# Patient Record
Sex: MALE | Race: WHITE | Employment: OTHER | URBAN - METROPOLITAN AREA
[De-identification: names, ages, dates, MRNs, and addresses within clinical notes are randomized per-mention and may not be internally consistent; named-entity substitution may affect disease eponyms.]

---

## 2021-11-30 ENCOUNTER — HOSPITAL ENCOUNTER (INPATIENT)
Age: 66
LOS: 1 days | Discharge: ANOTHER HOSPITAL | DRG: 853 | End: 2021-12-01
Attending: STUDENT IN AN ORGANIZED HEALTH CARE EDUCATION/TRAINING PROGRAM | Admitting: INTERNAL MEDICINE

## 2021-11-30 ENCOUNTER — APPOINTMENT (OUTPATIENT)
Dept: CT IMAGING | Age: 66
DRG: 853 | End: 2021-11-30

## 2021-11-30 ENCOUNTER — ANESTHESIA (OUTPATIENT)
Dept: OPERATING ROOM | Age: 66
DRG: 853 | End: 2021-11-30

## 2021-11-30 ENCOUNTER — APPOINTMENT (OUTPATIENT)
Dept: GENERAL RADIOLOGY | Age: 66
DRG: 853 | End: 2021-11-30

## 2021-11-30 ENCOUNTER — ANESTHESIA EVENT (OUTPATIENT)
Dept: OPERATING ROOM | Age: 66
DRG: 853 | End: 2021-11-30

## 2021-11-30 VITALS — OXYGEN SATURATION: 100 % | TEMPERATURE: 98.6 F | SYSTOLIC BLOOD PRESSURE: 136 MMHG | DIASTOLIC BLOOD PRESSURE: 70 MMHG

## 2021-11-30 DIAGNOSIS — M86.9 OSTEOMYELITIS OF LEFT FOOT, UNSPECIFIED TYPE (HCC): ICD-10-CM

## 2021-11-30 DIAGNOSIS — A41.9 SEPTICEMIA (HCC): ICD-10-CM

## 2021-11-30 DIAGNOSIS — E13.10 DIABETIC KETOACIDOSIS WITHOUT COMA ASSOCIATED WITH OTHER SPECIFIED DIABETES MELLITUS (HCC): ICD-10-CM

## 2021-11-30 DIAGNOSIS — N49.3 FOURNIER'S GANGRENE: Primary | ICD-10-CM

## 2021-11-30 PROBLEM — E11.10 DKA, TYPE 2, NOT AT GOAL (HCC): Status: ACTIVE | Noted: 2021-11-30

## 2021-11-30 LAB
A/G RATIO: 0.7 (ref 1.1–2.2)
ALBUMIN SERPL-MCNC: 2.7 G/DL (ref 3.4–5)
ALP BLD-CCNC: 261 U/L (ref 40–129)
ALT SERPL-CCNC: 11 U/L (ref 10–40)
ANION GAP SERPL CALCULATED.3IONS-SCNC: 11 MMOL/L (ref 3–16)
ANION GAP SERPL CALCULATED.3IONS-SCNC: 22 MMOL/L (ref 3–16)
ANISOCYTOSIS: ABNORMAL
AST SERPL-CCNC: 7 U/L (ref 15–37)
ATYPICAL LYMPHOCYTE RELATIVE PERCENT: 2 % (ref 0–6)
BANDED NEUTROPHILS RELATIVE PERCENT: 16 % (ref 0–7)
BASE EXCESS VENOUS: -4.7 MMOL/L (ref -3–3)
BASOPHILS ABSOLUTE: 0 K/UL (ref 0–0.2)
BASOPHILS RELATIVE PERCENT: 0 %
BETA-HYDROXYBUTYRATE: 4 MMOL/L (ref 0–0.27)
BILIRUB SERPL-MCNC: 0.9 MG/DL (ref 0–1)
BUN BLDV-MCNC: 49 MG/DL (ref 7–20)
BUN BLDV-MCNC: 53 MG/DL (ref 7–20)
CALCIUM SERPL-MCNC: 10.1 MG/DL (ref 8.3–10.6)
CALCIUM SERPL-MCNC: 8 MG/DL (ref 8.3–10.6)
CARBOXYHEMOGLOBIN: 5.1 % (ref 0–1.5)
CHLORIDE BLD-SCNC: 107 MMOL/L (ref 99–110)
CHLORIDE BLD-SCNC: 89 MMOL/L (ref 99–110)
CO2: 20 MMOL/L (ref 21–32)
CO2: 22 MMOL/L (ref 21–32)
CREAT SERPL-MCNC: 0.9 MG/DL (ref 0.8–1.3)
CREAT SERPL-MCNC: 1.2 MG/DL (ref 0.8–1.3)
EOSINOPHILS ABSOLUTE: 0 K/UL (ref 0–0.6)
EOSINOPHILS RELATIVE PERCENT: 0 %
GFR AFRICAN AMERICAN: >60
GFR AFRICAN AMERICAN: >60
GFR NON-AFRICAN AMERICAN: >60
GFR NON-AFRICAN AMERICAN: >60
GLUCOSE BLD-MCNC: 282 MG/DL (ref 70–99)
GLUCOSE BLD-MCNC: 283 MG/DL (ref 70–99)
GLUCOSE BLD-MCNC: 308 MG/DL (ref 70–99)
GLUCOSE BLD-MCNC: 368 MG/DL (ref 70–99)
GLUCOSE BLD-MCNC: 395 MG/DL (ref 70–99)
GLUCOSE BLD-MCNC: 420 MG/DL (ref 70–99)
GLUCOSE BLD-MCNC: 580 MG/DL (ref 70–99)
GLUCOSE BLD-MCNC: 679 MG/DL (ref 70–99)
GLUCOSE BLD-MCNC: >600 MG/DL (ref 70–99)
GLUCOSE BLD-MCNC: >600 MG/DL (ref 70–99)
HCO3 VENOUS: 21.4 MMOL/L (ref 23–29)
HCT VFR BLD CALC: 40.7 % (ref 40.5–52.5)
HEMOGLOBIN: 13.3 G/DL (ref 13.5–17.5)
LACTIC ACID, SEPSIS: 3 MMOL/L (ref 0.4–1.9)
LYMPHOCYTES ABSOLUTE: 0.7 K/UL (ref 1–5.1)
LYMPHOCYTES RELATIVE PERCENT: 2 %
MAGNESIUM: 2.1 MG/DL (ref 1.8–2.4)
MCH RBC QN AUTO: 29.6 PG (ref 26–34)
MCHC RBC AUTO-ENTMCNC: 32.6 G/DL (ref 31–36)
MCV RBC AUTO: 90.6 FL (ref 80–100)
METHEMOGLOBIN VENOUS: 0.3 %
MONOCYTES ABSOLUTE: 1.1 K/UL (ref 0–1.3)
MONOCYTES RELATIVE PERCENT: 6 %
NEUTROPHILS ABSOLUTE: 16.6 K/UL (ref 1.7–7.7)
NEUTROPHILS RELATIVE PERCENT: 71 %
O2 CONTENT, VEN: 11 VOL %
O2 SAT, VEN: 57 %
O2 THERAPY: ABNORMAL
PCO2, VEN: 43.3 MMHG (ref 40–50)
PDW BLD-RTO: 14.3 % (ref 12.4–15.4)
PERFORMED ON: ABNORMAL
PH VENOUS: 7.31 (ref 7.35–7.45)
PHOSPHORUS: 3 MG/DL (ref 2.5–4.9)
PLATELET # BLD: 308 K/UL (ref 135–450)
PLATELET SLIDE REVIEW: ADEQUATE
PMV BLD AUTO: 9.6 FL (ref 5–10.5)
PO2, VEN: 32.4 MMHG (ref 25–40)
POIKILOCYTES: ABNORMAL
POTASSIUM REFLEX MAGNESIUM: 4.1 MMOL/L (ref 3.5–5.1)
POTASSIUM SERPL-SCNC: 4.3 MMOL/L (ref 3.5–5.1)
PROCALCITONIN: 9.56 NG/ML (ref 0–0.15)
PROMYELOCYTES PERCENT: 3 %
RBC # BLD: 4.49 M/UL (ref 4.2–5.9)
SARS-COV-2, NAAT: NOT DETECTED
SEDIMENTATION RATE, ERYTHROCYTE: 81 MM/HR (ref 0–20)
SLIDE REVIEW: ABNORMAL
SODIUM BLD-SCNC: 131 MMOL/L (ref 136–145)
SODIUM BLD-SCNC: 140 MMOL/L (ref 136–145)
TCO2 CALC VENOUS: 23 MMOL/L
TOTAL CK: 26 U/L (ref 39–308)
TOTAL PROTEIN: 6.4 G/DL (ref 6.4–8.2)
TOXIC GRANULATION: PRESENT
TROPONIN: <0.01 NG/ML
VACUOLATED NEUTROPHILS: PRESENT
WBC # BLD: 18.4 K/UL (ref 4–11)

## 2021-11-30 PROCEDURE — 96375 TX/PRO/DX INJ NEW DRUG ADDON: CPT

## 2021-11-30 PROCEDURE — 0BH17EZ INSERTION OF ENDOTRACHEAL AIRWAY INTO TRACHEA, VIA NATURAL OR ARTIFICIAL OPENING: ICD-10-PCS | Performed by: INTERNAL MEDICINE

## 2021-11-30 PROCEDURE — 6360000002 HC RX W HCPCS: Performed by: INTERNAL MEDICINE

## 2021-11-30 PROCEDURE — 2000000000 HC ICU R&B

## 2021-11-30 PROCEDURE — 5A1935Z RESPIRATORY VENTILATION, LESS THAN 24 CONSECUTIVE HOURS: ICD-10-PCS | Performed by: INTERNAL MEDICINE

## 2021-11-30 PROCEDURE — 2700000000 HC OXYGEN THERAPY PER DAY

## 2021-11-30 PROCEDURE — 3700000000 HC ANESTHESIA ATTENDED CARE: Performed by: UROLOGY

## 2021-11-30 PROCEDURE — 85652 RBC SED RATE AUTOMATED: CPT

## 2021-11-30 PROCEDURE — 2580000003 HC RX 258: Performed by: NURSE ANESTHETIST, CERTIFIED REGISTERED

## 2021-11-30 PROCEDURE — 6360000002 HC RX W HCPCS: Performed by: UROLOGY

## 2021-11-30 PROCEDURE — 2709999900 HC NON-CHARGEABLE SUPPLY: Performed by: UROLOGY

## 2021-11-30 PROCEDURE — 3600000015 HC SURGERY LEVEL 5 ADDTL 15MIN: Performed by: UROLOGY

## 2021-11-30 PROCEDURE — 74177 CT ABD & PELVIS W/CONTRAST: CPT

## 2021-11-30 PROCEDURE — 2500000003 HC RX 250 WO HCPCS: Performed by: NURSE PRACTITIONER

## 2021-11-30 PROCEDURE — 83605 ASSAY OF LACTIC ACID: CPT

## 2021-11-30 PROCEDURE — 88304 TISSUE EXAM BY PATHOLOGIST: CPT

## 2021-11-30 PROCEDURE — 84145 PROCALCITONIN (PCT): CPT

## 2021-11-30 PROCEDURE — 85025 COMPLETE CBC W/AUTO DIFF WBC: CPT

## 2021-11-30 PROCEDURE — 73630 X-RAY EXAM OF FOOT: CPT

## 2021-11-30 PROCEDURE — 87040 BLOOD CULTURE FOR BACTERIA: CPT

## 2021-11-30 PROCEDURE — 3600000005 HC SURGERY LEVEL 5 BASE: Performed by: UROLOGY

## 2021-11-30 PROCEDURE — 6360000004 HC RX CONTRAST MEDICATION: Performed by: NURSE PRACTITIONER

## 2021-11-30 PROCEDURE — 84484 ASSAY OF TROPONIN QUANT: CPT

## 2021-11-30 PROCEDURE — 87205 SMEAR GRAM STAIN: CPT

## 2021-11-30 PROCEDURE — 6360000002 HC RX W HCPCS: Performed by: NURSE ANESTHETIST, CERTIFIED REGISTERED

## 2021-11-30 PROCEDURE — 6370000000 HC RX 637 (ALT 250 FOR IP): Performed by: UROLOGY

## 2021-11-30 PROCEDURE — 84100 ASSAY OF PHOSPHORUS: CPT

## 2021-11-30 PROCEDURE — 80053 COMPREHEN METABOLIC PANEL: CPT

## 2021-11-30 PROCEDURE — 96365 THER/PROPH/DIAG IV INF INIT: CPT

## 2021-11-30 PROCEDURE — 96368 THER/DIAG CONCURRENT INF: CPT

## 2021-11-30 PROCEDURE — 6360000002 HC RX W HCPCS: Performed by: STUDENT IN AN ORGANIZED HEALTH CARE EDUCATION/TRAINING PROGRAM

## 2021-11-30 PROCEDURE — 83036 HEMOGLOBIN GLYCOSYLATED A1C: CPT

## 2021-11-30 PROCEDURE — 87635 SARS-COV-2 COVID-19 AMP PRB: CPT

## 2021-11-30 PROCEDURE — 87070 CULTURE OTHR SPECIMN AEROBIC: CPT

## 2021-11-30 PROCEDURE — 2580000003 HC RX 258: Performed by: NURSE PRACTITIONER

## 2021-11-30 PROCEDURE — 83735 ASSAY OF MAGNESIUM: CPT

## 2021-11-30 PROCEDURE — 94761 N-INVAS EAR/PLS OXIMETRY MLT: CPT

## 2021-11-30 PROCEDURE — 2500000003 HC RX 250 WO HCPCS: Performed by: UROLOGY

## 2021-11-30 PROCEDURE — 2580000003 HC RX 258: Performed by: INTERNAL MEDICINE

## 2021-11-30 PROCEDURE — 94002 VENT MGMT INPAT INIT DAY: CPT

## 2021-11-30 PROCEDURE — 82550 ASSAY OF CK (CPK): CPT

## 2021-11-30 PROCEDURE — 82010 KETONE BODYS QUAN: CPT

## 2021-11-30 PROCEDURE — 6370000000 HC RX 637 (ALT 250 FOR IP): Performed by: STUDENT IN AN ORGANIZED HEALTH CARE EDUCATION/TRAINING PROGRAM

## 2021-11-30 PROCEDURE — 36415 COLL VENOUS BLD VENIPUNCTURE: CPT

## 2021-11-30 PROCEDURE — 3700000001 HC ADD 15 MINUTES (ANESTHESIA): Performed by: UROLOGY

## 2021-11-30 PROCEDURE — 6360000002 HC RX W HCPCS: Performed by: NURSE PRACTITIONER

## 2021-11-30 PROCEDURE — 2580000003 HC RX 258: Performed by: UROLOGY

## 2021-11-30 PROCEDURE — 2580000003 HC RX 258: Performed by: STUDENT IN AN ORGANIZED HEALTH CARE EDUCATION/TRAINING PROGRAM

## 2021-11-30 PROCEDURE — 11004 DBRDMT SKIN XTRNL GENT&PER: CPT | Performed by: SURGERY

## 2021-11-30 PROCEDURE — 99285 EMERGENCY DEPT VISIT HI MDM: CPT

## 2021-11-30 PROCEDURE — 82803 BLOOD GASES ANY COMBINATION: CPT

## 2021-11-30 PROCEDURE — 96374 THER/PROPH/DIAG INJ IV PUSH: CPT

## 2021-11-30 PROCEDURE — 2500000003 HC RX 250 WO HCPCS: Performed by: NURSE ANESTHETIST, CERTIFIED REGISTERED

## 2021-11-30 RX ORDER — MIDAZOLAM HYDROCHLORIDE 1 MG/ML
INJECTION INTRAMUSCULAR; INTRAVENOUS PRN
Status: DISCONTINUED | OUTPATIENT
Start: 2021-11-30 | End: 2021-11-30 | Stop reason: SDUPTHER

## 2021-11-30 RX ORDER — DEXTROSE MONOHYDRATE 25 G/50ML
12.5 INJECTION, SOLUTION INTRAVENOUS PRN
Status: DISCONTINUED | OUTPATIENT
Start: 2021-11-30 | End: 2021-12-01 | Stop reason: HOSPADM

## 2021-11-30 RX ORDER — ONDANSETRON 2 MG/ML
INJECTION INTRAMUSCULAR; INTRAVENOUS PRN
Status: DISCONTINUED | OUTPATIENT
Start: 2021-11-30 | End: 2021-11-30 | Stop reason: SDUPTHER

## 2021-11-30 RX ORDER — FENTANYL CITRATE 50 UG/ML
INJECTION, SOLUTION INTRAMUSCULAR; INTRAVENOUS PRN
Status: DISCONTINUED | OUTPATIENT
Start: 2021-11-30 | End: 2021-11-30 | Stop reason: SDUPTHER

## 2021-11-30 RX ORDER — DEXTROSE MONOHYDRATE 25 G/50ML
12.5 INJECTION, SOLUTION INTRAVENOUS PRN
Status: DISCONTINUED | OUTPATIENT
Start: 2021-11-30 | End: 2021-11-30 | Stop reason: SDUPTHER

## 2021-11-30 RX ORDER — 0.9 % SODIUM CHLORIDE 0.9 %
1000 INTRAVENOUS SOLUTION INTRAVENOUS ONCE
Status: COMPLETED | OUTPATIENT
Start: 2021-11-30 | End: 2021-11-30

## 2021-11-30 RX ORDER — SODIUM CHLORIDE 9 MG/ML
1000 INJECTION, SOLUTION INTRAVENOUS CONTINUOUS
Status: DISCONTINUED | OUTPATIENT
Start: 2021-11-30 | End: 2021-12-01

## 2021-11-30 RX ORDER — LIDOCAINE HYDROCHLORIDE 20 MG/ML
INJECTION, SOLUTION INFILTRATION; PERINEURAL PRN
Status: DISCONTINUED | OUTPATIENT
Start: 2021-11-30 | End: 2021-11-30 | Stop reason: SDUPTHER

## 2021-11-30 RX ORDER — NICOTINE POLACRILEX 4 MG
15 LOZENGE BUCCAL PRN
Status: DISCONTINUED | OUTPATIENT
Start: 2021-11-30 | End: 2021-11-30 | Stop reason: SDUPTHER

## 2021-11-30 RX ORDER — ROCURONIUM BROMIDE 10 MG/ML
INJECTION, SOLUTION INTRAVENOUS PRN
Status: DISCONTINUED | OUTPATIENT
Start: 2021-11-30 | End: 2021-11-30 | Stop reason: SDUPTHER

## 2021-11-30 RX ORDER — DEXTROSE MONOHYDRATE 50 MG/ML
100 INJECTION, SOLUTION INTRAVENOUS PRN
Status: DISCONTINUED | OUTPATIENT
Start: 2021-11-30 | End: 2021-11-30 | Stop reason: SDUPTHER

## 2021-11-30 RX ORDER — CHLORHEXIDINE GLUCONATE 0.12 MG/ML
15 RINSE ORAL 2 TIMES DAILY
Status: DISCONTINUED | OUTPATIENT
Start: 2021-11-30 | End: 2021-12-01 | Stop reason: HOSPADM

## 2021-11-30 RX ORDER — POTASSIUM CHLORIDE 7.45 MG/ML
10 INJECTION INTRAVENOUS PRN
Status: DISCONTINUED | OUTPATIENT
Start: 2021-11-30 | End: 2021-12-01 | Stop reason: HOSPADM

## 2021-11-30 RX ORDER — SODIUM CHLORIDE AND POTASSIUM CHLORIDE .9; .15 G/100ML; G/100ML
SOLUTION INTRAVENOUS ONCE
Status: COMPLETED | OUTPATIENT
Start: 2021-11-30 | End: 2021-11-30

## 2021-11-30 RX ORDER — PROPOFOL 10 MG/ML
INJECTION, EMULSION INTRAVENOUS PRN
Status: DISCONTINUED | OUTPATIENT
Start: 2021-11-30 | End: 2021-11-30 | Stop reason: SDUPTHER

## 2021-11-30 RX ORDER — DEXTROSE AND SODIUM CHLORIDE 5; .45 G/100ML; G/100ML
INJECTION, SOLUTION INTRAVENOUS CONTINUOUS PRN
Status: DISCONTINUED | OUTPATIENT
Start: 2021-11-30 | End: 2021-12-01 | Stop reason: HOSPADM

## 2021-11-30 RX ORDER — MAGNESIUM SULFATE 1 G/100ML
1000 INJECTION INTRAVENOUS PRN
Status: DISCONTINUED | OUTPATIENT
Start: 2021-11-30 | End: 2021-12-01 | Stop reason: HOSPADM

## 2021-11-30 RX ORDER — MORPHINE SULFATE 2 MG/ML
2 INJECTION, SOLUTION INTRAMUSCULAR; INTRAVENOUS EVERY 4 HOURS PRN
Status: DISCONTINUED | OUTPATIENT
Start: 2021-11-30 | End: 2021-12-01

## 2021-11-30 RX ORDER — METHYLPREDNISOLONE SODIUM SUCCINATE 500 MG/8ML
INJECTION INTRAMUSCULAR; INTRAVENOUS PRN
Status: DISCONTINUED | OUTPATIENT
Start: 2021-11-30 | End: 2021-11-30 | Stop reason: SDUPTHER

## 2021-11-30 RX ORDER — MAGNESIUM HYDROXIDE 1200 MG/15ML
LIQUID ORAL CONTINUOUS PRN
Status: COMPLETED | OUTPATIENT
Start: 2021-11-30 | End: 2021-11-30

## 2021-11-30 RX ORDER — CEFEPIME HYDROCHLORIDE 1 G/1
2000 INJECTION, POWDER, FOR SOLUTION INTRAMUSCULAR; INTRAVENOUS EVERY 12 HOURS
Status: DISCONTINUED | OUTPATIENT
Start: 2021-11-30 | End: 2021-11-30 | Stop reason: SDUPTHER

## 2021-11-30 RX ORDER — DEXTROSE MONOHYDRATE 50 MG/ML
100 INJECTION, SOLUTION INTRAVENOUS PRN
Status: DISCONTINUED | OUTPATIENT
Start: 2021-11-30 | End: 2021-12-01 | Stop reason: HOSPADM

## 2021-11-30 RX ORDER — PHENYLEPHRINE HCL IN 0.9% NACL 1 MG/10 ML
SYRINGE (ML) INTRAVENOUS PRN
Status: DISCONTINUED | OUTPATIENT
Start: 2021-11-30 | End: 2021-11-30 | Stop reason: SDUPTHER

## 2021-11-30 RX ORDER — SODIUM CHLORIDE 9 MG/ML
INJECTION, SOLUTION INTRAVENOUS CONTINUOUS PRN
Status: DISCONTINUED | OUTPATIENT
Start: 2021-11-30 | End: 2021-11-30 | Stop reason: SDUPTHER

## 2021-11-30 RX ORDER — POLYETHYLENE GLYCOL 3350 17 G/17G
17 POWDER, FOR SOLUTION ORAL DAILY PRN
Status: DISCONTINUED | OUTPATIENT
Start: 2021-11-30 | End: 2021-12-01 | Stop reason: HOSPADM

## 2021-11-30 RX ORDER — NICOTINE POLACRILEX 4 MG
15 LOZENGE BUCCAL PRN
Status: DISCONTINUED | OUTPATIENT
Start: 2021-11-30 | End: 2021-12-01 | Stop reason: HOSPADM

## 2021-11-30 RX ORDER — SUCCINYLCHOLINE CHLORIDE 20 MG/ML
INJECTION INTRAMUSCULAR; INTRAVENOUS PRN
Status: DISCONTINUED | OUTPATIENT
Start: 2021-11-30 | End: 2021-11-30 | Stop reason: SDUPTHER

## 2021-11-30 RX ORDER — PROPOFOL 10 MG/ML
5-50 INJECTION, EMULSION INTRAVENOUS
Status: DISCONTINUED | OUTPATIENT
Start: 2021-11-30 | End: 2021-12-01 | Stop reason: HOSPADM

## 2021-11-30 RX ADMIN — ROCURONIUM BROMIDE 30 MG: 10 INJECTION, SOLUTION INTRAVENOUS at 19:30

## 2021-11-30 RX ADMIN — Medication 200 MCG: at 21:38

## 2021-11-30 RX ADMIN — PROPOFOL 150 MG: 10 INJECTION, EMULSION INTRAVENOUS at 18:43

## 2021-11-30 RX ADMIN — IOPAMIDOL 75 ML: 755 INJECTION, SOLUTION INTRAVENOUS at 18:14

## 2021-11-30 RX ADMIN — SODIUM CHLORIDE 1000 ML: 9 INJECTION, SOLUTION INTRAVENOUS at 15:56

## 2021-11-30 RX ADMIN — SODIUM CHLORIDE: 9 INJECTION, SOLUTION INTRAVENOUS at 19:26

## 2021-11-30 RX ADMIN — CEFEPIME 2000 MG: 2 INJECTION, POWDER, FOR SOLUTION INTRAVENOUS at 16:24

## 2021-11-30 RX ADMIN — METRONIDAZOLE 500 MG: 500 INJECTION, SOLUTION INTRAVENOUS at 17:02

## 2021-11-30 RX ADMIN — Medication 100 MCG: at 19:20

## 2021-11-30 RX ADMIN — Medication 200 MCG: at 18:52

## 2021-11-30 RX ADMIN — METHYLPREDNISOLONE SODIUM SUCCINATE 125 MG: 500 INJECTION, POWDER, FOR SOLUTION INTRAMUSCULAR; INTRAVENOUS at 18:51

## 2021-11-30 RX ADMIN — Medication 15 ML: at 23:10

## 2021-11-30 RX ADMIN — FENTANYL CITRATE 50 MCG: 50 INJECTION INTRAMUSCULAR; INTRAVENOUS at 18:43

## 2021-11-30 RX ADMIN — SODIUM CHLORIDE 7.3 UNITS/HR: 9 INJECTION, SOLUTION INTRAVENOUS at 17:16

## 2021-11-30 RX ADMIN — FENTANYL CITRATE 50 MCG: 50 INJECTION INTRAMUSCULAR; INTRAVENOUS at 18:35

## 2021-11-30 RX ADMIN — Medication 100 MCG: at 20:35

## 2021-11-30 RX ADMIN — FAMOTIDINE 20 MG: 10 INJECTION, SOLUTION INTRAVENOUS at 23:09

## 2021-11-30 RX ADMIN — SODIUM CHLORIDE 1000 ML: 9 INJECTION, SOLUTION INTRAVENOUS at 16:24

## 2021-11-30 RX ADMIN — SODIUM CHLORIDE 1000 ML: 9 INJECTION, SOLUTION INTRAVENOUS at 23:23

## 2021-11-30 RX ADMIN — ROCURONIUM BROMIDE 20 MG: 10 INJECTION, SOLUTION INTRAVENOUS at 20:43

## 2021-11-30 RX ADMIN — Medication 100 MCG: at 19:41

## 2021-11-30 RX ADMIN — MIDAZOLAM 4 MG: 1 INJECTION INTRAMUSCULAR; INTRAVENOUS at 21:33

## 2021-11-30 RX ADMIN — Medication 100 MCG: at 19:27

## 2021-11-30 RX ADMIN — SODIUM CHLORIDE 1000 ML: 9 INJECTION, SOLUTION INTRAVENOUS at 22:03

## 2021-11-30 RX ADMIN — SODIUM BICARBONATE 50 MEQ: 84 INJECTION, SOLUTION INTRAVENOUS at 20:33

## 2021-11-30 RX ADMIN — POTASSIUM CHLORIDE AND SODIUM CHLORIDE: 900; 150 INJECTION, SOLUTION INTRAVENOUS at 17:05

## 2021-11-30 RX ADMIN — SODIUM BICARBONATE 50 MEQ: 84 INJECTION, SOLUTION INTRAVENOUS at 20:35

## 2021-11-30 RX ADMIN — PROPOFOL 10 MCG/KG/MIN: 10 INJECTION, EMULSION INTRAVENOUS at 22:29

## 2021-11-30 RX ADMIN — Medication 100 MCG: at 19:15

## 2021-11-30 RX ADMIN — Medication 100 MCG: at 18:43

## 2021-11-30 RX ADMIN — Medication 100 MCG: at 19:09

## 2021-11-30 RX ADMIN — LIDOCAINE HYDROCHLORIDE 5 ML: 20 INJECTION, SOLUTION INFILTRATION; PERINEURAL at 18:43

## 2021-11-30 RX ADMIN — ONDANSETRON HYDROCHLORIDE 4 MG: 2 INJECTION, SOLUTION INTRAMUSCULAR; INTRAVENOUS at 18:51

## 2021-11-30 RX ADMIN — Medication 100 MCG: at 20:12

## 2021-11-30 RX ADMIN — SODIUM CHLORIDE: 9 INJECTION, SOLUTION INTRAVENOUS at 18:18

## 2021-11-30 RX ADMIN — VANCOMYCIN HYDROCHLORIDE 1250 MG: 10 INJECTION, POWDER, LYOPHILIZED, FOR SOLUTION INTRAVENOUS at 16:34

## 2021-11-30 RX ADMIN — SUCCINYLCHOLINE CHLORIDE 100 MG: 20 INJECTION, SOLUTION INTRAMUSCULAR; INTRAVENOUS at 18:43

## 2021-11-30 RX ADMIN — ROCURONIUM BROMIDE 40 MG: 10 INJECTION, SOLUTION INTRAVENOUS at 18:55

## 2021-11-30 RX ADMIN — Medication 100 MCG: at 19:02

## 2021-11-30 RX ADMIN — SODIUM CHLORIDE: 9 INJECTION, SOLUTION INTRAVENOUS at 20:31

## 2021-11-30 RX ADMIN — POTASSIUM CHLORIDE 10 MEQ: 7.46 INJECTION, SOLUTION INTRAVENOUS at 23:19

## 2021-11-30 ASSESSMENT — ENCOUNTER SYMPTOMS
SHORTNESS OF BREATH: 0
RHINORRHEA: 0
COLOR CHANGE: 0
ABDOMINAL PAIN: 0
SORE THROAT: 0

## 2021-11-30 ASSESSMENT — PULMONARY FUNCTION TESTS
PIF_VALUE: 18
PIF_VALUE: 21
PIF_VALUE: 20
PIF_VALUE: 1
PIF_VALUE: 22
PIF_VALUE: 20
PIF_VALUE: 21
PIF_VALUE: 18
PIF_VALUE: 1
PIF_VALUE: 21
PIF_VALUE: 22
PIF_VALUE: 0
PIF_VALUE: 20
PIF_VALUE: 21
PIF_VALUE: 0
PIF_VALUE: 22
PIF_VALUE: 22
PIF_VALUE: 19
PIF_VALUE: 22
PIF_VALUE: 21
PIF_VALUE: 20
PIF_VALUE: 21
PIF_VALUE: 0
PIF_VALUE: 21
PIF_VALUE: 22
PIF_VALUE: 20
PIF_VALUE: 21
PIF_VALUE: 21
PIF_VALUE: 22
PIF_VALUE: 20
PIF_VALUE: 20
PIF_VALUE: 17
PIF_VALUE: 17
PIF_VALUE: 20
PIF_VALUE: 19
PIF_VALUE: 0
PIF_VALUE: 18
PIF_VALUE: 19
PIF_VALUE: 20
PIF_VALUE: 21
PIF_VALUE: 20
PIF_VALUE: 21
PIF_VALUE: 21
PIF_VALUE: 20
PIF_VALUE: 1
PIF_VALUE: 20
PIF_VALUE: 23
PIF_VALUE: 21
PIF_VALUE: 20
PIF_VALUE: 21
PIF_VALUE: 20
PIF_VALUE: 22
PIF_VALUE: 19
PIF_VALUE: 21
PIF_VALUE: 20
PIF_VALUE: 22
PIF_VALUE: 20
PIF_VALUE: 21
PIF_VALUE: 22
PIF_VALUE: 20
PIF_VALUE: 1
PIF_VALUE: 21
PIF_VALUE: 20
PIF_VALUE: 18
PIF_VALUE: 21
PIF_VALUE: 19
PIF_VALUE: 21
PIF_VALUE: 0
PIF_VALUE: 20
PIF_VALUE: 20
PIF_VALUE: 21
PIF_VALUE: 18
PIF_VALUE: 1
PIF_VALUE: 22
PIF_VALUE: 22
PIF_VALUE: 35
PIF_VALUE: 19
PIF_VALUE: 18
PIF_VALUE: 20
PIF_VALUE: 20
PIF_VALUE: 1
PIF_VALUE: 21
PIF_VALUE: 19
PIF_VALUE: 21
PIF_VALUE: 20
PIF_VALUE: 21
PIF_VALUE: 18
PIF_VALUE: 21
PIF_VALUE: 18
PIF_VALUE: 22
PIF_VALUE: 21
PIF_VALUE: 0
PIF_VALUE: 0
PIF_VALUE: 21
PIF_VALUE: 0
PIF_VALUE: 21
PIF_VALUE: 21
PIF_VALUE: 20
PIF_VALUE: 22
PIF_VALUE: 20
PIF_VALUE: 19
PIF_VALUE: 20
PIF_VALUE: 21
PIF_VALUE: 21
PIF_VALUE: 20
PIF_VALUE: 21
PIF_VALUE: 21
PIF_VALUE: 18
PIF_VALUE: 21
PIF_VALUE: 20
PIF_VALUE: 21
PIF_VALUE: 1
PIF_VALUE: 20
PIF_VALUE: 21
PIF_VALUE: 23
PIF_VALUE: 22
PIF_VALUE: 22
PIF_VALUE: 19
PIF_VALUE: 21
PIF_VALUE: 21
PIF_VALUE: 3
PIF_VALUE: 22
PIF_VALUE: 21
PIF_VALUE: 21
PIF_VALUE: 17
PIF_VALUE: 22
PIF_VALUE: 20
PIF_VALUE: 22
PIF_VALUE: 23
PIF_VALUE: 19
PIF_VALUE: 1
PIF_VALUE: 1
PIF_VALUE: 21
PIF_VALUE: 20
PIF_VALUE: 21
PIF_VALUE: 17
PIF_VALUE: 18
PIF_VALUE: 21
PIF_VALUE: 20
PIF_VALUE: 22
PIF_VALUE: 1
PIF_VALUE: 20
PIF_VALUE: 21
PIF_VALUE: 21
PIF_VALUE: 19
PIF_VALUE: 20
PIF_VALUE: 0
PIF_VALUE: 22
PIF_VALUE: 22
PIF_VALUE: 21
PIF_VALUE: 22
PIF_VALUE: 19
PIF_VALUE: 20
PIF_VALUE: 21
PIF_VALUE: 21
PIF_VALUE: 17
PIF_VALUE: 1
PIF_VALUE: 20
PIF_VALUE: 21
PIF_VALUE: 0
PIF_VALUE: 21
PIF_VALUE: 22
PIF_VALUE: 19
PIF_VALUE: 18
PIF_VALUE: 21
PIF_VALUE: 20
PIF_VALUE: 21
PIF_VALUE: 21
PIF_VALUE: 20
PIF_VALUE: 22
PIF_VALUE: 21
PIF_VALUE: 22
PIF_VALUE: 21
PIF_VALUE: 21
PIF_VALUE: 20
PIF_VALUE: 22
PIF_VALUE: 20
PIF_VALUE: 18
PIF_VALUE: 21
PIF_VALUE: 23
PIF_VALUE: 18
PIF_VALUE: 0
PIF_VALUE: 21
PIF_VALUE: 19
PIF_VALUE: 21
PIF_VALUE: 21
PIF_VALUE: 19
PIF_VALUE: 22
PIF_VALUE: 20
PIF_VALUE: 22
PIF_VALUE: 23
PIF_VALUE: 20
PIF_VALUE: 20
PIF_VALUE: 22
PIF_VALUE: 0
PIF_VALUE: 21
PIF_VALUE: 23

## 2021-11-30 ASSESSMENT — LIFESTYLE VARIABLES: SMOKING_STATUS: 1

## 2021-11-30 ASSESSMENT — PAIN SCALES - GENERAL
PAINLEVEL_OUTOF10: 0
PAINLEVEL_OUTOF10: 0

## 2021-11-30 NOTE — ED TRIAGE NOTES
Pt brought in by squad for ulcers and swelling to testicles, also has ulcer to left foot. FSBS reads \"HI\" on glucometer.

## 2021-11-30 NOTE — ANESTHESIA PRE PROCEDURE
Department of Anesthesiology  Preprocedure Note       Name:  Brenda Torres   Age:  63 y.o.  :  1955                                          MRN:  [id number]         Date:  2021      Surgeon: Awlida Rg):  Thompson Talbot MD    Procedure: Procedure(s):  SCROTAL INCISION AND DRAINAGE    Medications prior to admission:   Prior to Admission medications    Medication Sig Start Date End Date Taking? Authorizing Provider   metFORMIN (GLUCOPHAGE) 500 MG tablet Take 500 mg by mouth daily (with breakfast)   Yes Historical Provider, MD   meloxicam (MOBIC) 15 MG tablet Take 1 tablet by mouth daily 16   Sonia Danger, DO       Current medications:    Current Facility-Administered Medications   Medication Dose Route Frequency Provider Last Rate Last Admin    metronidazole (FLAGYL) 500 mg in NaCl 100 mL IVPB premix  500 mg IntraVENous Q8H JAGJIT Parker -  mL/hr at 21 1702 500 mg at 21 1702    glucose (GLUTOSE) 40 % oral gel 15 g  15 g Oral PRN Amaya Mejia, DO        dextrose 50 % IV solution  12.5 g IntraVENous PRN Amaya Mejia, DO        glucagon (rDNA) injection 1 mg  1 mg IntraMUSCular PRN Amaya Wrangell, DO        dextrose 5 % solution  100 mL/hr IntraVENous PRN Amaya Mejia, DO        insulin regular (HUMULIN R;NOVOLIN R) 100 Units in sodium chloride 0.9 % 100 mL infusion  0.1 Units/kg/hr IntraVENous Continuous Amaya Mejia, DO 7.3 mL/hr at 21 1716 7.3 Units/hr at 21 1716     Current Outpatient Medications   Medication Sig Dispense Refill    metFORMIN (GLUCOPHAGE) 500 MG tablet Take 500 mg by mouth daily (with breakfast)      meloxicam (MOBIC) 15 MG tablet Take 1 tablet by mouth daily 30 tablet 3       Allergies:  No Known Allergies    Problem List:    Patient Active Problem List   Diagnosis Code    DKA, type 2, 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 No pertinent past medical history.     Past Surgical History:        Procedure Laterality Date    ANKLE FRACTURE SURGERY         Social History:    Social History     Tobacco Use    Smoking status: Current Every Day Smoker    Smokeless tobacco: Never Used   Substance Use Topics    Alcohol use: Yes     Alcohol/week: 0.0 standard drinks     Comment: social                                 Ready to quit: Not Answered  Counseling given: Not Answered      Vital Signs (Current):   Vitals:    11/30/21 1630 11/30/21 1700 11/30/21 1730 11/30/21 1800   BP: 132/62 (!) 146/83 96/70 101/88   Pulse: 95 114 93 96   Resp: 27 24 29 21   Temp:       SpO2: 97% 97% 97% 96%   Weight:       Height:                                                  BP Readings from Last 3 Encounters:   11/30/21 101/88   04/18/16 130/76   02/29/16 136/83       NPO Status:                                                                                 BMI:   Wt Readings from Last 3 Encounters:   11/30/21 161 lb (73 kg)   04/18/16 179 lb 14.3 oz (81.6 kg)   02/29/16 180 lb (81.6 kg)     Body mass index is 22.45 kg/m².     CBC:   Lab Results   Component Value Date    WBC 18.4 11/30/2021    RBC 4.49 11/30/2021    HGB 13.3 11/30/2021    HCT 40.7 11/30/2021    MCV 90.6 11/30/2021    RDW 14.3 11/30/2021     11/30/2021       CMP:   Lab Results   Component Value Date     11/30/2021    K 4.1 11/30/2021    CL 89 11/30/2021    CO2 20 11/30/2021    BUN 53 11/30/2021    CREATININE 1.2 11/30/2021    GFRAA >60 11/30/2021    AGRATIO 0.7 11/30/2021    LABGLOM >60 11/30/2021    GLUCOSE 679 11/30/2021    PROT 6.4 11/30/2021    CALCIUM 10.1 11/30/2021    BILITOT 0.9 11/30/2021    ALKPHOS 261 11/30/2021    AST 7 11/30/2021    ALT 11 11/30/2021       POC Tests:   Recent Labs     11/30/21  1711   POCGLU >600*       Coags: No results found for: PROTIME, INR, APTT    HCG (If Applicable): No results found for: PREGTESTUR, PREGSERUM, HCG, HCGQUANT     ABGs: No results found for: PHART, PO2ART, HQL8VBD, ZML4XOI, BEART, W6BQLBAO     Type & Screen (If Applicable):  No results found for: LABABO, LABRH    Drug/Infectious Status (If Applicable):  No results found for: HIV, HEPCAB    COVID-19 Screening (If Applicable): No results found for: COVID19        Anesthesia Evaluation  Patient summary reviewed and Nursing notes reviewed no history of anesthetic complications:   Airway: Mallampati: II     Neck ROM: full   Dental:          Pulmonary:   (+) COPD:  current smoker                           Cardiovascular:                      Neuro/Psych:               GI/Hepatic/Renal:             Endo/Other:    (+) Diabetespoorly controlled, , .                 Abdominal:             Vascular: Other Findings:             Anesthesia Plan      general     ASA 4 - emergent     (Medications & allergies reviewed  All available lab & EKG data reviewed  DKA with severe metabolic acidosis, sepsis  Pt informed that course will be stormy with post-surgical bacteremia, hypotension & potential airway compromise  Plan for post-op ventilation to protect AW  Invasive HD monitoring if indicated  All R/B/O understood & accepted. Qs answered)  Induction: intravenous and rapid sequence. arterial line and central line  MIPS: Postoperative ventilation. Anesthetic plan and risks discussed with patient and spouse. Plan discussed with CRNA.                 Evelyn Jones MD   11/30/2021

## 2021-11-30 NOTE — ED NOTES
Dr Shaneka Ba notified we can not speak with  about the patient due to capacity @ 1703. Was asked to notify Dr. Jo Pettit of this.  Dr. Jo Pettit sent perfect serve message @ [phone] [facility]  11/30/21 1705    Dr Jo Pettit returned the call to Rodolfo Taylor @ [phone] [facility]  11/30/21 1705

## 2021-11-30 NOTE — PROGRESS NOTES
Report given to [facility] in OR, rapid covid ordered, obtained and sent to lab. CT picked up pt, OR requested pt go straight to OR from CT. Pt will have a bed in ICU after surgery.

## 2021-11-30 NOTE — ED NOTES
Perfect serve message to Dr. Bettina Chaves @ [address] [place] [address]  11/30/21 1711    Dr Bettina Chaves returned the call to [address] [place] [address]  11/30/21 1720

## 2021-11-30 NOTE — ED PROVIDER NOTES
I independently examined and evaluated Inge Hammans. All diagnostic, treatment, and disposition decisions were made by myself in conjunction with the advanced practice provider. For all further details of the patient's emergency department visit, please see the advanced practice provider's documentation. Primary Care Physician: No primary care provider on file. History: This is a 63 y.o. 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 Patient reports that 2 weeks ago he noticed an abscess near his anus, however reports in the last 2 days the area has significantly swelled and is now extending up into the scrotum, see pictures in chart. History of poorly controlled diabetes. Patient meeting SIRS sepsis criteria based on white count, pulse, source infection. ,  DKA by acidosis with low bicarb, anion gap of 22, glucose 679 with elevated beta hydroxybutyrate, protocol initiated, IV infusion builder with normal saline with 20 mEq of potassium ordered. Please see [facility] documentation for conversation with urology as patient needs to emergently go to the OR for debridement. Physical:     temperature is 98.1 °F (36.7 °C). His blood pressure is 124/63 and his pulse is 102. His respiration is 21 and oxygen saturation is 97%.    63 y.o. male    alert oriented  Heart slight tachycardic regular  Lungs fields clear  Abdomen soft nontender  , malodorous, edematous and swollen scrotum with hemorrhagic bulla    Impression: Shayla's gangrene, DKA    Plan: Surgery consult, admit      CRITICAL CARE: There was a high probability of clinically significant/life threatening deterioration in this patient's condition which required my urgent intervention. Total critical care time was 0 minutes. This excludes any time for separately reportable procedures.        Colletta Peacemaker, DO  Emergency Physician        Comment: Please note this report has been produced using speech

## 2021-11-30 NOTE — ED PROVIDER NOTES
Evaluated by Advanced Practice Provider          [facility]        Pt Name: Charlene Verdugo  MRN: [id number]  [place] 1955  Dateof evaluation: 11/30/2021  Provider: JAGJIT Perdomo CNP  PCP: No primary care provider on file. ED Attending: No att. providers found    [address] [place] [address]       Chief Complaint   Patient presents with    Blood Sugar Problem     pt doesn't take diabetic medications; EMS called because glucose high    Wound Check     pt has       HISTORY OF PRESENTILLNESS   (Location/Symptom, Timing/Onset, Context/Setting, Quality, Duration, Modifying Factors, Severity)  Note limiting factors. Charlene Verdugo is a 63 y.o. male for concerns for scrotal infection. Onset was 2 days. Context includes patient states that he has developed a scrotal infection over the last 2 days. He denies any fevers nausea vomiting diarrhea. Patient reports that he has had a chronic wound to the left lateral part of his foot. Patient states that he has been taking antibiotics intermittently for his foot since March. Alleviating factors include nothing. Aggravating factors include nothing. Pain is 0/10. Nothing has been used for pain today. Nursing Notes were all reviewed and agreed with or any disagreements were addressed  in the HPI. REVIEW OF SYSTEMS    (2-9 systems for level 4, 10 or more for level 5)     Review of Systems   Constitutional: Negative for fever. Elevated blood sugar   HENT: Negative for congestion, rhinorrhea and sore throat. Respiratory: Negative for shortness of breath. Cardiovascular: Negative for chest pain. Gastrointestinal: Negative for abdominal pain. Genitourinary: Negative for decreased urine volume and difficulty urinating. Musculoskeletal: Negative for arthralgias and myalgias. Skin: Positive for wound. Negative for color change and rash. Neurological: Negative for dizziness and light-headedness. Psychiatric/Behavioral: Negative for agitation. All other systems reviewed and are negative. Positives and Pertinent negatives as per HPI. Except as noted above in the ROS, all other systems were reviewed and negative. PAST MEDICAL HISTORY   History reviewed. No pertinent past medical history. SURGICAL HISTORY       Past Surgical History:   Procedure Laterality Date    ANKLE FRACTURE SURGERY           CURRENT MEDICATIONS       Current Discharge Medication List      CONTINUE these medications which have NOT CHANGED    Details   metFORMIN (GLUCOPHAGE) 500 MG tablet Take 500 mg by mouth daily (with breakfast)      meloxicam (MOBIC) 15 MG tablet Take 1 tablet by mouth daily  Qty: 30 tablet, Refills: 3    Associated Diagnoses: Knee pain, right; MMT (medial meniscus tear), right, initial encounter               ALLERGIES     Patient has no known allergies. FAMILY HISTORY     History reviewed. No pertinent family history. SOCIAL HISTORY       Social History     Socioeconomic History    Marital status:      Spouse name: None    Number of children: None    Years of education: None    Highest education level: None   Occupational History    None   Tobacco Use    Smoking status: Current Every Day Smoker    Smokeless tobacco: Never Used   Substance and Sexual Activity    Alcohol use: Yes     Alcohol/week: 0.0 standard drinks     Comment: social     Drug use: No    Sexual activity: Yes     Partners: Female   Other Topics Concern    None   Social History Narrative    None     Social Determinants of Health     Financial Resource Strain:     Difficulty of Paying Living Expenses: Not on file   Food Insecurity:     Worried About Running Out of Food in the Last Year: Not on file    Nghia of Food in the Last Year: Not on file   Transportation Needs:     Lack of Transportation (Medical): Not on file    Lack of Transportation (Non-Medical):  Not on file   Physical Activity:     Days Open wound noted to the fifth metatarsal of the left foot. Sensation and pulse intact to left foot. Skin:     General: Skin is warm and dry. Neurological:      Mental Status: He is alert and oriented to person, place, and time.          DIAGNOSTIC RESULTS   LABS:    Labs Reviewed   CBC WITH AUTO DIFFERENTIAL - Abnormal; Notable for the following components:       Result Value    WBC 18.4 (*)     Hemoglobin 13.3 (*)     Neutrophils Absolute 16.6 (*)     Lymphocytes Absolute 0.7 (*)     Bands Relative 16 (*)     Promyelocytes Percent 3 (*)     Toxic Granulation Present (*)     Vacuolated Neutrophils Present (*)     Anisocytosis 1+ (*)     Poikilocytes Occasional (*)     All other components within normal limits    Narrative:     Performed at:  [place] [address],  [employer]   Phone [phone]   COMPREHENSIVE METABOLIC PANEL W/ REFLEX TO MG FOR LOW K - Abnormal; Notable for the following components:    Sodium 131 (*)     Chloride 89 (*)     CO2 20 (*)     Anion Gap 22 (*)     Glucose 679 (*)     BUN 53 (*)     Albumin 2.7 (*)     Albumin/Globulin Ratio 0.7 (*)     Alkaline Phosphatase 261 (*)     AST 7 (*)     All other components within normal limits    Narrative:     Tez Kari  SC[facility] tel. [phone],  Chemistry results called to and read back by Stephanie LUJAN, 11/30/2021 15:59, by  [facility]  Performed at:  [place] [address],  [employer]   Phone [phone]   BETA-HYDROXYBUTYRATE - Abnormal; Notable for the following components:    Beta-Hydroxybutyrate 4.00 (*)     All other components within normal limits    Narrative:     [address] [phone],  Chemistry results called to and read back by Rudy, 11/30/2021 15:59, by  [facility]  Performed at:  [place] [address],  [employer]   Phone [phone]   LACTATE, SEPSIS - Abnormal; Notable for the following components:    Lactic Acid, Sepsis 3.0 (*)     All other components within normal limits    Narrative:     Performed at:  [facility],  [place], [employer]   Phone [phone]   PROCALCITONIN - Abnormal; Notable for the following components:    Procalcitonin 9.56 (*)     All other components within normal limits    Narrative:     Performed at:  [facility],  [place], [employer]   Phone [phone]   BLOOD GAS, VENOUS - Abnormal; Notable for the following components:    pH, Chris 7.311 (*)     HCO3, Venous 21.4 (*)     Base Excess, Chris -4.7 (*)     Carboxyhemoglobin 5.1 (*)     All other components within normal limits    Narrative:     Performed at:  [facility]) - [facility],  [place], [employer]   Phone [phone]   SEDIMENTATION RATE - Abnormal; Notable for the following components:    Sed Rate 81 (*)     All other components within normal limits    Narrative:     Performed at:  [facility],  [place], [employer]   Phone [phone]   POCT GLUCOSE - Abnormal; Notable for the following components:    POC Glucose >600 (*)     All other components within normal limits    Narrative:     Performed at:  [facility],  [place], [employer]   Phone [phone]   POCT GLUCOSE - Abnormal; Notable for the following components:    POC Glucose >600 (*)     All other components within normal limits    Narrative:     Performed at:  [facility]) - [facility],  [place], [employer]   Phone [phone]   POCT GLUCOSE - Abnormal; Notable for the following components:    POC Glucose 580 (*)     All other components within normal limits    Narrative:     Performed at:  [facility]) - [facility], [place], [facility] [place]   Phone [phone]   POCT GLUCOSE - Abnormal; Notable for the following components:    POC Glucose 420 (*)     All other components within normal limits    Narrative:     Performed at:  [facility],  [place], [employer]   Phone [phone]   POCT GLUCOSE - Abnormal; Notable for the following components:    POC Glucose 395 (*)     All other components within normal limits    Narrative:     Performed at:  [facility],  [place], [place] [employer] [facility] [place]   Phone [phone]   POCT GLUCOSE - Abnormal; Notable for the following components:    POC Glucose 368 (*)     All other components within normal limits    Narrative:     Performed at:  [facility] [employer],  [place], [employer]   Phone [phone], [employer]    Narrative:     Performed at:  [facility],  [employer], [facility]   Phone [phone]   CULTURE, BLOOD 1   CULTURE, BLOOD 2   CULTURE, TISSUE   LACTATE, SEPSIS   BASIC METABOLIC PANEL W/ REFLEX TO MG FOR LOW K   BASIC METABOLIC PANEL W/ REFLEX TO MG FOR LOW K   BASIC METABOLIC PANEL W/ REFLEX TO MG FOR LOW K   BASIC METABOLIC PANEL W/ REFLEX TO MG FOR LOW K   BASIC METABOLIC PANEL W/ REFLEX TO MG FOR LOW K   POCT GLUCOSE   POCT GLUCOSE   POCT GLUCOSE   POCT GLUCOSE   POCT GLUCOSE   POCT GLUCOSE   POCT GLUCOSE   POCT GLUCOSE   POCT GLUCOSE   POCT GLUCOSE   POCT GLUCOSE   POCT GLUCOSE   POCT GLUCOSE   POCT GLUCOSE   POCT GLUCOSE   POCT GLUCOSE   POCT GLUCOSE   POCT GLUCOSE       All other labs werewithin normal range or not returned as of this dictation. EKG: All EKG's are interpreted by the Emergency Department Physician who either signs or Co-signs this chart in the absence of a cardiologist.  Please see their note for interpretation of EKG.       RADIOLOGY:   CT abdomen pelvis with IV contrast interpreted by radiologist for  Impression:    1. 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  There is   additional muscle or fascial involvement on the left as above.  Of note, the   testes are incompletely evaluated but appear within normal limits.  Critical   results were called by Dr. Alexis Brooks MD to Dr. Matt Guzman on 11/30/2021   at 19:31.   2. Minimal bilateral hydronephrosis is likely due to severe urinary bladder   distention        Left foot x-ray interpreted by radiologist for  FINDINGS:   Osseous destruction along the articular margins of the 5th   metatarsophalangeal joint with associated lucency in the soft tissues. .   There is no evidence of fracture or dislocation. . The remaining joint spaces   appear well maintained. The remaining soft tissues are unremarkable. Interpretation per the Radiologist below, if available at the time of this note:    CT ABDOMEN PELVIS W IV CONTRAST Additional Contrast? None   Final Result   1. 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 There is   additional muscle or fascial involvement on the left as above. Of note, the   testes are incompletely evaluated but appear within normal limits. Critical   results were called by Dr. Alexis Brooks MD to Dr. Matt Guzman on 11/30/2021   at 19:31.   2. Minimal bilateral hydronephrosis is likely due to severe urinary bladder   distention. XR FOOT LEFT (MIN 3 VIEWS)   Final Result   Evidence of a septic joint involving the 5th metatarsal phalangeal joint with   associated osteomyelitis           No results found.       PROCEDURES   Unless otherwise noted below, none     Procedures CRITICAL CARE TIME   N/A    CONSULTS:  IP CONSULT TO UROLOGY  IP CONSULT TO UROLOGY  IP CONSULT TO HOSPITALIST  IP CONSULT TO CRITICAL CARE  IP CONSULT TO UROLOGY  PHARMACY TO DOSE VANCOMYCIN      EMERGENCYDEPARTMENT COURSE and DIFFERENTIAL DIAGNOSIS/MDM:   Vitals:    Vitals:    11/30/21 1630 11/30/21 1700 11/30/21 1730 11/30/21 1800   BP: 132/62 (!) 146/83 96/70 101/88   Pulse: 95 114 93 96   Resp: 27 24 29 21   Temp:       SpO2: 97% 97% 97% 96%   Weight:       Height:           Patient was given the following medications:  Medications   metronidazole (FLAGYL) 500 mg in NaCl 100 mL IVPB premix (0 mg IntraVENous Stopped 11/30/21 1936)   glucose (GLUTOSE) 40 % oral gel 15 g (has no administration in time range)   dextrose 50 % IV solution (has no administration in time range)   glucagon (rDNA) injection 1 mg (has no administration in time range)   dextrose 5 % solution (has no administration in time range)   insulin regular (HUMULIN R;NOVOLIN R) 100 Units in sodium chloride 0.9 % 100 mL infusion (3.65 Units/hr IntraVENous Rate/Dose Change 11/30/21 1936)   norepinephrine (LEVOPHED) 16 mg in dextrose 5 % 250 mL infusion (has no administration in time range)   vasopressin 20 Units in dextrose 5 % 100 mL infusion (has no administration in time range)   chlorhexidine (PERIDEX) 0.12 % solution 15 mL (has no administration in time range)   famotidine (PEPCID) injection 20 mg (has no administration in time range)   propofol injection (has no administration in time range)   sodium chloride 0.9 % irrigation (500 mLs Irrigation New Bag 11/30/21 1926)   0.9 % sodium chloride bolus (0 mLs IntraVENous Stopped 11/30/21 1712)   vancomycin (VANCOCIN) 1,250 mg in dextrose 5 % 250 mL IVPB (0 mg IntraVENous Stopped 11/30/21 1936)   cefepime (MAXIPIME) 2000 mg IVPB minibag (0 mg IntraVENous Stopped 11/30/21 1712)   0.9 % sodium chloride bolus (0 mLs IntraVENous Stopped 11/30/21 1712)   0.9% NaCl with KCl 20 mEq infusion ( IntraVENous New Bag 11/30/21 1705)   iopamidol (ISOVUE-370) 76 % injection 75 mL (75 mLs IntraVENous Given 11/30/21 1814)                 Patient was seen and evaluated by myself. Patient here for concerns for blood sugar issues. Patient also reports that he has a wound noted to his left scrotum. He states it started as a small pimple 2 days ago and has progressed. Patient reports that he has been dealing with a diabetic ulcer to the lateral aspect of his left foot for the last few months. On exam the patient is awake and alert he was found to be tachycardic and meeting SIRS criteria. Sepsis protocols initiated. Patient was given IV antibiotics and IV fluids. Lab values were concerning for DKA. Patient was also started on insulin drip in the ED. 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 Consult was placed to urology. Urology initially recommended the patient be transferred to a higher level of care due to the complexity of the patient's illness however due to the pandemic there are no ICU beds available in the city and  was unable to accept the patient. Dr. Dorothy Morales from urology was called again and has accepted the patient and will be taking the patient to the operating room. X-ray of the left foot was concerning for osteomyelitis. Abdominal CT was concerning for extensive gas and concerns for Fourniers. consult was placed to the hospitalist who was notified of the patient's need for admission after surgery. Hospitalist is accepted. Patient's care was transferred to the inpatient unit. CRITICAL CARE NOTE:  There was a high probability of clinically significant life-threatening deterioration of the patient's condition requiring my urgent intervention. Total critical care time is 45minutes.     This includes vital sign monitoring, pulse oximetry monitoring, telemetry monitoring, clinical response to the IV medications, reviewing the nursing notes, consultation time, dictation/documentation time, and interpretation of the labwork. The patient tolerated their visit well. I have evaluated this patient. My supervising physician was available for consultation. The patient and / or the family were informed of the results of any tests, a time was given to answer questions, a plan was proposed and they agreed with plan. FINAL IMPRESSION      1. Shayla's gangrene    2. Septicemia ([facility]    3. Osteomyelitis of left foot, unspecified type ([facility]    4. Diabetic ketoacidosis without coma associated with other specified diabetes mellitus ([facility]          DISPOSITION/PLAN   DISPOSITION Admitted 11/30/2021 05:26:55 PM      PATIENT REFERRED TO:  No follow-up provider specified.     DISCHARGE MEDICATIONS:  Current Discharge Medication List          DISCONTINUED MEDICATIONS:  Current Discharge Medication List                 (Please note that portions of this note were completed with a voice recognition program.  Efforts were made to edit the dictations but occasionally words are mis-transcribed.)    JAGJIT Rendon CNP (electronically signed)          JAGJIT Rendon CNP  11/30/21 4095

## 2021-11-30 NOTE — CONSULTS
Urology Attending Consult Note      Reason for Consultation: Shayla's gangrene    History: 62 y/o diabetic male has scrotal swelling and is foul smelling. 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 He is diabetic and control has been very poor. Family History, Social History, Review of Systems:  Reviewed and agreed to as per chart    Vitals:  /88   Pulse 96   Temp 98.1 °F (36.7 °C)   Resp 21   Ht 5' 11\" (1.803 m)   Wt 161 lb (73 kg)   SpO2 96%   BMI 22.45 kg/m²   Temp  Av.1 °F (36.7 °C)  Min: 98.1 °F (36.7 °C)  Max: 98.1 °F (36.7 °C)  No intake or output data in the 24 hours ending 21      Physical:   Well developed, well nourished in no acute distress   Mood indicates no abnormalities. Pt doesnt appear depressed   Orientated to time and place   Neck is supple, trachea is midline   Respiratory effort is normal   Cardiovascular show no extremity swelling   Abdomen no masses or hernias are palpated, there is no tenderness. Liver and Spleen appear normal.   Skin show no abnormal lesions   Lymph nodes are not palpated in the inguinal, neck, or axillary area.      Male :   Penis appears normal and circumcised   Urethral meatus is normal in size and location   Scrotum appears very swollen with left black purple area consistent with Shayla's gangrene      Labs:  WBC:    Lab Results   Component Value Date    WBC 18.4 2021     Hemoglobin/Hematocrit:    Lab Results   Component Value Date    HGB 13.3 2021    HCT 40.7 2021     BMP:    Lab Results   Component Value Date     2021    K 4.1 2021    CL 89 2021    CO2 20 2021    BUN 53 2021    LABALBU 2.7 2021    CREATININE 1.2 2021    CALCIUM 10.1 2021    GFRAA >60 2021    LABGLOM >60 2021     PT/INR:  No results found for: PROTIME, INR  PTT:  No results found for: APTT[APTT    Antibiotic Therapy: Vancomycin, Flagyl, Maxipime    Impression/Plan: Shayla's gangrene  1. On broad spectrum antibiotics  2.  To OR for emergent scrotal resection of Shayla's gangrene    Lynn Patterson MD

## 2021-12-01 ENCOUNTER — ANESTHESIA EVENT (OUTPATIENT)
Dept: OPERATING ROOM | Age: 66
DRG: 853 | End: 2021-12-01

## 2021-12-01 ENCOUNTER — APPOINTMENT (OUTPATIENT)
Dept: GENERAL RADIOLOGY | Age: 66
DRG: 853 | End: 2021-12-01

## 2021-12-01 ENCOUNTER — APPOINTMENT (OUTPATIENT)
Dept: INTERVENTIONAL RADIOLOGY/VASCULAR | Age: 66
DRG: 853 | End: 2021-12-01

## 2021-12-01 ENCOUNTER — HOSPITAL ENCOUNTER (INPATIENT)
Age: 66
LOS: 41 days | Discharge: NURSING, REHAB OR LONG-TERM CARE | DRG: 853 | End: 2022-01-11
Attending: INTERNAL MEDICINE | Admitting: INTERNAL MEDICINE
Payer: MEDICAID

## 2021-12-01 ENCOUNTER — ANESTHESIA (OUTPATIENT)
Dept: OPERATING ROOM | Age: 66
DRG: 853 | End: 2021-12-01

## 2021-12-01 VITALS
HEART RATE: 79 BPM | RESPIRATION RATE: 15 BRPM | OXYGEN SATURATION: 99 % | TEMPERATURE: 98 F | BODY MASS INDEX: 22.85 KG/M2 | SYSTOLIC BLOOD PRESSURE: 91 MMHG | DIASTOLIC BLOOD PRESSURE: 59 MMHG | WEIGHT: 163.2 LBS | HEIGHT: 71 IN

## 2021-12-01 VITALS
OXYGEN SATURATION: 100 % | DIASTOLIC BLOOD PRESSURE: 65 MMHG | RESPIRATION RATE: 12 BRPM | TEMPERATURE: 96.6 F | SYSTOLIC BLOOD PRESSURE: 121 MMHG

## 2021-12-01 DIAGNOSIS — M72.6 NECROTIZING FASCIITIS (HCC): ICD-10-CM

## 2021-12-01 DIAGNOSIS — R40.4 TRANSIENT ALTERATION OF AWARENESS: Primary | ICD-10-CM

## 2021-12-01 DIAGNOSIS — G89.18 POST-OP PAIN: ICD-10-CM

## 2021-12-01 PROBLEM — J96.01 ACUTE RESPIRATORY FAILURE WITH HYPOXIA (HCC): Status: ACTIVE | Noted: 2021-12-01

## 2021-12-01 LAB
ABO/RH: NORMAL
ALBUMIN SERPL-MCNC: 2 G/DL (ref 3.4–5)
ALP BLD-CCNC: 153 U/L (ref 40–129)
ALT SERPL-CCNC: 8 U/L (ref 10–40)
ANION GAP SERPL CALCULATED.3IONS-SCNC: 12 MMOL/L (ref 3–16)
ANION GAP SERPL CALCULATED.3IONS-SCNC: 7 MMOL/L (ref 3–16)
ANION GAP SERPL CALCULATED.3IONS-SCNC: 8 MMOL/L (ref 3–16)
ANISOCYTOSIS: ABNORMAL
ANTIBODY SCREEN: NORMAL
AST SERPL-CCNC: 13 U/L (ref 15–37)
BANDED NEUTROPHILS RELATIVE PERCENT: 13 % (ref 0–7)
BASE EXCESS ARTERIAL: -0.1 MMOL/L (ref -3–3)
BASE EXCESS ARTERIAL: -7 (ref -3–3)
BASOPHILS ABSOLUTE: 0 K/UL (ref 0–0.2)
BASOPHILS ABSOLUTE: 0 K/UL (ref 0–0.2)
BASOPHILS RELATIVE PERCENT: 0 %
BASOPHILS RELATIVE PERCENT: 0 %
BILIRUB SERPL-MCNC: 0.5 MG/DL (ref 0–1)
BILIRUBIN DIRECT: 0.4 MG/DL (ref 0–0.3)
BILIRUBIN, INDIRECT: 0.1 MG/DL (ref 0–1)
BUN BLDV-MCNC: 48 MG/DL (ref 7–20)
BUN BLDV-MCNC: 50 MG/DL (ref 7–20)
BUN BLDV-MCNC: 53 MG/DL (ref 7–20)
BURR CELLS: ABNORMAL
C-REACTIVE PROTEIN: 323.5 MG/L (ref 0–5.1)
CALCIUM IONIZED: 1.01 MMOL/L (ref 1.12–1.32)
CALCIUM SERPL-MCNC: 7.1 MG/DL (ref 8.3–10.6)
CALCIUM SERPL-MCNC: 7.4 MG/DL (ref 8.3–10.6)
CALCIUM SERPL-MCNC: 7.7 MG/DL (ref 8.3–10.6)
CARBOXYHEMOGLOBIN ARTERIAL: 0.3 % (ref 0–1.5)
CHLORIDE BLD-SCNC: 104 MMOL/L (ref 99–110)
CHLORIDE BLD-SCNC: 108 MMOL/L (ref 99–110)
CHLORIDE BLD-SCNC: 110 MMOL/L (ref 99–110)
CO2: 20 MMOL/L (ref 21–32)
CO2: 22 MMOL/L (ref 21–32)
CO2: 23 MMOL/L (ref 21–32)
CREAT SERPL-MCNC: 0.8 MG/DL (ref 0.8–1.3)
CREAT SERPL-MCNC: 0.9 MG/DL (ref 0.8–1.3)
CREAT SERPL-MCNC: 1 MG/DL (ref 0.8–1.3)
EOSINOPHILS ABSOLUTE: 0 K/UL (ref 0–0.6)
EOSINOPHILS ABSOLUTE: 0 K/UL (ref 0–0.6)
EOSINOPHILS RELATIVE PERCENT: 0 %
EOSINOPHILS RELATIVE PERCENT: 0 %
ESTIMATED AVERAGE GLUCOSE: 340.8 MG/DL
GFR AFRICAN AMERICAN: >60
GFR NON-AFRICAN AMERICAN: >60
GLUCOSE BLD-MCNC: 111 MG/DL (ref 70–99)
GLUCOSE BLD-MCNC: 133 MG/DL (ref 70–99)
GLUCOSE BLD-MCNC: 155 MG/DL (ref 70–99)
GLUCOSE BLD-MCNC: 158 MG/DL (ref 70–99)
GLUCOSE BLD-MCNC: 162 MG/DL (ref 70–99)
GLUCOSE BLD-MCNC: 182 MG/DL (ref 70–99)
GLUCOSE BLD-MCNC: 198 MG/DL (ref 70–99)
GLUCOSE BLD-MCNC: 200 MG/DL (ref 70–99)
GLUCOSE BLD-MCNC: 264 MG/DL (ref 70–99)
GLUCOSE BLD-MCNC: 282 MG/DL (ref 70–99)
GLUCOSE BLD-MCNC: 282 MG/DL (ref 70–99)
GLUCOSE BLD-MCNC: 283 MG/DL (ref 70–99)
GLUCOSE BLD-MCNC: 284 MG/DL (ref 70–99)
GLUCOSE BLD-MCNC: 293 MG/DL (ref 70–99)
GLUCOSE BLD-MCNC: 319 MG/DL (ref 70–99)
GLUCOSE BLD-MCNC: 325 MG/DL (ref 70–99)
GLUCOSE BLD-MCNC: 327 MG/DL (ref 70–99)
GLUCOSE BLD-MCNC: 363 MG/DL (ref 70–99)
GLUCOSE BLD-MCNC: 523 MG/DL (ref 70–99)
GLUCOSE BLD-MCNC: 98 MG/DL (ref 70–99)
GLUCOSE BLD-MCNC: 99 MG/DL (ref 70–99)
HBA1C MFR BLD: 13.5 %
HCO3 ARTERIAL: 19.8 MMOL/L (ref 21–29)
HCO3 ARTERIAL: 24.5 MMOL/L (ref 21–29)
HCT VFR BLD CALC: 28.4 % (ref 40.5–52.5)
HCT VFR BLD CALC: 30.1 % (ref 40.5–52.5)
HEMOGLOBIN, ART, EXTENDED: 9.6 G/DL (ref 13.5–17.5)
HEMOGLOBIN: 10.1 G/DL (ref 13.5–17.5)
HEMOGLOBIN: 9.2 G/DL (ref 13.5–17.5)
HYPOCHROMIA: ABNORMAL
INR BLD: 1.08 (ref 0.88–1.12)
INR BLD: 1.1 (ref 0.88–1.12)
LACTATE: 0.77 MMOL/L (ref 0.4–2)
LACTIC ACID: 0.9 MMOL/L (ref 0.4–2)
LACTIC ACID: 2.9 MMOL/L (ref 0.4–2)
LYMPHOCYTES ABSOLUTE: 0.5 K/UL (ref 1–5.1)
LYMPHOCYTES ABSOLUTE: 1 K/UL (ref 1–5.1)
LYMPHOCYTES RELATIVE PERCENT: 2 %
LYMPHOCYTES RELATIVE PERCENT: 9 %
MAGNESIUM: 2 MG/DL (ref 1.8–2.4)
MAGNESIUM: 2 MG/DL (ref 1.8–2.4)
MAGNESIUM: 2.1 MG/DL (ref 1.8–2.4)
MCH RBC QN AUTO: 29.3 PG (ref 26–34)
MCH RBC QN AUTO: 30 PG (ref 26–34)
MCHC RBC AUTO-ENTMCNC: 32.5 G/DL (ref 31–36)
MCHC RBC AUTO-ENTMCNC: 33.7 G/DL (ref 31–36)
MCV RBC AUTO: 89.1 FL (ref 80–100)
MCV RBC AUTO: 90.1 FL (ref 80–100)
METHEMOGLOBIN ARTERIAL: 0.3 %
MONOCYTES ABSOLUTE: 0.4 K/UL (ref 0–1.3)
MONOCYTES ABSOLUTE: 1.3 K/UL (ref 0–1.3)
MONOCYTES RELATIVE PERCENT: 4 %
MONOCYTES RELATIVE PERCENT: 5 %
MYELOCYTE PERCENT: 1 %
NEUTROPHILS ABSOLUTE: 23.4 K/UL (ref 1.7–7.7)
NEUTROPHILS ABSOLUTE: 9.7 K/UL (ref 1.7–7.7)
NEUTROPHILS RELATIVE PERCENT: 74 %
NEUTROPHILS RELATIVE PERCENT: 92 %
O2 SAT, ARTERIAL: 98 % (ref 93–100)
O2 SAT, ARTERIAL: 98.3 %
O2 THERAPY: ABNORMAL
PCO2 ARTERIAL: 39.2 MMHG (ref 35–45)
PCO2 ARTERIAL: 39.7 MM HG (ref 35–45)
PDW BLD-RTO: 14 % (ref 12.4–15.4)
PDW BLD-RTO: 14.3 % (ref 12.4–15.4)
PERFORMED ON: ABNORMAL
PERFORMED ON: NORMAL
PERFORMED ON: NORMAL
PH ARTERIAL: 7.31 (ref 7.35–7.45)
PH ARTERIAL: 7.41 (ref 7.35–7.45)
PHOSPHORUS: 2.2 MG/DL (ref 2.5–4.9)
PHOSPHORUS: 2.4 MG/DL (ref 2.5–4.9)
PHOSPHORUS: 3.2 MG/DL (ref 2.5–4.9)
PLATELET # BLD: 201 K/UL (ref 135–450)
PLATELET # BLD: 272 K/UL (ref 135–450)
PLATELET SLIDE REVIEW: ADEQUATE
PLATELET SLIDE REVIEW: ADEQUATE
PMV BLD AUTO: 9.5 FL (ref 5–10.5)
PMV BLD AUTO: 9.8 FL (ref 5–10.5)
PO2 ARTERIAL: 110.8 MM HG (ref 75–108)
PO2 ARTERIAL: 117.3 MMHG (ref 75–108)
POC POTASSIUM: 5 MMOL/L (ref 3.5–5.1)
POC SAMPLE TYPE: ABNORMAL
POC SODIUM: 136 MMOL/L (ref 136–145)
POLYCHROMASIA: ABNORMAL
POTASSIUM REFLEX MAGNESIUM: 4.1 MMOL/L (ref 3.5–5.1)
POTASSIUM SERPL-SCNC: 4.7 MMOL/L (ref 3.5–5.1)
POTASSIUM SERPL-SCNC: 5.2 MMOL/L (ref 3.5–5.1)
PROTHROMBIN TIME: 12.3 SEC (ref 9.9–12.7)
PROTHROMBIN TIME: 12.5 SEC (ref 9.9–12.7)
RBC # BLD: 3.15 M/UL (ref 4.2–5.9)
RBC # BLD: 3.38 M/UL (ref 4.2–5.9)
SLIDE REVIEW: ABNORMAL
SLIDE REVIEW: ABNORMAL
SODIUM BLD-SCNC: 136 MMOL/L (ref 136–145)
SODIUM BLD-SCNC: 139 MMOL/L (ref 136–145)
SODIUM BLD-SCNC: 139 MMOL/L (ref 136–145)
TCO2 ARTERIAL: 21 MMOL/L
TCO2 ARTERIAL: 25.7 MMOL/L
TOTAL PROTEIN: 4.9 G/DL (ref 6.4–8.2)
WBC # BLD: 11.2 K/UL (ref 4–11)
WBC # BLD: 25.2 K/UL (ref 4–11)

## 2021-12-01 PROCEDURE — 6360000002 HC RX W HCPCS: Performed by: INTERNAL MEDICINE

## 2021-12-01 PROCEDURE — 82947 ASSAY GLUCOSE BLOOD QUANT: CPT

## 2021-12-01 PROCEDURE — 36415 COLL VENOUS BLD VENIPUNCTURE: CPT

## 2021-12-01 PROCEDURE — 99223 1ST HOSP IP/OBS HIGH 75: CPT | Performed by: SURGERY

## 2021-12-01 PROCEDURE — 3700000000 HC ANESTHESIA ATTENDED CARE: Performed by: SURGERY

## 2021-12-01 PROCEDURE — 94002 VENT MGMT INPAT INIT DAY: CPT

## 2021-12-01 PROCEDURE — 87205 SMEAR GRAM STAIN: CPT

## 2021-12-01 PROCEDURE — 6360000002 HC RX W HCPCS: Performed by: NURSE ANESTHETIST, CERTIFIED REGISTERED

## 2021-12-01 PROCEDURE — 86923 COMPATIBILITY TEST ELECTRIC: CPT

## 2021-12-01 PROCEDURE — 84134 ASSAY OF PREALBUMIN: CPT

## 2021-12-01 PROCEDURE — 99291 CRITICAL CARE FIRST HOUR: CPT | Performed by: SURGERY

## 2021-12-01 PROCEDURE — 2500000003 HC RX 250 WO HCPCS: Performed by: NURSE ANESTHETIST, CERTIFIED REGISTERED

## 2021-12-01 PROCEDURE — 84100 ASSAY OF PHOSPHORUS: CPT

## 2021-12-01 PROCEDURE — 2500000003 HC RX 250 WO HCPCS: Performed by: INTERNAL MEDICINE

## 2021-12-01 PROCEDURE — 6360000002 HC RX W HCPCS: Performed by: UROLOGY

## 2021-12-01 PROCEDURE — 85025 COMPLETE CBC W/AUTO DIFF WBC: CPT

## 2021-12-01 PROCEDURE — 82330 ASSAY OF CALCIUM: CPT

## 2021-12-01 PROCEDURE — 6370000000 HC RX 637 (ALT 250 FOR IP): Performed by: INTERNAL MEDICINE

## 2021-12-01 PROCEDURE — 0JB80ZZ EXCISION OF ABDOMEN SUBCUTANEOUS TISSUE AND FASCIA, OPEN APPROACH: ICD-10-PCS | Performed by: SURGERY

## 2021-12-01 PROCEDURE — 84132 ASSAY OF SERUM POTASSIUM: CPT

## 2021-12-01 PROCEDURE — 0JBB0ZZ EXCISION OF PERINEUM SUBCUTANEOUS TISSUE AND FASCIA, OPEN APPROACH: ICD-10-PCS | Performed by: SURGERY

## 2021-12-01 PROCEDURE — 2000000000 HC ICU R&B

## 2021-12-01 PROCEDURE — 2580000003 HC RX 258: Performed by: INTERNAL MEDICINE

## 2021-12-01 PROCEDURE — 94761 N-INVAS EAR/PLS OXIMETRY MLT: CPT

## 2021-12-01 PROCEDURE — 88305 TISSUE EXAM BY PATHOLOGIST: CPT

## 2021-12-01 PROCEDURE — 80076 HEPATIC FUNCTION PANEL: CPT

## 2021-12-01 PROCEDURE — 83735 ASSAY OF MAGNESIUM: CPT

## 2021-12-01 PROCEDURE — 05HY33Z INSERTION OF INFUSION DEVICE INTO UPPER VEIN, PERCUTANEOUS APPROACH: ICD-10-PCS | Performed by: INTERNAL MEDICINE

## 2021-12-01 PROCEDURE — 83605 ASSAY OF LACTIC ACID: CPT

## 2021-12-01 PROCEDURE — 2709999900 HC NON-CHARGEABLE SUPPLY: Performed by: SURGERY

## 2021-12-01 PROCEDURE — 11006 DBRDMT SKIN XTRNL GENT PER: CPT | Performed by: SURGERY

## 2021-12-01 PROCEDURE — 0JB90ZZ EXCISION OF BUTTOCK SUBCUTANEOUS TISSUE AND FASCIA, OPEN APPROACH: ICD-10-PCS | Performed by: SURGERY

## 2021-12-01 PROCEDURE — 99291 CRITICAL CARE FIRST HOUR: CPT | Performed by: INTERNAL MEDICINE

## 2021-12-01 PROCEDURE — 86901 BLOOD TYPING SEROLOGIC RH(D): CPT

## 2021-12-01 PROCEDURE — 85610 PROTHROMBIN TIME: CPT

## 2021-12-01 PROCEDURE — 2700000000 HC OXYGEN THERAPY PER DAY

## 2021-12-01 PROCEDURE — 82803 BLOOD GASES ANY COMBINATION: CPT

## 2021-12-01 PROCEDURE — 80048 BASIC METABOLIC PNL TOTAL CA: CPT

## 2021-12-01 PROCEDURE — 2580000003 HC RX 258: Performed by: SURGERY

## 2021-12-01 PROCEDURE — 99239 HOSP IP/OBS DSCHRG MGMT >30: CPT | Performed by: INTERNAL MEDICINE

## 2021-12-01 PROCEDURE — 0VB50ZZ EXCISION OF SCROTUM, OPEN APPROACH: ICD-10-PCS | Performed by: UROLOGY

## 2021-12-01 PROCEDURE — 86900 BLOOD TYPING SEROLOGIC ABO: CPT

## 2021-12-01 PROCEDURE — 36573 INSJ PICC RS&I 5 YR+: CPT

## 2021-12-01 PROCEDURE — 11043 DBRDMT MUSC&/FSCA 1ST 20/<: CPT | Performed by: SURGERY

## 2021-12-01 PROCEDURE — 86850 RBC ANTIBODY SCREEN: CPT

## 2021-12-01 PROCEDURE — 3600000014 HC SURGERY LEVEL 4 ADDTL 15MIN: Performed by: SURGERY

## 2021-12-01 PROCEDURE — 6370000000 HC RX 637 (ALT 250 FOR IP)

## 2021-12-01 PROCEDURE — 87070 CULTURE OTHR SPECIMN AEROBIC: CPT

## 2021-12-01 PROCEDURE — P9016 RBC LEUKOCYTES REDUCED: HCPCS

## 2021-12-01 PROCEDURE — 36620 INSERTION CATHETER ARTERY: CPT

## 2021-12-01 PROCEDURE — 71045 X-RAY EXAM CHEST 1 VIEW: CPT

## 2021-12-01 PROCEDURE — 94003 VENT MGMT INPAT SUBQ DAY: CPT

## 2021-12-01 PROCEDURE — 2580000003 HC RX 258

## 2021-12-01 PROCEDURE — 84295 ASSAY OF SERUM SODIUM: CPT

## 2021-12-01 PROCEDURE — 6370000000 HC RX 637 (ALT 250 FOR IP): Performed by: NURSE ANESTHETIST, CERTIFIED REGISTERED

## 2021-12-01 PROCEDURE — C1751 CATH, INF, PER/CENT/MIDLINE: HCPCS

## 2021-12-01 PROCEDURE — 2500000003 HC RX 250 WO HCPCS: Performed by: UROLOGY

## 2021-12-01 PROCEDURE — 6370000000 HC RX 637 (ALT 250 FOR IP): Performed by: UROLOGY

## 2021-12-01 PROCEDURE — 11046 DBRDMT MUSC&/FSCA EA ADDL: CPT | Performed by: SURGERY

## 2021-12-01 PROCEDURE — 86140 C-REACTIVE PROTEIN: CPT

## 2021-12-01 PROCEDURE — 3600000004 HC SURGERY LEVEL 4 BASE: Performed by: SURGERY

## 2021-12-01 PROCEDURE — 0JB70ZZ EXCISION OF BACK SUBCUTANEOUS TISSUE AND FASCIA, OPEN APPROACH: ICD-10-PCS | Performed by: SURGERY

## 2021-12-01 PROCEDURE — 3700000001 HC ADD 15 MINUTES (ANESTHESIA): Performed by: SURGERY

## 2021-12-01 PROCEDURE — 2500000003 HC RX 250 WO HCPCS

## 2021-12-01 PROCEDURE — 80069 RENAL FUNCTION PANEL: CPT

## 2021-12-01 PROCEDURE — P9045 ALBUMIN (HUMAN), 5%, 250 ML: HCPCS | Performed by: NURSE ANESTHETIST, CERTIFIED REGISTERED

## 2021-12-01 PROCEDURE — 6360000002 HC RX W HCPCS

## 2021-12-01 RX ORDER — SODIUM CHLORIDE 0.9 % (FLUSH) 0.9 %
5-40 SYRINGE (ML) INJECTION PRN
Status: DISCONTINUED | OUTPATIENT
Start: 2021-12-01 | End: 2022-01-11 | Stop reason: HOSPADM

## 2021-12-01 RX ORDER — ACETAMINOPHEN 325 MG/1
650 TABLET ORAL EVERY 6 HOURS PRN
Status: DISCONTINUED | OUTPATIENT
Start: 2021-12-01 | End: 2021-12-06

## 2021-12-01 RX ORDER — SODIUM CHLORIDE 0.9 % (FLUSH) 0.9 %
5-40 SYRINGE (ML) INJECTION EVERY 12 HOURS SCHEDULED
Status: DISCONTINUED | OUTPATIENT
Start: 2021-12-01 | End: 2021-12-01 | Stop reason: HOSPADM

## 2021-12-01 RX ORDER — FENTANYL CITRATE 50 UG/ML
25 INJECTION, SOLUTION INTRAMUSCULAR; INTRAVENOUS EVERY 5 MIN PRN
Status: DISCONTINUED | OUTPATIENT
Start: 2021-12-01 | End: 2021-12-01 | Stop reason: HOSPADM

## 2021-12-01 RX ORDER — SODIUM CHLORIDE 0.9 % (FLUSH) 0.9 %
5-40 SYRINGE (ML) INJECTION PRN
Status: DISCONTINUED | OUTPATIENT
Start: 2021-12-01 | End: 2021-12-01 | Stop reason: HOSPADM

## 2021-12-01 RX ORDER — NOREPINEPHRINE BITARTRATE 1 MG/ML
INJECTION, SOLUTION INTRAVENOUS
Status: DISPENSED
Start: 2021-12-01 | End: 2021-12-02

## 2021-12-01 RX ORDER — SODIUM CHLORIDE 9 MG/ML
INJECTION, SOLUTION INTRAVENOUS CONTINUOUS
Status: DISCONTINUED | OUTPATIENT
Start: 2021-12-01 | End: 2021-12-01 | Stop reason: HOSPADM

## 2021-12-01 RX ORDER — DEXTROSE MONOHYDRATE 50 MG/ML
100 INJECTION, SOLUTION INTRAVENOUS PRN
Status: DISCONTINUED | OUTPATIENT
Start: 2021-12-01 | End: 2022-01-11 | Stop reason: HOSPADM

## 2021-12-01 RX ORDER — SODIUM CHLORIDE 0.9 % (FLUSH) 0.9 %
5-40 SYRINGE (ML) INJECTION EVERY 12 HOURS SCHEDULED
Status: DISCONTINUED | OUTPATIENT
Start: 2021-12-01 | End: 2022-01-11 | Stop reason: HOSPADM

## 2021-12-01 RX ORDER — NICOTINE POLACRILEX 4 MG
15 LOZENGE BUCCAL PRN
Status: DISCONTINUED | OUTPATIENT
Start: 2021-12-01 | End: 2022-01-11 | Stop reason: HOSPADM

## 2021-12-01 RX ORDER — ACETAMINOPHEN 650 MG/1
650 SUPPOSITORY RECTAL EVERY 6 HOURS PRN
Status: DISCONTINUED | OUTPATIENT
Start: 2021-12-01 | End: 2021-12-06

## 2021-12-01 RX ORDER — PROPOFOL 10 MG/ML
INJECTION, EMULSION INTRAVENOUS
Status: DISPENSED
Start: 2021-12-01 | End: 2021-12-02

## 2021-12-01 RX ORDER — PANTOPRAZOLE SODIUM 40 MG/10ML
40 INJECTION, POWDER, LYOPHILIZED, FOR SOLUTION INTRAVENOUS DAILY
Status: DISCONTINUED | OUTPATIENT
Start: 2021-12-02 | End: 2021-12-03

## 2021-12-01 RX ORDER — MIDAZOLAM HYDROCHLORIDE 1 MG/ML
2 INJECTION INTRAMUSCULAR; INTRAVENOUS
Status: DISCONTINUED | OUTPATIENT
Start: 2021-12-01 | End: 2021-12-01 | Stop reason: HOSPADM

## 2021-12-01 RX ORDER — LIDOCAINE HYDROCHLORIDE 10 MG/ML
5 INJECTION, SOLUTION EPIDURAL; INFILTRATION; INTRACAUDAL; PERINEURAL ONCE
Status: DISCONTINUED | OUTPATIENT
Start: 2021-12-01 | End: 2021-12-01 | Stop reason: HOSPADM

## 2021-12-01 RX ORDER — SODIUM CHLORIDE 9 MG/ML
25 INJECTION, SOLUTION INTRAVENOUS PRN
Status: DISCONTINUED | OUTPATIENT
Start: 2021-12-01 | End: 2022-01-11 | Stop reason: HOSPADM

## 2021-12-01 RX ORDER — 0.9 % SODIUM CHLORIDE 0.9 %
1000 INTRAVENOUS SOLUTION INTRAVENOUS ONCE
Status: COMPLETED | OUTPATIENT
Start: 2021-12-01 | End: 2021-12-01

## 2021-12-01 RX ORDER — FENTANYL CITRATE 50 UG/ML
50 INJECTION, SOLUTION INTRAMUSCULAR; INTRAVENOUS
Status: DISCONTINUED | OUTPATIENT
Start: 2021-12-01 | End: 2021-12-01

## 2021-12-01 RX ORDER — SODIUM CHLORIDE 9 MG/ML
INJECTION, SOLUTION INTRAVENOUS CONTINUOUS
Status: ACTIVE | OUTPATIENT
Start: 2021-12-01 | End: 2021-12-01

## 2021-12-01 RX ORDER — CLINDAMYCIN PHOSPHATE 600 MG/50ML
600 INJECTION INTRAVENOUS EVERY 6 HOURS
Status: DISCONTINUED | OUTPATIENT
Start: 2021-12-01 | End: 2021-12-01 | Stop reason: HOSPADM

## 2021-12-01 RX ORDER — CALCIUM CHLORIDE 100 MG/ML
INJECTION INTRAVENOUS; INTRAVENTRICULAR PRN
Status: DISCONTINUED | OUTPATIENT
Start: 2021-12-01 | End: 2021-12-01 | Stop reason: SDUPTHER

## 2021-12-01 RX ORDER — ROCURONIUM BROMIDE 10 MG/ML
INJECTION, SOLUTION INTRAVENOUS PRN
Status: DISCONTINUED | OUTPATIENT
Start: 2021-12-01 | End: 2021-12-01 | Stop reason: SDUPTHER

## 2021-12-01 RX ORDER — ALBUMIN, HUMAN INJ 5% 5 %
SOLUTION INTRAVENOUS PRN
Status: DISCONTINUED | OUTPATIENT
Start: 2021-12-01 | End: 2021-12-01 | Stop reason: SDUPTHER

## 2021-12-01 RX ORDER — PROPOFOL 10 MG/ML
5-50 INJECTION, EMULSION INTRAVENOUS
Status: DISCONTINUED | OUTPATIENT
Start: 2021-12-01 | End: 2021-12-03

## 2021-12-01 RX ORDER — SODIUM CHLORIDE 9 MG/ML
25 INJECTION, SOLUTION INTRAVENOUS PRN
Status: DISCONTINUED | OUTPATIENT
Start: 2021-12-01 | End: 2021-12-01 | Stop reason: HOSPADM

## 2021-12-01 RX ORDER — DEXTROSE MONOHYDRATE 25 G/50ML
12.5 INJECTION, SOLUTION INTRAVENOUS PRN
Status: DISCONTINUED | OUTPATIENT
Start: 2021-12-01 | End: 2022-01-11 | Stop reason: HOSPADM

## 2021-12-01 RX ORDER — MAGNESIUM HYDROXIDE 1200 MG/15ML
LIQUID ORAL CONTINUOUS PRN
Status: COMPLETED | OUTPATIENT
Start: 2021-12-01 | End: 2021-12-01

## 2021-12-01 RX ORDER — FENTANYL CITRATE 50 UG/ML
50 INJECTION, SOLUTION INTRAMUSCULAR; INTRAVENOUS
Status: DISCONTINUED | OUTPATIENT
Start: 2021-12-01 | End: 2021-12-01 | Stop reason: HOSPADM

## 2021-12-01 RX ORDER — CLINDAMYCIN PHOSPHATE 600 MG/50ML
600 INJECTION INTRAVENOUS EVERY 8 HOURS
Status: DISCONTINUED | OUTPATIENT
Start: 2021-12-01 | End: 2021-12-03

## 2021-12-01 RX ADMIN — Medication 10 ML: at 10:09

## 2021-12-01 RX ADMIN — NOREPINEPHRINE BITARTRATE 5 MCG/MIN: 1 INJECTION, SOLUTION, CONCENTRATE INTRAVENOUS at 17:26

## 2021-12-01 RX ADMIN — SODIUM PHOSPHATE, MONOBASIC, MONOHYDRATE 10 MMOL: 276; 142 INJECTION, SOLUTION INTRAVENOUS at 09:15

## 2021-12-01 RX ADMIN — SODIUM CHLORIDE 25 ML: 9 INJECTION, SOLUTION INTRAVENOUS at 17:43

## 2021-12-01 RX ADMIN — POTASSIUM CHLORIDE 10 MEQ: 7.46 INJECTION, SOLUTION INTRAVENOUS at 00:25

## 2021-12-01 RX ADMIN — MORPHINE SULFATE 2 MG: 2 INJECTION, SOLUTION INTRAMUSCULAR; INTRAVENOUS at 00:23

## 2021-12-01 RX ADMIN — Medication 12.5 MCG/HR: at 17:22

## 2021-12-01 RX ADMIN — SODIUM CHLORIDE 8.01 UNITS/HR: 9 INJECTION, SOLUTION INTRAVENOUS at 00:09

## 2021-12-01 RX ADMIN — PROPOFOL 30 MCG/KG/MIN: 10 INJECTION, EMULSION INTRAVENOUS at 22:26

## 2021-12-01 RX ADMIN — VANCOMYCIN HYDROCHLORIDE 750 MG: 750 INJECTION, POWDER, LYOPHILIZED, FOR SOLUTION INTRAVENOUS at 00:37

## 2021-12-01 RX ADMIN — PROPOFOL 15 MCG/KG/MIN: 10 INJECTION, EMULSION INTRAVENOUS at 06:51

## 2021-12-01 RX ADMIN — CLINDAMYCIN PHOSPHATE 600 MG: 600 INJECTION, SOLUTION INTRAVENOUS at 18:53

## 2021-12-01 RX ADMIN — CALCIUM CHLORIDE 0.5 G: 100 INJECTION, SOLUTION INTRAVENOUS at 19:51

## 2021-12-01 RX ADMIN — METRONIDAZOLE 500 MG: 500 INJECTION, SOLUTION INTRAVENOUS at 00:30

## 2021-12-01 RX ADMIN — DEXTROSE AND SODIUM CHLORIDE: 5; 450 INJECTION, SOLUTION INTRAVENOUS at 04:48

## 2021-12-01 RX ADMIN — MIDAZOLAM HYDROCHLORIDE 2 MG: 2 INJECTION, SOLUTION INTRAMUSCULAR; INTRAVENOUS at 09:53

## 2021-12-01 RX ADMIN — MEROPENEM 1000 MG: 1 INJECTION, POWDER, FOR SOLUTION INTRAVENOUS at 17:47

## 2021-12-01 RX ADMIN — MUPIROCIN: 20 OINTMENT TOPICAL at 08:47

## 2021-12-01 RX ADMIN — Medication 15 ML: at 08:48

## 2021-12-01 RX ADMIN — ROCURONIUM BROMIDE 100 MG: 10 INJECTION INTRAVENOUS at 19:39

## 2021-12-01 RX ADMIN — MIDAZOLAM HYDROCHLORIDE 2 MG: 2 INJECTION, SOLUTION INTRAMUSCULAR; INTRAVENOUS at 12:48

## 2021-12-01 RX ADMIN — VANCOMYCIN HYDROCHLORIDE 750 MG: 750 INJECTION, POWDER, LYOPHILIZED, FOR SOLUTION INTRAVENOUS at 12:14

## 2021-12-01 RX ADMIN — SODIUM CHLORIDE 1000 ML: 9 INJECTION, SOLUTION INTRAVENOUS at 05:46

## 2021-12-01 RX ADMIN — SODIUM CHLORIDE 5.88 UNITS/HR: 9 INJECTION, SOLUTION INTRAVENOUS at 04:20

## 2021-12-01 RX ADMIN — ALBUMIN (HUMAN) 12.5 G: 12.5 SOLUTION INTRAVENOUS at 19:53

## 2021-12-01 RX ADMIN — SODIUM CHLORIDE, PRESERVATIVE FREE 10 ML: 5 INJECTION INTRAVENOUS at 10:17

## 2021-12-01 RX ADMIN — NOREPINEPHRINE BITARTRATE 2 MCG/MIN: 1 INJECTION INTRAVENOUS at 12:37

## 2021-12-01 RX ADMIN — FENTANYL CITRATE 50 MCG: 0.05 INJECTION, SOLUTION INTRAMUSCULAR; INTRAVENOUS at 07:41

## 2021-12-01 RX ADMIN — INSULIN HUMAN 10 UNITS: 100 INJECTION, SOLUTION PARENTERAL at 20:41

## 2021-12-01 RX ADMIN — PROPOFOL 30 MCG/KG/MIN: 10 INJECTION, EMULSION INTRAVENOUS at 15:49

## 2021-12-01 RX ADMIN — CALCIUM CHLORIDE 0.5 G: 100 INJECTION, SOLUTION INTRAVENOUS at 19:53

## 2021-12-01 RX ADMIN — ROCURONIUM BROMIDE 50 MG: 10 INJECTION INTRAVENOUS at 20:38

## 2021-12-01 RX ADMIN — SODIUM CHLORIDE: 9 INJECTION, SOLUTION INTRAVENOUS at 16:48

## 2021-12-01 RX ADMIN — FAMOTIDINE 20 MG: 10 INJECTION, SOLUTION INTRAVENOUS at 08:48

## 2021-12-01 RX ADMIN — SODIUM CHLORIDE: 9 INJECTION, SOLUTION INTRAVENOUS at 10:10

## 2021-12-01 RX ADMIN — INSULIN LISPRO 6 UNITS: 100 INJECTION, SOLUTION INTRAVENOUS; SUBCUTANEOUS at 11:26

## 2021-12-01 RX ADMIN — MEROPENEM 1000 MG: 1 INJECTION, POWDER, FOR SOLUTION INTRAVENOUS at 02:50

## 2021-12-01 RX ADMIN — SODIUM CHLORIDE 7.95 UNITS/HR: 900 INJECTION, SOLUTION INTRAVENOUS at 18:56

## 2021-12-01 RX ADMIN — Medication 10 ML: at 08:47

## 2021-12-01 RX ADMIN — MORPHINE SULFATE 2 MG: 2 INJECTION, SOLUTION INTRAMUSCULAR; INTRAVENOUS at 03:58

## 2021-12-01 RX ADMIN — CLINDAMYCIN PHOSPHATE 600 MG: 600 INJECTION, SOLUTION INTRAVENOUS at 05:45

## 2021-12-01 RX ADMIN — MEROPENEM 1000 MG: 1 INJECTION, POWDER, FOR SOLUTION INTRAVENOUS at 10:16

## 2021-12-01 RX ADMIN — CLINDAMYCIN PHOSPHATE 600 MG: 600 INJECTION, SOLUTION INTRAVENOUS at 11:29

## 2021-12-01 ASSESSMENT — PULMONARY FUNCTION TESTS
PIF_VALUE: 14
PIF_VALUE: 14
PIF_VALUE: 17
PIF_VALUE: 23
PIF_VALUE: 5
PIF_VALUE: 40
PIF_VALUE: 14
PIF_VALUE: 19
PIF_VALUE: 19
PIF_VALUE: 18
PIF_VALUE: 41
PIF_VALUE: 17
PIF_VALUE: 17
PIF_VALUE: 21
PIF_VALUE: 35
PIF_VALUE: 19
PIF_VALUE: 17
PIF_VALUE: 20
PIF_VALUE: 35
PIF_VALUE: 19
PIF_VALUE: 41
PIF_VALUE: 18
PIF_VALUE: 20
PIF_VALUE: 18
PIF_VALUE: 17
PIF_VALUE: 13
PIF_VALUE: 19
PIF_VALUE: 12
PIF_VALUE: 19
PIF_VALUE: 16
PIF_VALUE: 19
PIF_VALUE: 19
PIF_VALUE: 17
PIF_VALUE: 17
PIF_VALUE: 16
PIF_VALUE: 19
PIF_VALUE: 18
PIF_VALUE: 13
PIF_VALUE: 18
PIF_VALUE: 26
PIF_VALUE: 12
PIF_VALUE: 13
PIF_VALUE: 19
PIF_VALUE: 39
PIF_VALUE: 7
PIF_VALUE: 18
PIF_VALUE: 18
PIF_VALUE: 26
PIF_VALUE: 13
PIF_VALUE: 21
PIF_VALUE: 18
PIF_VALUE: 14
PIF_VALUE: 19
PIF_VALUE: 17
PIF_VALUE: 18
PIF_VALUE: 21
PIF_VALUE: 16
PIF_VALUE: 24
PIF_VALUE: 17
PIF_VALUE: 24
PIF_VALUE: 19
PIF_VALUE: 18
PIF_VALUE: 18
PIF_VALUE: 19
PIF_VALUE: 19
PIF_VALUE: 13
PIF_VALUE: 19
PIF_VALUE: 28
PIF_VALUE: 18
PIF_VALUE: 17
PIF_VALUE: 22
PIF_VALUE: 16
PIF_VALUE: 13
PIF_VALUE: 25
PIF_VALUE: 21
PIF_VALUE: 17
PIF_VALUE: 18
PIF_VALUE: 19
PIF_VALUE: 21
PIF_VALUE: 19
PIF_VALUE: 18
PIF_VALUE: 13
PIF_VALUE: 19
PIF_VALUE: 19
PIF_VALUE: 38
PIF_VALUE: 18
PIF_VALUE: 19
PIF_VALUE: 16
PIF_VALUE: 32
PIF_VALUE: 18
PIF_VALUE: 19
PIF_VALUE: 28
PIF_VALUE: 13
PIF_VALUE: 18
PIF_VALUE: 17
PIF_VALUE: 20
PIF_VALUE: 19
PIF_VALUE: 19
PIF_VALUE: 27
PIF_VALUE: 13
PIF_VALUE: 22
PIF_VALUE: 18
PIF_VALUE: 19
PIF_VALUE: 21
PIF_VALUE: 19
PIF_VALUE: 24
PIF_VALUE: 18
PIF_VALUE: 26
PIF_VALUE: 32
PIF_VALUE: 18
PIF_VALUE: 19
PIF_VALUE: 24
PIF_VALUE: 17
PIF_VALUE: 41
PIF_VALUE: 26
PIF_VALUE: 19
PIF_VALUE: 22

## 2021-12-01 ASSESSMENT — PAIN SCALES - GENERAL
PAINLEVEL_OUTOF10: 0
PAINLEVEL_OUTOF10: 4
PAINLEVEL_OUTOF10: 4
PAINLEVEL_OUTOF10: 0
PAINLEVEL_OUTOF10: 3
PAINLEVEL_OUTOF10: 0
PAINLEVEL_OUTOF10: 0

## 2021-12-01 NOTE — PROGRESS NOTES
Dr. Lucas  from surgery at bedside to assess wound. Decision made to transfer to [facility] if bed available.

## 2021-12-01 NOTE — PROGRESS NOTES
Assessment complete as per flow sheets. Sinus rhythm on cardiac monitor. BP on soft side, otherwise VSS. Patient is intubated and sedated, on mechanical ventilation. Vent settings of AC/VC, 450 / 14 / 50% / +5. ETT #8 @ 25 cm lip. Sedation and other gtts infusing as per MAR;  Propofol gtt infusing at 20 mcg/kg/min. Insulin gtt infusing as per MAR (hourly glucose checks and gtt adjustments are ongoing). 0.9 NS infusing at 150 ml/hr. Surgical site to perineum was packed in OR and left LYLY, this area has moderate serosanguinous drainage noted. Patient repositioned onto left side and abd pad placed to surgical site. Patient voids per walsh catheter, site is patent and secured. UO is yellow and hazy in appearance. All lines and monitoring appears WNL. Dressings are C/D/I. Meds as per MAR. Safety measures in place and will continue to monitor.

## 2021-12-01 NOTE — CONSULTS
Initial Pulmonary & Critical Care Consult Note      Reason for Consult: Westley gangrene   Requesting Physician: Kacey Vega. Sajan Teague    Subjective:   CHIEF COMPLAINT / HPI:               Justina Lorenzo is a 63 y.o. male with PMH as below notable for T2DM who presented with groin swelling. Patient reported that 2 weeks ago he notice a abscess near his anus, however reported in the last 2 days the area has increasing in size and warmth and extended to his scrotum. He reported poor compliance with his diabetes medication (metformin).      On admission the patient was hypotensive, WBC 18, Bicarb 20, Anion gap 22, Glucose 679, elevated Bhydroxybutirate. General surgery, urology and podiatry were consulted. Vancomycin was started, 3 L of IV fluids were given and the patient was placed in DKA protocol. Anion gap closed twice. The patient was taken for surgery debridement, was kept intubated due to possible multiple surgery interventions. Patient was transferred to the [facility] for colorectal and plastic surgery management.      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 Past Medical History:  No past medical history on file. Past Surgical History:        Procedure Laterality Date    ANKLE FRACTURE SURGERY       Current Medications:     norepinephrine        propofol        sodium chloride flush  5-40 mL IntraVENous 2 times per day    [START ON 12/2/2021] pantoprazole  40 mg IntraVENous Daily    meropenem  1,000 mg IntraVENous Q8H    clindamycin (CLEOCIN) IV  600 mg IntraVENous Q8H    [START ON 12/2/2021] vancomycin  1,250 mg IntraVENous Q18H        Social History:     reports that he has been smoking. He has never used smokeless tobacco. He reports current alcohol use. He reports that he does not use drugs. Family History:   No family history on file.     REVIEW OF SYSTEMS:    A 10 point review of systems was conducted, significant findings as noted in HPI.    Objective:   PHYSICAL EXAM:    Vitals:   T-max:  Patient Vitals for the past 8 hrs:   BP Temp Temp src Pulse Resp SpO2   12/01/21 1800 107/63 -- -- 68 -- 100 %   12/01/21 1745 (!) 114/58 -- -- 70 -- 99 %   12/01/21 1730 (!) 111/57 -- -- 68 -- 100 %   12/01/21 1715 (!) 118/59 -- -- 70 -- 100 %   12/01/21 1700 (!) 119/59 -- -- 69 -- 100 %   12/01/21 1645 120/61 -- -- 71 -- 100 %   12/01/21 1630 (!) 111/57 -- -- 69 -- 100 %   12/01/21 1615 114/60 -- -- 70 -- 100 %   12/01/21 1600 (!) 97/52 -- -- 73 -- 100 %   12/01/21 1545 (!) 85/53 -- -- 76 11 97 %   12/01/21 1543 -- -- -- 76 19 99 %   12/01/21 1541 -- 97.2 °F (36.2 °C) Axillary 74 21 99 %     No intake or output data in the 24 hours ending 12/01/21 1818    Physical Examination:      Physical Exam  Constitutional:       Comments: Patient is sedated and intubated. HENT:      Head: Normocephalic and atraumatic. Eyes:      Conjunctiva/sclera: Conjunctivae normal.      Pupils: Pupils are equal, round, and reactive to light. Cardiovascular:      Rate and Rhythm: Normal rate and regular rhythm. Pulses: Normal pulses. Heart sounds: Normal heart sounds. Pulmonary:      Comments: Patient intubated. Mechanical breath sounds present bilateral.  Abdominal:      General: Abdomen is flat. Bowel sounds are normal.      Palpations: Abdomen is soft. Skin:     Coloration: Skin is pale. Comments: Grade 2-3 ulcer in his left foot. Deep groin ulcer with dressings. Neurological:      Comments: Patient is sedated.     Vent Settings: Vent Mode: AC/PRVC Rate Set: 14 bmp/Vt Ordered: 450 mL/ /FiO2 : 40 %  PEEP5  Recent Labs     12/01/21  0430   PHART 7.413   ORS4BMW 39.2   PO2ART 117.3*       IV:   sodium chloride 25 mL (12/01/21 1743)    sodium chloride 100 mL/hr at 12/01/21 1648    norepinephrine 7 mcg/min (12/01/21 1806)    propofol 30 mcg/kg/min (12/01/21 1549)    fentaNYL 25 mcg/hr (12/01/21 1741)    dextrose      insulin (HUMAN R) non-weight based infusion         LABS:    CBC:   Recent Labs     11/30/21  1522 12/01/21  0416   WBC 18.4* 11.2*   HGB 13.3* 10.1*   HCT 40.7 30.1*    201   MCV 90.6 89.1     Renal:    Recent Labs     11/30/21  2219 12/01/21  0416 12/01/21  0830    139 139   K 4.3 4.1 4.7    108 110   CO2 22 23 22   BUN 49* 48* 50*   CREATININE 0.9 0.9 0.8   GLUCOSE 283* 182* 155*   CALCIUM 8.0* 7.7* 7.4*   MG 2.10 2.10 2.00   PHOS 3.0 2.2* 2.4*   ANIONGAP 11 8 7     Hepatic:   Recent Labs     11/30/21  1522   AST 7*   ALT 11   BILITOT 0.9   PROT 6.4   LABALBU 2.7*   ALKPHOS 261*     Troponin:   Recent Labs     11/30/21 2219   TROPONINI <0.01     BNP: No results for input(s): BNP in the last 72 hours. Lipids: No results for input(s): CHOL, HDL in the last 72 hours. Invalid input(s): LDLCALCU, TRIGLYCERIDE  ABGs:    Recent Labs     12/01/21  0430   PHART 7.413   EAS4UNO 39.2   PO2ART 117.3*   XKL2LQZ 24.5   BEART -0.1   [id number] 98.3   SER2AYA 25.7       INR:   Recent Labs     12/01/21  0416   INR 1.08     Lactate: No results for input(s): LACTATE in the last 72 hours. Cultures:  -----------------------------------------------------------------  RAD:   VL DUP LOWER EXTREMITY ARTERIES BILATERAL    (Results Pending)         Assessment/Plan   Mirza aJne is a 63 y.o. male with PMH as below notable for T2DM who presented with groin swelling. 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      Neuro/Sedation:  · Sedated and intubated  · Propofol  30 mcg     CV:  · Hypotensive stable and afebrile  · SBP between 90-100s  · Levophed 2 mcg     Respiratory:   · On ventilation  ·  SpO2 99% on MV  · Vent Settings: Vent Mode: AC/PRVC Rate Set: 14 bmp/Vt Ordered: 450 mL/ /FiO2 : 40 % PEEP5      ID:  Westley gangrene   Presented as abscess near his anus that progressed to his scrotum.  CT scan showed extensive soft tissue gas related to Westley gangrene with severe enlargement of the scrotum and soft tissue gas extending into the left buttock, perineum, groins, mons pubis, and abdominal wall. There is additional muscle or fascial involvement on the left as above.  Of note, the testes are incompletely evaluated but appear within normal limits. WBC 18.  Surgery performed debridement.    -Continue Vanc, Merrem, Clindamycin  - mL/hr  -F/u BCxs and wound cxs  -Colorectal surgery consulted   -Plastic surgery consulted     Renal:  · Voiding catheter        GI:   · Diet NPO  · Prophylaxis protonix 40 mg        Endocrine:  T2DM  Last A1c 13.5(11/30/2021)  -Insulin drip    -hypoglicemia protocol   -f/u BG levels       Code Status: Full Code  FEN: Diet NPO  PPX: SCDs  DISPO: ICU     Zhao Sahu MD, PGY-1  12/01/21  6:18 PM    This patient will be staffed and discussed with  Tanika Gomez MD.

## 2021-12-01 NOTE — CONSULTS
Patient is being seen at the request of Dr. Jimena Cortes for a consultation for DKA and scrotal cellulitis    HISTORY OF PRESENT ILLNESS: This is a 73-year-old male with diabetes who presented to the emergency department on 11/29/2021 with a several day history of a scrotal wound infection. 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 He also was found to be in DKA and was treated with an insulin infusion. He was admitted postoperatively to the ICU on a ventilator. He has had hypotension in the ICU. PAST MEDICAL HISTORY:  Diabetes    PAST SURGICAL HISTORY:  Past Surgical History:   Procedure Laterality Date    ANKLE FRACTURE SURGERY         FAMILY HISTORY:  Unable to obtain because the patient is non-communicative     SOCIAL HISTORY:   reports that he has been smoking.  He has never used smokeless tobacco.    Scheduled Meds:   meropenem  1,000 mg IntraVENous Q8H    clindamycin (CLEOCIN) IV  600 mg IntraVENous Q6H    sodium chloride  1,000 mL IntraVENous Once    lidocaine 1 % injection  5 mL IntraDERmal Once    sodium chloride flush  5-40 mL IntraVENous 2 times per day    enoxaparin  40 mg SubCUTAneous Daily    chlorhexidine  15 mL Mouth/Throat BID    famotidine (PEPCID) injection  20 mg IntraVENous BID    vancomycin  750 mg IntraVENous Q12H     Continuous Infusions:   sodium chloride      dextrose      dextrose 5 % and 0.45 % NaCl 150 mL/hr at 12/01/21 0448    norepinephrine      vasopressin (Septic Shock) infusion      propofol 15 mcg/kg/min (12/01/21 0510)    insulin 1.52 Units/hr (12/01/21 0605)     PRN Meds:  fentanNYL, sodium chloride flush, sodium chloride, glucose, glucagon (rDNA), dextrose, potassium chloride, magnesium sulfate, sodium phosphate IVPB **OR** sodium phosphate IVPB **OR** sodium phosphate IVPB, polyethylene glycol, dextrose 5 % and 0.45 % NaCl, morphine, dextrose    ALLERGIES:  Patient has No Known Allergies. REVIEW OF SYSTEMS:  Unable to obtain because the patient is non-communicative     PHYSICAL EXAM:  Blood pressure (!) 88/53, pulse 77, temperature 97 °F (36.1 °C), temperature source Axillary, resp. rate 12, height 5' 11\" (1.803 m), weight 163 lb 3.2 oz (74 kg), SpO2 99 %.' on VENT  General: intubated, ill appearing    ENT: Pharynx with ETT. Resp: No crackles. No wheezing. CV: S1, S2. No edema  GI: NT, ND, +BS  Skin: Warm and dry. Neuro: PERRL. Sedated, not following commands. Patellar reflexes are symmetric     LABS:  CBC:   Recent Labs     11/30/21  1522 12/01/21  0416   WBC 18.4* 11.2*   HGB 13.3* 10.1*   HCT 40.7 30.1*   MCV 90.6 89.1    201     BMP:   Recent Labs     11/30/21  1522 11/30/21  2219 12/01/21  0416   * 140 139   K 4.1 4.3 4.1   CL 89* 107 108   CO2 20* 22 23   PHOS  --  3.0 2.2*   BUN 53* 49* 48*   CREATININE 1.2 0.9 0.9     LIVER PROFILE:   Recent Labs     11/30/21  1522   AST 7*   ALT 11   BILITOT 0.9   ALKPHOS 261*     PT/INR: No results for input(s): PROTIME, INR in the last 72 hours. APTT: No results for input(s): APTT in the last 72 hours. UA:No results for input(s): NITRITE, COLORU, PHUR, LABCAST, WBCUA, RBCUA, MUCUS, TRICHOMONAS, YEAST, BACTERIA, CLARITYU, SPECGRAV, LEUKOCYTESUR, UROBILINOGEN, BILIRUBINUR, BLOODU, GLUCOSEU, AMORPHOUS in the last 72 hours. Invalid input(s): KETONESU  Recent Labs     12/01/21  0430   PHART 7.413   RID5YRO 39.2   PO2ART 117.3*     Microbiology:  11/30/2021 tissue culture sent  11/30/2021 blood sent  11/30/2021 SARS-CoV-2 negative    Imaging:  Chest imaging was reviewed by me and showed  CXR 12/1/2021 ETT okay     Abdominal CT 11/30/2021  1.  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  There is   additional muscle or fascial involvement on the left as above.  Of note, the   testes are incompletely evaluated but appear within normal limits. 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 The ventilator was adjusted by me at the bedside for unstable, life threatening respiratory failure  IV Propofol for sedation, target RASS -2, with daily SAT  Fentanyl and Versed PRN, gtt as needed  Head of bed 30 degrees or higher at all times  Daily SBT per protocol   Merrem, vancomycin and clindamycin day #1  Levophed for map of 65    Insulin gtt to off, M-SSI  Central access    Inhaled bronchodilators  Nutrition: Tube feeding if not extubated   Prophylaxis: DVT - lovenox; Mupirocin; Pepcid    Total critical care time caring for this patient with life threatening, unstable organ failure, including direct patient contact, management of life support systems, review of data including imaging and labs, discussions with other team members and physicians is 33 minutes so far today, excluding procedures.

## 2021-12-01 NOTE — PROGRESS NOTES
EOS report and transfer of care to [facility]. Patient resting in bed, stable condition at hand off.

## 2021-12-01 NOTE — PLAN OF CARE
Problem: Falls - Risk of:  Goal: Will remain free from falls  Description: Will remain free from falls  Outcome: Ongoing  Goal: Absence of physical injury  Description: Absence of physical injury  Outcome: Ongoing     Problem: Skin Integrity:  Goal: Will show no infection signs and symptoms  Description: Will show no infection signs and symptoms  Outcome: Ongoing  Goal: Absence of new skin breakdown  Description: Absence of new skin breakdown  Outcome: Ongoing     Problem: ABCDS Injury Assessment  Goal: Absence of physical injury  Outcome: Ongoing     Problem: SAFETY  Goal: Free from accidental physical injury  Outcome: Ongoing  Goal: Free from intentional harm  Outcome: Ongoing     Problem: DAILY CARE  Goal: Daily care needs are met  Outcome: Ongoing     Problem: PAIN  Goal: Patient's pain/discomfort is manageable  Outcome: Ongoing     Problem: SKIN INTEGRITY  Goal: Skin integrity is maintained or improved  Outcome: Ongoing     Problem: KNOWLEDGE DEFICIT  Goal: Patient/S.O. demonstrates understanding of disease process, treatment plan, medications, and discharge instructions.   Outcome: Ongoing     Problem: DISCHARGE BARRIERS  Goal: Patient's continuum of care needs are met  Outcome: Ongoing

## 2021-12-01 NOTE — CONSULTS
Pharmacy Note  Vancomycin Consult    Sumanth Mulligan is a 63 y.o. male started on Vancomycin for SSTI/intra-abdominal infection; consult received from Dr. Regina Kumar to manage therapy. Also receiving the following antibiotics: cefepime, metronidazole. Patient Active Problem List   Diagnosis    DKA, type 2, not at goal [facility])     Allergies:  Patient has no known allergies. Recent Labs     11/30/21  1522 11/30/21  2219   BUN 53* 49*   CREATININE 1.2 0.9   WBC 18.4*  --        Intake/Output Summary (Last 24 hours) at 11/30/2021 2332  Last data filed at 11/30/2021 2134  Gross per 24 hour   Intake 2600 ml   Output 300 ml   Net 2300 ml     Ht Readings from Last 1 Encounters:   11/30/21 5' 11\" (1.803 m)        Wt Readings from Last 1 Encounters:   11/30/21 161 lb (73 kg)       Body mass index is 22.45 kg/m². Estimated Creatinine Clearance: 87 mL/min (based on SCr of 0.9 mg/dL). Goal Trough Level: 15-20 mcg/mL  Goal AUC: 400-600    Assessment/Plan:  Will initiate Vancomycin with a one time loading dose of 1250 mg x1, followed by 750 mg IV every 12 hours. Per Pk-insight:   NMI51,WT: 544 mg/L.hr  Probability of AUC24 > 400: 59 %  Ctrough,ss: 13.9 mg/L  Probability of Ctrough,ss > 20: 22 %  Probability of nephrotoxicity (Lodise TONY 2009): 9 %    Next trough: 12/1 @ 2330    Thank you for the consult. Will continue to follow.     Andie Monson, WilfridD, [facility], 11/30/2021 11:33 PM

## 2021-12-01 NOTE — CONSULTS
General Surgery   Resident Consult Note    Reason for Consult: Shayla's gangrene    History of Present Illness:   Zachariah Mcmahan is a 63 y.o. male with history of diabetes who presented to the ED on 11/29/2021 with a several day history of a scrotal wound infection. 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 He also was found to be in DKA and was treated with an insulin infusion. He was admitted postoperatively to the ICU on a ventilator, peristently hypotensive The patient was transferred to [facility] for higher level of care and management by a multidisciplinary team. The patient is currently intubated, sedated, on low dose levo, unable to answer questions. Past Medical History:    No past medical history on file. Past Surgical History:        Procedure Laterality Date    ANKLE FRACTURE SURGERY         Allergies:  Patient has no known allergies. Medications:   Home Meds  No current facility-administered medications on file prior to encounter.      Current Outpatient Medications on File Prior to Encounter   Medication Sig Dispense Refill    metFORMIN (GLUCOPHAGE) 500 MG tablet Take 500 mg by mouth daily (with breakfast)      meloxicam (MOBIC) 15 MG tablet Take 1 tablet by mouth daily 30 tablet 3       Current Meds  norepinephrine (LEVOPHED) 1 MG/ML injection,   propofol 1000 MG/100ML injection,   sodium chloride flush 0.9 % injection 5-40 mL, 2 times per day  sodium chloride flush 0.9 % injection 5-40 mL, PRN  0.9 % sodium chloride infusion, PRN  acetaminophen (TYLENOL) tablet 650 mg, Q6H PRN   Or  acetaminophen (TYLENOL) suppository 650 mg, Q6H PRN  0.9 % sodium chloride infusion, Continuous  norepinephrine (LEVOPHED) 16 mg in dextrose 5 % 250 mL infusion, Continuous  [START ON 12/2/2021] pantoprazole (PROTONIX) injection 40 mg, Daily  propofol injection, Titrated  meropenem (MERREM) 1,000 mg in sodium chloride 0.9 % 100 mL IVPB (mini-bag), Q8H  clindamycin (CLEOCIN) 600 mg in dextrose 5 % 50 mL IVPB, Q8H  fentaNYL (SUBLIMAZE) 1,000 mcg in sodium chloride 0.9% 100 mL infusion, Continuous  insulin glargine (LANTUS;BASAGLAR) injection pen 10 Units, Nightly  glucose (GLUTOSE) 40 % oral gel 15 g, PRN  dextrose 50 % IV solution, PRN  glucagon (rDNA) injection 1 mg, PRN  dextrose 5 % solution, PRN        Family History:   No family history on file. Social History:   TOBACCO:   reports that he has been smoking. He has never used smokeless tobacco.  ETOH:   reports current alcohol use. DRUGS:   reports no history of drug use. Review of Systems:     Constitutional: Negative. HENT: Negative. Eyes: Negative. Respiratory: Negative. Cardiovascular: Negative. Gastrointestinal: Negative. Genitourinary: Negative. Musculoskeletal: Negative. Skin: Negative. Endocrine: Negative. Allergic/Immunologic: Negative. Neurological: Negative. Hematological: Negative. Psychiatric/Behavioral: Negative.     Physical exam:    Vitals:    12/01/21 1543   Pulse: 76   Resp: 19   SpO2: 99%       General appearance: sedated  HEENT: Normocephalic, atraumatic; EOMI; moist mucous membranes  Neck: trachea midline, no JVD, no lymphadenopathy  Chest/Lungs: intubated on MV  Cardiovascular: Regular rate and rhythm; perfusing extremities  Abdomen: soft, non-tender, non-distended, no guarding/rigidity, suprapubic palpable crepitus   Skin: warm and dry, no rashes  Extremities: no edema, no cyanosis  :                  Neuro: intubated, sedated    Labs:    CBC:   Recent Labs     11/30/21  1522 12/01/21  0416   WBC 18.4* 11.2*   HGB 13.3* 10.1*   HCT 40.7 30.1*   MCV 90.6 89.1    201     BMP:   Recent Labs     11/30/21  2219 12/01/21  0416 12/01/21  0830    139 139   K 4.3 4.1 4.7    108 110   CO2 22 23 22   PHOS 3.0 2.2* 2.4*   BUN 49* 48* 50*   CREATININE 0.9 0.9 0.8     Liver Profile:   Lab Results   Component Value Date    AST 7 11/30/2021    ALT 11 11/30/2021    BILITOT 0.9 11/30/2021    ALKPHOS 261 11/30/2021   No results found for: CHOL, HDL, TRIG  PT/INR:   Recent Labs     12/01/21  0416   PROTIME 12.3   INR 1.08         Imaging:   No orders to display         Assessment/Plan:  Justina Lorenzo is a 63 y.o. male with history of diabetes, Shayla's gangrene who is transfer from [address] [place] [address] broad spectrum IV abx  -continue pressors for MAP>65, wean as tolerated  -Will likely need further debridement and ultimate plastics/reconstructive intervention   -will need better glucose control  -balance of care per primary team     Lorelei Stevenson MD, PGY-1  12/01/21 5:52 PM  Pager: [phone]    I have seen, examined, and reviewed the patients chart. I agree with the residents assessment and have made appropriate changes. The patient has Sq gas not on CT scan on exam involving the anterior abdominal wall and flanks. He will need further aggressive debridement tonight. The details for the extent of debridement were discussed with the wife and consent was obtained. Of further concern is small amount of air noted in the retroperitoneum along the crura.      Eros Quigley

## 2021-12-01 NOTE — H&P
ICU HISTORY AND [facility]25 [employer]:   ICU Day:                                                          Code:Full Code  Admit Date: 12/1/2021  PCP: No primary care provider on file. CC: Westley gangrene    HISTORY OF PRESENT ILLNESS:   Farhad Ariza is a 63 y.o. male with PMH as below notable for T2DM who presented with groin swelling. Patient reported that 2 weeks ago he notice a abscess near his anus, however reported in the last 2 days the area has increasing in size and warmth and extended to his scrotum. He reported poor compliance with his diabetes medication (metformin). On admission the patient was hypotensive, WBC 18, Bicarb 20, Anion gap 22, Glucose 679, elevated Bhydroxybutirate. General surgery, urology and podiatry were consulted. Vancomycin was started, 3 L of IV fluids were given and the patient was placed in DKA protocol. Anion gap closed twice. The patient was taken for surgery debridement, was kept intubated due to possible multiple surgery interventions. Patient was transferred to the [facility] for colorectal and plastic surgery management. 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 PAST HISTORY:   No past medical history on file. Past Surgical History:   Procedure Laterality Date    ANKLE FRACTURE SURGERY         SocialHistory:    Patient lives at home with his family. Patient reported 80 pack year smoking history and continue smoking. Family History:  No family history on file. MEDICATIONS:     No current facility-administered medications on file prior to encounter.      Current Outpatient Medications on File Prior to Encounter   Medication Sig Dispense Refill    metFORMIN (GLUCOPHAGE) 500 MG tablet Take 500 mg by mouth daily (with breakfast)      meloxicam (MOBIC) 15 MG tablet Take 1 tablet by mouth daily 30 tablet 3         Scheduled Meds:   norepinephrine        propofol sodium chloride      sodium chloride      norepinephrine      propofol         DATA:       Labs:  CBC:   Recent Labs     11/30/21  1522 12/01/21  0416   WBC 18.4* 11.2*   HGB 13.3* 10.1*   HCT 40.7 30.1*    201       BMP:   Recent Labs     11/30/21  2219 12/01/21  0416 12/01/21  0830    139 139   K 4.3 4.1 4.7    108 110   CO2 22 23 22   BUN 49* 48* 50*   CREATININE 0.9 0.9 0.8   GLUCOSE 283* 182* 155*   PHOS 3.0 2.2* 2.4*     LFT's:   Recent Labs     11/30/21  1522   AST 7*   ALT 11   BILITOT 0.9   ALKPHOS 261*     Troponin:   Recent Labs     11/30/21 2219   TROPONINI <0.01     BNP:No results for input(s): BNP in the last 72 hours. ABGs:   Recent Labs     12/01/21  0430   PHART 7.413   GXO4MDT 39.2   PO2ART 117.3*     INR:   Recent Labs     12/01/21 0416   INR 1.08       U/A:No results for input(s): NITRITE, COLORU, PHUR, LABCAST, WBCUA, RBCUA, MUCUS, TRICHOMONAS, YEAST, BACTERIA, CLARITYU, SPECGRAV, LEUKOCYTESUR, UROBILINOGEN, BILIRUBINUR, BLOODU, GLUCOSEU, AMORPHOUS in the last 72 hours. Invalid input(s): Beverley Post    No orders to display         ASSESSMENT AND PLAN:   Janell Crandall is a 63 y.o. male with PMH as below notable for T2DM who presented with groin swelling. 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 Neuro/Sedation:  · Sedated and intubated  · Propofol  30 mcg    CV:   Hypotensive stable and afebrile   SBP between 90-100s   Levophed 2 mcg    Respiratory:    On ventilation    SpO2 99% on MV   Vent Settings: Vent Mode: AC/PRVC Rate Set: 14 bmp/Vt Ordered: 450 mL/ /FiO2 : 40 % PEEP5  Recent Labs     12/01/21  0430   PHART 7.413   CPT3NXF 39.2   PO2ART 117.3*       ID:  Westley gangrene   Presented as abscess near his anus that progressed to his scrotum.  CT scan showed extensive soft tissue gas related to Westley gangrene with severe enlargement of the scrotum and soft tissue gas extending into the left buttock, perineum, groins, mons pubis, and abdominal wall. There is additional muscle or fascial involvement on the left as above.  Of note, the testes are incompletely evaluated but appear within normal limits. WBC 18.  Surgery performed debridement.    -Continue Vanc, Merrem, Clindamycin  - mL/hr  -F/u BCxs and wound cxs  -Colorectal surgery consulted   -Plastic surgery consulted    Renal:   Voiding catheter      GI:   Diet NPO   Prophylaxis protonix 40 mg      Endocrine:  T2DM  Last A1c 13.5(11/30/2021)  -Insulin drip    -hypoglicemia protocol   -f/u BG levels     Code Status: Full Code  FEN: Diet NPO  PPX:  SCDs  DISPO: ICU    This patient will be staffed and discussed with Dr. Hany Davies  -----------------------------  Sadi Weber MD, PGY-1  12/1/2021  4:13 PM

## 2021-12-01 NOTE — H&P
Hospital Medicine History & Physical      PCP: No primary care provider on file. Date of Admission: 11/30/2021    Date of Service: Pt seen/examined on 11/30/2021    Chief Complaint: DKA, severe sepsis    History Of Present Illness:    63 y.o. male who presented to the hospital with a chief complaint scrotal infection on the left foot infection. Patient is a diabetic who is noncompliant with medications. When I saw the patient he was postop in the ICU and intubated. He apparently developed this scrotal infection a couple days ago and decided to come the emergency department today. 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 He was taken emergently to the operating room for emergent I&D. Past Medical History:    Diabetes mellitus type 2  Noncompliance to medical therapy    Past Surgical History:        Procedure Laterality Date    ANKLE FRACTURE SURGERY         Medications Prior to Admission:      Prior to Admission medications    Medication Sig Start Date End Date Taking? Authorizing Provider   metFORMIN (GLUCOPHAGE) 500 MG tablet Take 500 mg by mouth daily (with breakfast)   Yes Historical Provider, MD   meloxicam (MOBIC) 15 MG tablet Take 1 tablet by mouth daily 2/18/16   Quentin Bledsoe DO       Allergies:  Patient has no known allergies. Social History:      TOBACCO:   reports that he has been smoking. He has never used smokeless tobacco.  ETOH:   reports current alcohol use. Family History:      History reviewed. No pertinent family history. REVIEW OF SYSTEMS:   Could not obtain as patient was intubated    PHYSICAL EXAM:    /88   Pulse 96   Temp 98.1 °F (36.7 °C)   Resp 21   Ht 5' 11\" (1.803 m)   Wt 161 lb (73 kg)   SpO2 96%   BMI 22.45 kg/m²     General appearance:  No apparent distress, appears stated age and cooperative. HEENT:  Normal cephalic, atraumatic without obvious deformity.  Pupils equal, round, and reactive to light. Extra ocular muscles intact. Conjunctivae/corneas clear. Neck: Supple, with full range of motion. No jugular venous distention. Trachea midline. Respiratory:  Normal respiratory effort. Clear to auscultation, bilaterally without Rales/Wheezes/Rhonchi. Cardiovascular:  Regular rate and rhythm with normal S1/S2 without murmurs, rubs or gallops. Abdomen: Soft, non-tender, non-distended with normal bowel sounds. Musculoskeletal: Left foot lateral foot wound with exposed bone  Skin: Could not examine as patient had dressing and packing around perineal areas  Neurologic:  Neurovascularly intact without any focal sensory/motor deficits. Cranial nerves: II-XII intact, grossly non-focal.  Psychiatric:  Alert and oriented, thought content appropriate, normal insight  Capillary Refill: Brisk,< 3 seconds   Peripheral Pulses: +2 palpable, equal bilaterally       Labs:   Recent Labs     11/30/21  1522   WBC 18.4*   HGB 13.3*   HCT 40.7        Recent Labs     11/30/21  1522   *   K 4.1   CL 89*   CO2 20*   BUN 53*   CREATININE 1.2   CALCIUM 10.1     Recent Labs     11/30/21  1522   AST 7*   ALT 11   BILITOT 0.9   ALKPHOS 261*     No results for input(s): INR in the last 72 hours. No results for input(s): Cherre Gash in the last 72 hours. Urinalysis:    No results found for: [place], BACTERIA, RBCUA, BLOODU, SPECGRAV, GLUCOSEU    Radiology:     CT ABDOMEN PELVIS W IV CONTRAST Additional Contrast? None   Final Result   1. 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 There is   additional muscle or fascial involvement on the left as above. Of note, the   testes are incompletely evaluated but appear within normal limits.   Critical   results were called by Dr. Latasha Hugo MD to Dr. Belia Duran on 11/30/2021   at 19:31.   2. Minimal bilateral hydronephrosis is likely due to severe urinary bladder   distention. XR FOOT LEFT (MIN 3 VIEWS)   Final Result   Evidence of a septic joint involving the 5th metatarsal phalangeal joint with   associated osteomyelitis             ASSESSMENT:  Severe sepsis  Shayla's gangrene  Osteomyelitis of left foot  Diabetic ketoacidosis  Medication noncompliance  Acute respiratory failure    PLAN:  Status post I&D by general surgery and urology, will likely need to go back to the OR in 1 to 2 days for closure. -Expand antibiotic therapy and add clindamycin for antitoxin effect, will add meropenem and DC cefepime and Flagyl. Continue vancomycin.  -Insulin protocol, keep blood sugars less than 200  -We will likely consult podiatry as that left foot needs I&D as well  -Consult intensivist  -Order PICC for long term abx       DVT Prophylaxis: Lovenox  Diet: Diet NPO  Code Status: No Order    Dispo -continue care in ICU      Thank you for the opportunity to be involved in this patient's care.       (Please note that portions of this note were completed with a voice recognition program. Efforts were made to edit the dictations but occasionally words are mis-transcribed.)

## 2021-12-01 NOTE — PROGRESS NOTES
Rounding completed with Dr. Atul Sr and multidisciplinary team. POC, labs, lines and assessment reviewed.    - DKA orders discontinued, start medium dose SSI  - NS started at 75 ml/hr  - PICC to be placed  - Consult with surgery, if no surgery today start TF

## 2021-12-01 NOTE — CONSULTS
Department of Podiatry Consult Note  Resident       Reason for Consult: Left foot diabetic ulcer  Requesting Physician: Dr. Luisana Heard: Left foot diabetic ulcer with bone exposed    HISTORY OF PRESENT ILLNESS:    The patient is a 63 y.o. male with significant past medical history as listed below who is consulted podiatry for left foot diabetic ulcer with bone exposed. Patient presented to the hospital from [facility].  Patient is intubated and sedated and was not able to obtain history. Per [facility] chart review and nurse at bedside the patient presented to [facility] with scrotal infection and an infection of the left foot. Patient is a diabetic who is noncompliant with medications. At [facility] he was taken to the operating room for emergent I&D for Shayla's gangrene. It is unknown how long he has had the wound to his left foot for. Per chart review wound has been present for months. Past Medical History:    No past medical history on file. Past Surgical History:        Procedure Laterality Date    ANKLE FRACTURE SURGERY         Allergies:   Patient has no known allergies. Medications:   Home Meds  No current facility-administered medications on file prior to encounter.      Current Outpatient Medications on File Prior to Encounter   Medication Sig Dispense Refill    metFORMIN (GLUCOPHAGE) 500 MG tablet Take 500 mg by mouth daily (with breakfast)      meloxicam (MOBIC) 15 MG tablet Take 1 tablet by mouth daily 30 tablet 3       Current Meds  norepinephrine (LEVOPHED) 1 MG/ML injection,   propofol 1000 MG/100ML injection,   sodium chloride flush 0.9 % injection 5-40 mL, 2 times per day  sodium chloride flush 0.9 % injection 5-40 mL, PRN  0.9 % sodium chloride infusion, PRN  acetaminophen (TYLENOL) tablet 650 mg, Q6H PRN   Or  acetaminophen (TYLENOL) suppository 650 mg, Q6H PRN  0.9 % sodium chloride infusion, Continuous  norepinephrine (LEVOPHED) 16 mg in dextrose 5 % 250 mL infusion, Continuous  [START ON 12/2/2021] pantoprazole (PROTONIX) injection 40 mg, Daily  propofol injection, Titrated  meropenem (MERREM) 1,000 mg in sodium chloride 0.9 % 100 mL IVPB (mini-bag), Q8H  clindamycin (CLEOCIN) 600 mg in dextrose 5 % 50 mL IVPB, Q8H  fentaNYL (SUBLIMAZE) 1,000 mcg in sodium chloride 0.9% 100 mL infusion, Continuous  glucose (GLUTOSE) 40 % oral gel 15 g, PRN  dextrose 50 % IV solution, PRN  glucagon (rDNA) injection 1 mg, PRN  dextrose 5 % solution, PRN  [START ON 12/2/2021] vancomycin (VANCOCIN) 1,250 mg in dextrose 5 % 250 mL IVPB, Q18H  insulin regular (HUMULIN R;NOVOLIN R) 100 Units in sodium chloride 0.9 % 100 mL infusion, Continuous        Family History:   No family history on file. Social History:   TOBACCO:   reports that he has been smoking. He has never used smokeless tobacco.  ETOH:   reports current alcohol use. DRUGS:   reports no history of drug use. REVIEW OF SYSTEMS:    Unable to be obtained due to patient being intubated and sedated    PHYSICAL EXAM:      Vitals:    BP (!) 118/59   Pulse 70   Temp 97.2 °F (36.2 °C) (Axillary)   Resp 11   SpO2 100%     LABS:   Recent Labs     11/30/21  1522 12/01/21  0416   WBC 18.4* 11.2*   HGB 13.3* 10.1*   HCT 40.7 30.1*    201     Recent Labs     12/01/21  0830      K 4.7      CO2 22   PHOS 2.4*   BUN 50*   CREATININE 0.8     Recent Labs     11/30/21  1522 12/01/21  0416   PROT 6.4  --    INR  --  1.08         GENERAL: Patient is alert and oriented x3. No signs of acute distress noted. LOWER EXTREMITY EXAMINATION:  VASCULAR: DP and PT pulses are non-palpable 0/4 b/l. Upon hand-held Doppler examination DP signals were noted to be monophasic and PT signals were noted to be nondopplerable. CFT is sluggish to the digits of the foot b/l. Skin temperature is warm to cold from proximal to distal.  No edema noted. No pain with calf compression b/l.     NEUROLOGIC: Unable to be obtained secondary to patient being intubated and sedated    DERMATOLOGIC:   Left lower extremity:          Full-thickness ulceration noted to the lateral aspect of the left foot. Wound measures approximately 3.2 cm x 2 cm x 0.5 cm. Of note the fifth metatarsal is exposed. Periwound rubor noted. No purulent drainage was able to be expressed. No fluctuance or crepitus or malodor noted. Right LE soft tissue envelope is closed without ulceration or acute signs of infection. MUSCULOSKELETAL: Muscle strength is unable to be obtained secondary to patient being intubated and sedated. No pain with palpation of the foot or ankle b/l. No obvious biomechanical abnormalities. IMAGING:  XR LEFT FOOT 11/30/2021  Narrative   EXAMINATION:   THREE XRAY VIEWS OF THE LEFT FOOT       11/30/2021 1:44 pm       COMPARISON:   None.       HISTORY:   ORDERING SYSTEM PROVIDED HISTORY: wound   TECHNOLOGIST PROVIDED HISTORY:   Reason for exam:->wound   Reason for Exam: blood sugar problem, wound check on lateral portion of foot   Acuity: Acute   Type of Exam: Initial       FINDINGS:   Osseous destruction along the articular margins of the 5th   metatarsophalangeal joint with associated lucency in the soft tissues. .   There is no evidence of fracture or dislocation. . The remaining joint spaces   appear well maintained.  The remaining soft tissues are unremarkable.           Impression   Evidence of a septic joint involving the 5th metatarsal phalangeal joint with   associated osteomyelitis             ASSESSMENT/PLAN:   -Full-thickness ulceration; lateral left foot-Sands stage III  -Osteomyelitis; left foot  -Diabetes mellitus, type II  -History of noncompliance    -Patient seen and examined at bedside this p.m.  -Debated and sedated,  leukocytosis noted (WBC 11.2)  -ESR 81 and CRP ordered  -lactic acid 2.9  -HbA1c 13.5  -prealbumin ordered  -Blood cultures 1 & 2 obtained  -XR images reviewed, impression noted above  -Arterial duplex ordered  -Wound culture obtained we will follow up with results  -Continue IV antibiotics per primary team  -Using nail nippers, nails 1-5 bilaterally were debrided in thickness and length without incident.  -Left lower extremity dressed with Betadine soaked gauze, DSD, Ace with gentle compression  -Prevalon boots ordered. Patient is to wear at all times while in bed to prevent further deep tissue injury.  -Nonweightbearing to left lower extremity  -Weightbearing as tolerated to right lower extremity    DISPO: Full-thickness ulceration with osteomyelitis to left foot. Imaging reviewed. Continue broad-spectrum IV antibiotics. Even with known osteomyelitis the wound is stable at this time, will continue with local wound care. Patient will eventually require podiatric surgical intervention, but timing will depend on medical improvement and stability. The patient assessment and plan was discussed with Dr. Frederick Romeo DPM.    Elisa Garcia DPM   Podiatric Resident PGY1  Pager [phone] or [employer]  12/1/2021, 5:32 PM    Patient was seen and evaluated at bedside. Agree with residents assessment and treatment plan. Patient has a longstanding diabetic foot ulceration with osteomyelitis as there is exposed bone. This does appear to be chronic in nature and can be managed once patient is medically stabilized due to his sepsis from Shayla's gangrene. An arterial duplex has been ordered as I am concerned that this longstanding ulceration has not healed due to an adequate peripheral circulation. We'll continue to follow closely.   Frederick Romeo DPM

## 2021-12-01 NOTE — PROCEDURES
Mortimer Gails is a 63 y.o. male patient. No diagnosis found. No past medical history on file. Blood pressure 107/63, pulse 68, temperature 97.2 °F (36.2 °C), temperature source Axillary, resp. rate 11, SpO2 100 %. Insert Arterial Line    Date/Time: 12/1/2021 6:21 PM  Performed by: Juan Myers MD  Authorized by: Juan Myers MD   Consent: The procedure was performed in an emergent situation. Verbal consent obtained. Written consent obtained. Risks and benefits: risks, benefits and alternatives were discussed  Consent given by: spouse  Patient identity confirmed: arm band, provided demographic data and hospital-assigned identification number  Preparation: Patient was prepped and draped in the usual sterile fashion.   Indications: hemodynamic monitoring  Location: right radial    Sedation:  Patient sedated: yes  Sedatives: fentanyl and propofol    Saul's test normal: yes  Needle gauge: 20  Number of attempts: 1  Post-procedure: line sutured and dressing applied  Post-procedure CMS: unchanged and normal  Comments: EBL <5 mL          Juan Myers MD  12/1/2021

## 2021-12-01 NOTE — PROGRESS NOTES
Clinical Pharmacy Progress Note    Vancomycin - Management by Pharmacy    Consult Date(s): 12/1/21  Consulting Provider(s): ALEXANDER Vizcarra     Assessment / Plan    Skin and Soft Tissue Infection - Vancomycin   Concurrent Antimicrobials: Clindamycin and Merrem   Day of Vanc Therapy: day 1 [received 3 doses before consult]   Current Dosing Method: Bayesian-Guided AUC Dosing   Therapeutic Goal: 400-600 mg/L*hr   Current Dose / Frequency: 1250 mg every 18 hours   Plan / Rationale: estimated AUC  419mg/L.hr and trough of 11.6mg/L.  Will continue to monitor clinical condition and make adjustments to regimen as appropriate. Thank you for consulting Pharmacy! Soy Johnston, PharmD           Subjective/Objective: Mr. Tono Clemons is a 63 y.o. male  admitted for Necrotizing fasciitis . Pharmacy has been consulted to dose Vancomycin. Height:   Ht Readings from Last 1 Encounters:   11/30/21 5' 11\" (1.803 m)     Weight:   Wt Readings from Last 1 Encounters:   11/30/21 163 lb 3.2 oz (74 kg)         Level(s) / Doses:    Date Time Dose Level / Type of Level Interpretation                 Note: Serum levels collected for AUC-based dosing may be high if collected in close proximity to the dose administered. This is not necessarily an indicator of toxicity. Cultures & Sensitivities:    Date Site Micro Susceptibility / Result                 Labs / Ancillary Data:    Estimated Creatinine Clearance: 99 mL/min (based on SCr of 0.8 mg/dL). Recent Labs     11/30/21  1522 11/30/21  1522 11/30/21  2219 12/01/21  0416 12/01/21  0830   CREATININE 1.2   < > 0.9 0.9 0.8   BUN 53*   < > 49* 48* 50*   WBC 18.4*  --   --  11.2*  --     < > = values in this interval not displayed.

## 2021-12-01 NOTE — ANESTHESIA POSTPROCEDURE EVALUATION
Department of Anesthesiology  Postprocedure Note    Patient: Bigg Nelson  MRN: [id number]  YOB: 1955  Date of evaluation: 11/30/2021  Time:  10:08 PM     Procedure Summary     Date: 11/30/21 Room / Location: [address] [place] [address] / [facility]    Anesthesia Start: [place] Anesthesia Stop: 2206    Procedure: [address] [place] (N/A ) Diagnosis: (Shayla's Gangrene)    Surgeons: Keo Rice MD Responsible Provider: Braden Oliver MD    Anesthesia Type: general ASA Status: 4 - Emergent          Anesthesia Type: general    Arcadio Phase I:      Arcadio Phase II:      Last vitals: Reviewed and per EMR flowsheets.        Anesthesia Post Evaluation    Comments: Postoperative Anesthesia Note    Name:    Bigg Nelson  MRN:      [id number]    Patient Vitals in the past 12 hrs:  11/30/21 1800, BP:101/88, Pulse:96, Resp:21, SpO2:96 %  11/30/21 1730, BP:96/70, Pulse:93, Resp:29, SpO2:97 %  11/30/21 1700, BP:(!) 146/83, Pulse:114, Resp:24, SpO2:97 %  11/30/21 1630, BP:132/62, Pulse:95, Resp:27, SpO2:97 %  11/30/21 1621, Resp:21, Height:5' 11\" (1.803 m), Weight:161 lb (73 kg)  11/30/21 1620, BP:124/63, Temp:98.1 °F (36.7 °C), Pulse:102, SpO2:97 %  11/30/21 1600, BP:124/63, Pulse:105, SpO2:99 %     LABS:    CBC  Lab Results       Component                Value               Date/Time                  WBC                      18.4 (H)            11/30/2021 03:22 PM        HGB                      13.3 (L)            11/30/2021 03:22 PM        HCT                      40.7                11/30/2021 03:22 PM        PLT                      308                 11/30/2021 03:22 PM   RENAL  Lab Results       Component                Value               Date/Time                  NA                       131 (L)             11/30/2021 03:22 PM        K                        4.1                 11/30/2021 03:22 PM        CL                       89 (L)              11/30/2021 03:22 PM CO2                      20 (L)              11/30/2021 03:22 PM        BUN                      53 (H)              11/30/2021 03:22 PM        CREATININE               1.2                 11/30/2021 03:22 PM        GLUCOSE                  679 ([facility])            11/30/2021 03:22 PM   COAGS  No results found for: PROTIME, INR, APTT    Intake & Output:  In: 2600 (I.V.:2600)  Out: 300     Nausea & Vomiting:  N/A    Level of Consciousness:  Deep sedation    Pain Assessment:  N/A    Anesthesia Complications:  No apparent anesthetic complications. Ventilated for airway protection after extensive perineal debridement. No HD derangements. SUMMARY      Vital signs stable  OK to discharge from Stage I post anesthesia care.   Care transferred from Anesthesiology department on discharge from perioperative area

## 2021-12-01 NOTE — PROGRESS NOTES
11/30/21 2211   Vent Information   Vent Mode AC/VC   Vt Ordered 450 mL   Rate Set 14 bmp   Peak Flow 55 L/min   FiO2  50 %   SpO2 100 %   Sensitivity 3   PEEP/CPAP 5   Vent Patient Data   Peak Inspiratory Pressure 17 cmH2O   Mean Airway Pressure 8.4 cmH20   Rate Measured 15 br/min   Vt Exhaled 453 mL   Minute Volume 6.92 Liters   I:E Ratio 1:3.80   Patient Observation   Observations #8.0 ETT [email or web address]

## 2021-12-01 NOTE — PROGRESS NOTES
PRE-OP NOTE  Department of Surgery      Chief Complaint or Reason for Surgery: Necrotizing Fasciitis     Procedure: Wide excisional debridement of perineum, back, abdomen  Expected time: ASAP    Plan  1. Diet: NPO since midnight  2. IVF: NS @ 100  3. Antibiotics: Merrem, Vanc, Clinda scheduled  4. Labs to be drawn: Type and Screen, INR, Lactate, CBC, Renal, ABG - sent by RN @ 1830  5. Anesthesia: to see patient  6. Consent: signed and in chart  7. Pulmonary: CXR: 12/01 - NG and ETT in good position. Mild left basilar segmental atelectasis vs pneumonia.   8. Cardiac: EKG: not indicated due to emergent nature of surgery    2 U PRBC Prepared       Anam Jones DO  12/01/21  6:32 PM

## 2021-12-01 NOTE — CARE COORDINATION
Case Management Assessment  Initial Evaluation      Patient Name: Bigg Nelson  YOB: 1955  Diagnosis: Septicemia ([facility] [address] [A41.9]  DKA, type 2, not at goal [facility]) [E11.10]  Date / Time: 11/30/2021  3:12 PM    Admission status/Date:11/30/21  Chart Reviewed: Yes      Patient Interviewed: No   Family Interviewed:  Yes - Spouse      Hospitalization in the last 30 days:  No      Health Care Decision Maker :    Judy Luevano, Spouse [phone]    Met with: Pt spouse via TC  Interview conducted  (bedside/phone): TC    Current PCP:  None. Referral made [facility]     Financial  self pay  Precert required for SNF : Y, N          3 night stay required - Y, N    ADLS  Support Systems/Care Needs:  Spouse   Transportation: self  or spouse   Meal Preparation: self, spouse    Housing  Living Arrangements: apartment with spouse. Steps: 20+ SOFIA  Intent for return to present living arrangements: Yes  Identified Issues: no insurance     Home Care Information  Active with [address] : No Agency:(Services)     Passport/Waiver : No  :                      Phone Number:    Passport/Waiver Services: n/a          Durable Medical Equiptment   DME Provider:   Equipment:   Walker___Cane_X__RTS___ BSC___Shower Chair_X__Hospital Bed___W/C____Other________  02 at ____Liter(s)---wears(frequency)_______ HHN ___ CPAP___ BiPap___   N/A____      Home O2 Use :  No    If No for home O2---if presently on O2 during hospitalization:  No  if yes CM to follow for potential DC O2 need  Informed of need for care provider to bring portable home O2 tank on day of discharge for nursing to connect prior to leaving:   Not Indicated  Verbalized agreement/Understanding:   Not Indicated    Community Service Affiliation  Dialysis:  No    · Agency:  · Location:  · Dialysis Schedule:  · Phone:   · Fax: Other Community Services: none    DISCHARGE PLAN: Explained Case Management role/services.  Spoke with the Pt's wife via TC and the Pt does not have insurance coverage. She states that she talked to someone this am from the hospital about a medicaid application, however she cannot remember the name of the person. Spoke to Jonathan the [address] is over income for medicaid, however is submitting it anyway because it is such a small amount. Bonnie application completed. If Pt needs SNF placement, Jonathan states to contact her as she may be able to assist. Phone number is [phone]. Pt does not have a PCP. Referral made to [facility] [address] following. Pt will need M2B at d/c.

## 2021-12-01 NOTE — DISCHARGE INSTR - COC
Continuity of Care Form    Patient Name: Shana Barlow   :  1955  MRN:  [id number]    Admit date:  2021  Discharge date:  ***    Code Status Order: Full Code   Advance Directives:   Advance Care Flowsheet Documentation       Date/Time Healthcare Directive Type of Healthcare Directive Copy in [address] Agent's Name Healthcare Agent's Phone Number    21 No, patient does not have an advance directive for healthcare treatment -- -- -- -- --            Admitting Physician:  Guadalupe Caballero MD  PCP: No primary care provider on file. Discharging Nurse: [facility] Unit/Room#: 5564/5652-38  Discharging Unit Phone Number: ***    Emergency Contact:   Extended Emergency Contact Information  Primary Emergency Contact: Shea Hernandez  Address: [facility] [address]           [address] Phone: [phone]  Work Phone: [phone]  Mobile Phone: [phone]  Relation: Spouse  Secondary Emergency Contact: [place] Phone: [phone]  Work Phone: [phone]  Mobile Phone: [phone]  Relation: Brother/Sister   needed? No    Past Surgical History:  Past Surgical History:   Procedure Laterality Date    ANKLE FRACTURE SURGERY         Immunization History: There is no immunization history on file for this patient.     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    Acute respiratory failure with hypoxia (HCC) J96.01    Necrotizing fasciitis ([facility]) M72.6       Isolation/Infection:   Isolation            No Isolation          Patient Infection Status       None to display            Nurse Assessment:  Last Vital Signs: BP (!) 91/59   Pulse 79   Temp 98 °F (36.7 °C) (Axillary)   Resp 15   Ht 5' 11\" (1.803 m)   Wt 163 lb 3.2 oz (74 kg)   SpO2 99%   BMI 22.76 kg/m²     Last documented pain score (0-10 scale): Pain Level: 0  Last Weight:   Wt Readings from Last 1 Encounters:   11/30/21 163 lb 3.2 oz (74 kg)     Mental Status:  {IP PT MENTAL STATUS:20030}    IV Access:  { SAMANTHA IV ACCESS:861792506}    Nursing Mobility/ADLs:  Walking   {CHP DME CLSZ:255225729}  Transfer  {CHP DME WVOE:025425580}  Bathing  {CHP DME LOMX:550827660}  Dressing  {CHP DME EISU:431986484}  Toileting  {CHP DME XWJY:504572451}  Feeding  {CHP DME NFYJ:546075624}  Med Admin  {CHP DME IDWW:735719731}  Med Delivery   { SAMANTHA MED Delivery:227565015}    Wound Care Documentation and Therapy:  Wound 11/30/21 Foot Anterior; Left Full thickness (Active)   Wound Image   11/30/21 2245   Wound Etiology Diabetic 12/01/21 1200   Dressing Status Clean; Dry; Intact 12/01/21 1200   Wound Cleansed Cleansed with saline 11/30/21 2245   Dressing/Treatment Moist to moist; Roll gauze 12/01/21 1200   Dressing Change Due 12/01/21 12/01/21 1200   Wound Length (cm) 4 cm 11/30/21 2245   Wound Width (cm) 2 cm 11/30/21 2245   Wound Depth (cm) 1 cm 11/30/21 2245   Wound Surface Area (cm^2) 8 cm^2 11/30/21 2245   Wound Volume (cm^3) 8 cm^3 11/30/21 2245   Wound Assessment [place]/red; [facility] 12/01/21 1200   Drainage Amount Scant 12/01/21 1200   Drainage Description Thin 12/01/21 1200   Odor None 12/01/21 1200   Princess-wound Assessment Warm 12/01/21 1200   Margins Attached edges 12/01/21 1200   Number of days: 0        Elimination:  Continence: Bowel: {YES / VERONICA:40935}  Bladder: {YES / PY:94949}  Urinary Catheter: {Urinary Catheter:287580908}   Colostomy/Ileostomy/Ileal Conduit: {YES / FV:47221}       Date of Last BM: ***    Intake/Output Summary (Last 24 hours) at 12/1/2021 1420  Last data filed at 12/1/2021 0900  Gross per 24 hour   Intake 7431 ml   Output 2325 ml   Net 5106 ml     I/O last 3 completed shifts:   In: 7084 [I.V.:7084]  Out: 2250 [Urine:1950; Blood:300]    Safety Concerns:     [address] Safety Concerns:006917680}    Impairments/Disabilities:      [address] Impairments/Disabilities:445327386}    Nutrition Therapy:  Current Nutrition Therapy:   [address] SAMANTHA Diet List:136906670}    Routes of Feeding: {CHP DME Other Feedings:904856724}  Liquids: {Slp liquid thickness:26796}  Daily Fluid Restriction: {CHP DME Yes amt example:529772948}  Last Modified Barium Swallow with Video (Video Swallowing Test): {Done Not Done SPUS:130348430}    Treatments at the Time of Hospital Discharge:   Respiratory Treatments: ***  Oxygen Therapy:  {Therapy; copd oxygen:37228}  Ventilator:    {[facility] Vent LVRT:202947134}    Rehab Therapies: {THERAPEUTIC INTERVENTION:9912649400}  Weight Bearing Status/Restrictions: { CC Weight Bearin}  Other Medical Equipment (for information only, NOT a DME order):  {EQUIPMENT:541488454}  Other Treatments: ***    Patient's personal belongings (please select all that are sent with patient):  {[facility] DME Belongings:399697783}    RN SIGNATURE:  {Esignature:117403159}    CASE MANAGEMENT/SOCIAL WORK SECTION    Inpatient Status Date: ***    Readmission Risk Assessment Score:  Readmission Risk              Risk of Unplanned Readmission:  11           Discharging to Facility/ Agency   Name:   Address:  Phone:  Fax:    Dialysis Facility (if applicable)   Name:  Address:  Dialysis Schedule:  Phone:  Fax:    / signature: {Esignature:177922951}    PHYSICIAN SECTION    Prognosis: {Prognosis:8434638359}    Condition at Discharge: [address] Patient Condition:216034315}    Rehab Potential (if transferring to Rehab): {Prognosis:9840782046}    Recommended Labs or Other Treatments After Discharge: ***    Physician Certification: I certify the above information and transfer of Brenda Torres  is necessary for the continuing treatment of the diagnosis listed and that he requires {Admit to Appropriate Level of Care:05513} for {GREATER/LESS:077853851} 30 days.      Update Admission H&P: {CHP DME Changes in XXCOP:847788454}    PHYSICIAN SIGNATURE:

## 2021-12-01 NOTE — DISCHARGE SUMMARY
Name:  Debi Oregon  Room:  3005/3005-01  MRN:    [id number]    Discharge Summary      This discharge summary is in conjunction with a complete physical exam done on the day of discharge. 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 *        Procedures (Please Review Full Report for Details)  Intubation  I and D  PICC line    Consults    IP CONSULT TO UROLOGY  IP CONSULT TO UROLOGY  IP CONSULT TO HOSPITALIST  IP CONSULT TO CRITICAL CARE  IP CONSULT TO UROLOGY  PHARMACY TO DOSE VANCOMYCIN  IP CONSULT TO PODIATRY      HPI:    63 y.o. male who presented to the hospital with a chief complaint scrotal infection on the left foot infection. Patient is a diabetic who is noncompliant with medications. When I saw the patient he was postop in the ICU and intubated. He apparently developed this scrotal infection a couple days ago and decided to come the emergency department today. 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 He was taken emergently to the operating room for emergent I&D. Physical Exam at Discharge:  BP (!) 90/58   Pulse 79   Temp 97.9 °F (36.6 °C) (Axillary)   Resp 15   Ht 5' 11\" (1.803 m)   Wt 163 lb 3.2 oz (74 kg)   SpO2 97%   BMI 22.76 kg/m²     Physical Exam  Constitutional:       Appearance: He is ill-appearing. Comments: sedated   HENT:      Head: Normocephalic and atraumatic. Nose: Nose normal.   Eyes:      Conjunctiva/sclera: Conjunctivae normal.      Pupils: Pupils are equal, round, and reactive to light. Cardiovascular:      Rate and Rhythm: Normal rate and regular rhythm. Heart sounds: Normal heart sounds. No murmur heard. Pulmonary:      Effort: No respiratory distress.       Breath sounds: No wheezing. Abdominal:      General: Bowel sounds are decreased. Palpations: Abdomen is soft. Tenderness: There is abdominal tenderness. Comments: Crepitus of abdominal wall. I did not examine the wound as it was covered. Musculoskeletal:      Cervical back: Normal range of motion and neck supple. 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 He was taken to the OR by general surgeon urology. He will need to go back to the OR today. Patient is presently on clindamycin, vancomycin and Merrem. Patient is intubated before he went to the operating room. #DKA at the time of admission. Gap is closed and this is resolved. #PICC line placed. #Acute respiratory failure on the vent with stable vent settings. After extensive discussions between the intensivist, general surgery service and myself it is decided that the patient would need urgent surgical care that cannot be provided at [facility]. He will need colorectal surgery, plastic surgery and urology services to take him back to the operating room given the crepitus of the abdominal wall and the severe changes seen on the imaging study. He has been stabilized to the extent possible in this hospital.  After several phone calls and reaching out to the transfer center we were able to transfer the patient to the [facility]  Dr. Frankey Shiley talk to the intensivist at the [facility]. who accepted the patient.   I am awaiting a call from the hospitalist at [facility]..    CBC:   Recent Labs     11/30/21  1522 12/01/21  0416   WBC 18.4* 11.2*   HGB 13.3* 10.1*   HCT 40.7 30.1*   MCV 90.6 89.1    201     BMP:   Recent Labs     11/30/21  2219 12/01/21  0416 12/01/21  0830    139 139   K 4.3 4.1 4.7    108 110   CO2 22 23 22   PHOS 3.0 2.2* 2.4*   BUN 49* 48* 50* CREATININE 0.9 0.9 0.8     LIVER PROFILE:   Recent Labs     11/30/21  1522   AST 7*   ALT 11   BILITOT 0.9   ALKPHOS 261*     PT/INR:   Recent Labs     12/01/21  0416   PROTIME 12.3   INR 1.08           IR PICC WO SQ PORT/PUMP > 5 YEARS   Final Result   Successful placement of PICC line. XR CHEST PORTABLE   Final Result   Mild left basilar segmental atelectasis versus pneumonia. CT ABDOMEN PELVIS W IV CONTRAST Additional Contrast? None   Final Result   1. 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 There is   additional muscle or fascial involvement on the left as above. Of note, the   testes are incompletely evaluated but appear within normal limits. Critical   results were called by Dr. Gulshan Carver MD to Dr. Micheal Augustin on 11/30/2021   at 19:31.   2. Minimal bilateral hydronephrosis is likely due to severe urinary bladder   distention. XR FOOT LEFT (MIN 3 VIEWS)   Final Result   Evidence of a septic joint involving the 5th metatarsal phalangeal joint with   associated osteomyelitis         XR CHEST PORTABLE    (Results Pending)          Discharge Medications     Medication List      ASK your doctor about these medications    meloxicam 15 MG tablet  Commonly known as: Mobic  Take 1 tablet by mouth daily     metFORMIN 500 MG tablet  Commonly known as: GLUCOPHAGE              Transfer to [facility] patient critically ill at discharge    More than 30 mts spent.         Martina Sharpe MD 12/1/2021 11:40 AM

## 2021-12-01 NOTE — PROGRESS NOTES
Shift assessment completed, see flow sheet. RASS -1. Following commands.   - Propofol infusing at 20 mcg/kg/min. Intubated and sedated on AC # 8 ETT, at 22 LL. 14 / 450 / 40 %/ +5. SpO2 99%. Respirations are easy, even, and unlabored. Bilateral lung sounds dimished. VSS. BP slightly soft, but no levo at this time. OG in place at 50, clamped. PIV, WNL. All lines and monitoring devices in place. Mcfadden is patent and secured. Bilateral soft wrist restraints in place for patient safety. Bed in lowest position with wheels locked.

## 2021-12-01 NOTE — BRIEF OP NOTE
Brief Postoperative Note      Patient: Huma Elias  YOB: 1955  MRN: [id number]    Date of Procedure: 11/30/2021    Pre-Op Diagnosis: Shayla's Gangrene    Post-Op Diagnosis: Same       Procedure: Perineal soft tissue excisional debridement    Surgeon:  Carey Reyes MD    Assistant:  Surgical Assistant: Agustin Fraga    Anesthesia: General    Estimated Blood Loss (mL): less than 50     Complications: None    Specimens:   ID Type Source Tests Collected by Time Destination   1 : SCROTAL TISSUE Tissue Tissue CULTURE, TISSUE Jack Reyes MD 11/30/2021 1927        Implants:  * No implants in log *      Drains: * No LDAs found *    Findings: devitialized, non-viable skin/soft tissue between scrotum and anus and posteriorly onto buttock. Additional wound dimensions = ~8x10cm posterior to scrotum. Sharply debrided with cautery, scalpel and scissors.      Job#: [id number]    Electronically signed by Maxwell Alpers, MD on 11/30/2021 at 11:33 PM

## 2021-12-01 NOTE — BRIEF OP NOTE
Brief Postoperative Note      Patient: Kamini Vasquez  YOB: 1955  MRN: [id number]    Date of Procedure: 11/30/2021    Pre-Op Diagnosis: Shayla's Gangrene    Post-Op Diagnosis: Same       Procedure(s):  DEBRIDEMENT OF SCROTAL SHAYLA'S GANGRENE    Surgeon(s):  MD Abhijit Vera MD    Assistant:  Surgical Assistant: Jada Geiger    Anesthesia: General    Estimated Blood Loss (mL): 559     Complications: None    Specimens:   ID Type Source Tests Collected by Time Destination   1 : SCROTAL TISSUE Tissue Tissue CULTURE, TISSUE Kip Gordillo MD 11/30/2021 1927        Implants:  * No implants in log *      Drains: * No LDAs found *    Findings: gangrene throughout scrotum, buttocks, and lower abdomen    Plan- will likely need 2nd look and colostomy later this week    Electronically signed by Kip Gordillo MD on 11/30/2021 at 9:51 PM

## 2021-12-01 NOTE — OP NOTE
[address]                 [address] [address]                                OPERATIVE REPORT    PATIENT NAME: Love Saldivar                    :        1955  MED REC NO:   [id number]                          ROOM:       2594  ACCOUNT NO:   [de-identified]                           ADMIT DATE: 2021  PROVIDER:     Leonela Navarro MD    DATE OF PROCEDURE:  2021    LOCATION:  [facility]    CO-SURGEONS:  Leonela Navarro MD and Carlita Herrera MD    PREOPERATIVE DIAGNOSIS:  Shayla's gangrene. POSTOPERATIVE DIAGNOSIS:  Shayla's gangrene. PROCEDURE PERFORMED:  Scrotal resection and debridement of Shayla's  gangrene. INDICATIONS FOR PROCEDURE:  The patient is a 79-year-old, diabetic male,  who has DKA and necrotic, painful, swollen scrotum. CAT scan and exam  are consistent with Shayla's gangrene. The risks and benefits of  surgical debridement were discussed with the family and they agreed to  proceed. DESCRIPTION OF PROCEDURE:  The patient had been receiving IV  antibiotics, had general anesthesia, was in lithotomy position. His  genitalia were prepped and draped in usual sterile fashion. An  18-Ukrainian Mcfadden catheter was placed. Then, there was a necrotic area on  the bottom left scrotum going down to the perineum. Took a Bovie and  cut away the necrotic skin. Took tissue for culture. Continued to  dissect flimsy, necrotic tissue. I dissected all of this free and saved  the testicles. The testicles were healthy. I continued to dissect the  scrotal and perineal skin off and all the tissue down to bleeding tissue. At this point, Dr. Carlita Herrera, General Surgery, was consulted and came  in to help operate due to the necrotic tissue going closer to the  rectum.   He helped to dissect the tissue going down into the buttocks  and made more incisions up into the left groin towards the lower  abdomen, made sure to

## 2021-12-01 NOTE — OP NOTE
[address]                 [address] [facility]                                OPERATIVE REPORT    PATIENT NAME: Brinda Silva                    :        1955  MED REC NO:   [id number]                          ROOM:       1861  ACCOUNT NO:   [de-identified]                           ADMIT DATE: 2021  PROVIDER:     Estrella Kirk MD    DATE OF PROCEDURE:  2021    PREOPERATIVE DIAGNOSIS:  Shayla's gangrene of perineum and buttock. POSTOPERATIVE DIAGNOSIS:  Shayla's gangrene of perineum and buttock. OPERATION PERFORMED:  Perineal soft tissue excisional debridement. SURGEON:  Estrella Kirk MD    ANESTHESIA:  General.    EBL:  less than 100ml in addition to what is listed in Urology note    SPECIMEN:  None. COUNTS:  Sponge and needle counts correct. INDICATIONS:  See Urology op note by Dr. Jo Pettit. I was consulted  intraoperatively to come and assist and evaluate the proximity of the  debridement to the rectum and to assist in any further debridement  needed as deemed appropriate. FINDINGS:  Additional devitalized, nonviable skin and soft tissue  extending backwards from the base of the scrotum, around the anus, and  then posteriorly onto the buttock. DESCRIPTION OF PROCEDURE:  As stated, I was consulted intraoperatively  and I came in after the procedure had arleady been ongoing for some time  by Dr. Jo Pettit. Please refer to his note for the full history and his  portion of the procedure. Upon my evaluation, he had already fully  dissected and excised the scrotum and much of the perineum posteriorly  down towards the anus and rectum. I then reevaluated the wound. Probing the rectum digitally, I confirmed that he had dissected down  right onto the surface of the rectal wall and likely had also dissected  to some of the sphincter complex removing the devitalized tissue.   There  was still extensive devitalized tissue in this posterior aspect of the  wound, which I proceeded to continue sharply excising. The preoperative  CT scan imaging had shown a gap extending down from the scrotum along  the left perineum and then out posteriorly onto the buttock area. I  excised some additional skin on the left perineum onto the very proximal  thigh and some additional skin was taken posteriorly behind the anus  that was clearly nonviable. More devitalized soft tissue underneath  this area was debrided sharply. At this point, we are reaching the  limits of where we could expose posteriorly with the position of the  patient then on the table. We proceeded to pulse lavage the open wound  areas extensively. I also probed anterior in the left inguinal crease,  opening up the skin and looking underneath that it appears that there  was bleeding wound edges appear and I did not feel I would do the  further dissection at this time in that area. At this point, Dr. Henry Nathan and myself did determinate sufficient debridement  had been performed at this time. The patient was hemodynamically  stable. We placed moist gauze across all of the open wounds and then  dry dressings placed over the top. The patient was returned back to the  supine position, and taken to the intensive care unit in critical  condition.         Josué Jackson MD    D: 11/30/2021 23:39:18       T: 12/01/2021 1:42:07     [id number]  Job#: [id number]     Doc#: [id number]    CC:

## 2021-12-01 NOTE — PROGRESS NOTES
Patient admitted to room ICU 5 from OR. Bed alarm in place, patient aware of placement and reason. Cardiac and other ICU monitoring in place. Bed locked, in lowest position, side rails up 2/4, call light within reach. Recliner Assessment  Patient is not able to demonstrate the ability to move from a reclining position to an upright position within the recliner due to medical condition. 4 Eyes Skin Assessment     The patient is being assess for   Admission    I agree that 2 RN's have performed a thorough Head to Toe Skin Assessment on the patient. ALL assessment sites listed below have been assessed. Areas assessed for pressure by both nurses:   [x]   Head, Face, and Ears   [x]   Shoulders, Back, and Chest, Abdomen  [x]   Arms, Elbows, and Hands   [x]   Coccyx, Sacrum, and Ischium  [x]   Legs, Feet, and Heels    Extensive surgical incision to scrotum, area packed in OR and LYLY. Left lateral foot diabetic ulcer measuring 4 cm x 2cm x 1cm. Otherwise skin intact with no pressure related injury noted. Skin Assessed Under all Medical Devices by both nurses:  ETT, walsh, securement devices and SCD's              All Mepilex Borders were peeled back and area peeked at by both nurses:  No: NA  Please list where Mepilex Borders are located:  NA             **SHARE this note so that the co-signing nurse is able to place an eSignature**    Co-signer eSignature: Electronically signed by Rustam Robb RN on 12/1/21 at 5:57 AM EST    Does the Patient have Skin Breakdown related to pressure?   No                              Reese Prevention initiated:  Yes   Wound Care Orders initiated:  Yes      [id number]  nurse consulted for Pressure Injury (Stage 3,4, Unstageable, DTI, NWPT, Complex wounds)and New or Established Ostomies:  Yes      Primary Nurse eSignature: Electronically signed by Shahid Davis on 12/1/21 at 1:37 AM EST

## 2021-12-01 NOTE — PROGRESS NOTES
@ 04:12- Patient appears with increased pain and RASS +2, PRN morphine given. He remains on vent, propofol gtt infusing. No PRN orders for versed or fentanyl. Perfectserve message to cristofer Trivedi regarding the above.     @ 05:35- Orders received for PRN fentanyl. Patient has not received a dose, and propofol gtt being titrated down due to hypotension with MAP <65. He has orders on chart for levo and vaso gtts, but only has peripheral access. Perfectserve to cristofer regarding the above. New orders for 1L bolus. See MAR for administration.

## 2021-12-01 NOTE — CONSULTS
CONSULT    Admit Date:  11/30/2021    Subjective:  63 y.o. male who is seen for evaluation of osteomyelitis left foot. Patient admitted for Shayla's gangrene and underwent emergent surgery last night with Jack Funk and Toni. Patient currently intubated and sedated and information obtained from the chart. Per ER chart wound has been present for months. Had been taking antibiotics intermittently since March. Was not taking his diabetic medication. Past Medical History:    History reviewed. No pertinent past medical history. Past Surgical History:        Procedure Laterality Date    ANKLE FRACTURE SURGERY         Current Medications:     meropenem  1,000 mg IntraVENous Q8H    clindamycin (CLEOCIN) IV  600 mg IntraVENous Q6H    lidocaine 1 % injection  5 mL IntraDERmal Once    sodium chloride flush  5-40 mL IntraVENous 2 times per day    mupirocin   Nasal BID    enoxaparin  40 mg SubCUTAneous Daily    chlorhexidine  15 mL Mouth/Throat BID    famotidine (PEPCID) injection  20 mg IntraVENous BID    vancomycin  750 mg IntraVENous Q12H       Allergies:  Patient has no known allergies. Social History:    Social History     Tobacco Use    Smoking status: Current Every Day Smoker    Smokeless tobacco: Never Used   Substance Use Topics    Alcohol use: Yes     Alcohol/week: 0.0 standard drinks     Comment: social     Drug use: No       Family History:   History reviewed. No pertinent family history.     Review of Systems    Not obtained - sedated      Objective:   BP (!) 97/54   Pulse 78   Temp 97.9 °F (36.6 °C) (Axillary)   Resp 14   Ht 5' 11\" (1.803 m)   Wt 163 lb 3.2 oz (74 kg)   SpO2 100%   BMI 22.76 kg/m²     Data:  CBC:   Recent Labs     11/30/21  1522 12/01/21  0416   WBC 18.4* 11.2*   HGB 13.3* 10.1*   HCT 40.7 30.1*   MCV 90.6 89.1    201     BMP:   Recent Labs     11/30/21  1522 11/30/21  2219 12/01/21  0416   * 140 139   K 4.1 4.3 4.1   CL 89* 107 108   CO2 20* 22 23 examined. Reviewed labs and imaging. Reviewed wound picture in EPIC and it appears that the mild cellulitis he might have had on admission is resolved. Is currently on antibiotics for his Shayla's gangrene which should cover his foot as well. The bone of the 5th ray is obviously osteomyelitic and will not respond to antibiotics and will need partial to complete 5th ray amputation. This does not appear to be emergent and I will work with medical/surgical teams to determine timing for surgery. Discussed with Dr. Satnam Ramos. Applied dry dressing to left foot. Mepilex borders intact to heels and heels are offloaded currently. Will await further evaluation by medical and surgical teams to determine future plans. Thank you for allowing me to participate in the care of your patient.        Electronically signed by Aurelia Lucero DPM on 12/1/2021 at 8:29 AM.

## 2021-12-02 ENCOUNTER — APPOINTMENT (OUTPATIENT)
Dept: VASCULAR LAB | Age: 66
DRG: 853 | End: 2021-12-02
Attending: INTERNAL MEDICINE

## 2021-12-02 LAB
ALBUMIN SERPL-MCNC: 1.8 G/DL (ref 3.4–5)
ALP BLD-CCNC: 109 U/L (ref 40–129)
ALT SERPL-CCNC: 7 U/L (ref 10–40)
ANION GAP SERPL CALCULATED.3IONS-SCNC: 8 MMOL/L (ref 3–16)
AST SERPL-CCNC: 9 U/L (ref 15–37)
BANDED NEUTROPHILS RELATIVE PERCENT: 9 % (ref 0–7)
BASE EXCESS ARTERIAL: -2 (ref -3–3)
BASE EXCESS ARTERIAL: -3 (ref -3–3)
BASOPHILS ABSOLUTE: 0 K/UL (ref 0–0.2)
BASOPHILS RELATIVE PERCENT: 0 %
BILIRUB SERPL-MCNC: 0.4 MG/DL (ref 0–1)
BILIRUBIN DIRECT: 0.3 MG/DL (ref 0–0.3)
BILIRUBIN, INDIRECT: 0.1 MG/DL (ref 0–1)
BLOOD BANK DISPENSE STATUS: NORMAL
BLOOD BANK DISPENSE STATUS: NORMAL
BLOOD BANK PRODUCT CODE: NORMAL
BLOOD BANK PRODUCT CODE: NORMAL
BPU ID: NORMAL
BPU ID: NORMAL
BUN BLDV-MCNC: 50 MG/DL (ref 7–20)
BURR CELLS: ABNORMAL
CALCIUM SERPL-MCNC: 7.2 MG/DL (ref 8.3–10.6)
CHLORIDE BLD-SCNC: 112 MMOL/L (ref 99–110)
CO2: 20 MMOL/L (ref 21–32)
CREAT SERPL-MCNC: 0.9 MG/DL (ref 0.8–1.3)
DESCRIPTION BLOOD BANK: NORMAL
DESCRIPTION BLOOD BANK: NORMAL
EOSINOPHILS ABSOLUTE: 0 K/UL (ref 0–0.6)
EOSINOPHILS RELATIVE PERCENT: 0 %
GFR AFRICAN AMERICAN: >60
GFR NON-AFRICAN AMERICAN: >60
GLUCOSE BLD-MCNC: 106 MG/DL (ref 70–99)
GLUCOSE BLD-MCNC: 111 MG/DL (ref 70–99)
GLUCOSE BLD-MCNC: 113 MG/DL (ref 70–99)
GLUCOSE BLD-MCNC: 113 MG/DL (ref 70–99)
GLUCOSE BLD-MCNC: 119 MG/DL (ref 70–99)
GLUCOSE BLD-MCNC: 124 MG/DL (ref 70–99)
GLUCOSE BLD-MCNC: 125 MG/DL (ref 70–99)
GLUCOSE BLD-MCNC: 128 MG/DL (ref 70–99)
GLUCOSE BLD-MCNC: 129 MG/DL (ref 70–99)
GLUCOSE BLD-MCNC: 130 MG/DL (ref 70–99)
GLUCOSE BLD-MCNC: 135 MG/DL (ref 70–99)
GLUCOSE BLD-MCNC: 138 MG/DL (ref 70–99)
GLUCOSE BLD-MCNC: 139 MG/DL (ref 70–99)
GLUCOSE BLD-MCNC: 141 MG/DL (ref 70–99)
GLUCOSE BLD-MCNC: 141 MG/DL (ref 70–99)
GLUCOSE BLD-MCNC: 144 MG/DL (ref 70–99)
GLUCOSE BLD-MCNC: 160 MG/DL (ref 70–99)
GLUCOSE BLD-MCNC: 161 MG/DL (ref 70–99)
GLUCOSE BLD-MCNC: 169 MG/DL (ref 70–99)
GLUCOSE BLD-MCNC: 171 MG/DL (ref 70–99)
GLUCOSE BLD-MCNC: 196 MG/DL (ref 70–99)
GLUCOSE BLD-MCNC: 198 MG/DL (ref 70–99)
GLUCOSE BLD-MCNC: 72 MG/DL (ref 70–99)
GLUCOSE BLD-MCNC: 80 MG/DL (ref 70–99)
HCO3 ARTERIAL: 22.5 MMOL/L (ref 21–29)
HCO3 ARTERIAL: 23.3 MMOL/L (ref 21–29)
HCT VFR BLD CALC: 22.1 % (ref 40.5–52.5)
HCT VFR BLD CALC: 27.1 % (ref 40.5–52.5)
HEMOGLOBIN: 7.2 G/DL (ref 13.5–17.5)
HEMOGLOBIN: 9 G/DL (ref 13.5–17.5)
LYMPHOCYTES ABSOLUTE: 0.9 K/UL (ref 1–5.1)
LYMPHOCYTES RELATIVE PERCENT: 6 %
MCH RBC QN AUTO: 29.1 PG (ref 26–34)
MCHC RBC AUTO-ENTMCNC: 32.6 G/DL (ref 31–36)
MCV RBC AUTO: 89.4 FL (ref 80–100)
METAMYELOCYTES RELATIVE PERCENT: 3 %
MONOCYTES ABSOLUTE: 0.9 K/UL (ref 0–1.3)
MONOCYTES RELATIVE PERCENT: 6 %
NEUTROPHILS ABSOLUTE: 13.1 K/UL (ref 1.7–7.7)
NEUTROPHILS RELATIVE PERCENT: 76 %
O2 SAT, ARTERIAL: 98 % (ref 93–100)
O2 SAT, ARTERIAL: 99 % (ref 93–100)
PCO2 ARTERIAL: 38.7 MM HG (ref 35–45)
PCO2 ARTERIAL: 40.4 MM HG (ref 35–45)
PDW BLD-RTO: 14.5 % (ref 12.4–15.4)
PERFORMED ON: ABNORMAL
PERFORMED ON: NORMAL
PERFORMED ON: NORMAL
PH ARTERIAL: 7.37 (ref 7.35–7.45)
PH ARTERIAL: 7.37 (ref 7.35–7.45)
PLATELET # BLD: 180 K/UL (ref 135–450)
PMV BLD AUTO: 9.3 FL (ref 5–10.5)
PO2 ARTERIAL: 107 MM HG (ref 75–108)
PO2 ARTERIAL: 123.1 MM HG (ref 75–108)
POC SAMPLE TYPE: ABNORMAL
POC SAMPLE TYPE: ABNORMAL
POTASSIUM REFLEX MAGNESIUM: 4.5 MMOL/L (ref 3.5–5.1)
PREALBUMIN: <3 MG/DL (ref 20–40)
RBC # BLD: 2.47 M/UL (ref 4.2–5.9)
SODIUM BLD-SCNC: 140 MMOL/L (ref 136–145)
TCO2 ARTERIAL: 24 MMOL/L
TCO2 ARTERIAL: 25 MMOL/L
TOTAL PROTEIN: 4.2 G/DL (ref 6.4–8.2)
WBC # BLD: 14.9 K/UL (ref 4–11)

## 2021-12-02 PROCEDURE — 80076 HEPATIC FUNCTION PANEL: CPT

## 2021-12-02 PROCEDURE — 85014 HEMATOCRIT: CPT

## 2021-12-02 PROCEDURE — 2000000000 HC ICU R&B

## 2021-12-02 PROCEDURE — 99291 CRITICAL CARE FIRST HOUR: CPT | Performed by: INTERNAL MEDICINE

## 2021-12-02 PROCEDURE — 85018 HEMOGLOBIN: CPT

## 2021-12-02 PROCEDURE — 94003 VENT MGMT INPAT SUBQ DAY: CPT

## 2021-12-02 PROCEDURE — 2500000003 HC RX 250 WO HCPCS: Performed by: STUDENT IN AN ORGANIZED HEALTH CARE EDUCATION/TRAINING PROGRAM

## 2021-12-02 PROCEDURE — C9113 INJ PANTOPRAZOLE SODIUM, VIA: HCPCS

## 2021-12-02 PROCEDURE — 6360000002 HC RX W HCPCS: Performed by: STUDENT IN AN ORGANIZED HEALTH CARE EDUCATION/TRAINING PROGRAM

## 2021-12-02 PROCEDURE — 82947 ASSAY GLUCOSE BLOOD QUANT: CPT

## 2021-12-02 PROCEDURE — 2580000003 HC RX 258

## 2021-12-02 PROCEDURE — 85025 COMPLETE CBC W/AUTO DIFF WBC: CPT

## 2021-12-02 PROCEDURE — 82803 BLOOD GASES ANY COMBINATION: CPT

## 2021-12-02 PROCEDURE — 6370000000 HC RX 637 (ALT 250 FOR IP): Performed by: STUDENT IN AN ORGANIZED HEALTH CARE EDUCATION/TRAINING PROGRAM

## 2021-12-02 PROCEDURE — 2500000003 HC RX 250 WO HCPCS

## 2021-12-02 PROCEDURE — 6360000002 HC RX W HCPCS

## 2021-12-02 PROCEDURE — 93925 LOWER EXTREMITY STUDY: CPT

## 2021-12-02 PROCEDURE — 36415 COLL VENOUS BLD VENIPUNCTURE: CPT

## 2021-12-02 PROCEDURE — 80048 BASIC METABOLIC PNL TOTAL CA: CPT

## 2021-12-02 PROCEDURE — 2580000003 HC RX 258: Performed by: STUDENT IN AN ORGANIZED HEALTH CARE EDUCATION/TRAINING PROGRAM

## 2021-12-02 RX ORDER — SODIUM CHLORIDE, SODIUM LACTATE, POTASSIUM CHLORIDE, AND CALCIUM CHLORIDE .6; .31; .03; .02 G/100ML; G/100ML; G/100ML; G/100ML
1000 INJECTION, SOLUTION INTRAVENOUS ONCE
Status: COMPLETED | OUTPATIENT
Start: 2021-12-02 | End: 2021-12-02

## 2021-12-02 RX ORDER — SODIUM CHLORIDE 9 MG/ML
INJECTION, SOLUTION INTRAVENOUS PRN
Status: DISCONTINUED | OUTPATIENT
Start: 2021-12-02 | End: 2021-12-03

## 2021-12-02 RX ORDER — SODIUM CHLORIDE, SODIUM LACTATE, POTASSIUM CHLORIDE, CALCIUM CHLORIDE 600; 310; 30; 20 MG/100ML; MG/100ML; MG/100ML; MG/100ML
INJECTION, SOLUTION INTRAVENOUS CONTINUOUS
Status: DISCONTINUED | OUTPATIENT
Start: 2021-12-02 | End: 2021-12-08

## 2021-12-02 RX ADMIN — SODIUM CHLORIDE 0.79 UNITS/HR: 900 INJECTION, SOLUTION INTRAVENOUS at 14:38

## 2021-12-02 RX ADMIN — CLINDAMYCIN PHOSPHATE 600 MG: 600 INJECTION, SOLUTION INTRAVENOUS at 02:35

## 2021-12-02 RX ADMIN — CLINDAMYCIN PHOSPHATE 600 MG: 600 INJECTION, SOLUTION INTRAVENOUS at 17:53

## 2021-12-02 RX ADMIN — SODIUM CHLORIDE, POTASSIUM CHLORIDE, SODIUM LACTATE AND CALCIUM CHLORIDE 1000 ML: 600; 310; 30; 20 INJECTION, SOLUTION INTRAVENOUS at 03:17

## 2021-12-02 RX ADMIN — PROPOFOL 15 MCG/KG/MIN: 10 INJECTION, EMULSION INTRAVENOUS at 08:49

## 2021-12-02 RX ADMIN — SODIUM CHLORIDE, PRESERVATIVE FREE 10 ML: 5 INJECTION INTRAVENOUS at 09:09

## 2021-12-02 RX ADMIN — MEROPENEM 1000 MG: 1 INJECTION, POWDER, FOR SOLUTION INTRAVENOUS at 18:46

## 2021-12-02 RX ADMIN — PANTOPRAZOLE SODIUM 40 MG: 40 INJECTION, POWDER, FOR SOLUTION INTRAVENOUS at 09:07

## 2021-12-02 RX ADMIN — SODIUM CHLORIDE, POTASSIUM CHLORIDE, SODIUM LACTATE AND CALCIUM CHLORIDE: 600; 310; 30; 20 INJECTION, SOLUTION INTRAVENOUS at 04:23

## 2021-12-02 RX ADMIN — MEROPENEM 1000 MG: 1 INJECTION, POWDER, FOR SOLUTION INTRAVENOUS at 12:39

## 2021-12-02 RX ADMIN — VANCOMYCIN HYDROCHLORIDE 1250 MG: 10 INJECTION, POWDER, LYOPHILIZED, FOR SOLUTION INTRAVENOUS at 05:42

## 2021-12-02 RX ADMIN — MEROPENEM 1000 MG: 1 INJECTION, POWDER, FOR SOLUTION INTRAVENOUS at 02:02

## 2021-12-02 RX ADMIN — CLINDAMYCIN PHOSPHATE 600 MG: 600 INJECTION, SOLUTION INTRAVENOUS at 12:03

## 2021-12-02 RX ADMIN — SODIUM CHLORIDE, PRESERVATIVE FREE 10 ML: 5 INJECTION INTRAVENOUS at 21:11

## 2021-12-02 ASSESSMENT — PULMONARY FUNCTION TESTS
PIF_VALUE: 16
PIF_VALUE: 5
PIF_VALUE: 18
PIF_VALUE: 17
PIF_VALUE: 16
PIF_VALUE: 18
PIF_VALUE: 17
PIF_VALUE: 16
PIF_VALUE: 11
PIF_VALUE: 16
PIF_VALUE: 18
PIF_VALUE: 17
PIF_VALUE: 18
PIF_VALUE: 19
PIF_VALUE: 13
PIF_VALUE: 22
PIF_VALUE: 15
PIF_VALUE: 22
PIF_VALUE: 17
PIF_VALUE: 18
PIF_VALUE: 18

## 2021-12-02 ASSESSMENT — PAIN SCALES - GENERAL
PAINLEVEL_OUTOF10: 0

## 2021-12-02 NOTE — PLAN OF CARE
Problem: Non-Violent Restraints  Goal: Removal from restraints as soon as assessed to be safe  12/2/2021 1646 by Mian Vogt RN  Outcome: Completed  12/2/2021 0348 by Mic Cox RN  Outcome: Ongoing  Goal: No harm/injury to patient while restraints in use  12/2/2021 1646 by Mian Vogt RN  Outcome: Completed  12/2/2021 0348 by Mic Cox RN  Outcome: Ongoing  Goal: Patient's dignity will be maintained  12/2/2021 1646 by Mian Vogt RN  Outcome: Completed  12/2/2021 0348 by Mic Cox RN  Outcome: Ongoing     Problem: Activity:  Goal: Ability to return to normal activity level will improve  Description: Ability to return to normal activity level will improve  Outcome: Ongoing     Problem: Health Behavior:  Goal: Identification of resources available to assist in meeting health care needs will improve  Description: Identification of resources available to assist in meeting health care needs will improve  Outcome: Ongoing     Problem: Physical Regulation:  Goal: Postoperative complications will be avoided or minimized  Description: Postoperative complications will be avoided or minimized  Outcome: Ongoing     Problem:  Bowel/Gastric:  Goal: Gastrointestinal status for postoperative course will improve  Description: Gastrointestinal status for postoperative course will improve  Outcome: Ongoing     Problem: Respiratory:  Goal: Ability to achieve and maintain a regular respiratory rate will improve  Description: Ability to achieve and maintain a regular respiratory rate will improve  Outcome: Ongoing     Problem: Safety:  Goal: Ability to remain free from injury will improve  Description: Ability to remain free from injury will improve  Outcome: Ongoing     Problem: Sensory:  Goal: General experience of comfort will improve  Description: General experience of comfort will improve  Outcome: Ongoing     Problem: Skin Integrity:  Goal: Demonstration of wound healing without infection will improve  Description: Demonstration of wound healing without infection will improve  Outcome: Ongoing  Goal: Will show no infection signs and symptoms  Description: Will show no infection signs and symptoms  Outcome: Ongoing  Goal: Absence of new skin breakdown  Description: Absence of new skin breakdown  Outcome: Ongoing     Problem: Infection - Surgical Site:  Goal: Will show no infection signs and symptoms  Description: Will show no infection signs and symptoms  Outcome: Ongoing     Problem: Falls - Risk of:  Goal: Will remain free from falls  Description: Will remain free from falls  Outcome: Ongoing  Goal: Absence of physical injury  Description: Absence of physical injury  Outcome: Ongoing     Problem: ABCDS Injury Assessment  Goal: Absence of physical injury  Outcome: Ongoing

## 2021-12-02 NOTE — PROGRESS NOTES
Department of Surgery:  Post-op Note    Procedure(s) Performed: Wide excision of the perineum, back, and abdominal wall. Subjective:  Patient remains intubated and sedated. Hemodynamically stable. Follow commands. Objective:  Anesthesia type: General      I/O    Intra op    Post op     Fluids  600 mL      EBL 75 mL 0 mL     Urine 50 mL Minimal      Exam:  Vitals:    12/02/21 0045 12/02/21 0100 12/02/21 0200 12/02/21 0300   BP:  98/65 (!) 152/69 86/62   Pulse: 76 75 88 75   Resp: 20 20 21 18   Temp:       TempSrc:       SpO2: 98% 98% 97% 95%   Height: 5' 11\" (1.803 m)          General appearance: intubated, sedated, no acute distress   Chest/Lungs: mechanical breath sounds bilaterally   Cardiovascular: RRR  Abdomen: soft, abdominal binder in place with dressings intact with minimal drainage   Extremities: no edema, no cyanosis  Neuro: intubated, sedated     Assessment and Plan  This is a 63y.o. year old male with Necrotizing fasciitis of the abdominal wall, gluteal region, and scrotum s/p wide excision of perineum, back, and abdominal wall.   Abdominal wall wound measuring 60 cm x 23 cm x 3cm and gluteal wound measures 20 cm x 15cm  POD #0    Neuro:   Fentanyl and propofol gtt     Cardiovascular:   Hemodynamically stable, off of pressors     Pulmonary:   Remains on ventilator, wean per critical care team  Patient to remain intubated at this time if needs additional debridement     GI:   NPO  OG to LCWS     FEN:       /Renal:   Mcfadden to remain in place for strict intake and output     Hem/ID:   Continue to monitor hemoglobins    Necrotizing fascitis of the abdominal wall, perineum, and back  - Continue dressing changes BID with betadine soaked Kerlix, will change in the AM  - Wound to be evaluated to determine need for further debridement   - Continue broad spectrum antibiotics with clindamycin, merrem, and vancomycin     Endo:   Patient was on insulin gtt which has been weaned  Monitor glucose closely Prophylaxis:   SCDs    Access:  Central Access: Right basilic PICC   Peripheral Access: Right antecubital   Arterial Line Access: Right radial                                Mcfadden Date placed: 11/30  NGT Date placed: 11/30  ETT Date placed: 11/30    Mobility:  Bedrest     Dispo:   BENJAMIN Casillas DO  12/02/21  3:34 AM

## 2021-12-02 NOTE — CONSULTS
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 Due to his extent of infection, it was felt he needed multiple specialities involved in his care and he was transferred to [place] [id number]. Seen by general surgery who took him back to the OR and performed derbidement for extended necrotizing fascitis to the abdominal wall, gluteal region and scrotum. Transferred to the ICU for recovery. Family History, Social History, Review of Systems:  Reviewed and agreed to as per chart    Vitals:  BP (!) 85/59   Pulse 73   Temp 97.5 °F (36.4 °C) (Oral)   Resp 18   Ht 5' 11\" (1.803 m)   Wt 182 lb 15.7 oz (83 kg)   SpO2 100%   BMI 25.52 kg/m²   Temp  Av.4 °F (36.3 °C)  Min: 96.6 °F (35.9 °C)  Max: 98 °F (36.7 °C)    Intake/Output Summary (Last 24 hours) at 2021 1054  Last data filed at 2021 0550  Gross per 24 hour   Intake 2034.83 ml   Output 650 ml   Net 1384.83 ml         Physical:   Well developed, well nourished in no acute distress   Mood indicates no abnormalities. Pt doesnt appear depressed   Orientated to time and place   Neck is supple, trachea is midline   Respiratory effort is normal   Cardiovascular show no extremity swelling   Abdomen no masses or hernias are palpated, there is no tenderness. Liver and Spleen appear normal.   Skin show no abnormal lesions   Lymph nodes are not palpated in the inguinal, neck, or axillary area. Male    Penis appears normal and circumcised, walsh catheter draining clear   Urethral meatus is normal in size and location   Incisions CDI, no necrotic tissue noted. Skin dressings in place.     Labs:  WBC:    Lab Results   Component Value Date    WBC 14.9 2021     Hemoglobin/Hematocrit:    Lab Results   Component Value Date    HGB 7.2 12/02/2021    HCT 22.1 12/02/2021     BMP:    Lab Results   Component Value Date     12/02/2021    K 4.5 12/02/2021     12/02/2021    CO2 20 12/02/2021    BUN 50 12/02/2021    LABALBU 1.8 12/02/2021    CREATININE 0.9 12/02/2021    CALCIUM 7.2 12/02/2021    GFRAA >60 12/02/2021    LABGLOM >60 12/02/2021     PT/INR:    Lab Results   Component Value Date    PROTIME 12.5 12/01/2021    INR 1.10 12/01/2021     PTT:  No results found for: APTT[APTT    Impression/Plan: 64 yo M POD1 sp wide excision of perineum, back and abdominal wall for necrotizing fascitis. -Intubated and sedated this AM  -Incisions CDI, dressing in place.  Agree with BID dressing changes and broad spectrum abx  -Would recommend keeping in walsh catheter for the time being to allow surrounding tissue to heal  -Please call with any questions     JOSS Han

## 2021-12-02 NOTE — PLAN OF CARE
Problem: Nutrition  Goal: Optimal nutrition therapy  12/2/2021 1030 by Chris Mcbride RD, LD  Note: Nutrition Problem #1: Inadequate oral intake  Intervention: Food and/or Nutrient Delivery: Continue NPO  Nutritional Goals: Pt will tolerate most appropriate form of nutrition while intubated to meet 100% of nutrition needs

## 2021-12-02 NOTE — ANESTHESIA PRE PROCEDURE
Department of Anesthesiology  Preprocedure Note       Name:  Aftab Sharma   Age:  63 y.o.  :  1955                                          MRN:  [id number]         Date:  2021      Surgeon: Flora Monge):  Jasmyn Velez MD    Procedure: Procedure(s):  WIDE EXCISION PERINEUM, BACK, ABDOMINAL WALL    Medications prior to admission:   Prior to Admission medications    Medication Sig Start Date End Date Taking?  Authorizing Provider   metFORMIN (GLUCOPHAGE) 500 MG tablet Take 500 mg by mouth daily (with breakfast)    Historical Provider, MD   meloxicam (MOBIC) 15 MG tablet Take 1 tablet by mouth daily 16   Zane Hart DO       Current medications:    Current Facility-Administered Medications   Medication Dose Route Frequency Provider Last Rate Last Admin    norepinephrine (LEVOPHED) 1 MG/ML injection             propofol 1000 MG/100ML injection             sodium chloride flush 0.9 % injection 5-40 mL  5-40 mL IntraVENous 2 times per day Maycol Silva MD        sodium chloride flush 0.9 % injection 5-40 mL  5-40 mL IntraVENous PRN Jane Lisa MD        0.9 % sodium chloride infusion  25 mL IntraVENous PRN Maycol Silva  mL/hr at 21 1743 25 mL at 21 1743    acetaminophen (TYLENOL) tablet 650 mg  650 mg Oral Q6H PRN Jane Lisa MD        Or   [facility] acetaminophen (TYLENOL) suppository 650 mg  650 mg Rectal Q6H PRN Jane Lisa MD        0.9 % sodium chloride infusion   IntraVENous Continuous Jane Lisa  mL/hr at 21 1648 New Bag at 21 1648    norepinephrine (LEVOPHED) 16 mg in dextrose 5 % 250 mL infusion  2-100 mcg/min IntraVENous Continuous Jane Lisa MD 8.4 mL/hr at 21 1851 9 mcg/min at 21 1851    [START ON 2021] pantoprazole (PROTONIX) injection 40 mg  40 mg IntraVENous Daily Jane Lisa MD        propofol injection  5-50 mcg/kg/min IntraVENous drinks     Comment: social                                 Ready to quit: Not Answered  Counseling given: Not Answered      Vital Signs (Current):   Vitals:    12/01/21 1715 12/01/21 1730 12/01/21 1745 12/01/21 1800   BP: (!) 118/59 (!) 111/57 (!) 114/58 107/63   Pulse: 70 68 70 68   Resp:       Temp:       TempSrc:       SpO2: 100% 100% 99% 100%                                              BP Readings from Last 3 Encounters:   12/01/21 107/63   12/01/21 (!) 91/59   11/30/21 136/70       NPO Status:                                                                                 BMI:   Wt Readings from Last 3 Encounters:   11/30/21 163 lb 3.2 oz (74 kg)   04/18/16 179 lb 14.3 oz (81.6 kg)   02/29/16 180 lb (81.6 kg)     There is no height or weight on file to calculate BMI.    CBC:   Lab Results   Component Value Date    WBC 11.2 12/01/2021    RBC 3.38 12/01/2021    HGB 10.1 12/01/2021    HCT 30.1 12/01/2021    MCV 89.1 12/01/2021    RDW 14.0 12/01/2021     12/01/2021       CMP:   Lab Results   Component Value Date     12/01/2021    K 4.7 12/01/2021    K 4.1 12/01/2021     12/01/2021    CO2 22 12/01/2021    BUN 50 12/01/2021    CREATININE 0.8 12/01/2021    GFRAA >60 12/01/2021    AGRATIO 0.7 11/30/2021    LABGLOM >60 12/01/2021    GLUCOSE 155 12/01/2021    PROT 6.4 11/30/2021    CALCIUM 7.4 12/01/2021    BILITOT 0.9 11/30/2021    ALKPHOS 261 11/30/2021    AST 7 11/30/2021    ALT 11 11/30/2021       POC Tests:   Recent Labs     12/01/21  1839   POCGLU 325*   POCNA 136   POCK 5.0       Coags:   Lab Results   Component Value Date    PROTIME 12.3 12/01/2021    INR 1.08 12/01/2021       HCG (If Applicable): No results found for: PREGTESTUR, PREGSERUM, HCG, HCGQUANT     ABGs:   Lab Results   Component Value Date    PHART 7.306 12/01/2021    PO2ART 110.8 12/01/2021    HAR3DBZ 39.7 12/01/2021    QTP2VEK 19.8 12/01/2021    BEART -7 12/01/2021    F0PTRPJZ 98 12/01/2021        Type & Screen (If Applicable):  No results found for: LABABO, LABRH    Drug/Infectious Status (If Applicable):  No results found for: HIV, HEPCAB    COVID-19 Screening (If Applicable):   Lab Results   Component Value Date    COVID19 Not Detected 11/30/2021           Anesthesia Evaluation  Patient summary reviewed and Nursing notes reviewed  Airway: Mallampati: Unable to assess / NA  TM distance: >3 FB   Neck ROM: full   Dental: normal exam         Pulmonary:normal exam  breath sounds clear to auscultation            Patient did not smoke on day of surgery. Cardiovascular:  Exercise tolerance: no interval change,           Rhythm: regular  Rate: normal           Beta Blocker:  Dose within 24 Hrs         Neuro/Psych:   Negative Neuro/Psych ROS              GI/Hepatic/Renal: Neg GI/Hepatic/Renal ROS            Endo/Other:    (+) DiabetesType II DM, poorly controlled, , .                 Abdominal:       Abdomen: soft. Vascular: negative vascular ROS. Other Findings: Gangrene - septic // patient intubated on levophed @ 9 mcg   Sedation             Anesthesia Plan      general     ASA 4 - emergent       Induction: intravenous. MIPS: Postoperative opioids intended and Prophylactic antiemetics administered. Anesthetic plan and risks discussed with healthcare power of . Use of blood products discussed with healthcare power of  whom. Plan discussed with attending and CRNA.     Attending anesthesiologist reviewed and agrees with Preprocedure content              Nabila Humphrey DO   12/1/2021

## 2021-12-02 NOTE — PLAN OF CARE
Problem: Nutrition  Goal: Optimal nutrition therapy  Note: Nutrition Problem #1: Inadequate oral intake  Intervention: Food and/or Nutrient Delivery: Continue NPO, Start Tube Feeding  Nutritional Goals: Pt will tolerate most appropriate form of nutrition while intubated to meet 100% of nutrition needs

## 2021-12-02 NOTE — PROGRESS NOTES
Pt had doppler study of bilateral lower extremities, tech informed this RN that pt has no JONA, informed Dr. Mark Barragan and Kelvin Starr surgical resident. Will continue to monitor.

## 2021-12-02 NOTE — PROGRESS NOTES
Patient has been successfully weaned from Mechanical Ventilation. RSBI before extubation was 29 with EtCO2 of 30 and SpO2 of 100 on 50% FiO2. Patient extubated and placed on 3 liters/min via nasal cannula. Post extubation SpO2 is 98% with HR  77 bpm and RR 20 breaths/min. Patient had strong cough that was productive of clear  and white sputum. Extubation Well tolerated by patient. . No striodor present post extubation

## 2021-12-02 NOTE — BRIEF OP NOTE
Brief Postoperative Note      Patient: Devika Clement  YOB: 1955  MRN: [id number]    Date of Procedure: 12/1/2021    Pre-Op Diagnosis: NECROTIZING FASCIITIS    Post-Op Diagnosis: Same       Procedure(s):  WIDE EXCISION PERINEUM, BACK, ABDOMINAL WALL    Surgeon(s):  Deng Verduzco MD    Assistant:  Resident: Madhuri Johnson DO    Anesthesia: General    Estimated Blood Loss (mL): 75    Complications: None    Specimens:   ID Type Source Tests Collected by Time Destination   A : A) NECTROTIZING SKIN OF ABDOMEN AND BUTTOCKS Tissue Tissue SURGICAL PATHOLOGY Deng Verduzco MD 12/1/2021 2052        Implants:  * No implants in log *      Drains:   NG/OG/NJ/NE Tube Orogastric 16 fr Center mouth (Active)   Surrounding Skin Dry; Intact 12/01/21 1800   Securement device Yes 12/01/21 1800   Status Clamped 12/01/21 1800   Placement Verified by X-Ray (Initial) 12/01/21 1800   NG/OG/NJ/NE External Measurement (cm) 50 cm 12/01/21 1800   Drainage Appearance None 12/01/21 1800   Tube Feeding Intake (mL) 0 ml 12/01/21 1800   Free Water Flush (mL) 0 mL 12/01/21 1800   Free Water Rate 0 12/01/21 0800   Residual Volume (ml) 0 ml 12/01/21 0000       Urethral Catheter Non-latex 18 fr (Active)   Catheter Indications Need for fluid volume management of the critically ill patient in a critical care setting 12/01/21 1800   Securement Device Date Changed 12/01/21 12/01/21 1800   Site Assessment [place] 12/01/21 1800   Urine Color Yellow 12/01/21 1800   Urine Appearance Hazy 12/01/21 1800   Output (mL) 175 mL 12/01/21 1451       Findings: Necrotizing fasciitis of the abdominal wall, gluteal region, and scrotum.  Abdominal wall wound measuring 60 cm x 23 cm x 3cm and gluteal wound measures 20 cm x 15cm     Electronically signed by Madhuri Johnson DO on 12/1/2021 at 9:07 PM

## 2021-12-02 NOTE — PROGRESS NOTES
CREATININE 0.9   < > 0.8 1.0 0.9   BUN 48*   < > 50* 53* 50*   WBC 11.2*  --   --  25.2* 14.9*    < > = values in this interval not displayed.

## 2021-12-02 NOTE — ANESTHESIA POSTPROCEDURE EVALUATION
Department of Anesthesiology  Postprocedure Note    Patient: Jackie Carrasco  MRN: [id number]  [place]: 1955  Date of evaluation: 12/2/2021  Time:  12:24 AM     Procedure Summary     Date: 12/01/21 Room / Location: [address] [place] [address] [facility] / [facility]    Anesthesia Start: 1925 Anesthesia Stop: 2132    Procedure: WIDE EXCISION PERINEUM, BACK, ABDOMINAL WALL (N/A ) Diagnosis: (NECROTIZING FASCIITIS)    Surgeons: Bernardo Trimble MD Responsible Provider: Nabila Humphrey DO    Anesthesia Type: general ASA Status: 4 - Emergent          Anesthesia Type: general    [place]cad[place] Phase I:      [place] Phase II:      Last vitals: Reviewed and per EMR flowsheets.        Anesthesia Post Evaluation    Patient location during evaluation: ICU  Patient participation: complete - patient participated  Level of consciousness: sedated and ventilated  Pain score: 0  Airway patency: patent  Nausea & Vomiting: no nausea and no vomiting  Complications: no  Cardiovascular status: blood pressure returned to baseline  Respiratory status: acceptable, intubated and ventilator  Hydration status: euvolemic

## 2021-12-02 NOTE — CONSULTS
Plastic Surgery   Resident Consult Note    Reason for Consult: Shayla's Gangrene/Necrotizing fascitis     History of Present Illness:   Zachariah Mcmahan is a 63 y.o. male with history of T2DM and chronic lower extremity wound presented to the ED on 11/29/2021 with a several day history of a scrotal wound infection. 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 He also was found to be in DKA and was treated with an insulin infusion. He was admitted postoperatively to the ICU on a ventilator, peristently hypotensive The patient was transferred to [facility] for higher level of care and management by a multidisciplinary team including plastic surgery. The patient is currently intubated, sedated. Went to the OR earlier this evening for further debridement and is currently off pressors. Past Medical History:    History reviewed. No pertinent past medical history. Past Surgical History:        Procedure Laterality Date    ABDOMEN SURGERY N/A 12/1/2021    WIDE EXCISION PERINEUM, BACK, ABDOMINAL WALL performed by Joaquin Phillips MD at [address] [place] [address]         Allergies:  Patient has no known allergies. Medications:   Home Meds  No current facility-administered medications on file prior to encounter.      Current Outpatient Medications on File Prior to Encounter   Medication Sig Dispense Refill    metFORMIN (GLUCOPHAGE) 500 MG tablet Take 500 mg by mouth daily (with breakfast)      meloxicam (MOBIC) 15 MG tablet Take 1 tablet by mouth daily 30 tablet 3       Current Meds  lactated ringers infusion, Continuous  0.9 % sodium chloride infusion, PRN  0.9 % sodium chloride infusion, PRN  sodium chloride flush 0.9 % injection 5-40 mL, 2 times per day  sodium chloride flush 0.9 % injection 5-40 mL, PRN  0.9 % sodium chloride infusion, PRN  acetaminophen (TYLENOL) tablet 650 mg, Q6H PRN   Or  acetaminophen (TYLENOL) suppository 650 mg, Q6H PRN  norepinephrine (LEVOPHED) 16 mg in dextrose 5 % 250 mL infusion, Continuous  pantoprazole (PROTONIX) injection 40 mg, Daily  propofol injection, Titrated  meropenem (MERREM) 1,000 mg in sodium chloride 0.9 % 100 mL IVPB (mini-bag), Q8H  clindamycin (CLEOCIN) 600 mg in dextrose 5 % 50 mL IVPB, Q8H  fentaNYL (SUBLIMAZE) 1,000 mcg in sodium chloride 0.9% 100 mL infusion, Continuous  glucose (GLUTOSE) 40 % oral gel 15 g, PRN  dextrose 50 % IV solution, PRN  glucagon (rDNA) injection 1 mg, PRN  dextrose 5 % solution, PRN  vancomycin (VANCOCIN) 1,250 mg in dextrose 5 % 250 mL IVPB, Q18H  insulin regular (HUMULIN R;NOVOLIN R) 100 Units in sodium chloride 0.9 % 100 mL infusion, Continuous        Family History:   History reviewed. No pertinent family history. Social History:   TOBACCO:   reports that he has been smoking. He has never used smokeless tobacco.  ETOH:   reports current alcohol use. DRUGS:   reports no history of drug use. Review of Systems:   A 14 point review of systems was conducted, significant findings as noted in HPI. All other systems negative.      Physical exam:    Vitals:    12/02/21 1200 12/02/21 1300 12/02/21 1400 12/02/21 1500   BP: 105/63 (!) 99/56 (!) 110/54 111/63   Pulse: 75 73 78 82   Resp: 10 12 (!) 6 12   Temp: 97.7 °F (36.5 °C)      TempSrc: Axillary      SpO2: 99% 100% 96% 93%   Weight:       Height:           General appearance: intubated, sedated, moderate distress  HENT: NC/AT, MMM & intact, no JVD, neck supple, trachea midline  Eyes: No scleral icterus, EOMI  Chest/Lungs: intubated, mechanical breath sounds present bilaterally  Cardiovascular: RRR  Abdomen: soft, abdominal binder in place with dressings intact over abdominal wound measuring 60 cm x 23 cm x 3cm and gluteal region 20 cm x 15 cm, minimal drainage, no signs of further necrotizing fasciitis requiring further debridement, no subcutaneous crepitus noted, skin edges with healthy granulation tissue with appropriate bleeding, non-malodorous          Extremities:  full-thickness ulceration on lateral aspect of L foot, no edema, no cyanosis  : walsh in place with clear yellow urine, no signs of further necrotizing fasciitis requiring further debridement of L lower scrotum, no subcutaneous crepitus noted, skin edges with healthy granulation tissue with appropriate bleeding, non-malodorous   Neurologic: intubated, sedated    Labs:    CBC:   Recent Labs     12/01/21  0416 12/01/21  0416 12/01/21  1824 12/02/21  0400 12/02/21  1313   WBC 11.2*  --  25.2* 14.9*  --    HGB 10.1*   < > 9.2* 7.2* 9.0*   HCT 30.1*   < > 28.4* 22.1* 27.1*   MCV 89.1  --  90.1 89.4  --      --  272 180  --     < > = values in this interval not displayed. BMP:   Recent Labs     12/01/21  0416 12/01/21  0416 12/01/21  0830 12/01/21  1824 12/02/21  0400      < > 139 136 140   K 4.1  --  4.7 5.2* 4.5      < > 110 104 112*   CO2 23   < > 22 20* 20*   PHOS 2.2*  --  2.4* 3.2  --    BUN 48*   < > 50* 53* 50*   CREATININE 0.9   < > 0.8 1.0 0.9    < > = values in this interval not displayed. PT/INR:   Recent Labs     12/01/21  0416 12/01/21  1824   PROTIME 12.3 12.5   INR 1.08 1.10     APTT: No results for input(s): APTT in the last 72 hours.   Liver Profile:   Lab Results   Component Value Date    AST 9 12/02/2021    ALT 7 12/02/2021    BILIDIR 0.3 12/02/2021    BILITOT 0.4 12/02/2021    ALKPHOS 109 12/02/2021   No results found for: CHOL, HDL, TRIG  UA: No results found for: NITRITE, COLORU, PHUR, LABCAST, WBCUA, RBCUA, MUCUS, TRICHOMONAS, YEAST, BACTERIA, CLARITYU, SPECGRAV, LEUKOCYTESUR, UROBILINOGEN, BILIRUBINUR, BLOODU, GLUCOSEU, AMORPHOUS    Imaging:   VL DUP LOWER EXTREMITY ARTERIES BILATERAL    (Results Pending)         Assessment/Plan:  Yu Scott is a 63 y.o. male with Necrotizing fasciitis of the abdominal wall, gluteal region, and scrotum s/p wide excision of perineum, back, and abdominal wall. Abdominal wall wound measuring 60 cm x 23 cm x 3cm and gluteal wound measures 20 cm x 15cm    - Continue BID dressing changes  - Will plan for reconstruction in likely multiple stages once we could verify no further debridement necessary  - Continue strict glucose control  - Plastic surgery will continue to follow. Joaquin Cortez DO  12/02/21  3:29 PM    I saw and independently examined the patient today. I agree with the history of present illness, past medical/surgical histories, family history, social history, medication list and allergies as listed. The review of systems is as noted above. My physical exam confirms the findings listed above. Review of labs, pathology reports, radiology reports and medical records confirm the findings noted above. I edited the note where appropriate in italics, strikethrough font, or underline. (LATE ENTRY)    Patient transferred to facility with Shayla's gangrene / necrotizing fasciitis. Resuscitation in ICU including glucose control for poorly controlled DM. Plan for return to OR for planned evaluation +/- debridement as necessary.     Vani Aldrich MD  [facility] [address] [place] [address] [address] Reconstructive Surgery  [phone]  12/03/21

## 2021-12-02 NOTE — PROGRESS NOTES
Podiatric Surgery Daily Progress Note      Admit Date: 12/1/2021      Code:Full Code    Patient seen and examined, labs and records reviewed    Subjective:     Patient seen at bedside this a.m. Patient remains intubated and sedated. Patient underwent emergent procedure of wide excision of the perineum, back, and abdominal wall on 12/1/2021 for necrotizing fasciitis. Patient follows commands and answers yes/no questions. Review of Systems: Unable to be obtained secondary to patient being intubated and sedated       Objective     BP (!) 90/56   Pulse 74   Temp 97.4 °F (36.3 °C) (Oral)   Resp 18   Ht 5' 11\" (1.803 m)   Wt 183 lb 13.8 oz (83.4 kg)   SpO2 94%   BMI 25.64 kg/m²      I/O:    Intake/Output Summary (Last 24 hours) at 12/2/2021 0845  Last data filed at 12/2/2021 0550  Gross per 24 hour   Intake 2034.83 ml   Output 650 ml   Net 1384.83 ml              Wt Readings from Last 3 Encounters:   12/02/21 183 lb 13.8 oz (83.4 kg)   11/30/21 163 lb 3.2 oz (74 kg)   04/18/16 179 lb 14.3 oz (81.6 kg)       LABS:    Recent Labs     12/01/21  1824 12/02/21  0400   WBC 25.2* 14.9*   HGB 9.2* 7.2*   HCT 28.4* 22.1*    180        Recent Labs     12/01/21  0416 12/01/21  1824 12/02/21  0400   NA   < > 136 140   K   < > 5.2* 4.5   CL   < > 104 112*   CO2   < > 20* 20*   PHOS  --  3.2  --    BUN   < > 53* 50*   CREATININE   < > 1.0 0.9    < > = values in this interval not displayed. Recent Labs     11/30/21  1522 12/01/21  0416 12/01/21  1824   PROT 6.4  --  4.9*   INR  --  1.08 1.10       LOWER EXTREMITY EXAMINATION    Dressing to left LE intact. No strikethrough noted to the external dressing. No drainage noted to the internal layers of the dressing. VASCULAR: DP and PT pulses are non-palpable 0/4 b/l. Upon hand-held Doppler examination DP signals were noted to be monophasic and PT signals were noted to be nondopplerable. CFT is sluggish to the digits of the foot b/l.  Skin temperature is warm to cool from proximal to distal.  No edema noted. No pain with calf compression b/l.     NEUROLOGIC: Unable to be obtained secondary to patient being intubated and sedated     DERMATOLOGIC:   Left lower extremity:       Full-thickness ulceration noted to the lateral aspect of the left foot. Wound measures approximately 3.2 cm x 2 cm x 0.5 cm. Of note the fifth metatarsal is exposed. Periwound rubor noted. No purulent drainage noted. No fluctuance or crepitus or malodor noted.     Right LE soft tissue envelope is closed without ulceration or acute signs of infection.     MUSCULOSKELETAL: Muscle strength is unable to be obtained secondary to patient being intubated and sedated. No pain with palpation of the foot or ankle b/l. No obvious biomechanical abnormalities. IMAGING:  XR LEFT FOOT 11/30/2021  Narrative   EXAMINATION:   THREE XRAY VIEWS OF THE LEFT FOOT       11/30/2021 1:44 pm       COMPARISON:   None.       HISTORY:   ORDERING SYSTEM PROVIDED HISTORY: wound   TECHNOLOGIST PROVIDED HISTORY:   Reason for exam:->wound   Reason for Exam: blood sugar problem, wound check on lateral portion of foot   Acuity: Acute   Type of Exam: Initial       FINDINGS:   Osseous destruction along the articular margins of the 5th   metatarsophalangeal joint with associated lucency in the soft tissues. .   There is no evidence of fracture or dislocation. . The remaining joint spaces   appear well maintained.  The remaining soft tissues are unremarkable.           Impression   Evidence of a septic joint involving the 5th metatarsal phalangeal joint with   associated osteomyelitis            ASSESSMENT/PLAN  ASSESSMENT/PLAN:   -Full-thickness ulceration; lateral left foot-Sands stage III  -Osteomyelitis of the fifth metatarsal; left foot  -Diabetes mellitus, type II  -History of noncompliance    -Patient seen and examined at bedside this a.m.  -Intubated and sedated, leukocytosis noted (WBC 14.9)  -ESR 81 and CRP 323.5  -lactic acid 0.9  -HbA1c 13.5%  -prealbumin less than 3  -Blood cultures 1 & 2 preliminary result: No growth to date  -XR images reviewed, impression noted above  -Arterial duplex ordered we will follow up results  -Wound culture obtained we will follow up results  -Continue IV antibiotics per primary team: Vancomycin, clindamycin, Merrem  -Left lower extremity dressed with Betadine soaked gauze, DSD, Ace with gentle compression  -Prevalon boots reapplied. Patient is to wear at all times while in bed to prevent further deep tissue injury.  -Nonweightbearing to left lower extremity  -Weightbearing as tolerated to right lower extremity    DISPO: Full-thickness ulceration with underlying osteomyelitis to the left fifth metatarsal.  Imaging reviewed. Arterial duplex ordered. Labs and wound culture pending. Continue broad-spectrum IV antibiotics. Even with known osteomyelitis wound is stable at this time we will continue with local wound care. Patient will eventually require podiatric surgical intervention but timing will depend on medical improvement and stability. Patient assessment and plan was discussed with Dr. Rodolfo Rincon DPM.    Clemente Agee DPM   Podiatric Resident PGY1  Pager [phone] or Mary [place]  12/2/2021, 8:45 AM      Patient was seen and evaluated at bedside. Agree with residents assessment and treatment plan.   Rodolfo Rincon DPM

## 2021-12-02 NOTE — CARE COORDINATION
Case Management Assessment           Initial Evaluation                Date / Time of Evaluation: 2021 2:48 PM                 Assessment Completed by: Golden Culver RN    Patient Name: Ollen Apgar     YOB: 1955  Diagnosis: Necrotizing fasciitis [facility]) [M72.6]     Date / Time: 2021  3:40 PM    Patient Admission Status: Inpatient    If patient is discharged prior to next notation, then this note serves as note for discharge by case management. Current PCP: No primary care provider on file. Clinic Patient: No    Chart Reviewed: Yes  Patient/ Family Interviewed: Yes    Initial assessment completed at bedside with: Patient    Hospitalization in the last 30 days: No    Emergency Contacts:  Extended Emergency Contact Information  Primary Emergency Contact: Shea Hernandez  Address: [place] [address]           [address] Phone: [phone]  Work Phone: [phone]  Mobile Phone: [phone]  Relation: Spouse  Secondary Emergency Contact: [place], [address]  Phone: [phone]  Mobile Phone: [phone]  Relation: Brother/Sister   needed? No    Advance Directives:   Code Status: Full Code    Healthcare Power of : No  Agent: NA  Contact Number: NA    Copy present: No     In paper Chart: No    Scanned into EMR No    Financial  Payor: /     Pre-cert required for SNF: Yes    Pharmacy    [facility]  [address]  Phone: [phone] Fax: [phone]      Potential assistance Purchasing Medications:    Does Patient want to participate in local refill/ meds to beds program?:      Meds To Beds General Rules:  1. Can ONLY be done Monday- Friday between 8:30am-5pm  2. Prescription(s) must be in pharmacy by 3pm to be filled same day  3. Copy of patient's insurance/ prescription drug card and patient face sheet must be sent along with the prescription(s)  4. Cost of Rx cannot be added to hospital bill. If financial assistance is needed, please contact unit  or ;  or  CANNOT provide pharmacy voucher for patients co-pays  5. Patients can then  the prescription on their way out of the hospital at discharge, or pharmacy can deliver to the bedside if staff is available. (payment due at time of pick-up or delivery - cash, check, or card accepted)     Able to afford home medications/ co-pay costs: Yes    ADLS  Support Systems:      PT AM-PAC:     OT AM-PAC:       HOUSING  Home Environment: Single family apartment  Steps: 7    Plans to RETURN to current housing: No  Barriers to RETURNING to current housin [address]  Currently ACTIVE with  [address]: No  Home Care Agency: Not Applicable    Currently ACTIVE with [place] [facility]: No  Passport/ Waiver: No  Passport/ Waiver Services: Not Applicable    : SAM Phone: [address]  DME Provider: NA  Equipment: NA    Home Oxygen and [address] [place] prior to admission: [address]: Not Applicable  Other Respiratory Equipment: NA    Informed of need to bring portable home O2 tank on day of DISCHARGE for nursing to connect prior to leaving: No  Verbalized agreement/Understanding: No  Person to bring portable tank at discharge: NA    Dialysis  Active with HD/PD prior to admission: No  Nephrologist: Nyla Blue 61:  Not Applicable    DISCHARGE PLAN:  Disposition: Home with  [address]: [facility] following (chairty case)     Transportation PLAN for discharge: family     Factors facilitating achievement of predicted outcomes: Family support    Barriers to discharge: Medication managment    Additional Case Management Notes: CM met with patient at bedside, patient lives with spouse in apartment that ha 7 steps to enter.   Patient extubated earlier today and somewhat sleepy during interview. Spoke with Narinder Astorga in [employer], patient does not have insurance and is over income level for assistance through [employer]. Carolann Storey at [facility] to follow patient as he may need wound care upon discharge, she is following. Spoke with Hilda at [facility] regarding possibility of long term antibiotics, she is following as well. The Plan for Transition of Care is related to the following treatment goals of Necrotizing fasciitis ([facility] [address] [M72.6]    The Patient and/or patient representative Sky Arroyo and his family were provided with a choice of provider and agrees with the discharge plan Not Indicated    Freedom of choice list was provided with basic dialogue that supports the patient's individualized plan of care/goals and shares the quality data associated with the providers.  Not Indicated    Care Transition patient: No    Zeferino Mc RN  [facility]  Case Management Department  Ph: [phone]

## 2021-12-02 NOTE — OP NOTE
Operative Note      Patient: Justina Lorenzo  YOB: 1955  MRN: [id number]    Date of Procedure: 12/1/2021    Pre-Op Diagnosis: NECROTIZING FASCIITIS    Post-Op Diagnosis: Same       Procedure(s):  WIDE EXCISION PERINEUM, BACK, ABDOMINAL WALL    Surgeon(s):  Luda Ziegler MD    Assistant:   Resident: Sawyer Delacruz DO    Anesthesia: General    Estimated Blood Loss (mL): 75    Complications: None    Specimens:   ID Type Source Tests Collected by Time Destination   A : A) NECTROTIZING SKIN OF ABDOMEN AND BUTTOCKS Tissue Tissue SURGICAL PATHOLOGY Luda Ziegler MD 12/1/2021 2052        Implants:  * No implants in log *      Drains:   NG/OG/NJ/NE Tube Orogastric 16 fr Center mouth (Active)   Surrounding Skin Dry; Intact 12/01/21 1800   Securement device Yes 12/01/21 1800   Status Clamped 12/01/21 1800   Placement Verified by X-Ray (Initial) 12/01/21 1800   NG/OG/NJ/NE External Measurement (cm) 50 cm 12/01/21 1800   Drainage Appearance None 12/01/21 1800   Tube Feeding Intake (mL) 0 ml 12/01/21 1800   Free Water Flush (mL) 0 mL 12/01/21 1800   Free Water Rate 0 12/01/21 0800   Residual Volume (ml) 0 ml 12/01/21 0000       Urethral Catheter Non-latex 18 fr (Active)   Catheter Indications Need for fluid volume management of the critically ill patient in a critical care setting 12/01/21 1800   Securement Device Date Changed 12/01/21 12/01/21 1800   Site Assessment [place] 12/01/21 1800   Urine Color Yellow 12/01/21 1800   Urine Appearance Hazy 12/01/21 1800   Output (mL) 175 mL 12/01/21 1451       Findings: Necrotizing fasciitis of the abdominal wall, gluteal region, and scrotum. Abdominal wall wound measuring 60 cm x 23 cm x 3cm and gluteal wound measures 20 cm x 15cm     Indication for Procedure: The patient is a 63year old male who presented to the hospital with Shayla's gangrene.  He underwent excision earlier however the patient had persistent crepitus, was requiring increasing pressors requirements, and therefore required further debridement. Risks, benefits, and alternatives were discussed with the patient's wife and she agreed to proceed. Detailed Description of Procedure: The patient was taken to the operating room and placed in the supine position. General anesthesia was induced. The patient was on multiple scheduled antibiotics. The abdomen and bilateral groins including the scrotum was prepped and draped in the usual sterile fashion. A time out was performed per hospital policy. We started at the previous incision and carried the incision across the groin using a #10 blade scalpel. The incision was carried down to the fascia removing all subcutaneous tissues. The debridement was carried superior to the level of the umbilicus. The debridement span to the bilateral flanks. The debridement was sharp and it was carried down through the subcutaneous tissue, muscle, and down to the fascia. Tissue cultures were sent. Hemostasis was achieved. The excision measured 60 cm x 23 cm x 3 cm. The abdominal wall, bilateral groins, and scrotum was copiously irrigated using the pulse lavage. Hemostasis was achieved. The wound was dressed with betadine soaked kerlix, ABD pads, and tape. Attention was turned to the perineum and additional necrotic tissue was sharply debrided down to healthy subcutaneous tissue. The patient was then transitioned to the prone position. The bilateral gluteal region was prepped and draped in the standard sterile fashion. A #10 blade scalpel was used to excise necrotic tissue of the left gluteal region. This was carried down through the subcutaneous tissue down to the muscle. All necrotic tissue was debrided. The wound was irrigated with the pulse lavage. Hemostasis was achieved. The gluteal wound measured 20cm x 15cm. The wound was dressed with betadine soaked kerlix, ABD pads, and tape. The patient tolerated the procedure well. All counts were correct at the end of the case x2.

## 2021-12-02 NOTE — PROGRESS NOTES
Hospitalist Progress Note      PCP: No primary care provider on file. Date of Admission: 12/1/2021    Chief Complaint: [facility] Course: Patient was transferred from [facility] after admission for DKA and Shayla's gangrene/necrotizing fasciitis. Patient was intubated and underwent I&D. Pt was transferred to [place] [id number] for further evaluation per colorectal/plastic surgery. Patient underwent wide excision of perineum/abdominal wall 12/01. On IV abx and insulin drip. Urology/surgery/plastic surgery following. Subjective: Patient intubated but open eyes to his name. Follows simple commands. Medications:  Reviewed    Infusion Medications    lactated ringers 125 mL/hr at 12/02/21 0423    sodium chloride      sodium chloride      sodium chloride Stopped (12/01/21 2027)    norepinephrine Stopped (12/01/21 1958)    propofol 15 mcg/kg/min (12/02/21 0849)    fentaNYL 25 mcg/hr (12/02/21 0550)    dextrose      insulin (HUMAN R) non-weight based infusion 4.08 Units/kg/hr (12/02/21 0850)     Scheduled Medications    sodium chloride flush  5-40 mL IntraVENous 2 times per day    pantoprazole  40 mg IntraVENous Daily    meropenem  1,000 mg IntraVENous Q8H    clindamycin (CLEOCIN) IV  600 mg IntraVENous Q8H    vancomycin  1,250 mg IntraVENous Q18H     PRN Meds: sodium chloride, sodium chloride, sodium chloride flush, sodium chloride, acetaminophen **OR** acetaminophen, glucose, dextrose, glucagon (rDNA), dextrose      Intake/Output Summary (Last 24 hours) at 12/2/2021 0857  Last data filed at 12/2/2021 0550  Gross per 24 hour   Intake 2034.83 ml   Output 650 ml   Net 1384.83 ml       Physical Exam Performed:    BP (!) 90/56   Pulse 74   Temp 97.4 °F (36.3 °C) (Oral)   Resp 18   Ht 5' 11\" (1.803 m)   Wt 183 lb 13.8 oz (83.4 kg)   SpO2 94%   BMI 25.64 kg/m²     General appearance: No apparent distress, ill-appearing  HEENT: Pupils equal, round, and reactive to light. Conjunctivae/corneas clear. Neck: Supple, with full range of motion. No jugular venous distention. Trachea midline. Respiratory: On vent. Clear to auscultation, bilaterally   Cardiovascular: Regular rate and rhythm with normal S1/S2 without murmurs, rubs or gallops. Abdomen: In abdominal binder with dressing  Musculoskeletal: Left lower extremity in dressing. Pictures from podiatry note reviewed  Skin: Skin color, texture, turgor normal.  No rashes or lesions. Neurologic: Open eyes to verbal command. Follows simple command. Capillary Refill: Brisk,< 3 seconds   Peripheral Pulses: +2 palpable, equal bilaterally       Labs:   Recent Labs     12/01/21  0416 12/01/21  1824 12/02/21  0400   WBC 11.2* 25.2* 14.9*   HGB 10.1* 9.2* 7.2*   HCT 30.1* 28.4* 22.1*    272 180     Recent Labs     12/01/21  0416 12/01/21  0416 12/01/21  0830 12/01/21  1824 12/02/21  0400      < > 139 136 140   K 4.1  --  4.7 5.2* 4.5      < > 110 104 112*   CO2 23   < > 22 20* 20*   BUN 48*   < > 50* 53* 50*   CREATININE 0.9   < > 0.8 1.0 0.9   CALCIUM 7.7*   < > 7.4* 7.1* 7.2*   PHOS 2.2*  --  2.4* 3.2  --     < > = values in this interval not displayed. Recent Labs     11/30/21  1522 12/01/21  1824   AST 7* 13*   ALT 11 8*   BILIDIR  --  0.4*   BILITOT 0.9 0.5   ALKPHOS 261* 153*     Recent Labs     12/01/21  0416 12/01/21  1824   INR 1.08 1.10     Recent Labs     11/30/21  2219   CKTOTAL 26*   TROPONINI <0.01       Urinalysis:    No results found for: Ros Solitario, BACTERIA, RBCUA, BLOODU, SPECGRAV, [address]    Radiology:  VL DUP LOWER EXTREMITY ARTERIES BILATERAL    (Results Pending)           Assessment/Plan:  Acute respiratory failure:   On vent, plan for SBT trial today  Management per intensivist    Shayla's gangrene/necrotizing fasciitis:  Status post I&D 11/30 and wide excision of perineum/abdominal wall 12/01  Abdominal wall wound measuring 60 cm x 23 cm x 3cm and gluteal wound measures 20 cm x 15cm  POD #0  Continue IV antibiotics  Surgery/plastic surgery following  Urology consulted    Acute postoperative blood loss anemia:  Likely secondary to I&D and wide excision  Monitor hemoglobin  1 unit of packed red blood cells to be transfused today    DKA in the setting of diabetes mellitus type 2:  Patient noncompliant.   Holding home medication  Monitor blood glucose  Continue insulin drip    Left fifth metatarsal diabetic foot ulceration and osteomyelitis:  Continue IV antibiotics  Arterial Doppler pending  Continue wound care per podiatry    DVT Prophylaxis: SCDs, on hold due to anemia  Diet: Diet NPO  Code Status: Full Code    PT/OT Eval Status: Once able    Dispo -Pending clinical course    Carey Goldberg, MD

## 2021-12-02 NOTE — PLAN OF CARE
Problem: Non-Violent Restraints  Goal: Removal from restraints as soon as assessed to be safe  12/2/2021 0348 by Remy Griffin RN  Outcome: Ongoing  12/1/2021 2007 by Tha Da Silva RN  Outcome: Ongoing  Goal: No harm/injury to patient while restraints in use  12/2/2021 0348 by Remy Griffin RN  Outcome: Ongoing  12/1/2021 2007 by Tha Da Silva RN  Outcome: Ongoing  Goal: Patient's dignity will be maintained  12/2/2021 0348 by Remy Griffin RN  Outcome: Ongoing  12/1/2021 2007 by Tha Da Silva RN  Outcome: Ongoing     Problem: Activity:  Goal: Ability to return to normal activity level will improve  Description: Ability to return to normal activity level will improve  12/1/2021 2007 by Tha Da Silva RN  Outcome: Ongoing     Problem:  Bowel/Gastric:  Goal: Gastrointestinal status for postoperative course will improve  Description: Gastrointestinal status for postoperative course will improve  12/1/2021 2007 by Tha Da Silva RN  Outcome: Ongoing     Problem: Health Behavior:  Goal: Identification of resources available to assist in meeting health care needs will improve  Description: Identification of resources available to assist in meeting health care needs will improve  12/1/2021 2007 by Tha Da Silva RN  Outcome: Ongoing     Problem: Physical Regulation:  Goal: Postoperative complications will be avoided or minimized  Description: Postoperative complications will be avoided or minimized  12/1/2021 2007 by Tha Da Silva RN  Outcome: Ongoing     Problem: Respiratory:  Goal: Ability to achieve and maintain a regular respiratory rate will improve  Description: Ability to achieve and maintain a regular respiratory rate will improve  12/1/2021 2007 by Tha Da Silva RN  Outcome: Ongoing     Problem: Safety:  Goal: Ability to remain free from injury will improve  Description: Ability to remain free from injury will improve  12/1/2021 2007 by Tha Da Silva RN  Outcome: Ongoing Problem: Sensory:  Goal: General experience of comfort will improve  Description: General experience of comfort will improve  12/1/2021 2007 by Nydia Diego RN  Outcome: Ongoing     Problem: Skin Integrity:  Goal: Demonstration of wound healing without infection will improve  Description: Demonstration of wound healing without infection will improve  12/1/2021 2007 by Nydia Diego RN  Outcome: Ongoing  Goal: Will show no infection signs and symptoms  Description: Will show no infection signs and symptoms  12/1/2021 2007 by Nydia Diego RN  Outcome: Ongoing     Problem: Infection - Surgical Site:  Goal: Will show no infection signs and symptoms  Description: Will show no infection signs and symptoms  12/1/2021 2007 by Nydia Diego RN  Outcome: Ongoing

## 2021-12-02 NOTE — PROGRESS NOTES
MECHANICAL VENTILATION WEANING PROTOCOL    PRE-TRIAL PATIENT ASSESSMENT - COMPLETED AT 1145    Ventilatory Assessment:    PARAMETER CRITERIA FOR WEANING   Spontaneous Cough:  Yes    Sputum Characteristics:  · Sputum Amount: Moderate  · Tenacity: Thin  · Sputum Color: Cloudy SPONTANEOUS COUGH With small to moderate  Amount of secretions   FiO2 : 50 % FIO2 less than or equal to 50%     PEEP less than or equal to 8   Progressive Mobility Protocol  No     ABG:  Lab Results   Component Value Date    PHART 7.369 12/02/2021    AJB9CKK 40.4 12/02/2021    PO2ART 107.0 12/02/2021    T5BGPJTV 98 12/02/2021    BPY8KTR 23.3 12/02/2021    BEART -2 12/02/2021     HGB/WBC:  Lab Results   Component Value Date    HGB 7.2 12/02/2021    WBC 14.9 12/02/2021        Vital Signs:    PARAMETER CRITERIA FOR WEANING Meets Criteria   Pulse: 74 Within patient's normal limits / stable Yes   Resp: 16 Less than or equal to 30 Yes   BP: 101/60 Within patient's normal limits / minimal pressors (Hemodynamically Stable) Yes   SpO2: 100 % Greater than or equal to 90% Yes   End Tidal CO2: 25 (%) Within patient's normal limits Yes   Temp: 97.5 °F (36.4 °C) Less than 38. 5oC / 101. 3oF Yes     [x]    Based on this assessment and the [facility] Ventilator Weaning Protocol, this patient  IS being placed on a Spontaneous Breathing Trial (SBT) at this time.  []    Based on this assessment and the [facility] Ventilator Weaning Protocol, this patient  IS NOT being placed on a Spontaneous Breathing Trial (SBT) at this time. []    Patient  IS NOT being placed on a Spontaneous Breathing Trial (SBT) at this time because of factors not previously addressed.   Those factors include     Vital Capacity (VC) = 2.315    Maximum Inspiratory Force (MIF) = -25    SBT - Initiated at  1148    Ventilator Settings:  CPAP - 5 cmH2O, PS - 8 cmH2O(if using settings other than CPAP 5/PS 8, please explain reasons for settings here):  1 Minute SBT Respiratory Parameters:   VE: 10.1 L   RR: 17 b/m   VT: 239 mL (average VT = VE/RR)   RSBI: 58 (RR/VT in liters)   ETCO2: 25 cmH2O   SPO2: 98 %   If on sedation, amount and type 2.5 mcg/kg/hr fentanyl, 0.5 mcg/kg/hr propofol         [x]   RR is less than 35, RSBI is less than 100, patient's vitals signs are stable, and patient is in no apparent distress; therefore, patient is being left on SBT for up to 1 hour. []   RR is greater than 35, RSBI is greater than 100, VS's are unstable, or patient is in distress; therefore, patient is being placed back on previous settings. SBT - Concluded at  1326. Weaning Parameters/VS's at conclusion of SBT:   VE: 7.3 L   RR: 14 b/m   VT: 557 mL (average VT = VE/RR)   RSBI: 29 (RR/VT in liters)   ETCO2: 30 cmH2O   SPO2: 100 %    If on sedation, amount and type 2.5 mcg/kg/hr fentanyl,      [x]   Patient tolerated SBT for full 60 minutes with acceptable weaning parameters and vital signs and showed no signs or distress. []   Patient tolerated SBT for full 60 minutes, but had unacceptable weaning parameters or vital signs, and/or signs of distress. []   Patient was unable to tolerate SBT for 60 minutes and was placed back on previous settings.             COMMENTS:  Pt extubated per order

## 2021-12-02 NOTE — DISCHARGE INSTR - COC
Continuity of Care Form    Patient Name: Yu Scott   :  1955  MRN:  [id number]    Admit date:  2021  Discharge date:  22    Code Status Order: Full Code   Advance Directives:      Admitting Physician:  Hugo Lees MD  PCP: No primary care provider on file. Discharging Nurse: [facility] Unit/Room#: 0003/4115-36  Discharging Unit Phone Number: [phone]    Emergency Contact:   Extended Emergency Contact Information  Primary Emergency Contact: DavidShea  Address: [address] [place] [address] [address] [place] [address] Phone: [phone]  Work Phone: [phone]  Mobile Phone: [phone]  Relation: Spouse  Secondary Emergency Contact: [place], [address] Phone: [phone]  Mobile Phone: [phone]  Relation: Brother/Sister   needed? No    Past Surgical History:  Past Surgical History:   Procedure Laterality Date    ABDOMEN SURGERY N/A 2021    WIDE EXCISION PERINEUM, BACK, ABDOMINAL WALL performed by Linda Quinones MD at [address] [place] [address]         Immunization History: There is no immunization history on file for this patient.     Active Problems:  Patient Active Problem List   Diagnosis Code    Diabetic acidosis without coma ([facility] [address] E11.10    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    Acute respiratory failure with hypoxia ([facility]) J96.01    Necrotizing fasciitis ([facility]) M72.6       Isolation/Infection:   Isolation            No Isolation          Patient Infection Status       None to display            Nurse Assessment:  Last Vital Signs: BP (!) 114/59   Pulse 83   Temp 97.7 °F (36.5 °C) (Oral)   Resp 19   Ht 5' 11\" (1.803 m)   Wt 182 lb 15.7 oz (83 kg)   SpO2 (!) 87%   BMI 25.52 kg/m²     Last documented pain score (0-10 scale): Pain Level: 0  Last Weight:   Wt Readings from Last 1 Encounters:   21 182 lb 15.7 oz (83 kg)     Mental Status:  oriented and alert    IV Access:  - PICC - site  R Pinky, insertion date: 12/1/121    Nursing Mobility/ADLs:  Walking   Assisted  Transfer  Dependent  Bathing  Assisted  Dressing  Assisted  Toileting  Assisted  Feeding  Assisted  Med Admin  Assisted  Med Delivery   whole    Wound Care Documentation and Therapy:  Wound 11/30/21 Foot Anterior; Left Full thickness (Active)   Wound Image   11/30/21 2245   Wound Etiology Diabetic 12/01/21 2200   Dressing Status Clean; Dry; Intact 12/02/21 1600   Wound Cleansed Cleansed with saline 12/01/21 1800   Dressing/Treatment Gauze dressing/dressing sponge; Roll gauze 12/01/21 1800   Offloading for Diabetic Foot Ulcers Offloading boot 12/02/21 1600   Dressing Change Due 12/01/21 12/01/21 1200   Wound Length (cm) 4 cm 11/30/21 2245   Wound Width (cm) 2 cm 11/30/21 2245   Wound Depth (cm) 1 cm 11/30/21 2245   Wound Surface Area (cm^2) 8 cm^2 11/30/21 2245   Wound Volume (cm^3) 8 cm^3 11/30/21 2245   Wound Assessment [place]/red; [facility] 12/01/21 1800   Drainage Amount None 12/02/21 1600   Drainage Description Thin 12/01/21 1800   Odor Malodorous/putrid 12/01/21 1800   Princess-wound Assessment Warm 12/01/21 1800   Margins Attached edges 12/01/21 1200   Number of days: 1        Elimination:  Continence: Bowel: No  Bladder: No  Urinary Catheter: Insertion Date: 1/11/22    Colostomy/Ileostomy/Ileal Conduit: Yes       Date of Last BM:     Intake/Output Summary (Last 24 hours) at 12/2/2021 1835  Last data filed at 12/2/2021 1800  Gross per 24 hour   Intake 3345.83 ml   Output 1375 ml   Net 1970.83 ml     I/O last 3 completed shifts: In: 3345.8 [I.V.:1707.7; Blood:320; IV Piggyback:1318.1]  Out: 1150 [RKBMD:3455]    Safety Concerns: At Risk for Falls    Impairments/Disabilities:      None    Nutrition Therapy:  Current Nutrition Therapy:   - Oral Diet:  Carb Control 3 carbs/meal (1500kcals/day)    Routes of Feeding: Oral  Liquids:  Thin Liquids  Daily Fluid Restriction: no  Last Modified Barium Swallow with Video (Video Swallowing Test): not done    Treatments at the Time of Hospital Discharge:   Respiratory Treatments: no  Oxygen Therapy:  is on oxygen at 3 L/min per nasal cannula. Ventilator:    - No ventilator support    Rehab Therapies: Physical Therapy and Occupational Therapy  Weight Bearing Status/Restrictions: Non-weight bearing on left leg - heel weight bearing only   Other Medical Equipment (for information only, NOT a DME order):  wheelchair, walker, and hospital bed  Other Treatments: dressing changes    Patient's personal belongings (please select all that are sent with patient):  None    RN SIGNATURE:  Electronically signed by Radhika Lopez RN on 1/11/22 at 4:30 PM EST    CASE MANAGEMENT/SOCIAL WORK SECTION    Inpatient Status Date: ***    Readmission Risk Assessment Score:  Readmission Risk              Risk of Unplanned Readmission:  10           Discharging to Facility/ Agency   [facility] [place]  Address:  [facility]:[phone]  [facility]:[phone]        / signature: Electronically signed by Marciano Mariscal RN on 1/11/22 at 3:30 PM EST    PHYSICIAN SECTION    Prognosis: Good    Condition at Discharge: Stable    Rehab Potential (if transferring to Rehab): Good    Recommended Labs or Other Treatments After Discharge:   Dressing Changes:  Abdominal wound - to be dressed daily with Xeroform gauze, covered with ABD pads and held together with abdominal binder  Gluteal wound - to be dressed daily with Xeroform gauze, covered with ABD pads and held together with mesh underwear  Perineal wound - to be dressed BID with wet-to-dry dressing. Using a moist guaze pack perineal open wound and place ABD over the top of the dressing. Right Thigh dressing - please apply Xeroform gauze to anterior portion of donor sight, ensure posterior thigh has Xeroform on donor sight and then wrap the thigh with Kerlix wrap.    Ostomy - Patient has an ostomy that needs to be changed completed every 5-7 days and ostomy bag to be changed PRN. Podiatry Wound Care Discharge Instructions  Please perform every day dressing changes to left lower extremity as follows  -Apply betadine soaked gauze to the wound on the outside of the left lower leg  -Next apply gauze to the top of the left foot and ankle   -Next loosely wrap the left lower extremity with Kerlix starting from just in front of the toes and ending just above the ankle  -Next gently wrap the left foot with 4 inch Ace bandage starting from just in front of the toes and ending just above the ankle  -Patient is to follow up with Dr. Kathleen Allen DPM at [facility] wound care for wound re-check    ANTIBIOTICS:  ampicillin-sulbactam (UNASYN) 3000 mg ivpb minibag  3,000 mg, IntraVENous, EVERY 6 HOURS, for one week. Walsh Care:  Patient underwent a void trial one 1/10-1/11/22 and was unable to void thus a walsh catheter was placed. Please continue Flowmax 0.4mg daily. Patient can undergo additional void trial in 1-2 days. Physician Certification: I certify the above information and transfer of Marianela Asher  is necessary for the continuing treatment of the diagnosis listed and that he requires LTAC for greater 30 days.      Update Admission H&P: No change in H&P    PHYSICIAN SIGNATURE:  Compa Hamilton MD

## 2021-12-02 NOTE — PROGRESS NOTES
ICU Progress Note    Admit Date: 12/1/2021  Day: 2  Vent Day: 2  IV Access:Peripheral, PICC, A line  IV Fluids:LR, insulin, propofol, fentanyl  Vasopressors:None                Antibiotics: None  Diet: Diet NPO    CC: Westley gangrene    Interval history: Levophed was stopped, SBP 90-110s. Patient went yesterday to the OR for wide excision of perineum, back and abdominal wall. Two units of RBCs were transfused. Patient's chart and notes were reviewed. Patient's chart and notes were reviewed. Patient sedated and intubated. Patient is hemodynamically stable and afebrile. He remains on SCDs      HPI: Sloane Moon a 63 y. o. male with PMH as below notable for T2DM who presented with groin swelling. Patient reported that 2 weeks ago he notice a abscess near his anus, however reported in the last 2 days the area has increasing in size and warmth and extended to his scrotum. He reported poor compliance with his diabetes medication (metformin).      On admission the patient was hypotensive, WBC 18, Bicarb 20, Anion gap 22, Glucose 679, elevated Bhydroxybutirate. General surgery, urology and podiatry were consulted. Vancomycin was started, 3 L of IV fluids were given and the patient was placed in DKA protocol. Anion gap closed twice. The patient was taken for surgery debridement, was kept intubated due to possible multiple surgery interventions. Patient was transferred to the [facility] for colorectal and plastic surgery management.      Patient was admitted to the ICU for further workup and management of westley gangrene.     Medications:     Scheduled Meds:   sodium chloride flush  5-40 mL IntraVENous 2 times per day    pantoprazole  40 mg IntraVENous Daily    meropenem  1,000 mg IntraVENous Q8H    clindamycin (CLEOCIN) IV  600 mg IntraVENous Q8H    vancomycin  1,250 mg IntraVENous Q18H     Continuous Infusions:   lactated ringers 125 mL/hr at 12/02/21 0423    sodium chloride      sodium chloride      sodium chloride Stopped (12/01/21 2027)    norepinephrine Stopped (12/01/21 1958)    propofol 15 mcg/kg/min (12/02/21 0550)    fentaNYL 25 mcg/hr (12/02/21 0550)    dextrose      insulin (HUMAN R) non-weight based infusion 2.76 Units/hr (12/02/21 0700)     PRN Meds:sodium chloride, sodium chloride, sodium chloride flush, sodium chloride, acetaminophen **OR** acetaminophen, glucose, dextrose, glucagon (rDNA), dextrose    Objective:   Vitals:   T-max:  Patient Vitals for the past 8 hrs:   BP Temp Temp src Pulse Resp SpO2 Height Weight   12/02/21 0600 (!) 90/56 -- -- 74 18 94 % -- --   12/02/21 0548 -- -- -- -- -- -- -- 183 lb 13.8 oz (83.4 kg)   12/02/21 0500 94/63 -- -- 75 18 97 % -- --   12/02/21 0458 -- -- -- 70 17 -- 5' 11\" (1.803 m) --   12/02/21 0400 (!) 96/59 97.4 °F (36.3 °C) Oral 71 18 98 % -- --   12/02/21 0300 86/62 -- -- 75 18 95 % -- --   12/02/21 0200 (!) 152/69 -- -- 88 21 97 % -- --   12/02/21 0100 98/65 -- -- 75 20 98 % -- --   12/02/21 0045 -- -- -- 76 20 98 % 5' 11\" (1.803 m) --   12/02/21 0000 (!) 103/59 97.4 °F (36.3 °C) Oral 76 18 97 % -- --       Intake/Output Summary (Last 24 hours) at 12/2/2021 0745  Last data filed at 12/2/2021 0550  Gross per 24 hour   Intake 2034.83 ml   Output 650 ml   Net 1384.83 ml       Review of Systems  A 10 point review of systems was conducted, significant findings as noted in HPI. Physical Exam  Constitutional:       Appearance: He is ill-appearing. Comments: Patient is sedated and intubated. HENT:      Head: Normocephalic and atraumatic. Eyes:      Conjunctiva/sclera: Conjunctivae normal.      Pupils: Pupils are equal, round, and reactive to light. Cardiovascular:      Rate and Rhythm: Normal rate and regular rhythm. Pulses: Normal pulses. Heart sounds: Normal heart sounds. Pulmonary:      Comments: Patient intubated. Mechanical breath sounds present bilateral.  Abdominal:      General: Abdomen is flat.  Bowel sounds are normal. Palpations: Abdomen is soft. Feet:      Comments: Full-thickness ulceration noted to the lateral aspect of the left foot. Wound measures approximately 3.2 cm x 2 cm x 0.5 cm. Of note the fifth metatarsal is exposed. Skin:     Coloration: Skin is pale. Comments: Necrotizing fasciitis of the abdominal wall, gluteal region, and scrotum. Abdominal wall wound measuring 60 cm x 23 cm x 3cm and gluteal wound measures 20 cm x 15cm      Neurological:      Comments: Patient is sedated. LABS:    CBC:   Recent Labs     12/01/21  0416 12/01/21  1824 12/02/21  0400   WBC 11.2* 25.2* 14.9*   HGB 10.1* 9.2* 7.2*   HCT 30.1* 28.4* 22.1*    272 180   MCV 89.1 90.1 89.4     Renal:    Recent Labs     12/01/21  0416 12/01/21  0416 12/01/21  0830 12/01/21  1824 12/02/21  0400      < > 139 136 140   K 4.1  --  4.7 5.2* 4.5      < > 110 104 112*   CO2 23   < > 22 20* 20*   BUN 48*   < > 50* 53* 50*   CREATININE 0.9   < > 0.8 1.0 0.9   GLUCOSE 182*   < > 155* 293* 111*   CALCIUM 7.7*   < > 7.4* 7.1* 7.2*   MG 2.10  --  2.00 2.00  --    PHOS 2.2*  --  2.4* 3.2  --    ANIONGAP 8   < > 7 12 8    < > = values in this interval not displayed. Hepatic:   Recent Labs     11/30/21  1522 12/01/21  1824   AST 7* 13*   ALT 11 8*   BILITOT 0.9 0.5   BILIDIR  --  0.4*   PROT 6.4 4.9*   LABALBU 2.7* 2.0*   ALKPHOS 261* 153*     Troponin:   Recent Labs     11/30/21  2219   TROPONINI <0.01     BNP: No results for input(s): BNP in the last 72 hours. Lipids: No results for input(s): CHOL, HDL in the last 72 hours.     Invalid input(s): LDLCALCU, TRIGLYCERIDE  ABGs:    Recent Labs     12/01/21  0430 12/01/21  1839   PHART 7.413 7.306*   ULR5UUK 39.2 39.7   PO2ART 117.3* 110.8*   XDD2IOY 24.5 19.8*   BEART -0.1 -7*   W2UFCEGB 98.3 98   GAM6IKS 25.7 21       INR:   Recent Labs     12/01/21  0416 12/01/21  1824   INR 1.08 1.10     Lactate:   Recent Labs     12/01/21  1839   LACTATE 0.77 Cultures:  -----------------------------------------------------------------  RAD:   VL DUP LOWER EXTREMITY ARTERIES BILATERAL    (Results Pending)         Assessment/Plan:   Kathe Saravia a 63 y. o. male with PMH as below notable for T2DM who presented with groin swelling. Patient was admitted to the ICU for further workup and management of westley gangrene    Neuro/Sedation:  · Patient is sedated   · Fentanyl 25 mcg  · Propofol 15 mcg    CV:   Hemodynamically stable and afebrile   SBP between 90-110s    Respiratory:    SpO2 90% on MV   Vent Settings: Vent Mode: AC/PRVC Rate Set: 18 bmp/Vt Ordered: 450 mL/ /FiO2 : 40 % PEEP5  Recent Labs     12/01/21  0430 12/01/21  1839   PHART 7.413 7.306*   PXF3FIF 39.2 39.7   PO2ART 117.3* 110.8*     -F/u ABG      ID:  Westley gangrene   Presented as abscess near his anus that progressed to his scrotum. Necrotizing fascitis of the abdominal wall, perineum and back. Yesterday went to wide excision of perineum, back and abdominal wall. WBC 14. 2 units of RBCs were transfused overnight.   -Continue Vanc, Merrem, Clindamycin  - mL/hr  -F/u BCxs and wound cxs  -Dressing changes BID  -Wound will be evaluated today to determine need for further debridement  -Colorectal surgery consulted   -Plastic surgery consulted     Renal:   Voiding catheter   I/Os: 1384/ 24 hours    (0.9 mL/Kg/hr)    GI:   Diet NPO   · Prophylaxis protonix 40 mg   Consider starting tube feeding     Endocrine:  T2DM  Last A1c 13.5(11/30/2021)  -Insulin drip    -hypoglicemia protocol   -f/u BG levels    Hematology/Oncology:  Hb 7.2, 1 unit of RBC transfused. -Monitor H/H post transfusion    Code Status: Full Code  FEN: Diet NPO  PPX: SCDs. protonix  DISPO: ICU    Shauna Javier MD, PGY-1  12/02/21  7:45 AM    This patient will be staffed and discussed with Melissa Hogan MD.       Patient was seen, examined and discussed with Dr. Haylee Hays.  I agree with the history of present illness, past medical/surgical histories, family history, social history, medication list and allergies as listed. The review of systems is as noted above. My physical exam confirms the findings listed above. Chart was reviewed including labs and medical records confirm the findings noted    Acute respiratory failure on mechanical vent support. , RR 18, FiO2 50%, PEEP 5  ABG 7.36/40/107  Necrotizing fasciitis   DKA  Leukocytosis is trending down   Osteomyelitis   DM - not compliant     Merrem, vanc and clinda   IVF  Transfuse one unit PRBC  Plan for SBT    Pt has a high probability of imminent or life-threatening deterioration requiring close monitoring, and highly complex decision-making and/or interventions of high intensity to assess, manipulate, and support his critical organ systems to prevent a likely inevitable decline which could occur if left untreated.      A total critical care time 45 minutes was used. This includes but not limited to examining patient, collaborating with other physicians, monitoring vital signs, telemetry, continuous pulse oximetry, and clinical response to IV medications, documentation time, review and interpretation of laboratory and radiological data, review of nursing notes and old record review.  This time excludes any time that may have been spent performing procedures for life threatening organ failure.

## 2021-12-02 NOTE — CONSULTS
Comprehensive Nutrition Assessment    RECOMMENDATIONS:  Per ASPEN guidelines, suggest start of alternative nutrition within 24-48 hours intubation, as appropriate. PO DIET: Continue Diet NPO   · Nutrition Support: If unable to extubate and/or stop advance diet: Recommend EN formula Peptide-Based  Vital AF 1.2 @ goal rate 80 ml/hr to provide 1920 ml total volume, 2304 kcal, 144 g protein and 1557 ml free water. · Initiate EN @ 20 mL/hr and as tolerated, increase by 25 mL/hr q 4 hours until goal of 80 mL/hr is met. · Maintenance water flush of 30 ml every 4 hours. NUTRITION ASSESSMENT:   Type and Reason for Visit:  Type and Reason for Visit: Initial   Nutritional summary & status: Pt assessed for new mechanical vent. 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 Noted pt intubated post-op scrotal incision and drainage. 2 days ago Pt remains intubated and on propofol @ 6.7 ml/hr, providing ~177 kcal/d. Per rounds plans to extubate today. EN recs above if unable to extubate and/or advance diet as pt has been NPO x3 days. Patient admitted d/t westley gangrene    PMH significant for: T2DM    MALNUTRITION ASSESSMENT  Context of Malnutrition: Acute Illness   Malnutrition Status:  At risk for malnutrition (Comment)  Findings of the 6 clinical characteristics of malnutrition (Minimum of 2 out of 6 clinical characteristics is required to make the diagnosis of moderate or severe Protein Calorie Malnutrition based on AND/ASPEN Guidelines):  Energy Intake: Less than/equal to 50% of estimated energy requirements    Energy Intake Time: 3 days   Weight Loss %: Unable to assess    Weight loss Time: Unable to assess     NUTRITION DIAGNOSIS   · Inadequate oral intake related to impaired respiratory function as evidenced by intubation      [address] and/or Nutrient Delivery:  Continue NPO  Nutrition Education/Counseling:  No recommendation at this time   Goals:  Pt will tolerate most appropriate form of nutrition while intubated to meet 100% of nutrition needs       Nutrition Monitoring and Evaluation:   Food/Nutrient Intake Outcomes:  Diet Advancement/Tolerance, Enteral Nutrition Intake/Tolerance  Physical Signs/Symptoms Outcomes:  Weight, Biochemical Data, Hemodynamic Status     OBJECTIVE DATA: Significant to nutrition assessment  · Nutrition-Focused Physical Findings: Nutrition Related Findings: lbm 12/1   · Labs: Reviewed; POCG 129-198, A1C 13.5 11/30  · Meds: Reviewed; propofol  · Wounds: Wound Type: Surgical Incision (Shayla's gangrene)     CURRENT NUTRITION THERAPIES  Diet NPO     PO Intake: Average Meal Intake: NPO   PO Supplement Intake:Average Supplements Intake: NPO  IVF: 125 ml/hr LR    ANTHROPOMETRICS  Current Height: 5' 11\" (180.3 cm)  Current Weight: 182 lb 15.7 oz (83 kg)    Admission weight: 183 lb 13.8 oz (83.4 kg)  Ideal Body Weight (lbs) (Calculated): 172 lbs (Ideal Body Weight (Kg) (Calculated): 78 kg)  Usual Bodyweight Kenn - wt hx fluctuates    Weight Changes KENN        BMI BMI (Calculated): 25.6    Wt Readings from Last 50 Encounters:   12/02/21 182 lb 15.7 oz (83 kg)   11/30/21 163 lb 3.2 oz (74 kg)   04/18/16 179 lb 14.3 oz (81.6 kg)   02/29/16 180 lb (81.6 kg)   02/18/16 178 lb (80.7 kg)       COMPARATIVE STANDARDS  Energy (kcal):  9314-9666 (25-30); Weight Used for Energy Requirements:  Current (83)     Protein (g):  125-141 (1.5-1.7); Weight Used for Protein Requirements:  Ideal        Fluid (ml/day):  2709-2472; Method Used for Fluid Requirements:  1 ml/kcal      The patient will still be monitored per nutrition standards of care. Consult dietitian if nutrition interventions essential to patient care is needed.      Shoaib Carmona, [address] [place] [address], [address] [place] [address] Drive:  [phone]  Office:  [phone]

## 2021-12-02 NOTE — PROGRESS NOTES
tissue with appropriate bleeding, non-malodorous   Neurologic: intubated, sedated     Labs:  CBC:   Recent Labs     12/01/21  0416 12/01/21  1824 12/02/21  0400   WBC 11.2* 25.2* 14.9*   HGB 10.1* 9.2* 7.2*   HCT 30.1* 28.4* 22.1*    272 180       BMP:   Recent Labs     12/01/21  0830 12/01/21  1824 12/02/21  0400    136 140   K 4.7 5.2* 4.5    104 112*   CO2 22 20* 20*   BUN 50* 53* 50*   CREATININE 0.8 1.0 0.9   GLUCOSE 155* 293* 111*     LFT's:   Recent Labs     11/30/21  1522 12/01/21  1824   AST 7* 13*   ALT 11 8*   BILITOT 0.9 0.5   ALKPHOS 261* 153*     Troponin:   Recent Labs     11/30/21  2219   TROPONINI <0.01     BNP: No results for input(s): BNP in the last 72 hours. ABGs:   Recent Labs     12/01/21  0430 12/01/21  1839   PHART 7.413 7.306*   [id number] 39.2 39.7   PO2ART 117.3* 110.8*     INR:   Recent Labs     12/01/21  0416 12/01/21  1824   INR 1.08 1.10       U/A:No results for input(s): NITRITE, COLORU, PHUR, LABCAST, WBCUA, RBCUA, MUCUS, TRICHOMONAS, YEAST, BACTERIA, CLARITYU, SPECGRAV, LEUKOCYTESUR, UROBILINOGEN, BILIRUBINUR, BLOODU, GLUCOSEU, AMORPHOUS in the last 72 hours. Invalid input(s): Wilburt Orn     Rad:   VL DUP LOWER EXTREMITY ARTERIES BILATERAL    (Results Pending)       ASSESSMENT AND PLAN:   Sandra Duffy is a 63 y.o. male with Necrotizing fasciitis of the abdominal wall, gluteal region, and scrotum s/p wide excision of perineum, back, and abdominal wall.   Abdominal wall wound measuring 60 cm x 23 cm x 3cm and gluteal wound measures 20 cm x 15cm  POD #1    Neuro:   Pain/sedation/psych  Fentanyl 25  Propofol 15    Cardiovascular:   Hypotensive, but otherwise HDS  Off pressors    Pulmonary:   Remains on minimal ventilation settings   PEEP 5  FiO2 40  Will extubate today    GI:   NPO  OG to LCWS, okay to remove when extubating  Protonix daily    FEN:       /Renal:   Mcfadden in place to monitor I/Os    Hem/ID:   Post-operative Hgb 7.2 from 9.4  - Will receive 2U PRBC today  - Will continue to monitor   Leukocytosis improved 14.9 from 25.2  - Continue to monitor  Necrotizing fascitis of abdominal wall, perineum, and back  - Dressing changes BID with Kerlix soaked with betadine   - Continue broad spectrum antibiotics with vancomycin, merrem, clindamycin  - Will consult urology and plastic surgery     Endo:   POCT glucose 198 from 160  Insulin drip  Continue to monitor    Prophylaxis:   SCDs    Access:  Central Access: R basilic PICC  Peripheral Access: R antecubital   Arterial Line Access: R radial                                Mcfadden Date placed: 11/30  NGT Date placed: 11/30  ETT Date placed: 11/30    Mobility:  Bedrest    Dispo: Will remain in ICU    Code Status: Full Code  -----------------------------  Darwin Leslie  12/02/21  7:39 AM     Addendum:  Patient seen and examined with Medical Student and Surgical Team.  Agree with assessment and plan with changes made accordingly. Hermelinda Vanegas DO, [address] 89  PGY-1 General Surgery  12/02/21  12:59 PM  [phone]    I have seen, examined, and reviewed the patients chart. I agree with the residents assessment and have made appropriate changes.     Deep Ham

## 2021-12-02 NOTE — PROGRESS NOTES
PRE-OP NOTE  Department of Surgery      Chief Complaint or Reason for Surgery: Necrotizing fascitis of the abdominal wall and left buttock    Procedure: Debridement of abdominal wall and left buttock, placement of wound vac   Expected time: 12/3, add-on    Plan  1. Diet: NPO effective now. 2. IVF:   3. Antibiotics: Continue with scheduled antibiotics  4. Labs to be drawn: Type and Screen and INR are up to date, renal panel, magnesium and CBC ordered for the morning  5. Anesthesia: to see patient  6. Consent: Will obtain from the patient's wife and place in the patient's chart  7.  Pulmonary: CXR: Left basilar atelectasis     Earlene Miguel DO  12/02/21  5:00 PM

## 2021-12-02 NOTE — PROGRESS NOTES
ICU SURGERY DAILY PROGRESS NOTE    CC: Shayla's gangrene  Procedure: Wide excision perineum, back, abdominal wall    SUBJECTIVE:   Interval Hx: Patient remains intubated and sedated. Remains afebrile and HDS. Follows commands and answers yes/no questions. Off of pressors since surgery. Remains on minimal sedation for comfort. ROS: A 10 point review of systems was conducted, significant findings as noted above. All other systems negative. OBJECTIVE:   Vitals:   Vitals:    12/02/21 1000 12/02/21 1100 12/02/21 1148 12/02/21 1200   BP: (!) 85/59 101/60  105/63   Pulse: 73 74  75   Resp: 18 18 16 10   Temp:  97.5 °F (36.4 °C)  97.7 °F (36.5 °C)   TempSrc:  Axillary  Axillary   SpO2: 100% 95% 100% 99%   Weight:       Height:           I/O:     Intake/Output Summary (Last 24 hours) at 12/2/2021 1303  Last data filed at 12/2/2021 1200  Gross per 24 hour   Intake 2354.83 ml   Output 650 ml   Net 1704.83 ml     I/O last 3 completed shifts:   In: 2034.8 [I.V.:716.7; IV Piggyback:1318.1]  Out: 650 [Urine:650]    Diet: Diet NPO      Physical Examination:   General appearance: intubated, sedated, moderate distress  HENT: NC/AT, MMM & intact, no JVD, neck supple, trachea midline  Eyes: No scleral icterus, EOMI  Chest/Lungs: intubated, mechanical breath sounds present bilaterally  Cardiovascular: RRR  Abdomen: soft, abdominal binder in place with dressings intact over abdominal wound measuring 60 cm x 23 cm x 3cm and gluteal region 20 cm x 15 cm, minimal drainage, no signs of further necrotizing fasciitis requiring further debridement, no subcutaneous crepitus noted, skin edges with healthy granulation tissue with appropriate bleeding, non-malodorous         Extremities:  full-thickness ulceration on lateral aspect of L foot, no edema, no cyanosis  : walsh in place with clear yellow urine, no signs of further necrotizing fasciitis requiring further debridement of L lower scrotum, no subcutaneous crepitus noted, skin edges with healthy granulation tissue with appropriate bleeding, non-malodorous   Neurologic: intubated, sedated     Labs:  CBC:   Recent Labs     12/01/21  0416 12/01/21  1824 12/02/21  0400   WBC 11.2* 25.2* 14.9*   HGB 10.1* 9.2* 7.2*   HCT 30.1* 28.4* 22.1*    272 180       BMP:   Recent Labs     12/01/21  0830 12/01/21  1824 12/02/21  0400    136 140   K 4.7 5.2* 4.5    104 112*   CO2 22 20* 20*   BUN 50* 53* 50*   CREATININE 0.8 1.0 0.9   GLUCOSE 155* 293* 111*     LFT's:   Recent Labs     11/30/21  1522 12/01/21  1824 12/02/21  0400   AST 7* 13* 9*   ALT 11 8* 7*   BILITOT 0.9 0.5 0.4   ALKPHOS 261* 153* 109     Troponin:   Recent Labs     11/30/21  2219   TROPONINI <0.01     BNP: No results for input(s): BNP in the last 72 hours. ABGs:   Recent Labs     12/01/21  1839 12/02/21  0943   PHART 7.306* 7.369   DJL5LRI 39.7 40.4   PO2ART 110.8* 107.0     INR:   Recent Labs     12/01/21  0416 12/01/21  1824   INR 1.08 1.10       U/A:No results for input(s): NITRITE, COLORU, PHUR, LABCAST, WBCUA, RBCUA, MUCUS, TRICHOMONAS, YEAST, BACTERIA, CLARITYU, SPECGRAV, LEUKOCYTESUR, UROBILINOGEN, BILIRUBINUR, BLOODU, GLUCOSEU, AMORPHOUS in the last 72 hours. Invalid input(s): Aakash Castillo     Rad:   VL DUP LOWER EXTREMITY ARTERIES BILATERAL    (Results Pending)       ASSESSMENT AND PLAN:   Robert Silva is a 63 y.o. male with Necrotizing fasciitis of the abdominal wall, gluteal region, and scrotum s/p wide excision of perineum, back, and abdominal wall. Abdominal wall wound measuring 60 cm x 23 cm x 3cm and gluteal wound measures 20 cm x 15cm  POD #1    - Will plan to return to OR tomorrow for possible further debridement and wound vac placement. - Continue with tight glucose control. Patient currently on an insulin gtt  - Patient currently being weaned on SBT, anticipate extubating today. - Plastic surgery will continue to follow.      Code Status: Full Code  -----------------------------  Jahaira Fong MS GREER  PGY-1 General Surgery  12/02/21  1:03 PM  [phone]

## 2021-12-02 NOTE — PLAN OF CARE
Problem: Non-Violent Restraints  Goal: Removal from restraints as soon as assessed to be safe  Outcome: Ongoing     Problem:  Activity:  Goal: Ability to return to normal activity level will improve  Description: Ability to return to normal activity level will improve  Outcome: Ongoing     Problem: Respiratory:  Goal: Ability to achieve and maintain a regular respiratory rate will improve  Description: Ability to achieve and maintain a regular respiratory rate will improve  Outcome: Ongoing     Problem: Skin Integrity:  Goal: Demonstration of wound healing without infection will improve  Description: Demonstration of wound healing without infection will improve  Outcome: Ongoing

## 2021-12-03 ENCOUNTER — ANESTHESIA EVENT (OUTPATIENT)
Dept: OPERATING ROOM | Age: 66
DRG: 853 | End: 2021-12-03

## 2021-12-03 ENCOUNTER — ANESTHESIA (OUTPATIENT)
Dept: OPERATING ROOM | Age: 66
DRG: 853 | End: 2021-12-03

## 2021-12-03 VITALS
SYSTOLIC BLOOD PRESSURE: 114 MMHG | RESPIRATION RATE: 25 BRPM | TEMPERATURE: 96.1 F | DIASTOLIC BLOOD PRESSURE: 61 MMHG | OXYGEN SATURATION: 97 %

## 2021-12-03 LAB
ANAEROBIC CULTURE: ABNORMAL
ANION GAP SERPL CALCULATED.3IONS-SCNC: 6 MMOL/L (ref 3–16)
ANION GAP SERPL CALCULATED.3IONS-SCNC: 6 MMOL/L (ref 3–16)
ANISOCYTOSIS: ABNORMAL
BANDED NEUTROPHILS RELATIVE PERCENT: 13 % (ref 0–7)
BASOPHILS ABSOLUTE: 0 K/UL (ref 0–0.2)
BASOPHILS ABSOLUTE: 0 K/UL (ref 0–0.2)
BASOPHILS RELATIVE PERCENT: 0 %
BASOPHILS RELATIVE PERCENT: 0 %
BUN BLDV-MCNC: 34 MG/DL (ref 7–20)
BUN BLDV-MCNC: 36 MG/DL (ref 7–20)
CALCIUM SERPL-MCNC: 7.3 MG/DL (ref 8.3–10.6)
CALCIUM SERPL-MCNC: 7.5 MG/DL (ref 8.3–10.6)
CHLORIDE BLD-SCNC: 110 MMOL/L (ref 99–110)
CHLORIDE BLD-SCNC: 112 MMOL/L (ref 99–110)
CO2: 23 MMOL/L (ref 21–32)
CO2: 24 MMOL/L (ref 21–32)
CREAT SERPL-MCNC: 0.7 MG/DL (ref 0.8–1.3)
CREAT SERPL-MCNC: 0.7 MG/DL (ref 0.8–1.3)
EOSINOPHILS ABSOLUTE: 0 K/UL (ref 0–0.6)
EOSINOPHILS ABSOLUTE: 0 K/UL (ref 0–0.6)
EOSINOPHILS RELATIVE PERCENT: 0 %
EOSINOPHILS RELATIVE PERCENT: 0 %
GFR AFRICAN AMERICAN: >60
GFR AFRICAN AMERICAN: >60
GFR NON-AFRICAN AMERICAN: >60
GFR NON-AFRICAN AMERICAN: >60
GLUCOSE BLD-MCNC: 103 MG/DL (ref 70–99)
GLUCOSE BLD-MCNC: 104 MG/DL (ref 70–99)
GLUCOSE BLD-MCNC: 110 MG/DL (ref 70–99)
GLUCOSE BLD-MCNC: 115 MG/DL (ref 70–99)
GLUCOSE BLD-MCNC: 117 MG/DL (ref 70–99)
GLUCOSE BLD-MCNC: 122 MG/DL (ref 70–99)
GLUCOSE BLD-MCNC: 122 MG/DL (ref 70–99)
GLUCOSE BLD-MCNC: 133 MG/DL (ref 70–99)
GLUCOSE BLD-MCNC: 139 MG/DL (ref 70–99)
GLUCOSE BLD-MCNC: 140 MG/DL (ref 70–99)
GLUCOSE BLD-MCNC: 141 MG/DL (ref 70–99)
GLUCOSE BLD-MCNC: 149 MG/DL (ref 70–99)
GLUCOSE BLD-MCNC: 151 MG/DL (ref 70–99)
GLUCOSE BLD-MCNC: 157 MG/DL (ref 70–99)
GLUCOSE BLD-MCNC: 160 MG/DL (ref 70–99)
GLUCOSE BLD-MCNC: 163 MG/DL (ref 70–99)
GLUCOSE BLD-MCNC: 168 MG/DL (ref 70–99)
GLUCOSE BLD-MCNC: 168 MG/DL (ref 70–99)
GLUCOSE BLD-MCNC: 177 MG/DL (ref 70–99)
GLUCOSE BLD-MCNC: 188 MG/DL (ref 70–99)
GLUCOSE BLD-MCNC: 191 MG/DL (ref 70–99)
GLUCOSE BLD-MCNC: 192 MG/DL (ref 70–99)
GLUCOSE BLD-MCNC: 194 MG/DL (ref 70–99)
GRAM STAIN RESULT: ABNORMAL
GRAM STAIN RESULT: NORMAL
HCT VFR BLD CALC: 27.5 % (ref 40.5–52.5)
HCT VFR BLD CALC: 28.2 % (ref 40.5–52.5)
HCT VFR BLD CALC: 29.8 % (ref 40.5–52.5)
HEMOGLOBIN: 8.9 G/DL (ref 13.5–17.5)
HEMOGLOBIN: 9.2 G/DL (ref 13.5–17.5)
HEMOGLOBIN: 9.7 G/DL (ref 13.5–17.5)
LYMPHOCYTES ABSOLUTE: 0.8 K/UL (ref 1–5.1)
LYMPHOCYTES ABSOLUTE: 1.2 K/UL (ref 1–5.1)
LYMPHOCYTES RELATIVE PERCENT: 5 %
LYMPHOCYTES RELATIVE PERCENT: 7 %
MAGNESIUM: 2 MG/DL (ref 1.8–2.4)
MCH RBC QN AUTO: 29.5 PG (ref 26–34)
MCH RBC QN AUTO: 29.8 PG (ref 26–34)
MCHC RBC AUTO-ENTMCNC: 32.5 G/DL (ref 31–36)
MCHC RBC AUTO-ENTMCNC: 32.7 G/DL (ref 31–36)
MCV RBC AUTO: 90.7 FL (ref 80–100)
MCV RBC AUTO: 90.9 FL (ref 80–100)
METAMYELOCYTES RELATIVE PERCENT: 7 %
MONOCYTES ABSOLUTE: 0.5 K/UL (ref 0–1.3)
MONOCYTES ABSOLUTE: 0.7 K/UL (ref 0–1.3)
MONOCYTES RELATIVE PERCENT: 3 %
MONOCYTES RELATIVE PERCENT: 4 %
MYELOCYTE PERCENT: 1 %
NEUTROPHILS ABSOLUTE: 14.8 K/UL (ref 1.7–7.7)
NEUTROPHILS ABSOLUTE: 15.4 K/UL (ref 1.7–7.7)
NEUTROPHILS RELATIVE PERCENT: 70 %
NEUTROPHILS RELATIVE PERCENT: 90 %
OVALOCYTES: ABNORMAL
PDW BLD-RTO: 14.4 % (ref 12.4–15.4)
PDW BLD-RTO: 14.7 % (ref 12.4–15.4)
PERFORMED ON: ABNORMAL
PHOSPHORUS: 1.7 MG/DL (ref 2.5–4.9)
PLATELET # BLD: 181 K/UL (ref 135–450)
PLATELET # BLD: 204 K/UL (ref 135–450)
PMV BLD AUTO: 9.5 FL (ref 5–10.5)
PMV BLD AUTO: 9.7 FL (ref 5–10.5)
POTASSIUM REFLEX MAGNESIUM: 3.8 MMOL/L (ref 3.5–5.1)
POTASSIUM REFLEX MAGNESIUM: 4.1 MMOL/L (ref 3.5–5.1)
RBC # BLD: 3.03 M/UL (ref 4.2–5.9)
RBC # BLD: 3.11 M/UL (ref 4.2–5.9)
RBC # BLD: NORMAL 10*6/UL
SODIUM BLD-SCNC: 139 MMOL/L (ref 136–145)
SODIUM BLD-SCNC: 142 MMOL/L (ref 136–145)
VANCOMYCIN RANDOM: 12 UG/ML
WBC # BLD: 16.3 K/UL (ref 4–11)
WBC # BLD: 17.1 K/UL (ref 4–11)
WOUND/ABSCESS: ABNORMAL
WOUND/ABSCESS: NORMAL

## 2021-12-03 PROCEDURE — 6360000002 HC RX W HCPCS

## 2021-12-03 PROCEDURE — 2500000003 HC RX 250 WO HCPCS: Performed by: ANESTHESIOLOGY

## 2021-12-03 PROCEDURE — 0KBP0ZZ EXCISION OF LEFT HIP MUSCLE, OPEN APPROACH: ICD-10-PCS | Performed by: SURGERY

## 2021-12-03 PROCEDURE — 2700000000 HC OXYGEN THERAPY PER DAY

## 2021-12-03 PROCEDURE — 36415 COLL VENOUS BLD VENIPUNCTURE: CPT

## 2021-12-03 PROCEDURE — 2000000000 HC ICU R&B

## 2021-12-03 PROCEDURE — 0KBL0ZZ EXCISION OF LEFT ABDOMEN MUSCLE, OPEN APPROACH: ICD-10-PCS | Performed by: SURGERY

## 2021-12-03 PROCEDURE — 2709999900 HC NON-CHARGEABLE SUPPLY: Performed by: SURGERY

## 2021-12-03 PROCEDURE — 2580000003 HC RX 258: Performed by: STUDENT IN AN ORGANIZED HEALTH CARE EDUCATION/TRAINING PROGRAM

## 2021-12-03 PROCEDURE — 3600000002 HC SURGERY LEVEL 2 BASE: Performed by: SURGERY

## 2021-12-03 PROCEDURE — 6360000002 HC RX W HCPCS: Performed by: ANESTHESIOLOGY

## 2021-12-03 PROCEDURE — 6360000002 HC RX W HCPCS: Performed by: STUDENT IN AN ORGANIZED HEALTH CARE EDUCATION/TRAINING PROGRAM

## 2021-12-03 PROCEDURE — 2500000003 HC RX 250 WO HCPCS: Performed by: STUDENT IN AN ORGANIZED HEALTH CARE EDUCATION/TRAINING PROGRAM

## 2021-12-03 PROCEDURE — 83735 ASSAY OF MAGNESIUM: CPT

## 2021-12-03 PROCEDURE — 85014 HEMATOCRIT: CPT

## 2021-12-03 PROCEDURE — 3600000012 HC SURGERY LEVEL 2 ADDTL 15MIN: Performed by: SURGERY

## 2021-12-03 PROCEDURE — 87206 SMEAR FLUORESCENT/ACID STAI: CPT

## 2021-12-03 PROCEDURE — 0KBM0ZZ EXCISION OF PERINEUM MUSCLE, OPEN APPROACH: ICD-10-PCS | Performed by: SURGERY

## 2021-12-03 PROCEDURE — 80048 BASIC METABOLIC PNL TOTAL CA: CPT

## 2021-12-03 PROCEDURE — 2580000003 HC RX 258

## 2021-12-03 PROCEDURE — 99233 SBSQ HOSP IP/OBS HIGH 50: CPT | Performed by: INTERNAL MEDICINE

## 2021-12-03 PROCEDURE — 87116 MYCOBACTERIA CULTURE: CPT

## 2021-12-03 PROCEDURE — 3700000001 HC ADD 15 MINUTES (ANESTHESIA): Performed by: SURGERY

## 2021-12-03 PROCEDURE — 87070 CULTURE OTHR SPECIMN AEROBIC: CPT

## 2021-12-03 PROCEDURE — 87205 SMEAR GRAM STAIN: CPT

## 2021-12-03 PROCEDURE — 85018 HEMOGLOBIN: CPT

## 2021-12-03 PROCEDURE — 3700000000 HC ANESTHESIA ATTENDED CARE: Performed by: SURGERY

## 2021-12-03 PROCEDURE — 87015 SPECIMEN INFECT AGNT CONCNTJ: CPT

## 2021-12-03 PROCEDURE — 2580000003 HC RX 258: Performed by: ANESTHESIOLOGY

## 2021-12-03 PROCEDURE — 84100 ASSAY OF PHOSPHORUS: CPT

## 2021-12-03 PROCEDURE — 0KBK0ZZ EXCISION OF RIGHT ABDOMEN MUSCLE, OPEN APPROACH: ICD-10-PCS | Performed by: SURGERY

## 2021-12-03 PROCEDURE — 94761 N-INVAS EAR/PLS OXIMETRY MLT: CPT

## 2021-12-03 PROCEDURE — 88305 TISSUE EXAM BY PATHOLOGIST: CPT

## 2021-12-03 PROCEDURE — 11006 DBRDMT SKIN XTRNL GENT PER: CPT | Performed by: SURGERY

## 2021-12-03 PROCEDURE — 85025 COMPLETE CBC W/AUTO DIFF WBC: CPT

## 2021-12-03 PROCEDURE — 87106 FUNGI IDENTIFICATION YEAST: CPT

## 2021-12-03 PROCEDURE — C9113 INJ PANTOPRAZOLE SODIUM, VIA: HCPCS | Performed by: STUDENT IN AN ORGANIZED HEALTH CARE EDUCATION/TRAINING PROGRAM

## 2021-12-03 PROCEDURE — 80202 ASSAY OF VANCOMYCIN: CPT

## 2021-12-03 PROCEDURE — 99254 IP/OBS CNSLTJ NEW/EST MOD 60: CPT | Performed by: SURGERY

## 2021-12-03 PROCEDURE — 87102 FUNGUS ISOLATION CULTURE: CPT

## 2021-12-03 RX ORDER — 0.9 % SODIUM CHLORIDE 0.9 %
500 INTRAVENOUS SOLUTION INTRAVENOUS
Status: DISCONTINUED | OUTPATIENT
Start: 2021-12-03 | End: 2021-12-03 | Stop reason: HOSPADM

## 2021-12-03 RX ORDER — SODIUM CHLORIDE 9 MG/ML
INJECTION, SOLUTION INTRAVENOUS CONTINUOUS PRN
Status: DISCONTINUED | OUTPATIENT
Start: 2021-12-03 | End: 2021-12-03 | Stop reason: SDUPTHER

## 2021-12-03 RX ORDER — PROMETHAZINE HYDROCHLORIDE 25 MG/ML
INJECTION, SOLUTION INTRAMUSCULAR; INTRAVENOUS PRN
Status: DISCONTINUED | OUTPATIENT
Start: 2021-12-03 | End: 2021-12-03 | Stop reason: SDUPTHER

## 2021-12-03 RX ORDER — ONDANSETRON 2 MG/ML
4 INJECTION INTRAMUSCULAR; INTRAVENOUS
Status: DISCONTINUED | OUTPATIENT
Start: 2021-12-03 | End: 2021-12-03 | Stop reason: HOSPADM

## 2021-12-03 RX ORDER — MEPERIDINE HYDROCHLORIDE 25 MG/ML
12.5 INJECTION INTRAMUSCULAR; INTRAVENOUS; SUBCUTANEOUS EVERY 5 MIN PRN
Status: DISCONTINUED | OUTPATIENT
Start: 2021-12-03 | End: 2021-12-03 | Stop reason: HOSPADM

## 2021-12-03 RX ORDER — LIDOCAINE HYDROCHLORIDE 20 MG/ML
INJECTION, SOLUTION EPIDURAL; INFILTRATION; INTRACAUDAL; PERINEURAL PRN
Status: DISCONTINUED | OUTPATIENT
Start: 2021-12-03 | End: 2021-12-03 | Stop reason: SDUPTHER

## 2021-12-03 RX ORDER — PROCHLORPERAZINE EDISYLATE 5 MG/ML
5 INJECTION INTRAMUSCULAR; INTRAVENOUS
Status: DISCONTINUED | OUTPATIENT
Start: 2021-12-03 | End: 2021-12-03 | Stop reason: HOSPADM

## 2021-12-03 RX ORDER — PROPOFOL 10 MG/ML
INJECTION, EMULSION INTRAVENOUS PRN
Status: DISCONTINUED | OUTPATIENT
Start: 2021-12-03 | End: 2021-12-03 | Stop reason: SDUPTHER

## 2021-12-03 RX ORDER — HYDROCODONE BITARTRATE AND ACETAMINOPHEN 5; 325 MG/1; MG/1
1 TABLET ORAL
Status: DISCONTINUED | OUTPATIENT
Start: 2021-12-03 | End: 2021-12-03 | Stop reason: HOSPADM

## 2021-12-03 RX ORDER — NEOSTIGMINE METHYLSULFATE 1 MG/ML
INJECTION, SOLUTION INTRAVENOUS PRN
Status: DISCONTINUED | OUTPATIENT
Start: 2021-12-03 | End: 2021-12-03 | Stop reason: SDUPTHER

## 2021-12-03 RX ORDER — HYDRALAZINE HYDROCHLORIDE 20 MG/ML
5 INJECTION INTRAMUSCULAR; INTRAVENOUS EVERY 10 MIN PRN
Status: DISCONTINUED | OUTPATIENT
Start: 2021-12-03 | End: 2021-12-03 | Stop reason: HOSPADM

## 2021-12-03 RX ORDER — ROCURONIUM BROMIDE 10 MG/ML
INJECTION, SOLUTION INTRAVENOUS PRN
Status: DISCONTINUED | OUTPATIENT
Start: 2021-12-03 | End: 2021-12-03 | Stop reason: SDUPTHER

## 2021-12-03 RX ORDER — FENTANYL CITRATE 50 UG/ML
INJECTION, SOLUTION INTRAMUSCULAR; INTRAVENOUS PRN
Status: DISCONTINUED | OUTPATIENT
Start: 2021-12-03 | End: 2021-12-03 | Stop reason: SDUPTHER

## 2021-12-03 RX ORDER — DIPHENHYDRAMINE HYDROCHLORIDE 50 MG/ML
12.5 INJECTION INTRAMUSCULAR; INTRAVENOUS
Status: DISCONTINUED | OUTPATIENT
Start: 2021-12-03 | End: 2021-12-03 | Stop reason: HOSPADM

## 2021-12-03 RX ADMIN — SODIUM CHLORIDE, PRESERVATIVE FREE 10 ML: 5 INJECTION INTRAVENOUS at 09:52

## 2021-12-03 RX ADMIN — PROPOFOL 20 MG: 10 INJECTION, EMULSION INTRAVENOUS at 11:31

## 2021-12-03 RX ADMIN — ENOXAPARIN SODIUM 40 MG: 100 INJECTION SUBCUTANEOUS at 09:54

## 2021-12-03 RX ADMIN — MEROPENEM 1000 MG: 1 INJECTION, POWDER, FOR SOLUTION INTRAVENOUS at 02:45

## 2021-12-03 RX ADMIN — SODIUM PHOSPHATE, MONOBASIC, MONOHYDRATE 30 MMOL: 276; 142 INJECTION, SOLUTION INTRAVENOUS at 08:56

## 2021-12-03 RX ADMIN — SODIUM CHLORIDE, PRESERVATIVE FREE 10 ML: 5 INJECTION INTRAVENOUS at 22:05

## 2021-12-03 RX ADMIN — FENTANYL CITRATE 50 MCG: 50 INJECTION, SOLUTION INTRAMUSCULAR; INTRAVENOUS at 11:30

## 2021-12-03 RX ADMIN — LIDOCAINE HYDROCHLORIDE 40 MG: 20 INJECTION, SOLUTION EPIDURAL; INFILTRATION; INTRACAUDAL; PERINEURAL at 11:36

## 2021-12-03 RX ADMIN — CLINDAMYCIN PHOSPHATE 600 MG: 600 INJECTION, SOLUTION INTRAVENOUS at 11:02

## 2021-12-03 RX ADMIN — MEROPENEM 1000 MG: 1 INJECTION, POWDER, FOR SOLUTION INTRAVENOUS at 17:39

## 2021-12-03 RX ADMIN — MEROPENEM 1000 MG: 1 INJECTION, POWDER, FOR SOLUTION INTRAVENOUS at 13:24

## 2021-12-03 RX ADMIN — Medication 12.5 MCG/HR: at 10:00

## 2021-12-03 RX ADMIN — ROCURONIUM BROMIDE 40 MG: 10 INJECTION INTRAVENOUS at 11:36

## 2021-12-03 RX ADMIN — CLINDAMYCIN PHOSPHATE 600 MG: 600 INJECTION, SOLUTION INTRAVENOUS at 02:12

## 2021-12-03 RX ADMIN — VANCOMYCIN HYDROCHLORIDE 1250 MG: 10 INJECTION, POWDER, LYOPHILIZED, FOR SOLUTION INTRAVENOUS at 00:14

## 2021-12-03 RX ADMIN — PROPOFOL 80 MG: 10 INJECTION, EMULSION INTRAVENOUS at 11:36

## 2021-12-03 RX ADMIN — VANCOMYCIN HYDROCHLORIDE 1250 MG: 10 INJECTION, POWDER, LYOPHILIZED, FOR SOLUTION INTRAVENOUS at 16:54

## 2021-12-03 RX ADMIN — Medication 3 MG: at 12:51

## 2021-12-03 RX ADMIN — SODIUM CHLORIDE: 9 INJECTION, SOLUTION INTRAVENOUS at 11:28

## 2021-12-03 RX ADMIN — FENTANYL CITRATE 50 MCG: 50 INJECTION, SOLUTION INTRAMUSCULAR; INTRAVENOUS at 11:54

## 2021-12-03 RX ADMIN — PANTOPRAZOLE SODIUM 40 MG: 40 INJECTION, POWDER, FOR SOLUTION INTRAVENOUS at 09:52

## 2021-12-03 RX ADMIN — PROMETHAZINE HYDROCHLORIDE 6.25 MG: 25 INJECTION INTRAMUSCULAR; INTRAVENOUS at 11:34

## 2021-12-03 ASSESSMENT — PULMONARY FUNCTION TESTS
PIF_VALUE: 20
PIF_VALUE: 17
PIF_VALUE: 20
PIF_VALUE: 1
PIF_VALUE: 9
PIF_VALUE: 8
PIF_VALUE: 14
PIF_VALUE: 20
PIF_VALUE: 13
PIF_VALUE: 17
PIF_VALUE: 16
PIF_VALUE: 17
PIF_VALUE: 18
PIF_VALUE: 7
PIF_VALUE: 19
PIF_VALUE: 17
PIF_VALUE: 20
PIF_VALUE: 17
PIF_VALUE: 1
PIF_VALUE: 17
PIF_VALUE: 18
PIF_VALUE: 7
PIF_VALUE: 17
PIF_VALUE: 1
PIF_VALUE: 16
PIF_VALUE: 1
PIF_VALUE: 13
PIF_VALUE: 17
PIF_VALUE: 16
PIF_VALUE: 16
PIF_VALUE: 21
PIF_VALUE: 13
PIF_VALUE: 21
PIF_VALUE: 24
PIF_VALUE: 16
PIF_VALUE: 17
PIF_VALUE: 21
PIF_VALUE: 17
PIF_VALUE: 18
PIF_VALUE: 17
PIF_VALUE: 1
PIF_VALUE: 17
PIF_VALUE: 5
PIF_VALUE: 17
PIF_VALUE: 21
PIF_VALUE: 13
PIF_VALUE: 17
PIF_VALUE: 17
PIF_VALUE: 1
PIF_VALUE: 17
PIF_VALUE: 17
PIF_VALUE: 18
PIF_VALUE: 13
PIF_VALUE: 17
PIF_VALUE: 1
PIF_VALUE: 17
PIF_VALUE: 1
PIF_VALUE: 14
PIF_VALUE: 16
PIF_VALUE: 17
PIF_VALUE: 17
PIF_VALUE: 1
PIF_VALUE: 16
PIF_VALUE: 13
PIF_VALUE: 7
PIF_VALUE: 21
PIF_VALUE: 16
PIF_VALUE: 17
PIF_VALUE: 21
PIF_VALUE: 17
PIF_VALUE: 21
PIF_VALUE: 20
PIF_VALUE: 17
PIF_VALUE: 1
PIF_VALUE: 16
PIF_VALUE: 16
PIF_VALUE: 20
PIF_VALUE: 17
PIF_VALUE: 10
PIF_VALUE: 16
PIF_VALUE: 18
PIF_VALUE: 19
PIF_VALUE: 20
PIF_VALUE: 17
PIF_VALUE: 17

## 2021-12-03 ASSESSMENT — PAIN SCALES - GENERAL
PAINLEVEL_OUTOF10: 0

## 2021-12-03 ASSESSMENT — LIFESTYLE VARIABLES: SMOKING_STATUS: 1

## 2021-12-03 NOTE — PLAN OF CARE
Problem: Activity:  Goal: Ability to return to normal activity level will improve  Description: Ability to return to normal activity level will improve  Outcome: Ongoing   Patient remains on bedrest due to open surgical wounds in esperanza and abdominal area. Problem: Skin Integrity:  Goal: Will show no infection signs and symptoms  Description: Will show no infection signs and symptoms  Outcome: Ongoing   Patient is at an increased risk for skin breakdown, see Reese Score. Pressure ulcer prevention measures in place per protocol. Pt assisted to turn q2 hrs and PRN with pillow support. Complete head to toe skin assessment qshift. No new skin breakdown noted. Preventative sacral heart dressing in place over intact sacral skin. Skin kept clean/dry. Proper transferring/positioning in bed to prevent friction/shear. Heels floated off of bed and foot of bed elevated. Low air loss and alternating pressure mattress in use. Problem: Falls - Risk of:  Goal: Will remain free from falls  Description: Will remain free from falls  Outcome: Ongoing   Patient is at an increased risk for falls, see Fontenot Fall Score. Fall precautions in place per protocol. Fall cloth at foot of bed, fall band around wrist and nonskid footwear in place. Patient instructed to call for assistance by using nurse call light before attempting to get out of bed. Bed is locked and in lowest position with side rails up 3/4. Bed alarm engaged. Room door left open. Belongings and call light within reach. Hourly visual safety checks in place.

## 2021-12-03 NOTE — OP NOTE
[place] [id number] SURGERY     OPERATIVE DICTATION    NAME: Walt Vela   MRN: [id number]  DATE: 12/3/2021     AGE: 63 y.o. LOCATION: [facility]    PREOPERATIVE DIAGNOSIS:  Shayla's gangrene with necrotizing fasciitis     POSTOPERATIVE DIAGNOSIS:  Same. OPERATION PERFORMED: 1) Excisional debridement of abdomen, perineum, and gluteal region (abdomen: 45 x 20 cm; perineum: 15 x 20 cm; left buttock: 14 x 19 cm)     SURGEON:  Lance Short MD    ASSISTANT: Carlita Adams (PGY1)     ANESTHESIA: General     ESTIMATED BLOOD LOSS:  75 cc     DRAINS:  None     SPECIMENS: Tissue for culture and pathology     OPERATIVE INDICATIONS:  This is an unfortunate 63 y.o. male with a history of poorly controlled diabetes who developed Shayla's gangrene at an outside hospital.  He underwent debridement with both Urology and General surgery, however was noted to have additional crepitance. Given this, he was urgently transferred to our hospital and was taken to the operating room by general surgery for extensive debridement. Plastic surgery was consulted for assistance with management and he is brought to the operating room today as he has additional palpable crepitance as well as fibrinous tissue. The risks, benefits, alternatives, outcomes, and personnel involved was performed with the patient. After all questions were answered to the patient's satisfaction, they agreed to proceed. OPERATIVE PROCEDURE:  The patient was brought to the operating room on his ICU bed and placed in the supine position on the operating room table. He underwent general anesthesia without difficulty, was then placed in the lithotomy position, and was prepped and draped in the usual sterile manner. A time-out was performed confirming the patient and the procedure to be performed.   The operation commenced by marking the areas on the abdominal wall of crepitance and excising this tissue to the abdominal wall musculature. The tissue was then sent to pathology. The residual wound was debrided sharply with curettes and a a pulse lavage with 3L of saline solution. Attention was then directed toward the perineum and additional sharp debridement was performed with electrocautery to the pelvic musculature. The testicle on the left appeared somewhat dessicated, thus this was placed into a subcutaneous pouch in the left thigh. The perineum was then irrigated with 3L of saline solution using a Pulse Lavage. Moistened Kerlex dressings were then applied and the patient taken out of the lithotomy position and placed in the right lateral decubitus position with the left side up. The gluteal musculature was sharply debrided with electrocautery and then also irrigated with a pulse lavage. Dayron Rotunda was then applied and the patient placed supine, extubated, and taken back to the ICU in stable, yet critical condition. There were no immediate complications and the patient tolerated the procedure well. At the end of the case, all counts were correct. PLAN: Will return for additional planned second look in 48 hours. Once stable overall with resolved leukocytosis and clean cultures, will plan for diverting colostomy with Dr. Israel Hamilton and then will initiate reconstructive surgeries.     Shirley Elizabeth MD

## 2021-12-03 NOTE — PROGRESS NOTES
ICU SURGERY DAILY PROGRESS NOTE    CC: Shayla's gangrene  Procedure: Wide excision perineum, back, abdominal wall    SUBJECTIVE:   Interval Hx:   Patient extubated yesterday without complication. Complains of increased pain over the left side of the abdomen this morning but otherwise pain is well controlled. Patient otherwise afebrile and HDS, off pressors. ROS: A 10 point review of systems was conducted, significant findings as noted above. All other systems negative. OBJECTIVE:   Vitals:   Vitals:    12/03/21 0300 12/03/21 0400 12/03/21 0500 12/03/21 0600   BP: (!) 99/59 (!) 110/58 104/62 (!) 103/53   Pulse: 83 86 85 85   Resp: 19 17 21 21   Temp:  97.8 °F (36.6 °C)     TempSrc:  Oral     SpO2: 91% 93% 91% 92%   Weight:    190 lb 7.6 oz (86.4 kg)   Height:           I/O:     Intake/Output Summary (Last 24 hours) at 12/3/2021 0715  Last data filed at 12/3/2021 0700  Gross per 24 hour   Intake 2770.92 ml   Output 1500 ml   Net 1270.92 ml     I/O last 3 completed shifts: In: 2770.9 [I.V.:2000.9;  Blood:320; IV Piggyback:450]  Out: 1500 [Urine:1500]    Diet: Diet NPO      Physical Examination:   General appearance: intubated, sedated, moderate distress  HENT: NC/AT, MMM & intact, no JVD, neck supple, trachea midline  Eyes: No scleral icterus, EOMI  Chest/Lungs: intubated, mechanical breath sounds present bilaterally  Cardiovascular: RRR  Abdomen: soft, abdominal binder in place with dressings intact over abdominal wound measuring 60 cm x 23 cm x 3cm and gluteal region 20 cm x 15 cm, minimal drainage, no signs of further necrotizing fasciitis requiring further debridement, no subcutaneous crepitus noted, skin edges with healthy granulation tissue with appropriate bleeding, non-malodorous   Extremities:  full-thickness ulceration on lateral aspect of L foot, no edema, no cyanosis  : walsh in place with clear yellow urine, no signs of further necrotizing fasciitis requiring further debridement of L lower scrotum, no subcutaneous crepitus noted, skin edges with healthy granulation tissue with appropriate bleeding, non-malodorous   Neurologic: intubated, sedated     Labs:  CBC:   Recent Labs     12/02/21  0400 12/02/21  0400 12/02/21  1313 12/03/21  0310 12/03/21  0542   WBC 14.9*  --   --  17.1* 16.3*   HGB 7.2*   < > 9.0* 8.9* 9.2*   HCT 22.1*   < > 27.1* 27.5* 28.2*     --   --  181 204    < > = values in this interval not displayed. BMP:   Recent Labs     12/02/21  0400 12/03/21  0310 12/03/21  0542    139 142   K 4.5 4.1 3.8   * 110 112*   CO2 20* 23 24   BUN 50* 36* 34*   CREATININE 0.9 0.7* 0.7*   GLUCOSE 111* 168* 122*     LFT's:   Recent Labs     11/30/21  1522 12/01/21  1824 12/02/21  0400   AST 7* 13* 9*   ALT 11 8* 7*   BILITOT 0.9 0.5 0.4   ALKPHOS 261* 153* 109     Troponin:   Recent Labs     11/30/21  2219   Marrion Kicks <0.01     BNP: No results for input(s): BNP in the last 72 hours. ABGs:   Recent Labs     12/02/21  0943 12/02/21  1305   PHART 7.369 7.372   PJH9UME 40.4 38.7   PO2ART 107.0 123.1*     INR:   Recent Labs     12/01/21  0416 12/01/21  1824   INR 1.08 1.10       U/A:No results for input(s): NITRITE, COLORU, PHUR, LABCAST, WBCUA, RBCUA, MUCUS, TRICHOMONAS, YEAST, BACTERIA, CLARITYU, SPECGRAV, LEUKOCYTESUR, UROBILINOGEN, BILIRUBINUR, BLOODU, GLUCOSEU, AMORPHOUS in the last 72 hours. Invalid input(s): Allan Trent     Rad:   VL DUP LOWER EXTREMITY ARTERIES BILATERAL             ASSESSMENT AND PLAN:   Albert Allred is a 63 y.o. male with Necrotizing fasciitis of the abdominal wall, gluteal region, and scrotum s/p wide excision of perineum, back, and abdominal wall. Abdominal wall wound measuring 60 cm x 23 cm x 3cm and gluteal wound measures 20 cm x 15cm (12/1) POD #2    - Patient to return to the OR today for possible further debridement   - Continue with tight glucose control. Patient currently on an insulin gtt   - Plastic surgery will continue to follow.      Code Status: Full Code  -----------------------------  Kasie Bethea DO, [address]  PGY-1 General Surgery  12/03/21  7:15 AM  [phone]

## 2021-12-03 NOTE — PROGRESS NOTES
ICU SURGERY DAILY PROGRESS NOTE    CC: Shayla's gangrene  Procedure: Wide excision perineum, back, abdominal wall    SUBJECTIVE:   Interval Hx:  Patient extubated yesterday without complication. Complains of increased pain over the left side of the abdomen this morning but otherwise pain is well controlled. Patient otherwise afebrile and HDS, off pressors. ROS: A 10 point review of systems was conducted, significant findings as noted above. All other systems negative. OBJECTIVE:   Vitals:   Vitals:    12/03/21 0300 12/03/21 0400 12/03/21 0500 12/03/21 0600   BP: (!) 99/59 (!) 110/58 104/62 (!) 103/53   Pulse: 83 86 85 85   Resp: 19 17 21 21   Temp:  97.8 °F (36.6 °C)     TempSrc:  Oral     SpO2: 91% 93% 91% 92%   Weight:    190 lb 7.6 oz (86.4 kg)   Height:           I/O:     Intake/Output Summary (Last 24 hours) at 12/3/2021 0723  Last data filed at 12/3/2021 0700  Gross per 24 hour   Intake 2770.92 ml   Output 1500 ml   Net 1270.92 ml     I/O last 3 completed shifts: In: 2770.9 [I.V.:2000.9; Blood:320; IV Piggyback:450]  Out: 1500 [Urine:1500]    Diet: Diet NPO      Physical Examination:   General appearance: alert, no acute distress, groomed appropriately   HENT: NC/AT, MMM & intact, no JVD, neck supple, trachea midline  Eyes: No scleral icterus, EOMI  Chest/Lungs: intubated, mechanical breath sounds present bilaterally  Cardiovascular: RRR  Abdomen: soft, abdominal binder in place with dressings intact over abdominal wound measuring 60 cm x 23 cm x 3cm and gluteal region 20 cm x 15 cm, minimal drainage, no signs of further necrotizing fasciitis requiring further debridement, no subcutaneous crepitus noted, skin edges with healthy granulation tissue with appropriate bleeding, non-malodorous   Extremities:  full-thickness ulceration on lateral aspect of L foot, no edema, no cyanosis, decreased sensation along entirety of bilateral feet. No DP/PT pulse appreciated with doppler.    : walsh in place with clear yellow urine, no signs of further necrotizing fasciitis requiring further debridement of L lower scrotum, no subcutaneous crepitus noted, skin edges with healthy granulation tissue with appropriate bleeding, non-malodorous   Neurologic: A&Ox3, decrease sensation to bilateral lower extremities. Labs:  CBC:   Recent Labs     12/02/21  0400 12/02/21  0400 12/02/21  1313 12/03/21  0310 12/03/21  0542   WBC 14.9*  --   --  17.1* 16.3*   HGB 7.2*   < > 9.0* 8.9* 9.2*   HCT 22.1*   < > 27.1* 27.5* 28.2*     --   --  181 204    < > = values in this interval not displayed. BMP:   Recent Labs     12/02/21  0400 12/03/21  0310 12/03/21  0542    139 142   K 4.5 4.1 3.8   * 110 112*   CO2 20* 23 24   BUN 50* 36* 34*   CREATININE 0.9 0.7* 0.7*   GLUCOSE 111* 168* 122*     LFT's:   Recent Labs     11/30/21  1522 12/01/21  1824 12/02/21  0400   AST 7* 13* 9*   ALT 11 8* 7*   BILITOT 0.9 0.5 0.4   ALKPHOS 261* 153* 109     Troponin:   Recent Labs     11/30/21  2219   Angelina Pew <0.01     BNP: No results for input(s): BNP in the last 72 hours. ABGs:   Recent Labs     12/02/21  0943 12/02/21  1305   PHART 7.369 7.372   SDX5AAG 40.4 38.7   PO2ART 107.0 123.1*     INR:   Recent Labs     12/01/21  0416 12/01/21  1824   INR 1.08 1.10       U/A:No results for input(s): NITRITE, COLORU, PHUR, LABCAST, WBCUA, RBCUA, MUCUS, TRICHOMONAS, YEAST, BACTERIA, CLARITYU, SPECGRAV, LEUKOCYTESUR, UROBILINOGEN, BILIRUBINUR, BLOODU, GLUCOSEU, AMORPHOUS in the last 72 hours. Invalid input(s): Darlihi Knock     Rad:   VL DUP LOWER EXTREMITY ARTERIES BILATERAL             ASSESSMENT AND PLAN:   Byron Duke is a 63 y.o. male with Necrotizing fasciitis of the abdominal wall, gluteal region, and scrotum s/p wide excision of perineum, back, and abdominal wall.   Abdominal wall wound measuring 60 cm x 23 cm x 3cm and gluteal wound measures 20 cm x 15cm (12/1)  POD 2    - Patient returning to the OR with plastic surgery today for possible further debridement and possible wound vac placement  - Continue with aggressive glucose control  - Family (wife) updated on condition.  - General surgery will continue to follow.      Kasie Bethea DO, MS  PGY1, General Surgery  12/03/21  11:36 AM  [phone]

## 2021-12-03 NOTE — PROGRESS NOTES
Plastic Surgery  Post-operative Note      Procedure(s) Performed: excisional debridement of abdomen, perineum, and gluteal region    Subjective:   Patient's pain is controlled, denies nausea or vomiting. Tolerating diet. OOB, ambulating and voiding appropriately. Denies flatus or BM at this time.      Objective:  Anesthesia type: General      I/O    Intra op    Post op     Fluids  600 mL 500 mL     EBL 75 mL 0 mL     Urine Not recorded 350 mL     Vitals:   Vitals:    12/03/21 1500 12/03/21 1600 12/03/21 1700 12/03/21 1800   BP: 132/73 133/69 135/68 127/68   Pulse: 92 92 90 87   Resp: 21 20 19 21   Temp:  98.1 °F (36.7 °C)     TempSrc:  Oral     SpO2: 98% 98%     Weight:       Height:           Physical Exam:  Post-op vital signs:  Stable   General appearance: alert, no acute distress, grooming appropriate  Eyes: No scleral icterus, EOM grossly intact  Neck: trachea midline, no JVD, no lymphadenopathy, neck supple  Chest/Lungs: normal effort with no accessory muscle use on 2L NC  Cardiovascular: RRR, brisk capillary refill distally  Abdomen: soft, abdominal binder in place with dressings intact over abdominal wound measuring 60 cm x 23 cm x 3cm and gluteal region 20 cm x 15 cm, minimal drainage, no signs of further necrotizing fasciitis requiring further debridement, no subcutaneous crepitus noted, skin edges with healthy granulation tissue with appropriate bleeding, non-malodorous   Extremities:  full-thickness ulceration on lateral aspect of L foot, no edema, no cyanosis  : walsh in place with clear yellow urine, no signs of further necrotizing fasciitis requiring further debridement of L lower scrotum, no subcutaneous crepitus noted, skin edges with healthy granulation tissue with appropriate bleeding, non-malodorous   Neuro: A&Ox3, no focal deficits, sensation intact    Assessment and Plan  This is a 63y.o. year old male status post excisional debridement of abdomen, perineum, and gluteal region secondary to Shayla's gangrene. (12/3) POD0.     Pain management: Fentanyl drip  Cardiovasc: hemodynamically stable, will continue to monitor  Respiratory:  IS ordered to bedside, encourage hourly IS and deep breathing, wean oxygen as tolerated  Fluids:  , Diet: NPO  : walsh in place  Ambulation: OOB to chair, encourage ambulation  Prophylaxis: SCDs, lovenox  Antibiotics: Merrem, Vanc  Wound: Local wound care  Will plan to return to OR Sunday for [address] PETTY Garcia DO  PGY1, General Surgery  12/03/21  6:43 PM  [phone]

## 2021-12-03 NOTE — BRIEF OP NOTE
Brief Postoperative Note      Patient: Sandra Duffy  YOB: 1955  MRN: [id number]    Date of Procedure: 12/3/2021    Pre-Op Diagnosis: NECROTIZING FASCIITIS LEFT BUTTOCK AND ABDOMEN    Post-Op Diagnosis: Same       Procedure(s):  ABDOMINAL WALL AND LEFT BUTTOCK DEBRIDEMENT, WOUND VAC PLACEMENT    Surgeon(s):  Warren Baldwin MD    Assistant:  Surgical Assistant: Yuni Alvarenga  Resident: Vinicio Robb DO    Anesthesia: General    Estimated Blood Loss (mL): Minimal    Complications: None    Specimens:   ID Type Source Tests Collected by Time Destination   1 : PERINEUM Tissue Tissue CULTURE, FUNGUS, CULTURE, TISSUE (Canceled), CULTURE WITH SMEAR, ACID FAST BACILLIUS, CULTURE, ANAEROBIC AND AEROBIC Warren Baldwin MD 12/3/2021 1209    A : ABDOMINAL WOUND Tissue Tissue SURGICAL PATHOLOGY Warren Baldwin MD 12/3/2021 1208        Implants:  * No implants in log *      Drains:   Urethral Catheter Non-latex 18 fr (Active)   Catheter Indications Assist in healing of open sacral or perineal wounds (Stage III, IV, or unstageable documented by a provider or enterostomal therapy) and/or full thickness perineal and lower extremity burns in incontinent patients 12/02/21 2000   Securement Device Date Changed 12/01/21 12/02/21 2000   Site Assessment Pink 12/02/21 2000   Urine Color Yellow 12/02/21 2000   Urine Appearance Hazy 12/02/21 2000   Output (mL) 275 mL 12/03/21 0607       [REMOVED] NG/OG/NJ/NE Tube Orogastric 16 fr Center mouth (Removed)   Surrounding Skin Dry;  Intact 12/01/21 2200   Securement device Yes 12/02/21 0800   Status Clamped 12/02/21 0800   Placement Verified by External Catheter Length 12/02/21 0800   NG/OG/NJ/NE External Measurement (cm) 50 cm 12/02/21 0800   Drainage Appearance Bile 12/01/21 2200   Tube Feeding Intake (mL) 0 ml 12/01/21 1800   Free Water Flush (mL) 0 mL 12/01/21 1800   Free Water Rate 0 12/01/21 0800   Residual Volume (ml) 0 ml 12/01/21 0000       Findings: Further debridement of upper right abdominal wall. Tucked testicle within thigh tissue to preserve organ. Pulse lavage of both abdominal wall and left buttock, dressed in wet-to-dry fashion.      Electronically signed by Inocente Seip, DO on 12/3/2021 at 1:02 PM

## 2021-12-03 NOTE — PROGRESS NOTES
Clinical Pharmacy Progress Note    Vancomycin - Management by Pharmacy    Consult Date(s): 12/1/21  Consulting Provider(s): ALEXANDER Vizcarra     Assessment / Plan    Shayla Gangrene and foot osteomyelitis - Vancomycin   Concurrent Antimicrobials:   o Clindamycin - day #3  o Meropenem - day #3   Day of Vanc Therapy: #4   Current Dosing Method: Bayesian-Guided AUC Dosing   Therapeutic Goal: 400-600 mg/L*hr   Current Dose / Frequency: 1250 mg every 18 hours   Plan / Rationale:   o Random vancomycin level this afternoon was 12 mcg/mL (drawn ~14 hrs after the previous dose). o Predicted AUC is 402 mg/L*h with a steady state trough of 11.1 mcg/mL on this regimen.   o Will increase dose to 1.25g IV q12h. Estimated AUC is 598 mg/L*h with steady state trough of 18.9 mcg/mL with this regimen.   o Level ordered for tomorrow to assess kinetics.  Will continue to monitor clinical condition and make adjustments to regimen as appropriate. Thank you for consulting Pharmacy! Levi Conroy, PharmD, [facility]  Wireless: [phone]  12/3/2021 2:37 PM       Interval Update: Patient was extubated yesterday. He returned to the OR today for abdominal wall and L buttock debridement and wound vac placement. Subjective/Objective: Mr. Ramos Quiroz is a 63 y.o. male  admitted for Necrotizing fasciitis . Pharmacy has been consulted to dose Vancomycin. Height:   Ht Readings from Last 1 Encounters:   12/02/21 5' 11\" (1.803 m)     Weight:   Wt Readings from Last 1 Encounters:   12/03/21 190 lb 7.6 oz (86.4 kg)     Level(s) / Doses:    Date Time Dose Level / Type of Level Interpretation   12/3 14:11 1250 mg IV q18h Random = 12 mcg/mL · Drawn ~14 hrs after previous dose; predicted  mg/L*h  · Increase dose to 1.25g IV q12h          Note: Serum levels collected for AUC-based dosing may be high if collected in close proximity to the dose administered.  This is not necessarily an indicator of toxicity. Cultures & Sensitivities:    Date Site Micro Susceptibility / Result   11/30 Blood x2 NGTD    11/30 COVID-19 rapid Negative    11/30 Scrotal tissue NGTD    12/1 Foot wound Mixed skin pam    12/3 Perineum, fungus Collected    12/3 Perineum, AFB Collected    12/3 Perineum, anaerobic and aerobic Collected      Labs / Ancillary Data:    Estimated Creatinine Clearance: 115 mL/min (A) (based on SCr of 0.7 mg/dL (L)).     Recent Labs     12/02/21  0400 12/03/21  0310 12/03/21  0542   CREATININE 0.9 0.7* 0.7*   BUN 50* 36* 34*   WBC 14.9* 17.1* 16.3*

## 2021-12-03 NOTE — PROGRESS NOTES
Hospitalist Progress Note      PCP: No primary care provider on file. Date of Admission: 12/1/2021    Chief Complaint: [facility] Course: Patient was transferred from [facility] after admission for DKA and Shayla's gangrene/necrotizing fasciitis. Patient was intubated and underwent I&D. Pt was transferred to [facility] for further evaluation per colorectal/plastic surgery. Patient underwent wide excision of perineum/abdominal wall 12/01. Extubated 12/02. Plan for excisional debridement of abdomen, perineum and gluteal region today. On IV abx and insulin drip. Urology/surgery/plastic surgery following. Subjective: Patient on nasal cannula. Alert and oriented X3. Follows simple commands. Denies any complaints.     Medications:  Reviewed    Infusion Medications    lactated ringers 125 mL/hr at 12/03/21 0700    sodium chloride      sodium chloride      sodium chloride Stopped (12/01/21 2027)    norepinephrine Stopped (12/01/21 1958)    fentaNYL 12.5 mcg/hr (12/03/21 0700)    dextrose      insulin (HUMAN R) non-weight based infusion 0.44 Units/hr (12/03/21 0795)     Scheduled Medications    sodium phosphate IVPB  30 mmol IntraVENous Once    enoxaparin  40 mg SubCUTAneous Daily    sodium chloride flush  5-40 mL IntraVENous 2 times per day    pantoprazole  40 mg IntraVENous Daily    meropenem  1,000 mg IntraVENous Q8H    clindamycin (CLEOCIN) IV  600 mg IntraVENous Q8H    vancomycin  1,250 mg IntraVENous Q18H     PRN Meds: sodium chloride, sodium chloride, sodium chloride flush, sodium chloride, acetaminophen **OR** acetaminophen, glucose, dextrose, glucagon (rDNA), dextrose      Intake/Output Summary (Last 24 hours) at 12/3/2021 0902  Last data filed at 12/3/2021 7545  Gross per 24 hour   Intake 2771.32 ml   Output 1500 ml   Net 1271.32 ml       Physical Exam Performed:    /61   Pulse 87   Temp 98.1 °F (36.7 °C) (Oral)   Resp 17   Ht 5' 11\" (1.803 m) Wt 190 lb 7.6 oz (86.4 kg)   SpO2 93%   BMI 26.57 kg/m²     General appearance: No apparent distress, ill-appearing  HEENT: Pupils equal, round, and reactive to light. Conjunctivae/corneas clear. Neck: Supple, with full range of motion. No jugular venous distention. Trachea midline. Respiratory: On vent. Clear to auscultation, bilaterally   Cardiovascular: Regular rate and rhythm with normal S1/S2 without murmurs, rubs or gallops. Abdomen: In abdominal binder with dressing  Musculoskeletal: Left lower extremity in dressing. Pictures from podiatry note reviewed  Skin: Skin color, texture, turgor normal.  No rashes or lesions. Neurologic: Open eyes to verbal command. Follows simple command. Capillary Refill: Brisk,< 3 seconds   Peripheral Pulses: +2 palpable, equal bilaterally       Labs:   Recent Labs     12/02/21  0400 12/02/21  0400 12/02/21  1313 12/03/21  0310 12/03/21  0542   WBC 14.9*  --   --  17.1* 16.3*   HGB 7.2*   < > 9.0* 8.9* 9.2*   HCT 22.1*   < > 27.1* 27.5* 28.2*     --   --  181 204    < > = values in this interval not displayed. Recent Labs     12/01/21  0416 12/01/21  0830 12/01/21  0830 12/01/21  1824 12/02/21  0400 12/03/21  0310 12/03/21  0542   NA   < > 139   < > 136 140 139 142   K   < > 4.7   < > 5.2* 4.5 4.1 3.8   CL   < > 110   < > 104 112* 110 112*   CO2   < > 22   < > 20* 20* 23 24   BUN   < > 50*   < > 53* 50* 36* 34*   CREATININE   < > 0.8   < > 1.0 0.9 0.7* 0.7*   CALCIUM   < > 7.4*   < > 7.1* 7.2* 7.5* 7.3*   PHOS  --  2.4*  --  3.2  --  1.7*  --     < > = values in this interval not displayed.      Recent Labs     11/30/21  1522 12/01/21  1824 12/02/21  0400   AST 7* 13* 9*   ALT 11 8* 7*   BILIDIR  --  0.4* 0.3   BILITOT 0.9 0.5 0.4   ALKPHOS 261* 153* 109     Recent Labs     12/01/21  0416 12/01/21  1824   INR 1.08 1.10     Recent Labs     11/30/21  2219   CKTOTAL 26*   TROPONINI <0.01       Urinalysis:    No results found for: Stuart Escobar, [address], BACTERIA, RBCUA, Juan Salazar [facility]94    Radiology:  VL DUP LOWER EXTREMITY ARTERIES BILATERAL                 Assessment/Plan:  Acute respiratory failure: On vent, plan for SBT trial today  Management per intensivist    Shayla's gangrene/necrotizing fasciitis:  Status post I&D 11/30 and wide excision of perineum/abdominal wall 12/01  Abdominal wall wound measuring 60 cm x 23 cm x 3cm and gluteal wound measures 20 cm x 15cm  POD #1  Plan for excisional debridement of abdomen, perineum and gluteal region today. Continue IV antibiotics. Clindamycin discontinued  Surgery/plastic surgery/urology following    Acute postoperative blood loss anemia:  Likely secondary to I&D and wide excision  Status post 1u pRBC transfusion 12/2  Monitor hemoglobin, stable    DKA in the setting of diabetes mellitus type 2:  Patient noncompliant. 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

## 2021-12-03 NOTE — CARE COORDINATION
Case Management Assessment           Daily Note                 Date/ Time of Note: 12/3/2021 1:17 PM         Note completed by: Jessica Mckinney RN    Patient Name: Marilyn Jung  YOB: 1955    Diagnosis:Necrotizing fasciitis [facility]) [M72.6]  Patient Admission Status: Inpatient    Date of Admission:12/1/2021  3:40 PM Length of Stay: 2 GLOS: GMLOS: 4.4    Current Plan of Care: Shayla gangrene, abdominal and buttock debridement in OR today, possible wound vac placement  ________________________________________________________________________________________  PT AM-PAC:   / 24 per last evaluation on: tbd    OT AM-PAC:   / 24 per last evaluation on: tbd    DME Needs for discharge: tbd  ________________________________________________________________________________________  Discharge Plan: Home with [address] [place] [address] Way: [facility] following for howard case    Tentative discharge date: tbd    Current barriers to discharge: IV antibiotics, debridement in OR    Referrals completed: Home Health Care: [facility] and Infusion: [facility]     Resources/ information provided: Not indicated at this time  ________________________________________________________________________________________  Case Management Notes: Patient lives at home with spouse that ha 7 steps to enter. Patient extubated yesterday. To OR today for debridement. Spoke with Estevan Jang in financial services, patient does not have insurance and is over income level for assistance through [employer]. Steffanie Payton at [facility] who will follow for wound care upon discharge, spoke with Hilda at [facility] regarding possibility of long term antibiotics. Per Lynn Daley, quote for merrem 1g every 8 hours would be $53 per day, they could arrange a payment plan to assist.  Likely will require placement. Reanna Rowan and his family were provided with choice of provider; he and his family are in agreement with the discharge plan.     Care Transition Patient: Linda Whitaker Chi, RN  [facility]  Case Management Department  Ph: [phone]

## 2021-12-03 NOTE — ANESTHESIA PRE PROCEDURE
Department of Anesthesiology  Preprocedure Note       Name:  Dayron Rico   Age:  63 y.o.  :  1955                                          MRN:  [id number]         Date:  12/3/2021      Surgeon: Chen Query):  Tori Piña MD    Procedure: Procedure(s):  ABDOMINAL WALL AND LEFT BUTTOCK DEBRIDEMENT, WOUND VAC PLACEMENT    Medications prior to admission:   Prior to Admission medications    Medication Sig Start Date End Date Taking?  Authorizing Provider   metFORMIN (GLUCOPHAGE) 500 MG tablet Take 500 mg by mouth daily (with breakfast)    Historical Provider, MD   meloxicam (MOBIC) 15 MG tablet Take 1 tablet by mouth daily 16   Jhonny Maurice DO       Current medications:    Current Facility-Administered Medications   Medication Dose Route Frequency Provider Last Rate Last Admin    sodium phosphate 30 mmol in dextrose 5 % 250 mL IVPB  30 mmol IntraVENous Once Sedonia Marlin, DO 41.7 mL/hr at 21 0856 30 mmol at 21 0856    enoxaparin (LOVENOX) injection 40 mg  40 mg SubCUTAneous Daily Jesus Isaac DO   40 mg at 21 0954    meperidine (DEMEROL) injection 12.5 mg  12.5 mg IntraVENous Q5 Min PRN Michi Danielle, DO        HYDROmorphone (DILAUDID) injection 0.25 mg  0.25 mg IntraVENous Q5 Min PRN Michi Danielle, DO        HYDROmorphone (DILAUDID) injection 0.5 mg  0.5 mg IntraVENous Q5 Min PRN Michi Danielle, DO        HYDROcodone-acetaminophen [facility]) 5-325 MG per tablet 1 tablet  1 tablet Oral Once PRN Michi Danielle, DO        [facility]dansetron [facility]) injection 4 mg  4 mg IntraVENous Once PRN Michi Danielle, DO        prochlorperazine (COMPAZINE) injection 5 mg  5 mg IntraVENous Once PRN Michi Danielle, DO        0.9 % sodium chloride bolus  500 mL IntraVENous Once PRN Michi Danielle, DO        diphenhydrAMINE (BENADRYL) injection 12.5 mg  12.5 mg IntraVENous Once PRN Michi Danielle, DO        hydrALAZINE (APRESOLINE) injection 5 mg  5 mg IntraVENous Q10 Min PRN Marcus Jade, DO        lactated ringers infusion   IntraVENous Continuous Opal Alvarado  mL/hr at 12/03/21 0700 Rate Verify at 12/03/21 0700    sodium chloride flush 0.9 % injection 5-40 mL  5-40 mL IntraVENous 2 times per day Alberto Dunlap, DO   10 mL at 12/03/21 0909    sodium chloride flush 0.9 % injection 5-40 mL  5-40 mL IntraVENous PRN Alberto De La Cruzstein, DO        0.9 % sodium chloride infusion  25 mL IntraVENous PRN Alberto De La Cruzstein, DO   Paused at 12/01/21 2027    acetaminophen (TYLENOL) tablet 650 mg  650 mg Oral Q6H PRN Alberto De La Cruzstein, DO        Or    acetaminophen (TYLENOL) suppository 650 mg  650 mg Rectal Q6H PRN Alberto Dunlap, DO        norepinephrine (LEVOPHED) 16 mg in dextrose 5 % 250 mL infusion  2-100 mcg/min IntraVENous Continuous Jazmin Smita, DO   Paused at 12/01/21 1958    pantoprazole (PROTONIX) injection 40 mg  40 mg IntraVENous Daily Jazmin Smita, DO   40 mg at 12/03/21 0952    meropenem (MERREM) 1,000 mg in sodium chloride 0.9 % 100 mL IVPB (mini-bag)  1,000 mg IntraVENous Q8H Jazmin Smita, DO   Stopped at 12/03/21 0330    clindamycin (CLEOCIN) 600 mg in dextrose 5 % 50 mL IVPB  600 mg IntraVENous Q8H Jazmin Smita,  mL/hr at 12/03/21 1102 600 mg at 12/03/21 1102    fentaNYL (SUBLIMAZE) 1,000 mcg in sodium chloride 0.9% 100 mL infusion  12.5-200 mcg/hr IntraVENous Continuous Jazmin Smita, DO 1.3 mL/hr at 12/03/21 1000 12.5 mcg/hr at 12/03/21 1000    glucose (GLUTOSE) 40 % oral gel 15 g  15 g Oral PRN Jazmin Smita, DO        dextrose 50 % IV solution  12.5 g IntraVENous PRN Alberto Dunlap, DO        glucagon (rDNA) injection 1 mg  1 mg IntraMUSCular PRN Jazmin Smita, DO        dextrose 5 % solution  100 mL/hr IntraVENous PRN Jazmin Smita, DO        vancomycin (VANCOCIN) 1,250 mg in dextrose 5 % 250 mL IVPB  1,250 mg IntraVENous Q18H Jazmin Smita, DO   Stopped at 12/03/21 0144    insulin regular (HUMULIN R;NOVOLIN R) 100 Units in sodium chloride 0.9 % 100 mL infusion  1-50 Units/hr IntraVENous Continuous Jazmin Smita, DO 0.6 mL/hr at 12/03/21 1100 0.57 Units/hr at 12/03/21 1100     Facility-Administered Medications Ordered in Other Encounters   Medication Dose Route Frequency Provider Last Rate Last Admin    0.9 % sodium chloride infusion   IntraVENous Continuous PRN Bertha Edinger, DO   New Bag at 12/03/21 1128    propofol injection   IntraVENous PRN Bertha Edinger, DO   80 mg at 12/03/21 1136    rocuronium (ZEMURON) injection   IntraVENous PRN Bertha Edinger, DO   40 mg at 12/03/21 1136    lidocaine PF 2 % injection   IntraVENous PRN Bertha Edinger, DO   40 mg at 12/03/21 1136    promethazine (PHENERGAN) injection   IntraVENous PRN Bertha Edinger, DO   6.25 mg at 12/03/21 1134    fentaNYL (SUBLIMAZE) injection   IntraVENous PRN Bertha Edinger, DO   50 mcg at 12/03/21 1154       Allergies:  No Known Allergies    Problem List:    Patient Active Problem List   Diagnosis Code    Diabetic acidosis without coma ([facility] E11.10    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 No pertinent past medical history. Past Surgical History:        Procedure Laterality Date    ABDOMEN SURGERY N/A 12/1/2021    WIDE EXCISION PERINEUM, BACK, ABDOMINAL WALL performed by Bertha Lazar MD at [place] [address] N/A 11/30/2021    DEBRIDEMENT OF SCROTAL SHAYLA'S GANGRENE performed by Karen Yanes MD at [address] [place] [address] N/A 11/30/2021    PERINEAL SOFT TISSUE EXCISIONAL DEBRIDEMENT performed by Alberto Jacob MD at [address] [place] [address] [facility] History:    Social History     Tobacco Use    Smoking status: Current Every Day Smoker    Smokeless tobacco: Never Used   Substance Use Topics    Alcohol use:  Yes     Alcohol/week: 0.0 standard drinks     Comment: social                                 Ready to quit: Not Answered  Counseling given: Not Answered      Vital Signs (Current):   Vitals:    12/03/21 0800 12/03/21 0900 12/03/21 0959 12/03/21 1000   BP: 123/61 123/64  (!) 127/56   Pulse: 87 90  86   Resp: 17 22 22   Temp: 98.1 °F (36.7 °C)      TempSrc: Oral      SpO2: 93% 96% 93% 96%   Weight:       Height:                                                  BP Readings from Last 3 Encounters:   12/03/21 (!) 127/56   12/03/21 (!) 151/76   12/01/21 121/65       NPO Status: Time of last liquid consumption: 2300                        Time of last solid consumption: 2300                                                      BMI:   Wt Readings from Last 3 Encounters:   12/03/21 190 lb 7.6 oz (86.4 kg)   11/30/21 163 lb 3.2 oz (74 kg)   04/18/16 179 lb 14.3 oz (81.6 kg)     Body mass index is 26.57 kg/m². CBC:   Lab Results   Component Value Date    WBC 16.3 12/03/2021    RBC 3.11 12/03/2021    HGB 9.2 12/03/2021    HCT 28.2 12/03/2021    MCV 90.9 12/03/2021    RDW 14.4 12/03/2021     12/03/2021       CMP:   Lab Results   Component Value Date     12/03/2021    K 3.8 12/03/2021     12/03/2021    CO2 24 12/03/2021    BUN 34 12/03/2021    CREATININE 0.7 12/03/2021    GFRAA >60 12/03/2021    AGRATIO 0.7 11/30/2021    LABGLOM >60 12/03/2021    GLUCOSE 122 12/03/2021    PROT 4.2 12/02/2021    CALCIUM 7.3 12/03/2021    BILITOT 0.4 12/02/2021    ALKPHOS 109 12/02/2021    AST 9 12/02/2021    ALT 7 12/02/2021       POC Tests:   Recent Labs     12/01/21  1839 12/01/21  1955 12/03/21  1110   POCGLU 325*   < > 117*   POCNA 136  --   --    POCK 5.0  --   --     < > = values in this interval not displayed.        Coags:   Lab Results   Component Value Date    PROTIME 12.5 12/01/2021    INR 1.10 12/01/2021       HCG (If Applicable): No results found for: PREGTESTUR, PREGSERUM, HCG, HCGQUANT     ABGs:   Lab Results   Component Value Date    PHART 7.372 12/02/2021    PO2ART 123.1 12/02/2021    SUR3FCL 38.7 12/02/2021    JQT0KYP 22.5 12/02/2021 BEART -3 12/02/2021    L9TWRLNL 99 12/02/2021        Type & Screen (If Applicable):  No results found for: LABABO, LABRH    Drug/Infectious Status (If Applicable):  No results found for: HIV, HEPCAB    COVID-19 Screening (If Applicable):   Lab Results   Component Value Date    COVID19 Not Detected 11/30/2021           Anesthesia Evaluation  Patient summary reviewed and Nursing notes reviewed no history of anesthetic complications:   Airway: Mallampati: II  TM distance: >3 FB   Neck ROM: full  Mouth opening: > = 3 FB Dental:    (+) lower dentures and upper dentures      Pulmonary: breath sounds clear to auscultation  (+) current smoker (smoker 2 ppd since age 17 )          Patient did not smoke on day of surgery. Cardiovascular:  Exercise tolerance: good (>4 METS),       (-) past MI    NYHA Classification: II  ECG reviewed  Rhythm: regular  Rate: normal           Beta Blocker:  Not on Beta Blocker         Neuro/Psych:   Negative Neuro/Psych ROS              GI/Hepatic/Renal:        (-) GERD       Endo/Other:    (+) DiabetesType II DM, poorly controlled, using insulin, . ROS comment: Shayla's  gangrene underwent I/D few days  Ago  Extubated yesterday and off  All pressors vss  Abdominal:             Vascular: negative vascular ROS. Other Findings:             Anesthesia Plan      general     ASA 4       Induction: intravenous. MIPS: Postoperative opioids intended and Prophylactic antiemetics administered. Anesthetic plan and risks discussed with patient.         Attending anesthesiologist reviewed and agrees with Preprocedure content              Timothy Wood DO   12/3/2021

## 2021-12-03 NOTE — PROGRESS NOTES
Podiatric Surgery Daily Progress Note      Admit Date: 12/1/2021      Code:Full Code    Patient seen and examined, labs and records reviewed    Subjective:     Patient seen at bedside this a.m. Patient extubated yesterday on 12/2/2021. Patient is awake, alert, and oriented. Review of Systems: A 12 point review of symptoms is unremarkable with the exception of the chief complaint. Patient specifically denies nausea, fever, vomiting, chills, shortness of breath, chest pain, abdominal pain, constipation or difficulty urinating. Objective     BP (!) 103/53   Pulse 85   Temp 97.8 °F (36.6 °C) (Oral)   Resp 21   Ht 5' 11\" (1.803 m)   Wt 190 lb 7.6 oz (86.4 kg)   SpO2 92%   BMI 26.57 kg/m²      I/O:    Intake/Output Summary (Last 24 hours) at 12/3/2021 0816  Last data filed at 12/3/2021 0700  Gross per 24 hour   Intake 2770.92 ml   Output 1500 ml   Net 1270.92 ml              Wt Readings from Last 3 Encounters:   12/03/21 190 lb 7.6 oz (86.4 kg)   11/30/21 163 lb 3.2 oz (74 kg)   04/18/16 179 lb 14.3 oz (81.6 kg)       LABS:    Recent Labs     12/03/21  0310 12/03/21  0542   WBC 17.1* 16.3*   HGB 8.9* 9.2*   HCT 27.5* 28.2*    204        Recent Labs     12/03/21  0310 12/03/21  0310 12/03/21  0542      < > 142   K 4.1   < > 3.8      < > 112*   CO2 23   < > 24   PHOS 1.7*  --   --    BUN 36*   < > 34*   CREATININE 0.7*   < > 0.7*    < > = values in this interval not displayed. Recent Labs     11/30/21  1522 12/01/21  0416 12/01/21  1824 12/02/21  0400   PROT   < >  --  4.9* 4.2*   INR  --  1.08 1.10  --     < > = values in this interval not displayed. LOWER EXTREMITY EXAMINATION    Dressing to left LE intact. No strikethrough noted to the external dressing. No drainage noted to the internal layers of the dressing. VASCULAR: DP and PT pulses are non-palpable 0/4 b/l.   Upon hand-held Doppler examination DP signals were noted to be monophasic and PT signals were noted to be nondopplerable. CFT is sluggish to the digits of the foot b/l. Skin temperature is warm to cold from proximal to distal.  No edema noted. No pain with calf compression b/l.     NEUROLOGIC: Gross and epicritic sensation is diminished b/l. Protective sensation is diminished at all pedal sites b/l.     DERMATOLOGIC:   Left lower extremity:     Full-thickness ulceration noted to the lateral aspect of the left foot. Wound measures approximately 3.2 cm x 2 cm x 0.5 cm. Of note the fifth metatarsal is exposed. Periwound rubor noted, improving. No purulent drainage noted. No fluctuance or crepitus or malodor noted.     Right LE soft tissue envelope is closed without ulceration or acute signs of infection.     MUSCULOSKELETAL: Mild pain with palpation of the left foot. No obvious biomechanical abnormalities. IMAGING:  XR LEFT FOOT 11/30/2021  Narrative   EXAMINATION:   THREE XRAY VIEWS OF THE LEFT FOOT       11/30/2021 1:44 pm       COMPARISON:   None.       HISTORY:   ORDERING SYSTEM PROVIDED HISTORY: wound   TECHNOLOGIST PROVIDED HISTORY:   Reason for exam:->wound   Reason for Exam: blood sugar problem, wound check on lateral portion of foot   Acuity: Acute   Type of Exam: Initial       FINDINGS:   Osseous destruction along the articular margins of the 5th   metatarsophalangeal joint with associated lucency in the soft tissues. .   There is no evidence of fracture or dislocation. . The remaining joint spaces   appear well maintained. The remaining soft tissues are unremarkable.           Impression   Evidence of a septic joint involving the 5th metatarsal phalangeal joint with   associated osteomyelitis         ARTERIAL DUPLEX 12/2/21     Type of Study:        Extremities Arteries:Lower Extremities Arterial Duplex, VL LOWER EXTREMITY    ARTERIES DUPLEX BILATERAL.       Tech Comments   Right   The right ankle/brachial index is 0.0 (no Doppler signal could be heard at the   [facility] or the ).    The common femoral and profunda femoral arteries could not be visualized due   to bandages extending into the groin area. The mid superficial femoral artery has a short segmental occlusion and then is   reconstituted. Elevated velocities at the distal superficial femoral artery indicate a > 50%   stenosis by velocity criteria (velocity went from 15 to 298 cm/s). The posterior tibial artery is occluded. The distal anterior tibial artery is barely patent, with very low flow   (possibly occluded and then reconstituted). Left   The left ankle brachial index is 0 for the PT and the DP was not accessible   due to the bandages on the foot. The common femoral and profunda femoral artery could not be visualized due to   bandages extending into the groin area. The distal superficial femoral artery is occluded and then reconstituted. The posterior tibial artery, peroneal, and anterior tibial artery are   occluded. There were no previous studies to use for comparison. ASSESSMENT/PLAN  ASSESSMENT/PLAN:   -Full-thickness ulceration; lateral left foot-Sands stage III  -Osteomyelitis of the fifth metatarsal; left foot  -Critical limb ischemia; bilateral lower extremity  -Diabetes mellitus, type II  -History of noncompliance    -Patient seen and examined at bedside this a.m.  -Tachypneic and hypotensive, leukocytosis noted (WBC 16.3)  -ESR 81 and .5  -lactic acid 0.9  -HbA1c 13.5%  -prealbumin less than 3  -Blood cultures 1 & 2 preliminary result: No growth to date  -XR images reviewed, impression noted above  -Arterial duplex reviewed, impression noted above  -Vascular consulted; appreciate recs  -Wound culture preliminary results: No WBCs or organisms seen  -Continue IV antibiotics per primary team: Vancomycin, clindamycin, Merrem  -Left lower extremity dressed with Betadine soaked gauze, DSD, and tape  -Prevalon boots reapplied.  Patient is to wear at all times while in bed to prevent further deep tissue injury.  -Nonweightbearing to left lower extremity  -Weightbearing as tolerated to right lower extremity  -Lengthy discussion with patient regarding infection and the need for possible surgical intervention once medically stable    DISPO: Full-thickness ulceration with underlying osteomyelitis to the left fifth metatarsal.  Critical limb ischemia bilateral lower extremity. Labs and imaging reviewed. Vascular consulted; appreciate recs. Continue broad-spectrum IV antibiotics. Even with known osteomyelitis wound is stable at this time we will continue with local wound care. Patient will eventually require podiatric surgical intervention but timing will depend on medical stability and vascular recommendations. Patient was examined and evaluated at bedside with Dr. Sanjuanita Nageotte, DPM.    Austin Cedeno DPM   Podiatric Resident PGY1  Pager [phone] or [employer]  12/3/2021, 8:16 AM      Patient was seen and evaluated at bedside. Agree with residents assessment and treatment plan. Prolonged discussion with patient that healing of his left diabetic foot ulceration with osteomyelitis is unlikely due to his severe peripheral vascular disease. Patient is a very complex and complicated patient with multiple serious medical comorbidities. As his infection stabilizes from his Shayla's gangrene will coordinate surgical intervention based on results of vascular surgery intervention. Unfortunately if patient is not able to be revascularized his salvage procedure is likely a major amputation BKA versus AKA due to osteomyelitis with severe PVD.   Sanjuanita Nageotte, DPM

## 2021-12-03 NOTE — PROGRESS NOTES
Urology Attending Progress Note      Subjective: Extubated yesterday, pain reported but is well controlled    Vitals:  BP (!) 127/56   Pulse 86   Temp 98.1 °F (36.7 °C) (Oral)   Resp 22   Ht 5' 11\" (1.803 m)   Wt 190 lb 7.6 oz (86.4 kg)   SpO2 96%   BMI 26.57 kg/m²   Temp  Av.8 °F (36.6 °C)  Min: 97.5 °F (36.4 °C)  Max: 98.1 °F (36.7 °C)    Intake/Output Summary (Last 24 hours) at 12/3/2021 1040  Last data filed at 12/3/2021 0582  Gross per 24 hour   Intake 2771.32 ml   Output 1500 ml   Net 1271.32 ml       Exam: Exam appearing stable from yesterday. No signs of necrotic tissue seen in scrotum, wound egdes appear healthy. Walsh in place draining clear. Labs:  WBC:    Lab Results   Component Value Date    WBC 16.3 2021     Hemoglobin/Hematocrit:    Lab Results   Component Value Date    HGB 9.2 2021    HCT 28.2 2021     BMP:    Lab Results   Component Value Date     2021    K 3.8 2021     2021    CO2 24 2021    BUN 34 2021    LABALBU 1.8 2021    CREATININE 0.7 2021    CALCIUM 7.3 2021    GFRAA >60 2021    LABGLOM >60 2021     PT/INR:    Lab Results   Component Value Date    PROTIME 12.5 2021    INR 1.10 2021     PTT:  No results found for: APTT[APTT    Impression/Plan: 62 yo M POD2 sp wide excision of perineum, back and abdominal wall for necrotizing fascitis.      -Extubated, remains in ICU  -Incisions CDI, dressing in place. Agree with BID dressing changes and broad spectrum abx. Exam appears stable from yesterday in scrotal area  -Continue walsh catheter  -Patient to go to OR today for takeback debridement.  Please notify us for assistance if needed and call with any questions    JOSS Wilkes

## 2021-12-03 NOTE — PROGRESS NOTES
----- Message from Angelique Nguyễn MD sent at 8/18/2020  7:36 PM CDT -----  Please mail results with low-cholesterol diet sheet     ICU Progress Note    Admit Date: 12/1/2021  Day: 2  Vent Day: 2  IV Access:Peripheral, PICC, A line  IV Fluids:LR, insulin, propofol, fentanyl  Vasopressors:None                Antibiotics: None  Diet: Diet NPO    CC: Westley gangrene    Interval history: Patient was extubated yesterday without complication. No acute events overnight. Patient seen and examined at been side. Patient reports pain in his left side of the abdomen, but overall pain well controled. Patient denies shortness of breath, chest pain, chills, nausea, vomiting He denies urinary frequency, dysuria. Patient is hemodynamically stable and afebrile He remains on Enoxaparin sc        HPI: Rose Mary Romero a 63 y. o. male with PMH as below notable for T2DM who presented with groin swelling. Patient reported that 2 weeks ago he notice a abscess near his anus, however reported in the last 2 days the area has increasing in size and warmth and extended to his scrotum. He reported poor compliance with his diabetes medication (metformin).      On admission the patient was hypotensive, WBC 18, Bicarb 20, Anion gap 22, Glucose 679, elevated Bhydroxybutirate. General surgery, urology and podiatry were consulted. Vancomycin was started, 3 L of IV fluids were given and the patient was placed in DKA protocol. Anion gap closed twice. The patient was taken for surgery debridement, was kept intubated due to possible multiple surgery interventions. Patient was transferred to the [facility] for colorectal and plastic surgery management.      Patient was admitted to the ICU for further workup and management of westley gangrene.     Medications:     Scheduled Meds:   sodium phosphate IVPB  30 mmol IntraVENous Once    enoxaparin  40 mg SubCUTAneous Daily    sodium chloride flush  5-40 mL IntraVENous 2 times per day    pantoprazole  40 mg IntraVENous Daily    meropenem  1,000 mg IntraVENous Q8H    clindamycin (CLEOCIN) IV  600 mg IntraVENous Q8H    vancomycin  1,250 mg IntraVENous Q18H     Continuous Infusions:   lactated ringers 125 mL/hr at 12/03/21 0700    sodium chloride      sodium chloride      sodium chloride Stopped (12/01/21 2027)    norepinephrine Stopped (12/01/21 1958)    fentaNYL 12.5 mcg/hr (12/03/21 0700)    dextrose      insulin (HUMAN R) non-weight based infusion 0.44 Units/hr (12/03/21 0812)     PRN Meds:sodium chloride, sodium chloride, sodium chloride flush, sodium chloride, acetaminophen **OR** acetaminophen, glucose, dextrose, glucagon (rDNA), dextrose    Objective:   Vitals:   T-max:  Patient Vitals for the past 8 hrs:   BP Temp Temp src Pulse Resp SpO2 Weight   12/03/21 0959 -- -- -- -- -- 93 % --   12/03/21 0800 123/61 98.1 °F (36.7 °C) Oral 87 17 93 % --   12/03/21 0700 116/64 -- -- 86 20 91 % --   12/03/21 0600 (!) 103/53 -- -- 85 21 92 % 190 lb 7.6 oz (86.4 kg)   12/03/21 0500 104/62 -- -- 85 21 91 % --   12/03/21 0400 (!) 110/58 97.8 °F (36.6 °C) Oral 86 17 93 % --   12/03/21 0300 (!) 99/59 -- -- 83 19 91 % --       Intake/Output Summary (Last 24 hours) at 12/3/2021 1001  Last data filed at 12/3/2021 8868  Gross per 24 hour   Intake 2771.32 ml   Output 1500 ml   Net 1271.32 ml       Review of Systems  A 10 point review of systems was conducted, significant findings as noted in HPI. Physical Exam  Constitutional:       Appearance: He is ill-appearing. HENT:      Head: Normocephalic and atraumatic. Mouth/Throat:      Mouth: Mucous membranes are moist.   Eyes:      Extraocular Movements: Extraocular movements intact. Conjunctiva/sclera: Conjunctivae normal.      Pupils: Pupils are equal, round, and reactive to light. Cardiovascular:      Rate and Rhythm: Normal rate and regular rhythm. Pulses: Normal pulses. Heart sounds: Normal heart sounds. Pulmonary:      Effort: Pulmonary effort is normal.      Breath sounds: Normal breath sounds. Abdominal:      General: Abdomen is flat.  Bowel sounds are normal.      Palpations: Abdomen is soft. Tenderness: There is abdominal tenderness (Left side abdomen at mild palpation). Musculoskeletal:         General: Normal range of motion. Cervical back: Normal range of motion. Feet:      Comments: Full-thickness ulceration noted to the lateral aspect of the left foot. Wound measures approximately 3.2 cm x 2 cm x 0.5 cm. Of note the fifth metatarsal is exposed. Skin:     Coloration: Skin is pale. Comments: Necrotizing fasciitis of the abdominal wall, gluteal region, and scrotum. Abdominal wall wound measuring 60 cm x 23 cm x 3cm and gluteal wound measures 20 cm x 15cm      Neurological:      General: No focal deficit present. Mental Status: He is alert and oriented to person, place, and time. LABS:    CBC:   Recent Labs     12/02/21  0400 12/02/21  0400 12/02/21  1313 12/03/21  0310 12/03/21  0542   WBC 14.9*  --   --  17.1* 16.3*   HGB 7.2*   < > 9.0* 8.9* 9.2*   HCT 22.1*   < > 27.1* 27.5* 28.2*     --   --  181 204   MCV 89.4  --   --  90.7 90.9    < > = values in this interval not displayed. Renal:    Recent Labs     12/01/21  0416 12/01/21  0830 12/01/21  0830 12/01/21  1824 12/02/21  0400 12/03/21  0310 12/03/21  0542   NA   < > 139   < > 136 140 139 142   K   < > 4.7   < > 5.2* 4.5 4.1 3.8   CL   < > 110   < > 104 112* 110 112*   CO2   < > 22   < > 20* 20* 23 24   BUN   < > 50*   < > 53* 50* 36* 34*   CREATININE   < > 0.8   < > 1.0 0.9 0.7* 0.7*   GLUCOSE   < > 155*   < > 293* 111* 168* 122*   CALCIUM   < > 7.4*   < > 7.1* 7.2* 7.5* 7.3*   MG  --  2.00  --  2.00  --  2.00  --    PHOS  --  2.4*  --  3.2  --  1.7*  --    ANIONGAP   < > 7   < > 12 8 6 6    < > = values in this interval not displayed.      Hepatic:   Recent Labs     11/30/21  1522 12/01/21  1824 12/02/21  0400   AST 7* 13* 9*   ALT 11 8* 7*   BILITOT 0.9 0.5 0.4   BILIDIR  --  0.4* 0.3   PROT 6.4 4.9* 4.2*   LABALBU 2.7* 2.0* 1.8*   ALKPHOS 261* 153* 109     Troponin:   Recent Labs 11/30/21  2219   TROPONINI <0.01     BNP: No results for input(s): BNP in the last 72 hours. Lipids: No results for input(s): CHOL, HDL in the last 72 hours. Invalid input(s): LDLCALCU, TRIGLYCERIDE  ABGs:    Recent Labs     12/01/21  1839 12/02/21  0943 12/02/21  1305   PHART 7.306* 7.369 7.372   GYB1VGI 39.7 40.4 38.7   PO2ART 110.8* 107.0 123.1*   KAA0QUY 19.8* 23.3 22.5   BEART -7* -2 -3   E2WTNLBG 98 98 99   RHR2PMP 21 25 24       INR:   Recent Labs     12/01/21  0416 12/01/21  1824   INR 1.08 1.10     Lactate:   Recent Labs     12/01/21  1839   LACTATE 0.77     Cultures:  -----------------------------------------------------------------  RAD:   VL DUP LOWER EXTREMITY ARTERIES BILATERAL               Assessment/Plan:   Bobetta Opitz a 63 y. o. male with PMH as below notable for T2DM who presented with groin swelling. Patient was admitted to the ICU for further workup and management of westley gangrene    Neuro/Sedation:  · Patient is alert and oriented    CV:   Hemodynamically stable and afebrile   SBP between 90-110s    Respiratory:    SpO2 90% on 3 L NC    ID:  Westley gangrene   Necrotizing fascitis of the abdominal wall, perineum and back. WBC improving.   -Continue Vanc, Merrem, Clindamycin  - mL/hr  -F/u BCxs and wound cxs  -Dressing changes BID  -Plan for debridement of abdominal wall and left buttock, placement of wound vac   -Colorectal surgery consulted   -Plastic surgery consulted       Renal:   Voiding catheter   I/Os: +1270/ 24 hours   UO 1500 (0.7 mL/Kg/hr)    GI:   Diet NPO   · Prophylaxis protonix 40 mg    Endocrine:  T2DM  Last A1c 13.5(11/30/2021),. BG levels <140  -Insulin drip    -hypoglicemia protocol   -f/u BG levels    Hematology/Oncology:  Hb 9.2, 1 unit of RBC transfused. -Monitor H/H q12h    Code Status: Full Code  FEN: Diet NPO  PPX: SCDs.  protonix  DISPO: ICU    Inge Craft MD, PGY-1  12/03/21  10:01 AM    This patient will be staffed and discussed with Shauna Munoz MD.    Patient was seen, examined and discussed with Dr. Meek Strauss. I agree with the interval history. My physical exam confirms the findings listed below  Chart was reviewed including labs and medical records confirm the findings noted    Acute respiratory failure - extubated on 12/2  Necrotizing fasciitis   DKA - resolved.     Leukocytosis still high but better than the day of admission   Osteomyelitis   DM - not compliant      D/c clinda  Continue merrem and vanc  F/u on culture   IVF

## 2021-12-04 ENCOUNTER — ANESTHESIA EVENT (OUTPATIENT)
Dept: OPERATING ROOM | Age: 66
DRG: 853 | End: 2021-12-04

## 2021-12-04 LAB
ANION GAP SERPL CALCULATED.3IONS-SCNC: 8 MMOL/L (ref 3–16)
ATYPICAL LYMPHOCYTE RELATIVE PERCENT: 1 % (ref 0–6)
BASE EXCESS ARTERIAL: 3.3 MMOL/L (ref -3–3)
BASOPHILS ABSOLUTE: 0 K/UL (ref 0–0.2)
BASOPHILS RELATIVE PERCENT: 0 %
BLOOD CULTURE, ROUTINE: NORMAL
BUN BLDV-MCNC: 18 MG/DL (ref 7–20)
CALCIUM SERPL-MCNC: 7 MG/DL (ref 8.3–10.6)
CARBOXYHEMOGLOBIN ARTERIAL: 1.3 % (ref 0–1.5)
CHLORIDE BLD-SCNC: 110 MMOL/L (ref 99–110)
CO2: 23 MMOL/L (ref 21–32)
CREAT SERPL-MCNC: <0.5 MG/DL (ref 0.8–1.3)
CULTURE, BLOOD 2: NORMAL
EOSINOPHILS ABSOLUTE: 0 K/UL (ref 0–0.6)
EOSINOPHILS RELATIVE PERCENT: 0 %
GFR AFRICAN AMERICAN: >60
GFR NON-AFRICAN AMERICAN: >60
GLUCOSE BLD-MCNC: 104 MG/DL (ref 70–99)
GLUCOSE BLD-MCNC: 110 MG/DL (ref 70–99)
GLUCOSE BLD-MCNC: 115 MG/DL (ref 70–99)
GLUCOSE BLD-MCNC: 115 MG/DL (ref 70–99)
GLUCOSE BLD-MCNC: 119 MG/DL (ref 70–99)
GLUCOSE BLD-MCNC: 122 MG/DL (ref 70–99)
GLUCOSE BLD-MCNC: 123 MG/DL (ref 70–99)
GLUCOSE BLD-MCNC: 125 MG/DL (ref 70–99)
GLUCOSE BLD-MCNC: 128 MG/DL (ref 70–99)
GLUCOSE BLD-MCNC: 129 MG/DL (ref 70–99)
GLUCOSE BLD-MCNC: 131 MG/DL (ref 70–99)
GLUCOSE BLD-MCNC: 135 MG/DL (ref 70–99)
GLUCOSE BLD-MCNC: 135 MG/DL (ref 70–99)
GLUCOSE BLD-MCNC: 137 MG/DL (ref 70–99)
GLUCOSE BLD-MCNC: 137 MG/DL (ref 70–99)
GLUCOSE BLD-MCNC: 138 MG/DL (ref 70–99)
GLUCOSE BLD-MCNC: 142 MG/DL (ref 70–99)
GLUCOSE BLD-MCNC: 144 MG/DL (ref 70–99)
GLUCOSE BLD-MCNC: 146 MG/DL (ref 70–99)
GLUCOSE BLD-MCNC: 147 MG/DL (ref 70–99)
GLUCOSE BLD-MCNC: 148 MG/DL (ref 70–99)
GLUCOSE BLD-MCNC: 162 MG/DL (ref 70–99)
GLUCOSE BLD-MCNC: 166 MG/DL (ref 70–99)
GLUCOSE BLD-MCNC: 36 MG/DL (ref 70–99)
GLUCOSE BLD-MCNC: 93 MG/DL (ref 70–99)
GLUCOSE BLD-MCNC: 99 MG/DL (ref 70–99)
HCO3 ARTERIAL: 28 MMOL/L (ref 21–29)
HCT VFR BLD CALC: 25.1 % (ref 40.5–52.5)
HEMOGLOBIN, ART, EXTENDED: 9.6 G/DL
HEMOGLOBIN: 8 G/DL (ref 13.5–17.5)
LYMPHOCYTES ABSOLUTE: 0.9 K/UL (ref 1–5.1)
LYMPHOCYTES RELATIVE PERCENT: 5 %
MAGNESIUM: 1.7 MG/DL (ref 1.8–2.4)
MCH RBC QN AUTO: 30.4 PG (ref 26–34)
MCHC RBC AUTO-ENTMCNC: 31.7 G/DL (ref 31–36)
MCV RBC AUTO: 96 FL (ref 80–100)
METAMYELOCYTES RELATIVE PERCENT: 2 %
METHEMOGLOBIN ARTERIAL: 0 % (ref 0–1.4)
MONOCYTES ABSOLUTE: 0.3 K/UL (ref 0–1.3)
MONOCYTES RELATIVE PERCENT: 2 %
MYELOCYTE PERCENT: 2 %
NEUTROPHILS ABSOLUTE: 14.5 K/UL (ref 1.7–7.7)
NEUTROPHILS RELATIVE PERCENT: 86 %
O2 SAT, ARTERIAL: 95 % (ref 93–100)
PCO2 ARTERIAL: 40.3 MMHG (ref 35–45)
PDW BLD-RTO: 15.5 % (ref 12.4–15.4)
PERFORMED ON: ABNORMAL
PERFORMED ON: NORMAL
PERFORMED ON: NORMAL
PH ARTERIAL: 7.44 (ref 7.35–7.45)
PLATELET # BLD: 181 K/UL (ref 135–450)
PLATELET SLIDE REVIEW: ADEQUATE
PMV BLD AUTO: 9 FL (ref 5–10.5)
PO2 ARTERIAL: 71.5 MMHG (ref 75–108)
POTASSIUM SERPL-SCNC: 3.5 MMOL/L (ref 3.5–5.1)
PROMYELOCYTES PERCENT: 2 %
RBC # BLD: 2.62 M/UL (ref 4.2–5.9)
RBC # BLD: NORMAL 10*6/UL
SODIUM BLD-SCNC: 141 MMOL/L (ref 136–145)
TCO2 ARTERIAL: 29 MMOL/L
VANCOMYCIN RANDOM: 24.6 UG/ML
WBC # BLD: 15.8 K/UL (ref 4–11)

## 2021-12-04 PROCEDURE — 2700000000 HC OXYGEN THERAPY PER DAY

## 2021-12-04 PROCEDURE — 99232 SBSQ HOSP IP/OBS MODERATE 35: CPT | Performed by: SURGERY

## 2021-12-04 PROCEDURE — 82803 BLOOD GASES ANY COMBINATION: CPT

## 2021-12-04 PROCEDURE — 2000000000 HC ICU R&B

## 2021-12-04 PROCEDURE — 2580000003 HC RX 258: Performed by: STUDENT IN AN ORGANIZED HEALTH CARE EDUCATION/TRAINING PROGRAM

## 2021-12-04 PROCEDURE — 80202 ASSAY OF VANCOMYCIN: CPT

## 2021-12-04 PROCEDURE — 99233 SBSQ HOSP IP/OBS HIGH 50: CPT | Performed by: INTERNAL MEDICINE

## 2021-12-04 PROCEDURE — 94761 N-INVAS EAR/PLS OXIMETRY MLT: CPT

## 2021-12-04 PROCEDURE — 2580000003 HC RX 258

## 2021-12-04 PROCEDURE — 80048 BASIC METABOLIC PNL TOTAL CA: CPT

## 2021-12-04 PROCEDURE — 6360000002 HC RX W HCPCS

## 2021-12-04 PROCEDURE — 36415 COLL VENOUS BLD VENIPUNCTURE: CPT

## 2021-12-04 PROCEDURE — 6360000002 HC RX W HCPCS: Performed by: STUDENT IN AN ORGANIZED HEALTH CARE EDUCATION/TRAINING PROGRAM

## 2021-12-04 PROCEDURE — 85025 COMPLETE CBC W/AUTO DIFF WBC: CPT

## 2021-12-04 PROCEDURE — 83735 ASSAY OF MAGNESIUM: CPT

## 2021-12-04 PROCEDURE — 36592 COLLECT BLOOD FROM PICC: CPT

## 2021-12-04 RX ORDER — POTASSIUM CHLORIDE 7.45 MG/ML
10 INJECTION INTRAVENOUS
Status: DISCONTINUED | OUTPATIENT
Start: 2021-12-04 | End: 2021-12-04

## 2021-12-04 RX ORDER — POTASSIUM CHLORIDE 29.8 MG/ML
20 INJECTION INTRAVENOUS
Status: COMPLETED | OUTPATIENT
Start: 2021-12-04 | End: 2021-12-04

## 2021-12-04 RX ORDER — MAGNESIUM SULFATE IN WATER 40 MG/ML
2000 INJECTION, SOLUTION INTRAVENOUS ONCE
Status: COMPLETED | OUTPATIENT
Start: 2021-12-04 | End: 2021-12-04

## 2021-12-04 RX ADMIN — MEROPENEM 1000 MG: 1 INJECTION, POWDER, FOR SOLUTION INTRAVENOUS at 02:06

## 2021-12-04 RX ADMIN — POTASSIUM CHLORIDE 20 MEQ: 29.8 INJECTION INTRAVENOUS at 13:03

## 2021-12-04 RX ADMIN — ENOXAPARIN SODIUM 40 MG: 100 INJECTION SUBCUTANEOUS at 09:34

## 2021-12-04 RX ADMIN — POTASSIUM CHLORIDE 20 MEQ: 29.8 INJECTION INTRAVENOUS at 11:48

## 2021-12-04 RX ADMIN — MEROPENEM 1000 MG: 1 INJECTION, POWDER, FOR SOLUTION INTRAVENOUS at 09:35

## 2021-12-04 RX ADMIN — VANCOMYCIN HYDROCHLORIDE 1250 MG: 10 INJECTION, POWDER, LYOPHILIZED, FOR SOLUTION INTRAVENOUS at 16:04

## 2021-12-04 RX ADMIN — SODIUM CHLORIDE, POTASSIUM CHLORIDE, SODIUM LACTATE AND CALCIUM CHLORIDE: 600; 310; 30; 20 INJECTION, SOLUTION INTRAVENOUS at 20:19

## 2021-12-04 RX ADMIN — SODIUM CHLORIDE, POTASSIUM CHLORIDE, SODIUM LACTATE AND CALCIUM CHLORIDE: 600; 310; 30; 20 INJECTION, SOLUTION INTRAVENOUS at 11:23

## 2021-12-04 RX ADMIN — VANCOMYCIN HYDROCHLORIDE 1250 MG: 10 INJECTION, POWDER, LYOPHILIZED, FOR SOLUTION INTRAVENOUS at 03:59

## 2021-12-04 RX ADMIN — MEROPENEM 1000 MG: 1 INJECTION, POWDER, FOR SOLUTION INTRAVENOUS at 17:54

## 2021-12-04 RX ADMIN — SODIUM CHLORIDE, PRESERVATIVE FREE 10 ML: 5 INJECTION INTRAVENOUS at 21:03

## 2021-12-04 RX ADMIN — MAGNESIUM SULFATE HEPTAHYDRATE 2000 MG: 2 INJECTION, SOLUTION INTRAVENOUS at 10:07

## 2021-12-04 ASSESSMENT — PAIN SCALES - GENERAL
PAINLEVEL_OUTOF10: 0

## 2021-12-04 ASSESSMENT — ENCOUNTER SYMPTOMS: ABDOMINAL PAIN: 0

## 2021-12-04 NOTE — PLAN OF CARE
Problem: Activity:  Goal: Ability to return to normal activity level will improve  Description: Ability to return to normal activity level will improve  12/3/2021 2014 by Kitty Rodriguez RN  Outcome: Ongoing     Problem: Bowel/Gastric:  Goal: Gastrointestinal status for postoperative course will improve  Description: Gastrointestinal status for postoperative course will improve  Outcome: Ongoing     Problem: Health Behavior:  Goal: Identification of resources available to assist in meeting health care needs will improve  Description: Identification of resources available to assist in meeting health care needs will improve  Outcome: Ongoing     Problem: Respiratory:  Goal: Ability to achieve and maintain a regular respiratory rate will improve  Description: Ability to achieve and maintain a regular respiratory rate will improve  Outcome: Ongoing     Problem: Safety:  Goal: Ability to remain free from injury will improve  Description: Ability to remain free from injury will improve  Outcome: Ongoing     Problem: Sensory:  Goal: General experience of comfort will improve  Description: General experience of comfort will improve  Outcome: Ongoing     Problem: Skin Integrity:  Goal: Demonstration of wound healing without infection will improve  Description: Demonstration of wound healing without infection will improve  Outcome: Ongoing  Note: Pt at risk for skin breakdown. See Reese score. Pt remains on bedrest. Unable to reposition self in bed. Heels elevated off bed. Sacral heart mepilex intact to protect,  site inspected and intact underneath. Will continue to turn and reposition patient every two hours and as needed. Will continue to keep patient clean and dry, applying skin care cream as needed. Pillows used for repositioning q2hs. Will continue to monitor and assess for skin breakdown.      Problem: Skin Integrity:  Goal: Will show no infection signs and symptoms  Description: Will show no infection signs and symptoms  12/3/2021 2014 by Jesica Parmar RN  Outcome: Ongoing     Problem: Skin Integrity:  Goal: Absence of new skin breakdown  Description: Absence of new skin breakdown  Outcome: Ongoing     Problem: Infection - Surgical Site:  Goal: Will show no infection signs and symptoms  Description: Will show no infection signs and symptoms  12/3/2021 2014 by Jesica Parmar RN  Outcome: Ongoing     Problem: Falls - Risk of:  Goal: Will remain free from falls  Description: Will remain free from falls  12/3/2021 2014 by Jesica Parmar RN  Outcome: Ongoing  Note: Pt is a fall risk. Fall risk protocol in place. See [facility] Bill Fall Score. Pt bed is in low position, bed alarm is on, side rails up, fall risk bracelet applied. , non-skid footwear in use. Patient/family educated on fall risk protocol, instructed to call for assistance when needed and belongings are in reach. assistance. Will continue with hourly rounds for po intake, pain needs, toileting and repositioning as needed. Will continue to monitor for needs.      Problem: Falls - Risk of:  Goal: Absence of physical injury  Description: Absence of physical injury  Outcome: Ongoing     Problem: ABCDS Injury Assessment  Goal: Absence of physical injury  Outcome: Ongoing     Problem: Nutrition  Goal: Optimal nutrition therapy  Outcome: Ongoing

## 2021-12-04 NOTE — PROGRESS NOTES
63yo M with necrotizing fascitis s/p extensive debridement 12/1 and take back 12/3. Left testis now in thigh pouch. No complaints this AM.  Plan for Plastic Surgery to take back to OR tomorrow AM for EUA, possible debridement, possible wound vac placement. Please call if any Urologic assistance needed.

## 2021-12-04 NOTE — PROGRESS NOTES
ICU SURGERY DAILY PROGRESS NOTE    CC: Shayla's gangrene  Procedure: Wide excision perineum, back, abdominal wall    SUBJECTIVE:   Interval Hx:  Remains extubated, AF, HDS, off pressors. Pain well controlled. ROS: A 10 point review of systems was conducted, significant findings as noted above. All other systems negative. OBJECTIVE:   Vitals:   Vitals:    12/04/21 0400 12/04/21 0500 12/04/21 0600 12/04/21 0700   BP: 136/68 135/64 (!) 147/72 135/67   Pulse: 84 81 83 84   Resp: 16 19 18 19   Temp: 98.1 °F (36.7 °C)      TempSrc: Oral      SpO2: 94% 95% 95% 94%   Weight:   203 lb 0.7 oz (92.1 kg)    Height:           I/O:     Intake/Output Summary (Last 24 hours) at 12/4/2021 0732  Last data filed at 12/4/2021 0419  Gross per 24 hour   Intake 600.4 ml   Output 1450 ml   Net -849.6 ml     I/O last 3 completed shifts: In: 600.4 [I.V.:600.4]  Out: 1450 [Urine:1450]    Diet: Diet NPO      Physical Examination:   General appearance: alert, no acute distress, groomed appropriately   HENT: NC/AT, MMM & intact, no JVD, neck supple, trachea midline  Eyes: No scleral icterus, EOMI  Chest/Lungs: intubated, mechanical breath sounds present bilaterally  Cardiovascular: RRR  Abdomen: soft, abdominal binder in place with dressings intact over abdominal wound measuring 60 cm x 23 cm x 3cm and gluteal region 20 cm x 15 cm, minimal drainage, no signs of further necrotizing fasciitis requiring further debridement, no subcutaneous crepitus noted, skin edges with healthy granulation tissue with appropriate bleeding, non-malodorous   Extremities:  full-thickness ulceration on lateral aspect of L foot, no edema, no cyanosis, decreased sensation along entirety of bilateral feet. No DP/PT pulse appreciated with doppler.    : walsh in place with clear yellow urine, no signs of further necrotizing fasciitis requiring further debridement of L lower scrotum, no subcutaneous crepitus noted, skin edges with healthy granulation tissue with appropriate bleeding, non-malodorous   Neurologic: A&Ox3, decrease sensation to bilateral lower extremities. Labs:  CBC:   Recent Labs     12/03/21  0310 12/03/21  0310 12/03/21  0542 12/03/21  1835 12/04/21  0505   WBC 17.1*  --  16.3*  --  15.8*   HGB 8.9*   < > 9.2* 9.7* 8.0*   HCT 27.5*   < > 28.2* 29.8* 25.1*     --  204  --  181    < > = values in this interval not displayed. BMP:   Recent Labs     12/02/21  0400 12/03/21  0310 12/03/21  0542    139 142   K 4.5 4.1 3.8   * 110 112*   CO2 20* 23 24   BUN 50* 36* 34*   CREATININE 0.9 0.7* 0.7*   GLUCOSE 111* 168* 122*     LFT's:   Recent Labs     12/01/21  1824 12/02/21  0400   AST 13* 9*   ALT 8* 7*   BILITOT 0.5 0.4   ALKPHOS 153* 109     Troponin:   No results for input(s): TROPONINI in the last 72 hours. BNP: No results for input(s): BNP in the last 72 hours. ABGs:   Recent Labs     12/02/21  1305 12/04/21  0505   PHART 7.372 7.444   LKH6OIU 38.7 40.3   PO2ART 123.1* 71.5*     INR:   Recent Labs     12/01/21  1824   INR 1.10       U/A:No results for input(s): NITRITE, COLORU, PHUR, LABCAST, WBCUA, RBCUA, MUCUS, TRICHOMONAS, YEAST, BACTERIA, CLARITYU, SPECGRAV, LEUKOCYTESUR, UROBILINOGEN, BILIRUBINUR, BLOODU, GLUCOSEU, AMORPHOUS in the last 72 hours. Invalid input(s): Parvin Barber     Rad:   VL DUP LOWER EXTREMITY ARTERIES BILATERAL   Final Result          ASSESSMENT AND PLAN:   Itzel Sosa is a 63 y.o. male with Necrotizing fasciitis of the abdominal wall, gluteal region, and scrotum s/p wide excision of perineum, back, and abdominal wall. Abdominal wall wound measuring 60 cm x 23 cm x 3cm and gluteal wound measures 20 cm x 15cm (12/1), returned to the OR for further debridement or right abdominal wall, and placement of testicle within a thigh flap (12/3).     - Patient returning to the OR with plastic surgery tomorrow for EUA and possible further debridement and possible wound vac placement  - will continue three times daily dressing changing WTD  - Continue with aggressive glucose control  - Family (wife) updated on condition.  - General surgery planning for diverting ostomy on tuesday    Shay Cotton MD, PGY-1  12/04/21 7:32 AM  Pager: [phone]    I have personally performed the medical history, physical exam and medical decision making and agree with all pertinent clinical information unless otherwise noted.      Angelica Nation MD  Surgery Attending

## 2021-12-04 NOTE — PROGRESS NOTES
Podiatric Surgery Daily Progress Note      Admit Date: 12/1/2021      Code:Full Code    Patient seen and examined, labs and records reviewed    Subjective:     Patient seen at bedside this a.m. Patient is awake and alert. Patient denies pain to bilateral lower extremity. Patient denies fever, chills, shortness of breath, chest pain. Patient denies any acute events overnight. Review of Systems: As above       Objective     /63   Pulse 85   Temp 98.2 °F (36.8 °C) (Oral)   Resp 19   Ht 5' 11\" (1.803 m)   Wt 203 lb 0.7 oz (92.1 kg)   SpO2 95%   BMI 28.32 kg/m²      I/O:    Intake/Output Summary (Last 24 hours) at 12/4/2021 0911  Last data filed at 12/4/2021 0419  Gross per 24 hour   Intake 600 ml   Output 1450 ml   Net -850 ml              Wt Readings from Last 3 Encounters:   12/04/21 203 lb 0.7 oz (92.1 kg)   11/30/21 163 lb 3.2 oz (74 kg)   04/18/16 179 lb 14.3 oz (81.6 kg)       LABS:    Recent Labs     12/03/21  0542 12/03/21  0542 12/03/21  1835 12/04/21  0505   WBC 16.3*  --   --  15.8*   HGB 9.2*   < > 9.7* 8.0*   HCT 28.2*   < > 29.8* 25.1*     --   --  181    < > = values in this interval not displayed. Recent Labs     12/03/21  0310 12/03/21  0310 12/03/21  0542      < > 142   K 4.1   < > 3.8      < > 112*   CO2 23   < > 24   PHOS 1.7*  --   --    BUN 36*   < > 34*   CREATININE 0.7*   < > 0.7*    < > = values in this interval not displayed. Recent Labs     12/01/21  1824 12/02/21  0400   PROT 4.9* 4.2*   INR 1.10  --        LOWER EXTREMITY EXAMINATION    Dressing to left LE intact. No strikethrough noted to the external dressing. No drainage noted to the internal layers of the dressing. VASCULAR: DP and PT pulses are non-palpable 0/4 b/l. Upon hand-held Doppler examination DP signals were noted to be monophasic and PT signals were noted to be nondopplerable. CFT is sluggish to the digits of the foot b/l.  Skin temperature is warm to cold from proximal to distal.  No edema noted. No pain with calf compression b/l.     NEUROLOGIC: Gross and epicritic sensation is diminished b/l. Protective sensation is diminished at all pedal sites b/l.     DERMATOLOGIC:   Left lower extremity:               Full-thickness ulceration noted to the lateral aspect of the left foot. Wound measures approximately 3.2 cm x 2 cm x 0.5 cm. Of note the fifth metatarsal is exposed. Periwound rubor noted, improving. No purulent drainage noted. No fluctuance or crepitus or malodor noted. Increased ischemic changes noted to left fifth digit, with significant duskiness. Rubor and ecchymosis overlying the dorsal aspect of the midfoot. No open wound noted. Right LE soft tissue envelope is closed without ulceration or acute signs of infection.     MUSCULOSKELETAL: Mild diffuse tenderness with palpation of the left foot. No obvious biomechanical abnormalities. IMAGING:  XR LEFT FOOT 11/30/2021  Narrative   EXAMINATION:   THREE XRAY VIEWS OF THE LEFT FOOT       11/30/2021 1:44 pm       COMPARISON:   None.       HISTORY:   ORDERING SYSTEM PROVIDED HISTORY: wound   TECHNOLOGIST PROVIDED HISTORY:   Reason for exam:->wound   Reason for Exam: blood sugar problem, wound check on lateral portion of foot   Acuity: Acute   Type of Exam: Initial       FINDINGS:   Osseous destruction along the articular margins of the 5th   metatarsophalangeal joint with associated lucency in the soft tissues. .   There is no evidence of fracture or dislocation. . The remaining joint spaces   appear well maintained.  The remaining soft tissues are unremarkable.           Impression   Evidence of a septic joint involving the 5th metatarsal phalangeal joint with   associated osteomyelitis         ARTERIAL DUPLEX 12/2/21     Type of Study:        Extremities Arteries:Lower Extremities Arterial Duplex, VL LOWER EXTREMITY    ARTERIES DUPLEX BILATERAL.       Tech Comments   Right   The right ankle/brachial index is 0.0 (no Doppler signal could be heard at the   [facility] or the PT). The common femoral and profunda femoral arteries could not be visualized due   to bandages extending into the groin area. The mid superficial femoral artery has a short segmental occlusion and then is   reconstituted. Elevated velocities at the distal superficial femoral artery indicate a > 50%   stenosis by velocity criteria (velocity went from 15 to 298 cm/s). The posterior tibial artery is occluded. The distal anterior tibial artery is barely patent, with very low flow   (possibly occluded and then reconstituted). Left   The left ankle brachial index is 0 for the PT and the DP was not accessible   due to the bandages on the foot. The common femoral and profunda femoral artery could not be visualized due to   bandages extending into the groin area. The distal superficial femoral artery is occluded and then reconstituted. The posterior tibial artery, peroneal, and anterior tibial artery are   occluded. There were no previous studies to use for comparison. ASSESSMENT/PLAN  ASSESSMENT/PLAN:   -Full-thickness ulceration; lateral left foot-Sands stage III  -Osteomyelitis of the fifth metatarsal; left foot  -Critical limb ischemia; bilateral lower extremity  -Diabetes mellitus, type II  -History of noncompliance    -Patient seen and examined at bedside this a.m.  -VSS on 2L NC. Leukocytosis noted (WBC 15.8)  -ESR 81 and .5  -HbA1c 13.5%  -Blood cultures 1 & 2 preliminary result: No growth to date  -XR images reviewed, impression noted above  -Arterial duplex reviewed, impression noted above  -Vascular surgery consulted; appreciate recommendations.  -Wound culture: Mixed skin pam  -Continue IV antibiotics per primary team: Vancomycin, Merrem  -Left lower extremity dressed with Betadine soaked gauze, DSD, and tape  -Prevalon boots reapplied.  Patient is to wear at all times while in bed to prevent further deep tissue injury.  -Nonweightbearing to left lower extremity  -Weightbearing as tolerated to right lower extremity  -Lengthy discussion with patient regarding infection and the need for possible surgical intervention once medically stable    DISPO: Full-thickness ulceration with underlying osteomyelitis to the left fifth metatarsal.  Critical limb ischemia bilateral lower extremity. Vascular consulted; appreciate recs. Continue broad-spectrum IV antibiotics. Patient will eventually require podiatric surgical intervention but timing will depend on medical stability and vascular recommendations.     Patient was examined and evaluated at bedside with Dr. Jasmeet Cortez DPM.    Jolly Lowe  12/04/21  9:11 AM

## 2021-12-04 NOTE — PROGRESS NOTES
ICU SURGERY DAILY PROGRESS NOTE    CC: Shayla's gangrene  Procedure: Wide excision perineum, back, abdominal wall    SUBJECTIVE:   Interval Hx:  Remains extubated, AF, HDS, off pressors. Pain well controlled. ROS: A 10 point review of systems was conducted, significant findings as noted above. All other systems negative. OBJECTIVE:   Vitals:   Vitals:    12/04/21 0400 12/04/21 0500 12/04/21 0600 12/04/21 0700   BP: 136/68 135/64 (!) 147/72 135/67   Pulse: 84 81 83 84   Resp: 16 19 18 19   Temp: 98.1 °F (36.7 °C)      TempSrc: Oral      SpO2: 94% 95% 95% 94%   Weight:   203 lb 0.7 oz (92.1 kg)    Height:           I/O:     Intake/Output Summary (Last 24 hours) at 12/4/2021 0700  Last data filed at 12/4/2021 0419  Gross per 24 hour   Intake 600.4 ml   Output 1450 ml   Net -849.6 ml     I/O last 3 completed shifts:   In: 600.4 [I.V.:600.4]  Out: 1450 [Urine:1450]    Diet: Diet NPO      Physical Examination:   General appearance: intubated, sedated, moderate distress  HENT: NC/AT, MMM & intact, no JVD, neck supple, trachea midline  Eyes: No scleral icterus, EOMI  Chest/Lungs: intubated, mechanical breath sounds present bilaterally  Cardiovascular: RRR  Abdomen: soft, abdominal binder in place with dressings intact over abdominal wound measuring 60 cm x 23 cm x 3cm and gluteal region 20 cm x 15 cm, minimal drainage, no signs of further necrotizing fasciitis requiring further debridement, no subcutaneous crepitus noted, skin edges with healthy granulation tissue with appropriate bleeding, non-malodorous   Extremities:  full-thickness ulceration on lateral aspect of L foot, no edema, no cyanosis  : walsh in place with clear yellow urine, no signs of further necrotizing fasciitis requiring further debridement of L lower scrotum, no subcutaneous crepitus noted, skin edges with healthy granulation tissue with appropriate bleeding, non-malodorous   Neurologic: intubated, sedated     Labs:  CBC:   Recent Labs 12/03/21  0310 12/03/21  0310 12/03/21  0542 12/03/21  1835 12/04/21  0505   WBC 17.1*  --  16.3*  --  15.8*   HGB 8.9*   < > 9.2* 9.7* 8.0*   HCT 27.5*   < > 28.2* 29.8* 25.1*     --  204  --  181    < > = values in this interval not displayed. BMP:   Recent Labs     12/02/21  0400 12/03/21  0310 12/03/21  0542    139 142   K 4.5 4.1 3.8   * 110 112*   CO2 20* 23 24   BUN 50* 36* 34*   CREATININE 0.9 0.7* 0.7*   GLUCOSE 111* 168* 122*     LFT's:   Recent Labs     12/01/21  1824 12/02/21  0400   AST 13* 9*   ALT 8* 7*   BILITOT 0.5 0.4   ALKPHOS 153* 109     Troponin:   No results for input(s): TROPONINI in the last 72 hours. BNP: No results for input(s): BNP in the last 72 hours. ABGs:   Recent Labs     12/02/21  1305 12/04/21  0505   PHART 7.372 7.444   UPI5OUW 38.7 40.3   PO2ART 123.1* 71.5*     INR:   Recent Labs     12/01/21  1824   INR 1.10       U/A:No results for input(s): NITRITE, COLORU, PHUR, LABCAST, WBCUA, RBCUA, MUCUS, TRICHOMONAS, YEAST, BACTERIA, CLARITYU, SPECGRAV, LEUKOCYTESUR, UROBILINOGEN, BILIRUBINUR, BLOODU, GLUCOSEU, AMORPHOUS in the last 72 hours. Invalid input(s): María Batter     Rad:   VL DUP LOWER EXTREMITY ARTERIES BILATERAL   Final Result          ASSESSMENT AND PLAN:   Flavia iRvas is a 63 y.o. male with Necrotizing fasciitis of the abdominal wall, gluteal region, and scrotum s/p wide excision of perineum, back, and abdominal wall.   Abdominal wall wound measuring 60 cm x 23 cm x 3cm and gluteal wound measures 20 cm x 15cm (12/1)  POD 3    - Patient returning to the OR with plastic surgery tomorrow for EUA and possible further debridement and possible wound vac placement  - will continue three times daily dressing changing WTD  - Continue with aggressive glucose control  - Family (wife) updated on condition.  - General surgery planning for diverting ostomy on tuesday    Roro Pulliam MD, PGY-1  12/04/21 7:32 AM  Pager: [phone]    I saw and independently examined the patient today. I agree with the history of present illness, past medical/surgical histories, family history, social history, medication list and allergies as listed. The review of systems is as noted above. My physical exam confirms the findings listed above. Review of labs, pathology reports, radiology reports and medical records confirm the findings noted above. I edited the note where appropriate in italics, strikethrough font, or underline. Improve glucose control with drip - target 120. Ok from [facility] standpoint for PO intake as patient is hungry - but needs 0 carb diet. Continue with antibiotics. Plan for return to OR tomorrow for planned evaluation and possible likely vac application.     Barbara Noel MD  [address] [place] [address] [address] Reconstructive Surgery  [phone]  12/04/21

## 2021-12-04 NOTE — CONSULTS
Vascular Surgery   Resident Consult Note    Reason for Consult: Abnormal arterial duplex of lower extremity. History of Present Illness:   Celeste Brown a 63 y. o. male with history of T2DM and a chronic lower extremity wound presented to the [address] [place] [address] 11/29/2021 with a several day history of a scrotal wound infection.  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  He also was found to be in DKA and was treated with an insulin infusion.  He was admitted postoperatively to the ICU on a ventilator, peristently hypotensive The patient was transferred to [facility] for higher level of care and management by a multidisciplinary team including plastic surgery. The patient underwent further debridement upon transfer to [facility]. His lower extremity wound is being treated by podiatry. This lower extremity wound is chronic. The patient states that his wife has been treating it with neosporin and wrapping. The patient admits to being independent when ambulating at baseline. He states that he gets cramping pain on ambulation which is treated with rest. He also admits to rest pain. He states that his legs always feel numb and he experiences pins and needles all day. He states that he does not follow up with vascular surgery and has never had any intervention on his LE before. Smokes 1.5 packs per day for the last 40+ years. Past Medical History:    History reviewed. No pertinent past medical history.     Past Surgical History:        Procedure Laterality Date    ABDOMEN SURGERY N/A 12/1/2021    WIDE EXCISION PERINEUM, BACK, ABDOMINAL WALL performed by Laila Tyson MD at [facility] Left 12/3/2021    ABDOMINAL WALL AND LEFT BUTTOCK DEBRIDEMENT, WOUND VAC PLACEMENT performed by Robby Ruiz MD at [place] [address] N/A 11/30/2021    DEBRIDEMENT OF SCROTAL [place] GANGRENE performed by Mariaelena Worley MD at [address] N/A 11/30/2021    PERINEAL SOFT TISSUE EXCISIONAL DEBRIDEMENT performed by Prudence Wilder MD at [facility] [place] [facility]:  Patient has no known allergies. Medications:   Home Meds  No current facility-administered medications on file prior to encounter. Current Outpatient Medications on File Prior to Encounter   Medication Sig Dispense Refill    metFORMIN (GLUCOPHAGE) 500 MG tablet Take 500 mg by mouth daily (with breakfast)      meloxicam (MOBIC) 15 MG tablet Take 1 tablet by mouth daily 30 tablet 3       Current Meds  magnesium sulfate 2000 mg in 50 mL IVPB premix, Once  potassium chloride 20 mEq/50 mL IVPB (Central Line), Q1H  enoxaparin (LOVENOX) injection 40 mg, Daily  vancomycin (VANCOCIN) 1,250 mg in dextrose 5 % 250 mL IVPB, Q12H  lactated ringers infusion, Continuous  sodium chloride flush 0.9 % injection 5-40 mL, 2 times per day  sodium chloride flush 0.9 % injection 5-40 mL, PRN  0.9 % sodium chloride infusion, PRN  acetaminophen (TYLENOL) tablet 650 mg, Q6H PRN   Or  acetaminophen (TYLENOL) suppository 650 mg, Q6H PRN  norepinephrine (LEVOPHED) 16 mg in dextrose 5 % 250 mL infusion, Continuous  meropenem (MERREM) 1,000 mg in sodium chloride 0.9 % 100 mL IVPB (mini-bag), Q8H  fentaNYL (SUBLIMAZE) 1,000 mcg in sodium chloride 0.9% 100 mL infusion, Continuous  glucose (GLUTOSE) 40 % oral gel 15 g, PRN  dextrose 50 % IV solution, PRN  glucagon (rDNA) injection 1 mg, PRN  dextrose 5 % solution, PRN  insulin regular (HUMULIN R;NOVOLIN R) 100 Units in sodium chloride 0.9 % 100 mL infusion, Continuous        Family History:   History reviewed. No pertinent family history. Social History:   TOBACCO:   reports that he has been smoking. He has never used smokeless tobacco.  ETOH:   reports current alcohol use. DRUGS:   reports no history of drug use.     Review of Systems:   A 14 point review of systems was conducted, significant 4.1 3.8 3.5      < > 110 112* 110   CO2 20*   < > 23 24 23   PHOS 3.2  --  1.7*  --   --    BUN 53*   < > 36* 34* 18   CREATININE 1.0   < > 0.7* 0.7* <0.5*    < > = values in this interval not displayed. PT/INR:   Recent Labs     12/01/21  1824   PROTIME 12.5   INR 1.10     APTT: No results for input(s): APTT in the last 72 hours. Liver Profile:   Lab Results   Component Value Date    AST 9 12/02/2021    ALT 7 12/02/2021    BILIDIR 0.3 12/02/2021    BILITOT 0.4 12/02/2021    ALKPHOS 109 12/02/2021   No results found for: CHOL, HDL, TRIG  UA: No results found for: NITRITE, COLORU, PHUR, LABCAST, WBCUA, RBCUA, MUCUS, TRICHOMONAS, YEAST, BACTERIA, CLARITYU, SPECGRAV, LEUKOCYTESUR, UROBILINOGEN, BILIRUBINUR, BLOODU, GLUCOSEU, AMORPHOUS    Imaging:   VL DUP LOWER EXTREMITY ARTERIES BILATERAL   Final Result            Assessment/Plan: This is a 63 y.o. male with Hx of T2DM and a chronic lower extremity wound presented to the [address] [place] [address] 11/29/2021, the patient was found to have Necrotizing fasciitis of the abdominal wall, gluteal region, and scrotum s/p wide excision of perineum, back, and abdominal wall.  Abdominal wall wound measuring 60 cm x 23 cm x 3cm and gluteal wound measures 20 cm x 15cm (12/1), the vascular surgery team is being consulted because the patient was found to have an abnormal arterial lower extremity duplex with no doppler LE pulses. Vascular duplex shows severe bilateral arterial occlusive disease and JUAN JOSÉ's are unable to be calculated 2/2 to no doppler DP/PT signals bilaterally. - Given the acute nature of his current necrotizing fasciitis, will await further stabilization with the general surgery team and plastic surgery team before offering vascular intervention.  - Given presenting symptoms and duplex findings, the patient's LE findings are chronic in nature, no urgent need for vascular intervention at this time. - May consider an angiogram once the patient is better stabilized.   - Vascular surgery will continue to follow.      Parrish Yanez DO  12/04/21  9:46 AM

## 2021-12-04 NOTE — PROGRESS NOTES
ICU Progress Note    Admit Date: 12/1/2021  Day: 3  Extubated 12/2  IV Access:Peripheral, PICC, A line  IV Fluids:LR, insulin, propofol, fentanyl  Vasopressors:None                Antibiotics: None  Diet: ADULT DIET; Regular; 3 carb choices (45 gm/meal)  Diet NPO    CC: Westley gangrene    Interval history:   Patient seen at bedside. Alert and oriented x3. SpO2 96% on 2L NC   Reports some abdominal pain however functional with meds. Denies any N/P, chest pain, SOB  On fentanyl 12.5  Good urine output: 1.5 L in last 24 hours  Hb: 8.0. +leukocytosis 15.8  BCx x2: NGTD  Tissue Cx: Mixed anaerobic growth present  Wound Cx: Mixed skin pam   Plan for OR (12/5) debridement of abdominal wall and left buttock, placement of wound VAC per plastic surgery. HPI: Bobetta Opitz a 63 y. o. male with PMH as below notable for T2DM who presented with groin swelling. Patient reported that 2 weeks ago he notice a abscess near his anus, however reported in the last 2 days the area has increasing in size and warmth and extended to his scrotum. He reported poor compliance with his diabetes medication (metformin).      On admission the patient was hypotensive, WBC 18, Bicarb 20, Anion gap 22, Glucose 679, elevated Bhydroxybutirate. General surgery, urology and podiatry were consulted. Vancomycin was started, 3 L of IV fluids were given and the patient was placed in DKA protocol. Anion gap closed twice. The patient was taken for surgery debridement, was kept intubated due to possible multiple surgery interventions. Patient was transferred to the [facility] for colorectal and plastic surgery management.      Patient was admitted to the ICU for further workup and management of westley gangrene.     Medications:     Scheduled Meds:   magnesium sulfate  2,000 mg IntraVENous Once    potassium chloride  20 mEq IntraVENous Q1H    enoxaparin  40 mg SubCUTAneous Daily    vancomycin  1,250 mg IntraVENous Q12H    sodium chloride flush 5-40 mL IntraVENous 2 times per day    meropenem  1,000 mg IntraVENous Q8H     Continuous Infusions:   lactated ringers 125 mL/hr at 12/03/21 0700    sodium chloride Stopped (12/01/21 2027)    norepinephrine Stopped (12/01/21 1958)    fentaNYL 12.5 mcg/hr (12/03/21 1000)    dextrose      insulin (HUMAN R) non-weight based infusion 0.62 Units/hr (12/04/21 0902)     PRN Meds:sodium chloride flush, sodium chloride, acetaminophen **OR** acetaminophen, glucose, dextrose, glucagon (rDNA), dextrose    Objective:   Vitals:   T-max:  Patient Vitals for the past 8 hrs:   BP Temp Temp src Pulse Resp SpO2 Weight   12/04/21 0900 (!) 148/70 -- -- 90 17 94 % --   12/04/21 0800 130/63 98.2 °F (36.8 °C) Oral 85 19 95 % --   12/04/21 0700 135/67 -- -- 84 19 94 % --   12/04/21 0600 (!) 147/72 -- -- 83 18 95 % 203 lb 0.7 oz (92.1 kg)   12/04/21 0500 135/64 -- -- 81 19 95 % --   12/04/21 0400 136/68 98.1 °F (36.7 °C) Oral 84 16 94 % --   12/04/21 0300 130/69 -- -- 85 19 94 % --       Intake/Output Summary (Last 24 hours) at 12/4/2021 1008  Last data filed at 12/4/2021 0900  Gross per 24 hour   Intake 600 ml   Output 1305 ml   Net -705 ml       Review of Systems   Gastrointestinal: Negative for abdominal pain. A 10 point review of systems was conducted, significant findings as noted in HPI. Physical Exam  Constitutional:       Appearance: He is ill-appearing. HENT:      Head: Normocephalic and atraumatic. Mouth/Throat:      Mouth: Mucous membranes are dry. Pharynx: Oropharynx is clear. Eyes:      Extraocular Movements: Extraocular movements intact. Conjunctiva/sclera: Conjunctivae normal.      Pupils: Pupils are equal, round, and reactive to light. Cardiovascular:      Rate and Rhythm: Normal rate and regular rhythm. Pulses: Normal pulses. Heart sounds: Murmur (hyperdynamic flow murmur) heard. Pulmonary:      Effort: Pulmonary effort is normal.      Breath sounds: Normal breath sounds. Abdominal:      General: Abdomen is flat. Bowel sounds are normal.      Palpations: Abdomen is soft. Tenderness: There is abdominal tenderness (Left side abdomen at mild palpation). Musculoskeletal:         General: Normal range of motion. Cervical back: Normal range of motion. Right lower leg: No edema. Left lower leg: No edema. Feet:      Comments: Full-thickness ulceration noted to the lateral aspect of the left foot. Wound measures approximately 3.2 cm x 2 cm x 0.5 cm. Of note the fifth metatarsal is exposed. Skin:     General: Skin is warm and dry. Capillary Refill: Capillary refill takes 2 to 3 seconds. Coloration: Skin is pale. Comments: Necrotizing fasciitis of the abdominal wall, gluteal region, and scrotum. Abdominal wall wound measuring 60 cm x 23 cm x 3cm and gluteal wound measures 20 cm x 15cm      Neurological:      General: No focal deficit present. Mental Status: He is alert and oriented to person, place, and time. LABS:    CBC:   Recent Labs     12/03/21  0310 12/03/21  0310 12/03/21  0542 12/03/21  1835 12/04/21  0505   WBC 17.1*  --  16.3*  --  15.8*   HGB 8.9*   < > 9.2* 9.7* 8.0*   HCT 27.5*   < > 28.2* 29.8* 25.1*     --  204  --  181   MCV 90.7  --  90.9  --  96.0    < > = values in this interval not displayed. Renal:    Recent Labs     12/01/21  1824 12/02/21  0400 12/03/21  0310 12/03/21  0542 12/04/21  0900      < > 139 142 141   K 5.2*   < > 4.1 3.8 3.5      < > 110 112* 110   CO2 20*   < > 23 24 23   BUN 53*   < > 36* 34* 18   CREATININE 1.0   < > 0.7* 0.7* <0.5*   GLUCOSE 293*   < > 168* 122* 123*   CALCIUM 7.1*   < > 7.5* 7.3* 7.0*   MG 2.00  --  2.00  --  1.70*   PHOS 3.2  --  1.7*  --   --    ANIONGAP 12   < > 6 6 8    < > = values in this interval not displayed.      Hepatic:   Recent Labs     12/01/21  1824 12/02/21  0400   AST 13* 9*   ALT 8* 7*   BILITOT 0.5 0.4   BILIDIR 0.4* 0.3   PROT 4.9* 4.2* LABALBU 2.0* 1.8*   ALKPHOS 153* 109     Troponin:   No results for input(s): TROPONINI in the last 72 hours. BNP: No results for input(s): BNP in the last 72 hours. Lipids: No results for input(s): CHOL, HDL in the last 72 hours. Invalid input(s): LDLCALCU, TRIGLYCERIDE  ABGs:    Recent Labs     12/02/21  0943 12/02/21  1305 12/04/21  0505   PHART 7.369 7.372 7.444   OKA3BBA 40.4 38.7 40.3   PO2ART 107.0 123.1* 71.5*   SSI9NAN 23.3 22.5 28   BEART -2 -3 3.3*   H5AGTJKY 98 99 95   PLD3OEQ 25 24 29       INR:   Recent Labs     12/01/21  1824   INR 1.10     Lactate:   Recent Labs     12/01/21  1839   LACTATE 0.77     Cultures:  -----------------------------------------------------------------  RAD:   VL DUP LOWER EXTREMITY ARTERIES BILATERAL   Final Result            Assessment/Plan:   Rose Mary Hernandez is a 63 y. o. male with PMH as below notable for T2DM who presented with groin swelling. Patient was admitted to the ICU for further workup and management of shayla gangrene    Neuro/Sedation:  · Patient is alert and oriented    CV:   Hemodynamically stable and afebrile   SBP between 90-110s    Respiratory:    SpO2 96% on 2L NC    ID:  Shayla gangrene   Necrotizing fascitis of the abdominal wall, perineum and back. WBC improving. BCx x2: NGTD  Tissue Cx: Mixed anaerobic growth present  Wound Cx: Mixed skin pam  -Continue Vanc, Merrem  - mL/hr  -Dressing changes BID  -Plan for debridement of abdominal wall and left buttock, placement of wound vac   -Colorectal surgery consulted   -Plastic surgery consulted       Renal:   Voiding catheter   I/Os: +1.5 L/ 24 hours   UO 1500 (0.7 mL/Kg/hr)    GI:  ADULT DIET; Regular; 3 carb choices (45 gm/meal)   Diet NPO after midnight  · Prophylaxis protonix 40 mg    Endocrine:  T2DM  Last A1c 13.5(11/30/2021),.  BG levels <140  Blood glucose goal is less than 120 to aid with wound healing  -Insulin drip    -Hypoglycemia protocol -POCT    Hematology/Oncology:  Hb 9.2 -> 8.0, 1 unit of RBC transfused. -Monitor H/H q12h    Code Status: Full Code  FEN: ADULT DIET; Regular; 3 carb choices (45 gm/meal)  Diet NPO  PPX: SCDs. protonix  DISPO: ICU    Danielle Valentino MD, PGY-1  Internal medicine resident  12/04/21  10:08 AM    This patient will be staffed and discussed with Williams Cartwright MD.     Patient was seen, examined and discussed with Danielle Valentino MDI agree with the interval history. My physical exam confirms the findings listed below  Chart was reviewed including labs and medical records confirm the findings noted     Extubated on 12/2  Necrotizing fasciitis   DKA - resolved.     Leukocytosis   Osteomyelitis   DM - not compliant      Continue merrem and vanc  Strict blood sugar control 110 to 140  IVF  Discussed with plastic surgery

## 2021-12-04 NOTE — PLAN OF CARE
Problem: Activity:  Goal: Ability to return to normal activity level will improve  Description: Ability to return to normal activity level will improve  12/4/2021 0819 by Prieto Yoon RN  Outcome: Ongoing  12/3/2021 2014 by Moo Avilez RN  Outcome: Ongoing  12/3/2021 1850 by Kris Triana RN  Outcome: Ongoing     Problem:  Bowel/Gastric:  Goal: Gastrointestinal status for postoperative course will improve  Description: Gastrointestinal status for postoperative course will improve  12/4/2021 0819 by Prieto Yoon RN  Outcome: Ongoing  12/3/2021 2014 by Moo Avilez RN  Outcome: Ongoing     Problem: Health Behavior:  Goal: Identification of resources available to assist in meeting health care needs will improve  Description: Identification of resources available to assist in meeting health care needs will improve  12/4/2021 0819 by Prieto Yoon RN  Outcome: Ongoing  12/3/2021 2014 by Moo Avilez RN  Outcome: Ongoing     Problem: Physical Regulation:  Goal: Postoperative complications will be avoided or minimized  Description: Postoperative complications will be avoided or minimized  12/4/2021 0819 by Prieto Yoon RN  Outcome: Ongoing  12/3/2021 2014 by Moo Avilez RN  Outcome: Ongoing     Problem: Respiratory:  Goal: Ability to achieve and maintain a regular respiratory rate will improve  Description: Ability to achieve and maintain a regular respiratory rate will improve  12/4/2021 0819 by Prieto Yoon RN  Outcome: Ongoing  12/3/2021 2014 by Moo Avilez RN  Outcome: Ongoing     Problem: Safety:  Goal: Ability to remain free from injury will improve  Description: Ability to remain free from injury will improve  12/4/2021 0819 by Prieto Yoon RN  Outcome: Ongoing  12/3/2021 2014 by Moo Avilez RN  Outcome: Ongoing     Problem: Sensory:  Goal: General experience of comfort will improve  Description: General experience of comfort will improve  12/4/2021 0819 by Prieto Yoon RN  Outcome: Ongoing  12/3/2021 2014 by Dara Biggs RN  Outcome: Ongoing     Problem: Skin Integrity:  Goal: Demonstration of wound healing without infection will improve  Description: Demonstration of wound healing without infection will improve  12/4/2021 0819 by Terrance Mchugh RN  Outcome: Ongoing  12/3/2021 2014 by Dara Biggs RN  Outcome: Ongoing  Note: Pt at risk for skin breakdown. See Reese score. Pt remains on bedrest. Unable to reposition self in bed. Heels elevated off bed. Sacral heart mepilex intact to protect,  site inspected and intact underneath. Will continue to turn and reposition patient every two hours and as needed. Will continue to keep patient clean and dry, applying skin care cream as needed. Pillows used for repositioning q2hs. Will continue to monitor and assess for skin breakdown. Goal: Will show no infection signs and symptoms  Description: Will show no infection signs and symptoms  12/4/2021 0819 by Terrance Mchugh RN  Outcome: Ongoing  12/3/2021 2014 by Dara Biggs RN  Outcome: Ongoing  12/3/2021 1850 by Meghann Felix RN  Outcome: Ongoing  Goal: Absence of new skin breakdown  Description: Absence of new skin breakdown  12/4/2021 0819 by Terrance Mchugh RN  Outcome: Ongoing  12/3/2021 2014 by Dara Biggs RN  Outcome: Ongoing     Problem: Infection - Surgical Site:  Goal: Will show no infection signs and symptoms  Description: Will show no infection signs and symptoms  12/4/2021 0819 by Terrance Mchugh RN  Outcome: Ongoing  12/3/2021 2014 by Dara Biggs RN  Outcome: Ongoing  12/3/2021 1850 by Meghann Felix RN  Outcome: Ongoing     Problem: Falls - Risk of:  Goal: Will remain free from falls  Description: Will remain free from falls  12/4/2021 0819 by Terrance Mchugh RN  Outcome: Ongoing  12/3/2021 2014 by Dara Biggs RN  Outcome: Ongoing  Note: Pt is a fall risk. Fall risk protocol in place. [facility]. Pt bed is in low position, bed alarm is on, side rails up, fall risk bracelet applied. , non-skid footwear in use. Patient/family educated on fall risk protocol, instructed to call for assistance when needed and belongings are in reach. assistance. Will continue with hourly rounds for po intake, pain needs, toileting and repositioning as needed. Will continue to monitor for needs.   12/3/2021 1850 by Yadira Mariano RN  Outcome: Ongoing  Goal: Absence of physical injury  Description: Absence of physical injury  12/4/2021 0819 by Antonio Tello RN  Outcome: Ongoing  12/3/2021 2014 by Bertha Servin RN  Outcome: Ongoing     Problem: ABCDS Injury Assessment  Goal: Absence of physical injury  12/4/2021 0819 by Antonio Tello RN  Outcome: Ongoing  12/3/2021 2014 by Bertha Servin RN  Outcome: Ongoing     Problem: Nutrition  Goal: Optimal nutrition therapy  12/4/2021 0819 by Antonio Tello RN  Outcome: Ongoing  12/3/2021 2014 by Bertha Servin RN  Outcome: Ongoing

## 2021-12-04 NOTE — PROGRESS NOTES
Hospitalist Progress Note      PCP: No primary care provider on file. Date of Admission: 12/1/2021    Chief Complaint: [facility] Course: Patient was transferred from [facility] after admission for DKA and Shayla's gangrene/necrotizing fasciitis. Patient was intubated and underwent I&D. Pt was transferred to [facility] for further evaluation per colorectal/plastic surgery. Patient underwent wide excision of perineum/abdominal wall 12/01. Extubated 12/02. Plan for excisional debridement of abdomen, perineum and gluteal region today. On IV abx and insulin drip. Plan for possible further debridement and possible wound VAC placement tomorrow by plastic surgery with return to the OR for diverting ostomy on Tuesday by general surgery. Urology/surgery/plastic surgery following. Subjective: Patient on nasal cannula. Alert and oriented X3. Follows simple commands. States his lower abdominal area is sore. Denies any other complaints.     Medications:  Reviewed    Infusion Medications    lactated ringers 125 mL/hr at 12/03/21 0700    sodium chloride Stopped (12/01/21 2027)    norepinephrine Stopped (12/01/21 1958)    fentaNYL 12.5 mcg/hr (12/03/21 1000)    dextrose      insulin (HUMAN R) non-weight based infusion 0.77 Units/hr (12/04/21 0800)     Scheduled Medications    enoxaparin  40 mg SubCUTAneous Daily    vancomycin  1,250 mg IntraVENous Q12H    sodium chloride flush  5-40 mL IntraVENous 2 times per day    meropenem  1,000 mg IntraVENous Q8H     PRN Meds: sodium chloride flush, sodium chloride, acetaminophen **OR** acetaminophen, glucose, dextrose, glucagon (rDNA), dextrose      Intake/Output Summary (Last 24 hours) at 12/4/2021 0821  Last data filed at 12/4/2021 0419  Gross per 24 hour   Intake 600 ml   Output 1450 ml   Net -850 ml       Physical Exam Performed:    /67   Pulse 84   Temp 98.1 °F (36.7 °C) (Oral)   Resp 19   Ht 5' 11\" (1.803 m)   Wt 203 lb 0.7 oz (92.1 kg)   SpO2 94%   BMI 28.32 kg/m²     General appearance: No apparent distress, ill-appearing  HEENT: Pupils equal, round, and reactive to light. Conjunctivae/corneas clear. Neck: Supple, with full range of motion. No jugular venous distention. Trachea midline. Respiratory: On vent. Clear to auscultation, bilaterally   Cardiovascular: Regular rate and rhythm with normal S1/S2 without murmurs, rubs or gallops. Abdomen: In abdominal binder with dressing  Musculoskeletal: Left lower extremity in dressing. Pictures from podiatry note reviewed  Skin: Skin color, texture, turgor normal.  No rashes or lesions. Neurologic: Alert and oriented x3. Grossly nonfocal.  Capillary Refill: Brisk,< 3 seconds   Peripheral Pulses: +2 palpable, equal bilaterally       Labs:   Recent Labs     12/03/21  0310 12/03/21  0310 12/03/21  0542 12/03/21  1835 12/04/21  0505   WBC 17.1*  --  16.3*  --  15.8*   HGB 8.9*   < > 9.2* 9.7* 8.0*   HCT 27.5*   < > 28.2* 29.8* 25.1*     --  204  --  181    < > = values in this interval not displayed. Recent Labs     12/01/21  0830 12/01/21  0830 12/01/21  1824 12/01/21  1824 12/02/21  0400 12/03/21  0310 12/03/21  0542      < > 136   < > 140 139 142   K 4.7   < > 5.2*  --  4.5 4.1 3.8      < > 104   < > 112* 110 112*   CO2 22   < > 20*   < > 20* 23 24   BUN 50*   < > 53*   < > 50* 36* 34*   CREATININE 0.8   < > 1.0   < > 0.9 0.7* 0.7*   CALCIUM 7.4*   < > 7.1*   < > 7.2* 7.5* 7.3*   PHOS 2.4*  --  3.2  --   --  1.7*  --     < > = values in this interval not displayed. Recent Labs     12/01/21  1824 12/02/21  0400   AST 13* 9*   ALT 8* 7*   BILIDIR 0.4* 0.3   BILITOT 0.5 0.4   ALKPHOS 153* 109     Recent Labs     12/01/21  1824   INR 1.10     No results for input(s): CKTOTAL, TROPONINI in the last 72 hours.     Urinalysis:    No results found for: Jerrlyn Ades, BACTERIA, RBCUA, BLOODU, [address], [address]    Radiology:  VL DUP LOWER EXTREMITY ARTERIES BILATERAL Final Result              Assessment/Plan:  Acute respiratory failure:  Extubated 12/02  On nasal cannula  Wean oxygen, as tolerated to keep sats more than 90    Shayla's gangrene/necrotizing fasciitis:  Status post I&D 11/30 and wide excision of perineum/abdominal wall 12/01  Abdominal wall wound measuring 60 cm x 23 cm x 3cm and gluteal wound measures 20 cm x 15cm  12/01  S/p excisional debridement of abdomen and left buttock, wound VAC placement 12/03  Clindamycin discontinued. Continue IV Vanco and [place] for possible further debridement and possible wound VAC placement tomorrow by plastic surgery with return to the OR for diverting ostomy on Tuesday by general surgery  Surgery/plastic surgery/urology following    Acute postoperative blood loss anemia:  Likely secondary to I&D and wide excision  Status post 1u pRBC transfusion 12/2  Monitor hemoglobin, stable    DKA in the setting of diabetes mellitus type 2:  Patient noncompliant.   Holding home medication  DKA resolved  Monitor blood glucose, better  Continue insulin drip    Left fifth metatarsal diabetic foot ulceration and osteomyelitis:  Continue IV antibiotics  Arterial Doppler showed severe bilateral arterial occlusive disease  Vascular surgery recommended possible consideration of an angiogram once the patient is stabilized  Patient will eventually require podiatric surgical intervention pending medical stability  Continue wound care  Podiatry following, appreciate recommendation    DVT Prophylaxis: SCDs, on hold due to anemia and anticipation for procedure  Diet: Diet NPO  Code Status: Full Code    PT/OT Eval Status: Once able    Dispo -Pending clinical course    Yessy Vera MD

## 2021-12-04 NOTE — PROGRESS NOTES
PRE-OP NOTE  Department of Surgery      Chief Complaint or Reason for Surgery: Necrotizing fascitis of the abdominal wall and left buttock     Procedure: Debridement of abdominal wall and left buttock, placement of wound vac   Expected time: 12/5, 8am     Plan  1. Diet: NPO at midnight  2. IVF:   3. Antibiotics: Continue with scheduled antibiotics  4. Labs to be drawn: Type and Screen and INR are up to date, renal panel, magnesium and CBC ordered for the morning  5. Anesthesia: to see patient  6. Consent: Will obtain from the patient's wife and place in the patient's chart  7.  Pulmonary: CXR: Left basilar atelectasis     Beni Larkin DO  12/04/21  8:40 AM

## 2021-12-04 NOTE — PROGRESS NOTES
Clinical Pharmacy Progress Note    Vancomycin - Management by Pharmacy    Consult Date(s): 12/1/21  Consulting Provider(s): ALEXANDER Vizcarra     Assessment / Plan    Shayla Gangrene and foot osteomyelitis - Vancomycin   Concurrent Antimicrobials:   o Meropenem - day #4   Day of Vanc Therapy: #5   Current Dosing Method: Bayesian-Guided AUC Dosing   Therapeutic Goal: 400-600 mg/L*hr   Current Dose / Frequency: 1250 mg every 12 hours   Plan / Rationale:   o Random vancomycin level this morning was 24.6 mcg/mL (drawn ~5 hrs after the previous dose). o Predicted AUC is 562 mg/L*h with a steady state trough of 17.1 mcg/mL on this regimen.   o Will continue current dose. Plan to obtain another level in a few days to ensure vancomycin stays within goal therapeutic range.  Will continue to monitor clinical condition and make adjustments to regimen as appropriate. Thank you for consulting Pharmacy! Carol Mayers, WilfridD, [facility]  Wireless: [phone]  12/4/2021 10:53 AM       Interval Update: Patient underwent excisional debridement of the abdomen, perineum, and gluteal region yesterday. Plan to return to the OR tomorrow for possible further debridement and wound vac placement. Subjective/Objective: Mr. Gabrielle Allen is a 63 y.o. male  admitted for Necrotizing fasciitis . Pharmacy has been consulted to dose Vancomycin.     Height:   Ht Readings from Last 1 Encounters:   12/02/21 5' 11\" (1.803 m)     Weight:   Wt Readings from Last 1 Encounters:   12/04/21 203 lb 0.7 oz (92.1 kg)     Level(s) / Doses:    Date Time Dose Level / Type of Level Interpretation   12/3 14:11 1250 mg IV q18h Random = 12 mcg/mL · Drawn ~14 hrs after previous dose; predicted  mg/L*h  · Increase dose to 1.25g IV q12h   12/4 09:00 1250 mg IV q24h Random = 24.6 mcg/mL · Drawn ~5 hrs after previous dose; predicted  mg/L*h  · Continue dose   Note: Serum levels collected for AUC-based dosing may be high if collected in close proximity to the dose administered. This is not necessarily an indicator of toxicity. Cultures & Sensitivities:    Date Site Micro Susceptibility / Result   11/30 Blood x2 NGTD    11/30 COVID-19 rapid Negative    11/30 Scrotal tissue Mixed anaerobic growth    12/1 Foot wound Mixed skin pam    12/3 Perineum, fungus NGTD    12/3 Perineum, AFB In process    12/3 Perineum, anaerobic and aerobic NGTD      Labs / Ancillary Data:    CrCl cannot be calculated (This lab value cannot be used to calculate CrCl because it is not a number: <0.5).     Recent Labs     12/03/21  0310 12/03/21  0542 12/04/21  0505 12/04/21  0900   CREATININE 0.7* 0.7*  --  <0.5*   BUN 36* 34*  --  18   WBC 17.1* 16.3* 15.8*  --

## 2021-12-05 ENCOUNTER — ANESTHESIA (OUTPATIENT)
Dept: OPERATING ROOM | Age: 66
DRG: 853 | End: 2021-12-05

## 2021-12-05 VITALS — DIASTOLIC BLOOD PRESSURE: 65 MMHG | SYSTOLIC BLOOD PRESSURE: 105 MMHG | TEMPERATURE: 97 F | OXYGEN SATURATION: 100 %

## 2021-12-05 LAB
ANION GAP SERPL CALCULATED.3IONS-SCNC: 5 MMOL/L (ref 3–16)
BANDED NEUTROPHILS RELATIVE PERCENT: 5 % (ref 0–7)
BASOPHILS ABSOLUTE: 0 K/UL (ref 0–0.2)
BASOPHILS RELATIVE PERCENT: 0 %
BLOOD BANK DISPENSE STATUS: NORMAL
BLOOD BANK DISPENSE STATUS: NORMAL
BLOOD BANK PRODUCT CODE: NORMAL
BLOOD BANK PRODUCT CODE: NORMAL
BPU ID: NORMAL
BPU ID: NORMAL
BUN BLDV-MCNC: 19 MG/DL (ref 7–20)
CALCIUM SERPL-MCNC: 7.4 MG/DL (ref 8.3–10.6)
CHLORIDE BLD-SCNC: 111 MMOL/L (ref 99–110)
CO2: 24 MMOL/L (ref 21–32)
CREAT SERPL-MCNC: 0.6 MG/DL (ref 0.8–1.3)
DESCRIPTION BLOOD BANK: NORMAL
DESCRIPTION BLOOD BANK: NORMAL
EOSINOPHILS ABSOLUTE: 0.3 K/UL (ref 0–0.6)
EOSINOPHILS RELATIVE PERCENT: 1 %
GFR AFRICAN AMERICAN: >60
GFR NON-AFRICAN AMERICAN: >60
GLUCOSE BLD-MCNC: 101 MG/DL (ref 70–99)
GLUCOSE BLD-MCNC: 103 MG/DL (ref 70–99)
GLUCOSE BLD-MCNC: 106 MG/DL (ref 70–99)
GLUCOSE BLD-MCNC: 108 MG/DL (ref 70–99)
GLUCOSE BLD-MCNC: 112 MG/DL (ref 70–99)
GLUCOSE BLD-MCNC: 115 MG/DL (ref 70–99)
GLUCOSE BLD-MCNC: 115 MG/DL (ref 70–99)
GLUCOSE BLD-MCNC: 116 MG/DL (ref 70–99)
GLUCOSE BLD-MCNC: 121 MG/DL (ref 70–99)
GLUCOSE BLD-MCNC: 128 MG/DL (ref 70–99)
GLUCOSE BLD-MCNC: 138 MG/DL (ref 70–99)
GLUCOSE BLD-MCNC: 144 MG/DL (ref 70–99)
GLUCOSE BLD-MCNC: 153 MG/DL (ref 70–99)
GLUCOSE BLD-MCNC: 158 MG/DL (ref 70–99)
GLUCOSE BLD-MCNC: 167 MG/DL (ref 70–99)
GLUCOSE BLD-MCNC: 182 MG/DL (ref 70–99)
GLUCOSE BLD-MCNC: 209 MG/DL (ref 70–99)
GLUCOSE BLD-MCNC: 82 MG/DL (ref 70–99)
GLUCOSE BLD-MCNC: 83 MG/DL (ref 70–99)
GLUCOSE BLD-MCNC: 90 MG/DL (ref 70–99)
GLUCOSE BLD-MCNC: 98 MG/DL (ref 70–99)
GLUCOSE BLD-MCNC: 98 MG/DL (ref 70–99)
HCT VFR BLD CALC: 28 % (ref 40.5–52.5)
HEMOGLOBIN: 9.3 G/DL (ref 13.5–17.5)
LYMPHOCYTES ABSOLUTE: 0.6 K/UL (ref 1–5.1)
LYMPHOCYTES RELATIVE PERCENT: 2 %
MAGNESIUM: 2 MG/DL (ref 1.8–2.4)
MCH RBC QN AUTO: 30.1 PG (ref 26–34)
MCHC RBC AUTO-ENTMCNC: 33.2 G/DL (ref 31–36)
MCV RBC AUTO: 90.6 FL (ref 80–100)
METAMYELOCYTES RELATIVE PERCENT: 6 %
MONOCYTES ABSOLUTE: 0.6 K/UL (ref 0–1.3)
MONOCYTES RELATIVE PERCENT: 2 %
NEUTROPHILS ABSOLUTE: 26.3 K/UL (ref 1.7–7.7)
NEUTROPHILS RELATIVE PERCENT: 84 %
PDW BLD-RTO: 14.1 % (ref 12.4–15.4)
PERFORMED ON: ABNORMAL
PERFORMED ON: NORMAL
PLATELET # BLD: 245 K/UL (ref 135–450)
PMV BLD AUTO: 8.7 FL (ref 5–10.5)
POTASSIUM REFLEX MAGNESIUM: 4 MMOL/L (ref 3.5–5.1)
RBC # BLD: 3.1 M/UL (ref 4.2–5.9)
SODIUM BLD-SCNC: 140 MMOL/L (ref 136–145)
WBC # BLD: 27.7 K/UL (ref 4–11)

## 2021-12-05 PROCEDURE — 2580000003 HC RX 258: Performed by: SURGERY

## 2021-12-05 PROCEDURE — 6360000002 HC RX W HCPCS: Performed by: ANESTHESIOLOGY

## 2021-12-05 PROCEDURE — 6360000002 HC RX W HCPCS

## 2021-12-05 PROCEDURE — 97606 NEG PRS WND THER DME>50 SQCM: CPT | Performed by: SURGERY

## 2021-12-05 PROCEDURE — 99233 SBSQ HOSP IP/OBS HIGH 50: CPT | Performed by: INTERNAL MEDICINE

## 2021-12-05 PROCEDURE — 94761 N-INVAS EAR/PLS OXIMETRY MLT: CPT

## 2021-12-05 PROCEDURE — 11004 DBRDMT SKIN XTRNL GENT&PER: CPT | Performed by: SURGERY

## 2021-12-05 PROCEDURE — 85025 COMPLETE CBC W/AUTO DIFF WBC: CPT

## 2021-12-05 PROCEDURE — 87015 SPECIMEN INFECT AGNT CONCNTJ: CPT

## 2021-12-05 PROCEDURE — 3600000002 HC SURGERY LEVEL 2 BASE: Performed by: SURGERY

## 2021-12-05 PROCEDURE — 6370000000 HC RX 637 (ALT 250 FOR IP)

## 2021-12-05 PROCEDURE — 99999 PR OFFICE/OUTPT VISIT,PROCEDURE ONLY: CPT | Performed by: SURGERY

## 2021-12-05 PROCEDURE — 87102 FUNGUS ISOLATION CULTURE: CPT

## 2021-12-05 PROCEDURE — 87106 FUNGI IDENTIFICATION YEAST: CPT

## 2021-12-05 PROCEDURE — 87116 MYCOBACTERIA CULTURE: CPT

## 2021-12-05 PROCEDURE — 0KBM0ZZ EXCISION OF PERINEUM MUSCLE, OPEN APPROACH: ICD-10-PCS | Performed by: SURGERY

## 2021-12-05 PROCEDURE — 2580000003 HC RX 258

## 2021-12-05 PROCEDURE — 2580000003 HC RX 258: Performed by: STUDENT IN AN ORGANIZED HEALTH CARE EDUCATION/TRAINING PROGRAM

## 2021-12-05 PROCEDURE — 80048 BASIC METABOLIC PNL TOTAL CA: CPT

## 2021-12-05 PROCEDURE — 87070 CULTURE OTHR SPECIMN AEROBIC: CPT

## 2021-12-05 PROCEDURE — 36592 COLLECT BLOOD FROM PICC: CPT

## 2021-12-05 PROCEDURE — 3700000001 HC ADD 15 MINUTES (ANESTHESIA): Performed by: SURGERY

## 2021-12-05 PROCEDURE — 2709999900 HC NON-CHARGEABLE SUPPLY: Performed by: SURGERY

## 2021-12-05 PROCEDURE — 6360000002 HC RX W HCPCS: Performed by: SURGERY

## 2021-12-05 PROCEDURE — 87077 CULTURE AEROBIC IDENTIFY: CPT

## 2021-12-05 PROCEDURE — 87205 SMEAR GRAM STAIN: CPT

## 2021-12-05 PROCEDURE — 2500000003 HC RX 250 WO HCPCS: Performed by: ANESTHESIOLOGY

## 2021-12-05 PROCEDURE — 6360000002 HC RX W HCPCS: Performed by: STUDENT IN AN ORGANIZED HEALTH CARE EDUCATION/TRAINING PROGRAM

## 2021-12-05 PROCEDURE — 3600000012 HC SURGERY LEVEL 2 ADDTL 15MIN: Performed by: SURGERY

## 2021-12-05 PROCEDURE — 36415 COLL VENOUS BLD VENIPUNCTURE: CPT

## 2021-12-05 PROCEDURE — 3700000000 HC ANESTHESIA ATTENDED CARE: Performed by: SURGERY

## 2021-12-05 PROCEDURE — 2700000000 HC OXYGEN THERAPY PER DAY

## 2021-12-05 PROCEDURE — 2000000000 HC ICU R&B

## 2021-12-05 PROCEDURE — 87206 SMEAR FLUORESCENT/ACID STAI: CPT

## 2021-12-05 PROCEDURE — 83735 ASSAY OF MAGNESIUM: CPT

## 2021-12-05 RX ORDER — FENTANYL CITRATE 50 UG/ML
INJECTION, SOLUTION INTRAMUSCULAR; INTRAVENOUS PRN
Status: DISCONTINUED | OUTPATIENT
Start: 2021-12-05 | End: 2021-12-05 | Stop reason: SDUPTHER

## 2021-12-05 RX ORDER — SODIUM HYPOCHLORITE 1.25 MG/ML
SOLUTION TOPICAL ONCE
Status: COMPLETED | OUTPATIENT
Start: 2021-12-05 | End: 2021-12-05

## 2021-12-05 RX ORDER — ONDANSETRON 2 MG/ML
INJECTION INTRAMUSCULAR; INTRAVENOUS PRN
Status: DISCONTINUED | OUTPATIENT
Start: 2021-12-05 | End: 2021-12-05 | Stop reason: SDUPTHER

## 2021-12-05 RX ORDER — PROPOFOL 10 MG/ML
INJECTION, EMULSION INTRAVENOUS PRN
Status: DISCONTINUED | OUTPATIENT
Start: 2021-12-05 | End: 2021-12-05 | Stop reason: SDUPTHER

## 2021-12-05 RX ORDER — MAGNESIUM HYDROXIDE 1200 MG/15ML
LIQUID ORAL CONTINUOUS PRN
Status: COMPLETED | OUTPATIENT
Start: 2021-12-05 | End: 2021-12-05

## 2021-12-05 RX ORDER — ROCURONIUM BROMIDE 10 MG/ML
INJECTION, SOLUTION INTRAVENOUS PRN
Status: DISCONTINUED | OUTPATIENT
Start: 2021-12-05 | End: 2021-12-05 | Stop reason: SDUPTHER

## 2021-12-05 RX ORDER — LIDOCAINE HYDROCHLORIDE 20 MG/ML
INJECTION, SOLUTION INTRAVENOUS PRN
Status: DISCONTINUED | OUTPATIENT
Start: 2021-12-05 | End: 2021-12-05 | Stop reason: SDUPTHER

## 2021-12-05 RX ORDER — DIPHENHYDRAMINE HCL 25 MG
25 TABLET ORAL EVERY 6 HOURS PRN
Status: DISCONTINUED | OUTPATIENT
Start: 2021-12-05 | End: 2021-12-05

## 2021-12-05 RX ADMIN — ROCURONIUM BROMIDE 50 MG: 10 INJECTION INTRAVENOUS at 08:30

## 2021-12-05 RX ADMIN — SUGAMMADEX 200 MG: 100 INJECTION, SOLUTION INTRAVENOUS at 09:47

## 2021-12-05 RX ADMIN — FENTANYL CITRATE 100 MCG: 50 INJECTION, SOLUTION INTRAMUSCULAR; INTRAVENOUS at 08:54

## 2021-12-05 RX ADMIN — PROPOFOL 20 MG: 10 INJECTION, EMULSION INTRAVENOUS at 08:32

## 2021-12-05 RX ADMIN — VANCOMYCIN HYDROCHLORIDE 1250 MG: 10 INJECTION, POWDER, LYOPHILIZED, FOR SOLUTION INTRAVENOUS at 16:15

## 2021-12-05 RX ADMIN — SODIUM CHLORIDE, POTASSIUM CHLORIDE, SODIUM LACTATE AND CALCIUM CHLORIDE: 600; 310; 30; 20 INJECTION, SOLUTION INTRAVENOUS at 05:16

## 2021-12-05 RX ADMIN — VANCOMYCIN HYDROCHLORIDE 1250 MG: 10 INJECTION, POWDER, LYOPHILIZED, FOR SOLUTION INTRAVENOUS at 04:05

## 2021-12-05 RX ADMIN — LIDOCAINE HYDROCHLORIDE 100 MG: 20 INJECTION, SOLUTION INTRAVENOUS at 08:30

## 2021-12-05 RX ADMIN — PROPOFOL 120 MG: 10 INJECTION, EMULSION INTRAVENOUS at 08:30

## 2021-12-05 RX ADMIN — FENTANYL CITRATE 100 MCG: 50 INJECTION, SOLUTION INTRAMUSCULAR; INTRAVENOUS at 08:30

## 2021-12-05 RX ADMIN — SODIUM CHLORIDE, POTASSIUM CHLORIDE, SODIUM LACTATE AND CALCIUM CHLORIDE: 600; 310; 30; 20 INJECTION, SOLUTION INTRAVENOUS at 08:35

## 2021-12-05 RX ADMIN — SODIUM CHLORIDE, PRESERVATIVE FREE 10 ML: 5 INJECTION INTRAVENOUS at 19:59

## 2021-12-05 RX ADMIN — MEROPENEM 1000 MG: 1 INJECTION, POWDER, FOR SOLUTION INTRAVENOUS at 10:24

## 2021-12-05 RX ADMIN — SODIUM CHLORIDE, POTASSIUM CHLORIDE, SODIUM LACTATE AND CALCIUM CHLORIDE: 600; 310; 30; 20 INJECTION, SOLUTION INTRAVENOUS at 15:48

## 2021-12-05 RX ADMIN — DAKIN'S SOLUTION 0.125% (QUARTER STRENGTH): 0.12 SOLUTION at 10:17

## 2021-12-05 RX ADMIN — ONDANSETRON 4 MG: 2 INJECTION INTRAMUSCULAR; INTRAVENOUS at 09:47

## 2021-12-05 RX ADMIN — MEROPENEM 1000 MG: 1 INJECTION, POWDER, FOR SOLUTION INTRAVENOUS at 18:08

## 2021-12-05 RX ADMIN — MEROPENEM 1000 MG: 1 INJECTION, POWDER, FOR SOLUTION INTRAVENOUS at 02:15

## 2021-12-05 RX ADMIN — VANCOMYCIN HYDROCHLORIDE 1250 MG: 10 INJECTION, POWDER, LYOPHILIZED, FOR SOLUTION INTRAVENOUS at 08:28

## 2021-12-05 RX ADMIN — SODIUM CHLORIDE 3.21 UNITS/HR: 900 INJECTION, SOLUTION INTRAVENOUS at 10:28

## 2021-12-05 ASSESSMENT — PULMONARY FUNCTION TESTS
PIF_VALUE: 17
PIF_VALUE: 15
PIF_VALUE: 17
PIF_VALUE: 19
PIF_VALUE: 16
PIF_VALUE: 15
PIF_VALUE: 7
PIF_VALUE: 17
PIF_VALUE: 2
PIF_VALUE: 17
PIF_VALUE: 17
PIF_VALUE: 2
PIF_VALUE: 2
PIF_VALUE: 4
PIF_VALUE: 15
PIF_VALUE: 20
PIF_VALUE: 2
PIF_VALUE: 17
PIF_VALUE: 2
PIF_VALUE: 18
PIF_VALUE: 15
PIF_VALUE: 18
PIF_VALUE: 6
PIF_VALUE: 16
PIF_VALUE: 16
PIF_VALUE: 2
PIF_VALUE: 17
PIF_VALUE: 8
PIF_VALUE: 2
PIF_VALUE: 15
PIF_VALUE: 15
PIF_VALUE: 16
PIF_VALUE: 52
PIF_VALUE: 16
PIF_VALUE: 18
PIF_VALUE: 16
PIF_VALUE: 16
PIF_VALUE: 7
PIF_VALUE: 16
PIF_VALUE: 15
PIF_VALUE: 16
PIF_VALUE: 19
PIF_VALUE: 11
PIF_VALUE: 15
PIF_VALUE: 16
PIF_VALUE: 15
PIF_VALUE: 16
PIF_VALUE: 17
PIF_VALUE: 16
PIF_VALUE: 17
PIF_VALUE: 17
PIF_VALUE: 2
PIF_VALUE: 20
PIF_VALUE: 16
PIF_VALUE: 15
PIF_VALUE: 16
PIF_VALUE: 15
PIF_VALUE: 20
PIF_VALUE: 18
PIF_VALUE: 17
PIF_VALUE: 5
PIF_VALUE: 15
PIF_VALUE: 40
PIF_VALUE: 16
PIF_VALUE: 44
PIF_VALUE: 16
PIF_VALUE: 20
PIF_VALUE: 15
PIF_VALUE: 23
PIF_VALUE: 15
PIF_VALUE: 16
PIF_VALUE: 5
PIF_VALUE: 19
PIF_VALUE: 16
PIF_VALUE: 17
PIF_VALUE: 19
PIF_VALUE: 29
PIF_VALUE: 16
PIF_VALUE: 2
PIF_VALUE: 17
PIF_VALUE: 16
PIF_VALUE: 17
PIF_VALUE: 15
PIF_VALUE: 16
PIF_VALUE: 17
PIF_VALUE: 15
PIF_VALUE: 15
PIF_VALUE: 16

## 2021-12-05 ASSESSMENT — ENCOUNTER SYMPTOMS: ABDOMINAL PAIN: 0

## 2021-12-05 ASSESSMENT — PAIN SCALES - GENERAL
PAINLEVEL_OUTOF10: 0

## 2021-12-05 NOTE — PROGRESS NOTES
ICU SURGERY DAILY PROGRESS NOTE    CC: Shayla's gangrene  Procedure: Wide excision perineum, back, abdominal wall    SUBJECTIVE:   Interval Hx:  No acute events overnight. Patient was tolerating a diet yesterday without any nausea or vomiting. NPO since midnight in anticipation of OR with plastic surgery today. ROS: A 10 point review of systems was conducted, significant findings as noted above. All other systems negative. OBJECTIVE:   Vitals:   Vitals:    12/05/21 0500 12/05/21 0523 12/05/21 0600 12/05/21 0700   BP: 139/84  136/72 (!) 145/80   Pulse: 95  90 91   Resp: 23  25 19   Temp:       TempSrc:       SpO2:    94%   Weight:  190 lb 7.6 oz (86.4 kg)     Height:           I/O:     Intake/Output Summary (Last 24 hours) at 12/5/2021 0722  Last data filed at 12/5/2021 0600  Gross per 24 hour   Intake 2501.46 ml   Output 1255 ml   Net 1246.46 ml     I/O last 3 completed shifts: In: 2501.5 [P.O.:450; I.V.:925.1; IV Piggyback:1126.4]  Out: 1255 [Urine:1255]    Diet: Diet NPO      Physical Examination:   General appearance: alert, no acute distress, groomed appropriately   HENT: NC/AT, MMM & intact, no JVD, neck supple, trachea midline  Eyes: No scleral icterus, EOMI  Chest/Lungs: CTAB, normal effort on 2L NC, no accessory muscles used for breathing. Cardiovascular: RRR  Abdomen: soft, abdominal binder in place with dressings intact over abdominal wound measuring 60 cm x 23 cm x 3cm and gluteal region 20 cm x 15 cm, minimal drainage, no signs of further necrotizing fasciitis requiring further debridement, no subcutaneous crepitus noted, skin edges with healthy granulation tissue with appropriate bleeding, non-malodorous   Extremities:  full-thickness ulceration on lateral aspect of L foot, no edema, no cyanosis, decreased sensation along entirety of bilateral feet. No DP/PT pulse appreciated with doppler.    : walsh in place with clear yellow urine, no signs of further necrotizing fasciitis requiring further debridement of L lower scrotum, no subcutaneous crepitus noted, skin edges with healthy granulation tissue with appropriate bleeding, non-malodorous   Neurologic: A&Ox3, decrease sensation to bilateral lower extremities. Labs:  CBC:   Recent Labs     12/03/21  0542 12/03/21  0542 12/03/21  1835 12/04/21  0505 12/05/21  0430   WBC 16.3*  --   --  15.8* 27.7*   HGB 9.2*   < > 9.7* 8.0* 9.3*   HCT 28.2*   < > 29.8* 25.1* 28.0*     --   --  181 245    < > = values in this interval not displayed. BMP:   Recent Labs     12/03/21  0542 12/04/21  0900 12/05/21  0430    141 140   K 3.8 3.5 4.0   * 110 111*   CO2 24 23 24   BUN 34* 18 19   CREATININE 0.7* <0.5* 0.6*   GLUCOSE 122* 123* 128*     LFT's:   No results for input(s): AST, ALT, ALB, BILITOT, ALKPHOS in the last 72 hours. Troponin:   No results for input(s): TROPONINI in the last 72 hours. BNP: No results for input(s): BNP in the last 72 hours. ABGs:   Recent Labs     12/02/21  1305 12/04/21  0505   PHART 7.372 7.444   MVG0FYA 38.7 40.3   PO2ART 123.1* 71.5*     INR:   No results for input(s): INR in the last 72 hours. U/A:No results for input(s): NITRITE, COLORU, PHUR, LABCAST, WBCUA, RBCUA, MUCUS, TRICHOMONAS, YEAST, BACTERIA, CLARITYU, SPECGRAV, LEUKOCYTESUR, UROBILINOGEN, BILIRUBINUR, BLOODU, GLUCOSEU, AMORPHOUS in the last 72 hours. Invalid input(s): Swapnil Rodriguez     Rad:   VL DUP LOWER EXTREMITY ARTERIES BILATERAL   Final Result          ASSESSMENT AND PLAN:   Aydin Zarate is a 63 y.o. male with Necrotizing fasciitis of the abdominal wall, gluteal region, and scrotum s/p wide excision of perineum, back, and abdominal wall. Abdominal wall wound measuring 60 cm x 23 cm x 3cm and gluteal wound measures 20 cm x 15cm (12/1), returned to the OR for further debridement or right abdominal wall, and placement of testicle within a thigh flap (12/3).     - Patient returning to the OR with plastic surgery today for possible further debridement and possible wound vac placement  - will continue BID dressing changing WTD per plastic surgery. - Continue with aggressive glucose control  - Family (wife) updated on condition.  - General surgery planning for diverting ostomy on Tuesday, if patient does not require further debridement. Chau Rudd DO, MS  PGY1, General Surgery  12/05/21  7:26 AM    I am post-call today and will not be on campus. Please contact Dr. Keron Méndez at [phone] for questions or concerns regarding this patient. I have personally performed the medical history, physical exam and medical decision making and agree with all pertinent clinical information unless otherwise noted.      Vandana Mariano MD  Surgery Attending

## 2021-12-05 NOTE — PROGRESS NOTES
Podiatric Surgery Daily Progress Note      Admit Date: 12/1/2021      Code:Full Code    Patient seen and examined, labs and records reviewed    Subjective:     Patient seen at bedside this a.m. Patient denies pain to bilateral lower extremity. Patient denies fever, chills, shortness of breath, chest pain. Patient denies any acute events overnight. Review of Systems: As above       Objective     BP (!) 145/79   Pulse 99   Temp 98.1 °F (36.7 °C) (Oral)   Resp 20   Ht 5' 11\" (1.803 m)   Wt 190 lb 7.6 oz (86.4 kg)   SpO2 96%   BMI 26.57 kg/m²      I/O:    Intake/Output Summary (Last 24 hours) at 12/5/2021 0849  Last data filed at 12/5/2021 0800  Gross per 24 hour   Intake 2501.46 ml   Output 1325 ml   Net 1176.46 ml              Wt Readings from Last 3 Encounters:   12/05/21 190 lb 7.6 oz (86.4 kg)   11/30/21 163 lb 3.2 oz (74 kg)   04/18/16 179 lb 14.3 oz (81.6 kg)       LABS:    Recent Labs     12/04/21  0505 12/05/21  0430   WBC 15.8* 27.7*   HGB 8.0* 9.3*   HCT 25.1* 28.0*    245        Recent Labs     12/03/21  0310 12/03/21  0542 12/05/21  0430      < > 140   K 4.1   < > 4.0      < > 111*   CO2 23   < > 24   PHOS 1.7*  --   --    BUN 36*   < > 19   CREATININE 0.7*   < > 0.6*    < > = values in this interval not displayed. No results for input(s): PROT, INR, APTT in the last 72 hours. LOWER EXTREMITY EXAMINATION    Dressing to left LE intact. No strikethrough noted to the external dressing. No drainage noted to the internal layers of the dressing. VASCULAR: DP and PT pulses are non-palpable 0/4 b/l. Upon hand-held Doppler examination DP signals were noted to be monophasic and PT signals were noted to be nondopplerable. CFT is sluggish to the digits of the foot b/l. Skin temperature is warm to cold from proximal to distal.  No edema noted. No pain with calf compression b/l.     NEUROLOGIC: Gross and epicritic sensation is diminished b/l.  Protective sensation is diminished at all pedal sites b/l.     DERMATOLOGIC:   Left lower extremity:              Full-thickness ulceration noted to the lateral aspect of the left foot. Wound measures approximately 3.2 cm x 2 cm x 0.5 cm. Of note the fifth metatarsal is exposed. Periwound rubor noted, improving. No purulent drainage noted. No fluctuance or crepitus or malodor noted. Increased ischemic changes noted to left fifth digit, with significant duskiness. Rubor and ecchymosis overlying the dorsal aspect of the midfoot. No open wound noted. Right LE soft tissue envelope is closed without ulceration or acute signs of infection.     MUSCULOSKELETAL: Mild diffuse tenderness with palpation of the left foot. No obvious biomechanical abnormalities. IMAGING:  XR LEFT FOOT 11/30/2021  Narrative   EXAMINATION:   THREE XRAY VIEWS OF THE LEFT FOOT       11/30/2021 1:44 pm       COMPARISON:   None.       HISTORY:   ORDERING SYSTEM PROVIDED HISTORY: wound   TECHNOLOGIST PROVIDED HISTORY:   Reason for exam:->wound   Reason for Exam: blood sugar problem, wound check on lateral portion of foot   Acuity: Acute   Type of Exam: Initial       FINDINGS:   Osseous destruction along the articular margins of the 5th   metatarsophalangeal joint with associated lucency in the soft tissues. .   There is no evidence of fracture or dislocation. . The remaining joint spaces   appear well maintained. The remaining soft tissues are unremarkable.           Impression   Evidence of a septic joint involving the 5th metatarsal phalangeal joint with   associated osteomyelitis         ARTERIAL DUPLEX 12/2/21     Type of Study:        Extremities Arteries:Lower Extremities Arterial Duplex, VL LOWER EXTREMITY    ARTERIES DUPLEX BILATERAL.       Tech Comments   Right   The right ankle/brachial index is 0.0 (no Doppler signal could be heard at the   [facility] or the ).    The common femoral and profunda femoral arteries could not be visualized due   to bandages

## 2021-12-05 NOTE — FLOWSHEET NOTE
12/05/21 0813   Handoff   Communication Given Transfer Handoff   Oncoming Nurse/Offgoing Nurse esperanza/yanet. Handoff Communication Face to Face; In department   Time Handoff Given 0813   End of Shift Check Performed Yes       Patient transfer to OR at this time with handoff given to Sushant Lagos [facility]. All questions answered.

## 2021-12-05 NOTE — ANESTHESIA PRE PROCEDURE
Department of Anesthesiology  Preprocedure Note       Name:  Bigg Nelson   Age:  63 y.o.  :  1955                                          MRN:  [id number]         Date:  2021      Surgeon: Valentino Pereira):  Karla Weems MD    Procedure: Procedure(s):  ABDOMINAL WALL AND LEFT BUTTOCK DEBRIDEMENT, WOUND VAC PLACEMENT    Medications prior to admission:   Prior to Admission medications    Medication Sig Start Date End Date Taking?  Authorizing Provider   metFORMIN (GLUCOPHAGE) 500 MG tablet Take 500 mg by mouth daily (with breakfast)    Historical Provider, MD   meloxicam (MOBIC) 15 MG tablet Take 1 tablet by mouth daily 16   Merly Valadez DO       Current medications:    Current Facility-Administered Medications   Medication Dose Route Frequency Provider Last Rate Last Admin    enoxaparin (LOVENOX) injection 40 mg  40 mg SubCUTAneous Daily Ebb Cabot, DO   40 mg at 21 0934    vancomycin (VANCOCIN) 1,250 mg in dextrose 5 % 250 mL IVPB  1,250 mg IntraVENous Q12H Asif Gray MD   Stopped at 21 5547    lactated ringers infusion   IntraVENous Continuous Ebb Cabot,  mL/hr at 21 0516 New Bag at 21 0516    sodium chloride flush 0.9 % injection 5-40 mL  5-40 mL IntraVENous 2 times per day Ebb Cabot, DO   10 mL at 21    sodium chloride flush 0.9 % injection 5-40 mL  5-40 mL IntraVENous PRN Ebb Cabot, DO        0.9 % sodium chloride infusion  25 mL IntraVENous PRN Ebb Cabot, DO   Paused at 21    acetaminophen (TYLENOL) tablet 650 mg  650 mg Oral Q6H PRN Ebb Cabot, DO        Or    acetaminophen (TYLENOL) suppository 650 mg  650 mg Rectal Q6H PRN Ebb Cabot, DO        norepinephrine (LEVOPHED) 16 mg in dextrose 5 % 250 mL infusion  2-100 mcg/min IntraVENous Continuous Ebb Cabot, DO   Paused at 21    meropenem (MERREM) 1,000 mg in sodium chloride 0.9 % 100 mL IVPB (mini-bag) 1,000 mg IntraVENous Q8H Sriram Apley, DO   Stopped at 12/05/21 9528    fentaNYL (SUBLIMAZE) 1,000 mcg in sodium chloride 0.9% 100 mL infusion  12.5-200 mcg/hr IntraVENous Continuous Sriram Apley, DO 1.3 mL/hr at 12/04/21 1400 12.5 mcg/hr at 12/04/21 1400    glucose (GLUTOSE) 40 % oral gel 15 g  15 g Oral PRN Sriram Apley, DO        dextrose 50 % IV solution  12.5 g IntraVENous PRN Sriram Apley, DO        glucagon (rDNA) injection 1 mg  1 mg IntraMUSCular PRN Sriram Apley, DO        dextrose 5 % solution  100 mL/hr IntraVENous PRN Sriram Apley, DO        insulin regular (HUMULIN R;NOVOLIN R) 100 Units in sodium chloride 0.9 % 100 mL infusion  1-50 Units/hr IntraVENous Continuous Sriram Apley, DO 3.2 mL/hr at 12/05/21 0700 3.21 Units/hr at 12/05/21 0700       Allergies:  No Known Allergies    Problem List:    Patient Active Problem List   Diagnosis Code    Diabetic acidosis without coma ([facility] E11.10    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 No pertinent past medical history. Past Surgical History:        Procedure Laterality Date    ABDOMEN SURGERY N/A 12/1/2021    WIDE EXCISION PERINEUM, BACK, ABDOMINAL WALL performed by Lowell Gary MD at [address] 12/3/2021    ABDOMINAL WALL AND LEFT BUTTOCK DEBRIDEMENT, WOUND VAC PLACEMENT performed by Grisel Barrow MD at [place] [address] N/A 11/30/2021    DEBRIDEMENT OF SCROTAL SHAYLA'S GANGRENE performed by Maddy Ashley MD at [address] [place] [address] N/A 11/30/2021    PERINEAL SOFT TISSUE EXCISIONAL DEBRIDEMENT performed by Briana Flores MD at [place] [address] History:    Social History     Tobacco Use    Smoking status: Current Every Day Smoker    Smokeless tobacco: Never Used   Substance Use Topics    Alcohol use:  Yes     Alcohol/week: 0.0 standard drinks     Comment: social                                 Ready to quit: Not Answered  Counseling given: Not Answered      Vital Signs (Current):   Vitals:    12/05/21 0500 12/05/21 0523 12/05/21 0600 12/05/21 0700   BP: 139/84  136/72 (!) 145/80   Pulse: 95  90 91   Resp: 23 25 19   Temp:       TempSrc:       SpO2:    94%   Weight:  190 lb 7.6 oz (86.4 kg)     Height:                                                  BP Readings from Last 3 Encounters:   12/05/21 (!) 145/80   12/03/21 114/61   12/01/21 121/65       NPO Status: Time of last liquid consumption: 2300                        Time of last solid consumption: 2300                                                      BMI:   Wt Readings from Last 3 Encounters:   12/05/21 190 lb 7.6 oz (86.4 kg)   11/30/21 163 lb 3.2 oz (74 kg)   04/18/16 179 lb 14.3 oz (81.6 kg)     Body mass index is 26.57 kg/m².     CBC:   Lab Results   Component Value Date    WBC 27.7 12/05/2021    RBC 3.10 12/05/2021    HGB 9.3 12/05/2021    HCT 28.0 12/05/2021    MCV 90.6 12/05/2021    RDW 14.1 12/05/2021     12/05/2021       CMP:   Lab Results   Component Value Date     12/05/2021    K 4.0 12/05/2021     12/05/2021    CO2 24 12/05/2021    BUN 19 12/05/2021    CREATININE 0.6 12/05/2021    GFRAA >60 12/05/2021    AGRATIO 0.7 11/30/2021    LABGLOM >60 12/05/2021    GLUCOSE 128 12/05/2021    PROT 4.2 12/02/2021    CALCIUM 7.4 12/05/2021    BILITOT 0.4 12/02/2021    ALKPHOS 109 12/02/2021    AST 9 12/02/2021    ALT 7 12/02/2021       POC Tests:   Recent Labs     12/05/21  0711   POCGLU 167*       Coags:   Lab Results   Component Value Date    PROTIME 12.5 12/01/2021    INR 1.10 12/01/2021       HCG (If Applicable): No results found for: PREGTESTUR, PREGSERUM, HCG, HCGQUANT     ABGs:   Lab Results   Component Value Date    PHART 7.444 12/04/2021    PO2ART 71.5 12/04/2021    VTB4DEU 40.3 12/04/2021    PQR1OTZ 28 12/04/2021    BEART 3.3 12/04/2021    K7DLCCNI 95 12/04/2021        Type & Screen (If Applicable):  No results found for: LABABO, LABRH    Drug/Infectious Status (If Applicable):  No results found for: HIV, HEPCAB    COVID-19 Screening (If Applicable):   Lab Results   Component Value Date    COVID19 Not Detected 11/30/2021           Anesthesia Evaluation  Patient summary reviewed and Nursing notes reviewed  Airway: Mallampati: II  TM distance: >3 FB   Neck ROM: full  Mouth opening: > = 3 FB Dental: normal exam         Pulmonary:Negative Pulmonary ROS and normal exam  breath sounds clear to auscultation            Patient did not smoke on day of surgery. Cardiovascular:Negative CV ROS          ECG reviewed  Rhythm: regular  Rate: normal           Beta Blocker:  Dose within 24 Hrs         Neuro/Psych:   Negative Neuro/Psych ROS              GI/Hepatic/Renal: Neg GI/Hepatic/Renal ROS            Endo/Other:    (+) Diabetespoorly controlled, , .                 Abdominal:       Abdomen: soft. Vascular: negative vascular ROS. Other Findings:             Anesthesia Plan      general     ASA 3 - emergent       Induction: intravenous. MIPS: Postoperative opioids intended and Prophylactic antiemetics administered. Anesthetic plan and risks discussed with patient. Use of blood products discussed with patient whom consented to blood products. Plan discussed with attending.                   Benton Tinajero DO   12/5/2021

## 2021-12-05 NOTE — PROGRESS NOTES
Hospitalist Progress Note      PCP: No primary care provider on file. Date of Admission: 12/1/2021    Chief Complaint: [facility] Course: Patient was transferred from [facility] after admission for DKA and Shayla's gangrene/necrotizing fasciitis. Patient was intubated and underwent I&D. Pt was transferred to [facility] for further evaluation per colorectal/plastic surgery. Patient underwent wide excision of perineum/abdominal wall 12/01. Extubated 12/02. S/p excisional debridement of abdomen and left buttock, wound VAC placement 12/03. S/p further debridement and wound VAC placement today. Plan to return to the OR for diverting ostomy on Tuesday by general surgery. On IV abx and insulin drip. Urology/surgery/plastic surgery following. Subjective: Patient on nasal cannula. Alert and oriented X3. Follows simple commands. Has lower abdominal pain s/p procedure today. Denies any other complaints.     Medications:  Reviewed    Infusion Medications    lactated ringers 125 mL/hr at 12/05/21 0835    sodium chloride Stopped (12/01/21 2027)    norepinephrine Stopped (12/01/21 1958)    fentaNYL 12.5 mcg/hr (12/04/21 1400)    dextrose      insulin (HUMAN R) non-weight based infusion 0.3 Units/hr (12/05/21 1200)     Scheduled Medications    vancomycin  1,250 mg IntraVENous Q12H    enoxaparin  40 mg SubCUTAneous Daily    sodium chloride flush  5-40 mL IntraVENous 2 times per day    meropenem  1,000 mg IntraVENous Q8H     PRN Meds: sodium chloride flush, sodium chloride, acetaminophen **OR** acetaminophen, glucose, dextrose, glucagon (rDNA), dextrose      Intake/Output Summary (Last 24 hours) at 12/5/2021 1303  Last data filed at 12/5/2021 1200  Gross per 24 hour   Intake 3001.46 ml   Output 1260 ml   Net 1741.46 ml       Physical Exam Performed:    /72   Pulse 98   Temp 98.2 °F (36.8 °C) (Oral)   Resp 24   Ht 5' 11\" (1.803 m)   Wt 190 lb 7.6 oz (86.4 kg)   SpO2 gangrene/necrotizing fasciitis:  Status post I&D 11/30 and wide excision of perineum/abdominal wall 12/01  Abdominal wall wound measuring 60 cm x 23 cm x 3cm and gluteal wound measures 20 cm x 15cm  12/01  S/p excisional debridement of abdomen and left buttock, wound VAC placement 12/03  S/p further debridement and wound VAC placement 12/05  Clindamycin discontinued. Continue IV Vanco and Merrem  Plan to return to the OR for diverting ostomy on Tuesday by general surgery  Surgery/plastic surgery/urology following    Acute postoperative blood loss anemia:  Likely secondary to I&D and wide excision  Status post 1u pRBC transfusion 12/2  Monitor hemoglobin, stable    DKA in the setting of diabetes mellitus type 2:  Patient noncompliant. Holding home medication  DKA resolved  Monitor blood glucose, better  Continue insulin drip    Left fifth metatarsal diabetic foot ulceration and osteomyelitis:  Continue IV antibiotics  Arterial Doppler showed severe bilateral arterial occlusive disease  Vascular surgery recommended possible consideration of an angiogram once the patient is stabilized  Patient will eventually require podiatric surgical intervention pending medical stability  Continue wound care  Podiatry following, appreciate recommendation    DVT Prophylaxis: SCDs, on hold due to anemia and anticipation for procedures  Diet: ADULT DIET;  Regular; 3 carb choices (45 gm/meal)  Code Status: Full Code    PT/OT Eval Status: Once able    Dispo -Pending diverting ostomy on Tuesday, transition off of insulin gtt, surgery recs, PT/OT    Ankita Ndaiye MD

## 2021-12-05 NOTE — PROGRESS NOTES
ICU Progress Note    Admit Date: 12/1/2021  Day: 4  Extubated 12/2  IV Access:Peripheral, PICC, A line  IV Fluids:LR, insulin, propofol, fentanyl  Vasopressors:None                Antibiotics: Vancomycin and Merem  Diet: Diet NPO    CC: Westley gangrene    Interval history:     Pt seen at bedside. AOx3. No acute overnight events  Continues on 2L NC at 96%  + leukocytosis increasing: 15.8 ->27.7 - On vancomycin and Merem  UO: 1.2 L in 24 hrs. Net+ 3L   For OR today: debridement of abdominal wall and left buttock, placement of wound VAC per plastic surgery       HPI: Vish Jimenez a 63 y. o. male with PMH as below notable for T2DM who presented with groin swelling. Patient reported that 2 weeks ago he notice a abscess near his anus, however reported in the last 2 days the area has increasing in size and warmth and extended to his scrotum. He reported poor compliance with his diabetes medication (metformin).      On admission the patient was hypotensive, WBC 18, Bicarb 20, Anion gap 22, Glucose 679, elevated Bhydroxybutirate. General surgery, urology and podiatry were consulted. Vancomycin was started, 3 L of IV fluids were given and the patient was placed in DKA protocol. Anion gap closed twice. The patient was taken for surgery debridement, was kept intubated due to possible multiple surgery interventions. Patient was transferred to the [facility] for colorectal and plastic surgery management.      Patient was admitted to the ICU for further workup and management of westley gangrene.     Medications:     Scheduled Meds:   enoxaparin  40 mg SubCUTAneous Daily    vancomycin  1,250 mg IntraVENous Q12H    sodium chloride flush  5-40 mL IntraVENous 2 times per day    meropenem  1,000 mg IntraVENous Q8H     Continuous Infusions:   lactated ringers 125 mL/hr at 12/05/21 0516    sodium chloride Stopped (12/01/21 2027)    norepinephrine Stopped (12/01/21 1958)    fentaNYL 12.5 mcg/hr (12/04/21 1400)    dextrose      insulin (HUMAN R) non-weight based infusion 0.78 Units/hr (12/05/21 0511)     PRN Meds:sodium chloride flush, sodium chloride, acetaminophen **OR** acetaminophen, glucose, dextrose, glucagon (rDNA), dextrose    Objective:   Vitals:   T-max:  Patient Vitals for the past 8 hrs:   BP Temp Temp src Pulse Resp SpO2 Weight   12/05/21 0523 -- -- -- -- -- -- 190 lb 7.6 oz (86.4 kg)   12/05/21 0500 139/84 -- -- 95 23 -- --   12/05/21 0400 127/72 97.7 °F (36.5 °C) Oral 93 15 -- --   12/05/21 0300 126/75 -- -- 96 23 96 % --   12/05/21 0200 (!) 142/76 -- -- 98 17 -- --   12/05/21 0100 137/73 -- -- 97 22 96 % --   12/05/21 0000 127/77 97.8 °F (36.6 °C) Oral 101 18 97 % --   12/04/21 2300 137/82 -- -- 96 22 100 % --   12/04/21 2200 (!) 155/74 -- -- 103 23 98 % --       Intake/Output Summary (Last 24 hours) at 12/5/2021 0531  Last data filed at 12/5/2021 0500  Gross per 24 hour   Intake 2501.46 ml   Output 1260 ml   Net 1241.46 ml       Review of Systems   Gastrointestinal: Negative for abdominal pain. A 10 point review of systems was conducted, significant findings as noted in HPI. Physical Exam  Constitutional:       Appearance: He is ill-appearing. HENT:      Head: Normocephalic and atraumatic. Mouth/Throat:      Mouth: Mucous membranes are dry. Pharynx: Oropharynx is clear. Eyes:      Extraocular Movements: Extraocular movements intact. Conjunctiva/sclera: Conjunctivae normal.      Pupils: Pupils are equal, round, and reactive to light. Cardiovascular:      Rate and Rhythm: Normal rate and regular rhythm. Pulses: Normal pulses. Heart sounds: Murmur (hyperdynamic flow murmur) heard. Pulmonary:      Effort: Pulmonary effort is normal.      Breath sounds: Normal breath sounds. Abdominal:      General: Abdomen is flat. Bowel sounds are normal.      Palpations: Abdomen is soft. Tenderness: There is abdominal tenderness (Left side abdomen at mild palpation).    Musculoskeletal: General: Normal range of motion. Cervical back: Normal range of motion. Right lower leg: No edema. Left lower leg: No edema. Feet:      Comments: Full-thickness ulceration noted to the lateral aspect of the left foot. Wound measures approximately 3.2 cm x 2 cm x 0.5 cm. Of note the fifth metatarsal is exposed. Skin:     General: Skin is warm and dry. Capillary Refill: Capillary refill takes 2 to 3 seconds. Coloration: Skin is pale. Comments: Necrotizing fasciitis of the abdominal wall, gluteal region, and scrotum. Abdominal wall wound measuring 60 cm x 23 cm x 3cm and gluteal wound measures 20 cm x 15cm      Neurological:      General: No focal deficit present. Mental Status: He is alert and oriented to person, place, and time. LABS:    CBC:   Recent Labs     12/03/21  0542 12/03/21  0542 12/03/21  1835 12/04/21  0505 12/05/21  0430   WBC 16.3*  --   --  15.8* 27.7*   HGB 9.2*   < > 9.7* 8.0* 9.3*   HCT 28.2*   < > 29.8* 25.1* 28.0*     --   --  181 245   MCV 90.9  --   --  96.0 90.6    < > = values in this interval not displayed. Renal:    Recent Labs     12/03/21  0310 12/03/21  0310 12/03/21  0542 12/04/21  0900 12/05/21  0430      < > 142 141 140   K 4.1   < > 3.8 3.5 4.0      < > 112* 110 111*   CO2 23   < > 24 23 24   BUN 36*   < > 34* 18 19   CREATININE 0.7*   < > 0.7* <0.5* 0.6*   GLUCOSE 168*   < > 122* 123* 128*   CALCIUM 7.5*   < > 7.3* 7.0* 7.4*   MG 2.00  --   --  1.70* 2.00   PHOS 1.7*  --   --   --   --    ANIONGAP 6   < > 6 8 5    < > = values in this interval not displayed. Hepatic:   No results for input(s): AST, ALT, BILITOT, BILIDIR, PROT, LABALBU, ALKPHOS in the last 72 hours. Troponin:   No results for input(s): TROPONINI in the last 72 hours. BNP: No results for input(s): BNP in the last 72 hours. Lipids: No results for input(s): CHOL, HDL in the last 72 hours.     Invalid input(s): LDLCALCU, TRIGLYCERIDE  ABGs: Recent Labs     12/02/21  0943 12/02/21  1305 12/04/21  0505   PHART 7.369 7.372 7.444   LAD0XMN 40.4 38.7 40.3   PO2ART 107.0 123.1* 71.5*   MMJ6ZOP 23.3 22.5 28   BEART -2 -3 3.3*   M8TXCTWQ 98 99 95   VLR5DBJ 25 24 29       INR:   No results for input(s): INR in the last 72 hours. Lactate:   No results for input(s): LACTATE in the last 72 hours. Cultures:  -----------------------------------------------------------------  RAD:   VL DUP LOWER EXTREMITY ARTERIES BILATERAL   Final Result            Assessment/Plan:   Xiang Toussaint a 63 y. o. male with PMH as below notable for T2DM who presented with groin swelling. Patient was admitted to the ICU for further workup and management of westley gangrene    Neuro/Sedation:  · Patient is alert and oriented    CV:   Hemodynamically stable and afebrile   SBP between 120-140s    Respiratory:    SpO2 96% on 2L NC    ID:  Westley gangrene   Necrotizing fascitis of the abdominal wall, perineum and back. WBC improving. BCx x2: NGTD  Tissue Cx: Mixed anaerobic growth present  Wound Cx: Mixed skin pam  -Continue Vanc, Merrem  - mL/hr  -Dressing changes BID  -Plan for OR for debridement of abdominal wall and left buttock, placement of wound vacToday  -Colorectal surgery following  -Plastic surgery following      Renal:   Voiding catheter   I/Os: +1.2 L/ 24 hours   UO 1255 (0.6 mL/Kg/hr)    GI:   Diet NPO after midnight  · Prophylaxis protonix 40 mg    Endocrine:  T2DM  Last A1c 13.5 (11/30/2021),. BG levels 167  Strict BG goal 110- 140 to aid with wound healing  -Insulin drip    -Hypoglycemia protocol   -POCT    Hematology/Oncology:  Hb 9.2 -> 8.0, 1 unit of RBC transfused. -Monitor H/H q12h    Code Status: Full Code  FEN: Diet NPO  PPX: SCDs.  protonix  DISPO: ICU    Reyne Bias, MD, PGY-1  Internal medicine resident  12/05/21  5:31 AM    This patient will be staffed and discussed with Ari Iyer MD.     Patient was seen, examined and discussed with LIZA Oh agree with the interval history. My physical exam confirms the findings listed below  Chart was reviewed including labs and medical records confirm the findings noted     Extubated on 12/2  Necrotizing fasciitis, had an OR visit this morning    DKA - resolved.    Leukocytosis jumped this morning to 27  Osteomyelitis   DM - not compliant      Continue merrem and vanc  Strict blood sugar control 110 to 140  IVF  Discussed with plastic surgery.   Keep on zero carb diet

## 2021-12-05 NOTE — PLAN OF CARE
Problem: Activity:  Goal: Ability to return to normal activity level will improve  Description: Ability to return to normal activity level will improve  Outcome: Ongoing     Problem:  Bowel/Gastric:  Goal: Gastrointestinal status for postoperative course will improve  Description: Gastrointestinal status for postoperative course will improve  Outcome: Ongoing     Problem: Health Behavior:  Goal: Identification of resources available to assist in meeting health care needs will improve  Description: Identification of resources available to assist in meeting health care needs will improve  Outcome: Ongoing     Problem: Physical Regulation:  Goal: Postoperative complications will be avoided or minimized  Description: Postoperative complications will be avoided or minimized  Outcome: Ongoing     Problem: Respiratory:  Goal: Ability to achieve and maintain a regular respiratory rate will improve  Description: Ability to achieve and maintain a regular respiratory rate will improve  Outcome: Ongoing     Problem: Safety:  Goal: Ability to remain free from injury will improve  Description: Ability to remain free from injury will improve  Outcome: Ongoing     Problem: Sensory:  Goal: General experience of comfort will improve  Description: General experience of comfort will improve  Outcome: Ongoing     Problem: Skin Integrity:  Goal: Demonstration of wound healing without infection will improve  Description: Demonstration of wound healing without infection will improve  Outcome: Ongoing  Goal: Will show no infection signs and symptoms  Description: Will show no infection signs and symptoms  Outcome: Ongoing  Goal: Absence of new skin breakdown  Description: Absence of new skin breakdown  Outcome: Ongoing     Problem: Infection - Surgical Site:  Goal: Will show no infection signs and symptoms  Description: Will show no infection signs and symptoms  Outcome: Ongoing     Problem: Falls - Risk of:  Goal: Will remain free from falls  Description: Will remain free from falls  Outcome: Ongoing  Goal: Absence of physical injury  Description: Absence of physical injury  Outcome: Ongoing     Problem: ABCDS Injury Assessment  Goal: Absence of physical injury  Outcome: Ongoing     Problem: Nutrition  Goal: Optimal nutrition therapy  Outcome: Ongoing

## 2021-12-05 NOTE — ANESTHESIA POSTPROCEDURE EVALUATION
Department of Anesthesiology  Postprocedure Note    Patient: Dayron Rico  MRN: [id number]  [place]: 1955  Date of evaluation: 12/5/2021  Time:  10:14 AM     Procedure Summary     Date: 12/05/21 Room / Location: [address] [place] [address]    Anesthesia Start: 2713 Anesthesia Stop:     Procedure: ABDOMINAL WALL AND LEFT BUTTOCK DEBRIDEMENT, WOUND VAC PLACEMENT (Left Abdomen) Diagnosis: (NECROTIZING FASCIITIS LEFT BUTTOCK AND ABDOMEN)    Surgeons: Tori Piña MD Responsible Provider: Liban Villar DO    Anesthesia Type: general ASA Status: 3 - Emergent          Anesthesia Type: No value filed. [place]cadio Phase I:      Arcadio Phase II:      Last vitals: Reviewed and per EMR flowsheets.        Anesthesia Post Evaluation    Patient location during evaluation: ICU  Patient participation: complete - patient participated  Level of consciousness: awake  Pain score: 0  Airway patency: patent  Nausea & Vomiting: no nausea and no vomiting  Complications: no  Cardiovascular status: blood pressure returned to baseline  Respiratory status: acceptable  Hydration status: euvolemic

## 2021-12-05 NOTE — BRIEF OP NOTE
Brief Postoperative Note      Patient: Tono Clemons  YOB: 1955  MRN: [id number]    Date of Procedure: 12/5/2021    Pre-Op Diagnosis: NECROTIZING FASCIITIS LEFT BUTTOCK AND ABDOMEN    Post-Op Diagnosis: Same       Procedure(s):  ABDOMINAL WALL AND LEFT BUTTOCK DEBRIDEMENT, WOUND VAC PLACEMENT    Surgeon(s):  Darling Bianchi MD    Assistant:  Surgical Assistant: Jonny Guevara  Resident: César Gomez DO    Anesthesia: General    Estimated Blood Loss (mL): 97MO    Complications: None    Specimens:   ID Type Source Tests Collected by Time Destination   1 : PENILE TISSUE Tissue Tissue CULTURE, FUNGUS, CULTURE, TISSUE, CULTURE WITH SMEAR, ACID FAST BACILLIUS Darling Bianchi MD 12/5/2021 0914        Implants:  * No implants in log *      Drains:   Negative Pressure Wound Therapy Abdomen Mid (Active)   Wound Type Surgical 12/05/21 1600   Dressing Type Black foam 12/05/21 1600   Cycle Continuous 12/05/21 1600   Target Pressure (mmHg) 125 12/05/21 1600   Irrigation Solution Dakins 0.125% 12/05/21 1200   Canister changed? Yes 12/05/21 1600   Dressing Status Clean; Dry; Intact 12/05/21 1600   Dressing Changed Changed/New 12/05/21 1600   Output (ml) 0 ml 12/05/21 1600   Odor None 12/05/21 1600       Urethral Catheter Non-latex 18 fr (Active)   Catheter Indications Assist in healing of open sacral or perineal wounds (Stage III, IV, or unstageable documented by a provider or enterostomal therapy) and/or full thickness perineal and lower extremity burns in incontinent patients 12/05/21 1600   Securement Device Date Changed 12/01/21 12/05/21 1600   Site Assessment [place] 12/05/21 1600   Urine Color Yellow 12/05/21 1600   Urine Appearance Hazy 12/05/21 1600   Output (mL) 85 mL 12/05/21 1600       [REMOVED] NG/OG/NJ/NE Tube Orogastric 16 fr Center mouth (Removed)   Surrounding Skin Dry;  Intact 12/01/21 2200   Securement device Yes 12/02/21 0800   Status Clamped 12/02/21 0800   Placement Verified by External Catheter Length 12/02/21 0800   NG/OG/NJ/NE External Measurement (cm) 50 cm 12/02/21 0800   Drainage Appearance Bile 12/01/21 2200   Tube Feeding Intake (mL) 0 ml 12/01/21 1800   Free Water Flush (mL) 0 mL 12/01/21 1800   Free Water Rate 0 12/01/21 0800   Residual Volume (ml) 0 ml 12/01/21 0000       [REMOVED] NG/OG/NJ/NE Tube Left nostril (Removed)       Findings: Free exposed right testicle placed between scrotal and thigh skin, which was closed over the testicle with interrupted 4-0 Prolene sutures. Minimal debridement performed to healthy tissue, sent for culture. Vera flow wound VAC fitted and placed.     Electronically signed by No Mosley DO on 12/5/2021 at 4:50 PM

## 2021-12-05 NOTE — PLAN OF CARE
Problem: Activity:  Goal: Ability to return to normal activity level will improve  Description: Ability to return to normal activity level will improve  12/5/2021 0724 by Jonas Kingsley RN  Outcome: Ongoing  12/5/2021 0354 by Erin Lainez RN  Outcome: Ongoing     Problem:  Bowel/Gastric:  Goal: Gastrointestinal status for postoperative course will improve  Description: Gastrointestinal status for postoperative course will improve  12/5/2021 0724 by Jonas Kingsley RN  Outcome: Ongoing  12/5/2021 0354 by Erin Lainez RN  Outcome: Ongoing     Problem: Health Behavior:  Goal: Identification of resources available to assist in meeting health care needs will improve  Description: Identification of resources available to assist in meeting health care needs will improve  12/5/2021 0724 by Jonas Kingsley RN  Outcome: Ongoing  12/5/2021 0354 by Erin Lainez RN  Outcome: Ongoing     Problem: Physical Regulation:  Goal: Postoperative complications will be avoided or minimized  Description: Postoperative complications will be avoided or minimized  12/5/2021 0724 by Jonas Kingsley RN  Outcome: Ongoing  12/5/2021 0354 by Erin Lainez RN  Outcome: Ongoing     Problem: Respiratory:  Goal: Ability to achieve and maintain a regular respiratory rate will improve  Description: Ability to achieve and maintain a regular respiratory rate will improve  12/5/2021 0724 by Jonas Kingsley RN  Outcome: Ongoing  12/5/2021 0354 by Erin Lainez RN  Outcome: Ongoing     Problem: Safety:  Goal: Ability to remain free from injury will improve  Description: Ability to remain free from injury will improve  12/5/2021 0724 by Jonas Kingsley RN  Outcome: Ongoing  12/5/2021 0354 by Erin Lainez RN  Outcome: Ongoing     Problem: Sensory:  Goal: General experience of comfort will improve  Description: General experience of comfort will improve  12/5/2021 0724 by Jonas Kingsley RN  Outcome: Ongoing  12/5/2021 0354 by Erin Lainez RN  Outcome: Ongoing     Problem: Skin Integrity:  Goal: Demonstration of wound healing without infection will improve  Description: Demonstration of wound healing without infection will improve  12/5/2021 0724 by Bonny Hernandes RN  Outcome: Ongoing  12/5/2021 0354 by Kandice Kulkarni RN  Outcome: Ongoing  Goal: Will show no infection signs and symptoms  Description: Will show no infection signs and symptoms  12/5/2021 0724 by Bonny Hernandes RN  Outcome: Ongoing  12/5/2021 0354 by Kandice Kulkarni RN  Outcome: Ongoing  Goal: Absence of new skin breakdown  Description: Absence of new skin breakdown  12/5/2021 0724 by Bonny Hernandes RN  Outcome: Ongoing  12/5/2021 0354 by Kandice Kulkarni RN  Outcome: Ongoing     Problem: Infection - Surgical Site:  Goal: Will show no infection signs and symptoms  Description: Will show no infection signs and symptoms  12/5/2021 0724 by Bonny Hernandes RN  Outcome: Ongoing  12/5/2021 0354 by Kandice Kulkarni RN  Outcome: Ongoing     Problem: Falls - Risk of:  Goal: Will remain free from falls  Description: Will remain free from falls  12/5/2021 0724 by Bonny Hernandes RN  Outcome: Ongoing  12/5/2021 0354 by Kandice Kulkarni RN  Outcome: Ongoing  Goal: Absence of physical injury  Description: Absence of physical injury  12/5/2021 0724 by Bonny Hernandes RN  Outcome: Ongoing  12/5/2021 0354 by Kandice Kulkarni RN  Outcome: Ongoing     Problem: ABCDS Injury Assessment  Goal: Absence of physical injury  12/5/2021 0724 by Bonny Hernandes RN  Outcome: Ongoing  12/5/2021 0354 by Kandice Kulkarni RN  Outcome: Ongoing     Problem: Nutrition  Goal: Optimal nutrition therapy  12/5/2021 0724 by Bonny Hernandes RN  Outcome: Ongoing  12/5/2021 0354 by Kandice Kulkarni RN  Outcome: Ongoing

## 2021-12-05 NOTE — PROGRESS NOTES
Plastic Surgery  Post-operative Note      Procedure(s) Performed: Abdominal wall and perineum debridement, sutured protective skin flap in groin for right testicle, wound VAC placement    Subjective:   Patient's pain is controlled, denies nausea or vomiting. Denies testicular pain. Tolerating diet. Patient has not gotten OOB. Voiding via Mcfadden catheter. Passing flatus, denies BM at this time. Objective:  Anesthesia type: General      I/O    Intra op    Post op     Fluids  700 mL 1000 mL     EBL 10 mL 0 mL     Urine 50 mL 545 mL     Vitals:   Vitals:    12/05/21 1330 12/05/21 1400 12/05/21 1500 12/05/21 1600   BP: (!) 157/79 (!) 146/72 127/67 124/67   Pulse: 110 110 100 98   Resp: 21 21 19 21   Temp:    98 °F (36.7 °C)   TempSrc:    Oral   SpO2: 93% 95% 93% 94%   Weight:       Height:           Physical Exam:  Post-op vital signs:  Stable   General appearance: alert, no acute distress, grooming appropriate  Eyes: No scleral icterus, EOM grossly intact  Neck: trachea midline, no JVD, no lymphadenopathy, neck supple  Chest/Lungs: normal effort with no accessory muscle use on 2L NC  Cardiovascular: RRR, brisk capillary refill distally  Abdomen: Soft, large surgical debridement area of the abdomen measuring 60 x 23 x 3 cm in gluteal region 20 x 15 cm with veraflo VAC in place holding adequate suction and instilling Dakin's quarter strength  Skin: warm and dry, no rashes  Extremities: full-thickness ulceration on lateral aspect of L foot, no edema, no cyanosis, decreased sensation along entirety of bilateral feet. No DP/PT pulse appreciated with doppler.   Genitourinary: Mcfadden in place with dark yellow urine,  Neuro: A&Ox3, no focal deficits, sensation intact    Assessment and Plan  This is a 63y.o. 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 (12/5) POD0.     Pain management: PRN tylenol, fentanyl drip at 12.5mcg/hr  Cardiovasc: hemodynamically stable, will continue to monitor  Respiratory:  IS ordered to bedside, encourage hourly IS and deep breathing, wean oxygen as tolerated  Fluids:  , Diet: General diet with carb control  : Urine output is adequate, continue Mcfadden catheter  Ambulation: OOB to chair, encourage ambulation  Prophylaxis: SCDs, lovenox  Antibiotics: Merrem, vancomycin  Wound: Continue veraflo wound VAC with quarter strength Dakin's    -Continue aggressive glucose control with insulin drip      Jayro King DO  PGY1, General Surgery  12/05/21  4:55 PM  [phone]

## 2021-12-06 LAB
ANION GAP SERPL CALCULATED.3IONS-SCNC: 6 MMOL/L (ref 3–16)
BASOPHILS ABSOLUTE: 0 K/UL (ref 0–0.2)
BASOPHILS RELATIVE PERCENT: 0 %
BUN BLDV-MCNC: 16 MG/DL (ref 7–20)
CALCIUM SERPL-MCNC: 7.5 MG/DL (ref 8.3–10.6)
CHLORIDE BLD-SCNC: 107 MMOL/L (ref 99–110)
CO2: 26 MMOL/L (ref 21–32)
CREAT SERPL-MCNC: 0.6 MG/DL (ref 0.8–1.3)
EOSINOPHILS ABSOLUTE: 0 K/UL (ref 0–0.6)
EOSINOPHILS RELATIVE PERCENT: 0 %
GFR AFRICAN AMERICAN: >60
GFR NON-AFRICAN AMERICAN: >60
GLUCOSE BLD-MCNC: 100 MG/DL (ref 70–99)
GLUCOSE BLD-MCNC: 101 MG/DL (ref 70–99)
GLUCOSE BLD-MCNC: 107 MG/DL (ref 70–99)
GLUCOSE BLD-MCNC: 112 MG/DL (ref 70–99)
GLUCOSE BLD-MCNC: 113 MG/DL (ref 70–99)
GLUCOSE BLD-MCNC: 113 MG/DL (ref 70–99)
GLUCOSE BLD-MCNC: 117 MG/DL (ref 70–99)
GLUCOSE BLD-MCNC: 119 MG/DL (ref 70–99)
GLUCOSE BLD-MCNC: 122 MG/DL (ref 70–99)
GLUCOSE BLD-MCNC: 123 MG/DL (ref 70–99)
GLUCOSE BLD-MCNC: 123 MG/DL (ref 70–99)
GLUCOSE BLD-MCNC: 124 MG/DL (ref 70–99)
GLUCOSE BLD-MCNC: 125 MG/DL (ref 70–99)
GLUCOSE BLD-MCNC: 126 MG/DL (ref 70–99)
GLUCOSE BLD-MCNC: 131 MG/DL (ref 70–99)
GLUCOSE BLD-MCNC: 131 MG/DL (ref 70–99)
GLUCOSE BLD-MCNC: 156 MG/DL (ref 70–99)
GLUCOSE BLD-MCNC: 161 MG/DL (ref 70–99)
GLUCOSE BLD-MCNC: 169 MG/DL (ref 70–99)
GLUCOSE BLD-MCNC: 175 MG/DL (ref 70–99)
GLUCOSE BLD-MCNC: 93 MG/DL (ref 70–99)
GLUCOSE BLD-MCNC: 98 MG/DL (ref 70–99)
GLUCOSE BLD-MCNC: 99 MG/DL (ref 70–99)
HCT VFR BLD CALC: 24.5 % (ref 40.5–52.5)
HEMATOLOGY PATH CONSULT: NORMAL
HEMATOLOGY PATH CONSULT: YES
HEMOGLOBIN: 8.1 G/DL (ref 13.5–17.5)
LYMPHOCYTES ABSOLUTE: 1.8 K/UL (ref 1–5.1)
LYMPHOCYTES RELATIVE PERCENT: 9 %
MAGNESIUM: 1.9 MG/DL (ref 1.8–2.4)
MCH RBC QN AUTO: 29.9 PG (ref 26–34)
MCHC RBC AUTO-ENTMCNC: 32.8 G/DL (ref 31–36)
MCV RBC AUTO: 91.2 FL (ref 80–100)
METAMYELOCYTES RELATIVE PERCENT: 5 %
MONOCYTES ABSOLUTE: 0.8 K/UL (ref 0–1.3)
MONOCYTES RELATIVE PERCENT: 4 %
MYELOCYTE PERCENT: 2 %
NEUTROPHILS ABSOLUTE: 17.4 K/UL (ref 1.7–7.7)
NEUTROPHILS RELATIVE PERCENT: 79 %
PDW BLD-RTO: 14.6 % (ref 12.4–15.4)
PERFORMED ON: ABNORMAL
PERFORMED ON: NORMAL
PLASMA CELLS PERCENT: 1 %
PLATELET # BLD: 242 K/UL (ref 135–450)
PMV BLD AUTO: 8.6 FL (ref 5–10.5)
POTASSIUM REFLEX MAGNESIUM: 4.1 MMOL/L (ref 3.5–5.1)
RBC # BLD: 2.69 M/UL (ref 4.2–5.9)
RBC # BLD: NORMAL 10*6/UL
SODIUM BLD-SCNC: 139 MMOL/L (ref 136–145)
WBC # BLD: 20.2 K/UL (ref 4–11)

## 2021-12-06 PROCEDURE — 6360000002 HC RX W HCPCS

## 2021-12-06 PROCEDURE — 2580000003 HC RX 258

## 2021-12-06 PROCEDURE — 99232 SBSQ HOSP IP/OBS MODERATE 35: CPT | Performed by: SURGERY

## 2021-12-06 PROCEDURE — 2580000003 HC RX 258: Performed by: STUDENT IN AN ORGANIZED HEALTH CARE EDUCATION/TRAINING PROGRAM

## 2021-12-06 PROCEDURE — 2000000000 HC ICU R&B

## 2021-12-06 PROCEDURE — 80048 BASIC METABOLIC PNL TOTAL CA: CPT

## 2021-12-06 PROCEDURE — 6360000002 HC RX W HCPCS: Performed by: STUDENT IN AN ORGANIZED HEALTH CARE EDUCATION/TRAINING PROGRAM

## 2021-12-06 PROCEDURE — 36415 COLL VENOUS BLD VENIPUNCTURE: CPT

## 2021-12-06 PROCEDURE — 99233 SBSQ HOSP IP/OBS HIGH 50: CPT | Performed by: INTERNAL MEDICINE

## 2021-12-06 PROCEDURE — 85025 COMPLETE CBC W/AUTO DIFF WBC: CPT

## 2021-12-06 PROCEDURE — 36592 COLLECT BLOOD FROM PICC: CPT

## 2021-12-06 PROCEDURE — 6370000000 HC RX 637 (ALT 250 FOR IP)

## 2021-12-06 PROCEDURE — 83735 ASSAY OF MAGNESIUM: CPT

## 2021-12-06 PROCEDURE — 6370000000 HC RX 637 (ALT 250 FOR IP): Performed by: STUDENT IN AN ORGANIZED HEALTH CARE EDUCATION/TRAINING PROGRAM

## 2021-12-06 RX ORDER — MAGNESIUM SULFATE IN WATER 40 MG/ML
2000 INJECTION, SOLUTION INTRAVENOUS ONCE
Status: COMPLETED | OUTPATIENT
Start: 2021-12-06 | End: 2021-12-06

## 2021-12-06 RX ORDER — OXYCODONE HYDROCHLORIDE 5 MG/1
10 TABLET ORAL EVERY 4 HOURS PRN
Status: DISCONTINUED | OUTPATIENT
Start: 2021-12-06 | End: 2022-01-11 | Stop reason: HOSPADM

## 2021-12-06 RX ORDER — ACETAMINOPHEN 500 MG
1000 TABLET ORAL EVERY 6 HOURS
Status: DISCONTINUED | OUTPATIENT
Start: 2021-12-06 | End: 2022-01-11 | Stop reason: HOSPADM

## 2021-12-06 RX ORDER — FLUCONAZOLE 2 MG/ML
400 INJECTION, SOLUTION INTRAVENOUS EVERY 24 HOURS
Status: DISCONTINUED | OUTPATIENT
Start: 2021-12-06 | End: 2021-12-06 | Stop reason: ALTCHOICE

## 2021-12-06 RX ORDER — SODIUM CHLORIDE, SODIUM LACTATE, POTASSIUM CHLORIDE, AND CALCIUM CHLORIDE .6; .31; .03; .02 G/100ML; G/100ML; G/100ML; G/100ML
500 INJECTION, SOLUTION INTRAVENOUS ONCE
Status: COMPLETED | OUTPATIENT
Start: 2021-12-06 | End: 2021-12-06

## 2021-12-06 RX ORDER — OXYCODONE HYDROCHLORIDE 5 MG/1
5 TABLET ORAL EVERY 4 HOURS PRN
Status: DISCONTINUED | OUTPATIENT
Start: 2021-12-06 | End: 2022-01-11 | Stop reason: HOSPADM

## 2021-12-06 RX ADMIN — SODIUM CHLORIDE, POTASSIUM CHLORIDE, SODIUM LACTATE AND CALCIUM CHLORIDE: 600; 310; 30; 20 INJECTION, SOLUTION INTRAVENOUS at 01:17

## 2021-12-06 RX ADMIN — OXYCODONE HYDROCHLORIDE 10 MG: 5 TABLET ORAL at 10:50

## 2021-12-06 RX ADMIN — SODIUM CHLORIDE, POTASSIUM CHLORIDE, SODIUM LACTATE AND CALCIUM CHLORIDE: 600; 310; 30; 20 INJECTION, SOLUTION INTRAVENOUS at 17:03

## 2021-12-06 RX ADMIN — MAGNESIUM SULFATE HEPTAHYDRATE 2000 MG: 2 INJECTION, SOLUTION INTRAVENOUS at 06:50

## 2021-12-06 RX ADMIN — ACETAMINOPHEN 1000 MG: 500 TABLET ORAL at 08:40

## 2021-12-06 RX ADMIN — VANCOMYCIN HYDROCHLORIDE 1250 MG: 10 INJECTION, POWDER, LYOPHILIZED, FOR SOLUTION INTRAVENOUS at 04:20

## 2021-12-06 RX ADMIN — MEROPENEM 1000 MG: 1 INJECTION, POWDER, FOR SOLUTION INTRAVENOUS at 02:41

## 2021-12-06 RX ADMIN — OXYCODONE HYDROCHLORIDE 10 MG: 5 TABLET ORAL at 18:08

## 2021-12-06 RX ADMIN — ACETAMINOPHEN 1000 MG: 500 TABLET ORAL at 19:57

## 2021-12-06 RX ADMIN — ACETAMINOPHEN 1000 MG: 500 TABLET ORAL at 15:00

## 2021-12-06 RX ADMIN — ENOXAPARIN SODIUM 40 MG: 100 INJECTION SUBCUTANEOUS at 08:40

## 2021-12-06 RX ADMIN — SODIUM CHLORIDE, POTASSIUM CHLORIDE, SODIUM LACTATE AND CALCIUM CHLORIDE: 600; 310; 30; 20 INJECTION, SOLUTION INTRAVENOUS at 08:40

## 2021-12-06 RX ADMIN — MEROPENEM 1000 MG: 1 INJECTION, POWDER, FOR SOLUTION INTRAVENOUS at 10:50

## 2021-12-06 RX ADMIN — DEXTROSE MONOHYDRATE 200 MG: 50 INJECTION, SOLUTION INTRAVENOUS at 14:55

## 2021-12-06 RX ADMIN — MEROPENEM 1000 MG: 1 INJECTION, POWDER, FOR SOLUTION INTRAVENOUS at 18:10

## 2021-12-06 RX ADMIN — SODIUM CHLORIDE 0.66 UNITS/HR: 900 INJECTION, SOLUTION INTRAVENOUS at 06:14

## 2021-12-06 RX ADMIN — SODIUM CHLORIDE, PRESERVATIVE FREE 10 ML: 5 INJECTION INTRAVENOUS at 20:00

## 2021-12-06 RX ADMIN — ACETAMINOPHEN 1000 MG: 500 TABLET ORAL at 02:30

## 2021-12-06 RX ADMIN — SODIUM CHLORIDE, POTASSIUM CHLORIDE, SODIUM LACTATE AND CALCIUM CHLORIDE 500 ML: 600; 310; 30; 20 INJECTION, SOLUTION INTRAVENOUS at 23:24

## 2021-12-06 RX ADMIN — VANCOMYCIN HYDROCHLORIDE 1250 MG: 10 INJECTION, POWDER, LYOPHILIZED, FOR SOLUTION INTRAVENOUS at 17:03

## 2021-12-06 RX ADMIN — OXYCODONE HYDROCHLORIDE 10 MG: 5 TABLET ORAL at 23:22

## 2021-12-06 ASSESSMENT — ENCOUNTER SYMPTOMS
CHEST TIGHTNESS: 0
NAUSEA: 0
ABDOMINAL PAIN: 0
ALLERGIC/IMMUNOLOGIC NEGATIVE: 1
VOMITING: 0
EYES NEGATIVE: 1
SHORTNESS OF BREATH: 0

## 2021-12-06 ASSESSMENT — PAIN SCALES - GENERAL
PAINLEVEL_OUTOF10: 0
PAINLEVEL_OUTOF10: 5
PAINLEVEL_OUTOF10: 0
PAINLEVEL_OUTOF10: 4
PAINLEVEL_OUTOF10: 0
PAINLEVEL_OUTOF10: 4
PAINLEVEL_OUTOF10: 3
PAINLEVEL_OUTOF10: 5
PAINLEVEL_OUTOF10: 0
PAINLEVEL_OUTOF10: 7
PAINLEVEL_OUTOF10: 3
PAINLEVEL_OUTOF10: 8
PAINLEVEL_OUTOF10: 8
PAINLEVEL_OUTOF10: 5
PAINLEVEL_OUTOF10: 0

## 2021-12-06 ASSESSMENT — PAIN DESCRIPTION - ONSET
ONSET: ON-GOING
ONSET: ON-GOING

## 2021-12-06 ASSESSMENT — PAIN DESCRIPTION - ORIENTATION
ORIENTATION: MID
ORIENTATION: MID

## 2021-12-06 ASSESSMENT — PAIN DESCRIPTION - LOCATION
LOCATION: UMBILICUS

## 2021-12-06 ASSESSMENT — PAIN DESCRIPTION - PROGRESSION
CLINICAL_PROGRESSION: GRADUALLY IMPROVING

## 2021-12-06 ASSESSMENT — PAIN DESCRIPTION - DESCRIPTORS
DESCRIPTORS: DISCOMFORT
DESCRIPTORS: DISCOMFORT

## 2021-12-06 ASSESSMENT — PAIN DESCRIPTION - PAIN TYPE
TYPE: SURGICAL PAIN
TYPE: SURGICAL PAIN;ACUTE PAIN
TYPE: SURGICAL PAIN

## 2021-12-06 ASSESSMENT — PAIN DESCRIPTION - FREQUENCY
FREQUENCY: CONTINUOUS
FREQUENCY: CONTINUOUS

## 2021-12-06 ASSESSMENT — PAIN - FUNCTIONAL ASSESSMENT
PAIN_FUNCTIONAL_ASSESSMENT: ACTIVITIES ARE NOT PREVENTED
PAIN_FUNCTIONAL_ASSESSMENT: ACTIVITIES ARE NOT PREVENTED

## 2021-12-06 NOTE — OP NOTE
[place] [address] SURGERY     OPERATIVE DICTATION    NAME: Sandy Ferrera   MRN: [id number]  DATE: 12/5/2021     AGE: 63 y.o. LOCATION: [facility]    PREOPERATIVE DIAGNOSIS:  Shayla's gangrene with necrotizing fasciitis     POSTOPERATIVE DIAGNOSIS:  Same. OPERATION PERFORMED: 1) Excisional debridement of perineum, and gluteal region (10 x 5 cm)       2) Application of Veraflo instillation vac therapy     SURGEON:  Trev Trent MD    ASSISTANT: Surendra Schneider (PGY1)     ANESTHESIA: General     ESTIMATED BLOOD LOSS:  Minimal cc     DRAINS:  Vac     SPECIMENS: Tissue for culture     OPERATIVE INDICATIONS:  This is an unfortunate 63 y.o. male with a history of poorly controlled diabetes who developed Shayla's gangrene at an outside hospital.  He underwent debridement with both Urology and General surgery, however was noted to have additional crepitance. Given this, he was urgently transferred to our hospital and was taken to the operating room by general surgery for extensive debridement. Plastic surgery was consulted for assistance with management and he is brought to the operating room today for additional planned evaluation / debridement as necessary. The risks, benefits, alternatives, outcomes, and personnel involved was performed with the patient. After all questions were answered to the patient's satisfaction, they agreed to proceed. OPERATIVE PROCEDURE:  The patient was brought to the operating room on his ICU bed and placed in the supine position on the operating room table. He underwent general anesthesia without difficulty, was then placed in the lithotomy position, and was prepped and draped in the usual sterile manner. A time-out was performed confirming the patient and the procedure to be performed. The operation commenced by evaluating the wound.  There was no additional crepitance in the upper abdomen, however there was significant fibrinous exudate in the perineum. This area was debrided sharply with a 15 blade / scissors / and electrocautery. The entirety of the wound was then copiously irrigated with 3L of saline solution using a Pulse lavage. A complex wound vacuum device was then fashioned and applied with good seal.    The patient was then placed supine, extubated, and taken back to the ICU in stable, yet critical condition. There were no immediate complications and the patient tolerated the procedure well. At the end of the case, all counts were correct. PLAN: Will return for additional planned dressing change under anesthesia and possible colostomy in 48 hours.      Jodi Lucas MD

## 2021-12-06 NOTE — PROGRESS NOTES
Patient dropped in the 40% while asleep. Surgical resident made aware and at bedside. Fentanyl discontinued and PRNs were ordered for pain.

## 2021-12-06 NOTE — PLAN OF CARE
Problem: Activity:  Goal: Ability to return to normal activity level will improve  Description: Ability to return to normal activity level will improve  12/6/2021 0746 by Foster Grant RN  Outcome: Ongoing  12/6/2021 0502 by Doug Herrera RN  Outcome: Ongoing     Problem:  Bowel/Gastric:  Goal: Gastrointestinal status for postoperative course will improve  Description: Gastrointestinal status for postoperative course will improve  12/6/2021 0746 by Foster Grant RN  Outcome: Ongoing  12/6/2021 0502 by Doug Herrera RN  Outcome: Ongoing     Problem: Health Behavior:  Goal: Identification of resources available to assist in meeting health care needs will improve  Description: Identification of resources available to assist in meeting health care needs will improve  Outcome: Ongoing     Problem: Physical Regulation:  Goal: Postoperative complications will be avoided or minimized  Description: Postoperative complications will be avoided or minimized  Outcome: Ongoing     Problem: Respiratory:  Goal: Ability to achieve and maintain a regular respiratory rate will improve  Description: Ability to achieve and maintain a regular respiratory rate will improve  Outcome: Ongoing     Problem: Safety:  Goal: Ability to remain free from injury will improve  Description: Ability to remain free from injury will improve  12/6/2021 0746 by Foster Grant RN  Outcome: Ongoing  12/6/2021 0502 by Doug Herrera RN  Outcome: Ongoing     Problem: Sensory:  Goal: General experience of comfort will improve  Description: General experience of comfort will improve  Outcome: Ongoing     Problem: Skin Integrity:  Goal: Demonstration of wound healing without infection will improve  Description: Demonstration of wound healing without infection will improve  12/6/2021 0746 by Foster Grant RN  Outcome: Ongoing  12/6/2021 0502 by Doug Herrera RN  Outcome: Ongoing  Goal: Will show no infection signs and symptoms  Description: Will show no infection signs and symptoms  12/6/2021 0746 by Esha Fernández RN  Outcome: Ongoing  12/6/2021 0502 by Severo Motts, RN  Outcome: Ongoing  Goal: Absence of new skin breakdown  Description: Absence of new skin breakdown  12/6/2021 0746 by Esha Fernández RN  Outcome: Ongoing  12/6/2021 0502 by Severo Motts, RN  Outcome: Ongoing     Problem: Infection - Surgical Site:  Goal: Will show no infection signs and symptoms  Description: Will show no infection signs and symptoms  12/6/2021 0746 by Esha Fernández RN  Outcome: Ongoing  12/6/2021 0502 by Severo Motts, RN  Outcome: Ongoing     Problem: Falls - Risk of:  Goal: Will remain free from falls  Description: Will remain free from falls  12/6/2021 0746 by Esha Fernández RN  Outcome: Ongoing  12/6/2021 0502 by Severo Motts, RN  Outcome: Ongoing  Goal: Absence of physical injury  Description: Absence of physical injury  12/6/2021 0746 by Esha Fernández RN  Outcome: Ongoing  12/6/2021 0502 by Severo Motts, RN  Outcome: Ongoing     Problem: ABCDS Injury Assessment  Goal: Absence of physical injury  Outcome: Ongoing     Problem: Nutrition  Goal: Optimal nutrition therapy  Outcome: Ongoing

## 2021-12-06 NOTE — PROGRESS NOTES
ICU SURGERY DAILY PROGRESS NOTE    CC: Shayla's gangrene  Procedure: Wide excision perineum, back, abdominal wall    SUBJECTIVE:   Interval Hx:  Patient rested well overnight. No acute events. Remains on an insulin gtt this morning with blood glucose under strict control. The patient is otherwise afebrile and HDS off pressors. Tolerating a diet without any nausea or vomiting. Pain is well controlled. ROS: A 10 point review of systems was conducted, significant findings as noted above. All other systems negative. OBJECTIVE:   Vitals:   Vitals:    12/06/21 0200 12/06/21 0300 12/06/21 0400 12/06/21 0500   BP: 127/64 130/72 116/65 139/67   Pulse: 87 93 94 90   Resp: 16 17 15 14   Temp:   98 °F (36.7 °C)    TempSrc:   Oral    SpO2: 95% 92% 92% 93%   Weight:       Height:           I/O:     Intake/Output Summary (Last 24 hours) at 12/6/2021 0625  Last data filed at 12/6/2021 7215  Gross per 24 hour   Intake 5498.01 ml   Output 1985 ml   Net 3513.01 ml     I/O last 3 completed shifts: In: 2498.6 [P.O.:470; I.V.:1228.6; IV Piggyback:800.1]  Out: 2038 [NFYLQ:4415; Drains:200; Blood:10]    Diet: ADULT DIET; Regular; 3 carb choices (45 gm/meal)      Physical Examination:   General appearance: alert, no acute distress, groomed appropriately   HENT: NC/AT, MMM & intact, no JVD, neck supple, trachea midline  Eyes: No scleral icterus, EOMI  Chest/Lungs: CTAB, normal effort on 2L NC, no accessory muscles used for breathing. Cardiovascular: RRR  Abdomen: Soft, large surgical debridement area of the abdomen measuring 60 x 23 x 3 cm in gluteal region 20 x 15 cm with veraflo VAC in place holding adequate suction and instilling Dakin's quarter strength  Extremities:  full-thickness ulceration on lateral aspect of L foot, no edema, no cyanosis, decreased sensation along entirety of bilateral feet. No DP/PT pulse appreciated with doppler.    : walsh in place with clear yellow urine, no signs of further necrotizing fasciitis requiring further debridement of L lower scrotum, no subcutaneous crepitus noted, skin edges with healthy granulation tissue with appropriate bleeding, non-malodorous   Neurologic: A&Ox3, decrease sensation to bilateral lower extremities. Labs:  CBC:   Recent Labs     12/04/21  0505 12/05/21  0430 12/06/21  0418   WBC 15.8* 27.7* 20.2*   HGB 8.0* 9.3* 8.1*   HCT 25.1* 28.0* 24.5*    245 242       BMP:   Recent Labs     12/04/21  0900 12/05/21  0430 12/06/21  0418    140 139   K 3.5 4.0 4.1    111* 107   CO2 23 24 26   BUN 18 19 16   CREATININE <0.5* 0.6* 0.6*   GLUCOSE 123* 128* 112*     LFT's:   No results for input(s): AST, ALT, ALB, BILITOT, ALKPHOS in the last 72 hours. Troponin:   No results for input(s): TROPONINI in the last 72 hours. BNP: No results for input(s): BNP in the last 72 hours. ABGs:   Recent Labs     12/04/21  0505   PHART 7.444   [id number] 40.3   PO2ART 71.5*     INR:   No results for input(s): INR in the last 72 hours. U/A:No results for input(s): NITRITE, COLORU, PHUR, LABCAST, WBCUA, RBCUA, MUCUS, TRICHOMONAS, YEAST, BACTERIA, CLARITYU, SPECGRAV, LEUKOCYTESUR, UROBILINOGEN, BILIRUBINUR, BLOODU, GLUCOSEU, AMORPHOUS in the last 72 hours. Invalid input(s): Danita Senior     Rad:   VL DUP LOWER EXTREMITY ARTERIES BILATERAL   Final Result          ASSESSMENT AND PLAN:   Lynn Whiteside is a 63 y.o. male with Necrotizing fasciitis of the abdominal wall, gluteal region, and scrotum s/p wide excision of perineum, back, and abdominal wall.   Abdominal wall wound measuring 60 cm x 23 cm x 3cm and gluteal wound measures 20 cm x 15cm (12/1), returned to the OR for further debridement or right abdominal wall, and placement of testicle within a thigh flap (12/3), followed by minimal abdominal wall and perineum debridement, sutured protective skin flap in groin for right testicle, and placement of a wound VAC (12/5)    - Continue instill wound vac with 1/4 strength Dakins, per plastic surgery  - Continue with aggressive glucose control  - Planning for diverting ostomy on Tuesday, if patient does not require further debridement. Will pre-op and consent the patient today.   - Will plan to return to the OR later this week for a wound vac change, timing TBD    Zoe Hernandez DO, MS  PGY1, General Surgery  12/06/21  6:25 AM

## 2021-12-06 NOTE — PROGRESS NOTES
Podiatric Surgery Daily Progress Note      Admit Date: 12/1/2021      Code:Full Code    Patient seen and examined, labs and records reviewed    Subjective:     Patient seen at bedside this a.m. Patient denies pain to bilateral lower extremity. Patient denies fever, chills, shortness of breath, chest pain. Patient denies any acute events overnight. Review of Systems: As above       Objective     BP (!) 136/124   Pulse 86   Temp 97.5 °F (36.4 °C) (Oral)   Resp 19   Ht 5' 11\" (1.803 m)   Wt 194 lb 0.1 oz (88 kg)   SpO2 95%   BMI 27.06 kg/m²      I/O:    Intake/Output Summary (Last 24 hours) at 12/6/2021 1352  Last data filed at 12/6/2021 1300  Gross per 24 hour   Intake 5048.01 ml   Output 2330 ml   Net 2718.01 ml              Wt Readings from Last 3 Encounters:   12/06/21 194 lb 0.1 oz (88 kg)   11/30/21 163 lb 3.2 oz (74 kg)   04/18/16 179 lb 14.3 oz (81.6 kg)       LABS:    Recent Labs     12/05/21  0430 12/06/21  0418   WBC 27.7* 20.2*   HGB 9.3* 8.1*   HCT 28.0* 24.5*    242        Recent Labs     12/06/21  0418      K 4.1      CO2 26   BUN 16   CREATININE 0.6*        No results for input(s): PROT, INR, APTT in the last 72 hours. LOWER EXTREMITY EXAMINATION    Dressing to left LE intact. No strikethrough noted to the external dressing. No drainage noted to the internal layers of the dressing. VASCULAR: DP and PT pulses are non-palpable 0/4 b/l. Upon hand-held Doppler examination DP signals were noted to be monophasic and PT signals were noted to be nondopplerable. CFT is sluggish to the digits of the foot b/l. Skin temperature is warm to cold from proximal to distal.  No edema noted. No pain with calf compression b/l.     NEUROLOGIC: Gross and epicritic sensation is diminished b/l. Protective sensation is diminished at all pedal sites b/l.     DERMATOLOGIC:   Left lower extremity:  Full-thickness ulceration noted to the lateral aspect of the left foot.   Wound measures approximately 3.2 cm x 2 cm x 0.5 cm. Of note the fifth metatarsal is exposed. Periwound rubor noted, improving. No purulent drainage noted. No fluctuance or crepitus or malodor noted. Increased ischemic changes noted to left fifth digit, with significant duskiness. Rubor and ecchymosis overlying the dorsal aspect of the midfoot. No open wound noted. Likely 2/2 prevalon boot strap. Right LE soft tissue envelope is closed without ulceration or acute signs of infection.     MUSCULOSKELETAL: Mild diffuse tenderness with palpation of the left foot. No obvious biomechanical abnormalities. IMAGING:  XR LEFT FOOT 11/30/2021  Narrative   EXAMINATION:   THREE XRAY VIEWS OF THE LEFT FOOT       11/30/2021 1:44 pm       COMPARISON:   None.       HISTORY:   ORDERING SYSTEM PROVIDED HISTORY: wound   TECHNOLOGIST PROVIDED HISTORY:   Reason for exam:->wound   Reason for Exam: blood sugar problem, wound check on lateral portion of foot   Acuity: Acute   Type of Exam: Initial       FINDINGS:   Osseous destruction along the articular margins of the 5th   metatarsophalangeal joint with associated lucency in the soft tissues. .   There is no evidence of fracture or dislocation. . The remaining joint spaces   appear well maintained. The remaining soft tissues are unremarkable.           Impression   Evidence of a septic joint involving the 5th metatarsal phalangeal joint with   associated osteomyelitis         ARTERIAL DUPLEX 12/2/21     Type of Study:        Extremities Arteries:Lower Extremities Arterial Duplex, VL LOWER EXTREMITY    ARTERIES DUPLEX BILATERAL.       Tech Comments   Right   The right ankle/brachial index is 0.0 (no Doppler signal could be heard at the   [facility] or the PT). The common femoral and profunda femoral arteries could not be visualized due   to bandages extending into the groin area. The mid superficial femoral artery has a short segmental occlusion and then is   reconstituted.    Elevated velocities at the distal superficial femoral artery indicate a > 50%   stenosis by velocity criteria (velocity went from 15 to 298 cm/s). The posterior tibial artery is occluded. The distal anterior tibial artery is barely patent, with very low flow   (possibly occluded and then reconstituted). Left   The left ankle brachial index is 0 for the PT and the DP was not accessible   due to the bandages on the foot. The common femoral and profunda femoral artery could not be visualized due to   bandages extending into the groin area. The distal superficial femoral artery is occluded and then reconstituted. The posterior tibial artery, peroneal, and anterior tibial artery are   occluded. There were no previous studies to use for comparison. ASSESSMENT/PLAN  ASSESSMENT/PLAN:   -Full-thickness ulceration; lateral left foot-Sands stage III  -Osteomyelitis of the fifth metatarsal; left foot  -Critical limb ischemia; bilateral lower extremity  -Diabetes mellitus, type II  -History of noncompliance    -Patient seen and examined at bedside this a.m.  -VSS on 2L NC. Leukocytosis noted (WBC 20.2)  -ESR 81 and .5  -HbA1c 13.5%  -Blood cultures 1 & 2: No growth to date  -XR images reviewed, impression noted above  -Arterial duplex reviewed, impression noted above  -Vascular surgery consulted; appreciate recommendations; awaiting further stabilization at this time  -Wound culture: Mixed skin pam  -Continue IV antibiotics per primary team: Vancomycin, Merrem  -Left lower extremity dressed with Betadine soaked gauze, DSD, and tape  -Prevalon boots reapplied. Patient is to wear at all times while in bed to prevent further deep tissue injury. Ankle strap removed from bilateral boot as the ankle straps were causing pressure to his feet.   -Nonweightbearing to left lower extremity  -Weightbearing as tolerated to right lower extremity  -Lengthy discussion with patient regarding infection and the need for possible surgical intervention once medically stable    DISPO: Full-thickness ulceration with underlying osteomyelitis to the left fifth metatarsal.  Critical limb ischemia bilateral lower extremity. Vascular consulted; awaiting further stabilization at this time. Continue broad-spectrum IV antibiotics. Patient will eventually require podiatric surgical intervention but timing will depend on medical stability and vascular recommendations. Patient examined and evaluated at the bedside with Dr. Korin Olivarez DPM.    Prateek Kamara [facility]  12/06/21  1:52 PM    Patient was seen and evaluated at bedside. Agree with residents assessment and treatment plan.   Korin Olivarez DPM

## 2021-12-06 NOTE — PLAN OF CARE
Problem: Activity:  Goal: Ability to return to normal activity level will improve  Description: Ability to return to normal activity level will improve  Outcome: Ongoing     Problem:  Bowel/Gastric:  Goal: Gastrointestinal status for postoperative course will improve  Description: Gastrointestinal status for postoperative course will improve  Outcome: Ongoing     Problem: Safety:  Goal: Ability to remain free from injury will improve  Description: Ability to remain free from injury will improve  Outcome: Ongoing     Problem: Skin Integrity:  Goal: Demonstration of wound healing without infection will improve  Description: Demonstration of wound healing without infection will improve  Outcome: Ongoing  Goal: Will show no infection signs and symptoms  Description: Will show no infection signs and symptoms  Outcome: Ongoing  Goal: Absence of new skin breakdown  Description: Absence of new skin breakdown  Outcome: Ongoing     Problem: Infection - Surgical Site:  Goal: Will show no infection signs and symptoms  Description: Will show no infection signs and symptoms  Outcome: Ongoing     Problem: Falls - Risk of:  Goal: Will remain free from falls  Description: Will remain free from falls  Outcome: Ongoing  Goal: Absence of physical injury  Description: Absence of physical injury  Outcome: Ongoing

## 2021-12-06 NOTE — PROGRESS NOTES
Clinical Pharmacy Progress Note    Vancomycin - Management by Pharmacy    Consult Date(s): 12/1/21  Consulting Provider(s): Dr. Doug Romo / [facility] Foot Osteomyelitis - Vancomycin   Concurrent Antimicrobials:   o Meropenem - day #6   Day of Vanc Therapy: #7   Current Dosing Method: Bayesian-Guided AUC Dosing   Therapeutic Goal: 400-600 mg/L*hr   Current Dose / Frequency: 1250 mg every 12 hours   Plan / Rationale:  o Renal function remains stable. Will continue current dose and frequency. o Vancomycin level collected on 12/4 ~ 5 hrs after prior dose predicts AUC within goal therapeutic range. Plan to obtain another level in a few days.  Will continue to monitor clinical condition and make adjustments to regimen as appropriate. Thank you for allowing us to participate in the care of this patient. Please reach out with any questions. Irvin Luu PharmD, BCPS  12/6/2021  Wireless: [phone]      Interval Update: Patient returned to the OR yesterday for further debridement and wound vac placement. Remains on insulin gtt this AM with strict BG control. Afebrile, hemodynamically stable. Plans for OR later this week for wound vac change. Subjective/Objective: Mr. Marianela Asher is a 63 y.o. male  admitted for Necrotizing fasciitis. Pharmacy has been consulted to dose Vancomycin.     Height:   Ht Readings from Last 1 Encounters:   12/02/21 5' 11\" (1.803 m)     Weight:   Wt Readings from Last 1 Encounters:   12/06/21 194 lb 0.1 oz (88 kg)     Level(s) / Doses:    Date Time Dose Level / Type of Level Interpretation   12/3 14:11 1250 mg IV q18h Random = 12 mcg/mL · Drawn ~14 hrs after previous dose; predicted  mg/L*h  · Increase dose to 1.25g IV q12h   12/4 09:00 1250 mg IV q24h Random = 24.6 mcg/mL · Drawn ~5 hrs after previous dose; predicted  mg/L*h  · Continue dose   Note: Serum levels collected for AUC-based dosing may be high if collected in close proximity to the dose administered. This is not necessarily an indicator of toxicity. Cultures & Sensitivities:    Date Site Micro Susceptibility / Result   11/30 Blood x2 No growth    11/30 COVID-19 rapid Negative    11/30 Scrotal tissue Mixed anaerobic growth    12/1 Foot wound Mixed skin pam    12/3 Perineum, fungus NGTD    12/3 Perineum, AFB NGTD    12/3 Perineum, anaerobic and aerobic NGTD    12/5 Penile tissue In process    12/5 Penile tissue, fungus In process    12/5 Penile tissue, AFB In process    12/5 Blood x2 In process      Labs / Ancillary Data:    Estimated Creatinine Clearance: 134 mL/min (A) (based on SCr of 0.6 mg/dL (L)).     Recent Labs     12/04/21  0505 12/04/21  0900 12/05/21  0430 12/06/21  0418   CREATININE  --  <0.5* 0.6* 0.6*   BUN  --  18 19 16   WBC 15.8*  --  27.7* 20.2*

## 2021-12-06 NOTE — PROGRESS NOTES
ICU SURGERY DAILY PROGRESS NOTE    CC: Shayla's gangrene  Procedure: Wide excision perineum, back, abdominal wall    SUBJECTIVE:   Interval Hx:  Patient rested well overnight. No acute events. Remains on an insulin gtt this morning with blood glucose under strict control. The patient is otherwise afebrile and HDS off pressors. Tolerating a diet without any nausea or vomiting. Pain is well controlled. ROS: A 10 point review of systems was conducted, significant findings as noted above. All other systems negative. OBJECTIVE:   Vitals:   Vitals:    12/06/21 0200 12/06/21 0300 12/06/21 0400 12/06/21 0500   BP: 127/64 130/72 116/65 139/67   Pulse: 87 93 94 90   Resp: 16 17 15 14   Temp:   98 °F (36.7 °C)    TempSrc:   Oral    SpO2: 95% 92% 92% 93%   Weight:       Height:           I/O:     Intake/Output Summary (Last 24 hours) at 12/6/2021 0609  Last data filed at 12/6/2021 0509  Gross per 24 hour   Intake 5009.26 ml   Output 1885 ml   Net 3124.26 ml     I/O last 3 completed shifts: In: 2498.6 [P.O.:470; I.V.:1228.6; IV Piggyback:800.1]  Out: 6821 [TENFJ:4980; Drains:200; Blood:10]    Diet: ADULT DIET; Regular; 3 carb choices (45 gm/meal)      Physical Examination:   General appearance: alert, no acute distress, groomed appropriately   HENT: NC/AT, MMM & intact, no JVD, neck supple, trachea midline  Eyes: No scleral icterus, EOMI  Chest/Lungs: CTAB, normal effort on 2L NC, no accessory muscles used for breathing. Cardiovascular: RRR  Abdomen: Soft, large surgical debridement area of the abdomen measuring 60 x 23 x 3 cm in gluteal region 20 x 15 cm with veraflo VAC in place holding adequate suction and instilling Dakin's quarter strength  Extremities:  full-thickness ulceration on lateral aspect of L foot, no edema, no cyanosis, decreased sensation along entirety of bilateral feet. No DP/PT pulse appreciated with doppler.    : walsh in place with clear yellow urine, no signs of further necrotizing fasciitis requiring further debridement of L lower scrotum, no subcutaneous crepitus noted, skin edges with healthy granulation tissue with appropriate bleeding, non-malodorous   Neurologic: A&Ox3, decrease sensation to bilateral lower extremities. Labs:  CBC:   Recent Labs     12/04/21  0505 12/05/21  0430 12/06/21  0418   WBC 15.8* 27.7* 20.2*   HGB 8.0* 9.3* 8.1*   HCT 25.1* 28.0* 24.5*    245 242       BMP:   Recent Labs     12/04/21  0900 12/05/21  0430 12/06/21  0418    140 139   K 3.5 4.0 4.1    111* 107   CO2 23 24 26   BUN 18 19 16   CREATININE <0.5* 0.6* 0.6*   GLUCOSE 123* 128* 112*     LFT's:   No results for input(s): AST, ALT, ALB, BILITOT, ALKPHOS in the last 72 hours. Troponin:   No results for input(s): TROPONINI in the last 72 hours. BNP: No results for input(s): BNP in the last 72 hours. ABGs:   Recent Labs     12/04/21  0505   PHART 7.444   [id number] 40.3   PO2ART 71.5*     INR:   No results for input(s): INR in the last 72 hours. U/A:No results for input(s): NITRITE, COLORU, PHUR, LABCAST, WBCUA, RBCUA, MUCUS, TRICHOMONAS, YEAST, BACTERIA, CLARITYU, SPECGRAV, LEUKOCYTESUR, UROBILINOGEN, BILIRUBINUR, BLOODU, GLUCOSEU, AMORPHOUS in the last 72 hours. Invalid input(s): Darliss Knock     Rad:   VL DUP LOWER EXTREMITY ARTERIES BILATERAL   Final Result          ASSESSMENT AND PLAN:   Byron Duke is a 63 y.o. male with Necrotizing fasciitis of the abdominal wall, gluteal region, and scrotum s/p wide excision of perineum, back, and abdominal wall.   Abdominal wall wound measuring 60 cm x 23 cm x 3cm and gluteal wound measures 20 cm x 15cm (12/1), returned to the OR for further debridement or right abdominal wall, and placement of testicle within a thigh flap (12/3), followed by minimal abdominal wall and perineum debridement, sutured protective skin flap in groin for right testicle, and placement of a wound VAC (12/5)    - Continue instill wound vac with 1/4 strength Dakins  - Continue with aggressive glucose control  - General surgery planning for diverting ostomy on Tuesday, if patient does not require further debridement with general surgery. - Will plan to return to the OR later this week for a wound vac change, timing TBD    Braxton Auguste DO, MS  PGY1, General Surgery  12/06/21  6:09 AM    I saw and independently examined the patient today. I agree with the history of present illness, past medical/surgical histories, family history, social history, medication list and allergies as listed. The review of systems is as noted above. My physical exam confirms the findings listed above. Review of labs, pathology reports, radiology reports and medical records confirm the findings noted above. I edited the note where appropriate in italics, strikethrough font, or underline. (LATE ENTRY)    Doing well. Awaiting culture results. To OR tomorrow for vac change and stoma with GENS.     Kimmy Mueller MD  [address] [place] [address] Reconstructive Surgery  [phone]  12/07/21

## 2021-12-06 NOTE — PROGRESS NOTES
ICU Progress Note    Admit Date: 12/1/2021  Day: 5  Extubated 12/2  IV Access:Peripheral, PICC, A line  IV Fluids:LR, insulin, propofol, fentanyl  Vasopressors:None                Antibiotics: Vancomycin and Merem  Diet: ADULT DIET; Regular; 3 carb choices (45 gm/meal)    CC: Westley gangrene    Interval history:   No acute events overnight. Went to OR yesterday for debridement. HPI: Jenean Day a 63 y. o. male with PMH as below notable for T2DM who presented with groin swelling. Patient reported that 2 weeks ago he notice a abscess near his anus, however reported in the last 2 days the area has increasing in size and warmth and extended to his scrotum. He reported poor compliance with his diabetes medication (metformin).      On admission the patient was hypotensive, WBC 18, Bicarb 20, Anion gap 22, Glucose 679, elevated Bhydroxybutirate. General surgery, urology and podiatry were consulted. Vancomycin was started, 3 L of IV fluids were given and the patient was placed in DKA protocol. Anion gap closed twice. The patient was taken for surgery debridement, was kept intubated due to possible multiple surgery interventions. Patient was transferred to the [facility] for colorectal and plastic surgery management.      Patient was admitted to the ICU for further workup and management of westley gangrene.     Medications:     Scheduled Meds:   acetaminophen  1,000 mg Oral Q6H    magnesium sulfate  2,000 mg IntraVENous Once    vancomycin  1,250 mg IntraVENous Q12H    enoxaparin  40 mg SubCUTAneous Daily    sodium chloride flush  5-40 mL IntraVENous 2 times per day    meropenem  1,000 mg IntraVENous Q8H     Continuous Infusions:   lactated ringers 125 mL/hr at 12/06/21 0614    sodium chloride Stopped (12/01/21 2027)    dextrose      insulin (HUMAN R) non-weight based infusion 0.62 Units/hr (12/06/21 0701)     PRN Meds:HYDROmorphone **OR** HYDROmorphone, oxyCODONE **OR** oxyCODONE, sodium chloride flush, sodium chloride, glucose, dextrose, glucagon (rDNA), dextrose    Objective:   Vitals:   T-max:  Patient Vitals for the past 8 hrs:   BP Temp Temp src Pulse Resp SpO2 Weight   12/06/21 0700 132/68 -- -- 83 16 94 % --   12/06/21 0600 131/68 -- -- 89 15 93 % 194 lb 0.1 oz (88 kg)   12/06/21 0500 139/67 -- -- 90 14 93 % --   12/06/21 0400 116/65 98 °F (36.7 °C) Oral 94 15 92 % --   12/06/21 0300 130/72 -- -- 93 17 92 % --   12/06/21 0200 127/64 -- -- 87 16 95 % --   12/06/21 0100 126/65 -- -- 94 21 92 % --   12/06/21 0000 136/73 98 °F (36.7 °C) Oral 96 19 92 % --       Intake/Output Summary (Last 24 hours) at 12/6/2021 0752  Last data filed at 12/6/2021 9011  Gross per 24 hour   Intake 5498.01 ml   Output 1985 ml   Net 3513.01 ml       Review of Systems   Constitutional: Negative for chills and fever. HENT: Negative. Eyes: Negative. Respiratory: Negative for chest tightness and shortness of breath. Cardiovascular: Negative for chest pain, palpitations and leg swelling. Gastrointestinal: Negative for abdominal pain, nausea and vomiting. Endocrine: Negative. Genitourinary: Negative. Musculoskeletal: Negative. Skin: Negative. Allergic/Immunologic: Negative. Neurological: Negative. Hematological: Negative. Psychiatric/Behavioral: Negative. A 10 point review of systems was conducted, significant findings as noted in HPI. Physical Exam  Constitutional:       Appearance: He is ill-appearing. HENT:      Head: Normocephalic and atraumatic. Right Ear: External ear normal.      Left Ear: External ear normal.      Nose: Nose normal.      Mouth/Throat:      Mouth: Mucous membranes are dry. Pharynx: Oropharynx is clear. Eyes:      Extraocular Movements: Extraocular movements intact. Conjunctiva/sclera: Conjunctivae normal.      Pupils: Pupils are equal, round, and reactive to light. Cardiovascular:      Rate and Rhythm: Normal rate and regular rhythm.       Pulses: Normal pulses. Heart sounds: Murmur: hyperdynamic flow murmur. Pulmonary:      Effort: Pulmonary effort is normal.      Breath sounds: Normal breath sounds. Abdominal:      General: Abdomen is flat. Bowel sounds are normal.      Palpations: Abdomen is soft. Tenderness: There is no abdominal tenderness (Left side abdomen at mild palpation). Musculoskeletal:         General: Normal range of motion. Cervical back: Normal range of motion. Feet:      Comments: Bandages noted  Skin:     General: Skin is warm and dry. Capillary Refill: Capillary refill takes 2 to 3 seconds. Coloration: Skin is pale. Comments: Wound vac in place over lower abdomen/pelvis     Neurological:      General: No focal deficit present. Mental Status: He is alert and oriented to person, place, and time. LABS:    CBC:   Recent Labs     12/04/21  0505 12/05/21  0430 12/06/21  0418   WBC 15.8* 27.7* 20.2*   HGB 8.0* 9.3* 8.1*   HCT 25.1* 28.0* 24.5*    245 242   MCV 96.0 90.6 91.2     Renal:    Recent Labs     12/04/21  0900 12/05/21  0430 12/06/21  0418    140 139   K 3.5 4.0 4.1    111* 107   CO2 23 24 26   BUN 18 19 16   CREATININE <0.5* 0.6* 0.6*   GLUCOSE 123* 128* 112*   CALCIUM 7.0* 7.4* 7.5*   MG 1.70* 2.00 1.90   ANIONGAP 8 5 6     Hepatic:   No results for input(s): AST, ALT, BILITOT, BILIDIR, PROT, LABALBU, ALKPHOS in the last 72 hours. Troponin:   No results for input(s): TROPONINI in the last 72 hours. BNP: No results for input(s): BNP in the last 72 hours. Lipids: No results for input(s): CHOL, HDL in the last 72 hours. Invalid input(s): LDLCALCU, TRIGLYCERIDE  ABGs:    Recent Labs     12/04/21  0505   PHART 7.444   CLH8LHS 40.3   PO2ART 71.5*   XYL4ICZ 28   BEART 3.3*   Q8SIMNXG 95   OTQ7BOB 29       INR:   No results for input(s): INR in the last 72 hours.   Lactate:   No results for input(s): LACTATE in the last 72 hours.  Cultures:  -----------------------------------------------------------------  RAD:   VL DUP LOWER EXTREMITY ARTERIES BILATERAL   Final Result            Assessment/Plan:   Brynn Lafleur a 63 y. o. male with PMH as below notable for T2DM who presented with groin swelling. Patient was admitted to the ICU for further workup and management of westley gangrene    Westley gangrene   Necrotizing fascitis of the abdominal wall, perineum and back. WBC improving. BCx x2: NGTD; Tissue Cx: Mixed anaerobic growth present; Wound Cx: Mixed skin pam  -Continue Vanc, Merrem (12/1-)  - mL/hr  -Dressing changes BID  -Colorectal surgery following, plan for ostomy on 12/7/21; wound vac change later this week. -Plastic surgery following    Endocrine:  T2DM  Last A1c 13.5 (11/30/2021)  Strict BG goal 110- 140 to aid with wound healing  -Insulin drip    -Hypoglycemia protocol   -POCT    Code Status: Full Code  FEN: ADULT DIET; Regular; 3 carb choices (45 gm/meal)  PPX: SCDs. protonix  DISPO: ICU    Magdiel Galeas MD, PGY-1  Internal medicine resident  12/06/21  7:52 AM    This patient will be staffed and discussed with Dr. Audrey Goel      Patient seen, examined and discussed with the resident and I agree with the assessment and plan. Briefly, this is a 63 y.o. male with DM, Westley's gangrene    Surgery managing his necrotizing faschiitis. On vanc and meropenem. On insulin drip for tight glucose control. If we can get him off an insulin drip and he could potentially downgrade, however he has multiple surgeries upcoming so his p.o. status is going to be highly variable. In addition surgery wants his blood sugars very tightly controlled which can only be accomplished with an insulin drip in the current context.     Darshan Jimenez MD

## 2021-12-06 NOTE — PROGRESS NOTES
Comprehensive Nutrition Assessment    RECOMMENDATIONS:  PO DIET: Continue ADULT DIET; Regular; 3 carb choices (45 gm/meal)   ONS: Start wound healing supplement John BID   Nutrition Education: will provide DM diet educ through adm as able    NUTRITION ASSESSMENT:   Type and Reason for Visit:  Type and Reason for Visit: Reassess   Nutritional summary & status: Pt was extubated on 12/2 and diet was upgraded to a regular 3 CC diet~ pt tolerating well and consuming % of meals per documentation. Noted pt went to OR for debridement yesterday and plans to return this week for wound vac change per MD. [place] to send wound healing ONS to help promote better wound healing. Will continue to monitor nutritional status and poc through adm     Patient admitted d/t westley gangrene    PMH significant for: T2DM    MALNUTRITION ASSESSMENT  Context of Malnutrition: Acute Illness   Malnutrition Status: No malnutrition    NUTRITION DIAGNOSIS   · Increased nutrient needs related to increase demand for energy/nutrients as evidenced by wounds    NUTRITION INTERVENTION  Food and/or Nutrient Delivery:  Continue Current Diet, Start Oral Nutrition Supplement  Nutrition Education/Counseling:  Education needed   Goals:  pt will consistently consume >75% PO intake of meals and supplements offered through adm to meet increased energy needs       Nutrition Monitoring and Evaluation:   Food/Nutrient Intake Outcomes:  Food and Nutrient Intake, Supplement Intake  Physical Signs/Symptoms Outcomes:  Biochemical Data, Weight, Skin     OBJECTIVE DATA: Significant to nutrition assessment  · Nutrition-Focused Physical Findings: Nutrition Related Findings: lbm 12/1   · Labs: Reviewed; POCBG 119-175, A1C 13.5 11/30  · Meds: Reviewed; insulin  · Wounds: Wound Type: Surgical Incision (Westley's gangrene)     CURRENT NUTRITION THERAPIES  ADULT DIET;  Regular; 3 carb choices (45 gm/meal)     PO Intake: Average Meal Intake: %   PO Supplement Intake:Average Supplements Intake: None Ordered  IVF: LR @ 125 ml/hr    ANTHROPOMETRICS  Current Height: 5' 11\" (180.3 cm)  Current Weight: 194 lb 0.1 oz (88 kg)    Admission weight: 183 lb 13.8 oz (83.4 kg)  Ideal Body Weight (lbs) (Calculated): 172 lbs (Ideal Body Weight (Kg) (Calculated): 78 kg)  Usual Bodyweight KENN -    Weight Changes KENN- wt fluctuates       BMI (Calculated): 27.1    Wt Readings from Last 50 Encounters:   12/06/21 194 lb 0.1 oz (88 kg)   11/30/21 163 lb 3.2 oz (74 kg)     COMPARATIVE STANDARDS  Energy (kcal):  3449-9670 (25-30); Weight Used for Energy Requirements:  Current (83)     Protein (g):  125-141 (1.5-1.7); Weight Used for Protein Requirements:  Ideal        Fluid (ml/day):  5535-8174; Method Used for Fluid Requirements:  1 ml/kcal      The patient will still be monitored per nutrition standards of care. Consult dietitian if nutrition interventions essential to patient care is needed.      Gautam Ellis, [address]  [place]:  [phone]  Office:  [phone]

## 2021-12-06 NOTE — CARE COORDINATION
Case Management Assessment           Daily Note                 Date/ Time of Note: 12/6/2021 12:30 PM         Note completed by: Salima Bran RN    Patient Name: Domingo Bailey  YOB: 1955    Diagnosis:Necrotizing fasciitis [facility]) [M72.6]  Patient Admission Status: Inpatient    Date of Admission:12/1/2021  3:40 PM Length of Stay: 5 GLOS: GMLOS: 4.4    Current Plan of Care: Surgical debridement, New ostomy planned for 12/7, possible wound vac placement, IV Abx  ________________________________________________________________________________________  PT AM-PAC:   / 24 per last evaluation on: tbd    OT AM-PAC:   / 24 per last evaluation on: tbd    DME Needs for discharge: tbd  ________________________________________________________________________________________  Discharge Plan: To Be Determined DUE TO: Surgical intervention, wound vac, IV abx    Tentative discharge date: tbd    Current barriers to discharge: tbd    Referrals completed: Infusion: [address] vs [facility]    Resources/ information provided: Not indicated at this time  ________________________________________________________________________________________  Case Management Notes:Patient lives at home with spouse that has 7 steps to enter. To OR Tuesday for diverting ostomy. Spoke with Pavan Rausch in financial services, patient does not have insurance and is over income level for assistance through [place]. Job Wood at [facility] who will follow for wound care upon discharge, [facility] of [facility]. Per Erin Freeman, quote for merrem 1g every 8 hours would be $53 per day, they could arrange a payment plan to assist.  Likely will require placement. Kaylah House and his family were provided with choice of provider; he and his family are in agreement with the discharge plan.     Care Transition Patient: Linda Bran RN  [facility] [employer]  Case

## 2021-12-06 NOTE — PROGRESS NOTES
PRE-OP NOTE  Department of Surgery      Chief Complaint or Reason for Surgery: Necrotizing fasciitis of the abdominal wall and left buttock    Procedure: Laparoscopic colostomy creation, exam under anesthesia, possible further debridement, possible wound vac change. Expected time: 12/7, 12:30    Plan  1. Diet: NPO at midnight  2. IVF: LR at 125 cc/hr  3. Antibiotics: Continue with regular scheduled antibiotics  4. Labs to be drawn: Type and Screen, INR, renal panel, CBC and magnesium ordered for the morning. 5. Anesthesia: to see patient  6. Consent: Obtained for both surgeons and placed in the patient's chart.   7. Pulmonary: CXR: Left basilar atelectasis     Lindsey Ryan DO  12/06/21  5:49 PM

## 2021-12-07 ENCOUNTER — ANESTHESIA (OUTPATIENT)
Dept: OPERATING ROOM | Age: 66
DRG: 853 | End: 2021-12-07

## 2021-12-07 ENCOUNTER — ANESTHESIA EVENT (OUTPATIENT)
Dept: OPERATING ROOM | Age: 66
DRG: 853 | End: 2021-12-07

## 2021-12-07 VITALS
SYSTOLIC BLOOD PRESSURE: 101 MMHG | DIASTOLIC BLOOD PRESSURE: 56 MMHG | TEMPERATURE: 96.4 F | RESPIRATION RATE: 17 BRPM | OXYGEN SATURATION: 95 %

## 2021-12-07 LAB
ABO/RH: NORMAL
ANION GAP SERPL CALCULATED.3IONS-SCNC: 4 MMOL/L (ref 3–16)
ANION GAP SERPL CALCULATED.3IONS-SCNC: 4 MMOL/L (ref 3–16)
ANTIBODY SCREEN: NORMAL
BANDED NEUTROPHILS RELATIVE PERCENT: 5 % (ref 0–7)
BASOPHILS ABSOLUTE: 0 K/UL (ref 0–0.2)
BASOPHILS RELATIVE PERCENT: 0 %
BUN BLDV-MCNC: 13 MG/DL (ref 7–20)
BUN BLDV-MCNC: 14 MG/DL (ref 7–20)
CALCIUM SERPL-MCNC: 7.1 MG/DL (ref 8.3–10.6)
CALCIUM SERPL-MCNC: 8.2 MG/DL (ref 8.3–10.6)
CHLORIDE BLD-SCNC: 105 MMOL/L (ref 99–110)
CHLORIDE BLD-SCNC: 106 MMOL/L (ref 99–110)
CO2: 24 MMOL/L (ref 21–32)
CO2: 24 MMOL/L (ref 21–32)
CREAT SERPL-MCNC: 0.5 MG/DL (ref 0.8–1.3)
CREAT SERPL-MCNC: 0.5 MG/DL (ref 0.8–1.3)
EOSINOPHILS ABSOLUTE: 0.6 K/UL (ref 0–0.6)
EOSINOPHILS RELATIVE PERCENT: 4 %
GFR AFRICAN AMERICAN: >60
GFR AFRICAN AMERICAN: >60
GFR NON-AFRICAN AMERICAN: >60
GFR NON-AFRICAN AMERICAN: >60
GLUCOSE BLD-MCNC: 101 MG/DL (ref 70–99)
GLUCOSE BLD-MCNC: 102 MG/DL (ref 70–99)
GLUCOSE BLD-MCNC: 103 MG/DL (ref 70–99)
GLUCOSE BLD-MCNC: 104 MG/DL (ref 70–99)
GLUCOSE BLD-MCNC: 108 MG/DL (ref 70–99)
GLUCOSE BLD-MCNC: 109 MG/DL (ref 70–99)
GLUCOSE BLD-MCNC: 110 MG/DL (ref 70–99)
GLUCOSE BLD-MCNC: 112 MG/DL (ref 70–99)
GLUCOSE BLD-MCNC: 113 MG/DL (ref 70–99)
GLUCOSE BLD-MCNC: 118 MG/DL (ref 70–99)
GLUCOSE BLD-MCNC: 118 MG/DL (ref 70–99)
GLUCOSE BLD-MCNC: 119 MG/DL (ref 70–99)
GLUCOSE BLD-MCNC: 121 MG/DL (ref 70–99)
GLUCOSE BLD-MCNC: 123 MG/DL (ref 70–99)
GLUCOSE BLD-MCNC: 128 MG/DL (ref 70–99)
GLUCOSE BLD-MCNC: 129 MG/DL (ref 70–99)
GLUCOSE BLD-MCNC: 135 MG/DL (ref 70–99)
GLUCOSE BLD-MCNC: 138 MG/DL (ref 70–99)
GLUCOSE BLD-MCNC: 159 MG/DL (ref 70–99)
GLUCOSE BLD-MCNC: 93 MG/DL (ref 70–99)
GLUCOSE BLD-MCNC: 98 MG/DL (ref 70–99)
HCT VFR BLD CALC: 23.8 % (ref 40.5–52.5)
HCT VFR BLD CALC: 25.1 % (ref 40.5–52.5)
HEMOGLOBIN: 8 G/DL (ref 13.5–17.5)
HEMOGLOBIN: 8.3 G/DL (ref 13.5–17.5)
HYPOCHROMIA: ABNORMAL
INR BLD: 1.09 (ref 0.88–1.12)
LYMPHOCYTES ABSOLUTE: 1.4 K/UL (ref 1–5.1)
LYMPHOCYTES RELATIVE PERCENT: 9 %
MCH RBC QN AUTO: 30.1 PG (ref 26–34)
MCH RBC QN AUTO: 30.5 PG (ref 26–34)
MCHC RBC AUTO-ENTMCNC: 32.9 G/DL (ref 31–36)
MCHC RBC AUTO-ENTMCNC: 33.5 G/DL (ref 31–36)
MCV RBC AUTO: 91.2 FL (ref 80–100)
MCV RBC AUTO: 91.5 FL (ref 80–100)
METAMYELOCYTES RELATIVE PERCENT: 4 %
MONOCYTES ABSOLUTE: 1.2 K/UL (ref 0–1.3)
MONOCYTES RELATIVE PERCENT: 8 %
MYELOCYTE PERCENT: 2 %
NEUTROPHILS ABSOLUTE: 11.9 K/UL (ref 1.7–7.7)
NEUTROPHILS RELATIVE PERCENT: 68 %
PDW BLD-RTO: 14.3 % (ref 12.4–15.4)
PDW BLD-RTO: 14.6 % (ref 12.4–15.4)
PERFORMED ON: ABNORMAL
PERFORMED ON: NORMAL
PERFORMED ON: NORMAL
PH ARTERIAL: 7.35 (ref 7.35–7.45)
PLATELET # BLD: 305 K/UL (ref 135–450)
PLATELET # BLD: 362 K/UL (ref 135–450)
PMV BLD AUTO: 8.7 FL (ref 5–10.5)
PMV BLD AUTO: 8.9 FL (ref 5–10.5)
POLYCHROMASIA: ABNORMAL
POTASSIUM REFLEX MAGNESIUM: 4.2 MMOL/L (ref 3.5–5.1)
POTASSIUM SERPL-SCNC: 4.3 MMOL/L (ref 3.5–5.1)
PROTHROMBIN TIME: 12.4 SEC (ref 9.9–12.7)
RBC # BLD: 2.61 M/UL (ref 4.2–5.9)
RBC # BLD: 2.74 M/UL (ref 4.2–5.9)
SODIUM BLD-SCNC: 133 MMOL/L (ref 136–145)
SODIUM BLD-SCNC: 134 MMOL/L (ref 136–145)
WBC # BLD: 15.1 K/UL (ref 4–11)
WBC # BLD: 16.5 K/UL (ref 4–11)

## 2021-12-07 PROCEDURE — 80048 BASIC METABOLIC PNL TOTAL CA: CPT

## 2021-12-07 PROCEDURE — 3700000000 HC ANESTHESIA ATTENDED CARE: Performed by: SURGERY

## 2021-12-07 PROCEDURE — 3600000012 HC SURGERY LEVEL 2 ADDTL 15MIN: Performed by: SURGERY

## 2021-12-07 PROCEDURE — 2580000003 HC RX 258

## 2021-12-07 PROCEDURE — 2580000003 HC RX 258: Performed by: NURSE ANESTHETIST, CERTIFIED REGISTERED

## 2021-12-07 PROCEDURE — 6360000002 HC RX W HCPCS: Performed by: NURSE ANESTHETIST, CERTIFIED REGISTERED

## 2021-12-07 PROCEDURE — 3600000002 HC SURGERY LEVEL 2 BASE: Performed by: SURGERY

## 2021-12-07 PROCEDURE — 85025 COMPLETE CBC W/AUTO DIFF WBC: CPT

## 2021-12-07 PROCEDURE — 2709999900 HC NON-CHARGEABLE SUPPLY: Performed by: SURGERY

## 2021-12-07 PROCEDURE — 88307 TISSUE EXAM BY PATHOLOGIST: CPT

## 2021-12-07 PROCEDURE — 6360000002 HC RX W HCPCS

## 2021-12-07 PROCEDURE — 14302 TIS TRNFR ADDL 30 SQ CM: CPT | Performed by: SURGERY

## 2021-12-07 PROCEDURE — 2720000010 HC SURG SUPPLY STERILE: Performed by: SURGERY

## 2021-12-07 PROCEDURE — 97606 NEG PRS WND THER DME>50 SQCM: CPT | Performed by: SURGERY

## 2021-12-07 PROCEDURE — 2500000003 HC RX 250 WO HCPCS: Performed by: NURSE ANESTHETIST, CERTIFIED REGISTERED

## 2021-12-07 PROCEDURE — P9041 ALBUMIN (HUMAN),5%, 50ML: HCPCS | Performed by: NURSE ANESTHETIST, CERTIFIED REGISTERED

## 2021-12-07 PROCEDURE — 2500000003 HC RX 250 WO HCPCS: Performed by: SURGERY

## 2021-12-07 PROCEDURE — 99232 SBSQ HOSP IP/OBS MODERATE 35: CPT | Performed by: INTERNAL MEDICINE

## 2021-12-07 PROCEDURE — 0D1N4Z4 BYPASS SIGMOID COLON TO CUTANEOUS, PERCUTANEOUS ENDOSCOPIC APPROACH: ICD-10-PCS | Performed by: SURGERY

## 2021-12-07 PROCEDURE — 3700000001 HC ADD 15 MINUTES (ANESTHESIA): Performed by: SURGERY

## 2021-12-07 PROCEDURE — 86901 BLOOD TYPING SEROLOGIC RH(D): CPT

## 2021-12-07 PROCEDURE — 14301 TIS TRNFR ANY 30.1-60 SQ CM: CPT | Performed by: SURGERY

## 2021-12-07 PROCEDURE — 6370000000 HC RX 637 (ALT 250 FOR IP): Performed by: STUDENT IN AN ORGANIZED HEALTH CARE EDUCATION/TRAINING PROGRAM

## 2021-12-07 PROCEDURE — 44188 LAP COLOSTOMY: CPT | Performed by: SURGERY

## 2021-12-07 PROCEDURE — 86900 BLOOD TYPING SEROLOGIC ABO: CPT

## 2021-12-07 PROCEDURE — 85027 COMPLETE CBC AUTOMATED: CPT

## 2021-12-07 PROCEDURE — 6370000000 HC RX 637 (ALT 250 FOR IP): Performed by: NURSE ANESTHETIST, CERTIFIED REGISTERED

## 2021-12-07 PROCEDURE — 6360000002 HC RX W HCPCS: Performed by: STUDENT IN AN ORGANIZED HEALTH CARE EDUCATION/TRAINING PROGRAM

## 2021-12-07 PROCEDURE — 2000000000 HC ICU R&B

## 2021-12-07 PROCEDURE — 0WQF0ZZ REPAIR ABDOMINAL WALL, OPEN APPROACH: ICD-10-PCS | Performed by: SURGERY

## 2021-12-07 PROCEDURE — 36415 COLL VENOUS BLD VENIPUNCTURE: CPT

## 2021-12-07 PROCEDURE — 85610 PROTHROMBIN TIME: CPT

## 2021-12-07 PROCEDURE — 86850 RBC ANTIBODY SCREEN: CPT

## 2021-12-07 PROCEDURE — 2580000003 HC RX 258: Performed by: SURGERY

## 2021-12-07 RX ORDER — ALBUMIN, HUMAN INJ 5% 5 %
SOLUTION INTRAVENOUS PRN
Status: DISCONTINUED | OUTPATIENT
Start: 2021-12-07 | End: 2021-12-07 | Stop reason: SDUPTHER

## 2021-12-07 RX ORDER — SODIUM CHLORIDE, SODIUM GLUCONATE, SODIUM ACETATE, POTASSIUM CHLORIDE AND MAGNESIUM CHLORIDE 526; 502; 368; 37; 30 MG/100ML; MG/100ML; MG/100ML; MG/100ML; MG/100ML
INJECTION, SOLUTION INTRAVENOUS CONTINUOUS PRN
Status: DISCONTINUED | OUTPATIENT
Start: 2021-12-07 | End: 2021-12-07

## 2021-12-07 RX ORDER — SODIUM CHLORIDE 9 MG/ML
INJECTION, SOLUTION INTRAVENOUS CONTINUOUS PRN
Status: DISCONTINUED | OUTPATIENT
Start: 2021-12-07 | End: 2021-12-07 | Stop reason: SDUPTHER

## 2021-12-07 RX ORDER — IPRATROPIUM BROMIDE AND ALBUTEROL SULFATE 2.5; .5 MG/3ML; MG/3ML
1 SOLUTION RESPIRATORY (INHALATION)
Status: DISCONTINUED | OUTPATIENT
Start: 2021-12-07 | End: 2021-12-08

## 2021-12-07 RX ORDER — BUPIVACAINE HYDROCHLORIDE 5 MG/ML
INJECTION, SOLUTION EPIDURAL; INTRACAUDAL PRN
Status: DISCONTINUED | OUTPATIENT
Start: 2021-12-07 | End: 2021-12-07 | Stop reason: ALTCHOICE

## 2021-12-07 RX ORDER — CALCIUM CHLORIDE 100 MG/ML
INJECTION INTRAVENOUS; INTRAVENTRICULAR PRN
Status: DISCONTINUED | OUTPATIENT
Start: 2021-12-07 | End: 2021-12-07 | Stop reason: SDUPTHER

## 2021-12-07 RX ORDER — FENTANYL CITRATE 50 UG/ML
INJECTION, SOLUTION INTRAMUSCULAR; INTRAVENOUS PRN
Status: DISCONTINUED | OUTPATIENT
Start: 2021-12-07 | End: 2021-12-07 | Stop reason: SDUPTHER

## 2021-12-07 RX ORDER — ALBUTEROL SULFATE 90 UG/1
AEROSOL, METERED RESPIRATORY (INHALATION) PRN
Status: DISCONTINUED | OUTPATIENT
Start: 2021-12-07 | End: 2021-12-07 | Stop reason: SDUPTHER

## 2021-12-07 RX ORDER — ROCURONIUM BROMIDE 10 MG/ML
INJECTION, SOLUTION INTRAVENOUS PRN
Status: DISCONTINUED | OUTPATIENT
Start: 2021-12-07 | End: 2021-12-07 | Stop reason: SDUPTHER

## 2021-12-07 RX ORDER — LIDOCAINE HYDROCHLORIDE 20 MG/ML
INJECTION, SOLUTION EPIDURAL; INFILTRATION; INTRACAUDAL; PERINEURAL PRN
Status: DISCONTINUED | OUTPATIENT
Start: 2021-12-07 | End: 2021-12-07 | Stop reason: SDUPTHER

## 2021-12-07 RX ORDER — SODIUM CHLORIDE, SODIUM LACTATE, POTASSIUM CHLORIDE, CALCIUM CHLORIDE 600; 310; 30; 20 MG/100ML; MG/100ML; MG/100ML; MG/100ML
INJECTION, SOLUTION INTRAVENOUS CONTINUOUS PRN
Status: DISCONTINUED | OUTPATIENT
Start: 2021-12-07 | End: 2021-12-07 | Stop reason: SDUPTHER

## 2021-12-07 RX ORDER — HYDRALAZINE HYDROCHLORIDE 20 MG/ML
5 INJECTION INTRAMUSCULAR; INTRAVENOUS EVERY 10 MIN PRN
Status: DISCONTINUED | OUTPATIENT
Start: 2021-12-07 | End: 2021-12-07 | Stop reason: HOSPADM

## 2021-12-07 RX ORDER — SUCCINYLCHOLINE CHLORIDE 20 MG/ML
INJECTION INTRAMUSCULAR; INTRAVENOUS PRN
Status: DISCONTINUED | OUTPATIENT
Start: 2021-12-07 | End: 2021-12-07 | Stop reason: SDUPTHER

## 2021-12-07 RX ORDER — LABETALOL HYDROCHLORIDE 5 MG/ML
5 INJECTION, SOLUTION INTRAVENOUS EVERY 10 MIN PRN
Status: DISCONTINUED | OUTPATIENT
Start: 2021-12-07 | End: 2021-12-07 | Stop reason: HOSPADM

## 2021-12-07 RX ORDER — ONDANSETRON 2 MG/ML
4 INJECTION INTRAMUSCULAR; INTRAVENOUS
Status: DISCONTINUED | OUTPATIENT
Start: 2021-12-07 | End: 2021-12-07 | Stop reason: HOSPADM

## 2021-12-07 RX ORDER — MAGNESIUM HYDROXIDE 1200 MG/15ML
LIQUID ORAL CONTINUOUS PRN
Status: COMPLETED | OUTPATIENT
Start: 2021-12-07 | End: 2021-12-07

## 2021-12-07 RX ORDER — ONDANSETRON 2 MG/ML
INJECTION INTRAMUSCULAR; INTRAVENOUS PRN
Status: DISCONTINUED | OUTPATIENT
Start: 2021-12-07 | End: 2021-12-07 | Stop reason: SDUPTHER

## 2021-12-07 RX ORDER — MIDAZOLAM HYDROCHLORIDE 1 MG/ML
INJECTION INTRAMUSCULAR; INTRAVENOUS PRN
Status: DISCONTINUED | OUTPATIENT
Start: 2021-12-07 | End: 2021-12-07 | Stop reason: SDUPTHER

## 2021-12-07 RX ORDER — DIPHENHYDRAMINE HYDROCHLORIDE 50 MG/ML
12.5 INJECTION INTRAMUSCULAR; INTRAVENOUS
Status: DISCONTINUED | OUTPATIENT
Start: 2021-12-07 | End: 2021-12-07 | Stop reason: HOSPADM

## 2021-12-07 RX ORDER — PROMETHAZINE HYDROCHLORIDE 25 MG/ML
6.25 INJECTION, SOLUTION INTRAMUSCULAR; INTRAVENOUS
Status: DISCONTINUED | OUTPATIENT
Start: 2021-12-07 | End: 2021-12-07 | Stop reason: HOSPADM

## 2021-12-07 RX ORDER — FENTANYL CITRATE 50 UG/ML
50 INJECTION, SOLUTION INTRAMUSCULAR; INTRAVENOUS EVERY 5 MIN PRN
Status: DISCONTINUED | OUTPATIENT
Start: 2021-12-07 | End: 2021-12-07 | Stop reason: HOSPADM

## 2021-12-07 RX ORDER — PROPOFOL 10 MG/ML
INJECTION, EMULSION INTRAVENOUS PRN
Status: DISCONTINUED | OUTPATIENT
Start: 2021-12-07 | End: 2021-12-07 | Stop reason: SDUPTHER

## 2021-12-07 RX ADMIN — CALCIUM CHLORIDE 0.5 G: 100 INJECTION, SOLUTION INTRAVENOUS at 14:20

## 2021-12-07 RX ADMIN — FENTANYL CITRATE 100 MCG: 50 INJECTION, SOLUTION INTRAMUSCULAR; INTRAVENOUS at 12:34

## 2021-12-07 RX ADMIN — SODIUM CHLORIDE, PRESERVATIVE FREE 10 ML: 5 INJECTION INTRAVENOUS at 09:16

## 2021-12-07 RX ADMIN — SODIUM CHLORIDE, SODIUM LACTATE, POTASSIUM CHLORIDE, AND CALCIUM CHLORIDE: .6; .31; .03; .02 INJECTION, SOLUTION INTRAVENOUS at 14:58

## 2021-12-07 RX ADMIN — ALBUTEROL SULFATE 4 PUFF: 90 AEROSOL, METERED RESPIRATORY (INHALATION) at 13:45

## 2021-12-07 RX ADMIN — CALCIUM CHLORIDE 0.5 G: 100 INJECTION, SOLUTION INTRAVENOUS at 14:00

## 2021-12-07 RX ADMIN — ONDANSETRON 4 MG: 2 INJECTION INTRAMUSCULAR; INTRAVENOUS at 12:50

## 2021-12-07 RX ADMIN — HYDROMORPHONE HYDROCHLORIDE 0.5 MG: 1 INJECTION, SOLUTION INTRAMUSCULAR; INTRAVENOUS; SUBCUTANEOUS at 20:05

## 2021-12-07 RX ADMIN — PROPOFOL 100 MG: 10 INJECTION, EMULSION INTRAVENOUS at 12:40

## 2021-12-07 RX ADMIN — VANCOMYCIN HYDROCHLORIDE 1250 MG: 10 INJECTION, POWDER, LYOPHILIZED, FOR SOLUTION INTRAVENOUS at 16:12

## 2021-12-07 RX ADMIN — FENTANYL CITRATE 100 MCG: 50 INJECTION, SOLUTION INTRAMUSCULAR; INTRAVENOUS at 15:16

## 2021-12-07 RX ADMIN — VANCOMYCIN HYDROCHLORIDE 1250 MG: 10 INJECTION, POWDER, LYOPHILIZED, FOR SOLUTION INTRAVENOUS at 04:21

## 2021-12-07 RX ADMIN — MEROPENEM 1000 MG: 1 INJECTION, POWDER, FOR SOLUTION INTRAVENOUS at 18:18

## 2021-12-07 RX ADMIN — SODIUM CHLORIDE: 9 INJECTION, SOLUTION INTRAVENOUS at 12:34

## 2021-12-07 RX ADMIN — ACETAMINOPHEN 1000 MG: 500 TABLET ORAL at 02:03

## 2021-12-07 RX ADMIN — ROCURONIUM BROMIDE 50 MG: 10 INJECTION INTRAVENOUS at 12:50

## 2021-12-07 RX ADMIN — ALBUMIN (HUMAN) 250 ML: 12.5 INJECTION, SOLUTION INTRAVENOUS at 13:30

## 2021-12-07 RX ADMIN — ACETAMINOPHEN 1000 MG: 500 TABLET ORAL at 09:15

## 2021-12-07 RX ADMIN — HYDROMORPHONE HYDROCHLORIDE 0.5 MG: 1 INJECTION, SOLUTION INTRAMUSCULAR; INTRAVENOUS; SUBCUTANEOUS at 06:19

## 2021-12-07 RX ADMIN — ALBUMIN (HUMAN) 250 ML: 12.5 INJECTION, SOLUTION INTRAVENOUS at 14:24

## 2021-12-07 RX ADMIN — HYDROMORPHONE HYDROCHLORIDE 0.5 MG: 1 INJECTION, SOLUTION INTRAMUSCULAR; INTRAVENOUS; SUBCUTANEOUS at 16:08

## 2021-12-07 RX ADMIN — LIDOCAINE HYDROCHLORIDE 100 MG: 20 INJECTION, SOLUTION EPIDURAL; INFILTRATION; INTRACAUDAL; PERINEURAL at 12:38

## 2021-12-07 RX ADMIN — SUCCINYLCHOLINE CHLORIDE 100 MG: 20 INJECTION, SOLUTION INTRAMUSCULAR; INTRAVENOUS; PARENTERAL at 12:41

## 2021-12-07 RX ADMIN — MEROPENEM 1000 MG: 1 INJECTION, POWDER, FOR SOLUTION INTRAVENOUS at 10:11

## 2021-12-07 RX ADMIN — OXYCODONE HYDROCHLORIDE 5 MG: 5 TABLET ORAL at 22:09

## 2021-12-07 RX ADMIN — MIDAZOLAM HYDROCHLORIDE 2 MG: 2 INJECTION, SOLUTION INTRAMUSCULAR; INTRAVENOUS at 12:30

## 2021-12-07 RX ADMIN — ROCURONIUM BROMIDE 50 MG: 10 INJECTION INTRAVENOUS at 13:39

## 2021-12-07 RX ADMIN — MEROPENEM 1000 MG: 1 INJECTION, POWDER, FOR SOLUTION INTRAVENOUS at 02:03

## 2021-12-07 RX ADMIN — SODIUM CHLORIDE, PRESERVATIVE FREE 10 ML: 5 INJECTION INTRAVENOUS at 20:06

## 2021-12-07 ASSESSMENT — PULMONARY FUNCTION TESTS
PIF_VALUE: 1
PIF_VALUE: 21
PIF_VALUE: 38
PIF_VALUE: 41
PIF_VALUE: 41
PIF_VALUE: 38
PIF_VALUE: 26
PIF_VALUE: 28
PIF_VALUE: 3
PIF_VALUE: 38
PIF_VALUE: 26
PIF_VALUE: 41
PIF_VALUE: 21
PIF_VALUE: 21
PIF_VALUE: 20
PIF_VALUE: 36
PIF_VALUE: 38
PIF_VALUE: 40
PIF_VALUE: 27
PIF_VALUE: 21
PIF_VALUE: 39
PIF_VALUE: 28
PIF_VALUE: 41
PIF_VALUE: 29
PIF_VALUE: 21
PIF_VALUE: 4
PIF_VALUE: 21
PIF_VALUE: 38
PIF_VALUE: 39
PIF_VALUE: 38
PIF_VALUE: 29
PIF_VALUE: 3
PIF_VALUE: 21
PIF_VALUE: 20
PIF_VALUE: 20
PIF_VALUE: 21
PIF_VALUE: 21
PIF_VALUE: 27
PIF_VALUE: 29
PIF_VALUE: 50
PIF_VALUE: 41
PIF_VALUE: 38
PIF_VALUE: 41
PIF_VALUE: 4
PIF_VALUE: 1
PIF_VALUE: 29
PIF_VALUE: 38
PIF_VALUE: 22
PIF_VALUE: 36
PIF_VALUE: 19
PIF_VALUE: 26
PIF_VALUE: 38
PIF_VALUE: 29
PIF_VALUE: 27
PIF_VALUE: 21
PIF_VALUE: 28
PIF_VALUE: 21
PIF_VALUE: 4
PIF_VALUE: 26
PIF_VALUE: 38
PIF_VALUE: 21
PIF_VALUE: 38
PIF_VALUE: 21
PIF_VALUE: 20
PIF_VALUE: 38
PIF_VALUE: 41
PIF_VALUE: 38
PIF_VALUE: 21
PIF_VALUE: 38
PIF_VALUE: 29
PIF_VALUE: 21
PIF_VALUE: 41
PIF_VALUE: 27
PIF_VALUE: 0
PIF_VALUE: 44
PIF_VALUE: 38
PIF_VALUE: 21
PIF_VALUE: 3
PIF_VALUE: 4
PIF_VALUE: 19
PIF_VALUE: 21
PIF_VALUE: 26
PIF_VALUE: 21
PIF_VALUE: 38
PIF_VALUE: 21
PIF_VALUE: 20
PIF_VALUE: 21
PIF_VALUE: 27
PIF_VALUE: 24
PIF_VALUE: 40
PIF_VALUE: 21
PIF_VALUE: 4
PIF_VALUE: 30
PIF_VALUE: 39
PIF_VALUE: 41
PIF_VALUE: 27
PIF_VALUE: 41
PIF_VALUE: 38
PIF_VALUE: 22
PIF_VALUE: 18
PIF_VALUE: 41
PIF_VALUE: 41
PIF_VALUE: 21
PIF_VALUE: 27
PIF_VALUE: 20
PIF_VALUE: 20
PIF_VALUE: 33
PIF_VALUE: 26
PIF_VALUE: 21
PIF_VALUE: 39
PIF_VALUE: 4
PIF_VALUE: 4
PIF_VALUE: 27
PIF_VALUE: 3
PIF_VALUE: 27
PIF_VALUE: 21
PIF_VALUE: 22
PIF_VALUE: 21
PIF_VALUE: 27
PIF_VALUE: 20
PIF_VALUE: 28
PIF_VALUE: 41
PIF_VALUE: 26
PIF_VALUE: 27
PIF_VALUE: 21
PIF_VALUE: 26
PIF_VALUE: 26
PIF_VALUE: 20
PIF_VALUE: 38
PIF_VALUE: 29
PIF_VALUE: 21
PIF_VALUE: 21
PIF_VALUE: 28
PIF_VALUE: 2
PIF_VALUE: 38
PIF_VALUE: 41
PIF_VALUE: 5
PIF_VALUE: 21
PIF_VALUE: 38
PIF_VALUE: 21
PIF_VALUE: 38
PIF_VALUE: 0
PIF_VALUE: 26
PIF_VALUE: 0
PIF_VALUE: 21
PIF_VALUE: 21
PIF_VALUE: 28
PIF_VALUE: 19
PIF_VALUE: 26
PIF_VALUE: 28
PIF_VALUE: 5
PIF_VALUE: 21
PIF_VALUE: 28
PIF_VALUE: 1
PIF_VALUE: 21
PIF_VALUE: 0
PIF_VALUE: 29
PIF_VALUE: 41
PIF_VALUE: 30
PIF_VALUE: 23
PIF_VALUE: 21
PIF_VALUE: 21
PIF_VALUE: 20
PIF_VALUE: 21
PIF_VALUE: 21

## 2021-12-07 ASSESSMENT — PAIN SCALES - GENERAL
PAINLEVEL_OUTOF10: 0
PAINLEVEL_OUTOF10: 2
PAINLEVEL_OUTOF10: 0
PAINLEVEL_OUTOF10: 8
PAINLEVEL_OUTOF10: 4
PAINLEVEL_OUTOF10: 0
PAINLEVEL_OUTOF10: 0
PAINLEVEL_OUTOF10: 4
PAINLEVEL_OUTOF10: 4
PAINLEVEL_OUTOF10: 9
PAINLEVEL_OUTOF10: 6
PAINLEVEL_OUTOF10: 8
PAINLEVEL_OUTOF10: 2
PAINLEVEL_OUTOF10: 8
PAINLEVEL_OUTOF10: 6
PAINLEVEL_OUTOF10: 8

## 2021-12-07 ASSESSMENT — PAIN DESCRIPTION - DESCRIPTORS
DESCRIPTORS: DISCOMFORT

## 2021-12-07 ASSESSMENT — PAIN DESCRIPTION - PAIN TYPE
TYPE: SURGICAL PAIN;ACUTE PAIN
TYPE: SURGICAL PAIN
TYPE: SURGICAL PAIN

## 2021-12-07 ASSESSMENT — PAIN DESCRIPTION - PROGRESSION

## 2021-12-07 ASSESSMENT — PAIN DESCRIPTION - FREQUENCY
FREQUENCY: CONTINUOUS

## 2021-12-07 ASSESSMENT — PAIN DESCRIPTION - LOCATION
LOCATION: ABDOMEN

## 2021-12-07 ASSESSMENT — PAIN DESCRIPTION - ONSET
ONSET: ON-GOING

## 2021-12-07 ASSESSMENT — PAIN - FUNCTIONAL ASSESSMENT: PAIN_FUNCTIONAL_ASSESSMENT: ACTIVITIES ARE NOT PREVENTED

## 2021-12-07 ASSESSMENT — PAIN DESCRIPTION - ORIENTATION
ORIENTATION: MID

## 2021-12-07 NOTE — PROGRESS NOTES
ICU Progress Note    Admit Date: 2021  Day: 6  Extubated   IV Access:Peripheral, PICC, A line  IV Fluids:LR, insulin, propofol, fentanyl  Vasopressors:None                Antibiotics: Vancomycin and Merem  Diet: Diet NPO    CC: Westley gangrene    Interval history:   Pt seen at bedside. No acute overnight events reported  Tissue Cx + Candida glabrata  Leukocytosis improvin.2 -> 15.1   Hb: 8.0 stable. Afebrile over last 24 hrs. Hemodynamically stable    HPI: Angelina Mariscal a 63 y. o. male with PMH as below notable for T2DM who presented with groin swelling. Patient reported that 2 weeks ago he notice a abscess near his anus, however reported in the last 2 days the area has increasing in size and warmth and extended to his scrotum. He reported poor compliance with his diabetes medication (metformin).      On admission the patient was hypotensive, WBC 18, Bicarb 20, Anion gap 22, Glucose 679, elevated Bhydroxybutirate. General surgery, urology and podiatry were consulted. Vancomycin was started, 3 L of IV fluids were given and the patient was placed in DKA protocol. Anion gap closed twice. The patient was taken for surgery debridement, was kept intubated due to possible multiple surgery interventions. Patient was transferred to the [facility] for colorectal and plastic surgery management.      Patient was admitted to the ICU for further workup and management of westley gangrene.     Medications:     Scheduled Meds:   acetaminophen  1,000 mg Oral Q6H    anidulafungin  100 mg IntraVENous Q24H    vancomycin  1,250 mg IntraVENous Q12H    enoxaparin  40 mg SubCUTAneous Daily    sodium chloride flush  5-40 mL IntraVENous 2 times per day    meropenem  1,000 mg IntraVENous Q8H     Continuous Infusions:   lactated ringers 125 mL/hr at 21 2300    sodium chloride Stopped (21)    dextrose      insulin (HUMAN R) non-weight based infusion 0.38 Units/hr (21 0624)     PRN Meds:HYDROmorphone **OR** HYDROmorphone, oxyCODONE **OR** oxyCODONE, sodium chloride flush, sodium chloride, glucose, dextrose, glucagon (rDNA), dextrose    Objective:   Vitals:   T-max:  Patient Vitals for the past 8 hrs:   BP Temp Temp src Pulse Resp SpO2 Weight   12/07/21 0600 (!) 142/71 -- -- 76 14 98 % --   12/07/21 0500 119/69 -- -- 73 15 97 % --   12/07/21 0425 -- -- -- -- -- -- 198 lb 6.6 oz (90 kg)   12/07/21 0400 130/70 98.6 °F (37 °C) Oral 77 15 98 % --   12/07/21 0300 121/71 -- -- 75 16 100 % --   12/07/21 0200 (!) 141/75 -- -- 79 14 96 % --   12/07/21 0100 132/70 -- -- 82 15 96 % --   12/07/21 0000 (!) 146/75 98 °F (36.7 °C) Oral 85 21 93 % --   12/06/21 2300 118/64 -- -- 71 15 100 % --       Intake/Output Summary (Last 24 hours) at 12/7/2021 0647  Last data filed at 12/7/2021 0425  Gross per 24 hour   Intake 2594.28 ml   Output 2490 ml   Net 104.28 ml       Review of Systems   Constitutional: Negative for chills and fever. HENT: Negative. Eyes: Negative. Respiratory: Negative for chest tightness and shortness of breath. Cardiovascular: Negative for chest pain, palpitations and leg swelling. Gastrointestinal: Negative for abdominal pain, nausea and vomiting. Endocrine: Negative. Genitourinary: Negative. Musculoskeletal: Negative. Skin: Negative. Allergic/Immunologic: Negative. Neurological: Negative. Hematological: Negative. Psychiatric/Behavioral: Negative. A 10 point review of systems was conducted, significant findings as noted in HPI. Physical Exam  Constitutional:       Appearance: He is ill-appearing. HENT:      Head: Normocephalic and atraumatic. Right Ear: External ear normal.      Left Ear: External ear normal.      Nose: Nose normal.      Mouth/Throat:      Mouth: Mucous membranes are dry. Pharynx: Oropharynx is clear. Eyes:      Extraocular Movements: Extraocular movements intact.       Conjunctiva/sclera: Conjunctivae normal. Pupils: Pupils are equal, round, and reactive to light. Cardiovascular:      Rate and Rhythm: Normal rate and regular rhythm. Pulses: Normal pulses. Heart sounds: Murmur: hyperdynamic flow murmur. Pulmonary:      Effort: Pulmonary effort is normal.      Breath sounds: Normal breath sounds. Abdominal:      General: Abdomen is flat. Bowel sounds are normal.      Palpations: Abdomen is soft. Tenderness: There is no abdominal tenderness (Left side abdomen at mild palpation). Musculoskeletal:         General: Normal range of motion. Cervical back: Normal range of motion. Feet:      Comments: Bandages noted  Skin:     General: Skin is warm and dry. Capillary Refill: Capillary refill takes 2 to 3 seconds. Coloration: Skin is pale. Comments: Wound vac in place over lower abdomen/pelvis     Neurological:      General: No focal deficit present. Mental Status: He is alert and oriented to person, place, and time. LABS:    CBC:   Recent Labs     12/05/21  0430 12/06/21 0418 12/07/21  0350   WBC 27.7* 20.2* 15.1*   HGB 9.3* 8.1* 8.0*   HCT 28.0* 24.5* 23.8*    242 305   MCV 90.6 91.2 91.2     Renal:    Recent Labs     12/04/21  0900 12/04/21  0900 12/05/21  0430 12/06/21  0418 12/07/21  0350      < > 140 139 133*   K 3.5  --  4.0 4.1 4.2      < > 111* 107 105   CO2 23   < > 24 26 24   BUN 18   < > 19 16 14   CREATININE <0.5*   < > 0.6* 0.6* 0.5*   GLUCOSE 123*   < > 128* 112* 108*   CALCIUM 7.0*   < > 7.4* 7.5* 7.1*   MG 1.70*  --  2.00 1.90  --    ANIONGAP 8   < > 5 6 4    < > = values in this interval not displayed. Hepatic:   No results for input(s): AST, ALT, BILITOT, BILIDIR, PROT, LABALBU, ALKPHOS in the last 72 hours. Troponin:   No results for input(s): TROPONINI in the last 72 hours. BNP: No results for input(s): BNP in the last 72 hours. Lipids: No results for input(s): CHOL, HDL in the last 72 hours.     Invalid input(s): LDLCALCU, TRIGLYCERIDE  ABGs:    No results for input(s): PHART, MSI4TZC, PO2ART, WPI9QAS, BEART, THGBART, J8GOPOJE, SVM6PBX in the last 72 hours. INR:   Recent Labs     12/07/21  0350   INR 1.09     Lactate:   No results for input(s): LACTATE in the last 72 hours. Cultures:  -----------------------------------------------------------------  RAD:   VL DUP LOWER EXTREMITY ARTERIES BILATERAL   Final Result            Assessment/Plan:   Katherine Camacho a 63 y. o. male with PMH as below notable for T2DM who presented with groin swelling. Patient was admitted to the ICU for further workup and management of westley gangrene    Westley gangrene   Necrotizing fascitis of the abdominal wall, perineum and back. WBC improving. BCx x2: NGTD; Tissue Cx (12/3): + Candida glabrata; Wound Cx: Mixed skin pam  -Continue Vanc, Merrem (12/1-)  - mL/hr  -Dressing changes BID  -Colorectal surgery following    Plan for ostomy today    Wound vac change later this week. 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   -Podiatry following  -May need surgical intervention once medically stable. Endocrine:  T2DM  Last A1c 13.5 (11/30/2021)  Strict BG goal 110-140 to aid with wound healing  -Insulin drip    -Hypoglycemia protocol   -POCT    Code Status: Full Code  FEN: Diet NPO  PPX: SCDs, protonix  DISPO: ICU    Sasha Angel MD, PGY-1  Internal medicine resident  12/07/21  6:47 AM    This patient will be staffed and discussed with Dr. Ramón Blanco    Patient seen, examined and discussed with the resident and I agree with the assessment and plan as edited above. OR again today for debridement and diverting colostomy. Continue insulin drip for tight glucose control.     Hannah Edwards MD

## 2021-12-07 NOTE — PLAN OF CARE
Problem: Activity:  Goal: Ability to return to normal activity level will improve  Description: Ability to return to normal activity level will improve  12/7/2021 0735 by Sharon Owen RN  Outcome: Ongoing  12/7/2021 0729 by Sharon Owen RN  Outcome: Ongoing  12/7/2021 0609 by Marycruz Sherwood RN  Outcome: Ongoing     Problem:  Bowel/Gastric:  Goal: Gastrointestinal status for postoperative course will improve  Description: Gastrointestinal status for postoperative course will improve  12/7/2021 0735 by Sharon Owen RN  Outcome: Ongoing  12/7/2021 0729 by Sharon Owen RN  Outcome: Ongoing     Problem: Health Behavior:  Goal: Identification of resources available to assist in meeting health care needs will improve  Description: Identification of resources available to assist in meeting health care needs will improve  12/7/2021 0735 by Sharon Owen RN  Outcome: Ongoing  12/7/2021 0729 by Sharon Owen RN  Outcome: Ongoing  12/7/2021 0609 by Marycruz Sherwood RN  Outcome: Ongoing     Problem: Physical Regulation:  Goal: Postoperative complications will be avoided or minimized  Description: Postoperative complications will be avoided or minimized  12/7/2021 0735 by Sharon Owen RN  Outcome: Ongoing  12/7/2021 0729 by Sharon Owen RN  Outcome: Ongoing     Problem: Respiratory:  Goal: Ability to achieve and maintain a regular respiratory rate will improve  Description: Ability to achieve and maintain a regular respiratory rate will improve  12/7/2021 0735 by Sharon Owen RN  Outcome: Ongoing  12/7/2021 0729 by Sharon Owen RN  Outcome: Ongoing     Problem: Safety:  Goal: Ability to remain free from injury will improve  Description: Ability to remain free from injury will improve  12/7/2021 0735 by Sharon Owen RN  Outcome: Ongoing  12/7/2021 0729 by Sharon Owen RN  Outcome: Ongoing  12/7/2021 0609 by Marycruz Sherwood RN  Outcome: Ongoing     Problem: Sensory:  Goal: General experience of comfort will improve  Description: General experience of comfort will improve  12/7/2021 0735 by Lottie Song RN  Outcome: Ongoing  12/7/2021 0729 by Lottie Song RN  Outcome: Ongoing     Problem: Skin Integrity:  Goal: Demonstration of wound healing without infection will improve  Description: Demonstration of wound healing without infection will improve  12/7/2021 0735 by Lottie Song RN  Outcome: Ongoing  12/7/2021 0729 by Lottie Song RN  Outcome: Ongoing  12/7/2021 0609 by Bettina Paulino RN  Outcome: Ongoing  Goal: Will show no infection signs and symptoms  Description: Will show no infection signs and symptoms  12/7/2021 0735 by Lottie Song RN  Outcome: Ongoing  12/7/2021 0729 by Lottie Song RN  Outcome: Ongoing  12/7/2021 0609 by Bettina Paulino RN  Outcome: Ongoing  Goal: Absence of new skin breakdown  Description: Absence of new skin breakdown  12/7/2021 0735 by Lottie Song RN  Outcome: Ongoing  12/7/2021 0729 by Lottie Song RN  Outcome: Ongoing  12/7/2021 0609 by Bettina Paulino RN  Outcome: Ongoing     Problem: Infection - Surgical Site:  Goal: Will show no infection signs and symptoms  Description: Will show no infection signs and symptoms  12/7/2021 0735 by Lottie Song RN  Outcome: Ongoing  12/7/2021 0729 by Lottie Song RN  Outcome: Ongoing  12/7/2021 0609 by Bettina Paulino RN  Outcome: Ongoing     Problem: Falls - Risk of:  Goal: Will remain free from falls  Description: Will remain free from falls  12/7/2021 0735 by Lottie Song RN  Outcome: Ongoing  12/7/2021 0729 by Lottie Song RN  Outcome: Ongoing  12/7/2021 0609 by Bettina Paulino RN  Outcome: Ongoing  Goal: Absence of physical injury  Description: Absence of physical injury  12/7/2021 0735 by Lottie Song RN  Outcome: Ongoing  12/7/2021 0729 by Lottie Song RN  Outcome: Ongoing  12/7/2021 0609 by Bettina Paulino RN  Outcome: Ongoing     Problem: ABCDS Injury Assessment  Goal: Absence of physical injury  12/7/2021 0735 by Lottie Song RN  Outcome: Ongoing  12/7/2021 0729 by Lottie Song RN  Outcome: Ongoing     Problem: Nutrition  Goal: Optimal

## 2021-12-07 NOTE — PROGRESS NOTES
Progress Note  Admit Date: 12/1/2021         CC: F/U for   DKA  Shayla's Gangrene    HPI:  \"Patient was transferred from [facility] after admission for DKA and Shayla's gangrene/necrotizing fasciitis. Patient was intubated and underwent I&D. Pt was transferred to [facility] for further evaluation per colorectal/plastic surgery. Patient underwent wide excision of perineum/abdominal wall 12/01. Extubated 12/02. S/p excisional debridement of abdomen and left buttock, wound VAC placement 12/03. S/p further debridement and wound VAC placement today. Plan to return to the OR for diverting ostomy on Tuesday by general surgery. On IV abx and insulin drip. Urology/surgery/plastic surgery following. \"    Subjective:  NPO for procedure today. Denies significant pain. No cp or sob. No n/v. PHYSICAL EXAM:  /67   Pulse 79   Temp 98.6 °F (37 °C) (Oral)   Resp 14   Ht 5' 11\" (1.803 m)   Wt 198 lb 6.6 oz (90 kg)   SpO2 95%   BMI 27.67 kg/m²   Recent Labs     12/07/21  0102 12/07/21  0207 12/07/21  0345 12/07/21  0423 12/07/21  0517   POCGLU 128* 159* 118* 119* 104*       Intake/Output Summary (Last 24 hours) at 12/7/2021 0821  Last data filed at 12/7/2021 0600  Gross per 24 hour   Intake 2594.28 ml   Output 2235 ml   Net 359.28 ml     Physical Exam  Constitutional:       General: He is not in acute distress. Eyes:      Extraocular Movements: Extraocular movements intact. Conjunctiva/sclera: Conjunctivae normal.   Cardiovascular:      Rate and Rhythm: Normal rate and regular rhythm. Pulses: Normal pulses. Pulmonary:      Effort: Pulmonary effort is normal. No respiratory distress. Breath sounds: Normal breath sounds. No wheezing. Abdominal:      General: Bowel sounds are normal. There is no distension. Palpations: Abdomen is soft. Musculoskeletal:         General: Normal range of motion. Cervical back: Normal range of motion and neck supple.       Left lower leg: No bilateral arterial occlusive disease  Vascular surgery recommended possible consideration of an angiogram once the patient is stabilized  Patient will eventually require podiatric surgical intervention pending medical stability  Continue wound care  Podiatry following, appreciate recommendation    Disposition: ICU    Full Adonica MD Tank

## 2021-12-07 NOTE — CARE COORDINATION
Case Management Assessment           Daily Note                 Date/ Time of Note: 12/7/2021 4:02 PM         Note completed by: Will Cespedes RN    Patient Name: Ramos Quiroz  YOB: 1955    Diagnosis:Necrotizing fasciitis [facility]) [M72.6]  Patient Admission Status: Inpatient    Date of Admission:12/1/2021  3:40 PM Length of Stay: 6 GLOS: GMLOS: 4.4    Current Plan of Care: Surgical debridement, to OR today for new ostomy, wound vac, IV ABX  ________________________________________________________________________________________  PT AM-PAC:   / 24 per last evaluation on: tbd    OT AM-PAC:   / 24 per last evaluation on: tbd    DME Needs for discharge: tbd  ________________________________________________________________________________________  Discharge Plan: Home with [address] [place] [address] Way: [facility] vs [facility]    Tentative discharge date: tbd    Current barriers to discharge: Surgery    Referrals completed: Infusion: [facility]    Resources/ information provided: Not indicated at this time  ________________________________________________________________________________________  Case Management Notes:Patient lives at home with spouse that has 7 steps to enter.  To OR today for diverting ostomy.  Spoke with Fabiola Howard in financial services, patient does not have insurance and is over income level for assistance through "[employer]. Per Fabiola Howard, patient may be eligible for Medicare, gave phone number for application to spouse. Leonidas Gretchen at [facility] who will follow for wound care upon discharge. May defer to [facility] due to new ostomy as patient will be howard case. Spoke with Hilda at [facility] regarding possibility of long term antibiotics.  Per Hilda, quote for merrem 1g every 8 hours would be $53 per day, they could arrange a payment plan to assist. Angelika Pena will require placement.     Rebekah Randle and his family were provided with choice of provider; he and his family are in agreement with the discharge plan.     Care Transition Patient: Linda Walter RN  [facility]  Case Management Department  Ph: [phone]

## 2021-12-07 NOTE — PROGRESS NOTES
[id number] [facility] nurse consulted for stoma marking for a diverting colostomy d/t necrotizing fasciitis of the abdominal wall, gluteal region and scrotum. Patient currently has Paige Root spanning lower abdomen and scrotal area. Due to the location of the [facility] dressing, limited options for colostomy location. 2 colostomy sites marked -  left lupper quadrant and right upper quadrant in the rectus muscle. The sites were examined for a flat pouching surface to accomadate placement of ostomy bag and wafer. Reviewed anatomy and physiology, basic pouching procedures, and maintanence of the colostomy. Will plan to follow post op as indicated.

## 2021-12-07 NOTE — PROGRESS NOTES
Podiatric Surgery Daily Progress Note      Admit Date: 12/1/2021      Code:Full Code    Patient seen and examined, labs and records reviewed    Subjective:     Patient seen at bedside this a.m. Patient denies pain to bilateral lower extremity. Patient denies fever, chills, shortness of breath, chest pain. Patient denies any acute events overnight. Review of Systems: As above       Objective     /67   Pulse 79   Temp 98.7 °F (37.1 °C) (Oral)   Resp 16   Ht 5' 11\" (1.803 m)   Wt 198 lb 6.6 oz (90 kg)   SpO2 96%   BMI 27.67 kg/m²      I/O:    Intake/Output Summary (Last 24 hours) at 12/7/2021 0921  Last data filed at 12/7/2021 0600  Gross per 24 hour   Intake 2594.28 ml   Output 2235 ml   Net 359.28 ml              Wt Readings from Last 3 Encounters:   12/07/21 198 lb 6.6 oz (90 kg)   11/30/21 163 lb 3.2 oz (74 kg)   04/18/16 179 lb 14.3 oz (81.6 kg)       LABS:    Recent Labs     12/06/21  0418 12/07/21  0350   WBC 20.2* 15.1*   HGB 8.1* 8.0*   HCT 24.5* 23.8*    305        Recent Labs     12/07/21  0350   *   K 4.2      CO2 24   BUN 14   CREATININE 0.5*        Recent Labs     12/07/21  0350   INR 1.09       LOWER EXTREMITY EXAMINATION    Dressing to left LE intact. No strikethrough noted to the external dressing. No drainage noted to the internal layers of the dressing. VASCULAR: DP and PT pulses are non-palpable 0/4 b/l. Upon hand-held Doppler examination DP signals were noted to be monophasic and PT signals were noted to be nondopplerable. CFT is sluggish to the digits of the foot b/l. Skin temperature is warm to cold from proximal to distal.  No edema noted. No pain with calf compression b/l.     NEUROLOGIC: Gross and epicritic sensation is diminished b/l. Protective sensation is diminished at all pedal sites b/l.     DERMATOLOGIC:   Left lower extremity:  Full-thickness ulceration noted to the lateral aspect of the left foot.   Wound measures approximately 3.2 cm x 2 cm x 0.5 cm. Of note the fifth metatarsal is exposed. Periwound rubor noted, improving. No purulent drainage noted. No fluctuance or crepitus or malodor noted. Increased ischemic changes noted to left fifth digit, with significant duskiness. Serous bulla with surrounding rubor and ecchymosis overlying the dorsal aspect of the midfoot. Scant fluctuance noted. No crepitus, erythema, drainage, or malodor noted. No open wound noted. Likely 2/2 prevalon boot strap. Right LE soft tissue envelope is closed without ulceration or acute signs of infection.     MUSCULOSKELETAL: Mild diffuse tenderness with palpation of the left foot. No obvious biomechanical abnormalities. IMAGING:  XR LEFT FOOT 11/30/2021  Narrative   EXAMINATION:   THREE XRAY VIEWS OF THE LEFT FOOT       11/30/2021 1:44 pm       COMPARISON:   None.       HISTORY:   ORDERING SYSTEM PROVIDED HISTORY: wound   TECHNOLOGIST PROVIDED HISTORY:   Reason for exam:->wound   Reason for Exam: blood sugar problem, wound check on lateral portion of foot   Acuity: Acute   Type of Exam: Initial       FINDINGS:   Osseous destruction along the articular margins of the 5th   metatarsophalangeal joint with associated lucency in the soft tissues. .   There is no evidence of fracture or dislocation. . The remaining joint spaces   appear well maintained. The remaining soft tissues are unremarkable.           Impression   Evidence of a septic joint involving the 5th metatarsal phalangeal joint with   associated osteomyelitis         ARTERIAL DUPLEX 12/2/21     Type of Study:        Extremities Arteries:Lower Extremities Arterial Duplex, VL LOWER EXTREMITY    ARTERIES DUPLEX BILATERAL.       Tech Comments   Right   The right ankle/brachial index is 0.0 (no Doppler signal could be heard at the   [facility] or the PT). The common femoral and profunda femoral arteries could not be visualized due   to bandages extending into the groin area.    The mid superficial femoral artery has a short segmental occlusion and then is   reconstituted. Elevated velocities at the distal superficial femoral artery indicate a > 50%   stenosis by velocity criteria (velocity went from 15 to 298 cm/s). The posterior tibial artery is occluded. The distal anterior tibial artery is barely patent, with very low flow   (possibly occluded and then reconstituted). Left   The left ankle brachial index is 0 for the PT and the DP was not accessible   due to the bandages on the foot. The common femoral and profunda femoral artery could not be visualized due to   bandages extending into the groin area. The distal superficial femoral artery is occluded and then reconstituted. The posterior tibial artery, peroneal, and anterior tibial artery are   occluded. There were no previous studies to use for comparison. ASSESSMENT/PLAN  ASSESSMENT/PLAN:   -Full-thickness ulceration; lateral left foot-Sands stage III  -Osteomyelitis of the fifth metatarsal; left foot  -Critical limb ischemia; bilateral lower extremity  -Diabetes mellitus, type II  -History of noncompliance    -Patient seen and examined at bedside this a.m.  -VSS on 2L NC. Leukocytosis noted (WBC 15.1)  -ESR 81 and .5  -HbA1c 13.5%  -XR images reviewed, impression noted above  -Arterial duplex reviewed, impression noted above  -Vascular surgery consulted; appreciate recommendations; awaiting further stabilization at this time  -Wound culture: Mixed skin pam  -Continue IV antibiotics per primary team: Vancomycin, Merrem  -Left lower extremity dressed with Betadine soaked gauze, DSD, and tape  -Prevalon boots reapplied. Patient is to wear at all times while in bed to prevent further deep tissue injury. Ankle strap removed from bilateral boot as the ankle straps were causing pressure to his feet.   -Nonweightbearing to left lower extremity  -Weightbearing as tolerated to right lower extremity  -Lengthy discussion with patient regarding infection and the need for possible surgical intervention once medically stable    DISPO: Full-thickness ulceration with underlying osteomyelitis to the left fifth metatarsal.  Critical limb ischemia bilateral lower extremity. Vascular consulted; awaiting further stabilization at this time. Continue broad-spectrum IV antibiotics. Patient will eventually require podiatric surgical intervention but timing will depend on medical stability and vascular recommendations. Patient assessment and plan discussed with Dr. Dagmar Butt DPM.    Debi Valdez [facility]  12/07/21  9:21 AM      Patient was seen and evaluated at bedside. Agree with residents assessment and treatment plan.   Dagmar Butt DPM

## 2021-12-07 NOTE — FLOWSHEET NOTE
12/07/21 0741   Encounter Summary   Services provided to: Patient   Referral/Consult From: Rounding   Continue Visiting   (12/7, nigel )   Complexity of Encounter Moderate   Length of Encounter 15 minutes   Routine   Type Pre-procedure   Assessment Calm; Approachable;  Hopeful   Intervention Active listening; Explored feelings, thoughts, concerns; Nurtured hope   Outcome Comfort; Expressed gratitude   Staff Jon [place], [place]

## 2021-12-07 NOTE — OP NOTE
[facility] 30 SURGERY     OPERATIVE DICTATION    NAME: Robert Silva   MRN: [id number]  DATE: 12/7/2021     AGE: 63 y.o. LOCATION: [facility]    PREOPERATIVE DIAGNOSIS:  Shayla's gangrene with necrotizing fasciitis     POSTOPERATIVE DIAGNOSIS:  Same. OPERATION PERFORMED: 1) Abdominal advancement in preparation for diverting colostomy (42 x 14 cm)       2) Application of Veraflo instillation vac therapy     SURGEON:  Tirsh Gill MD    SURGEON2: Colleen Richter MD    ASSISTANT: Austin Gao (PGY2)     ANESTHESIA: General     ESTIMATED BLOOD LOSS:  50 cc (from plsatics)     DRAINS:  Instillation vac     SPECIMENS: None     OPERATIVE INDICATIONS:  This is an unfortunate 63 y.o. male with a history of poorly controlled diabetes who developed Shayla's gangrene at an outside hospital.  He underwent debridement with both Urology and General surgery, however was noted to have additional crepitance. Given this, he was urgently transferred to our hospital and was taken to the operating room by general surgery for extensive debridement. Plastic surgery was consulted for assistance with management. He has improved in stability and is coming to the operating room today in conjunction with general surgery for dressing change and colostomy. The risks, benefits, alternatives, outcomes, and personnel involved was performed with the patient. After all questions were answered to the patient's satisfaction, they agreed to proceed. OPERATIVE PROCEDURE:  The patient was brought to the operating room on his ICU bed and placed in the supine position on the operating room table. He underwent general anesthesia without difficulty, was then placed in the lithotomy position, and was prepped and draped in the usual sterile manner. A time-out was performed confirming the patient and the procedure to be performed.  With the debridement, there was a paucity of abdominal tissue for colostomy which would require more extensive takedown increasing the complexity. Thus, the decision was made to advance the abdominal wall flap down to increase the surface area for which to apply a stoma. The skin and subcutaneous tissue of the abdominal wall was elevated off the anterior abdominal wall. The umbilicus was floated with the advancement flap revealing a small umbilical hernia. The dissection continued up to the xiphoid process with care to ensure that the lateral perforators were intact to continue with perfusion as the extent of the injury continued beyond the flanks. The umbilical hernia defect was repaired primarily with a single 2-0 PDS suture. The flap was then sutured down to the lower aspect of the abdominal fascia using 2-0 PDS sutures. This mobilization increased the surface area for the general surgery team to perform their colostomy. The case was then turned over to Dr. Jyoti Felder for creation of his stoma as will be dictated in a separate operative note. Upon completion, attention was directed back to the wound and this was copiously irrigated with 3L of saline solution using a Pulse Lavage. Once completed, another complex wound vacuum device was then fashioned and applied with good seal.    The patient was then placed supine, extubated, and taken back to the ICU in stable, yet critical condition. There were no immediate complications and the patient tolerated the procedure well. At the end of the case, all counts were correct. PLAN: Will return for additional planned dressing change under anesthesia and possible debridement in 48-72 hours. Once there is improved granulation tissue with negative cultures, will begin the reconstructive process.     Regenia Rubinstein, MD

## 2021-12-07 NOTE — PROGRESS NOTES
Clinical Pharmacy Progress Note    Vancomycin - Management by Pharmacy    Consult Date(s): 12/1/21  Consulting Provider(s): Dr. Raul Conner / Plan    1. Shayla Gangrene and Foot Osteomyelitis - Vancomycin   Concurrent Antimicrobials:   o Meropenem - day #7   Day of Vanc Therapy: #8   Current Dosing Method: Bayesian-Guided AUC Dosing   Therapeutic Goal: 400-600 mg/L*hr   Current Dose / Frequency: 1250 mg IV every 12 hours   Plan / Rationale:  o Renal function remains stable. Will continue current dose and frequency. Predicts an AUC of 563 and steady state trough of 17.1 mcg/mL. o Plan to check a trough level 12/8 @ 0345 to ensure no accumulation.  Will continue to monitor clinical condition and make adjustments to regimen as appropriate. Please reach out with any questions. Jorge L Villaseñor PharmD., BCPS   12/7/2021 8:40 AM  Wireless: [phone]        Interval Update:  Plans for OR today for diverting colostomy and possibly wound vac change. Remains on insulin gtt. Subjective/Objective: Mr. Partha Brito is a 63 y.o. male who presented to [facility] with scrotal wound pain, as well as L foot diabetic foot infection. Found to be in DKA with Shayla's gangrene throughout scrotum, buttocks, and lower abdomen s/p initial I&D on 11/30 at [facility]. Transferred to [facility] on 12/1 and taken for further excision debridement, and again on 12/3 and 48/4 with application of wound vac. Per Podiatry, patient's L foot with stable OM but also critical limb ischemia; will need intervention with timing to be determined with overall improvement. Pharmacy has been consulted to dose Vancomycin.     Height:   Ht Readings from Last 1 Encounters:   12/02/21 5' 11\" (1.803 m)     Weight:   Wt Readings from Last 1 Encounters:   12/07/21 198 lb 6.6 oz (90 kg)     Level(s) / Doses:    Date Time Dose Level / Type of Level Interpretation   12/3 14:11 1250 mg IV q18h Random = 12 mcg/mL · Drawn ~14 hrs after previous dose; predicted  mg/L*h  · Increase dose to 1.25g IV q12h   12/4 09:00 1250 mg IV q12h Random = 24.6 mcg/mL · Drawn ~5 hrs after previous dose; predicted  mg/L*h  · Continue dose   12/8 03:45 1250mg IV q12h Trough = ordered ·    Note: Serum levels collected for AUC-based dosing may be high if collected in close proximity to the dose administered. This is not necessarily an indicator of toxicity. Cultures & Sensitivities:    Date Site Micro Susceptibility / Result   11/30 Blood x2 No growth    11/30 COVID-19 rapid Negative    11/30 Scrotal tissue Mixed anaerobic growth    12/1 Foot wound Mixed skin pam, no further workup    12/3 R upper abdom wall tissue Candida glabrata    12/3 Perineum, fungus NGTD    12/3 Perineum, AFB NGTD    12/3 Perineum, anaerobic and aerobic NGTD    12/5 Penile tissue NGTD    12/5 Penile tissue, fungus NGTD    12/5 Penile tissue, AFB NGTD      Labs / Ancillary Data:    Estimated Creatinine Clearance: 161 mL/min (A) (based on SCr of 0.5 mg/dL (L)).     Recent Labs     12/05/21  0430 12/06/21  0418 12/07/21  0350   CREATININE 0.6* 0.6* 0.5*   BUN 19 16 14   WBC 27.7* 20.2* 15.1*

## 2021-12-07 NOTE — ANESTHESIA PRE PROCEDURE
Department of Anesthesiology  Preprocedure Note       Name:  Cristina Gunter   Age:  63 y.o.  :  1955                                          MRN:  [id number]         Date:  2021      Surgeon: Lonna Frankel):  MD Hung Echeverria MD    Procedure: Procedure(s):  LAPAROSCOPIC COLOSTOMY CREATION  EVAULATION UNDER ANESTHESIA WITH POSSIBLE DEBRIDEMENT ABDOMINAL WOUND AND LEFT BUTTOCK/ POSSIBLE WOUND VAC CHANGE    Medications prior to admission:   Prior to Admission medications    Medication Sig Start Date End Date Taking?  Authorizing Provider   metFORMIN (GLUCOPHAGE) 500 MG tablet Take 500 mg by mouth daily (with breakfast)    Historical Provider, MD   meloxicam (MOBIC) 15 MG tablet Take 1 tablet by mouth daily 16   Blessing Wright DO       Current medications:    Current Facility-Administered Medications   Medication Dose Route Frequency Provider Last Rate Last Admin    HYDROmorphone (DILAUDID) injection 0.25 mg  0.25 mg IntraVENous Q3H PRN [facility] Tina France DO        Or    HYDROmorphone (DILAUDID) injection 0.5 mg  0.5 mg IntraVENous Q3H PRN [facility] Tina France DO   0.5 mg at 21 3904    oxyCODONE (ROXICODONE) immediate release tablet 5 mg  5 mg Oral Q4H PRN [facility] Tina France DO        Or    oxyCODONE (ROXICODONE) immediate release tablet 10 mg  10 mg Oral Q4H PRN [facility] Tina France, DO   10 mg at 21 2322    acetaminophen (TYLENOL) tablet 1,000 mg  1,000 mg Oral Q6H Doug France DO   1,000 mg at 21 0915    vancomycin (VANCOCIN) 1,250 mg in sodium chloride 0.9 % 250 mL IVPB  1,250 mg IntraVENous Q12H Jacqueline Cutting Spritzer, DO   Stopped at 21 0547    enoxaparin (LOVENOX) injection 40 mg  40 mg SubCUTAneous Daily Jacqueline Cutting Spritzer, DO   40 mg at 21 0840    lactated ringers infusion   IntraVENous Continuous Jacqueline Cutting Spritzer,  mL/hr at 21 2300 Rate Verify at 21 2300    sodium chloride flush 0.9 % injection 5-40 mL  5-40 mL IntraVENous 2 times per day Vee [place] Spritzer, DO   10 mL at 12/07/21 1780    sodium chloride flush 0.9 % injection 5-40 mL  5-40 mL IntraVENous PRN [facility] [place] Spritzer, DO        0.9 % sodium chloride infusion  25 mL IntraVENous PRN [place] [place] Spritzer, DO   Paused at 12/01/21 2027    meropenem (MERREM) 1,000 mg in sodium chloride 0.9 % 100 mL IVPB (mini-bag)  1,000 mg IntraVENous Q8H [place] Spritzer, DO   Stopped at 12/07/21 1041    glucose (GLUTOSE) 40 % oral gel 15 g  15 g Oral PRN [place] Spritzer, DO        dextrose 50 % IV solution  12.5 g IntraVENous PRN [place] [place] Spritzer, DO        glucagon (rDNA) injection 1 mg  1 mg IntraMUSCular PRN [place] Spritzer, DO        dextrose 5 % solution  100 mL/hr IntraVENous PRN [place] [place] Spritzer, DO        insulin regular (HUMULIN R;NOVOLIN R) 100 Units in sodium chloride 0.9 % 100 mL infusion  1-50 Units/hr IntraVENous Continuous [place] Spritzer, DO 0.6 mL/hr at 12/07/21 1100 0.61 Units/hr at 12/07/21 1100       Allergies:  No Known Allergies    Problem List:    Patient Active Problem List   Diagnosis Code    Diabetic acidosis without coma ([facility] [address] E11.10    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    Acute respiratory failure with hypoxia (HCC) J96.01    Necrotizing fasciitis ([facility] [address] M72.6       Past Medical History:  History reviewed. No pertinent past medical history.     Past Surgical History:        Procedure Laterality Date    ABDOMEN SURGERY N/A 12/1/2021    WIDE EXCISION PERINEUM, BACK, ABDOMINAL WALL performed by Holly Díaz MD at [address] 12/3/2021    ABDOMINAL WALL AND LEFT BUTTOCK DEBRIDEMENT, WOUND VAC PLACEMENT performed by Abiola Payan MD at [address] 12/5/2021    ABDOMINAL WALL AND LEFT BUTTOCK DEBRIDEMENT, WOUND VAC PLACEMENT performed by Abiola Payan MD at [place] [address] N/A 11/30/2021    DEBRIDEMENT OF SCROTAL SHAYLA'S GANGRENE performed by Rossana Pozo MD at [address] [place] [address] OR    VAGINA SURGERY N/A 11/30/2021    PERINEAL SOFT TISSUE EXCISIONAL DEBRIDEMENT performed by Aliza Ortega MD at [address] [facility] History:    Social History     Tobacco Use    Smoking status: Current Every Day Smoker    Smokeless tobacco: Never Used   Substance Use Topics    Alcohol use: Yes     Alcohol/week: 0.0 standard drinks     Comment: social                                 Ready to quit: Not Answered  Counseling given: Not Answered      Vital Signs (Current):   Vitals:    12/07/21 0900 12/07/21 1000 12/07/21 1100 12/07/21 1200   BP: 137/67 136/71 (!) 149/69 (!) 145/72   Pulse: 79 77 80 85   Resp: 16 14 16 16   Temp:    98.2 °F (36.8 °C)   TempSrc:    Oral   SpO2: 96% 97% 93% 94%   Weight:       Height:                                                  BP Readings from Last 3 Encounters:   12/07/21 (!) 145/72   12/05/21 105/65   12/03/21 114/61       NPO Status: Time of last liquid consumption: 2300                        Time of last solid consumption: 2300                                                      BMI:   Wt Readings from Last 3 Encounters:   12/07/21 198 lb 6.6 oz (90 kg)   11/30/21 163 lb 3.2 oz (74 kg)   04/18/16 179 lb 14.3 oz (81.6 kg)     Body mass index is 27.67 kg/m².     CBC:   Lab Results   Component Value Date    WBC 15.1 12/07/2021    RBC 2.61 12/07/2021    HGB 8.0 12/07/2021    HCT 23.8 12/07/2021    MCV 91.2 12/07/2021    RDW 14.6 12/07/2021     12/07/2021       CMP:   Lab Results   Component Value Date     12/07/2021    K 4.2 12/07/2021     12/07/2021    CO2 24 12/07/2021    BUN 14 12/07/2021    CREATININE 0.5 12/07/2021    GFRAA >60 12/07/2021    AGRATIO 0.7 11/30/2021    LABGLOM >60 12/07/2021    GLUCOSE 108 12/07/2021    PROT 4.2 12/02/2021    CALCIUM 7.1 12/07/2021    BILITOT 0.4 12/02/2021    ALKPHOS 109 12/02/2021    AST 9 12/02/2021    ALT 7 12/02/2021       POC Tests:   Recent Labs     12/07/21  1106   POCGLU 121*       Coags:   Lab Results Component Value Date    PROTIME 12.4 12/07/2021    INR 1.09 12/07/2021       HCG (If Applicable): No results found for: PREGTESTUR, PREGSERUM, HCG, HCGQUANT     ABGs:   Lab Results   Component Value Date    PHART 7.444 12/04/2021    PO2ART 71.5 12/04/2021    HTN7DCY 40.3 12/04/2021    GHZ9BEX 28 12/04/2021    BEART 3.3 12/04/2021    M7TGNMFM 95 12/04/2021        Type & Screen (If Applicable):  No results found for: LABABO, LABRH    Drug/Infectious Status (If Applicable):  No results found for: HIV, HEPCAB    COVID-19 Screening (If Applicable):   Lab Results   Component Value Date    COVID19 Not Detected 11/30/2021           Anesthesia Evaluation  Patient summary reviewed and Nursing notes reviewed no history of anesthetic complications:   Airway: Mallampati: I  TM distance: <3 FB   Neck ROM: full  Mouth opening: > = 3 FB Dental: normal exam         Pulmonary:Negative Pulmonary ROS and normal exam  breath sounds clear to auscultation                             Cardiovascular:          NYHA Classification: II  ECG reviewed  Rhythm: regular  Rate: normal  Echocardiogram reviewed         Beta Blocker:  Not on Beta Blocker         Neuro/Psych:   Negative Neuro/Psych ROS              GI/Hepatic/Renal: Neg GI/Hepatic/Renal ROS            Endo/Other:    (+) DiabetesType II DM, poorly controlled, , .                 Abdominal:             Vascular: negative vascular ROS. Other Findings:             Anesthesia Plan      general     ASA 2       Induction: intravenous. MIPS: Postoperative opioids intended. Anesthetic plan and risks discussed with patient and spouse. Use of blood products discussed with patient and spouse whom consented to blood products. Plan discussed with CRNA.     Attending anesthesiologist reviewed and agrees with Preprocedure content              Penny Tomlinson DO   12/7/2021

## 2021-12-07 NOTE — PROGRESS NOTES
Physician Progress Note      PATIENT:               Pierre Wilhelm  [facility] #:                  [id number]  :                       1955  ADMIT DATE:       2021 3:40 PM  [address] [place] DATE:  RESPONDING  PROVIDER #:        Cele Johnson MD          QUERY TEXT:    Patient admitted with necrotizing fasciitis. Noted documentation of intubation   for multiple surgical interventions in H&P and acute respiratory failure in   PN . Please indicate one of the following and document in the medical   record: The medical record reflects the following:  Risk Factors: 62 yo w/ necrotizing fasciitis needing multiple surgeries  Clinical Indicators: Per H&P: The patient was taken for surgery debridement,   was kept intubated due to possible multiple surgery interventions. Per PN   : Acute respiratory failure: On vent, plan for SBT trial today. Treatment: arrived from [facility] intubated  1543, extubated    1332. Options provided:  -- Intubated for multiple surgical interventions,  Acute Respiratory Failure   ruled out after study  -- Intubated for Acute Respiratory Failure as evidenced by, Please document   evidence. -- Other - I will add my own diagnosis  -- Disagree - Not applicable / Not valid  -- Disagree - Clinically unable to determine / Unknown  -- Refer to Clinical Documentation Reviewer    PROVIDER RESPONSE TEXT:    This patient was intubated for multiple surgical interventions, Acute   Respiratory Failure has been ruled out after study.     Query created by: Anais Paz on 2021 12:28 PM      Electronically signed by:  Cele Johnson MD 2021 1:04 PM

## 2021-12-07 NOTE — FLOWSHEET NOTE
12/07/21 1100   Family/Significant Other Communication   Reason patient updates. Name roby. Relationship Spouse/Significant Other   Response updates appreciated. Method of Communication In Person       Patient Spouse, Kemar Temple, updated on patient condition with all questions answered. Will continue to keep updated.

## 2021-12-07 NOTE — PLAN OF CARE
Problem: Activity:  Goal: Ability to return to normal activity level will improve  Description: Ability to return to normal activity level will improve  12/7/2021 0729 by Oscar Gama RN  Outcome: Ongoing  12/7/2021 0609 by Rosy Sanchez RN  Outcome: Ongoing     Problem:  Bowel/Gastric:  Goal: Gastrointestinal status for postoperative course will improve  Description: Gastrointestinal status for postoperative course will improve  Outcome: Ongoing     Problem: Health Behavior:  Goal: Identification of resources available to assist in meeting health care needs will improve  Description: Identification of resources available to assist in meeting health care needs will improve  12/7/2021 0729 by Oscar Gama RN  Outcome: Ongoing  12/7/2021 0609 by Rosy Sanchez RN  Outcome: Ongoing     Problem: Physical Regulation:  Goal: Postoperative complications will be avoided or minimized  Description: Postoperative complications will be avoided or minimized  Outcome: Ongoing     Problem: Respiratory:  Goal: Ability to achieve and maintain a regular respiratory rate will improve  Description: Ability to achieve and maintain a regular respiratory rate will improve  Outcome: Ongoing     Problem: Safety:  Goal: Ability to remain free from injury will improve  Description: Ability to remain free from injury will improve  12/7/2021 0729 by Oscar Gama RN  Outcome: Ongoing  12/7/2021 0609 by Rosy Sanchez RN  Outcome: Ongoing     Problem: Sensory:  Goal: General experience of comfort will improve  Description: General experience of comfort will improve  Outcome: Ongoing     Problem: Skin Integrity:  Goal: Demonstration of wound healing without infection will improve  Description: Demonstration of wound healing without infection will improve  12/7/2021 0729 by Oscar Gama RN  Outcome: Ongoing  12/7/2021 0609 by Rosy Sanchez RN  Outcome: Ongoing  Goal: Will show no infection signs and symptoms  Description: Will show no infection signs and symptoms  12/7/2021 0729 by Abigail Washington RN  Outcome: Ongoing  12/7/2021 0609 by Raymon Acevedo RN  Outcome: Ongoing  Goal: Absence of new skin breakdown  Description: Absence of new skin breakdown  12/7/2021 0729 by Abigail Washington RN  Outcome: Ongoing  12/7/2021 0609 by Raymon Acevedo RN  Outcome: Ongoing     Problem: Infection - Surgical Site:  Goal: Will show no infection signs and symptoms  Description: Will show no infection signs and symptoms  12/7/2021 0729 by Abigail Washington RN  Outcome: Ongoing  12/7/2021 0609 by Raymon Acevedo RN  Outcome: Ongoing     Problem: Falls - Risk of:  Goal: Will remain free from falls  Description: Will remain free from falls  12/7/2021 0729 by Abigail Washington RN  Outcome: Ongoing  12/7/2021 0609 by Raymon Acevedo RN  Outcome: Ongoing  Goal: Absence of physical injury  Description: Absence of physical injury  12/7/2021 0729 by Abigail Washington RN  Outcome: Ongoing  12/7/2021 0609 by Raymon Acevedo RN  Outcome: Ongoing     Problem: ABCDS Injury Assessment  Goal: Absence of physical injury  Outcome: Ongoing     Problem: Nutrition  Goal: Optimal nutrition therapy  Outcome: Ongoing     Problem: Pain:  Goal: Pain level will decrease  Description: Pain level will decrease  Outcome: Ongoing  Goal: Control of acute pain  Description: Control of acute pain  Outcome: Ongoing  Goal: Control of chronic pain  Description: Control of chronic pain  Outcome: Ongoing

## 2021-12-08 PROBLEM — M79.89 NECROTIZING SOFT TISSUE INFECTION: Status: ACTIVE | Noted: 2021-12-08

## 2021-12-08 LAB
ANAEROBIC CULTURE: ABNORMAL
ANION GAP SERPL CALCULATED.3IONS-SCNC: 8 MMOL/L (ref 3–16)
BASOPHILS ABSOLUTE: 0 K/UL (ref 0–0.2)
BASOPHILS RELATIVE PERCENT: 0 %
BUN BLDV-MCNC: 10 MG/DL (ref 7–20)
CALCIUM SERPL-MCNC: 7.5 MG/DL (ref 8.3–10.6)
CHLORIDE BLD-SCNC: 103 MMOL/L (ref 99–110)
CO2: 23 MMOL/L (ref 21–32)
CREAT SERPL-MCNC: <0.5 MG/DL (ref 0.8–1.3)
EOSINOPHILS ABSOLUTE: 0.2 K/UL (ref 0–0.6)
EOSINOPHILS RELATIVE PERCENT: 1 %
GFR AFRICAN AMERICAN: >60
GFR NON-AFRICAN AMERICAN: >60
GLUCOSE BLD-MCNC: 105 MG/DL (ref 70–99)
GLUCOSE BLD-MCNC: 108 MG/DL (ref 70–99)
GLUCOSE BLD-MCNC: 110 MG/DL (ref 70–99)
GLUCOSE BLD-MCNC: 132 MG/DL (ref 70–99)
GLUCOSE BLD-MCNC: 135 MG/DL (ref 70–99)
GLUCOSE BLD-MCNC: 141 MG/DL (ref 70–99)
GLUCOSE BLD-MCNC: 78 MG/DL (ref 70–99)
GLUCOSE BLD-MCNC: 86 MG/DL (ref 70–99)
GLUCOSE BLD-MCNC: 91 MG/DL (ref 70–99)
GLUCOSE BLD-MCNC: 93 MG/DL (ref 70–99)
GLUCOSE BLD-MCNC: 98 MG/DL (ref 70–99)
GRAM STAIN RESULT: ABNORMAL
HCT VFR BLD CALC: 25 % (ref 40.5–52.5)
HCT VFR BLD CALC: 25.3 % (ref 40.5–52.5)
HEMOGLOBIN: 8 G/DL (ref 13.5–17.5)
HEMOGLOBIN: 8.2 G/DL (ref 13.5–17.5)
LYMPHOCYTES ABSOLUTE: 1 K/UL (ref 1–5.1)
LYMPHOCYTES RELATIVE PERCENT: 5 %
MCH RBC QN AUTO: 28.9 PG (ref 26–34)
MCHC RBC AUTO-ENTMCNC: 31.9 G/DL (ref 31–36)
MCV RBC AUTO: 90.6 FL (ref 80–100)
METAMYELOCYTES RELATIVE PERCENT: 4 %
MICROCYTES: ABNORMAL
MONOCYTES ABSOLUTE: 0.6 K/UL (ref 0–1.3)
MONOCYTES RELATIVE PERCENT: 3 %
NEUTROPHILS ABSOLUTE: 17.3 K/UL (ref 1.7–7.7)
NEUTROPHILS RELATIVE PERCENT: 87 %
ORGANISM: ABNORMAL
PDW BLD-RTO: 14.5 % (ref 12.4–15.4)
PERFORMED ON: ABNORMAL
PERFORMED ON: NORMAL
PLATELET # BLD: 422 K/UL (ref 135–450)
PMV BLD AUTO: 8.1 FL (ref 5–10.5)
POTASSIUM REFLEX MAGNESIUM: 4.4 MMOL/L (ref 3.5–5.1)
RBC # BLD: 2.76 M/UL (ref 4.2–5.9)
SCHISTOCYTES: ABNORMAL
SODIUM BLD-SCNC: 134 MMOL/L (ref 136–145)
STOMATOCYTES: ABNORMAL
VANCOMYCIN TROUGH: 15.3 UG/ML (ref 10–20)
WBC # BLD: 19 K/UL (ref 4–11)
WOUND/ABSCESS: ABNORMAL

## 2021-12-08 PROCEDURE — 85018 HEMOGLOBIN: CPT

## 2021-12-08 PROCEDURE — 85014 HEMATOCRIT: CPT

## 2021-12-08 PROCEDURE — 6370000000 HC RX 637 (ALT 250 FOR IP): Performed by: STUDENT IN AN ORGANIZED HEALTH CARE EDUCATION/TRAINING PROGRAM

## 2021-12-08 PROCEDURE — 80048 BASIC METABOLIC PNL TOTAL CA: CPT

## 2021-12-08 PROCEDURE — 6370000000 HC RX 637 (ALT 250 FOR IP): Performed by: INTERNAL MEDICINE

## 2021-12-08 PROCEDURE — 2580000003 HC RX 258: Performed by: INTERNAL MEDICINE

## 2021-12-08 PROCEDURE — 6360000002 HC RX W HCPCS

## 2021-12-08 PROCEDURE — 99255 IP/OBS CONSLTJ NEW/EST HI 80: CPT | Performed by: INTERNAL MEDICINE

## 2021-12-08 PROCEDURE — 2580000003 HC RX 258

## 2021-12-08 PROCEDURE — 80202 ASSAY OF VANCOMYCIN: CPT

## 2021-12-08 PROCEDURE — 36415 COLL VENOUS BLD VENIPUNCTURE: CPT

## 2021-12-08 PROCEDURE — 6360000002 HC RX W HCPCS: Performed by: INTERNAL MEDICINE

## 2021-12-08 PROCEDURE — 1200000000 HC SEMI PRIVATE

## 2021-12-08 PROCEDURE — 94669 MECHANICAL CHEST WALL OSCILL: CPT

## 2021-12-08 PROCEDURE — 2700000000 HC OXYGEN THERAPY PER DAY

## 2021-12-08 PROCEDURE — 85025 COMPLETE CBC W/AUTO DIFF WBC: CPT

## 2021-12-08 PROCEDURE — 94150 VITAL CAPACITY TEST: CPT

## 2021-12-08 PROCEDURE — 99232 SBSQ HOSP IP/OBS MODERATE 35: CPT | Performed by: INTERNAL MEDICINE

## 2021-12-08 PROCEDURE — 83036 HEMOGLOBIN GLYCOSYLATED A1C: CPT

## 2021-12-08 PROCEDURE — 94761 N-INVAS EAR/PLS OXIMETRY MLT: CPT

## 2021-12-08 RX ORDER — IPRATROPIUM BROMIDE AND ALBUTEROL SULFATE 2.5; .5 MG/3ML; MG/3ML
1 SOLUTION RESPIRATORY (INHALATION) EVERY 4 HOURS PRN
Status: DISCONTINUED | OUTPATIENT
Start: 2021-12-08 | End: 2022-01-11 | Stop reason: HOSPADM

## 2021-12-08 RX ORDER — DEXTROSE MONOHYDRATE 25 G/50ML
12.5 INJECTION, SOLUTION INTRAVENOUS PRN
Status: DISCONTINUED | OUTPATIENT
Start: 2021-12-08 | End: 2022-01-11 | Stop reason: HOSPADM

## 2021-12-08 RX ORDER — HYDRALAZINE HYDROCHLORIDE 20 MG/ML
10 INJECTION INTRAMUSCULAR; INTRAVENOUS EVERY 6 HOURS PRN
Status: DISCONTINUED | OUTPATIENT
Start: 2021-12-08 | End: 2022-01-11 | Stop reason: HOSPADM

## 2021-12-08 RX ORDER — AMLODIPINE BESYLATE 5 MG/1
5 TABLET ORAL DAILY
Status: DISCONTINUED | OUTPATIENT
Start: 2021-12-08 | End: 2022-01-11 | Stop reason: HOSPADM

## 2021-12-08 RX ADMIN — SODIUM CHLORIDE, PRESERVATIVE FREE 10 ML: 5 INJECTION INTRAVENOUS at 20:46

## 2021-12-08 RX ADMIN — VANCOMYCIN HYDROCHLORIDE 1250 MG: 10 INJECTION, POWDER, LYOPHILIZED, FOR SOLUTION INTRAVENOUS at 05:18

## 2021-12-08 RX ADMIN — ACETAMINOPHEN 1000 MG: 500 TABLET ORAL at 16:52

## 2021-12-08 RX ADMIN — MEROPENEM 1000 MG: 1 INJECTION, POWDER, FOR SOLUTION INTRAVENOUS at 02:12

## 2021-12-08 RX ADMIN — ACETAMINOPHEN 1000 MG: 500 TABLET ORAL at 08:52

## 2021-12-08 RX ADMIN — VANCOMYCIN HYDROCHLORIDE 1250 MG: 10 INJECTION, POWDER, LYOPHILIZED, FOR SOLUTION INTRAVENOUS at 17:05

## 2021-12-08 RX ADMIN — INSULIN HUMAN 2 UNITS: 100 INJECTION, SOLUTION PARENTERAL at 17:42

## 2021-12-08 RX ADMIN — INSULIN HUMAN 2 UNITS: 100 INJECTION, SOLUTION PARENTERAL at 11:06

## 2021-12-08 RX ADMIN — AMLODIPINE BESYLATE 5 MG: 5 TABLET ORAL at 17:40

## 2021-12-08 RX ADMIN — ACETAMINOPHEN 1000 MG: 500 TABLET ORAL at 02:13

## 2021-12-08 RX ADMIN — ENOXAPARIN SODIUM 40 MG: 100 INJECTION SUBCUTANEOUS at 08:52

## 2021-12-08 RX ADMIN — OXYCODONE HYDROCHLORIDE 10 MG: 5 TABLET ORAL at 03:32

## 2021-12-08 RX ADMIN — AMPICILLIN SODIUM AND SULBACTAM SODIUM 3000 MG: 2; 1 INJECTION, POWDER, FOR SOLUTION INTRAMUSCULAR; INTRAVENOUS at 20:45

## 2021-12-08 RX ADMIN — MEROPENEM 1000 MG: 1 INJECTION, POWDER, FOR SOLUTION INTRAVENOUS at 09:00

## 2021-12-08 RX ADMIN — SODIUM CHLORIDE, PRESERVATIVE FREE 20 ML: 5 INJECTION INTRAVENOUS at 09:31

## 2021-12-08 RX ADMIN — INSULIN HUMAN 2 UNITS: 100 INJECTION, SOLUTION PARENTERAL at 20:58

## 2021-12-08 ASSESSMENT — PAIN DESCRIPTION - LOCATION
LOCATION: ABDOMEN

## 2021-12-08 ASSESSMENT — PAIN DESCRIPTION - ORIENTATION
ORIENTATION: MID

## 2021-12-08 ASSESSMENT — PAIN SCALES - GENERAL
PAINLEVEL_OUTOF10: 3
PAINLEVEL_OUTOF10: 3
PAINLEVEL_OUTOF10: 4
PAINLEVEL_OUTOF10: 3
PAINLEVEL_OUTOF10: 4
PAINLEVEL_OUTOF10: 0
PAINLEVEL_OUTOF10: 9

## 2021-12-08 ASSESSMENT — PAIN DESCRIPTION - PROGRESSION

## 2021-12-08 ASSESSMENT — ENCOUNTER SYMPTOMS
VOMITING: 0
ABDOMINAL PAIN: 0
NAUSEA: 0
CHEST TIGHTNESS: 0
EYES NEGATIVE: 1
ALLERGIC/IMMUNOLOGIC NEGATIVE: 1
SHORTNESS OF BREATH: 0

## 2021-12-08 ASSESSMENT — PAIN DESCRIPTION - DESCRIPTORS
DESCRIPTORS: DISCOMFORT

## 2021-12-08 ASSESSMENT — PAIN DESCRIPTION - ONSET
ONSET: ON-GOING
ONSET: ON-GOING

## 2021-12-08 ASSESSMENT — PAIN DESCRIPTION - PAIN TYPE
TYPE: SURGICAL PAIN
TYPE: SURGICAL PAIN

## 2021-12-08 ASSESSMENT — PAIN DESCRIPTION - FREQUENCY
FREQUENCY: CONTINUOUS
FREQUENCY: CONTINUOUS

## 2021-12-08 NOTE — PROGRESS NOTES
ICU SURGERY DAILY PROGRESS NOTE    CC: Shayla's gangrene    SUBJECTIVE:   Interval Hx:  Patient rested well overnight. Pain is well controlled this morning. The patient denies any nausea or vomiting. Tolerating a carb controlled diet. Otherwise remains afebrile and HDS.     ROS: A 10 point review of systems was conducted, significant findings as noted above. All other systems negative. OBJECTIVE:   Vitals:   Vitals:    12/08/21 0000 12/08/21 0100 12/08/21 0200 12/08/21 0300   BP: (!) 147/67 123/68 (!) 140/71 (!) 150/69   Pulse: 95 94 95 96   Resp: 24 16 21 21   Temp: 98 °F (36.7 °C)      TempSrc: Oral      SpO2: 95% 92% 92% 93%   Weight:       Height:           I/O:     Intake/Output Summary (Last 24 hours) at 12/8/2021 0542  Last data filed at 12/8/2021 0300  Gross per 24 hour   Intake 5496.7 ml   Output 3790 ml   Net 1706.7 ml     I/O last 3 completed shifts: In: 5496.7 [P.O.:120; I.V.:1050.1; IV Piggyback:4326.6]  Out: 3430 [Urine:2060; Drains:1320; Blood:50]    Diet: ADULT DIET; Regular; 3 carb choices (45 gm/meal)  ADULT ORAL NUTRITION SUPPLEMENT; Breakfast, Lunch, Dinner; Standard High Calorie/High Protein Oral Supplement      Physical Examination:   General appearance: alert, no acute distress, groomed appropriately   HENT: NC/AT, MMM & intact, no JVD, neck supple, trachea midline  Eyes: No scleral icterus, EOMI  Chest/Lungs: CTAB, normal effort on 2L NC, no accessory muscles used for breathing. Cardiovascular: RRR  Abdomen: Soft, large surgical debridement area of the abdomen measuring 60 x 23 x 3 cm in gluteal region 20 x 15 cm with veraflo VAC in place holding adequate suction and instilling normal saline. Colostomy is pink, patent, with sweat in appliance. Extremities:  full-thickness ulceration on lateral aspect of L foot, no edema, no cyanosis, decreased sensation along entirety of bilateral feet. No DP/PT pulse appreciated with doppler.    : walsh in place with clear yellow urine, no signs of further necrotizing fasciitis requiring further debridement of L lower scrotum, no subcutaneous crepitus noted, skin edges with healthy granulation tissue with appropriate bleeding, non-malodorous   Neurologic: A&Ox3, decrease sensation to bilateral lower extremities. Labs:  CBC:   Recent Labs     12/07/21  0350 12/07/21  1526 12/08/21  0330   WBC 15.1* 16.5* 19.0*   HGB 8.0* 8.3* 8.0*   HCT 23.8* 25.1* 25.0*    362 422       BMP:   Recent Labs     12/07/21  0350 12/07/21  1445 12/08/21  0330   * 134* 134*   K 4.2 4.3 4.4    106 103   CO2 24 24 23   BUN 14 13 10   CREATININE 0.5* 0.5* <0.5*   GLUCOSE 108* 112* 78     LFT's:   No results for input(s): AST, ALT, ALB, BILITOT, ALKPHOS in the last 72 hours. Troponin:   No results for input(s): TROPONINI in the last 72 hours. BNP: No results for input(s): BNP in the last 72 hours. ABGs:   Recent Labs     12/07/21  1521   PHART 7.351     INR:   Recent Labs     12/07/21  0350   INR 1.09       U/A:No results for input(s): NITRITE, COLORU, PHUR, LABCAST, WBCUA, RBCUA, MUCUS, TRICHOMONAS, YEAST, BACTERIA, CLARITYU, SPECGRAV, LEUKOCYTESUR, UROBILINOGEN, BILIRUBINUR, BLOODU, GLUCOSEU, AMORPHOUS in the last 72 hours. Invalid input(s): Tahmina Alvarez     Rad:   VL DUP LOWER EXTREMITY ARTERIES BILATERAL   Final Result          ASSESSMENT AND PLAN:   Justina Lorenzo is a 63 y.o. male with Necrotizing fasciitis of the abdominal wall, gluteal region, and scrotum s/p wide excision of perineum, back, and abdominal wall.   Abdominal wall wound measuring 60 cm x 23 cm x 3cm and gluteal wound measures 20 cm x 15cm (12/1), returned to the OR for further debridement or right abdominal wall, and placement of testicle within a thigh flap (12/3), followed by minimal abdominal wall and perineum debridement, sutured protective skin flap in groin for right testicle, and placement of a wound VAC (12/5), s/p diverting colostomy and wound vac change (12/7)    - Continue instill wound vac, instilling normal saline  - Continue with aggressive glucose control  - Increased leukocytosis this morning 19.0 from 16.5  - Continue wound care per plastic surgery team  - Await return of bowel function  - Okay to continue carb controlled diet.   - Discontinue insulin gtt, transition to high dose sliding scale insulin    Reji Costello DO, MS  PGY1, General Surgery  12/08/21  5:42 AM

## 2021-12-08 NOTE — ANESTHESIA POSTPROCEDURE EVALUATION
Department of Anesthesiology  Postprocedure Note    Patient: Shana Barlow  MRN: [id number]  YOB: 1955  Date of evaluation: 12/7/2021  Time:  7:11 PM     Procedure Summary     Date: 12/07/21 Room / Location: [address] [place] [address]    Anesthesia Start: 1234 Anesthesia Stop: 1537    Procedures:       LAPAROSCOPIC COLOSTOMY CREATION (Left )      EVALUATION UNDER ANESTHESIA WITH DEBRIDEMENT ABDOMINAL WOUND AND LEFT BUTTOCK, WOUND VAC CHANGE (N/A ) Diagnosis: (NECROTIZING FASCIITIS LEFT BUTTOCK AND ABDOMEN)    Surgeons: Tank Monet MD; Shauna Funk MD Responsible Provider: Gill Adams DO    Anesthesia Type: general ASA Status: 2          Anesthesia Type: general    Arcadio Phase I:      Arcadio Phase II:      Last vitals: Reviewed and per EMR flowsheets.        Anesthesia Post Evaluation    Patient location during evaluation: PACU  Patient participation: complete - patient participated  Level of consciousness: awake  Pain score: 0  Airway patency: patent  Nausea & Vomiting: no nausea and no vomiting  Complications: no  Cardiovascular status: blood pressure returned to baseline  Respiratory status: acceptable  Hydration status: euvolemic

## 2021-12-08 NOTE — PROGRESS NOTES
Clinical Pharmacy Progress Note    Vancomycin - Management by Pharmacy    Consult Date(s): 12/1/21  Consulting Provider(s): Dr. Leah Marks / Plan    1. Shayla Gangrene and Foot Osteomyelitis - Vancomycin   Concurrent Antimicrobials:   o Meropenem - day #8   Day of Vanc Therapy: #9   Current Dosing Method: Bayesian-Guided AUC Dosing   Therapeutic Goal: 400-600 mg/L*hr   Current Dose / Frequency: 1250 mg IV q12h   Plan / Rationale:  o Renal function remains stable. o Trough drawn ~11 hrs after previous dose = 15.3 mcg/mL. Bayesian kinetics predicts an AUC of 499 and steady state trough of 14.2 mcg/mL. Will continue same regimen for now.     o Plan to repeat a level early next week, or sooner if clinically indicated.  Will continue to monitor clinical condition and make adjustments to regimen as appropriate. Please reach out with any questions. Ronni Castillo PharmD., BCPS   12/8/2021 7:25 AM  Wireless: [phone]        Interval Update:  Pt now s/p abdominal advancement flap, instill wound vac change, and diverting colostomy yesterday. Insulin gtt stopped this AM; patient needed only ~9.9 units in 24 hrs. Now on high dose SS, on carb controlled diet. Subjective/Objective: Mr. Chana Zhao is a 63 y.o. male who presented to [facility] with scrotal wound pain, as well as L foot diabetic foot infection. 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. Transferred to [facility] on 12/1 and taken for further excision debridement, and again on 12/3 and 37/5 with application of wound vac. Per Podiatry, patient's L foot with stable OM but also critical limb ischemia of bilat LE; will need intervention with timing to be determined with overall improvement. Pharmacy has been consulted to dose Vancomycin.     Height:   Ht Readings from Last 1 Encounters:   12/02/21 5' 11\" (1.803 m)     Weight:   Wt Readings from Last 1 Encounters:   12/08/21 196 lb 3.4 oz (89 kg)     Level(s) / Doses:    Date Time Dose Level / Type of Level Interpretation   12/3 14:11 1250 mg IV q18h Random = 12 mcg/mL · Drawn ~14 hrs after previous dose; predicted  mg/L*h  · Increase dose to 1.25g IV q12h   12/4 09:00 1250 mg IV q12h Random = 24.6 mcg/mL · Drawn ~5 hrs after previous dose; predicted  mg/L*h  · Continue dose   12/8 03:45 1250mg IV q12h Trough = 15.3 mcg/mL · Drawn ~11 hrs after previous dose; predicts    · Continue dose   Note: Serum levels collected for AUC-based dosing may be high if collected in close proximity to the dose administered. This is not necessarily an indicator of toxicity. Cultures & Sensitivities:    Date Site Micro Susceptibility / Result   11/30 Blood x2 No growth    11/30 COVID-19 rapid Negative    11/30 Scrotal tissue Mixed anaerobic growth    12/1 Foot wound Mixed skin pam, no further workup    12/3 R upper abdom wall tissue Candida glabrata    12/3 Perineum, fungus NGTD    12/3 Perineum, AFB NGTD    12/3 Perineum, anaerobic and aerobic NGTD    12/5 Penile tissue NGTD    12/5 Penile tissue, fungus NGTD    12/5 Penile tissue, AFB NGTD      Labs / Ancillary Data:    CrCl cannot be calculated (This lab value cannot be used to calculate CrCl because it is not a number: <0.5).     Recent Labs     12/07/21  0350 12/07/21  1445 12/07/21  1526 12/08/21  0330   CREATININE 0.5* 0.5*  --  <0.5*   BUN 14 13  --  10   WBC 15.1*  --  16.5* 19.0*

## 2021-12-08 NOTE — PROGRESS NOTES
ICU TRANSFER NOTE    1:17 PM2021  Admit Date: 2021   Hospital Day: 8                                                   PCP   No primary care provider on file. : 1955                                                       CodeStatus:Full Code  MRN: [id number]                                                        Diet: ADULT DIET; Regular; 3 carb choices (45 gm/meal)  ADULT ORAL NUTRITION SUPPLEMENT; Breakfast, Lunch, Dinner; Standard High Calorie/High Protein Oral Supplement     Byron Duke is a 64 yo M with DM2 who p/w groin swelling. 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 He underwent debridement, wound vac placement, and diverting colostomy. He is being treated with Vanc and Merrem and followed by surgery and plastic surgery. He is saturating well on 3L NC, complains of some abdominal pain, but otherwise feels okay. Will be transferred to floors today with plans to return to OR later this week for wound vac change. Vitals:    21 1255   BP:    Pulse:    Resp:    Temp:    SpO2: 92%     Scheduled Meds:   insulin regular  0-18 Units SubCUTAneous 4x Daily AC & HS    acetaminophen  1,000 mg Oral Q6H    vancomycin  1,250 mg IntraVENous Q12H    enoxaparin  40 mg SubCUTAneous Daily    sodium chloride flush  5-40 mL IntraVENous 2 times per day    meropenem  1,000 mg IntraVENous Q8H     Continuous Infusions:   sodium chloride Stopped (21)    dextrose       PRN Meds:ipratropium-albuterol, dextrose, oxyCODONE **OR** oxyCODONE, sodium chloride flush, sodium chloride, glucose, dextrose, glucagon (rDNA), dextrose      Shayla's gangrene/necrotizing fasciitis:  Status post I&D  and wide excision of perineum/abdominal wall .  S/p excisional debridement of abdomen and left buttock, wound VAC placement , further debridement and wound VAC placement   - Continue IV Vanc and Merrem  - ID consulted  - Plan to return to the OR wound vac change later this week  - Surgery/plastic surgery following    Left fifth metatarsal diabetic foot ulceration and osteomyelitis  - podiatry following  - Continue IV antibiotics  - PT/OT  - possible surgical intervention pending medical stability, awaiting vascular surgery recs  - Continue wound care     Acute postoperative blood loss anemia, resolved  - S/p 1u pRBC transfusion 12/2  - Monitor hemoglobin, stable    Acute respiratory failure, resolved  - extubated 12/02  - on 3L NC, wean as tolerated     DKA in the setting of diabetes mellitus type 2, resolved  Noncompliant with home medications  - HDSS, POCT, hypoglycemia protocol       The objective and subjective findings as well as the ICU course of treatment have been reviewed with the ICU team. The treatment plan has been reviewed with the ICU team. The patient is being transferred to [place] [address] in stable condition.     Yeison Kim DO, PGY-1  12/08/21  1:17 PM

## 2021-12-08 NOTE — PROGRESS NOTES
Ostomy Referral Progress Note      NAME:  Ab Ortega  MEDICAL RECORD NUMBER:  [id number]  AGE: 63 y.o. GENDER:  male  :  1955  TODAY'S DATE:  2021    Subjective: It has passed some gas. Ab Ortega is a 63 y.o. male referred by:   [x] Physician  [] Nursing  [] Other:     New Colostomy  Stoma: not measured  Barrier: 2 piece flat #[id number] with drainable pouch #[id number]    Surgeon Dr. Hair Razo & Dr. Daren Gonzalez 21 - Abdominal advancement in preparation for diverting colostomy, Application of Veraflo instillation VAC therapy, Diverting Colostomy    PAST MEDICAL HISTORY:    History reviewed. No pertinent past medical history. MEDICATIONS:    No current facility-administered medications on file prior to encounter.      Current Outpatient Medications on File Prior to Encounter   Medication Sig Dispense Refill    metFORMIN (GLUCOPHAGE) 500 MG tablet Take 500 mg by mouth daily (with breakfast)      meloxicam (MOBIC) 15 MG tablet Take 1 tablet by mouth daily 30 tablet 3       ALLERGIES:    No Known Allergies    PAST SURGICAL HISTORY:    Past Surgical History:   Procedure Laterality Date    ABDOMEN SURGERY N/A 2021    WIDE EXCISION PERINEUM, BACK, ABDOMINAL WALL performed by Mark Pederson MD at  [facility] Left 12/3/2021    ABDOMINAL WALL AND LEFT BUTTOCK DEBRIDEMENT, WOUND VAC PLACEMENT performed by Nisha Almeida MD at  [facility] Left 2021    ABDOMINAL WALL AND LEFT BUTTOCK DEBRIDEMENT, WOUND VAC PLACEMENT performed by Nisha Almeida MD at  [facility] N/A 2021    EVALUATION UNDER ANESTHESIA WITH DEBRIDEMENT ABDOMINAL WOUND AND LEFT BUTTOCK, WOUND VAC CHANGE performed by Nisha Almeida MD at [address] [place] [address] [facility] Left 2021    LAPAROSCOPIC COLOSTOMY CREATION [facility]    DEBRIDEMENT OF SCROTAL SHAYLA'S GANGRENE performed by Dell Middleton MD at [address] N/A 11/30/2021    PERINEAL SOFT TISSUE EXCISIONAL DEBRIDEMENT performed by Marlene Andrade MD at [address] [facility]:    family history is not on file. SOCIAL HISTORY:    Social History     Tobacco Use    Smoking status: Current Every Day Smoker    Smokeless tobacco: Never Used   Substance Use Topics    Alcohol use: Yes     Alcohol/week: 0.0 standard drinks     Comment: social     Drug use: No       LABS:  WBC:    Lab Results   Component Value Date    WBC 19.0 12/08/2021     H/H:    Lab Results   Component Value Date    HGB 8.2 12/08/2021    HCT 25.3 12/08/2021     BMP:    Lab Results   Component Value Date     12/08/2021    K 4.4 12/08/2021     12/08/2021    CO2 23 12/08/2021    BUN 10 12/08/2021    LABALBU 1.8 12/02/2021    CREATININE <0.5 12/08/2021    CALCIUM 7.5 12/08/2021    GFRAA >60 12/08/2021    LABGLOM >60 12/08/2021    GLUCOSE 78 12/08/2021     PTT:  No results found for: APTT, PTT[APTT}  PT/INR:    Lab Results   Component Value Date    PROTIME 12.4 12/07/2021    INR 1.09 12/07/2021       Objective: A/O; lying in bed, current appliance intact and sealed, VAC dressing intact and sealed. BP (!) 156/69   Pulse 87   Temp 98.8 °F (37.1 °C) (Oral)   Resp 24   Ht 5' 11\" (1.803 m)   Wt 196 lb 3.4 oz (89 kg)   SpO2 92%   BMI 27.37 kg/m²     Reese Risk Score Reese Scale Score: 14    Patient Active Problem List   Diagnosis Code    Diabetic acidosis without coma (HCC) E11.10    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    Acute respiratory failure with hypoxia (HCC) J96.01    Necrotizing fasciitis ([facility] [address] M72.6       Assessment: Stoma is red, moist and protrudes. Output is bloody and watery. Per pt has passed some gas.       Colostomy LUQ (Active)   Stomal Appliance 2 piece; Clean; Dry; Intact 12/08/21 1334   Flange Size (inches) 2 Inches 12/08/21 1334   Stoma  Assessment Moist; Red 12/08/21 1335 Mucocutaneous Junction Intact 12/08/21 1334   Peristomal Assessment KENN 12/08/21 1334   Stool Appearance Bloody; Watery 12/08/21 1334   Stool Color Red 12/08/21 1334   Stool Amount Small 12/08/21 1334   Output (mL) 0 ml 12/07/21 2200   Number of days: 1       Intake/Output Summary (Last 24 hours) at 12/8/2021 1335  Last data filed at 12/8/2021 1200  Gross per 24 hour   Intake 5888.52 ml   Output 3825 ml   Net 2063.52 ml         Plan   Plan for Ostomy Care:  [address] - Patient to empty appliance when 1/3 to 1/2 full with assistance of the staff. Cleanse inside and outside of the drain spout prior to rolling closed. Change appliance every 3-5 days or 1-2 times a week. Call for problems with seal [phone]    Return Friday to change appliance. Updated pt with plan. Ostomy Plan of Care  [x] Supplies/Instructions left in room  [] Patient using home supplies  [x] Brand/supplies at bedside [employer]  [] Current pouching system     Current Diet: ADULT DIET;  Regular; 3 carb choices (45 gm/meal)  ADULT ORAL NUTRITION SUPPLEMENT; Breakfast, Lunch, Dinner; Standard High Calorie/High Protein Oral Supplement  Dietician consult:  Yes    Discharge Plan:  Placement for patient upon discharge: skilled nursing    Outpatient visit plan No  Supplies given Yes   Samples requested No    Referrals:  [x]   [] [facility]  [] Supplies  [] Other      Patient/Caregiver Teaching:  Written Instructions given to patient/family  Teaching provided:  [x] Reviewed GI and A&P        [x] Supplies  [x] Pouch emptying      [x] Manipulate closure  [x] Routine Care         [] Comment  [x] Pouch maintenance           Level of patient/caregiver understanding able to:  [x] Indicates understanding       [] Needs reinforcement  [] Unsuccessful      [] Verbal Understanding  [] Demonstrated understanding       [] No evidence of learning  [] Refused teaching         [] N/A    Electronically signed by Claude Morales RN, CWOCN on 12/8/2021 at 1:35 PM

## 2021-12-08 NOTE — PROGRESS NOTES
Physician Progress Note      PATIENT:               James Kamara  [facility] #:                  [id number]  :                       1955  ADMIT DATE:       2021 3:40 PM  [address] [place] DATE:  RESPONDING  PROVIDER #:        Dee Murillo MD          QUERY TEXT:    Patient admitted with necrotizing fasciitis. Per Op note dated    documentation of debridement. To accurately reflect the procedure performed   please document if debridement the deepest depth of tissue removed as down to   and including: The medical record reflects the following:  Risk Factors: 64 yo w/ necrotizing fasciitis undergoing multiple debridements  Clinical Indicators: Per op note : was significant fibrinous exudate in   the perineum. This area was debrided sharply with a 15 blade / scissors / and   electrocautery. The entirety of the wound was then copiously irrigated with   3L of saline solution using a Pulse lavage. A complex wound vacuum device was   then fashioned and applied with good seal.  Treatment: As Above  Options provided:  -- Excisional debridement of skin  -- Excisional debridement of subcutaneous tissue  -- Excisional debridement of fascia  -- Excisional debridement of muscle  -- Other - I will add my own diagnosis  -- Disagree - Not applicable / Not valid  -- Disagree - Clinically unable to determine / Unknown  -- Refer to Clinical Documentation Reviewer    PROVIDER RESPONSE TEXT:    Excisional debridement of muscle of perineum was performed during procedure on   .     Query created by: Jarad Kwok on 2021 12:34 PM      Electronically signed by:  Dee Murillo MD 2021 7:51 AM

## 2021-12-08 NOTE — PROGRESS NOTES
ICU SURGERY DAILY PROGRESS NOTE    CC: Shayla's gangrene    SUBJECTIVE:   Interval Hx:  Patient rested well overnight. Pain is well controlled this morning. The patient denies any nausea or vomiting. Tolerating a carb controlled diet. Otherwise remains afebrile and HDS.     ROS: A 10 point review of systems was conducted, significant findings as noted above. All other systems negative. OBJECTIVE:   Vitals:   Vitals:    12/08/21 0200 12/08/21 0300 12/08/21 0400 12/08/21 0500   BP: (!) 140/71 (!) 150/69 (!) 143/69    Pulse: 95 96 102 110   Resp: 21 21 23 23   Temp:   99 °F (37.2 °C)    TempSrc:   Oral    SpO2: 92% 93% 93% (!) 89%   Weight:       Height:           I/O:     Intake/Output Summary (Last 24 hours) at 12/8/2021 0549  Last data filed at 12/8/2021 0400  Gross per 24 hour   Intake 5496.7 ml   Output 4100 ml   Net 1396.7 ml     I/O last 3 completed shifts: In: 5496.7 [P.O.:120; I.V.:1050.1; IV Piggyback:4326.6]  Out: 3430 [Urine:2060; Drains:1320; Blood:50]    Diet: ADULT DIET; Regular; 3 carb choices (45 gm/meal)  ADULT ORAL NUTRITION SUPPLEMENT; Breakfast, Lunch, Dinner; Standard High Calorie/High Protein Oral Supplement      Physical Examination:   General appearance: alert, no acute distress, groomed appropriately   HENT: NC/AT, MMM & intact, no JVD, neck supple, trachea midline  Eyes: No scleral icterus, EOMI  Chest/Lungs: CTAB, normal effort on 2L NC, no accessory muscles used for breathing. Cardiovascular: RRR  Abdomen: Soft, large surgical debridement area of the abdomen measuring 60 x 23 x 3 cm in gluteal region 20 x 15 cm with veraflo VAC in place holding adequate suction and instilling normal saline. Colostomy is pink, patent, with sweat in appliance. Extremities:  full-thickness ulceration on lateral aspect of L foot, no edema, no cyanosis, decreased sensation along entirety of bilateral feet. No DP/PT pulse appreciated with doppler.    : walsh in place with clear yellow urine, no signs of further necrotizing fasciitis requiring further debridement of L lower scrotum, no subcutaneous crepitus noted, skin edges with healthy granulation tissue with appropriate bleeding, non-malodorous   Neurologic: A&Ox3, decrease sensation to bilateral lower extremities. Labs:  CBC:   Recent Labs     12/07/21  0350 12/07/21  1526 12/08/21  0330   WBC 15.1* 16.5* 19.0*   HGB 8.0* 8.3* 8.0*   HCT 23.8* 25.1* 25.0*    362 422       BMP:   Recent Labs     12/07/21  0350 12/07/21  1445 12/08/21  0330   * 134* 134*   K 4.2 4.3 4.4    106 103   CO2 24 24 23   BUN 14 13 10   CREATININE 0.5* 0.5* <0.5*   GLUCOSE 108* 112* 78     LFT's:   No results for input(s): AST, ALT, ALB, BILITOT, ALKPHOS in the last 72 hours. Troponin:   No results for input(s): TROPONINI in the last 72 hours. BNP: No results for input(s): BNP in the last 72 hours. ABGs:   Recent Labs     12/07/21  1521   PHART 7.351     INR:   Recent Labs     12/07/21  0350   INR 1.09       U/A:No results for input(s): NITRITE, COLORU, PHUR, LABCAST, WBCUA, RBCUA, MUCUS, TRICHOMONAS, YEAST, BACTERIA, CLARITYU, SPECGRAV, LEUKOCYTESUR, UROBILINOGEN, BILIRUBINUR, BLOODU, GLUCOSEU, AMORPHOUS in the last 72 hours. Invalid input(s): Swapnil Rodriguez     Rad:   VL DUP LOWER EXTREMITY ARTERIES BILATERAL   Final Result          ASSESSMENT AND PLAN:   Aydin Zarate is a 63 y.o. male with Necrotizing fasciitis of the abdominal wall, gluteal region, and scrotum s/p wide excision of perineum, back, and abdominal wall.   Abdominal wall wound measuring 60 cm x 23 cm x 3cm and gluteal wound measures 20 cm x 15cm (12/1), returned to the OR for further debridement or right abdominal wall, and placement of testicle within a thigh flap (12/3), followed by minimal abdominal wall and perineum debridement, sutured protective skin flap in groin for right testicle, and placement of a wound VAC (12/5), s/p diverting colostomy and wound vac change (12/7)    - Continue instill wound vac, instilling normal saline  - Continue with aggressive glucose control  - Continue merrem/vanc for antibiotics, Eraxis discontinued. - Consult placed to ID, will await further antibiotic recommendations. - Increased leukocytosis this morning 19.0 from 16.5  - Will plan to return to the OR later this week for additional wound vac change. - Await return of bowel function  - Okay to continue carb controlled diet.     Remi Bermudez DO, MS  PGY1, General Surgery  12/08/21  5:49 AM

## 2021-12-08 NOTE — PROGRESS NOTES
Podiatric Surgery Daily Progress Note      Admit Date: 12/1/2021      Code:Full Code    Patient seen and examined, labs and records reviewed    Subjective:     Patient seen at bedside this a.m with attending present. Patient denies pain to bilateral lower extremity. Patient denies fever, chills, shortness of breath, chest pain. Patient denies any acute events overnight. Review of Systems: A 10 point review of systems was conducted, significant findings as noted in HPI. All other systems negative. Objective     BP (!) 150/69   Pulse 96   Temp 98 °F (36.7 °C) (Oral)   Resp 21   Ht 5' 11\" (1.803 m)   Wt 198 lb 6.6 oz (90 kg)   SpO2 93%   BMI 27.67 kg/m²      I/O:    Intake/Output Summary (Last 24 hours) at 12/8/2021 0536  Last data filed at 12/8/2021 0300  Gross per 24 hour   Intake 5496.7 ml   Output 3790 ml   Net 1706.7 ml              Wt Readings from Last 3 Encounters:   12/07/21 198 lb 6.6 oz (90 kg)   11/30/21 163 lb 3.2 oz (74 kg)   04/18/16 179 lb 14.3 oz (81.6 kg)       LABS:    Recent Labs     12/07/21  1526 12/08/21  0330   WBC 16.5* 19.0*   HGB 8.3* 8.0*   HCT 25.1* 25.0*    422        Recent Labs     12/08/21  0330   *   K 4.4      CO2 23   BUN 10   CREATININE <0.5*        Recent Labs     12/07/21  0350   INR 1.09       LOWER EXTREMITY EXAMINATION    Dressing to left LE intact. No strikethrough noted to the external dressing. No drainage noted to the internal layers of the dressing. VASCULAR:   -DP and PT pulses are non-palpable b/l.    -Upon hand-held Doppler examination DP signals were noted to be monophasic and PT signals were noted to be nondopplerable.     -CFT diminished to the digits of the foot b/l.   -Skin temperature is warm to lukewarm from proximal to distal.    -No edema noted.   -No pain with calf compression b/l.     NEUROLOGIC:   -Gross and epicritic sensation is diminished b/l.   -Protective sensation is diminished at all pedal sites b/l.     DERMATOLOGIC:   Left lower extremity:  -Full-thickness ulceration noted to the lateral aspect of the left foot.    -Wound measures approximately 3.2 cm x 2 cm x 0.5 cm.   -Wound base exposed bone with necrotic edges at the proximal and distal edges.  -Wound probes to bone with exposed fifth metatarsal.  No tracking or tunneling noted.  -No purulent drainage noted. No fluctuance or crepitus or malodor noted. -Bluish/purple discoloration changes noted to left fifth digit, with significant duskiness.  -Wound clinically stable without acute signs infection noted. Picture taken 12/8/2021        -Improved Serous bulla with surrounding rubor and ecchymosis overlying the dorsal aspect of the midfoot.    -Scant fluctuance noted.    -No crepitus, erythema, drainage, or malodor noted.    -No open wound noted.  -Likely 2/2 prevalon boot strap. Picture taken 12/8/2021    Patient gave verbal consent for the picture taken at today's visit. Right LE soft tissue envelope is closed without ulceration or acute signs of infection.     MUSCULOSKELETAL:   -Mild diffuse tenderness with palpation of the left foot. IMAGING:  XR LEFT FOOT 11/30/2021  Narrative   EXAMINATION:   THREE XRAY VIEWS OF THE LEFT FOOT       11/30/2021 1:44 pm       COMPARISON:   None.       HISTORY:   ORDERING SYSTEM PROVIDED HISTORY: wound   TECHNOLOGIST PROVIDED HISTORY:   Reason for exam:->wound   Reason for Exam: blood sugar problem, wound check on lateral portion of foot   Acuity: Acute   Type of Exam: Initial       FINDINGS:   Osseous destruction along the articular margins of the 5th   metatarsophalangeal joint with associated lucency in the soft tissues. .   There is no evidence of fracture or dislocation. . The remaining joint spaces   appear well maintained.  The remaining soft tissues are unremarkable.           Impression   Evidence of a septic joint involving the 5th metatarsal phalangeal joint with   associated osteomyelitis       ARTERIAL DUPLEX 12/2/21     Type of Study:        Extremities Arteries:Lower Extremities Arterial Duplex, VL LOWER EXTREMITY    ARTERIES DUPLEX BILATERAL.       Tech Comments   Right   The right ankle/brachial index is 0.0 (no Doppler signal could be heard at the   [facility] or the PT). The common femoral and profunda femoral arteries could not be visualized due   to bandages extending into the groin area. The mid superficial femoral artery has a short segmental occlusion and then is   reconstituted. Elevated velocities at the distal superficial femoral artery indicate a > 50%   stenosis by velocity criteria (velocity went from 15 to 298 cm/s). The posterior tibial artery is occluded. The distal anterior tibial artery is barely patent, with very low flow   (possibly occluded and then reconstituted). Left   The left ankle brachial index is 0 for the PT and the DP was not accessible   due to the bandages on the foot. The common femoral and profunda femoral artery could not be visualized due to   bandages extending into the groin area. The distal superficial femoral artery is occluded and then reconstituted. The posterior tibial artery, peroneal, and anterior tibial artery are   occluded. There were no previous studies to use for comparison. ASSESSMENT/PLAN:   -Full-thickness ulceration; lateral left foot-Sands stage III  -Osteomyelitis of the fifth metatarsal; left foot  -Critical limb ischemia; bilateral lower extremity  -Diabetes mellitus, type II  -History of noncompliance    -Patient seen and examined at bedside this a.m.  -Hypertensive, afebrile, leukocytosis noted.  -All imaging reviewed see impression above.   -Vascular surgery following; will await further stabilization with the general surgery team and plastic surgery team before offering vascular intervention.  -Plastic surgery following; S/P abdominal advancement with application of airflow installation VAC therapy.  -Wound culture: Mixed skin pam  -Continue IV antibiotics per primary team: Vancomycin, Merrem  -Left lower extremity dressed with Betadine soaked gauze, DSD, and tape  -Prevalon boots reapplied. Patient is to wear at all times while in bed to prevent further deep tissue injury. Ankle strap removed from bilateral boot as the ankle straps were causing pressure to his feet. -Nonweightbearing to left lower extremity  -Weightbearing as tolerated to right lower extremity  -Lengthy discussion with patient regarding infection and the need for possible surgical intervention once medically stable, but will wait for vascular surgery recommendation. DISPO: Full-thickness ulceration with underlying osteomyelitis to the left fifth metatarsal.  Critical limb ischemia bilateral lower extremity. Vascular following; awaiting further stabilization at this time. Continue broad-spectrum IV antibiotics. Patient will eventually require podiatric surgical intervention but timing will depend on medical stability and vascular recommendations. Patient assessment and plan discussed with Татьяна Madden [facility]  Podiatry resident, PGY 3  [place], [place]  12/08/21  5:36 AM      Patient was seen and evaluated at bedside. Agree with residents assessment and treatment plan.   Liliana Jeffrey DPM

## 2021-12-08 NOTE — CARE COORDINATION
Case Management Assessment           Daily Note                 Date/ Time of Note: 12/8/2021 9:39 AM         Note completed by: Andrew Barajas RN    Patient Name: Mohamud Verdugo  YOB: 1955    Diagnosis:Necrotizing fasciitis [facility]) [M72.6]  Patient Admission Status: Inpatient    Date of Admission:12/1/2021  3:40 PM Length of Stay: 7 GLOS: GMLOS: 4.4    Current Plan of Care: Surgical debridement with wound vac placement, new ostomy 12/7, IV abd  ________________________________________________________________________________________  PT AM-PAC:   / 24 per last evaluation on: tbd    OT AM-PAC:   / 24 per last evaluation on: tbd    DME Needs for discharge: tbd  ________________________________________________________________________________________  Discharge Plan: Home with [address] [place] [address] Way: [facility] vs [facility]    Tentative discharge date: tbd    Current barriers to discharge: IV abd, surgical debridement    Referrals completed: Infusion: [facility]     Resources/ information provided: Not indicated at this time  ________________________________________________________________________________________  Case Management Notes: Patient lives at home with spouse that has 7 steps to enter. To OR today for diverting ostomy. Spoke with Julianne Cone in financial service, patient does not have insurance coverage and is over income for medicaid assistance, however if patient is placed in SNF may be eligible for institutional Medicaid. Per Julianne Adame, patient may be eligible for Medicare, gave phone number to apply to spouse. Didi Edouard at [facility] who is following for howard, however also spoke with [facility] to cover with new ostomy, per Gita Jason cannot give howard in this case. Spoke with Hilda at [facility], quote for Merrem 1G every 8 hours would be $53 per day, they could arrange payment plan to assist.  Patient to OR yesterday and placed new ostomy and wound vac, NG tube in place. Lonnie Patel and his family were provided with choice of provider; he and his family are in agreement with the discharge plan.     Care Transition Patient: Linda Sweeney RN  [facility]  Case Management Department  Ph: [phone]

## 2021-12-08 NOTE — PROGRESS NOTES
Patient AOx4, refused PRN pain medications at this time and resting comfortably. SBP climbing and above parameters at this time. MD notified and awaiting new orders.

## 2021-12-08 NOTE — PLAN OF CARE
Problem: Activity:  Goal: Ability to return to normal activity level will improve  Description: Ability to return to normal activity level will improve  Outcome: Ongoing     Problem:  Bowel/Gastric:  Goal: Gastrointestinal status for postoperative course will improve  Description: Gastrointestinal status for postoperative course will improve  Outcome: Ongoing     Problem: Respiratory:  Goal: Ability to achieve and maintain a regular respiratory rate will improve  Description: Ability to achieve and maintain a regular respiratory rate will improve  Outcome: Ongoing     Problem: Sensory:  Goal: General experience of comfort will improve  Description: General experience of comfort will improve  Outcome: Ongoing     Problem: Skin Integrity:  Goal: Will show no infection signs and symptoms  Description: Will show no infection signs and symptoms  Outcome: Ongoing

## 2021-12-08 NOTE — RT PROTOCOL NOTE
RT Nebulizer Bronchodilator Protocol Note    There is a bronchodilator order in the chart from a provider indicating to follow the RT Bronchodilator Protocol and there is an Initiate RT Bronchodilator Protocol order as well (see protocol at bottom of note). CXR Findings:  No results found. The findings from the last RT Protocol Assessment were as follows:  Smoking: Smoker 15 pack years or more  Respiratory Pattern: Regular pattern and RR 12-20 bpm  Breath Sounds: Slightly diminished and/or crackles  Cough: Strong, spontaneous, non-productive  Indication for Bronchodilator Therapy:    Bronchodilator Assessment Score: 3    Aerosolized bronchodilator medication orders have been revised according to the RT Nebulizer Bronchodilator Protocol below. Respiratory Therapist to perform RT Therapy Protocol Assessment initially then follow the protocol. Repeat RT Therapy Protocol Assessment PRN for score 0-3 or on second treatment, BID, and PRN for scores above 3. No Indications - adjust the frequency to every 6 hours PRN wheezing or bronchospasm, if no treatments needed after 48 hours then discontinue using Per Protocol order mode. If indication present, adjust the RT bronchodilator orders based on the Bronchodilator Assessment Score as indicated below. If a patient is on this medication at home then do not decrease Frequency below that used at home. 0-3 - enter or revise RT bronchodilator order(s) to equivalent RT Bronchodilator order with Frequency of every 4 hours PRN for wheezing or increased work of breathing using Per Protocol order mode. 4-6 - enter or revise RT Bronchodilator order(s) to two equivalent RT bronchodilator orders with one order with BID Frequency and one order with Frequency of every 4 hours PRN wheezing or increased work of breathing using Per Protocol order mode.          7-10 - enter or revise RT Bronchodilator order(s) to two equivalent RT bronchodilator orders with one order with TID Frequency and one order with Frequency of every 4 hours PRN wheezing or increased work of breathing using Per Protocol order mode. 11-13 - enter or revise RT Bronchodilator order(s) to one equivalent RT bronchodilator order with QID Frequency and an Albuterol order with Frequency of every 4 hours PRN wheezing or increased work of breathing using Per Protocol order mode. Greater than 13 - enter or revise RT Bronchodilator order(s) to one equivalent RT bronchodilator order with every 4 hours Frequency and an Albuterol order with Frequency of every 2 hours PRN wheezing or increased work of breathing using Per Protocol order mode. RT to enter RT Home Evaluation for COPD & MDI Assessment order using Per Protocol order mode.     Electronically signed by Shanelle Jamil RCP on 12/8/2021 at 12:59 AM

## 2021-12-08 NOTE — PROGRESS NOTES
ICU Progress Note    Admit Date: 12/1/2021  Day: 7  Extubated 12/2  IV Access:Peripheral, PICC, A line  IV Fluids: none  Vasopressors:None                Antibiotics: Vancomycin and Merem  Diet: ADULT DIET; Regular; 3 carb choices (45 gm/meal)  ADULT ORAL NUTRITION SUPPLEMENT; Breakfast, Lunch, Dinner; Standard High Calorie/High Protein Oral Supplement    CC: Westley gangrene    Interval history: NAEO. Patient ostomy clean and well dressed. Wound vac in place. HPI: Jory Potts a 63 y. o. male with PMH as below notable for T2DM who presented with groin swelling. Patient reported that 2 weeks ago he notice a abscess near his anus, however reported in the last 2 days the area has increasing in size and warmth and extended to his scrotum. He reported poor compliance with his diabetes medication (metformin).      On admission the patient was hypotensive, WBC 18, Bicarb 20, Anion gap 22, Glucose 679, elevated Bhydroxybutirate. General surgery, urology and podiatry were consulted. Vancomycin was started, 3 L of IV fluids were given and the patient was placed in DKA protocol. Anion gap closed twice. The patient was taken for surgery debridement, was kept intubated due to possible multiple surgery interventions. Patient was transferred to the [facility] for colorectal and plastic surgery management.      Patient was admitted to the ICU for further workup and management of westley gangrene.     Medications:     Scheduled Meds:   acetaminophen  1,000 mg Oral Q6H    vancomycin  1,250 mg IntraVENous Q12H    enoxaparin  40 mg SubCUTAneous Daily    sodium chloride flush  5-40 mL IntraVENous 2 times per day    meropenem  1,000 mg IntraVENous Q8H     Continuous Infusions:   lactated ringers 125 mL/hr at 12/06/21 2300    sodium chloride Stopped (12/01/21 2027)    dextrose      insulin (HUMAN R) non-weight based infusion 0.5 Units/hr (12/08/21 0520)     PRN Meds:ipratropium-albuterol, HYDROmorphone **OR** HYDROmorphone, oxyCODONE **OR** oxyCODONE, sodium chloride flush, sodium chloride, glucose, dextrose, glucagon (rDNA), dextrose    Objective:   Vitals:   T-max:  Patient Vitals for the past 8 hrs:   BP Temp Temp src Pulse Resp SpO2   12/08/21 0500 -- -- -- 110 23 (!) 89 %   12/08/21 0400 (!) 143/69 99 °F (37.2 °C) Oral 102 23 93 %   12/08/21 0300 (!) 150/69 -- -- 96 21 93 %   12/08/21 0200 (!) 140/71 -- -- 95 21 92 %   12/08/21 0100 123/68 -- -- 94 16 92 %   12/08/21 0000 (!) 147/67 98 °F (36.7 °C) Oral 95 24 95 %   12/07/21 2300 133/72 -- -- 93 8 93 %       Intake/Output Summary (Last 24 hours) at 12/8/2021 0552  Last data filed at 12/8/2021 0400  Gross per 24 hour   Intake 5496.7 ml   Output 4100 ml   Net 1396.7 ml       Review of Systems   Constitutional: Negative for chills and fever. HENT: Negative. Eyes: Negative. Respiratory: Negative for chest tightness and shortness of breath. Cardiovascular: Negative for chest pain, palpitations and leg swelling. Gastrointestinal: Negative for abdominal pain, nausea and vomiting. Endocrine: Negative. Genitourinary: Negative. Musculoskeletal: Negative. Skin: Negative. Allergic/Immunologic: Negative. Neurological: Negative. Hematological: Negative. Psychiatric/Behavioral: Negative. A 10 point review of systems was conducted, significant findings as noted in HPI. Physical Exam  Constitutional:       Appearance: He is ill-appearing. HENT:      Head: Normocephalic and atraumatic. Right Ear: External ear normal.      Left Ear: External ear normal.      Nose: Nose normal.      Mouth/Throat:      Mouth: Mucous membranes are dry. Pharynx: Oropharynx is clear. Eyes:      Extraocular Movements: Extraocular movements intact. Conjunctiva/sclera: Conjunctivae normal.      Pupils: Pupils are equal, round, and reactive to light. Cardiovascular:      Rate and Rhythm: Normal rate and regular rhythm. Pulses: Normal pulses.       Heart Recent Labs     12/07/21  0350   INR 1.09     Lactate:   No results for input(s): LACTATE in the last 72 hours. Cultures:  -----------------------------------------------------------------  RAD:   VL DUP LOWER EXTREMITY ARTERIES BILATERAL   Final Result            Assessment/Plan:   Deejay Blandon a 63 y. o. male with PMH as below notable for T2DM who presented with groin swelling. Patient was admitted to the ICU for further workup and management of westley gangrene    Westley gangrene   Necrotizing fascitis of the abdominal wall, perineum and back. WBC improving. BCx x2: NGTD; Tissue Cx (12/3): + Candida glabrata; Wound Cx: Mixed skin pam  -Continue Vanc, Merrem (12/1-)  -Dressing changes BID  -Colorectal surgery following   Wound vac change later this week. 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   -Podiatry following  -May need surgical intervention once medically stable. T2DM  Last A1c 13.5 (11/30/2021)  Strict BG goal 110-140 to aid with wound healing  -Insulin drip held by surgery   -Hypoglycemia protocol   -POCT    Code Status: Full Code  FEN: ADULT DIET; Regular; 3 carb choices (45 gm/meal)  ADULT ORAL NUTRITION SUPPLEMENT; Breakfast, Lunch, Dinner; Standard High Calorie/High Protein Oral Supplement  PPX: SCDs, protonix  DISPO: ICU     Thank you for allowing us to participate in the care of this patient. ICU Team will sign off, please reach out if we can be of assistance in the future. Randi Claire MD, PGY-1  Internal medicine resident  12/08/21  5:52 AM    This patient will be staffed and discussed with Dr. Sigrid Mobley. Patient seen, examined and discussed with the resident and I agree with the assessment and plan. Had ostomy yesterday and is now off the insulin drip. Will transfer to  today with hospitalist following for additional glucose management.       Thais Norman MD

## 2021-12-08 NOTE — PROGRESS NOTES
Progress Note  Admit Date: 12/1/2021         CC: F/U for   DKA  Shayla's Gangrene    HPI:  \"Patient was transferred from [facility] after admission for DKA and Shayla's gangrene/necrotizing fasciitis. Patient was intubated and underwent I&D. Pt was transferred to [facility] for further evaluation per colorectal/plastic surgery. Patient underwent wide excision of perineum/abdominal wall 12/01. Extubated 12/02. S/p excisional debridement of abdomen and left buttock, wound VAC placement 12/03. S/p further debridement and wound VAC placement today. Plan to return to the OR for diverting ostomy on Tuesday by general surgery. On IV abx and insulin drip. Urology/surgery/plastic surgery following. \"    Subjective:  Pt reports feeling tired and wants to rest.  Denies pain. Denies cp or sob. PHYSICAL EXAM:  /72   Pulse 94   Temp 99 °F (37.2 °C) (Oral)   Resp (!) 0   Ht 5' 11\" (1.803 m)   Wt 196 lb 3.4 oz (89 kg)   SpO2 (!) 89%   BMI 27.37 kg/m²   Recent Labs     12/08/21  0216 12/08/21  0314 12/08/21  0441 12/08/21  0516 12/08/21  0626   POCGLU 91 86 105* 110* 108*       Intake/Output Summary (Last 24 hours) at 12/8/2021 0842  Last data filed at 12/8/2021 8560  Gross per 24 hour   Intake 5888.52 ml   Output 4025 ml   Net 1863.52 ml     Physical Exam  Constitutional:       General: He is not in acute distress. Appearance: Normal appearance. Cardiovascular:      Rate and Rhythm: Normal rate and regular rhythm. Pulses: Normal pulses. Heart sounds: Normal heart sounds. Abdominal:      General: There is no distension. Palpations: Abdomen is soft. Musculoskeletal:      Cervical back: Normal range of motion and neck supple. Left lower leg: No edema. Skin:     General: Skin is warm and dry. Neurological:      General: No focal deficit present. Mental Status: He is alert and oriented to person, place, and time.    Psychiatric:         Mood and Affect: Mood normal. Behavior: Behavior normal.         Thought Content: Thought content normal.         LABS:  Recent Labs     12/07/21  0350 12/07/21  0350 12/07/21  1526 12/08/21  0330 12/08/21  0712   WBC 15.1*  --  16.5* 19.0*  --    HGB 8.0*   < > 8.3* 8.0* 8.2*   HCT 23.8*   < > 25.1* 25.0* 25.3*     --  362 422  --     < > = values in this interval not displayed. Recent Labs     12/07/21  0350 12/07/21  1445 12/08/21  0330   * 134* 134*   K 4.2 4.3 4.4    106 103   CO2 24 24 23   BUN 14 13 10   CREATININE 0.5* 0.5* <0.5*   GLUCOSE 108* 112* 78       Assessment & Plan:      Acute respiratory failure:  Post-operative given sepsis and need for repeat surgical procedures. Now resolved and pt extubated 12/02  On 2 liters since surgery 12/7- wean as tolerated.     Shayla's gangrene/necrotizing fasciitis:  Further debridement and surgery done 12/7. Plan is for return to or per plastics for exchange of wound vac later this week- appreciate involvement. Status post I&D 11/30 and wide excision of perineum/abdominal wall 12/01  Abdominal wall wound measuring 60 cm x 23 cm x 3cm and gluteal wound measures 20 cm x 15cm  12/01  S/p excisional debridement of abdomen and left buttock, wound VAC placement 12/03  S/p further debridement and wound VAC placement 12/05  Clindamycin discontinued. Continue IV Vanco and Merrem  Plan to return to the OR for diverting ostomy on Tuesday by general surgery  Surgery/plastic surgery/urology following     Acute postoperative blood loss anemia:  Likely secondary to I&D and wide excision  Status post 1u pRBC transfusion 12/2  Monitor hemoglobin, stable     DKA in the setting of diabetes mellitus type 2:  Patient noncompliant. Holding home medication  Was on inulin gtt- now taking po- placed on ssi- cc managing.     Left fifth metatarsal diabetic foot ulceration and osteomyelitis:  Continue IV antibiotics  Arterial Doppler showed severe bilateral arterial occlusive disease  Vascular surgery recommended possible consideration of an angiogram once the patient is stabilized  Patient will eventually require podiatric surgical intervention pending medical stability  Continue wound care  Podiatry following, appreciate recommendation    Disposition: Inpatient    Full Code      Jerardo Lawler MD

## 2021-12-08 NOTE — PROGRESS NOTES
General Surgery  Post-operative Note      Procedure(s) Performed: abdominal advancement flap, veraflo instillation vac therapy application; laparoscopic diverting colostomy     Subjective:   Patient's pain is controlled, denies nausea or vomiting. Tolerating diet. UOP adequate via walsh. Denies flatus or BM at this time. Remains AF HDS. Objective:  Anesthesia type: General      I/O    Intra op    Post op     Fluids  1000 mL crystalloid, 500 cc albumin  250 mL     EBL 50 mL 0 mL     Urine 400 mL 150 mL     Vitals:   Vitals:    12/07/21 1745 12/07/21 1800 12/07/21 1815 12/07/21 1830   BP: (!) 160/86 (!) 164/87 (!) 162/86 (!) 161/86   Pulse: 101 98 98 98   Resp: 26 17 19 19   Temp:       TempSrc:       SpO2: (!) 89% 95% 94% 93%   Weight:       Height:           Physical Exam:  Post-op vital signs:  Stable   General appearance: alert, no acute distress, grooming appropriate  Eyes: No scleral icterus, EOM grossly intact  Neck: trachea midline, no JVD, no lymphadenopathy, neck supple  Chest/Lungs: normal effort with no accessory muscle use on 2L NC  Cardiovascular: RRR, well perfused  Abdomen: Soft, appropriately-tender, veraflo wound vac in place with good seal, LLQ colostomy well appearing stoma pink/viable, sweat in bag  Skin: warm and dry, no rashes  Extremities: no edema, no cyanosis  Genitourinary: Walsh in place with clear yellow urine  Neuro: A&Ox3, no focal deficits, sensation intact    Assessment and Plan  This is a 63y.o. year old male status post abdominal advancement flap, veraflo instillation vac therapy application; diverting colostomy secondary to Shayla's gangrene with necrotizing fasciitis. POD0.     Pain management: Roxicodone pain panel, Dilaudid pain panel and scheduled tylenol   Cardiovasc: hemodynamically stable, will continue to monitor  Respiratory:  IS ordered to bedside, encourage hourly IS and deep breathing, wean oxygen as tolerated  Fluids:  , Diet: General diet  : Urine output is adequate   Ambulation: OOB to chair, encourage ambulation  Prophylaxis: SCDs, lovenox  Antibiotics: Merrem/Vanc  Wound: Local wound care    Carla Kowalski MD  PGY1, General Surgery  12/07/21  7:45 PM  [phone]

## 2021-12-08 NOTE — PLAN OF CARE
Problem: Activity:  Goal: Ability to return to normal activity level will improve  Description: Ability to return to normal activity level will improve  12/8/2021 1056 by Marguerite Espinosa  Outcome: Ongoing  12/8/2021 0547 by Dani Garcia RN  Outcome: Ongoing     Problem:  Bowel/Gastric:  Goal: Gastrointestinal status for postoperative course will improve  Description: Gastrointestinal status for postoperative course will improve  12/8/2021 1056 by Marguerite Espinosa  Outcome: Ongoing  12/8/2021 0547 by Dani Garcia RN  Outcome: Ongoing     Problem: Health Behavior:  Goal: Identification of resources available to assist in meeting health care needs will improve  Description: Identification of resources available to assist in meeting health care needs will improve  Outcome: Ongoing     Problem: Physical Regulation:  Goal: Postoperative complications will be avoided or minimized  Description: Postoperative complications will be avoided or minimized  Outcome: Ongoing     Problem: Respiratory:  Goal: Ability to achieve and maintain a regular respiratory rate will improve  Description: Ability to achieve and maintain a regular respiratory rate will improve  12/8/2021 1056 by Marguerite Espinosa  Outcome: Ongoing  12/8/2021 0547 by Dani Garcia RN  Outcome: Ongoing     Problem: Safety:  Goal: Ability to remain free from injury will improve  Description: Ability to remain free from injury will improve  Outcome: Ongoing     Problem: Sensory:  Goal: General experience of comfort will improve  Description: General experience of comfort will improve  12/8/2021 1056 by Marguerite Espinosa  Outcome: Ongoing  12/8/2021 0547 by Dani Garcia RN  Outcome: Ongoing     Problem: Skin Integrity:  Goal: Demonstration of wound healing without infection will improve  Description: Demonstration of wound healing without infection will improve  12/8/2021 1056 by Marguerite Espinosa  Outcome: Ongoing  12/8/2021 0547 by Dani Garcia RN  Outcome: Ongoing  Goal: Will show no infection signs and symptoms  Description: Will show no infection signs and symptoms  12/8/2021 1056 by Tequila Vazquez  Outcome: Ongoing  12/8/2021 0547 by Nikkie Dorantes RN  Outcome: Ongoing  Goal: Absence of new skin breakdown  Description: Absence of new skin breakdown  12/8/2021 1056 by Tequila Vazquez  Outcome: Ongoing  12/8/2021 0547 by Nikkie Dorantes RN  Outcome: Ongoing     Problem: Infection - Surgical Site:  Goal: Will show no infection signs and symptoms  Description: Will show no infection signs and symptoms  12/8/2021 1056 by Tequila Vazquez  Outcome: Ongoing  12/8/2021 0547 by Nikkie Dorantes RN  Outcome: Ongoing     Problem: Falls - Risk of:  Goal: Will remain free from falls  Description: Will remain free from falls  Outcome: Ongoing  Goal: Absence of physical injury  Description: Absence of physical injury  Outcome: Ongoing     Problem: ABCDS Injury Assessment  Goal: Absence of physical injury  Outcome: Ongoing     Problem: Nutrition  Goal: Optimal nutrition therapy  Outcome: Ongoing     Problem: Pain:  Goal: Pain level will decrease  Description: Pain level will decrease  Outcome: Ongoing  Goal: Control of acute pain  Description: Control of acute pain  Outcome: Ongoing  Goal: Control of chronic pain  Description: Control of chronic pain  Outcome: Ongoing

## 2021-12-08 NOTE — CONSULTS
Infectious Diseases Inpatient Consult Note    RESIDENT NOTE - reviewed / edited, attending note at bottom    Reason for Consult:   Necrotizing soft tissue infection, antibiotic management  Requesting Physician:   Jonh Beauchamp MD  Primary Care Physician:  No primary care provider on file. History Obtained From:   Pt, EPIC    Admit Date: 12/1/2021  Hospital Day: 8    CHIEF COMPLAINT:     Scrotal infection    HISTORY OF PRESENT ILLNESS:      Janell Crandall is a 63 y.o. male w/ PMHx: Type 2 DM (noncompliant with meds) who presented to [facility] due to swelling and erythema of scrotal region of unknown length of time. 11/30 ED visit : C/o scrotal infection at [facility]. Admitted for westley's gangrene and DKA. Taken into OR for debridement. Started on Vancomycin, cefipime and Flagyl. Abx switched to Vancomycin (11/30 - present), Clindamycin (12/01-03) and Iva Gails (12/01 -present)    12/1: Transferred to [facility] ICU in DKA and taken into OR for further debridement. Intubated due to multiple surgical interventions. Extubated 12/2    Pt had multiple debridements on 12/2,3,5,7. Had wound vac placement. Seen at bedside. Complains of some mild abdominal wall discomfort. Afebrile over the last 24 hours. Hemodynamically stable.  On 2L NC spo2: 94%    Past Medical History:    DM type 2    Past Surgical History:    Past Surgical History:   Procedure Laterality Date    ABDOMEN SURGERY N/A 12/1/2021    WIDE EXCISION PERINEUM, BACK, ABDOMINAL WALL performed by Ahsan Olivarez MD at [facility] Left 12/3/2021    ABDOMINAL WALL AND LEFT BUTTOCK DEBRIDEMENT, WOUND VAC PLACEMENT performed by Jonh Beauchamp MD at [facility] Left 12/5/2021    ABDOMINAL WALL AND LEFT BUTTOCK DEBRIDEMENT, WOUND VAC PLACEMENT performed by Jonh Beauchamp MD at [facility] N/A 12/7/2021    EVALUATION UNDER ANESTHESIA WITH DEBRIDEMENT ABDOMINAL WOUND AND LEFT BUTTOCK, WOUND VAC CHANGE performed by Jamie Rodríguez MD at [address] Left 12/7/2021    LAPAROSCOPIC COLOSTOMY CREATION performed by Arabella Cartwright MD at [facility] 11/30/2021    DEBRIDEMENT OF SCROTAL JAYRO'S GANGRENE performed by Pasquale Berman MD at [address] N/A 11/30/2021    PERINEAL SOFT TISSUE EXCISIONAL DEBRIDEMENT performed by Farrah Ruiz MD at [facility]       Current Medications:     insulin regular  0-18 Units SubCUTAneous 4x Daily AC & HS    acetaminophen  1,000 mg Oral Q6H    vancomycin  1,250 mg IntraVENous Q12H    enoxaparin  40 mg SubCUTAneous Daily    sodium chloride flush  5-40 mL IntraVENous 2 times per day    meropenem  1,000 mg IntraVENous Q8H       Allergies:  Patient has no known allergies. Social History:    TOBACCO:    80-pack-year smoking history  ETOH:    Yes  DRUGS:   No  MARITAL STATUS:        Patient lives at home with wife    Family History:   No immunodeficiency    REVIEW OF SYSTEMS:    No fever / chills / sweats. No weight loss. No visual change, eye pain, eye discharge. No oral lesion, sore throat, dysphagia. Denies cough / sputum. Denies chest pain, palpitations. Denies n / v . + abd wall  pain. No diarrhea. Denies dysuria or change in urinary function. Denies joint swelling or pain. No myalgia, arthralgia. Denies skin changes, itching  Denies focal weakness, sensory change or other neurologic symptom    Denies new / worse depression, psychiatric symptoms    PHYSICAL EXAM:      Vitals:    BP (!) 156/69   Pulse 87   Temp 98.8 °F (37.1 °C) (Oral)   Resp 24   Ht 5' 11\" (1.803 m)   Wt 196 lb 3.4 oz (89 kg)   SpO2 92%   BMI 27.37 kg/m²     GENERAL: No apparent distress. HEENT: Membranes moist, no oral lesion, PERRL  NECK:  Supple, no lymphadenopathy  LUNGS: Clear b/l, no rales, no dullness  CARDIAC: RRR, no murmur appreciated  ABD:  Wound VAC in situ.   Ostomy  To left upper quadrant in situ draining serosanguineous fluid + BS, soft. EXT:  Left lower extremity with bandages. + ulcer to L.LE. seen in Media. NEURO: No focal neurologic findings  PSYCH: Orientation, sensorium, mood normal  LINES:  Right basilic PICC line, right antecubital peripheral IV, urethral catheter    DATA:    Lab Results   Component Value Date    WBC 19.0 (H) 12/08/2021    HGB 8.2 (L) 12/08/2021    HCT 25.3 (L) 12/08/2021    MCV 90.6 12/08/2021     12/08/2021     Lab Results   Component Value Date    CREATININE <0.5 (L) 12/08/2021    BUN 10 12/08/2021     (L) 12/08/2021    K 4.4 12/08/2021     12/08/2021    CO2 23 12/08/2021       Hepatic Function Panel:   Lab Results   Component Value Date    ALKPHOS 109 12/02/2021    ALT 7 12/02/2021    AST 9 12/02/2021    PROT 4.2 12/02/2021    BILITOT 0.4 12/02/2021    BILIDIR 0.3 12/02/2021    IBILI 0.1 12/02/2021    LABALBU 1.8 12/02/2021       Micro:  Fungus Culture(12/3): No fungal elements  AF bacillus Cx (12/3): no AFB  Tissue Cx (12/3): Light growth, Candida glabrata  Fungal Cx: (12/5): No fungal elements  AF Bacillus Cx (12/5): no AFB  Tissue Cx (12/5): Light growth, Candida glabrata    Imaging:   CT Abd 11/30: Extensive soft tissue gas w/ enlargement of scrotum and soft tissue gas extending into L buttock, perineum, groins, and abd wall. With muscle or fascial involvement. Duplex arterial USG 12/3: Severe b/l arterial occlusive dx. IMPRESSION:    Mortimer Gails is a 63 y.o. male who initially had a scrotal abcess, Uncontrolled Type 2 DM    Shayla's gangrene  Necrotizing fascitis    CT abdomen 11/30 revealing extensive soft tissue gas to scrotal extending to abdominal wall and involving muscle or fascia. Multiple debridements in OR   Tissue Cx (12/3 and 12/5): + Light growth Candida Glabrata. Uncommonly, fungi (predominantly Candida) can be recovered in polymicrobial infection. However, candida not invasive.     Debridements done in OR: 11/30, 12/ 1,2,3,5,7. Wound Vac in situ. WBC continues to be elevated: 27.7 ->16.5 -> 19.0 w/ neutrophilia    Type 2 DM   Noncompliant. Has strict glucose goal. On Insulin    RECOMMENDATIONS:    - Continue Merrem (broad spectrum abx), Vancomycin  + Clindamycin (antitoxin effect) x 2 weeks. - Debridement per surgical team.     Discussed with MD Allison Marks MD   Internal medicine resident    Addendum to Resident Consult note:  Pt seen, examined and evaluated. I have reviewed the current history, physical findings, labs and assessment and plan and agree with note as documented by resident (Dr. Annie Acosta). 64 yo man with DM - not taking meds    Presented with scrotal swelling and pain  Seen 11/30 at [facility] ED, dx DKA, Shayla's gangrene  CT with gas extending   OR debridement  Rx Vancomycin, Cefepime, Metronidazole    Transfer to [facility] on 12/1. Return to OR 12/2,3,5,7. Colostomy 12/7    Reviewed cultures -  11/30 GS GPC, GNDC, GNR, cult neg  12/3,5 light C glabrata. Exam - VAC in wound over lower abd, suprapubic region, extending over L scrotum, inguinal fold, posteriorly to buttock    IMP/  Necrotizing esperanza-gential infection c/c Shayla's gangrene   - mixed / polymicrobial infection   - yeast in tissue     REC/  Change to unsyn  D/c other antibiotics  Wound care / reconstruction per Surg / Plastics    At discharge may not iv antibiotic or long course of antibiotics    Medical Decision Making:   The following items were considered in medical decision making:  Discussion of patient care with other providers  Reviewed clinical lab tests  Reviewed radiology tests  Reviewed other diagnostic tests/interventions  Microbiology cultures and other micro tests reviewed      Discussed with pt  Donnie Helm MD

## 2021-12-09 ENCOUNTER — APPOINTMENT (OUTPATIENT)
Dept: CT IMAGING | Age: 66
DRG: 853 | End: 2021-12-09
Attending: INTERNAL MEDICINE

## 2021-12-09 ENCOUNTER — APPOINTMENT (OUTPATIENT)
Dept: GENERAL RADIOLOGY | Age: 66
DRG: 853 | End: 2021-12-09
Attending: INTERNAL MEDICINE

## 2021-12-09 LAB
ANION GAP SERPL CALCULATED.3IONS-SCNC: 11 MMOL/L (ref 3–16)
ANION GAP SERPL CALCULATED.3IONS-SCNC: 9 MMOL/L (ref 3–16)
BANDED NEUTROPHILS RELATIVE PERCENT: 4 % (ref 0–7)
BASE EXCESS ARTERIAL: -10 (ref -3–3)
BASOPHILS ABSOLUTE: 0 K/UL (ref 0–0.2)
BASOPHILS ABSOLUTE: 0 K/UL (ref 0–0.2)
BASOPHILS RELATIVE PERCENT: 0 %
BASOPHILS RELATIVE PERCENT: 0 %
BUN BLDV-MCNC: 12 MG/DL (ref 7–20)
BUN BLDV-MCNC: 12 MG/DL (ref 7–20)
CALCIUM IONIZED: 1.04 MMOL/L (ref 1.12–1.32)
CALCIUM IONIZED: 1.23 MMOL/L (ref 1.12–1.32)
CALCIUM SERPL-MCNC: 7.5 MG/DL (ref 8.3–10.6)
CALCIUM SERPL-MCNC: 7.6 MG/DL (ref 8.3–10.6)
CHLORIDE BLD-SCNC: 101 MMOL/L (ref 99–110)
CHLORIDE BLD-SCNC: 104 MMOL/L (ref 99–110)
CO2: 21 MMOL/L (ref 21–32)
CO2: 23 MMOL/L (ref 21–32)
CREAT SERPL-MCNC: 0.7 MG/DL (ref 0.8–1.3)
CREAT SERPL-MCNC: 0.8 MG/DL (ref 0.8–1.3)
EOSINOPHILS ABSOLUTE: 0 K/UL (ref 0–0.6)
EOSINOPHILS ABSOLUTE: 0.4 K/UL (ref 0–0.6)
EOSINOPHILS RELATIVE PERCENT: 0 %
EOSINOPHILS RELATIVE PERCENT: 2 %
GFR AFRICAN AMERICAN: >60
GFR AFRICAN AMERICAN: >60
GFR NON-AFRICAN AMERICAN: >60
GFR NON-AFRICAN AMERICAN: >60
GLUCOSE BLD-MCNC: 111 MG/DL (ref 70–99)
GLUCOSE BLD-MCNC: 126 MG/DL (ref 70–99)
GLUCOSE BLD-MCNC: 160 MG/DL (ref 70–99)
GLUCOSE BLD-MCNC: 161 MG/DL (ref 70–99)
GLUCOSE BLD-MCNC: 165 MG/DL (ref 70–99)
GLUCOSE BLD-MCNC: 168 MG/DL (ref 70–99)
GLUCOSE BLD-MCNC: 174 MG/DL (ref 70–99)
GLUCOSE BLD-MCNC: 194 MG/DL (ref 70–99)
HCO3 ARTERIAL: 17.6 MMOL/L (ref 21–29)
HCT VFR BLD CALC: 26.3 % (ref 40.5–52.5)
HCT VFR BLD CALC: 27.3 % (ref 40.5–52.5)
HEMOGLOBIN: 8.6 G/DL (ref 13.5–17.5)
HEMOGLOBIN: 9 G/DL (ref 13.5–17.5)
LACTATE: 8.22 MMOL/L (ref 0.4–2)
LACTIC ACID: 1.1 MMOL/L (ref 0.4–2)
LACTIC ACID: 5 MMOL/L (ref 0.4–2)
LYMPHOCYTES ABSOLUTE: 0.4 K/UL (ref 1–5.1)
LYMPHOCYTES ABSOLUTE: 1.1 K/UL (ref 1–5.1)
LYMPHOCYTES RELATIVE PERCENT: 2 %
LYMPHOCYTES RELATIVE PERCENT: 6 %
MCH RBC QN AUTO: 30 PG (ref 26–34)
MCH RBC QN AUTO: 30.1 PG (ref 26–34)
MCHC RBC AUTO-ENTMCNC: 32.6 G/DL (ref 31–36)
MCHC RBC AUTO-ENTMCNC: 32.8 G/DL (ref 31–36)
MCV RBC AUTO: 91.7 FL (ref 80–100)
MCV RBC AUTO: 92.3 FL (ref 80–100)
METAMYELOCYTES RELATIVE PERCENT: 1 %
METAMYELOCYTES RELATIVE PERCENT: 2 %
MONOCYTES ABSOLUTE: 0.6 K/UL (ref 0–1.3)
MONOCYTES ABSOLUTE: 1.6 K/UL (ref 0–1.3)
MONOCYTES RELATIVE PERCENT: 3 %
MONOCYTES RELATIVE PERCENT: 9 %
MYELOCYTE PERCENT: 1 %
NEUTROPHILS ABSOLUTE: 15.2 K/UL (ref 1.7–7.7)
NEUTROPHILS ABSOLUTE: 20.1 K/UL (ref 1.7–7.7)
NEUTROPHILS RELATIVE PERCENT: 76 %
NEUTROPHILS RELATIVE PERCENT: 94 %
O2 SAT, ARTERIAL: 97 % (ref 93–100)
PCO2 ARTERIAL: 42.4 MM HG (ref 35–45)
PDW BLD-RTO: 14.6 % (ref 12.4–15.4)
PDW BLD-RTO: 14.6 % (ref 12.4–15.4)
PERFORMED ON: ABNORMAL
PH ARTERIAL: 7.23 (ref 7.35–7.45)
PH VENOUS: 7.46 (ref 7.35–7.45)
PLATELET # BLD: 515 K/UL (ref 135–450)
PLATELET # BLD: 545 K/UL (ref 135–450)
PMV BLD AUTO: 7.9 FL (ref 5–10.5)
PMV BLD AUTO: 8 FL (ref 5–10.5)
PO2 ARTERIAL: 107.8 MM HG (ref 75–108)
POC POTASSIUM: 3.9 MMOL/L (ref 3.5–5.1)
POC SAMPLE TYPE: ABNORMAL
POC SODIUM: 138 MMOL/L (ref 136–145)
POTASSIUM REFLEX MAGNESIUM: 3.9 MMOL/L (ref 3.5–5.1)
POTASSIUM REFLEX MAGNESIUM: 4.4 MMOL/L (ref 3.5–5.1)
RBC # BLD: 2.85 M/UL (ref 4.2–5.9)
RBC # BLD: 2.98 M/UL (ref 4.2–5.9)
RBC # BLD: NORMAL 10*6/UL
RBC # BLD: NORMAL 10*6/UL
SODIUM BLD-SCNC: 133 MMOL/L (ref 136–145)
SODIUM BLD-SCNC: 136 MMOL/L (ref 136–145)
TCO2 ARTERIAL: 19 MMOL/L
TOTAL CK: 66 U/L (ref 39–308)
TROPONIN: 0.2 NG/ML
TROPONIN: 0.21 NG/ML
TROPONIN: 0.23 NG/ML
TROPONIN: 0.23 NG/ML
TROPONIN: 0.24 NG/ML
WBC # BLD: 18.3 K/UL (ref 4–11)
WBC # BLD: 21.2 K/UL (ref 4–11)

## 2021-12-09 PROCEDURE — 82330 ASSAY OF CALCIUM: CPT

## 2021-12-09 PROCEDURE — 85025 COMPLETE CBC W/AUTO DIFF WBC: CPT

## 2021-12-09 PROCEDURE — 99232 SBSQ HOSP IP/OBS MODERATE 35: CPT | Performed by: INTERNAL MEDICINE

## 2021-12-09 PROCEDURE — 80048 BASIC METABOLIC PNL TOTAL CA: CPT

## 2021-12-09 PROCEDURE — 74177 CT ABD & PELVIS W/CONTRAST: CPT

## 2021-12-09 PROCEDURE — 2580000003 HC RX 258: Performed by: STUDENT IN AN ORGANIZED HEALTH CARE EDUCATION/TRAINING PROGRAM

## 2021-12-09 PROCEDURE — 6360000002 HC RX W HCPCS: Performed by: STUDENT IN AN ORGANIZED HEALTH CARE EDUCATION/TRAINING PROGRAM

## 2021-12-09 PROCEDURE — 93005 ELECTROCARDIOGRAM TRACING: CPT

## 2021-12-09 PROCEDURE — 71045 X-RAY EXAM CHEST 1 VIEW: CPT

## 2021-12-09 PROCEDURE — 6370000000 HC RX 637 (ALT 250 FOR IP): Performed by: STUDENT IN AN ORGANIZED HEALTH CARE EDUCATION/TRAINING PROGRAM

## 2021-12-09 PROCEDURE — 84146 ASSAY OF PROLACTIN: CPT

## 2021-12-09 PROCEDURE — 82550 ASSAY OF CK (CPK): CPT

## 2021-12-09 PROCEDURE — 99024 POSTOP FOLLOW-UP VISIT: CPT | Performed by: SURGERY

## 2021-12-09 PROCEDURE — 82947 ASSAY GLUCOSE BLOOD QUANT: CPT

## 2021-12-09 PROCEDURE — 2580000003 HC RX 258: Performed by: INTERNAL MEDICINE

## 2021-12-09 PROCEDURE — 6360000004 HC RX CONTRAST MEDICATION: Performed by: STUDENT IN AN ORGANIZED HEALTH CARE EDUCATION/TRAINING PROGRAM

## 2021-12-09 PROCEDURE — 6360000002 HC RX W HCPCS: Performed by: INTERNAL MEDICINE

## 2021-12-09 PROCEDURE — APPNB45 APP NON BILLABLE 31-45 MINUTES: Performed by: NURSE PRACTITIONER

## 2021-12-09 PROCEDURE — 99233 SBSQ HOSP IP/OBS HIGH 50: CPT | Performed by: INTERNAL MEDICINE

## 2021-12-09 PROCEDURE — 95819 EEG AWAKE AND ASLEEP: CPT

## 2021-12-09 PROCEDURE — 84295 ASSAY OF SERUM SODIUM: CPT

## 2021-12-09 PROCEDURE — 70450 CT HEAD/BRAIN W/O DYE: CPT

## 2021-12-09 PROCEDURE — 36415 COLL VENOUS BLD VENIPUNCTURE: CPT

## 2021-12-09 PROCEDURE — 84484 ASSAY OF TROPONIN QUANT: CPT

## 2021-12-09 PROCEDURE — 82803 BLOOD GASES ANY COMBINATION: CPT

## 2021-12-09 PROCEDURE — 84132 ASSAY OF SERUM POTASSIUM: CPT

## 2021-12-09 PROCEDURE — 83605 ASSAY OF LACTIC ACID: CPT

## 2021-12-09 PROCEDURE — 1200000000 HC SEMI PRIVATE

## 2021-12-09 RX ORDER — DOCUSATE SODIUM 100 MG/1
100 CAPSULE, LIQUID FILLED ORAL 2 TIMES DAILY
Status: DISCONTINUED | OUTPATIENT
Start: 2021-12-09 | End: 2022-01-11 | Stop reason: HOSPADM

## 2021-12-09 RX ORDER — POLYETHYLENE GLYCOL 3350 17 G/17G
17 POWDER, FOR SOLUTION ORAL DAILY
Status: DISCONTINUED | OUTPATIENT
Start: 2021-12-09 | End: 2022-01-11 | Stop reason: HOSPADM

## 2021-12-09 RX ORDER — 0.9 % SODIUM CHLORIDE 0.9 %
1000 INTRAVENOUS SOLUTION INTRAVENOUS ONCE
Status: DISCONTINUED | OUTPATIENT
Start: 2021-12-09 | End: 2021-12-10

## 2021-12-09 RX ORDER — ETOMIDATE 2 MG/ML
30 INJECTION INTRAVENOUS ONCE
Status: DISCONTINUED | OUTPATIENT
Start: 2021-12-09 | End: 2021-12-09 | Stop reason: ALTCHOICE

## 2021-12-09 RX ORDER — SODIUM CHLORIDE, SODIUM LACTATE, POTASSIUM CHLORIDE, CALCIUM CHLORIDE 600; 310; 30; 20 MG/100ML; MG/100ML; MG/100ML; MG/100ML
INJECTION, SOLUTION INTRAVENOUS CONTINUOUS
Status: DISCONTINUED | OUTPATIENT
Start: 2021-12-10 | End: 2021-12-12

## 2021-12-09 RX ADMIN — CALCIUM GLUCONATE 1000 MG: 98 INJECTION, SOLUTION INTRAVENOUS at 18:11

## 2021-12-09 RX ADMIN — SODIUM CHLORIDE 25 ML: 9 INJECTION, SOLUTION INTRAVENOUS at 18:10

## 2021-12-09 RX ADMIN — POLYETHYLENE GLYCOL 3350 17 G: 17 POWDER, FOR SOLUTION ORAL at 15:12

## 2021-12-09 RX ADMIN — AMPICILLIN SODIUM AND SULBACTAM SODIUM 3000 MG: 2; 1 INJECTION, POWDER, FOR SOLUTION INTRAMUSCULAR; INTRAVENOUS at 20:02

## 2021-12-09 RX ADMIN — Medication 1000 ML: at 02:45

## 2021-12-09 RX ADMIN — SODIUM CHLORIDE, PRESERVATIVE FREE 10 ML: 5 INJECTION INTRAVENOUS at 20:03

## 2021-12-09 RX ADMIN — IOPAMIDOL 80 ML: 755 INJECTION, SOLUTION INTRAVENOUS at 09:05

## 2021-12-09 RX ADMIN — DOCUSATE SODIUM 100 MG: 100 CAPSULE, LIQUID FILLED ORAL at 20:03

## 2021-12-09 RX ADMIN — AMPICILLIN SODIUM AND SULBACTAM SODIUM 3000 MG: 2; 1 INJECTION, POWDER, FOR SOLUTION INTRAMUSCULAR; INTRAVENOUS at 12:28

## 2021-12-09 RX ADMIN — AMPICILLIN SODIUM AND SULBACTAM SODIUM 3000 MG: 2; 1 INJECTION, POWDER, FOR SOLUTION INTRAMUSCULAR; INTRAVENOUS at 02:57

## 2021-12-09 RX ADMIN — INSULIN HUMAN 2 UNITS: 100 INJECTION, SOLUTION PARENTERAL at 12:26

## 2021-12-09 RX ADMIN — INSULIN HUMAN 5 UNITS: 100 INJECTION, SOLUTION PARENTERAL at 20:57

## 2021-12-09 RX ADMIN — AMPICILLIN SODIUM AND SULBACTAM SODIUM 3000 MG: 2; 1 INJECTION, POWDER, FOR SOLUTION INTRAMUSCULAR; INTRAVENOUS at 06:40

## 2021-12-09 RX ADMIN — ACETAMINOPHEN 1000 MG: 500 TABLET ORAL at 12:29

## 2021-12-09 RX ADMIN — INSULIN HUMAN 5 UNITS: 100 INJECTION, SOLUTION PARENTERAL at 06:45

## 2021-12-09 RX ADMIN — ACETAMINOPHEN 1000 MG: 500 TABLET ORAL at 20:02

## 2021-12-09 RX ADMIN — INSULIN HUMAN 5 UNITS: 100 INJECTION, SOLUTION PARENTERAL at 19:08

## 2021-12-09 RX ADMIN — ACETAMINOPHEN 1000 MG: 500 TABLET ORAL at 03:02

## 2021-12-09 RX ADMIN — DOCUSATE SODIUM 100 MG: 100 CAPSULE, LIQUID FILLED ORAL at 15:12

## 2021-12-09 ASSESSMENT — PAIN DESCRIPTION - PROGRESSION
CLINICAL_PROGRESSION: GRADUALLY IMPROVING

## 2021-12-09 ASSESSMENT — PAIN SCALES - GENERAL
PAINLEVEL_OUTOF10: 0
PAINLEVEL_OUTOF10: 3
PAINLEVEL_OUTOF10: 0

## 2021-12-09 ASSESSMENT — PAIN DESCRIPTION - FREQUENCY: FREQUENCY: CONTINUOUS

## 2021-12-09 ASSESSMENT — PAIN DESCRIPTION - ONSET: ONSET: ON-GOING

## 2021-12-09 ASSESSMENT — PAIN DESCRIPTION - LOCATION: LOCATION: ABDOMEN

## 2021-12-09 ASSESSMENT — PAIN DESCRIPTION - DESCRIPTORS: DESCRIPTORS: DISCOMFORT

## 2021-12-09 ASSESSMENT — PAIN DESCRIPTION - ORIENTATION: ORIENTATION: MID

## 2021-12-09 ASSESSMENT — PAIN - FUNCTIONAL ASSESSMENT: PAIN_FUNCTIONAL_ASSESSMENT: ACTIVITIES ARE NOT PREVENTED

## 2021-12-09 ASSESSMENT — PAIN DESCRIPTION - PAIN TYPE: TYPE: SURGICAL PAIN

## 2021-12-09 NOTE — SIGNIFICANT EVENT
SIGNIFICANT EVENT:    Code was called at [id number], for bed 5318. At time of ICU team arrival patient was unresponsive but has pulse. HR 85, /70. Patient was placed on bag mask ventilation with SpO2 of 95%. The decision of intubate the patient was made and the patient was transferred for the ICU for intubation. On arrival at the ICU patient awoke, he was still confused, SpO2 was 96% on 2 L NC. Intubation was held. Patient was alert following commands. ABG 7.22/42/107. Glucose 117. Lactate 8.22. CT head was ordered. BMP, lactic acid, CBC, CXR, UA, troponin were also ordered. Will monitor the patient and follow up labs/images.      Jessica Thacker MD, PGY-1  12/09/21  1:19 AM

## 2021-12-09 NOTE — PROCEDURES
Byron Duke is a 63 y.o. male patient. 1. Necrotizing fasciitis ([address]      History reviewed. No pertinent past medical history. Blood pressure 122/76, pulse 73, temperature 97.5 °F (36.4 °C), temperature source Oral, resp. rate 16, height 5' 11\" (1.803 m), weight 196 lb 3.4 oz (89 kg), SpO2 99 %. EEG awake and asleep portable    Date/Time: 12/9/2021 2:26 PM  Performed by: Brii Martinez MD  Authorized by: JAGJIT Finnegan - LANE       CLINICAL HISTORY:  63year old man with no PMH seizures or brain injury who had episode of unresponsiveness and bradypnea for 20 minutes. Pt amnestic to the event and confused for an hour after. He reported that he started sweating and became anxious right before he lost consciousness. Clinical concern is for complex partial or simple partial seizures or subclinical status epilepticus. FINDINGS: This is a routine 16 channel referential and bipolar video EEG. There is a background rhythm in the alpha range, without a posterior dominant rhythm. Photic stimulation is performed without driving or abnormality. Hyperventilation is not performed. No epileptiform abnormalities are noted. No electrographic seizures are recorded. There is no asymmetry. IMPRESSION:  This is a normal awake and drowsy EEG. No focal or epileptiform abnormalities.       Brii Martinez MD  12/9/2021

## 2021-12-09 NOTE — CONSULTS
Clinical Pharmacy Progress Note    Vancomycin has been discontinued. Pharmacy will sign off. Please re-consult pharmacy if Vancomycin dosing is wanted in the future. Please call with questions.     Yoanna Sheppard, PharmD  PGY-1 Pharmacy Resident  [id number]  12/8/2021 9:34 PM

## 2021-12-09 NOTE — PROGRESS NOTES
Podiatric Surgery Daily Progress Note      Admit Date: 12/1/2021      Code:Full Code    Patient seen and examined, labs and records reviewed    Subjective:     Patient seen at bedside this a.m with attending present. Patient denies pain to bilateral lower extremity. Patient denies fever, chills, shortness of breath, chest pain. Patient denies any acute events overnight. Patient doing well overall and is in good spirits even after the event earlier this a.m. Review of Systems: A 10 point review of systems was conducted, significant findings as noted in HPI. All other systems negative. Objective     BP (!) 141/70   Pulse 91   Temp 97.8 °F (36.6 °C) (Oral)   Resp 24   Ht 5' 11\" (1.803 m)   Wt 196 lb 3.4 oz (89 kg)   SpO2 93%   BMI 27.37 kg/m²      I/O:    Intake/Output Summary (Last 24 hours) at 12/9/2021 0610  Last data filed at 12/9/2021 0400  Gross per 24 hour   Intake 1169.57 ml   Output 1700 ml   Net -530.43 ml              Wt Readings from Last 3 Encounters:   12/09/21 196 lb 3.4 oz (89 kg)   11/30/21 163 lb 3.2 oz (74 kg)   04/18/16 179 lb 14.3 oz (81.6 kg)       LABS:    Recent Labs     12/09/21  0124 12/09/21  0507   WBC 18.3* 21.2*   HGB 9.0* 8.6*   HCT 27.3* 26.3*   * 515*        Recent Labs     12/09/21  0507      K 4.4      CO2 23   BUN 12   CREATININE 0.7*        Recent Labs     12/07/21  0350   INR 1.09       LOWER EXTREMITY EXAMINATION    Dressing to left LE intact. No strikethrough noted to the external dressing. No drainage noted to the internal layers of the dressing. VASCULAR:   -DP and PT pulses are non-palpable b/l.    -Upon hand-held Doppler examination DP signals were noted to be monophasic and PT signals were noted to be nondopplerable. -CFT slightly sluggish to the distal digits of the RLE >LLE.    -Skin temperature is warm to lukewarm to cool to the distal digits from proximal to distal.    -No edema noted.   -No pain with calf compression b/l.     NEUROLOGIC:   -Gross and epicritic sensation is diminished b/l.   -Protective sensation is diminished at all pedal sites b/l.     DERMATOLOGIC:   Left lower extremity:  -Full-thickness ulceration noted to the lateral aspect of the left foot.    -Wound measures approximately 3.2 cm x 2 cm x 0.5 cm.   -Wound base exposed bone with necrotic edges at the proximal and distal edges.  -Wound probes to bone with exposed fifth metatarsal.  No tracking or tunneling noted.  -No purulent drainage noted. No fluctuance or crepitus or malodor noted.  -Ink/red discoloration noted distal tip of left fifth digit.  -No acute signs of infection. Picture taken 12/9/2021        Anterior aspect of ankle joint left foot  -After deroofing serous bulla with mechanical debridement noted to mL of serous drainage.  -Deep tissue injury with slight rubor discoloration noted 2/2 Prevalon boot strap.  -Intact epithelialized tissue noted.  -No crepitus, erythema, drainage, or malodor noted.    -No open wound noted.  -No acute signs of infection. Picture taken 12/9/2021    Patient gave verbal consent for the picture taken at today's visit. Right LE soft tissue envelope is closed without ulceration or acute signs of infection.     MUSCULOSKELETAL:   -Muscle strength 4/5 for all pedal muscle groups B/L LE.  -No pain with palpation of the left foot. IMAGING:  XR LEFT FOOT 11/30/2021  Narrative   EXAMINATION:   THREE XRAY VIEWS OF THE LEFT FOOT       11/30/2021 1:44 pm       COMPARISON:   None.       HISTORY:   ORDERING SYSTEM PROVIDED HISTORY: wound   TECHNOLOGIST PROVIDED HISTORY:   Reason for exam:->wound   Reason for Exam: blood sugar problem, wound check on lateral portion of foot   Acuity: Acute   Type of Exam: Initial       FINDINGS:   Osseous destruction along the articular margins of the 5th   metatarsophalangeal joint with associated lucency in the soft tissues. .   There is no evidence of fracture or dislocation.  . The remaining joint spaces   appear well maintained. The remaining soft tissues are unremarkable.           Impression   Evidence of a septic joint involving the 5th metatarsal phalangeal joint with   associated osteomyelitis         ARTERIAL DUPLEX 12/2/21     Type of Study:        Extremities Arteries:Lower Extremities Arterial Duplex, VL LOWER EXTREMITY    ARTERIES DUPLEX BILATERAL.       Tech Comments   Right   The right ankle/brachial index is 0.0 (no Doppler signal could be heard at the   [facility] or the PT). The common femoral and profunda femoral arteries could not be visualized due   to bandages extending into the groin area. The mid superficial femoral artery has a short segmental occlusion and then is   reconstituted. Elevated velocities at the distal superficial femoral artery indicate a > 50%   stenosis by velocity criteria (velocity went from 15 to 298 cm/s). The posterior tibial artery is occluded. The distal anterior tibial artery is barely patent, with very low flow   (possibly occluded and then reconstituted). Left   The left ankle brachial index is 0 for the PT and the DP was not accessible   due to the bandages on the foot. The common femoral and profunda femoral artery could not be visualized due to   bandages extending into the groin area. The distal superficial femoral artery is occluded and then reconstituted. The posterior tibial artery, peroneal, and anterior tibial artery are   occluded. There were no previous studies to use for comparison. ASSESSMENT/PLAN:   -Full-thickness ulceration; lateral left foot-Sands stage III  -Osteomyelitis of the fifth metatarsal; left foot  -Critical limb ischemia; bilateral lower extremity  -Diabetes mellitus, type II  -History of noncompliance    -Patient seen and examined at bedside this a.m.  -Hypertensive, afebrile, leukocytosis noted.  -All imaging reviewed see impression above.   -Vascular surgery following; will await further stabilization with the general surgery team and plastic surgery team before offering vascular intervention.  -Plastic surgery following; plan to return to the OR with plastic surgery tomorrow 12/10  -Infectious disease consulted, Unasyn and DC other antibiotics, at discharge may not need IV antibiotics or long course of antibiotics. -Left lower extremity dressed with Betadine soaked gauze, DSD, and tape  -Prevalon boots reapplied. Patient is to wear at all times while in bed to prevent further deep tissue injury. Ankle strap removed from bilateral boot as the ankle straps were causing pressure to his feet. -Nonweightbearing to left lower extremity.  -Weightbearing as tolerated to right lower extremity. DISPO: Full-thickness ulceration with underlying osteomyelitis to the left fifth metatarsal. Critical limb ischemia bilateral lower extremity. Vascular following; awaiting further stabilization at this time. Continue broad-spectrum IV antibiotics. Patient will eventually require podiatric surgical intervention but timing will depend on medical stability and vascular recommendations. Discussed assessment and plans with Dr. Say Cruz. Jayro Camacho  Podiatry resident, PGY 3  Perfect serve    Patient was seen and evaluated at bedside. Agree with residents assessment and treatment plan.   Say Cruz DPM

## 2021-12-09 NOTE — PROGRESS NOTES
4 Eyes Admission Assessment     I agree as the admission nurse that 2 RN's have performed a thorough Head to Toe Skin Assessment on the patient. ALL assessment sites listed below have been assessed on admission. Areas assessed by both nurses:   [x]   Head, Face, and Ears    [x]   Shoulders, Back, and Chest  [x]   Arms, Elbows, and Hands   [x]   Coccyx, Sacrum, and Ischium  [x]   Legs, Feet, and Heels    L foot wound: lateral side, 5th toe, dorsal side of foot    R foot: lateral ankle open area, non-blanching redness dorsal mid, Lateral mid non-blanching purple/red, heel areas of blanching and non-blanching redness. Scattered open areas surrounding wound vac Tegaderm. Surgical site lower abdomen to princess area        Does the Patient have Skin Breakdown?   Yes a wound was noted on the Admission Assessment and an LDA was Initiated documentation include the Princess-wound, Wound Assessment, Measurements, Dressing Treatment, Drainage, and Color\",         Reese Prevention initiated:  Yes   Wound Care Orders initiated:  Yes      [id number]  nurse consulted for Pressure Injury (Stage 3,4, Unstageable, DTI, NWPT, and Complex wounds) or Reese score 18 or lower:  Yes      Nurse 1 eSignature: Electronically signed by Larissa Pierce RN on 12/9/21 at 4:15 PM EST    **SHARE this note so that the co-signing nurse is able to place an eSignature**    Nurse 2 eSignature: Electronically signed by Miracle Zelaya RN on 12/9/21 at 4:47 PM EST

## 2021-12-09 NOTE — PROGRESS NOTES
Pt easily awakened and oriented x4. VSS and remains on 3L NC. Pt reports no pain, no SOB. CT of abdomen/pelvis in progress.

## 2021-12-09 NOTE — PROGRESS NOTES
PRE-OP NOTE  Department of Surgery      Chief Complaint or Reason for Surgery: Necrotizing fasciitis     Procedure: Exam under anesthesia, wound vac change, and possible further debridement of abdominal wall and left buttock. Expected time: 12/10, add-on    Plan  1. Diet: NPO at midnight  2. IVF:  cc/hr to start at midnight  3. Antibiotics: Scheduled Unasyn to continue to the OR. 4. Labs to be drawn: CBC, renal panel and magnesium to be collected with morning labs. 5. Anesthesia: to see patient  6. Consent: Obtained and in the patient's chart  7. Pulmonary:  from CT  - WNL   8.  Cardiac: EK/9 NSR    Lulu Foster DO  21  2:38 PM

## 2021-12-09 NOTE — PROGRESS NOTES
Patient to room # 67 [id number] via bed. VS stable. Patient A/O x 4 . No complaints of pain or nausea at this time. O2 saturation 94% on 2 liters NC. Report received from ICU RN David Holguin. Patient is on continuous O2 saturations- respiratory therapist called.

## 2021-12-09 NOTE — PROGRESS NOTES
Occupational Therapy/ Physical Therapy  Hold  Attempted to see pt for PT/OT, RN reporting pt leaving floor for testing and possible procedure/retroperitoneal bleed. Pt also coded last night. RN to check to see if general surgery still wants to be seen by therapy. Will follow up later this date vs 12/10/21. Vaishnavi Appiah.  Dahiana LORIE Montes/JAZIEL #[id number]  Cassandra Pino DPT

## 2021-12-09 NOTE — PROGRESS NOTES
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  Antibiotics: Unasyn    Admit Date: 12/1/2021  Hospital Day: 9    Subjective:     Patient reports wound / surg site pain mostly controlled  No other complaint     Objective:     Patient Vitals for the past 8 hrs:   BP Temp Temp src Pulse Resp SpO2 Weight   12/09/21 0800 122/76 97.5 °F (36.4 °C) Oral 73 16 99 % --   12/09/21 0715 133/79 -- -- 86 17 98 % --   12/09/21 0615 (!) 156/85 -- -- 88 15 -- --   12/09/21 0600 (!) 149/83 -- -- 77 18 -- --   12/09/21 0545 136/75 -- -- 80 18 -- --   12/09/21 0540 -- -- -- -- -- -- 196 lb 3.4 oz (89 kg)   12/09/21 0530 (!) 141/70 -- -- 91 24 93 % --   12/09/21 0515 (!) 151/69 -- -- 71 (!) 0 97 % --   12/09/21 0510 -- -- -- 84 13 -- --   12/09/21 0505 -- -- -- 77 16 -- --   12/09/21 0500 131/73 -- -- 82 15 -- --   12/09/21 0430 (!) 150/110 -- -- 90 26 -- --   12/09/21 0415 (!) 141/71 -- -- 86 23 98 % --   12/09/21 0400 (!) 148/72 97.8 °F (36.6 °C) Oral 88 24 99 % --   12/09/21 0350 (!) 150/79 -- -- 87 24 100 % --     I/O last 3 completed shifts: In: 897.8 [P.O.:580; I.V.:10; IV Piggyback:307.8]  Out: 1700 [Urine:900; Drains:800]  I/O this shift:  In: -   Out: 100 [Urine:100]    EXAM:  GENERAL: No apparent distress.   RA  HEENT: Membranes moist, no oral lesion  NECK:  Supple, no lymphadenopathy  LUNGS: Clear b/l, no rales, no dullness  CARDIAC: RRR, no murmur appreciated  ABD:  + BS, soft / NT  Wounds - VAC in wound over lower abd, suprapubic region, extending over L scrotum, inguinal fold, posteriorly to buttock  EXT:  No rash, no edema, no lesions  NEURO: No focal neurologic findings  PSYCH: Orientation, sensorium, mood normal  LINES:  R PICC in place       Data Review:  Lab Results   Component Value Date    WBC 21.2 (H) 12/09/2021    HGB 8.6 (L) 12/09/2021    HCT 26.3 (L) 12/09/2021    MCV 92.3 12/09/2021     (H) 12/09/2021     Lab Results   Component Value Date    CREATININE 0.7 (L) 12/09/2021    BUN 12 12/09/2021     12/09/2021    K 4.4 12/09/2021     12/09/2021    CO2 23 12/09/2021       Hepatic Function Panel:   Lab Results   Component Value Date    ALKPHOS 109 12/02/2021    ALT 7 12/02/2021    AST 9 12/02/2021    PROT 4.2 12/02/2021    BILITOT 0.4 12/02/2021    BILIDIR 0.3 12/02/2021    IBILI 0.1 12/02/2021    LABALBU 1.8 12/02/2021       MICRO:      IMAGING:      Scheduled Meds:   sodium chloride  1,000 mL IntraVENous Once    insulin regular  0-18 Units SubCUTAneous 4x Daily AC & HS    amLODIPine  5 mg Oral Daily    ampicillin-sulbactam  3,000 mg IntraVENous Q6H    acetaminophen  1,000 mg Oral Q6H    enoxaparin  40 mg SubCUTAneous Daily    sodium chloride flush  5-40 mL IntraVENous 2 times per day       Continuous Infusions:   sodium chloride Stopped (12/01/21 2027)    dextrose         PRN Meds:  ipratropium-albuterol, dextrose, hydrALAZINE, oxyCODONE **OR** oxyCODONE, sodium chloride flush, sodium chloride, glucose, dextrose, glucagon (rDNA), dextrose      Assessment:     64 yo man with DM - not taking meds     Presented with scrotal swelling and pain  Seen 11/30 at [facility] ED, dx DKA, Shayla's gangrene  CT with gas extending   OR debridement  Rx Vancomycin, Cefepime, Metronidazole     Transfer to [facility] on 12/1. Return to OR 12/2,3,5,7. Colostomy 12/7     Reviewed cultures -  11/30 GS GPC, GNDC, GNR, cult neg  12/3,5 light C glabrata.     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   - mixed / polymicrobial infection   - yeast in tissue      Plan:     Cont unsyn  Wound care / reconstruction per Surg / Plastics     At discharge may not iv antibiotic or long course of antibiotics    Medical Decision Making:   The following items were considered in medical decision making:  Discussion of patient care with other providers  Reviewed clinical lab tests  Reviewed radiology tests  Reviewed other diagnostic tests/interventions  Independent review of radiologic images - reviewed initial and f/u CT with Radiologist 12/8  Microbiology cultures and other micro tests reviewed      Discussed with pt, his wife  Jose C Oneill MD

## 2021-12-09 NOTE — PROGRESS NOTES
Patient transferred to ICU. After an hour patient became alert and oriented X4 again. Lactic trend down to normal on 4 am lab drawl. Patient complains of no pain at the moment. O2 at 3 L and stating 97%.

## 2021-12-09 NOTE — PROGRESS NOTES
ICU TRANSFER TO FLOOR NOTE    4:59 PM2021  Admit Date: 2021   Hospital Day: 9                                                   PCP   No primary care provider on file. : 1955                                                       CodeStatus:Full Code  MRN: [id number]                                                        Diet: ADULT DIET; Regular; 3 carb choices (45 gm/meal)  ADULT ORAL NUTRITION SUPPLEMENT; Breakfast, Lunch, Dinner; Standard High Calorie/High Protein Oral Supplement  Diet NPO     Tono Clemons is a 62 yo M with DM2 who p/w groin swelling. 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 He underwent debridement, wound vac placement, and diverting colostomy. He is being treated with Vanc and Merrem and followed by surgery and plastic surgery. He is saturating well on 3L NC, complains of some abdominal pain, but otherwise feels okay. Will be transferred to floors today with plans to return to OR later this week for wound vac change. Of note, was transferred to the floor two days ago but had AMS overnight where rapid was called and transferred back to ICU. See significant event note from that day for further detail.     Vitals:    21 1540   BP: 134/68   Pulse: 94   Resp: 20   Temp: 98.1 °F (36.7 °C)   SpO2: 99%     Scheduled Meds:   sodium chloride  1,000 mL IntraVENous Once    docusate sodium  100 mg Oral BID    polyethylene glycol  17 g Oral Daily    insulin regular  0-18 Units SubCUTAneous 4x Daily AC & HS    amLODIPine  5 mg Oral Daily    ampicillin-sulbactam  3,000 mg IntraVENous Q6H    acetaminophen  1,000 mg Oral Q6H    enoxaparin  40 mg SubCUTAneous Daily    sodium chloride flush  5-40 mL IntraVENous 2 times per day     Continuous Infusions:   [START ON 12/10/2021] lactated ringers      sodium chloride Stopped (21)    dextrose       PRN Meds:ipratropium-albuterol, dextrose, hydrALAZINE, oxyCODONE **OR** oxyCODONE, sodium chloride flush, sodium chloride, glucose, dextrose, glucagon (rDNA), dextrose      Shayla's gangrene/necrotizing fasciitis:  Status post I&D 11/30 and wide excision of perineum/abdominal wall 12/01. S/p excisional debridement of abdomen and left buttock, wound VAC placement 12/03, further debridement and wound VAC placement 12/05  - Continue IV Vanc and Merrem  - ID consulted  - Plan to return to the OR wound vac change later this week  - Surgery/plastic surgery following    Left fifth metatarsal diabetic foot ulceration and osteomyelitis  - podiatry following  - Continue IV antibiotics  - PT/OT  - possible surgical intervention pending medical stability, awaiting vascular surgery recs  - Continue wound care     Acute postoperative blood loss anemia, resolved  - S/p 1u pRBC transfusion 12/2  - Monitor hemoglobin, stable    Acute respiratory failure, resolved  - extubated 12/02  - on 3L NC, wean as tolerated     DKA in the setting of diabetes mellitus type 2, resolved  Noncompliant with home medications  - HDSS, POCT, hypoglycemia protocol     The objective and subjective findings as well as the ICU course of treatment have been reviewed with the ICU team. The treatment plan has been reviewed with the ICU team. The patient is being transferred to [place] [address] in stable condition.     Bj Robison MD, PGY-1  12/09/21  4:59 PM

## 2021-12-09 NOTE — PROGRESS NOTES
Progress Note  Admit Date: 12/1/2021         CC: F/U for   DKA  Shayla's Gangrene    HPI:  \"Patient was transferred from [facility] after admission for DKA and Shayla's gangrene/necrotizing fasciitis. Patient was intubated and underwent I&D. Pt was transferred to [facility] [place] for further evaluation per colorectal/plastic surgery. Patient underwent wide excision of perineum/abdominal wall 12/01. Extubated 12/02. S/p excisional debridement of abdomen and left buttock, wound VAC placement 12/03. S/p further debridement and wound VAC placement today. Plan to return to the OR for diverting ostomy on Tuesday by general surgery. On IV abx and insulin drip. Urology/surgery/plastic surgery following. \"    Subjective:  Transferred out of the icu yesterday and then transferred back for ams and bradycardia. Has pacer pads on. No cp or sob. No complaints at this time. PHYSICAL EXAM:  /76   Pulse 73   Temp 97.5 °F (36.4 °C) (Oral)   Resp 16   Ht 5' 11\" (1.803 m)   Wt 196 lb 3.4 oz (89 kg)   SpO2 99%   BMI 27.37 kg/m²   Recent Labs     12/08/21  2055 12/09/21  0035 12/09/21  0049 12/09/21  0828 12/09/21  1143   POCGLU 132* 111* 165* 168* 126*       Intake/Output Summary (Last 24 hours) at 12/9/2021 1433  Last data filed at 12/9/2021 0800  Gross per 24 hour   Intake 897.75 ml   Output 1200 ml   Net -302.25 ml     Physical Exam  Constitutional:       Appearance: He is ill-appearing. HENT:      Head: Normocephalic and atraumatic. Cardiovascular:      Rate and Rhythm: Normal rate and regular rhythm. Pulses: Normal pulses. Heart sounds: Normal heart sounds. Pulmonary:      Effort: Pulmonary effort is normal. No respiratory distress. Breath sounds: Normal breath sounds. Abdominal:      General: There is no distension. Palpations: Abdomen is soft. Musculoskeletal:      Cervical back: Normal range of motion and neck supple. Left lower leg: No edema.    Skin:     General: Skin is warm and dry. Neurological:      General: No focal deficit present. Mental Status: He is oriented to person, place, and time. Psychiatric:         Mood and Affect: Mood normal.         Behavior: Behavior normal.         Thought Content: Thought content normal.         LABS:  Recent Labs     12/08/21  0330 12/08/21  0330 12/08/21  0712 12/09/21  0124 12/09/21  0507   WBC 19.0*  --   --  18.3* 21.2*   HGB 8.0*   < > 8.2* 9.0* 8.6*   HCT 25.0*   < > 25.3* 27.3* 26.3*     --   --  545* 515*    < > = values in this interval not displayed. Recent Labs     12/08/21  0330 12/09/21  0124 12/09/21  0507   * 133* 136   K 4.4 3.9 4.4    101 104   CO2 23 21 23   BUN 10 12 12   CREATININE <0.5* 0.8 0.7*   GLUCOSE 78 160* 174*       Assessment & Plan:      Acute respiratory failure:  Post-operative given sepsis and need for repeat surgical procedures. Now resolved and pt extubated 12/02  On 2 liters since surgery 12/7- wean as tolerated.     Shayla's gangrene/necrotizing fasciitis:  Further debridement and surgery done 12/7. Plan is for return to or per plastics for exchange of wound vac later this week- appreciate involvement. Status post I&D 11/30 and wide excision of perineum/abdominal wall 12/01  Abdominal wall wound measuring 60 cm x 23 cm x 3cm and gluteal wound measures 20 cm x 15cm  12/01  S/p excisional debridement of abdomen and left buttock, wound VAC placement 12/03  S/p further debridement and wound VAC placement 12/05  Clindamycin discontinued. Continue IV Vanco and Merrem  S/p  diverting ostomy on Tuesday by general surgery. Wound vac change planned for am by surgery. Appreciate surgery/urology involvement.     Bradycardia:  Transient and resolved. Possibly 2/2 hypoxia- vito currently stable on 3 l via nc- cont.     Acute postoperative blood loss anemia:  Likely secondary to I&D and wide excision  Status post 1u pRBC transfusion 12/2  Monitor hemoglobin, stable     DKA in the setting of diabetes mellitus type 2:  Patient noncompliant. Holding home medication  Was on inulin gtt- now taking po- placed on ssi- cc managing.     Left fifth metatarsal diabetic foot ulceration and osteomyelitis:  Continue IV antibiotics  Arterial Doppler showed severe bilateral arterial occlusive disease  Vascular surgery recommended possible consideration of an angiogram once the patient is stabilized  Patient will eventually require podiatric surgical intervention pending medical stability  Continue wound care  Podiatry following, appreciate recommendation    Disposition: Inpatient    Full Code      Manuela Hernandez MD

## 2021-12-09 NOTE — PROGRESS NOTES
Pt update given to wife.  All questions answered    Electronically signed by Tez Ramirez RN on 12/9/2021 at 5:43 PM

## 2021-12-09 NOTE — PROGRESS NOTES
TRANSFER ACCEPTANCE NOTE:  S: The patient was seen and examined. Robert Silva is a 63 y.o. male with PMH as below notable for T2DM who presented with groin swelling. Patient reported that 2 weeks ago he notice a abscess near his anus, however reported in the last 2 days the area has increasing in size and warmth and extended to his scrotum. He reported poor compliance with his diabetes medication (metformin).      On admission the patient was hypotensive, WBC 18, Bicarb 20, Anion gap 22, Glucose 679, elevated Bhydroxybutirate. General surgery, urology and podiatry were consulted. Vancomycin was started, 3 L of IV fluids were given and the patient was placed in DKA protocol. Anion gap closed twice. The patient was taken for surgery debridement, was kept intubated due to possible multiple surgery interventions. Patient was transferred to the [facility] for colorectal and plastic surgery management.      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 Patient received I&D with wide excision of perineum/abdominal wall. Wound vac was placed. Tissue culture  grew Candida glabrata and wound grew mixed skin pam. ID was consulted. Patient was placed on Vanc and Merrem. Patient was continued on the insulin drip for strict control on her blood glucose levels. Patient also presented blood loss anemia 1 units of RBCs was transfused. Patient was finally weaned off of insulin drip and transferred to the [facility]. Today code was called at [id number], for bed 5318. At time of ICU team arrival patient was unresponsive but has pulse. HR 85, /70. Patient was placed on bag mask ventilation with SpO2 of 95%. The decision of intubate the patient was made and the patient was transferred for the ICU for intubation. On arrival at the ICU patient awoke, he was still confused, SpO2 was 96% on 2 L NC. Intubation was held. Patient was alert following commands. ABG 7.22/42/107. Glucose 117.  Lactate 8.22. CT head was ordered. BMP, lactic acid, CBC, CXR, UA, troponin were also ordered. Vitals:    12/09/21 0100   BP:    Pulse:    Resp:    Temp: 97.7 °F (36.5 °C)   SpO2:      Scheduled Meds:   etomidate  30 mg IntraVENous Once    sodium chloride  1,000 mL IntraVENous Once    insulin regular  0-18 Units SubCUTAneous 4x Daily AC & HS    amLODIPine  5 mg Oral Daily    ampicillin-sulbactam  3,000 mg IntraVENous Q6H    acetaminophen  1,000 mg Oral Q6H    enoxaparin  40 mg SubCUTAneous Daily    sodium chloride flush  5-40 mL IntraVENous 2 times per day     Continuous Infusions:   sodium chloride Stopped (12/01/21 2027)    dextrose       PRN Meds:ipratropium-albuterol, dextrose, hydrALAZINE, oxyCODONE **OR** oxyCODONE, sodium chloride flush, sodium chloride, glucose, dextrose, glucagon (rDNA), dextrose    Physical Exam  Constitutional:       Appearance: Normal appearance. HENT:      Head: Normocephalic. Mouth/Throat:      Mouth: Mucous membranes are moist.   Eyes:      Extraocular Movements: Extraocular movements intact. Conjunctiva/sclera: Conjunctivae normal.      Pupils: Pupils are equal, round, and reactive to light. Cardiovascular:      Rate and Rhythm: Normal rate and regular rhythm. Pulses: Normal pulses. Heart sounds: Normal heart sounds. Pulmonary:      Effort: Pulmonary effort is normal.      Breath sounds: Normal breath sounds. Abdominal:      General: Abdomen is flat. Bowel sounds are normal.      Palpations: Abdomen is soft. Comments: Ostomy in place, clean and well dressed,  Wound vac also in place. Musculoskeletal:         General: Normal range of motion. Cervical back: Normal range of motion. Feet:      Comments: Bandages noted  Skin:     Capillary Refill: Capillary refill takes 2 to 3 seconds. Coloration: Skin is pale. Comments: Wound vac in place over lower abdomen/pelvis   Neurological:      General: No focal deficit present. Mental Status: He is alert and oriented to person, place, and time. Mental status is at baseline. Psychiatric:         Mood and Affect: Mood normal.         Behavior: Behavior normal.         LABS:    CBC:   Recent Labs     12/07/21  1526 12/07/21  1526 12/08/21  0330 12/08/21  0712 12/09/21  0124   WBC 16.5*  --  19.0*  --  18.3*   HGB 8.3*   < > 8.0* 8.2* 9.0*   HCT 25.1*   < > 25.0* 25.3* 27.3*     --  422  --  545*   MCV 91.5  --  90.6  --  91.7    < > = values in this interval not displayed. Renal:    Recent Labs     12/06/21  0418 12/07/21  0350 12/07/21  1445 12/08/21  0330 12/09/21  0124      < > 134* 134* 133*   K 4.1   < > 4.3 4.4 3.9      < > 106 103 101   CO2 26   < > 24 23 21   BUN 16   < > 13 10 12   CREATININE 0.6*   < > 0.5* <0.5* 0.8   GLUCOSE 112*   < > 112* 78 160*   CALCIUM 7.5*   < > 8.2* 7.5* 7.6*   MG 1.90  --   --   --   --    ANIONGAP 6   < > 4 8 11    < > = values in this interval not displayed. Hepatic: No results for input(s): AST, ALT, BILITOT, BILIDIR, PROT, LABALBU, ALKPHOS in the last 72 hours. Troponin:   Recent Labs     12/09/21  0124   TROPONINI 0.23*     BNP: No results for input(s): BNP in the last 72 hours. Lipids: No results for input(s): CHOL, HDL in the last 72 hours. Invalid input(s): LDLCALCU, TRIGLYCERIDE  ABGs:    Recent Labs     12/07/21  1521 12/09/21  0049   PHART 7.351 7.227*   UFS2ILS  --  42.4   PO2ART  --  107.8   [id number]  --  17.6*   BEART  --  -10*   [id number]  --  97   [id number]  --  19       INR:   Recent Labs     12/07/21  0350   INR 1.09     Lactate:   Recent Labs     12/09/21  0049   LACTATE 8.22*     AMS 2/2 likely seizure  Patient was unresponsive with bradypnea when the code was called. Patient gained consciousness 20 minutes after. Lactic acid went down from 8 to 5. Troponin 0.23. CT head showed   No acute hemorrhage, hydrocephalus, or mass effect. Patient was confused for 1 hour after the episode.  CK normal.   -F/u lactic acid q6h  -F/u prolactin  -F/u neurology recs    Positive troponin  Patient denies any CP, SOB.   -F/u EKG  -Trend troponin    Shayla's gangrene/necrotizing fasciitis:  Status post I&D 11/30 and wide excision of perineum/abdominal wall 12/01. S/p excisional debridement of abdomen and left buttock, wound VAC placement 12/03, further debridement and wound VAC placement 12/05  - Continue IV Vanc and Merrem  - ID consulted  - Plan to return to the OR wound vac change later this week  - Surgery/plastic surgery following     Left fifth metatarsal diabetic foot ulceration and osteomyelitis  - podiatry following  - Continue IV antibiotics  - PT/OT  - possible surgical intervention pending medical stability, awaiting vascular surgery recs  - Continue wound care      The objective and subjective findings as well as the ICU course of treatment have been reviewed with the ICU team. The treatment plan has been reviewed with the ICU team. The patient is being transferred to [place] [address] in stable condition. Maryse Cage MD, M.D.    Internal Medicine Resident, PGY-1  12/9/2021, 3:36 AM

## 2021-12-09 NOTE — PROGRESS NOTES
ICU SURGERY DAILY PROGRESS NOTE    CC: Shayla's gangrene    SUBJECTIVE:   Interval Hx:  Patient briefly transferred out of the ICU overnight but was found unresponsive on the floor just past midnight. The patient immediately regained a pulse and was somnolent but responsive. Abg with evidence of metabolic acidosis 5.76/49/460/11. Patient transferred back to the ICU and became increasingly more responsive and thus a decision was made to hold off on intubation. Patient lactic acid normalized this morning to 1.1. Patient currently alert and oriented. States that his pain is well controlled. Continues to hear gas from his ostomy. ROS: A 10 point review of systems was conducted, significant findings as noted above. All other systems negative. OBJECTIVE:   Vitals:   Vitals:    12/09/21 0510 12/09/21 0515 12/09/21 0530 12/09/21 0540   BP:  (!) 151/69 (!) 141/70    Pulse: 84 71 91    Resp: 13 (!) 0 24    Temp:       TempSrc:       SpO2:  97% 93%    Weight:    196 lb 3.4 oz (89 kg)   Height:           I/O:     Intake/Output Summary (Last 24 hours) at 12/9/2021 0638  Last data filed at 12/9/2021 0400  Gross per 24 hour   Intake 897.75 ml   Output 1700 ml   Net -802.25 ml     I/O last 3 completed shifts: In: 1189.6 [P.O.:600; I.V.:10; IV Piggyback:579.6]  Out: 3220 [Urine:2100; Drains:1120]    Diet: ADULT DIET; Regular; 3 carb choices (45 gm/meal)  ADULT ORAL NUTRITION SUPPLEMENT; Breakfast, Lunch, Dinner; Standard High Calorie/High Protein Oral Supplement      Physical Examination:   General appearance: alert, no acute distress, groomed appropriately   HENT: NC/AT, MMM & intact, no JVD, neck supple, trachea midline  Eyes: No scleral icterus, EOMI  Chest/Lungs: CTAB, normal effort on 5L NC, no accessory muscles used for breathing.    Cardiovascular: RRR  Abdomen: Soft, large surgical debridement area of the abdomen measuring 60 x 23 x 3 cm in gluteal region 20 x 15 cm with veraflo VAC in place holding adequate suction and instilling normal saline. Colostomy is pink, patent, with sweat in appliance. Extremities:  full-thickness ulceration on lateral aspect of L foot, no edema, no cyanosis, decreased sensation along entirety of bilateral feet. No DP/PT pulse appreciated with doppler. : walsh in place with clear yellow urine, no signs of further necrotizing fasciitis requiring further debridement of L lower scrotum, no subcutaneous crepitus noted, skin edges with healthy granulation tissue with appropriate bleeding, non-malodorous   Neurologic: A&Ox3, decrease sensation to bilateral lower extremities. Labs:  CBC:   Recent Labs     12/08/21  0330 12/08/21  0330 12/08/21  0712 12/09/21  0124 12/09/21  0507   WBC 19.0*  --   --  18.3* 21.2*   HGB 8.0*   < > 8.2* 9.0* 8.6*   HCT 25.0*   < > 25.3* 27.3* 26.3*     --   --  545* 515*    < > = values in this interval not displayed. BMP:   Recent Labs     12/08/21  0330 12/09/21  0124 12/09/21  0507   * 133* 136   K 4.4 3.9 4.4    101 104   CO2 23 21 23   BUN 10 12 12   CREATININE <0.5* 0.8 0.7*   GLUCOSE 78 160* 174*     LFT's:   No results for input(s): AST, ALT, ALB, BILITOT, ALKPHOS in the last 72 hours. Troponin:   Recent Labs     12/09/21  0124 12/09/21  0507   TROPONINI 0.23* 0.20*     BNP: No results for input(s): BNP in the last 72 hours. ABGs:   Recent Labs     12/07/21  1521 12/09/21  0049   PHART 7.351 7.227*   UZR1QYG  --  42.4   PO2ART  --  107.8     INR:   Recent Labs     12/07/21  0350   INR 1.09       U/A:No results for input(s): NITRITE, COLORU, PHUR, LABCAST, WBCUA, RBCUA, MUCUS, TRICHOMONAS, YEAST, BACTERIA, CLARITYU, SPECGRAV, LEUKOCYTESUR, UROBILINOGEN, BILIRUBINUR, BLOODU, GLUCOSEU, AMORPHOUS in the last 72 hours. Invalid input(s): Orlan Homans     Rad:   CT HEAD WO CONTRAST   Final Result     No acute hemorrhage, hydrocephalus, or mass effect. XR CHEST PORTABLE   Final Result      1.   Interval development of bibasilar airspace disease and bilateral pleural effusions since the prior exam.      2.  Interval placement of right PICC catheter with tip extending into the proximal right atrium. 3.  Interval removal of ET and NG tubes from the prior study. VL DUP LOWER EXTREMITY ARTERIES BILATERAL   Final Result          ASSESSMENT AND PLAN:   Aydin Zarate is a 63 y.o. male with Necrotizing fasciitis of the abdominal wall, gluteal region, and scrotum s/p wide excision of perineum, back, and abdominal wall. Abdominal wall wound measuring 60 cm x 23 cm x 3cm and gluteal wound measures 20 cm x 15cm (12/1), returned to the OR for further debridement or right abdominal wall, and placement of testicle within a thigh flap (12/3), followed by minimal abdominal wall and perineum debridement, sutured protective skin flap in groin for right testicle, and placement of a wound VAC (12/5), s/p diverting colostomy and wound vac change (12/7)    - Continue instill wound vac, instilling normal saline  - Continue with aggressive glucose control  - ID on board, appreciate recommendations:   - Change antibiotics to Unasyn (previously on Merrem/Vanc)  - Increased leukocytosis this morning to 21.2 from 18.2  - Will plan to return to the OR tomorrow 12/10 for wound vac change and possible further debridement. - Pre-op and consent today  - Await return of bowel function  - Okay to continue carb controlled diet. Braxton Auguste DO, MS  PGY1, General Surgery  12/09/21  6:38 AM     I saw and independently examined the patient today. I agree with the history of present illness, past medical/surgical histories, family history, social history, medication list and allergies as listed. The review of systems is as noted above. My physical exam confirms the findings listed above. Review of labs, pathology reports, radiology reports and medical records confirm the findings noted above.  I edited the note where appropriate in italics, strikethrough font, or underline. CT reviewed. Will continue with close observation in ICU. Plan for return to OR tomorrow.     Mary Lamb MD  [address] [place] [address] [facility]  [phone]  12/09/21

## 2021-12-09 NOTE — PROGRESS NOTES
4 Eyes Admission Assessment     I agree as the admission nurse that 2 RN's have performed a thorough Head to Toe Skin Assessment on the patient. ALL assessment sites listed below have been assessed on admission. Areas assessed by both nurses:   [x]   Head, Face, and Ears   [x]   Shoulders, Back, and Chest  [x]   Arms, Elbows, and Hands   [x]   Coccyx, Sacrum, and Ischium- see flowsheets  [x]   Legs, Feet, and Heels- see flowsheets        Does the Patient have Skin Breakdown?   Yes a wound was noted on the Admission Assessment and an LDA was Initiated documentation include the Princess-wound, Wound Assessment, Measurements, Dressing Treatment, Drainage, and Color\",         Reese Prevention initiated:  Yes   Wound Care Orders initiated:  No      WOC nurse consulted for Pressure Injury (Stage 3,4, Unstageable, DTI, NWPT, and Complex wounds) or Reese score 18 or lower:  NA   - already following    Nurse 1 eSignature: Electronically signed by Farzana Allen RN on 12/9/21 at 12:24 AM EST    **SHARE this note so that the co-signing nurse is able to place an eSignature**    Nurse 2 eSignature: {Esignature:[id number]}

## 2021-12-09 NOTE — PLAN OF CARE
Problem: Activity:  Goal: Ability to return to normal activity level will improve  Description: Ability to return to normal activity level will improve  Outcome: Ongoing     Problem:  Bowel/Gastric:  Goal: Gastrointestinal status for postoperative course will improve  Description: Gastrointestinal status for postoperative course will improve  Outcome: Ongoing     Problem: Safety:  Goal: Ability to remain free from injury will improve  Description: Ability to remain free from injury will improve  Outcome: Ongoing     Problem: Skin Integrity:  Goal: Demonstration of wound healing without infection will improve  Description: Demonstration of wound healing without infection will improve  Outcome: Ongoing  Goal: Will show no infection signs and symptoms  Description: Will show no infection signs and symptoms  Outcome: Ongoing  Goal: Absence of new skin breakdown  Description: Absence of new skin breakdown  Outcome: Ongoing     Problem: Pain:  Goal: Pain level will decrease  Description: Pain level will decrease  Outcome: Ongoing  Goal: Control of acute pain  Description: Control of acute pain  Outcome: Ongoing  Goal: Control of chronic pain  Description: Control of chronic pain  Outcome: Ongoing

## 2021-12-09 NOTE — PROGRESS NOTES
Patient report given to [place]. Patient AOx4, VSS, PO2 96% on 3l via NC and transferred with tank. Patient transferred with lift to [facility] bed, wound vac and wounds intact.

## 2021-12-09 NOTE — PROGRESS NOTES
Pt transferred to [facility] 4320. Sister Jinny Willis contacted by this RN to update of pt transfer.

## 2021-12-09 NOTE — PROGRESS NOTES
ICU SURGERY DAILY PROGRESS NOTE    CC: Shayla's gangrene    SUBJECTIVE:   Interval Hx:  Patient briefly transferred out of the ICU overnight but was found unresponsive on the floor just past midnight. The patient immediately regained a pulse and was somnolent but responsive. Abg with evidence of metabolic acidosis 3.59/14/374/01. Patient transferred back to the ICU and became increasingly more responsive and thus a decision was made to hold off on intubation. Patient lactic acid normalized this morning to 1.1. Patient currently alert and oriented. States that his pain is well controlled. Continues to hear gas from his ostomy. ROS: A 10 point review of systems was conducted, significant findings as noted above. All other systems negative. OBJECTIVE:   Vitals:   Vitals:    12/09/21 0510 12/09/21 0515 12/09/21 0530 12/09/21 0540   BP:  (!) 151/69 (!) 141/70    Pulse: 84 71 91    Resp: 13 (!) 0 24    Temp:       TempSrc:       SpO2:  97% 93%    Weight:    196 lb 3.4 oz (89 kg)   Height:           I/O:     Intake/Output Summary (Last 24 hours) at 12/9/2021 0633  Last data filed at 12/9/2021 0400  Gross per 24 hour   Intake 897.75 ml   Output 1700 ml   Net -802.25 ml     I/O last 3 completed shifts: In: 1189.6 [P.O.:600; I.V.:10; IV Piggyback:579.6]  Out: 3220 [Urine:2100; Drains:1120]    Diet: ADULT DIET; Regular; 3 carb choices (45 gm/meal)  ADULT ORAL NUTRITION SUPPLEMENT; Breakfast, Lunch, Dinner; Standard High Calorie/High Protein Oral Supplement      Physical Examination:   General appearance: alert, no acute distress, groomed appropriately   HENT: NC/AT, MMM & intact, no JVD, neck supple, trachea midline  Eyes: No scleral icterus, EOMI  Chest/Lungs: CTAB, normal effort on 5L NC, no accessory muscles used for breathing.    Cardiovascular: RRR  Abdomen: Soft, large surgical debridement area of the abdomen measuring 60 x 23 x 3 cm in gluteal region 20 x 15 cm with veraflo VAC in place holding adequate suction and instilling normal saline. Colostomy is pink, patent, with sweat in appliance. Extremities:  full-thickness ulceration on lateral aspect of L foot, no edema, no cyanosis, decreased sensation along entirety of bilateral feet. No DP/PT pulse appreciated with doppler. : walsh in place with clear yellow urine, no signs of further necrotizing fasciitis requiring further debridement of L lower scrotum, no subcutaneous crepitus noted, skin edges with healthy granulation tissue with appropriate bleeding, non-malodorous   Neurologic: A&Ox3, decrease sensation to bilateral lower extremities. Labs:  CBC:   Recent Labs     12/08/21  0330 12/08/21  0330 12/08/21  0712 12/09/21  0124 12/09/21  0507   WBC 19.0*  --   --  18.3* 21.2*   HGB 8.0*   < > 8.2* 9.0* 8.6*   HCT 25.0*   < > 25.3* 27.3* 26.3*     --   --  545* 515*    < > = values in this interval not displayed. BMP:   Recent Labs     12/08/21  0330 12/09/21  0124 12/09/21  0507   * 133* 136   K 4.4 3.9 4.4    101 104   CO2 23 21 23   BUN 10 12 12   CREATININE <0.5* 0.8 0.7*   GLUCOSE 78 160* 174*     LFT's:   No results for input(s): AST, ALT, ALB, BILITOT, ALKPHOS in the last 72 hours. Troponin:   Recent Labs     12/09/21  0124 12/09/21  0507   TROPONINI 0.23* 0.20*     BNP: No results for input(s): BNP in the last 72 hours. ABGs:   Recent Labs     12/07/21  1521 12/09/21  0049   PHART 7.351 7.227*   IEP2CPZ  --  42.4   PO2ART  --  107.8     INR:   Recent Labs     12/07/21  0350   INR 1.09       U/A:No results for input(s): NITRITE, COLORU, PHUR, LABCAST, WBCUA, RBCUA, MUCUS, TRICHOMONAS, YEAST, BACTERIA, CLARITYU, SPECGRAV, LEUKOCYTESUR, UROBILINOGEN, BILIRUBINUR, BLOODU, GLUCOSEU, AMORPHOUS in the last 72 hours. Invalid input(s): Ryanne Garcia     Rad:   CT HEAD WO CONTRAST   Final Result     No acute hemorrhage, hydrocephalus, or mass effect. XR CHEST PORTABLE   Final Result      1.   Interval development of bibasilar airspace disease and bilateral pleural effusions since the prior exam.      2.  Interval placement of right PICC catheter with tip extending into the proximal right atrium. 3.  Interval removal of ET and NG tubes from the prior study. VL DUP LOWER EXTREMITY ARTERIES BILATERAL   Final Result          ASSESSMENT AND PLAN:   Chana Zhao is a 63 y.o. male with Necrotizing fasciitis of the abdominal wall, gluteal region, and scrotum s/p wide excision of perineum, back, and abdominal wall. Abdominal wall wound measuring 60 cm x 23 cm x 3cm and gluteal wound measures 20 cm x 15cm (12/1), returned to the OR for further debridement or right abdominal wall, and placement of testicle within a thigh flap (12/3), followed by minimal abdominal wall and perineum debridement, sutured protective skin flap in groin for right testicle, and placement of a wound VAC (12/5), s/p diverting colostomy and wound vac change (12/7)    - Continue instill wound vac, instilling normal saline per plastic surgery team  - Continue with aggressive glucose control  - ID on board, appreciate recommendations:   - Change antibiotics to Unasyn (previously on Merrem/Vanc)  - Increased leukocytosis this morning to 21.2 from 18.2  - Will plan to return to the OR with plastic surgery tomorrow 12/10. - Await return of bowel function  - Okay to continue carb controlled diet. Kasie Bethea DO, MS  PGY1, General Surgery  12/09/21  6:33 AM     I have seen, examined, and reviewed the patients chart. I agree with the residents assessment and have made appropriate changes.     Mercy Cedillo

## 2021-12-09 NOTE — CONSULTS
Neurology / Neurocritical Care Consult Note    Reason for Consult: spell of loss of consciousness   Admission Chief Complaint: groin swelling      HPI:     Mr. Ulysses Iraheta is a 63year old man with PMH type II DM who initially presented on 12/1 with groin swelling. He was septic and underwent emergency surgery for Necrotizing fasciitis of the abdominal wall, gluteal region, and scrotum s/p wide excision of perineum, back, and abdominal wall. Abdominal wall wound measuring 60 cm x 23 cm x 3cm and gluteal wound measures 20 cm x 15cm (12/1), returned to the OR for further debridement or right abdominal wall, and placement of testicle within a thigh flap (12/3), followed by minimal abdominal wall and perineum debridement, sutured protective skin flap in groin for right testicle, and placement of a wound VAC (12/5), s/p diverting colostomy and wound vac change (12/7). He was transferred out of ICU last night. On PCU, he was trying to move the bed with the controls and remembers feeling diaphoretic and sweaty. That is the last thing he remembers. He woke up in the ICU. He has no history of stroke, TBI, febrile seizures. PAST MEDICAL HISTORY:  History reviewed. No pertinent past medical history.     ALLERGIES:  No Known Allergies    SURGICAL HISTORY:  Past Surgical History:   Procedure Laterality Date    ABDOMEN SURGERY N/A 12/1/2021    WIDE EXCISION PERINEUM, BACK, ABDOMINAL WALL performed by Isabel Dukes MD at [facility] Left 12/3/2021    ABDOMINAL WALL AND LEFT BUTTOCK DEBRIDEMENT, WOUND VAC PLACEMENT performed by Lupe Hancock MD at [facility] Left 12/5/2021    ABDOMINAL WALL AND LEFT BUTTOCK DEBRIDEMENT, WOUND VAC PLACEMENT performed by Lupe Hancock MD at [facility] N/A 12/7/2021    EVALUATION UNDER ANESTHESIA WITH DEBRIDEMENT ABDOMINAL WOUND AND LEFT BUTTOCK, WOUND VAC CHANGE performed by Lupe Hancock MD at [facility]  SURGERY      COLOSTOMY Left 12/7/2021    LAPAROSCOPIC COLOSTOMY CREATION performed by Ahsan Olivarez MD at [address] N/A 11/30/2021    DEBRIDEMENT OF SCROTAL JAYRO'S GANGRENE performed by Karon Vitale MD at [address] N/A 11/30/2021    PERINEAL SOFT TISSUE EXCISIONAL DEBRIDEMENT performed by Carlin Beltre MD at [address] OR       FAMILY HISTORY:  Family history non-contributory  History reviewed. No pertinent family history. SOCIAL HISTORY:  Social History     Tobacco History     Smoking Status  Current Every Day Smoker    Smokeless Tobacco Use  Never Used          Alcohol History     Alcohol Use Status  Yes Drinks/Week  0 Standard drinks or equivalent per week Amount  0.0 standard drinks of alcohol/wk Comment  social           Drug Use     Drug Use Status  No          Sexual Activity     Sexually Active  Yes Partners  Female                HOME MEDICATIONS:  No current facility-administered medications on file prior to encounter. Current Outpatient Medications on File Prior to Encounter   Medication Sig Dispense Refill    metFORMIN (GLUCOPHAGE) 500 MG tablet Take 500 mg by mouth daily (with breakfast)      meloxicam (MOBIC) 15 MG tablet Take 1 tablet by mouth daily 30 tablet 3         ROS:   Constitutional- No weight loss or fevers   Eyes- No diplopia. No photophobia. Ears/nose/throat- No dysphagia. No Dysarthria   Cardiovascular- No palpitations. No chest pain   Respiratory- No dyspnea. No Cough   Gastrointestinal- No Abdominal pain. No Vomiting. Genitourinary- No incontinence. No urinary retention   Musculoskeletal- No myalgia. No arthralgia   Skin- No rash. No easy bruising. Psychiatric- No depression. No anxiety   Endocrine- No diabetes. No thyroid issues. Hematologic- No bleeding difficulty. No fatigue   Neurologic- No weakness. No Headache.       PHYSICAL EXAM:  /79   Pulse 86   Temp 97.8 °F (36.6 °C) (Oral)   Resp 17   Ht 5' 11\" (1.803 m)   Wt Component Value Date    INR 1.09 12/07/2021    LABA1C 13.5 11/30/2021    COVID19 Not Detected 11/30/2021     Lab Results   Component Value Date    LACTSEPSIS 3.0 11/30/2021    LACTA 1.1 12/09/2021         CURRENT SCHEDULED MEDICATIONS:   etomidate  30 mg IntraVENous Once    sodium chloride  1,000 mL IntraVENous Once    insulin regular  0-18 Units SubCUTAneous 4x Daily AC & HS    amLODIPine  5 mg Oral Daily    ampicillin-sulbactam  3,000 mg IntraVENous Q6H    acetaminophen  1,000 mg Oral Q6H    [Held by provider] enoxaparin  40 mg SubCUTAneous Daily    sodium chloride flush  5-40 mL IntraVENous 2 times per day     sodium chloride, Last Rate: Stopped (12/01/21 2027)  dextrose      iopamidol, 80 mL, ONCE PRN  ipratropium-albuterol, 1 ampule, Q4H PRN  dextrose, 12.5 g, PRN  hydrALAZINE, 10 mg, Q6H PRN  oxyCODONE, 5 mg, Q4H PRN   Or  oxyCODONE, 10 mg, Q4H PRN  sodium chloride flush, 5-40 mL, PRN  sodium chloride, 25 mL, PRN  glucose, 15 g, PRN  dextrose, 12.5 g, PRN  glucagon (rDNA), 1 mg, PRN  dextrose, 100 mL/hr, PRN         IMPRESSION & RECOMMENDATIONS     IMPRESSION:  63year old man with no PMH seizures or brain injury who had episode of unresponsiveness and bradypnea for 20 minutes. Pt amnestic to the event and confused for an hour after. He did tell me that he started sweating and became anxious right before he lost consciousness. This event certainly could have been a seizure due to multiple factors lowering the seizure threshold but I would not rule-out investigating other etiologies given he has no major risks for seizure, the prolonged nature of the event, and the bradypnea with respiratory acidosis.        RECOMMENDATIONS:  -Routine EEG  -Further recommendations based on EEG    [address] [facility], [facility] - CNP   Neurology & Neurocritical Care   Neurology Line: [phone]  [facility]: [facility] NPs  12/9/2021 8:52 AM

## 2021-12-09 NOTE — PROGRESS NOTES
Progress Note    Admit Date: 12/1/2021  Day: 8  Diet: Diet NPO    CC: Shayla's gangrene     Interval history: Significant overnight events as documented in resident's note. Patient has no recollection of last night's events.      Medications:     Scheduled Meds:   etomidate  30 mg IntraVENous Once    sodium chloride  1,000 mL IntraVENous Once    insulin regular  0-18 Units SubCUTAneous 4x Daily AC & HS    amLODIPine  5 mg Oral Daily    ampicillin-sulbactam  3,000 mg IntraVENous Q6H    acetaminophen  1,000 mg Oral Q6H    [Held by provider] enoxaparin  40 mg SubCUTAneous Daily    sodium chloride flush  5-40 mL IntraVENous 2 times per day     Continuous Infusions:   sodium chloride Stopped (12/01/21 2027)    dextrose       PRN Meds:iopamidol, ipratropium-albuterol, dextrose, hydrALAZINE, oxyCODONE **OR** oxyCODONE, sodium chloride flush, sodium chloride, glucose, dextrose, glucagon (rDNA), dextrose    Objective:   Vitals:   T-max:  Patient Vitals for the past 8 hrs:   BP Temp Temp src Pulse Resp SpO2 Weight   12/09/21 0715 133/79 -- -- 86 17 98 % --   12/09/21 0615 (!) 156/85 -- -- 88 15 -- --   12/09/21 0600 (!) 149/83 -- -- 77 18 -- --   12/09/21 0545 136/75 -- -- 80 18 -- --   12/09/21 0540 -- -- -- -- -- -- 196 lb 3.4 oz (89 kg)   12/09/21 0530 (!) 141/70 -- -- 91 24 93 % --   12/09/21 0515 (!) 151/69 -- -- 71 (!) 0 97 % --   12/09/21 0510 -- -- -- 84 13 -- --   12/09/21 0505 -- -- -- 77 16 -- --   12/09/21 0500 131/73 -- -- 82 15 -- --   12/09/21 0430 (!) 150/110 -- -- 90 26 -- --   12/09/21 0415 (!) 141/71 -- -- 86 23 98 % --   12/09/21 0400 (!) 148/72 97.8 °F (36.6 °C) Oral 88 24 99 % --   12/09/21 0350 (!) 150/79 -- -- 87 24 100 % --   12/09/21 0345 (!) 148/73 -- -- 88 26 100 % --   12/09/21 0335 (!) 150/127 -- -- 90 24 98 % --   12/09/21 0300 (!) 143/64 -- -- 90 18 99 % --   12/09/21 0250 (!) 141/70 -- -- 95 22 98 % --   12/09/21 0200 -- -- -- 95 25 96 % --   12/09/21 0125 (!) 142/71 -- -- 101 24 97 % --   12/09/21 0120 137/73 -- -- 103 22 95 % --   12/09/21 0115 (!) 145/73 -- -- 104 27 96 % --   12/09/21 0100 -- 97.7 °F (36.5 °C) Oral -- -- -- --       Intake/Output Summary (Last 24 hours) at 12/9/2021 0834  Last data filed at 12/9/2021 0715  Gross per 24 hour   Intake 897.75 ml   Output 1500 ml   Net -602.25 ml       Review of Systems  Patient has no complains this AM    Physical Exam  Constitutional:       Appearance: Normal appearance. HENT:      Head: Normocephalic. Mouth/Throat:      Mouth: Mucous membranes are moist.   Eyes:      Extraocular Movements: Extraocular movements intact. Conjunctiva/sclera: Conjunctivae normal.      Pupils: Pupils are equal, round, and reactive to light. Cardiovascular:      Rate and Rhythm: Normal rate and regular rhythm. Pulses: Normal pulses. Heart sounds: Normal heart sounds. Pulmonary:      Effort: Pulmonary effort is normal.      Breath sounds: Normal breath sounds. Abdominal:      General: Abdomen is flat. Bowel sounds are normal.      Palpations: Abdomen is soft. Comments: Ostomy in place, clean and well dressed,  Wound vac also in place. Musculoskeletal:         General: Normal range of motion. Cervical back: Normal range of motion. Feet:      Comments: Bandages noted  Skin:     Capillary Refill: Capillary refill takes 2 to 3 seconds. Coloration: Skin is pale. Comments: Wound vac in place over lower abdomen/pelvis   Neurological:      General: No focal deficit present. Mental Status: He is alert and oriented to person, place, and time. Mental status is at baseline.    Psychiatric:         Mood and Affect: Mood normal.         Behavior: Behavior normal.     LABS:    CBC:   Recent Labs     12/08/21  0330 12/08/21  0330 12/08/21  0712 12/09/21  0124 12/09/21  0507   WBC 19.0*  --   --  18.3* 21.2*   HGB 8.0*   < > 8.2* 9.0* 8.6*   HCT 25.0*   < > 25.3* 27.3* 26.3*     --   --  545* 515*   MCV 90.6  --   -- 91.7 92.3    < > = values in this interval not displayed. Renal:    Recent Labs     12/08/21  0330 12/09/21  0124 12/09/21  0507   * 133* 136   K 4.4 3.9 4.4    101 104   CO2 23 21 23   BUN 10 12 12   CREATININE <0.5* 0.8 0.7*   GLUCOSE 78 160* 174*   CALCIUM 7.5* 7.6* 7.5*   ANIONGAP 8 11 9     Hepatic: No results for input(s): AST, ALT, BILITOT, BILIDIR, PROT, LABALBU, ALKPHOS in the last 72 hours. Troponin:   Recent Labs     12/09/21  0124 12/09/21  0507   TROPONINI 0.23* 0.20*     BNP: No results for input(s): BNP in the last 72 hours. Lipids: No results for input(s): CHOL, HDL in the last 72 hours. Invalid input(s): LDLCALCU, TRIGLYCERIDE  ABGs:    Recent Labs     12/07/21  1521 12/09/21  0049   PHART 7.351 7.227*   BEE6AHO  --  42.4   PO2ART  --  107.8   OYB2ZHG  --  17.6*   BEART  --  -10*   [id number]  --  97   OMU5RHV  --  19       INR:   Recent Labs     12/07/21  0350   INR 1.09     Lactate:   Recent Labs     12/09/21  0049   LACTATE 8.22*     Cultures:  -----------------------------------------------------------------  RAD:   CT HEAD WO CONTRAST   Final Result     No acute hemorrhage, hydrocephalus, or mass effect. XR CHEST PORTABLE   Final Result      1. Interval development of bibasilar airspace disease and bilateral pleural effusions since the prior exam.      2.  Interval placement of right PICC catheter with tip extending into the proximal right atrium. 3.  Interval removal of ET and NG tubes from the prior study. VL DUP LOWER EXTREMITY ARTERIES BILATERAL   Final Result      CT ABDOMEN PELVIS W IV CONTRAST Additional Contrast? None    (Results Pending)       Assessment/Plan:     AMS of unclear etiology   Patient was unresponsive with bradypnea when the code was called. Patient gained consciousness 20 minutes after. Lactic acid went down from 8 to 5. Troponin 0.23. CT head showed   No acute hemorrhage, hydrocephalus, or mass effect.  Patient was confused for 1 hour after the episode. CK normal.   - Lactic acid normalized  - F/u neurology recs     Shayla's gangrene/necrotizing fasciitis:  Status post I&D 11/30 and wide excision of perineum/abdominal wall 12/01. S/p excisional debridement of abdomen and left buttock, wound VAC placement 12/03, further debridement and wound VAC placement 12/05  - Continue Unaysn  - ID consulted  - Plan to return to the OR wound vac change later this week  - Surgery/plastic surgery following  - CTAP pelvis today      Left fifth metatarsal diabetic foot ulceration and osteomyelitis  - podiatry following  - Continue IV antibiotics  - PT/OT  - possible surgical intervention pending medical stability, awaiting vascular surgery recs  - Continue wound care    T2DM  - HDSSI  - regular gluc checks  - hypoglycemia protocl     Code Status: Full code   FEN: Low carb diet  PPX: Lovenox   DISPO: ICU    Anthony Valenzuela MD, PGY-1  12/09/21  8:34 AM    This patient has been staffed and discussed with Dr. Rayna Dumont MD.     Patient seen, examined and discussed with the resident and I agree with the assessment and plan. Transferred out of ICU yesterday, but then brought back because of an episode of AMS with bradycardia. A rapid response and then code was called, but he never lost pulse. Lactate was elevated and patient was confused. May have had a seizure, but has no history of them. Glucose was normal at the time. Patient appears to be back to his previous baseline.       Dina Bianchi MD

## 2021-12-10 ENCOUNTER — ANESTHESIA EVENT (OUTPATIENT)
Dept: OPERATING ROOM | Age: 66
DRG: 853 | End: 2021-12-10

## 2021-12-10 ENCOUNTER — ANESTHESIA (OUTPATIENT)
Dept: OPERATING ROOM | Age: 66
DRG: 853 | End: 2021-12-10

## 2021-12-10 VITALS — DIASTOLIC BLOOD PRESSURE: 62 MMHG | SYSTOLIC BLOOD PRESSURE: 96 MMHG | OXYGEN SATURATION: 98 % | TEMPERATURE: 97.2 F

## 2021-12-10 LAB
ALBUMIN SERPL-MCNC: 2.1 G/DL (ref 3.4–5)
ALP BLD-CCNC: 170 U/L (ref 40–129)
ALT SERPL-CCNC: 7 U/L (ref 10–40)
ANION GAP SERPL CALCULATED.3IONS-SCNC: 6 MMOL/L (ref 3–16)
AST SERPL-CCNC: 13 U/L (ref 15–37)
BANDED NEUTROPHILS RELATIVE PERCENT: 1 % (ref 0–7)
BASOPHILS ABSOLUTE: 0 K/UL (ref 0–0.2)
BASOPHILS RELATIVE PERCENT: 0 %
BILIRUB SERPL-MCNC: 0.3 MG/DL (ref 0–1)
BILIRUBIN DIRECT: <0.2 MG/DL (ref 0–0.3)
BILIRUBIN, INDIRECT: ABNORMAL MG/DL (ref 0–1)
BUN BLDV-MCNC: 12 MG/DL (ref 7–20)
CALCIUM SERPL-MCNC: 7.7 MG/DL (ref 8.3–10.6)
CHLORIDE BLD-SCNC: 102 MMOL/L (ref 99–110)
CO2: 29 MMOL/L (ref 21–32)
CREAT SERPL-MCNC: 0.6 MG/DL (ref 0.8–1.3)
EOSINOPHILS ABSOLUTE: 0.2 K/UL (ref 0–0.6)
EOSINOPHILS RELATIVE PERCENT: 1 %
GFR AFRICAN AMERICAN: >60
GFR NON-AFRICAN AMERICAN: >60
GLUCOSE BLD-MCNC: 117 MG/DL (ref 70–99)
GLUCOSE BLD-MCNC: 118 MG/DL (ref 70–99)
GLUCOSE BLD-MCNC: 121 MG/DL (ref 70–99)
GLUCOSE BLD-MCNC: 122 MG/DL (ref 70–99)
GLUCOSE BLD-MCNC: 137 MG/DL (ref 70–99)
GLUCOSE BLD-MCNC: 146 MG/DL (ref 70–99)
GLUCOSE BLD-MCNC: 164 MG/DL (ref 70–99)
HCT VFR BLD CALC: 25.9 % (ref 40.5–52.5)
HEMOGLOBIN: 8.5 G/DL (ref 13.5–17.5)
LYMPHOCYTES ABSOLUTE: 1.2 K/UL (ref 1–5.1)
LYMPHOCYTES RELATIVE PERCENT: 8 %
MCH RBC QN AUTO: 30 PG (ref 26–34)
MCHC RBC AUTO-ENTMCNC: 32.9 G/DL (ref 31–36)
MCV RBC AUTO: 91.1 FL (ref 80–100)
MONOCYTES ABSOLUTE: 1.4 K/UL (ref 0–1.3)
MONOCYTES RELATIVE PERCENT: 9 %
NEUTROPHILS ABSOLUTE: 12.5 K/UL (ref 1.7–7.7)
NEUTROPHILS RELATIVE PERCENT: 81 %
PDW BLD-RTO: 14.4 % (ref 12.4–15.4)
PERFORMED ON: ABNORMAL
PLATELET # BLD: 562 K/UL (ref 135–450)
PLATELET SLIDE REVIEW: ABNORMAL
PMV BLD AUTO: 7.6 FL (ref 5–10.5)
POTASSIUM REFLEX MAGNESIUM: 4 MMOL/L (ref 3.5–5.1)
RBC # BLD: 2.85 M/UL (ref 4.2–5.9)
RBC # BLD: NORMAL 10*6/UL
SLIDE REVIEW: ABNORMAL
SODIUM BLD-SCNC: 137 MMOL/L (ref 136–145)
TOTAL PROTEIN: 4.8 G/DL (ref 6.4–8.2)
WBC # BLD: 15.3 K/UL (ref 4–11)

## 2021-12-10 PROCEDURE — 3600000012 HC SURGERY LEVEL 2 ADDTL 15MIN: Performed by: SURGERY

## 2021-12-10 PROCEDURE — 2700000000 HC OXYGEN THERAPY PER DAY

## 2021-12-10 PROCEDURE — 2580000003 HC RX 258: Performed by: STUDENT IN AN ORGANIZED HEALTH CARE EDUCATION/TRAINING PROGRAM

## 2021-12-10 PROCEDURE — 97162 PT EVAL MOD COMPLEX 30 MIN: CPT

## 2021-12-10 PROCEDURE — 94664 DEMO&/EVAL PT USE INHALER: CPT

## 2021-12-10 PROCEDURE — 80076 HEPATIC FUNCTION PANEL: CPT

## 2021-12-10 PROCEDURE — 7100000001 HC PACU RECOVERY - ADDTL 15 MIN: Performed by: SURGERY

## 2021-12-10 PROCEDURE — 6360000002 HC RX W HCPCS: Performed by: STUDENT IN AN ORGANIZED HEALTH CARE EDUCATION/TRAINING PROGRAM

## 2021-12-10 PROCEDURE — 6370000000 HC RX 637 (ALT 250 FOR IP): Performed by: STUDENT IN AN ORGANIZED HEALTH CARE EDUCATION/TRAINING PROGRAM

## 2021-12-10 PROCEDURE — 3700000000 HC ANESTHESIA ATTENDED CARE: Performed by: SURGERY

## 2021-12-10 PROCEDURE — 94669 MECHANICAL CHEST WALL OSCILL: CPT

## 2021-12-10 PROCEDURE — 3700000001 HC ADD 15 MINUTES (ANESTHESIA): Performed by: SURGERY

## 2021-12-10 PROCEDURE — 94761 N-INVAS EAR/PLS OXIMETRY MLT: CPT

## 2021-12-10 PROCEDURE — 2709999900 HC NON-CHARGEABLE SUPPLY: Performed by: SURGERY

## 2021-12-10 PROCEDURE — 6360000002 HC RX W HCPCS: Performed by: ANESTHESIOLOGY

## 2021-12-10 PROCEDURE — P9047 ALBUMIN (HUMAN), 25%, 50ML: HCPCS | Performed by: NURSE ANESTHETIST, CERTIFIED REGISTERED

## 2021-12-10 PROCEDURE — 2500000003 HC RX 250 WO HCPCS: Performed by: NURSE ANESTHETIST, CERTIFIED REGISTERED

## 2021-12-10 PROCEDURE — 15852 DRESSING CHANGE NOT FOR BURN: CPT | Performed by: SURGERY

## 2021-12-10 PROCEDURE — 7100000000 HC PACU RECOVERY - FIRST 15 MIN: Performed by: SURGERY

## 2021-12-10 PROCEDURE — 36415 COLL VENOUS BLD VENIPUNCTURE: CPT

## 2021-12-10 PROCEDURE — 6360000002 HC RX W HCPCS: Performed by: NURSE ANESTHETIST, CERTIFIED REGISTERED

## 2021-12-10 PROCEDURE — 97530 THERAPEUTIC ACTIVITIES: CPT

## 2021-12-10 PROCEDURE — 97167 OT EVAL HIGH COMPLEX 60 MIN: CPT

## 2021-12-10 PROCEDURE — 1200000000 HC SEMI PRIVATE

## 2021-12-10 PROCEDURE — 99232 SBSQ HOSP IP/OBS MODERATE 35: CPT | Performed by: INTERNAL MEDICINE

## 2021-12-10 PROCEDURE — 94150 VITAL CAPACITY TEST: CPT

## 2021-12-10 PROCEDURE — 97110 THERAPEUTIC EXERCISES: CPT

## 2021-12-10 PROCEDURE — 94640 AIRWAY INHALATION TREATMENT: CPT

## 2021-12-10 PROCEDURE — 3600000002 HC SURGERY LEVEL 2 BASE: Performed by: SURGERY

## 2021-12-10 PROCEDURE — 80048 BASIC METABOLIC PNL TOTAL CA: CPT

## 2021-12-10 PROCEDURE — 85025 COMPLETE CBC W/AUTO DIFF WBC: CPT

## 2021-12-10 RX ORDER — ALBUMIN (HUMAN) 12.5 G/50ML
SOLUTION INTRAVENOUS PRN
Status: DISCONTINUED | OUTPATIENT
Start: 2021-12-10 | End: 2021-12-10 | Stop reason: SDUPTHER

## 2021-12-10 RX ORDER — CALCIUM CHLORIDE 100 MG/ML
INJECTION INTRAVENOUS; INTRAVENTRICULAR PRN
Status: DISCONTINUED | OUTPATIENT
Start: 2021-12-10 | End: 2021-12-10 | Stop reason: SDUPTHER

## 2021-12-10 RX ORDER — LIDOCAINE HYDROCHLORIDE 20 MG/ML
INJECTION, SOLUTION INFILTRATION; PERINEURAL PRN
Status: DISCONTINUED | OUTPATIENT
Start: 2021-12-10 | End: 2021-12-10 | Stop reason: SDUPTHER

## 2021-12-10 RX ORDER — 0.9 % SODIUM CHLORIDE 0.9 %
500 INTRAVENOUS SOLUTION INTRAVENOUS
Status: DISCONTINUED | OUTPATIENT
Start: 2021-12-10 | End: 2021-12-10 | Stop reason: HOSPADM

## 2021-12-10 RX ORDER — FENTANYL CITRATE 50 UG/ML
INJECTION, SOLUTION INTRAMUSCULAR; INTRAVENOUS PRN
Status: DISCONTINUED | OUTPATIENT
Start: 2021-12-10 | End: 2021-12-10 | Stop reason: SDUPTHER

## 2021-12-10 RX ORDER — ONDANSETRON 2 MG/ML
4 INJECTION INTRAMUSCULAR; INTRAVENOUS
Status: DISCONTINUED | OUTPATIENT
Start: 2021-12-10 | End: 2021-12-10 | Stop reason: HOSPADM

## 2021-12-10 RX ORDER — PROPOFOL 10 MG/ML
INJECTION, EMULSION INTRAVENOUS PRN
Status: DISCONTINUED | OUTPATIENT
Start: 2021-12-10 | End: 2021-12-10 | Stop reason: SDUPTHER

## 2021-12-10 RX ORDER — MEPERIDINE HYDROCHLORIDE 25 MG/ML
12.5 INJECTION INTRAMUSCULAR; INTRAVENOUS; SUBCUTANEOUS EVERY 5 MIN PRN
Status: DISCONTINUED | OUTPATIENT
Start: 2021-12-10 | End: 2021-12-10 | Stop reason: HOSPADM

## 2021-12-10 RX ORDER — ROCURONIUM BROMIDE 10 MG/ML
INJECTION, SOLUTION INTRAVENOUS PRN
Status: DISCONTINUED | OUTPATIENT
Start: 2021-12-10 | End: 2021-12-10 | Stop reason: SDUPTHER

## 2021-12-10 RX ORDER — ONDANSETRON 2 MG/ML
INJECTION INTRAMUSCULAR; INTRAVENOUS PRN
Status: DISCONTINUED | OUTPATIENT
Start: 2021-12-10 | End: 2021-12-10 | Stop reason: SDUPTHER

## 2021-12-10 RX ADMIN — AMLODIPINE BESYLATE 5 MG: 5 TABLET ORAL at 09:30

## 2021-12-10 RX ADMIN — SODIUM CHLORIDE, PRESERVATIVE FREE 10 ML: 5 INJECTION INTRAVENOUS at 20:58

## 2021-12-10 RX ADMIN — SODIUM CHLORIDE 25 ML: 9 INJECTION, SOLUTION INTRAVENOUS at 12:34

## 2021-12-10 RX ADMIN — DOCUSATE SODIUM 100 MG: 100 CAPSULE, LIQUID FILLED ORAL at 20:56

## 2021-12-10 RX ADMIN — PROPOFOL 120 MG: 10 INJECTION, EMULSION INTRAVENOUS at 13:28

## 2021-12-10 RX ADMIN — OXYCODONE HYDROCHLORIDE 10 MG: 5 TABLET ORAL at 00:10

## 2021-12-10 RX ADMIN — ACETAMINOPHEN 1000 MG: 500 TABLET ORAL at 20:56

## 2021-12-10 RX ADMIN — HYDROMORPHONE HYDROCHLORIDE 0.5 MG: 1 INJECTION, SOLUTION INTRAMUSCULAR; INTRAVENOUS; SUBCUTANEOUS at 16:06

## 2021-12-10 RX ADMIN — SODIUM CHLORIDE, PRESERVATIVE FREE 10 ML: 5 INJECTION INTRAVENOUS at 09:29

## 2021-12-10 RX ADMIN — OXYCODONE HYDROCHLORIDE 10 MG: 5 TABLET ORAL at 18:21

## 2021-12-10 RX ADMIN — CALCIUM CHLORIDE 1 G: 100 INJECTION, SOLUTION INTRAVENOUS at 13:30

## 2021-12-10 RX ADMIN — INSULIN HUMAN 2 UNITS: 100 INJECTION, SOLUTION PARENTERAL at 12:41

## 2021-12-10 RX ADMIN — ROCURONIUM BROMIDE 50 MG: 10 INJECTION INTRAVENOUS at 13:28

## 2021-12-10 RX ADMIN — SODIUM CHLORIDE, POTASSIUM CHLORIDE, SODIUM LACTATE AND CALCIUM CHLORIDE: 600; 310; 30; 20 INJECTION, SOLUTION INTRAVENOUS at 13:20

## 2021-12-10 RX ADMIN — AMPICILLIN SODIUM AND SULBACTAM SODIUM 3000 MG: 2; 1 INJECTION, POWDER, FOR SOLUTION INTRAMUSCULAR; INTRAVENOUS at 01:14

## 2021-12-10 RX ADMIN — AMPICILLIN SODIUM AND SULBACTAM SODIUM 3000 MG: 2; 1 INJECTION, POWDER, FOR SOLUTION INTRAMUSCULAR; INTRAVENOUS at 06:39

## 2021-12-10 RX ADMIN — ALBUMIN (HUMAN) 100 ML: 0.25 INJECTION, SOLUTION INTRAVENOUS at 13:20

## 2021-12-10 RX ADMIN — POLYETHYLENE GLYCOL 3350 17 G: 17 POWDER, FOR SOLUTION ORAL at 18:18

## 2021-12-10 RX ADMIN — SODIUM CHLORIDE, POTASSIUM CHLORIDE, SODIUM LACTATE AND CALCIUM CHLORIDE: 600; 310; 30; 20 INJECTION, SOLUTION INTRAVENOUS at 14:57

## 2021-12-10 RX ADMIN — LIDOCAINE HYDROCHLORIDE 100 MG: 20 INJECTION, SOLUTION INFILTRATION; PERINEURAL at 13:28

## 2021-12-10 RX ADMIN — DOCUSATE SODIUM 100 MG: 100 CAPSULE, LIQUID FILLED ORAL at 09:29

## 2021-12-10 RX ADMIN — AMPICILLIN SODIUM AND SULBACTAM SODIUM 3000 MG: 2; 1 INJECTION, POWDER, FOR SOLUTION INTRAMUSCULAR; INTRAVENOUS at 12:35

## 2021-12-10 RX ADMIN — PROPOFOL 50 MG: 10 INJECTION, EMULSION INTRAVENOUS at 14:33

## 2021-12-10 RX ADMIN — INSULIN HUMAN 2 UNITS: 100 INJECTION, SOLUTION PARENTERAL at 21:06

## 2021-12-10 RX ADMIN — INSULIN HUMAN 5 UNITS: 100 INJECTION, SOLUTION PARENTERAL at 09:20

## 2021-12-10 RX ADMIN — SUGAMMADEX 200 MG: 100 INJECTION, SOLUTION INTRAVENOUS at 14:34

## 2021-12-10 RX ADMIN — ONDANSETRON 4 MG: 2 INJECTION INTRAMUSCULAR; INTRAVENOUS at 13:28

## 2021-12-10 RX ADMIN — SODIUM CHLORIDE, POTASSIUM CHLORIDE, SODIUM LACTATE AND CALCIUM CHLORIDE: 600; 310; 30; 20 INJECTION, SOLUTION INTRAVENOUS at 00:11

## 2021-12-10 RX ADMIN — FENTANYL CITRATE 50 MCG: 50 INJECTION, SOLUTION INTRAMUSCULAR; INTRAVENOUS at 14:35

## 2021-12-10 RX ADMIN — ACETAMINOPHEN 1000 MG: 500 TABLET ORAL at 09:29

## 2021-12-10 RX ADMIN — PHENYLEPHRINE HYDROCHLORIDE 100 MCG: 10 INJECTION, SOLUTION INTRAMUSCULAR; INTRAVENOUS; SUBCUTANEOUS at 13:28

## 2021-12-10 RX ADMIN — IPRATROPIUM BROMIDE AND ALBUTEROL SULFATE 1 AMPULE: .5; 2.5 SOLUTION RESPIRATORY (INHALATION) at 12:02

## 2021-12-10 RX ADMIN — HYDROMORPHONE HYDROCHLORIDE 0.5 MG: 1 INJECTION, SOLUTION INTRAMUSCULAR; INTRAVENOUS; SUBCUTANEOUS at 15:52

## 2021-12-10 RX ADMIN — PHENYLEPHRINE HYDROCHLORIDE 100 MCG: 10 INJECTION, SOLUTION INTRAMUSCULAR; INTRAVENOUS; SUBCUTANEOUS at 13:33

## 2021-12-10 RX ADMIN — ENOXAPARIN SODIUM 40 MG: 100 INJECTION SUBCUTANEOUS at 09:29

## 2021-12-10 RX ADMIN — PROPOFOL 20 MG: 10 INJECTION, EMULSION INTRAVENOUS at 14:35

## 2021-12-10 RX ADMIN — SODIUM CHLORIDE 25 ML: 9 INJECTION, SOLUTION INTRAVENOUS at 18:52

## 2021-12-10 RX ADMIN — AMPICILLIN SODIUM AND SULBACTAM SODIUM 3000 MG: 2; 1 INJECTION, POWDER, FOR SOLUTION INTRAMUSCULAR; INTRAVENOUS at 18:54

## 2021-12-10 ASSESSMENT — PULMONARY FUNCTION TESTS
PIF_VALUE: 19
PIF_VALUE: 25
PIF_VALUE: 21
PIF_VALUE: 36
PIF_VALUE: 52
PIF_VALUE: 23
PIF_VALUE: 11
PIF_VALUE: 2
PIF_VALUE: 21
PIF_VALUE: 23
PIF_VALUE: 25
PIF_VALUE: 22
PIF_VALUE: 21
PIF_VALUE: 21
PIF_VALUE: 24
PIF_VALUE: 24
PIF_VALUE: 1
PIF_VALUE: 21
PIF_VALUE: 21
PIF_VALUE: 0
PIF_VALUE: 27
PIF_VALUE: 23
PIF_VALUE: 21
PIF_VALUE: 25
PIF_VALUE: 14
PIF_VALUE: 23
PIF_VALUE: 24
PIF_VALUE: 24
PIF_VALUE: 20
PIF_VALUE: 25
PIF_VALUE: 24
PIF_VALUE: 25
PIF_VALUE: 11
PIF_VALUE: 25
PIF_VALUE: 21
PIF_VALUE: 22
PIF_VALUE: 23
PIF_VALUE: 24
PIF_VALUE: 21
PIF_VALUE: 24
PIF_VALUE: 14
PIF_VALUE: 20
PIF_VALUE: 21
PIF_VALUE: 24
PIF_VALUE: 19
PIF_VALUE: 23
PIF_VALUE: 20
PIF_VALUE: 34
PIF_VALUE: 25
PIF_VALUE: 21
PIF_VALUE: 23
PIF_VALUE: 23
PIF_VALUE: 17
PIF_VALUE: 2
PIF_VALUE: 24
PIF_VALUE: 23
PIF_VALUE: 26
PIF_VALUE: 24
PIF_VALUE: 24
PIF_VALUE: 18
PIF_VALUE: 0
PIF_VALUE: 27
PIF_VALUE: 1
PIF_VALUE: 22
PIF_VALUE: 21
PIF_VALUE: 23
PIF_VALUE: 24
PIF_VALUE: 20
PIF_VALUE: 21
PIF_VALUE: 1
PIF_VALUE: 24
PIF_VALUE: 17
PIF_VALUE: 23
PEFR_L/MIN: 94
PIF_VALUE: 24
PIF_VALUE: 25
PIF_VALUE: 20
PIF_VALUE: 21
PIF_VALUE: 24
PIF_VALUE: 21
PIF_VALUE: 20
PIF_VALUE: 25
PIF_VALUE: 37
PIF_VALUE: 4
PIF_VALUE: 2
PIF_VALUE: 24
PIF_VALUE: 4
PIF_VALUE: 19
PIF_VALUE: 23
PIF_VALUE: 19
PIF_VALUE: 25
PIF_VALUE: 0
PIF_VALUE: 26
PIF_VALUE: 24
PIF_VALUE: 24
PIF_VALUE: 28
PIF_VALUE: 19

## 2021-12-10 ASSESSMENT — PAIN DESCRIPTION - PROGRESSION
CLINICAL_PROGRESSION: NOT CHANGED
CLINICAL_PROGRESSION: NOT CHANGED

## 2021-12-10 ASSESSMENT — PAIN SCALES - GENERAL
PAINLEVEL_OUTOF10: 4
PAINLEVEL_OUTOF10: 0
PAINLEVEL_OUTOF10: 5
PAINLEVEL_OUTOF10: 7
PAINLEVEL_OUTOF10: 0
PAINLEVEL_OUTOF10: 0
PAINLEVEL_OUTOF10: 7
PAINLEVEL_OUTOF10: 0
PAINLEVEL_OUTOF10: 0
PAINLEVEL_OUTOF10: 2
PAINLEVEL_OUTOF10: 8
PAINLEVEL_OUTOF10: 8

## 2021-12-10 ASSESSMENT — PAIN DESCRIPTION - FREQUENCY
FREQUENCY: CONTINUOUS
FREQUENCY: CONTINUOUS

## 2021-12-10 ASSESSMENT — PAIN DESCRIPTION - ONSET
ONSET: ON-GOING
ONSET: ON-GOING

## 2021-12-10 ASSESSMENT — PAIN DESCRIPTION - LOCATION
LOCATION: BUTTOCKS
LOCATION: BUTTOCKS

## 2021-12-10 ASSESSMENT — PAIN DESCRIPTION - DESCRIPTORS
DESCRIPTORS: DISCOMFORT
DESCRIPTORS: DISCOMFORT

## 2021-12-10 ASSESSMENT — PAIN DESCRIPTION - PAIN TYPE
TYPE: SURGICAL PAIN
TYPE: SURGICAL PAIN

## 2021-12-10 NOTE — PLAN OF CARE
Problem: Nutrition  Goal: Optimal nutrition therapy  Outcome: Ongoing  Note: Nutrition Problem #1: Increased nutrient needs  Intervention: Food and/or Nutrient Delivery: Continue Current Diet, Continue Oral Nutrition Supplement  Nutritional Goals: pt will consistently consume >75% PO intake of meals and supplements offered through adm to meet increased energy needs

## 2021-12-10 NOTE — PLAN OF CARE
Problem: Physical Regulation:  Goal: Postoperative complications will be avoided or minimized  Description: Postoperative complications will be avoided or minimized  Outcome: Ongoing  Note: Pt VVS as documented in flowsheets. Pt denies pain. Problem: Skin Integrity:  Goal: Demonstration of wound healing without infection will improve  Description: Demonstration of wound healing without infection will improve  12/9/2021 1959 by Dulce Maria Limon RN  Outcome: Ongoing  Note: Wound covered by wound vac. Intermittently flushed. No S/S of infection noted. Pt is afebrile    Electronically signed by Dulce Maria Limon RN on 12/9/2021 at 8:01 PM       Problem: Skin Integrity:  Goal: Will show no infection signs and symptoms  Description: Will show no infection signs and symptoms  12/9/2021 1753 by Dulce Maria Limon RN  Outcome: Ongoing  Note: Pt on IV abx. No new S/S of infection noted       Problem: Infection - Surgical Site:  Goal: Will show no infection signs and symptoms  Description: Will show no infection signs and symptoms  12/9/2021 1753 by Dulce Maria Limon RN  Outcome: Ongoing  Note: Pt on IV abx. No new S/S of infection noted       Problem: Falls - Risk of:  Goal: Will remain free from falls  Description: Will remain free from falls  Outcome: Ongoing  Note: Fall precautions in place. Bed alarm on. Call light within reach.  Pt calls out appropriately    Electronically signed by Dulce Maria Limon RN on 12/9/2021 at 8:00 PM       Problem: Pain:  Goal: Pain level will decrease  Description: Pain level will decrease  12/9/2021 1959 by Dulce Maria Limon RN  Outcome: Ongoing  Note: Pt reports no pain during this shift    Electronically signed by Dulce Maria Limon RN on 12/9/2021 at 8:01 PM

## 2021-12-10 NOTE — PROGRESS NOTES
ICU SURGERY DAILY PROGRESS NOTE    CC: Shayla's gangrene    SUBJECTIVE:   Interval Hx:  Patient remains afebrile and HDS. No acute events overnight. Otherwise pain is well controlled. Patient has been NPO since midnight in anticipation of procedure with plastic surgery this afternoon. ROS: A 10 point review of systems was conducted, significant findings as noted above. All other systems negative. OBJECTIVE:   Vitals:   Vitals:    12/09/21 2336 12/10/21 0042 12/10/21 0432 12/10/21 0448   BP: 136/77  127/73    Pulse: 98 98 96    Resp: 20  20    Temp: 97.3 °F (36.3 °C)  98 °F (36.7 °C)    TempSrc: Oral  Oral    SpO2: 95%  95%    Weight:    195 lb 12.3 oz (88.8 kg)   Height:           I/O:     Intake/Output Summary (Last 24 hours) at 12/10/2021 0625  Last data filed at 12/10/2021 0401  Gross per 24 hour   Intake 1101 ml   Output 2180 ml   Net -1079 ml     I/O last 3 completed shifts: In: 1024 [P.O.:820; I.V.:231]  Out: 9064 [Urine:850; Drains:900; Stool:5]    Diet: Diet NPO      Physical Examination:   General appearance: alert, no acute distress, groomed appropriately   HENT: NC/AT, MMM & intact, no JVD, neck supple, trachea midline  Eyes: No scleral icterus, EOMI  Chest/Lungs: CTAB, normal effort on 3L NC, no accessory muscles used for breathing. Cardiovascular: RRR  Abdomen: Soft, large surgical debridement area of the abdomen measuring 60 x 23 x 3 cm in gluteal region 20 x 15 cm with veraflo VAC in place holding adequate suction and instilling normal saline. Colostomy is pink, patent, with sweat in appliance. Extremities:  full-thickness ulceration on lateral aspect of L foot, no edema, no cyanosis, decreased sensation along entirety of bilateral feet. No DP/PT pulse appreciated with doppler.    : walsh in place with clear yellow urine, no signs of further necrotizing fasciitis requiring further debridement of L lower scrotum, no subcutaneous crepitus noted, skin edges with healthy granulation tissue with appropriate bleeding, non-malodorous   Neurologic: A&Ox3, decrease sensation to bilateral lower extremities. Labs:  CBC:   Recent Labs     12/09/21  0124 12/09/21  0507 12/10/21  0443   WBC 18.3* 21.2* 15.3*   HGB 9.0* 8.6* 8.5*   HCT 27.3* 26.3* 25.9*   * 515* 562*       BMP:   Recent Labs     12/09/21  0124 12/09/21  0507 12/10/21  0443   * 136 137   K 3.9 4.4 4.0    104 102   CO2 21 23 29   BUN 12 12 12   CREATININE 0.8 0.7* 0.6*   GLUCOSE 160* 174* 122*     LFT's:   No results for input(s): AST, ALT, ALB, BILITOT, ALKPHOS in the last 72 hours. Troponin:   Recent Labs     12/09/21  0840 12/09/21  1255 12/09/21  1601   TROPONINI 0.23* 0.21* 0.24*     BNP: No results for input(s): BNP in the last 72 hours. ABGs:   Recent Labs     12/07/21  1521 12/09/21  0049   PHART 7.351 7.227*   USX9YZQ  --  42.4   PO2ART  --  107.8     INR:   No results for input(s): INR in the last 72 hours. U/A:No results for input(s): NITRITE, COLORU, PHUR, LABCAST, WBCUA, RBCUA, MUCUS, TRICHOMONAS, YEAST, BACTERIA, CLARITYU, SPECGRAV, LEUKOCYTESUR, UROBILINOGEN, BILIRUBINUR, BLOODU, GLUCOSEU, AMORPHOUS in the last 72 hours. Invalid input(s): Danita Senior     Rad:   CT ABDOMEN PELVIS W IV CONTRAST Additional Contrast? None   Final Result      Significant interval improvement and soft tissue emphysema in the visualized left gluteal region and over the anterior abdominal wall since prior study. Anasarca more prominently of the abdominal wall without suspicious drainable abdominal wall fluid collection. More prominent presacral strandy fluid without suspicious retroperitoneal emphysema to suggest retroperitoneal infection. New small amount of ascites throughout the abdomen and pelvis without suspicious organized intraperitoneal fluid collections. New moderate to large bilateral pleural effusions with associated compressive atelectasis of the lung bases. New trace pericardial effusion. Colostomy in the left lower quadrant without obstruction or complicating features. Interval improvement in bilateral hydronephrosis. CT HEAD WO CONTRAST   Final Result     No acute hemorrhage, hydrocephalus, or mass effect. XR CHEST PORTABLE   Final Result      1. Interval development of bibasilar airspace disease and bilateral pleural effusions since the prior exam.      2.  Interval placement of right PICC catheter with tip extending into the proximal right atrium. 3.  Interval removal of ET and NG tubes from the prior study. VL DUP LOWER EXTREMITY ARTERIES BILATERAL   Final Result          ASSESSMENT AND PLAN:   Amaris Mayer is a 63 y.o. male with Necrotizing fasciitis of the abdominal wall, gluteal region, and scrotum s/p wide excision of perineum, back, and abdominal wall. Abdominal wall wound measuring 60 cm x 23 cm x 3cm and gluteal wound measures 20 cm x 15cm (12/1), returned to the OR for further debridement or right abdominal wall, and placement of testicle within a thigh flap (12/3), followed by minimal abdominal wall and perineum debridement, sutured protective skin flap in groin for right testicle, and placement of a wound VAC (12/5), s/p diverting colostomy and wound vac change (12/7)    - Continue instill wound vac, instilling normal saline per plastic surgery team  - Continue with aggressive glucose control  - ID on board, appreciate recommendations:   - Continue antibiotics (Unasyn)  - Increased leukocytosis improved this morning to 15.3 from 21.2  - Will plan to return to the OR with plastic surgery today  - Await return of bowel function  - Okay to continue carb controlled diet. NPO since midnight for return tot he OR today.     Scott Zaman DO, MS  PGY1, General Surgery  12/10/21  6:25 AM

## 2021-12-10 NOTE — PROGRESS NOTES
Ostomy Referral Follow-up Progress Note      NAME:  Ollen Apgar  MEDICAL RECORD NUMBER:  [id number]  AGE: 63 y.o. GENDER:  male  :  1955  TODAY'S DATE:  12/10/2021    Subjective:    Ollen Apgar is a 63 y.o. male referred by:   [x] Physician  [] Nursing  [] Other:     New Colostomy  Stoma: 40 mm  Barrier: 2 piece flat (red) #13649 with barrier #38880    Surgeon Dr. Bao White & Dr. Shaye Galeas 21 - Abdominal advancement in preparation for diverting colostomy, Application of Veraflo instillation VAC therapy, Diverting Colostomy    Objective: Lying in bed, current appliance intact and sealed, VAC alarmed blockage - cleared all tubing, VAC working    /67   Pulse 88   Temp 97.8 °F (36.6 °C) (Oral)   Resp 20   Ht 5' 11\" (1.803 m)   Wt 195 lb 12.3 oz (88.8 kg)   SpO2 93%   BMI 27.30 kg/m²     Reese Risk Score Reese Scale Score: 14    Assessment: Stoma is pink, moist and protrudes, peristomal skin intact and clean, small area near [facility] dressing with denuded skin - non-sting barrier wipe used     Colostomy      Colostomy LUQ (Active)   Stomal Appliance 2 piece; Clean; Dry; Intact; Changed 12/10/21 0931   Flange Size (inches) 2 Inches 12/10/21 0931   Stoma  Assessment Pink; Protrudes; Moist 12/10/21 0931   Mucocutaneous Junction Intact 12/10/21 0931   Peristomal Assessment Clean; Intact 12/10/21 0931   Treatment Bag change; Liquid skin barrier 12/10/21 0931   Stool Appearance Watery; Mucous 12/10/21 0931   Stool Color Brown; Red 12/10/21 0931   Stool Amount Small 12/10/21 0931   Output (mL) 5 ml 21 1652   Number of days: 3       Intake/Output Summary (Last 24 hours) at 12/10/2021 0932  Last data filed at 12/10/2021 3895  Gross per 24 hour   Intake 1101 ml   Output 2905 ml   Net -1804 ml       Plan:   Plan for Ostomy Care:  [address] [place] [address] - Patient to empty appliance when 1/3 to 1/2 full with assistance of the staff.  Cleanse inside and outside of the drain spout prior to rolling closed. Change appliance every 3-5 days or 1-2 times a week. Call for problems with seal [phone]    Reviewed with patient step by step how to change appliance, how to close the spout, when to empty the appliance and frequency of appliance change. Pt verbalized understanding. Pt appears drowsy and not very interactive at this time. Wound Ostomy RN to continue education until discharge.      Ostomy Plan of Care  [x] Supplies/Instructions left in room  [] Patient using home supplies  [x] Brand/supplies at bedside [employer]    Current Diet: Diet NPO  Dietician consult:  Yes    Discharge Plan:  Placement for patient upon discharge: skilled nursing    Outpatient visit plan No  Supplies given Yes   Samples requested No    Referrals:  [x]   [] [facility]  [] Supplies  [] Other      Patient/Caregiver Teaching:  Written Instructions given to patient/family  Teaching provided:  [x] Reviewed GI and A&P        [x] Supplies  [x] Pouch emptying      [x] Manipulate closure  [x] Routine Care         [] Comment  [x] Pouch maintenance           Level of patient/caregiver understanding able to:  [x] Indicates understanding       [x] Needs reinforcement  [] Unsuccessful      [] Verbal Understanding  [] Demonstrated understanding       [] No evidence of learning  [] Refused teaching         [] N/A    Electronically signed by Kevon Fine RN, CMSRN on 12/10/2021 at 9:32 AM

## 2021-12-10 NOTE — PROGRESS NOTES
Podiatric Surgery Daily Progress Note      Admit Date: 12/1/2021      Code:Full Code    Patient seen and examined, labs and records reviewed    Subjective:     Patient seen at bedside this a.m with attending present. Patient denies pain to bilateral lower extremity. Patient denies fever, chills, shortness of breath, chest pain. Patient denies any acute events overnight. No pedal complaints during today's examination. Review of Systems: A 10 point review of systems was conducted, significant findings as noted in HPI. All other systems negative. Objective     /73   Pulse 96   Temp 98 °F (36.7 °C) (Oral)   Resp 20   Ht 5' 11\" (1.803 m)   Wt 195 lb 12.3 oz (88.8 kg)   SpO2 95%   BMI 27.30 kg/m²      I/O:    Intake/Output Summary (Last 24 hours) at 12/10/2021 0616  Last data filed at 12/10/2021 0401  Gross per 24 hour   Intake 1101 ml   Output 2180 ml   Net -1079 ml              Wt Readings from Last 3 Encounters:   12/10/21 195 lb 12.3 oz (88.8 kg)   11/30/21 163 lb 3.2 oz (74 kg)   04/18/16 179 lb 14.3 oz (81.6 kg)       LABS:    Recent Labs     12/09/21  0507 12/10/21  0443   WBC 21.2* 15.3*   HGB 8.6* 8.5*   HCT 26.3* 25.9*   * 562*        Recent Labs     12/10/21  0443      K 4.0      CO2 29   BUN 12   CREATININE 0.6*        No results for input(s): PROT, INR, APTT in the last 72 hours. LOWER EXTREMITY EXAMINATION    Dressing to left LE intact. No strikethrough noted to the external dressing. No drainage noted to the internal layers of the dressing. VASCULAR:   -DP and PT pulses are non-palpable b/l.    -Upon hand-held Doppler examination DP signals were noted to be monophasic and PT signals were noted to be nondopplerable. -CFT slightly diminished to the distal digits of the RLE >LLE.    -Skin temperature is warm to lukewarm to cool to the distal digits from proximal to distal.    -No edema noted.   -No pain with calf compression b/l.     NEUROLOGIC:   Kalyan Salinas and epicritic sensation is diminished b/l.   -Protective sensation is diminished at all pedal sites b/l.     DERMATOLOGIC:   Left lower extremity:  -Full-thickness ulceration noted to the lateral aspect of the left foot.    -Wound measures approximately 3.2 cm x 2 cm x 0.5 cm.   -Wound base exposed bone, desiccated with necrotic edges at the proximal and distal edges.  -Wound probes to bone with exposed fifth metatarsal.  No tracking or tunneling noted.  -No purulent drainage noted. No fluctuance or crepitus or malodor noted. -Maroon/red discoloration noted distal tip of left fifth digit.  -Wound is stable. Picture taken 12/10/2021          Anterior aspect of ankle joint left foot  -Deep tissue injury with rubor discoloration noted 2/2 Prevalon boot strap.  -Intact epithelialized tissue noted.  -No crepitus, erythema, drainage, or malodor noted.    -No open wound noted. -Stable without acute infection noted. Picture taken 12/10/2021          Right lower extremity  -Styloid process fifth metatarsal deep tissue injury.  -Purplish circumferential discoloration noted.  -No open wound.  -No fluctuance, crepitus, malodor, or drainage noted.  -No acute signs infection noted.      -Lateral malleolus deep tissue injury.  -Purplish circumferential discoloration noted.  -No open wound.  -No fluctuance, crepitus, malodor, or drainage noted.  -No acute signs infection noted. Picture taken 12/10/2021    Patient gave verbal consent for the picture taken at today's visit. MUSCULOSKELETAL:   -Muscle strength 4/5 for all pedal muscle groups B/L LE.  -No pain with palpation of the left foot.      IMAGING:  XR LEFT FOOT 11/30/2021  Narrative   EXAMINATION:   THREE XRAY VIEWS OF THE LEFT FOOT       11/30/2021 1:44 pm       COMPARISON:   None.       HISTORY:   ORDERING SYSTEM PROVIDED HISTORY: wound   TECHNOLOGIST PROVIDED HISTORY:   Reason for exam:->wound   Reason for Exam: blood sugar problem, wound check on lateral portion of foot   Acuity: Acute   Type of Exam: Initial       FINDINGS:   Osseous destruction along the articular margins of the 5th   metatarsophalangeal joint with associated lucency in the soft tissues. .   There is no evidence of fracture or dislocation. . The remaining joint spaces   appear well maintained. The remaining soft tissues are unremarkable.           Impression   Evidence of a septic joint involving the 5th metatarsal phalangeal joint with   associated osteomyelitis         ARTERIAL DUPLEX 12/2/21     Type of Study:        Extremities Arteries:Lower Extremities Arterial Duplex, VL LOWER EXTREMITY    ARTERIES DUPLEX BILATERAL.       Tech Comments   Right   The right ankle/brachial index is 0.0 (no Doppler signal could be heard at the   [facility] or the PT). The common femoral and profunda femoral arteries could not be visualized due   to bandages extending into the groin area. The mid superficial femoral artery has a short segmental occlusion and then is   reconstituted. Elevated velocities at the distal superficial femoral artery indicate a > 50%   stenosis by velocity criteria (velocity went from 15 to 298 cm/s). The posterior tibial artery is occluded. The distal anterior tibial artery is barely patent, with very low flow   (possibly occluded and then reconstituted). Left   The left ankle brachial index is 0 for the PT and the DP was not accessible   due to the bandages on the foot. The common femoral and profunda femoral artery could not be visualized due to   bandages extending into the groin area. The distal superficial femoral artery is occluded and then reconstituted. The posterior tibial artery, peroneal, and anterior tibial artery are   occluded. There were no previous studies to use for comparison.        ASSESSMENT/PLAN:   -Full-thickness ulceration; lateral left foot-Sands stage III  -Deep tissue injury, styloid process fifth metatarsal base left foot  -Deep tissue injury, lateral malleolus left ankle  -Osteomyelitis of the fifth metatarsal; left foot  -Critical limb ischemia; bilateral lower extremity  -Diabetes mellitus, type II  -History of noncompliance    -Patient seen and examined at bedside this a.m.  -Hypertensive, afebrile, leukocytosis noted and downtrending  -All imaging reviewed see impression above. -Vascular surgery following; will await further stabilization with the general surgery team and plastic surgery team before offering vascular intervention.  -Plastic surgery following; debridement of abdominal wall and left buttock 12/10  -Infectious disease following, continue Unasyn and at discharge may not need IV antibiotics or long course of antibiotics. -Left lower extremity dressed with Betadine soaked gauze, DSD, and tape. -Right lower extremity applied to Mepilex borders to deep tissue injury.  -Prevalon boots reapplied. Patient is to wear at all times while in bed to prevent further deep tissue injury. Ankle strap removed from bilateral boot as the ankle straps were causing pressure to his feet. -Nonweightbearing to left lower extremity.  -Weightbearing as tolerated to right lower extremity. DISPO: Full-thickness ulceration with underlying osteomyelitis to the left fifth metatarsal. Critical limb ischemia bilateral lower extremity. Vascular following; awaiting further stabilization at this time. Patient will eventually require podiatric surgical intervention but timing will depend on medical stability and vascular recommendations. Will continue to closely monitor patient and follow while in house. Patient seen and examined at bedside with Dr. Leonardo Cordero. [facility]  Podiatry resident, PGY 3  Perfect serve    Patient was seen and evaluated at bedside. Agree with residents assessment and treatment plan.   Leonardo Cordero DPM

## 2021-12-10 NOTE — PROGRESS NOTES
Progress Note    Admit Date: 12/1/2021  Day: 9   Diet: Diet NPO    CC: Shayla's gangrene     Interval history: ICU transfer from yesterday. Seen and examined at bedside. No acute complaints or concerns. NPO for OR today with plastics. HPI:   Shelli Hall is a 62 yo M with DM2 who p/w groin swelling. 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 He underwent debridement, wound vac placement, and diverting colostomy. He is being treated with Vanc and Merrem and followed by surgery and plastic surgery. He is saturating well on 3L NC, complains of some abdominal pain, but otherwise feels okay. Will be transferred to floors with plans to return to OR later this week for wound vac change.     Medications:     Scheduled Meds:   sodium chloride  1,000 mL IntraVENous Once    docusate sodium  100 mg Oral BID    polyethylene glycol  17 g Oral Daily    insulin regular  0-18 Units SubCUTAneous 4x Daily AC & HS    amLODIPine  5 mg Oral Daily    ampicillin-sulbactam  3,000 mg IntraVENous Q6H    acetaminophen  1,000 mg Oral Q6H    enoxaparin  40 mg SubCUTAneous Daily    sodium chloride flush  5-40 mL IntraVENous 2 times per day     Continuous Infusions:   lactated ringers 100 mL/hr at 12/10/21 0011    sodium chloride 25 mL (12/09/21 1810)    dextrose       PRN Meds:ipratropium-albuterol, dextrose, hydrALAZINE, oxyCODONE **OR** oxyCODONE, sodium chloride flush, sodium chloride, glucose, dextrose, glucagon (rDNA), dextrose    Objective:   Vitals:   T-max:  Patient Vitals for the past 8 hrs:   BP Temp Temp src Pulse Resp SpO2 Weight   12/10/21 0448 -- -- -- -- -- -- 195 lb 12.3 oz (88.8 kg)   12/10/21 0432 127/73 98 °F (36.7 °C) Oral 96 20 95 % --   12/10/21 0042 -- -- -- 98 -- -- --   12/09/21 2336 136/77 97.3 °F (36.3 °C) Oral 98 20 95 % --       Intake/Output Summary (Last 24 hours) at 12/10/2021 0626  Last data filed at 12/10/2021 0401  Gross per 24 hour   Intake 1101 ml Output 2180 ml   Net -1079 ml       Review of Systems  10 point ROS completed and negative     Physical Exam   Constitutional:       Appearance: Normal appearance. HENT:      Head: Normocephalic.      Mouth/Throat:      Mouth: Mucous membranes are moist.   Eyes:      Extraocular Movements: Extraocular movements intact.      Conjunctiva/sclera: Conjunctivae normal.      Pupils: Pupils are equal, round, and reactive to light. Cardiovascular:      Rate and Rhythm: Normal rate and regular rhythm.      Pulses: Normal pulses.      Heart sounds: Normal heart sounds. Pulmonary:      Effort: Pulmonary effort is normal.      Breath sounds: Normal breath sounds. Abdominal:      General: Abdomen is flat. Bowel sounds are normal.      Palpations: Abdomen is soft.      Comments: Ostomy in place, clean and well dressed,  Wound vac also in place.    Musculoskeletal:         General: Normal range of motion.      Cervical back: Normal range of motion. Feet:      Comments: Bandages noted  Skin:     Comments: Wound vac in place over lower abdomen/pelvis   Neurological:      General: No focal deficit present.      Mental Status: He is alert and oriented to person, place, and time. Mental status is at baseline. Psychiatric:         Mood and Affect: Mood normal.         Behavior: Behavior normal.     LABS:    CBC:   Recent Labs     12/09/21  0124 12/09/21  0507 12/10/21  0443   WBC 18.3* 21.2* 15.3*   HGB 9.0* 8.6* 8.5*   HCT 27.3* 26.3* 25.9*   * 515* 562*   MCV 91.7 92.3 91.1     Renal:    Recent Labs     12/09/21  0124 12/09/21  0507 12/10/21  0443   * 136 137   K 3.9 4.4 4.0    104 102   CO2 21 23 29   BUN 12 12 12   CREATININE 0.8 0.7* 0.6*   GLUCOSE 160* 174* 122*   CALCIUM 7.6* 7.5* 7.7*   ANIONGAP 11 9 6     Hepatic: No results for input(s): AST, ALT, BILITOT, BILIDIR, PROT, LABALBU, ALKPHOS in the last 72 hours.   Troponin:   Recent Labs     12/09/21  0840 12/09/21  1255 12/09/21  1601   TROPONINI 0.23* 0.21* 0.24*     BNP: No results for input(s): BNP in the last 72 hours. Lipids: No results for input(s): CHOL, HDL in the last 72 hours. Invalid input(s): LDLCALCU, TRIGLYCERIDE  ABGs:    Recent Labs     12/07/21  1521 12/09/21  0049   PHART 7.351 7.227*   LFC9DJT  --  42.4   PO2ART  --  107.8   NVC2QON  --  17.6*   BEART  --  -10*   [id number]  --  97   TQL8IBT  --  19       INR: No results for input(s): INR in the last 72 hours. Lactate:   Recent Labs     12/09/21  0049   LACTATE 8.22*     Cultures:  -----------------------------------------------------------------  RAD:   CT ABDOMEN PELVIS W IV CONTRAST Additional Contrast? None   Final Result      Significant interval improvement and soft tissue emphysema in the visualized left gluteal region and over the anterior abdominal wall since prior study. Anasarca more prominently of the abdominal wall without suspicious drainable abdominal wall fluid collection. More prominent presacral strandy fluid without suspicious retroperitoneal emphysema to suggest retroperitoneal infection. New small amount of ascites throughout the abdomen and pelvis without suspicious organized intraperitoneal fluid collections. New moderate to large bilateral pleural effusions with associated compressive atelectasis of the lung bases. New trace pericardial effusion. Colostomy in the left lower quadrant without obstruction or complicating features. Interval improvement in bilateral hydronephrosis. CT HEAD WO CONTRAST   Final Result     No acute hemorrhage, hydrocephalus, or mass effect. XR CHEST PORTABLE   Final Result      1. Interval development of bibasilar airspace disease and bilateral pleural effusions since the prior exam.      2.  Interval placement of right PICC catheter with tip extending into the proximal right atrium. 3.  Interval removal of ET and NG tubes from the prior study.       VL DUP LOWER EXTREMITY ARTERIES BILATERAL   Final Result          Assessment/Plan:     Shayla's gangrene/necrotizing fasciitis:  Status post I&D 11/30 and wide excision of perineum/abdominal wall 12/01. S/p excisional debridement of abdomen and left buttock, wound VAC placement 12/03, further debridement and wound VAC placement 12/05  - Continue Unaysn  - ID consulted  - Plan to return to the OR wound vac change week  - Surgery/plastic surgery following     AMS of unclear etiology - resolved  Patient was unresponsive with bradypnea when the code was called. Patient gained consciousness 20 minutes after. Lactic acid went down from 8 to 5. Troponin 0.23. CT head showed   No acute hemorrhage, hydrocephalus, or mass effect. Patient was confused for 1 hour after the episode.  CK normal. Now back on floor   - Lactic acid normalized  - F/u neurology recs, EEG    Left fifth metatarsal diabetic foot ulceration and osteomyelitis  - podiatry following  - Continue IV antibiotics  - PT/OT  - possible surgical intervention pending medical stability, awaiting vascular surgery recs  - Continue wound care     T2DM  - HDSSI  - regular gluc checks  - hypoglycemia protocl      HTN - norvasc    Code Status: Full code   FEN: Low carb diet  PPX: Lovenox   DISPO: Teresita Mayberry MD, PGY-1  12/10/21  6:26 AM    This patient has been staffed and discussed with Siria Chao MD.

## 2021-12-10 NOTE — PROGRESS NOTES
Comprehensive Nutrition Assessment    RECOMMENDATIONS:  PO DIET: Continue Diet NPO per MD for OR  ONS: Restart wound healing supplement John BID when diet adv  Nutrition Education: will provide DM diet educ through adm as able    NUTRITION ASSESSMENT:   Type and Reason for Visit:  Type and Reason for Visit: Reassess   Nutritional summary & status: Noted plans for pt to go to OR for wound vac change and possible debridement today. Pt frequently on/off NPO status for OR. He has good po intakes when able to eat, noted >50% intakes of majority of meals recorded. Noted colostomy on 12/7. Re-send wound healing ONS when diet adv.      Patient admitted d/t westley gangrene    PMH significant for: T2DM    MALNUTRITION ASSESSMENT  Context of Malnutrition: Acute Illness   Malnutrition Status: No malnutrition    NUTRITION DIAGNOSIS   · Increased nutrient needs related to increase demand for energy/nutrients as evidenced by wounds    NUTRITION INTERVENTION  Food and/or Nutrient Delivery:  Continue Current Diet, Start Oral Nutrition Supplement  Nutrition Education/Counseling:  Education needed   Goals:  pt will consistently consume >75% PO intake of meals and supplements offered through adm to meet increased energy needs       Nutrition Monitoring and Evaluation:   Food/Nutrient Intake Outcomes:  Food and Nutrient Intake, Supplement Intake  Physical Signs/Symptoms Outcomes:  Biochemical Data, Weight, Skin     OBJECTIVE DATA: Significant to nutrition assessment  · Nutrition-Focused Physical Findings: Nutrition Related Findings: lbm 12/1   · Labs: Reviewed  · Meds: Reviewed; glycolax, colace  · Wounds: Wound Type: Surgical Incision (Westley's gangrene)     CURRENT NUTRITION THERAPIES  Diet NPO     PO Intake: Average Meal Intake: %   PO Supplement Intake:Average Supplements Intake: None Ordered  IVF: LR @ 100 ml/hr    ANTHROPOMETRICS  Current Height: 5' 11\" (180.3 cm)  Current Weight: 195 lb 12.3 oz (88.8 kg)    Admission weight: 183 lb 13.8 oz (83.4 kg)  Ideal Body Weight (lbs) (Calculated): 172 lbs (Ideal Body Weight (Kg) (Calculated): 78 kg)  Usual Bodyweight KENN   Weight Changes KENN- wt fluctuates       BMI (Calculated): 27.4    Wt Readings from Last 50 Encounters:   12/06/21 194 lb 0.1 oz (88 kg)   11/30/21 163 lb 3.2 oz (74 kg)     COMPARATIVE STANDARDS  Energy (kcal):  0197-1739 (25-30); Weight Used for Energy Requirements:  Current (83)     Protein (g):  125-141 (1.5-1.7); Weight Used for Protein Requirements:  Ideal        Fluid (ml/day):  0193-9123; Method Used for Fluid Requirements:  1 ml/kcal      The patient will still be monitored per nutrition standards of care. Consult dietitian if nutrition interventions essential to patient care is needed.      Yeimi Hogan, 68 [address]: [phone]  Office:  [phone]

## 2021-12-10 NOTE — PROGRESS NOTES
Patient admitted to PACU #9 from OR per bed at 1502 s/p ABDOMINAL WALL AND LEFT BUTTOCK WOUND VAC CHANGE WITH FURTHER DEBRIDEMENT ABDOMEN AND LEFT BUTTOCK WOUND (Left). Report received at bedside in PACU per CRNA and Dr. Eyad Alvarez. Patient was reported to be hypotensive in OR which he was treated for, see anesthesia record. No complications reported. Patient connected to PACU monitoring equipment. IVF's infusing with site unremarkable. Patient arrived to PACU responsive but not fully wakeful from anesthesia but with respirations easy, even and regular with no distress or pain noted. Patient remains on special mattress. Abdominal/left buttocks surgical dressing (wound vac) remains C,D,I with no drainage noted. Pravalon boots placed on patient's feet. Mcfadden catheter remains in place. No further changes. Will continue to monitor.

## 2021-12-10 NOTE — PROGRESS NOTES
Report called to HELIO Lewis receiving patient back on 4 PCU via telephone at [phone] with all information provided along with medications given while in PACU. No further questions noted.

## 2021-12-10 NOTE — PLAN OF CARE
Problem: Respiratory:  Goal: Ability to achieve and maintain a regular respiratory rate will improve  Description: Ability to achieve and maintain a regular respiratory rate will improve  Outcome: Ongoing  Note: Pt on home dose 3L NC of O2. Pt O2 sat above 90%. Pt reports no SOB or dsypnea    Electronically signed by Jovan Jeronimo RN on 12/10/2021 at 3:53 PM       Problem: Skin Integrity:  Goal: Will show no infection signs and symptoms  Description: Will show no infection signs and symptoms  Outcome: Ongoing  Note: Wound vac in place. Pt on IV abx         Problem: Skin Integrity:  Goal: Absence of new skin breakdown  Description: Absence of new skin breakdown  Outcome: Ongoing  Note: No new skin breakdown noted this shift. Pt on specialty mattress. Offloading boots. Electronically signed by Jovan Jeronimo RN on 12/10/2021 at 3:54 PM       Problem: Infection - Surgical Site:  Goal: Will show no infection signs and symptoms  Description: Will show no infection signs and symptoms  Outcome: Ongoing  Note: Wound vac in place. Pt on IV abx         Problem: Falls - Risk of:  Goal: Will remain free from falls  Description: Will remain free from falls  Outcome: Ongoing  Note: Fall precautions in place. Bed alarm on. Call light within reach. Electronically signed by Jovan Jeronimo RN on 12/10/2021 at 3:54 PM       Problem: Pain:  Goal: Pain level will decrease  Description: Pain level will decrease  Outcome: Ongoing  Note: Pt reports no pain during this shift.  Scheduled tylenol given    Electronically signed by Jovan Jeronimo RN on 12/10/2021 at 3:55 PM

## 2021-12-10 NOTE — PROGRESS NOTES
Physical Therapy    Facility/Department: [facility] [address]  Initial Assessment/Treatment    NAME: Leopold Ramming  : 1955  MRN: [id number]    Date of Service: 12/10/2021    Discharge Recommendations: Leopold Ramming scored a 6/24 on the AM-PAC short mobility form. Current research shows that an AM-PAC score of 17 or less is typically not associated with a discharge to the patient's home setting. Based on the patient's AM-PAC score and their current functional mobility deficits, it is recommended that the patient have 3-5 sessions per week of Physical Therapy at d/c to increase the patient's independence. Please see assessment section for further patient specific details. If patient discharges prior to next session this note will serve as a discharge summary. Please see below for the latest assessment towards goals. PT Equipment Recommendations  Equipment Needed:  (if going home, may need medical transport home due to stairs & NWB; continue to assess)    Assessment   Assessment: 64 yo seen with necrotizing fasciitis. Pt not allowed EOB sitting yet per MDs but hopefully soon. OK for bed exercises per surgery team.  Pt performed LE ex well in bed today & gave written copy of HEP to continue working on. Will follow up next week to see if allowed further mobility with therapy. Pt will have many obstacles to returning home, including flight of stairs & being NWB on LLE.   Treatment Diagnosis: impaired mobility  Specific instructions for Next Treatment: clarify with surgery if pt allowed more activity following surgery today  Prognosis: Good  Decision Making: Medium Complexity  PT Education: Home Exercise Program  Patient Education: importance of exercises while confined to bed- verbalized understanding; discussed WB restrictions for when allowed OOB  REQUIRES PT FOLLOW UP: Yes  Activity Tolerance  Activity Tolerance: Patient Tolerated treatment well       Patient Diagnosis(es): The primary encounter diagnosis was Transient alteration of awareness. A diagnosis of Necrotizing fasciitis ([address] was also pertinent to this visit. has no past medical history on file. has a past surgical history that includes Ankle fracture surgery; Abdomen surgery (N/A, 12/1/2021); Scrotal surgery (N/A, 11/30/2021); Vagina surgery (N/A, 11/30/2021); Abdomen surgery (Left, 12/3/2021); Abdomen surgery (Left, 12/5/2021); colostomy (Left, 12/7/2021); and Abdomen surgery (N/A, 12/7/2021). Restrictions  Position Activity Restriction  Other position/activity restrictions: up as tolerated, NWB L LE, WBAT R LE per podiatry note; (verbal direction from surgery on 12/10: OK for exercises in bed, no sitting yet)  Vision/Hearing  Vision: Within Functional Limits  Hearing: Within functional limits     Subjective  General  Family / Caregiver Present: No  Referring Practitioner: Queen Ciara DO  Diagnosis: necrotizing fasciitis  Follows Commands: Within Functional Limits  Subjective  Subjective: Found in bed, agreeable to PT. Confirmed with surgery prior to eval that they do want PT/OT for bed exercises only right now until seal is better on wound vac. No sitting yet. Pt very pleasant & talkative throughout session. States UEs getting swollen. Has been trying to move a little in bed. Had to be NWB once before when broke L foot.   Pain Screening  Patient Currently in Pain: No          Orientation  Orientation  Overall Orientation Status: Within Normal Limits  Social/Functional History  Social/Functional History  Lives With: Spouse  Type of Home: Apartment (2nd floor apt)  Home Layout: One level  Home Access: Stairs to enter with rails  Entrance Stairs - Number of Steps: couple to enter building with rail; up a flight of stairs to 2nd floor apt  Bathroom Shower/Tub: Tub/Shower unit  Bathroom Toilet: Standard (vanity)  Bathroom Equipment: Shower chair  Home Equipment: Cane, Crutches  ADL Assistance: [facility]: Needs assistance (wife does laundry; they share the cleaning & cooking)  Ambulation Assistance: Independent (with cane when going out)  Transfer Assistance: Independent  Active : Yes  Occupation: Retired  Type of occupation: refurbished apartments  [address]: \"I'm a couch potato. 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 No falls PTA. Objective          AROM RLE (degrees)  RLE AROM: WFL  AROM LLE (degrees)  LLE AROM : WFL  Strength RLE  Comment: MMT not performed but appears WFL  Strength LLE  Comment: MMT not performed but appears [facility]              Ambulation  Ambulation?: No        Exercises  Heelslides: x 10 CGA  Hip Abduction: x 5 B indep  Ankle Pumps: x 15 independent B with 20 sec stretch for df on last rep   Gave written copy of exercises.   Plan   Plan  Times per week: 2-5  Specific instructions for Next Treatment: clarify with surgery if pt allowed more activity following surgery today  Safety Devices  Type of devices: Call light within reach, Bed alarm in place, Nurse notified, Left in bed                                                      AM-PAC Score  AM-PAC Inpatient Mobility Raw Score : 6 (12/10/21 1057)  AM-PAC Inpatient T-Scale Score : 23.55 (12/10/21 1057)  Mobility Inpatient CMS 0-100% Score: 100 (12/10/21 1057)  Mobility Inpatient CMS G-Code Modifier : CN (12/10/21 1057)          Goals  Short term goals  Time Frame for Short term goals: dc  Short term goal 1: Demo indep with HEP x 15 reps  Patient Goals   Patient goals : hopes to go home at [address]   Time In 0946         Time Out 1024         Minutes 38           Timed Code Treatment Minutes:   25    Total Treatment Minutes:  [facility]

## 2021-12-10 NOTE — PROGRESS NOTES
PACU Transfer Note    Vitals:    12/10/21 1630   BP: (!) 147/78   Pulse: 85   Resp: 18   Temp: 97.1 °F (36.2 °C)   SpO2: 94%       In: 136 [I.V.:136]  Out: 210 [Urine:200]    Pain assessment:  present - adequately treated  Pain Level: 4    Report given to Receiving unit RN. Patient meets iza discharge criteria from PACU. No further changes.      12/10/2021 4:37 PM

## 2021-12-10 NOTE — PROGRESS NOTES
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  Antibiotics: Unasyn    Admit Date: 12/1/2021  Hospital Day: 10    Subjective:     Patient reports wound / surg site pain mostly controlled  No other complaint     Objective:     Patient Vitals for the past 8 hrs:   BP Temp Temp src Pulse Resp SpO2 Weight   12/10/21 0920 126/67 97.8 °F (36.6 °C) Oral 88 20 93 % --   12/10/21 0448 -- -- -- -- -- -- 195 lb 12.3 oz (88.8 kg)   12/10/21 0432 127/73 98 °F (36.7 °C) Oral 96 20 95 % --     I/O last 3 completed shifts: In: 1101 [P.O.:870; I.V.:231]  Out: 2780 [Urine:1075; Drains:1700; Stool:5]  I/O this shift: In: 0   Out: 225 [Urine:225]    EXAM:  GENERAL: No apparent distress.   RA  HEENT: Membranes moist, no oral lesion  NECK:  Supple, no lymphadenopathy  LUNGS: Clear b/l, no rales, no dullness  CARDIAC: RRR, no murmur appreciated  ABD:  + BS, soft / NT  Wounds - VAC in wound over lower abd, suprapubic region, extending over L scrotum, inguinal fold, posteriorly to buttock  EXT:  No rash, no edema, no lesions  NEURO: No focal neurologic findings  PSYCH: Orientation, sensorium, mood normal  LINES:  R PICC in place       Data Review:  Lab Results   Component Value Date    WBC 15.3 (H) 12/10/2021    HGB 8.5 (L) 12/10/2021    HCT 25.9 (L) 12/10/2021    MCV 91.1 12/10/2021     (H) 12/10/2021     Lab Results   Component Value Date    CREATININE 0.6 (L) 12/10/2021    BUN 12 12/10/2021     12/10/2021    K 4.0 12/10/2021     12/10/2021    CO2 29 12/10/2021       Hepatic Function Panel:   Lab Results   Component Value Date    ALKPHOS 109 12/02/2021    ALT 7 12/02/2021    AST 9 12/02/2021    PROT 4.2 12/02/2021    BILITOT 0.4 12/02/2021    BILIDIR 0.3 12/02/2021    IBILI 0.1 12/02/2021    LABALBU 1.8 12/02/2021       MICRO:  11/30 BC no growth   11/30 SARS CoV-2 NAAT neg  11/30 Tissue cult - no growth   12/1   Wound cult - light mixed skin pam  12/3 Tissue cult - light C glabrata   Tissue cult - light C glabrata    IMAGIN/30 L foot:   Evidence of a septic joint involving the 5th metatarsal phalangeal joint with   associated osteomyelitis      Abd / Pelvic CT   Impression   1. 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  There is   additional muscle or fascial involvement on the left as above.  Of note, the   testes are incompletely evaluated but appear within normal limits.  Critical   results were called by Dr. Tate Moss MD to Dr. Gisela Loyd on 2021   at 19:31.   2. Minimal bilateral hydronephrosis is likely due to severe urinary bladder   distention.  Abd / Pelvic CT  Impression   Significant interval improvement and soft tissue emphysema in the visualized left gluteal region and over the anterior abdominal wall since prior study.       Anasarca more prominently of the abdominal wall without suspicious drainable abdominal wall fluid collection.       More prominent presacral strandy fluid without suspicious retroperitoneal emphysema to suggest retroperitoneal infection.       New small amount of ascites throughout the abdomen and pelvis without suspicious organized intraperitoneal fluid collections.       New moderate to large bilateral pleural effusions with associated compressive atelectasis of the lung bases.       New trace pericardial effusion.       Colostomy in the left lower quadrant without obstruction or complicating features.       Interval improvement in bilateral hydronephrosis.        Scheduled Meds:   sodium chloride  1,000 mL IntraVENous Once    docusate sodium  100 mg Oral BID    polyethylene glycol  17 g Oral Daily    insulin regular  0-18 Units SubCUTAneous 4x Daily AC & HS    amLODIPine  5 mg Oral Daily    ampicillin-sulbactam  3,000 mg IntraVENous Q6H    acetaminophen  1,000 mg Oral Q6H    enoxaparin  40 mg SubCUTAneous Daily    sodium chloride flush  5-40 mL IntraVENous 2 times per day       Continuous Infusions:   lactated ringers 100 mL/hr at 12/10/21 0011    sodium chloride 25 mL (12/09/21 1810)    dextrose         PRN Meds:  ipratropium-albuterol, dextrose, hydrALAZINE, oxyCODONE **OR** oxyCODONE, sodium chloride flush, sodium chloride, glucose, dextrose, glucagon (rDNA), dextrose      Assessment:     64 yo man with DM - not taking meds     Presented with scrotal swelling and pain  Seen 11/30 at [facility] ED, dx DKA, Shayla's gangrene  CT with gas extending   OR debridement  Rx Vancomycin, Cefepime, Metronidazole     Transfer to [facility] on 12/1. Return to OR 12/2,3,5,7. Colostomy 12/7     Reviewed cultures -  11/30 GS GPC, GNDC, GNR, cult neg  12/3,5 light C glabrata.     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   - mixed / polymicrobial infection   - yeast in tissue    Leukocytosis      Plan:     Cont unsyn  Wound care / reconstruction per Surg / Plastics   - OR today for VAC change, poss debridement    At discharge may not iv antibiotic or long course of antibiotics    Medical Decision Making:   The following items were considered in medical decision making:  Discussion of patient care with other providers  Reviewed clinical lab tests  Reviewed radiology tests  Reviewed other diagnostic tests/interventions  Independent review of radiologic images - reviewed initial and f/u CT with Radiologist 12/8  Microbiology cultures and other micro tests reviewed      Discussed with pt  Call with ID issues over weekend  Toño Alvarez MD

## 2021-12-10 NOTE — ANESTHESIA PRE PROCEDURE
Department of Anesthesiology  Preprocedure Note       Name:  Ady Cunningham   Age:  63 y.o.  :  1955                                          MRN:  [id number]         Date:  12/10/2021      Surgeon: Mei Robertson):  Domenica Costa MD    Procedure: Procedure(s):  ABDOMINAL WALL AND LEFT BUTTOCK WOUND VAC CHANGE/ POSSIBLE FURTHER DEBRIDEMENT ABDOMEN AND LEFT BUTTOCK WOUND    Medications prior to admission:   Prior to Admission medications    Medication Sig Start Date End Date Taking? Authorizing Provider   metFORMIN (GLUCOPHAGE) 500 MG tablet Take 500 mg by mouth daily (with breakfast)    Historical Provider, MD   meloxicam (MOBIC) 15 MG tablet Take 1 tablet by mouth daily 16   Debi Godinez DO       Current medications:    No current facility-administered medications for this visit. No current outpatient medications on file.      Facility-Administered Medications Ordered in Other Visits   Medication Dose Route Frequency Provider Last Rate Last Admin    0.9 % sodium chloride bolus  1,000 mL IntraVENous Once Manoj Tucker, DO        docusate sodium (COLACE) capsule 100 mg  100 mg Oral BID Manoj Tucker, DO   100 mg at 12/10/21 0929    polyethylene glycol (GLYCOLAX) packet 17 g  17 g Oral Daily Manoj Tucker, DO   17 g at 21 1512    lactated ringers infusion   IntraVENous Continuous Manoj Tucker  mL/hr at 12/10/21 0011 New Bag at 12/10/21 0011    ipratropium-albuterol (DUONEB) nebulizer solution 1 ampule  1 ampule Inhalation Q4H PRN Manoj Tucker, DO   1 ampule at 12/10/21 1202    dextrose 50 % IV solution  12.5 g IntraVENous PRN Manoj Tucker, DO        insulin regular (HUMULIN R;NOVOLIN R) injection 0-18 Units  0-18 Units SubCUTAneous 4x Daily AC & HS Manoj Tucker, DO   2 Units at 12/10/21 1241    amLODIPine (NORVASC) tablet 5 mg  5 mg Oral Daily Malijean Bullocks, DO   5 mg at 12/10/21 0930    hydrALAZINE (APRESOLINE) injection 10 mg  10 mg IntraVENous ABDOMINAL WALL AND LEFT BUTTOCK DEBRIDEMENT, WOUND VAC PLACEMENT performed by Lindsey Sage MD at [facility] Left 12/5/2021    ABDOMINAL WALL AND LEFT BUTTOCK DEBRIDEMENT, WOUND VAC PLACEMENT performed by Lindsey Sage MD at [address] [facility] N/A 12/7/2021    EVALUATION UNDER ANESTHESIA WITH DEBRIDEMENT ABDOMINAL WOUND AND LEFT BUTTOCK, WOUND VAC CHANGE performed by Lindsey Sage MD at [address] Left 12/7/2021    LAPAROSCOPIC COLOSTOMY CREATION performed by Wicho Kendall MD at [address] N/A 11/30/2021    DEBRIDEMENT OF SCROTAL JAYRO'S GANGRENE [facility] [address] [facility] N/A 11/30/2021    PERINEAL SOFT TISSUE EXCISIONAL DEBRIDEMENT performed by Patricia Arguello MD at [place] [address] History:    Social History     Tobacco Use    Smoking status: Current Every Day Smoker    Smokeless tobacco: Never Used   Substance Use Topics    Alcohol use: Yes     Alcohol/week: 0.0 standard drinks     Comment: social                                 Ready to quit: Not Answered  Counseling given: Not Answered      Vital Signs (Current): There were no vitals filed for this visit.                                            BP Readings from Last 3 Encounters:   12/10/21 126/67   12/07/21 (!) 101/56   12/05/21 105/65       NPO Status:                                                                                 BMI:   Wt Readings from Last 3 Encounters:   12/10/21 195 lb 12.3 oz (88.8 kg)   11/30/21 163 lb 3.2 oz (74 kg)   04/18/16 179 lb 14.3 oz (81.6 kg)     There is no height or weight on file to calculate BMI.    CBC:   Lab Results   Component Value Date    WBC 15.3 12/10/2021    RBC 2.85 12/10/2021    HGB 8.5 12/10/2021    HCT 25.9 12/10/2021    MCV 91.1 12/10/2021    RDW 14.4 12/10/2021     12/10/2021       CMP:   Lab Results   Component Value Date     12/10/2021    K 4.0 12/10/2021     12/10/2021    CO2 29 12/10/2021    BUN 12 12/10/2021    CREATININE 0.6 12/10/2021    GFRAA >60 12/10/2021    AGRATIO 0.7 11/30/2021    LABGLOM >60 12/10/2021    GLUCOSE 122 12/10/2021    PROT 4.8 12/10/2021    CALCIUM 7.7 12/10/2021    BILITOT 0.3 12/10/2021    ALKPHOS 170 12/10/2021    AST 13 12/10/2021    ALT 7 12/10/2021       POC Tests:   Recent Labs     12/09/21  0049 12/09/21  0828 12/10/21  1225   POCGLU 165*   < > 137*   POCNA 138  --   --    POCK 3.9  --   --     < > = values in this interval not displayed.        Coags:   Lab Results   Component Value Date    PROTIME 12.4 12/07/2021    INR 1.09 12/07/2021       HCG (If Applicable): No results found for: PREGTESTUR, PREGSERUM, HCG, HCGQUANT     ABGs:   Lab Results   Component Value Date    PHART 7.227 12/09/2021    PO2ART 107.8 12/09/2021    QCR0IGP 42.4 12/09/2021    KXT5BTB 17.6 12/09/2021    BEART -10 12/09/2021    T7JWVXIP 97 12/09/2021        Type & Screen (If Applicable):  No results found for: LABABO, LABRH    Drug/Infectious Status (If Applicable):  No results found for: HIV, HEPCAB    COVID-19 Screening (If Applicable):   Lab Results   Component Value Date    COVID19 Not Detected 11/30/2021           Anesthesia Evaluation  Patient summary reviewed and Nursing notes reviewed no history of anesthetic complications:   Airway: Mallampati: I  TM distance: <3 FB   Neck ROM: full  Mouth opening: > = 3 FB Dental: normal exam         Pulmonary:Negative Pulmonary ROS and normal exam  breath sounds clear to auscultation                             Cardiovascular:          NYHA Classification: II  ECG reviewed  Rhythm: regular  Rate: normal  Echocardiogram reviewed         Beta Blocker:  Not on Beta Blocker         Neuro/Psych:   Negative Neuro/Psych ROS              GI/Hepatic/Renal: Neg GI/Hepatic/Renal ROS            Endo/Other:    (+) DiabetesType II DM, poorly controlled, , .                 Abdominal:             Vascular: negative vascular ROS. Other Findings:               Anesthesia Plan      general     ASA 3       Induction: intravenous. MIPS: Postoperative opioids intended. Anesthetic plan and risks discussed with patient and spouse. Use of blood products discussed with patient and spouse whom consented to blood products. Plan discussed with CRNA.     Attending anesthesiologist reviewed and agrees with Preprocedure content              Martha East DO   12/10/2021

## 2021-12-10 NOTE — PLAN OF CARE
Problem: Activity:  Goal: Ability to return to normal activity level will improve  Description: Ability to return to normal activity level will improve  Outcome: Ongoing     Problem: Bowel/Gastric:  Goal: Gastrointestinal status for postoperative course will improve  Description: Gastrointestinal status for postoperative course will improve  Outcome: Ongoing  Continue to monitor. Stoma pink. Ostomy bag cdi. Problem: Health Behavior:  Goal: Identification of resources available to assist in meeting health care needs will improve  Description: Identification of resources available to assist in meeting health care needs will improve  Outcome: Ongoing     Problem: Physical Regulation:  Goal: Postoperative complications will be avoided or minimized  Description: Postoperative complications will be avoided or minimized  12/9/2021 2212 by Ilda Nuñez RN  Outcome: Ongoing       Problem: Respiratory:  Goal: Ability to achieve and maintain a regular respiratory rate will improve  Description: Ability to achieve and maintain a regular respiratory rate will improve  Outcome: Ongoing     Problem: Safety:  Goal: Ability to remain free from injury will improve  Description: Ability to remain free from injury will improve  Outcome: Ongoing     Problem: Sensory:  Goal: General experience of comfort will improve  Description: General experience of comfort will improve  Outcome: Ongoing     Problem: Skin Integrity:  Goal: Demonstration of wound healing without infection will improve  Description: Demonstration of wound healing without infection will improve  12/9/2021 2212 by Ilda Nuñez RN  Outcome: Ongoing  WOUND COVERED BY WOUND VAC. INTERMITTENTLY FLUSHED WITH NORMAL SALINE. NO S/SX OF INFECTION NOTED. PT IS AFEBRILE.    Goal: Will show no infection signs and symptoms  Description: Will show no infection signs and symptoms  12/9/2021 2212 by Ilda Nuñez RN  Outcome: Ongoing  PT IS ON IV ABX, afebrile Goal: Absence of new skin breakdown  Description: Absence of new skin breakdown  Outcome: Ongoing     Problem: Infection - Surgical Site:  Goal: Will show no infection signs and symptoms  Description: Will show no infection signs and symptoms  12/9/2021 2212 by Sacha Corona RN  Outcome: Ongoing    Problem: Falls - Risk of:  Goal: Will remain free from falls  Description: Will remain free from falls  12/9/2021 2212 by Sacha Corona RN  Outcome: Ongoing  Fall precautions in place. Bed alarm on. Call light within reach. Pt calls out appropriately    Goal: Absence of physical injury  Description: Absence of physical injury  Outcome: Ongoing     Problem: ABCDS Injury Assessment  Goal: Absence of physical injury  Outcome: Ongoing     Problem: Nutrition  Goal: Optimal nutrition therapy  Outcome: Ongoing     Problem: Pain:  Goal: Pain level will decrease  Description: Pain level will decrease  12/9/2021 2212 by Sacha Corona RN  Outcome: Ongoing  No pain noted. 0/10.      Goal: Control of acute pain  Description: Control of acute pain  Outcome: Ongoing  Goal: Control of chronic pain  Description: Control of chronic pain  Outcome: Ongoing

## 2021-12-10 NOTE — PROGRESS NOTES
Occupational Therapy   Occupational Therapy Initial Assessment/Tx Note  Date: 12/10/2021   Patient Name: Gabrielle Allen  MRN: [id number]     : 1955    Date of Service: 12/10/2021     Assessment: Pt not yet allowed to sit up/mobilize, but tolerated exercises well. PT/OT will f/u and begin to mobilize once OKed by surgical team. Anticipate pt will continued inpt therapies at d/c. Discharge Recommendations: Gabrielle Allen scored a 15/24 on the AM-PAC ADL Inpatient form. Current research shows that an AM-PAC score of 17 or less is typically not associated with a discharge to the patient's home setting. Based on the patient's AM-PAC score and their current ADL deficits, it is recommended that the patient have 3-5 sessions per week of Occupational Therapy at d/c to increase the patient's independence. Please see assessment section for further patient specific details. OT Equipment Recommendations  Equipment Needed: No    Assessment   Performance deficits / Impairments: Decreased functional mobility ; Decreased ADL status; Decreased high-level IADLs; Decreased endurance; Decreased strength  Treatment Diagnosis: Decreased activity tolerance, impaired ADls and mobility  Decision Making: High Complexity  REQUIRES OT FOLLOW UP: Yes  Activity Tolerance  Activity Tolerance: Patient Tolerated treatment well  Safety Devices  Safety Devices in place: Yes  Type of devices: Call light within reach; Left in bed; Nurse notified;  Bed alarm in place          Treatment Diagnosis: Decreased activity tolerance, impaired ADls and mobility      Restrictions  Position Activity Restriction  Other position/activity restrictions: up as tolerated, NWB L LE, WBAT R LE per podiatry note; (verbal direction from surgery on 12/10: OK for exercises in bed, no sitting yet)    Subjective   General  Chart Reviewed: Yes  Patient assessed for rehabilitation services?: Yes  Additional Pertinent Hx: 63 y.o. M admitted  with necrotizing fasciitis to abdominal wall, gluteal region, and scrotum. s/p extensive wide excisions 12/1, 12/3, 12/5, 12/7 with wound vac placement. Colostomy creation 12/7. Returning to OR 12/10. Podiatry: NWB L, FWB R. PMHx includes L foot fracture  Family / Caregiver Present: No  Referring Practitioner: Cynthia Tejada DO  Diagnosis: necrotizing fascitis  Subjective  Subjective: Pt in bed on entry. Very pleasant and cooperative. General Comment  Comments: No c/o pain during session      Social/Functional History  Social/Functional History  Lives With: Spouse  Type of Home: Apartment (2nd floor apt)  Home Layout: One level  Home Access: Stairs to enter with rails  Entrance Stairs - Number of Steps: couple to enter building with rail; up a flight of stairs to 2nd floor apt  Bathroom Shower/Tub: Tub/Shower unit  Bathroom Toilet: Standard (vanity)  Bathroom Equipment: Shower chair  Home Equipment: Cane, Crutches  ADL Assistance: Independent  Homemaking Assistance: Needs assistance (wife does laundry; they share the cleaning & cooking)  Ambulation Assistance: Independent (with cane when going out)  Transfer Assistance: Independent  Active : Yes  Occupation: Retired  Type of occupation: refurbished apartments  [address]: \"I'm a couch potato. 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 No falls PTA. Objective        Orientation  Overall Orientation Status: Within Functional Limits  ADL  Feeding: NPO  Grooming: Modified independent ; Setup  Toileting: Dependent/Total (catheter in place, anticipate pt could participate in using handheld urinal; has new colostomy, would be dependent for management)  Type of ROM/Therapeutic Exercise  Type of ROM/Therapeutic Exercise: AROM  Comment: required cues to attend to task and finish each set. Provided HEP handout with UE and LE exercises to perform independently in room. Pt verb understanding of handout. Will f/u to ensure retention.   Exercises  Shoulder Flexion: 8  Shoulder Extension: 8  Elbow Flexion: 15  Elbow Extension: 15  Wrist Flexion: 5  Wrist Extension: 5  Finger Flexion: 20  Finger Extension: 20     LUE Edema - Circumference (cm)  LUE Edema Present?: Yes  RUE Edema - Circumference (cm)  RUE Edema Present?: Yes     Plan  - If pt discharges prior to next tx, this note will serve as d/c summary.  Continue per POC if pt does not d/c.     Plan  Times per week: 2-5x  Times per day: Daily  Current Treatment Recommendations: Strengthening, ROM, Self-Care / ADL, Patient/Caregiver Education & Training, Safety Education & Training      AM-PAC Score        AM-PAC Inpatient Daily Activity Raw Score: 15 (12/10/21 1058)  AM-PAC Inpatient ADL T-Scale Score : 34.69 (12/10/21 1058)  ADL Inpatient CMS 0-100% Score: 56.46 (12/10/21 1058)  ADL Inpatient CMS G-Code Modifier : CK (12/10/21 1058)    Goals  Short term goals  Time Frame for Short term goals: by D/C  Short term goal 1: Demonstrate independence with HEP - Not met       Therapy Time   Individual Concurrent Group Co-treatment   Time In 0950         Time Out 1030         Minutes 40           Timed Code Tx Min: 25  Total Tx Time: [address], OT

## 2021-12-11 LAB
ANION GAP SERPL CALCULATED.3IONS-SCNC: 8 MMOL/L (ref 3–16)
BASOPHILS ABSOLUTE: 0.1 K/UL (ref 0–0.2)
BASOPHILS RELATIVE PERCENT: 0.6 %
BUN BLDV-MCNC: 10 MG/DL (ref 7–20)
CALCIUM SERPL-MCNC: 7.8 MG/DL (ref 8.3–10.6)
CHLORIDE BLD-SCNC: 103 MMOL/L (ref 99–110)
CO2: 27 MMOL/L (ref 21–32)
CREAT SERPL-MCNC: 0.6 MG/DL (ref 0.8–1.3)
EOSINOPHILS ABSOLUTE: 0.1 K/UL (ref 0–0.6)
EOSINOPHILS RELATIVE PERCENT: 0.9 %
GFR AFRICAN AMERICAN: >60
GFR NON-AFRICAN AMERICAN: >60
GLUCOSE BLD-MCNC: 134 MG/DL (ref 70–99)
GLUCOSE BLD-MCNC: 175 MG/DL (ref 70–99)
GLUCOSE BLD-MCNC: 240 MG/DL (ref 70–99)
GLUCOSE BLD-MCNC: 81 MG/DL (ref 70–99)
GLUCOSE BLD-MCNC: 94 MG/DL (ref 70–99)
HCT VFR BLD CALC: 22.3 % (ref 40.5–52.5)
HEMOGLOBIN: 7.5 G/DL (ref 13.5–17.5)
LYMPHOCYTES ABSOLUTE: 1.2 K/UL (ref 1–5.1)
LYMPHOCYTES RELATIVE PERCENT: 7.6 %
MCH RBC QN AUTO: 30.5 PG (ref 26–34)
MCHC RBC AUTO-ENTMCNC: 33.8 G/DL (ref 31–36)
MCV RBC AUTO: 90.1 FL (ref 80–100)
MONOCYTES ABSOLUTE: 1 K/UL (ref 0–1.3)
MONOCYTES RELATIVE PERCENT: 6.6 %
NEUTROPHILS ABSOLUTE: 13.2 K/UL (ref 1.7–7.7)
NEUTROPHILS RELATIVE PERCENT: 84.3 %
PDW BLD-RTO: 14.7 % (ref 12.4–15.4)
PERFORMED ON: ABNORMAL
PERFORMED ON: ABNORMAL
PERFORMED ON: NORMAL
PERFORMED ON: NORMAL
PLATELET # BLD: 547 K/UL (ref 135–450)
PMV BLD AUTO: 7.8 FL (ref 5–10.5)
POTASSIUM REFLEX MAGNESIUM: 4.2 MMOL/L (ref 3.5–5.1)
RBC # BLD: 2.47 M/UL (ref 4.2–5.9)
SODIUM BLD-SCNC: 138 MMOL/L (ref 136–145)
WBC # BLD: 15.7 K/UL (ref 4–11)

## 2021-12-11 PROCEDURE — 2580000003 HC RX 258: Performed by: STUDENT IN AN ORGANIZED HEALTH CARE EDUCATION/TRAINING PROGRAM

## 2021-12-11 PROCEDURE — 85025 COMPLETE CBC W/AUTO DIFF WBC: CPT

## 2021-12-11 PROCEDURE — 6370000000 HC RX 637 (ALT 250 FOR IP): Performed by: STUDENT IN AN ORGANIZED HEALTH CARE EDUCATION/TRAINING PROGRAM

## 2021-12-11 PROCEDURE — 6360000002 HC RX W HCPCS: Performed by: STUDENT IN AN ORGANIZED HEALTH CARE EDUCATION/TRAINING PROGRAM

## 2021-12-11 PROCEDURE — 94669 MECHANICAL CHEST WALL OSCILL: CPT

## 2021-12-11 PROCEDURE — 1200000000 HC SEMI PRIVATE

## 2021-12-11 PROCEDURE — 80048 BASIC METABOLIC PNL TOTAL CA: CPT

## 2021-12-11 PROCEDURE — 94150 VITAL CAPACITY TEST: CPT

## 2021-12-11 PROCEDURE — 99024 POSTOP FOLLOW-UP VISIT: CPT | Performed by: SURGERY

## 2021-12-11 PROCEDURE — 36415 COLL VENOUS BLD VENIPUNCTURE: CPT

## 2021-12-11 PROCEDURE — 6360000002 HC RX W HCPCS

## 2021-12-11 RX ORDER — HEPARIN SODIUM 5000 [USP'U]/ML
5000 INJECTION, SOLUTION INTRAVENOUS; SUBCUTANEOUS EVERY 8 HOURS SCHEDULED
Status: DISCONTINUED | OUTPATIENT
Start: 2021-12-12 | End: 2022-01-11 | Stop reason: HOSPADM

## 2021-12-11 RX ORDER — INSULIN LISPRO 100 [IU]/ML
0-6 INJECTION, SOLUTION INTRAVENOUS; SUBCUTANEOUS
Status: DISCONTINUED | OUTPATIENT
Start: 2021-12-11 | End: 2021-12-19

## 2021-12-11 RX ORDER — INSULIN LISPRO 100 [IU]/ML
0-3 INJECTION, SOLUTION INTRAVENOUS; SUBCUTANEOUS NIGHTLY
Status: DISCONTINUED | OUTPATIENT
Start: 2021-12-11 | End: 2021-12-19

## 2021-12-11 RX ORDER — INSULIN LISPRO 100 [IU]/ML
3 INJECTION, SOLUTION INTRAVENOUS; SUBCUTANEOUS
Status: DISCONTINUED | OUTPATIENT
Start: 2021-12-11 | End: 2021-12-12

## 2021-12-11 RX ADMIN — ACETAMINOPHEN 1000 MG: 500 TABLET ORAL at 14:28

## 2021-12-11 RX ADMIN — OXYCODONE HYDROCHLORIDE 10 MG: 5 TABLET ORAL at 04:32

## 2021-12-11 RX ADMIN — AMLODIPINE BESYLATE 5 MG: 5 TABLET ORAL at 08:50

## 2021-12-11 RX ADMIN — POLYETHYLENE GLYCOL 3350 17 G: 17 POWDER, FOR SOLUTION ORAL at 08:50

## 2021-12-11 RX ADMIN — SODIUM CHLORIDE, PRESERVATIVE FREE 10 ML: 5 INJECTION INTRAVENOUS at 21:21

## 2021-12-11 RX ADMIN — INSULIN HUMAN 5 UNITS: 100 INJECTION, SOLUTION PARENTERAL at 09:10

## 2021-12-11 RX ADMIN — ACETAMINOPHEN 1000 MG: 500 TABLET ORAL at 21:21

## 2021-12-11 RX ADMIN — AMPICILLIN SODIUM AND SULBACTAM SODIUM 3000 MG: 2; 1 INJECTION, POWDER, FOR SOLUTION INTRAMUSCULAR; INTRAVENOUS at 19:32

## 2021-12-11 RX ADMIN — INSULIN LISPRO 3 UNITS: 100 INJECTION, SOLUTION INTRAVENOUS; SUBCUTANEOUS at 14:48

## 2021-12-11 RX ADMIN — AMPICILLIN SODIUM AND SULBACTAM SODIUM 3000 MG: 2; 1 INJECTION, POWDER, FOR SOLUTION INTRAMUSCULAR; INTRAVENOUS at 06:29

## 2021-12-11 RX ADMIN — OXYCODONE HYDROCHLORIDE 10 MG: 5 TABLET ORAL at 13:28

## 2021-12-11 RX ADMIN — INSULIN HUMAN 9 UNITS: 100 INJECTION, SOLUTION PARENTERAL at 11:38

## 2021-12-11 RX ADMIN — AMPICILLIN SODIUM AND SULBACTAM SODIUM 3000 MG: 2; 1 INJECTION, POWDER, FOR SOLUTION INTRAMUSCULAR; INTRAVENOUS at 14:43

## 2021-12-11 RX ADMIN — ACETAMINOPHEN 1000 MG: 500 TABLET ORAL at 08:50

## 2021-12-11 RX ADMIN — DOCUSATE SODIUM 100 MG: 100 CAPSULE, LIQUID FILLED ORAL at 08:49

## 2021-12-11 RX ADMIN — INSULIN GLARGINE 9 UNITS: 100 INJECTION, SOLUTION SUBCUTANEOUS at 14:48

## 2021-12-11 RX ADMIN — AMPICILLIN SODIUM AND SULBACTAM SODIUM 3000 MG: 2; 1 INJECTION, POWDER, FOR SOLUTION INTRAMUSCULAR; INTRAVENOUS at 01:33

## 2021-12-11 RX ADMIN — SODIUM CHLORIDE, PRESERVATIVE FREE 30 ML: 5 INJECTION INTRAVENOUS at 09:01

## 2021-12-11 RX ADMIN — OXYCODONE HYDROCHLORIDE 10 MG: 5 TABLET ORAL at 23:10

## 2021-12-11 RX ADMIN — ALTEPLASE 1 MG: 2.2 INJECTION, POWDER, LYOPHILIZED, FOR SOLUTION INTRAVENOUS at 18:44

## 2021-12-11 RX ADMIN — DOCUSATE SODIUM 100 MG: 100 CAPSULE, LIQUID FILLED ORAL at 21:21

## 2021-12-11 ASSESSMENT — PAIN SCALES - GENERAL
PAINLEVEL_OUTOF10: 0
PAINLEVEL_OUTOF10: 8
PAINLEVEL_OUTOF10: 0
PAINLEVEL_OUTOF10: 0
PAINLEVEL_OUTOF10: 7
PAINLEVEL_OUTOF10: 6
PAINLEVEL_OUTOF10: 0
PAINLEVEL_OUTOF10: 0
PAINLEVEL_OUTOF10: 3
PAINLEVEL_OUTOF10: 5
PAINLEVEL_OUTOF10: 7
PAINLEVEL_OUTOF10: 0
PAINLEVEL_OUTOF10: 7
PAINLEVEL_OUTOF10: 3

## 2021-12-11 ASSESSMENT — PAIN DESCRIPTION - ONSET: ONSET: ON-GOING

## 2021-12-11 ASSESSMENT — PAIN DESCRIPTION - PROGRESSION: CLINICAL_PROGRESSION: NOT CHANGED

## 2021-12-11 ASSESSMENT — PAIN DESCRIPTION - DESCRIPTORS: DESCRIPTORS: DISCOMFORT;CONSTANT

## 2021-12-11 ASSESSMENT — PAIN DESCRIPTION - PAIN TYPE: TYPE: SURGICAL PAIN

## 2021-12-11 ASSESSMENT — PAIN DESCRIPTION - FREQUENCY: FREQUENCY: CONTINUOUS

## 2021-12-11 ASSESSMENT — PAIN DESCRIPTION - LOCATION: LOCATION: ABDOMEN;RECTUM

## 2021-12-11 NOTE — PLAN OF CARE
Problem: Activity:  Goal: Ability to return to normal activity level will improve  Description: Ability to return to normal activity level will improve  Outcome: Ongoing     Problem: Bowel/Gastric:  Goal: Gastrointestinal status for postoperative course will improve  Description: Gastrointestinal status for postoperative course will improve  Outcome: Ongoing     Problem: Health Behavior:  Goal: Identification of resources available to assist in meeting health care needs will improve  Description: Identification of resources available to assist in meeting health care needs will improve  Outcome: Ongoing     Problem: Physical Regulation:  Goal: Postoperative complications will be avoided or minimized  Description: Postoperative complications will be avoided or minimized  Outcome: Ongoing     Problem: Respiratory:  Goal: Ability to achieve and maintain a regular respiratory rate will improve  Description: Ability to achieve and maintain a regular respiratory rate will improve  12/10/2021 2138 by Ghassan Miller RN  Outcome: Ongoing     Problem: Safety:  Goal: Ability to remain free from injury will improve  Description: Ability to remain free from injury will improve  Outcome: Ongoing     Problem: Sensory:  Goal: General experience of comfort will improve  Description: General experience of comfort will improve  Outcome: Ongoing     Problem: Skin Integrity:  Goal: Demonstration of wound healing without infection will improve  Description: Demonstration of wound healing without infection will improve  Outcome: Ongoing  Afebrile. Continue to turn patient every 2 hours and as needed. Prevalon boots to protect Bilateral heels. Wound vac on abdominal incision.      Goal: Will show no infection signs and symptoms  Description: Will show no infection signs and symptoms  12/10/2021 2138 by Ghassan Miller RN  Outcome: Ongoing  afebrile      Goal: Absence of new skin breakdown  Description: Absence of new skin breakdown  12/10/2021 2138 by Sebastien Potter RN  Outcome: Ongoing  No s/sx of new skin breakdown. Problem: Infection - Surgical Site:  Goal: Will show no infection signs and symptoms  Description: Will show no infection signs and symptoms  12/10/2021 2138 by Sebastien Potter RN  Outcome: Ongoing    Problem: Falls - Risk of:  Goal: Will remain free from falls  Description: Will remain free from falls  12/10/2021 2138 by Sebastien Potter RN  Outcome: Ongoing  No falls noted. Patient educated on fall risk. Call light in reach, bed in lowest position. Goal: Absence of physical injury  Description: Absence of physical injury  Outcome: Ongoing     Problem: ABCDS Injury Assessment  Goal: Absence of physical injury  Outcome: Ongoing     Problem: Nutrition  Goal: Optimal nutrition therapy  12/10/2021 2138 by Sebastien Potter RN  Outcome: Ongoing     Problem: Pain:  Goal: Pain level will decrease  Description: Pain level will decrease  12/10/2021 2138 by Sebastien Potter RN  Outcome: Ongoing  Tylenol scheduled for pain. No pain noted at this time.       Goal: Control of acute pain  Description: Control of acute pain  Outcome: Ongoing  Goal: Control of chronic pain  Description: Control of chronic pain  Outcome: Ongoing

## 2021-12-11 NOTE — PROGRESS NOTES
Progress Note    Admit Date: 12/1/2021  Day: 10   Diet: ADULT DIET; Regular; Low Fat/Low Chol/High Fiber/2 gm Na    CC: Shayla's gangrene     Interval history: Seen and examined at bedside. WV change and debridement yesterday. Tissue culture grew candida glabrata. No other AOE or concerns. HPI:   Stefani Foster is a 62 yo M with DM2 who p/w groin swelling. 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 He underwent debridement, wound vac placement, and diverting colostomy. He is being treated with Vanc and Merrem and followed by surgery and plastic surgery. He is saturating well on 3L NC, complains of some abdominal pain, but otherwise feels okay. Will be transferred to floors with plans to return to OR later this week for wound vac change.     Medications:     Scheduled Meds:   docusate sodium  100 mg Oral BID    polyethylene glycol  17 g Oral Daily    insulin regular  0-18 Units SubCUTAneous 4x Daily AC & HS    amLODIPine  5 mg Oral Daily    ampicillin-sulbactam  3,000 mg IntraVENous Q6H    acetaminophen  1,000 mg Oral Q6H    enoxaparin  40 mg SubCUTAneous Daily    sodium chloride flush  5-40 mL IntraVENous 2 times per day     Continuous Infusions:   lactated ringers 100 mL/hr at 12/10/21 0011    sodium chloride 25 mL (12/10/21 1852)    dextrose       PRN Meds:ipratropium-albuterol, dextrose, hydrALAZINE, oxyCODONE **OR** oxyCODONE, sodium chloride flush, sodium chloride, glucose, dextrose, glucagon (rDNA), dextrose    Objective:   Vitals:   T-max:  Patient Vitals for the past 8 hrs:   BP Temp Temp src Pulse Resp SpO2 Weight   12/11/21 0730 136/70 97.9 °F (36.6 °C) Oral 88 20 96 % --   12/11/21 0554 -- -- -- -- -- -- 196 lb 3.4 oz (89 kg)   12/11/21 0430 125/69 98.1 °F (36.7 °C) Oral 89 20 96 % --   12/11/21 0020 119/68 97.9 °F (36.6 °C) Oral 89 20 95 % --       Intake/Output Summary (Last 24 hours) at 12/11/2021 0811  Last data filed at 12/11/2021 0630  Gross per 24 hour   Intake 786 ml   Output 1775 ml   Net -989 ml       Review of Systems  10 point ROS completed and negative     Physical Exam   Constitutional:       Appearance: Normal appearance. HENT:      Head: Normocephalic.      Mouth/Throat:      Mouth: Mucous membranes are moist.   Eyes:      Extraocular Movements: Extraocular movements intact.      Conjunctiva/sclera: Conjunctivae normal.      Pupils: Pupils are equal, round, and reactive to light. Cardiovascular:      Rate and Rhythm: Normal rate and regular rhythm.      Pulses: Normal pulses.      Heart sounds: Normal heart sounds. Pulmonary:      Effort: Pulmonary effort is normal.      Breath sounds: Normal breath sounds. Abdominal:      General: Abdomen is flat. Bowel sounds are normal.      Palpations: Abdomen is soft.      Comments: Ostomy in place, clean and well dressed,  Wound vac also in place.    Musculoskeletal:         General: Normal range of motion.      Cervical back: Normal range of motion. Feet:      Comments: Bandages noted  Skin:     Comments: Wound vac in place over lower abdomen/pelvis   Neurological:      General: No focal deficit present.      Mental Status: He is alert and oriented to person, place, and time. Mental status is at baseline.    Psychiatric:         Mood and Affect: Mood normal.         Behavior: Behavior normal.     LABS:    CBC:   Recent Labs     12/09/21  0507 12/10/21  0443 12/11/21  0548   WBC 21.2* 15.3* 15.7*   HGB 8.6* 8.5* 7.5*   HCT 26.3* 25.9* 22.3*   * 562* 547*   MCV 92.3 91.1 90.1     Renal:    Recent Labs     12/09/21  0507 12/10/21  0443 12/11/21  0548    137 138   K 4.4 4.0 4.2    102 103   CO2 23 29 27   BUN 12 12 10   CREATININE 0.7* 0.6* 0.6*   GLUCOSE 174* 122* 134*   CALCIUM 7.5* 7.7* 7.8*   ANIONGAP 9 6 8     Hepatic:   Recent Labs     12/10/21  0443   AST 13*   ALT 7*   BILITOT 0.3   BILIDIR <0.2   PROT 4.8*   LABALBU 2.1*   ALKPHOS 170*     Troponin:   Recent Labs ARTERIES BILATERAL   Final Result          Assessment/Plan:     Shayla's gangrene/necrotizing fasciitis:  Status post I&D 11/30 and wide excision of perineum/abdominal wall 12/01. S/p excisional debridement of abdomen and left buttock, wound VAC placement 12/03, further debridement and wound VAC placement 12/05. Grew Candida Glabrata  - Continue Unaysn  - ID consulted, will follow recs given candida growth  - Surgery/plastic surgery following  - further OR potentially Monday    Left fifth metatarsal diabetic foot ulceration and osteomyelitis  - podiatry following  - Continue IV antibiotics  - PT/OT  - possible surgical intervention pending medical stability, awaiting vascular surgery recs  - Continue wound care     T2DM  - HDSSI  - regular gluc checks  - hypoglycemia protocl      AMS of unclear etiology - resolved  Patient was unresponsive with bradypnea when the code was called. Patient gained consciousness 20 minutes after. Lactic acid went down from 8 to 5. Troponin 0.23. CT head showed   No acute hemorrhage, hydrocephalus, or mass effect. Patient was confused for 1 hour after the episode.  CK normal. Now back on floor   - Lactic acid normalized  - EEG wnl      HTN - norvasc    Code Status: Full code   FEN: Low carb diet  PPX: Lovenox   DISPO: Beni Alvarenga MD, PGY-1  12/11/21  8:08 AM    This patient has been staffed and discussed with Sebastien Potter MD.

## 2021-12-11 NOTE — PROGRESS NOTES
Plastic Surgery  Post-operative Note      Procedure(s) Performed: debridement and WV change    Subjective:   Patient's pain is controlled, denies nausea or vomiting. Tolerating diet. UOP adequate via walsh. Objective:  Anesthesia type: General    Vitals:   Vitals:    12/10/21 1630 12/10/21 1653 12/10/21 1945 12/11/21 0020   BP: (!) 147/78 139/76 (!) 144/72 119/68   Pulse: 85 82 90 89   Resp: 18 19 20 20   Temp: 97.1 °F (36.2 °C) 97.7 °F (36.5 °C) 97.9 °F (36.6 °C) 97.9 °F (36.6 °C)   TempSrc: Temporal Oral Oral Oral   SpO2: 94% 97% 96% 95%   Weight:       Height:           Physical Exam:  Post-op vital signs:  Stable   General appearance: alert, no acute distress, grooming appropriate  Eyes: No scleral icterus, EOM grossly intact  Neck: trachea midline, no JVD, no lymphadenopathy, neck supple  Chest/Lungs: normal effort with no accessory muscle use on 4L NC  Cardiovascular: RRR,well perfused  Abdomen: Soft, large surgical debridement area of the abdomen measuring 60 x 23 x 3 cm in gluteal region 20 x 15 cm with veraflo VAC in place holding adequate suction and instilling normal saline. Colostomy is pink, patent, with sweat in appliance. Skin: warm and dry, no rashes  Extremities: full-thickness ulceration on lateral aspect of L foot, no edema, no cyanosis, decreased sensation along entirety of bilateral feet.  No DP/PT pulse appreciated with doppler.   Genitourinary: walsh in place with clear yellow urine, no signs of further necrotizing fasciitis requiring further debridement of L lower scrotum, no subcutaneous crepitus noted, skin edges with healthy granulation tissue with appropriate bleeding, non-malodorous   Neuro: A&Ox3, no focal deficits, sensation intact    Assessment and Plan  This is a 63y.o. year old male status post with Necrotizing fasciitis of the abdominal wall, gluteal region, and scrotum s/p wide excision of perineum, back, and abdominal wall.  Abdominal wall wound measuring 60 cm x 23 cm x 3cm and gluteal wound measures 20 cm x 15cm (12/1), returned to the OR for further debridement or right abdominal wall, and placement of testicle within a thigh flap (12/3), followed by minimal abdominal wall and perineum debridement, sutured protective skin flap in groin for right testicle, and placement of a wound VAC (12/5), s/p diverting colostomy and wound vac change (12/7), s/p debridement and WV change (12/11)    Pain management: Roxicodone pain panel and scheduled tylenol  Cardiovasc: hemodynamically stable, will continue to monitor  Respiratory:  IS ordered to bedside, encourage hourly IS and deep breathing, wean oxygen as tolerated  Fluids:  , Diet: General diet  : Urine output is adequate   Ambulation: OOB to chair, encourage ambulation  Prophylaxis: SCDs, lovenox  Antibiotics: unasyn   Wound: Local wound care      Karli Alvarado MD  PGY1, General Surgery  12/11/21  1:03 AM  [phone]

## 2021-12-11 NOTE — PROGRESS NOTES
Surgery Daily Progress Note      CC: necrotizing fascitis     SUBJECTIVE:  Patient's pain is controlled, denies nausea or vomiting. Tolerating diet. UOP adequate via walsh. ROS:   A 14 point review of systems was conducted, significant findings as noted above. All other systems negative. OBJECTIVE:    PHYSICAL EXAM:  Vitals:    12/11/21 0020 12/11/21 0430 12/11/21 0554 12/11/21 0730   BP: 119/68 125/69  136/70   Pulse: 89 89  88   Resp: 20 20  20   Temp: 97.9 °F (36.6 °C) 98.1 °F (36.7 °C)  97.9 °F (36.6 °C)   TempSrc: Oral Oral  Oral   SpO2: 95% 96%  96%   Weight:   196 lb 3.4 oz (89 kg)    Height:           Post-op vital signs:  Stable   General appearance: alert, no acute distress, grooming appropriate  Eyes: No scleral icterus, EOM grossly intact  Neck: trachea midline, no JVD, no lymphadenopathy, neck supple  Chest/Lungs: normal effort with no accessory muscle use on 4L NC  Cardiovascular: RRR,well perfused  Abdomen: Soft, large surgical debridement area of the abdomen measuring 60 x 23 x 3 cm in gluteal region 20 x 15 cm with veraflo VAC in place holding adequate suction and instilling normal saline. Colostomy is pink, patent, with sweat in appliance. Skin: warm and dry, no rashes  Extremities: full-thickness ulceration on lateral aspect of L foot, no edema, no cyanosis, decreased sensation along entirety of bilateral feet.  No DP/PT pulse appreciated with doppler.   Genitourinary: walsh in place with clear yellow urine, no signs of further necrotizing fasciitis requiring further debridement of L lower scrotum, no subcutaneous crepitus noted, skin edges with healthy granulation tissue with appropriate bleeding, non-malodorous   Neuro: A&Ox3, no focal deficits, sensation intact      ASSESSMENT & PLAN:   Sandra Duffy is a 63 y.o. male status post with Necrotizing fasciitis of the abdominal wall, gluteal region, and scrotum s/p wide excision of perineum, back, and abdominal wall.  Abdominal wall wound measuring 60 cm x 23 cm x 3cm and gluteal wound measures 20 cm x 15cm (12/1), returned to the OR for further debridement or right abdominal wall, and placement of testicle within a thigh flap (12/3), followed by minimal abdominal wall and perineum debridement, sutured protective skin flap in groin for right testicle, and placement of a wound VAC (12/5), s/p diverting colostomy and wound vac change (12/7), s/p debridement and WV change (12/11)    - Continue reg diet  - continue to monitor GI function   - blood sugar control   - further wound care per plastic surgery team  - balance of care per primary    Efra Brown MD, PGY-1  12/11/21 8:04 AM  Pager: [phone]    I have seen, examined, and reviewed the patients chart. I agree with the residents assessment and have made appropriate changes.     Denzel Austin

## 2021-12-11 NOTE — ANESTHESIA POSTPROCEDURE EVALUATION
Department of Anesthesiology  Postprocedure Note    Patient: Anastasia Abbott  MRN: [id number]  [place]: 1955  Date of evaluation: 12/10/2021  Time:  7:26 PM     Procedure Summary     Date: 12/10/21 Room / Location: [address] [place] [address] [address] [place] [address] / [facility]    Anesthesia Start: 1320 Anesthesia Stop: 1504    Procedure: ABDOMINAL WALL AND LEFT BUTTOCK WOUND VAC CHANGE WITH FURTHER DEBRIDEMENT ABDOMEN AND LEFT BUTTOCK WOUND (Left ) Diagnosis: (NECROTIZING FASCIITIS LEFT BUTTOCK AND ABDOMEN)    Surgeons: Israel Fenton MD Responsible Provider: Simón Castano MD    Anesthesia Type: general ASA Status: 3          Anesthesia Type: general    Arcadio Phase I: Arcadio Score: 9    Arcadio Phase II:      Last vitals: Reviewed and per EMR flowsheets.        Anesthesia Post Evaluation    Patient location during evaluation: PACU  Patient participation: complete - patient participated  Level of consciousness: awake and lethargic  Airway patency: patent  Nausea & Vomiting: no nausea and no vomiting  Complications: no  Cardiovascular status: hemodynamically stable and blood pressure returned to baseline  Respiratory status: spontaneous ventilation and nasal cannula  Hydration status: stable

## 2021-12-11 NOTE — PROGRESS NOTES
Plastic Surgery     CC: Shayla's gangrene    SUBJECTIVE:   Interval Hx:  Patient remains afebrile and HDS. No acute events overnight. Otherwise pain is well controlled. ROS: A 10 point review of systems was conducted, significant findings as noted above. All other systems negative. OBJECTIVE:   Vitals:   Vitals:    12/11/21 0020 12/11/21 0430 12/11/21 0554 12/11/21 0730   BP: 119/68 125/69  136/70   Pulse: 89 89  88   Resp: 20 20  20   Temp: 97.9 °F (36.6 °C) 98.1 °F (36.7 °C)  97.9 °F (36.6 °C)   TempSrc: Oral Oral  Oral   SpO2: 95% 96%  96%   Weight:   196 lb 3.4 oz (89 kg)    Height:           I/O:     Intake/Output Summary (Last 24 hours) at 12/11/2021 0806  Last data filed at 12/11/2021 0630  Gross per 24 hour   Intake 786 ml   Output 1775 ml   Net -989 ml     I/O last 3 completed shifts: In: 786 [P.O.:150; I.V.:636]  Out: 2000 [Urine:1025; Drains:950; Stool:15; Blood:10]    Diet: ADULT DIET; Regular; Low Fat/Low Chol/High Fiber/2 gm Na      Physical Examination:   General appearance: alert, no acute distress, groomed appropriately   HENT: NC/AT, MMM & intact, no JVD, neck supple, trachea midline  Eyes: No scleral icterus, EOMI  Chest/Lungs: CTAB, normal effort on 3L NC, no accessory muscles used for breathing. Cardiovascular: RRR  Abdomen: Soft, large surgical debridement area of the abdomen with veraflo VAC in place holding adequate suction and instilling normal saline. Colostomy is pink, patent, with sweat in appliance. Extremities:  full-thickness ulceration on lateral aspect of L foot, no edema, no cyanosis, decreased sensation along entirety of bilateral feet. No DP/PT pulse appreciated with doppler. : walsh in place with clear yellow urine Vac intact with good seal  Neurologic: A&Ox3, decrease sensation to bilateral lower extremities.      Labs:  CBC:   Recent Labs     12/09/21  0507 12/10/21  0443 12/11/21  0548   WBC 21.2* 15.3* 15.7*   HGB 8.6* 8.5* 7.5*   HCT 26.3* 25.9* 22.3*   PLT 515* 562* 547*       BMP:   Recent Labs     12/09/21  0507 12/10/21  0443 12/11/21  0548    137 138   K 4.4 4.0 4.2    102 103   CO2 23 29 27   BUN 12 12 10   CREATININE 0.7* 0.6* 0.6*   GLUCOSE 174* 122* 134*     LFT's:   Recent Labs     12/10/21  0443   AST 13*   ALT 7*   BILITOT 0.3   ALKPHOS 170*     Troponin:   Recent Labs     12/09/21  0840 12/09/21  1255 12/09/21  1601   TROPONINI 0.23* 0.21* 0.24*     BNP: No results for input(s): BNP in the last 72 hours. ABGs:   Recent Labs     12/09/21  0049   PHART 7.227*   ZMV3OKM 42.4   PO2ART 107.8     INR:   No results for input(s): INR in the last 72 hours. U/A:No results for input(s): NITRITE, COLORU, PHUR, LABCAST, WBCUA, RBCUA, MUCUS, TRICHOMONAS, YEAST, BACTERIA, CLARITYU, SPECGRAV, LEUKOCYTESUR, UROBILINOGEN, BILIRUBINUR, BLOODU, GLUCOSEU, AMORPHOUS in the last 72 hours. Invalid input(s): Danita Senior     Rad:   CT ABDOMEN PELVIS W IV CONTRAST Additional Contrast? None   Final Result      Significant interval improvement and soft tissue emphysema in the visualized left gluteal region and over the anterior abdominal wall since prior study. Anasarca more prominently of the abdominal wall without suspicious drainable abdominal wall fluid collection. More prominent presacral strandy fluid without suspicious retroperitoneal emphysema to suggest retroperitoneal infection. New small amount of ascites throughout the abdomen and pelvis without suspicious organized intraperitoneal fluid collections. New moderate to large bilateral pleural effusions with associated compressive atelectasis of the lung bases. New trace pericardial effusion. Colostomy in the left lower quadrant without obstruction or complicating features. Interval improvement in bilateral hydronephrosis. CT HEAD WO CONTRAST   Final Result     No acute hemorrhage, hydrocephalus, or mass effect. XR CHEST PORTABLE   Final Result      1. Interval development of bibasilar airspace disease and bilateral pleural effusions since the prior exam.      2.  Interval placement of right PICC catheter with tip extending into the proximal right atrium. 3.  Interval removal of ET and NG tubes from the prior study. VL DUP LOWER EXTREMITY ARTERIES BILATERAL   Final Result          ASSESSMENT AND PLAN:   Aydin Zarate is a 63 y.o. male with Necrotizing fasciitis of the abdominal wall, gluteal region, and scrotum s/p wide excision of perineum, back, and abdominal wall. Abdominal wall wound measuring 60 cm x 23 cm x 3cm and gluteal wound measures 20 cm x 15cm (12/1), returned to the OR for further debridement or right abdominal wall, and placement of testicle within a thigh flap (12/3), followed by minimal abdominal wall and perineum debridement, sutured protective skin flap in groin for right testicle, and placement of a wound VAC (12/5), s/p diverting colostomy and wound vac change (12/7)    - Continue instill wound vac, instilling normal saline   - Continue with aggressive glucose control  - ID on board, appreciate recommendations:   - Continue antibiotics (Unasyn)  - Increased leukocytosis this morning to 15.7 from 15.3  . Need to have carb controlled diet (changes made)   - Patient will require wound vac change in OR, likely to occur on Monday     Efra Brown MD, PGY-1  12/11/21 8:06 AM  Pager: [phone]    I saw and independently examined the patient today. I agree with the history of present illness, past medical/surgical histories, family history, social history, medication list and allergies as listed. The review of systems is as noted above. My physical exam confirms the findings listed above. Review of labs, pathology reports, radiology reports and medical records confirm the findings noted above.  I edited the note where appropriate in italics, strikethrough font, or underline.    - Will work with PT/OT for ambulation assistance to get out of bed. - DC Lovenox and transition to heparin subQ. - Need to increase protein supplementation in diet - 1.5g/kg body weight minimum.  - ac change in OR next week.     Bruno Lucero MD  [address] [place] [address] [address] Reconstructive Surgery  [phone]  12/11/21

## 2021-12-11 NOTE — PROGRESS NOTES
b/l.     NEUROLOGIC:   Gross and epicritic sensation is diminished b/l. Protective sensation is diminished at all pedal sites b/l.     DERMATOLOGIC:   Patient provided verbal consent for photos to be obtained today:  Left lower extremity:        Full-thickness ulceration noted to the lateral aspect of the left foot. Wound measures approximately 3.2 cm x 2 cm x 0.5 cm. Wound base exposed bone, desiccated with necrotic edges at the proximal and distal edges. Wound probes to bone with exposed fifth metatarsal.  No tracking or tunneling noted. No purulent drainage noted. No fluctuance or crepitus or malodor noted. Maroon/red and black discoloration noted distal tip of left fifth digit. Wound is stable. Anterior aspect of ankle joint left foot. Deep tissue injury with rubor discoloration noted 2/2 Prevalon boot strap. Intact epithelialized tissue noted. No crepitus, erythema, drainage, or malodor noted. No open wound noted. Stable without acute infection noted. Right lower extremity    Styloid process fifth metatarsal deep tissue injury. Purplish circumferential discoloration noted. No open wound. No fluctuance, crepitus, malodor, or drainage noted. No acute signs infection noted. Lateral malleolus deep tissue injury. Purplish circumferential discoloration noted. No open wound. No fluctuance, crepitus, malodor, or drainage noted. No acute signs infection noted. Pressure injury with purple discoloration noted to the dorsal aspect of the midfoot, right lower extremity. No open wound. No fluctuance, crepitus, malodor, or drainage noted. No acute signs of infection noted. MUSCULOSKELETAL:   Muscle strength 4/5 for all pedal muscle groups B/L LE. No pain with palpation of the left foot.      IMAGING:  XR LEFT FOOT 11/30/2021  Narrative   EXAMINATION:   THREE XRAY VIEWS OF THE LEFT FOOT       11/30/2021 1:44 pm       COMPARISON:   None.       HISTORY:   ORDERING SYSTEM PROVIDED HISTORY: wound TECHNOLOGIST PROVIDED HISTORY:   Reason for exam:->wound   Reason for Exam: blood sugar problem, wound check on lateral portion of foot   Acuity: Acute   Type of Exam: Initial       FINDINGS:   Osseous destruction along the articular margins of the 5th   metatarsophalangeal joint with associated lucency in the soft tissues. .   There is no evidence of fracture or dislocation. . The remaining joint spaces   appear well maintained. The remaining soft tissues are unremarkable.           Impression   Evidence of a septic joint involving the 5th metatarsal phalangeal joint with   associated osteomyelitis         ARTERIAL DUPLEX 12/2/21     Type of Study:        Extremities Arteries:Lower Extremities Arterial Duplex, VL LOWER EXTREMITY    ARTERIES DUPLEX BILATERAL.       Tech Comments   Right   The right ankle/brachial index is 0.0 (no Doppler signal could be heard at the   [facility] or the PT). The common femoral and profunda femoral arteries could not be visualized due   to bandages extending into the groin area. The mid superficial femoral artery has a short segmental occlusion and then is   reconstituted. Elevated velocities at the distal superficial femoral artery indicate a > 50%   stenosis by velocity criteria (velocity went from 15 to 298 cm/s). The posterior tibial artery is occluded. The distal anterior tibial artery is barely patent, with very low flow   (possibly occluded and then reconstituted). Left   The left ankle brachial index is 0 for the PT and the DP was not accessible   due to the bandages on the foot. The common femoral and profunda femoral artery could not be visualized due to   bandages extending into the groin area. The distal superficial femoral artery is occluded and then reconstituted. The posterior tibial artery, peroneal, and anterior tibial artery are   occluded. There were no previous studies to use for comparison.        ASSESSMENT/PLAN:   -Full-thickness ulceration; lateral left foot-Sands stage III  -Deep tissue injury, styloid process fifth metatarsal base right foot  -Deep tissue injury, lateral malleolus right ankle  -Pressure injury, dorsal right midfoot -NPUAP Stage I  -Osteomyelitis of the fifth metatarsal; left foot  -Critical limb ischemia; bilateral lower extremity  -Diabetes mellitus, type II  -History of noncompliance    -Patient seen and examined at bedside this a.m.  -VSS on 4 L of O2 via nasal cannula; leukocytosis noted.  -All imaging reviewed see impression above. -Vascular surgery following; will await further stabilization with the general surgery team and plastic surgery team before offering vascular intervention.  -Plastic surgery following  -Infectious disease following, continue Unasyn and at discharge may not need IV antibiotics or long course of antibiotics. -Left lower extremity dressed with Betadine soaked gauze, DSD, and tape. -Right lower extremity applied to Mepilex borders to deep tissue injury.  -Prevalon boots reapplied. Patient is to wear at all times while in bed to prevent further deep tissue injury. Ankle strap removed from bilateral boot as the ankle straps were causing pressure to his feet. -Nonweightbearing to left lower extremity.  -Weightbearing as tolerated to right lower extremity. DISPO: Full-thickness ulceration with underlying osteomyelitis to the left fifth metatarsal. Critical limb ischemia bilateral lower extremity. Vascular following; awaiting further stabilization at this time. Patient will eventually require podiatric surgical intervention but timing will depend on medical stability and vascular recommendations. Will continue to closely monitor patient and follow while in house.     Patient assessment and plan was discussed with Dr. Daquan Garcia DPM.    Anh Mendez DPM   Podiatric Resident PGY1  Pager [phone] or [employer]  12/11/2021, 9:14 AM

## 2021-12-11 NOTE — PLAN OF CARE
Problem: Activity:  Goal: Ability to return to normal activity level will improve  Description: Ability to return to normal activity level will improve  12/11/2021 0826 by Marcelino Spurling  Outcome: Ongoing  12/10/2021 2138 by Janice Arriaga RN  Outcome: Ongoing     Problem:  Bowel/Gastric:  Goal: Gastrointestinal status for postoperative course will improve  Description: Gastrointestinal status for postoperative course will improve  12/11/2021 0826 by Marcelino Spurling  Outcome: Ongoing  12/10/2021 2138 by Janice Arriaga RN  Outcome: Ongoing     Problem: Health Behavior:  Goal: Identification of resources available to assist in meeting health care needs will improve  Description: Identification of resources available to assist in meeting health care needs will improve  12/11/2021 0826 by Marcelino Spurling  Outcome: Ongoing  12/10/2021 2138 by Janice Arriaga RN  Outcome: Ongoing     Problem: Physical Regulation:  Goal: Postoperative complications will be avoided or minimized  Description: Postoperative complications will be avoided or minimized  12/11/2021 0826 by Marcelino Spurling  Outcome: Ongoing  12/10/2021 2138 by Janice Arriaga RN  Outcome: Ongoing     Problem: Respiratory:  Goal: Ability to achieve and maintain a regular respiratory rate will improve  Description: Ability to achieve and maintain a regular respiratory rate will improve  12/11/2021 0826 by Marcelino Spurling  Outcome: Ongoing  12/10/2021 2138 by Janice Arriaga RN  Outcome: Ongoing     Problem: Safety:  Goal: Ability to remain free from injury will improve  Description: Ability to remain free from injury will improve  12/11/2021 0826 by Marcelino Spurling  Outcome: Ongoing  12/10/2021 2138 by Janice Arriaga RN  Outcome: Ongoing     Problem: Sensory:  Goal: General experience of comfort will improve  Description: General experience of comfort will improve  12/11/2021 0826 by Marcelino Spurling  Outcome: Ongoing  12/10/2021 2138 by Amy Italo Hdz RN  Outcome: Ongoing     Problem: Skin Integrity:  Goal: Demonstration of wound healing without infection will improve  Description: Demonstration of wound healing without infection will improve  12/11/2021 0826 by Brandon Hoffmann  Outcome: Ongoing  12/10/2021 2138 by Claudia Powell RN  Outcome: Ongoing  Goal: Will show no infection signs and symptoms  Description: Will show no infection signs and symptoms  12/11/2021 0826 by Brandon Hoffmann  Outcome: Ongoing  12/10/2021 2138 by Claudia Powell RN  Outcome: Ongoing  Goal: Absence of new skin breakdown  Description: Absence of new skin breakdown  12/11/2021 0826 by Brandon Hoffmann  Outcome: Ongoing  12/10/2021 2138 by Claudia Powell RN  Outcome: Ongoing     Problem: Infection - Surgical Site:  Goal: Will show no infection signs and symptoms  Description: Will show no infection signs and symptoms  12/11/2021 0826 by Brandon Hoffmann  Outcome: Ongoing  12/10/2021 2138 by Claudia Powell RN  Outcome: Ongoing     Problem: Falls - Risk of:  Goal: Will remain free from falls  Description: Will remain free from falls  12/11/2021 0826 by Brandon Hoffmann  Outcome: Ongoing  12/10/2021 2138 by Claudia Powell RN  Outcome: Ongoing  Goal: Absence of physical injury  Description: Absence of physical injury  12/11/2021 0826 by Brandon Hoffmann  Outcome: Ongoing  12/10/2021 2138 by Claudia Powell RN  Outcome: Ongoing     Problem: ABCDS Injury Assessment  Goal: Absence of physical injury  12/11/2021 0826 by Brandon Hoffmann  Outcome: Ongoing  12/10/2021 2138 by Claudia Powell RN  Outcome: Ongoing     Problem: Nutrition  Goal: Optimal nutrition therapy  12/11/2021 0826 by Brandon Hoffmann  Outcome: Ongoing  12/10/2021 2138 by Claudia Powell RN  Outcome: Ongoing     Problem: Pain:  Goal: Pain level will decrease  Description: Pain level will decrease  12/11/2021 0826 by Brandon Hoffmann  Outcome: Ongoing  12/10/2021 2138 by Claudia Poewll RN  Outcome: Ongoing  Goal: Control of acute pain  Description: Control of acute pain  12/11/2021 0826 by Amanuel Heck  Outcome: Ongoing  12/10/2021 2138 by Dahlia Dozier RN  Outcome: Ongoing  Goal: Control of chronic pain  Description: Control of chronic pain  12/11/2021 0826 by Amanuel Heck  Outcome: Ongoing  12/10/2021 2138 by Dahlia Dozier RN  Outcome: Ongoing

## 2021-12-11 NOTE — PROGRESS NOTES
Physician Progress Note      PATIENT:               Yareli Pugh  [facility] #:                  [id number]  :                       1955  ADMIT DATE:       2021 3:12 PM  Ashok Tyler DATE:        2021 3:20 PM  RESPONDING  PROVIDER #:        Karolyn Ortiz MD          QUERY TEXT:    Patient admitted with Shayla's gangrene. Documentation reflects sepsis in   the H&P but then dropped. If possible, please document in the progress notes   and discharge summary if sepsis was: The medical record reflects the following:  Risk Factors: osteomyelitis, Shayla's gangrene, DKA  Clinical Indicators: WBC 18.4, lactic acid 3, procal 9.56, acute respiratory   failure, vitals on 1600-: T 97, RR 8-29, HR , SBP , SPO2   %  Treatment: IV Clindamycin/Merrem, IVF with bolus, serial labs, I&D, cultures,   Levophed, supportive care    Thank you,  Maritza Akers RN, [facility]  [phone]  Options provided:  -- Sepsis confirmed after study  -- Sepsis ruled out after study  -- Other - I will add my own diagnosis  -- Disagree - Not applicable / Not valid  -- Disagree - Clinically unable to determine / Unknown  -- Refer to Clinical Documentation Reviewer    PROVIDER RESPONSE TEXT:    Sepsis confirmed after study.     Query created by: Magalie Carmen on 2021 2:45 PM      Electronically signed by:  Karolyn Ortiz MD 2021 10:57 AM

## 2021-12-12 LAB
ABO/RH: NORMAL
ANION GAP SERPL CALCULATED.3IONS-SCNC: 6 MMOL/L (ref 3–16)
ANTIBODY SCREEN: NORMAL
BASOPHILS ABSOLUTE: 0.1 K/UL (ref 0–0.2)
BASOPHILS RELATIVE PERCENT: 1 %
BLOOD BANK DISPENSE STATUS: NORMAL
BLOOD BANK PRODUCT CODE: NORMAL
BPU ID: NORMAL
BUN BLDV-MCNC: 10 MG/DL (ref 7–20)
CALCIUM SERPL-MCNC: 7.4 MG/DL (ref 8.3–10.6)
CHLORIDE BLD-SCNC: 103 MMOL/L (ref 99–110)
CO2: 27 MMOL/L (ref 21–32)
CREAT SERPL-MCNC: 0.5 MG/DL (ref 0.8–1.3)
DESCRIPTION BLOOD BANK: NORMAL
EOSINOPHILS ABSOLUTE: 0.2 K/UL (ref 0–0.6)
EOSINOPHILS RELATIVE PERCENT: 1.6 %
GFR AFRICAN AMERICAN: >60
GFR NON-AFRICAN AMERICAN: >60
GLUCOSE BLD-MCNC: 109 MG/DL (ref 70–99)
GLUCOSE BLD-MCNC: 132 MG/DL (ref 70–99)
GLUCOSE BLD-MCNC: 183 MG/DL (ref 70–99)
GLUCOSE BLD-MCNC: 74 MG/DL (ref 70–99)
GLUCOSE BLD-MCNC: 78 MG/DL (ref 70–99)
HCT VFR BLD CALC: 20.4 % (ref 40.5–52.5)
HEMOGLOBIN: 6.7 G/DL (ref 13.5–17.5)
LYMPHOCYTES ABSOLUTE: 1.4 K/UL (ref 1–5.1)
LYMPHOCYTES RELATIVE PERCENT: 12.9 %
MCH RBC QN AUTO: 30 PG (ref 26–34)
MCHC RBC AUTO-ENTMCNC: 33.1 G/DL (ref 31–36)
MCV RBC AUTO: 90.7 FL (ref 80–100)
MONOCYTES ABSOLUTE: 1 K/UL (ref 0–1.3)
MONOCYTES RELATIVE PERCENT: 8.8 %
NEUTROPHILS ABSOLUTE: 8.4 K/UL (ref 1.7–7.7)
NEUTROPHILS RELATIVE PERCENT: 75.7 %
PDW BLD-RTO: 14.5 % (ref 12.4–15.4)
PERFORMED ON: ABNORMAL
PERFORMED ON: NORMAL
PLATELET # BLD: 509 K/UL (ref 135–450)
PMV BLD AUTO: 7.6 FL (ref 5–10.5)
POTASSIUM REFLEX MAGNESIUM: 3.8 MMOL/L (ref 3.5–5.1)
RBC # BLD: 2.25 M/UL (ref 4.2–5.9)
SODIUM BLD-SCNC: 136 MMOL/L (ref 136–145)
WBC # BLD: 11.1 K/UL (ref 4–11)

## 2021-12-12 PROCEDURE — 2580000003 HC RX 258: Performed by: STUDENT IN AN ORGANIZED HEALTH CARE EDUCATION/TRAINING PROGRAM

## 2021-12-12 PROCEDURE — 99024 POSTOP FOLLOW-UP VISIT: CPT | Performed by: SURGERY

## 2021-12-12 PROCEDURE — 6370000000 HC RX 637 (ALT 250 FOR IP): Performed by: STUDENT IN AN ORGANIZED HEALTH CARE EDUCATION/TRAINING PROGRAM

## 2021-12-12 PROCEDURE — 6360000002 HC RX W HCPCS: Performed by: STUDENT IN AN ORGANIZED HEALTH CARE EDUCATION/TRAINING PROGRAM

## 2021-12-12 PROCEDURE — 86850 RBC ANTIBODY SCREEN: CPT

## 2021-12-12 PROCEDURE — 6370000000 HC RX 637 (ALT 250 FOR IP): Performed by: INTERNAL MEDICINE

## 2021-12-12 PROCEDURE — 86923 COMPATIBILITY TEST ELECTRIC: CPT

## 2021-12-12 PROCEDURE — P9016 RBC LEUKOCYTES REDUCED: HCPCS

## 2021-12-12 PROCEDURE — 94669 MECHANICAL CHEST WALL OSCILL: CPT

## 2021-12-12 PROCEDURE — 36415 COLL VENOUS BLD VENIPUNCTURE: CPT

## 2021-12-12 PROCEDURE — 2700000000 HC OXYGEN THERAPY PER DAY

## 2021-12-12 PROCEDURE — 94664 DEMO&/EVAL PT USE INHALER: CPT

## 2021-12-12 PROCEDURE — 85025 COMPLETE CBC W/AUTO DIFF WBC: CPT

## 2021-12-12 PROCEDURE — 86900 BLOOD TYPING SEROLOGIC ABO: CPT

## 2021-12-12 PROCEDURE — 80048 BASIC METABOLIC PNL TOTAL CA: CPT

## 2021-12-12 PROCEDURE — 36430 TRANSFUSION BLD/BLD COMPNT: CPT

## 2021-12-12 PROCEDURE — 1200000000 HC SEMI PRIVATE

## 2021-12-12 PROCEDURE — 86901 BLOOD TYPING SEROLOGIC RH(D): CPT

## 2021-12-12 RX ORDER — SODIUM CHLORIDE, SODIUM LACTATE, POTASSIUM CHLORIDE, CALCIUM CHLORIDE 600; 310; 30; 20 MG/100ML; MG/100ML; MG/100ML; MG/100ML
INJECTION, SOLUTION INTRAVENOUS CONTINUOUS
Status: DISCONTINUED | OUTPATIENT
Start: 2021-12-13 | End: 2021-12-13

## 2021-12-12 RX ORDER — POTASSIUM CHLORIDE 20 MEQ/1
20 TABLET, EXTENDED RELEASE ORAL ONCE
Status: COMPLETED | OUTPATIENT
Start: 2021-12-12 | End: 2021-12-12

## 2021-12-12 RX ORDER — INSULIN LISPRO 100 [IU]/ML
5 INJECTION, SOLUTION INTRAVENOUS; SUBCUTANEOUS
Status: DISCONTINUED | OUTPATIENT
Start: 2021-12-12 | End: 2021-12-13

## 2021-12-12 RX ORDER — INSULIN LISPRO 100 [IU]/ML
5 INJECTION, SOLUTION INTRAVENOUS; SUBCUTANEOUS
Status: DISCONTINUED | OUTPATIENT
Start: 2021-12-12 | End: 2021-12-12 | Stop reason: SDUPTHER

## 2021-12-12 RX ORDER — SODIUM CHLORIDE 9 MG/ML
INJECTION, SOLUTION INTRAVENOUS PRN
Status: DISCONTINUED | OUTPATIENT
Start: 2021-12-12 | End: 2022-01-11 | Stop reason: HOSPADM

## 2021-12-12 RX ADMIN — SODIUM CHLORIDE 25 ML: 9 INJECTION, SOLUTION INTRAVENOUS at 08:50

## 2021-12-12 RX ADMIN — SODIUM CHLORIDE, PRESERVATIVE FREE 10 ML: 5 INJECTION INTRAVENOUS at 10:18

## 2021-12-12 RX ADMIN — INSULIN LISPRO 5 UNITS: 100 INJECTION, SOLUTION INTRAVENOUS; SUBCUTANEOUS at 17:41

## 2021-12-12 RX ADMIN — SODIUM CHLORIDE, PRESERVATIVE FREE 10 ML: 5 INJECTION INTRAVENOUS at 20:10

## 2021-12-12 RX ADMIN — ACETAMINOPHEN 1000 MG: 500 TABLET ORAL at 17:39

## 2021-12-12 RX ADMIN — ACETAMINOPHEN 1000 MG: 500 TABLET ORAL at 03:52

## 2021-12-12 RX ADMIN — INSULIN GLARGINE 9 UNITS: 100 INJECTION, SOLUTION SUBCUTANEOUS at 10:11

## 2021-12-12 RX ADMIN — ACETAMINOPHEN 1000 MG: 500 TABLET ORAL at 21:50

## 2021-12-12 RX ADMIN — HEPARIN SODIUM 5000 UNITS: 5000 INJECTION INTRAVENOUS; SUBCUTANEOUS at 13:48

## 2021-12-12 RX ADMIN — OXYCODONE HYDROCHLORIDE 10 MG: 5 TABLET ORAL at 08:45

## 2021-12-12 RX ADMIN — AMPICILLIN SODIUM AND SULBACTAM SODIUM 3000 MG: 2; 1 INJECTION, POWDER, FOR SOLUTION INTRAMUSCULAR; INTRAVENOUS at 08:52

## 2021-12-12 RX ADMIN — SODIUM CHLORIDE, POTASSIUM CHLORIDE, SODIUM LACTATE AND CALCIUM CHLORIDE: 600; 310; 30; 20 INJECTION, SOLUTION INTRAVENOUS at 03:51

## 2021-12-12 RX ADMIN — AMPICILLIN SODIUM AND SULBACTAM SODIUM 3000 MG: 2; 1 INJECTION, POWDER, FOR SOLUTION INTRAMUSCULAR; INTRAVENOUS at 20:11

## 2021-12-12 RX ADMIN — INSULIN LISPRO 1 UNITS: 100 INJECTION, SOLUTION INTRAVENOUS; SUBCUTANEOUS at 13:27

## 2021-12-12 RX ADMIN — HEPARIN SODIUM 5000 UNITS: 5000 INJECTION INTRAVENOUS; SUBCUTANEOUS at 21:50

## 2021-12-12 RX ADMIN — ACETAMINOPHEN 1000 MG: 500 TABLET ORAL at 11:10

## 2021-12-12 RX ADMIN — INSULIN LISPRO 5 UNITS: 100 INJECTION, SOLUTION INTRAVENOUS; SUBCUTANEOUS at 13:47

## 2021-12-12 RX ADMIN — AMPICILLIN SODIUM AND SULBACTAM SODIUM 3000 MG: 2; 1 INJECTION, POWDER, FOR SOLUTION INTRAMUSCULAR; INTRAVENOUS at 01:17

## 2021-12-12 RX ADMIN — AMPICILLIN SODIUM AND SULBACTAM SODIUM 3000 MG: 2; 1 INJECTION, POWDER, FOR SOLUTION INTRAMUSCULAR; INTRAVENOUS at 13:50

## 2021-12-12 RX ADMIN — OXYCODONE HYDROCHLORIDE 10 MG: 5 TABLET ORAL at 13:51

## 2021-12-12 RX ADMIN — AMLODIPINE BESYLATE 5 MG: 5 TABLET ORAL at 08:45

## 2021-12-12 RX ADMIN — DOCUSATE SODIUM 100 MG: 100 CAPSULE, LIQUID FILLED ORAL at 08:44

## 2021-12-12 RX ADMIN — DOCUSATE SODIUM 100 MG: 100 CAPSULE, LIQUID FILLED ORAL at 20:10

## 2021-12-12 RX ADMIN — OXYCODONE HYDROCHLORIDE 10 MG: 5 TABLET ORAL at 17:48

## 2021-12-12 RX ADMIN — POTASSIUM CHLORIDE 20 MEQ: 1500 TABLET, EXTENDED RELEASE ORAL at 08:45

## 2021-12-12 RX ADMIN — POLYETHYLENE GLYCOL 3350 17 G: 17 POWDER, FOR SOLUTION ORAL at 08:44

## 2021-12-12 ASSESSMENT — PAIN DESCRIPTION - FREQUENCY: FREQUENCY: CONTINUOUS

## 2021-12-12 ASSESSMENT — PAIN SCALES - GENERAL
PAINLEVEL_OUTOF10: 7
PAINLEVEL_OUTOF10: 6
PAINLEVEL_OUTOF10: 7
PAINLEVEL_OUTOF10: 7
PAINLEVEL_OUTOF10: 3
PAINLEVEL_OUTOF10: 7
PAINLEVEL_OUTOF10: 0
PAINLEVEL_OUTOF10: 6

## 2021-12-12 ASSESSMENT — PAIN DESCRIPTION - LOCATION: LOCATION: ABDOMEN;PERINEUM

## 2021-12-12 ASSESSMENT — PAIN DESCRIPTION - PAIN TYPE: TYPE: SURGICAL PAIN;ACUTE PAIN

## 2021-12-12 ASSESSMENT — PAIN DESCRIPTION - PROGRESSION: CLINICAL_PROGRESSION: NOT CHANGED

## 2021-12-12 ASSESSMENT — PAIN DESCRIPTION - DESCRIPTORS: DESCRIPTORS: DISCOMFORT;CONSTANT

## 2021-12-12 ASSESSMENT — PAIN DESCRIPTION - ONSET: ONSET: AWAKENED FROM SLEEP

## 2021-12-12 NOTE — PROGRESS NOTES
Plastic Surgery   Daily Progress Note  Patient: Gabrielle Allen      CC: Shayla's gangrene    SUBJECTIVE:   Patient with drop in Hgb this morning to 6.7. 1 unit pRBC's ordered. However, he remains HDS and afebrile without increase in oxygen requirements. Patient states pain is well-controlled. Tolerating diet, denies any nausea/ vomiting. ROS:   A 14 point review of systems was conducted, significant findings as noted above. All other systems negative. OBJECTIVE:    PHYSICAL EXAM:    Vitals:    12/11/21 1600 12/11/21 2004 12/11/21 2313 12/12/21 0354   BP: 121/72 121/72 110/69 (!) 148/74   Pulse: 89 78 80 80   Resp: 20 18 16 18   Temp: 97.9 °F (36.6 °C) 98.1 °F (36.7 °C) 98 °F (36.7 °C) 97.8 °F (36.6 °C)   TempSrc: Oral Oral Oral Oral   SpO2:  94% 96%    Weight:       Height:           General appearance: alert, no acute distress, grooming appropriate  Eyes: No scleral icterus, EOM grossly intact  Neck: trachea midline, no JVD, no lymphadenopathy, neck supple  Chest/Lungs: Equal excursion bilaterally, normal effort with no accessory muscle use on 4L NC  Cardiovascular: RRR, brisk capillary refill  Abdomen:  Soft, large surgical debridement area of the abdomen with veraflo Vac in place holding adequate suction and instilling normal saline. Colostomy is pink, viable and with brown stool in the bag. Skin: warm and dry, no rashes  Extremities: no edema, no cyanosis  Genitourinary:  Mcfadden in place with clear yellow urine  Neuro: A&Ox3, no focal deficits, sensation intact    LABS:   Recent Labs     12/11/21  0548 12/12/21  0430   WBC 15.7* 11.1*   HGB 7.5* 6.7*   HCT 22.3* 20.4*   MCV 90.1 90.7   * 509*        Recent Labs     12/11/21  0548 12/12/21  0430    136   K 4.2 3.8    103   CO2 27 27   BUN 10 10   CREATININE 0.6* 0.5*        Recent Labs     12/10/21  0443   AST 13*   ALT 7*   BILIDIR <0.2   BILITOT 0.3   ALKPHOS 170*      No results for input(s): LIPASE, AMYLASE in the last 72 hours. Recent Labs     12/10/21  0443   PROT 4.8*        Recent Labs     12/09/21  1255 12/09/21  1601   TROPONINI 0.21* 0.24*         ASSESSMENT & PLAN:   Lynn Whiteside is a 63 y.o. male with Necrotizing fasciitis of the abdominal wall, gluteal region, and scrotum s/p wide excision of perineum, back, and abdominal wall. S/p multiple debridements and wound vac placement and changes intraoperatively with diverting colostomy creation (12/7). Most recent wound vac change in OR (12/10)    - Hgb 6.7 this morning, will transfuse unit pRBCs  - Continue zero carb diet  - Continue aggressive glucose control with goal glucose <120  - ID on board, continue Unasyn  - Plan for OR for wound vac change, likely Monday.  - PT/ OT following    Misael Bowie DO  PGY1, General Surgery  12/12/21  6:27 AM  [phone]    I saw and independently examined the patient today. I agree with the history of present illness, past medical/surgical histories, family history, social history, medication list and allergies as listed. The review of systems is as noted above. My physical exam confirms the findings listed above. Review of labs, pathology reports, radiology reports and medical records confirm the findings noted above. I edited the note where appropriate in italics, strikethrough font, or underline. Transfusing RBC this AM for acute blood loss anemia. Improving glucose control. Need to increase protein intake - patient states that he is hungry and stoma is also working. Appreciate ID. OOB with PT/OT. Plan for return to OR tomorrow for vac change - wound evaluation. Nutrition labs tomorrow AM.  Once granulation tissue noted and nutrition improved, will begin reconstructive process.     Davie Hughes MD  [address] Reconstructive Surgery  [phone]  12/12/21

## 2021-12-12 NOTE — PLAN OF CARE
Problem: Activity:  Goal: Ability to return to normal activity level will improve  Description: Ability to return to normal activity level will improve  Outcome: Ongoing  Note: Pt able to assist with turns in bed, pt states he wants to be able to get up and walk again. Problem: Bowel/Gastric:  Goal: Gastrointestinal status for postoperative course will improve  Description: Gastrointestinal status for postoperative course will improve  Outcome: Ongoing  Note: BS noted in upper quadrants. Problem: Respiratory:  Goal: Ability to achieve and maintain a regular respiratory rate will improve  Description: Ability to achieve and maintain a regular respiratory rate will improve  Outcome: Ongoing  Note: Pt on 4L oxygen, saturating well. Problem: Skin Integrity:  Goal: Demonstration of wound healing without infection will improve  Description: Demonstration of wound healing without infection will improve  Outcome: Ongoing  Note: Pt has wound vac in place for large wound to abdomen and rectum. Skin irritation from tape around surgical site noted. Will cont to turn pt and rotate tape sites to promote healing. Goal: Will show no infection signs and symptoms  Description: Will show no infection signs and symptoms  Outcome: Ongoing  Goal: Absence of new skin breakdown  Description: Absence of new skin breakdown  Outcome: Ongoing     Problem: Pain:  Goal: Control of acute pain  Description: Control of acute pain  Outcome: Ongoing  Note: Pt states pain to abdomen and rectum is 6/10, pt receiving scheduled tylenol along with prn oxycodone for prn pain relief. Pt to get 10mg oxycodone at bedtime per pt request.     Problem: Falls - Risk of:  Goal: Will remain free from falls  Description: Will remain free from falls  Outcome: Ongoing  Note: No falls this shift, bed in lowest position, bed alarm on, non skid socks on, call light within reach, hourly checks, safety maintained, will continue to monitor.

## 2021-12-12 NOTE — PROGRESS NOTES
4 Eyes Admission Assessment     I agree as the admission nurse that 2 RN's have performed a thorough Head to Toe Skin Assessment on the patient. ALL assessment sites listed below have been assessed on admission. Areas assessed by both nurses: jai/francesca  [x]   Head, Face, and Ears   [x]   Shoulders, Back, and Chest  [x]   Arms, Elbows, and Hands   [x]   Coccyx, Sacrum, and Ischium  [x]   Legs, Feet, and Heels        Does the Patient have Skin Breakdown?   Yes a wound was noted on the Admission Assessment and an LDA was Initiated documentation include the Princess-wound, Wound Assessment, Measurements, Dressing Treatment, Drainage, and Color\",         Reese Prevention initiated:  Yes   Wound Care Orders initiated:  Yes      [id number]  nurse consulted for Pressure Injury (Stage 3,4, Unstageable, DTI, NWPT, and Complex wounds) or Reese score 18 or lower:  No      Nurse 1 eSignature: Electronically signed by Tisha Arshad RN on 12/12/21 at 6:42 AM EST    **SHARE this note so that the co-signing nurse is able to place an eSignature**    Nurse 2 eSignature: Electronically signed by Juan Spear RN on 12/12/21 at 8:37 AM EST

## 2021-12-12 NOTE — PROGRESS NOTES
Progress Note    Admit Date: 12/1/2021  Day: 11   Diet: ADULT DIET; Regular; Low Fat/Low Chol/High Fiber/2 gm Na; NO CARB diet    CC: Shayla's gangrene     Interval history: SAMEON. Pt was seen at bedside today AM, resting comfortably. Does not have any new complaints. Denies F/C, N/V, CP, SOB, HA, vision changes, weakness in arms and feet. HPI:   Marianela Asher is a 64 yo M with DM2 who p/w groin swelling. 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 He underwent debridement, wound vac placement, and diverting colostomy. He is being treated with Vanc and Merrem and followed by surgery and plastic surgery. He is saturating well on 3L NC, complains of some abdominal pain, but otherwise feels okay. Will be transferred to floors with plans to return to OR later this week for wound vac change.     Medications:     Scheduled Meds:   potassium chloride  20 mEq Oral Once    heparin (porcine)  5,000 Units SubCUTAneous 3 times per day    insulin glargine  9 Units SubCUTAneous QAM    insulin lispro  3 Units SubCUTAneous TID WC    insulin lispro  0-6 Units SubCUTAneous TID WC    insulin lispro  0-3 Units SubCUTAneous Nightly    docusate sodium  100 mg Oral BID    polyethylene glycol  17 g Oral Daily    amLODIPine  5 mg Oral Daily    ampicillin-sulbactam  3,000 mg IntraVENous Q6H    acetaminophen  1,000 mg Oral Q6H    sodium chloride flush  5-40 mL IntraVENous 2 times per day     Continuous Infusions:   sodium chloride      sodium chloride 25 mL (12/10/21 1852)    dextrose       PRN Meds:sodium chloride, alteplase, ipratropium-albuterol, dextrose, hydrALAZINE, oxyCODONE **OR** oxyCODONE, sodium chloride flush, sodium chloride, glucose, dextrose, glucagon (rDNA), dextrose    Objective:   Vitals:   T-max:  Patient Vitals for the past 8 hrs:   BP Temp Temp src Pulse Resp SpO2   12/12/21 0820 (!) 149/75 97.5 °F (36.4 °C) Oral 79 20 97 %   12/12/21 0750 136/71 97.7 °F (36.5 °C) Oral 79 18 97 %   12/12/21 0354 (!) 148/74 97.8 °F (36.6 °C) Oral 80 18 --       Intake/Output Summary (Last 24 hours) at 12/12/2021 0831  Last data filed at 12/12/2021 0435  Gross per 24 hour   Intake 1490 ml   Output 2400 ml   Net -910 ml       Review of Systems  10 point ROS completed and negative     Physical Exam  Constitutional:       General: He is not in acute distress. Appearance: Normal appearance. He is normal weight. He is not ill-appearing, toxic-appearing or diaphoretic. Eyes:      Pupils: Pupils are equal, round, and reactive to light. Cardiovascular:      Rate and Rhythm: Normal rate and regular rhythm. Pulses: Normal pulses. Heart sounds: Normal heart sounds. No murmur heard. Pulmonary:      Effort: Pulmonary effort is normal. No respiratory distress. Breath sounds: Normal breath sounds. Abdominal:      General: Bowel sounds are normal. There is no distension. Palpations: Abdomen is soft. Comments: Ostomy in place, clean and well dressed,  Wound vac also in place. Wound vac in place over lower abdomen/pelvis    Musculoskeletal:         General: No swelling. Comments: Bandages noted   Neurological:      Mental Status: He is alert and oriented to person, place, and time. Mental status is at baseline.         LABS:  CBC:   Recent Labs     12/10/21  0443 12/11/21  0548 12/12/21 0430   WBC 15.3* 15.7* 11.1*   HGB 8.5* 7.5* 6.7*   HCT 25.9* 22.3* 20.4*   * 547* 509*   MCV 91.1 90.1 90.7     Renal:    Recent Labs     12/10/21  0443 12/11/21  0548 12/12/21  0430    138 136   K 4.0 4.2 3.8    103 103   CO2 29 27 27   BUN 12 10 10   CREATININE 0.6* 0.6* 0.5*   GLUCOSE 122* 134* 78   CALCIUM 7.7* 7.8* 7.4*   ANIONGAP 6 8 6     Hepatic:   Recent Labs     12/10/21  0443   AST 13*   ALT 7*   BILITOT 0.3   BILIDIR <0.2   PROT 4.8*   LABALBU 2.1*   ALKPHOS 170*     Troponin:   Recent Labs     12/09/21  0840 12/09/21  1255 12/09/21  1606 TROPONINI 0.23* 0.21* 0.24*     BNP: No results for input(s): BNP in the last 72 hours. Lipids: No results for input(s): CHOL, HDL in the last 72 hours. Invalid input(s): LDLCALCU, TRIGLYCERIDE  ABGs:    No results for input(s): PHART, OAI4ZMJ, PO2ART, GDV4ETP, BEART, THGBART, H7UTCRAT, GMF0STT in the last 72 hours. INR: No results for input(s): INR in the last 72 hours. Lactate:   No results for input(s): LACTATE in the last 72 hours. Cultures:  -----------------------------------------------------------------  RAD:   CT ABDOMEN PELVIS W IV CONTRAST Additional Contrast? None   Final Result      Significant interval improvement and soft tissue emphysema in the visualized left gluteal region and over the anterior abdominal wall since prior study. Anasarca more prominently of the abdominal wall without suspicious drainable abdominal wall fluid collection. More prominent presacral strandy fluid without suspicious retroperitoneal emphysema to suggest retroperitoneal infection. New small amount of ascites throughout the abdomen and pelvis without suspicious organized intraperitoneal fluid collections. New moderate to large bilateral pleural effusions with associated compressive atelectasis of the lung bases. New trace pericardial effusion. Colostomy in the left lower quadrant without obstruction or complicating features. Interval improvement in bilateral hydronephrosis. CT HEAD WO CONTRAST   Final Result     No acute hemorrhage, hydrocephalus, or mass effect. XR CHEST PORTABLE   Final Result      1. Interval development of bibasilar airspace disease and bilateral pleural effusions since the prior exam.      2.  Interval placement of right PICC catheter with tip extending into the proximal right atrium. 3.  Interval removal of ET and NG tubes from the prior study.       VL DUP LOWER EXTREMITY ARTERIES BILATERAL   Final Result          Assessment/Plan: Shayla's gangrene/necrotizing fasciitis:  Status post I&D 11/30 and wide excision of perineum/abdominal wall 12/01. S/p excisional debridement of abdomen and left buttock, wound VAC placement 12/03, further debridement and wound VAC placement 12/05. Grew Candida Glabrata  - Continue Unaysn  - ID consulted, will follow recs given candida growth  - Surgery/plastic surgery following  - further OR potentially Monday    Left fifth metatarsal diabetic foot ulceration and osteomyelitis  - podiatry following  - Continue IV antibiotics  - PT/OT  - possible surgical intervention pending medical stability, awaiting vascular surgery recs  - Continue wound care     T2DM  - HDSSI  - regular gluc checks  - hypoglycemia protocl     Anemia, likely post surgical  - Hg 6.7, s/p 1 unit pRBC transfused  - VSS     AMS of unclear etiology - resolved  Patient was unresponsive with bradypnea when the code was called. Patient gained consciousness 20 minutes after. Lactic acid went down from 8 to 5. Troponin 0.23. CT head showed   No acute hemorrhage, hydrocephalus, or mass effect. Patient was confused for 1 hour after the episode.  CK normal. Now back on floor   - Lactic acid normalized  - EEG wnl    HTN - norvasc    Code Status: Full code   FEN: Low carb diet  PPX: Lovenox   DISPO: Abhijit Balbuena MD, PGY-2  12/12/21  8:31 AM    This patient will be staffed and discussed with Dr Concetta Jackson

## 2021-12-12 NOTE — PROGRESS NOTES
Patient insulin scale updated, BS 80 to 240  Orders to get patient moving more via surgery, patient able to move to side of bed, and MAEx4 against gravity and resistance. Needs further help with Physical therapy to regain strength. Patient AOx4, pain controled with scheduled tylenol and PRN Oxy adequately.

## 2021-12-12 NOTE — PROGRESS NOTES
PRE-OP NOTE  Department of Surgery      Chief Complaint or Reason for Surgery: Necrotizing fasciitis      Procedure: Exam under anesthesia, wound vac change of abdominal wall and left buttock. Expected time: , add-on     Plan  1. Diet: NPO at midnight  2. IVF:  cc/hr to start at midnight  3. Antibiotics: Scheduled Unasyn to continue to the OR. 4. Labs to be drawn: CBC, renal panel and magnesium to be collected with morning labs. 5. Anesthesia: to see patient  6. Consent: Obtained and in the patient's chart  7. Pulmonary:  from CT  - WNL   8.  Cardiac: EK/9 NSR    Leonela Gómez, DO  21  4:08 PM

## 2021-12-12 NOTE — PROGRESS NOTES
Podiatric Surgery Daily Progress Note      Admit Date: 12/1/2021      Code:Full Code    Patient seen and examined, labs and records reviewed    Subjective:     Patient seen at bedside this a.m. Patient denies pain to bilateral lower extremity. Patient denies fever, chills, shortness of breath, chest pain. Patient denies any acute events overnight. No pedal complaints during today's examination. Review of Systems: A 10 point review of systems was conducted, significant findings as noted in HPI. All other systems negative. Objective     BP (!) 149/75   Pulse 79   Temp 97.5 °F (36.4 °C) (Oral)   Resp 20   Ht 5' 11\" (1.803 m)   Wt 196 lb 3.4 oz (89 kg)   SpO2 97%   BMI 27.37 kg/m²      I/O:    Intake/Output Summary (Last 24 hours) at 12/12/2021 0911  Last data filed at 12/12/2021 0435  Gross per 24 hour   Intake 1490 ml   Output 2400 ml   Net -910 ml              Wt Readings from Last 3 Encounters:   12/11/21 196 lb 3.4 oz (89 kg)   11/30/21 163 lb 3.2 oz (74 kg)   04/18/16 179 lb 14.3 oz (81.6 kg)       LABS:    Recent Labs     12/11/21  0548 12/12/21  0430   WBC 15.7* 11.1*   HGB 7.5* 6.7*   HCT 22.3* 20.4*   * 509*        Recent Labs     12/12/21  0430      K 3.8      CO2 27   BUN 10   CREATININE 0.5*        Recent Labs     12/10/21  0443   PROT 4.8*       LOWER EXTREMITY EXAMINATION    Dressing to left LE intact. No strikethrough noted to the external dressing. No drainage noted to the internal layers of the dressing. VASCULAR:   DP and PT pulses are non-palpable b/l. Upon hand-held Doppler examination DP signals were noted to be monophasic and PT signals were noted to be nondopplerable. CFT slightly diminished to the distal digits of the RLE >LLE. Skin temperature is warm to lukewarm to cool to the distal digits from proximal to distal.  No edema noted. No pain with calf compression b/l.     NEUROLOGIC:   Gross and epicritic sensation is diminished b/l.  Protective sensation is diminished at all pedal sites b/l.     DERMATOLOGIC:   Patient provided verbal consent for photos to be obtained today:  Left lower extremity:      Full-thickness ulceration noted to the lateral aspect of the left foot. Wound measures approximately 3.2 cm x 2 cm x 0.5 cm. Wound base exposed bone, desiccated with necrotic edges at the proximal and distal edges. Wound probes to bone with exposed fifth metatarsal.  No tracking or tunneling noted. No purulent drainage noted. No fluctuance or crepitus or malodor noted. Maroon/red and black discoloration noted distal tip of left fifth digit. Wound is stable. Anterior aspect of ankle joint left foot. Deep tissue injury with rubor discoloration noted 2/2 Prevalon boot strap. Intact epithelialized tissue noted. No crepitus, erythema, drainage, or malodor noted. No open wound noted. Stable without acute infection noted. Right lower extremity      Styloid process fifth metatarsal deep tissue injury. Purplish circumferential discoloration noted. No open wound. No fluctuance, crepitus, malodor, or drainage noted. No acute signs infection noted. Lateral malleolus deep tissue injury. Purplish circumferential discoloration noted. No open wound. No fluctuance, crepitus, malodor, or drainage noted. No acute signs infection noted. Pressure injury with purple discoloration noted to the dorsal aspect of the midfoot, right lower extremity. No open wound. No fluctuance, crepitus, malodor, or drainage noted. No acute signs of infection noted. MUSCULOSKELETAL:   Muscle strength 4/5 for all pedal muscle groups B/L LE. No pain with palpation of the left foot.      IMAGING:  XR LEFT FOOT 11/30/2021  Narrative   EXAMINATION:   THREE XRAY VIEWS OF THE LEFT FOOT       11/30/2021 1:44 pm       COMPARISON:   None.       HISTORY:   ORDERING SYSTEM PROVIDED HISTORY: wound   TECHNOLOGIST PROVIDED HISTORY:   Reason for exam:->wound   Reason for Exam: blood sugar problem, wound check on lateral portion of foot   Acuity: Acute   Type of Exam: Initial       FINDINGS:   Osseous destruction along the articular margins of the 5th   metatarsophalangeal joint with associated lucency in the soft tissues. .   There is no evidence of fracture or dislocation. . The remaining joint spaces   appear well maintained. The remaining soft tissues are unremarkable.           Impression   Evidence of a septic joint involving the 5th metatarsal phalangeal joint with   associated osteomyelitis         ARTERIAL DUPLEX 12/2/21     Type of Study:        Extremities Arteries:Lower Extremities Arterial Duplex, VL LOWER EXTREMITY    ARTERIES DUPLEX BILATERAL.       Tech Comments   Right   The right ankle/brachial index is 0.0 (no Doppler signal could be heard at the   [facility] or the PT). The common femoral and profunda femoral arteries could not be visualized due   to bandages extending into the groin area. The mid superficial femoral artery has a short segmental occlusion and then is   reconstituted. Elevated velocities at the distal superficial femoral artery indicate a > 50%   stenosis by velocity criteria (velocity went from 15 to 298 cm/s). The posterior tibial artery is occluded. The distal anterior tibial artery is barely patent, with very low flow   (possibly occluded and then reconstituted). Left   The left ankle brachial index is 0 for the PT and the DP was not accessible   due to the bandages on the foot. The common femoral and profunda femoral artery could not be visualized due to   bandages extending into the groin area. The distal superficial femoral artery is occluded and then reconstituted. The posterior tibial artery, peroneal, and anterior tibial artery are   occluded. There were no previous studies to use for comparison.        ASSESSMENT/PLAN:   -Full-thickness ulceration; lateral left foot-Sands stage III  -Deep tissue injury, styloid process fifth metatarsal base right foot  -Deep tissue injury, lateral malleolus right ankle  -Pressure injury, dorsal right midfoot -NPUAP Stage I  -Osteomyelitis of the fifth metatarsal; left foot  -Critical limb ischemia; bilateral lower extremity  -Diabetes mellitus, type II  -History of noncompliance    -Patient seen and examined at bedside this a.m.  -Per tensive and on 4 L of O2 via nasal cannula; leukocytosis noted to be downtrending (11.1). -All imaging reviewed see impression above. -Vascular surgery following; will await further stabilization with the general surgery team and plastic surgery team before offering vascular intervention.  -Plastic surgery following; Plan for return to the OR tomorrow 12/13 for wound VAC change and wound evaluation  -Infectious disease following, continue Unasyn and at discharge may not need IV antibiotics or long course of antibiotics. -Left lower extremity dressed with Betadine soaked gauze, DSD, and tape. -Right lower extremity applied to Mepilex borders to deep tissue injury.  -Prevalon boots reapplied. Patient is to wear at all times while in bed to prevent further deep tissue injury. Ankle strap removed from bilateral boot as the ankle straps were causing pressure to his feet. -Nonweightbearing to left lower extremity.  -Weightbearing as tolerated to right lower extremity. DISPO: Full-thickness ulceration with underlying osteomyelitis to the left fifth metatarsal. Critical limb ischemia bilateral lower extremity. Vascular following; awaiting further stabilization at this time. Patient will eventually require podiatric surgical intervention but timing will depend on medical stability and vascular recommendations. Will continue to closely monitor patient and follow while in house.     Patient examined and evaluated with Dr. Anai Mckenna DPM.    Betty Webb DPM   Podiatric Resident PGY1  Pager [phone] or [place]  12/12/2021, 9:11 AM

## 2021-12-12 NOTE — PROGRESS NOTES
General Surgery   Daily Progress Note  Patient: Itzel Sosa      CC: Shayla's gangrene    SUBJECTIVE:   Patient with drop in Hgb this morning to 6.7. 1 unit pRBC's ordered. However, he remains HDS and afebrile without increase in oxygen requirements. Patient states pain is well-controlled. Tolerating diet, denies any nausea/ vomiting. ROS:   A 14 point review of systems was conducted, significant findings as noted above. All other systems negative. OBJECTIVE:    PHYSICAL EXAM:    Vitals:    12/11/21 1600 12/11/21 2004 12/11/21 2313 12/12/21 0354   BP: 121/72 121/72 110/69 (!) 148/74   Pulse: 89 78 80 80   Resp: 20 18 16 18   Temp: 97.9 °F (36.6 °C) 98.1 °F (36.7 °C) 98 °F (36.7 °C) 97.8 °F (36.6 °C)   TempSrc: Oral Oral Oral Oral   SpO2:  94% 96%    Weight:       Height:           General appearance: alert, no acute distress, grooming appropriate  Eyes: No scleral icterus, EOM grossly intact  Neck: trachea midline, no JVD, no lymphadenopathy, neck supple  Chest/Lungs: Equal excursion bilaterally, normal effort with no accessory muscle use on 4L NC  Cardiovascular: RRR, brisk capillary refill  Abdomen:  Soft, large surgical debridement area of the abdomen with veraflo Vac in place holding adequate suction and instilling normal saline. Colostomy is pink, viable and with brown stool in the bag. Skin: warm and dry, no rashes  Extremities: no edema, no cyanosis  Genitourinary:  Mcfadden in place with clear yellow urine  Neuro: A&Ox3, no focal deficits, sensation intact    LABS:   Recent Labs     12/11/21  0548 12/12/21  0430   WBC 15.7* 11.1*   HGB 7.5* 6.7*   HCT 22.3* 20.4*   MCV 90.1 90.7   * 509*        Recent Labs     12/11/21  0548 12/12/21  0430    136   K 4.2 3.8    103   CO2 27 27   BUN 10 10   CREATININE 0.6* 0.5*        Recent Labs     12/10/21  0443   AST 13*   ALT 7*   BILIDIR <0.2   BILITOT 0.3   ALKPHOS 170*      No results for input(s): LIPASE, AMYLASE in the last 72 hours. Recent Labs     12/10/21  0443   PROT 4.8*        Recent Labs     12/09/21  1255 12/09/21  1601   TROPONINI 0.21* 0.24*         ASSESSMENT & PLAN:   Yu Scott is a 63 y.o. male with Necrotizing fasciitis of the abdominal wall, gluteal region, and scrotum s/p wide excision of perineum, back, and abdominal wall. S/p multiple debridements and wound vac placement and changes intraoperatively with diverting colostomy creation (12/7). Most recent wound vac change in OR (12/10)    - Hgb 6.7 this morning, will transfuse unit pRBCs  - Diet per plastics  - Wound care per plastics  - ID on board, continue Unasyn  - Plan for OR for wound vac change, likely Monday.  - PT/ OT following    Raleigh Lindsay DO  PGY1, General Surgery  12/12/21  6:34 AM  [phone]    I have seen, examined, and reviewed the patients chart. I agree with the residents assessment and have made appropriate changes.     Lillian Quintero

## 2021-12-13 ENCOUNTER — ANESTHESIA (OUTPATIENT)
Dept: OPERATING ROOM | Age: 66
DRG: 853 | End: 2021-12-13

## 2021-12-13 ENCOUNTER — ANESTHESIA EVENT (OUTPATIENT)
Dept: OPERATING ROOM | Age: 66
DRG: 853 | End: 2021-12-13

## 2021-12-13 VITALS — SYSTOLIC BLOOD PRESSURE: 181 MMHG | DIASTOLIC BLOOD PRESSURE: 81 MMHG | OXYGEN SATURATION: 93 %

## 2021-12-13 LAB
ALBUMIN SERPL-MCNC: 2.1 G/DL (ref 3.4–5)
ANION GAP SERPL CALCULATED.3IONS-SCNC: 7 MMOL/L (ref 3–16)
BASOPHILS ABSOLUTE: 0.1 K/UL (ref 0–0.2)
BASOPHILS RELATIVE PERCENT: 1.2 %
BUN BLDV-MCNC: 10 MG/DL (ref 7–20)
C-REACTIVE PROTEIN: 60.2 MG/L (ref 0–5.1)
CALCIUM SERPL-MCNC: 7.6 MG/DL (ref 8.3–10.6)
CHLORIDE BLD-SCNC: 103 MMOL/L (ref 99–110)
CO2: 28 MMOL/L (ref 21–32)
CREAT SERPL-MCNC: 0.5 MG/DL (ref 0.8–1.3)
EOSINOPHILS ABSOLUTE: 0.2 K/UL (ref 0–0.6)
EOSINOPHILS RELATIVE PERCENT: 1.5 %
GFR AFRICAN AMERICAN: >60
GFR NON-AFRICAN AMERICAN: >60
GLUCOSE BLD-MCNC: 113 MG/DL (ref 70–99)
GLUCOSE BLD-MCNC: 118 MG/DL (ref 70–99)
GLUCOSE BLD-MCNC: 165 MG/DL (ref 70–99)
GLUCOSE BLD-MCNC: 86 MG/DL (ref 70–99)
GLUCOSE BLD-MCNC: 95 MG/DL (ref 70–99)
HCT VFR BLD CALC: 22.3 % (ref 40.5–52.5)
HEMOGLOBIN: 7.5 G/DL (ref 13.5–17.5)
LYMPHOCYTES ABSOLUTE: 1.2 K/UL (ref 1–5.1)
LYMPHOCYTES RELATIVE PERCENT: 10.6 %
MAGNESIUM: 1.5 MG/DL (ref 1.8–2.4)
MCH RBC QN AUTO: 30.8 PG (ref 26–34)
MCHC RBC AUTO-ENTMCNC: 33.9 G/DL (ref 31–36)
MCV RBC AUTO: 90.7 FL (ref 80–100)
MONOCYTES ABSOLUTE: 0.8 K/UL (ref 0–1.3)
MONOCYTES RELATIVE PERCENT: 7.6 %
NEUTROPHILS ABSOLUTE: 8.6 K/UL (ref 1.7–7.7)
NEUTROPHILS RELATIVE PERCENT: 79.1 %
PDW BLD-RTO: 14.8 % (ref 12.4–15.4)
PERFORMED ON: ABNORMAL
PERFORMED ON: NORMAL
PHOSPHORUS: 2.3 MG/DL (ref 2.5–4.9)
PLATELET # BLD: 459 K/UL (ref 135–450)
PMV BLD AUTO: 7.7 FL (ref 5–10.5)
POTASSIUM SERPL-SCNC: 4.2 MMOL/L (ref 3.5–5.1)
PREALBUMIN: 9.8 MG/DL (ref 20–40)
PROLACTIN: 71.4 NG/ML
RBC # BLD: 2.45 M/UL (ref 4.2–5.9)
SODIUM BLD-SCNC: 138 MMOL/L (ref 136–145)
WBC # BLD: 10.9 K/UL (ref 4–11)

## 2021-12-13 PROCEDURE — 85025 COMPLETE CBC W/AUTO DIFF WBC: CPT

## 2021-12-13 PROCEDURE — 6370000000 HC RX 637 (ALT 250 FOR IP): Performed by: STUDENT IN AN ORGANIZED HEALTH CARE EDUCATION/TRAINING PROGRAM

## 2021-12-13 PROCEDURE — 2580000003 HC RX 258: Performed by: STUDENT IN AN ORGANIZED HEALTH CARE EDUCATION/TRAINING PROGRAM

## 2021-12-13 PROCEDURE — 3700000000 HC ANESTHESIA ATTENDED CARE: Performed by: SURGERY

## 2021-12-13 PROCEDURE — 84134 ASSAY OF PREALBUMIN: CPT

## 2021-12-13 PROCEDURE — 6360000002 HC RX W HCPCS

## 2021-12-13 PROCEDURE — 99232 SBSQ HOSP IP/OBS MODERATE 35: CPT | Performed by: INTERNAL MEDICINE

## 2021-12-13 PROCEDURE — 6360000002 HC RX W HCPCS: Performed by: NURSE ANESTHETIST, CERTIFIED REGISTERED

## 2021-12-13 PROCEDURE — 2500000003 HC RX 250 WO HCPCS: Performed by: NURSE ANESTHETIST, CERTIFIED REGISTERED

## 2021-12-13 PROCEDURE — 6370000000 HC RX 637 (ALT 250 FOR IP)

## 2021-12-13 PROCEDURE — 3700000001 HC ADD 15 MINUTES (ANESTHESIA): Performed by: SURGERY

## 2021-12-13 PROCEDURE — 7100000000 HC PACU RECOVERY - FIRST 15 MIN: Performed by: SURGERY

## 2021-12-13 PROCEDURE — 7100000001 HC PACU RECOVERY - ADDTL 15 MIN: Performed by: SURGERY

## 2021-12-13 PROCEDURE — 3600000012 HC SURGERY LEVEL 2 ADDTL 15MIN: Performed by: SURGERY

## 2021-12-13 PROCEDURE — 2580000003 HC RX 258

## 2021-12-13 PROCEDURE — 83735 ASSAY OF MAGNESIUM: CPT

## 2021-12-13 PROCEDURE — 6360000002 HC RX W HCPCS: Performed by: STUDENT IN AN ORGANIZED HEALTH CARE EDUCATION/TRAINING PROGRAM

## 2021-12-13 PROCEDURE — 2709999900 HC NON-CHARGEABLE SUPPLY: Performed by: SURGERY

## 2021-12-13 PROCEDURE — 3600000002 HC SURGERY LEVEL 2 BASE: Performed by: SURGERY

## 2021-12-13 PROCEDURE — 80069 RENAL FUNCTION PANEL: CPT

## 2021-12-13 PROCEDURE — 6360000002 HC RX W HCPCS: Performed by: ANESTHESIOLOGY

## 2021-12-13 PROCEDURE — 15852 DRESSING CHANGE NOT FOR BURN: CPT | Performed by: SURGERY

## 2021-12-13 PROCEDURE — 86140 C-REACTIVE PROTEIN: CPT

## 2021-12-13 PROCEDURE — 1200000000 HC SEMI PRIVATE

## 2021-12-13 RX ORDER — DIPHENHYDRAMINE HYDROCHLORIDE 50 MG/ML
12.5 INJECTION INTRAMUSCULAR; INTRAVENOUS
Status: DISCONTINUED | OUTPATIENT
Start: 2021-12-13 | End: 2021-12-13 | Stop reason: HOSPADM

## 2021-12-13 RX ORDER — FENTANYL CITRATE 50 UG/ML
INJECTION, SOLUTION INTRAMUSCULAR; INTRAVENOUS PRN
Status: DISCONTINUED | OUTPATIENT
Start: 2021-12-13 | End: 2021-12-13 | Stop reason: SDUPTHER

## 2021-12-13 RX ORDER — MAGNESIUM SULFATE IN WATER 40 MG/ML
4000 INJECTION, SOLUTION INTRAVENOUS ONCE
Status: COMPLETED | OUTPATIENT
Start: 2021-12-13 | End: 2021-12-13

## 2021-12-13 RX ORDER — ROCURONIUM BROMIDE 10 MG/ML
INJECTION, SOLUTION INTRAVENOUS PRN
Status: DISCONTINUED | OUTPATIENT
Start: 2021-12-13 | End: 2021-12-13 | Stop reason: SDUPTHER

## 2021-12-13 RX ORDER — PROPOFOL 10 MG/ML
INJECTION, EMULSION INTRAVENOUS PRN
Status: DISCONTINUED | OUTPATIENT
Start: 2021-12-13 | End: 2021-12-13 | Stop reason: SDUPTHER

## 2021-12-13 RX ORDER — SUCCINYLCHOLINE/SOD CL,ISO/PF 200MG/10ML
SYRINGE (ML) INTRAVENOUS PRN
Status: DISCONTINUED | OUTPATIENT
Start: 2021-12-13 | End: 2021-12-13 | Stop reason: SDUPTHER

## 2021-12-13 RX ORDER — SODIUM CHLORIDE, SODIUM LACTATE, POTASSIUM CHLORIDE, CALCIUM CHLORIDE 600; 310; 30; 20 MG/100ML; MG/100ML; MG/100ML; MG/100ML
INJECTION, SOLUTION INTRAVENOUS CONTINUOUS
Status: DISCONTINUED | OUTPATIENT
Start: 2021-12-13 | End: 2021-12-14

## 2021-12-13 RX ORDER — GLYCOPYRROLATE 1 MG/5 ML
SYRINGE (ML) INTRAVENOUS PRN
Status: DISCONTINUED | OUTPATIENT
Start: 2021-12-13 | End: 2021-12-13 | Stop reason: SDUPTHER

## 2021-12-13 RX ORDER — HYDROMORPHONE HCL 110MG/55ML
PATIENT CONTROLLED ANALGESIA SYRINGE INTRAVENOUS PRN
Status: DISCONTINUED | OUTPATIENT
Start: 2021-12-13 | End: 2021-12-13 | Stop reason: SDUPTHER

## 2021-12-13 RX ORDER — FENTANYL CITRATE 50 UG/ML
50 INJECTION, SOLUTION INTRAMUSCULAR; INTRAVENOUS EVERY 5 MIN PRN
Status: DISCONTINUED | OUTPATIENT
Start: 2021-12-13 | End: 2021-12-13 | Stop reason: HOSPADM

## 2021-12-13 RX ORDER — ONDANSETRON 2 MG/ML
4 INJECTION INTRAMUSCULAR; INTRAVENOUS
Status: DISCONTINUED | OUTPATIENT
Start: 2021-12-13 | End: 2021-12-13 | Stop reason: HOSPADM

## 2021-12-13 RX ORDER — ONDANSETRON 2 MG/ML
INJECTION INTRAMUSCULAR; INTRAVENOUS PRN
Status: DISCONTINUED | OUTPATIENT
Start: 2021-12-13 | End: 2021-12-13 | Stop reason: SDUPTHER

## 2021-12-13 RX ORDER — HYDRALAZINE HYDROCHLORIDE 20 MG/ML
5 INJECTION INTRAMUSCULAR; INTRAVENOUS EVERY 10 MIN PRN
Status: DISCONTINUED | OUTPATIENT
Start: 2021-12-13 | End: 2021-12-13 | Stop reason: HOSPADM

## 2021-12-13 RX ORDER — INSULIN LISPRO 100 [IU]/ML
4 INJECTION, SOLUTION INTRAVENOUS; SUBCUTANEOUS
Status: DISCONTINUED | OUTPATIENT
Start: 2021-12-13 | End: 2021-12-19

## 2021-12-13 RX ORDER — LABETALOL HYDROCHLORIDE 5 MG/ML
5 INJECTION, SOLUTION INTRAVENOUS EVERY 10 MIN PRN
Status: DISCONTINUED | OUTPATIENT
Start: 2021-12-13 | End: 2021-12-13 | Stop reason: HOSPADM

## 2021-12-13 RX ORDER — NEOSTIGMINE METHYLSULFATE 5 MG/5 ML
SYRINGE (ML) INTRAVENOUS PRN
Status: DISCONTINUED | OUTPATIENT
Start: 2021-12-13 | End: 2021-12-13 | Stop reason: SDUPTHER

## 2021-12-13 RX ORDER — PROMETHAZINE HYDROCHLORIDE 25 MG/ML
6.25 INJECTION, SOLUTION INTRAMUSCULAR; INTRAVENOUS
Status: DISCONTINUED | OUTPATIENT
Start: 2021-12-13 | End: 2021-12-13 | Stop reason: HOSPADM

## 2021-12-13 RX ADMIN — SODIUM CHLORIDE, PRESERVATIVE FREE 10 ML: 5 INJECTION INTRAVENOUS at 21:07

## 2021-12-13 RX ADMIN — SODIUM CHLORIDE, POTASSIUM CHLORIDE, SODIUM LACTATE AND CALCIUM CHLORIDE: 600; 310; 30; 20 INJECTION, SOLUTION INTRAVENOUS at 21:07

## 2021-12-13 RX ADMIN — MAGNESIUM SULFATE HEPTAHYDRATE 4000 MG: 4 INJECTION, SOLUTION INTRAVENOUS at 09:48

## 2021-12-13 RX ADMIN — HYDROMORPHONE HYDROCHLORIDE 1 MG: 2 INJECTION, SOLUTION INTRAMUSCULAR; INTRAVENOUS; SUBCUTANEOUS at 15:51

## 2021-12-13 RX ADMIN — AMPICILLIN SODIUM AND SULBACTAM SODIUM 3000 MG: 2; 1 INJECTION, POWDER, FOR SOLUTION INTRAMUSCULAR; INTRAVENOUS at 21:16

## 2021-12-13 RX ADMIN — ONDANSETRON 4 MG: 2 INJECTION INTRAMUSCULAR; INTRAVENOUS at 15:08

## 2021-12-13 RX ADMIN — Medication 0.6 MG: at 15:12

## 2021-12-13 RX ADMIN — ROCURONIUM BROMIDE 10 MG: 10 INJECTION, SOLUTION INTRAVENOUS at 14:49

## 2021-12-13 RX ADMIN — HYDROMORPHONE HYDROCHLORIDE 0.5 MG: 2 INJECTION, SOLUTION INTRAMUSCULAR; INTRAVENOUS; SUBCUTANEOUS at 15:54

## 2021-12-13 RX ADMIN — AMPICILLIN SODIUM AND SULBACTAM SODIUM 3000 MG: 2; 1 INJECTION, POWDER, FOR SOLUTION INTRAMUSCULAR; INTRAVENOUS at 06:04

## 2021-12-13 RX ADMIN — AMPICILLIN SODIUM AND SULBACTAM SODIUM 3000 MG: 2; 1 INJECTION, POWDER, FOR SOLUTION INTRAMUSCULAR; INTRAVENOUS at 00:17

## 2021-12-13 RX ADMIN — INSULIN LISPRO 1 UNITS: 100 INJECTION, SOLUTION INTRAVENOUS; SUBCUTANEOUS at 21:09

## 2021-12-13 RX ADMIN — FENTANYL CITRATE 50 MCG: 50 INJECTION INTRAMUSCULAR; INTRAVENOUS at 16:17

## 2021-12-13 RX ADMIN — OXYCODONE HYDROCHLORIDE 5 MG: 5 TABLET ORAL at 22:59

## 2021-12-13 RX ADMIN — HYDROMORPHONE HYDROCHLORIDE 0.5 MG: 2 INJECTION, SOLUTION INTRAMUSCULAR; INTRAVENOUS; SUBCUTANEOUS at 15:56

## 2021-12-13 RX ADMIN — FENTANYL CITRATE 50 MCG: 50 INJECTION INTRAMUSCULAR; INTRAVENOUS at 16:32

## 2021-12-13 RX ADMIN — DOCUSATE SODIUM 100 MG: 100 CAPSULE, LIQUID FILLED ORAL at 21:16

## 2021-12-13 RX ADMIN — ACETAMINOPHEN 1000 MG: 500 TABLET ORAL at 06:01

## 2021-12-13 RX ADMIN — SODIUM CHLORIDE, POTASSIUM CHLORIDE, SODIUM LACTATE AND CALCIUM CHLORIDE: 600; 310; 30; 20 INJECTION, SOLUTION INTRAVENOUS at 00:13

## 2021-12-13 RX ADMIN — FENTANYL CITRATE 50 MCG: 50 INJECTION, SOLUTION INTRAMUSCULAR; INTRAVENOUS at 14:41

## 2021-12-13 RX ADMIN — ROCURONIUM BROMIDE 10 MG: 10 INJECTION, SOLUTION INTRAVENOUS at 14:41

## 2021-12-13 RX ADMIN — ACETAMINOPHEN 1000 MG: 500 TABLET ORAL at 17:59

## 2021-12-13 RX ADMIN — PROPOFOL 170 MG: 10 INJECTION, EMULSION INTRAVENOUS at 14:41

## 2021-12-13 RX ADMIN — SODIUM CHLORIDE, POTASSIUM CHLORIDE, SODIUM LACTATE AND CALCIUM CHLORIDE: 600; 310; 30; 20 INJECTION, SOLUTION INTRAVENOUS at 13:49

## 2021-12-13 RX ADMIN — ACETAMINOPHEN 1000 MG: 500 TABLET ORAL at 22:55

## 2021-12-13 RX ADMIN — DIBASIC SODIUM PHOSPHATE, MONOBASIC POTASSIUM PHOSPHATE AND MONOBASIC SODIUM PHOSPHATE 2 TABLET: 852; 155; 130 TABLET ORAL at 17:59

## 2021-12-13 RX ADMIN — AMLODIPINE BESYLATE 5 MG: 5 TABLET ORAL at 09:48

## 2021-12-13 RX ADMIN — Medication 160 MG: at 14:41

## 2021-12-13 RX ADMIN — Medication 4 MG: at 15:12

## 2021-12-13 RX ADMIN — AMPICILLIN SODIUM AND SULBACTAM SODIUM 3000 MG: 2; 1 INJECTION, POWDER, FOR SOLUTION INTRAMUSCULAR; INTRAVENOUS at 14:22

## 2021-12-13 RX ADMIN — SODIUM CHLORIDE, PRESERVATIVE FREE 10 ML: 5 INJECTION INTRAVENOUS at 09:49

## 2021-12-13 RX ADMIN — HEPARIN SODIUM 5000 UNITS: 5000 INJECTION INTRAVENOUS; SUBCUTANEOUS at 21:16

## 2021-12-13 RX ADMIN — OXYCODONE HYDROCHLORIDE 10 MG: 5 TABLET ORAL at 07:17

## 2021-12-13 RX ADMIN — SODIUM CHLORIDE, POTASSIUM CHLORIDE, SODIUM LACTATE AND CALCIUM CHLORIDE: 600; 310; 30; 20 INJECTION, SOLUTION INTRAVENOUS at 17:54

## 2021-12-13 ASSESSMENT — PULMONARY FUNCTION TESTS
PIF_VALUE: 18
PIF_VALUE: 9
PIF_VALUE: 26
PIF_VALUE: 25
PIF_VALUE: 24
PIF_VALUE: 24
PIF_VALUE: 1
PIF_VALUE: 7
PIF_VALUE: 3
PIF_VALUE: 24
PIF_VALUE: 1
PIF_VALUE: 28
PIF_VALUE: 33
PIF_VALUE: 25
PIF_VALUE: 13
PIF_VALUE: 1
PIF_VALUE: 24
PIF_VALUE: 26
PIF_VALUE: 27
PIF_VALUE: 26
PIF_VALUE: 28
PIF_VALUE: 29
PIF_VALUE: 1
PIF_VALUE: 28
PIF_VALUE: 28
PIF_VALUE: 24
PIF_VALUE: 27
PIF_VALUE: 1
PIF_VALUE: 4
PIF_VALUE: 24
PIF_VALUE: 10
PIF_VALUE: 28
PIF_VALUE: 9
PIF_VALUE: 23
PIF_VALUE: 25
PIF_VALUE: 24
PIF_VALUE: 9
PIF_VALUE: 28
PIF_VALUE: 23
PIF_VALUE: 10
PIF_VALUE: 24
PIF_VALUE: 24
PIF_VALUE: 23
PIF_VALUE: 28
PIF_VALUE: 0
PIF_VALUE: 11
PIF_VALUE: 24
PIF_VALUE: 10
PIF_VALUE: 28
PIF_VALUE: 23
PIF_VALUE: 22
PIF_VALUE: 24
PIF_VALUE: 1
PIF_VALUE: 28
PIF_VALUE: 27
PIF_VALUE: 1
PIF_VALUE: 24
PIF_VALUE: 27
PIF_VALUE: 28
PIF_VALUE: 27
PIF_VALUE: 27
PIF_VALUE: 8
PIF_VALUE: 29
PIF_VALUE: 24
PIF_VALUE: 1
PIF_VALUE: 28
PIF_VALUE: 16
PIF_VALUE: 4
PIF_VALUE: 28
PIF_VALUE: 24
PIF_VALUE: 24
PIF_VALUE: 26
PIF_VALUE: 1
PIF_VALUE: 2
PIF_VALUE: 24
PIF_VALUE: 24

## 2021-12-13 ASSESSMENT — PAIN DESCRIPTION - DESCRIPTORS: DESCRIPTORS: SHARP

## 2021-12-13 ASSESSMENT — PAIN SCALES - GENERAL
PAINLEVEL_OUTOF10: 6
PAINLEVEL_OUTOF10: 5
PAINLEVEL_OUTOF10: 5
PAINLEVEL_OUTOF10: 6
PAINLEVEL_OUTOF10: 7
PAINLEVEL_OUTOF10: 4
PAINLEVEL_OUTOF10: 6
PAINLEVEL_OUTOF10: 0
PAINLEVEL_OUTOF10: 5

## 2021-12-13 ASSESSMENT — PAIN DESCRIPTION - LOCATION
LOCATION: BUTTOCKS
LOCATION: BUTTOCKS

## 2021-12-13 ASSESSMENT — PAIN DESCRIPTION - FREQUENCY: FREQUENCY: CONTINUOUS

## 2021-12-13 ASSESSMENT — PAIN DESCRIPTION - ORIENTATION: ORIENTATION: LEFT;POSTERIOR

## 2021-12-13 ASSESSMENT — PAIN DESCRIPTION - PAIN TYPE: TYPE: SURGICAL PAIN

## 2021-12-13 NOTE — PROGRESS NOTES
Plastic Surgery   Daily Progress Note  Patient: Ramos Quiroz      CC: Shayla's gangrene    SUBJECTIVE:   No acute issues overnight. Patient remains afebrile and HDS with 3L NC oxygen requirement. States pain is well controlled. Tolerating zero carb diet, no nausea/ vomiting. Having ostomy output. ROS:   A 14 point review of systems was conducted, significant findings as noted above. All other systems negative. OBJECTIVE:    PHYSICAL EXAM:    Vitals:    12/12/21 1600 12/12/21 2012 12/12/21 2327 12/13/21 0332   BP: 123/77 128/74 138/74 114/71   Pulse: 88 84 78 79   Resp: 20 18 18 18   Temp: 98.4 °F (36.9 °C) 97.9 °F (36.6 °C) 97.9 °F (36.6 °C) 98.2 °F (36.8 °C)   TempSrc: Oral Oral Oral Oral   SpO2: 98% 95% 96% 95%   Weight:       Height:           General appearance: alert, no acute distress, grooming appropriate  Eyes: No scleral icterus, EOM grossly intact  Neck: trachea midline, no JVD, no lymphadenopathy, neck supple  Chest/Lungs: Equal excursion bilaterally, normal effort with no accessory muscle use on 3L NC  Cardiovascular: RRR, brisk capillary refill  Abdomen:  Soft, large surgical debridement area of the abdomen with veraflo Vac in place holding adequate suction and instilling normal saline. Colostomy is pink, viable and with brown stool in the bag. Skin: warm and dry, no rashes  Extremities: no edema, no cyanosis  Genitourinary: Mcfadden in place with clear yellow urine  Neuro: A&Ox3, no focal deficits, sensation intact    LABS:   Recent Labs     12/11/21  0548 12/12/21  0430   WBC 15.7* 11.1*   HGB 7.5* 6.7*   HCT 22.3* 20.4*   MCV 90.1 90.7   * 509*        Recent Labs     12/11/21  0548 12/12/21  0430    136   K 4.2 3.8    103   CO2 27 27   BUN 10 10   CREATININE 0.6* 0.5*        No results for input(s): AST, ALT, ALB, BILIDIR, BILITOT, ALKPHOS in the last 72 hours. No results for input(s): LIPASE, AMYLASE in the last 72 hours.      No results for input(s): PROT, INR, APTT in the last 72 hours. No results for input(s): CKTOTAL, CKMB, CKMBINDEX, TROPONINI in the last 72 hours. ASSESSMENT & PLAN:   Sandy Ferrera is a 63 y.o. male with Necrotizing fasciitis of the abdominal wall, gluteal region, and scrotum s/p wide excision of perineum, back, and abdominal wall. S/p multiple debridements and wound vac placement and changes intraoperatively with diverting colostomy creation (12/7). Most recent wound vac change in OR (12/10). S/p 1 unit pRBCs (12/12).     - Patient to go to OR today for exam under anesthesia and wound vac change  - Continue zero carb diet after procedure today  - Continue aggressive glucose control with goal glucose <120  - ID on board, continue Unasyn  - PT/ OT following    Surendra Schneider DO   PGY1, General Surgery  12/13/21  6:12 AM  [phone]

## 2021-12-13 NOTE — PROGRESS NOTES
[facility] PACU Education and Care Plan Goals  The following items will be achieved upon completion of the patient's transfer or discharge from the PACU:    Post Operative Pain Management                                                                               [] Patient will verbalize understanding of pain scale and pain management. [] Patient achieves predetermined pain goal of 4  [x] Self reports a comfort level acceptable for discharge or able to rest when not disturbed with VS the same as or improved from pre-op. [] Other     Fall Risk Potential  [x] Due to Perioperative medication administration  Additional Risk Identified:   [x] Sensory deficit         [x] Motor deficit         [x] Balance problem         [] Home medication         [] Uses assistive device to ambulate    Goal(s) for fall prevention:  [x] Prevent fall or injury by calling for assistance with activity and use of siderails while hospitalized  [] Prevent fall or injury by using assistance with activity after discharge. [] Patient / Significant other verbalize understanding in use of any ordered assistive devices    Mobility Safety/ ADL  [x] Reach a functional mobility goal within limitations of the procedure. Infection Precautions                                                                                                            []Patient understands implementation of infection precautions (see [facility] Presurgical Instructions and SSI Prevention Handout)    Post operative Assessment and Care                                                             [x] Standards of care met as delineated by ASPAN.                                                               Discharge Education and Goals  [x]Patient voices understanding of PACU discharge criteria  []Outpatient / significant other voices understanding of home care and follow up procedures (See [facility] Procedure Discharge Instructions)  [] Patient / significant

## 2021-12-13 NOTE — OP NOTE
[place] [address] SURGERY     OPERATIVE DICTATION    NAME: Gabrielle Allen   MRN: [id number]  DATE: 12/10/2021     AGE: 63 y.o. LOCATION[facility]    PREOPERATIVE DIAGNOSIS:  Shayla's gangrene with necrotizing fasciitis     POSTOPERATIVE DIAGNOSIS:  Same. OPERATION PERFORMED: 1) Dressing change under anesthesia       2) Application of Veraflo instillation vac therapy     SURGEON:  Anatoly Quezada MD    ANESTHESIA: General     ESTIMATED BLOOD LOSS: Minimal     DRAINS:  Instillation vac     SPECIMENS: None     OPERATIVE INDICATIONS:  This is an unfortunate 63 y.o. male with a history of poorly controlled diabetes who developed Shayla's gangrene at an outside hospital.  He underwent debridement with both Urology and General surgery, however was noted to have additional crepitance. Given this, he was urgently transferred to our hospital and was taken to the operating room by general surgery for extensive debridement. Plastic surgery was consulted for assistance with management. He has stabilized and undergone diverting colostomy. He is brought to the operating room for dressing change under anesthesia given the complexity and pain. The risks, benefits, alternatives, outcomes, and personnel involved was performed with the patient. After all questions were answered to the patient's satisfaction, they agreed to proceed. OPERATIVE PROCEDURE:  The patient was brought to the operating room on his ICU bed and placed in the supine position on the operating room table. He underwent general anesthesia without difficulty, was then placed in the lithotomy position, and was prepped and draped in the usual sterile manner. A time-out was performed confirming the patient and the procedure to be performed. There was minimal granulation tissue, however there was also minimal fibrinous exudate. There was a significant amount of tissue edema.   The wound was copiously irrigated with 3L of saline solution using a Pulse Lavage. Once completed, another complex wound vacuum device was then fashioned and applied with good seal.    The patient was then placed supine, extubated, and taken back to the ICU in stable, yet critical condition. There were no immediate complications and the patient tolerated the procedure well. At the end of the case, all counts were correct. PLAN: Will return for additional planned dressing change under anesthesia in 48-72 hours. Once there is improved granulation tissue with negative cultures, will begin the reconstructive process.     Jason Bee MD

## 2021-12-13 NOTE — ANESTHESIA PRE PROCEDURE
Department of Anesthesiology  Preprocedure Note       Name:  Katelin Gamble   Age:  63 y.o.  :  1955                                          MRN:  [id number]         Date:  2021      Surgeon: Stefany Muhammad):  Eduarda Peralta MD    Procedure: Procedure(s):  WOUND VAC CHANGE ABDOMEN AND LEFT BUTTOCK    Medications prior to admission:   Prior to Admission medications    Medication Sig Start Date End Date Taking?  Authorizing Provider   metFORMIN (GLUCOPHAGE) 500 MG tablet Take 500 mg by mouth daily (with breakfast)    Historical Provider, MD   meloxicam (MOBIC) 15 MG tablet Take 1 tablet by mouth daily 16   Leah Rojas DO       Current medications:    Current Facility-Administered Medications   Medication Dose Route Frequency Provider Last Rate Last Admin    insulin lispro (1 Unit Dial) 4 Units  4 Units SubCUTAneous TID  Jourdan Ma MD        [START ON 2021] insulin glargine (LANTUS;BASAGLAR) injection pen 10 Units  10 Units SubCUTAneous QAM Jourdan Ma MD        0.9 % sodium chloride infusion   IntraVENous PRN Sree Garcia DO        lactated ringers infusion   IntraVENous Continuous Reza Isle,  mL/hr at 21 1349 New Bag at 21 1349    heparin (porcine) injection 5,000 Units  5,000 Units SubCUTAneous 3 times per day Reji Galvin MD   5,000 Units at 21 2150    alteplase (CATHFLO) injection 1 mg  1 mg IntraCATHeter PRN Sree Garcia DO   1 mg at 21 1844    insulin lispro (1 Unit Dial) 0-6 Units  0-6 Units SubCUTAneous TID  Blaire Villa MD   1 Units at 21 1327    insulin lispro (1 Unit Dial) 0-3 Units  0-3 Units SubCUTAneous Nightly Blaire Villa MD        docusate sodium (COLACE) capsule 100 mg  100 mg Oral BID Reji Galvin MD   100 mg at 21    polyethylene glycol (GLYCOLAX) packet 17 g  17 g Oral Daily Reji Galvin MD   17 g at 21 0844    ipratropium-albuterol (DUONEB) nebulizer solution 1 ampule  1 ampule Inhalation Q4H PRN Agnes Crespo MD   1 ampule at 12/10/21 1202    dextrose 50 % IV solution  12.5 g IntraVENous PRN Agnes Crespo MD        amLODIPine (NORVASC) tablet 5 mg  5 mg Oral Daily Agnes Crespo MD   5 mg at 12/13/21 0948    hydrALAZINE (APRESOLINE) injection 10 mg  10 mg IntraVENous Q6H PRN Agnes Crespo MD        ampicillin-sulbactam (UNASYN) 3000 mg ivpb minibag  3,000 mg IntraVENous Q6H Agnes Crespo MD   Stopped at 12/13/21 5001    oxyCODONE (ROXICODONE) immediate release tablet 5 mg  5 mg Oral Q4H PRN Agnes Crespo MD   5 mg at 12/07/21 2209    Or    oxyCODONE (ROXICODONE) immediate release tablet 10 mg  10 mg Oral Q4H PRN Agnes Crespo MD   10 mg at 12/13/21 0717    acetaminophen (TYLENOL) tablet 1,000 mg  1,000 mg Oral Q6H Agnes Crespo MD   1,000 mg at 12/13/21 0601    sodium chloride flush 0.9 % injection 5-40 mL  5-40 mL IntraVENous 2 times per day Agnes Crespo MD   10 mL at 12/13/21 0949    sodium chloride flush 0.9 % injection 5-40 mL  5-40 mL IntraVENous PRN Agnes Crespo MD        0.9 % sodium chloride infusion  25 mL IntraVENous PRN Agnes Crespo  mL/hr at 12/12/21 0850 25 mL at 12/12/21 0850    glucose (GLUTOSE) 40 % oral gel 15 g  15 g Oral PRN Agnes Crespo MD        dextrose 50 % IV solution  12.5 g IntraVENous PRN Agnes Crespo MD        glucagon (rDNA) injection 1 mg  1 mg IntraMUSCular PRN Agnes Crespo MD        dextrose 5 % solution  100 mL/hr IntraVENous PRN Agnes Crespo MD           Allergies:  No Known Allergies    Problem List:    Patient Active Problem List   Diagnosis Code    Diabetic acidosis without coma ([facility] E11.10    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    Acute respiratory failure with hypoxia (HCC) J96.01    Necrotizing fasciitis ([facility] M72.6    Necrotizing soft tissue infection M79.89       Past Medical History:  History reviewed.  No pertinent past medical history. Past Surgical History:        Procedure Laterality Date    ABDOMEN SURGERY N/A 12/1/2021    WIDE EXCISION PERINEUM, BACK, ABDOMINAL WALL performed by Wicho Kendall MD at [address] [facility] Left 12/3/2021    ABDOMINAL WALL AND LEFT BUTTOCK DEBRIDEMENT, WOUND VAC PLACEMENT performed by Lindsey Sage MD at [address] [facility] Left 12/5/2021    ABDOMINAL WALL AND LEFT BUTTOCK DEBRIDEMENT, WOUND VAC PLACEMENT performed by Lindsey Sage MD at [address] [facility] N/A 12/7/2021    EVALUATION UNDER ANESTHESIA WITH DEBRIDEMENT ABDOMINAL WOUND AND LEFT BUTTOCK, WOUND VAC CHANGE performed by Lindsey Sage MD at [address] [facility] Left 12/10/2021    ABDOMINAL WALL AND LEFT BUTTOCK WOUND VAC CHANGE WITH FURTHER DEBRIDEMENT ABDOMEN AND LEFT BUTTOCK WOUND performed by Lindsey Sage MD at [address] Left 12/7/2021    LAPAROSCOPIC COLOSTOMY CREATION performed by Wicho Kendall MD at [address] N/A 11/30/2021    DEBRIDEMENT OF SCROTAL JAYRO'S GANGRENE performed by Abraham Grijalva MD at [address] N/A 11/30/2021    PERINEAL SOFT TISSUE EXCISIONAL DEBRIDEMENT performed by Patricia Arguello MD at [address] [place] [address] History:    Social History     Tobacco Use    Smoking status: Current Every Day Smoker    Smokeless tobacco: Never Used   Substance Use Topics    Alcohol use:  Yes     Alcohol/week: 0.0 standard drinks     Comment: social                                 Ready to quit: Not Answered  Counseling given: Not Answered      Vital Signs (Current):   Vitals:    12/13/21 0332 12/13/21 0754 12/13/21 1137 12/13/21 1331   BP: 114/71 132/70 131/68 139/63   Pulse: 79 80 76 79   Resp: 18 17 17 16   Temp: 98.2 °F (36.8 °C) 97.9 °F (36.6 °C) 98 °F (36.7 °C) 98.5 °F (36.9 °C)   TempSrc: Oral Oral Oral Oral   SpO2: 95% 95% 96% 97%   Weight:       Height: BP Readings from Last 3 Encounters:   12/13/21 139/63   12/10/21 96/62   12/07/21 (!) 101/56       NPO Status: Time of last liquid consumption: 0948                        Time of last solid consumption: 1800                        Date of last liquid consumption: 12/13/21                        Date of last solid food consumption: 12/13/21    BMI:   Wt Readings from Last 3 Encounters:   12/11/21 196 lb 3.4 oz (89 kg)   11/30/21 163 lb 3.2 oz (74 kg)   04/18/16 179 lb 14.3 oz (81.6 kg)     Body mass index is 27.37 kg/m².     CBC:   Lab Results   Component Value Date    WBC 10.9 12/13/2021    RBC 2.45 12/13/2021    HGB 7.5 12/13/2021    HCT 22.3 12/13/2021    MCV 90.7 12/13/2021    RDW 14.8 12/13/2021     12/13/2021       CMP:   Lab Results   Component Value Date     12/13/2021    K 4.2 12/13/2021    K 3.8 12/12/2021     12/13/2021    CO2 28 12/13/2021    BUN 10 12/13/2021    CREATININE 0.5 12/13/2021    GFRAA >60 12/13/2021    AGRATIO 0.7 11/30/2021    LABGLOM >60 12/13/2021    GLUCOSE 86 12/13/2021    PROT 4.8 12/10/2021    CALCIUM 7.6 12/13/2021    BILITOT 0.3 12/10/2021    ALKPHOS 170 12/10/2021    AST 13 12/10/2021    ALT 7 12/10/2021       POC Tests:   Recent Labs     12/13/21  1142   POCGLU 118*       Coags:   Lab Results   Component Value Date    PROTIME 12.4 12/07/2021    INR 1.09 12/07/2021       HCG (If Applicable): No results found for: PREGTESTUR, PREGSERUM, HCG, HCGQUANT     ABGs:   Lab Results   Component Value Date    PHART 7.227 12/09/2021    PO2ART 107.8 12/09/2021    VHW5OVC 42.4 12/09/2021    PAP8YUY 17.6 12/09/2021    BEART -10 12/09/2021    M4ZHUYGD 97 12/09/2021        Type & Screen (If Applicable):  No results found for: LABABO, LABRH    Drug/Infectious Status (If Applicable):  No results found for: HIV, HEPCAB    COVID-19 Screening (If Applicable):   Lab Results   Component Value Date    COVID19 Not Detected 11/30/2021           Anesthesia Evaluation  Patient summary reviewed and Nursing notes reviewed no history of anesthetic complications:   Airway: Mallampati: I  TM distance: >3 FB   Neck ROM: full  Mouth opening: > = 3 FB Dental:    (+) lower dentures and upper dentures      Pulmonary:Negative Pulmonary ROS and normal exam  breath sounds clear to auscultation                             Cardiovascular:  Exercise tolerance: no interval change,         ECG reviewed  Rhythm: regular  Rate: normal                    Neuro/Psych:   Negative Neuro/Psych ROS              GI/Hepatic/Renal:             Endo/Other:    (+) DiabetesType II DM, poorly controlled, , .                 Abdominal:             Vascular: negative vascular ROS. Other Findings:             Anesthesia Plan      general     ASA 2       Induction: intravenous. MIPS: Postoperative opioids intended. Anesthetic plan and risks discussed with patient. Plan discussed with CRNA.     Attending anesthesiologist reviewed and agrees with Preprocedure content              Merrick Toro DO   12/13/2021

## 2021-12-13 NOTE — PROGRESS NOTES
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  Antibiotics: Unasyn    Admit Date: 12/1/2021  Hospital Day: 13    Subjective:     Going to OR today for [facility] change    Patient reports wound / surg site pain mostly controlled  No other complaint     Objective:     Patient Vitals for the past 8 hrs:   BP Temp Temp src Pulse Resp SpO2   12/13/21 0754 132/70 97.9 °F (36.6 °C) Oral 80 17 95 %   12/13/21 0332 114/71 98.2 °F (36.8 °C) Oral 79 18 95 %     I/O last 3 completed shifts: In: 1030 [P.O.:480; I.V.:200; Blood:350]  Out: 9305 [Urine:725; Drains:1700; Stool:50]  I/O this shift:  In: -   Out: 200 [Urine:200]    EXAM:  GENERAL: No apparent distress.   RA  HEENT: Membranes moist, no oral lesion  NECK:  Supple, no lymphadenopathy  LUNGS: Clear b/l, no rales, no dullness  CARDIAC: RRR, no murmur appreciated  ABD:  + BS, soft / NT  Wounds - VAC in wound over lower abd, suprapubic region, extending over L scrotum, inguinal fold, posteriorly to buttock  EXT:  No rash, no edema, no lesions  NEURO: No focal neurologic findings  PSYCH: Orientation, sensorium, mood normal  LINES:  R PICC in place       Data Review:  Lab Results   Component Value Date    WBC 10.9 12/13/2021    HGB 7.5 (L) 12/13/2021    HCT 22.3 (L) 12/13/2021    MCV 90.7 12/13/2021     (H) 12/13/2021     Lab Results   Component Value Date    CREATININE 0.5 (L) 12/13/2021    BUN 10 12/13/2021     12/13/2021    K 4.2 12/13/2021     12/13/2021    CO2 28 12/13/2021       Hepatic Function Panel:   Lab Results   Component Value Date    ALKPHOS 170 12/10/2021    ALT 7 12/10/2021    AST 13 12/10/2021    PROT 4.8 12/10/2021    BILITOT 0.3 12/10/2021    BILIDIR <0.2 12/10/2021    IBILI see below 12/10/2021    LABALBU 2.1 12/13/2021       MICRO:  11/30 BC no growth   11/30 SARS CoV-2 NAAT neg  11/30 Tissue cult - no growth   12/1   Wound cult - light mixed skin pam  12/3 Tissue cult - light C glabrata  12/5 Tissue cult - light C glabrata    IMAGIN/30 L foot:   Evidence of a septic joint involving the 5th metatarsal phalangeal joint with   associated osteomyelitis      Abd / Pelvic CT   Impression   1. 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  There is   additional muscle or fascial involvement on the left as above.  Of note, the   testes are incompletely evaluated but appear within normal limits.  Critical   results were called by Dr. Diallo Cheng MD to Dr. Lucila Rose on 2021   at 19:31.   2. Minimal bilateral hydronephrosis is likely due to severe urinary bladder   distention.  Abd / Pelvic CT  Impression   Significant interval improvement and soft tissue emphysema in the visualized left gluteal region and over the anterior abdominal wall since prior study.       Anasarca more prominently of the abdominal wall without suspicious drainable abdominal wall fluid collection.       More prominent presacral strandy fluid without suspicious retroperitoneal emphysema to suggest retroperitoneal infection.       New small amount of ascites throughout the abdomen and pelvis without suspicious organized intraperitoneal fluid collections.       New moderate to large bilateral pleural effusions with associated compressive atelectasis of the lung bases.       New trace pericardial effusion.       Colostomy in the left lower quadrant without obstruction or complicating features.       Interval improvement in bilateral hydronephrosis.        Scheduled Meds:   magnesium sulfate  4,000 mg IntraVENous Once    insulin lispro  4 Units SubCUTAneous TID     [START ON 2021] insulin glargine  10 Units SubCUTAneous QAM    heparin (porcine)  5,000 Units SubCUTAneous 3 times per day    insulin lispro  0-6 Units SubCUTAneous TID     insulin lispro  0-3 Units SubCUTAneous Nightly    docusate sodium  100 mg Oral BID    polyethylene glycol  17 g Oral Daily    amLODIPine  5 mg Oral Daily    ampicillin-sulbactam  3,000 mg IntraVENous Q6H    acetaminophen  1,000 mg Oral Q6H    sodium chloride flush  5-40 mL IntraVENous 2 times per day       Continuous Infusions:   sodium chloride      lactated ringers 100 mL/hr at 12/13/21 0013    sodium chloride 25 mL (12/12/21 0850)    dextrose         PRN Meds:  sodium chloride, alteplase, ipratropium-albuterol, dextrose, hydrALAZINE, oxyCODONE **OR** oxyCODONE, sodium chloride flush, sodium chloride, glucose, dextrose, glucagon (rDNA), dextrose      Assessment:     64 yo man with DM - not taking meds     Presented with scrotal swelling and pain  Seen 11/30 at [facility] ED, dx DKA, Shayla's gangrene  CT with gas extending   OR debridement  Rx Vancomycin, Cefepime, Metronidazole     Transfer to [facility] on 12/1. Return to OR 12/2,3,5,7. Colostomy 12/7     Reviewed cultures -  11/30 GS GPC, GNDC, GNR, cult neg  12/3,5 light C glabrata.     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   - mixed / polymicrobial infection   - yeast in tissue    Leukocytosis - resolved      Plan:     Cont unsyn  Wound care / reconstruction per Surg / Plastics   - OR today for VAC change, poss debridement    Diet (high protein / no carb), BS control    At discharge may not iv antibiotic or long course of antibiotics    Medical Decision Making:   The following items were considered in medical decision making:  Discussion of patient care with other providers  Reviewed clinical lab tests  Reviewed radiology tests  Reviewed other diagnostic tests/interventions  Independent review of radiologic images - reviewed initial and f/u CT with Radiologist 12/8  Microbiology cultures and other micro tests reviewed      Discussed with pt  Almita Perrin MD

## 2021-12-13 NOTE — PROGRESS NOTES
1600 Admitted to PACU from OR. Connected to monitor. Report at bedside. C/O pain total of 2 mg dilaudid given post-op per CRNA. Insp and exp wheezes - reports he does not need a treatment. BS 95. Extremities cold x 4 - ear-ox obtained to monitor SpO2. Pain feels like someone punching him over and over in butt.

## 2021-12-13 NOTE — BRIEF OP NOTE
Brief Postoperative Note      Patient: Tono Clemons  YOB: 1955  MRN: [id number]    Date of Procedure: 12/13/2021    Pre-Op Diagnosis: necrotizing fasciitis    Post-Op Diagnosis: Same       Procedure(s):  WOUND VAC CHANGE ABDOMEN AND LEFT BUTTOCK    Surgeon(s):  Darling Bianchi MD    Assistant:  * No surgical staff found *    Anesthesia: General    Estimated Blood Loss (mL): 5mL    Complications: None    Specimens:   * No specimens in log *    Implants:  * No implants in log *      Drains:   Negative Pressure Wound Therapy Groin Lower; Medial (Active)   Wound Type Surgical 12/13/21 0754   Dressing Type Black foam 12/13/21 0754   Cycle Continuous 12/13/21 0754   Target Pressure (mmHg) 125 12/13/21 0754   Irrigation Solution Sodium chloride 0.9% 12/12/21 1359   Instillation Volume  250 mL 12/12/21 1359   Canister changed? Yes 12/13/21 0956   Dressing Status Clean; Dry; Intact 12/13/21 0754   Dressing Changed Changed/New 12/10/21 1600   Drainage Amount None 12/10/21 1600   Drainage Description Serous 12/12/21 0820   Output (ml) 900 ml 12/13/21 0053   Wound Assessment Other (Comment) 12/13/21 0754   Princess-wound Assessment Dry 12/13/21 0754   Odor None 12/13/21 0754       NG/OG/NJ/NE Tube Orogastric 16 fr Center mouth (Active)       Colostomy LUQ (Active)   Stomal Appliance Clean; Dry; Intact 12/12/21 2012   Flange Size (inches) 2 Inches 12/11/21 0553   Stoma  Assessment Moist; Red 12/13/21 0754   Mucocutaneous Junction Intact 12/13/21 0754   Peristomal Assessment Clean; Intact 12/13/21 0754   Treatment Bag change;  Liquid skin barrier 12/10/21 0931   Stool Appearance Loose 12/13/21 0754   Stool Color Brown 12/13/21 0754   Stool Amount Small 12/13/21 0332   Output (mL) 50 ml 12/12/21 2327       Urethral Catheter Non-latex 18 fr (Active)   Catheter Indications Assist in healing of open sacral or perineal wounds (Stage III, IV, or unstageable documented by a provider or enterostomal therapy) and/or full thickness perineal and lower extremity burns in incontinent patients 12/13/21 0754   Securement Device Date Changed 12/01/21 12/11/21 0021   Site Assessment No urethral drainage 12/13/21 0754   Urine Color Yellow 12/13/21 0754   Urine Appearance Cloudy 12/13/21 0754   Urine Odor Other (Comment) 12/11/21 2315   Output (mL) 200 mL 12/13/21 0956       [REMOVED] Negative Pressure Wound Therapy Abdomen Mid (Removed)   Wound Type Surgical 12/07/21 1600   Dressing Type Black foam 12/07/21 1600   Cycle Continuous 12/07/21 1600   Target Pressure (mmHg) 125 12/07/21 1600   Irrigation Solution Dakins 0.125% 12/06/21 0509   Canister changed? Yes 12/07/21 1600   Dressing Status Clean; Dry; Intact 12/07/21 1600   Dressing Changed Changed/New 12/06/21 0400   Output (ml) 200 ml 12/07/21 1600   Odor None 12/07/21 1600       [REMOVED] NG/OG/NJ/NE Tube Orogastric 16 fr Center mouth (Removed)   Surrounding Skin Dry;  Intact 12/01/21 2200   Securement device Yes 12/02/21 0800   Status Clamped 12/02/21 0800   Placement Verified by External Catheter Length 12/02/21 0800   NG/OG/NJ/NE External Measurement (cm) 50 cm 12/02/21 0800   Drainage Appearance Bile 12/01/21 2200   Tube Feeding Intake (mL) 0 ml 12/01/21 1800   Free Water Flush (mL) 0 mL 12/01/21 1800   Free Water Rate 0 12/01/21 0800   Residual Volume (ml) 0 ml 12/01/21 0000       [REMOVED] NG/OG/NJ/NE Tube Left nostril (Removed)       Findings: Wound vac replaced with Dermatec and veraflo     - Plan to return to OR for vac change on Thurs (12/16)    Electronically signed by Joesph Biggs DO on 12/13/2021 at 3:51 PM

## 2021-12-13 NOTE — CARE COORDINATION
Cm following, pt to OR today for [place] change. Pt with no Ins over income for Medicaid, may have to revisit in 30 days as pt will need placement for care needs. Pt with extensive wound care needs, new ostomy, probable IV ABX at [place].   Electronically signed by Alison Lugo RN on 12/13/2021 at 3:36 PM  [phone]

## 2021-12-13 NOTE — PROGRESS NOTES
Chart reviewed. Glu now 70s-130s. Will decrease lantus to 10 and lispro w/ meals to 4 units to prevent hypoglycemia. Recommend continuing this regimen as needs tight glu control for wound heeling. Will sign off- please call w/ questions.     Beatrice Manning MD

## 2021-12-13 NOTE — PLAN OF CARE
Problem: Activity:  Goal: Ability to return to normal activity level will improve  Description: Ability to return to normal activity level will improve  Outcome: Ongoing     Problem:  Bowel/Gastric:  Goal: Gastrointestinal status for postoperative course will improve  Description: Gastrointestinal status for postoperative course will improve  Outcome: Ongoing     Problem: Health Behavior:  Goal: Identification of resources available to assist in meeting health care needs will improve  Description: Identification of resources available to assist in meeting health care needs will improve  Outcome: Ongoing     Problem: Physical Regulation:  Goal: Postoperative complications will be avoided or minimized  Description: Postoperative complications will be avoided or minimized  Outcome: Ongoing     Problem: Respiratory:  Goal: Ability to achieve and maintain a regular respiratory rate will improve  Description: Ability to achieve and maintain a regular respiratory rate will improve  Outcome: Ongoing     Problem: Safety:  Goal: Ability to remain free from injury will improve  Description: Ability to remain free from injury will improve  Outcome: Ongoing     Problem: Sensory:  Goal: General experience of comfort will improve  Description: General experience of comfort will improve  Outcome: Ongoing     Problem: Skin Integrity:  Goal: Demonstration of wound healing without infection will improve  Description: Demonstration of wound healing without infection will improve  Outcome: Ongoing  Goal: Will show no infection signs and symptoms  Description: Will show no infection signs and symptoms  Outcome: Ongoing  Goal: Absence of new skin breakdown  Description: Absence of new skin breakdown  Outcome: Ongoing     Problem: Infection - Surgical Site:  Goal: Will show no infection signs and symptoms  Description: Will show no infection signs and symptoms  Outcome: Ongoing     Problem: Falls - Risk of:  Goal: Will remain free from falls  Description: Will remain free from falls  Outcome: Ongoing  Goal: Absence of physical injury  Description: Absence of physical injury  Outcome: Ongoing     Problem: ABCDS Injury Assessment  Goal: Absence of physical injury  Outcome: Ongoing     Problem: Nutrition  Goal: Optimal nutrition therapy  Outcome: Ongoing     Problem: Pain:  Description: Pain management should include both nonpharmacologic and pharmacologic interventions.   Goal: Pain level will decrease  Description: Pain level will decrease  Outcome: Ongoing  Goal: Control of acute pain  Description: Control of acute pain  Outcome: Ongoing  Goal: Control of chronic pain  Description: Control of chronic pain  Outcome: Ongoing

## 2021-12-13 NOTE — PROGRESS NOTES
Podiatric Surgery Daily Progress Note      Admit Date: 12/1/2021      Code:Full Code    Patient seen and examined, labs and records reviewed    Subjective:     Patient seen at bedside this a.m. Patient denies pain to bilateral lower extremity. Patient denies fever, chills, shortness of breath, chest pain. Patient denies any acute events overnight. Review of Systems: A 10 point review of systems was conducted, significant findings as noted in HPI. All other systems negative. Objective     /71   Pulse 79   Temp 98.2 °F (36.8 °C) (Oral)   Resp 18   Ht 5' 11\" (1.803 m)   Wt 196 lb 3.4 oz (89 kg)   SpO2 95%   BMI 27.37 kg/m²      I/O:    Intake/Output Summary (Last 24 hours) at 12/13/2021 0722  Last data filed at 12/13/2021 0332  Gross per 24 hour   Intake 1030 ml   Output 2475 ml   Net -1445 ml              Wt Readings from Last 3 Encounters:   12/11/21 196 lb 3.4 oz (89 kg)   11/30/21 163 lb 3.2 oz (74 kg)   04/18/16 179 lb 14.3 oz (81.6 kg)       LABS:    Recent Labs     12/11/21  0548 12/12/21  0430   WBC 15.7* 11.1*   HGB 7.5* 6.7*   HCT 22.3* 20.4*   * 509*        Recent Labs     12/12/21  0430      K 3.8      CO2 27   BUN 10   CREATININE 0.5*        No results for input(s): PROT, INR, APTT in the last 72 hours. LOWER EXTREMITY EXAMINATION    Dressing to left LE intact. No strikethrough noted to the external dressing. No drainage noted to the internal layers of the dressing. VASCULAR:   DP and PT pulses are non-palpable b/l. Upon hand-held Doppler examination DP signals were noted to be monophasic and PT signals were noted to be nondopplerable. CFT slightly diminished to the distal digits of bilateral lower extremities. Skin temperature is warm to lukewarm to cool to the distal digits from proximal to distal.  No edema noted. No pain with calf compression b/l.     NEUROLOGIC:   Gross and epicritic sensation is diminished b/l.  Protective sensation is diminished at all pedal sites b/l.     DERMATOLOGIC:   Patient provided verbal consent for photos to be obtained today:  Left lower extremity:      Full-thickness ulceration noted to the lateral aspect of the left foot. Wound measures approximately 3.2 cm x 2 cm x 0.5 cm. Wound base exposed bone, with necrotic edges at the proximal and distal edges. Wound probes to bone with exposed fifth metatarsal.  No tracking or tunneling noted. No purulent drainage noted. No fluctuance or crepitus or malodor noted. Maroon/red and black discoloration noted distal tip of left fifth digit. Wound is stable. Anterior aspect of ankle joint left foot. Deep tissue injury with rubor discoloration noted 2/2 Prevalon boot strap. Intact epithelialized tissue noted. No crepitus, erythema, drainage, or malodor noted. No open wound noted. Stable without acute infection noted. Right lower extremity      Styloid process fifth metatarsal deep tissue injury. Purple circumferential discoloration noted. No open wound. No fluctuance, crepitus, malodor, or drainage noted. No acute signs infection noted. Lateral malleolus deep tissue injury. Purple circumferential discoloration noted. No open wound. No fluctuance, crepitus, malodor, or drainage noted. No acute signs infection noted. Pressure injury with red/purple discoloration noted to the dorsal aspect of the midfoot, right lower extremity. No open wound. No fluctuance, crepitus, malodor, or drainage noted. No acute signs of infection noted. MUSCULOSKELETAL:   Muscle strength 4/5 for all pedal muscle groups B/L LE. No pain with palpation of the left foot.      IMAGING:  XR LEFT FOOT 11/30/2021  Narrative   EXAMINATION:   THREE XRAY VIEWS OF THE LEFT FOOT       11/30/2021 1:44 pm       COMPARISON:   None.       HISTORY:   ORDERING SYSTEM PROVIDED HISTORY: wound   TECHNOLOGIST PROVIDED HISTORY:   Reason for exam:->wound   Reason for Exam: blood sugar problem, wound check on lateral portion of foot   Acuity: Acute   Type of Exam: Initial       FINDINGS:   Osseous destruction along the articular margins of the 5th   metatarsophalangeal joint with associated lucency in the soft tissues. .   There is no evidence of fracture or dislocation. . The remaining joint spaces   appear well maintained. The remaining soft tissues are unremarkable.           Impression   Evidence of a septic joint involving the 5th metatarsal phalangeal joint with   associated osteomyelitis         ARTERIAL DUPLEX 12/2/21     Type of Study:        Extremities Arteries:Lower Extremities Arterial Duplex, VL LOWER EXTREMITY    ARTERIES DUPLEX BILATERAL.       Tech Comments   Right   The right ankle/brachial index is 0.0 (no Doppler signal could be heard at the   [facility] or the PT). The common femoral and profunda femoral arteries could not be visualized due   to bandages extending into the groin area. The mid superficial femoral artery has a short segmental occlusion and then is   reconstituted. Elevated velocities at the distal superficial femoral artery indicate a > 50%   stenosis by velocity criteria (velocity went from 15 to 298 cm/s). The posterior tibial artery is occluded. The distal anterior tibial artery is barely patent, with very low flow   (possibly occluded and then reconstituted). Left   The left ankle brachial index is 0 for the PT and the DP was not accessible   due to the bandages on the foot. The common femoral and profunda femoral artery could not be visualized due to   bandages extending into the groin area. The distal superficial femoral artery is occluded and then reconstituted. The posterior tibial artery, peroneal, and anterior tibial artery are   occluded. There were no previous studies to use for comparison.        ASSESSMENT/PLAN:   -Full-thickness ulceration; lateral left foot-Sands stage III  -Deep tissue injury, styloid process fifth metatarsal base right foot  -Deep tissue injury, lateral malleolus right ankle  -Pressure injury, dorsal right midfoot -NPUAP Stage I  -Osteomyelitis of the fifth metatarsal; left foot  -Critical limb ischemia; bilateral lower extremity  -Diabetes mellitus, type II  -History of noncompliance    -Patient seen and examined at bedside this a.m.  -VSS on 3 L of O2 via nasal cannula; no updated a.m. CBC  -All imaging reviewed see impression above. -Vascular surgery following; will await further stabilization with the general surgery team and plastic surgery team before offering vascular intervention.  -Plastic surgery following; Plan for return to the OR on 12/13 for wound VAC change and wound evaluation  -Infectious disease following, continue Unasyn and at discharge may not need IV antibiotics or long course of antibiotics. -Left lower extremity dressed with Betadine soaked gauze, DSD, and tape. -Right lower extremity applied to Mepilex borders to deep tissue injury.  -Prevalon boots reapplied. Patient is to wear at all times while in bed to prevent further deep tissue injury. Ankle strap removed from bilateral boot as the ankle straps were causing pressure to his feet. -Nonweightbearing to left lower extremity.  -Weightbearing as tolerated to right lower extremity. DISPO: Full-thickness ulceration with underlying osteomyelitis to the left fifth metatarsal. Critical limb ischemia bilateral lower extremity. Vascular following; awaiting further stabilization at this time. Patient will eventually require podiatric surgical intervention but timing will depend on medical stability and vascular recommendations. Will continue to closely monitor patient and follow while in house. Patient examined and evaluated with Dr. Kiley Sears DPM.    Cinthia Hutchison DPM   Podiatric Resident PGY1  Pager [phone] or [employer]  12/13/2021, 7:22 AM    Patient was seen and evaluated at bedside. Agree with residents assessment and treatment plan.   Kiley Sears DPM

## 2021-12-13 NOTE — ANESTHESIA POSTPROCEDURE EVALUATION
Department of Anesthesiology  Postprocedure Note    Patient: Dayron Rico  MRN: [id number]  [place]: 1955  Date of evaluation: 12/13/2021  Time:  5:59 PM     Procedure Summary     Date: 12/13/21 Room / Location: [facility] [address] / [facility]    Anesthesia Start: 8065 Anesthesia Stop: 1602    Procedure: WOUND VAC CHANGE ABDOMEN AND LEFT BUTTOCK (N/A Abdomen) Diagnosis: (necrotizing fasciitis)    Surgeons: Tori Piña MD Responsible Provider: Jesi Guajardo DO    Anesthesia Type: general ASA Status: 2          Anesthesia Type: general    Arcadio Phase I: Arcadio Score: 9    Arcadio Phase II:      Last vitals: Reviewed and per EMR flowsheets.        Anesthesia Post Evaluation    Patient location during evaluation: PACU  Patient participation: complete - patient participated  Level of consciousness: awake and alert  Airway patency: patent  Nausea & Vomiting: no nausea and no vomiting  Cardiovascular status: blood pressure returned to baseline  Respiratory status: acceptable  Hydration status: euvolemic

## 2021-12-13 NOTE — PROGRESS NOTES
PACU Transfer Note    Vitals:    12/13/21 1700   BP: (!) 151/74   Pulse: 80   Resp: 20   Temp: 97 °F (36.1 °C)   SpO2: 92%     BP within 20% of pre-op  In: 506 [I.V.:506]  Out: 375 [Urine:375]    Pain assessment:  present - adequately treated    Report given to Receiving unit HELIO - Marck Sauer.     12/13/2021 5:15 PM

## 2021-12-13 NOTE — PROGRESS NOTES
General Surgery   Daily Progress Note  Patient: Stefani Foster      CC: Shayla's gangrene    SUBJECTIVE:   No acute issues overnight. Patient remains afebrile and HDS with 3L NC oxygen requirement. States pain is well controlled. Tolerating zero carb diet, no nausea/ vomiting. Having ostomy output. ROS:   A 14 point review of systems was conducted, significant findings as noted above. All other systems negative. OBJECTIVE:    PHYSICAL EXAM:    Vitals:    12/12/21 1600 12/12/21 2012 12/12/21 2327 12/13/21 0332   BP: 123/77 128/74 138/74 114/71   Pulse: 88 84 78 79   Resp: 20 18 18 18   Temp: 98.4 °F (36.9 °C) 97.9 °F (36.6 °C) 97.9 °F (36.6 °C) 98.2 °F (36.8 °C)   TempSrc: Oral Oral Oral Oral   SpO2: 98% 95% 96% 95%   Weight:       Height:           General appearance: alert, no acute distress, grooming appropriate  Eyes: No scleral icterus, EOM grossly intact  Neck: trachea midline, no JVD, no lymphadenopathy, neck supple  Chest/Lungs: Equal excursion bilaterally, normal effort with no accessory muscle use on 3L NC  Cardiovascular: RRR, brisk capillary refill  Abdomen:  Soft, large surgical debridement area of the abdomen with veraflo Vac in place holding adequate suction and instilling normal saline. Colostomy is pink, viable and with brown stool in the bag. Skin: warm and dry, no rashes  Extremities: no edema, no cyanosis  Genitourinary: Mcfadden in place with clear yellow urine  Neuro: A&Ox3, no focal deficits, sensation intact    LABS:   Recent Labs     12/11/21  0548 12/12/21  0430   WBC 15.7* 11.1*   HGB 7.5* 6.7*   HCT 22.3* 20.4*   MCV 90.1 90.7   * 509*        Recent Labs     12/11/21  0548 12/12/21  0430    136   K 4.2 3.8    103   CO2 27 27   BUN 10 10   CREATININE 0.6* 0.5*        No results for input(s): AST, ALT, ALB, BILIDIR, BILITOT, ALKPHOS in the last 72 hours. No results for input(s): LIPASE, AMYLASE in the last 72 hours.      No results for input(s): PROT, INR, APTT in the last 72 hours. No results for input(s): CKTOTAL, CKMB, CKMBINDEX, TROPONINI in the last 72 hours. ASSESSMENT & PLAN:   Leopold Ramming is a 63 y.o. male with Necrotizing fasciitis of the abdominal wall, gluteal region, and scrotum s/p wide excision of perineum, back, and abdominal wall. S/p multiple debridements and wound vac placement and changes intraoperatively with diverting colostomy creation (12/7). Most recent wound vac change in OR (12/10). S/p 1 unit pRBCs (12/12).     - Wound care per plastics; Plan for OR for wound vac change today  - ID on board, continue Unasyn  - PT/ OT following  - Amadeo Jones on board for home ostomy supplies  - ostomy nurse following for continued teaching    Adeola Carmona DO  PGY-1, General Surgery  12/13/21  6:24 AM  [phone]

## 2021-12-14 LAB
ALBUMIN SERPL-MCNC: 2.3 G/DL (ref 3.4–5)
ANAEROBIC CULTURE: ABNORMAL
ANION GAP SERPL CALCULATED.3IONS-SCNC: 5 MMOL/L (ref 3–16)
BASOPHILS ABSOLUTE: 0.1 K/UL (ref 0–0.2)
BASOPHILS RELATIVE PERCENT: 1 %
BUN BLDV-MCNC: 12 MG/DL (ref 7–20)
CALCIUM SERPL-MCNC: 7.7 MG/DL (ref 8.3–10.6)
CHLORIDE BLD-SCNC: 101 MMOL/L (ref 99–110)
CO2: 29 MMOL/L (ref 21–32)
CREAT SERPL-MCNC: 0.7 MG/DL (ref 0.8–1.3)
EKG ATRIAL RATE: 90 BPM
EKG ATRIAL RATE: 99 BPM
EKG DIAGNOSIS: NORMAL
EKG DIAGNOSIS: NORMAL
EKG P AXIS: 50 DEGREES
EKG P AXIS: 53 DEGREES
EKG P-R INTERVAL: 186 MS
EKG P-R INTERVAL: 188 MS
EKG Q-T INTERVAL: 326 MS
EKG Q-T INTERVAL: 362 MS
EKG QRS DURATION: 92 MS
EKG QRS DURATION: 96 MS
EKG QTC CALCULATION (BAZETT): 418 MS
EKG QTC CALCULATION (BAZETT): 442 MS
EKG R AXIS: 58 DEGREES
EKG R AXIS: 58 DEGREES
EKG T AXIS: 57 DEGREES
EKG T AXIS: 59 DEGREES
EKG VENTRICULAR RATE: 90 BPM
EKG VENTRICULAR RATE: 99 BPM
EOSINOPHILS ABSOLUTE: 0.2 K/UL (ref 0–0.6)
EOSINOPHILS RELATIVE PERCENT: 1.3 %
ESTIMATED AVERAGE GLUCOSE: 251.8 MG/DL
GFR AFRICAN AMERICAN: >60
GFR NON-AFRICAN AMERICAN: >60
GLUCOSE BLD-MCNC: 147 MG/DL (ref 70–99)
GLUCOSE BLD-MCNC: 156 MG/DL (ref 70–99)
GLUCOSE BLD-MCNC: 163 MG/DL (ref 70–99)
GLUCOSE BLD-MCNC: 170 MG/DL (ref 70–99)
GLUCOSE BLD-MCNC: 212 MG/DL (ref 70–99)
GRAM STAIN RESULT: ABNORMAL
HBA1C MFR BLD: 10.4 %
HCT VFR BLD CALC: 26.4 % (ref 40.5–52.5)
HEMOGLOBIN: 8.7 G/DL (ref 13.5–17.5)
LYMPHOCYTES ABSOLUTE: 1.3 K/UL (ref 1–5.1)
LYMPHOCYTES RELATIVE PERCENT: 9.1 %
MAGNESIUM: 2 MG/DL (ref 1.8–2.4)
MCH RBC QN AUTO: 30.2 PG (ref 26–34)
MCHC RBC AUTO-ENTMCNC: 33.1 G/DL (ref 31–36)
MCV RBC AUTO: 91.3 FL (ref 80–100)
MONOCYTES ABSOLUTE: 0.8 K/UL (ref 0–1.3)
MONOCYTES RELATIVE PERCENT: 5.9 %
NEUTROPHILS ABSOLUTE: 11.4 K/UL (ref 1.7–7.7)
NEUTROPHILS RELATIVE PERCENT: 82.7 %
ORGANISM: ABNORMAL
PDW BLD-RTO: 15.2 % (ref 12.4–15.4)
PERFORMED ON: ABNORMAL
PHOSPHORUS: 3.2 MG/DL (ref 2.5–4.9)
PLATELET # BLD: 562 K/UL (ref 135–450)
PMV BLD AUTO: 7.6 FL (ref 5–10.5)
POTASSIUM SERPL-SCNC: 5 MMOL/L (ref 3.5–5.1)
RBC # BLD: 2.89 M/UL (ref 4.2–5.9)
REPORT: NORMAL
SODIUM BLD-SCNC: 135 MMOL/L (ref 136–145)
WBC # BLD: 13.8 K/UL (ref 4–11)
WOUND/ABSCESS: ABNORMAL

## 2021-12-14 PROCEDURE — 2580000003 HC RX 258

## 2021-12-14 PROCEDURE — 6370000000 HC RX 637 (ALT 250 FOR IP)

## 2021-12-14 PROCEDURE — 1200000000 HC SEMI PRIVATE

## 2021-12-14 PROCEDURE — 83735 ASSAY OF MAGNESIUM: CPT

## 2021-12-14 PROCEDURE — 6360000002 HC RX W HCPCS

## 2021-12-14 PROCEDURE — 80069 RENAL FUNCTION PANEL: CPT

## 2021-12-14 PROCEDURE — 97530 THERAPEUTIC ACTIVITIES: CPT

## 2021-12-14 PROCEDURE — 99024 POSTOP FOLLOW-UP VISIT: CPT | Performed by: SURGERY

## 2021-12-14 PROCEDURE — 99231 SBSQ HOSP IP/OBS SF/LOW 25: CPT | Performed by: INTERNAL MEDICINE

## 2021-12-14 PROCEDURE — 85025 COMPLETE CBC W/AUTO DIFF WBC: CPT

## 2021-12-14 RX ADMIN — SODIUM CHLORIDE, PRESERVATIVE FREE 10 ML: 5 INJECTION INTRAVENOUS at 08:46

## 2021-12-14 RX ADMIN — INSULIN LISPRO 4 UNITS: 100 INJECTION, SOLUTION INTRAVENOUS; SUBCUTANEOUS at 17:49

## 2021-12-14 RX ADMIN — INSULIN LISPRO 1 UNITS: 100 INJECTION, SOLUTION INTRAVENOUS; SUBCUTANEOUS at 08:47

## 2021-12-14 RX ADMIN — OXYCODONE HYDROCHLORIDE 10 MG: 5 TABLET ORAL at 08:57

## 2021-12-14 RX ADMIN — INSULIN LISPRO 4 UNITS: 100 INJECTION, SOLUTION INTRAVENOUS; SUBCUTANEOUS at 08:46

## 2021-12-14 RX ADMIN — HEPARIN SODIUM 5000 UNITS: 5000 INJECTION INTRAVENOUS; SUBCUTANEOUS at 13:35

## 2021-12-14 RX ADMIN — SODIUM CHLORIDE, PRESERVATIVE FREE 10 ML: 5 INJECTION INTRAVENOUS at 23:07

## 2021-12-14 RX ADMIN — AMPICILLIN SODIUM AND SULBACTAM SODIUM 3000 MG: 2; 1 INJECTION, POWDER, FOR SOLUTION INTRAMUSCULAR; INTRAVENOUS at 01:19

## 2021-12-14 RX ADMIN — DOCUSATE SODIUM 100 MG: 100 CAPSULE, LIQUID FILLED ORAL at 20:48

## 2021-12-14 RX ADMIN — OXYCODONE HYDROCHLORIDE 10 MG: 5 TABLET ORAL at 13:35

## 2021-12-14 RX ADMIN — INSULIN LISPRO 4 UNITS: 100 INJECTION, SOLUTION INTRAVENOUS; SUBCUTANEOUS at 12:37

## 2021-12-14 RX ADMIN — INSULIN LISPRO 2 UNITS: 100 INJECTION, SOLUTION INTRAVENOUS; SUBCUTANEOUS at 17:49

## 2021-12-14 RX ADMIN — AMPICILLIN SODIUM AND SULBACTAM SODIUM 3000 MG: 2; 1 INJECTION, POWDER, FOR SOLUTION INTRAMUSCULAR; INTRAVENOUS at 06:30

## 2021-12-14 RX ADMIN — INSULIN LISPRO 1 UNITS: 100 INJECTION, SOLUTION INTRAVENOUS; SUBCUTANEOUS at 21:36

## 2021-12-14 RX ADMIN — POLYETHYLENE GLYCOL 3350 17 G: 17 POWDER, FOR SOLUTION ORAL at 08:46

## 2021-12-14 RX ADMIN — DOCUSATE SODIUM 100 MG: 100 CAPSULE, LIQUID FILLED ORAL at 08:45

## 2021-12-14 RX ADMIN — HEPARIN SODIUM 5000 UNITS: 5000 INJECTION INTRAVENOUS; SUBCUTANEOUS at 05:33

## 2021-12-14 RX ADMIN — ACETAMINOPHEN 1000 MG: 500 TABLET ORAL at 10:30

## 2021-12-14 RX ADMIN — AMPICILLIN SODIUM AND SULBACTAM SODIUM 3000 MG: 2; 1 INJECTION, POWDER, FOR SOLUTION INTRAMUSCULAR; INTRAVENOUS at 18:07

## 2021-12-14 RX ADMIN — INSULIN LISPRO 1 UNITS: 100 INJECTION, SOLUTION INTRAVENOUS; SUBCUTANEOUS at 12:37

## 2021-12-14 RX ADMIN — AMLODIPINE BESYLATE 5 MG: 5 TABLET ORAL at 08:45

## 2021-12-14 RX ADMIN — HEPARIN SODIUM 5000 UNITS: 5000 INJECTION INTRAVENOUS; SUBCUTANEOUS at 20:48

## 2021-12-14 RX ADMIN — ACETAMINOPHEN 1000 MG: 500 TABLET ORAL at 05:33

## 2021-12-14 RX ADMIN — AMPICILLIN SODIUM AND SULBACTAM SODIUM 3000 MG: 2; 1 INJECTION, POWDER, FOR SOLUTION INTRAMUSCULAR; INTRAVENOUS at 13:44

## 2021-12-14 ASSESSMENT — PAIN SCALES - GENERAL
PAINLEVEL_OUTOF10: 6
PAINLEVEL_OUTOF10: 5
PAINLEVEL_OUTOF10: 7
PAINLEVEL_OUTOF10: 7
PAINLEVEL_OUTOF10: 6
PAINLEVEL_OUTOF10: 7
PAINLEVEL_OUTOF10: 6

## 2021-12-14 ASSESSMENT — PAIN DESCRIPTION - PAIN TYPE
TYPE: SURGICAL PAIN

## 2021-12-14 ASSESSMENT — PAIN - FUNCTIONAL ASSESSMENT
PAIN_FUNCTIONAL_ASSESSMENT: PREVENTS OR INTERFERES SOME ACTIVE ACTIVITIES AND ADLS
PAIN_FUNCTIONAL_ASSESSMENT: ACTIVITIES ARE NOT PREVENTED

## 2021-12-14 ASSESSMENT — PAIN DESCRIPTION - ORIENTATION
ORIENTATION: POSTERIOR;LEFT
ORIENTATION: POSTERIOR;LEFT
ORIENTATION: LEFT;POSTERIOR

## 2021-12-14 ASSESSMENT — PAIN DESCRIPTION - LOCATION
LOCATION: ABDOMEN;BUTTOCKS

## 2021-12-14 ASSESSMENT — PAIN DESCRIPTION - DESCRIPTORS
DESCRIPTORS: SHARP
DESCRIPTORS: ACHING

## 2021-12-14 ASSESSMENT — PAIN DESCRIPTION - PROGRESSION
CLINICAL_PROGRESSION: NOT CHANGED

## 2021-12-14 ASSESSMENT — PAIN DESCRIPTION - ONSET
ONSET: ON-GOING

## 2021-12-14 ASSESSMENT — PAIN DESCRIPTION - FREQUENCY
FREQUENCY: CONTINUOUS

## 2021-12-14 NOTE — PROGRESS NOTES
VSS, A&O. Pt's pain managed with medication, denies n/c. Ostomy intact with good output, Mcfadden intact. IVF and IV abx infused. Wound vac CDI with great amount of output. Pt on 2L oxygen. Pt was able to reposition and turned with minimal help, on specialty bed. Bilateral prevalon boots on, fall precaution in place, bed alarm and bed at lowest position. Call light in place, uneventful night with pt.

## 2021-12-14 NOTE — PROGRESS NOTES
Plastic Surgery  Post-operative Note      Procedure(s) Performed: debridement and WV change    Subjective:   Patient's pain is controlled, denies nausea or vomiting. Tolerating diet. UOP adequate via walsh. Objective:  Anesthesia type: General      Vitals:   Vitals:    12/13/21 1700 12/13/21 1751 12/13/21 1930 12/13/21 2252   BP: (!) 151/74 131/74 (!) 168/89 133/75   Pulse: 80 82 90 77   Resp: 20 18 18 18   Temp: 97 °F (36.1 °C) 97.4 °F (36.3 °C) 97.2 °F (36.2 °C) 97.9 °F (36.6 °C)   TempSrc:  Oral Oral Oral   SpO2: 92% 96% 93% 95%   Weight:       Height:           Physical Exam:  Post-op vital signs:  Stable   General appearance: alert, no acute distress, grooming appropriate  Eyes: No scleral icterus, EOM grossly intact  Neck: trachea midline, no JVD, no lymphadenopathy, neck supple  Chest/Lungs: normal effort with no accessory muscle use on RA  Cardiovascular: RRR, no murmurs/gallops/rubs, S1 and S2 noted, well perfused  Abdomen: Soft, large surgical debridement area of the abdomen in gluteal region with veraflo VAC in place holding adequate suction and instilling normal saline. Colostomy is pink, patent, with stool in appliance  Skin: warm and dry, no rashes  Extremities: full-thickness ulceration on lateral aspect of L foot, no edema, no cyanosis, decreased sensation along entirety of bilateral feet.  No DP/PT pulse appreciated with doppler.   Genitourinary:  walsh in place with clear yellow urine, no signs of further necrotizing fasciitis requiring further debridement of L lower scrotum, no subcutaneous crepitus noted, skin edges with healthy granulation tissue with appropriate bleeding, non-malodorous   Neuro: A&Ox3, no focal deficits, sensation intact    Assessment and Plan  This is a 63y.o. year old male status post with Necrotizing fasciitis of the abdominal wall, gluteal region, and scrotum s/p wide excision of perineum, back, and abdominal wall.  Abdominal wall wound measuring 60 cm x 23 cm x 3cm and gluteal wound measures 20 cm x 15cm (12/1), returned to the OR for further debridement or right abdominal wall, and placement of testicle within a thigh flap (12/3), followed by minimal abdominal wall and perineum debridement, sutured protective skin flap in groin for right testicle, and placement of a wound VAC (12/5), s/p diverting colostomy and wound vac change (12/7), s/p debridement and WV change (12/11), s/p [address] change 12/14    Pain management: Roxicodone pain panel and scheduled tylenol   Cardiovasc: hemodynamically stable, will continue to monitor  Respiratory:  IS ordered to bedside, encourage hourly IS and deep breathing, wean oxygen as tolerated  Fluids:  none, Diet: General diet  : Urine output is adequate   Ambulation: OOB to chair, encourage ambulation  Prophylaxis: SCDs, lovenox  Antibiotics: unasyn  Wound: Local wound care      Jersey Elizabeth MD  PGY1, General Surgery  12/14/21  1:38 AM  [phone]

## 2021-12-14 NOTE — OP NOTE
[place] [address] SURGERY     OPERATIVE DICTATION    NAME: Sandy Ferrera   MRN: [id number]  DATE: 12/13/2021     AGE: 63 y.o. LOCATION: [facility]    PREOPERATIVE DIAGNOSIS:  Shayla's gangrene with necrotizing fasciitis     POSTOPERATIVE DIAGNOSIS:  Same. OPERATION PERFORMED: 1) Dressing change under anesthesia       2) Application of Veraflo instillation vac therapy     SURGEON:  Trev Trent MD    ANESTHESIA: General     ESTIMATED BLOOD LOSS: Minimal     DRAINS:  Instillation vac     SPECIMENS: None     OPERATIVE INDICATIONS:  This is an unfortunate 63 y.o. male with a history of poorly controlled diabetes who developed Shayla's gangrene at an outside hospital.  He underwent debridement with both Urology and General surgery, however was noted to have additional crepitance. Given this, he was urgently transferred to our hospital and was taken to the operating room by general surgery for extensive debridement. Plastic surgery was consulted for assistance with management. He has stabilized and undergone diverting colostomy. He is brought to the operating room for dressing change under anesthesia given the complexity and pain. The risks, benefits, alternatives, outcomes, and personnel involved was performed with the patient. After all questions were answered to the patient's satisfaction, they agreed to proceed. OPERATIVE PROCEDURE:  The patient was brought to the operating room on his ICU bed and placed in the supine position on the operating room table. He underwent general anesthesia without difficulty, was then placed in the lithotomy position, and was prepped and draped in the usual sterile manner. A time-out was performed confirming the patient and the procedure to be performed. There was minimal granulation tissue, however there was also minimal fibrinous exudate. There was a significant amount of tissue edema.   The wound was copiously irrigated with 3L of saline solution using a Pulse Lavage. Once completed, another complex wound vacuum device was then fashioned and applied with good seal.    The patient was then placed supine, extubated, and taken back to the ICU in stable, yet critical condition. There were no immediate complications and the patient tolerated the procedure well. At the end of the case, all counts were correct. PLAN: Will return for additional planned dressing change under anesthesia in 48-72 hours. Once there is improved granulation tissue with negative cultures, will begin the reconstructive process.     Ana Geller MD

## 2021-12-14 NOTE — PLAN OF CARE
Problem: Nutrition  Goal: Optimal nutrition therapy  Outcome: Ongoing  Note: Nutrition Problem #1: Increased nutrient needs  Intervention: Food and/or Nutrient Delivery: Modify Current Diet, Modify Oral Nutrition Supplement, Start Calorie Count  Nutritional Goals: pt will consistently consume >75% PO intake of meals and supplements offered through adm to meet increased energy needs

## 2021-12-14 NOTE — PROGRESS NOTES
Plastic Surgery   Daily Progress Note  Patient: Ramos Quiroz      CC: Shayla's gangrene    SUBJECTIVE:   No issues overnight. Remains afebrile and HDS. Tolerating diet. Having good ostomy output. ROS:   A 14 point review of systems was conducted, significant findings as noted above. All other systems negative. OBJECTIVE:    PHYSICAL EXAM:    Vitals:    12/13/21 1751 12/13/21 1930 12/13/21 2252 12/14/21 0253   BP: 131/74 (!) 168/89 133/75 (!) 142/82   Pulse: 82 90 77 77   Resp: 18 18 18 18   Temp: 97.4 °F (36.3 °C) 97.2 °F (36.2 °C) 97.9 °F (36.6 °C) 97.9 °F (36.6 °C)   TempSrc: Oral Oral Oral Oral   SpO2: 96% 93% 95% 100%   Weight:       Height:           General appearance: alert, no acute distress, grooming appropriate  Eyes: No scleral icterus, EOM grossly intact  Neck: trachea midline, no JVD, no lymphadenopathy, neck supple  Chest/Lungs: Equal excursion bilaterally, normal effort with no accessory muscle use on 2L NC  Cardiovascular: RRR, brisk capillary refill  Abdomen:  Soft, large surgical debridement area of the abdomen with veraflo Vac in place holding adequate suction and instilling normal saline. Colostomy is pink, viable and with brown stool in the bag. Skin: warm and dry, no rashes  Extremities: no edema, no cyanosis  Genitourinary: Mcfadden in place with clear yellow urine  Neuro: A&Ox3, no focal deficits, sensation intact    LABS:   Recent Labs     12/13/21  0756 12/14/21  0553   WBC 10.9 13.8*   HGB 7.5* 8.7*   HCT 22.3* 26.4*   MCV 90.7 91.3   * 562*        Recent Labs     12/12/21  0430 12/13/21  0756    138   K 3.8 4.2    103   CO2 27 28   PHOS  --  2.3*   BUN 10 10   CREATININE 0.5* 0.5*        No results for input(s): AST, ALT, ALB, BILIDIR, BILITOT, ALKPHOS in the last 72 hours. No results for input(s): LIPASE, AMYLASE in the last 72 hours. No results for input(s): PROT, INR, APTT in the last 72 hours.      No results for input(s): CKTOTAL, CKMB, CKMBINDEX, TROPONINI in the last 72 hours. ASSESSMENT & PLAN:   Lynn Whiteside is a 63 y.o. male with Necrotizing fasciitis of the abdominal wall, gluteal region, and scrotum s/p wide excision of perineum, back, and abdominal wall. S/p multiple debridements and wound vac placement and changes intraoperatively with diverting colostomy creation (12/7). OR wound vac change (12/10). S/p 1 unit pRBCs (12/12). OR wound vac change (12/13). - Continue zero carb diet   - Dietitian on board for calorie count and at least 100g protein daily   - Continue aggressive glucose control with goal glucose <120  - ID on board, continue Unasyn  - PT/ OT following  - Plan for return to OR on [employer], DO   PGY1, General Surgery  12/14/21  6:17 AM  [phone]    I saw and independently examined the patient today. I agree with the history of present illness, past medical/surgical histories, family history, social history, medication list and allergies as listed. The review of systems is as noted above. My physical exam confirms the findings listed above. Review of labs, pathology reports, radiology reports and medical records confirm the findings noted above. I edited the note where appropriate in italics, strikethrough font, or underline. Doing well overall. Need to improve nutrition - working on this actively. Appreciate ID, Podiatry, Urology, [facility] assistance. OOB with PT/OT. Plan for return to OR later this week for dressing change under anesthesia given complexity.     Davie Hughes MD  [address] [place] [address] [address] Reconstructive Surgery  [phone]  12/14/21

## 2021-12-14 NOTE — PROGRESS NOTES
Urology Attending Progress Note      Subjective: No  complaints    Vitals:  /64   Pulse 83   Temp 97.7 °F (36.5 °C) (Oral)   Resp 16   Ht 5' 11\" (1.803 m)   Wt 196 lb 3.4 oz (89 kg)   SpO2 94%   BMI 27.37 kg/m²   Temp  Av.7 °F (36.5 °C)  Min: 97 °F (36.1 °C)  Max: 98.5 °F (36.9 °C)    Intake/Output Summary (Last 24 hours) at 2021 0931  Last data filed at 2021 0550  Gross per 24 hour   Intake 866 ml   Output 1820 ml   Net -954 ml       Exam: Penile dependent edema noted on exam. Testicles tucked into thigh pouch with abdominal wound vac in place. Mcfadden draining clear. Labs:  WBC:    Lab Results   Component Value Date    WBC 13.8 2021     Hemoglobin/Hematocrit:    Lab Results   Component Value Date    HGB 8.7 2021    HCT 26.4 2021     BMP:    Lab Results   Component Value Date     2021    K 5.0 2021    K 3.8 2021     2021    CO2 29 2021    BUN 12 2021    LABALBU 2.3 2021    CREATININE 0.7 2021    CALCIUM 7.7 2021    GFRAA >60 2021    LABGLOM >60 2021     PT/INR:    Lab Results   Component Value Date    PROTIME 12.4 2021    INR 1.09 2021     PTT:  No results found for: APTT[APTT    Impression/Plan: 63 y. o. male with Necrotizing fasciitis of the abdominal wall, gluteal region, and scrotum s/p wide excision of perineum, back, and abdominal wall. S/p multiple debridements and wound vac placement.    -No  complaints  -Mcfadden in place draining clear urine. Penile dependent edema notd on exam. Testicles tucked into thigh pouch.  -Please call with any questions or if assistance needed in OR.     JOSS Rodrigues

## 2021-12-14 NOTE — PROGRESS NOTES
Patient A&OX4. VSS. Patient remains on 2L O2. Walsh is draining adequate urine out. Wound vac dressing on abdomen and buttocks remain CDI with suction, cannister has been changed as needed. PICC infusing intermittent IVABX, see MAR. PICC line care was provided today as well as walsh care. Patient pain rated at 7/10, PRN oxycodone and scheduled tylenol are given with benefit, see flowsheets. PT/OT suggested moving patient to room where [facility] lift is availabel. All patient needs have been met at this time. Fall precautions remain in place. Will continue to monitor.        Electronically signed by Mitali Ko RN on 12/14/2021 at 2:24 PM

## 2021-12-14 NOTE — CARE COORDINATION
Cm following, spoke with Marcos Mota [place] [address], she spoke with family and will start medicaid application and wife willing to assist with financial paperwork needed. Spoke with Audra [phone] about [facility] LTACH at [facility] for extensive wound care, IV ABX and therapy needs. She is following and will let me know if able to accept.   Electronically signed by Patyon Giron RN on 12/14/2021 at 1:23 PM

## 2021-12-14 NOTE — PROGRESS NOTES
Physical Therapy  Facility/Department: [facility]  Daily Treatment Note  NAME: Lynn Whiteside  : 1955  MRN: [id number]    Date of Service: 2021    Discharge Recommendations: Lynn Whiteside scored a /24 on the AM-PAC short mobility form. Current research shows that an AM-PAC score of 17 or less is typically not associated with a discharge to the patient's home setting. Based on the patient's AM-PAC score and their current functional mobility deficits, it is recommended that the patient have 3-5 sessions per week of Physical Therapy at d/c to increase the patient's independence. Please see assessment section for further patient specific details. If patient discharges prior to next session this note will serve as a discharge summary. Please see below for the latest assessment towards goals. PT Equipment Recommendations  Other: defer d/c recs pending further assessment    Assessment   Assessment: Pt able to tolerated bed mobility and transfer training today. Good effort and participation but limited by generalized weakness and L LE NWB. Pt is well below his functional baseline and will require extensive rehab. Not safe to transfer to chair today due to LE weakness. Maxi move not appropriate at this time 2* multiple surgical procedures in periarea where sling would apply pressure. Will benefit from transfer to a room with candie lift allowing for different sling to be utilized for safe transfer OOB. Discussed with RN. Will follow  Treatment Diagnosis: impaired mobility  Specific instructions for Next Treatment: -  Prognosis: Good  PT Education: PT Role; Functional Mobility Training  Patient Education: pt demos good understanding  REQUIRES PT FOLLOW UP: Yes     Patient Diagnosis(es): The primary encounter diagnosis was Transient alteration of awareness. A diagnosis of Necrotizing fasciitis ([facility] [address] was also pertinent to this visit. has no past medical history on file.    has a past surgical history that includes Ankle fracture surgery; Abdomen surgery (N/A, 12/1/2021); Scrotal surgery (N/A, 11/30/2021); Vagina surgery (N/A, 11/30/2021); Abdomen surgery (Left, 12/3/2021); Abdomen surgery (Left, 12/5/2021); colostomy (Left, 12/7/2021); Abdomen surgery (N/A, 12/7/2021); Abdomen surgery (Left, 12/10/2021); and Abdomen surgery (N/A, 12/13/2021). Restrictions  Position Activity Restriction  Other position/activity restrictions: up as tolerated, NWB L LE, WBAT R LE per podiatry note; per Dr. [employer] note 12/14: OOB with PT/OT  Subjective   General  Chart Reviewed: Yes  Additional Pertinent Hx: Chana Zhao is a 63 y.o. male with Necrotizing fasciitis of the abdominal wall, gluteal region, and scrotum s/p wide excision of perineum, back, and abdominal wall. returned to the OR for further debridement or right abdominal wall, and placement of testicle within a thigh flap (12/3), followed by minimal abdominal wall and perineum debridement, sutured protective skin flap in groin for right testicle, and placement of a wound VAC (12/5), s/p diverting colostomy and wound vac change (12/7). Plan for OR on 12/10 for further debridement and change of wound vac.  12/13 wound vac change  Referring Practitioner: Mary Leon,   Subjective  Subjective: Found in bed, agreeable to PT. Wife in room. Pt motivated for PT but states \"I don't think I can do it\"  (referring to standing up). Reports abdominal/surgical site pain - RN provided meds immediately prior to mobility.           Orientation  Orientation  Overall Orientation Status: Within Normal Limits  Cognition      Objective   Bed mobility  Rolling to Left: Moderate assistance (partital roll)  Rolling to Right: Moderate assistance (partial roll)  Supine to Sit: Moderate assistance (HOB raised)  Sit to Supine: 2 Person assistance (Mod A x 2)  Transfers  Sit to Stand: 2 Person Assistance (from raised EOB to isabella stedy x 2 trials: max A for rising

## 2021-12-14 NOTE — PROGRESS NOTES
Podiatric Surgery Daily Progress Note      Admit Date: 12/1/2021      Code:Full Code    Patient seen and examined, labs and records reviewed    Subjective:     Patient seen at bedside this a.m. Patient denies pain to bilateral lower extremity. Patient denies fever, chills, shortness of breath, chest pain. Patient denies any acute events overnight. Review of Systems: A 10 point review of systems was conducted, significant findings as noted in HPI. All other systems negative. Objective     BP (!) 142/82   Pulse 77   Temp 97.9 °F (36.6 °C) (Oral)   Resp 18   Ht 5' 11\" (1.803 m)   Wt 196 lb 3.4 oz (89 kg)   SpO2 100%   BMI 27.37 kg/m²      I/O:    Intake/Output Summary (Last 24 hours) at 12/14/2021 0705  Last data filed at 12/14/2021 0550  Gross per 24 hour   Intake 866 ml   Output 1820 ml   Net -954 ml              Wt Readings from Last 3 Encounters:   12/11/21 196 lb 3.4 oz (89 kg)   11/30/21 163 lb 3.2 oz (74 kg)   04/18/16 179 lb 14.3 oz (81.6 kg)       LABS:    Recent Labs     12/13/21  0756 12/14/21  0553   WBC 10.9 13.8*   HGB 7.5* 8.7*   HCT 22.3* 26.4*   * 562*        Recent Labs     12/14/21  0553   *   K 5.0      CO2 29   PHOS 3.2   BUN 12   CREATININE 0.7*        No results for input(s): PROT, INR, APTT in the last 72 hours. LOWER EXTREMITY EXAMINATION    Dressing to left LE intact. No strikethrough noted to the external dressing. No drainage noted to the internal layers of the dressing. VASCULAR:   DP and PT pulses are non-palpable b/l. Upon hand-held Doppler examination DP signals were noted to be monophasic and PT signals were noted to be nondopplerable. CFT slightly diminished to the distal digits of bilateral lower extremities. Skin temperature is warm to lukewarm to cool to the distal digits from proximal to distal.  No edema noted. No pain with calf compression b/l.     NEUROLOGIC:   Gross and epicritic sensation is diminished b/l.  Protective sensation is diminished at all pedal sites b/l.     DERMATOLOGIC:   Patient provided verbal consent for photos to be obtained today:  Left lower extremity:    Full-thickness ulceration noted to the lateral aspect of the left foot. Wound measures approximately 3.2 cm x 2 cm x 0.5 cm. Wound base exposed bone, with necrotic edges at the proximal and distal edges. Wound probes to bone with exposed fifth metatarsal.  No tracking or tunneling noted. No purulent drainage noted. No fluctuance or crepitus or malodor noted. Maroon/red and black discoloration noted distal tip of left fifth digit. Wound is stable. Anterior aspect of ankle joint left foot. Deep tissue injury with rubor discoloration noted 2/2 Prevalon boot strap. Intact epithelialized tissue noted. No crepitus, erythema, drainage, or malodor noted. No open wound noted. Stable without acute infection noted. Right lower extremity      Styloid process fifth metatarsal deep tissue injury. Purple circumferential discoloration noted. No open wound. No fluctuance, crepitus, malodor, or drainage noted. No acute signs infection noted. Lateral malleolus deep tissue injury. Purple circumferential discoloration noted. No open wound. No fluctuance, crepitus, malodor, or drainage noted. No acute signs infection noted. Pressure injury with red/purple discoloration noted to the dorsal aspect of the midfoot, right lower extremity. No open wound. No fluctuance, crepitus, malodor, or drainage noted. No acute signs of infection noted. MUSCULOSKELETAL:   Muscle strength 4/5 for all pedal muscle groups B/L LE. No pain with palpation of the left foot.      IMAGING:  XR LEFT FOOT 11/30/2021  Narrative   EXAMINATION:   THREE XRAY VIEWS OF THE LEFT FOOT       11/30/2021 1:44 pm       COMPARISON:   None.       HISTORY:   ORDERING SYSTEM PROVIDED HISTORY: wound   TECHNOLOGIST PROVIDED HISTORY:   Reason for exam:->wound   Reason for Exam: blood sugar problem, wound check on injury, lateral malleolus right ankle  -Pressure injury, dorsal right midfoot -NPUAP Stage I  -Osteomyelitis of the fifth metatarsal; left foot  -Critical limb ischemia; bilateral lower extremity  -Diabetes mellitus, type II  -History of noncompliance    -Patient seen and examined at bedside this a.m.  -Hypertensive otherwise VSS on 2 L of O2 via nasal cannula; leukocytosis noted (13.8)  -All imaging reviewed see impression above. -CRP 60.2 (12/13/21)  -Prealbumin 9.8 (12/13/21)  -Vascular surgery following; will await further stabilization with the general surgery team and plastic surgery team before offering vascular intervention.  -Plastic surgery following; Plan for return to the OR on 12/16 for wound VAC change and wound evaluation  -Infectious disease following, continue Unasyn and at discharge may not need IV antibiotics or long course of antibiotics. -Left lower extremity dressed with Betadine soaked gauze, DSD, and tape. -Right lower extremity applied to Mepilex borders to deep tissue injury.  -Prevalon boots reapplied. Patient is to wear at all times while in bed to prevent further deep tissue injury. Ankle strap removed from bilateral boot as the ankle straps were causing pressure to his feet. -Nonweightbearing to left lower extremity.  -Weightbearing as tolerated to right lower extremity. DISPO: Full-thickness ulceration with underlying osteomyelitis to the left fifth metatarsal. Critical limb ischemia bilateral lower extremity. Vascular following; awaiting further stabilization at this time. Patient will eventually require podiatric surgical intervention but timing will depend on medical stability and vascular recommendations. Will continue to closely monitor patient and follow while in house.     Patient assessment and plan was discussed with Dr. Sanjuanita Nageotte, DPM.    Austin Cedeno DPM   Podiatric Resident PGY1  Pager [phone] or [employer]  12/14/2021, 7:05 AM      Patient was seen and

## 2021-12-14 NOTE — PROGRESS NOTES
Pt arrived back from PACU via bed. VSS. Pt on 2L NC. Pt states pain is well controlled with pain medication received in PACU. BS 95. Pt ordered dinner try. Mcfadden intact. Pt's SCDs in place. Scheduled tylenol given per orders. Pt called his wife when arriving back to room. All needs met at this time.

## 2021-12-14 NOTE — PROGRESS NOTES
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  Antibiotics: Unasyn    Admit Date: 12/1/2021  Hospital Day: 14    Subjective:     OR yesterday for VAC change    Patient reports wound / surg site pain mostly controlled  No other complaint     Objective:     Patient Vitals for the past 8 hrs:   BP Temp Temp src Pulse Resp SpO2   12/14/21 0802 117/64 97.7 °F (36.5 °C) Oral 83 16 94 %   12/14/21 0253 (!) 142/82 97.9 °F (36.6 °C) Oral 77 18 100 %     I/O last 3 completed shifts: In: 26 [P.O.:360; I.V.:506]  Out: 1820 [Urine:750; Drains:1000; Stool:50; Blood:20]  No intake/output data recorded. EXAM:  GENERAL: No apparent distress.   RA  HEENT: Membranes moist, no oral lesion  NECK:  Supple, no lymphadenopathy  LUNGS: Clear b/l, no rales, no dullness  CARDIAC: RRR, no murmur appreciated  ABD:  + BS, soft / NT  Wounds - VAC in wound over lower abd, suprapubic region, extending over L scrotum, inguinal fold, posteriorly to buttock  EXT:  No rash, no edema, no lesions  NEURO: No focal neurologic findings  PSYCH: Orientation, sensorium, mood normal  LINES:  R PICC in place       Data Review:  Lab Results   Component Value Date    WBC 13.8 (H) 12/14/2021    HGB 8.7 (L) 12/14/2021    HCT 26.4 (L) 12/14/2021    MCV 91.3 12/14/2021     (H) 12/14/2021     Lab Results   Component Value Date    CREATININE 0.7 (L) 12/14/2021    BUN 12 12/14/2021     (L) 12/14/2021    K 5.0 12/14/2021     12/14/2021    CO2 29 12/14/2021       Hepatic Function Panel:   Lab Results   Component Value Date    ALKPHOS 170 12/10/2021    ALT 7 12/10/2021    AST 13 12/10/2021    PROT 4.8 12/10/2021    BILITOT 0.3 12/10/2021    BILIDIR <0.2 12/10/2021    IBILI see below 12/10/2021    LABALBU 2.3 12/14/2021       MICRO:  11/30 BC no growth   11/30 SARS CoV-2 NAAT neg  11/30 Tissue cult - no growth   12/1   Wound cult - light mixed skin pam  12/3 Tissue cult - light C glabrata  12/5 Tissue cult - light C glabrata    IMAGIN/30 L foot:   Evidence of a septic joint involving the 5th metatarsal phalangeal joint with   associated osteomyelitis      Abd / Pelvic CT   Impression   1. 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  There is   additional muscle or fascial involvement on the left as above.  Of note, the   testes are incompletely evaluated but appear within normal limits.  Critical   results were called by Dr. Toño Marcum MD to Dr. David Patterson on 2021   at 19:31.   2. Minimal bilateral hydronephrosis is likely due to severe urinary bladder   distention.  Abd / Pelvic CT  Impression   Significant interval improvement and soft tissue emphysema in the visualized left gluteal region and over the anterior abdominal wall since prior study.       Anasarca more prominently of the abdominal wall without suspicious drainable abdominal wall fluid collection.       More prominent presacral strandy fluid without suspicious retroperitoneal emphysema to suggest retroperitoneal infection.       New small amount of ascites throughout the abdomen and pelvis without suspicious organized intraperitoneal fluid collections.       New moderate to large bilateral pleural effusions with associated compressive atelectasis of the lung bases.       New trace pericardial effusion.       Colostomy in the left lower quadrant without obstruction or complicating features.       Interval improvement in bilateral hydronephrosis.        Scheduled Meds:   insulin lispro  4 Units SubCUTAneous TID WC    insulin glargine  10 Units SubCUTAneous QAM    heparin (porcine)  5,000 Units SubCUTAneous 3 times per day    insulin lispro  0-6 Units SubCUTAneous TID WC    insulin lispro  0-3 Units SubCUTAneous Nightly    docusate sodium  100 mg Oral BID    polyethylene glycol  17 g Oral Daily    amLODIPine  5 mg Oral Daily    ampicillin-sulbactam  3,000 mg IntraVENous Q6H    acetaminophen  1,000 mg Oral Q6H    sodium chloride flush  5-40 mL IntraVENous 2 times per day       Continuous Infusions:   sodium chloride      sodium chloride 25 mL (12/12/21 0850)    dextrose         PRN Meds:  sodium chloride, alteplase, ipratropium-albuterol, dextrose, hydrALAZINE, oxyCODONE **OR** oxyCODONE, sodium chloride flush, sodium chloride, glucose, dextrose, glucagon (rDNA), dextrose      Assessment:     62 yo man with DM - not taking meds     Presented with scrotal swelling and pain  Seen 11/30 at [facility] ED, dx DKA, Shayla's gangrene  CT with gas extending   OR debridement  Rx Vancomycin, Cefepime, Metronidazole     Transfer to [facility] on 12/1. Return to OR 12/2,3,5,7. Colostomy 12/7     Reviewed cultures -  11/30 GS GPC, GNDC, GNR, cult neg  12/3,5 light C glabrata.     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   - mixed / polymicrobial infection   - yeast in tissue    Leukocytosis - resolved      Plan:     Cont unsyn  Wound care / reconstruction per Surg / Plastics  OR Thurs 12/16 for VAC change, poss debridement    Diet (high protein / no carb), BS control    At discharge may not need iv antibiotics    Medical Decision Making:   The following items were considered in medical decision making:  Discussion of patient care with other providers  Reviewed clinical lab tests  Reviewed radiology tests  Reviewed other diagnostic tests/interventions  Independent review of radiologic images - reviewed initial and f/u CT with Radiologist 12/8  Microbiology cultures and other micro tests reviewed      Discussed with pt, wife  Kimber Wilhelm MD

## 2021-12-14 NOTE — PLAN OF CARE
Problem: Skin Integrity:  Goal: Demonstration of wound healing without infection will improve  Note: Wound vac dressing to abdomen remains CDI and does no show any evidence of infection or impairment in wound healing. Will continue to monitor/assess. Problem: Falls - Risk of:  Goal: Will remain free from falls  Note: Patient has remained free from falls or any injury. Fall precautions remain in place. Will continue to monitor.

## 2021-12-14 NOTE — PROGRESS NOTES
General Surgery   Daily Progress Note  Patient: Farhad Ariza      CC: Shayla's gangrene    SUBJECTIVE:   No issues overnight. Pain is better-controlled. Remains afebrile and HDS. Tolerating diet. Having good ostomy output. ROS:   A 14 point review of systems was conducted, significant findings as noted above. All other systems negative. OBJECTIVE:    PHYSICAL EXAM:    Vitals:    12/13/21 1751 12/13/21 1930 12/13/21 2252 12/14/21 0253   BP: 131/74 (!) 168/89 133/75 (!) 142/82   Pulse: 82 90 77 77   Resp: 18 18 18 18   Temp: 97.4 °F (36.3 °C) 97.2 °F (36.2 °C) 97.9 °F (36.6 °C) 97.9 °F (36.6 °C)   TempSrc: Oral Oral Oral Oral   SpO2: 96% 93% 95% 100%   Weight:       Height:           General appearance: alert, no acute distress, grooming appropriate  Eyes: No scleral icterus, EOM grossly intact  Neck: trachea midline, no JVD, no lymphadenopathy, neck supple  Chest/Lungs: Equal excursion bilaterally, normal effort with no accessory muscle use on 2L NC  Cardiovascular: RRR, brisk capillary refill  Abdomen:  Soft, large surgical debridement area of the abdomen with veraflo Vac in place holding adequate suction and instilling normal saline. Colostomy is pink, viable and with brown stool in the bag. Skin: warm and dry, no rashes  Extremities: no edema, no cyanosis  Genitourinary: Mcfadden in place with clear yellow urine  Neuro: A&Ox3, no focal deficits, sensation intact    LABS:   Recent Labs     12/13/21  0756 12/14/21  0553   WBC 10.9 13.8*   HGB 7.5* 8.7*   HCT 22.3* 26.4*   MCV 90.7 91.3   * 562*        Recent Labs     12/12/21  0430 12/13/21  0756    138   K 3.8 4.2    103   CO2 27 28   PHOS  --  2.3*   BUN 10 10   CREATININE 0.5* 0.5*        No results for input(s): AST, ALT, ALB, BILIDIR, BILITOT, ALKPHOS in the last 72 hours. No results for input(s): LIPASE, AMYLASE in the last 72 hours. No results for input(s): PROT, INR, APTT in the last 72 hours.      No results for input(s): CKTOTAL, CKMB, CKMBINDEX, TROPONINI in the last 72 hours. ASSESSMENT & PLAN:   Sandy Ferrera is a 63 y.o. male with Necrotizing fasciitis of the abdominal wall, gluteal region, and scrotum s/p wide excision of perineum, back, and abdominal wall. S/p multiple debridements and wound vac placement and changes intraoperatively with diverting colostomy creation (12/7). OR wound vac change (12/10). S/p 1 unit pRBCs (12/12). OR wound vac change (12/13).      - Wound care per plastics; Plan for OR on 12/16  - ID on board, continue Unasyn  - PT/ OT following  -  and [facility] on board for home ostomy supplies  - ostomy nurse following for continued teaching    Monica Altamirano DO  PGY-1, General Surgery  12/14/21  6:27 AM  [phone]

## 2021-12-14 NOTE — PROGRESS NOTES
Comprehensive Nutrition Assessment    RECOMMENDATIONS:  PO DIET: Modify to add 3CC carbohydrate restriction. Continue regular; cardiac diet; \"no carbs\" per MD.   ONS: Modify to: Wound healing ONS BID, protein modular QD, Ensure HP BID  Nutrition Education: Discussed increased protein needs + good blood sugar control for wound healing. Encouraged consistent carbohydrate diet after discharge by monitoring carbohydrate portions. Offered additional education and pt declines at this time. RD to re-offer diabetic diet education as appropriate. NUTRITION ASSESSMENT:   Type and Reason for Visit:  Type and Reason for Visit: Reassess   Nutritional summary & status: RD consulted to start calorie count. Per MD pt to consume >100 g of protein/d and consume no carbs. RD recommending 3CC diet restriction rather than \"no carb\" diet as no carb diet is not sustainable and hard for pt to take in adequate po with little to no carbs. RD to add 3 carb choice diet restriction to diet to block amount of carbohydrates pt may order at each meal incase tray is not caught by RN or other medical staff before being given to pt. Without 3CC restriction, there is no cap to amount of carbohydrates pt may choose when ordering meals and he may accidentally order carbohydrate-rich items. RD discussed high protein and good blood sugar control for wound healing. Pt endorses consuming 100% of oral nutrition supplements. RD encouraged pt to follow consistent/low carbohydrate diet after discharge by monitoring carbohydrate servings. Pt c/o abdominal pain but voices strong appetite and good po intakes.       Patient admitted d/t westley gangrene    PMH significant for: T2DM    MALNUTRITION ASSESSMENT  Context of Malnutrition: Acute Illness   Malnutrition Status: No malnutrition    NUTRITION DIAGNOSIS   · Increased nutrient needs related to increase demand for energy/nutrients as evidenced by wounds    [address] and/or Nutrient Used for Fluid Requirements:  1 ml/kcal      The patient will still be monitored per nutrition standards of care. Consult dietitian if nutrition interventions essential to patient care is needed.      Quiana Guerra, [address] [place] [address], Bert Job: [phone]  Office:  [phone]

## 2021-12-14 NOTE — PROGRESS NOTES
Occupational Therapy  Facility/Department: [facility]  Daily Treatment Note  NAME: Ab Ortega  : 1955  MRN: [id number]    Date of Service: 2021    Discharge Recommendations:  Ab Ortega scored a 15/24 on the AM-PAC ADL Inpatient form. Current research shows that an AM-PAC score of 17 or less is typically not associated with a discharge to the patient's home setting. Based on the patient's AM-PAC score and their current ADL deficits, it is recommended that the patient have 3-5 sessions per week of Occupational Therapy at d/c to increase the patient's independence. Please see assessment section for further patient specific details. If patient discharges prior to next session this note will serve as a discharge summary. Please see below for the latest assessment towards goals. OT Equipment Recommendations  Other: continue to assess    Assessment   Performance deficits / Impairments: Decreased functional mobility ; Decreased ADL status; Decreased high-level IADLs; Decreased endurance; Decreased strength  Assessment: Pt sat EOB ~20 min at CGA progressing to SBA. Pt with 2 attempts sit to stand with Max Bathe clearing ~4\"-5\" Max A + Min A < 10 sec stance. Recommend continued inpt therapy at d/c to maximize functional independence and safety. Continue with POC. Treatment Diagnosis: Decreased activity tolerance, impaired ADls and mobility  OT Education: OT Role; Plan of Care; Transfer Training  Patient Education: reinforce prn  REQUIRES OT FOLLOW UP: Yes  Activity Tolerance  Activity Tolerance: Patient Tolerated treatment well  Activity Tolerance: BP WNL and pt on 2L O2 - slight drop to 88% EOB with PLB pt recovered into low 90s  Safety Devices  Safety Devices in place: Yes  Type of devices: Call light within reach; Left in bed; Nurse notified; Bed alarm in place;  All fall risk precautions in place         Patient Diagnosis(es): The primary encounter diagnosis was Transient alteration of awareness. A diagnosis of Necrotizing fasciitis ([facility] [address] was also pertinent to this visit. has no past medical history on file. has a past surgical history that includes Ankle fracture surgery; Abdomen surgery (N/A, 12/1/2021); Scrotal surgery (N/A, 11/30/2021); Vagina surgery (N/A, 11/30/2021); Abdomen surgery (Left, 12/3/2021); Abdomen surgery (Left, 12/5/2021); colostomy (Left, 12/7/2021); Abdomen surgery (N/A, 12/7/2021); Abdomen surgery (Left, 12/10/2021); and Abdomen surgery (N/A, 12/13/2021). Restrictions  Position Activity Restriction  Other position/activity restrictions: up as tolerated, NWB L LE, WBAT R LE per podiatry note; per Dr. Mcintyre Apt note 12/14: OOB with PT/OT  Subjective   General  Chart Reviewed: Yes  Patient assessed for rehabilitation services?: Yes  Additional Pertinent Hx: 63 y.o. M admitted 12/1 with necrotizing fasciitis to abdominal wall, gluteal region, and scrotum. s/p extensive wide excisions 12/1, 12/3, 12/5, 12/7 with wound vac placement. Colostomy creation 12/7. Returning to OR 12/10. Podiatry: NWB L, FWB R. PMHx includes L foot fracture  Response to previous treatment: Patient with no complaints from previous session  Family / Caregiver Present: Yes (wife)  Referring Practitioner: Enrico Hamilton DO  Diagnosis: necrotizing fascitis  Subjective  Subjective: Pt supine in bed upon entry, pleasant and agreeable to therapy session. Pt educated on LLE NWB, pt stating he wasn't aware. General Comment  Comments: Pt supine to sit Mod A. Pt sit EOB ~20 min at CGA progressing to SBA. Pt with 2 attempts sit to stand Vevelyn Caldwell at [address] A (to maintain NWB LLE), pt with clearance ~4\"-5\" < 10 sec. Pt sit to supine  Mod A x 2. Pt with partial rolling left/right to adjust josé miguel pad Mod A. Call light in reach and bed alarm on.   Pain Assessment  Pain Level: 7  Pain Type: Surgical pain  Pain Location: Abdomen; Buttocks  Pain Orientation: Left; Posterior  Pain Descriptors: Aching  Pain Frequency: Continuous  Pain Onset: On-going  Clinical Progression: Not changed  Functional Pain Assessment: Prevents or interferes some active activities and ADLs  Pre Treatment Pain Screening  Intervention List: Patient able to continue with treatment  Comments / Details: RN present at beginning of therapy session administering pain meds  Vital Signs  Patient Currently in Pain: Yes   Orientation  Orientation  Overall Orientation Status: Within Functional Limits  Objective             Balance  Standing Balance:  (~4\"-5\" clearance from EOB with [place] Steady < 10 sec)  Bed mobility  Rolling to Left: Moderate assistance (partital roll)  Rolling to Right: Moderate assistance (partial roll)  Supine to Sit: Moderate assistance (HOB raised)  Sit to Supine: 2 Person assistance (Mod A x 2)                          Cognition  Overall Cognitive Status: [facility]                                         Plan   Plan  Times per week: 2-5x  Times per day: Daily  Current Treatment Recommendations: Strengthening, ROM, Self-Care / ADL, Patient/Caregiver Education & Training, Safety Education & Training  G-Code     OutComes Score                                                  AM-PAC Score        AM-PAC Inpatient Daily Activity Raw Score: 15 (12/14/21 1154)  AM-PAC Inpatient ADL T-Scale Score : 34.69 (12/14/21 1154)  ADL Inpatient CMS 0-100% Score: 56.46 (12/14/21 1154)  ADL Inpatient CMS G-Code Modifier : CK (12/14/21 1154)    Goals  Short term goals  Time Frame for Short term goals: by D/C  Short term goal 1: Demonstrate independence with HEP - Not met, not addressed 12/14       Therapy Time   Individual Concurrent Group Co-treatment   Time In 1033         Time Out 1126         Minutes 53         Timed Code Treatment Minutes: [address], [address] [facility]

## 2021-12-15 LAB
ALBUMIN SERPL-MCNC: 2.3 G/DL (ref 3.4–5)
ANION GAP SERPL CALCULATED.3IONS-SCNC: 6 MMOL/L (ref 3–16)
BASOPHILS ABSOLUTE: 0.1 K/UL (ref 0–0.2)
BASOPHILS RELATIVE PERCENT: 0.9 %
BUN BLDV-MCNC: 12 MG/DL (ref 7–20)
CALCIUM SERPL-MCNC: 7.6 MG/DL (ref 8.3–10.6)
CHLORIDE BLD-SCNC: 101 MMOL/L (ref 99–110)
CO2: 29 MMOL/L (ref 21–32)
CREAT SERPL-MCNC: 0.7 MG/DL (ref 0.8–1.3)
EOSINOPHILS ABSOLUTE: 0.1 K/UL (ref 0–0.6)
EOSINOPHILS RELATIVE PERCENT: 1.3 %
GFR AFRICAN AMERICAN: >60
GFR NON-AFRICAN AMERICAN: >60
GLUCOSE BLD-MCNC: 150 MG/DL (ref 70–99)
GLUCOSE BLD-MCNC: 178 MG/DL (ref 70–99)
GLUCOSE BLD-MCNC: 204 MG/DL (ref 70–99)
GLUCOSE BLD-MCNC: 227 MG/DL (ref 70–99)
GLUCOSE BLD-MCNC: 237 MG/DL (ref 70–99)
HCT VFR BLD CALC: 24.8 % (ref 40.5–52.5)
HEMOGLOBIN: 8.3 G/DL (ref 13.5–17.5)
LYMPHOCYTES ABSOLUTE: 1.4 K/UL (ref 1–5.1)
LYMPHOCYTES RELATIVE PERCENT: 12 %
MAGNESIUM: 1.8 MG/DL (ref 1.8–2.4)
MCH RBC QN AUTO: 30 PG (ref 26–34)
MCHC RBC AUTO-ENTMCNC: 33.2 G/DL (ref 31–36)
MCV RBC AUTO: 90.2 FL (ref 80–100)
MONOCYTES ABSOLUTE: 0.8 K/UL (ref 0–1.3)
MONOCYTES RELATIVE PERCENT: 7 %
NEUTROPHILS ABSOLUTE: 8.9 K/UL (ref 1.7–7.7)
NEUTROPHILS RELATIVE PERCENT: 78.8 %
PDW BLD-RTO: 14.9 % (ref 12.4–15.4)
PERFORMED ON: ABNORMAL
PHOSPHORUS: 2.3 MG/DL (ref 2.5–4.9)
PLATELET # BLD: 527 K/UL (ref 135–450)
PMV BLD AUTO: 7.7 FL (ref 5–10.5)
POTASSIUM SERPL-SCNC: 4.4 MMOL/L (ref 3.5–5.1)
RBC # BLD: 2.75 M/UL (ref 4.2–5.9)
SODIUM BLD-SCNC: 136 MMOL/L (ref 136–145)
WBC # BLD: 11.3 K/UL (ref 4–11)

## 2021-12-15 PROCEDURE — 85025 COMPLETE CBC W/AUTO DIFF WBC: CPT

## 2021-12-15 PROCEDURE — 99231 SBSQ HOSP IP/OBS SF/LOW 25: CPT | Performed by: INTERNAL MEDICINE

## 2021-12-15 PROCEDURE — 6370000000 HC RX 637 (ALT 250 FOR IP)

## 2021-12-15 PROCEDURE — 94761 N-INVAS EAR/PLS OXIMETRY MLT: CPT

## 2021-12-15 PROCEDURE — 2580000003 HC RX 258

## 2021-12-15 PROCEDURE — 1200000000 HC SEMI PRIVATE

## 2021-12-15 PROCEDURE — 94669 MECHANICAL CHEST WALL OSCILL: CPT

## 2021-12-15 PROCEDURE — 6360000002 HC RX W HCPCS

## 2021-12-15 PROCEDURE — 97110 THERAPEUTIC EXERCISES: CPT

## 2021-12-15 PROCEDURE — 80069 RENAL FUNCTION PANEL: CPT

## 2021-12-15 PROCEDURE — 97530 THERAPEUTIC ACTIVITIES: CPT

## 2021-12-15 PROCEDURE — 83735 ASSAY OF MAGNESIUM: CPT

## 2021-12-15 RX ORDER — MAGNESIUM SULFATE IN WATER 40 MG/ML
2000 INJECTION, SOLUTION INTRAVENOUS ONCE
Status: COMPLETED | OUTPATIENT
Start: 2021-12-15 | End: 2021-12-15

## 2021-12-15 RX ORDER — SODIUM CHLORIDE 9 MG/ML
INJECTION, SOLUTION INTRAVENOUS CONTINUOUS
Status: DISCONTINUED | OUTPATIENT
Start: 2021-12-15 | End: 2021-12-16

## 2021-12-15 RX ADMIN — INSULIN LISPRO 4 UNITS: 100 INJECTION, SOLUTION INTRAVENOUS; SUBCUTANEOUS at 08:48

## 2021-12-15 RX ADMIN — AMPICILLIN SODIUM AND SULBACTAM SODIUM 3000 MG: 2; 1 INJECTION, POWDER, FOR SOLUTION INTRAMUSCULAR; INTRAVENOUS at 12:35

## 2021-12-15 RX ADMIN — DIBASIC SODIUM PHOSPHATE, MONOBASIC POTASSIUM PHOSPHATE AND MONOBASIC SODIUM PHOSPHATE 2 TABLET: 852; 155; 130 TABLET ORAL at 08:55

## 2021-12-15 RX ADMIN — INSULIN LISPRO 4 UNITS: 100 INJECTION, SOLUTION INTRAVENOUS; SUBCUTANEOUS at 17:26

## 2021-12-15 RX ADMIN — AMPICILLIN SODIUM AND SULBACTAM SODIUM 3000 MG: 2; 1 INJECTION, POWDER, FOR SOLUTION INTRAMUSCULAR; INTRAVENOUS at 00:27

## 2021-12-15 RX ADMIN — INSULIN LISPRO 4 UNITS: 100 INJECTION, SOLUTION INTRAVENOUS; SUBCUTANEOUS at 13:00

## 2021-12-15 RX ADMIN — INSULIN LISPRO 2 UNITS: 100 INJECTION, SOLUTION INTRAVENOUS; SUBCUTANEOUS at 17:27

## 2021-12-15 RX ADMIN — INSULIN LISPRO 2 UNITS: 100 INJECTION, SOLUTION INTRAVENOUS; SUBCUTANEOUS at 12:59

## 2021-12-15 RX ADMIN — INSULIN LISPRO 1 UNITS: 100 INJECTION, SOLUTION INTRAVENOUS; SUBCUTANEOUS at 08:48

## 2021-12-15 RX ADMIN — ACETAMINOPHEN 1000 MG: 500 TABLET ORAL at 00:23

## 2021-12-15 RX ADMIN — AMLODIPINE BESYLATE 5 MG: 5 TABLET ORAL at 08:55

## 2021-12-15 RX ADMIN — DOCUSATE SODIUM 100 MG: 100 CAPSULE, LIQUID FILLED ORAL at 21:46

## 2021-12-15 RX ADMIN — HEPARIN SODIUM 5000 UNITS: 5000 INJECTION INTRAVENOUS; SUBCUTANEOUS at 21:55

## 2021-12-15 RX ADMIN — OXYCODONE HYDROCHLORIDE 10 MG: 5 TABLET ORAL at 10:44

## 2021-12-15 RX ADMIN — SODIUM CHLORIDE, PRESERVATIVE FREE 10 ML: 5 INJECTION INTRAVENOUS at 08:48

## 2021-12-15 RX ADMIN — SODIUM CHLORIDE, PRESERVATIVE FREE 10 ML: 5 INJECTION INTRAVENOUS at 21:55

## 2021-12-15 RX ADMIN — HEPARIN SODIUM 5000 UNITS: 5000 INJECTION INTRAVENOUS; SUBCUTANEOUS at 14:56

## 2021-12-15 RX ADMIN — POLYETHYLENE GLYCOL 3350 17 G: 17 POWDER, FOR SOLUTION ORAL at 08:55

## 2021-12-15 RX ADMIN — AMPICILLIN SODIUM AND SULBACTAM SODIUM 3000 MG: 2; 1 INJECTION, POWDER, FOR SOLUTION INTRAMUSCULAR; INTRAVENOUS at 18:13

## 2021-12-15 RX ADMIN — DIBASIC SODIUM PHOSPHATE, MONOBASIC POTASSIUM PHOSPHATE AND MONOBASIC SODIUM PHOSPHATE 2 TABLET: 852; 155; 130 TABLET ORAL at 21:46

## 2021-12-15 RX ADMIN — INSULIN LISPRO 1 UNITS: 100 INJECTION, SOLUTION INTRAVENOUS; SUBCUTANEOUS at 22:03

## 2021-12-15 RX ADMIN — SODIUM CHLORIDE: 9 INJECTION, SOLUTION INTRAVENOUS at 22:07

## 2021-12-15 RX ADMIN — ACETAMINOPHEN 1000 MG: 500 TABLET ORAL at 11:29

## 2021-12-15 RX ADMIN — MAGNESIUM SULFATE HEPTAHYDRATE 2000 MG: 2 INJECTION, SOLUTION INTRAVENOUS at 09:00

## 2021-12-15 RX ADMIN — DOCUSATE SODIUM 100 MG: 100 CAPSULE, LIQUID FILLED ORAL at 08:55

## 2021-12-15 RX ADMIN — ACETAMINOPHEN 1000 MG: 500 TABLET ORAL at 04:50

## 2021-12-15 RX ADMIN — AMPICILLIN SODIUM AND SULBACTAM SODIUM 3000 MG: 2; 1 INJECTION, POWDER, FOR SOLUTION INTRAMUSCULAR; INTRAVENOUS at 06:50

## 2021-12-15 RX ADMIN — ACETAMINOPHEN 1000 MG: 500 TABLET ORAL at 17:25

## 2021-12-15 RX ADMIN — HEPARIN SODIUM 5000 UNITS: 5000 INJECTION INTRAVENOUS; SUBCUTANEOUS at 04:51

## 2021-12-15 ASSESSMENT — PAIN SCALES - GENERAL
PAINLEVEL_OUTOF10: 6
PAINLEVEL_OUTOF10: 7
PAINLEVEL_OUTOF10: 6

## 2021-12-15 ASSESSMENT — PAIN DESCRIPTION - PROGRESSION: CLINICAL_PROGRESSION: NOT CHANGED

## 2021-12-15 ASSESSMENT — PAIN DESCRIPTION - PAIN TYPE: TYPE: SURGICAL PAIN

## 2021-12-15 ASSESSMENT — PAIN DESCRIPTION - ONSET: ONSET: ON-GOING

## 2021-12-15 ASSESSMENT — PAIN DESCRIPTION - DESCRIPTORS: DESCRIPTORS: ACHING

## 2021-12-15 ASSESSMENT — PAIN - FUNCTIONAL ASSESSMENT: PAIN_FUNCTIONAL_ASSESSMENT: ACTIVITIES ARE NOT PREVENTED

## 2021-12-15 ASSESSMENT — PAIN DESCRIPTION - FREQUENCY: FREQUENCY: CONTINUOUS

## 2021-12-15 ASSESSMENT — PAIN DESCRIPTION - ORIENTATION: ORIENTATION: POSTERIOR;LEFT

## 2021-12-15 ASSESSMENT — PAIN DESCRIPTION - LOCATION: LOCATION: BUTTOCKS

## 2021-12-15 NOTE — PROGRESS NOTES
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  Antibiotics: Unasyn    Admit Date: 2021  Hospital Day: 15    Subjective:     Patient reports wound / surg site pain mostly controlled  No other complaint     Objective:     Patient Vitals for the past 8 hrs:   BP Temp Temp src Pulse SpO2   12/15/21 1444 124/69 98.2 °F (36.8 °C) Oral 94 93 %   12/15/21 1101 135/71 98.3 °F (36.8 °C) Oral 115 93 %     I/O last 3 completed shifts:  In: -   Out: 1825 [Urine:900; Drains:900; Stool:25]  I/O this shift:  In: 180 [P.O.:180]  Out: 900 [Drains:900]    EXAM:  GENERAL: No apparent distress.   RA  HEENT: Membranes moist, no oral lesion  NECK:  Supple, no lymphadenopathy  LUNGS: Clear b/l, no rales, no dullness  CARDIAC: RRR, no murmur appreciated  ABD:  + BS, soft / NT  Wounds - VAC in wound over lower abd, suprapubic region, extending over L scrotum, inguinal fold, posteriorly to buttock  EXT:  No rash, no edema, no lesions  NEURO: No focal neurologic findings  PSYCH: Orientation, sensorium, mood normal  LINES:  R PICC in place       Data Review:  Lab Results   Component Value Date    WBC 11.3 (H) 12/15/2021    HGB 8.3 (L) 12/15/2021    HCT 24.8 (L) 12/15/2021    MCV 90.2 12/15/2021     (H) 12/15/2021     Lab Results   Component Value Date    CREATININE 0.7 (L) 12/15/2021    BUN 12 12/15/2021     12/15/2021    K 4.4 12/15/2021     12/15/2021    CO2 29 12/15/2021       Hepatic Function Panel:   Lab Results   Component Value Date    ALKPHOS 170 12/10/2021    ALT 7 12/10/2021    AST 13 12/10/2021    PROT 4.8 12/10/2021    BILITOT 0.3 12/10/2021    BILIDIR <0.2 12/10/2021    IBILI see below 12/10/2021    LABALBU 2.3 12/15/2021       MICRO:   BC no growth    SARS CoV-2 NAAT neg   Tissue cult - no growth      Wound cult - light mixed skin pam  12/3 Tissue cult - light C glabrata   Tissue cult - light C glabrata    IMAGIN/30 L foot:   Evidence of a septic joint involving the 5th metatarsal phalangeal joint with   associated osteomyelitis     11/30 Abd / Pelvic CT   Impression   1. 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  There is   additional muscle or fascial involvement on the left as above.  Of note, the   testes are incompletely evaluated but appear within normal limits.  Critical   results were called by Dr. Tate Moss MD to Dr. Gisela Loyd on 11/30/2021   at 19:31.   2. Minimal bilateral hydronephrosis is likely due to severe urinary bladder   distention. 12/9 Abd / Pelvic CT  Impression   Significant interval improvement and soft tissue emphysema in the visualized left gluteal region and over the anterior abdominal wall since prior study.       Anasarca more prominently of the abdominal wall without suspicious drainable abdominal wall fluid collection.       More prominent presacral strandy fluid without suspicious retroperitoneal emphysema to suggest retroperitoneal infection.       New small amount of ascites throughout the abdomen and pelvis without suspicious organized intraperitoneal fluid collections.       New moderate to large bilateral pleural effusions with associated compressive atelectasis of the lung bases.       New trace pericardial effusion.       Colostomy in the left lower quadrant without obstruction or complicating features.       Interval improvement in bilateral hydronephrosis.        Scheduled Meds:   phosphorus  500 mg Oral BID    insulin lispro  4 Units SubCUTAneous TID WC    insulin glargine  10 Units SubCUTAneous QAM    heparin (porcine)  5,000 Units SubCUTAneous 3 times per day    insulin lispro  0-6 Units SubCUTAneous TID WC    insulin lispro  0-3 Units SubCUTAneous Nightly    docusate sodium  100 mg Oral BID    polyethylene glycol  17 g Oral Daily    amLODIPine  5 mg Oral Daily    ampicillin-sulbactam  3,000 mg IntraVENous Q6H    acetaminophen  1,000 mg Oral Q6H    sodium chloride flush  5-40 mL IntraVENous 2 times per day       Continuous Infusions:   sodium chloride      sodium chloride      sodium chloride 25 mL (12/12/21 0850)    dextrose         PRN Meds:  sodium chloride, alteplase, ipratropium-albuterol, dextrose, hydrALAZINE, oxyCODONE **OR** oxyCODONE, sodium chloride flush, sodium chloride, glucose, dextrose, glucagon (rDNA), dextrose      Assessment:     62 yo man with DM - not taking meds     Presented with scrotal swelling and pain  Seen 11/30 at [facility] ED, dx DKA, Shayla's gangrene  CT with gas extending   OR debridement  Rx Vancomycin, Cefepime, Metronidazole     Transfer to [facility] on 12/1. Return to OR 12/2,3,5,7. Colostomy 12/7     Reviewed cultures -  11/30 GS GPC, GNDC, GNR, cult neg  12/3,5 light C glabrata.     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   - mixed / polymicrobial infection   - yeast in tissue    Leukocytosis - resolved      Plan:     Cont unsyn  Wound care / reconstruction per Surg / Plastics  OR Thurs 12/16 for VAC change, poss debridement    Diet (high protein / no carb), BS control    At discharge may not need iv antibiotics    Medical Decision Making:   The following items were considered in medical decision making:  Discussion of patient care with other providers  Reviewed clinical lab tests  Reviewed radiology tests  Reviewed other diagnostic tests/interventions  Independent review of radiologic images - reviewed initial and f/u CT with Radiologist 12/8  Microbiology cultures and other micro tests reviewed      Discussed with pt  Jose C Oneill MD

## 2021-12-15 NOTE — FLOWSHEET NOTE
12/15/21 1420   Colostomy LUQ   Placement Date: 12/07/21   Pre-existing: No  Inserted by: DR WAN  Location: LUQ   Stomal Appliance 2 piece; Clean; Dry; Intact   Flange Size (inches) 2 Inches   Stoma  Assessment Moist; Red   Mucocutaneous Junction Intact   Peristomal Assessment KENN   Stool Appearance Loose   Stool Color Brown   Stool Amount Small   Output (mL) 25 ml     Demonstrated to pt and significant other how to open pouch, empty pouch, clean spout and close pouch. Re-iterated when to empty the appliance. Will return tomorrow and work with pt and family member on how to change the appliance.

## 2021-12-15 NOTE — PROGRESS NOTES
General Surgery   Daily Progress Note  Patient: Itzel Sosa      CC: Shayla's gangrene    SUBJECTIVE:   No issues overnight. Pain is better-controlled. Remains afebrile and HDS. Tolerating diet. Having good ostomy output. ROS:   A 14 point review of systems was conducted, significant findings as noted above. All other systems negative. OBJECTIVE:    PHYSICAL EXAM:    Vitals:    12/14/21 1427 12/14/21 2033 12/15/21 0029 12/15/21 0313   BP: 127/75 125/80 134/89 120/70   Pulse: 88 88 87 84   Resp: 16 16 18 16   Temp: 98.4 °F (36.9 °C) 98.5 °F (36.9 °C) 99 °F (37.2 °C) 98.1 °F (36.7 °C)   TempSrc: Oral Oral Oral Oral   SpO2: 90% 90% 91% 90%   Weight:       Height:           General appearance: alert, no acute distress, grooming appropriate  Eyes: No scleral icterus, EOM grossly intact  Neck: trachea midline, no JVD, no lymphadenopathy, neck supple  Chest/Lungs: Equal excursion bilaterally, normal effort with no accessory muscle use on 2L NC  Cardiovascular: RRR, brisk capillary refill  Abdomen:  Soft, large surgical debridement area of the abdomen with veraflo Vac in place holding adequate suction and instilling normal saline. Colostomy is pink, viable and with brown stool in the bag. Skin: warm and dry, no rashes  Extremities: no edema, no cyanosis  Genitourinary: Mcfadden in place with clear yellow urine  Neuro: A&Ox3, no focal deficits, sensation intact    LABS:   Recent Labs     12/14/21  0553 12/15/21  0430   WBC 13.8* 11.3*   HGB 8.7* 8.3*   HCT 26.4* 24.8*   MCV 91.3 90.2   * 527*        Recent Labs     12/14/21  0553 12/15/21  0430   * 136   K 5.0 4.4    101   CO2 29 29   PHOS 3.2 2.3*   BUN 12 12   CREATININE 0.7* 0.7*        No results for input(s): AST, ALT, ALB, BILIDIR, BILITOT, ALKPHOS in the last 72 hours. No results for input(s): LIPASE, AMYLASE in the last 72 hours. No results for input(s): PROT, INR, APTT in the last 72 hours.      No results for input(s): CKTOTAL, CKMB, CKMBINDEX, TROPONINI in the last 72 hours. ASSESSMENT & PLAN:   Justina Lorenzo is a 63 y.o. male with Necrotizing fasciitis of the abdominal wall, gluteal region, and scrotum s/p wide excision of perineum, back, and abdominal wall. S/p multiple debridements and wound vac placement and changes intraoperatively with diverting colostomy creation (12/7). OR wound vac change (12/10). S/p 1 unit pRBCs (12/12). OR wound vac change (12/13). - OR per plastic surgery tomorrow  - patient to be NPO after midnight tonight  - will continue to follow    EB Gold NP-C  [phone]  Resident Support Staff

## 2021-12-15 NOTE — PROGRESS NOTES
Podiatric Surgery Daily Progress Note      Admit Date: 12/1/2021      Code:Full Code    Patient seen and examined, labs and records reviewed    Subjective:     Patient seen at bedside this a.m. Patient denies pain to bilateral lower extremity. Patient denies fever, chills, shortness of breath, chest pain. Patient denies any acute events overnight. Review of Systems: A 10 point review of systems was conducted, significant findings as noted in HPI. All other systems negative. Objective     /70   Pulse 84   Temp 98.1 °F (36.7 °C) (Oral)   Resp 16   Ht 5' 11\" (1.803 m)   Wt 196 lb 3.4 oz (89 kg)   SpO2 90%   BMI 27.37 kg/m²      I/O:    Intake/Output Summary (Last 24 hours) at 12/15/2021 0727  Last data filed at 12/15/2021 0448  Gross per 24 hour   Intake 480 ml   Output 2820 ml   Net -2340 ml              Wt Readings from Last 3 Encounters:   12/11/21 196 lb 3.4 oz (89 kg)   11/30/21 163 lb 3.2 oz (74 kg)   04/18/16 179 lb 14.3 oz (81.6 kg)       LABS:    Recent Labs     12/14/21  0553 12/15/21  0430   WBC 13.8* 11.3*   HGB 8.7* 8.3*   HCT 26.4* 24.8*   * 527*        Recent Labs     12/15/21  0430      K 4.4      CO2 29   PHOS 2.3*   BUN 12   CREATININE 0.7*        No results for input(s): PROT, INR, APTT in the last 72 hours. LOWER EXTREMITY EXAMINATION    Dressing to left LE intact. No strikethrough noted to the external dressing. No drainage noted to the internal layers of the dressing. VASCULAR:   DP and PT pulses are non-palpable b/l. Upon hand-held Doppler examination DP signals were noted to be monophasic and PT signals were noted to be nondopplerable. CFT slightly diminished to the distal digits of bilateral lower extremities. Skin temperature is warm to lukewarm to cool to the distal digits from proximal to distal.  No edema noted. No pain with calf compression b/l.     NEUROLOGIC:   Gross and epicritic sensation is diminished b/l.  Protective sensation is diminished at all pedal sites b/l.     DERMATOLOGIC:   Patient provided verbal consent for photos to be obtained today:  Left lower extremity:      Full-thickness ulceration noted to the lateral aspect of the left foot. Wound measures approximately 3.2 cm x 2 cm x 0.5 cm. Wound base exposed bone, with necrotic edges at the proximal and distal edges. Wound probes to bone with exposed fifth metatarsal.  No tracking or tunneling noted. No purulent drainage noted. No fluctuance or crepitus or malodor noted. Maroon and black discoloration noted distal tip of left fifth digit extending proximally. Wound is stable. Anterior aspect of ankle joint left foot. Deep tissue injury noted 2/2 Prevalon boot strap. Intact epithelialized tissue noted. No crepitus, erythema, drainage, or malodor noted. No open wound noted. Stable without acute infection noted. Right lower extremity      Lateral malleolus deep tissue injury. Purple circumferential discoloration noted. No open wound. No fluctuance, crepitus, malodor, or drainage noted. No acute signs infection noted. Pressure injury with red/purple discoloration noted to the dorsal aspect of the midfoot, right lower extremity. No open wound. No fluctuance, crepitus, malodor, or drainage noted. No acute signs of infection noted. MUSCULOSKELETAL:   Muscle strength 4/5 for all pedal muscle groups B/L LE. No pain with palpation of the left foot.      IMAGING:  XR LEFT FOOT 11/30/2021  Narrative   EXAMINATION:   THREE XRAY VIEWS OF THE LEFT FOOT       11/30/2021 1:44 pm       COMPARISON:   None.       HISTORY:   ORDERING SYSTEM PROVIDED HISTORY: wound   TECHNOLOGIST PROVIDED HISTORY:   Reason for exam:->wound   Reason for Exam: blood sugar problem, wound check on lateral portion of foot   Acuity: Acute   Type of Exam: Initial       FINDINGS:   Osseous destruction along the articular margins of the 5th   metatarsophalangeal joint with associated lucency in the soft tissues. .   There is no evidence of fracture or dislocation. . The remaining joint spaces   appear well maintained. The remaining soft tissues are unremarkable.           Impression   Evidence of a septic joint involving the 5th metatarsal phalangeal joint with   associated osteomyelitis         ARTERIAL DUPLEX 12/2/21     Type of Study:        Extremities Arteries:Lower Extremities Arterial Duplex, VL LOWER EXTREMITY    ARTERIES DUPLEX BILATERAL.       Tech Comments   Right   The right ankle/brachial index is 0.0 (no Doppler signal could be heard at the   [facility] or the PT). The common femoral and profunda femoral arteries could not be visualized due   to bandages extending into the groin area. The mid superficial femoral artery has a short segmental occlusion and then is   reconstituted. Elevated velocities at the distal superficial femoral artery indicate a > 50%   stenosis by velocity criteria (velocity went from 15 to 298 cm/s). The posterior tibial artery is occluded. The distal anterior tibial artery is barely patent, with very low flow   (possibly occluded and then reconstituted). Left   The left ankle brachial index is 0 for the PT and the DP was not accessible   due to the bandages on the foot. The common femoral and profunda femoral artery could not be visualized due to   bandages extending into the groin area. The distal superficial femoral artery is occluded and then reconstituted. The posterior tibial artery, peroneal, and anterior tibial artery are   occluded. There were no previous studies to use for comparison.        ASSESSMENT/PLAN:   -Full-thickness ulceration; lateral left foot-Sands stage III  -Deep tissue injury, lateral malleolus right ankle  -Pressure injury, dorsal right midfoot -NPUAP Stage I  -Osteomyelitis of the fifth metatarsal; left foot  -Critical limb ischemia; bilateral lower extremity  -Diabetes mellitus, type II  -History of noncompliance    -Patient seen and

## 2021-12-15 NOTE — PROGRESS NOTES
Physical Therapy  Facility/Department: [facility]  Daily Treatment Note  NAME: Lynn Whiteside  : 1955  MRN: [id number]    Date of Service: 12/15/2021    Discharge Recommendations: Lynn Whiteside scored a 6/24 on the AM-PAC short mobility form. Current research shows that an AM-PAC score of 17 or less is typically not associated with a discharge to the patient's home setting. Based on the patient's AM-PAC score and their current functional mobility deficits, it is recommended that the patient have 3-5 sessions per week of Physical Therapy at d/c to increase the patient's independence. Please see assessment section for further patient specific details. If patient discharges prior to next session this note will serve as a discharge summary. Please see below for the latest assessment towards goals. PT Equipment Recommendations  Other: defer d/c recs to next level of care    Assessment   Assessment: Pt demos decreased tolerance of mobility today. Limited by increased pain in sitting position. Demos good effort with LE ex in bed. Pt remains generally weak and requires min to mod A with bed mobility. Rec continued IP PT. Will follow  Treatment Diagnosis: impaired mobility  Prognosis: Good  PT Education: PT Role; Functional Mobility Training  Patient Education: pt demos good understanding  REQUIRES PT FOLLOW UP: Yes     Patient Diagnosis(es): The primary encounter diagnosis was Transient alteration of awareness. A diagnosis of Necrotizing fasciitis ([facility] [address] was also pertinent to this visit. has no past medical history on file. has a past surgical history that includes Ankle fracture surgery; Abdomen surgery (N/A, 2021); Scrotal surgery (N/A, 2021); Vagina surgery (N/A, 2021); Abdomen surgery (Left, 12/3/2021); Abdomen surgery (Left, 2021); colostomy (Left, 2021); Abdomen surgery (N/A, 2021);  Abdomen surgery (Left, 12/10/2021); and Abdomen surgery (N/A, 12/13/2021). Restrictions  Position Activity Restriction  Other position/activity restrictions: up as tolerated, NWB L LE, WBAT R LE per podiatry note; per Dr. Carlie Silva note 12/14: OOB with PT/OT  Subjective   General  Chart Reviewed: Yes  Additional Pertinent Hx: Marianela Asher is a 63 y.o. male with Necrotizing fasciitis of the abdominal wall, gluteal region, and scrotum s/p wide excision of perineum, back, and abdominal wall. returned to the OR for further debridement or right abdominal wall, and placement of testicle within a thigh flap (12/3), followed by minimal abdominal wall and perineum debridement, sutured protective skin flap in groin for right testicle, and placement of a wound VAC (12/5), s/p diverting colostomy and wound vac change (12/7). Plan for OR on 12/10 for further debridement and change of wound vac.  12/13 wound vac change  Subjective  Subjective: Found in bed, agreeable to PT. Wife in room. \"I'm weak! I'm weaker today than yesterday. \"  Pt reports frustration regarding multiple things. Reports surgical site pain is <7/10, states it is controlled. Orientation  Orientation  Overall Orientation Status: Within Normal Limits  Cognition      Objective   Bed mobility  Rolling to Left: Moderate assistance  Rolling to Right: Moderate assistance  Supine to Sit: Moderate assistance (HOB elevated and use of rail)  Sit to Supine: Minimal assistance           Balance  Sitting - Static: Fair  Comments: pt sat EOB x 3-4 min with min A initiallly progressing to SBA at times. Pt states he is unable to sit more than a few minutes due to pain  Exercises  Comments: x 10 R heel slides, hip ABD, SLR with brief rest breaks taken between exercises. x 10 L heel slides, hip ABD         Comment: L foot dressing saturated. RN notified - arrived to room and addressed.               G-Code     OutComes Score                                                     AM-PAC Score  AM-PAC Inpatient Mobility Raw Score : 6 (12/15/21 1008)  AM-PAC Inpatient T-Scale Score : 23.55 (12/15/21 1008)  Mobility Inpatient CMS 0-100% Score: 100 (12/15/21 1008)  Mobility Inpatient CMS G-Code Modifier : CN (12/15/21 1008)          Goals  Short term goals  Time Frame for Short term goals: dc  Short term goal 1: Demo indep with HEP x 15 reps  ongoing  Short term goal 2: Pt will transfer supine <--> sit with min A x 1   ongoing  Short term goal 3: Pt will transfer sit to stand with mod A x 1 and achieve near upright posture, maintaining L LE NWB   ongoing  Patient Goals   Patient goals : hopes to go home at [address] per week: 2-5  Specific instructions for Next Treatment: -  Current Treatment Recommendations: Strengthening, Balance Training, Functional Mobility Training, Transfer Training, Equipment Evaluation, Education, & procurement, Patient/Caregiver Education & Training, Home Exercise Program  Safety Devices  Type of devices: Call light within reach, Bed alarm in place, Nurse notified, Left in bed     Therapy Time   Individual Concurrent Group Co-treatment   Time In 0945         Time Out 1025         Minutes 40                 Timed Code Treatment Minutes:  40    Total Treatment Minutes:  40    If patient is discharged prior to next treatment, this note will serve as the discharge summary.   Claudia Masters, PT, DPT  [id number]

## 2021-12-15 NOTE — PLAN OF CARE
Problem: Falls - Risk of:  Goal: Will remain free from falls  Note: Patient has remain free from falls or any injury during shift. Fall precautions remain in place. Will continue to monitor. Problem: Pain:  Description: Pain management should include both nonpharmacologic and pharmacologic interventions. Goal: Pain level will decrease  Note: Patient rated pain 7/10, PRN oxycodone given with benefit, see MAR. Will continue to monitor.

## 2021-12-15 NOTE — PROGRESS NOTES
Occupational Therapy  [facility]  Daily Treatment Note  NAME: Marilyn Jung  : 1955  MRN: [id number]    Date of Service: 12/15/2021    Discharge Recommendations:    Marilyn Jung scored a 15/24 on the AM-PAC ADL Inpatient form. Current research shows that an AM-PAC score of 17 or less is typically not associated with a discharge to the patient's home setting. Based on the patient's AM-PAC score and their current ADL deficits, it is recommended that the patient have 3-5 sessions per week of Occupational Therapy at d/c to increase the patient's independence. Please see assessment section for further patient specific details. If patient discharges prior to next session this note will serve as a discharge summary. Please see below for the latest assessment towards goals. OT Equipment Recommendations  Equipment Needed: No  Other: continue to assess    Assessment   Performance deficits / Impairments: Decreased functional mobility ; Decreased ADL status; Decreased high-level IADLs; Decreased endurance; Decreased strength  Assessment: Pt states he is in a bad mood today and agreeable to lilmited activity. Rt UE is edematous and exercises were difficult. Recommend continued inpt therapy at d/c to maximize functional independence and safety. Continue with POC.   Treatment Diagnosis: Decreased activity tolerance, impaired ADls and mobility  Prognosis: Fair  OT Education: OT Role; Plan of Care; Home Exercise Program  Patient Education: reinforce prn  REQUIRES OT FOLLOW UP: Yes  Activity Tolerance  Activity Tolerance: Patient Tolerated treatment well  Safety Devices  Safety Devices in place: Yes  Type of devices: Left in bed; Bed alarm in place; Call light within reach; Nurse notified (wife present)       Restrictions  Position Activity Restriction  Other position/activity restrictions: up as tolerated, NWB L LE, WBAT R LE per podiatry note; per Dr. Cass Cohen note : OOB with by D/C  Short term goal 1: Demonstrate independence with HEP - Not met  Short term goal 2: Perform sit to stand NWB LLE with mod assist x2 to progress transfers - Not met  Short term goal 3: Roll side to side in bed with SBA for pressure relief and ADLs - Not met       Therapy Time   Individual Concurrent Group Co-treatment   Time In 0947         Time Out 1028         Minutes 41         Timed Code Treatment Minutes: [address], OTR/L [address]

## 2021-12-15 NOTE — PROGRESS NOTES
Plastic Surgery   Daily Progress Note  Patient: Justina Lorenzo      CC: Shayla's gangrene    SUBJECTIVE:   No issues overnight. Remains afebrile and HDS. Tolerating diet. Continues having good ostomy output. Pain is controlled. ROS:   A 14 point review of systems was conducted, significant findings as noted above. All other systems negative. OBJECTIVE:    PHYSICAL EXAM:    Vitals:    12/14/21 1427 12/14/21 2033 12/15/21 0029 12/15/21 0313   BP: 127/75 125/80 134/89 120/70   Pulse: 88 88 87 84   Resp: 16 16 18 16   Temp: 98.4 °F (36.9 °C) 98.5 °F (36.9 °C) 99 °F (37.2 °C) 98.1 °F (36.7 °C)   TempSrc: Oral Oral Oral Oral   SpO2: 90% 90% 91% 90%   Weight:       Height:           General appearance: alert, no acute distress, grooming appropriate  Eyes: No scleral icterus, EOM grossly intact  Neck: trachea midline, no JVD, no lymphadenopathy, neck supple  Chest/Lungs: Equal excursion bilaterally, normal effort with no accessory muscle use on 2L NC  Cardiovascular: RRR, brisk capillary refill  Abdomen:  Soft, large surgical debridement area of the abdomen with veraflo Vac in place holding adequate suction and instilling normal saline. Colostomy is pink, viable and with brown stool in the bag. Skin: warm and dry, no rashes  Extremities: no edema, no cyanosis  Genitourinary: Mcfadden in place with clear yellow urine  Neuro: A&Ox3, no focal deficits, sensation intact    LABS:   Recent Labs     12/14/21  0553 12/15/21  0430   WBC 13.8* 11.3*   HGB 8.7* 8.3*   HCT 26.4* 24.8*   MCV 91.3 90.2   * 527*        Recent Labs     12/14/21  0553 12/15/21  0430   * 136   K 5.0 4.4    101   CO2 29 29   PHOS 3.2 2.3*   BUN 12 12   CREATININE 0.7* 0.7*        No results for input(s): AST, ALT, ALB, BILIDIR, BILITOT, ALKPHOS in the last 72 hours. No results for input(s): LIPASE, AMYLASE in the last 72 hours. No results for input(s): PROT, INR, APTT in the last 72 hours.      No results for input(s): CKTOTAL, CKMB, CKMBINDEX, TROPONINI in the last 72 hours. ASSESSMENT & PLAN:   Amaris Mayer is a 63 y.o. male with Necrotizing fasciitis of the abdominal wall, gluteal region, and scrotum s/p wide excision of perineum, back, and abdominal wall. S/p multiple debridements and wound vac placement and changes intraoperatively with diverting colostomy creation (12/7). OR wound vac change (12/10). S/p 1 unit pRBCs (12/12). OR wound vac change (12/13).      - Continue zero carb diet   - Dietitian on board for calorie count and at least 100g protein daily   - Continue aggressive glucose control with goal glucose <120  - ID on board, continue Unasyn  - PT/ OT following  - Plan for return to OR on Thurs for [address] [place] [address] change    Abdulkadir Hidalgo MD, PGY-1  12/15/21 6:13 AM  Pager: [phone]

## 2021-12-15 NOTE — CARE COORDINATION
CM following, plan OR Thursday for [address] change. New ostomy care needed, [address] care needed, IV ABX, [employer] following for LTACH at [facility] following case aware of Medicaid pending application started 97/78.   Electronically signed by Mirna Cage RN on 12/15/2021 at 4:19 PM  [phone]

## 2021-12-15 NOTE — PROGRESS NOTES
PRE-OP NOTE  Department of Surgery      Chief Complaint or Reason for Surgery: Shayla's Gangrene    Procedure: Wound vac change, exam under anesthesia  Expected time: To follow    Plan  1. Diet: NPO at MN  2. IVF:  @2200  3. Antibiotics: Continue Unasyn  4. Labs to be drawn: CBC, renal, mag  5. Anesthesia: to see patient  6. Consent: Obtained, and placed in chart  7. Pulmonary: CXR:  from CT (12/9) WNL  8. Cardiac: EKG: (12/9) WNL  9.  Chemical DVT prophylaxis: MIRIAM Erickson DO  12/15/21  10:29 AM

## 2021-12-15 NOTE — PROGRESS NOTES
Patient remains A&OX4. VSS. Patient is still on 2L of O2 via NC. Mcfadden is draining with adequate urine output. Wound vac dressing to abdomen and buttocks remains CDI. Wound vac has been detecting an air leak, surgery was notified. PICC line continues to infuse intermittent IVABX. Patient has rated pain 7/10, PRN oxycodone has been given with benefit. Patient is currently resting in bed with alarm on, bedside table and call light are within reach if needed. All patient needs have been met at this time. Will continue to monitor.     Electronically signed by Delroy Dick RN on 12/15/2021 at 2:22 PM

## 2021-12-16 ENCOUNTER — ANESTHESIA EVENT (OUTPATIENT)
Dept: OPERATING ROOM | Age: 66
DRG: 853 | End: 2021-12-16

## 2021-12-16 ENCOUNTER — ANESTHESIA (OUTPATIENT)
Dept: OPERATING ROOM | Age: 66
DRG: 853 | End: 2021-12-16

## 2021-12-16 VITALS
DIASTOLIC BLOOD PRESSURE: 69 MMHG | OXYGEN SATURATION: 95 % | TEMPERATURE: 97.3 F | SYSTOLIC BLOOD PRESSURE: 113 MMHG | RESPIRATION RATE: 18 BRPM

## 2021-12-16 LAB
ALBUMIN SERPL-MCNC: 2.1 G/DL (ref 3.4–5)
ANION GAP SERPL CALCULATED.3IONS-SCNC: 8 MMOL/L (ref 3–16)
BASOPHILS ABSOLUTE: 0.1 K/UL (ref 0–0.2)
BASOPHILS RELATIVE PERCENT: 0.8 %
BUN BLDV-MCNC: 13 MG/DL (ref 7–20)
CALCIUM SERPL-MCNC: 7.9 MG/DL (ref 8.3–10.6)
CHLORIDE BLD-SCNC: 101 MMOL/L (ref 99–110)
CO2: 29 MMOL/L (ref 21–32)
CREAT SERPL-MCNC: 0.6 MG/DL (ref 0.8–1.3)
EOSINOPHILS ABSOLUTE: 0.1 K/UL (ref 0–0.6)
EOSINOPHILS RELATIVE PERCENT: 1.1 %
GFR AFRICAN AMERICAN: >60
GFR NON-AFRICAN AMERICAN: >60
GLUCOSE BLD-MCNC: 106 MG/DL (ref 70–99)
GLUCOSE BLD-MCNC: 130 MG/DL (ref 70–99)
GLUCOSE BLD-MCNC: 138 MG/DL (ref 70–99)
GLUCOSE BLD-MCNC: 189 MG/DL (ref 70–99)
GLUCOSE BLD-MCNC: 211 MG/DL (ref 70–99)
GLUCOSE BLD-MCNC: 211 MG/DL (ref 70–99)
HCT VFR BLD CALC: 25.2 % (ref 40.5–52.5)
HEMOGLOBIN: 8.4 G/DL (ref 13.5–17.5)
LYMPHOCYTES ABSOLUTE: 0.9 K/UL (ref 1–5.1)
LYMPHOCYTES RELATIVE PERCENT: 7.1 %
MAGNESIUM: 1.8 MG/DL (ref 1.8–2.4)
MCH RBC QN AUTO: 30.5 PG (ref 26–34)
MCHC RBC AUTO-ENTMCNC: 33.5 G/DL (ref 31–36)
MCV RBC AUTO: 91.2 FL (ref 80–100)
MONOCYTES ABSOLUTE: 0.9 K/UL (ref 0–1.3)
MONOCYTES RELATIVE PERCENT: 7.2 %
NEUTROPHILS ABSOLUTE: 10.2 K/UL (ref 1.7–7.7)
NEUTROPHILS RELATIVE PERCENT: 83.8 %
PDW BLD-RTO: 15.4 % (ref 12.4–15.4)
PERFORMED ON: ABNORMAL
PHOSPHORUS: 3 MG/DL (ref 2.5–4.9)
PLATELET # BLD: 469 K/UL (ref 135–450)
PMV BLD AUTO: 7.6 FL (ref 5–10.5)
POTASSIUM SERPL-SCNC: 4.5 MMOL/L (ref 3.5–5.1)
RBC # BLD: 2.76 M/UL (ref 4.2–5.9)
SODIUM BLD-SCNC: 138 MMOL/L (ref 136–145)
WBC # BLD: 12.2 K/UL (ref 4–11)

## 2021-12-16 PROCEDURE — 6370000000 HC RX 637 (ALT 250 FOR IP)

## 2021-12-16 PROCEDURE — 2500000003 HC RX 250 WO HCPCS: Performed by: NURSE ANESTHETIST, CERTIFIED REGISTERED

## 2021-12-16 PROCEDURE — 6360000002 HC RX W HCPCS

## 2021-12-16 PROCEDURE — 1200000000 HC SEMI PRIVATE

## 2021-12-16 PROCEDURE — 3700000001 HC ADD 15 MINUTES (ANESTHESIA): Performed by: SURGERY

## 2021-12-16 PROCEDURE — 3600000002 HC SURGERY LEVEL 2 BASE: Performed by: SURGERY

## 2021-12-16 PROCEDURE — 80069 RENAL FUNCTION PANEL: CPT

## 2021-12-16 PROCEDURE — 2709999900 HC NON-CHARGEABLE SUPPLY: Performed by: SURGERY

## 2021-12-16 PROCEDURE — 2580000003 HC RX 258: Performed by: STUDENT IN AN ORGANIZED HEALTH CARE EDUCATION/TRAINING PROGRAM

## 2021-12-16 PROCEDURE — 6360000002 HC RX W HCPCS: Performed by: STUDENT IN AN ORGANIZED HEALTH CARE EDUCATION/TRAINING PROGRAM

## 2021-12-16 PROCEDURE — 36415 COLL VENOUS BLD VENIPUNCTURE: CPT

## 2021-12-16 PROCEDURE — 7100000001 HC PACU RECOVERY - ADDTL 15 MIN: Performed by: SURGERY

## 2021-12-16 PROCEDURE — 6360000002 HC RX W HCPCS: Performed by: ANESTHESIOLOGY

## 2021-12-16 PROCEDURE — 7100000000 HC PACU RECOVERY - FIRST 15 MIN: Performed by: SURGERY

## 2021-12-16 PROCEDURE — 2580000003 HC RX 258: Performed by: NURSE ANESTHETIST, CERTIFIED REGISTERED

## 2021-12-16 PROCEDURE — 3600000012 HC SURGERY LEVEL 2 ADDTL 15MIN: Performed by: SURGERY

## 2021-12-16 PROCEDURE — 6370000000 HC RX 637 (ALT 250 FOR IP): Performed by: STUDENT IN AN ORGANIZED HEALTH CARE EDUCATION/TRAINING PROGRAM

## 2021-12-16 PROCEDURE — 6360000002 HC RX W HCPCS: Performed by: NURSE ANESTHETIST, CERTIFIED REGISTERED

## 2021-12-16 PROCEDURE — 99231 SBSQ HOSP IP/OBS SF/LOW 25: CPT | Performed by: INTERNAL MEDICINE

## 2021-12-16 PROCEDURE — 85025 COMPLETE CBC W/AUTO DIFF WBC: CPT

## 2021-12-16 PROCEDURE — 3700000000 HC ANESTHESIA ATTENDED CARE: Performed by: SURGERY

## 2021-12-16 PROCEDURE — 2580000003 HC RX 258

## 2021-12-16 PROCEDURE — 83735 ASSAY OF MAGNESIUM: CPT

## 2021-12-16 RX ORDER — MEPERIDINE HYDROCHLORIDE 25 MG/ML
12.5 INJECTION INTRAMUSCULAR; INTRAVENOUS; SUBCUTANEOUS EVERY 5 MIN PRN
Status: DISCONTINUED | OUTPATIENT
Start: 2021-12-16 | End: 2021-12-16 | Stop reason: HOSPADM

## 2021-12-16 RX ORDER — METOCLOPRAMIDE HYDROCHLORIDE 5 MG/ML
10 INJECTION INTRAMUSCULAR; INTRAVENOUS
Status: DISCONTINUED | OUTPATIENT
Start: 2021-12-16 | End: 2021-12-16 | Stop reason: HOSPADM

## 2021-12-16 RX ORDER — HYDRALAZINE HYDROCHLORIDE 20 MG/ML
5 INJECTION INTRAMUSCULAR; INTRAVENOUS EVERY 10 MIN PRN
Status: DISCONTINUED | OUTPATIENT
Start: 2021-12-16 | End: 2021-12-16 | Stop reason: HOSPADM

## 2021-12-16 RX ORDER — ONDANSETRON 2 MG/ML
INJECTION INTRAMUSCULAR; INTRAVENOUS PRN
Status: DISCONTINUED | OUTPATIENT
Start: 2021-12-16 | End: 2021-12-16 | Stop reason: SDUPTHER

## 2021-12-16 RX ORDER — DIPHENHYDRAMINE HYDROCHLORIDE 50 MG/ML
12.5 INJECTION INTRAMUSCULAR; INTRAVENOUS
Status: DISCONTINUED | OUTPATIENT
Start: 2021-12-16 | End: 2021-12-16 | Stop reason: HOSPADM

## 2021-12-16 RX ORDER — MORPHINE SULFATE 4 MG/ML
1 INJECTION, SOLUTION INTRAMUSCULAR; INTRAVENOUS EVERY 5 MIN PRN
Status: DISCONTINUED | OUTPATIENT
Start: 2021-12-16 | End: 2021-12-16 | Stop reason: HOSPADM

## 2021-12-16 RX ORDER — PROPOFOL 10 MG/ML
INJECTION, EMULSION INTRAVENOUS PRN
Status: DISCONTINUED | OUTPATIENT
Start: 2021-12-16 | End: 2021-12-16 | Stop reason: SDUPTHER

## 2021-12-16 RX ORDER — LABETALOL HYDROCHLORIDE 5 MG/ML
5 INJECTION, SOLUTION INTRAVENOUS EVERY 10 MIN PRN
Status: DISCONTINUED | OUTPATIENT
Start: 2021-12-16 | End: 2021-12-16 | Stop reason: HOSPADM

## 2021-12-16 RX ORDER — PROMETHAZINE HYDROCHLORIDE 25 MG/ML
6.25 INJECTION, SOLUTION INTRAMUSCULAR; INTRAVENOUS
Status: DISCONTINUED | OUTPATIENT
Start: 2021-12-16 | End: 2021-12-16 | Stop reason: HOSPADM

## 2021-12-16 RX ORDER — OXYCODONE HYDROCHLORIDE 5 MG/1
5 TABLET ORAL PRN
Status: DISCONTINUED | OUTPATIENT
Start: 2021-12-16 | End: 2021-12-16 | Stop reason: HOSPADM

## 2021-12-16 RX ORDER — MAGNESIUM SULFATE IN WATER 40 MG/ML
2000 INJECTION, SOLUTION INTRAVENOUS ONCE
Status: COMPLETED | OUTPATIENT
Start: 2021-12-16 | End: 2021-12-16

## 2021-12-16 RX ORDER — OXYCODONE HYDROCHLORIDE 5 MG/1
10 TABLET ORAL PRN
Status: DISCONTINUED | OUTPATIENT
Start: 2021-12-16 | End: 2021-12-16 | Stop reason: HOSPADM

## 2021-12-16 RX ORDER — ROCURONIUM BROMIDE 10 MG/ML
INJECTION, SOLUTION INTRAVENOUS PRN
Status: DISCONTINUED | OUTPATIENT
Start: 2021-12-16 | End: 2021-12-16 | Stop reason: SDUPTHER

## 2021-12-16 RX ORDER — LIDOCAINE HYDROCHLORIDE 20 MG/ML
INJECTION, SOLUTION INFILTRATION; PERINEURAL PRN
Status: DISCONTINUED | OUTPATIENT
Start: 2021-12-16 | End: 2021-12-16 | Stop reason: SDUPTHER

## 2021-12-16 RX ADMIN — AMPICILLIN SODIUM AND SULBACTAM SODIUM 3000 MG: 2; 1 INJECTION, POWDER, FOR SOLUTION INTRAMUSCULAR; INTRAVENOUS at 06:59

## 2021-12-16 RX ADMIN — PHENYLEPHRINE HYDROCHLORIDE 25 MCG/MIN: 10 INJECTION, SOLUTION INTRAMUSCULAR; INTRAVENOUS; SUBCUTANEOUS at 12:39

## 2021-12-16 RX ADMIN — LIDOCAINE HYDROCHLORIDE 100 MG: 20 INJECTION, SOLUTION INFILTRATION; PERINEURAL at 12:37

## 2021-12-16 RX ADMIN — ONDANSETRON 6 MG: 2 INJECTION INTRAMUSCULAR; INTRAVENOUS at 12:40

## 2021-12-16 RX ADMIN — INSULIN LISPRO 4 UNITS: 100 INJECTION, SOLUTION INTRAVENOUS; SUBCUTANEOUS at 18:06

## 2021-12-16 RX ADMIN — HYDROMORPHONE HYDROCHLORIDE 0.5 MG: 1 INJECTION, SOLUTION INTRAMUSCULAR; INTRAVENOUS; SUBCUTANEOUS at 14:29

## 2021-12-16 RX ADMIN — ACETAMINOPHEN 1000 MG: 500 TABLET ORAL at 18:06

## 2021-12-16 RX ADMIN — DOCUSATE SODIUM 100 MG: 100 CAPSULE, LIQUID FILLED ORAL at 21:34

## 2021-12-16 RX ADMIN — SODIUM CHLORIDE, PRESERVATIVE FREE 10 ML: 5 INJECTION INTRAVENOUS at 21:34

## 2021-12-16 RX ADMIN — PROPOFOL 100 MG: 10 INJECTION, EMULSION INTRAVENOUS at 12:37

## 2021-12-16 RX ADMIN — MAGNESIUM SULFATE HEPTAHYDRATE 2000 MG: 2 INJECTION, SOLUTION INTRAVENOUS at 09:21

## 2021-12-16 RX ADMIN — SUGAMMADEX 200 MG: 100 INJECTION, SOLUTION INTRAVENOUS at 13:22

## 2021-12-16 RX ADMIN — INSULIN LISPRO 1 UNITS: 100 INJECTION, SOLUTION INTRAVENOUS; SUBCUTANEOUS at 12:05

## 2021-12-16 RX ADMIN — PHENYLEPHRINE HYDROCHLORIDE 100 MCG: 10 INJECTION, SOLUTION INTRAMUSCULAR; INTRAVENOUS; SUBCUTANEOUS at 12:37

## 2021-12-16 RX ADMIN — INSULIN LISPRO 4 UNITS: 100 INJECTION, SOLUTION INTRAVENOUS; SUBCUTANEOUS at 12:05

## 2021-12-16 RX ADMIN — HYDROMORPHONE HYDROCHLORIDE 0.5 MG: 1 INJECTION, SOLUTION INTRAMUSCULAR; INTRAVENOUS; SUBCUTANEOUS at 14:18

## 2021-12-16 RX ADMIN — ACETAMINOPHEN 1000 MG: 500 TABLET ORAL at 23:17

## 2021-12-16 RX ADMIN — HEPARIN SODIUM 5000 UNITS: 5000 INJECTION INTRAVENOUS; SUBCUTANEOUS at 21:34

## 2021-12-16 RX ADMIN — HEPARIN SODIUM 5000 UNITS: 5000 INJECTION INTRAVENOUS; SUBCUTANEOUS at 05:54

## 2021-12-16 RX ADMIN — HEPARIN SODIUM 5000 UNITS: 5000 INJECTION INTRAVENOUS; SUBCUTANEOUS at 15:22

## 2021-12-16 RX ADMIN — OXYCODONE HYDROCHLORIDE 10 MG: 5 TABLET ORAL at 19:48

## 2021-12-16 RX ADMIN — AMPICILLIN SODIUM AND SULBACTAM SODIUM 3000 MG: 2; 1 INJECTION, POWDER, FOR SOLUTION INTRAMUSCULAR; INTRAVENOUS at 15:22

## 2021-12-16 RX ADMIN — AMPICILLIN SODIUM AND SULBACTAM SODIUM 3000 MG: 2; 1 INJECTION, POWDER, FOR SOLUTION INTRAMUSCULAR; INTRAVENOUS at 21:32

## 2021-12-16 RX ADMIN — AMLODIPINE BESYLATE 5 MG: 5 TABLET ORAL at 09:16

## 2021-12-16 RX ADMIN — ROCURONIUM BROMIDE 50 MG: 10 INJECTION INTRAVENOUS at 12:37

## 2021-12-16 RX ADMIN — ACETAMINOPHEN 1000 MG: 500 TABLET ORAL at 05:52

## 2021-12-16 RX ADMIN — AMPICILLIN SODIUM AND SULBACTAM SODIUM 3000 MG: 2; 1 INJECTION, POWDER, FOR SOLUTION INTRAMUSCULAR; INTRAVENOUS at 01:10

## 2021-12-16 RX ADMIN — INSULIN LISPRO 4 UNITS: 100 INJECTION, SOLUTION INTRAVENOUS; SUBCUTANEOUS at 09:17

## 2021-12-16 RX ADMIN — INSULIN LISPRO 2 UNITS: 100 INJECTION, SOLUTION INTRAVENOUS; SUBCUTANEOUS at 09:18

## 2021-12-16 RX ADMIN — DOCUSATE SODIUM 100 MG: 100 CAPSULE, LIQUID FILLED ORAL at 09:16

## 2021-12-16 RX ADMIN — OXYCODONE HYDROCHLORIDE 10 MG: 5 TABLET ORAL at 09:16

## 2021-12-16 RX ADMIN — PROPOFOL 50 MG: 10 INJECTION, EMULSION INTRAVENOUS at 13:18

## 2021-12-16 ASSESSMENT — PULMONARY FUNCTION TESTS
PIF_VALUE: 27
PIF_VALUE: 21
PIF_VALUE: 23
PIF_VALUE: 24
PIF_VALUE: 1
PIF_VALUE: 2
PIF_VALUE: 21
PIF_VALUE: 12
PIF_VALUE: 26
PIF_VALUE: 25
PIF_VALUE: 2
PIF_VALUE: 23
PIF_VALUE: 25
PIF_VALUE: 21
PIF_VALUE: 2
PIF_VALUE: 24
PIF_VALUE: 24
PIF_VALUE: 11
PIF_VALUE: 26
PIF_VALUE: 25
PIF_VALUE: 24
PIF_VALUE: 25
PIF_VALUE: 12
PIF_VALUE: 29
PIF_VALUE: 27
PIF_VALUE: 29
PIF_VALUE: 1
PIF_VALUE: 29
PIF_VALUE: 26
PIF_VALUE: 29
PIF_VALUE: 25
PIF_VALUE: 1
PIF_VALUE: 24
PIF_VALUE: 29
PIF_VALUE: 12
PIF_VALUE: 13
PIF_VALUE: 26
PIF_VALUE: 23
PIF_VALUE: 25
PIF_VALUE: 25
PIF_VALUE: 23
PIF_VALUE: 21
PIF_VALUE: 29
PIF_VALUE: 25
PIF_VALUE: 28
PIF_VALUE: 27
PIF_VALUE: 30
PIF_VALUE: 3
PIF_VALUE: 23
PIF_VALUE: 23
PIF_VALUE: 2
PIF_VALUE: 27
PIF_VALUE: 23
PIF_VALUE: 25
PIF_VALUE: 23

## 2021-12-16 ASSESSMENT — PAIN SCALES - GENERAL
PAINLEVEL_OUTOF10: 7
PAINLEVEL_OUTOF10: 7
PAINLEVEL_OUTOF10: 0
PAINLEVEL_OUTOF10: 4
PAINLEVEL_OUTOF10: 7
PAINLEVEL_OUTOF10: 4
PAINLEVEL_OUTOF10: 7
PAINLEVEL_OUTOF10: 5
PAINLEVEL_OUTOF10: 6
PAINLEVEL_OUTOF10: 7

## 2021-12-16 ASSESSMENT — PAIN DESCRIPTION - FREQUENCY: FREQUENCY: CONTINUOUS

## 2021-12-16 ASSESSMENT — PAIN DESCRIPTION - PROGRESSION: CLINICAL_PROGRESSION: NOT CHANGED

## 2021-12-16 ASSESSMENT — PAIN DESCRIPTION - LOCATION
LOCATION: PERINEUM
LOCATION: BUTTOCKS
LOCATION: BUTTOCKS

## 2021-12-16 ASSESSMENT — PAIN DESCRIPTION - ONSET: ONSET: GRADUAL

## 2021-12-16 ASSESSMENT — PAIN DESCRIPTION - ORIENTATION
ORIENTATION: MID

## 2021-12-16 ASSESSMENT — PAIN DESCRIPTION - PAIN TYPE
TYPE: SURGICAL PAIN

## 2021-12-16 ASSESSMENT — PAIN DESCRIPTION - DESCRIPTORS: DESCRIPTORS: ACHING;BURNING

## 2021-12-16 NOTE — PROGRESS NOTES
PACU Transfer Note    Vitals:    12/16/21 1450   BP: (!) 142/72   Pulse: 83   Resp: 19   Temp: 98 °F (36.7 °C)   SpO2: 98%       In: 841.4 [P.O.:60; I.V.:781.4]  Out: 1050 [Urine:950]    Pain assessment:    Pain Level: 7    Report given to Receiving unit RN.    12/16/2021 2:52 PM

## 2021-12-16 NOTE — PROGRESS NOTES
Department of Plastic Surgery:  Post-op Note      Procedure(s) Performed: 2ND LOOK/ EVALUATION UNDER ANESTHESIA/ WOUND VAC CHANGE ABDOMINAL WALL AND PERINEUM       Subjective:   Patient sitting up eating dinner, denies pain, wound vac in place, holding suction, no nausea or emesis. Worked with PTOT, states not strong enough to get OOB. Objective:  Anesthesia type: General      I/O    Intra op    Post op     Fluids  841 240     EBL - 0     Urine 950 200     Exam:/75   Pulse 80   Temp 97.6 °F (36.4 °C) (Oral)   Resp 19   Ht 5' 11\" (1.803 m)   Wt 202 lb 2.6 oz (91.7 kg)   SpO2 98%   BMI 28.20 kg/m²   Post-op vital signs:  Stable   General appearance: alert, no acute distress, grooming appropriate  Chest/Lungs: normal effort with no accessory muscle use on 4L NC  Cardiovascular: RRR, brisk capillary refill  Abdomen:  Soft, Veraflo Vac in place holding adequate suction and instilling normal saline. Colostomy is pink, viable and with brown stool in the bag. Skin: warm and dry, no rashes  Extremities: no edema, no cyanosis  Genitourinary: Mcfadden in place with clear yellow urine  Neuro: A&Ox3, no focal deficits, sensation intact       Assessment and Plan  Pt is a 63y.o. year old male  with Necrotizing fasciitis of the abdominal wall, gluteal region, and scrotum s/p wide excision of perineum, back, and abdominal wall. S/p multiple debridements and wound vac placement and changes intraoperatively with diverting colostomy creation (12/7). OR wound vac change (12/10). S/p 1 unit pRBCs (12/12). OR wound vac change (12/13), 12/16.  POD #0    Pain management: tylenol, roxicodone pain panel PRN  Cardiovasc: hemodynamically stable, will continue to monitor  Respiratory:  IS ordered to bedside, encourage hourly IS and deep breathing, wean oxygen as tolerated  FeNa: Fluids  SLIV, Diet: Carb control  :  Urine output is adequate  Ambulation: OOB to chair, encourage ambulation  Prophylaxis: SCDs, lovenox  Abx: Long Reddy MD   Gen Surg PGY 4  12/16/2021  6:54 PM  [phone]

## 2021-12-16 NOTE — ANESTHESIA POSTPROCEDURE EVALUATION
Department of Anesthesiology  Postprocedure Note    Patient: Brenda Torres  MRN: [id number]  YOB: 1955  Date of evaluation: 12/16/2021  Time:  1:50 PM     Procedure Summary     Date: 12/16/21 Room / [facility]: [facility] [address] [place] [address] / [facility]    Anesthesia Start: 2572 Anesthesia Stop: 3164    Procedure: 2ND LOOK/ EVALUATION UNDER ANESTHESIA/ WOUND VAC CHANGE ABDOMINAL WALL AND PERINEUM (N/A ) Diagnosis: (NECROTIZING FASCIITIS LEFT BUTTOCK AND ABDOMEN)    Surgeons: Racquel Montana MD Responsible Provider:     Anesthesia Type: general ASA Status: 3          Anesthesia Type: general    Arcadio Phase I: Arcadio Score: 9    Arcadio Phase II:      Last vitals: Reviewed and per EMR flowsheets.        Anesthesia Post Evaluation    Patient location during evaluation: PACU  Patient participation: complete - patient participated  Level of consciousness: awake and alert  Pain score: 0  Airway patency: patent  Nausea & Vomiting: no nausea and no vomiting  Complications: no  Cardiovascular status: hemodynamically stable  Respiratory status: acceptable  Hydration status: euvolemic

## 2021-12-16 NOTE — PLAN OF CARE
Problem: Falls - Risk of:  Goal: Will remain free from falls  Description: Will remain free from falls  Outcome: Ongoing  Note: Bed in lowest setting. Call light within reach. Bed alarm on. Nurse rounding on patient frequently for visual checks and bathroom needs.

## 2021-12-16 NOTE — PROGRESS NOTES
Podiatric Surgery Daily Progress Note      Admit Date: 12/1/2021      Code:Full Code    Patient seen and examined, labs and records reviewed    Subjective:     Patient seen at bedside this a.m. Patient denies pain to bilateral lower extremity. Patient denies fever, chills, shortness of breath, chest pain. Patient denies any acute events overnight. Review of Systems: A 10 point review of systems was conducted, significant findings as noted in HPI. All other systems negative. Objective     BP (!) 144/75   Pulse 85   Temp 98.2 °F (36.8 °C) (Oral)   Resp 17   Ht 5' 11\" (1.803 m)   Wt 202 lb 2.6 oz (91.7 kg)   SpO2 94%   BMI 28.20 kg/m²      I/O:    Intake/Output Summary (Last 24 hours) at 12/16/2021 0656  Last data filed at 12/16/2021 0346  Gross per 24 hour   Intake 681.77 ml   Output 2275 ml   Net -1593.23 ml              Wt Readings from Last 3 Encounters:   12/16/21 202 lb 2.6 oz (91.7 kg)   11/30/21 163 lb 3.2 oz (74 kg)   04/18/16 179 lb 14.3 oz (81.6 kg)       LABS:    Recent Labs     12/15/21  0430 12/16/21  0430   WBC 11.3* 12.2*   HGB 8.3* 8.4*   HCT 24.8* 25.2*   * 469*        Recent Labs     12/16/21  0538      K 4.5      CO2 29   PHOS 3.0   BUN 13   CREATININE 0.6*        No results for input(s): PROT, INR, APTT in the last 72 hours. LOWER EXTREMITY EXAMINATION    Dressing to left LE intact. No strikethrough noted to the external dressing. No drainage noted to the internal layers of the dressing. VASCULAR:   DP and PT pulses are non-palpable b/l. Upon hand-held Doppler examination DP signals were noted to be monophasic and PT signals were noted to be nondopplerable. CFT slightly diminished to the distal digits of bilateral lower extremities. Skin temperature is warm to lukewarm to cool to the distal digits from proximal to distal.  No edema noted. No pain with calf compression b/l.     NEUROLOGIC:   Gross and epicritic sensation is diminished b/l.  Protective sensation is diminished at all pedal sites b/l.     DERMATOLOGIC:   Patient provided verbal consent for photos to be obtained today:  Left lower extremity:    Full-thickness ulceration noted to the lateral aspect of the left foot. Wound measures approximately 3.2 cm x 2 cm x 0.5 cm. Wound base exposed bone, with necrotic edges at the proximal and distal edges. Wound probes to bone with exposed fifth metatarsal.  No tracking or tunneling noted. No purulent drainage noted. No fluctuance or crepitus or malodor noted. Maroon and black discoloration noted distal tip of left fifth digit extending proximally. Wound is stable. Anterior aspect of ankle joint left foot. Deep tissue injury noted 2/2 Prevalon boot strap. Intact epithelialized tissue noted. No crepitus, erythema, drainage, or malodor noted. No open wound noted. Stable without acute infection noted. Right lower extremity      Lateral malleolus deep tissue injury. Purple circumferential discoloration noted. No open wound is noted. No fluctuance, crepitus, malodor, or drainage noted. No acute signs infection noted. Deep tissue injury noted at the styloid process of the 5th metatarsal. Purple circumferential discoloration noted. No open wound is noted. No fluctuance, crepitus, malodor, or drainage noted. No acute signs of infection noted. Pressure injury with red/purple discoloration noted to the dorsal aspect of the midfoot, right lower extremity. No open wound. No fluctuance, crepitus, malodor, or drainage noted. No acute signs of infection noted. MUSCULOSKELETAL:   Muscle strength 4/5 for all pedal muscle groups B/L LE. No pain with palpation of the left foot.      IMAGING:  XR LEFT FOOT 11/30/2021  Narrative   EXAMINATION:   THREE XRAY VIEWS OF THE LEFT FOOT       11/30/2021 1:44 pm       COMPARISON:   None.       HISTORY:   ORDERING SYSTEM PROVIDED HISTORY: wound   TECHNOLOGIST PROVIDED HISTORY:   Reason for exam:->wound   Reason for Exam: blood sugar problem, wound check on lateral portion of foot   Acuity: Acute   Type of Exam: Initial       FINDINGS:   Osseous destruction along the articular margins of the 5th   metatarsophalangeal joint with associated lucency in the soft tissues. .   There is no evidence of fracture or dislocation. . The remaining joint spaces   appear well maintained. The remaining soft tissues are unremarkable.           Impression   Evidence of a septic joint involving the 5th metatarsal phalangeal joint with   associated osteomyelitis         ARTERIAL DUPLEX 12/2/21     Type of Study:        Extremities Arteries:Lower Extremities Arterial Duplex, VL LOWER EXTREMITY    ARTERIES DUPLEX BILATERAL.       Tech Comments   Right   The right ankle/brachial index is 0.0 (no Doppler signal could be heard at the   [facility] or the PT). The common femoral and profunda femoral arteries could not be visualized due   to bandages extending into the groin area. The mid superficial femoral artery has a short segmental occlusion and then is   reconstituted. Elevated velocities at the distal superficial femoral artery indicate a > 50%   stenosis by velocity criteria (velocity went from 15 to 298 cm/s). The posterior tibial artery is occluded. The distal anterior tibial artery is barely patent, with very low flow   (possibly occluded and then reconstituted). Left   The left ankle brachial index is 0 for the PT and the DP was not accessible   due to the bandages on the foot. The common femoral and profunda femoral artery could not be visualized due to   bandages extending into the groin area. The distal superficial femoral artery is occluded and then reconstituted. The posterior tibial artery, peroneal, and anterior tibial artery are   occluded. There were no previous studies to use for comparison.        ASSESSMENT/PLAN:   -Full-thickness ulceration; lateral left foot-Sands stage III  -Deep tissue injury, lateral malleolus right ankle  -Deep tissue injury, 5th metatarsal base, right foot  -Pressure injury, dorsal right midfoot -NPUAP Stage I  -Osteomyelitis of the fifth metatarsal; left foot  -Critical limb ischemia; bilateral lower extremity  -Diabetes mellitus, type II  -History of noncompliance    -Patient seen and examined at bedside this a.m.  -Hypertensive otherwise VSS on 2 L of O2 via nasal cannula; leukocytosis noted (12.2)  -All imaging reviewed see impression above. -CRP 60.2 (12/13/21)  -Prealbumin 9.8 (12/13/21)  -Vascular surgery following; will await further stabilization with the general surgery team and plastic surgery team before offering vascular intervention.  -Plastic surgery following; Plan for return to the OR on 12/16 for wound VAC change and wound evaluation  -Infectious disease following, continue Unasyn and at discharge may not need IV antibiotics or long course of antibiotics. -Left lower extremity dressed with Betadine soaked gauze, DSD, and tape. -Right lower extremity applied Mepilex borders to deep tissue injury.  -Prevalon boots reapplied. Patient is to wear at all times while in bed to prevent further deep tissue injury. Ankle strap removed from bilateral boot as the ankle straps were causing pressure to his feet. -Nonweightbearing to left lower extremity.  -Weightbearing as tolerated to right lower extremity. DISPO: Full-thickness ulceration with underlying osteomyelitis to the left fifth metatarsal. Critical limb ischemia bilateral lower extremity. Vascular following; awaiting further stabilization at this time. Plastic surgery, general surgery, and ID following, recs noted above. Patient will eventually require podiatric surgical intervention but timing will depend on medical stability and vascular recommendations. Will continue to closely monitor patient and follow while in house.     Patient assessment and plan was discussed with Dr. Litzy Del Rio DPM.    Theron Mayberry DPM   Podiatric Resident PGY1  Pager [phone] or [employer]  12/16/2021, 6:56 AM    Patient was seen and evaluated at bedside. Agree with residents assessment and treatment plan.   Clotilde Villegas DPM

## 2021-12-16 NOTE — PROGRESS NOTES
Physical Therapy/Occupational Therapy  Attempt    Attempted to see pt at 1230 on 12/16/21. Per RN, pt is currently off floor in surgery. Will re-attempt as schedule allows. Lenka Bowen, TAVARES Jose, [address] [place] [address]

## 2021-12-16 NOTE — PROGRESS NOTES
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  Antibiotics: Unasyn    Admit Date: 12/1/2021  Hospital Day: 16    Subjective:     Patient reports wound / surg site pain mostly controlled  No other complaint     Objective:     Patient Vitals for the past 8 hrs:   BP Temp Temp src Pulse Resp SpO2 Weight   12/16/21 0910 (!) 157/78 98.2 °F (36.8 °C) Oral 83 17 95 % --   12/16/21 0556 -- -- -- -- -- -- 202 lb 2.6 oz (91.7 kg)   12/16/21 0346 (!) 144/75 98.2 °F (36.8 °C) Oral 85 17 94 % --     I/O last 3 completed shifts: In: 681.8 [P.O.:180; I.V.:501.8]  Out: 0777 [Urine:350; Drains:1800; Stool:125]  I/O this shift: In: 452.4 [I.V.:452.4]  Out: 1050 [Urine:950; Stool:100]    EXAM:  GENERAL: No apparent distress.   RA  HEENT: Membranes moist, no oral lesion  NECK:  Supple, no lymphadenopathy  LUNGS: Clear b/l, no rales, no dullness  CARDIAC: RRR, no murmur appreciated  ABD:  + BS, soft / NT  Wounds - VAC in wound over lower abd, suprapubic region, extending over L scrotum, inguinal fold, posteriorly to buttock  EXT:  No rash, no edema, no lesions  NEURO: No focal neurologic findings  PSYCH: Orientation, sensorium, mood normal  LINES:  R PICC in place       Data Review:  Lab Results   Component Value Date    WBC 12.2 (H) 12/16/2021    HGB 8.4 (L) 12/16/2021    HCT 25.2 (L) 12/16/2021    MCV 91.2 12/16/2021     (H) 12/16/2021     Lab Results   Component Value Date    CREATININE 0.6 (L) 12/16/2021    BUN 13 12/16/2021     12/16/2021    K 4.5 12/16/2021     12/16/2021    CO2 29 12/16/2021       Hepatic Function Panel:   Lab Results   Component Value Date    ALKPHOS 170 12/10/2021    ALT 7 12/10/2021    AST 13 12/10/2021    PROT 4.8 12/10/2021    BILITOT 0.3 12/10/2021    BILIDIR <0.2 12/10/2021    IBILI see below 12/10/2021    LABALBU 2.1 12/16/2021       MICRO:  11/30 BC no growth   11/30 SARS CoV-2 NAAT neg  11/30 Tissue cult - no growth   12/1   Wound cult - light mixed skin pam  12/3 Tissue cult - light C glabrata   Tissue cult - light C glabrata    IMAGIN/30 L foot:   Evidence of a septic joint involving the 5th metatarsal phalangeal joint with   associated osteomyelitis      Abd / Pelvic CT   Impression   1. 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  There is   additional muscle or fascial involvement on the left as above.  Of note, the   testes are incompletely evaluated but appear within normal limits.  Critical   results were called by Dr. Warner Starkey MD to Dr. Kolby Allred on 2021   at 19:31.   2. Minimal bilateral hydronephrosis is likely due to severe urinary bladder   distention.  Abd / Pelvic CT  Impression   Significant interval improvement and soft tissue emphysema in the visualized left gluteal region and over the anterior abdominal wall since prior study.       Anasarca more prominently of the abdominal wall without suspicious drainable abdominal wall fluid collection.       More prominent presacral strandy fluid without suspicious retroperitoneal emphysema to suggest retroperitoneal infection.       New small amount of ascites throughout the abdomen and pelvis without suspicious organized intraperitoneal fluid collections.       New moderate to large bilateral pleural effusions with associated compressive atelectasis of the lung bases.       New trace pericardial effusion.       Colostomy in the left lower quadrant without obstruction or complicating features.       Interval improvement in bilateral hydronephrosis.        Scheduled Meds:   magnesium sulfate  2,000 mg IntraVENous Once    insulin lispro  4 Units SubCUTAneous TID WC    insulin glargine  10 Units SubCUTAneous QAM    heparin (porcine)  5,000 Units SubCUTAneous 3 times per day    insulin lispro  0-6 Units SubCUTAneous TID     insulin lispro  0-3 Units SubCUTAneous Nightly    docusate sodium  100 mg Oral BID    polyethylene glycol  17 g Oral Daily    amLODIPine  5 mg Oral Daily    ampicillin-sulbactam  3,000 mg IntraVENous Q6H    acetaminophen  1,000 mg Oral Q6H    sodium chloride flush  5-40 mL IntraVENous 2 times per day       Continuous Infusions:   sodium chloride 20 mL/hr at 12/16/21 5730    sodium chloride      sodium chloride 25 mL (12/12/21 0850)    dextrose         PRN Meds:  sodium chloride, alteplase, ipratropium-albuterol, dextrose, hydrALAZINE, oxyCODONE **OR** oxyCODONE, sodium chloride flush, sodium chloride, glucose, dextrose, glucagon (rDNA), dextrose      Assessment:     64 yo man with DM - not taking meds     Presented with scrotal swelling and pain  Seen 11/30 at [facility] ED, dx DKA, Shayla's gangrene  CT with gas extending   OR debridement  Rx Vancomycin, Cefepime, Metronidazole     Transfer to [facility] on 12/1. Return to OR 12/2,3,5,7. Colostomy 12/7     Reviewed cultures -  11/30 GS GPC, GNDC, GNR, cult neg  12/3,5 light C glabrata.     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   - mixed / polymicrobial infection   - yeast in tissue    Leukocytosis - resolved      Plan:     Cont unsyn  Wound care / reconstruction per Surg / Plastics  OR today 12/16 for VAC change, poss debridement    Diet (high protein / no carb), BS control    At discharge may not need iv antibiotics    Medical Decision Making:   The following items were considered in medical decision making:  Discussion of patient care with other providers  Reviewed clinical lab tests  Reviewed radiology tests  Reviewed other diagnostic tests/interventions  Independent review of radiologic images - reviewed initial and f/u CT with Radiologist 12/8  Microbiology cultures and other micro tests reviewed      Discussed with pt  Prieto King MD

## 2021-12-16 NOTE — PROGRESS NOTES
Pt arrived back to [address] from PACU around 1515. VSS, Pt A&Ox4. Surgical sites clean, dry, intact with wound vac in place. Mcfadden in place draining clear, jerica urine. Ostomy in place with soft, brown stool. IVF infusing per orders with IV abx given per orders. Pt remains on 2 L O2. NSR on tele. Pt tolerating diet at this time. Calorie count on door. Podiatry off-loading boots in place. Pt turned Q2 hours as tolerated. Fall precautions in place and call light within reach.  Will continue to monitor

## 2021-12-16 NOTE — PROGRESS NOTES
Patient A&Ox4. VSS. Patient remains on 2L O2. When O2 off, sat in low 80s. Dressing on LLE cdi. Wound vac remains in place with irrigation and canister changed. IV fluids and antibiotic infusing per orders. SCDs on. Ostomy having soft brown output and \"burped\". Patient NPO since midnight. Mcfadden in place. Bed is in lowest setting and call light is within reach.

## 2021-12-16 NOTE — ANESTHESIA PRE PROCEDURE
(DEMEROL) injection 12.5 mg  12.5 mg IntraVENous Q5 Min PRN Sheri Jeronimo MD        0.9 % sodium chloride infusion   IntraVENous Continuous Ressie Payton Garcia, DO 20 mL/hr at 12/16/21 0742 Rate Verify at 12/16/21 0742    insulin lispro (1 Unit Dial) 4 Units  4 Units SubCUTAneous TID WC Ressie Payton Franciscoitzer, DO   4 Units at 12/16/21 0444    insulin glargine (LANTUS;BASAGLAR) injection pen 10 Units  10 Units SubCUTAneous QAM Ressie Payton Spritzer, DO   10 Units at 12/15/21 0848    0.9 % sodium chloride infusion   IntraVENous PRN Ressie Payton Spritzer, DO        heparin (porcine) injection 5,000 Units  5,000 Units SubCUTAneous 3 times per day Ressie Payton Franciscoitzer, DO   5,000 Units at 12/16/21 0554    alteplase (CATHFLO) injection 1 mg  1 mg IntraCATHeter PRN Ressie Payton Garcia, DO   1 mg at 12/11/21 1844    insulin lispro (1 Unit Dial) 0-6 Units  0-6 Units SubCUTAneous TID  Ressie Payton Franciscoitzer, DO   2 Units at 12/16/21 1545    insulin lispro (1 Unit Dial) 0-3 Units  0-3 Units SubCUTAneous Nightly Ressie Payton Franciscoitzer, DO   1 Units at 12/15/21 2203    docusate sodium (COLACE) capsule 100 mg  100 mg Oral BID Ressie Payton Franciscoitzer, DO   100 mg at 12/16/21 5733    polyethylene glycol (GLYCOLAX) packet 17 g  17 g Oral Daily Ressie Payton Franciscoitzer, DO   17 g at 12/15/21 0855    ipratropium-albuterol (DUONEB) nebulizer solution 1 ampule  1 ampule Inhalation Q4H PRN Ressie Payton Franciscoitzer, DO   1 ampule at 12/10/21 1202    dextrose 50 % IV solution  12.5 g IntraVENous PRN Ressie Payton Spritzer, DO        amLODIPine (NORVASC) tablet 5 mg  5 mg Oral Daily Ressie Payton Spritzer, DO   5 mg at 12/16/21 8337    hydrALAZINE (APRESOLINE) injection 10 mg  10 mg IntraVENous Q6H PRN Ressie Payton Spritzer,         ampicillin-sulbactam (UNASYN) 3000 mg ivpb minibag  3,000 mg IntraVENous Q6H Lenny Garcia,    Stopped at 12/16/21 7919    oxyCODONE (ROXICODONE) immediate release tablet 5 mg  5 mg Oral Q4H PRN Lenny Garcia DO   5 mg at 12/13/21 2259    Or    oxyCODONE (ROXICODONE) immediate release tablet 10 mg  10 mg Oral Q4H PRN Bettejane Epp Spritzer, DO   10 mg at 12/16/21 0025    acetaminophen (TYLENOL) tablet 1,000 mg  1,000 mg Oral Q6H Bettejane Epp Spritzer, DO   1,000 mg at 12/16/21 9131    sodium chloride flush 0.9 % injection 5-40 mL  5-40 mL IntraVENous 2 times per day Bettejane Epp Spritzer, DO   10 mL at 12/15/21 2155    sodium chloride flush 0.9 % injection 5-40 mL  5-40 mL IntraVENous PRN Bettejane Epp Spritzer, DO        0.9 % sodium chloride infusion  25 mL IntraVENous PRN Bettejane Epp Spritzer,  mL/hr at 12/12/21 0850 25 mL at 12/12/21 0850    glucose (GLUTOSE) 40 % oral gel 15 g  15 g Oral PRN Bettejane Epp Spritzer, DO        dextrose 50 % IV solution  12.5 g IntraVENous PRN Bettejane Epp Spritzer, DO        glucagon (rDNA) injection 1 mg  1 mg IntraMUSCular PRN Bettejane Epp Spritzer, DO        dextrose 5 % solution  100 mL/hr IntraVENous PRN Bettejane Epp Spritzer, DO           Allergies:  No Known Allergies    Problem List:    Patient Active Problem List   Diagnosis Code    Diabetic acidosis without coma (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 [facility] M72.6    Necrotizing soft tissue infection M79.89       Past Medical History:  History reviewed. No pertinent past medical history.     Past Surgical History:        Procedure Laterality Date    ABDOMEN SURGERY N/A 12/1/2021    WIDE EXCISION PERINEUM, BACK, ABDOMINAL WALL performed by Hieu Rushing MD at [address] Left 12/3/2021    ABDOMINAL WALL AND LEFT BUTTOCK DEBRIDEMENT, WOUND VAC PLACEMENT performed by Domenica Costa MD at [address] Left 12/5/2021    ABDOMINAL WALL AND LEFT BUTTOCK DEBRIDEMENT, WOUND VAC PLACEMENT performed by Domenica Costa MD at [address] N/A 12/7/2021    EVALUATION UNDER ANESTHESIA WITH DEBRIDEMENT ABDOMINAL WOUND AND LEFT BUTTOCK, WOUND VAC CHANGE performed by Pascale Logan MD at [address] Left 12/10/2021    ABDOMINAL WALL AND LEFT BUTTOCK WOUND VAC CHANGE WITH FURTHER DEBRIDEMENT ABDOMEN AND LEFT BUTTOCK WOUND performed by Pascale Logan MD at [address] [facility] N/A 12/13/2021    WOUND VAC CHANGE ABDOMEN AND LEFT BUTTOCK performed by Pascale Logan MD at [address] Left 12/7/2021    LAPAROSCOPIC COLOSTOMY CREATION performed by Axel Clifford MD at [address] N/A 11/30/2021    DEBRIDEMENT OF SCROTAL JAYRO'S GANGRENE performed by Katty Monk MD at [address] N/A 11/30/2021    PERINEAL SOFT TISSUE EXCISIONAL DEBRIDEMENT performed by Shaun Watkins MD at [place] [address] History:    Social History     Tobacco Use    Smoking status: Current Every Day Smoker    Smokeless tobacco: Never Used   Substance Use Topics    Alcohol use:  Yes     Alcohol/week: 0.0 standard drinks     Comment: social                                 Ready to quit: Not Answered  Counseling given: Not Answered      Vital Signs (Current):   Vitals:    12/16/21 0346 12/16/21 0556 12/16/21 0910 12/16/21 1038   BP: (!) 144/75  (!) 157/78 123/66   Pulse: 85  83 84   Resp: 17  17 17   Temp: 98.2 °F (36.8 °C)  98.2 °F (36.8 °C) 98.2 °F (36.8 °C)   TempSrc: Oral  Oral Oral   SpO2: 94%  95% 95%   Weight:  202 lb 2.6 oz (91.7 kg)     Height:                                                  BP Readings from Last 3 Encounters:   12/16/21 123/66   12/13/21 (!) 181/81   12/10/21 96/62       NPO Status: Time of last liquid consumption: 0948                        Time of last solid consumption: 1800                        Date of last liquid consumption: 12/13/21                        Date of last solid food consumption: 12/13/21    BMI:   Wt Readings from Last 3 Encounters:   12/16/21 202 lb 2.6 oz (91.7 kg)   11/30/21 163 lb 3.2 oz (74 kg)   04/18/16 179 lb 14.3 oz (81.6 kg)     Body mass index is 28.2 kg/m².     CBC:   Lab Results   Component Value Date    WBC 12.2 12/16/2021    RBC 2.76 12/16/2021    HGB 8.4 12/16/2021    HCT 25.2 12/16/2021    MCV 91.2 12/16/2021    RDW 15.4 12/16/2021     12/16/2021       CMP:   Lab Results   Component Value Date     12/16/2021    K 4.5 12/16/2021    K 3.8 12/12/2021     12/16/2021    CO2 29 12/16/2021    BUN 13 12/16/2021    CREATININE 0.6 12/16/2021    GFRAA >60 12/16/2021    AGRATIO 0.7 11/30/2021    LABGLOM >60 12/16/2021    GLUCOSE 211 12/16/2021    PROT 4.8 12/10/2021    CALCIUM 7.9 12/16/2021    BILITOT 0.3 12/10/2021    ALKPHOS 170 12/10/2021    AST 13 12/10/2021    ALT 7 12/10/2021       POC Tests:   Recent Labs     12/16/21  0743   POCGLU 211*       Coags:   Lab Results   Component Value Date    PROTIME 12.4 12/07/2021    INR 1.09 12/07/2021       HCG (If Applicable): No results found for: PREGTESTUR, PREGSERUM, HCG, HCGQUANT     ABGs:   Lab Results   Component Value Date    PHART 7.227 12/09/2021    PO2ART 107.8 12/09/2021    KUX3CTQ 42.4 12/09/2021    QNJ3ZKH 17.6 12/09/2021    BEART -10 12/09/2021    [id number] 97 12/09/2021        Type & Screen (If Applicable):  No results found for: LABABO, LABRH    Drug/Infectious Status (If Applicable):  No results found for: HIV, HEPCAB    COVID-19 Screening (If Applicable):   Lab Results   Component Value Date    COVID19 Not Detected 11/30/2021           Anesthesia Evaluation  Patient summary reviewed and Nursing notes reviewed no history of anesthetic complications:   Airway: Mallampati: II  TM distance: >3 FB   Neck ROM: full  Mouth opening: > = 3 FB Dental:          Pulmonary:Negative Pulmonary ROS                              Cardiovascular:Negative CV ROS                      Neuro/Psych:   Negative Neuro/Psych ROS              GI/Hepatic/Renal: Neg GI/Hepatic/Renal ROS            Endo/Other:    (+) DiabetesType II DM, , .                 Abdominal:

## 2021-12-16 NOTE — PROGRESS NOTES
Pt admitted to pacu s/p 2ND LOOK/ EVALUATION UNDER ANESTHESIA/ WOUND VAC CHANGE ABDOMINAL WALL AND PERINEUM, PO 90% - O2 up to 6L.  Pt responds to verbal stimuli, abd/perineal wound vacs in place- drsg CDI, no c/o pain

## 2021-12-16 NOTE — BRIEF OP NOTE
Brief Postoperative Note      Patient: Marianela Asher  YOB: 1955  MRN: [id number]    Date of Procedure: 12/16/2021    Pre-Op Diagnosis: NECROTIZING FASCIITIS LEFT BUTTOCK AND ABDOMEN    Post-Op Diagnosis: Same       Procedure(s):  2ND LOOK/ EVALUATION UNDER ANESTHESIA/ WOUND VAC CHANGE ABDOMINAL WALL AND PERINEUM    Surgeon(s):  MD Compa Carias MD    Assistant:  Surgical Assistant: Jetty Nims Holter; Dillon Pedro  Resident: Monica Flor DO    Anesthesia: General    Estimated Blood Loss (mL): 2    Complications: None    Specimens:   * No specimens in log *    Implants:  * No implants in log *      Drains:   Negative Pressure Wound Therapy Abdomen Lower; Medial (Active)       Negative Pressure Wound Therapy Perineum (Active)       Colostomy LUQ (Active)   Stomal Appliance 2 piece; Clean; Dry; Intact 12/16/21 0930   Flange Size (inches) 2 Inches 12/15/21 1420   Stoma  Assessment Moist; Pink 12/16/21 0930   Mucocutaneous Junction Intact 12/16/21 0930   Peristomal Assessment Intact 12/16/21 0930   Treatment Bag change;  Liquid skin barrier 12/10/21 0931   Stool Appearance Loose 12/15/21 1420   Stool Color Brown 12/16/21 1029   Stool Amount Small 12/16/21 1029   Output (mL) 100 ml 12/16/21 1029       Urethral Catheter Non-latex 18 fr (Active)   Catheter Indications Assist in healing of open sacral or perineal wounds (Stage III, IV, or unstageable documented by a provider or enterostomal therapy) and/or full thickness perineal and lower extremity burns in incontinent patients 12/16/21 0930   Securement Device Date Changed 12/13/21 12/13/21 1659   Site Assessment No urethral drainage 12/16/21 0930   Urine Color Jazmin 12/16/21 0930   Urine Appearance Clear 12/16/21 0930   Urine Odor Other (Comment) 12/11/21 2315   Output (mL) 950 mL 12/16/21 0930       [REMOVED] Negative Pressure Wound Therapy Abdomen Mid (Removed)   Wound Type Surgical 12/07/21 1600   Dressing Type Black foam 12/07/21 1600 Cycle Continuous 12/07/21 1600   Target Pressure (mmHg) 125 12/07/21 1600   Irrigation Solution Dakins 0.125% 12/06/21 0509   Canister changed? Yes 12/07/21 1600   Dressing Status Clean; Dry; Intact 12/07/21 1600   Dressing Changed Changed/New 12/06/21 0400   Output (ml) 200 ml 12/07/21 1600   Odor None 12/07/21 1600       [REMOVED] Negative Pressure Wound Therapy Groin Lower; Medial (Removed)   Wound Type Surgical 12/16/21 0930   Unit Type VAC Ulta 12/16/21 0930   Dressing Type Black foam 12/16/21 0930   Number of pieces used 2 12/13/21 1558   Cycle Continuous 12/16/21 0930   Target Pressure (mmHg) 125 12/16/21 0930   Irrigation Solution Sodium chloride 0.9% 12/16/21 0930   Instillation Volume  850 mL 12/15/21 0425   Canister changed? Yes 12/16/21 0056   Dressing Status Clean; Dry; Intact 12/16/21 0930   Dressing Changed Dressing reinforced 12/16/21 0930   Drainage Amount None 12/13/21 1659   Drainage Description Serous 12/12/21 0820   Output (ml) 900 ml 12/16/21 0056   Wound Assessment Other (Comment) 12/14/21 2039   Princess-wound Assessment Other (Comment) 12/13/21 1659   Odor None 12/13/21 0754       [REMOVED] NG/OG/NJ/NE Tube Orogastric 16 fr Center mouth (Removed)   Surrounding Skin Dry; Intact 12/01/21 2200   Securement device Yes 12/02/21 0800   Status Clamped 12/02/21 0800   Placement Verified by External Catheter Length 12/02/21 0800   NG/OG/NJ/NE External Measurement (cm) 50 cm 12/02/21 0800   Drainage Appearance Bile 12/01/21 2200   Tube Feeding Intake (mL) 0 ml 12/01/21 1800   Free Water Flush (mL) 0 mL 12/01/21 1800   Free Water Rate 0 12/01/21 0800   Residual Volume (ml) 0 ml 12/01/21 0000       [REMOVED] NG/OG/NJ/NE Tube Left nostril (Removed)       [REMOVED] NG/OG/NJ/NE Tube Orogastric 16 fr Center mouth (Removed)       Findings: Wound base improving. Veraflo vac replaced. Suction trac pad to buttock and abdomen. Good Seal before and after soak cycle.      Electronically signed by Beata Jansen DO on 12/16/2021 at 1:39 PM

## 2021-12-16 NOTE — PROGRESS NOTES
General Surgery   Daily Progress Note  Patient: Sandra Duffy      CC: Shayla's gangrene    SUBJECTIVE:   Patient rested well overnight. He remains afebrile and HDS. Tolerating zero carb diet. Denies nausea/ vomiting. States pain is well-controlled. Wound vac with intermittent positional leaks, but able to be patched. ROS:   A 14 point review of systems was conducted, significant findings as noted above. All other systems negative. OBJECTIVE:    PHYSICAL EXAM:    Vitals:    12/15/21 2051 12/16/21 0029 12/16/21 0346 12/16/21 0556   BP: (!) 145/85 (!) 148/80 (!) 144/75    Pulse: 93 85 85    Resp: 18 17 17    Temp: 98.2 °F (36.8 °C) 98 °F (36.7 °C) 98.2 °F (36.8 °C)    TempSrc: Oral Oral Oral    SpO2: 90% 93% 94%    Weight:    202 lb 2.6 oz (91.7 kg)   Height:           General appearance: alert, no acute distress, grooming appropriate  Eyes: No scleral icterus, EOM grossly intact  Neck: trachea midline, no JVD, no lymphadenopathy, neck supple  Chest/Lungs: Equal excursion bilaterally, normal effort with no accessory muscle use on 2L NC  Cardiovascular: RRR, brisk capillary refill  Abdomen:  Soft, large surgical debridement area of the abdomen with veraflo Vac in place holding adequate suction and instilling normal saline. Colostomy is pink, viable and with brown stool in the bag. Skin: warm and dry, no rashes  Extremities: no edema, no cyanosis  Genitourinary: Mcfadden in place with clear yellow urine  Neuro: A&Ox3, no focal deficits, sensation intact    LABS:   Recent Labs     12/15/21  0430 12/16/21  0430   WBC 11.3* 12.2*   HGB 8.3* 8.4*   HCT 24.8* 25.2*   MCV 90.2 91.2   * 469*        Recent Labs     12/14/21  0553 12/15/21  0430   * 136   K 5.0 4.4    101   CO2 29 29   PHOS 3.2 2.3*   BUN 12 12   CREATININE 0.7* 0.7*        No results for input(s): AST, ALT, ALB, BILIDIR, BILITOT, ALKPHOS in the last 72 hours. No results for input(s): LIPASE, AMYLASE in the last 72 hours.      No results for input(s): PROT, INR, APTT in the last 72 hours. No results for input(s): CKTOTAL, CKMB, CKMBINDEX, TROPONINI in the last 72 hours. ASSESSMENT & PLAN:   Anisha Perez is a 63 y.o. male with Necrotizing fasciitis of the abdominal wall, gluteal region, and scrotum s/p wide excision of perineum, back, and abdominal wall. S/p multiple debridements and wound vac placement and changes intraoperatively with diverting colostomy creation (12/7). OR wound vac change (12/10). S/p 1 unit pRBCs (12/12). OR wound vac change (12/13).      - Patient to go to OR today with Dr. Maite Negrete for wound vac change  - patient NPO in anticipation of procedure today  - will continue to follow    Hernesto Rice DO  PGY1, General Surgery  12/16/21  6:19 AM  [phone]

## 2021-12-17 LAB
ALBUMIN SERPL-MCNC: 2.3 G/DL (ref 3.4–5)
ANION GAP SERPL CALCULATED.3IONS-SCNC: 6 MMOL/L (ref 3–16)
BASOPHILS ABSOLUTE: 0.1 K/UL (ref 0–0.2)
BASOPHILS RELATIVE PERCENT: 1.3 %
BUN BLDV-MCNC: 14 MG/DL (ref 7–20)
CALCIUM SERPL-MCNC: 7.7 MG/DL (ref 8.3–10.6)
CHLORIDE BLD-SCNC: 102 MMOL/L (ref 99–110)
CO2: 30 MMOL/L (ref 21–32)
CREAT SERPL-MCNC: 0.6 MG/DL (ref 0.8–1.3)
EOSINOPHILS ABSOLUTE: 0.2 K/UL (ref 0–0.6)
EOSINOPHILS RELATIVE PERCENT: 1.9 %
GFR AFRICAN AMERICAN: >60
GFR NON-AFRICAN AMERICAN: >60
GLUCOSE BLD-MCNC: 122 MG/DL (ref 70–99)
GLUCOSE BLD-MCNC: 138 MG/DL (ref 70–99)
GLUCOSE BLD-MCNC: 148 MG/DL (ref 70–99)
GLUCOSE BLD-MCNC: 151 MG/DL (ref 70–99)
GLUCOSE BLD-MCNC: 170 MG/DL (ref 70–99)
HCT VFR BLD CALC: 24.1 % (ref 40.5–52.5)
HEMOGLOBIN: 8.1 G/DL (ref 13.5–17.5)
LYMPHOCYTES ABSOLUTE: 1.2 K/UL (ref 1–5.1)
LYMPHOCYTES RELATIVE PERCENT: 14.5 %
MAGNESIUM: 1.9 MG/DL (ref 1.8–2.4)
MCH RBC QN AUTO: 31.1 PG (ref 26–34)
MCHC RBC AUTO-ENTMCNC: 33.5 G/DL (ref 31–36)
MCV RBC AUTO: 92.6 FL (ref 80–100)
MONOCYTES ABSOLUTE: 0.6 K/UL (ref 0–1.3)
MONOCYTES RELATIVE PERCENT: 6.7 %
NEUTROPHILS ABSOLUTE: 6.4 K/UL (ref 1.7–7.7)
NEUTROPHILS RELATIVE PERCENT: 75.6 %
PDW BLD-RTO: 15.7 % (ref 12.4–15.4)
PERFORMED ON: ABNORMAL
PHOSPHORUS: 2.9 MG/DL (ref 2.5–4.9)
PLATELET # BLD: 414 K/UL (ref 135–450)
PMV BLD AUTO: 8 FL (ref 5–10.5)
POTASSIUM SERPL-SCNC: 4.5 MMOL/L (ref 3.5–5.1)
RBC # BLD: 2.6 M/UL (ref 4.2–5.9)
SODIUM BLD-SCNC: 138 MMOL/L (ref 136–145)
WBC # BLD: 8.5 K/UL (ref 4–11)

## 2021-12-17 PROCEDURE — 80069 RENAL FUNCTION PANEL: CPT

## 2021-12-17 PROCEDURE — 6360000002 HC RX W HCPCS: Performed by: STUDENT IN AN ORGANIZED HEALTH CARE EDUCATION/TRAINING PROGRAM

## 2021-12-17 PROCEDURE — 85025 COMPLETE CBC W/AUTO DIFF WBC: CPT

## 2021-12-17 PROCEDURE — 6370000000 HC RX 637 (ALT 250 FOR IP): Performed by: STUDENT IN AN ORGANIZED HEALTH CARE EDUCATION/TRAINING PROGRAM

## 2021-12-17 PROCEDURE — 97530 THERAPEUTIC ACTIVITIES: CPT

## 2021-12-17 PROCEDURE — 97110 THERAPEUTIC EXERCISES: CPT

## 2021-12-17 PROCEDURE — 83735 ASSAY OF MAGNESIUM: CPT

## 2021-12-17 PROCEDURE — 2580000003 HC RX 258: Performed by: STUDENT IN AN ORGANIZED HEALTH CARE EDUCATION/TRAINING PROGRAM

## 2021-12-17 PROCEDURE — 94761 N-INVAS EAR/PLS OXIMETRY MLT: CPT

## 2021-12-17 PROCEDURE — 1200000000 HC SEMI PRIVATE

## 2021-12-17 PROCEDURE — 99232 SBSQ HOSP IP/OBS MODERATE 35: CPT | Performed by: INTERNAL MEDICINE

## 2021-12-17 PROCEDURE — 2700000000 HC OXYGEN THERAPY PER DAY

## 2021-12-17 RX ORDER — 0.9 % SODIUM CHLORIDE 0.9 %
500 INTRAVENOUS SOLUTION INTRAVENOUS ONCE
Status: COMPLETED | OUTPATIENT
Start: 2021-12-17 | End: 2021-12-17

## 2021-12-17 RX ORDER — MAGNESIUM SULFATE IN WATER 40 MG/ML
2000 INJECTION, SOLUTION INTRAVENOUS ONCE
Status: COMPLETED | OUTPATIENT
Start: 2021-12-17 | End: 2021-12-17

## 2021-12-17 RX ADMIN — POLYETHYLENE GLYCOL 3350 17 G: 17 POWDER, FOR SOLUTION ORAL at 08:42

## 2021-12-17 RX ADMIN — HEPARIN SODIUM 5000 UNITS: 5000 INJECTION INTRAVENOUS; SUBCUTANEOUS at 06:18

## 2021-12-17 RX ADMIN — ACETAMINOPHEN 1000 MG: 500 TABLET ORAL at 04:55

## 2021-12-17 RX ADMIN — SODIUM CHLORIDE, PRESERVATIVE FREE 10 ML: 5 INJECTION INTRAVENOUS at 20:43

## 2021-12-17 RX ADMIN — DOCUSATE SODIUM 100 MG: 100 CAPSULE, LIQUID FILLED ORAL at 08:42

## 2021-12-17 RX ADMIN — DOCUSATE SODIUM 100 MG: 100 CAPSULE, LIQUID FILLED ORAL at 20:39

## 2021-12-17 RX ADMIN — INSULIN LISPRO 1 UNITS: 100 INJECTION, SOLUTION INTRAVENOUS; SUBCUTANEOUS at 20:43

## 2021-12-17 RX ADMIN — HEPARIN SODIUM 5000 UNITS: 5000 INJECTION INTRAVENOUS; SUBCUTANEOUS at 23:15

## 2021-12-17 RX ADMIN — ACETAMINOPHEN 1000 MG: 500 TABLET ORAL at 23:48

## 2021-12-17 RX ADMIN — AMLODIPINE BESYLATE 5 MG: 5 TABLET ORAL at 08:42

## 2021-12-17 RX ADMIN — AMPICILLIN SODIUM AND SULBACTAM SODIUM 3000 MG: 2; 1 INJECTION, POWDER, FOR SOLUTION INTRAMUSCULAR; INTRAVENOUS at 15:15

## 2021-12-17 RX ADMIN — INSULIN LISPRO 4 UNITS: 100 INJECTION, SOLUTION INTRAVENOUS; SUBCUTANEOUS at 08:50

## 2021-12-17 RX ADMIN — SODIUM CHLORIDE 500 ML: 9 INJECTION, SOLUTION INTRAVENOUS at 04:52

## 2021-12-17 RX ADMIN — INSULIN LISPRO 4 UNITS: 100 INJECTION, SOLUTION INTRAVENOUS; SUBCUTANEOUS at 18:06

## 2021-12-17 RX ADMIN — INSULIN LISPRO 4 UNITS: 100 INJECTION, SOLUTION INTRAVENOUS; SUBCUTANEOUS at 12:39

## 2021-12-17 RX ADMIN — AMPICILLIN SODIUM AND SULBACTAM SODIUM 3000 MG: 2; 1 INJECTION, POWDER, FOR SOLUTION INTRAMUSCULAR; INTRAVENOUS at 08:44

## 2021-12-17 RX ADMIN — AMPICILLIN SODIUM AND SULBACTAM SODIUM 3000 MG: 2; 1 INJECTION, POWDER, FOR SOLUTION INTRAMUSCULAR; INTRAVENOUS at 03:38

## 2021-12-17 RX ADMIN — INSULIN LISPRO 1 UNITS: 100 INJECTION, SOLUTION INTRAVENOUS; SUBCUTANEOUS at 08:50

## 2021-12-17 RX ADMIN — SODIUM CHLORIDE, PRESERVATIVE FREE 10 ML: 5 INJECTION INTRAVENOUS at 08:45

## 2021-12-17 RX ADMIN — MAGNESIUM SULFATE HEPTAHYDRATE 2000 MG: 2 INJECTION, SOLUTION INTRAVENOUS at 12:45

## 2021-12-17 RX ADMIN — AMPICILLIN SODIUM AND SULBACTAM SODIUM 3000 MG: 2; 1 INJECTION, POWDER, FOR SOLUTION INTRAMUSCULAR; INTRAVENOUS at 20:45

## 2021-12-17 RX ADMIN — INSULIN LISPRO 1 UNITS: 100 INJECTION, SOLUTION INTRAVENOUS; SUBCUTANEOUS at 18:06

## 2021-12-17 RX ADMIN — HEPARIN SODIUM 5000 UNITS: 5000 INJECTION INTRAVENOUS; SUBCUTANEOUS at 15:13

## 2021-12-17 ASSESSMENT — PAIN DESCRIPTION - ORIENTATION: ORIENTATION: MID

## 2021-12-17 ASSESSMENT — PAIN SCALES - GENERAL
PAINLEVEL_OUTOF10: 0
PAINLEVEL_OUTOF10: 2
PAINLEVEL_OUTOF10: 4
PAINLEVEL_OUTOF10: 3
PAINLEVEL_OUTOF10: 3

## 2021-12-17 ASSESSMENT — PAIN DESCRIPTION - LOCATION: LOCATION: BUTTOCKS

## 2021-12-17 ASSESSMENT — PAIN DESCRIPTION - PAIN TYPE: TYPE: SURGICAL PAIN

## 2021-12-17 NOTE — PROGRESS NOTES
Patient alert and oriented. VSS Patient c/o  Pain,Oxycodone given. CDI surgical site,wound vac in place. Colostomy to LQ. Mcfadden in place. Patient had low urine output, 500 ml bolus ordered and is infusing right now. Patient in bed lowest position call light and bedside table within reach. All needs are met at this time. Patient aware to call if any help needed. Will continue to monitor  /68   Pulse 80   Temp 97.8 °F (36.6 °C) (Oral)   Resp 18   Ht 5' 11\" (1.803 m)   Wt 202 lb 2.6 oz (91.7 kg)   SpO2 97%   BMI 28.20 kg/m²

## 2021-12-17 NOTE — PROGRESS NOTES
General Surgery   Daily Progress Note  Patient: Jack Bernard      CC: Shayla's gangrene    SUBJECTIVE:   No acute issues overnight. Denies any pain this morning. Afebrile, HDS. Wound vac with good seal overnight. Tolerating diet. Ate 100% of dinner. ROS:   A 14 point review of systems was conducted, significant findings as noted above. All other systems negative. OBJECTIVE:    PHYSICAL EXAM:    Vitals:    12/16/21 1532 12/16/21 1940 12/16/21 2317 12/17/21 0320   BP: 133/75 125/72 110/68 131/75   Pulse: 80 83 79 80   Resp:   18 18   Temp: 97.6 °F (36.4 °C) 97.7 °F (36.5 °C) 97.9 °F (36.6 °C) 97.8 °F (36.6 °C)   TempSrc: Oral Oral Oral Oral   SpO2: 98% 97% 91% 97%   Weight:       Height:           General appearance: alert, no acute distress, grooming appropriate  Eyes: No scleral icterus, EOM grossly intact  Neck: trachea midline, no JVD, no lymphadenopathy, neck supple  Chest/Lungs: Equal excursion bilaterally, normal effort with no accessory muscle use on 4L NC  Cardiovascular: RRR, brisk capillary refill  Abdomen:  Soft, large surgical debridement area of the abdomen with veraflo Vac in place holding adequate suction and instilling normal saline. Colostomy is pink, viable and with brown stool in the bag. Skin: warm and dry, no rashes  Extremities: no edema, no cyanosis  Genitourinary: Mcfadden in place with clear yellow urine, strip paste around penis without edema   Neuro: A&Ox3, no focal deficits, sensation intact    LABS:   Recent Labs     12/15/21  0430 12/16/21  0430   WBC 11.3* 12.2*   HGB 8.3* 8.4*   HCT 24.8* 25.2*   MCV 90.2 91.2   * 469*        Recent Labs     12/15/21  0430 12/16/21  0538    138   K 4.4 4.5    101   CO2 29 29   PHOS 2.3* 3.0   BUN 12 13   CREATININE 0.7* 0.6*        No results for input(s): AST, ALT, ALB, BILIDIR, BILITOT, ALKPHOS in the last 72 hours. No results for input(s): LIPASE, AMYLASE in the last 72 hours.      No results for input(s): PROT, INR, APTT in the last 72 hours. No results for input(s): CKTOTAL, CKMB, CKMBINDEX, TROPONINI in the last 72 hours. ASSESSMENT & PLAN:   Mohamud Verdugo is a 63 y.o. male with Necrotizing fasciitis of the abdominal wall, gluteal region, and scrotum s/p wide excision of perineum, back, and abdominal wall. S/p multiple debridements and wound vac placement and changes intraoperatively with diverting colostomy creation (12/7). OR wound vac change (12/10). S/p 1 unit pRBCs (12/12). OR wound vac change (12/13). OR wound vac change (12/16).      - Continue wound vac, please insure all tubing is padding to prevent pressure injury to the patient's legs  - Continue zero carb diet, encourage protein intake  - Plan for OR early next week  - Continue antibiotics per ID  - will continue to follow    Tyree Erickson DO  PGY1, General Surgery  12/17/21  6:36 AM  [phone]

## 2021-12-17 NOTE — PROGRESS NOTES
Podiatric Surgery Daily Progress Note      Admit Date: 12/1/2021      Code:Full Code    Patient seen and examined, labs and records reviewed    Subjective:     Patient seen at bedside this a.m. Patient denies pain to bilateral lower extremity. Patient denies fever, chills, shortness of breath, chest pain. Patient denies any acute events overnight. Review of Systems: A 10 point review of systems was conducted, significant findings as noted in HPI. All other systems negative. Objective     /75   Pulse 80   Temp 97.8 °F (36.6 °C) (Oral)   Resp 18   Ht 5' 11\" (1.803 m)   Wt 199 lb 1.2 oz (90.3 kg)   SpO2 97%   BMI 27.77 kg/m²      I/O:    Intake/Output Summary (Last 24 hours) at 12/17/2021 0813  Last data filed at 12/17/2021 3963  Gross per 24 hour   Intake 1999 ml   Output 2275 ml   Net -276 ml              Wt Readings from Last 3 Encounters:   12/17/21 199 lb 1.2 oz (90.3 kg)   11/30/21 163 lb 3.2 oz (74 kg)   04/18/16 179 lb 14.3 oz (81.6 kg)       LABS:    Recent Labs     12/16/21  0430 12/17/21  0646   WBC 12.2* 8.5   HGB 8.4* 8.1*   HCT 25.2* 24.1*   * 414        Recent Labs     12/17/21  0620      K 4.5      CO2 30   PHOS 2.9   BUN 14   CREATININE 0.6*        No results for input(s): PROT, INR, APTT in the last 72 hours. LOWER EXTREMITY EXAMINATION    Dressing to left LE intact. No strikethrough noted to the external dressing. No drainage noted to the internal layers of the dressing. VASCULAR:   DP and PT pulses are non-palpable b/l. Upon hand-held Doppler examination DP signals were noted to be monophasic and PT signals were noted to be nondopplerable. CFT slightly diminished to the distal digits of bilateral lower extremities. Skin temperature is warm to lukewarm to cool to the distal digits from proximal to distal.  No edema noted. No pain with calf compression b/l.     NEUROLOGIC:   Gross and epicritic sensation is diminished b/l.  Protective sensation is diminished at all pedal sites b/l.     DERMATOLOGIC:     Left lower extremity:  Full-thickness ulceration noted to the lateral aspect of the left foot. Wound measures approximately 3.2 cm x 2 cm x 0.5 cm. Wound base exposed bone, with necrotic edges at the proximal and distal edges. Wound probes to bone with exposed fifth metatarsal.  No tracking or tunneling noted. No purulent drainage noted. No fluctuance or crepitus or malodor noted. Maroon and black discoloration noted distal tip of left fifth digit extending proximally. Wound is stable. Anterior aspect of ankle joint left foot. Deep tissue injury noted 2/2 Prevalon boot strap. Intact epithelialized tissue noted. No crepitus, erythema, drainage, or malodor noted. No open wound noted. Stable without signs of acute infection noted. Right lower extremity  Lateral malleolus deep tissue injury. Purple circumferential discoloration noted. No open wound is noted. No fluctuance, crepitus, malodor, or drainage noted. No acute signs infection noted. Deep tissue injury noted at the styloid process of the 5th metatarsal. Purple circumferential discoloration noted. No open wound is noted. No fluctuance, crepitus, malodor, or drainage noted. No acute signs of infection noted. Pressure injury with red/purple discoloration noted to the dorsal aspect of the midfoot, right lower extremity. No open wound. No fluctuance, crepitus, malodor, or drainage noted. No acute signs of infection noted. MUSCULOSKELETAL:   Muscle strength 4/5 for all pedal muscle groups B/L LE. No pain with palpation of the left foot.      IMAGING:  XR LEFT FOOT 11/30/2021  Narrative   EXAMINATION:   THREE XRAY VIEWS OF THE LEFT FOOT       11/30/2021 1:44 pm       COMPARISON:   None.       HISTORY:   ORDERING SYSTEM PROVIDED HISTORY: wound   TECHNOLOGIST PROVIDED HISTORY:   Reason for exam:->wound   Reason for Exam: blood sugar problem, wound check on lateral portion of foot   Acuity: Acute Type of Exam: Initial       FINDINGS:   Osseous destruction along the articular margins of the 5th   metatarsophalangeal joint with associated lucency in the soft tissues. .   There is no evidence of fracture or dislocation. . The remaining joint spaces   appear well maintained. The remaining soft tissues are unremarkable.           Impression   Evidence of a septic joint involving the 5th metatarsal phalangeal joint with   associated osteomyelitis         ARTERIAL DUPLEX 12/2/21     Type of Study:        Extremities Arteries:Lower Extremities Arterial Duplex, VL LOWER EXTREMITY    ARTERIES DUPLEX BILATERAL.       Tech Comments   Right   The right ankle/brachial index is 0.0 (no Doppler signal could be heard at the   [facility],  or the PT). The common femoral and profunda femoral arteries could not be visualized due   to bandages extending into the groin area. The mid superficial femoral artery has a short segmental occlusion and then is   reconstituted. Elevated velocities at the distal superficial femoral artery indicate a > 50%   stenosis by velocity criteria (velocity went from 15 to 298 cm/s). The posterior tibial artery is occluded. The distal anterior tibial artery is barely patent, with very low flow   (possibly occluded and then reconstituted). Left   The left ankle brachial index is 0 for the PT and the DP was not accessible   due to the bandages on the foot. The common femoral and profunda femoral artery could not be visualized due to   bandages extending into the groin area. The distal superficial femoral artery is occluded and then reconstituted. The posterior tibial artery, peroneal, and anterior tibial artery are   occluded. There were no previous studies to use for comparison.        ASSESSMENT/PLAN:   -Full-thickness ulceration; lateral left foot-Sands stage III  -Osteomyelitis of the fifth metatarsal; left foot  -Deep tissue injury, lateral malleolus right ankle  -Deep tissue injury, 5th metatarsal base, right foot  -Pressure injury, dorsal right midfoot -NPUAP Stage I  -Critical limb ischemia; bilateral lower extremity  -Diabetes mellitus, type II  -History of noncompliance    -Patient seen and examined at bedside this a.m.  -VSS on 4 L of O2 via nasal cannula; No leukocytosis noted (8.5)  -All imaging reviewed see impression above. -CRP 60.2 (12/13/21)  -Prealbumin 9.8 (12/13/21)  -Vascular surgery following; will await further stabilization with the general surgery team and plastic surgery team before offering vascular intervention.  -Plastic surgery following; plan for return to the OR early next week  -Infectious disease following, continue Unasyn and at discharge may not need IV antibiotics or long course of antibiotics. -Left lower extremity dressed with Betadine soaked gauze, DSD, and tape. -Right lower extremity applied Mepilex borders to deep tissue injury.  -Prevalon boots reapplied. Patient is to wear at all times while in bed to prevent further deep tissue injury. Ankle strap removed from bilateral boot as the ankle straps were causing pressure to his feet. -Nonweightbearing to left lower extremity.  -Weightbearing as tolerated to right lower extremity. DISPO: Full-thickness ulceration with underlying osteomyelitis to the left fifth metatarsal. Critical limb ischemia bilateral lower extremity. Wounds are stable at this time. Vascular following; awaiting further stabilization at this time. Plastic surgery, general surgery, and ID following, recs noted above. Patient will require podiatric surgical intervention but timing will depend on medical stability and vascular recommendations. Will continue to closely monitor patient and follow while in house. Patient examined and evaluated with with Dr. Mac Bain DPM.    Michaela Arroyo DPM   Podiatric Resident PGY1  Pager [phone] or [employer]  12/17/2021, 8:13 AM    Patient was seen and evaluated at bedside.   Agree with residents assessment and treatment plan.   Shreyas Maher DPM

## 2021-12-17 NOTE — PROGRESS NOTES
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  Antibiotics: Unasyn    Admit Date: 12/1/2021  Hospital Day: 17    Subjective:     OR yesterday - 12/16, VAC change    Patient reports wound / surg site pain mostly controlled  No other complaint     Objective:     Patient Vitals for the past 8 hrs:   BP Temp Temp src Pulse Resp SpO2 Weight   12/17/21 1115 137/69 97.5 °F (36.4 °C) Oral 93 18 95 % --   12/17/21 0834 (!) 149/73 97.6 °F (36.4 °C) Oral 82 18 96 % --   12/17/21 0826 -- -- -- -- 16 95 % --   12/17/21 0658 -- -- -- -- -- -- 199 lb 1.2 oz (90.3 kg)     I/O last 3 completed shifts: In: 2451.4 [P.O.:660; I.V.:1791.4]  Out: 3610 [Urine:1325; Drains:800; Stool:150]  I/O this shift: In: 5 [I.V.:5]  Out: 250 [Urine:250]    EXAM:  GENERAL: No apparent distress.   RA  HEENT: Membranes moist, no oral lesion  NECK:  Supple, no lymphadenopathy  LUNGS: Clear b/l, no rales, no dullness  CARDIAC: RRR, no murmur appreciated  ABD:  + BS, soft / NT  Wounds - VAC in wound over lower abd, suprapubic region, extending over L scrotum, inguinal fold, posteriorly to buttock  EXT:  No rash, no edema, no lesions  NEURO: No focal neurologic findings  PSYCH: Orientation, sensorium, mood normal  LINES:  R PICC in place       Data Review:  Lab Results   Component Value Date    WBC 8.5 12/17/2021    HGB 8.1 (L) 12/17/2021    HCT 24.1 (L) 12/17/2021    MCV 92.6 12/17/2021     12/17/2021     Lab Results   Component Value Date    CREATININE 0.6 (L) 12/17/2021    BUN 14 12/17/2021     12/17/2021    K 4.5 12/17/2021     12/17/2021    CO2 30 12/17/2021       Hepatic Function Panel:   Lab Results   Component Value Date    ALKPHOS 170 12/10/2021    ALT 7 12/10/2021    AST 13 12/10/2021    PROT 4.8 12/10/2021    BILITOT 0.3 12/10/2021    BILIDIR <0.2 12/10/2021    IBILI see below 12/10/2021    LABALBU 2.3 12/17/2021       MICRO:  11/30 BC no growth   11/30 SARS CoV-2 NAAT neg  11/30 Tissue cult - no growth      Wound cult - light mixed skin pam  12/3 Tissue cult - light C glabrata   Tissue cult - light C glabrata    IMAGIN/30 L foot:   Evidence of a septic joint involving the 5th metatarsal phalangeal joint with   associated osteomyelitis      Abd / Pelvic CT   Impression   1. 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  There is   additional muscle or fascial involvement on the left as above.  Of note, the   testes are incompletely evaluated but appear within normal limits.  Critical   results were called by Dr. Bubba Shelton MD to Dr. Uriel Christine on 2021   at 19:31.   2. Minimal bilateral hydronephrosis is likely due to severe urinary bladder   distention.  Abd / Pelvic CT  Impression   Significant interval improvement and soft tissue emphysema in the visualized left gluteal region and over the anterior abdominal wall since prior study.       Anasarca more prominently of the abdominal wall without suspicious drainable abdominal wall fluid collection.       More prominent presacral strandy fluid without suspicious retroperitoneal emphysema to suggest retroperitoneal infection.       New small amount of ascites throughout the abdomen and pelvis without suspicious organized intraperitoneal fluid collections.       New moderate to large bilateral pleural effusions with associated compressive atelectasis of the lung bases.       New trace pericardial effusion.       Colostomy in the left lower quadrant without obstruction or complicating features.       Interval improvement in bilateral hydronephrosis.        Scheduled Meds:   magnesium sulfate  2,000 mg IntraVENous Once    insulin lispro  4 Units SubCUTAneous TID WC    insulin glargine  10 Units SubCUTAneous QAM    heparin (porcine)  5,000 Units SubCUTAneous 3 times per day    insulin lispro  0-6 Units SubCUTAneous TID     insulin lispro  0-3 Units SubCUTAneous Nightly    docusate sodium  100 mg Oral BID    polyethylene glycol  17 g Oral Daily    amLODIPine  5 mg Oral Daily    ampicillin-sulbactam  3,000 mg IntraVENous Q6H    acetaminophen  1,000 mg Oral Q6H    sodium chloride flush  5-40 mL IntraVENous 2 times per day       Continuous Infusions:   sodium chloride      sodium chloride 25 mL (12/12/21 0850)    dextrose         PRN Meds:  sodium chloride, alteplase, ipratropium-albuterol, dextrose, hydrALAZINE, oxyCODONE **OR** oxyCODONE, sodium chloride flush, sodium chloride, glucose, dextrose, glucagon (rDNA), dextrose      Assessment:     62 yo man with DM - not taking meds     Presented with scrotal swelling and pain  Seen 11/30 at [facility] ED, dx DKA, Shayla's gangrene  CT with gas extending   OR debridement  Rx Vancomycin, Cefepime, Metronidazole     Transfer to [facility] on 12/1. Return to OR 12/2,3,5,7, 10,13,16  Laparoscopic end colostomy 12/7     Reviewed cultures -  11/30 GS GPC, GNDC, GNR, cult neg  12/3,5 light C glabrata.     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   - mixed / polymicrobial infection   - yeast in tissue    Leukocytosis - resolved      Plan:     Cont unsyn for now / until closure   Wound care / reconstruction per Surg / Plastics    Diet (high protein / no carb), BS control    At discharge may not need iv antibiotics / any further antibiotics    Medical Decision Making:   The following items were considered in medical decision making:  Discussion of patient care with other providers  Reviewed clinical lab tests  Reviewed radiology tests  Reviewed other diagnostic tests/interventions  Independent review of radiologic images - reviewed initial and f/u CT with Radiologist 12/8  Microbiology cultures and other micro tests reviewed      Discussed with pt  Call with ID issues over weekend  Linda Mukherjee MD

## 2021-12-17 NOTE — PROGRESS NOTES
Physical Therapy  Facility/Department: [facility]  Daily Treatment Note  NAME: Robert Silva  : 1955  MRN: [id number]    Date of Service: 2021    Discharge Recommendations: Robert Silva scored a 6/24 on the AM-PAC short mobility form. Current research shows that an AM-PAC score of 17 or less is typically not associated with a discharge to the patient's home setting. Based on the patient's AM-PAC score and their current functional mobility deficits, it is recommended that the patient have 3-5 sessions per week of Physical Therapy at d/c to increase the patient's independence. Please see assessment section for further patient specific details. If patient discharges prior to next session this note will serve as a discharge summary. Please see below for the latest assessment towards goals. PT Equipment Recommendations  Other: defer d/c recs to next level of care    Assessment   Assessment: Pt demos improved participation today. Able to sit for longer period of time. Good effort overall. Sit to stand transfers significantly limited by weakness, pain, and L LE NWB. Rec continued IP PT. Treatment Diagnosis: impaired mobility  Prognosis: Good  PT Education: PT Role; Functional Mobility Training  Patient Education: pt demos good understanding  REQUIRES PT FOLLOW UP: Yes     Patient Diagnosis(es): The primary encounter diagnosis was Transient alteration of awareness. A diagnosis of Necrotizing fasciitis ([facility] [address] was also pertinent to this visit. has no past medical history on file. has a past surgical history that includes Ankle fracture surgery; Abdomen surgery (N/A, 2021); Scrotal surgery (N/A, 2021); Vagina surgery (N/A, 2021); Abdomen surgery (Left, 12/3/2021); Abdomen surgery (Left, 2021); colostomy (Left, 2021); Abdomen surgery (N/A, 2021); Abdomen surgery (Left, 12/10/2021);  Abdomen surgery (N/A, 2021); and Abdomen surgery (N/A, 12/16/2021). Restrictions  Position Activity Restriction  Other position/activity restrictions: up as tolerated, NWB L LE, WBAT R LE per podiatry note; per Dr. Kiersten Kruse note 12/14: OOB with PT/OT  Subjective   General  Chart Reviewed: Yes  Additional Pertinent Hx: Gabrielle Allen is a 63 y.o. male with Necrotizing fasciitis of the abdominal wall, gluteal region, and scrotum s/p wide excision of perineum, back, and abdominal wall. returned to the OR for further debridement or right abdominal wall, and placement of testicle within a thigh flap (12/3), followed by minimal abdominal wall and perineum debridement, sutured protective skin flap in groin for right testicle, and placement of a wound VAC (12/5), s/p diverting colostomy and wound vac change (12/7). Plan for OR on 12/10 for further debridement and change of wound vac.  12/13 wound vac change  Subjective  Subjective: Found in bed, agreeable to PT. Wife in room. Pt in good spirits overall. Reports 6/10 pain at surgical site - RN aware. Orientation  Orientation  Overall Orientation Status: Within Normal Limits  Cognition      Objective   Bed mobility  Supine to Sit: Moderate assistance; 2 Person assistance  Sit to Supine: Minimal assistance  Comment: attempted lateral scooting at EOB with pt pulling on isabella stedy bar, cues for L LE NWB - pt unable to scoot           Balance  Comments: pt sat EOB x 25 min with SBA - pt performed R lateral lean onto elbow and supported himself for several minutes in this position. Performed LE ex in sitting. Progressed from B UE support to unilateral support and briefly to no UE support.   Exercises  Comments: x 5 B LAQ and seated marches with extra time and effort                        G-Code     OutComes Score                                                     AM-PAC Score  AM-PAC Inpatient Mobility Raw Score : 6 (12/17/21 1139)  AM-PAC Inpatient T-Scale Score : 23.55 (12/17/21 1139)  Mobility Inpatient CMS 0-100% Score: 100 (12/17/21 1139)  Mobility Inpatient CMS G-Code Modifier : CN (12/17/21 1139)          Goals  Short term goals  Time Frame for Short term goals: dc  Short term goal 1: Demo indep with HEP x 15 reps  ongoing  Short term goal 2: Pt will transfer supine <--> sit with min A x 1   ongoing  Short term goal 3: Pt will transfer sit to stand with mod A x 1 and achieve near upright posture, maintaining L LE NWB   ongoing  Patient Goals   Patient goals : hopes to go home at [address] per week: 2-5  Specific instructions for Next Treatment: -  Current Treatment Recommendations: Strengthening, Balance Training, Functional Mobility Training, Transfer Training, Equipment Evaluation, Education, & procurement, Patient/Caregiver Education & Training, Home Exercise Program  Safety Devices  Type of devices: Call light within reach, Bed alarm in place, Nurse notified, Left in bed     Therapy Time   Individual Concurrent Group Co-treatment   Time In 1035         Time Out 1120         Minutes 45                 Timed Code Treatment Minutes:  45    Total Treatment Minutes:  45    If patient is discharged prior to next treatment, this note will serve as the discharge summary.   Christian Dorsey, PT, DPT  [id number]

## 2021-12-17 NOTE — PLAN OF CARE
Problem: Bowel/Gastric:  Goal: Ability to achieve a regular elimination pattern will improve  Description: Ability to achieve a regular elimination pattern will improve  Outcome: Ongoing  Note: Abdomen soft,bowel sounds present. Ostomy tube in place with low output. Will continue to monitor     Problem: Safety:  Goal: Ability to remain free from injury will improve  Description: Ability to remain free from injury will improve  Outcome: Ongoing  Note: Patient remains free from physical injury. Fall precautions in place: Patient in bed lowest position,wheels locked. 2/4 side rails up Call light and beside table within reach. Will continue to monitor       Problem: Skin Integrity:  Goal: Will show no infection signs and symptoms  Description: Will show no infection signs and symptoms  Outcome: Ongoing  Note: CDI surgical sites, no signs of infection noted. Will continue to monitor     Problem: Infection - Surgical Site:  Goal: Will show no infection signs and symptoms  Description: Will show no infection signs and symptoms  Outcome: Ongoing  Note: CDI surgical sites, no signs of infection noted. Will continue to monitor     Problem: Pain:  Goal: Pain level will decrease  Description: Pain level will decrease  Outcome: Ongoing  Note: Patient c/o pain rated as 7 out of 10 Oxycodone given per protocol. Upon reassessment patient was asleep with RR >10. . Will continue to monitor       Problem: Urinary Elimination:  Goal: Ability to achieve a balanced intake and output will improve  Description: Ability to achieve a balanced intake and output will improve  Outcome: Ongoing  Note: Patient tolerates PO fluids intake. Patient had low urine output, bolus 500ml given over 2 hours.  Will continue to monitor

## 2021-12-17 NOTE — PROGRESS NOTES
Occupational Therapy  Facility/Department: [address] [facility] [address] [facility]  Daily Treatment Note  NAME: Tasneem Sneed  : 1955  MRN: [id number]    Date of Service: 2021    Discharge Recommendations:  Tasneem Sneed scored a 15/24 on the AM-PAC ADL Inpatient form. Current research shows that an AM-PAC score of 17 or less is typically not associated with a discharge to the patient's home setting. Based on the patient's AM-PAC score and their current ADL deficits, it is recommended that the patient have 3-5 sessions per week of Occupational Therapy at d/c to increase the patient's independence. Please see assessment section for further patient specific details. If patient discharges prior to next session this note will serve as a discharge summary. Please see below for the latest assessment towards goals. OT Equipment Recommendations  Equipment Needed: No  Other: defer to d/c setting    Assessment   Performance deficits / Impairments: Decreased functional mobility ; Decreased ADL status; Decreased high-level IADLs; Decreased endurance; Decreased strength  Assessment: Pt demo improved affect and ability to participate in therapy today. Able to sit eob x25 minutes for exercises. Attempted lateral scoot but pt unable to. Pt is grossly weakened. Would require lift equipment for OOB.  Continue OT per POC  Treatment Diagnosis: Decreased activity tolerance, impaired ADls and mobility  REQUIRES OT FOLLOW UP: Yes  Activity Tolerance  Activity Tolerance: Patient Tolerated treatment well  Safety Devices  Safety Devices in place: Yes  Type of devices: Left in bed; Bed alarm in place; Call light within reach; Nurse notified         Restrictions  Position Activity Restriction  Other position/activity restrictions: up as tolerated, NWB L LE, WBAT R LE per podiatry note; per Dr. Emory Asher note : OOB with PT/OT     Subjective   General  Chart Reviewed: Yes  Patient assessed for rehabilitation services?: Yes  Additional Pertinent Hx: 63 y.o. M admitted 12/1 with necrotizing fasciitis to abdominal wall, gluteal region, and scrotum. s/p extensive wide excisions 12/1, 12/3, 12/5, 12/7 with wound vac placement. Colostomy creation 12/7. OR wound vac change 12/10;  OR wound vac change 12/13; OR wound vac change (12/16). . Podiatry: NWB L, FWB R. PMHx includes L foot fracture  Response to previous treatment: Patient with no complaints from previous session  Family / Caregiver Present: Yes (wife Loretto)  Referring Practitioner: Siria Cheng DO  Diagnosis: necrotizing fascitis  Subjective  Subjective: Pt in bed, agreeable to therapy. Goes by \"Bernard\". Rated L buttock pain 6/10- RN aware and pt pre-medicated. \"today is my birthday. I want cake\"        Orientation  Orientation  Overall Orientation Status: Within Functional Limits     Objective    Lines: Has wound vac to abdomen and L buttock, IV R UE, colostomy, walsh catheter, 4L O2. Dressing on L foot    ADL  Toileting:  (has colostomy, walsh catheter)  Additional Comments: declined ADLs             Balance  Sitting Balance: Stand by assistance (seated eob x25 minutes with initial bilateral UE support for balance, progressed to unilateral UE support for balance)   Note:  seated eob: weighted UE strengthening+back stretch with propping on R elbow 3 minutes x2 reps; trialed propping on L elbow with 3 pillows under UE to decrease lateral lean to limit increased WB L hip- unable to sustain 20 seconds; Bed mobility  Supine to Sit: Moderate assistance; 2 Person assistance  Sit to Supine: Minimal assistance  Comment: attempted lateral scooting at EOB with pt pulling on isabella stedy bar, cues for L LE NWB - pt unable to scoot           Cognition  Overall Cognitive Status: WFL              Type of ROM/Therapeutic Exercise:    completed shoulder flexion overhead x6 reps each UE, shoulder horizontal ab/duction x10 reps; Educated pt to complete UE AROM daily to maintain ROM/strength. Edema:  noted edema throughout R UE (improving)- positioned UE elevated on pillow end of session for edema mgmt.            Plan   Plan  Times per week: 2-5x  Times per day: Daily  Current Treatment Recommendations: Strengthening, ROM, Self-Care / ADL, Patient/Caregiver Education & Training, Safety Education & Training    AM-PAC Score        AM-PAC Inpatient Daily Activity Raw Score: 15 (12/17/21 1147)  AM-PAC Inpatient ADL T-Scale Score : 34.69 (12/17/21 1147)  ADL Inpatient CMS 0-100% Score: 56.46 (12/17/21 1147)  ADL Inpatient CMS G-Code Modifier : CK (12/17/21 1147)    Goals  Short term goals  Time Frame for Short term goals: by D/C  Short term goal 1: Demonstrate independence with HEP - Not met  Short term goal 2: Perform sit to stand NWB LLE with mod assist x2 to progress transfers - Not met  Short term goal 3: Roll side to side in bed with SBA for pressure relief and ADLs - Not met       Therapy Time   Individual Concurrent Group Co-treatment   Time In 1035         Time Out 1120         Minutes 45         Timed Code Treatment Minutes: [address], OT

## 2021-12-17 NOTE — CARE COORDINATION
Cm following, pt plan OR early next week . [employer] following, pending medicaid.   Electronically signed by Patricia Fernandez RN on 12/17/2021 at 4:27 PM   [phone]

## 2021-12-17 NOTE — OP NOTE
[facility]                [address]                                OPERATIVE REPORT    PATIENT NAME: Marcus Sabillon                    :        1955  MED REC NO:   [id number]                          ROOM:       2489  ACCOUNT NO:   [de-identified]                           ADMIT DATE: 2021  PROVIDER:     Alix Archibald MD    DATE OF PROCEDURE:  2021    PREOPERATIVE DIAGNOSIS:  Necrotizing fasciitis of the perineum,  buttocks, and anterior abdominal wall. POSTOPERATIVE DIAGNOSIS:  Necrotizing fasciitis of the perineum,  buttocks, and anterior abdominal wall. PROCEDURE PERFORMED:  Laparoscopic end colostomy. SURGEON FOR THE CASE:  Alix Archibald MD    ANESTHESIA:  General endotracheal anesthesia. ESTIMATED BLOOD LOSS:  Minimal.    COMPLICATIONS:  None. SPECIMENS:  None. INDICATIONS FOR THE PROCEDURE:  The patient is a 62-year-old male who  previously presented with a severe necrotizing fasciitis of the  buttocks, perineum, and anterior abdominal wall and he has undergone  multiple debridements to control this infection, which was successful  and because of his perineal wounds and difficulty with the stool  control, a decision was made to proceed with colostomy creation. The  risks and benefits of the procedure were discussed with the patient and  the family, and consent was obtained. DETAILS OF THE OPERATION:  The patient was brought to the operating room  and placed in a supine position. After Dr. Fred Dsouza finished his portion  of the operation, a site was selected above the umbilicus for entrance  into the abdominal cavity. The skin was anesthetized with 1% lidocaine. Following adequate local anesthetic, a 5-mm trocar incision was then  made using 11-blade scalpel. A 5-mm Optiview trocar was then advanced  through the abdominal wall to gain entrance into the abdominal cavity.    Following entrance into the abdominal cavity, the abdomen was  insufflated. Following adequate insufflation, 2 additional 5-mm trocars  were then placed in the right lower quadrant and the left mid upper  quadrant. With placement of these trocars, minimal adhesions were noted  within the abdomen. He had a slightly redundant sigmoid. The left  colon was then mobilized by incising along the White line of Toldt. This was carried up to, but not to include the splenic flexure. This  provided us adequate mobilization. We then selected a site for division  of the colon, which was in the distal sigmoid region. A window was  created in the mesentery along the bowel wall. Portion of the mesentery  was then started to be taken down to create an adequate window. The  right lower quadrant 5-mm trocar was then upsized to a 12-mm trocar and  then a laparoscopic linear CHARLIE stapler was then advanced across the  bowel and then fired to provide a nice division of the bowel. Following  this, the mesentery was then divided using the LigaSure. This was  carried up to the distal descending colon. There was good hemostasis  noted throughout. At this point, we had adequate mobilization of colon  to come to our colostomy site, which was preoperatively marked. We then  passed a Devin through the left mid quadrant trocar and rest the  proximal stapled end of the bowel. We then de-insufflated the abdomen. We then proceeded to use cautery to create a circular colostomy opening  around the [address] This was then carried down through the  subcutaneous to the level of fascia. Fascia was then incised in a  cruciate fashion to accept at least two fingerbreadths. We then brought  up the colon through this opening and before maturing this colostomy, we  went back in to evaluate the bowel to ensure that it was not twisted. There was actually a slight twist, which was then corrected and it laid  nicely with good orientation.   At this point, we had slightly redundant  amount of colon, so this was then removed by placing two bowel clamps on  the bowel and then removing this extra portion of the sigmoid colon. This was sent to Pathology for evaluation and at this point, we then  matured the colostomy by placing 3-0 Vicryl sutures in a slightly  brooked fashion in four quadrants and then 3-0 Vicryl sutures were  placed at least two in each of these quadrants to fully mature the  colostomy. Colostomy appliance was then applied. Previous trocar  incisions were then closed using 4-0 Monocryl at the umbilicus and then  the 90-RR trocar was then closed by approximating the fascia using  interrupted 0-Vicryl suture. Wounds were then all cleaned, dried and  dressed. Then, Dr. Ramos Carty returned to place the wound VAC.         Jeramie Link MD    D: 12/17/2021 8:13:29       T: 12/17/2021 9:28:08     [email or web address]  Job#: [id number]     Doc#: [id number]    CC:

## 2021-12-17 NOTE — PROGRESS NOTES
NUTRITION NOTE   Admission Date: 12/1/2021     Type and Reason for Visit: Reassess    NUTRITION RECOMMENDATIONS:   Stop calorie count. PO DIET: RD recommending modifying to remove \"no carb\" diet restriction as 3CC restriction adequately restricts carbohydrate intakes for good blood sugar control. ONS: Continue wound healing ONS BID. NUTRITION ASSESSMENT:  RD to stop calorie count as pt with good po intakes and he reports taking in all ONS. RD continues to recommend diet modification to 3 carbohydrate choices/meal rather than zero carbs as no evidence found at this time to suggest no carb diet promotes wound healing in diabetes better than low carbohydrate intake with diabetes. Pts blood glucose fairly well controlled today. Expect blood sugars to intermittently rise as pt frequently in and out of the OR. Will continue to monitor. Patient admitted d/t westley gangrene    PMH significant for: T2DM    MALNUTRITION ASSESSMENT  Context of Malnutrition: Acute Illness   Malnutrition Status: No malnutrition    NUTRITION DIAGNOSIS   · Increased nutrient needs related to increase demand for energy/nutrients as evidenced by wounds      NUTRITION INTERVENTION  Food and/or Nutrient Delivery:  Modify Current Diet, Modify Oral Nutrition Supplement, Start Calorie Count  Nutrition Education/Counseling:  Counseling completed (increased protein + good BG control for wound healing)      The patient will still be monitored per nutrition standards of care. Consult dietitian if nutrition interventions essential to patient care is needed.      Kamari Pompa, [address], [address]:  [phone]  Office:  [phone]

## 2021-12-18 LAB
ALBUMIN SERPL-MCNC: 2.2 G/DL (ref 3.4–5)
ANION GAP SERPL CALCULATED.3IONS-SCNC: 7 MMOL/L (ref 3–16)
BASOPHILS ABSOLUTE: 0.1 K/UL (ref 0–0.2)
BASOPHILS RELATIVE PERCENT: 1.3 %
BUN BLDV-MCNC: 10 MG/DL (ref 7–20)
CALCIUM SERPL-MCNC: 8 MG/DL (ref 8.3–10.6)
CHLORIDE BLD-SCNC: 101 MMOL/L (ref 99–110)
CO2: 30 MMOL/L (ref 21–32)
CREAT SERPL-MCNC: 0.6 MG/DL (ref 0.8–1.3)
EOSINOPHILS ABSOLUTE: 0.2 K/UL (ref 0–0.6)
EOSINOPHILS RELATIVE PERCENT: 2.5 %
GFR AFRICAN AMERICAN: >60
GFR NON-AFRICAN AMERICAN: >60
GLUCOSE BLD-MCNC: 108 MG/DL (ref 70–99)
GLUCOSE BLD-MCNC: 156 MG/DL (ref 70–99)
GLUCOSE BLD-MCNC: 162 MG/DL (ref 70–99)
GLUCOSE BLD-MCNC: 176 MG/DL (ref 70–99)
GLUCOSE BLD-MCNC: 184 MG/DL (ref 70–99)
HCT VFR BLD CALC: 25.1 % (ref 40.5–52.5)
HEMOGLOBIN: 8.4 G/DL (ref 13.5–17.5)
LYMPHOCYTES ABSOLUTE: 1.3 K/UL (ref 1–5.1)
LYMPHOCYTES RELATIVE PERCENT: 13.7 %
MAGNESIUM: 2.1 MG/DL (ref 1.8–2.4)
MCH RBC QN AUTO: 30.6 PG (ref 26–34)
MCHC RBC AUTO-ENTMCNC: 33.5 G/DL (ref 31–36)
MCV RBC AUTO: 91.4 FL (ref 80–100)
MONOCYTES ABSOLUTE: 0.8 K/UL (ref 0–1.3)
MONOCYTES RELATIVE PERCENT: 8.7 %
NEUTROPHILS ABSOLUTE: 6.9 K/UL (ref 1.7–7.7)
NEUTROPHILS RELATIVE PERCENT: 73.8 %
PDW BLD-RTO: 15.2 % (ref 12.4–15.4)
PERFORMED ON: ABNORMAL
PHOSPHORUS: 2.6 MG/DL (ref 2.5–4.9)
PLATELET # BLD: 402 K/UL (ref 135–450)
PMV BLD AUTO: 8.1 FL (ref 5–10.5)
POTASSIUM SERPL-SCNC: 4.3 MMOL/L (ref 3.5–5.1)
RBC # BLD: 2.75 M/UL (ref 4.2–5.9)
SODIUM BLD-SCNC: 138 MMOL/L (ref 136–145)
WBC # BLD: 9.3 K/UL (ref 4–11)

## 2021-12-18 PROCEDURE — 2500000003 HC RX 250 WO HCPCS: Performed by: STUDENT IN AN ORGANIZED HEALTH CARE EDUCATION/TRAINING PROGRAM

## 2021-12-18 PROCEDURE — 6360000002 HC RX W HCPCS: Performed by: STUDENT IN AN ORGANIZED HEALTH CARE EDUCATION/TRAINING PROGRAM

## 2021-12-18 PROCEDURE — 94761 N-INVAS EAR/PLS OXIMETRY MLT: CPT

## 2021-12-18 PROCEDURE — 2580000003 HC RX 258: Performed by: STUDENT IN AN ORGANIZED HEALTH CARE EDUCATION/TRAINING PROGRAM

## 2021-12-18 PROCEDURE — 6370000000 HC RX 637 (ALT 250 FOR IP): Performed by: STUDENT IN AN ORGANIZED HEALTH CARE EDUCATION/TRAINING PROGRAM

## 2021-12-18 PROCEDURE — 80069 RENAL FUNCTION PANEL: CPT

## 2021-12-18 PROCEDURE — 94669 MECHANICAL CHEST WALL OSCILL: CPT

## 2021-12-18 PROCEDURE — 85025 COMPLETE CBC W/AUTO DIFF WBC: CPT

## 2021-12-18 PROCEDURE — 83735 ASSAY OF MAGNESIUM: CPT

## 2021-12-18 PROCEDURE — 2700000000 HC OXYGEN THERAPY PER DAY

## 2021-12-18 PROCEDURE — 1200000000 HC SEMI PRIVATE

## 2021-12-18 RX ADMIN — INSULIN LISPRO 4 UNITS: 100 INJECTION, SOLUTION INTRAVENOUS; SUBCUTANEOUS at 12:42

## 2021-12-18 RX ADMIN — HEPARIN SODIUM 5000 UNITS: 5000 INJECTION INTRAVENOUS; SUBCUTANEOUS at 21:21

## 2021-12-18 RX ADMIN — ACETAMINOPHEN 1000 MG: 500 TABLET ORAL at 06:00

## 2021-12-18 RX ADMIN — SODIUM CHLORIDE, PRESERVATIVE FREE 10 ML: 5 INJECTION INTRAVENOUS at 08:47

## 2021-12-18 RX ADMIN — AMPICILLIN SODIUM AND SULBACTAM SODIUM 3000 MG: 2; 1 INJECTION, POWDER, FOR SOLUTION INTRAMUSCULAR; INTRAVENOUS at 14:19

## 2021-12-18 RX ADMIN — POLYETHYLENE GLYCOL 3350 17 G: 17 POWDER, FOR SOLUTION ORAL at 08:47

## 2021-12-18 RX ADMIN — INSULIN LISPRO 4 UNITS: 100 INJECTION, SOLUTION INTRAVENOUS; SUBCUTANEOUS at 08:52

## 2021-12-18 RX ADMIN — ACETAMINOPHEN 1000 MG: 500 TABLET ORAL at 23:50

## 2021-12-18 RX ADMIN — HEPARIN SODIUM 5000 UNITS: 5000 INJECTION INTRAVENOUS; SUBCUTANEOUS at 13:36

## 2021-12-18 RX ADMIN — INSULIN LISPRO 4 UNITS: 100 INJECTION, SOLUTION INTRAVENOUS; SUBCUTANEOUS at 17:34

## 2021-12-18 RX ADMIN — HEPARIN SODIUM 5000 UNITS: 5000 INJECTION INTRAVENOUS; SUBCUTANEOUS at 06:28

## 2021-12-18 RX ADMIN — INSULIN LISPRO 1 UNITS: 100 INJECTION, SOLUTION INTRAVENOUS; SUBCUTANEOUS at 08:52

## 2021-12-18 RX ADMIN — INSULIN LISPRO 1 UNITS: 100 INJECTION, SOLUTION INTRAVENOUS; SUBCUTANEOUS at 17:35

## 2021-12-18 RX ADMIN — SODIUM CHLORIDE, PRESERVATIVE FREE 10 ML: 5 INJECTION INTRAVENOUS at 21:20

## 2021-12-18 RX ADMIN — AMPICILLIN SODIUM AND SULBACTAM SODIUM 3000 MG: 2; 1 INJECTION, POWDER, FOR SOLUTION INTRAMUSCULAR; INTRAVENOUS at 21:19

## 2021-12-18 RX ADMIN — AMLODIPINE BESYLATE 5 MG: 5 TABLET ORAL at 08:47

## 2021-12-18 RX ADMIN — INSULIN LISPRO 1 UNITS: 100 INJECTION, SOLUTION INTRAVENOUS; SUBCUTANEOUS at 12:42

## 2021-12-18 RX ADMIN — AMPICILLIN SODIUM AND SULBACTAM SODIUM 3000 MG: 2; 1 INJECTION, POWDER, FOR SOLUTION INTRAMUSCULAR; INTRAVENOUS at 03:30

## 2021-12-18 RX ADMIN — SODIUM PHOSPHATE, MONOBASIC, MONOHYDRATE 10 MMOL: 276; 142 INJECTION, SOLUTION INTRAVENOUS at 09:20

## 2021-12-18 RX ADMIN — DOCUSATE SODIUM 100 MG: 100 CAPSULE, LIQUID FILLED ORAL at 08:47

## 2021-12-18 RX ADMIN — ACETAMINOPHEN 1000 MG: 500 TABLET ORAL at 10:14

## 2021-12-18 RX ADMIN — DOCUSATE SODIUM 100 MG: 100 CAPSULE, LIQUID FILLED ORAL at 21:24

## 2021-12-18 RX ADMIN — AMPICILLIN SODIUM AND SULBACTAM SODIUM 3000 MG: 2; 1 INJECTION, POWDER, FOR SOLUTION INTRAMUSCULAR; INTRAVENOUS at 08:50

## 2021-12-18 ASSESSMENT — PAIN DESCRIPTION - LOCATION: LOCATION: ABDOMEN;BUTTOCKS

## 2021-12-18 ASSESSMENT — PAIN DESCRIPTION - ONSET: ONSET: ON-GOING

## 2021-12-18 ASSESSMENT — PAIN SCALES - GENERAL
PAINLEVEL_OUTOF10: 0
PAINLEVEL_OUTOF10: 7
PAINLEVEL_OUTOF10: 3
PAINLEVEL_OUTOF10: 5
PAINLEVEL_OUTOF10: 5

## 2021-12-18 ASSESSMENT — PAIN - FUNCTIONAL ASSESSMENT: PAIN_FUNCTIONAL_ASSESSMENT: ACTIVITIES ARE NOT PREVENTED

## 2021-12-18 ASSESSMENT — PAIN DESCRIPTION - DESCRIPTORS: DESCRIPTORS: ACHING

## 2021-12-18 ASSESSMENT — PAIN DESCRIPTION - ORIENTATION: ORIENTATION: MID;LEFT

## 2021-12-18 ASSESSMENT — PAIN DESCRIPTION - PROGRESSION: CLINICAL_PROGRESSION: NOT CHANGED

## 2021-12-18 ASSESSMENT — PAIN DESCRIPTION - FREQUENCY: FREQUENCY: CONTINUOUS

## 2021-12-18 ASSESSMENT — PAIN DESCRIPTION - PAIN TYPE: TYPE: SURGICAL PAIN

## 2021-12-18 NOTE — PLAN OF CARE
Problem: Skin Integrity:  Goal: Will show no infection signs and symptoms  Note: Patient has not shown any signs or evidence of new infection. Skin integrity remains intact with exception to surgical sites and left foot. Will continue to monitor. Problem: Infection - Surgical Site:  Goal: Will show no infection signs and symptoms  Note: Patient has not shown any signs or evidence of new infection. Skin integrity remains intact with exception to surgical sites and left foot. Will continue to monitor.

## 2021-12-18 NOTE — PROGRESS NOTES
Patient alert and oriented. VSS Pain controlled with Tylenol. CDI surgical site. Wound vac and walsh in place. Colostomy to LQ. Assessment done. see flowsheet. Patient in bed lowest position call light and bedside table within reach. All needs are met at this time. Patient aware to call if any help needed. Will continue to monitor  /69   Pulse 76   Temp 97.6 °F (36.4 °C) (Oral)   Resp 16   Ht 5' 11\" (1.803 m)   Wt 199 lb 1.2 oz (90.3 kg)   SpO2 94%   BMI 27.77 kg/m²

## 2021-12-18 NOTE — PROGRESS NOTES
VSS this shift. Pt denies pain, refused scheduled tylenol. BS controlled. Mcfadden with better urine output this shift.

## 2021-12-18 NOTE — PROGRESS NOTES
Podiatric Surgery Daily Progress Note      Admit Date: 12/1/2021      Code:Full Code    Patient seen and examined, labs and records reviewed    Subjective:     Patient seen at bedside this AM. Patient denies pain to bilateral lower extremity. Patient denies fever, chills, shortness of breath, chest pain. Patient denies any acute events overnight. Review of Systems: A 10 point review of systems was conducted, significant findings as noted in HPI. All other systems negative. Objective     BP (!) 169/75   Pulse 76   Temp 98.1 °F (36.7 °C) (Oral)   Resp 16   Ht 5' 11\" (1.803 m)   Wt 199 lb 1.2 oz (90.3 kg)   SpO2 98%   BMI 27.77 kg/m²     I/O:    Intake/Output Summary (Last 24 hours) at 12/18/2021 0905  Last data filed at 12/18/2021 0400  Gross per 24 hour   Intake 720 ml   Output 1400 ml   Net -680 ml              Wt Readings from Last 3 Encounters:   12/17/21 199 lb 1.2 oz (90.3 kg)   11/30/21 163 lb 3.2 oz (74 kg)   04/18/16 179 lb 14.3 oz (81.6 kg)       LABS:    Recent Labs     12/17/21  0646 12/18/21  0625   WBC 8.5 9.3   HGB 8.1* 8.4*   HCT 24.1* 25.1*    402        Recent Labs     12/18/21  0625      K 4.3      CO2 30   PHOS 2.6   BUN 10   CREATININE 0.6*        No results for input(s): PROT, INR, APTT in the last 72 hours. LOWER EXTREMITY EXAMINATION    Dressing to left LE intact. No strikethrough noted to the external dressing. Betadine discoloration noted to the internal layers of the dressing of the left LE.     VASCULAR:   DP and PT pulses are non-palpable b/l. Upon hand-held Doppler examination DP signals were noted to be monophasic and PT signals were noted to be nondopplerable. CFT slightly diminished to the distal digits of bilateral lower extremities. Skin temperature is warm to lukewarm to cool to the distal digits from proximal to distal.  No edema noted. No pain with calf compression b/l.     NEUROLOGIC:   Gross and epicritic sensation is diminished b/l. Protective sensation is diminished at all pedal sites b/l.     DERMATOLOGIC:     Left lower extremity:  Full-thickness ulceration noted to the lateral aspect of the left foot. Wound measures approximately 3.2 cm x 2 cm x 0.5 cm. Wound base with exposed bone, with necrotic edges at the proximal and distal edges. Wound probes to bone with exposed fifth metatarsal. Necrotic appearance of 5th digit. No tracking or tunneling noted. No purulent drainage noted. No fluctuance or crepitus or malodor noted. Anterior aspect of ankle joint left foot. Deep tissue injury noted 2/2 Prevalon boot strap. Intact epithelialized tissue noted. No crepitus, erythema, drainage, or malodor noted. No open wound noted. Stable without signs of acute infection noted. Right lower extremity  Partial thickness ulceration noted to the lateral malleolus 2/2 pressure. No fluctuance, crepitus, malodor, or drainage noted. No acute signs infection noted. Deep tissue injury noted at the styloid process of the 5th metatarsal. Purple circumferential discoloration noted. No open wound noted. No fluctuance or crepitus noted. No acute signs of infection noted. Pressure injury with red/purple discoloration noted to the dorsal aspect of the midfoot, right lower extremity. No open wound. No fluctuance, crepitus, malodor, or drainage noted. No acute signs of infection noted. MUSCULOSKELETAL:   Muscle strength 4/5 for all pedal muscle groups B/L LE. No pain with palpation of the left foot.      IMAGING:  XR LEFT FOOT 11/30/2021  Narrative   EXAMINATION:   THREE XRAY VIEWS OF THE LEFT FOOT       11/30/2021 1:44 pm       HISTORY:   ORDERING SYSTEM PROVIDED HISTORY: wound   TECHNOLOGIST PROVIDED HISTORY:   Reason for exam:->wound   Reason for Exam: blood sugar problem, wound check on lateral portion of foot   Acuity: Acute   Type of Exam: Initial       FINDINGS:   Osseous destruction along the articular margins of the 5th   metatarsophalangeal bilateral lower extremity  -Diabetes mellitus, type II  -History of noncompliance    -Patient seen and examined at bedside this AM.  -Hypertensive, otherwise VSS on 4 L of O2 via NC; No leukocytosis noted (9.3)  -All imaging reviewed; impressions noted above  -CRP 60.2 (12/13/21)  -Prealbumin 9.8 (12/13/21)  -Vascular surgery following; will await further stabilization with the general surgery team and plastic surgery team before offering vascular intervention.  -Plastic surgery following  -Infectious disease following, currently on Unasyn  -Left lower extremity dressed with Betadine soaked gauze, DSD, and tape. -Right lower extremity applied Mepilex borders  -Prevalon boots reapplied. Patient is to wear at all times while in bed to prevent further deep tissue injury. Ankle strap removed from bilateral boot as the ankle straps were causing pressure to his feet. -Nonweightbearing to left lower extremity.  -Weightbearing as tolerated to right lower extremity. DISPO: Full-thickness ulceration with underlying osteomyelitis to the left fifth metatarsal. Critical limb ischemia to b/l LE. Wounds are stable at this time. Vascular following; awaiting further stabilization at this time. Plastic surgery, general surgery, and ID following. Patient will require podiatric surgical intervention but timing will depend on medical stability and vascular recommendations. Will continue to follow while in house.     Discussed assessment and plan with Dr. Marky Espinoza DPM.    Chente Caputo DPM  Podiatric Resident, PGY-2  Pager #: [phone] or Perfect [employer]  12/18/2021, 9:05 AM

## 2021-12-18 NOTE — PROGRESS NOTES
Patient A&OX4. VSS. Pain has been controlled with scheduled Tylenol. Surgical site remains CDI. Wound vac and walsh in place. Colostomy to left lower quadrant with small, brown output. Inuslin coverage was provided during shift. Patient is currently in bed resting with fall precautions in place. All patient needs have been met. Call light and bedside table within reach when needed. Will continue to monitor.      Electronically signed by Frank Peterson RN on 12/18/2021 at 1:15 PM

## 2021-12-18 NOTE — PROGRESS NOTES
plastic Surgery   Daily Progress Note  Patient: Magalie Piper      CC: Shayla's gangrene    SUBJECTIVE:   Patient doing well this morning. Denies any abdominal pain. Tolerating diet. Denies n/v. Good ostomy output     ROS:   A 14 point review of systems was conducted, significant findings as noted above. All other systems negative. OBJECTIVE:    PHYSICAL EXAM:    Vitals:    12/17/21 1519 12/17/21 1945 12/17/21 2348 12/18/21 0400   BP: 123/67 121/67 127/68 130/69   Pulse: 80 87 79 76   Resp: 18 16 18 16   Temp: 98.3 °F (36.8 °C) 98.7 °F (37.1 °C) 98.2 °F (36.8 °C) 97.6 °F (36.4 °C)   TempSrc: Oral Oral Oral Oral   SpO2: 99% 96% 96% 94%   Weight:       Height:           General appearance: alert, no acute distress, grooming appropriate  Eyes: No scleral icterus, EOM grossly intact  Neck: trachea midline, no JVD, no lymphadenopathy, neck supple  Chest/Lungs: Equal excursion bilaterally, normal effort with no accessory muscle use on 4L NC  Cardiovascular: RRR, brisk capillary refill  Abdomen:  Soft, large surgical debridement area of the abdomen with veraflo Vac in place holding adequate suction and instilling normal saline. Colostomy is pink, viable and with brown stool in the bag. Skin: warm and dry, no rashes  Extremities: no edema, no cyanosis  Genitourinary: Mcfadden in place with clear yellow urine, strip paste around penis without edema   Neuro: A&Ox3, no focal deficits, sensation intact    LABS:   Recent Labs     12/16/21  0430 12/17/21  0646   WBC 12.2* 8.5   HGB 8.4* 8.1*   HCT 25.2* 24.1*   MCV 91.2 92.6   * 414        Recent Labs     12/16/21  0538 12/17/21  0620    138   K 4.5 4.5    102   CO2 29 30   PHOS 3.0 2.9   BUN 13 14   CREATININE 0.6* 0.6*        No results for input(s): AST, ALT, ALB, BILIDIR, BILITOT, ALKPHOS in the last 72 hours. No results for input(s): LIPASE, AMYLASE in the last 72 hours. No results for input(s): PROT, INR, APTT in the last 72 hours.      No results for input(s): CKTOTAL, CKMB, CKMBINDEX, TROPONINI in the last 72 hours. ASSESSMENT & PLAN:   Aydin Zarate is a 63 y.o. male with Necrotizing fasciitis of the abdominal wall, gluteal region, and scrotum s/p wide excision of perineum, back, and abdominal wall. S/p multiple debridements and wound vac placement and changes intraoperatively with diverting colostomy creation (12/7). OR wound vac change (12/10). S/p 1 unit pRBCs (12/12). OR wound vac change (12/13). OR wound vac change (12/16).      - Continue wound vac, please insure all tubing is padding to prevent pressure injury to the patient's legs  - Continue zero carb diet, encourage protein intake  - Plan for OR Tuesday for [address] [place] [address] change  - Continue antibiotics per ID    Efra Brown MD, PGY-1  12/18/21 6:50 AM  Pager: [phone]

## 2021-12-19 LAB
ALBUMIN SERPL-MCNC: 2.4 G/DL (ref 3.4–5)
ANION GAP SERPL CALCULATED.3IONS-SCNC: 8 MMOL/L (ref 3–16)
BASOPHILS ABSOLUTE: 0.1 K/UL (ref 0–0.2)
BASOPHILS RELATIVE PERCENT: 0.8 %
BUN BLDV-MCNC: 10 MG/DL (ref 7–20)
CALCIUM SERPL-MCNC: 7.8 MG/DL (ref 8.3–10.6)
CHLORIDE BLD-SCNC: 102 MMOL/L (ref 99–110)
CO2: 30 MMOL/L (ref 21–32)
CREAT SERPL-MCNC: 0.6 MG/DL (ref 0.8–1.3)
EOSINOPHILS ABSOLUTE: 0.2 K/UL (ref 0–0.6)
EOSINOPHILS RELATIVE PERCENT: 2 %
GFR AFRICAN AMERICAN: >60
GFR NON-AFRICAN AMERICAN: >60
GLUCOSE BLD-MCNC: 118 MG/DL (ref 70–99)
GLUCOSE BLD-MCNC: 141 MG/DL (ref 70–99)
GLUCOSE BLD-MCNC: 177 MG/DL (ref 70–99)
GLUCOSE BLD-MCNC: 179 MG/DL (ref 70–99)
GLUCOSE BLD-MCNC: 187 MG/DL (ref 70–99)
HCT VFR BLD CALC: 24.4 % (ref 40.5–52.5)
HEMOGLOBIN: 8.3 G/DL (ref 13.5–17.5)
LYMPHOCYTES ABSOLUTE: 1.2 K/UL (ref 1–5.1)
LYMPHOCYTES RELATIVE PERCENT: 14 %
MAGNESIUM: 1.8 MG/DL (ref 1.8–2.4)
MCH RBC QN AUTO: 31.2 PG (ref 26–34)
MCHC RBC AUTO-ENTMCNC: 33.9 G/DL (ref 31–36)
MCV RBC AUTO: 91.9 FL (ref 80–100)
MONOCYTES ABSOLUTE: 0.8 K/UL (ref 0–1.3)
MONOCYTES RELATIVE PERCENT: 8.7 %
NEUTROPHILS ABSOLUTE: 6.4 K/UL (ref 1.7–7.7)
NEUTROPHILS RELATIVE PERCENT: 74.5 %
PDW BLD-RTO: 15.7 % (ref 12.4–15.4)
PERFORMED ON: ABNORMAL
PHOSPHORUS: 3 MG/DL (ref 2.5–4.9)
PLATELET # BLD: 359 K/UL (ref 135–450)
PMV BLD AUTO: 7.7 FL (ref 5–10.5)
POTASSIUM SERPL-SCNC: 4.2 MMOL/L (ref 3.5–5.1)
RBC # BLD: 2.65 M/UL (ref 4.2–5.9)
SODIUM BLD-SCNC: 140 MMOL/L (ref 136–145)
WBC # BLD: 8.6 K/UL (ref 4–11)

## 2021-12-19 PROCEDURE — 6370000000 HC RX 637 (ALT 250 FOR IP): Performed by: STUDENT IN AN ORGANIZED HEALTH CARE EDUCATION/TRAINING PROGRAM

## 2021-12-19 PROCEDURE — 6360000002 HC RX W HCPCS

## 2021-12-19 PROCEDURE — 83735 ASSAY OF MAGNESIUM: CPT

## 2021-12-19 PROCEDURE — 85025 COMPLETE CBC W/AUTO DIFF WBC: CPT

## 2021-12-19 PROCEDURE — 2580000003 HC RX 258: Performed by: STUDENT IN AN ORGANIZED HEALTH CARE EDUCATION/TRAINING PROGRAM

## 2021-12-19 PROCEDURE — 6360000002 HC RX W HCPCS: Performed by: STUDENT IN AN ORGANIZED HEALTH CARE EDUCATION/TRAINING PROGRAM

## 2021-12-19 PROCEDURE — 6370000000 HC RX 637 (ALT 250 FOR IP): Performed by: SURGERY

## 2021-12-19 PROCEDURE — 1200000000 HC SEMI PRIVATE

## 2021-12-19 PROCEDURE — 80069 RENAL FUNCTION PANEL: CPT

## 2021-12-19 RX ORDER — MAGNESIUM SULFATE IN WATER 40 MG/ML
2000 INJECTION, SOLUTION INTRAVENOUS ONCE
Status: COMPLETED | OUTPATIENT
Start: 2021-12-19 | End: 2021-12-19

## 2021-12-19 RX ADMIN — HEPARIN SODIUM 5000 UNITS: 5000 INJECTION INTRAVENOUS; SUBCUTANEOUS at 20:43

## 2021-12-19 RX ADMIN — DOCUSATE SODIUM 100 MG: 100 CAPSULE, LIQUID FILLED ORAL at 20:43

## 2021-12-19 RX ADMIN — AMPICILLIN SODIUM AND SULBACTAM SODIUM 3000 MG: 2; 1 INJECTION, POWDER, FOR SOLUTION INTRAMUSCULAR; INTRAVENOUS at 03:13

## 2021-12-19 RX ADMIN — INSULIN HUMAN 3 UNITS: 100 INJECTION, SOLUTION PARENTERAL at 18:03

## 2021-12-19 RX ADMIN — ACETAMINOPHEN 1000 MG: 500 TABLET ORAL at 05:27

## 2021-12-19 RX ADMIN — ACETAMINOPHEN 1000 MG: 500 TABLET ORAL at 18:03

## 2021-12-19 RX ADMIN — AMPICILLIN SODIUM AND SULBACTAM SODIUM 3000 MG: 2; 1 INJECTION, POWDER, FOR SOLUTION INTRAMUSCULAR; INTRAVENOUS at 08:45

## 2021-12-19 RX ADMIN — INSULIN HUMAN 5 UNITS: 100 INJECTION, SOLUTION PARENTERAL at 20:48

## 2021-12-19 RX ADMIN — DOCUSATE SODIUM 100 MG: 100 CAPSULE, LIQUID FILLED ORAL at 08:44

## 2021-12-19 RX ADMIN — ACETAMINOPHEN 1000 MG: 500 TABLET ORAL at 11:05

## 2021-12-19 RX ADMIN — HEPARIN SODIUM 5000 UNITS: 5000 INJECTION INTRAVENOUS; SUBCUTANEOUS at 05:27

## 2021-12-19 RX ADMIN — AMPICILLIN SODIUM AND SULBACTAM SODIUM 3000 MG: 2; 1 INJECTION, POWDER, FOR SOLUTION INTRAMUSCULAR; INTRAVENOUS at 20:40

## 2021-12-19 RX ADMIN — AMPICILLIN SODIUM AND SULBACTAM SODIUM 3000 MG: 2; 1 INJECTION, POWDER, FOR SOLUTION INTRAMUSCULAR; INTRAVENOUS at 15:00

## 2021-12-19 RX ADMIN — POLYETHYLENE GLYCOL 3350 17 G: 17 POWDER, FOR SOLUTION ORAL at 08:44

## 2021-12-19 RX ADMIN — HEPARIN SODIUM 5000 UNITS: 5000 INJECTION INTRAVENOUS; SUBCUTANEOUS at 13:52

## 2021-12-19 RX ADMIN — INSULIN HUMAN 3 UNITS: 100 INJECTION, SOLUTION PARENTERAL at 12:41

## 2021-12-19 RX ADMIN — MAGNESIUM SULFATE HEPTAHYDRATE 2000 MG: 2 INJECTION, SOLUTION INTRAVENOUS at 09:24

## 2021-12-19 RX ADMIN — SODIUM CHLORIDE, PRESERVATIVE FREE 10 ML: 5 INJECTION INTRAVENOUS at 08:51

## 2021-12-19 RX ADMIN — OXYCODONE HYDROCHLORIDE 5 MG: 5 TABLET ORAL at 20:50

## 2021-12-19 RX ADMIN — SODIUM CHLORIDE, PRESERVATIVE FREE 10 ML: 5 INJECTION INTRAVENOUS at 21:51

## 2021-12-19 RX ADMIN — AMLODIPINE BESYLATE 5 MG: 5 TABLET ORAL at 08:44

## 2021-12-19 ASSESSMENT — PAIN SCALES - GENERAL
PAINLEVEL_OUTOF10: 2
PAINLEVEL_OUTOF10: 5
PAINLEVEL_OUTOF10: 4
PAINLEVEL_OUTOF10: 5

## 2021-12-19 NOTE — PROGRESS NOTES
Podiatric Surgery Daily Progress Note      Admit Date: 12/1/2021      Code:Full Code    Patient seen and examined, labs and records reviewed    Subjective:     Patient seen at bedside this AM. Patient denies pain to bilateral lower extremity. Patient denies fever, chills, shortness of breath, chest pain. Patient denies any acute events overnight. Review of Systems: A 10 point review of systems was conducted, significant findings as noted in HPI. All other systems negative. Objective     BP (!) 160/78   Pulse 70   Temp 98.2 °F (36.8 °C) (Oral)   Resp 16   Ht 5' 11\" (1.803 m)   Wt 199 lb 1.2 oz (90.3 kg)   SpO2 93%   BMI 27.77 kg/m²     I/O:    Intake/Output Summary (Last 24 hours) at 12/19/2021 0724  Last data filed at 12/19/2021 0706  Gross per 24 hour   Intake 480 ml   Output 2950 ml   Net -2470 ml              Wt Readings from Last 3 Encounters:   12/17/21 199 lb 1.2 oz (90.3 kg)   11/30/21 163 lb 3.2 oz (74 kg)   04/18/16 179 lb 14.3 oz (81.6 kg)       LABS:    Recent Labs     12/18/21  0625 12/19/21  0527   WBC 9.3 8.6   HGB 8.4* 8.3*   HCT 25.1* 24.4*    359        Recent Labs     12/19/21  0527      K 4.2      CO2 30   PHOS 3.0   BUN 10   CREATININE 0.6*        No results for input(s): PROT, INR, APTT in the last 72 hours. LOWER EXTREMITY EXAMINATION    Dressing to left LE intact. No strikethrough noted to the external dressing. Betadine discoloration noted to the internal layers of the dressing of the left LE.     VASCULAR:   DP and PT pulses are non-palpable b/l. Upon hand-held Doppler examination DP signals were noted to be monophasic and PT signals were noted to be nondopplerable. CFT slightly diminished to the distal digits of bilateral lower extremities. Skin temperature is warm to lukewarm to cool to the distal digits from proximal to distal.  No edema noted. No pain with calf compression b/l.     NEUROLOGIC:   Gross and epicritic sensation is diminished b/l. Protective sensation is diminished at all pedal sites b/l.     DERMATOLOGIC:     Left lower extremity:  Full-thickness ulceration noted to the lateral aspect of the left foot. Wound measures approximately 3.2 cm x 2 cm x 0.5 cm. Wound base with exposed bone, with necrotic edges at the proximal and distal edges. Wound probes to bone with exposed fifth metatarsal. Necrotic appearance of 5th digit. No tracking or tunneling noted. No purulent drainage noted. No fluctuance or crepitus or malodor noted. Deep tissue injury noted 2/2 Prevalon boot strap to the anterior aspect of the ankle. Intact epithelialized tissue noted. No crepitus, erythema, drainage, or malodor noted. No open wound noted. Stable without signs of acute infection noted. Right lower extremity  Partial thickness ulceration noted to the lateral malleolus 2/2 pressure. No fluctuance, crepitus, malodor, or drainage noted. No acute signs infection noted. Deep tissue injury noted at the styloid process of the 5th metatarsal. Purple circumferential discoloration noted. No open wound noted. No fluctuance or crepitus noted. No acute signs of infection noted. Pressure injury with red/purple discoloration noted to the dorsal aspect of the midfoot, right lower extremity. No open wound. No fluctuance, crepitus, malodor, or drainage noted. No acute signs of infection noted. MUSCULOSKELETAL:   Muscle strength 4/5 for all pedal muscle groups B/L LE. No pain with palpation of the left foot.      IMAGING:  XR LEFT FOOT 11/30/2021  Narrative   EXAMINATION:   THREE XRAY VIEWS OF THE LEFT FOOT       11/30/2021 1:44 pm       HISTORY:   ORDERING SYSTEM PROVIDED HISTORY: wound   TECHNOLOGIST PROVIDED HISTORY:   Reason for exam:->wound   Reason for Exam: blood sugar problem, wound check on lateral portion of foot   Acuity: Acute   Type of Exam: Initial       FINDINGS:   Osseous destruction along the articular margins of the 5th   metatarsophalangeal joint with associated lucency in the soft tissues. .   There is no evidence of fracture or dislocation. . The remaining joint spaces   appear well maintained. The remaining soft tissues are unremarkable.       Impression   Evidence of a septic joint involving the 5th metatarsal phalangeal joint with   associated osteomyelitis     ARTERIAL DUPLEX 12/2/21     Type of Study:        Extremities Arteries:Lower Extremities Arterial Duplex, VL LOWER EXTREMITY    ARTERIES DUPLEX BILATERAL.       Tech Comments   Right   The right ankle/brachial index is 0.0 (no Doppler signal could be heard at the   [facility] or the PT). The common femoral and profunda femoral arteries could not be visualized due   to bandages extending into the groin area. The mid superficial femoral artery has a short segmental occlusion and then is   reconstituted. Elevated velocities at the distal superficial femoral artery indicate a > 50%   stenosis by velocity criteria (velocity went from 15 to 298 cm/s). The posterior tibial artery is occluded. The distal anterior tibial artery is barely patent, with very low flow   (possibly occluded and then reconstituted). Left   The left ankle brachial index is 0 for the PT and the DP was not accessible   due to the bandages on the foot. The common femoral and profunda femoral artery could not be visualized due to   bandages extending into the groin area. The distal superficial femoral artery is occluded and then reconstituted. The posterior tibial artery, peroneal, and anterior tibial artery are   occluded. There were no previous studies to use for comparison.        ASSESSMENT/PLAN:   -Full-thickness ulceration; lateral left foot- Sands 4  -Osteomyelitis of the fifth metatarsal; left foot  -Partial thickness pressure ulceration, lateral malleolus right ankle  -Deep tissue injury, 5th metatarsal base, right foot  -Pressure injury, dorsal right midfoot -NPUAP Stage I  -Critical limb ischemia; bilateral lower extremity  -Diabetes mellitus, type II  -History of noncompliance    -Patient seen and examined at bedside this AM.  -Hypertensive, otherwise VSS on 4 L of O2 via NC; No leukocytosis noted (8.6)  -All imaging reviewed; impressions noted above  -CRP 60.2 (12/13/21)  -Prealbumin 9.8 (12/13/21)  -Vascular surgery following; will await further stabilization with the general surgery team and plastic surgery team before offering vascular intervention.  -Plastic surgery following  -Infectious disease following, currently on Unasyn  -Left lower extremity dressed with Betadine soaked gauze, DSD, and tape. -Right lower extremity applied Mepilex borders  -Prevalon boots reapplied. Patient is to wear at all times while in bed to prevent further deep tissue injury. Ankle strap removed from bilateral boot as the ankle straps were causing pressure to his feet. -Nonweightbearing to left lower extremity.  -Weightbearing as tolerated to right lower extremity. DISPO: Full-thickness ulceration with underlying osteomyelitis to the left fifth metatarsal. Critical limb ischemia to b/l LE. Wounds are stable at this time. Vascular following; awaiting further stabilization at this time. Plastic surgery, general surgery, and ID following. Patient will require podiatric surgical intervention but timing will depend on medical stability and vascular recommendations. Will continue to follow while in house.     Patient examined and evaluated at bedside with Dr. Manuela Osman DPM.    Yadira Venegas DPM  Podiatric Resident, PGY-2  Pager #: [phone] or Perfect [employer]

## 2021-12-19 NOTE — PLAN OF CARE
Problem: Bowel/Gastric:  Goal: Gastrointestinal status for postoperative course will improve  Note: Patient continues to tolerate diet as ordered. Ostomy continues to have small amount of brown output. Will continue to monitor. Problem: Falls - Risk of:  Goal: Will remain free from falls  Note: Patient has remained free from falls or any harm during shift. Fall precautions remain in place. Will continue to monitor.

## 2021-12-19 NOTE — PROGRESS NOTES
Plastic Surgery   Daily Progress Note  Patient: Ab Ortega      CC: Shayla's gangrene    SUBJECTIVE:   No acute issues overnight. Patient denies any abdominal pain. Patient is tolerating diet without nausea or vomiting. Ostomy with good output. ROS:   A 14 point review of systems was conducted, significant findings as noted above. All other systems negative. OBJECTIVE:    PHYSICAL EXAM:    Vitals:    12/18/21 1638 12/18/21 1939 12/18/21 2349 12/19/21 0246   BP: 129/73 125/69 (!) 143/66 (!) 160/78   Pulse: 79 79 79 70   Resp: 16 16 16 16   Temp: 98.1 °F (36.7 °C) 98.4 °F (36.9 °C) 98 °F (36.7 °C) 98.2 °F (36.8 °C)   TempSrc: Oral Oral Oral Oral   SpO2: 97% 96% 95% 93%   Weight:       Height:           General appearance: alert, no acute distress, grooming appropriate  Eyes: No scleral icterus, EOM grossly intact  Neck: trachea midline, no JVD, no lymphadenopathy, neck supple  Chest/Lungs: Equal excursion bilaterally, normal effort with no accessory muscle use on 4L NC  Cardiovascular: RRR, brisk capillary refill  Abdomen:  Soft, large surgical debridement area of the abdomen with veraflo Vac in place holding adequate suction and instilling normal saline. Colostomy is pink, viable and with brown stool in the bag. Skin: warm and dry, no rashes  Extremities: no edema, no cyanosis  Genitourinary: Mcfadden in place with clear yellow urine, strip paste around penis without edema   Neuro: A&Ox3, no focal deficits, sensation intact    LABS:   Recent Labs     12/18/21 0625 12/19/21 0527   WBC 9.3 8.6   HGB 8.4* 8.3*   HCT 25.1* 24.4*   MCV 91.4 91.9    359        Recent Labs     12/18/21  0625 12/19/21 0527    140   K 4.3 4.2    102   CO2 30 30   PHOS 2.6 3.0   BUN 10 10   CREATININE 0.6* 0.6*        No results for input(s): AST, ALT, ALB, BILIDIR, BILITOT, ALKPHOS in the last 72 hours. No results for input(s): LIPASE, AMYLASE in the last 72 hours.      No results for input(s): PROT, INR, APTT in the last 72 hours. No results for input(s): CKTOTAL, CKMB, CKMBINDEX, TROPONINI in the last 72 hours. ASSESSMENT & PLAN:   Walt Vela is a 63 y.o. male with Necrotizing fasciitis of the abdominal wall, gluteal region, and scrotum s/p wide excision of perineum, back, and abdominal wall. S/p multiple debridements and wound vac placement and changes intraoperatively with diverting colostomy creation (12/7). OR wound vac change (12/10). OR wound vac change (12/13). OR wound vac change (12/16).      - Continue wound vac, please insure all tubing is padding to prevent pressure injury to the patient's legs  - Continue zero carb diet, encourage protein intake  - will adjust to medium sliding scale insulin for hyperglycemia  - Plan for OR Tuesday for [address] [place] [address] change  - Continue antibiotics per CAROLINE Harris DO  PGY-1, General Surgery  12/19/21  6:36 AM  [phone]

## 2021-12-19 NOTE — PROGRESS NOTES
Patient A&OX4. VSS. Pain has been controlled with scheduled Tylenol, rated 5/10. Surgical sites remain CDI. Wound vac in place with adequate suction, cannister was changed today with 450ml output, see flow sheets. Colostomy to LLQ remains CDI with small, brown output. Tolerating diet as ordered. Insulin coverage provided during shift. All patient needs have been met at this time. Fall precautions in place. Bedside table and call light within each if needed. Will continue to monitor.      Electronically signed by Latisha Xie RN on 12/19/2021 at 1:14 PM

## 2021-12-20 LAB
ALBUMIN SERPL-MCNC: 2.2 G/DL (ref 3.4–5)
ANION GAP SERPL CALCULATED.3IONS-SCNC: 4 MMOL/L (ref 3–16)
BASOPHILS ABSOLUTE: 0.1 K/UL (ref 0–0.2)
BASOPHILS RELATIVE PERCENT: 1.4 %
BUN BLDV-MCNC: 9 MG/DL (ref 7–20)
C-REACTIVE PROTEIN: 42.6 MG/L (ref 0–5.1)
CALCIUM SERPL-MCNC: 8.1 MG/DL (ref 8.3–10.6)
CHLORIDE BLD-SCNC: 104 MMOL/L (ref 99–110)
CO2: 31 MMOL/L (ref 21–32)
CREAT SERPL-MCNC: 0.5 MG/DL (ref 0.8–1.3)
EOSINOPHILS ABSOLUTE: 0.2 K/UL (ref 0–0.6)
EOSINOPHILS RELATIVE PERCENT: 2.8 %
GFR AFRICAN AMERICAN: >60
GFR NON-AFRICAN AMERICAN: >60
GLUCOSE BLD-MCNC: 143 MG/DL (ref 70–99)
GLUCOSE BLD-MCNC: 164 MG/DL (ref 70–99)
GLUCOSE BLD-MCNC: 168 MG/DL (ref 70–99)
GLUCOSE BLD-MCNC: 174 MG/DL (ref 70–99)
GLUCOSE BLD-MCNC: 192 MG/DL (ref 70–99)
HCT VFR BLD CALC: 24.6 % (ref 40.5–52.5)
HEMOGLOBIN: 8.2 G/DL (ref 13.5–17.5)
LYMPHOCYTES ABSOLUTE: 1.2 K/UL (ref 1–5.1)
LYMPHOCYTES RELATIVE PERCENT: 15.8 %
Lab: NORMAL
MAGNESIUM: 1.9 MG/DL (ref 1.8–2.4)
MCH RBC QN AUTO: 30.5 PG (ref 26–34)
MCHC RBC AUTO-ENTMCNC: 33.3 G/DL (ref 31–36)
MCV RBC AUTO: 91.6 FL (ref 80–100)
MONOCYTES ABSOLUTE: 0.8 K/UL (ref 0–1.3)
MONOCYTES RELATIVE PERCENT: 10.3 %
NEUTROPHILS ABSOLUTE: 5.5 K/UL (ref 1.7–7.7)
NEUTROPHILS RELATIVE PERCENT: 69.7 %
PDW BLD-RTO: 15.7 % (ref 12.4–15.4)
PERFORMED ON: ABNORMAL
PHOSPHORUS: 3 MG/DL (ref 2.5–4.9)
PLATELET # BLD: 327 K/UL (ref 135–450)
PMV BLD AUTO: 7.7 FL (ref 5–10.5)
POTASSIUM SERPL-SCNC: 4.1 MMOL/L (ref 3.5–5.1)
PREALBUMIN: 13.1 MG/DL (ref 20–40)
RBC # BLD: 2.69 M/UL (ref 4.2–5.9)
REPORT: NORMAL
SODIUM BLD-SCNC: 139 MMOL/L (ref 136–145)
THIS TEST SENT TO: NORMAL
TRANSFERRIN: 137 MG/DL (ref 200–360)
WBC # BLD: 7.8 K/UL (ref 4–11)

## 2021-12-20 PROCEDURE — 6370000000 HC RX 637 (ALT 250 FOR IP): Performed by: STUDENT IN AN ORGANIZED HEALTH CARE EDUCATION/TRAINING PROGRAM

## 2021-12-20 PROCEDURE — 36415 COLL VENOUS BLD VENIPUNCTURE: CPT

## 2021-12-20 PROCEDURE — 1200000000 HC SEMI PRIVATE

## 2021-12-20 PROCEDURE — 84134 ASSAY OF PREALBUMIN: CPT

## 2021-12-20 PROCEDURE — 83735 ASSAY OF MAGNESIUM: CPT

## 2021-12-20 PROCEDURE — 6360000002 HC RX W HCPCS: Performed by: STUDENT IN AN ORGANIZED HEALTH CARE EDUCATION/TRAINING PROGRAM

## 2021-12-20 PROCEDURE — 2580000003 HC RX 258: Performed by: STUDENT IN AN ORGANIZED HEALTH CARE EDUCATION/TRAINING PROGRAM

## 2021-12-20 PROCEDURE — 80069 RENAL FUNCTION PANEL: CPT

## 2021-12-20 PROCEDURE — 86140 C-REACTIVE PROTEIN: CPT

## 2021-12-20 PROCEDURE — 6370000000 HC RX 637 (ALT 250 FOR IP): Performed by: SURGERY

## 2021-12-20 PROCEDURE — 84466 ASSAY OF TRANSFERRIN: CPT

## 2021-12-20 PROCEDURE — 99231 SBSQ HOSP IP/OBS SF/LOW 25: CPT | Performed by: INTERNAL MEDICINE

## 2021-12-20 PROCEDURE — 85025 COMPLETE CBC W/AUTO DIFF WBC: CPT

## 2021-12-20 RX ORDER — MAGNESIUM SULFATE IN WATER 40 MG/ML
2000 INJECTION, SOLUTION INTRAVENOUS ONCE
Status: COMPLETED | OUTPATIENT
Start: 2021-12-20 | End: 2021-12-20

## 2021-12-20 RX ORDER — SODIUM CHLORIDE 9 MG/ML
INJECTION, SOLUTION INTRAVENOUS CONTINUOUS
Status: DISCONTINUED | OUTPATIENT
Start: 2021-12-20 | End: 2021-12-21

## 2021-12-20 RX ADMIN — HEPARIN SODIUM 5000 UNITS: 5000 INJECTION INTRAVENOUS; SUBCUTANEOUS at 21:21

## 2021-12-20 RX ADMIN — ALTEPLASE 1 MG: 2.2 INJECTION, POWDER, LYOPHILIZED, FOR SOLUTION INTRAVENOUS at 10:34

## 2021-12-20 RX ADMIN — HEPARIN SODIUM 5000 UNITS: 5000 INJECTION INTRAVENOUS; SUBCUTANEOUS at 05:15

## 2021-12-20 RX ADMIN — DOCUSATE SODIUM 100 MG: 100 CAPSULE, LIQUID FILLED ORAL at 08:51

## 2021-12-20 RX ADMIN — DOCUSATE SODIUM 100 MG: 100 CAPSULE, LIQUID FILLED ORAL at 21:21

## 2021-12-20 RX ADMIN — AMPICILLIN SODIUM AND SULBACTAM SODIUM 3000 MG: 2; 1 INJECTION, POWDER, FOR SOLUTION INTRAMUSCULAR; INTRAVENOUS at 21:41

## 2021-12-20 RX ADMIN — ACETAMINOPHEN 1000 MG: 500 TABLET ORAL at 11:27

## 2021-12-20 RX ADMIN — AMPICILLIN SODIUM AND SULBACTAM SODIUM 3000 MG: 2; 1 INJECTION, POWDER, FOR SOLUTION INTRAMUSCULAR; INTRAVENOUS at 08:55

## 2021-12-20 RX ADMIN — INSULIN HUMAN 5 UNITS: 100 INJECTION, SOLUTION PARENTERAL at 18:28

## 2021-12-20 RX ADMIN — SODIUM CHLORIDE, PRESERVATIVE FREE 10 ML: 5 INJECTION INTRAVENOUS at 08:51

## 2021-12-20 RX ADMIN — SODIUM CHLORIDE, PRESERVATIVE FREE 10 ML: 5 INJECTION INTRAVENOUS at 21:45

## 2021-12-20 RX ADMIN — MAGNESIUM SULFATE HEPTAHYDRATE 2000 MG: 2 INJECTION, SOLUTION INTRAVENOUS at 09:10

## 2021-12-20 RX ADMIN — AMLODIPINE BESYLATE 5 MG: 5 TABLET ORAL at 08:51

## 2021-12-20 RX ADMIN — HEPARIN SODIUM 5000 UNITS: 5000 INJECTION INTRAVENOUS; SUBCUTANEOUS at 15:29

## 2021-12-20 RX ADMIN — AMPICILLIN SODIUM AND SULBACTAM SODIUM 3000 MG: 2; 1 INJECTION, POWDER, FOR SOLUTION INTRAMUSCULAR; INTRAVENOUS at 04:25

## 2021-12-20 RX ADMIN — SODIUM CHLORIDE: 9 INJECTION, SOLUTION INTRAVENOUS at 21:28

## 2021-12-20 RX ADMIN — INSULIN HUMAN 5 UNITS: 100 INJECTION, SOLUTION PARENTERAL at 11:29

## 2021-12-20 RX ADMIN — INSULIN HUMAN 5 UNITS: 100 INJECTION, SOLUTION PARENTERAL at 21:14

## 2021-12-20 RX ADMIN — AMPICILLIN SODIUM AND SULBACTAM SODIUM 3000 MG: 2; 1 INJECTION, POWDER, FOR SOLUTION INTRAMUSCULAR; INTRAVENOUS at 15:30

## 2021-12-20 RX ADMIN — INSULIN HUMAN 5 UNITS: 100 INJECTION, SOLUTION PARENTERAL at 09:03

## 2021-12-20 RX ADMIN — POLYETHYLENE GLYCOL 3350 17 G: 17 POWDER, FOR SOLUTION ORAL at 08:51

## 2021-12-20 RX ADMIN — ACETAMINOPHEN 1000 MG: 500 TABLET ORAL at 18:26

## 2021-12-20 ASSESSMENT — PAIN DESCRIPTION - ONSET: ONSET: ON-GOING

## 2021-12-20 ASSESSMENT — PAIN DESCRIPTION - DESCRIPTORS: DESCRIPTORS: DISCOMFORT

## 2021-12-20 ASSESSMENT — PAIN SCALES - GENERAL
PAINLEVEL_OUTOF10: 6
PAINLEVEL_OUTOF10: 5
PAINLEVEL_OUTOF10: 2
PAINLEVEL_OUTOF10: 6

## 2021-12-20 ASSESSMENT — PAIN DESCRIPTION - ORIENTATION: ORIENTATION: MID

## 2021-12-20 ASSESSMENT — PAIN DESCRIPTION - LOCATION
LOCATION: PERINEUM;SCROTUM
LOCATION: BUTTOCKS

## 2021-12-20 ASSESSMENT — PAIN DESCRIPTION - PROGRESSION: CLINICAL_PROGRESSION: NOT CHANGED

## 2021-12-20 ASSESSMENT — PAIN DESCRIPTION - PAIN TYPE
TYPE: SURGICAL PAIN
TYPE: SURGICAL PAIN

## 2021-12-20 ASSESSMENT — PAIN DESCRIPTION - FREQUENCY: FREQUENCY: CONTINUOUS

## 2021-12-20 NOTE — PROGRESS NOTES
Pt A&O. VSS. O2 97% with 4 L nassal cannula. Wound vac in place draining. Mcfadden catheter in place with adequate UO. Colostomy bag in place, intact. Pt turned to the sideb Q2. PRN oxycodone administered for pain. Pt satisfied. Will continue to monitor.

## 2021-12-20 NOTE — FLOWSHEET NOTE
12/20/21 1552   Colostomy LUQ   Placement Date: 12/07/21   Pre-existing: No  Inserted by: DR WAN  Location: LUQ   Stomal Appliance 2 piece   Flange Size (inches) 2 Inches   Stoma  Assessment Pink;Protrudes; Moist   Mucocutaneous Junction Intact   Peristomal Assessment Clean; Intact   Treatment Bag change;Site care   Stool Appearance Formed   Stool Color Brown   Stool Amount Large     Removed appliance, cleaned skin with warm water, dried, verified size of stoma - 38 mm, cut barrier accordingly, placed barrier and attached pouch. Supplies left in room - 1 box flat (red) barrier #[id number] and 1 box (red) pouches #[id number].

## 2021-12-20 NOTE — PROGRESS NOTES
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  Antibiotics: Unasyn    Admit Date: 12/1/2021  Hospital Day: 20    Subjective:     Patient reports wound / surg site pain mostly controlled  No other complaint     Objective:     Patient Vitals for the past 8 hrs:   BP Temp Temp src Pulse Resp SpO2   12/20/21 1139 -- -- -- -- -- 95 %   12/20/21 1126 133/66 98.1 °F (36.7 °C) Oral 84 15 96 %   12/20/21 0747 136/72 98.3 °F (36.8 °C) Oral 83 15 96 %     I/O last 3 completed shifts: In: 5 [P.O.:420]  Out: 3950 [Urine:1900; Drains:1700; Stool:350]  I/O this shift: In: 7052 [P.O.:460; I.V.:10; IV Piggyback:4700]  Out: -     EXAM:  GENERAL: No apparent distress.   RA  HEENT: Membranes moist, no oral lesion  NECK:  Supple, no lymphadenopathy  LUNGS: Clear b/l, no rales, no dullness  CARDIAC: RRR, no murmur appreciated  ABD:  + BS, soft / NT  Wounds - VAC in wound over lower abd, suprapubic region, extending over L scrotum, inguinal fold, posteriorly to buttock  EXT:  No rash, no edema, no lesions  NEURO: No focal neurologic findings  PSYCH: Orientation, sensorium, mood normal  LINES:  R PICC in place       Data Review:  Lab Results   Component Value Date    WBC 7.8 12/20/2021    HGB 8.2 (L) 12/20/2021    HCT 24.6 (L) 12/20/2021    MCV 91.6 12/20/2021     12/20/2021     Lab Results   Component Value Date    CREATININE 0.5 (L) 12/20/2021    BUN 9 12/20/2021     12/20/2021    K 4.1 12/20/2021     12/20/2021    CO2 31 12/20/2021       Hepatic Function Panel:   Lab Results   Component Value Date    ALKPHOS 170 12/10/2021    ALT 7 12/10/2021    AST 13 12/10/2021    PROT 4.8 12/10/2021    BILITOT 0.3 12/10/2021    BILIDIR <0.2 12/10/2021    IBILI see below 12/10/2021    LABALBU 2.2 12/20/2021       MICRO:  11/30 BC no growth   11/30 SARS CoV-2 NAAT neg  11/30 Tissue cult - no growth   12/1   Wound cult - light mixed skin pam  12/3 Tissue cult - light C glabrata  12/5 Tissue cult - light C glabrata    IMAGIN/30 L foot:   Evidence of a septic joint involving the 5th metatarsal phalangeal joint with   associated osteomyelitis      Abd / Pelvic CT   Impression   1. 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  There is   additional muscle or fascial involvement on the left as above.  Of note, the   testes are incompletely evaluated but appear within normal limits.  Critical   results were called by Dr. Tate Moss MD to Dr. Gisela Loyd on 2021   at 19:31.   2. Minimal bilateral hydronephrosis is likely due to severe urinary bladder   distention.  Abd / Pelvic CT  Impression   Significant interval improvement and soft tissue emphysema in the visualized left gluteal region and over the anterior abdominal wall since prior study.       Anasarca more prominently of the abdominal wall without suspicious drainable abdominal wall fluid collection.       More prominent presacral strandy fluid without suspicious retroperitoneal emphysema to suggest retroperitoneal infection.       New small amount of ascites throughout the abdomen and pelvis without suspicious organized intraperitoneal fluid collections.       New moderate to large bilateral pleural effusions with associated compressive atelectasis of the lung bases.       New trace pericardial effusion.       Colostomy in the left lower quadrant without obstruction or complicating features.       Interval improvement in bilateral hydronephrosis.        Scheduled Meds:   insulin glargine  12 Units SubCUTAneous QAM    insulin regular  0-18 Units SubCUTAneous 4x Daily AC & HS    heparin (porcine)  5,000 Units SubCUTAneous 3 times per day    docusate sodium  100 mg Oral BID    polyethylene glycol  17 g Oral Daily    amLODIPine  5 mg Oral Daily    ampicillin-sulbactam  3,000 mg IntraVENous Q6H    acetaminophen  1,000 mg Oral Q6H    sodium chloride flush  5-40 mL IntraVENous 2 times per day       Continuous Infusions:   sodium chloride      sodium chloride 25 mL (12/12/21 0850)    dextrose         PRN Meds:  sodium chloride, alteplase, ipratropium-albuterol, dextrose, hydrALAZINE, oxyCODONE **OR** oxyCODONE, sodium chloride flush, sodium chloride, glucose, dextrose, glucagon (rDNA), dextrose      Assessment:     64 yo man with DM - not taking meds     Presented with scrotal swelling and pain  Seen 11/30 at [place] ED, dx DKA, Shayla's gangrene  CT with gas extending   OR debridement  Rx Vancomycin, Cefepime, Metronidazole     Transfer to [facility] on 12/1. Return to OR 12/2,3,5,7, 10,13,16  Laparoscopic end colostomy 12/7     Reviewed cultures -  11/30 GS GPC, GNDC, GNR, cult neg  12/3,5 light C glabrata.     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   - mixed / polymicrobial infection   - yeast in tissue    Leukocytosis - resolved      Plan:     Cont unsyn for now / until closure   Wound care / reconstruction per Surg / Plastics - return to OR tomorrow 12/21    Diet (high protein / no carb), BS control    At discharge may not need iv antibiotics / any further antibiotics    Medical Decision Making:   The following items were considered in medical decision making:  Discussion of patient care with other providers  Reviewed clinical lab tests  Reviewed radiology tests  Reviewed other diagnostic tests/interventions  Independent review of radiologic images - reviewed initial and f/u CT with Radiologist 12/8  Microbiology cultures and other micro tests reviewed      Discussed with pt, wife  Milady Amezquita MD

## 2021-12-20 NOTE — PROGRESS NOTES
Called to bedside for clotted white lumen on PICC line. Upon assessment large blood clot at insertion site. Sterile dressing changed. 2 cm exposed, unchanged from insertion documentation. End caps changed on both lumens. Pink lumen flushes easily with blood return. Arm brought out to the side like a T position. White lumen was then able to be flushed easily. Power flushed with 30 ml normal saline with positive blood return. Of note arm does appears more swollen than left arm. No extremity circumference noted. Pt and wife states swelling is unchanged. May want to evaluate for DVT if swelling does not improve.

## 2021-12-20 NOTE — PLAN OF CARE
Problem: Activity:  Goal: Ability to return to normal activity level will improve  Description: Ability to return to normal activity level will improve  Outcome: Ongoing  Note: Compliant with q2 turns in the bed-turns/moves well in the bed     Problem:  Bowel/Gastric:  Goal: Gastrointestinal status for postoperative course will improve  Description: Gastrointestinal status for postoperative course will improve  Outcome: Ongoing  Note: Good stool output in colostomy bag     Problem: Respiratory:  Goal: Ability to achieve and maintain a regular respiratory rate will improve  Description: Ability to achieve and maintain a regular respiratory rate will improve  Outcome: Ongoing  Note: Weaned to 2 L 02

## 2021-12-20 NOTE — CARE COORDINATION
CM following, pt plan OR with plastic surgery on Tuesday. LTACH following pending medicaid.   Electronically signed by Katelyn Arguello RN on 12/20/2021 at 5:29 PM   [phone]

## 2021-12-20 NOTE — PROGRESS NOTES
PRE-OP NOTE  Department of Surgery      Chief Complaint or Reason for Surgery: Shayla's Gangrene    Procedure: exam under anesthesia, Wound vac change abdominal wall and perineum   Expected time: To follow    Plan  1. Diet: NPO at MN  2. IVF:  @2200  3. Antibiotics: Continue Unasyn  4. Labs to be drawn: CBC, renal, mag, PT-INR, T&S  5. Anesthesia: to see patient  6. Consent: Obtained, and placed in chart  7. Pulmonary: CXR: (12/9) WNL  8. Cardiac: EKG: (12/9) WNL  9.  Chemical DVT prophylaxis: Lynsey Ni MD  12/20/21  1:55 PM

## 2021-12-20 NOTE — PROGRESS NOTES
Given CHG bath w/ warm soapy wash cloths. Complete linen + gown change. Wound vac CDI w/ yellow/clear/serous output. Canister changed. Mcfadden care given during bath. Good UO (emptied 900ml) clear yellow urine. PICC line w/ new dressing applied by PICC RN- working adequately. All new tubing. Colostomy w/ red/moist stoma. Stool brown-filled the bag. New bag applied by wound/colost. RN. R side of arm more swollen than L (blood return noted in dual lumens). MD notified. Prevalon boots in place with fresh dressing on LLE by podiatry. On specialty mattress. SCDs on. VSS on 2 L 02. A+Ox4.  Pain controlled w/ sche'd Tylenol

## 2021-12-20 NOTE — FLOWSHEET NOTE
12/20/21 1040   Encounter Summary   Services provided to: Patient   Referral/Consult From: Rounding   Continue Visiting   (12/20, nigel )   Complexity of Encounter Low   Length of Encounter 15 minutes   Routine   Type Follow up   Busy w/ staff   Staff Cinthia Freeman MA

## 2021-12-20 NOTE — PROGRESS NOTES
Plastic Surgery   Daily Progress Note  Patient: Anisha Preez      CC: Shayla's gangrene    SUBJECTIVE:   Patient doing well this morning, states pain well controlled with meds. Tolerating diet. Denies n/v.    ROS:   A 14 point review of systems was conducted, significant findings as noted above. All other systems negative. OBJECTIVE:    PHYSICAL EXAM:    Vitals:    12/19/21 1542 12/19/21 1954 12/19/21 2320 12/20/21 0312   BP: 134/63 138/71 129/77 (!) 147/71   Pulse: 72 89 78 79   Resp:  16 16 16   Temp: 98 °F (36.7 °C) 98.3 °F (36.8 °C) 98.1 °F (36.7 °C) 97.8 °F (36.6 °C)   TempSrc: Oral Oral Oral Oral   SpO2: 94% 92% 97% 97%   Weight:       Height:           General appearance: alert, no acute distress, grooming appropriate  Eyes: No scleral icterus, EOM grossly intact  Neck: trachea midline, no JVD, no lymphadenopathy, neck supple  Chest/Lungs: Equal excursion bilaterally, normal effort with no accessory muscle use on 4L NC  Cardiovascular: RRR, brisk capillary refill  Abdomen:  Soft, large surgical debridement area of the abdomen with veraflo Vac in place holding adequate suction and instilling normal saline. Colostomy is pink, viable and with brown stool in the bag. Skin: warm and dry, no rashes  Extremities: no edema, no cyanosis  Genitourinary: Mcfadden in place with clear yellow urine, strip paste around penis without edema   Neuro: A&Ox3, no focal deficits, sensation intact    LABS:   Recent Labs     12/19/21 0527 12/20/21  0514   WBC 8.6 7.8   HGB 8.3* 8.2*   HCT 24.4* 24.6*   MCV 91.9 91.6    327        Recent Labs     12/19/21  0527 12/20/21  0513    139   K 4.2 4.1    104   CO2 30 31   PHOS 3.0 3.0   BUN 10 9   CREATININE 0.6* 0.5*        No results for input(s): AST, ALT, ALB, BILIDIR, BILITOT, ALKPHOS in the last 72 hours. No results for input(s): LIPASE, AMYLASE in the last 72 hours. No results for input(s): PROT, INR, APTT in the last 72 hours.      No results for

## 2021-12-20 NOTE — PROGRESS NOTES
Podiatric Surgery Daily Progress Note      Admit Date: 12/1/2021      Code:Full Code    Patient seen and examined, labs and records reviewed    Subjective:     Patient seen at bedside this AM. Patient denies pain to bilateral lower extremity. Patient denies fever, chills, shortness of breath, chest pain. Patient denies any acute events overnight. Review of Systems: A 10 point review of systems was conducted, significant findings as noted in HPI. All other systems negative. Objective     BP (!) 147/71   Pulse 79   Temp 97.8 °F (36.6 °C) (Oral)   Resp 16   Ht 5' 11\" (1.803 m)   Wt 199 lb 1.2 oz (90.3 kg)   SpO2 97%   BMI 27.77 kg/m²     I/O:    Intake/Output Summary (Last 24 hours) at 12/20/2021 0617  Last data filed at 12/20/2021 0504  Gross per 24 hour   Intake 420 ml   Output 3950 ml   Net -3530 ml              Wt Readings from Last 3 Encounters:   12/17/21 199 lb 1.2 oz (90.3 kg)   11/30/21 163 lb 3.2 oz (74 kg)   04/18/16 179 lb 14.3 oz (81.6 kg)       LABS:    Recent Labs     12/19/21  0527 12/20/21  0514   WBC 8.6 7.8   HGB 8.3* 8.2*   HCT 24.4* 24.6*    327        Recent Labs     12/20/21  0513      K 4.1      CO2 31   PHOS 3.0   BUN 9   CREATININE 0.5*        No results for input(s): PROT, INR, APTT in the last 72 hours. LOWER EXTREMITY EXAMINATION    Dressing to left LE intact. No strikethrough noted to the external dressing. Betadine discoloration noted to the internal layers of the dressing of the left LE.     VASCULAR:   DP and PT pulses are non-palpable b/l. Upon hand-held Doppler examination DP signals were noted to be monophasic and PT signals were noted to be nondopplerable. CFT slightly diminished to the distal digits of bilateral lower extremities. Skin temperature is warm to lukewarm to cool to the distal digits from proximal to distal.  No edema noted. No pain with calf compression b/l.     NEUROLOGIC:   Gross and epicritic sensation is diminished b/l. Protective sensation is diminished at all pedal sites b/l.     DERMATOLOGIC:     Left lower extremity:  Full-thickness ulceration noted to the lateral aspect of the left foot. Wound measures approximately 3.2 cm x 2 cm x 0.5 cm. Wound base with exposed bone, with necrotic edges at the proximal and distal edges. Wound probes to bone with exposed fifth metatarsal. Necrotic appearance of 5th digit. No tracking or tunneling noted. No purulent drainage noted. No fluctuance or crepitus or malodor noted. Deep tissue injury noted 2/2 Prevalon boot strap to the anterior aspect of the ankle. Intact epithelialized tissue noted. No crepitus, erythema, drainage, or malodor noted. No open wound noted. Stable without signs of acute infection noted. Right lower extremity  Partial thickness ulceration noted to the lateral malleolus 2/2 pressure. No fluctuance, crepitus, malodor, or drainage noted. No acute signs infection noted. Deep tissue injury noted at the styloid process of the 5th metatarsal. Purple circumferential discoloration noted. No open wound noted. No fluctuance or crepitus noted. No acute signs of infection noted. Pressure injury with red/purple discoloration noted to the dorsal aspect of the midfoot, right lower extremity. No open wound. No fluctuance, crepitus, malodor, or drainage noted. No acute signs of infection noted. MUSCULOSKELETAL:   Muscle strength 4/5 for all pedal muscle groups B/L LE. No pain with palpation of the left foot.      IMAGING:  XR LEFT FOOT 11/30/2021  Narrative   EXAMINATION:   THREE XRAY VIEWS OF THE LEFT FOOT       11/30/2021 1:44 pm       HISTORY:   ORDERING SYSTEM PROVIDED HISTORY: wound   TECHNOLOGIST PROVIDED HISTORY:   Reason for exam:->wound   Reason for Exam: blood sugar problem, wound check on lateral portion of foot   Acuity: Acute   Type of Exam: Initial       FINDINGS:   Osseous destruction along the articular margins of the 5th   metatarsophalangeal joint with associated lucency in the soft tissues. .   There is no evidence of fracture or dislocation. . The remaining joint spaces   appear well maintained. The remaining soft tissues are unremarkable.       Impression   Evidence of a septic joint involving the 5th metatarsal phalangeal joint with   associated osteomyelitis     ARTERIAL DUPLEX 12/2/21     Type of Study:        Extremities Arteries:Lower Extremities Arterial Duplex, VL LOWER EXTREMITY    ARTERIES DUPLEX BILATERAL.       Tech Comments   Right   The right ankle/brachial index is 0.0 (no Doppler signal could be heard at the   [facility] or the PT). The common femoral and profunda femoral arteries could not be visualized due   to bandages extending into the groin area. The mid superficial femoral artery has a short segmental occlusion and then is   reconstituted. Elevated velocities at the distal superficial femoral artery indicate a > 50%   stenosis by velocity criteria (velocity went from 15 to 298 cm/s). The posterior tibial artery is occluded. The distal anterior tibial artery is barely patent, with very low flow   (possibly occluded and then reconstituted). Left   The left ankle brachial index is 0 for the PT and the DP was not accessible   due to the bandages on the foot. The common femoral and profunda femoral artery could not be visualized due to   bandages extending into the groin area. The distal superficial femoral artery is occluded and then reconstituted. The posterior tibial artery, peroneal, and anterior tibial artery are   occluded. There were no previous studies to use for comparison.        ASSESSMENT/PLAN:   -Full-thickness ulceration; lateral left foot- [place] 4  -Osteomyelitis of the fifth metatarsal; left foot  -Partial thickness pressure ulceration, lateral malleolus right ankle  -Deep tissue injury, 5th metatarsal base, right foot  -Pressure injury, dorsal right midfoot -NPUAP Stage I  -Critical limb ischemia; bilateral lower extremity  -Diabetes mellitus, type II  -History of noncompliance    -Patient seen and examined at bedside this AM.  -Hypertensive, otherwise VSS on 4 L of O2 via NC; No leukocytosis noted (7.8)  -All imaging reviewed; impressions noted above  -CRP 42.6 (12/20/21)  -Prealbumin 9.8 (12/13/21)  -Vascular surgery following; will await further stabilization with the general surgery team and plastic surgery team before offering vascular intervention.  -Plastic surgery following  -Infectious disease following, currently on Unasyn  -Left lower extremity dressed with Betadine soaked gauze, DSD, and tape. -Right lower extremity applied Mepilex borders  -Prevalon boots reapplied. Patient is to wear at all times while in bed to prevent further deep tissue injury. Ankle strap removed from bilateral boot as the ankle straps were causing pressure to his feet. -Nonweightbearing to left lower extremity.  -Weightbearing as tolerated to right lower extremity. DISPO: Full-thickness ulceration with underlying osteomyelitis to the left fifth metatarsal. Critical limb ischemia to b/l LE. Wounds are stable at this time. Vascular following; awaiting further stabilization at this time. Plastic surgery, general surgery, and ID following. Patient will require podiatric surgical intervention but timing will depend on medical stability and vascular recommendations. Will continue to follow while in house. Patient examined and evaluated at bedside with Dr. Frederick Romeo DPM.    Drury Gottron, DPM  Podiatric Resident, PGY-2  Pager #: [phone] or Perfect Serve    Patient was seen and evaluated at bedside. Agree with residents assessment and treatment plan.   Frederick Romeo DPM

## 2021-12-21 ENCOUNTER — ANESTHESIA (OUTPATIENT)
Dept: OPERATING ROOM | Age: 66
DRG: 853 | End: 2021-12-21

## 2021-12-21 ENCOUNTER — ANESTHESIA EVENT (OUTPATIENT)
Dept: OPERATING ROOM | Age: 66
DRG: 853 | End: 2021-12-21

## 2021-12-21 VITALS — DIASTOLIC BLOOD PRESSURE: 62 MMHG | OXYGEN SATURATION: 100 % | SYSTOLIC BLOOD PRESSURE: 116 MMHG

## 2021-12-21 LAB
ABO/RH: NORMAL
ALBUMIN SERPL-MCNC: 2.4 G/DL (ref 3.4–5)
ANION GAP SERPL CALCULATED.3IONS-SCNC: 4 MMOL/L (ref 3–16)
ANTIBODY SCREEN: NORMAL
BASOPHILS ABSOLUTE: 0.1 K/UL (ref 0–0.2)
BASOPHILS RELATIVE PERCENT: 1 %
BUN BLDV-MCNC: 9 MG/DL (ref 7–20)
CALCIUM SERPL-MCNC: 8 MG/DL (ref 8.3–10.6)
CHLORIDE BLD-SCNC: 103 MMOL/L (ref 99–110)
CO2: 31 MMOL/L (ref 21–32)
CREAT SERPL-MCNC: 0.6 MG/DL (ref 0.8–1.3)
EOSINOPHILS ABSOLUTE: 0.3 K/UL (ref 0–0.6)
EOSINOPHILS RELATIVE PERCENT: 3.6 %
GFR AFRICAN AMERICAN: >60
GFR NON-AFRICAN AMERICAN: >60
GLUCOSE BLD-MCNC: 103 MG/DL (ref 70–99)
GLUCOSE BLD-MCNC: 123 MG/DL (ref 70–99)
GLUCOSE BLD-MCNC: 126 MG/DL (ref 70–99)
GLUCOSE BLD-MCNC: 128 MG/DL (ref 70–99)
GLUCOSE BLD-MCNC: 136 MG/DL (ref 70–99)
GLUCOSE BLD-MCNC: 161 MG/DL (ref 70–99)
HCT VFR BLD CALC: 25.3 % (ref 40.5–52.5)
HEMOGLOBIN: 8.4 G/DL (ref 13.5–17.5)
INR BLD: 0.95 (ref 0.88–1.12)
LYMPHOCYTES ABSOLUTE: 1.4 K/UL (ref 1–5.1)
LYMPHOCYTES RELATIVE PERCENT: 19.6 %
MAGNESIUM: 1.7 MG/DL (ref 1.8–2.4)
MCH RBC QN AUTO: 30.9 PG (ref 26–34)
MCHC RBC AUTO-ENTMCNC: 33.4 G/DL (ref 31–36)
MCV RBC AUTO: 92.4 FL (ref 80–100)
MONOCYTES ABSOLUTE: 0.7 K/UL (ref 0–1.3)
MONOCYTES RELATIVE PERCENT: 9.5 %
NEUTROPHILS ABSOLUTE: 4.7 K/UL (ref 1.7–7.7)
NEUTROPHILS RELATIVE PERCENT: 66.3 %
PDW BLD-RTO: 16 % (ref 12.4–15.4)
PERFORMED ON: ABNORMAL
PHOSPHORUS: 3.1 MG/DL (ref 2.5–4.9)
PLATELET # BLD: 291 K/UL (ref 135–450)
PMV BLD AUTO: 8 FL (ref 5–10.5)
POTASSIUM SERPL-SCNC: 4.2 MMOL/L (ref 3.5–5.1)
PROTHROMBIN TIME: 10.7 SEC (ref 9.9–12.7)
RBC # BLD: 2.74 M/UL (ref 4.2–5.9)
SODIUM BLD-SCNC: 138 MMOL/L (ref 136–145)
WBC # BLD: 7 K/UL (ref 4–11)

## 2021-12-21 PROCEDURE — 85610 PROTHROMBIN TIME: CPT

## 2021-12-21 PROCEDURE — 2580000003 HC RX 258: Performed by: SURGERY

## 2021-12-21 PROCEDURE — 80069 RENAL FUNCTION PANEL: CPT

## 2021-12-21 PROCEDURE — 87116 MYCOBACTERIA CULTURE: CPT

## 2021-12-21 PROCEDURE — 3700000000 HC ANESTHESIA ATTENDED CARE: Performed by: SURGERY

## 2021-12-21 PROCEDURE — 6360000002 HC RX W HCPCS: Performed by: NURSE ANESTHETIST, CERTIFIED REGISTERED

## 2021-12-21 PROCEDURE — 3700000001 HC ADD 15 MINUTES (ANESTHESIA): Performed by: SURGERY

## 2021-12-21 PROCEDURE — 97110 THERAPEUTIC EXERCISES: CPT

## 2021-12-21 PROCEDURE — 6360000002 HC RX W HCPCS

## 2021-12-21 PROCEDURE — 6360000002 HC RX W HCPCS: Performed by: STUDENT IN AN ORGANIZED HEALTH CARE EDUCATION/TRAINING PROGRAM

## 2021-12-21 PROCEDURE — 87205 SMEAR GRAM STAIN: CPT

## 2021-12-21 PROCEDURE — 3600000012 HC SURGERY LEVEL 2 ADDTL 15MIN: Performed by: SURGERY

## 2021-12-21 PROCEDURE — 6370000000 HC RX 637 (ALT 250 FOR IP): Performed by: STUDENT IN AN ORGANIZED HEALTH CARE EDUCATION/TRAINING PROGRAM

## 2021-12-21 PROCEDURE — 86850 RBC ANTIBODY SCREEN: CPT

## 2021-12-21 PROCEDURE — 85025 COMPLETE CBC W/AUTO DIFF WBC: CPT

## 2021-12-21 PROCEDURE — 36592 COLLECT BLOOD FROM PICC: CPT

## 2021-12-21 PROCEDURE — 6370000000 HC RX 637 (ALT 250 FOR IP): Performed by: SURGERY

## 2021-12-21 PROCEDURE — 2709999900 HC NON-CHARGEABLE SUPPLY: Performed by: SURGERY

## 2021-12-21 PROCEDURE — 87070 CULTURE OTHR SPECIMN AEROBIC: CPT

## 2021-12-21 PROCEDURE — 87102 FUNGUS ISOLATION CULTURE: CPT

## 2021-12-21 PROCEDURE — 7100000001 HC PACU RECOVERY - ADDTL 15 MIN: Performed by: SURGERY

## 2021-12-21 PROCEDURE — 86901 BLOOD TYPING SEROLOGIC RH(D): CPT

## 2021-12-21 PROCEDURE — 2500000003 HC RX 250 WO HCPCS: Performed by: NURSE ANESTHETIST, CERTIFIED REGISTERED

## 2021-12-21 PROCEDURE — 7100000000 HC PACU RECOVERY - FIRST 15 MIN: Performed by: SURGERY

## 2021-12-21 PROCEDURE — 83735 ASSAY OF MAGNESIUM: CPT

## 2021-12-21 PROCEDURE — 2580000003 HC RX 258: Performed by: STUDENT IN AN ORGANIZED HEALTH CARE EDUCATION/TRAINING PROGRAM

## 2021-12-21 PROCEDURE — 1200000000 HC SEMI PRIVATE

## 2021-12-21 PROCEDURE — 86900 BLOOD TYPING SEROLOGIC ABO: CPT

## 2021-12-21 PROCEDURE — 3600000002 HC SURGERY LEVEL 2 BASE: Performed by: SURGERY

## 2021-12-21 PROCEDURE — 2580000003 HC RX 258

## 2021-12-21 PROCEDURE — 15852 DRESSING CHANGE NOT FOR BURN: CPT | Performed by: SURGERY

## 2021-12-21 PROCEDURE — 87206 SMEAR FLUORESCENT/ACID STAI: CPT

## 2021-12-21 PROCEDURE — 87106 FUNGI IDENTIFICATION YEAST: CPT

## 2021-12-21 PROCEDURE — 87015 SPECIMEN INFECT AGNT CONCNTJ: CPT

## 2021-12-21 PROCEDURE — 6370000000 HC RX 637 (ALT 250 FOR IP)

## 2021-12-21 RX ORDER — MAGNESIUM HYDROXIDE 1200 MG/15ML
LIQUID ORAL CONTINUOUS PRN
Status: COMPLETED | OUTPATIENT
Start: 2021-12-21 | End: 2021-12-21

## 2021-12-21 RX ORDER — FENTANYL CITRATE 50 UG/ML
INJECTION, SOLUTION INTRAMUSCULAR; INTRAVENOUS PRN
Status: DISCONTINUED | OUTPATIENT
Start: 2021-12-21 | End: 2021-12-21 | Stop reason: SDUPTHER

## 2021-12-21 RX ORDER — FENTANYL CITRATE 50 UG/ML
25 INJECTION, SOLUTION INTRAMUSCULAR; INTRAVENOUS EVERY 5 MIN PRN
Status: DISCONTINUED | OUTPATIENT
Start: 2021-12-21 | End: 2021-12-21 | Stop reason: HOSPADM

## 2021-12-21 RX ORDER — MIDAZOLAM HYDROCHLORIDE 1 MG/ML
INJECTION INTRAMUSCULAR; INTRAVENOUS PRN
Status: DISCONTINUED | OUTPATIENT
Start: 2021-12-21 | End: 2021-12-21 | Stop reason: SDUPTHER

## 2021-12-21 RX ORDER — ONDANSETRON 2 MG/ML
4 INJECTION INTRAMUSCULAR; INTRAVENOUS
Status: DISCONTINUED | OUTPATIENT
Start: 2021-12-21 | End: 2021-12-21 | Stop reason: HOSPADM

## 2021-12-21 RX ORDER — PROPOFOL 10 MG/ML
INJECTION, EMULSION INTRAVENOUS PRN
Status: DISCONTINUED | OUTPATIENT
Start: 2021-12-21 | End: 2021-12-21 | Stop reason: SDUPTHER

## 2021-12-21 RX ORDER — INSULIN LISPRO 100 [IU]/ML
3 INJECTION, SOLUTION INTRAVENOUS; SUBCUTANEOUS 2 TIMES DAILY WITH MEALS
Status: DISCONTINUED | OUTPATIENT
Start: 2021-12-21 | End: 2022-01-11 | Stop reason: HOSPADM

## 2021-12-21 RX ORDER — LIDOCAINE HYDROCHLORIDE 20 MG/ML
INJECTION, SOLUTION EPIDURAL; INFILTRATION; INTRACAUDAL; PERINEURAL PRN
Status: DISCONTINUED | OUTPATIENT
Start: 2021-12-21 | End: 2021-12-21 | Stop reason: SDUPTHER

## 2021-12-21 RX ADMIN — DOCUSATE SODIUM 100 MG: 100 CAPSULE, LIQUID FILLED ORAL at 11:42

## 2021-12-21 RX ADMIN — OXYCODONE HYDROCHLORIDE 5 MG: 5 TABLET ORAL at 01:07

## 2021-12-21 RX ADMIN — OXYCODONE HYDROCHLORIDE 5 MG: 5 TABLET ORAL at 20:02

## 2021-12-21 RX ADMIN — MIDAZOLAM HYDROCHLORIDE 2 MG: 2 INJECTION, SOLUTION INTRAMUSCULAR; INTRAVENOUS at 09:15

## 2021-12-21 RX ADMIN — PROPOFOL 100 MG: 10 INJECTION, EMULSION INTRAVENOUS at 09:24

## 2021-12-21 RX ADMIN — AMPICILLIN SODIUM AND SULBACTAM SODIUM 3000 MG: 2; 1 INJECTION, POWDER, FOR SOLUTION INTRAMUSCULAR; INTRAVENOUS at 22:36

## 2021-12-21 RX ADMIN — HEPARIN SODIUM 5000 UNITS: 5000 INJECTION INTRAVENOUS; SUBCUTANEOUS at 22:19

## 2021-12-21 RX ADMIN — HEPARIN SODIUM 5000 UNITS: 5000 INJECTION INTRAVENOUS; SUBCUTANEOUS at 15:43

## 2021-12-21 RX ADMIN — LIDOCAINE HYDROCHLORIDE 100 MG: 20 INJECTION, SOLUTION EPIDURAL; INFILTRATION; INTRACAUDAL; PERINEURAL at 09:24

## 2021-12-21 RX ADMIN — AMLODIPINE BESYLATE 5 MG: 5 TABLET ORAL at 11:42

## 2021-12-21 RX ADMIN — INSULIN LISPRO 3 UNITS: 100 INJECTION, SOLUTION INTRAVENOUS; SUBCUTANEOUS at 11:44

## 2021-12-21 RX ADMIN — AMPICILLIN SODIUM AND SULBACTAM SODIUM 3000 MG: 2; 1 INJECTION, POWDER, FOR SOLUTION INTRAMUSCULAR; INTRAVENOUS at 15:43

## 2021-12-21 RX ADMIN — SODIUM CHLORIDE: 9 INJECTION, SOLUTION INTRAVENOUS at 11:50

## 2021-12-21 RX ADMIN — PHENYLEPHRINE HYDROCHLORIDE 100 MCG: 10 INJECTION, SOLUTION INTRAMUSCULAR; INTRAVENOUS; SUBCUTANEOUS at 10:15

## 2021-12-21 RX ADMIN — AMPICILLIN SODIUM AND SULBACTAM SODIUM 3000 MG: 2; 1 INJECTION, POWDER, FOR SOLUTION INTRAMUSCULAR; INTRAVENOUS at 08:25

## 2021-12-21 RX ADMIN — SODIUM CHLORIDE: 9 INJECTION, SOLUTION INTRAVENOUS at 04:36

## 2021-12-21 RX ADMIN — PHENYLEPHRINE HYDROCHLORIDE 100 MCG: 10 INJECTION, SOLUTION INTRAMUSCULAR; INTRAVENOUS; SUBCUTANEOUS at 09:59

## 2021-12-21 RX ADMIN — PHENYLEPHRINE HYDROCHLORIDE 100 MCG: 10 INJECTION, SOLUTION INTRAMUSCULAR; INTRAVENOUS; SUBCUTANEOUS at 10:09

## 2021-12-21 RX ADMIN — POLYETHYLENE GLYCOL 3350 17 G: 17 POWDER, FOR SOLUTION ORAL at 11:43

## 2021-12-21 RX ADMIN — ACETAMINOPHEN 1000 MG: 500 TABLET ORAL at 20:01

## 2021-12-21 RX ADMIN — FENTANYL CITRATE 100 MCG: 50 INJECTION, SOLUTION INTRAMUSCULAR; INTRAVENOUS at 10:47

## 2021-12-21 RX ADMIN — ACETAMINOPHEN 1000 MG: 500 TABLET ORAL at 06:40

## 2021-12-21 RX ADMIN — SODIUM CHLORIDE, PRESERVATIVE FREE 10 ML: 5 INJECTION INTRAVENOUS at 11:43

## 2021-12-21 RX ADMIN — OXYCODONE HYDROCHLORIDE 10 MG: 5 TABLET ORAL at 15:47

## 2021-12-21 RX ADMIN — ACETAMINOPHEN 1000 MG: 500 TABLET ORAL at 01:03

## 2021-12-21 RX ADMIN — FENTANYL CITRATE 100 MCG: 50 INJECTION, SOLUTION INTRAMUSCULAR; INTRAVENOUS at 09:19

## 2021-12-21 RX ADMIN — DOCUSATE SODIUM 100 MG: 100 CAPSULE, LIQUID FILLED ORAL at 22:18

## 2021-12-21 RX ADMIN — INSULIN HUMAN 2 UNITS: 100 INJECTION, SOLUTION PARENTERAL at 17:51

## 2021-12-21 RX ADMIN — HEPARIN SODIUM 5000 UNITS: 5000 INJECTION INTRAVENOUS; SUBCUTANEOUS at 06:38

## 2021-12-21 RX ADMIN — ACETAMINOPHEN 1000 MG: 500 TABLET ORAL at 11:42

## 2021-12-21 RX ADMIN — SODIUM CHLORIDE, PRESERVATIVE FREE 10 ML: 5 INJECTION INTRAVENOUS at 22:23

## 2021-12-21 RX ADMIN — AMPICILLIN SODIUM AND SULBACTAM SODIUM 3000 MG: 2; 1 INJECTION, POWDER, FOR SOLUTION INTRAMUSCULAR; INTRAVENOUS at 04:28

## 2021-12-21 RX ADMIN — INSULIN LISPRO 3 UNITS: 100 INJECTION, SOLUTION INTRAVENOUS; SUBCUTANEOUS at 17:51

## 2021-12-21 ASSESSMENT — PULMONARY FUNCTION TESTS
PIF_VALUE: 17
PIF_VALUE: 13
PIF_VALUE: 10
PIF_VALUE: 0
PIF_VALUE: 10
PIF_VALUE: 9
PIF_VALUE: 10
PIF_VALUE: 11
PIF_VALUE: 9
PIF_VALUE: 10
PIF_VALUE: 11
PIF_VALUE: 18
PIF_VALUE: 10
PIF_VALUE: 10
PIF_VALUE: 5
PIF_VALUE: 10
PIF_VALUE: 1
PIF_VALUE: 0
PIF_VALUE: 10
PIF_VALUE: 9
PIF_VALUE: 0
PIF_VALUE: 10
PIF_VALUE: 10
PIF_VALUE: 11
PIF_VALUE: 9
PIF_VALUE: 10
PIF_VALUE: 0
PIF_VALUE: 11
PIF_VALUE: 9
PIF_VALUE: 10
PIF_VALUE: 10
PIF_VALUE: 11
PIF_VALUE: 9
PIF_VALUE: 10
PIF_VALUE: 11
PIF_VALUE: 9
PIF_VALUE: 10
PIF_VALUE: 14
PIF_VALUE: 0
PIF_VALUE: 10
PIF_VALUE: 1
PIF_VALUE: 0
PIF_VALUE: 0
PIF_VALUE: 10
PIF_VALUE: 1
PIF_VALUE: 12
PIF_VALUE: 10
PIF_VALUE: 10
PIF_VALUE: 1
PIF_VALUE: 10
PIF_VALUE: 11
PIF_VALUE: 10
PIF_VALUE: 10
PIF_VALUE: 11
PIF_VALUE: 9
PIF_VALUE: 1
PIF_VALUE: 0
PIF_VALUE: 10
PIF_VALUE: 0
PIF_VALUE: 10
PIF_VALUE: 10
PIF_VALUE: 0
PIF_VALUE: 10
PIF_VALUE: 9
PIF_VALUE: 0
PIF_VALUE: 12
PIF_VALUE: 10
PIF_VALUE: 9
PIF_VALUE: 10
PIF_VALUE: 10
PIF_VALUE: 9
PIF_VALUE: 1
PIF_VALUE: 0
PIF_VALUE: 10
PIF_VALUE: 10
PIF_VALUE: 11
PIF_VALUE: 0
PIF_VALUE: 10
PIF_VALUE: 1
PIF_VALUE: 10
PIF_VALUE: 11
PIF_VALUE: 10
PIF_VALUE: 10
PIF_VALUE: 12
PIF_VALUE: 10
PIF_VALUE: 11
PIF_VALUE: 10
PIF_VALUE: 0
PIF_VALUE: 2
PIF_VALUE: 12

## 2021-12-21 ASSESSMENT — PAIN SCALES - GENERAL
PAINLEVEL_OUTOF10: 5
PAINLEVEL_OUTOF10: 6
PAINLEVEL_OUTOF10: 7
PAINLEVEL_OUTOF10: 0
PAINLEVEL_OUTOF10: 6
PAINLEVEL_OUTOF10: 5
PAINLEVEL_OUTOF10: 6
PAINLEVEL_OUTOF10: 5

## 2021-12-21 ASSESSMENT — PAIN DESCRIPTION - FREQUENCY
FREQUENCY: CONTINUOUS
FREQUENCY: CONTINUOUS

## 2021-12-21 ASSESSMENT — PAIN DESCRIPTION - ORIENTATION
ORIENTATION: MID
ORIENTATION: MID

## 2021-12-21 ASSESSMENT — PAIN - FUNCTIONAL ASSESSMENT: PAIN_FUNCTIONAL_ASSESSMENT: 0-10

## 2021-12-21 ASSESSMENT — PAIN DESCRIPTION - DESCRIPTORS
DESCRIPTORS: DISCOMFORT
DESCRIPTORS: DISCOMFORT

## 2021-12-21 ASSESSMENT — PAIN DESCRIPTION - PROGRESSION
CLINICAL_PROGRESSION: NOT CHANGED
CLINICAL_PROGRESSION: NOT CHANGED

## 2021-12-21 ASSESSMENT — PAIN DESCRIPTION - LOCATION
LOCATION: BUTTOCKS
LOCATION: BUTTOCKS

## 2021-12-21 ASSESSMENT — PAIN DESCRIPTION - ONSET
ONSET: ON-GOING
ONSET: ON-GOING

## 2021-12-21 ASSESSMENT — PAIN DESCRIPTION - PAIN TYPE
TYPE: SURGICAL PAIN
TYPE: SURGICAL PAIN

## 2021-12-21 NOTE — PROGRESS NOTES
Podiatric Surgery Daily Progress Note      Admit Date: 12/1/2021      Code:Full Code    Patient seen and examined, labs and records reviewed    Subjective:     Patient seen at bedside this AM. Patient denies pain to bilateral lower extremities. Patient denies fever, chills, shortness of breath, chest pain. Patient denies any acute events overnight. Review of Systems: A 10 point review of systems was conducted, significant findings as noted in HPI. All other systems negative. Objective     BP (!) 149/75   Pulse 74   Temp 97.8 °F (36.6 °C) (Oral)   Resp 18   Ht 5' 11\" (1.803 m)   Wt 199 lb 1.2 oz (90.3 kg)   SpO2 96%   BMI 27.77 kg/m²     I/O:    Intake/Output Summary (Last 24 hours) at 12/21/2021 3089  Last data filed at 12/21/2021 0438  Gross per 24 hour   Intake 8611.71 ml   Output 3500 ml   Net 5111.71 ml              Wt Readings from Last 3 Encounters:   12/17/21 199 lb 1.2 oz (90.3 kg)   11/30/21 163 lb 3.2 oz (74 kg)   04/18/16 179 lb 14.3 oz (81.6 kg)       LABS:    Recent Labs     12/19/21  0527 12/20/21  0514   WBC 8.6 7.8   HGB 8.3* 8.2*   HCT 24.4* 24.6*    327        Recent Labs     12/20/21  0513      K 4.1      CO2 31   PHOS 3.0   BUN 9   CREATININE 0.5*        No results for input(s): PROT, INR, APTT in the last 72 hours. LOWER EXTREMITY EXAMINATION    Dressing to left LE intact. No strikethrough noted to the external dressing. Betadine discoloration noted to the internal layers of the dressing of the left LE.     VASCULAR:   DP and PT pulses are non-palpable b/l. Upon hand-held Doppler examination DP signals were noted to be monophasic and PT signals were noted to be nondopplerable. CFT slightly diminished to the distal digits of bilateral lower extremities. Skin temperature is warm to lukewarm to cool to the distal digits from proximal to distal.  No edema noted.  No pain with calf compression b/l.     NEUROLOGIC:   Gross and epicritic sensation is diminished b/l. Protective sensation is diminished at all pedal sites b/l.     DERMATOLOGIC:     Left lower extremity:  Full-thickness ulceration noted to the lateral aspect of the left foot. Wound measures approximately 3.2 cm x 2 cm x 0.5 cm. Wound base with exposed bone, with necrotic edges at the proximal and distal edges. Wound probes to bone with exposed fifth metatarsal. Necrotic appearance of 5th digit. No tracking or tunneling noted. No purulent drainage noted. No fluctuance or crepitus or malodor noted. Deep tissue injury noted 2/2 Prevalon boot strap to the anterior aspect of the ankle. Intact epithelialized tissue noted. No crepitus, erythema, drainage, or malodor noted. No open wound noted. Stable without signs of acute infection noted. Right lower extremity  Partial thickness ulceration noted to the lateral malleolus 2/2 pressure. No fluctuance, crepitus, malodor, or drainage noted. No acute signs infection noted. Partial thickness ulceration 2/2 pressure noted at the styloid process of the 5th metatarsal. Fibrotic tissue noted. No fluctuance or crepitus noted. No acute signs of infection noted. MUSCULOSKELETAL:   Muscle strength 4/5 for all pedal muscle groups B/L LE. No pain with palpation of the left foot. IMAGING:  XR LEFT FOOT 11/30/2021  Narrative   EXAMINATION:   THREE XRAY VIEWS OF THE LEFT FOOT       11/30/2021 1:44 pm       HISTORY:   ORDERING SYSTEM PROVIDED HISTORY: wound   TECHNOLOGIST PROVIDED HISTORY:   Reason for exam:->wound   Reason for Exam: blood sugar problem, wound check on lateral portion of foot   Acuity: Acute   Type of Exam: Initial       FINDINGS:   Osseous destruction along the articular margins of the 5th   metatarsophalangeal joint with associated lucency in the soft tissues. .   There is no evidence of fracture or dislocation. . The remaining joint spaces   appear well maintained.  The remaining soft tissues are unremarkable.     (12/20/21)  -Prealbumin 9.8 (12/13/21)  -Vascular surgery following; will await further stabilization with the general surgery team and plastic surgery team before offering vascular intervention.  -Plastic surgery following  -Infectious disease following, currently on Unasyn  -Left lower extremity dressed with Betadine soaked gauze, DSD, and tape. -Right lower extremity applied Mepilex borders  -Prevalon boots reapplied. Patient is to wear at all times while in bed to prevent further deep tissue injury. Ankle strap removed from bilateral boot as the ankle straps were causing pressure to his feet. -Nonweightbearing to left lower extremity.  -Weightbearing as tolerated to right lower extremity. DISPO: Full-thickness ulceration with underlying osteomyelitis to the left fifth metatarsal. Critical limb ischemia to b/l LE. Wounds are stable at this time. Vascular following; awaiting further stabilization at this time. Plastic surgery, general surgery, and ID following. Patient will require podiatric surgical intervention but timing will depend on medical stability and vascular recommendations. Will continue to follow while in house. Discussed assessment and plan with Dr. Litzy Del Rio DPM.    Johnie Zavaleta DPM  Podiatric Resident, PGY-2  Pager #: [phone] or Perfect Serve    Patient was seen and evaluated at bedside. Agree with residents assessment and treatment plan.   Litzy Del Rio DPM

## 2021-12-21 NOTE — PLAN OF CARE
Problem: Activity:  Goal: Ability to return to normal activity level will improve  Description: Ability to return to normal activity level will improve  Outcome: Ongoing  Note: Worked with therapy today. Tolerating turns well. Will monitor/encourage movement in the bed (difficult for pt to attempt getting in the chair dt placement of wound vac)     Problem: Bowel/Gastric:  Goal: Gastrointestinal status for postoperative course will improve  Description: Gastrointestinal status for postoperative course will improve  Outcome: Ongoing  Note: Colostomy intact w/ normal amount stool . Denies nausea

## 2021-12-21 NOTE — PROGRESS NOTES
Surgery Daily Progress Note  [facility]  CC:  Shayla's gangrene      Subjective :No acute events overnight. Patient remains NPO. Voiding appropriately. Objective    Infusions:   sodium chloride 125 mL/hr at 12/21/21 0436    sodium chloride      sodium chloride 25 mL (12/12/21 0850)    dextrose          I/O:I/O last 3 completed shifts: In: 7701.7 [P.O.:1000; I.V.:1880; IV Piggyback:4821.7]  Out: 3575 [Urine:2275; Drains:1300]           Wt Readings from Last 1 Encounters:   12/17/21 199 lb 1.2 oz (90.3 kg)                 LABS:    Recent Labs     12/19/21  0527 12/20/21  0514   WBC 8.6 7.8   HGB 8.3* 8.2*   HCT 24.4* 24.6*   MCV 91.9 91.6    327        Recent Labs     12/19/21  0527 12/20/21  0513    139   K 4.2 4.1    104   CO2 30 31   PHOS 3.0 3.0   BUN 10 9   CREATININE 0.6* 0.5*      No results for input(s): AST, ALT, ALB, BILIDIR, BILITOT, ALKPHOS in the last 72 hours. No results for input(s): LIPASE, AMYLASE in the last 72 hours. No results for input(s): PROT, INR, APTT in the last 72 hours. No results for input(s): CKTOTAL, CKMB, CKMBINDEX, TROPONINI in the last 72 hours. Exam:BP (!) 149/75   Pulse 74   Temp 97.8 °F (36.6 °C) (Oral)   Resp 18   Ht 5' 11\" (1.803 m)   Wt 199 lb 1.2 oz (90.3 kg)   SpO2 96%   BMI 27.77 kg/m²   General appearance: alert, appears stated age and cooperative  Eyes: No scleral icterus, EOM grossly intact  Neck: trachea midline, no JVD, no lymphadenopathy, neck supple  Chest/Lungs: Equal excursion bilaterally, normal effort with no accessory muscle use on 4L NC  Cardiovascular: RRR, brisk capillary refill  Abdomen:  Soft, large surgical debridement area of the abdomen with veraflo Vac in place holding adequate suction and instilling normal saline. Colostomy is pink, viable and with brown stool in the bag. Skin: warm and dry, no rashes  Extremities: no edema, no cyanosis  Genitourinary:  Mcfadden in place with clear yellow urine, strip paste around penis without edema   Neuro: A&Ox3, no focal deficits, sensation intact      ASSESSMENT/PLAN: Pt. is a 61 y.o. male with Necrotizing fasciitis of the abdominal wall, gluteal region, and scrotum s/p wide excision of perineum, back, and abdominal wall. S/p multiple debridements and wound vac placement and changes intraoperatively with diverting colostomy creation (12/7). OR wound vac change (12/10). OR wound vac change (12/13). OR wound vac change (12/16). - Patient to the OR today for wound vac change  - patient to remain NPO with IVF  - Will resume carb free diet after surgery  - sliding scale insulin for glucose control.  Will add 3U BID with lunch and dinner to assist with better glycemic control   - continue abx per ID      JAGJIT Harmon - CNP 12/21/2021 6:22 AM  [phone]

## 2021-12-21 NOTE — ANESTHESIA PRE PROCEDURE
Department of Anesthesiology  Preprocedure Note       Name:  Kamini Vasquez   Age:  61 y.o.  :  1955                                          MRN:  [id number]         Date:  2021      Surgeon: Lord Loredo):  Kev Garrett MD    Procedure: Procedure(s):  EVALUATION UNDER ANESTHESIA/ WOUND VAC CHANGE ABDOMINAL WALL AND PERINEUM    Medications prior to admission:   Prior to Admission medications    Medication Sig Start Date End Date Taking?  Authorizing Provider   metFORMIN (GLUCOPHAGE) 500 MG tablet Take 500 mg by mouth daily (with breakfast)    Historical Provider, MD   meloxicam (MOBIC) 15 MG tablet Take 1 tablet by mouth daily 16   Anthony Huerta DO       Current medications:    Current Facility-Administered Medications   Medication Dose Route Frequency Provider Last Rate Last Admin    insulin lispro (1 Unit Dial) 3 Units  3 Units SubCUTAneous BID WC Jazmin Smita, DO        insulin glargine (LANTUS;BASAGLAR) injection pen 12 Units  12 Units SubCUTAneous QAM Jazmin Smita, DO   12 Units at 21 0826    0.9 % sodium chloride infusion   IntraVENous Continuous Breann Stroud  mL/hr at 21 0436 New Bag at 21 0436    insulin regular (HUMULIN R;NOVOLIN R) injection 0-18 Units  0-18 Units SubCUTAneous 4x Daily AC & HS Rowena Mcmahon MD   5 Units at 21 2114    0.9 % sodium chloride infusion   IntraVENous PRN Richard Lenz DO        heparin (porcine) injection 5,000 Units  5,000 Units SubCUTAneous 3 times per day Richard Lenz DO   5,000 Units at 21 2529    alteplase (CATHFLO) injection 1 mg  1 mg IntraCATHeter PRN Richard Lenz DO   1 mg at 21 1034    docusate sodium (COLACE) capsule 100 mg  100 mg Oral BID Richard Lenz DO   100 mg at 21 2121    polyethylene glycol (GLYCOLAX) packet 17 g  17 g Oral Daily Richard Lenz DO   17 g at 21 0851    ipratropium-albuterol (DUONEB) nebulizer solution 1 ampule  1 ampule Inhalation Q4H PRN Valenzuela Curia, DO   1 ampule at 12/10/21 1202    dextrose 50 % IV solution  12.5 g IntraVENous PRN Valenzuela Curia, DO        amLODIPine (NORVASC) tablet 5 mg  5 mg Oral Daily Valenzuela Curia, DO   5 mg at 12/20/21 6700    hydrALAZINE (APRESOLINE) injection 10 mg  10 mg IntraVENous Q6H PRN Valenzuela Curia, DO        ampicillin-sulbactam (UNASYN) 3000 mg ivpb minibag  3,000 mg IntraVENous Q6H Valenzuela Curia, DO   Stopped at 12/21/21 0857    oxyCODONE (ROXICODONE) immediate release tablet 5 mg  5 mg Oral Q4H PRN Valenzuela Curia, DO   5 mg at 12/21/21 0107    Or    oxyCODONE (ROXICODONE) immediate release tablet 10 mg  10 mg Oral Q4H PRN Valenzuela Curia, DO   10 mg at 12/16/21 1948    acetaminophen (TYLENOL) tablet 1,000 mg  1,000 mg Oral Q6H Valenzuela Curia, DO   1,000 mg at 12/21/21 0640    sodium chloride flush 0.9 % injection 5-40 mL  5-40 mL IntraVENous 2 times per day Valenzuela Curia, DO   10 mL at 12/20/21 2145    sodium chloride flush 0.9 % injection 5-40 mL  5-40 mL IntraVENous PRN Valenzuela Curia, DO        0.9 % sodium chloride infusion  25 mL IntraVENous PRN Valenzuela Curia,  mL/hr at 12/12/21 0850 25 mL at 12/12/21 0850    glucose (GLUTOSE) 40 % oral gel 15 g  15 g Oral PRN Valenzuela Curia, DO        dextrose 50 % IV solution  12.5 g IntraVENous PRN Valenzuela Curia, DO        glucagon (rDNA) injection 1 mg  1 mg IntraMUSCular PRN Valenzuela Curia, DO        dextrose 5 % solution  100 mL/hr IntraVENous PRN Valenzuela Curia, DO           Allergies:  No Known Allergies    Problem List:    Patient Active Problem List   Diagnosis Code    Diabetic acidosis without coma ([address] E11.10    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 No pertinent past medical history.     Past Surgical History:        Procedure Laterality Date    ABDOMEN SURGERY N/A 12/1/2021    WIDE EXCISION PERINEUM, BACK, ABDOMINAL WALL performed by Velia Dumont MD at [facility] Left 12/3/2021    ABDOMINAL WALL AND LEFT BUTTOCK DEBRIDEMENT, WOUND VAC PLACEMENT performed by Karla Weems MD at [facility] Left 12/5/2021    ABDOMINAL WALL AND LEFT BUTTOCK DEBRIDEMENT, WOUND VAC PLACEMENT performed by Karla Weems MD at [facility] N/A 12/7/2021    EVALUATION UNDER ANESTHESIA WITH DEBRIDEMENT ABDOMINAL WOUND AND LEFT BUTTOCK, WOUND VAC CHANGE performed by Karla Weems MD at [facility] Left 12/10/2021    ABDOMINAL WALL AND LEFT BUTTOCK WOUND VAC CHANGE WITH FURTHER DEBRIDEMENT ABDOMEN AND LEFT BUTTOCK WOUND performed by Karla Weems MD at [facility] N/A 12/13/2021    WOUND VAC CHANGE ABDOMEN AND LEFT BUTTOCK performed by Karla Weems MD at [facility] N/A 12/16/2021    2ND LOOK/ EVALUATION UNDER ANESTHESIA/ WOUND VAC CHANGE ABDOMINAL WALL AND PERINEUM performed by Karla Weems MD at [address] Left 12/7/2021    LAPAROSCOPIC COLOSTOMY CREATION performed by Velia Dumont MD at [address] N/A 11/30/2021    DEBRIDEMENT OF SCROTAL JAYRO'S GANGRENE performed by Keo Rice MD at [id number] [place] [address] N/A 11/30/2021    PERINEAL SOFT TISSUE EXCISIONAL DEBRIDEMENT performed by Arvin Rodgers MD at [place] [address] History:    Social History     Tobacco Use    Smoking status: Current Every Day Smoker    Smokeless tobacco: Never Used   Substance Use Topics    Alcohol use:  Yes     Alcohol/week: 0.0 standard drinks     Comment: social                                 Ready to quit: Not Answered  Counseling given: Not Answered      Vital Signs (Current):   Vitals:    12/21/21 0420 12/21/21 0714 12/21/21 0845 12/21/21 0900   BP: (!) 149/75 (!) 149/77 (!) 141/76 130/84   Pulse: 74 78 76 76   Resp: 18 15 16 16 Temp: 97.8 °F (36.6 °C) 98.1 °F (36.7 °C) 97.5 °F (36.4 °C)    TempSrc: Oral Oral Tympanic    SpO2: 96% 95% 100%    Weight:       Height:                                                  BP Readings from Last 3 Encounters:   12/21/21 130/84   12/21/21 (!) 86/53   12/16/21 113/69       NPO Status: Time of last liquid consumption: 0948                        Time of last solid consumption: 1800                        Date of last liquid consumption: 12/20/21                        Date of last solid food consumption: 12/13/21    BMI:   Wt Readings from Last 3 Encounters:   12/17/21 199 lb 1.2 oz (90.3 kg)   11/30/21 163 lb 3.2 oz (74 kg)   04/18/16 179 lb 14.3 oz (81.6 kg)     Body mass index is 27.77 kg/m².     CBC:   Lab Results   Component Value Date    WBC 7.0 12/21/2021    RBC 2.74 12/21/2021    HGB 8.4 12/21/2021    HCT 25.3 12/21/2021    MCV 92.4 12/21/2021    RDW 16.0 12/21/2021     12/21/2021       CMP:   Lab Results   Component Value Date     12/21/2021    K 4.2 12/21/2021    K 3.8 12/12/2021     12/21/2021    CO2 31 12/21/2021    BUN 9 12/21/2021    CREATININE 0.6 12/21/2021    GFRAA >60 12/21/2021    AGRATIO 0.7 11/30/2021    LABGLOM >60 12/21/2021    GLUCOSE 128 12/21/2021    PROT 4.8 12/10/2021    CALCIUM 8.0 12/21/2021    BILITOT 0.3 12/10/2021    ALKPHOS 170 12/10/2021    AST 13 12/10/2021    ALT 7 12/10/2021       POC Tests:   Recent Labs     12/21/21  0735   POCGLU 161*       Coags:   Lab Results   Component Value Date    PROTIME 10.7 12/21/2021    INR 0.95 12/21/2021       HCG (If Applicable): No results found for: PREGTESTUR, PREGSERUM, HCG, HCGQUANT     ABGs:   Lab Results   Component Value Date    PHART 7.227 12/09/2021    PO2ART 107.8 12/09/2021    CXJ4ZFT 42.4 12/09/2021    EBH1YOU 17.6 12/09/2021    BEART -10 12/09/2021    M8DXNXNO 97 12/09/2021        Type & Screen (If Applicable):  No results found for: LABABO, LABRH    Drug/Infectious Status (If Applicable):  No results found for: HIV, HEPCAB    COVID-19 Screening (If Applicable):   Lab Results   Component Value Date    COVID19 Not Detected 11/30/2021           Anesthesia Evaluation  Patient summary reviewed and Nursing notes reviewed  Airway: Mallampati: II  TM distance: >3 FB   Neck ROM: full  Mouth opening: > = 3 FB Dental: normal exam         Pulmonary:Negative Pulmonary ROS and normal exam              Patient did not smoke on day of surgery. Cardiovascular:Negative CV ROS  Exercise tolerance: good (>4 METS),         ECG reviewed  Rhythm: regular  Rate: normal           Beta Blocker:  Not on Beta Blocker         Neuro/Psych:   Negative Neuro/Psych ROS              GI/Hepatic/Renal: Neg GI/Hepatic/Renal ROS            Endo/Other:    (+) DiabetesType II DM, no interval change, , .                 Abdominal:       Abdomen: soft. Vascular: negative vascular ROS. Other Findings:             Anesthesia Plan      general     ASA 3       Induction: intravenous. MIPS: Postoperative opioids intended and Prophylactic antiemetics administered. Anesthetic plan and risks discussed with patient. Use of blood products discussed with patient whom consented to blood products. Plan discussed with attending and CRNA.     Attending anesthesiologist reviewed and agrees with Preprocedure content              Idalmis Murguia DO   12/21/2021

## 2021-12-21 NOTE — CARE COORDINATION
CM following, pt to OR today, plan OR 48/72 hours for dressing change again    Spoke with Sandy Mari at [facility] she cont to follow pt. Pt medicaid pending at this time.   Electronically signed by Juan Alberto العلي RN on 12/21/2021 at 2:46 [phone]

## 2021-12-21 NOTE — BRIEF OP NOTE
Brief Postoperative Note      Patient: Shayla Bedolla  YOB: 1955  MRN: [id number]    Date of Procedure: 12/21/2021    Pre-Op Diagnosis: NECROTIZING FASCIITIS LEFT BUTTOCK AND ABDOMEN    Post-Op Diagnosis: Same       Procedure(s):  EVALUATION UNDER ANESTHESIA/ WOUND VAC CHANGE ABDOMINAL WALL AND PERINEUM    Surgeon(s):  Luli Chan MD    Assistant:  Surgical Assistant: Mary Alvarenga  Resident: Fernanda Echols DO    Anesthesia: General    Estimated Blood Loss (mL): 5mL    Complications: None    Specimens:   ID Type Source Tests Collected by Time Destination   1 : 1. SKIN BASE OF PENIS FOR AEROBIC, ANAEROBIC CX, ACID FAST, FUNGUS, AND JONI BACTERIA. Tissue Tissue CULTURE, TISSUE Luli Chan MD 12/21/2021 1006        Implants:  * No implants in log *      Drains:   Negative Pressure Wound Therapy Abdomen Lower; Medial (Active)   Wound Type Surgical 12/20/21 1540   Dressing Type Black foam 12/20/21 1540   Target Pressure (mmHg) 125 12/21/21 0646   Canister changed? No 12/21/21 0646   Output (ml) 75 ml 12/21/21 0646       Negative Pressure Wound Therapy Perineum (Active)   Wound Type Surgical 12/21/21 0101   Dressing Type Black foam 12/21/21 0101   Canister changed? No 12/21/21 0101   Dressing Status Clean;Dry; Intact 12/21/21 0101   Drainage Description Yellow 12/21/21 0101   Output (ml) 500 ml 12/21/21 0101       Colostomy LUQ (Active)   Stomal Appliance 2 piece 12/20/21 1552   Flange Size (inches) 2 Inches 12/20/21 1552   Stoma  Assessment Pink;Protrudes; Moist 12/20/21 1552   Mucocutaneous Junction Intact 12/20/21 1552   Peristomal Assessment Clean; Intact 12/20/21 1552   Treatment Bag change;Site care 12/20/21 1552   Stool Appearance Formed 12/20/21 1552   Stool Color Brown 12/20/21 1552   Stool Amount Large 12/20/21 1552   Output (mL) 350 ml 12/19/21 2159       Urethral Catheter Non-latex 18 fr (Active)   Catheter Indications Assist in healing of open sacral or perineal wounds (Stage III, IV, or unstageable documented by a provider or enterostomal therapy) and/or full thickness perineal and lower extremity burns in incontinent patients 12/19/21 0031   Securement Device Date Changed 12/13/21 12/13/21 1659   Site Assessment No urethral drainage 12/20/21 2130   Urine Color Yellow 12/21/21 0829   Urine Appearance Clear 12/21/21 0829   Urine Odor Other (Comment) 12/21/21 0438   Output (mL) 175 mL 12/21/21 0438       [REMOVED] Negative Pressure Wound Therapy Abdomen Mid (Removed)   Wound Type Surgical 12/07/21 1600   Dressing Type Black foam 12/07/21 1600   Cycle Continuous 12/07/21 1600   Target Pressure (mmHg) 125 12/07/21 1600   Irrigation Solution Dakins 0.125% 12/06/21 0509   Canister changed? Yes 12/07/21 1600   Dressing Status Clean;Dry; Intact 12/07/21 1600   Dressing Changed Changed/New 12/06/21 0400   Output (ml) 200 ml 12/07/21 1600   Odor None 12/07/21 1600       [REMOVED] Negative Pressure Wound Therapy Groin Lower; Medial (Removed)   Wound Type Surgical 12/16/21 0930   Unit Type VAC Ulta 12/16/21 0930   Dressing Type Black foam 12/16/21 0930   Number of pieces used 2 12/13/21 1558   Cycle Continuous 12/16/21 0930   Target Pressure (mmHg) 125 12/16/21 0930   Irrigation Solution Sodium chloride 0.9% 12/16/21 0930   Instillation Volume  850 mL 12/15/21 0425   Canister changed? Yes 12/16/21 0056   Dressing Status Clean;Dry; Intact 12/16/21 0930   Dressing Changed Dressing reinforced 12/16/21 0930   Drainage Amount None 12/13/21 1659   Drainage Description Serous 12/12/21 0820   Output (ml) 900 ml 12/16/21 0056   Wound Assessment Other (Comment) 12/14/21 2039   Princess-wound Assessment Other (Comment) 12/13/21 1659   Odor None 12/13/21 0754       [REMOVED] NG/OG/NJ/NE Tube Orogastric 16 fr Center mouth (Removed)   Surrounding Skin Dry; Intact 12/01/21 2200   Securement device Yes 12/02/21 0800   Status Clamped 12/02/21 0800   Placement Verified by External Catheter Length 12/02/21 0800   NG/OG/NJ/NE External Measurement (cm) 50 cm 12/02/21 0800   Drainage Appearance Bile 12/01/21 2200   Tube Feeding Intake (mL) 0 ml 12/01/21 1800   Free Water Flush (mL) 0 mL 12/01/21 1800   Free Water Rate 0 12/01/21 0800   Residual Volume (ml) 0 ml 12/01/21 0000       [REMOVED] NG/OG/NJ/NE Tube Left nostril (Removed)       [REMOVED] NG/OG/NJ/NE Tube Orogastric 16 fr Center mouth (Removed)       Findings: Some necrotic tissue around the base of the penis, excised and sent for culture. Stapled skin at left groin with inadequate healing, sutured with chromic, staples removed. Some chromic sutures also placed at LUQ to evaluate wound healing at next EUA. Instil wound vac changed.     Electronically signed by Gamaliel Reynoso DO on 12/21/2021 at 10:42 AM

## 2021-12-21 NOTE — PROGRESS NOTES
Physical Therapy  Facility/Department: [facility] SURGERY  Daily Treatment Note  NAME: Anisha Perez  : 1955  MRN: [id number]    Date of Service: 2021    Discharge Recommendations: Anisha Perez scored a 6/24 on the AM-PAC short mobility form. Current research shows that an AM-PAC score of 17 or less is typically not associated with a discharge to the patient's home setting. Based on the patient's AM-PAC score and their current functional mobility deficits, it is recommended that the patient have 3-5 sessions per week of Physical Therapy at d/c to increase the patient's independence. Please see assessment section for further patient specific details. If patient discharges prior to next session this note will serve as a discharge summary. Please see below for the latest assessment towards goals. PT Equipment Recommendations  Other: defer d/c recs to next level of care    Assessment   Assessment: Tolerated LE ex in bed well. Good activation of LE muscles. Min cues at times for technique. Will continue to follow per POC. Treatment Diagnosis: impaired mobility  Prognosis: Good  PT Education: PT Role;Functional Mobility Training  Patient Education: pt demos good understanding  REQUIRES PT FOLLOW UP: Yes     Patient Diagnosis(es): The primary encounter diagnosis was Transient alteration of awareness. A diagnosis of Necrotizing fasciitis ([facility] was also pertinent to this visit. has no past medical history on file. has a past surgical history that includes Ankle fracture surgery; Abdomen surgery (N/A, 2021); Scrotal surgery (N/A, 2021); Vagina surgery (N/A, 2021); Abdomen surgery (Left, 12/3/2021); Abdomen surgery (Left, 2021); colostomy (Left, 2021); Abdomen surgery (N/A, 2021); Abdomen surgery (Left, 12/10/2021); Abdomen surgery (N/A, 2021); and Abdomen surgery (N/A, 2021).     Restrictions  Position Activity Restriction  Other position/activity restrictions: up as tolerated, NWB L LE, WBAT R LE per podiatry note; per Dr. Sanchez Fresh note 12/14: OOB with PT/OT  Subjective   General  Chart Reviewed: Yes  Additional Pertinent Hx: Justina Lorenzo is a 63 y.o. male with Necrotizing fasciitis of the abdominal wall, gluteal region, and scrotum s/p wide excision of perineum, back, and abdominal wall. returned to the OR for further debridement or right abdominal wall, and placement of testicle within a thigh flap (12/3), followed by minimal abdominal wall and perineum debridement, sutured protective skin flap in groin for right testicle, and placement of a wound VAC (12/5), s/p diverting colostomy and wound vac change (12/7). Plan for OR on 12/10 for further debridement and change of wound vac.  12/13 wound vac change  Subjective  Subjective: Found in bed, agreeable to PT. Reports 6-7/10 pain at surgical site - RN aware.           Orientation  Orientation  Overall Orientation Status: Within Normal Limits  Cognition      Objective                  Exercises  Comments: x 10 B heel slides, hip ABD, QS, SAQ, R leg press, APs; x 10 bilateral gentle resisted elbow flex and ext                         G-Code     OutComes Score                                                     AM-PAC Score  AM-PAC Inpatient Mobility Raw Score : 6 (12/21/21 1507)  AM-PAC Inpatient T-Scale Score : 23.55 (12/21/21 1507)  Mobility Inpatient CMS 0-100% Score: 100 (12/21/21 1507)  Mobility Inpatient CMS G-Code Modifier : CN (12/21/21 1507)          Goals  Short term goals  Time Frame for Short term goals: dc  Short term goal 1: Demo indep with HEP x 15 reps  ongoing  Short term goal 2: Pt will transfer supine <--> sit with min A x 1   ongoing  Short term goal 3: Pt will transfer sit to stand with mod A x 1 and achieve near upright posture, maintaining L LE NWB   ongoing  Patient Goals   Patient goals : hopes to go home at [address] per week: 2-5  Specific instructions for Next Treatment: -  Current Treatment Recommendations: Strengthening,Balance Training,Functional Mobility Training,Transfer Training,Equipment Evaluation, Education, & procurement,Patient/Caregiver Education & Training,Home Exercise Program  Safety Devices  Type of devices: Call light within reach,Bed alarm in place,Nurse notified,Left in bed     Therapy Time   Individual Concurrent Group Co-treatment   Time In  1500         Time Out  1525         Minutes  25                 Timed Code Treatment Minutes:  25    Total Treatment Minutes:  25    If patient is discharged prior to next treatment, this note will serve as the discharge summary.   Scherry Cheadle, PT, DPT  [id number]

## 2021-12-21 NOTE — PROGRESS NOTES
S/p EVALUATION UNDER ANESTHESIA/ WOUND VAC CHANGE ABDOMINAL WALL AND PERINEUM. Admitted to PACU bed 11 from OR. Arrived at 1051 . Attached to PACU monitoring system. Alarms and parameters set. Report received from anesthesia personnel. No complication reported intraoperatively. Pt on nasal cannula with oxygen at 3 liters. Athrombic wraps in place. VSS; will continue to monitor.

## 2021-12-21 NOTE — PROGRESS NOTES
Colostomy with normal output (soft brown stool emptied). Mcfadden with good UO (clear/yellow). Mcfadden care given w/ CHG wipes. CHG wipe down (PICC line wiped down as well). Wound vac working adequately (300ml drained in canister post op thus far--serous). Black foam intact. 7/10 pain-given dose of Oxycodone. Will monitor for improvement. Tylenol given as scheduled. Tolerating meals. Blood sugars improved to 130s/120s. Remains on 2L 02. Pt updated wife/talked to her on the phone. SCDs on. LLE wrapped w/ gauze. Prevalon boots remain on. Pt turns to sides easily (remains on specialty bed). Call light in reach.

## 2021-12-21 NOTE — ANESTHESIA POSTPROCEDURE EVALUATION
Department of Anesthesiology  Postprocedure Note    Patient: Sheri Engel  MRN: [id number]  [place]: 1955  Date of evaluation: 12/21/2021  Time:  1:34 PM     Procedure Summary     Date: 12/21/21 Room / Location: [address] [place] [address] [address] [place] [address] / [facility]    Anesthesia Start: 2439 Anesthesia Stop: 7019    Procedure: EVALUATION UNDER ANESTHESIA/ WOUND VAC CHANGE ABDOMINAL WALL AND PERINEUM (N/A Perineum) Diagnosis: (NECROTIZING FASCIITIS LEFT BUTTOCK AND ABDOMEN)    Surgeons: Eleanor Rocha MD Responsible Provider: Pricilla Smith DO    Anesthesia Type: general ASA Status: 3          Anesthesia Type: general    Arcadio Phase I: Arcadio Score: 9    Arcadio Phase II:      Last vitals: Reviewed and per EMR flowsheets.        Anesthesia Post Evaluation    Patient location during evaluation: bedside  Patient participation: complete - patient participated  Level of consciousness: awake  Pain score: 0  Airway patency: patent  Nausea & Vomiting: no nausea and no vomiting  Complications: no  Cardiovascular status: blood pressure returned to baseline  Respiratory status: acceptable  Hydration status: euvolemic

## 2021-12-21 NOTE — PROGRESS NOTES
Department of Surgery  Post Op Note    Objective:Patient resting in bed looking at menu. States pain is somewhat controlled, rating 6/10. Denies any c/o nausea or emesis. Denies any CP or SOB    Anesthesia type: General      I/O    Intra op    Post op     Fluids    775 ml      EBL          Urine    850 ml        Exam: VITALS:  /75   Pulse 71   Temp 97.6 °F (36.4 °C) (Oral)   Resp 13   Ht 5' 11\" (1.803 m)   Wt 199 lb 1.2 oz (90.3 kg)   SpO2 95%   BMI 27.77 kg/m²   Post-op vital signs:  Stable     Exam:General appearance: alert, appears stated age and cooperative  Lungs: clear to auscultation bilaterally  Heart: regular rate and rhythm, S1, S2 normal, no murmur, click, rub or gallop  Abdomen: soft, instill wound vac tacked down with staples. Good seal obtained. Wound vac to 125 mm/Hg. No leak present. Serosanguinous output in canister      Assessment and Plan  Pt is a 61year old male s/p necrotizing fascitis  POD #0    Pain management: continue with oxicodone  FeNa:  Diet - 0 carb diet, Fluids:  SLIV  :  Urine output is adequate. Keep walsh for strict I&O  Ambulation: OOB to chair  Respiratory:  IS at bedside, encourage hourly IS and deep breathing  Prophylaxis: HOME Robison NP-C  [phone]  Resident Support Staff

## 2021-12-21 NOTE — OP NOTE
[facility] 30 SURGERY     OPERATIVE DICTATION    NAME: Robert Silva   MRN: [id number]  DATE: 12/21/2021     AGE: 61 y.o. LOCATION: [facility]    PREOPERATIVE DIAGNOSIS:  Shayla's gangrene with necrotizing fasciitis     POSTOPERATIVE DIAGNOSIS:  Same. OPERATION PERFORMED: 1) Dressing change under anesthesia       2) Application of Veraflo instillation vac therapy     SURGEON:  Trish Gill MD    ASSISTANT: Abundio Espinal (PGY1)    ANESTHESIA: General     ESTIMATED BLOOD LOSS: Minimal     DRAINS:  Instillation vac     SPECIMENS: None     OPERATIVE INDICATIONS:  This is an unfortunate 61 y.o. male with a history of poorly controlled diabetes who developed Shayla's gangrene at an outside hospital.  He underwent debridement with both Urology and General surgery, however was noted to have additional crepitance. Given this, he was urgently transferred to our hospital and was taken to the operating room by general surgery for extensive debridement. Plastic surgery was consulted for assistance with management. He has stabilized and undergone diverting colostomy. He is brought to the operating room for dressing change under anesthesia given the complexity and pain. The risks, benefits, alternatives, outcomes, and personnel involved was performed with the patient. After all questions were answered to the patient's satisfaction, they agreed to proceed. OPERATIVE PROCEDURE:  The patient was brought to the operating room on his ICU bed and placed in the supine position on the operating room table. He underwent general anesthesia without difficulty, was then placed in the lithotomy position, and was prepped and draped in the usual sterile manner. A time-out was performed confirming the patient and the procedure to be performed. There was minimal granulation tissue, however there was also minimal fibrinous exudate. There was a significant amount of tissue edema.   The wound was copiously irrigated with 3L of saline solution using a Pulse Lavage. Once completed, another complex wound vacuum device was then fashioned and applied with good seal.    The patient was then placed supine, extubated, and taken back to the ICU in stable, yet critical condition. There were no immediate complications and the patient tolerated the procedure well. At the end of the case, all counts were correct. PLAN: Will return for additional planned dressing change under anesthesia in 48-72 hours. Once there is improved granulation tissue with negative cultures, will begin the reconstructive process.     Emilio Kebede MD

## 2021-12-21 NOTE — PROGRESS NOTES
Patient is a&ox4. VSS. Patient is voiding adequate amount of clear, yellow urine; colostomy w/ red/moist stoma. Small amt brown stool. Wound vac output yellow/clear/serous; PICC CDI - flushed both; pink infusing continuous fluids; abx ivpb. Patient O2 on 2L; patient given sched tylenol and prn 5mg oxy. with small sip of water.

## 2021-12-22 LAB
GLUCOSE BLD-MCNC: 103 MG/DL (ref 70–99)
GLUCOSE BLD-MCNC: 110 MG/DL (ref 70–99)
GLUCOSE BLD-MCNC: 128 MG/DL (ref 70–99)
GLUCOSE BLD-MCNC: 149 MG/DL (ref 70–99)
PERFORMED ON: ABNORMAL

## 2021-12-22 PROCEDURE — 99024 POSTOP FOLLOW-UP VISIT: CPT | Performed by: SURGERY

## 2021-12-22 PROCEDURE — 97110 THERAPEUTIC EXERCISES: CPT

## 2021-12-22 PROCEDURE — 6370000000 HC RX 637 (ALT 250 FOR IP)

## 2021-12-22 PROCEDURE — 6360000002 HC RX W HCPCS

## 2021-12-22 PROCEDURE — 94669 MECHANICAL CHEST WALL OSCILL: CPT

## 2021-12-22 PROCEDURE — 94761 N-INVAS EAR/PLS OXIMETRY MLT: CPT

## 2021-12-22 PROCEDURE — 2700000000 HC OXYGEN THERAPY PER DAY

## 2021-12-22 PROCEDURE — 94150 VITAL CAPACITY TEST: CPT

## 2021-12-22 PROCEDURE — 2580000003 HC RX 258

## 2021-12-22 PROCEDURE — 1200000000 HC SEMI PRIVATE

## 2021-12-22 RX ORDER — SODIUM CHLORIDE 9 MG/ML
INJECTION, SOLUTION INTRAVENOUS CONTINUOUS
Status: DISCONTINUED | OUTPATIENT
Start: 2021-12-23 | End: 2021-12-23

## 2021-12-22 RX ADMIN — INSULIN HUMAN 2 UNITS: 100 INJECTION, SOLUTION PARENTERAL at 12:06

## 2021-12-22 RX ADMIN — DOCUSATE SODIUM 100 MG: 100 CAPSULE, LIQUID FILLED ORAL at 20:32

## 2021-12-22 RX ADMIN — ACETAMINOPHEN 1000 MG: 500 TABLET ORAL at 12:01

## 2021-12-22 RX ADMIN — INSULIN HUMAN 2 UNITS: 100 INJECTION, SOLUTION PARENTERAL at 07:55

## 2021-12-22 RX ADMIN — HEPARIN SODIUM 5000 UNITS: 5000 INJECTION INTRAVENOUS; SUBCUTANEOUS at 07:55

## 2021-12-22 RX ADMIN — AMPICILLIN SODIUM AND SULBACTAM SODIUM 3000 MG: 2; 1 INJECTION, POWDER, FOR SOLUTION INTRAMUSCULAR; INTRAVENOUS at 03:43

## 2021-12-22 RX ADMIN — DOCUSATE SODIUM 100 MG: 100 CAPSULE, LIQUID FILLED ORAL at 09:32

## 2021-12-22 RX ADMIN — INSULIN LISPRO 3 UNITS: 100 INJECTION, SOLUTION INTRAVENOUS; SUBCUTANEOUS at 09:38

## 2021-12-22 RX ADMIN — ACETAMINOPHEN 1000 MG: 500 TABLET ORAL at 07:54

## 2021-12-22 RX ADMIN — OXYCODONE HYDROCHLORIDE 5 MG: 5 TABLET ORAL at 18:25

## 2021-12-22 RX ADMIN — HEPARIN SODIUM 5000 UNITS: 5000 INJECTION INTRAVENOUS; SUBCUTANEOUS at 21:12

## 2021-12-22 RX ADMIN — SODIUM CHLORIDE, PRESERVATIVE FREE 10 ML: 5 INJECTION INTRAVENOUS at 20:33

## 2021-12-22 RX ADMIN — HEPARIN SODIUM 5000 UNITS: 5000 INJECTION INTRAVENOUS; SUBCUTANEOUS at 14:17

## 2021-12-22 RX ADMIN — AMLODIPINE BESYLATE 5 MG: 5 TABLET ORAL at 09:32

## 2021-12-22 RX ADMIN — POLYETHYLENE GLYCOL 3350 17 G: 17 POWDER, FOR SOLUTION ORAL at 09:33

## 2021-12-22 RX ADMIN — OXYCODONE HYDROCHLORIDE 5 MG: 5 TABLET ORAL at 01:21

## 2021-12-22 RX ADMIN — AMPICILLIN SODIUM AND SULBACTAM SODIUM 3000 MG: 2; 1 INJECTION, POWDER, FOR SOLUTION INTRAMUSCULAR; INTRAVENOUS at 14:22

## 2021-12-22 RX ADMIN — SODIUM CHLORIDE, PRESERVATIVE FREE 10 ML: 5 INJECTION INTRAVENOUS at 09:38

## 2021-12-22 RX ADMIN — AMPICILLIN SODIUM AND SULBACTAM SODIUM 3000 MG: 2; 1 INJECTION, POWDER, FOR SOLUTION INTRAMUSCULAR; INTRAVENOUS at 20:28

## 2021-12-22 RX ADMIN — AMPICILLIN SODIUM AND SULBACTAM SODIUM 3000 MG: 2; 1 INJECTION, POWDER, FOR SOLUTION INTRAMUSCULAR; INTRAVENOUS at 09:37

## 2021-12-22 RX ADMIN — ACETAMINOPHEN 1000 MG: 500 TABLET ORAL at 01:16

## 2021-12-22 ASSESSMENT — PAIN DESCRIPTION - ORIENTATION: ORIENTATION: MID

## 2021-12-22 ASSESSMENT — PAIN SCALES - GENERAL
PAINLEVEL_OUTOF10: 6

## 2021-12-22 ASSESSMENT — PAIN DESCRIPTION - PAIN TYPE
TYPE: SURGICAL PAIN
TYPE: SURGICAL PAIN

## 2021-12-22 ASSESSMENT — PAIN DESCRIPTION - DESCRIPTORS
DESCRIPTORS: DISCOMFORT;PRESSURE
DESCRIPTORS: PRESSURE;DISCOMFORT

## 2021-12-22 ASSESSMENT — PAIN DESCRIPTION - LOCATION
LOCATION: BUTTOCKS
LOCATION: BUTTOCKS

## 2021-12-22 NOTE — PROGRESS NOTES
Pt A/Ox4. VSS. Attempting to wean off oxygen throughout the day by RT. Paint controlled with scheduled Tylenol. Turned Q2 hours due to low movement and no ambulation. Wound vac to ABD remains c/d/i. Mcfadden remains c/d/i with adequate output. PICC line in place with intermittent ABX infusions. Pt very discouraged with no carb diet stating it's \"very difficult\". Continued to encourage to follow dietary restriction per Dr. Hansa Zaman. Surgery planned for tomorrow, pt and wife aware. All needs met at this time.

## 2021-12-22 NOTE — PROGRESS NOTES
penis without edema   Neuro: A&Ox3, no focal deficits, sensation intact      ASSESSMENT/PLAN: Pt. is a 61 y.o. male with Necrotizing fasciitis of the abdominal wall, gluteal region, and scrotum s/p wide excision of perineum, back, and abdominal wall. S/p multiple debridements and wound vac placement and changes intraoperatively with diverting colostomy creation (12/7). OR wound vac change (12/10, 12/13, 12/16, 12/21). - Patient to return to the OR tomorrow (12/23) for EUA and wound vac change. Reconstruction likely to begin next week pending improvement in nutritional markers and evidence of wound healing potential  - Continue carb free diet with protein supplementation for goal >100g protein per day. Dietitian on board to assist with macronutrient accounting and support   - sliding scale, mealtime insulin, and basal insulin for glucose control. Blood glucose 103-136 since yesterday morning.  - continue abx per ID  - Follow up OR cultures from 12/21    [facility] Hospital[facility]  PGY1, General Surgery  12/22/21  6:19 AM  [phone]    I saw and independently examined the patient today. I agree with the history of present illness, past medical/surgical histories, family history, social history, medication list and allergies as listed. The review of systems is as noted above. My physical exam confirms the findings listed above. Review of labs, pathology reports, radiology reports and medical records confirm the findings noted above. I edited the note where appropriate in italics, strikethrough font, or underline. (LATE ENTRY)    Plan for OR for dressing change under anesthesia tomorrow. OOB with PT/OT. If improved granulation - anticipate initiation of reconstruction surgeries next week.     Velasquez Ortiz MD  [address] Reconstructive Surgery  [phone]  12/23/21

## 2021-12-22 NOTE — PROGRESS NOTES
PRE-OP NOTE  Department of Surgery      Chief Complaint or Reason for Surgery: necrotizing fasciitis    Procedure: EUA, wound vac change, possible partial closure of abdominal wound  Expected time: 1400 on 12/23    Plan  1. Diet: NPO at MN  2. IVF:  at MN  3. Antibiotics: Continue Unasyn  4. Labs to be drawn: AM CBC, Renal and Mg  5. Anesthesia: to see patient  6. Consent: Serial consent obtained, and placed in chart  7. Pulmonary: CXR: (12/9) WNL  8. Cardiac: EKG: (12/9) WNL  9.  Chemical DVT prophylaxis: MIRIAM Reynoso DO  12/22/21  10:19 AM

## 2021-12-22 NOTE — PROGRESS NOTES
Podiatric Surgery Daily Progress Note      Admit Date: 12/1/2021      Code:Full Code    Patient seen and examined, labs and records reviewed    Subjective:     Patient seen at bedside this AM. Patient denies pain to bilateral lower extremities. Patient denies fever, chills, shortness of breath, chest pain. Patient denies any acute events overnight. Review of Systems: A 10 point review of systems was conducted, significant findings as noted in HPI. All other systems negative. Objective     /61   Pulse 76   Temp 98 °F (36.7 °C) (Oral)   Resp 16   Ht 5' 11\" (1.803 m)   Wt 199 lb 1.2 oz (90.3 kg)   SpO2 94%   BMI 27.77 kg/m²     I/O:    Intake/Output Summary (Last 24 hours) at 12/22/2021 0613  Last data filed at 12/22/2021 0356  Gross per 24 hour   Intake 3691.33 ml   Output 3350 ml   Net 341.33 ml              Wt Readings from Last 3 Encounters:   12/17/21 199 lb 1.2 oz (90.3 kg)   11/30/21 163 lb 3.2 oz (74 kg)   04/18/16 179 lb 14.3 oz (81.6 kg)       LABS:    Recent Labs     12/20/21  0514 12/21/21  0600   WBC 7.8 7.0   HGB 8.2* 8.4*   HCT 24.6* 25.3*    291        Recent Labs     12/21/21  0600      K 4.2      CO2 31   PHOS 3.1   BUN 9   CREATININE 0.6*        Recent Labs     12/21/21  0600   INR 0.95       LOWER EXTREMITY EXAMINATION    Dressing to left LE intact. No strikethrough noted to the external dressing. Betadine discoloration noted to the internal layers of the dressing of the left LE.     VASCULAR:   DP and PT pulses are non-palpable b/l. Upon hand-held Doppler examination DP signals were noted to be monophasic and PT signals were noted to be nondopplerable. CFT slightly diminished to the distal digits of bilateral lower extremities. Skin temperature is warm to lukewarm to cool to the distal digits from proximal to distal.  No edema noted. No pain with calf compression b/l.     NEUROLOGIC:   Gross and epicritic sensation is diminished b/l.  Protective sensation is diminished at all pedal sites b/l.     DERMATOLOGIC:     Left lower extremity:  Full-thickness ulceration noted to the lateral aspect of the left foot. Wound measures approximately 3.2 cm x 2 cm x 0.5 cm. Wound base with exposed bone, with necrotic edges at the proximal and distal edges. Wound probes to bone with exposed fifth metatarsal. Necrotic appearance of 5th digit. No tracking or tunneling noted. No purulent drainage noted. No fluctuance or crepitus or malodor noted. Deep tissue injury noted 2/2 Prevalon boot strap to the anterior aspect of the ankle. Intact epithelialized tissue noted. No crepitus, erythema, drainage, or malodor noted. No open wound noted. Stable without signs of acute infection noted. Right lower extremity  Partial thickness ulceration noted to the lateral malleolus 2/2 pressure. No fluctuance, crepitus, malodor, or drainage noted. No acute signs infection noted. Partial thickness ulceration 2/2 pressure noted at the styloid process of the 5th metatarsal. Fibrotic tissue noted. No fluctuance or crepitus noted. No acute signs of infection noted. MUSCULOSKELETAL:   Muscle strength 4/5 for all pedal muscle groups B/L LE. No pain with palpation of the left foot. IMAGING:  XR LEFT FOOT 11/30/2021  Narrative   EXAMINATION:   THREE XRAY VIEWS OF THE LEFT FOOT       11/30/2021 1:44 pm       HISTORY:   ORDERING SYSTEM PROVIDED HISTORY: wound   TECHNOLOGIST PROVIDED HISTORY:   Reason for exam:->wound   Reason for Exam: blood sugar problem, wound check on lateral portion of foot   Acuity: Acute   Type of Exam: Initial       FINDINGS:   Osseous destruction along the articular margins of the 5th   metatarsophalangeal joint with associated lucency in the soft tissues. .   There is no evidence of fracture or dislocation. . The remaining joint spaces   appear well maintained.  The remaining soft tissues are unremarkable.       Impression   Evidence of a septic joint involving the 5th metatarsal phalangeal joint with   associated osteomyelitis     ARTERIAL DUPLEX 12/2/21     Type of Study:        Extremities Arteries:Lower Extremities Arterial Duplex, VL LOWER EXTREMITY    ARTERIES DUPLEX BILATERAL.       Tech Comments   Right   The right ankle/brachial index is 0.0 (no Doppler signal could be heard at the   [facility] or the PT). The common femoral and profunda femoral arteries could not be visualized due   to bandages extending into the groin area. The mid superficial femoral artery has a short segmental occlusion and then is   reconstituted. Elevated velocities at the distal superficial femoral artery indicate a > 50%   stenosis by velocity criteria (velocity went from 15 to 298 cm/s). The posterior tibial artery is occluded. The distal anterior tibial artery is barely patent, with very low flow   (possibly occluded and then reconstituted). Left   The left ankle brachial index is 0 for the PT and the DP was not accessible   due to the bandages on the foot. The common femoral and profunda femoral artery could not be visualized due to   bandages extending into the groin area. The distal superficial femoral artery is occluded and then reconstituted. The posterior tibial artery, peroneal, and anterior tibial artery are   occluded. There were no previous studies to use for comparison.        ASSESSMENT/PLAN:   -Full-thickness ulceration; lateral left foot- Sands 4  -Osteomyelitis of the fifth metatarsal; left foot  -Partial thickness pressure ulceration x2, right foot; NPUAP Stage 2  -Pressure injury, dorsal right midfoot -NPUAP Stage I  -Critical limb ischemia; bilateral lower extremity  -Diabetes mellitus, type II  -History of noncompliance    -Patient seen and examined at bedside this AM.  -Hypertensive, otherwise VSS on 2 L of O2 via NC; no new AM CBC  -All imaging reviewed; impressions noted above  -CRP 42.6 (12/20/21)  -Prealbumin 9.8 (12/13/21)  -Vascular surgery

## 2021-12-22 NOTE — PROGRESS NOTES
NUTRITION NOTE   Admission Date: 12/1/2021     Type and Reason for Visit: Reassess    NUTRITION RECOMMENDATIONS:   PO DIET: RD recommending removal of \"no carb\" restriction and modifying to [facility] diet. ONS: Modify ONS to meal times. Pt will not receive ONS scheduled at snack times as the orders do not transfer through to [facility] unless scheduled at meals times. NUTRITION ASSESSMENT:  Pt endorses good appetite, good po intakes, and no c/o nausea/vomiting. RD continues to recommend removing \"no carb\" diet restriction and modifying to 3 carbohydrate choices at each meal as this diet provided adequate blood glucose control. Blood sugars well controlled given how often pt is in OR. Pt reports consuming some carbohydrate products such as fruit and bread with sandwiches. Will continue to monitor. Patient admitted d/t westley gangrene    PMH significant for: T2DM    MALNUTRITION ASSESSMENT  Context of Malnutrition: Acute Illness   Malnutrition Status: No malnutrition    NUTRITION DIAGNOSIS   · Increased nutrient needs related to increase demand for energy/nutrients as evidenced by wounds      [address] and/or Nutrient Delivery:  Continue Current Diet,Modify Oral Nutrition Supplement  Nutrition Education/Counseling:  Counseling completed (increased protein + good BG control for wound healing)      The patient will still be monitored per nutrition standards of care. Consult dietitian if nutrition interventions essential to patient care is needed.      Javier Welch, [address] [place] [address], [address]:  [phone]  Office:  [phone]

## 2021-12-22 NOTE — PROGRESS NOTES
Occupational Therapy  Facility/Department: [facility]  Daily Treatment Note  NAME: Justina Lorenzo  : 1955  MRN: [id number]    Date of Service: 2021    Discharge Recommendations:    Justina Lorenzo scored a 15/24 on the AM-PAC ADL Inpatient form. Current research shows that an AM-PAC score of 17 or less is typically not associated with a discharge to the patient's home setting. Based on the patient's AM-PAC score and their current ADL deficits, it is recommended that the patient have 3-5 sessions per week of Occupational Therapy at d/c to increase the patient's independence. Please see assessment section for further patient specific details. If patient discharges prior to next session this note will serve as a discharge summary. Please see below for the latest assessment towards goals. OT Equipment Recommendations  Equipment Needed: No    Assessment   Performance deficits / Impairments: Decreased functional mobility ; Decreased ADL status; Decreased high-level IADLs;Decreased endurance;Decreased strength  Assessment: Pt with good affect and appears to be feeling better. Rt UE with decreased edemanabd increased ROM and strength. Instructed pt on Theraband exercises and theraband was left in his room. Cont OT tx per plan of care.   Treatment Diagnosis: Decreased activity tolerance, impaired ADLs and mobility  Prognosis: Fair  OT Education: OT Role;Plan of Care;Home Exercise Program  Patient Education: reinforce prn  REQUIRES OT FOLLOW UP: Yes  Activity Tolerance  Activity Tolerance: Patient Tolerated treatment well  Safety Devices  Safety Devices in place: Yes  Type of devices: Left in bed;Call light within reach;Nurse notified (wife present)     Restrictions  Position Activity Restriction  Other position/activity restrictions: up as tolerated, NWB L LE, WBAT R LE per podiatry note; per Dr. Sanchez Fresh note : OOB with PT/OT  Subjective   General  Chart Reviewed: Yes  Patient assessed for rehabilitation services?: Yes  Additional Pertinent Hx: 63 y.o. M admitted 12/1 with necrotizing fasciitis to abdominal wall, gluteal region, and scrotum. s/p extensive wide excisions 12/1, 12/3, 12/5, 12/7 with wound vac placement. Colostomy creation 12/7. OR wound vac change 12/10;  OR wound vac change 12/13; OR wound vac change (12/16). . Podiatry: NWB L, FWB R. PMHx includes L foot fracture  Response to previous treatment: Patient with no complaints from previous session  Family / Caregiver Present: Yes (wife)  Referring Practitioner: Seble Matthew DO  Diagnosis: necrotizing fascitis  Subjective  Subjective: Pt in bed upon entry. Pt agreeable to activity. Pt reports feeling better. General Comment  Comments: Amy Maid   Vital Signs  Patient Currently in Pain: Yes   Objective                                           Cognition  Overall Cognitive Status: WFL                    Type of ROM/Therapeutic Exercise  Type of ROM/Therapeutic Exercise: AROM  Comment: Orange theraband  Exercises  Shoulder Flexion: X10 without and with theraband  Elbow Flexion: x10  Elbow Extension: x1  Other: Push out and pull in x10 each                    Plan   Plan  Times per week: 2-5x  Times per day: Daily  Current Treatment Recommendations: Strengthening,ROM,Self-Care / ADL,Patient/Caregiver Education & Training,Safety Education & Training  AM-PAC Score        AM-[facility] Inpatient Daily Activity Raw Score: 15 (12/22/21 1158)  AM-PAC Inpatient ADL T-Scale Score : 34.69 (12/22/21 1158)  ADL Inpatient CMS 0-100% Score: 56.46 (12/22/21 1158)  ADL Inpatient CMS G-Code Modifier : CK (12/22/21 1158)    Goals  Short term goals  Time Frame for Short term goals: by D/C  Short term goal 1: Demonstrate independence with HEP - Not met  Short term goal 2: Perform sit to stand NWB LLE with mod assist x2 to progress transfers - Not met  Short term goal 3: Roll side to side in bed with SBA for pressure relief and ADLs - Not met       Therapy Time   Individual Concurrent Group Co-treatment   Time In 1125         Time Out 1150         Minutes 25         Timed Code Treatment Minutes: 25 Minutes    Total Treatment Time:25    Marcie Noel OTR/JAZEIL [id number]

## 2021-12-22 NOTE — CARE COORDINATION
CM following, EUA and [place] change tomorrow, pending cultures for reconstruction surgery next week. [facility] following, pending medicaid.   Electronically signed by Mirna Cage RN on 12/22/2021 at 4:02 PM  [phone]

## 2021-12-23 ENCOUNTER — ANESTHESIA EVENT (OUTPATIENT)
Dept: OPERATING ROOM | Age: 66
DRG: 853 | End: 2021-12-23

## 2021-12-23 ENCOUNTER — ANESTHESIA (OUTPATIENT)
Dept: OPERATING ROOM | Age: 66
DRG: 853 | End: 2021-12-23

## 2021-12-23 VITALS — DIASTOLIC BLOOD PRESSURE: 68 MMHG | SYSTOLIC BLOOD PRESSURE: 135 MMHG | OXYGEN SATURATION: 81 % | TEMPERATURE: 98.2 F

## 2021-12-23 LAB
ALBUMIN SERPL-MCNC: 2.3 G/DL (ref 3.4–5)
ANION GAP SERPL CALCULATED.3IONS-SCNC: 8 MMOL/L (ref 3–16)
BASOPHILS ABSOLUTE: 0.1 K/UL (ref 0–0.2)
BASOPHILS RELATIVE PERCENT: 0.9 %
BUN BLDV-MCNC: 10 MG/DL (ref 7–20)
CALCIUM SERPL-MCNC: 8.1 MG/DL (ref 8.3–10.6)
CHLORIDE BLD-SCNC: 103 MMOL/L (ref 99–110)
CO2: 29 MMOL/L (ref 21–32)
CREAT SERPL-MCNC: 0.6 MG/DL (ref 0.8–1.3)
EOSINOPHILS ABSOLUTE: 0.3 K/UL (ref 0–0.6)
EOSINOPHILS RELATIVE PERCENT: 3.8 %
GFR AFRICAN AMERICAN: >60
GFR NON-AFRICAN AMERICAN: >60
GLUCOSE BLD-MCNC: 100 MG/DL (ref 70–99)
GLUCOSE BLD-MCNC: 101 MG/DL (ref 70–99)
GLUCOSE BLD-MCNC: 103 MG/DL (ref 70–99)
GLUCOSE BLD-MCNC: 125 MG/DL (ref 70–99)
GLUCOSE BLD-MCNC: 139 MG/DL (ref 70–99)
GLUCOSE BLD-MCNC: 145 MG/DL (ref 70–99)
HCT VFR BLD CALC: 25.6 % (ref 40.5–52.5)
HEMOGLOBIN: 8.6 G/DL (ref 13.5–17.5)
LYMPHOCYTES ABSOLUTE: 1.1 K/UL (ref 1–5.1)
LYMPHOCYTES RELATIVE PERCENT: 13.2 %
MAGNESIUM: 1.6 MG/DL (ref 1.8–2.4)
MCH RBC QN AUTO: 31 PG (ref 26–34)
MCHC RBC AUTO-ENTMCNC: 33.5 G/DL (ref 31–36)
MCV RBC AUTO: 92.5 FL (ref 80–100)
MONOCYTES ABSOLUTE: 0.6 K/UL (ref 0–1.3)
MONOCYTES RELATIVE PERCENT: 7.4 %
NEUTROPHILS ABSOLUTE: 6.5 K/UL (ref 1.7–7.7)
NEUTROPHILS RELATIVE PERCENT: 74.7 %
PDW BLD-RTO: 16.2 % (ref 12.4–15.4)
PERFORMED ON: ABNORMAL
PHOSPHORUS: 3 MG/DL (ref 2.5–4.9)
PLATELET # BLD: 281 K/UL (ref 135–450)
PMV BLD AUTO: 7.7 FL (ref 5–10.5)
POTASSIUM SERPL-SCNC: 4.2 MMOL/L (ref 3.5–5.1)
RBC # BLD: 2.76 M/UL (ref 4.2–5.9)
SODIUM BLD-SCNC: 140 MMOL/L (ref 136–145)
WBC # BLD: 8.6 K/UL (ref 4–11)

## 2021-12-23 PROCEDURE — 3600000012 HC SURGERY LEVEL 2 ADDTL 15MIN: Performed by: SURGERY

## 2021-12-23 PROCEDURE — 3700000000 HC ANESTHESIA ATTENDED CARE: Performed by: SURGERY

## 2021-12-23 PROCEDURE — 15852 DRESSING CHANGE NOT FOR BURN: CPT | Performed by: SURGERY

## 2021-12-23 PROCEDURE — 6360000002 HC RX W HCPCS

## 2021-12-23 PROCEDURE — 87070 CULTURE OTHR SPECIMN AEROBIC: CPT

## 2021-12-23 PROCEDURE — 99232 SBSQ HOSP IP/OBS MODERATE 35: CPT | Performed by: INTERNAL MEDICINE

## 2021-12-23 PROCEDURE — 1200000000 HC SEMI PRIVATE

## 2021-12-23 PROCEDURE — 2580000003 HC RX 258: Performed by: STUDENT IN AN ORGANIZED HEALTH CARE EDUCATION/TRAINING PROGRAM

## 2021-12-23 PROCEDURE — 2500000003 HC RX 250 WO HCPCS: Performed by: NURSE ANESTHETIST, CERTIFIED REGISTERED

## 2021-12-23 PROCEDURE — 2580000003 HC RX 258: Performed by: SURGERY

## 2021-12-23 PROCEDURE — 2709999900 HC NON-CHARGEABLE SUPPLY: Performed by: SURGERY

## 2021-12-23 PROCEDURE — 87205 SMEAR GRAM STAIN: CPT

## 2021-12-23 PROCEDURE — 2580000003 HC RX 258

## 2021-12-23 PROCEDURE — 6370000000 HC RX 637 (ALT 250 FOR IP)

## 2021-12-23 PROCEDURE — 7100000001 HC PACU RECOVERY - ADDTL 15 MIN: Performed by: SURGERY

## 2021-12-23 PROCEDURE — 6360000002 HC RX W HCPCS: Performed by: STUDENT IN AN ORGANIZED HEALTH CARE EDUCATION/TRAINING PROGRAM

## 2021-12-23 PROCEDURE — 3700000001 HC ADD 15 MINUTES (ANESTHESIA): Performed by: SURGERY

## 2021-12-23 PROCEDURE — 87106 FUNGI IDENTIFICATION YEAST: CPT

## 2021-12-23 PROCEDURE — 2580000003 HC RX 258: Performed by: NURSE ANESTHETIST, CERTIFIED REGISTERED

## 2021-12-23 PROCEDURE — 6360000002 HC RX W HCPCS: Performed by: NURSE ANESTHETIST, CERTIFIED REGISTERED

## 2021-12-23 PROCEDURE — 3600000002 HC SURGERY LEVEL 2 BASE: Performed by: SURGERY

## 2021-12-23 PROCEDURE — 85025 COMPLETE CBC W/AUTO DIFF WBC: CPT

## 2021-12-23 PROCEDURE — 7100000000 HC PACU RECOVERY - FIRST 15 MIN: Performed by: SURGERY

## 2021-12-23 PROCEDURE — 6370000000 HC RX 637 (ALT 250 FOR IP): Performed by: STUDENT IN AN ORGANIZED HEALTH CARE EDUCATION/TRAINING PROGRAM

## 2021-12-23 PROCEDURE — 83735 ASSAY OF MAGNESIUM: CPT

## 2021-12-23 PROCEDURE — 87176 TISSUE HOMOGENIZATION CULTR: CPT

## 2021-12-23 PROCEDURE — 80069 RENAL FUNCTION PANEL: CPT

## 2021-12-23 RX ORDER — FENTANYL CITRATE 50 UG/ML
25 INJECTION, SOLUTION INTRAMUSCULAR; INTRAVENOUS EVERY 5 MIN PRN
Status: DISCONTINUED | OUTPATIENT
Start: 2021-12-23 | End: 2021-12-23 | Stop reason: SDUPTHER

## 2021-12-23 RX ORDER — PROPOFOL 10 MG/ML
INJECTION, EMULSION INTRAVENOUS PRN
Status: DISCONTINUED | OUTPATIENT
Start: 2021-12-23 | End: 2021-12-23 | Stop reason: SDUPTHER

## 2021-12-23 RX ORDER — FENTANYL CITRATE 50 UG/ML
50 INJECTION, SOLUTION INTRAMUSCULAR; INTRAVENOUS EVERY 5 MIN PRN
Status: DISCONTINUED | OUTPATIENT
Start: 2021-12-23 | End: 2021-12-23 | Stop reason: SDUPTHER

## 2021-12-23 RX ORDER — LIDOCAINE HYDROCHLORIDE 20 MG/ML
INJECTION, SOLUTION INTRAVENOUS PRN
Status: DISCONTINUED | OUTPATIENT
Start: 2021-12-23 | End: 2021-12-23 | Stop reason: SDUPTHER

## 2021-12-23 RX ORDER — PROCHLORPERAZINE EDISYLATE 5 MG/ML
5 INJECTION INTRAMUSCULAR; INTRAVENOUS
Status: DISCONTINUED | OUTPATIENT
Start: 2021-12-23 | End: 2021-12-23 | Stop reason: SDUPTHER

## 2021-12-23 RX ORDER — SODIUM CHLORIDE 9 MG/ML
INJECTION, SOLUTION INTRAVENOUS CONTINUOUS PRN
Status: DISCONTINUED | OUTPATIENT
Start: 2021-12-23 | End: 2021-12-23 | Stop reason: SDUPTHER

## 2021-12-23 RX ORDER — LABETALOL HYDROCHLORIDE 5 MG/ML
5 INJECTION, SOLUTION INTRAVENOUS EVERY 10 MIN PRN
Status: DISCONTINUED | OUTPATIENT
Start: 2021-12-23 | End: 2021-12-23 | Stop reason: HOSPADM

## 2021-12-23 RX ORDER — FENTANYL CITRATE 50 UG/ML
INJECTION, SOLUTION INTRAMUSCULAR; INTRAVENOUS PRN
Status: DISCONTINUED | OUTPATIENT
Start: 2021-12-23 | End: 2021-12-23 | Stop reason: SDUPTHER

## 2021-12-23 RX ORDER — MAGNESIUM SULFATE IN WATER 40 MG/ML
2000 INJECTION, SOLUTION INTRAVENOUS ONCE
Status: COMPLETED | OUTPATIENT
Start: 2021-12-23 | End: 2021-12-23

## 2021-12-23 RX ORDER — ONDANSETRON 2 MG/ML
INJECTION INTRAMUSCULAR; INTRAVENOUS PRN
Status: DISCONTINUED | OUTPATIENT
Start: 2021-12-23 | End: 2021-12-23 | Stop reason: SDUPTHER

## 2021-12-23 RX ORDER — OXYCODONE HYDROCHLORIDE AND ACETAMINOPHEN 5; 325 MG/1; MG/1
2 TABLET ORAL PRN
Status: DISCONTINUED | OUTPATIENT
Start: 2021-12-23 | End: 2021-12-23 | Stop reason: SDUPTHER

## 2021-12-23 RX ORDER — MAGNESIUM HYDROXIDE 1200 MG/15ML
LIQUID ORAL CONTINUOUS PRN
Status: COMPLETED | OUTPATIENT
Start: 2021-12-23 | End: 2021-12-23

## 2021-12-23 RX ORDER — HYDRALAZINE HYDROCHLORIDE 20 MG/ML
5 INJECTION INTRAMUSCULAR; INTRAVENOUS EVERY 10 MIN PRN
Status: DISCONTINUED | OUTPATIENT
Start: 2021-12-23 | End: 2021-12-23 | Stop reason: SDUPTHER

## 2021-12-23 RX ORDER — DIPHENHYDRAMINE HYDROCHLORIDE 50 MG/ML
12.5 INJECTION INTRAMUSCULAR; INTRAVENOUS
Status: ACTIVE | OUTPATIENT
Start: 2021-12-23 | End: 2021-12-23

## 2021-12-23 RX ORDER — OXYCODONE HYDROCHLORIDE AND ACETAMINOPHEN 5; 325 MG/1; MG/1
1 TABLET ORAL PRN
Status: DISCONTINUED | OUTPATIENT
Start: 2021-12-23 | End: 2021-12-23 | Stop reason: SDUPTHER

## 2021-12-23 RX ORDER — ROCURONIUM BROMIDE 10 MG/ML
INJECTION, SOLUTION INTRAVENOUS PRN
Status: DISCONTINUED | OUTPATIENT
Start: 2021-12-23 | End: 2021-12-23 | Stop reason: SDUPTHER

## 2021-12-23 RX ORDER — ONDANSETRON 2 MG/ML
4 INJECTION INTRAMUSCULAR; INTRAVENOUS
Status: ACTIVE | OUTPATIENT
Start: 2021-12-23 | End: 2021-12-23

## 2021-12-23 RX ADMIN — SODIUM CHLORIDE, PRESERVATIVE FREE 10 ML: 5 INJECTION INTRAVENOUS at 21:11

## 2021-12-23 RX ADMIN — MAGNESIUM SULFATE HEPTAHYDRATE 2000 MG: 2 INJECTION, SOLUTION INTRAVENOUS at 10:10

## 2021-12-23 RX ADMIN — FENTANYL CITRATE 50 MCG: 50 INJECTION, SOLUTION INTRAMUSCULAR; INTRAVENOUS at 16:02

## 2021-12-23 RX ADMIN — FENTANYL CITRATE 50 MCG: 50 INJECTION, SOLUTION INTRAMUSCULAR; INTRAVENOUS at 14:39

## 2021-12-23 RX ADMIN — INSULIN LISPRO 3 UNITS: 100 INJECTION, SOLUTION INTRAVENOUS; SUBCUTANEOUS at 18:08

## 2021-12-23 RX ADMIN — INSULIN HUMAN 2 UNITS: 100 INJECTION, SOLUTION PARENTERAL at 12:26

## 2021-12-23 RX ADMIN — AMPICILLIN SODIUM AND SULBACTAM SODIUM 3000 MG: 2; 1 INJECTION, POWDER, FOR SOLUTION INTRAMUSCULAR; INTRAVENOUS at 12:57

## 2021-12-23 RX ADMIN — FENTANYL CITRATE 50 MCG: 50 INJECTION, SOLUTION INTRAMUSCULAR; INTRAVENOUS at 15:45

## 2021-12-23 RX ADMIN — PHENYLEPHRINE HYDROCHLORIDE 100 MCG: 10 INJECTION, SOLUTION INTRAMUSCULAR; INTRAVENOUS; SUBCUTANEOUS at 14:50

## 2021-12-23 RX ADMIN — SUGAMMADEX 200 MG: 100 INJECTION, SOLUTION INTRAVENOUS at 15:42

## 2021-12-23 RX ADMIN — DOCUSATE SODIUM 100 MG: 100 CAPSULE, LIQUID FILLED ORAL at 09:06

## 2021-12-23 RX ADMIN — PROPOFOL 150 MG: 10 INJECTION, EMULSION INTRAVENOUS at 14:39

## 2021-12-23 RX ADMIN — SODIUM CHLORIDE: 9 INJECTION, SOLUTION INTRAVENOUS at 14:35

## 2021-12-23 RX ADMIN — ACETAMINOPHEN 1000 MG: 500 TABLET ORAL at 12:25

## 2021-12-23 RX ADMIN — HEPARIN SODIUM 5000 UNITS: 5000 INJECTION INTRAVENOUS; SUBCUTANEOUS at 22:11

## 2021-12-23 RX ADMIN — LIDOCAINE HYDROCHLORIDE 100 MG: 20 INJECTION, SOLUTION INTRAVENOUS at 14:39

## 2021-12-23 RX ADMIN — SODIUM CHLORIDE: 9 INJECTION, SOLUTION INTRAVENOUS at 00:35

## 2021-12-23 RX ADMIN — AMPICILLIN SODIUM AND SULBACTAM SODIUM 3000 MG: 2; 1 INJECTION, POWDER, FOR SOLUTION INTRAMUSCULAR; INTRAVENOUS at 21:17

## 2021-12-23 RX ADMIN — AMPICILLIN SODIUM AND SULBACTAM SODIUM 3000 MG: 2; 1 INJECTION, POWDER, FOR SOLUTION INTRAMUSCULAR; INTRAVENOUS at 09:04

## 2021-12-23 RX ADMIN — AMLODIPINE BESYLATE 5 MG: 5 TABLET ORAL at 09:06

## 2021-12-23 RX ADMIN — SODIUM CHLORIDE: 9 INJECTION, SOLUTION INTRAVENOUS at 09:03

## 2021-12-23 RX ADMIN — HEPARIN SODIUM 5000 UNITS: 5000 INJECTION INTRAVENOUS; SUBCUTANEOUS at 05:46

## 2021-12-23 RX ADMIN — PHENYLEPHRINE HYDROCHLORIDE 100 MCG: 10 INJECTION, SOLUTION INTRAMUSCULAR; INTRAVENOUS; SUBCUTANEOUS at 14:44

## 2021-12-23 RX ADMIN — INSULIN HUMAN 2 UNITS: 100 INJECTION, SOLUTION PARENTERAL at 09:07

## 2021-12-23 RX ADMIN — ROCURONIUM BROMIDE 50 MG: 10 INJECTION INTRAVENOUS at 14:39

## 2021-12-23 RX ADMIN — DOCUSATE SODIUM 100 MG: 100 CAPSULE, LIQUID FILLED ORAL at 21:12

## 2021-12-23 RX ADMIN — OXYCODONE HYDROCHLORIDE 5 MG: 5 TABLET ORAL at 17:44

## 2021-12-23 RX ADMIN — AMPICILLIN SODIUM AND SULBACTAM SODIUM 3000 MG: 2; 1 INJECTION, POWDER, FOR SOLUTION INTRAMUSCULAR; INTRAVENOUS at 03:32

## 2021-12-23 RX ADMIN — PHENYLEPHRINE HYDROCHLORIDE 100 MCG: 10 INJECTION, SOLUTION INTRAMUSCULAR; INTRAVENOUS; SUBCUTANEOUS at 15:04

## 2021-12-23 RX ADMIN — ONDANSETRON 4 MG: 2 INJECTION INTRAMUSCULAR; INTRAVENOUS at 14:39

## 2021-12-23 ASSESSMENT — PULMONARY FUNCTION TESTS
PIF_VALUE: 25
PIF_VALUE: 2
PIF_VALUE: 25
PIF_VALUE: 28
PIF_VALUE: 24
PIF_VALUE: 1
PIF_VALUE: 28
PIF_VALUE: 19
PIF_VALUE: 20
PIF_VALUE: 19
PIF_VALUE: 19
PIF_VALUE: 28
PIF_VALUE: 32
PIF_VALUE: 28
PIF_VALUE: 24
PIF_VALUE: 27
PIF_VALUE: 24
PIF_VALUE: 27
PIF_VALUE: 27
PIF_VALUE: 1
PIF_VALUE: 5
PIF_VALUE: 1
PIF_VALUE: 29
PIF_VALUE: 29
PIF_VALUE: 28
PIF_VALUE: 0
PIF_VALUE: 14
PIF_VALUE: 27
PIF_VALUE: 22
PIF_VALUE: 20
PIF_VALUE: 27
PIF_VALUE: 29
PIF_VALUE: 24
PIF_VALUE: 14
PIF_VALUE: 29
PIF_VALUE: 24
PIF_VALUE: 25
PIF_VALUE: 21
PIF_VALUE: 30
PIF_VALUE: 27
PIF_VALUE: 28
PIF_VALUE: 14
PIF_VALUE: 25
PIF_VALUE: 26
PIF_VALUE: 27
PIF_VALUE: 26
PIF_VALUE: 28
PIF_VALUE: 19
PIF_VALUE: 1
PIF_VALUE: 25
PIF_VALUE: 28
PIF_VALUE: 28
PIF_VALUE: 20
PIF_VALUE: 28
PIF_VALUE: 20
PIF_VALUE: 19
PIF_VALUE: 21
PIF_VALUE: 26
PIF_VALUE: 28
PIF_VALUE: 1
PIF_VALUE: 1
PIF_VALUE: 25
PIF_VALUE: 30
PIF_VALUE: 1
PIF_VALUE: 28
PIF_VALUE: 18
PIF_VALUE: 15
PIF_VALUE: 28
PIF_VALUE: 21
PIF_VALUE: 28
PIF_VALUE: 24
PIF_VALUE: 1
PIF_VALUE: 25
PIF_VALUE: 21
PIF_VALUE: 19

## 2021-12-23 ASSESSMENT — PAIN SCALES - GENERAL
PAINLEVEL_OUTOF10: 7
PAINLEVEL_OUTOF10: 5
PAINLEVEL_OUTOF10: 0
PAINLEVEL_OUTOF10: 7
PAINLEVEL_OUTOF10: 6

## 2021-12-23 ASSESSMENT — PAIN DESCRIPTION - LOCATION
LOCATION: ABDOMEN;BUTTOCKS
LOCATION: BUTTOCKS

## 2021-12-23 ASSESSMENT — PAIN DESCRIPTION - PAIN TYPE
TYPE: SURGICAL PAIN
TYPE: SURGICAL PAIN

## 2021-12-23 ASSESSMENT — PAIN DESCRIPTION - DESCRIPTORS: DESCRIPTORS: DISCOMFORT

## 2021-12-23 ASSESSMENT — PAIN DESCRIPTION - FREQUENCY
FREQUENCY: CONTINUOUS
FREQUENCY: CONTINUOUS

## 2021-12-23 ASSESSMENT — PAIN DESCRIPTION - ORIENTATION: ORIENTATION: LEFT

## 2021-12-23 NOTE — ANESTHESIA PRE PROCEDURE
Department of Anesthesiology  Preprocedure Note       Name:  Billie Forrest   Age:  61 y.o.  :  1955                                          MRN:  [id number]         Date:  2021      Surgeon: Valdo Mendenhall):  Tere Metcalf MD    Procedure: Procedure(s):  EVALUATION UNDER ANESTHESIA/ WOUND VAC CHANGE/ POSSIBLE PARTIAL CLOSURE/ ABDOMINAL WALL AND PERINEUM    Medications prior to admission:   Prior to Admission medications    Medication Sig Start Date End Date Taking?  Authorizing Provider   metFORMIN (GLUCOPHAGE) 500 MG tablet Take 500 mg by mouth daily (with breakfast)    Historical Provider, MD   meloxicam (MOBIC) 15 MG tablet Take 1 tablet by mouth daily 16   Todd Urrutia DO       Current medications:    Current Facility-Administered Medications   Medication Dose Route Frequency Provider Last Rate Last Admin    0.9 % sodium chloride infusion   IntraVENous Continuous Kathrene Bence Spritzer,  mL/hr at 21 1226 Rate Verify at 21 1226    insulin lispro (1 Unit Dial) 3 Units  3 Units SubCUTAneous BID WC Kathrene Bence Spritzer, DO   3 Units at 21 8795    insulin glargine (LANTUS;BASAGLAR) injection pen 12 Units  12 Units SubCUTAneous QAM Kathrene Bence Spritzer, DO   12 Units at 21 0939    insulin regular (HUMULIN R;NOVOLIN R) injection 0-18 Units  0-18 Units SubCUTAneous 4x Daily AC & HS Kathrene Bence Spritzer, DO   2 Units at 21 1226    0.9 % sodium chloride infusion   IntraVENous PRN Kathrene Bence Spritzer, DO        heparin (porcine) injection 5,000 Units  5,000 Units SubCUTAneous 3 times per day Kathrene Bence Spritzer, DO   5,000 Units at 21 0546    alteplase (CATHFLO) injection 1 mg  1 mg IntraCATHeter PRN Kathrene Bence Spritzer, DO   1 mg at 21 1034    docusate sodium (COLACE) capsule 100 mg  100 mg Oral BID Kathrene Bence Spritzer, DO   100 mg at 21 0906    polyethylene glycol (GLYCOLAX) packet 17 g  17 g Oral Daily Kathrene Bence Spritzer, DO   17 g at 21 8263  ipratropium-albuterol (DUONEB) nebulizer solution 1 ampule  1 ampule Inhalation Q4H PRN [place] Muck Spritzer, DO   1 ampule at 12/10/21 1202    dextrose 50 % IV solution  12.5 g IntraVENous PRN Aiden Muck Spritzer, DO        amLODIPine (NORVASC) tablet 5 mg  5 mg Oral Daily Aiden Muck Spritzer, DO   5 mg at 12/23/21 6562    hydrALAZINE (APRESOLINE) injection 10 mg  10 mg IntraVENous Q6H PRN Aiden Muck Spritzer, DO        ampicillin-sulbactam (UNASYN) 3000 mg ivpb minibag  3,000 mg IntraVENous Q6H [place] Muck Spritzer,  mL/hr at 12/23/21 1324 Rate Verify at 12/23/21 1324    oxyCODONE (ROXICODONE) immediate release tablet 5 mg  5 mg Oral Q4H PRN [place] Muck Spritzer, DO   5 mg at 12/22/21 1825    Or    oxyCODONE (ROXICODONE) immediate release tablet 10 mg  10 mg Oral Q4H PRN [place] Muck Spritzer, DO   10 mg at 12/21/21 1547    acetaminophen (TYLENOL) tablet 1,000 mg  1,000 mg Oral Q6H [place] Muck Spritzer, DO   1,000 mg at 12/23/21 1225    sodium chloride flush 0.9 % injection 5-40 mL  5-40 mL IntraVENous 2 times per day [place] Muck Spritzer, DO   10 mL at 12/22/21 2033    sodium chloride flush 0.9 % injection 5-40 mL  5-40 mL IntraVENous PRN [place] Muck Spritzer, DO        0.9 % sodium chloride infusion  25 mL IntraVENous PRN [place] Muck Spritzer,  mL/hr at 12/12/21 0850 25 mL at 12/12/21 0850    glucose (GLUTOSE) 40 % oral gel 15 g  15 g Oral PRN Aiden Muck Spritzer, DO        dextrose 50 % IV solution  12.5 g IntraVENous PRN [place] Muck Spritzer, DO        glucagon (rDNA) injection 1 mg  1 mg IntraMUSCular PRN [place] Muck Spritzer, DO        dextrose 5 % solution  100 mL/hr IntraVENous PRN [place] Muck Spritzer, DO           Allergies:  No Known Allergies    Problem List:    Patient Active Problem List   Diagnosis Code    Diabetic acidosis without coma ([facility] E11.10    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 No pertinent past medical history.     Past Surgical History:        Procedure Laterality Date    ABDOMEN SURGERY N/A 12/1/2021    WIDE EXCISION PERINEUM, BACK, ABDOMINAL WALL performed by Cherise Choi MD at [facility] Left 12/3/2021    ABDOMINAL WALL AND LEFT BUTTOCK DEBRIDEMENT, WOUND VAC PLACEMENT performed by Radha Freitas MD at [facility] Left 12/5/2021    ABDOMINAL WALL AND LEFT BUTTOCK DEBRIDEMENT, WOUND VAC PLACEMENT performed by Radha Freitas MD at [facility] N/A 12/7/2021    EVALUATION UNDER ANESTHESIA WITH DEBRIDEMENT ABDOMINAL WOUND AND LEFT BUTTOCK, WOUND VAC CHANGE performed by Radha Freitas MD at [facility] Left 12/10/2021    ABDOMINAL WALL AND LEFT BUTTOCK WOUND VAC CHANGE WITH FURTHER DEBRIDEMENT ABDOMEN AND LEFT BUTTOCK WOUND performed by Radha Freitas MD at [facility] N/A 12/13/2021    WOUND VAC CHANGE ABDOMEN AND LEFT BUTTOCK performed by Radha Freitas MD at [facility] N/A 12/16/2021    2ND LOOK/ EVALUATION UNDER ANESTHESIA/ WOUND VAC CHANGE ABDOMINAL WALL AND PERINEUM performed by Radha Freitas MD at [facility] N/A 12/21/2021    EVALUATION UNDER ANESTHESIA/ WOUND VAC CHANGE ABDOMINAL WALL AND PERINEUM performed by Radha Freitas MD at [facility] Left 12/7/2021    LAPAROSCOPIC COLOSTOMY CREATION performed by Cherise Choi MD at [address] N/A 11/30/2021    DEBRIDEMENT OF SCROTAL JAYRO'S GANGRENE performed by Monica Schmidt MD at [id number] [place] [address] N/A 11/30/2021    PERINEAL SOFT TISSUE EXCISIONAL DEBRIDEMENT performed by Umu Lee MD at [facility] History:    Social History     Tobacco Use    Smoking status: Current Every Day Smoker    Smokeless tobacco: Never Used   Substance Use Topics    Alcohol use: Yes     Alcohol/week: 0.0 standard drinks     Comment: social                                 Ready to quit: Not Answered  Counseling given: Not Answered      Vital Signs (Current):   Vitals:    12/23/21 0334 12/23/21 0716 12/23/21 0727 12/23/21 1315   BP: (!) 151/72 (!) 161/77 (!) 151/70 (!) 142/63   Pulse: 81 89 85 77   Resp: 16 16 15 18   Temp: 98.7 °F (37.1 °C) 98.1 °F (36.7 °C) 98.1 °F (36.7 °C) 98.3 °F (36.8 °C)   TempSrc: Oral Oral Oral    SpO2: 92% 91% 93% (!) 9%   Weight:       Height:                                                  BP Readings from Last 3 Encounters:   12/23/21 (!) 142/63   12/21/21 116/62   12/16/21 113/69       NPO Status: Time of last liquid consumption: 2300                        Time of last solid consumption: 2300                        Date of last liquid consumption:  (12/22/21)                        Date of last solid food consumption: 12/22/21    BMI:   Wt Readings from Last 3 Encounters:   12/17/21 199 lb 1.2 oz (90.3 kg)   11/30/21 163 lb 3.2 oz (74 kg)   04/18/16 179 lb 14.3 oz (81.6 kg)     Body mass index is 27.77 kg/m².     CBC:   Lab Results   Component Value Date    WBC 8.6 12/23/2021    RBC 2.76 12/23/2021    HGB 8.6 12/23/2021    HCT 25.6 12/23/2021    MCV 92.5 12/23/2021    RDW 16.2 12/23/2021     12/23/2021       CMP:   Lab Results   Component Value Date     12/23/2021    K 4.2 12/23/2021    K 3.8 12/12/2021     12/23/2021    CO2 29 12/23/2021    BUN 10 12/23/2021    CREATININE 0.6 12/23/2021    GFRAA >60 12/23/2021    AGRATIO 0.7 11/30/2021    LABGLOM >60 12/23/2021    GLUCOSE 125 12/23/2021    PROT 4.8 12/10/2021    CALCIUM 8.1 12/23/2021    BILITOT 0.3 12/10/2021    ALKPHOS 170 12/10/2021    AST 13 12/10/2021    ALT 7 12/10/2021       POC Tests:   Recent Labs     12/23/21  1151   POCGLU 145*       Coags:   Lab Results   Component Value Date    PROTIME 10.7 12/21/2021    INR 0.95 12/21/2021       HCG (If Applicable): No results found for: PREGTESTUR, PREGSERUM, HCG, HCGQUANT     ABGs:   Lab Results   Component Value Date    PHART 7.227 12/09/2021    PO2ART 107.8 12/09/2021    MOL2TGG 42.4 12/09/2021    UQT0TLG 17.6 12/09/2021    BEART -10 12/09/2021    G9HIAOYC 97 12/09/2021        Type & Screen (If Applicable):  No results found for: LABABO, LABRH    Drug/Infectious Status (If Applicable):  No results found for: HIV, HEPCAB    COVID-19 Screening (If Applicable):   Lab Results   Component Value Date    COVID19 Not Detected 11/30/2021           Anesthesia Evaluation  Patient summary reviewed and Nursing notes reviewed no history of anesthetic complications:   Airway: Mallampati: II  TM distance: >3 FB   Neck ROM: full  Mouth opening: > = 3 FB Dental:    (+) upper dentures and lower dentures      Pulmonary:Negative Pulmonary ROS and normal exam                               Cardiovascular:  Exercise tolerance: good (>4 METS),       (-) CAD,  angina and  CHF                Neuro/Psych:   Negative Neuro/Psych ROS              GI/Hepatic/Renal: Neg GI/Hepatic/Renal ROS            Endo/Other:    (+) DiabetesType II DM, , .                 Abdominal:             Vascular: negative vascular ROS. Other Findings:           Anesthesia Plan      general     ASA 3       Induction: intravenous. MIPS: Postoperative opioids intended and Prophylactic antiemetics administered. Anesthetic plan and risks discussed with patient. Plan discussed with CRNA.     Attending anesthesiologist reviewed and agrees with Pj Kaur MD   12/23/2021

## 2021-12-23 NOTE — PLAN OF CARE
Problem: Activity:  Goal: Ability to return to normal activity level will improve  Description: Ability to return to normal activity level will improve  Outcome: Ongoing     Problem:  Bowel/Gastric:  Goal: Gastrointestinal status for postoperative course will improve  Description: Gastrointestinal status for postoperative course will improve  Outcome: Ongoing  Goal: Control of bowel function will improve  Description: Control of bowel function will improve  Outcome: Ongoing  Goal: Ability to achieve a regular elimination pattern will improve  Description: Ability to achieve a regular elimination pattern will improve  Outcome: Ongoing     Problem: Health Behavior:  Goal: Identification of resources available to assist in meeting health care needs will improve  Description: Identification of resources available to assist in meeting health care needs will improve  Outcome: Ongoing     Problem: Physical Regulation:  Goal: Postoperative complications will be avoided or minimized  Description: Postoperative complications will be avoided or minimized  Outcome: Ongoing     Problem: Respiratory:  Goal: Ability to achieve and maintain a regular respiratory rate will improve  Description: Ability to achieve and maintain a regular respiratory rate will improve  Outcome: Ongoing     Problem: Safety:  Goal: Ability to remain free from injury will improve  Description: Ability to remain free from injury will improve  Outcome: Ongoing     Problem: Sensory:  Goal: General experience of comfort will improve  Description: General experience of comfort will improve  Outcome: Ongoing     Problem: Skin Integrity:  Goal: Demonstration of wound healing without infection will improve  Description: Demonstration of wound healing without infection will improve  Outcome: Ongoing  Goal: Will show no infection signs and symptoms  Description: Will show no infection signs and symptoms  Outcome: Ongoing  Goal: Absence of new skin Ongoing

## 2021-12-23 NOTE — PROGRESS NOTES
Pt alert and oriented. VSS. Pt denies pain. Pt has wound vac in place CDI. Pt walsh with adequate urine output. Pt ostomy with stool in bag. Pt turned and repositioned. Pt NPO at midnight for OR in AM. Pt has call light within reach, bed in lowest position with wheels locked, 2/4 side rails up, and bed alarm on. Will continue to monitor.

## 2021-12-23 NOTE — PROGRESS NOTES
PICC pink line flushed with some resistance. Unable to flush white line. Requested pharmacy send cath gilles to 5S. Left Iv running well.

## 2021-12-23 NOTE — ANESTHESIA POSTPROCEDURE EVALUATION
Department of Anesthesiology  Postprocedure Note    Patient: Wesley Umana  MRN: [id number]  [place]: 1955  Date of evaluation: 12/23/2021  Time:  3:57 PM     Procedure Summary     Date: 12/23/21 Room / Location: [address] [place] [address] / [facility]    Anesthesia Start: 2444 Anesthesia Stop: 9817    Procedure: EVALUATION UNDER ANESTHESIA/ WOUND VAC CHANGE/ POSSIBLE PARTIAL CLOSURE/ ABDOMINAL WALL AND PERINEUM (N/A Groin) Diagnosis: (NECROTIZING FASCIITIS LEFT BUTTOCK AND ABDOMEN)    Surgeons: Dwight Carrasquillo MD Responsible Provider: Sylvie Estrada DO    Anesthesia Type: general ASA Status: 3          Anesthesia Type: general    Arcadio Phase I: Arcadio Score: 9    Arcadio Phase II:      Last vitals: Reviewed and per EMR flowsheets.        Anesthesia Post Evaluation    Patient location during evaluation: PACU  Patient participation: complete - patient participated  Level of consciousness: awake and lethargic  Airway patency: patent  Nausea & Vomiting: no nausea and no vomiting  Complications: no  Cardiovascular status: hemodynamically stable and blood pressure returned to baseline  Respiratory status: spontaneous ventilation and nasal cannula  Hydration status: stable

## 2021-12-23 NOTE — CARE COORDINATION
Cm following, pt to OR today for EUA and [place] change. Reta Silver at Desk following, pending medicaid.   Electronically signed by Lainey Tillman RN on 12/23/2021 at 2:45 PM  [phone]

## 2021-12-23 NOTE — PROGRESS NOTES
Surgery Daily Progress Note  Leopold Ramming  CC:  Shayla's gangrene      Subjective :  No issues overnight. Patient has remained afebrile and HDS with some oxygen requirements. Worked with OT yesterday and made it only to the side of the bed, with some lightheadedness. Otherwise no complaints this morning. Tolerating diet. Having good ostomy output. States he is ready for OR today. Objective    Infusions:   sodium chloride 100 mL/hr at 12/23/21 0035    sodium chloride      sodium chloride 25 mL (12/12/21 0850)    dextrose          I/O:I/O last 3 completed shifts: In: 1396.6 [P.O.:720; I.V.:267.7; IV Piggyback:408.9]  Out: 3025 [Urine:2000; Drains:1025]           Wt Readings from Last 1 Encounters:   12/17/21 199 lb 1.2 oz (90.3 kg)                 LABS:    Recent Labs     12/21/21  0600 12/23/21  0551   WBC 7.0 8.6   HGB 8.4* 8.6*   HCT 25.3* 25.6*   MCV 92.4 92.5    281        Recent Labs     12/21/21  0600      K 4.2      CO2 31   PHOS 3.1   BUN 9   CREATININE 0.6*      No results for input(s): AST, ALT, ALB, BILIDIR, BILITOT, ALKPHOS in the last 72 hours. No results for input(s): LIPASE, AMYLASE in the last 72 hours. Recent Labs     12/21/21  0600   INR 0.95      No results for input(s): CKTOTAL, CKMB, CKMBINDEX, TROPONINI in the last 72 hours. Exam:BP (!) 151/72   Pulse 81   Temp 98.7 °F (37.1 °C) (Oral)   Resp 16   Ht 5' 11\" (1.803 m)   Wt 199 lb 1.2 oz (90.3 kg)   SpO2 92%   BMI 27.77 kg/m²   General appearance: alert, appears stated age and cooperative  Eyes: No scleral icterus, EOM grossly intact  Neck: trachea midline, no JVD, no lymphadenopathy, neck supple  Chest/Lungs: Equal excursion bilaterally, normal effort with no accessory muscle use on 2L NC  Cardiovascular: RRR, brisk capillary refill  Abdomen:  Soft, large surgical debridement area of the abdomen with veraflo Vac in place holding adequate suction and instilling normal saline.  Colostomy is pink, viable and with brown stool in the bag. Skin: warm and dry, no rashes  Extremities: no edema, no cyanosis  Genitourinary: Mcfadden in place with clear yellow urine, strip paste around penis without edema   Neuro: A&Ox3, no focal deficits, sensation intact      ASSESSMENT/PLAN: Pt. is a 61 y.o. male with Necrotizing fasciitis of the abdominal wall, gluteal region, and scrotum s/p wide excision of perineum, back, and abdominal wall. S/p multiple debridements and wound vac placement and changes intraoperatively with diverting colostomy creation (12/7). OR wound vac change (12/10, 12/13, 12/16, 12/21). - Patient to return to the OR today (12/23) for EUA and wound vac change. Reconstruction likely to begin next week pending improvement in nutritional markers and evidence of wound healing potential  - Continue carb free diet with protein supplementation for goal >100g protein per day  following procedure. Dietitian on board to assist with macronutrient accounting and support   - sliding scale, mealtime insulin, and basal insulin for glucose control.  Blood glucose 103-136 since yesterday morning.  - continue abx per ID  - Follow up OR cultures from 12/21, NGTD    Inder Kelley DO  PGY1, General Surgery  12/23/21  6:43 AM  [phone]

## 2021-12-23 NOTE — PROGRESS NOTES
PACU Transfer Note    Vitals:    12/23/21 1655   BP: (!) 147/65   Pulse: 69   Resp: 15   Temp: 97.3 °F (36.3 °C)   SpO2: 94%   BP with 20% of pre-op. In: 67 [I.V.:72]  Out: 325 [Urine:325]    Pain assessment:  present - adequately treated  Pain Level:  (ok to return to room)    Report given to Receiving unit HELIO - Roberta Barthel.     12/23/2021 5:04 PM

## 2021-12-23 NOTE — OP NOTE
[place] [address] SURGERY     OPERATIVE DICTATION    NAME: Partha Brito   MRN: [id number]  DATE: 12/23/2021     AGE: 61 y.o. LOCATION: [facility]    PREOPERATIVE DIAGNOSIS:  Shayla's gangrene with necrotizing fasciitis     POSTOPERATIVE DIAGNOSIS:  Same. OPERATION PERFORMED: 1) Dressing change under anesthesia       2) Application of Veraflo instillation vac therapy     SURGEON:  Li Estrada MD    ASSISTANT: Nancy Herrera (PGY2)    ANESTHESIA: General     ESTIMATED BLOOD LOSS: Minimal     DRAINS:  Instillation vac     SPECIMENS: None     OPERATIVE INDICATIONS:  This is an unfortunate 61 y.o. male with a history of poorly controlled diabetes who developed Shayla's gangrene at an outside hospital.  He underwent debridement with both Urology and General surgery, however was noted to have additional crepitance. Given this, he was urgently transferred to our hospital and was taken to the operating room by general surgery for extensive debridement. Plastic surgery was consulted for assistance with management. He has stabilized and undergone diverting colostomy. He is brought to the operating room for dressing change under anesthesia given the complexity and pain. The risks, benefits, alternatives, outcomes, and personnel involved was performed with the patient. After all questions were answered to the patient's satisfaction, they agreed to proceed. OPERATIVE PROCEDURE:  The patient was brought to the operating room on his ICU bed and placed in the supine position on the operating room table. He underwent general anesthesia without difficulty, was then placed in the lithotomy position, and was prepped and draped in the usual sterile manner. A time-out was performed confirming the patient and the procedure to be performed. There was minimal granulation tissue, however there was also minimal fibrinous exudate. There was a significant amount of tissue edema.   The wound was copiously irrigated with 3L of saline solution using a Pulse Lavage. Once completed, another complex wound vacuum device was then fashioned and applied with good seal.    The patient was then placed supine, extubated, and taken back to the ICU in stable, yet critical condition. There were no immediate complications and the patient tolerated the procedure well. At the end of the case, all counts were correct. PLAN: Will return for additional planned dressing change under anesthesia in 72 hours and if nutrition satisfactory and improved granulation tissue with negative cultures, will begin the reconstructive process.     Codi Quezada MD

## 2021-12-23 NOTE — PROGRESS NOTES
1559 Admitted to PACU from OR. Connected to monitor. Report at bedside. Faint left wheeze - denies need for resp treatment.

## 2021-12-23 NOTE — PROGRESS NOTES
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  Antibiotics: Unasyn    Admit Date: 12/1/2021  Hospital Day: 23    Subjective:     Return to OR today  POD#2 dressing change, no reconstruction (noted some fibrinous exudate)    Patient reports wound / surg site pain mostly controlled  No other complaint     Objective:     Patient Vitals for the past 8 hrs:   BP Temp Temp src Pulse Resp SpO2   12/23/21 0727 (!) 151/70 98.1 °F (36.7 °C) Oral 85 15 93 %   12/23/21 0716 (!) 161/77 98.1 °F (36.7 °C) Oral 89 16 91 %   12/23/21 0334 (!) 151/72 98.7 °F (37.1 °C) Oral 81 16 92 %     I/O last 3 completed shifts: In: 1445.6 [P.O.:600; I.V.:436.7; IV Piggyback:408.9]  Out: 2725 [Urine:2300; Drains:425]  I/O this shift:  In: -   Out: 1600 [Urine:1300; Drains:300]    EXAM:  GENERAL: No apparent distress.   RA  HEENT: Membranes moist, no oral lesion  NECK:  Supple, no lymphadenopathy  LUNGS: Clear b/l, no rales, no dullness  CARDIAC: RRR, no murmur appreciated  ABD:  + BS, soft / NT  Wounds - VAC in wound over lower abd, suprapubic region, extending over L scrotum, inguinal fold, posteriorly to buttock  EXT:  No rash, no edema, no lesions  NEURO: No focal neurologic findings  PSYCH: Orientation, sensorium, mood normal  LINES:  R PICC in place       Data Review:  Lab Results   Component Value Date    WBC 8.6 12/23/2021    HGB 8.6 (L) 12/23/2021    HCT 25.6 (L) 12/23/2021    MCV 92.5 12/23/2021     12/23/2021     Lab Results   Component Value Date    CREATININE 0.6 (L) 12/23/2021    BUN 10 12/23/2021     12/23/2021    K 4.2 12/23/2021     12/23/2021    CO2 29 12/23/2021       Hepatic Function Panel:   Lab Results   Component Value Date    ALKPHOS 170 12/10/2021    ALT 7 12/10/2021    AST 13 12/10/2021    PROT 4.8 12/10/2021    BILITOT 0.3 12/10/2021    BILIDIR <0.2 12/10/2021    IBILI see below 12/10/2021    LABALBU 2.3 12/23/2021       MICRO:  11/30 BC no growth   11/30 SARS CoV-2 NAAT neg   Tissue cult - no growth      Wound cult - light mixed skin pam  12/3 Tissue cult - light C glabrata   Tissue cult - light C glabrata     Tissue cult - in process, 'growth too young'    IMAGIN/30 L foot:   Evidence of a septic joint involving the 5th metatarsal phalangeal joint with   associated osteomyelitis      Abd / Pelvic CT   Impression   1. 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  There is   additional muscle or fascial involvement on the left as above.  Of note, the   testes are incompletely evaluated but appear within normal limits.  Critical   results were called by Dr. Mimi Gutierrez MD to Dr. Jovan Obrien on 2021   at 19:31.   2. Minimal bilateral hydronephrosis is likely due to severe urinary bladder   distention.  Abd / Pelvic CT  Impression   Significant interval improvement and soft tissue emphysema in the visualized left gluteal region and over the anterior abdominal wall since prior study.       Anasarca more prominently of the abdominal wall without suspicious drainable abdominal wall fluid collection.       More prominent presacral strandy fluid without suspicious retroperitoneal emphysema to suggest retroperitoneal infection.       New small amount of ascites throughout the abdomen and pelvis without suspicious organized intraperitoneal fluid collections.       New moderate to large bilateral pleural effusions with associated compressive atelectasis of the lung bases.       New trace pericardial effusion.       Colostomy in the left lower quadrant without obstruction or complicating features.       Interval improvement in bilateral hydronephrosis.        Scheduled Meds:   magnesium sulfate  2,000 mg IntraVENous Once    insulin lispro  3 Units SubCUTAneous BID WC    insulin glargine 12 Units SubCUTAneous QAM    insulin regular  0-18 Units SubCUTAneous 4x Daily AC & HS    heparin (porcine)  5,000 Units SubCUTAneous 3 times per day    docusate sodium  100 mg Oral BID    polyethylene glycol  17 g Oral Daily    amLODIPine  5 mg Oral Daily    ampicillin-sulbactam  3,000 mg IntraVENous Q6H    acetaminophen  1,000 mg Oral Q6H    sodium chloride flush  5-40 mL IntraVENous 2 times per day       Continuous Infusions:   sodium chloride 100 mL/hr at 12/23/21 4634    sodium chloride      sodium chloride 25 mL (12/12/21 0850)    dextrose         PRN Meds:  sodium chloride, alteplase, ipratropium-albuterol, dextrose, hydrALAZINE, oxyCODONE **OR** oxyCODONE, sodium chloride flush, sodium chloride, glucose, dextrose, glucagon (rDNA), dextrose      Assessment:     64 yo man with DM - not taking meds     Presented with scrotal swelling and pain  Seen 11/30 at [facility] ED, dx DKA, Shayla's gangrene  CT with gas extending   OR debridement  Rx Vancomycin, Cefepime, Metronidazole     Transfer to [facility] on 12/1. Return to OR 12/2,3,5,7, 10,13,16  Laparoscopic end colostomy 12/7     Reviewed cultures -  11/30 GS GPC, GNDC, GNR, cult neg  12/3,5 light C glabrata.     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   - mixed / polymicrobial infection   - yeast in tissue    Leukocytosis - resolved      Plan:     Cont unsyn for now / until closure   Wound care / reconstruction per Surg / Plastics - return to OR today 12/23    Diet (high protein / no carb), BS control    At discharge may not need iv antibiotics / any further antibiotics    Medical Decision Making:   The following items were considered in medical decision making:  Discussion of patient care with other providers  Reviewed clinical lab tests  Reviewed radiology tests  Reviewed other diagnostic tests/interventions  Independent review of radiologic images - reviewed initial and f/u CT with Radiologist 12/8  Microbiology cultures and other micro tests reviewed      Discussed with pt  Keo Rodas MD

## 2021-12-23 NOTE — PROGRESS NOTES
Podiatric Surgery Daily Progress Note      Admit Date: 12/1/2021      Code:Full Code    Patient seen and examined, labs and records reviewed    Subjective:     Patient seen at bedside this AM. Patient denies pain to bilateral lower extremities. Patient denies fever, chills, shortness of breath, chest pain. Patient denies any acute events overnight. Review of Systems: A 10 point review of systems was conducted, significant findings as noted in HPI. All other systems negative. Objective     BP (!) 151/72   Pulse 81   Temp 98.7 °F (37.1 °C) (Oral)   Resp 16   Ht 5' 11\" (1.803 m)   Wt 199 lb 1.2 oz (90.3 kg)   SpO2 92%   BMI 27.77 kg/m²     I/O:    Intake/Output Summary (Last 24 hours) at 12/23/2021 0655  Last data filed at 12/23/2021 0335  Gross per 24 hour   Intake 1445.58 ml   Output 2725 ml   Net -1279.42 ml              Wt Readings from Last 3 Encounters:   12/17/21 199 lb 1.2 oz (90.3 kg)   11/30/21 163 lb 3.2 oz (74 kg)   04/18/16 179 lb 14.3 oz (81.6 kg)       LABS:    Recent Labs     12/21/21  0600 12/23/21  0551   WBC 7.0 8.6   HGB 8.4* 8.6*   HCT 25.3* 25.6*    281        Recent Labs     12/23/21  0551      K 4.2      CO2 29   PHOS 3.0   BUN 10   CREATININE 0.6*        Recent Labs     12/21/21  0600   INR 0.95       LOWER EXTREMITY EXAMINATION    Dressing to left LE intact. No strikethrough noted to the external dressing. Betadine discoloration noted to the internal layers of the dressing of the left LE.     VASCULAR:   DP and PT pulses are non-palpable b/l. Upon hand-held Doppler examination DP signals were noted to be monophasic and PT signals were noted to be nondopplerable. CFT slightly diminished to the distal digits of bilateral lower extremities. Skin temperature is warm to lukewarm to cool to the distal digits from proximal to distal.  No edema noted. No pain with calf compression b/l.     NEUROLOGIC:   Gross and epicritic sensation is diminished b/l.  Protective sensation is diminished at all pedal sites b/l.     DERMATOLOGIC:     Left lower extremity:  Full-thickness ulceration noted to the lateral aspect of the left foot. Wound measures approximately 3.2 cm x 2 cm x 0.5 cm. Wound base with exposed bone, with necrotic edges at the proximal and distal edges. Wound probes to bone with exposed fifth metatarsal. Necrotic appearance of 5th digit. No tracking or tunneling noted. No purulent drainage noted. No fluctuance or crepitus or malodor noted. Deep tissue injury noted 2/2 Prevalon boot strap to the anterior aspect of the ankle. Intact epithelialized tissue noted. No crepitus, erythema, drainage, or malodor noted. No open wound noted. Stable without signs of acute infection noted. Right lower extremity  Partial thickness ulceration noted to the lateral malleolus 2/2 pressure. No fluctuance, crepitus, malodor, or drainage noted. No acute signs infection noted. Partial thickness ulceration 2/2 pressure noted at the styloid process of the 5th metatarsal. Fibrotic tissue noted. No fluctuance or crepitus noted. No acute signs of infection noted. MUSCULOSKELETAL:   Muscle strength 4/5 for all pedal muscle groups B/L LE. No pain with palpation of the left foot. IMAGING:  XR LEFT FOOT 11/30/2021  Narrative   EXAMINATION:   THREE XRAY VIEWS OF THE LEFT FOOT       11/30/2021 1:44 pm       HISTORY:   ORDERING SYSTEM PROVIDED HISTORY: wound   TECHNOLOGIST PROVIDED HISTORY:   Reason for exam:->wound   Reason for Exam: blood sugar problem, wound check on lateral portion of foot   Acuity: Acute   Type of Exam: Initial       FINDINGS:   Osseous destruction along the articular margins of the 5th   metatarsophalangeal joint with associated lucency in the soft tissues. .   There is no evidence of fracture or dislocation. . The remaining joint spaces   appear well maintained.  The remaining soft tissues are unremarkable.       Impression   Evidence of a septic joint involving the 5th metatarsal phalangeal joint with   associated osteomyelitis     ARTERIAL DUPLEX 12/2/21     Type of Study:        Extremities Arteries:Lower Extremities Arterial Duplex, VL LOWER EXTREMITY    ARTERIES DUPLEX BILATERAL.       Tech Comments   Right   The right ankle/brachial index is 0.0 (no Doppler signal could be heard at the   [facility] or the PT). The common femoral and profunda femoral arteries could not be visualized due   to bandages extending into the groin area. The mid superficial femoral artery has a short segmental occlusion and then is   reconstituted. Elevated velocities at the distal superficial femoral artery indicate a > 50%   stenosis by velocity criteria (velocity went from 15 to 298 cm/s). The posterior tibial artery is occluded. The distal anterior tibial artery is barely patent, with very low flow   (possibly occluded and then reconstituted). Left   The left ankle brachial index is 0 for the PT and the DP was not accessible   due to the bandages on the foot. The common femoral and profunda femoral artery could not be visualized due to   bandages extending into the groin area. The distal superficial femoral artery is occluded and then reconstituted. The posterior tibial artery, peroneal, and anterior tibial artery are   occluded. There were no previous studies to use for comparison.        ASSESSMENT/PLAN:   -Full-thickness ulceration; lateral left foot- Sands 4  -Osteomyelitis of the fifth metatarsal; left foot  -Partial thickness pressure ulceration x2, right foot; NPUAP Stage 2  -Pressure injury, dorsal right midfoot -NPUAP Stage I  -Critical limb ischemia; bilateral lower extremity  -Diabetes mellitus, type II  -History of noncompliance    -Patient seen and examined at bedside this AM.  -Hypertensive, otherwise VSS on 2 L of O2 via NC; no leukocytosis noted (WBC 8.6)  -All imaging reviewed; impressions noted above  -CRP 42.6 (12/20/21)  -Prealbumin 9.8 (12/13/21)  -Vascular surgery following; will await further stabilization with the general surgery team and plastic surgery team before offering vascular intervention.  -Plastic surgery following  -Infectious disease following, currently on Unasyn  -Left lower extremity dressed with Betadine soaked gauze, DSD, and tape. -Right lower extremity applied Mepilex borders  -Prevalon boots reapplied. Patient is to wear at all times while in bed to prevent further deep tissue injury. Ankle strap removed from bilateral boot as the ankle straps were causing pressure to his feet. -Nonweightbearing to left lower extremity.  -Weightbearing as tolerated to right lower extremity. DISPO: Full-thickness ulceration with underlying osteomyelitis to the left fifth metatarsal. Critical limb ischemia to b/l LE. Wounds are stable at this time. Vascular following; awaiting further stabilization at this time. Plastic surgery, general surgery, and ID following. Patient will require podiatric surgical intervention but timing will depend on medical stability and vascular recommendations. Will continue to follow while in house. Discussed assessment and plan with Dr. Katt Simental DPM.    Ariane Lee DPM  Podiatric Resident, PGY-2  Pager #: [phone] or [employer] Serve    Patient was seen and evaluated at bedside. Agree with residents assessment and treatment plan.   Katt Simental DPM

## 2021-12-23 NOTE — BRIEF OP NOTE
Brief Postoperative Note      Patient: Lynn Whiteside  YOB: 1955  MRN: [id number]    Date of Procedure: 12/23/2021    Pre-Op Diagnosis: NECROTIZING FASCIITIS LEFT BUTTOCK AND ABDOMEN    Post-Op Diagnosis: Same       Procedure(s):  EVALUATION UNDER ANESTHESIA/ WOUND VAC CHANGE    Surgeon(s):  Robbie Muniz MD    Assistant:  Resident: Alida Fuentes MD    Anesthesia: General    Estimated Blood Loss (mL): Minimal    Complications: None    Specimens:   ID Type Source Tests Collected by Time Destination   1 : abdominal tissue Tissue Tissue CULTURE, TISSUE Robbie Muniz MD 12/23/2021 1510        Implants:  * No implants in log *      Drains:   Negative Pressure Wound Therapy Abdomen Lower; Medial (Active)   Wound Type Surgical 12/23/21 0909   Dressing Type Black foam 12/23/21 0909   Cycle Continuous 12/23/21 0909   Target Pressure (mmHg) 125 12/23/21 0909   Canister changed? No 12/21/21 0646   Dressing Status Clean;Dry; Intact 12/23/21 0909   Output (ml) 75 ml 12/21/21 0646       Negative Pressure Wound Therapy Perineum (Active)   Wound Type Surgical 12/23/21 0909   Dressing Type Black foam 12/23/21 0909   Cycle Continuous 12/23/21 0909   Target Pressure (mmHg) 125 12/23/21 0909   Instillation Volume  250 mL 12/21/21 1906   Canister changed? Yes 12/22/21 1327   Dressing Status Clean;Dry; Intact 12/23/21 0909   Drainage Description Serous 12/22/21 0944   Output (ml) 300 ml 12/23/21 0910       Colostomy LUQ (Active)   Stomal Appliance 2 piece 12/21/21 1950   Flange Size (inches) 2 Inches 12/20/21 1552   Stoma  Assessment Pink;Moist 12/22/21 0944   Mucocutaneous Junction Intact 12/23/21 0909   Peristomal Assessment Clean; Intact 12/23/21 0909   Treatment Bag change;Site care 12/20/21 1552   Stool Appearance Soft 12/22/21 0944   Stool Color Brown 12/22/21 0944   Stool Amount Smear 12/22/21 0944   Output (mL) 0 ml 12/23/21 0910       Urethral Catheter Non-latex 18 fr (Active)   Catheter Indications Assist in healing of open sacral or perineal wounds (Stage III, IV, or unstageable documented by a provider or enterostomal therapy) and/or full thickness perineal and lower extremity burns in incontinent patients 12/23/21 0909   Securement Device Date Changed 12/13/21 12/21/21 1950   Site Assessment No urethral drainage 12/23/21 0909   Urine Color Yellow 12/23/21 0909   Urine Appearance Clear 12/23/21 0909   Urine Odor Other (Comment) 12/21/21 1950   Output (mL) 850 mL 12/23/21 1344       [REMOVED] Negative Pressure Wound Therapy Abdomen Mid (Removed)   Wound Type Surgical 12/07/21 1600   Dressing Type Black foam 12/07/21 1600   Cycle Continuous 12/07/21 1600   Target Pressure (mmHg) 125 12/07/21 1600   Irrigation Solution Dakins 0.125% 12/06/21 0509   Canister changed? Yes 12/07/21 1600   Dressing Status Clean;Dry; Intact 12/07/21 1600   Dressing Changed Changed/New 12/06/21 0400   Output (ml) 200 ml 12/07/21 1600   Odor None 12/07/21 1600       [REMOVED] Negative Pressure Wound Therapy Groin Lower; Medial (Removed)   Wound Type Surgical 12/16/21 0930   Unit Type VAC Ulta 12/16/21 0930   Dressing Type Black foam 12/16/21 0930   Number of pieces used 2 12/13/21 1558   Cycle Continuous 12/16/21 0930   Target Pressure (mmHg) 125 12/16/21 0930   Irrigation Solution Sodium chloride 0.9% 12/16/21 0930   Instillation Volume  850 mL 12/15/21 0425   Canister changed? Yes 12/16/21 0056   Dressing Status Clean;Dry; Intact 12/16/21 0930   Dressing Changed Dressing reinforced 12/16/21 0930   Drainage Amount None 12/13/21 1659   Drainage Description Serous 12/12/21 0820   Output (ml) 900 ml 12/16/21 0056   Wound Assessment Other (Comment) 12/14/21 2039   Princess-wound Assessment Other (Comment) 12/13/21 1659   Odor None 12/13/21 0754       [REMOVED] NG/OG/NJ/NE Tube Orogastric 16 fr Center mouth (Removed)   Surrounding Skin Dry; Intact 12/01/21 2200   Securement device Yes 12/02/21 0800   Status Clamped 12/02/21 0800   Placement Verified by External Catheter Length 12/02/21 0800   NG/OG/NJ/NE External Measurement (cm) 50 cm 12/02/21 0800   Drainage Appearance Bile 12/01/21 2200   Tube Feeding Intake (mL) 0 ml 12/01/21 1800   Free Water Flush (mL) 0 mL 12/01/21 1800   Free Water Rate 0 12/01/21 0800   Residual Volume (ml) 0 ml 12/01/21 0000       [REMOVED] NG/OG/NJ/NE Tube Left nostril (Removed)       [REMOVED] NG/OG/NJ/NE Tube Orogastric 16 fr Center mouth (Removed)       Findings: Wound vac changed under anesthesia; abdominal wall fibrinous exudate sent for culture    Electronically signed by Mayra Todd MD on 12/23/2021 at 3:48 PM

## 2021-12-23 NOTE — PROGRESS NOTES
Occupational Therapy/Physical Therapy Attempt  Chart reviewed. Pt is off the unit for wound vac exchange. No tx rendered. SIS Mcgrath, [facility]  Yoshi Trejo DPT

## 2021-12-24 LAB
ALBUMIN SERPL-MCNC: 2.5 G/DL (ref 3.4–5)
ANION GAP SERPL CALCULATED.3IONS-SCNC: 9 MMOL/L (ref 3–16)
BASOPHILS ABSOLUTE: 0.1 K/UL (ref 0–0.2)
BASOPHILS RELATIVE PERCENT: 0.7 %
BUN BLDV-MCNC: 13 MG/DL (ref 7–20)
CALCIUM SERPL-MCNC: 7.9 MG/DL (ref 8.3–10.6)
CHLORIDE BLD-SCNC: 103 MMOL/L (ref 99–110)
CO2: 28 MMOL/L (ref 21–32)
CREAT SERPL-MCNC: 0.7 MG/DL (ref 0.8–1.3)
EOSINOPHILS ABSOLUTE: 0.3 K/UL (ref 0–0.6)
EOSINOPHILS RELATIVE PERCENT: 4 %
GFR AFRICAN AMERICAN: >60
GFR NON-AFRICAN AMERICAN: >60
GLUCOSE BLD-MCNC: 104 MG/DL (ref 70–99)
GLUCOSE BLD-MCNC: 112 MG/DL (ref 70–99)
GLUCOSE BLD-MCNC: 116 MG/DL (ref 70–99)
GLUCOSE BLD-MCNC: 125 MG/DL (ref 70–99)
GLUCOSE BLD-MCNC: 137 MG/DL (ref 70–99)
HCT VFR BLD CALC: 24.8 % (ref 40.5–52.5)
HEMOGLOBIN: 8.2 G/DL (ref 13.5–17.5)
LYMPHOCYTES ABSOLUTE: 1 K/UL (ref 1–5.1)
LYMPHOCYTES RELATIVE PERCENT: 13.1 %
MAGNESIUM: 1.8 MG/DL (ref 1.8–2.4)
MCH RBC QN AUTO: 30.5 PG (ref 26–34)
MCHC RBC AUTO-ENTMCNC: 33.2 G/DL (ref 31–36)
MCV RBC AUTO: 92 FL (ref 80–100)
MONOCYTES ABSOLUTE: 0.5 K/UL (ref 0–1.3)
MONOCYTES RELATIVE PERCENT: 6.7 %
NEUTROPHILS ABSOLUTE: 5.8 K/UL (ref 1.7–7.7)
NEUTROPHILS RELATIVE PERCENT: 75.5 %
PDW BLD-RTO: 16.3 % (ref 12.4–15.4)
PERFORMED ON: ABNORMAL
PHOSPHORUS: 3.1 MG/DL (ref 2.5–4.9)
PLATELET # BLD: 240 K/UL (ref 135–450)
PMV BLD AUTO: 7.9 FL (ref 5–10.5)
POTASSIUM SERPL-SCNC: 4.2 MMOL/L (ref 3.5–5.1)
RBC # BLD: 2.69 M/UL (ref 4.2–5.9)
SODIUM BLD-SCNC: 140 MMOL/L (ref 136–145)
WBC # BLD: 7.6 K/UL (ref 4–11)

## 2021-12-24 PROCEDURE — 6360000002 HC RX W HCPCS: Performed by: STUDENT IN AN ORGANIZED HEALTH CARE EDUCATION/TRAINING PROGRAM

## 2021-12-24 PROCEDURE — 80069 RENAL FUNCTION PANEL: CPT

## 2021-12-24 PROCEDURE — 85025 COMPLETE CBC W/AUTO DIFF WBC: CPT

## 2021-12-24 PROCEDURE — 99232 SBSQ HOSP IP/OBS MODERATE 35: CPT | Performed by: INTERNAL MEDICINE

## 2021-12-24 PROCEDURE — 6370000000 HC RX 637 (ALT 250 FOR IP): Performed by: STUDENT IN AN ORGANIZED HEALTH CARE EDUCATION/TRAINING PROGRAM

## 2021-12-24 PROCEDURE — 94761 N-INVAS EAR/PLS OXIMETRY MLT: CPT

## 2021-12-24 PROCEDURE — 2580000003 HC RX 258: Performed by: STUDENT IN AN ORGANIZED HEALTH CARE EDUCATION/TRAINING PROGRAM

## 2021-12-24 PROCEDURE — 2700000000 HC OXYGEN THERAPY PER DAY

## 2021-12-24 PROCEDURE — 94669 MECHANICAL CHEST WALL OSCILL: CPT

## 2021-12-24 PROCEDURE — 1200000000 HC SEMI PRIVATE

## 2021-12-24 PROCEDURE — 83735 ASSAY OF MAGNESIUM: CPT

## 2021-12-24 RX ORDER — MAGNESIUM SULFATE IN WATER 40 MG/ML
2000 INJECTION, SOLUTION INTRAVENOUS ONCE
Status: COMPLETED | OUTPATIENT
Start: 2021-12-24 | End: 2021-12-24

## 2021-12-24 RX ADMIN — INSULIN HUMAN 2 UNITS: 100 INJECTION, SOLUTION PARENTERAL at 17:31

## 2021-12-24 RX ADMIN — SODIUM CHLORIDE, PRESERVATIVE FREE 10 ML: 5 INJECTION INTRAVENOUS at 20:39

## 2021-12-24 RX ADMIN — POLYETHYLENE GLYCOL 3350 17 G: 17 POWDER, FOR SOLUTION ORAL at 08:19

## 2021-12-24 RX ADMIN — MAGNESIUM SULFATE HEPTAHYDRATE 2000 MG: 2 INJECTION, SOLUTION INTRAVENOUS at 11:40

## 2021-12-24 RX ADMIN — ACETAMINOPHEN 1000 MG: 500 TABLET ORAL at 11:39

## 2021-12-24 RX ADMIN — OXYCODONE HYDROCHLORIDE 10 MG: 5 TABLET ORAL at 08:28

## 2021-12-24 RX ADMIN — INSULIN HUMAN 2 UNITS: 100 INJECTION, SOLUTION PARENTERAL at 08:19

## 2021-12-24 RX ADMIN — ACETAMINOPHEN 1000 MG: 500 TABLET ORAL at 06:21

## 2021-12-24 RX ADMIN — INSULIN LISPRO 3 UNITS: 100 INJECTION, SOLUTION INTRAVENOUS; SUBCUTANEOUS at 17:31

## 2021-12-24 RX ADMIN — AMPICILLIN SODIUM AND SULBACTAM SODIUM 3000 MG: 2; 1 INJECTION, POWDER, FOR SOLUTION INTRAMUSCULAR; INTRAVENOUS at 20:40

## 2021-12-24 RX ADMIN — OXYCODONE HYDROCHLORIDE 5 MG: 5 TABLET ORAL at 00:10

## 2021-12-24 RX ADMIN — AMPICILLIN SODIUM AND SULBACTAM SODIUM 3000 MG: 2; 1 INJECTION, POWDER, FOR SOLUTION INTRAMUSCULAR; INTRAVENOUS at 03:45

## 2021-12-24 RX ADMIN — HEPARIN SODIUM 5000 UNITS: 5000 INJECTION INTRAVENOUS; SUBCUTANEOUS at 15:51

## 2021-12-24 RX ADMIN — DOCUSATE SODIUM 100 MG: 100 CAPSULE, LIQUID FILLED ORAL at 08:19

## 2021-12-24 RX ADMIN — HEPARIN SODIUM 5000 UNITS: 5000 INJECTION INTRAVENOUS; SUBCUTANEOUS at 06:23

## 2021-12-24 RX ADMIN — DOCUSATE SODIUM 100 MG: 100 CAPSULE, LIQUID FILLED ORAL at 20:40

## 2021-12-24 RX ADMIN — AMLODIPINE BESYLATE 5 MG: 5 TABLET ORAL at 08:19

## 2021-12-24 RX ADMIN — ACETAMINOPHEN 1000 MG: 500 TABLET ORAL at 17:29

## 2021-12-24 RX ADMIN — OXYCODONE HYDROCHLORIDE 10 MG: 5 TABLET ORAL at 17:29

## 2021-12-24 RX ADMIN — OXYCODONE HYDROCHLORIDE 10 MG: 5 TABLET ORAL at 13:34

## 2021-12-24 RX ADMIN — INSULIN LISPRO 3 UNITS: 100 INJECTION, SOLUTION INTRAVENOUS; SUBCUTANEOUS at 08:20

## 2021-12-24 RX ADMIN — ACETAMINOPHEN 1000 MG: 500 TABLET ORAL at 00:07

## 2021-12-24 RX ADMIN — HEPARIN SODIUM 5000 UNITS: 5000 INJECTION INTRAVENOUS; SUBCUTANEOUS at 22:33

## 2021-12-24 RX ADMIN — AMPICILLIN SODIUM AND SULBACTAM SODIUM 3000 MG: 2; 1 INJECTION, POWDER, FOR SOLUTION INTRAMUSCULAR; INTRAVENOUS at 15:51

## 2021-12-24 RX ADMIN — AMPICILLIN SODIUM AND SULBACTAM SODIUM 3000 MG: 2; 1 INJECTION, POWDER, FOR SOLUTION INTRAMUSCULAR; INTRAVENOUS at 08:29

## 2021-12-24 RX ADMIN — OXYCODONE HYDROCHLORIDE 10 MG: 5 TABLET ORAL at 22:33

## 2021-12-24 ASSESSMENT — PAIN SCALES - GENERAL
PAINLEVEL_OUTOF10: 5
PAINLEVEL_OUTOF10: 5
PAINLEVEL_OUTOF10: 8
PAINLEVEL_OUTOF10: 3
PAINLEVEL_OUTOF10: 6
PAINLEVEL_OUTOF10: 8
PAINLEVEL_OUTOF10: 7
PAINLEVEL_OUTOF10: 6
PAINLEVEL_OUTOF10: 7
PAINLEVEL_OUTOF10: 6

## 2021-12-24 ASSESSMENT — PAIN DESCRIPTION - LOCATION
LOCATION: BUTTOCKS
LOCATION: BUTTOCKS;ABDOMEN

## 2021-12-24 ASSESSMENT — PAIN DESCRIPTION - FREQUENCY
FREQUENCY: CONTINUOUS
FREQUENCY: CONTINUOUS

## 2021-12-24 ASSESSMENT — PAIN DESCRIPTION - PAIN TYPE
TYPE: SURGICAL PAIN
TYPE: SURGICAL PAIN

## 2021-12-24 ASSESSMENT — PAIN DESCRIPTION - ONSET
ONSET: ON-GOING
ONSET: ON-GOING

## 2021-12-24 ASSESSMENT — PAIN DESCRIPTION - PROGRESSION: CLINICAL_PROGRESSION: NOT CHANGED

## 2021-12-24 ASSESSMENT — PAIN DESCRIPTION - DESCRIPTORS: DESCRIPTORS: DISCOMFORT

## 2021-12-24 NOTE — PROGRESS NOTES
Patient alert and oriented x4. VSS. Wound vac in place with continuous irrigation. Mcfadden catheter in place with yellow clear urine. Ostomy with adequate brown output. Pain controlled with scheduled and prn pain medication. Tolerating diet with no nausea complaints. Patient denies any other needs at this time. Bed is in the lowest position, call light and bedside table within reach. Patient bed alarm is on. Will continue to monitor for changes in patient status.      Electronically signed by Gisell Hernandez RN on 12/24/2021 at 3:05 PM

## 2021-12-24 NOTE — PROGRESS NOTES
Plastic Surgery  Post-operative Note      Procedure(s) Performed: EUA and Veraflo instillation WV change    Subjective:   Patient reports his pain is well-controlled. Patient is tolerating diet without nausea or vomiting. Patient Gearold Blew is in place holding adequate suction. Patient has Mcfadden in place with clear urine. Colostomy bag is in place with liquid brown stool. Objective:  Anesthesia type: General      I/O    Intra op    Post op     Fluids  1000 mL 0 mL     EBL minimal mL 0 mL     Urine Not recorded 325 mL     Vitals:   Vitals:    12/23/21 1630 12/23/21 1645 12/23/21 1655 12/23/21 1725   BP: 138/67 (!) 143/69 (!) 147/65 (!) 146/79   Pulse: 70 71 69 71   Resp: 9 10 15 16   Temp:   97.3 °F (36.3 °C) 97.9 °F (36.6 °C)   TempSrc:    Oral   SpO2: 95% 97% 94% 95%   Weight:       Height:           Physical Exam:  Post-op vital signs:  Stable   General appearance: alert, no acute distress, grooming appropriate  Eyes: No scleral icterus, EOM grossly intact  Neck: trachea midline, no JVD, no lymphadenopathy, neck supple  Chest/Lungs: Equal excursion bilaterally, normal effort with no accessory muscle use on 2L NC  Cardiovascular: RRR, brisk capillary refill  Abdomen:  Soft, large surgical debridement area of the abdomen with veraflo Vac in place holding adequate suction and instilling normal saline. Colostomy is pink, viable and with brown stool in the bag. Skin: warm and dry, no rashes  Extremities: no edema, no cyanosis  Genitourinary: Mcfadden in place with clear yellow urine; strip paste around penis without edema  Neuro: A&Ox3, no focal deficits, sensation intact    Assessment and Plan  Pt. is a 61 y.o. male with Necrotizing fasciitis of the abdominal wall, gluteal region, and scrotum s/p wide excision of perineum, back, and abdominal wall. S/p multiple debridements and wound vac placement and changes intraoperatively with diverting colostomy creation (12/7). OR wound vac change (12/10, 12/13, 12/16, 12/21, 12/23). Pain management: Roxicodone pain panel and scheduled tylenol  Cardiovasc: hemodynamically stable, will continue to monitor  Respiratory:  IS ordered to bedside, encourage hourly IS and deep breathing, wean oxygen as tolerated  Fluids:  none, Diet: General diet; carb control  Ambulation: OOB to chair, encourage ambulation  Prophylaxis: SCDs, subcutaneous heparin  : urine output is adequate  Antibiotics: cont IV Unasyn  Wound: Local wound care; will continue [address] [place] [address] changes in [address] [place] [address],[address], DO  PGY1, General Surgery  12/23/21  7:33 PM  [phone]

## 2021-12-24 NOTE — PROGRESS NOTES
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  Antibiotics: Unasyn    Admit Date: 12/1/2021  Hospital Day: 24    Subjective:     POD#1 tissue edema, irrigated, VAC  POD#3 dressing change, no reconstruction (noted some fibrinous exudate)    Patient reports wound / surg site pain mostly controlled  No other complaint     Objective:     Patient Vitals for the past 8 hrs:   BP Temp Temp src Pulse Resp SpO2 Weight   12/24/21 1046 129/67 97.7 °F (36.5 °C) Oral 78 18 96 % --   12/24/21 1007 -- -- -- -- -- 93 % --   12/24/21 0747 (!) 149/74 97.7 °F (36.5 °C) Oral 76 18 95 % --   12/24/21 0600 -- -- -- -- -- -- 190 lb 7.6 oz (86.4 kg)   12/24/21 0327 129/72 97.9 °F (36.6 °C) Oral 77 18 91 % --     I/O last 3 completed shifts: In: 2038.2 [P.O.:240; I.V.:1156.6; IV Piggyback:641.6]  Out: 1612 [Urine:3125; Drains:750]  I/O this shift:  In: 60 [P.O.:60]  Out: -     EXAM:  GENERAL: No apparent distress.   RA  HEENT: Membranes moist, no oral lesion  NECK:  Supple, no lymphadenopathy  LUNGS: Clear b/l, no rales, no dullness  CARDIAC: RRR, no murmur appreciated  ABD:  + BS, soft / NT  Wounds - VAC in wound over lower abd, suprapubic region, extending over L scrotum, inguinal fold, posteriorly to buttock  EXT:  No rash, no edema, no lesions  NEURO: No focal neurologic findings  PSYCH: Orientation, sensorium, mood normal  LINES:  R PICC in place       Data Review:  Lab Results   Component Value Date    WBC 7.6 12/24/2021    HGB 8.2 (L) 12/24/2021    HCT 24.8 (L) 12/24/2021    MCV 92.0 12/24/2021     12/24/2021     Lab Results   Component Value Date    CREATININE 0.7 (L) 12/24/2021    BUN 13 12/24/2021     12/24/2021    K 4.2 12/24/2021     12/24/2021    CO2 28 12/24/2021       Hepatic Function Panel:   Lab Results   Component Value Date    ALKPHOS 170 12/10/2021    ALT 7 12/10/2021    AST 13 12/10/2021    PROT 4.8 12/10/2021    BILITOT 0.3 12/10/2021    BILIDIR <0.2 12/10/2021 IBILI see below 12/10/2021    LABALBU 2.5 2021       MICRO:   BC no growth    SARS CoV-2 NAAT neg   Tissue cult - no growth      Wound cult - light mixed skin pam  12/3 Tissue cult - light C glabrata   Tissue cult - light C glabrata     Tissue cult - in process, 'growth too young'    IMAGIN/30 L foot:   Evidence of a septic joint involving the 5th metatarsal phalangeal joint with   associated osteomyelitis      Abd / Pelvic CT   Impression   1. 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  There is   additional muscle or fascial involvement on the left as above.  Of note, the   testes are incompletely evaluated but appear within normal limits.  Critical   results were called by Dr. Corrie Suarez MD to Dr. Vanessa Chatman on 2021   at 19:31.   2. Minimal bilateral hydronephrosis is likely due to severe urinary bladder   distention.  Abd / Pelvic CT  Impression   Significant interval improvement and soft tissue emphysema in the visualized left gluteal region and over the anterior abdominal wall since prior study.       Anasarca more prominently of the abdominal wall without suspicious drainable abdominal wall fluid collection.       More prominent presacral strandy fluid without suspicious retroperitoneal emphysema to suggest retroperitoneal infection.       New small amount of ascites throughout the abdomen and pelvis without suspicious organized intraperitoneal fluid collections.       New moderate to large bilateral pleural effusions with associated compressive atelectasis of the lung bases.       New trace pericardial effusion.       Colostomy in the left lower quadrant without obstruction or complicating features.       Interval improvement in bilateral hydronephrosis.        Scheduled Meds:   magnesium diagnostic tests/interventions  Independent review of radiologic images - reviewed initial and f/u CT with Radiologist 12/8  Microbiology cultures and other micro tests reviewed      Discussed with pt, his wife  Will sign off, call with ID issues   Milady Amezquita MD

## 2021-12-24 NOTE — PROGRESS NOTES
Podiatric Surgery Daily Progress Note      Admit Date: 12/1/2021      Code:Full Code    Patient seen and examined, labs and records reviewed    Subjective:     Patient seen at bedside this AM. Patient denies pain to bilateral lower extremities. Patient denies fever, chills, shortness of breath, chest pain. Patient denies any acute events overnight. Review of Systems: A 10 point review of systems was conducted, significant findings as noted in HPI. All other systems negative. Objective     /72   Pulse 77   Temp 97.9 °F (36.6 °C) (Oral)   Resp 18   Ht 5' 11\" (1.803 m)   Wt 199 lb 1.2 oz (90.3 kg)   SpO2 91%   BMI 27.77 kg/m²     I/O:    Intake/Output Summary (Last 24 hours) at 12/24/2021 0621  Last data filed at 12/24/2021 0327  Gross per 24 hour   Intake 1710.55 ml   Output 3875 ml   Net -2164.45 ml              Wt Readings from Last 3 Encounters:   12/17/21 199 lb 1.2 oz (90.3 kg)   11/30/21 163 lb 3.2 oz (74 kg)   04/18/16 179 lb 14.3 oz (81.6 kg)       LABS:    Recent Labs     12/23/21  0551 12/24/21  0511   WBC 8.6 7.6   HGB 8.6* 8.2*   HCT 25.6* 24.8*    240        Recent Labs     12/24/21  0511      K 4.2      CO2 28   PHOS 3.1   BUN 13   CREATININE 0.7*        No results for input(s): PROT, INR, APTT in the last 72 hours. LOWER EXTREMITY EXAMINATION    Dressing to left LE intact. No strikethrough noted to the external dressing. Betadine discoloration noted to the internal layers of the dressing of the left LE.     VASCULAR:   DP and PT pulses are non-palpable b/l. Upon hand-held Doppler examination DP signals were noted to be monophasic and PT signals were noted to be nondopplerable. CFT slightly diminished to the distal digits of bilateral lower extremities. Skin temperature is warm to lukewarm to cool to the distal digits from proximal to distal.  No edema noted.  No pain with calf compression b/l.     NEUROLOGIC:   Gross and epicritic sensation is diminished b/l. Protective sensation is diminished at all pedal sites b/l.     DERMATOLOGIC:     Left lower extremity:  Full-thickness ulceration noted to the lateral aspect of the left foot. Wound measures approximately 3.2 cm x 2 cm x 0.5 cm. Wound base with exposed bone, with necrotic edges at the proximal and distal edges. Wound probes to bone with exposed fifth metatarsal. Necrotic appearance of 5th digit. No tracking or tunneling noted. No purulent drainage noted. No fluctuance or crepitus or malodor noted. Deep tissue injury noted 2/2 Prevalon boot strap to the anterior aspect of the ankle. Intact epithelialized tissue noted. No crepitus, erythema, drainage, or malodor noted. No open wound noted. Stable without signs of acute infection noted. Right lower extremity  Partial thickness ulceration noted to the lateral malleolus 2/2 pressure. No fluctuance, crepitus, malodor, or drainage noted. No acute signs infection noted. Partial thickness ulceration 2/2 pressure noted at the styloid process of the 5th metatarsal. Fibrotic tissue noted. No fluctuance or crepitus noted. No acute signs of infection noted. MUSCULOSKELETAL:   Muscle strength 4/5 for all pedal muscle groups B/L LE. No pain with palpation of the left foot. IMAGING:  XR LEFT FOOT 11/30/2021  Narrative   EXAMINATION:   THREE XRAY VIEWS OF THE LEFT FOOT       11/30/2021 1:44 pm       HISTORY:   ORDERING SYSTEM PROVIDED HISTORY: wound   TECHNOLOGIST PROVIDED HISTORY:   Reason for exam:->wound   Reason for Exam: blood sugar problem, wound check on lateral portion of foot   Acuity: Acute   Type of Exam: Initial       FINDINGS:   Osseous destruction along the articular margins of the 5th   metatarsophalangeal joint with associated lucency in the soft tissues. .   There is no evidence of fracture or dislocation. . The remaining joint spaces   appear well maintained.  The remaining soft tissues are unremarkable.       Impression   Evidence of a septic joint involving the 5th metatarsal phalangeal joint with   associated osteomyelitis     ARTERIAL DUPLEX 12/2/21     Type of Study:        Extremities Arteries:Lower Extremities Arterial Duplex, VL LOWER EXTREMITY    ARTERIES DUPLEX BILATERAL.       Tech Comments   Right   The right ankle/brachial index is 0.0 (no Doppler signal could be heard at the   [facility] or the PT). The common femoral and profunda femoral arteries could not be visualized due   to bandages extending into the groin area. The mid superficial femoral artery has a short segmental occlusion and then is   reconstituted. Elevated velocities at the distal superficial femoral artery indicate a > 50%   stenosis by velocity criteria (velocity went from 15 to 298 cm/s). The posterior tibial artery is occluded. The distal anterior tibial artery is barely patent, with very low flow   (possibly occluded and then reconstituted). Left   The left ankle brachial index is 0 for the PT and the DP was not accessible   due to the bandages on the foot. The common femoral and profunda femoral artery could not be visualized due to   bandages extending into the groin area. The distal superficial femoral artery is occluded and then reconstituted. The posterior tibial artery, peroneal, and anterior tibial artery are   occluded. There were no previous studies to use for comparison.        ASSESSMENT/PLAN:   -Full-thickness ulceration; lateral left foot- Sands 4  -Osteomyelitis of the fifth metatarsal; left foot  -Partial thickness pressure ulceration x2, right foot; NPUAP Stage 2  -Pressure injury, dorsal right midfoot -NPUAP Stage I  -Critical limb ischemia; bilateral lower extremity  -Diabetes mellitus, type II  -History of noncompliance    -Patient seen and examined at bedside this AM.  -Hypertensive, otherwise VSS on 2 L of O2 via NC; no leukocytosis noted (WBC 7.6)  -All imaging reviewed; impressions noted above  -CRP 42.6 (12/20/21)  -Prealbumin 9.8 (12/13/21)  -Vascular surgery following; will await further stabilization with the general surgery team and plastic surgery team before offering vascular intervention.  -Plastic surgery following  -Infectious disease following, currently on Unasyn  -Left lower extremity dressed with Betadine soaked gauze, DSD, and tape. -Right lower extremity applied Mepilex borders  -Prevalon boots reapplied. Patient is to wear at all times while in bed to prevent further deep tissue injury. Ankle strap removed from bilateral boot as the ankle straps were causing pressure to his feet. -Nonweightbearing to left lower extremity.  -Weightbearing as tolerated to right lower extremity. DISPO: Full-thickness ulceration with underlying osteomyelitis to the left fifth metatarsal. Critical limb ischemia to b/l LE. Wounds are stable at this time. Vascular following; awaiting further stabilization at this time. Plastic surgery, general surgery, and ID following. Patient will require podiatric surgical intervention but timing will depend on medical stability and vascular recommendations. Will continue to follow while in house. Patient examined and evaluated at bedside with Dr. Litzy Del Rio DPM.    Johnie Zavaleta DPM  Podiatric Resident, PGY-2  Pager #: [phone] or [employer]    Patient was seen and evaluated at bedside. Agree with residents assessment and treatment plan. Prolonged discussion with patient and his wife at bedside that until vascular surgery is able to perform some sort of intervention no podiatric surgery will be performed as he will need 1/5 ray amputation due to exposed bone and gangrenous tissue. However until he is able to be revascularized no healing is likely as although the ulcerations have not worsened while he has been in the hospital he has been off of his feet for 3 weeks and there has been no meaningful improvement despite local wound care, offloading and IV antibiotics.   Litzy Del Rio DPM

## 2021-12-24 NOTE — PROGRESS NOTES
Pt alert and oriented. VSS. NPO this morning for OR. Wound Vac changed today. Pt returned with severe pain, PRN oxycodone given per MAR. Dinner and breakfast ordered to minimize carb intake. Wound vac remains in place with irrigation. Mcfadden in place with adequate output. Ostomy in place with stool in bag but not enough to empty. Turned and repositioned throughout the shift. All needs met at this time.

## 2021-12-24 NOTE — PROGRESS NOTES
Surgery Daily Progress Note  Sandra Duffy  CC:  Shayla's gangrene      Subjective :  No issues overnight. Patient went to the OR yesterday. Denies any pain. Tolerating diet. Afebrile, hemodynamically stable. Objective    Infusions:   sodium chloride      sodium chloride 25 mL (12/12/21 0850)    dextrose          I/O:I/O last 3 completed shifts: In: 1980.3 [P.O.:60; I.V.:1384.6; IV Piggyback:535.7]  Out: 3350 [Urine:3050; Drains:300]           Wt Readings from Last 1 Encounters:   12/17/21 199 lb 1.2 oz (90.3 kg)                 LABS:    Recent Labs     12/21/21  0600 12/23/21  0551   WBC 7.0 8.6   HGB 8.4* 8.6*   HCT 25.3* 25.6*   MCV 92.4 92.5    281        Recent Labs     12/21/21  0600 12/23/21  0551    140   K 4.2 4.2    103   CO2 31 29   PHOS 3.1 3.0   BUN 9 10   CREATININE 0.6* 0.6*      No results for input(s): AST, ALT, ALB, BILIDIR, BILITOT, ALKPHOS in the last 72 hours. No results for input(s): LIPASE, AMYLASE in the last 72 hours. Recent Labs     12/21/21  0600   INR 0.95      No results for input(s): CKTOTAL, CKMB, CKMBINDEX, TROPONINI in the last 72 hours. Exam:/67   Pulse 90   Temp 97.7 °F (36.5 °C) (Oral)   Resp 15   Ht 5' 11\" (1.803 m)   Wt 199 lb 1.2 oz (90.3 kg)   SpO2 93%   BMI 27.77 kg/m²   General appearance: alert, appears stated age and cooperative  Eyes: No scleral icterus, EOM grossly intact  Neck: trachea midline, no JVD, no lymphadenopathy, neck supple  Chest/Lungs: Equal excursion bilaterally, normal effort with no accessory muscle use on 2L NC  Cardiovascular: RRR, brisk capillary refill  Abdomen:  Soft, large surgical debridement area of the abdomen with veraflo Vac in place holding adequate suction and instilling normal saline. Colostomy is pink, viable and with brown stool in the bag. Skin: warm and dry, no rashes  Extremities: no edema, no cyanosis  Genitourinary:  Mcfadden in place with clear yellow urine, strip paste around penis without edema   Neuro: A&Ox3, no focal deficits, sensation intact      ASSESSMENT/PLAN: Pt. is a 61 y.o. male with Necrotizing fasciitis of the abdominal wall, gluteal region, and scrotum s/p wide excision of perineum, back, and abdominal wall. S/p multiple debridements and wound vac placement and changes intraoperatively with diverting colostomy creation (12/7). OR wound vac change (12/10, 12/13, 12/16, 12/21). - Patient to return to the OR on Monday (12/27) to begin reconstruction process   - follow up surgical cultures from 12/23  - Continue carb free diet with protein supplementation for goal >100g protein per day  following procedure. Dietitian on board to assist with macronutrient accounting and support   - sliding scale, mealtime insulin, and basal insulin for glucose control. Blood glucose 103-136 since yesterday morning.  - touched base with urology regarding walsh exchange.  Recommend exchange in OR monday  - continue abx per ID    Jahaira Fong DO, [address]  PGY1, General Surgery  12/24/21  6:35 AM  [phone]

## 2021-12-24 NOTE — PROGRESS NOTES
Patient A&Ox4. VSS. Wound vac in place with irrigation. Surgical team to bedside during the night to reinforce due to leak. Pt received prn pain medication per orders. IV abx given per orders. Mcfadden remains in place. Ostomy with loose brown output. Remains in specialty bed. Turned during the night. Bed is in lowest setting and call light is within reach.

## 2021-12-25 LAB
ALBUMIN SERPL-MCNC: 2.3 G/DL (ref 3.4–5)
ANION GAP SERPL CALCULATED.3IONS-SCNC: 7 MMOL/L (ref 3–16)
BASOPHILS ABSOLUTE: 0.1 K/UL (ref 0–0.2)
BASOPHILS RELATIVE PERCENT: 1.1 %
BUN BLDV-MCNC: 12 MG/DL (ref 7–20)
CALCIUM SERPL-MCNC: 7.9 MG/DL (ref 8.3–10.6)
CHLORIDE BLD-SCNC: 103 MMOL/L (ref 99–110)
CO2: 29 MMOL/L (ref 21–32)
CREAT SERPL-MCNC: 0.6 MG/DL (ref 0.8–1.3)
EOSINOPHILS ABSOLUTE: 0.5 K/UL (ref 0–0.6)
EOSINOPHILS RELATIVE PERCENT: 7.2 %
GFR AFRICAN AMERICAN: >60
GFR NON-AFRICAN AMERICAN: >60
GLUCOSE BLD-MCNC: 147 MG/DL (ref 70–99)
GLUCOSE BLD-MCNC: 149 MG/DL (ref 70–99)
GLUCOSE BLD-MCNC: 168 MG/DL (ref 70–99)
GLUCOSE BLD-MCNC: 81 MG/DL (ref 70–99)
GLUCOSE BLD-MCNC: 88 MG/DL (ref 70–99)
HCT VFR BLD CALC: 25.6 % (ref 40.5–52.5)
HEMOGLOBIN: 8.6 G/DL (ref 13.5–17.5)
LYMPHOCYTES ABSOLUTE: 1.3 K/UL (ref 1–5.1)
LYMPHOCYTES RELATIVE PERCENT: 17.2 %
MAGNESIUM: 1.7 MG/DL (ref 1.8–2.4)
MCH RBC QN AUTO: 31.1 PG (ref 26–34)
MCHC RBC AUTO-ENTMCNC: 33.7 G/DL (ref 31–36)
MCV RBC AUTO: 92.4 FL (ref 80–100)
MONOCYTES ABSOLUTE: 0.6 K/UL (ref 0–1.3)
MONOCYTES RELATIVE PERCENT: 8.7 %
NEUTROPHILS ABSOLUTE: 4.8 K/UL (ref 1.7–7.7)
NEUTROPHILS RELATIVE PERCENT: 65.8 %
PDW BLD-RTO: 16.3 % (ref 12.4–15.4)
PERFORMED ON: ABNORMAL
PERFORMED ON: ABNORMAL
PERFORMED ON: NORMAL
PERFORMED ON: NORMAL
PHOSPHORUS: 2.5 MG/DL (ref 2.5–4.9)
PLATELET # BLD: 248 K/UL (ref 135–450)
PMV BLD AUTO: 8.1 FL (ref 5–10.5)
POTASSIUM SERPL-SCNC: 4 MMOL/L (ref 3.5–5.1)
RBC # BLD: 2.77 M/UL (ref 4.2–5.9)
SODIUM BLD-SCNC: 139 MMOL/L (ref 136–145)
WBC # BLD: 7.3 K/UL (ref 4–11)

## 2021-12-25 PROCEDURE — 94669 MECHANICAL CHEST WALL OSCILL: CPT

## 2021-12-25 PROCEDURE — 99231 SBSQ HOSP IP/OBS SF/LOW 25: CPT | Performed by: SURGERY

## 2021-12-25 PROCEDURE — 6360000002 HC RX W HCPCS: Performed by: STUDENT IN AN ORGANIZED HEALTH CARE EDUCATION/TRAINING PROGRAM

## 2021-12-25 PROCEDURE — 1200000000 HC SEMI PRIVATE

## 2021-12-25 PROCEDURE — 6370000000 HC RX 637 (ALT 250 FOR IP): Performed by: STUDENT IN AN ORGANIZED HEALTH CARE EDUCATION/TRAINING PROGRAM

## 2021-12-25 PROCEDURE — 2580000003 HC RX 258: Performed by: STUDENT IN AN ORGANIZED HEALTH CARE EDUCATION/TRAINING PROGRAM

## 2021-12-25 PROCEDURE — 85025 COMPLETE CBC W/AUTO DIFF WBC: CPT

## 2021-12-25 PROCEDURE — 80069 RENAL FUNCTION PANEL: CPT

## 2021-12-25 PROCEDURE — 2700000000 HC OXYGEN THERAPY PER DAY

## 2021-12-25 PROCEDURE — 94761 N-INVAS EAR/PLS OXIMETRY MLT: CPT

## 2021-12-25 PROCEDURE — 83735 ASSAY OF MAGNESIUM: CPT

## 2021-12-25 RX ORDER — MAGNESIUM SULFATE IN WATER 40 MG/ML
2000 INJECTION, SOLUTION INTRAVENOUS ONCE
Status: COMPLETED | OUTPATIENT
Start: 2021-12-25 | End: 2021-12-25

## 2021-12-25 RX ADMIN — ACETAMINOPHEN 1000 MG: 500 TABLET ORAL at 23:16

## 2021-12-25 RX ADMIN — DIBASIC SODIUM PHOSPHATE, MONOBASIC POTASSIUM PHOSPHATE AND MONOBASIC SODIUM PHOSPHATE 2 TABLET: 852; 155; 130 TABLET ORAL at 09:02

## 2021-12-25 RX ADMIN — HEPARIN SODIUM 5000 UNITS: 5000 INJECTION INTRAVENOUS; SUBCUTANEOUS at 14:55

## 2021-12-25 RX ADMIN — MAGNESIUM SULFATE HEPTAHYDRATE 2000 MG: 2 INJECTION, SOLUTION INTRAVENOUS at 09:08

## 2021-12-25 RX ADMIN — DOCUSATE SODIUM 100 MG: 100 CAPSULE, LIQUID FILLED ORAL at 21:32

## 2021-12-25 RX ADMIN — INSULIN HUMAN 2 UNITS: 100 INJECTION, SOLUTION PARENTERAL at 11:08

## 2021-12-25 RX ADMIN — ACETAMINOPHEN 1000 MG: 500 TABLET ORAL at 11:08

## 2021-12-25 RX ADMIN — HEPARIN SODIUM 5000 UNITS: 5000 INJECTION INTRAVENOUS; SUBCUTANEOUS at 21:32

## 2021-12-25 RX ADMIN — AMPICILLIN SODIUM AND SULBACTAM SODIUM 3000 MG: 2; 1 INJECTION, POWDER, FOR SOLUTION INTRAMUSCULAR; INTRAVENOUS at 14:58

## 2021-12-25 RX ADMIN — DIBASIC SODIUM PHOSPHATE, MONOBASIC POTASSIUM PHOSPHATE AND MONOBASIC SODIUM PHOSPHATE 2 TABLET: 852; 155; 130 TABLET ORAL at 21:32

## 2021-12-25 RX ADMIN — INSULIN LISPRO 3 UNITS: 100 INJECTION, SOLUTION INTRAVENOUS; SUBCUTANEOUS at 09:03

## 2021-12-25 RX ADMIN — AMLODIPINE BESYLATE 5 MG: 5 TABLET ORAL at 09:09

## 2021-12-25 RX ADMIN — AMPICILLIN SODIUM AND SULBACTAM SODIUM 3000 MG: 2; 1 INJECTION, POWDER, FOR SOLUTION INTRAMUSCULAR; INTRAVENOUS at 21:40

## 2021-12-25 RX ADMIN — AMPICILLIN SODIUM AND SULBACTAM SODIUM 3000 MG: 2; 1 INJECTION, POWDER, FOR SOLUTION INTRAMUSCULAR; INTRAVENOUS at 03:29

## 2021-12-25 RX ADMIN — ALTEPLASE 1 MG: 2.2 INJECTION, POWDER, LYOPHILIZED, FOR SOLUTION INTRAVENOUS at 04:57

## 2021-12-25 RX ADMIN — INSULIN HUMAN 5 UNITS: 100 INJECTION, SOLUTION PARENTERAL at 09:02

## 2021-12-25 RX ADMIN — OXYCODONE HYDROCHLORIDE 10 MG: 5 TABLET ORAL at 16:21

## 2021-12-25 RX ADMIN — AMPICILLIN SODIUM AND SULBACTAM SODIUM 3000 MG: 2; 1 INJECTION, POWDER, FOR SOLUTION INTRAMUSCULAR; INTRAVENOUS at 09:08

## 2021-12-25 RX ADMIN — POLYETHYLENE GLYCOL 3350 17 G: 17 POWDER, FOR SOLUTION ORAL at 09:02

## 2021-12-25 RX ADMIN — DOCUSATE SODIUM 100 MG: 100 CAPSULE, LIQUID FILLED ORAL at 09:02

## 2021-12-25 RX ADMIN — SODIUM CHLORIDE, PRESERVATIVE FREE 10 ML: 5 INJECTION INTRAVENOUS at 09:09

## 2021-12-25 RX ADMIN — SODIUM CHLORIDE, PRESERVATIVE FREE 10 ML: 5 INJECTION INTRAVENOUS at 21:40

## 2021-12-25 ASSESSMENT — PAIN DESCRIPTION - ORIENTATION: ORIENTATION: LEFT

## 2021-12-25 ASSESSMENT — PAIN DESCRIPTION - PAIN TYPE
TYPE: SURGICAL PAIN
TYPE: SURGICAL PAIN

## 2021-12-25 ASSESSMENT — PAIN SCALES - GENERAL
PAINLEVEL_OUTOF10: 5
PAINLEVEL_OUTOF10: 0
PAINLEVEL_OUTOF10: 7
PAINLEVEL_OUTOF10: 6
PAINLEVEL_OUTOF10: 0
PAINLEVEL_OUTOF10: 6

## 2021-12-25 ASSESSMENT — PAIN DESCRIPTION - LOCATION
LOCATION: BUTTOCKS
LOCATION: BUTTOCKS

## 2021-12-25 NOTE — PROGRESS NOTES
Podiatric Surgery Daily Progress Note      Admit Date: 12/1/2021      Code:Full Code    Patient seen and examined, labs and records reviewed    Subjective:     Patient seen at bedside this AM. Patient denies pain to bilateral lower extremities. Patient denies fever, chills, shortness of breath, chest pain. Patient denies any acute events overnight. Review of Systems: A 10 point review of systems was conducted, significant findings as noted in HPI. All other systems negative. Objective     BP (!) 146/71   Pulse 80   Temp 97.8 °F (36.6 °C) (Oral)   Resp 16   Ht 5' 11\" (1.803 m)   Wt 190 lb 0.6 oz (86.2 kg)   SpO2 93%   BMI 26.50 kg/m²     I/O:    Intake/Output Summary (Last 24 hours) at 12/25/2021 0857  Last data filed at 12/25/2021 0600  Gross per 24 hour   Intake 600 ml   Output 2025 ml   Net -1425 ml              Wt Readings from Last 3 Encounters:   12/25/21 190 lb 0.6 oz (86.2 kg)   11/30/21 163 lb 3.2 oz (74 kg)   04/18/16 179 lb 14.3 oz (81.6 kg)       LABS:    Recent Labs     12/24/21  0511 12/25/21  0555   WBC 7.6 7.3   HGB 8.2* 8.6*   HCT 24.8* 25.6*    248        Recent Labs     12/25/21  0555      K 4.0      CO2 29   PHOS 2.5   BUN 12   CREATININE 0.6*        No results for input(s): PROT, INR, APTT in the last 72 hours. LOWER EXTREMITY EXAMINATION    Dressing to left LE intact. No strikethrough noted to the external dressing. Betadine discoloration noted to the internal layers of the dressing of the left LE.     VASCULAR:   DP and PT pulses are non-palpable b/l. Upon hand-held Doppler examination DP signals were noted to be monophasic and PT signals were noted to be nondopplerable. CFT slightly diminished to the distal digits of bilateral lower extremities. Skin temperature is warm to lukewarm to cool to the distal digits from proximal to distal.  No edema noted. No pain with calf compression b/l.     NEUROLOGIC:   Gross and epicritic sensation is diminished b/l. Protective sensation is diminished at all pedal sites b/l.     DERMATOLOGIC:     Left lower extremity:  Full-thickness ulceration noted to the lateral aspect of the left foot. Wound measures approximately 3.2 cm x 2 cm x 0.5 cm. Wound base with exposed bone, with necrotic edges at the proximal and distal edges. Wound probes to bone with exposed fifth metatarsal. Necrotic appearance of 5th digit. No tracking or tunneling noted. No purulent drainage noted. No fluctuance or crepitus or malodor noted. Deep tissue injury noted 2/2 Prevalon boot strap to the anterior aspect of the ankle. Intact epithelialized tissue noted. No crepitus, erythema, drainage, or malodor noted. No open wound noted. Stable without signs of acute infection noted. Right lower extremity  Partial thickness ulceration noted to the lateral malleolus 2/2 pressure. No fluctuance, crepitus, malodor, or drainage noted. No acute signs infection noted. Partial thickness ulceration 2/2 pressure noted at the styloid process of the 5th metatarsal. Fibrotic tissue noted. No fluctuance or crepitus noted. No acute signs of infection noted. MUSCULOSKELETAL:   Muscle strength 4/5 for all pedal muscle groups B/L LE. No pain with palpation of the left foot. IMAGING:  XR LEFT FOOT 11/30/2021  Narrative   EXAMINATION:   THREE XRAY VIEWS OF THE LEFT FOOT       11/30/2021 1:44 pm       HISTORY:   ORDERING SYSTEM PROVIDED HISTORY: wound   TECHNOLOGIST PROVIDED HISTORY:   Reason for exam:->wound   Reason for Exam: blood sugar problem, wound check on lateral portion of foot   Acuity: Acute   Type of Exam: Initial       FINDINGS:   Osseous destruction along the articular margins of the 5th   metatarsophalangeal joint with associated lucency in the soft tissues. .   There is no evidence of fracture or dislocation. . The remaining joint spaces   appear well maintained.  The remaining soft tissues are unremarkable.       Impression   Evidence of a septic joint involving the 5th metatarsal phalangeal joint with   associated osteomyelitis     ARTERIAL DUPLEX 12/2/21     Type of Study:        Extremities Arteries:Lower Extremities Arterial Duplex, VL LOWER EXTREMITY    ARTERIES DUPLEX BILATERAL.       Tech Comments   Right   The right ankle/brachial index is 0.0 (no Doppler signal could be heard at the   [facility] or the PT). The common femoral and profunda femoral arteries could not be visualized due   to bandages extending into the groin area. The mid superficial femoral artery has a short segmental occlusion and then is   reconstituted. Elevated velocities at the distal superficial femoral artery indicate a > 50%   stenosis by velocity criteria (velocity went from 15 to 298 cm/s). The posterior tibial artery is occluded. The distal anterior tibial artery is barely patent, with very low flow   (possibly occluded and then reconstituted). Left   The left ankle brachial index is 0 for the PT and the DP was not accessible   due to the bandages on the foot. The common femoral and profunda femoral artery could not be visualized due to   bandages extending into the groin area. The distal superficial femoral artery is occluded and then reconstituted. The posterior tibial artery, peroneal, and anterior tibial artery are   occluded. There were no previous studies to use for comparison.        ASSESSMENT/PLAN:   -Full-thickness ulceration; lateral left foot- Sands 4  -Osteomyelitis of the fifth metatarsal; left foot  -Partial thickness pressure ulceration x2, right foot; NPUAP Stage 2  -Pressure injury, dorsal right midfoot -NPUAP Stage I  -Critical limb ischemia; bilateral lower extremity  -Diabetes mellitus, type II  -History of noncompliance    -Patient seen and examined at bedside this AM.  -Hypertensive, otherwise VSS on 1.5 L of O2 via NC; no leukocytosis noted (WBC 7.3)  -All imaging reviewed; impressions noted above  -CRP 42.6 (12/20/21)  -Prealbumin 9.8 (12/13/21)  -Vascular surgery following; will await further stabilization with the general surgery team and plastic surgery team before offering vascular intervention.  -Plastic surgery following  -Infectious disease following, currently on Unasyn  -Left lower extremity dressed with Betadine soaked gauze, DSD, and tape. -Right lower extremity applied Mepilex borders  -Prevalon boots reapplied. Patient is to wear at all times while in bed to prevent further deep tissue injury. Ankle strap removed from bilateral boot as the ankle straps were causing pressure to his feet. -Nonweightbearing to left lower extremity.  -Weightbearing as tolerated to right lower extremity. DISPO: Full-thickness ulceration with underlying osteomyelitis to the left fifth metatarsal. Critical limb ischemia to b/l LE. Wounds are stable at this time. Vascular following; awaiting further stabilization at this time. Plastic surgery, general surgery, and ID following. Patient will require podiatric surgical intervention but timing will depend on medical stability and vascular recommendations. Will continue to follow while in house. Patient examined and evaluated at bedside with Dr. Dawit Long DPM.    Rob Solano DPM   Podiatric Resident PGY1  Pager [phone] or [employer]  12/25/2021, 8:57 AM    Patient was seen and evaluated at bedside. Agree with residents assessment and treatment plan.   Dawit Long DPM

## 2021-12-25 NOTE — PROGRESS NOTES
Patient remains a/ox4 throughout shift. VSS. Patient complained of pain in L buttock and allowed this RN to turn him one time this shift. PRN pain medication given per MAR. CHG, walsh care and complete cath given this shift. Wound canister changed. Patient to go back to OR on Monday for the start of reconstruction. No other acute events. Bed locked and in lowest position. Alarm intact. CLWR. Will continue to monitor.

## 2021-12-25 NOTE — PROGRESS NOTES
Surgery Daily Progress Note  Aydin Zarate  CC:  Shayla's gangrene      Subjective :  No acute events. Patient afebrile, HDS. Wound vac functioning. Tolerating diet. Objective    Infusions:   sodium chloride      sodium chloride 25 mL (12/12/21 0850)    dextrose          I/O:I/O last 3 completed shifts: In: 1107.6 [P.O.:900; IV Piggyback:207.6]  Out: 2100 [Urine:1650; Drains:450]           Wt Readings from Last 1 Encounters:   12/24/21 190 lb 7.6 oz (86.4 kg)                 LABS:    Recent Labs     12/23/21  0551 12/24/21  0511   WBC 8.6 7.6   HGB 8.6* 8.2*   HCT 25.6* 24.8*   MCV 92.5 92.0    240        Recent Labs     12/23/21  0551 12/24/21  0511    140   K 4.2 4.2    103   CO2 29 28   PHOS 3.0 3.1   BUN 10 13   CREATININE 0.6* 0.7*      No results for input(s): AST, ALT, ALB, BILIDIR, BILITOT, ALKPHOS in the last 72 hours. No results for input(s): LIPASE, AMYLASE in the last 72 hours. No results for input(s): PROT, INR, APTT in the last 72 hours. No results for input(s): CKTOTAL, CKMB, CKMBINDEX, TROPONINI in the last 72 hours. Exam:/64   Pulse 75   Temp 98.6 °F (37 °C) (Oral)   Resp 18   Ht 5' 11\" (1.803 m)   Wt 190 lb 7.6 oz (86.4 kg)   SpO2 91%   BMI 26.57 kg/m²   General appearance: alert, appears stated age and cooperative  Eyes: No scleral icterus, EOM grossly intact  Neck: trachea midline, no JVD, no lymphadenopathy, neck supple  Chest/Lungs: Equal excursion bilaterally, normal effort with no accessory muscle use on 2L NC  Cardiovascular: RRR, brisk capillary refill  Abdomen:  Soft, large surgical debridement area of the abdomen with veraflo Vac in place holding adequate suction and instilling normal saline. Colostomy is pink, viable and with brown stool in the bag. Skin: warm and dry, no rashes  Extremities: no edema, no cyanosis  Genitourinary:  Mcfadden in place with clear yellow urine, strip paste around penis without edema   Neuro: A&Ox3, no focal deficits, sensation intact      ASSESSMENT/PLAN: Pt. is a 61 y.o. male with Necrotizing fasciitis of the abdominal wall, gluteal region, and scrotum s/p wide excision of perineum, back, and abdominal wall. S/p multiple debridements and wound vac placement and changes intraoperatively with diverting colostomy creation (12/7). OR wound vac change (12/10, 12/13, 12/16, 12/21). - Patient to return to the OR on Monday (12/27) to begin reconstruction process   - follow up surgical cultures from 12/23 -   - Continue carb free diet with protein supplementation for goal >100g protein per day  following procedure. Dietitian on board to assist with macronutrient accounting and support   - sliding scale, mealtime insulin, and basal insulin for glucose control. Blood glucose 104-137 since yesterday morning.  - touched base with urology regarding walsh exchange. Recommend exchange in OR monday  - continue abx per ID - Unasyn until coverage obtained, then no further antibiotics recommended.      Parrish Yanez DO, MS  PGY1, General Surgery  12/25/21  5:43 AM  [phone]

## 2021-12-26 LAB
ALBUMIN SERPL-MCNC: 2.6 G/DL (ref 3.4–5)
ANAEROBIC CULTURE: ABNORMAL
ANION GAP SERPL CALCULATED.3IONS-SCNC: 9 MMOL/L (ref 3–16)
BASOPHILS ABSOLUTE: 0.1 K/UL (ref 0–0.2)
BASOPHILS RELATIVE PERCENT: 0.9 %
BUN BLDV-MCNC: 10 MG/DL (ref 7–20)
CALCIUM SERPL-MCNC: 7.8 MG/DL (ref 8.3–10.6)
CHLORIDE BLD-SCNC: 103 MMOL/L (ref 99–110)
CO2: 29 MMOL/L (ref 21–32)
CREAT SERPL-MCNC: 0.6 MG/DL (ref 0.8–1.3)
EOSINOPHILS ABSOLUTE: 0.6 K/UL (ref 0–0.6)
EOSINOPHILS RELATIVE PERCENT: 9.7 %
GFR AFRICAN AMERICAN: >60
GFR NON-AFRICAN AMERICAN: >60
GLUCOSE BLD-MCNC: 112 MG/DL (ref 70–99)
GLUCOSE BLD-MCNC: 124 MG/DL (ref 70–99)
GLUCOSE BLD-MCNC: 138 MG/DL (ref 70–99)
GLUCOSE BLD-MCNC: 71 MG/DL (ref 70–99)
GLUCOSE BLD-MCNC: 75 MG/DL (ref 70–99)
GRAM STAIN RESULT: ABNORMAL
HCT VFR BLD CALC: 24.8 % (ref 40.5–52.5)
HEMOGLOBIN: 8.3 G/DL (ref 13.5–17.5)
LYMPHOCYTES ABSOLUTE: 1.2 K/UL (ref 1–5.1)
LYMPHOCYTES RELATIVE PERCENT: 18.7 %
MAGNESIUM: 1.8 MG/DL (ref 1.8–2.4)
MCH RBC QN AUTO: 30.7 PG (ref 26–34)
MCHC RBC AUTO-ENTMCNC: 33.4 G/DL (ref 31–36)
MCV RBC AUTO: 91.9 FL (ref 80–100)
MONOCYTES ABSOLUTE: 0.7 K/UL (ref 0–1.3)
MONOCYTES RELATIVE PERCENT: 10.3 %
NEUTROPHILS ABSOLUTE: 3.9 K/UL (ref 1.7–7.7)
NEUTROPHILS RELATIVE PERCENT: 60.4 %
ORGANISM: ABNORMAL
PDW BLD-RTO: 16.5 % (ref 12.4–15.4)
PERFORMED ON: ABNORMAL
PERFORMED ON: ABNORMAL
PERFORMED ON: NORMAL
PERFORMED ON: NORMAL
PHOSPHORUS: 3 MG/DL (ref 2.5–4.9)
PLATELET # BLD: 305 K/UL (ref 135–450)
PMV BLD AUTO: 7.5 FL (ref 5–10.5)
POTASSIUM SERPL-SCNC: 4.1 MMOL/L (ref 3.5–5.1)
RBC # BLD: 2.69 M/UL (ref 4.2–5.9)
SODIUM BLD-SCNC: 141 MMOL/L (ref 136–145)
WBC # BLD: 6.4 K/UL (ref 4–11)
WOUND/ABSCESS: ABNORMAL

## 2021-12-26 PROCEDURE — 99231 SBSQ HOSP IP/OBS SF/LOW 25: CPT | Performed by: SURGERY

## 2021-12-26 PROCEDURE — 6360000002 HC RX W HCPCS: Performed by: STUDENT IN AN ORGANIZED HEALTH CARE EDUCATION/TRAINING PROGRAM

## 2021-12-26 PROCEDURE — 85025 COMPLETE CBC W/AUTO DIFF WBC: CPT

## 2021-12-26 PROCEDURE — 2580000003 HC RX 258: Performed by: STUDENT IN AN ORGANIZED HEALTH CARE EDUCATION/TRAINING PROGRAM

## 2021-12-26 PROCEDURE — 6370000000 HC RX 637 (ALT 250 FOR IP): Performed by: STUDENT IN AN ORGANIZED HEALTH CARE EDUCATION/TRAINING PROGRAM

## 2021-12-26 PROCEDURE — 1200000000 HC SEMI PRIVATE

## 2021-12-26 PROCEDURE — 80069 RENAL FUNCTION PANEL: CPT

## 2021-12-26 PROCEDURE — 83735 ASSAY OF MAGNESIUM: CPT

## 2021-12-26 RX ORDER — MAGNESIUM SULFATE IN WATER 40 MG/ML
2000 INJECTION, SOLUTION INTRAVENOUS ONCE
Status: COMPLETED | OUTPATIENT
Start: 2021-12-26 | End: 2021-12-26

## 2021-12-26 RX ORDER — SODIUM CHLORIDE, SODIUM LACTATE, POTASSIUM CHLORIDE, CALCIUM CHLORIDE 600; 310; 30; 20 MG/100ML; MG/100ML; MG/100ML; MG/100ML
INJECTION, SOLUTION INTRAVENOUS CONTINUOUS
Status: DISCONTINUED | OUTPATIENT
Start: 2021-12-27 | End: 2021-12-28

## 2021-12-26 RX ADMIN — SODIUM CHLORIDE, POTASSIUM CHLORIDE, SODIUM LACTATE AND CALCIUM CHLORIDE: 600; 310; 30; 20 INJECTION, SOLUTION INTRAVENOUS at 23:39

## 2021-12-26 RX ADMIN — DOCUSATE SODIUM 100 MG: 100 CAPSULE, LIQUID FILLED ORAL at 20:24

## 2021-12-26 RX ADMIN — AMPICILLIN SODIUM AND SULBACTAM SODIUM 3000 MG: 2; 1 INJECTION, POWDER, FOR SOLUTION INTRAMUSCULAR; INTRAVENOUS at 02:58

## 2021-12-26 RX ADMIN — HEPARIN SODIUM 5000 UNITS: 5000 INJECTION INTRAVENOUS; SUBCUTANEOUS at 23:44

## 2021-12-26 RX ADMIN — OXYCODONE HYDROCHLORIDE 5 MG: 5 TABLET ORAL at 00:07

## 2021-12-26 RX ADMIN — ACETAMINOPHEN 1000 MG: 500 TABLET ORAL at 12:57

## 2021-12-26 RX ADMIN — AMLODIPINE BESYLATE 5 MG: 5 TABLET ORAL at 09:10

## 2021-12-26 RX ADMIN — INSULIN LISPRO 3 UNITS: 100 INJECTION, SOLUTION INTRAVENOUS; SUBCUTANEOUS at 09:09

## 2021-12-26 RX ADMIN — AMPICILLIN SODIUM AND SULBACTAM SODIUM 3000 MG: 2; 1 INJECTION, POWDER, FOR SOLUTION INTRAMUSCULAR; INTRAVENOUS at 09:14

## 2021-12-26 RX ADMIN — ACETAMINOPHEN 1000 MG: 500 TABLET ORAL at 05:28

## 2021-12-26 RX ADMIN — DOCUSATE SODIUM 100 MG: 100 CAPSULE, LIQUID FILLED ORAL at 09:10

## 2021-12-26 RX ADMIN — HEPARIN SODIUM 5000 UNITS: 5000 INJECTION INTRAVENOUS; SUBCUTANEOUS at 05:41

## 2021-12-26 RX ADMIN — INSULIN HUMAN 2 UNITS: 100 INJECTION, SOLUTION PARENTERAL at 12:56

## 2021-12-26 RX ADMIN — INSULIN HUMAN 2 UNITS: 100 INJECTION, SOLUTION PARENTERAL at 09:09

## 2021-12-26 RX ADMIN — POLYETHYLENE GLYCOL 3350 17 G: 17 POWDER, FOR SOLUTION ORAL at 09:10

## 2021-12-26 RX ADMIN — AMPICILLIN SODIUM AND SULBACTAM SODIUM 3000 MG: 2; 1 INJECTION, POWDER, FOR SOLUTION INTRAMUSCULAR; INTRAVENOUS at 20:13

## 2021-12-26 RX ADMIN — MAGNESIUM SULFATE HEPTAHYDRATE 2000 MG: 2 INJECTION, SOLUTION INTRAVENOUS at 06:48

## 2021-12-26 RX ADMIN — ACETAMINOPHEN 1000 MG: 500 TABLET ORAL at 23:33

## 2021-12-26 RX ADMIN — SODIUM CHLORIDE, PRESERVATIVE FREE 10 ML: 5 INJECTION INTRAVENOUS at 20:25

## 2021-12-26 RX ADMIN — AMPICILLIN SODIUM AND SULBACTAM SODIUM 3000 MG: 2; 1 INJECTION, POWDER, FOR SOLUTION INTRAMUSCULAR; INTRAVENOUS at 14:34

## 2021-12-26 RX ADMIN — ACETAMINOPHEN 1000 MG: 500 TABLET ORAL at 17:48

## 2021-12-26 RX ADMIN — HEPARIN SODIUM 5000 UNITS: 5000 INJECTION INTRAVENOUS; SUBCUTANEOUS at 14:30

## 2021-12-26 RX ADMIN — OXYCODONE HYDROCHLORIDE 10 MG: 5 TABLET ORAL at 12:56

## 2021-12-26 ASSESSMENT — PAIN SCALES - GENERAL
PAINLEVEL_OUTOF10: 6
PAINLEVEL_OUTOF10: 6
PAINLEVEL_OUTOF10: 0
PAINLEVEL_OUTOF10: 0
PAINLEVEL_OUTOF10: 7
PAINLEVEL_OUTOF10: 2
PAINLEVEL_OUTOF10: 4
PAINLEVEL_OUTOF10: 0

## 2021-12-26 NOTE — PROGRESS NOTES
Podiatric Surgery Daily Progress Note      Admit Date: 12/1/2021      Code:Full Code    Patient seen and examined, labs and records reviewed    Subjective:     Patient seen at bedside this AM. Patient denies pain to bilateral lower extremities. Patient denies fever, chills, shortness of breath, chest pain. Patient denies any acute events overnight. Review of Systems: A 10 point review of systems was conducted, significant findings as noted in HPI. All other systems negative. Objective     BP (!) 147/79   Pulse 76   Temp 98.1 °F (36.7 °C) (Oral)   Resp 16   Ht 5' 11\" (1.803 m)   Wt 190 lb 0.6 oz (86.2 kg)   SpO2 94%   BMI 26.50 kg/m²     I/O:    Intake/Output Summary (Last 24 hours) at 12/26/2021 0916  Last data filed at 12/26/2021 0547  Gross per 24 hour   Intake --   Output 3000 ml   Net -3000 ml              Wt Readings from Last 3 Encounters:   12/25/21 190 lb 0.6 oz (86.2 kg)   11/30/21 163 lb 3.2 oz (74 kg)   04/18/16 179 lb 14.3 oz (81.6 kg)       LABS:    Recent Labs     12/25/21  0555 12/26/21  0530   WBC 7.3 6.4   HGB 8.6* 8.3*   HCT 25.6* 24.8*    305        Recent Labs     12/26/21  0530      K 4.1      CO2 29   PHOS 3.0   BUN 10   CREATININE 0.6*        No results for input(s): PROT, INR, APTT in the last 72 hours. LOWER EXTREMITY EXAMINATION    Dressing to left LE intact. No strikethrough noted to the external dressing. Betadine discoloration noted to the internal layers of the dressing of the left LE.     VASCULAR:   DP and PT pulses are non-palpable b/l. Upon hand-held Doppler examination DP signals were noted to be monophasic and PT signals were noted to be nondopplerable. CFT slightly diminished to the distal digits of bilateral lower extremities. Skin temperature is warm to lukewarm to cool to the distal digits from proximal to distal.  No edema noted. No pain with calf compression b/l.     NEUROLOGIC:   Gross and epicritic sensation is diminished b/l. Protective sensation is diminished at all pedal sites b/l.     DERMATOLOGIC:     Left lower extremity:  Full-thickness ulceration noted to the lateral aspect of the left foot. Wound measures approximately 3.2 cm x 2 cm x 0.5 cm. Wound base with exposed bone, with necrotic edges at the proximal and distal edges. Wound probes to bone with exposed fifth metatarsal. Necrotic appearance of 5th digit. No tracking or tunneling noted. No purulent drainage noted. No fluctuance or crepitus or malodor noted. Deep tissue injury noted 2/2 Prevalon boot strap to the anterior aspect of the ankle. Intact epithelialized tissue noted. No crepitus, erythema, drainage, or malodor noted. No open wound noted. Stable without signs of acute infection noted. Right lower extremity  Partial thickness ulceration noted to the lateral malleolus 2/2 pressure. No fluctuance, crepitus, malodor, or drainage noted. No acute signs infection noted. Partial thickness ulceration 2/2 pressure noted at the styloid process of the 5th metatarsal. Fibrotic tissue noted. No fluctuance or crepitus noted. No acute signs of infection noted. MUSCULOSKELETAL:   Muscle strength 4/5 for all pedal muscle groups B/L LE. No pain with palpation of the left foot. IMAGING:  XR LEFT FOOT 11/30/2021  Narrative   EXAMINATION:   THREE XRAY VIEWS OF THE LEFT FOOT       11/30/2021 1:44 pm       HISTORY:   ORDERING SYSTEM PROVIDED HISTORY: wound   TECHNOLOGIST PROVIDED HISTORY:   Reason for exam:->wound   Reason for Exam: blood sugar problem, wound check on lateral portion of foot   Acuity: Acute   Type of Exam: Initial       FINDINGS:   Osseous destruction along the articular margins of the 5th   metatarsophalangeal joint with associated lucency in the soft tissues. .   There is no evidence of fracture or dislocation. . The remaining joint spaces   appear well maintained.  The remaining soft tissues are unremarkable.       Impression   Evidence of a septic joint involving the 5th metatarsal phalangeal joint with   associated osteomyelitis     ARTERIAL DUPLEX 12/2/21     Type of Study:        Extremities Arteries:Lower Extremities Arterial Duplex, VL LOWER EXTREMITY    ARTERIES DUPLEX BILATERAL.       Tech Comments   Right   The right ankle/brachial index is 0.0 (no Doppler signal could be heard at the   [facility] or the PT). The common femoral and profunda femoral arteries could not be visualized due   to bandages extending into the groin area. The mid superficial femoral artery has a short segmental occlusion and then is   reconstituted. Elevated velocities at the distal superficial femoral artery indicate a > 50%   stenosis by velocity criteria (velocity went from 15 to 298 cm/s). The posterior tibial artery is occluded. The distal anterior tibial artery is barely patent, with very low flow   (possibly occluded and then reconstituted). Left   The left ankle brachial index is 0 for the PT and the DP was not accessible   due to the bandages on the foot. The common femoral and profunda femoral artery could not be visualized due to   bandages extending into the groin area. The distal superficial femoral artery is occluded and then reconstituted. The posterior tibial artery, peroneal, and anterior tibial artery are   occluded. There were no previous studies to use for comparison.        ASSESSMENT/PLAN:   -Full-thickness ulceration; lateral left foot- Sands 4  -Osteomyelitis of the fifth metatarsal; left foot  -Partial thickness pressure ulceration x2, right foot; NPUAP Stage 2  -Pressure injury, dorsal right midfoot -NPUAP Stage I  -Critical limb ischemia; bilateral lower extremity  -Diabetes mellitus, type II  -History of noncompliance    -Patient seen and examined at bedside this AM.  -Hypertensive, otherwise VSS on 2 L of O2 via NC; no leukocytosis noted (WBC 6.4)  -All imaging reviewed; impressions noted above  -CRP 42.6 (12/20/21)  -Prealbumin 9.8

## 2021-12-26 NOTE — PROGRESS NOTES
PRE-OP NOTE  Department of Surgery      Chief Complaint or Reason for Surgery: Nectrotizing fascitis    Procedure: Wound vac change, wound reconstruction with possible split thickness skin graft, possible closure, possible tissue transfer, possible flap   Expected time: , add-on    Plan  1. Diet: NPO at midnight  2. IVF:   3. Antibiotics: Continue Unasyn  4. Labs to be drawn: Renal panel, magnesium, and CBC ordered with morning labs. 5. Anesthesia: to see patient  6. Consent: Will be obtained and placed in the patient's chart. 7. Pulmonary: CXR:  reviewed, bibasilar airspace disease seen  8.  Cardiac: EK/9 NSR    Leonela Gómez, DO  21  8:58 AM

## 2021-12-26 NOTE — PLAN OF CARE
Problem:  Activity:  Goal: Ability to return to normal activity level will improve  Description: Ability to return to normal activity level will improve  Outcome: Ongoing

## 2021-12-26 NOTE — PROGRESS NOTES
Surgery Daily Progress Note  Ramos Quiroz  CC:  Shayla's gangrene      Subjective :  No acute events. Patient afebrile, HDS. Wound vac holding adequate suction. Objective    Infusions:   sodium chloride      sodium chloride 25 mL (12/12/21 0850)    dextrose          I/O:I/O last 3 completed shifts:  In: -   Out: 2800 [Urine:1725; Drains:1075]           Wt Readings from Last 1 Encounters:   12/25/21 190 lb 0.6 oz (86.2 kg)                 LABS:    Recent Labs     12/25/21  0555 12/26/21  0530   WBC 7.3 6.4   HGB 8.6* 8.3*   HCT 25.6* 24.8*   MCV 92.4 91.9    305        Recent Labs     12/25/21  0555 12/26/21  0530    141   K 4.0 4.1    103   CO2 29 29   PHOS 2.5 3.0   BUN 12 10   CREATININE 0.6* 0.6*      No results for input(s): AST, ALT, ALB, BILIDIR, BILITOT, ALKPHOS in the last 72 hours. No results for input(s): LIPASE, AMYLASE in the last 72 hours. No results for input(s): PROT, INR, APTT in the last 72 hours. No results for input(s): CKTOTAL, CKMB, CKMBINDEX, TROPONINI in the last 72 hours. Exam:BP (!) 149/73   Pulse 76   Temp 97.8 °F (36.6 °C) (Oral)   Resp 17   Ht 5' 11\" (1.803 m)   Wt 190 lb 0.6 oz (86.2 kg)   SpO2 94%   BMI 26.50 kg/m²   General appearance: alert, appears stated age and cooperative  Eyes: No scleral icterus, EOM grossly intact  Neck: trachea midline, no JVD, no lymphadenopathy, neck supple  Chest/Lungs: Equal excursion bilaterally, normal effort with no accessory muscle use on 2L NC  Cardiovascular: RRR, brisk capillary refill  Abdomen:  Soft, large surgical debridement area of the abdomen with veraflo Vac in place holding adequate suction and instilling normal saline. Colostomy is pink, viable and with brown stool in the bag. Skin: warm and dry, no rashes  Extremities: no edema, no cyanosis  Genitourinary:  Mcfadden in place with clear yellow urine, strip paste around penis without edema   Neuro: A&Ox3, no focal deficits, sensation intact      ASSESSMENT/PLAN: Pt. is a 61 y.o. male with Necrotizing fasciitis of the abdominal wall, gluteal region, and scrotum s/p wide excision of perineum, back, and abdominal wall. S/p multiple debridements and wound vac placement and changes intraoperatively with diverting colostomy creation (12/7). OR wound vac change (12/10, 12/13, 12/16, 12/21). - Patient to return to the OR on Monday (12/27) to begin reconstruction process   - follow up surgical cultures from 12/23 - Candida   - Continue carb free diet with protein supplementation for goal >100g protein per day  following procedure. Dietitian on board to assist with macronutrient accounting and support   - sliding scale, mealtime insulin, and basal insulin for glucose control. Blood glucose 104-137 since yesterday morning.  - touched base with urology regarding walsh exchange. Recommend exchange in OR monday  - continue abx per ID - Unasyn until coverage obtained, then no further antibiotics recommended.      Milena Cheema, DO MS  PGY1, General Surgery  12/26/21  6:14 AM  [phone]

## 2021-12-26 NOTE — PLAN OF CARE
Problem: Activity:  Goal: Ability to return to normal activity level will improve  Description: Ability to return to normal activity level will improve  12/25/2021 1840 by Ab العراقي RN  Outcome: Ongoing     Problem:  Bowel/Gastric:  Goal: Gastrointestinal status for postoperative course will improve  Description: Gastrointestinal status for postoperative course will improve  12/25/2021 1840 by Ab العراقي RN  Outcome: Ongoing  Goal: Control of bowel function will improve  Description: Control of bowel function will improve  12/25/2021 1840 by Ab العراقي RN  Outcome: Ongoing  Goal: Ability to achieve a regular elimination pattern will improve  Description: Ability to achieve a regular elimination pattern will improve  12/25/2021 1840 by Ab العراقي RN  Outcome: Ongoing Refill requested for:   Methylphenidate 20mg tab (1 tab 2 times daily)    Last refill:   01/03/2019 #60, 0 refills    Last office visit: 12/06/2018      Scheduled office visit: 06/06/2019    Medication can not be filled by protocol. Physician authorization required.

## 2021-12-27 ENCOUNTER — ANESTHESIA EVENT (OUTPATIENT)
Dept: OPERATING ROOM | Age: 66
DRG: 853 | End: 2021-12-27

## 2021-12-27 ENCOUNTER — ANESTHESIA (OUTPATIENT)
Dept: OPERATING ROOM | Age: 66
DRG: 853 | End: 2021-12-27

## 2021-12-27 VITALS
SYSTOLIC BLOOD PRESSURE: 96 MMHG | TEMPERATURE: 97.3 F | DIASTOLIC BLOOD PRESSURE: 56 MMHG | OXYGEN SATURATION: 100 % | RESPIRATION RATE: 21 BRPM

## 2021-12-27 LAB
ALBUMIN SERPL-MCNC: 2.7 G/DL (ref 3.4–5)
ANION GAP SERPL CALCULATED.3IONS-SCNC: 8 MMOL/L (ref 3–16)
BASOPHILS ABSOLUTE: 0.1 K/UL (ref 0–0.2)
BASOPHILS RELATIVE PERCENT: 0.7 %
BUN BLDV-MCNC: 11 MG/DL (ref 7–20)
C-REACTIVE PROTEIN: 24.2 MG/L (ref 0–5.1)
CALCIUM SERPL-MCNC: 7.9 MG/DL (ref 8.3–10.6)
CHLORIDE BLD-SCNC: 102 MMOL/L (ref 99–110)
CO2: 29 MMOL/L (ref 21–32)
CREAT SERPL-MCNC: 0.6 MG/DL (ref 0.8–1.3)
EOSINOPHILS ABSOLUTE: 0.6 K/UL (ref 0–0.6)
EOSINOPHILS RELATIVE PERCENT: 7.7 %
GFR AFRICAN AMERICAN: >60
GFR NON-AFRICAN AMERICAN: >60
GLUCOSE BLD-MCNC: 149 MG/DL (ref 70–99)
GLUCOSE BLD-MCNC: 154 MG/DL (ref 70–99)
GLUCOSE BLD-MCNC: 163 MG/DL (ref 70–99)
GLUCOSE BLD-MCNC: 58 MG/DL (ref 70–99)
GLUCOSE BLD-MCNC: 78 MG/DL (ref 70–99)
GLUCOSE BLD-MCNC: 91 MG/DL (ref 70–99)
HCT VFR BLD CALC: 26.1 % (ref 40.5–52.5)
HEMOGLOBIN: 8.7 G/DL (ref 13.5–17.5)
LYMPHOCYTES ABSOLUTE: 1.3 K/UL (ref 1–5.1)
LYMPHOCYTES RELATIVE PERCENT: 16.8 %
MAGNESIUM: 1.8 MG/DL (ref 1.8–2.4)
MCH RBC QN AUTO: 30.9 PG (ref 26–34)
MCHC RBC AUTO-ENTMCNC: 33.4 G/DL (ref 31–36)
MCV RBC AUTO: 92.7 FL (ref 80–100)
MONOCYTES ABSOLUTE: 0.6 K/UL (ref 0–1.3)
MONOCYTES RELATIVE PERCENT: 8.5 %
NEUTROPHILS ABSOLUTE: 4.9 K/UL (ref 1.7–7.7)
NEUTROPHILS RELATIVE PERCENT: 66.3 %
PDW BLD-RTO: 16.7 % (ref 12.4–15.4)
PERFORMED ON: ABNORMAL
PERFORMED ON: NORMAL
PERFORMED ON: NORMAL
PHOSPHORUS: 3 MG/DL (ref 2.5–4.9)
PLATELET # BLD: 310 K/UL (ref 135–450)
PMV BLD AUTO: 7.6 FL (ref 5–10.5)
POTASSIUM SERPL-SCNC: 4 MMOL/L (ref 3.5–5.1)
PREALBUMIN: 16.6 MG/DL (ref 20–40)
RBC # BLD: 2.81 M/UL (ref 4.2–5.9)
SODIUM BLD-SCNC: 139 MMOL/L (ref 136–145)
WBC # BLD: 7.5 K/UL (ref 4–11)

## 2021-12-27 PROCEDURE — 15852 DRESSING CHANGE NOT FOR BURN: CPT | Performed by: SURGERY

## 2021-12-27 PROCEDURE — 2580000003 HC RX 258: Performed by: SURGERY

## 2021-12-27 PROCEDURE — 1200000000 HC SEMI PRIVATE

## 2021-12-27 PROCEDURE — 2700000000 HC OXYGEN THERAPY PER DAY

## 2021-12-27 PROCEDURE — 2580000003 HC RX 258: Performed by: STUDENT IN AN ORGANIZED HEALTH CARE EDUCATION/TRAINING PROGRAM

## 2021-12-27 PROCEDURE — 6360000002 HC RX W HCPCS: Performed by: ANESTHESIOLOGY

## 2021-12-27 PROCEDURE — 6360000002 HC RX W HCPCS: Performed by: STUDENT IN AN ORGANIZED HEALTH CARE EDUCATION/TRAINING PROGRAM

## 2021-12-27 PROCEDURE — 6370000000 HC RX 637 (ALT 250 FOR IP): Performed by: STUDENT IN AN ORGANIZED HEALTH CARE EDUCATION/TRAINING PROGRAM

## 2021-12-27 PROCEDURE — 3700000001 HC ADD 15 MINUTES (ANESTHESIA): Performed by: SURGERY

## 2021-12-27 PROCEDURE — 84134 ASSAY OF PREALBUMIN: CPT

## 2021-12-27 PROCEDURE — 94669 MECHANICAL CHEST WALL OSCILL: CPT

## 2021-12-27 PROCEDURE — 7100000000 HC PACU RECOVERY - FIRST 15 MIN: Performed by: SURGERY

## 2021-12-27 PROCEDURE — 80069 RENAL FUNCTION PANEL: CPT

## 2021-12-27 PROCEDURE — 94761 N-INVAS EAR/PLS OXIMETRY MLT: CPT

## 2021-12-27 PROCEDURE — 3700000000 HC ANESTHESIA ATTENDED CARE: Performed by: SURGERY

## 2021-12-27 PROCEDURE — 3600000004 HC SURGERY LEVEL 4 BASE: Performed by: SURGERY

## 2021-12-27 PROCEDURE — 7100000001 HC PACU RECOVERY - ADDTL 15 MIN: Performed by: SURGERY

## 2021-12-27 PROCEDURE — 2500000003 HC RX 250 WO HCPCS

## 2021-12-27 PROCEDURE — 3600000014 HC SURGERY LEVEL 4 ADDTL 15MIN: Performed by: SURGERY

## 2021-12-27 PROCEDURE — 83735 ASSAY OF MAGNESIUM: CPT

## 2021-12-27 PROCEDURE — 6360000002 HC RX W HCPCS

## 2021-12-27 PROCEDURE — 2709999900 HC NON-CHARGEABLE SUPPLY: Performed by: SURGERY

## 2021-12-27 PROCEDURE — 85025 COMPLETE CBC W/AUTO DIFF WBC: CPT

## 2021-12-27 PROCEDURE — 86140 C-REACTIVE PROTEIN: CPT

## 2021-12-27 RX ORDER — MIDAZOLAM HYDROCHLORIDE 1 MG/ML
INJECTION INTRAMUSCULAR; INTRAVENOUS PRN
Status: DISCONTINUED | OUTPATIENT
Start: 2021-12-27 | End: 2021-12-27 | Stop reason: SDUPTHER

## 2021-12-27 RX ORDER — ONDANSETRON 2 MG/ML
4 INJECTION INTRAMUSCULAR; INTRAVENOUS
Status: DISCONTINUED | OUTPATIENT
Start: 2021-12-27 | End: 2021-12-27 | Stop reason: HOSPADM

## 2021-12-27 RX ORDER — LIDOCAINE HYDROCHLORIDE 20 MG/ML
INJECTION, SOLUTION INFILTRATION; PERINEURAL PRN
Status: DISCONTINUED | OUTPATIENT
Start: 2021-12-27 | End: 2021-12-27 | Stop reason: SDUPTHER

## 2021-12-27 RX ORDER — ONDANSETRON 2 MG/ML
INJECTION INTRAMUSCULAR; INTRAVENOUS PRN
Status: DISCONTINUED | OUTPATIENT
Start: 2021-12-27 | End: 2021-12-27 | Stop reason: SDUPTHER

## 2021-12-27 RX ORDER — HYDRALAZINE HYDROCHLORIDE 20 MG/ML
5 INJECTION INTRAMUSCULAR; INTRAVENOUS EVERY 10 MIN PRN
Status: DISCONTINUED | OUTPATIENT
Start: 2021-12-27 | End: 2021-12-27 | Stop reason: HOSPADM

## 2021-12-27 RX ORDER — OXYCODONE HYDROCHLORIDE AND ACETAMINOPHEN 5; 325 MG/1; MG/1
1 TABLET ORAL
Status: DISCONTINUED | OUTPATIENT
Start: 2021-12-27 | End: 2021-12-27 | Stop reason: HOSPADM

## 2021-12-27 RX ORDER — MAGNESIUM HYDROXIDE 1200 MG/15ML
LIQUID ORAL CONTINUOUS PRN
Status: COMPLETED | OUTPATIENT
Start: 2021-12-27 | End: 2021-12-27

## 2021-12-27 RX ORDER — LABETALOL HYDROCHLORIDE 5 MG/ML
5 INJECTION, SOLUTION INTRAVENOUS EVERY 10 MIN PRN
Status: DISCONTINUED | OUTPATIENT
Start: 2021-12-27 | End: 2021-12-27 | Stop reason: HOSPADM

## 2021-12-27 RX ORDER — MEPERIDINE HYDROCHLORIDE 25 MG/ML
12.5 INJECTION INTRAMUSCULAR; INTRAVENOUS; SUBCUTANEOUS EVERY 5 MIN PRN
Status: DISCONTINUED | OUTPATIENT
Start: 2021-12-27 | End: 2021-12-27 | Stop reason: HOSPADM

## 2021-12-27 RX ORDER — FENTANYL CITRATE 50 UG/ML
25 INJECTION, SOLUTION INTRAMUSCULAR; INTRAVENOUS EVERY 5 MIN PRN
Status: DISCONTINUED | OUTPATIENT
Start: 2021-12-27 | End: 2021-12-27 | Stop reason: HOSPADM

## 2021-12-27 RX ORDER — MAGNESIUM SULFATE IN WATER 40 MG/ML
2000 INJECTION, SOLUTION INTRAVENOUS ONCE
Status: COMPLETED | OUTPATIENT
Start: 2021-12-27 | End: 2021-12-27

## 2021-12-27 RX ORDER — PROMETHAZINE HYDROCHLORIDE 25 MG/ML
6.25 INJECTION, SOLUTION INTRAMUSCULAR; INTRAVENOUS
Status: DISCONTINUED | OUTPATIENT
Start: 2021-12-27 | End: 2021-12-27 | Stop reason: HOSPADM

## 2021-12-27 RX ORDER — ROCURONIUM BROMIDE 10 MG/ML
INJECTION, SOLUTION INTRAVENOUS PRN
Status: DISCONTINUED | OUTPATIENT
Start: 2021-12-27 | End: 2021-12-27 | Stop reason: SDUPTHER

## 2021-12-27 RX ORDER — FENTANYL CITRATE 50 UG/ML
50 INJECTION, SOLUTION INTRAMUSCULAR; INTRAVENOUS EVERY 5 MIN PRN
Status: DISCONTINUED | OUTPATIENT
Start: 2021-12-27 | End: 2021-12-27 | Stop reason: HOSPADM

## 2021-12-27 RX ORDER — PROPOFOL 10 MG/ML
INJECTION, EMULSION INTRAVENOUS PRN
Status: DISCONTINUED | OUTPATIENT
Start: 2021-12-27 | End: 2021-12-27 | Stop reason: SDUPTHER

## 2021-12-27 RX ADMIN — ONDANSETRON 4 MG: 2 INJECTION INTRAMUSCULAR; INTRAVENOUS at 13:40

## 2021-12-27 RX ADMIN — OXYCODONE HYDROCHLORIDE 5 MG: 5 TABLET ORAL at 04:15

## 2021-12-27 RX ADMIN — HEPARIN SODIUM 5000 UNITS: 5000 INJECTION INTRAVENOUS; SUBCUTANEOUS at 06:30

## 2021-12-27 RX ADMIN — PROPOFOL 150 MG: 10 INJECTION, EMULSION INTRAVENOUS at 13:39

## 2021-12-27 RX ADMIN — ROCURONIUM BROMIDE 40 MG: 10 INJECTION INTRAVENOUS at 13:40

## 2021-12-27 RX ADMIN — FENTANYL CITRATE 50 MCG: 50 INJECTION, SOLUTION INTRAMUSCULAR; INTRAVENOUS at 14:57

## 2021-12-27 RX ADMIN — INSULIN LISPRO 3 UNITS: 100 INJECTION, SOLUTION INTRAVENOUS; SUBCUTANEOUS at 18:30

## 2021-12-27 RX ADMIN — ACETAMINOPHEN 1000 MG: 500 TABLET ORAL at 06:26

## 2021-12-27 RX ADMIN — SODIUM CHLORIDE, POTASSIUM CHLORIDE, SODIUM LACTATE AND CALCIUM CHLORIDE: 600; 310; 30; 20 INJECTION, SOLUTION INTRAVENOUS at 09:11

## 2021-12-27 RX ADMIN — AMPICILLIN SODIUM AND SULBACTAM SODIUM 3000 MG: 2; 1 INJECTION, POWDER, FOR SOLUTION INTRAMUSCULAR; INTRAVENOUS at 04:07

## 2021-12-27 RX ADMIN — ACETAMINOPHEN 1000 MG: 500 TABLET ORAL at 18:27

## 2021-12-27 RX ADMIN — DOCUSATE SODIUM 100 MG: 100 CAPSULE, LIQUID FILLED ORAL at 21:06

## 2021-12-27 RX ADMIN — SODIUM CHLORIDE, PRESERVATIVE FREE 10 ML: 5 INJECTION INTRAVENOUS at 21:27

## 2021-12-27 RX ADMIN — DOCUSATE SODIUM 100 MG: 100 CAPSULE, LIQUID FILLED ORAL at 09:09

## 2021-12-27 RX ADMIN — SUGAMMADEX 200 MG: 100 INJECTION, SOLUTION INTRAVENOUS at 14:14

## 2021-12-27 RX ADMIN — MAGNESIUM SULFATE HEPTAHYDRATE 2000 MG: 2 INJECTION, SOLUTION INTRAVENOUS at 09:15

## 2021-12-27 RX ADMIN — AMPICILLIN SODIUM AND SULBACTAM SODIUM 3000 MG: 2; 1 INJECTION, POWDER, FOR SOLUTION INTRAMUSCULAR; INTRAVENOUS at 09:12

## 2021-12-27 RX ADMIN — SODIUM CHLORIDE, POTASSIUM CHLORIDE, SODIUM LACTATE AND CALCIUM CHLORIDE: 600; 310; 30; 20 INJECTION, SOLUTION INTRAVENOUS at 18:29

## 2021-12-27 RX ADMIN — AMLODIPINE BESYLATE 5 MG: 5 TABLET ORAL at 09:09

## 2021-12-27 RX ADMIN — MIDAZOLAM HYDROCHLORIDE 2 MG: 2 INJECTION, SOLUTION INTRAMUSCULAR; INTRAVENOUS at 13:37

## 2021-12-27 RX ADMIN — AMPICILLIN SODIUM AND SULBACTAM SODIUM 3000 MG: 2; 1 INJECTION, POWDER, FOR SOLUTION INTRAMUSCULAR; INTRAVENOUS at 21:26

## 2021-12-27 RX ADMIN — OXYCODONE HYDROCHLORIDE 5 MG: 5 TABLET ORAL at 21:06

## 2021-12-27 RX ADMIN — HEPARIN SODIUM 5000 UNITS: 5000 INJECTION INTRAVENOUS; SUBCUTANEOUS at 21:27

## 2021-12-27 RX ADMIN — FENTANYL CITRATE 50 MCG: 50 INJECTION, SOLUTION INTRAMUSCULAR; INTRAVENOUS at 14:52

## 2021-12-27 RX ADMIN — LIDOCAINE HYDROCHLORIDE 80 MG: 20 INJECTION, SOLUTION INFILTRATION; PERINEURAL at 13:39

## 2021-12-27 ASSESSMENT — PAIN DESCRIPTION - ORIENTATION
ORIENTATION: MID
ORIENTATION: LEFT
ORIENTATION: MID
ORIENTATION: MID

## 2021-12-27 ASSESSMENT — PAIN DESCRIPTION - PAIN TYPE
TYPE: ACUTE PAIN;SURGICAL PAIN
TYPE: SURGICAL PAIN
TYPE: SURGICAL PAIN
TYPE: ACUTE PAIN;SURGICAL PAIN
TYPE: SURGICAL PAIN

## 2021-12-27 ASSESSMENT — PULMONARY FUNCTION TESTS
PIF_VALUE: 23
PIF_VALUE: 23
PIF_VALUE: 0
PIF_VALUE: 1
PIF_VALUE: 23
PIF_VALUE: 23
PIF_VALUE: 1
PIF_VALUE: 1
PIF_VALUE: 23
PIF_VALUE: 20
PIF_VALUE: 21
PIF_VALUE: 23
PIF_VALUE: 1
PIF_VALUE: 0
PIF_VALUE: 1
PIF_VALUE: 20
PIF_VALUE: 23
PIF_VALUE: 24
PIF_VALUE: 23
PIF_VALUE: 22
PIF_VALUE: 0
PIF_VALUE: 23
PIF_VALUE: 24
PIF_VALUE: 2
PIF_VALUE: 0
PIF_VALUE: 0
PIF_VALUE: 24
PIF_VALUE: 26
PIF_VALUE: 24
PIF_VALUE: 23
PIF_VALUE: 23
PIF_VALUE: 1
PIF_VALUE: 24
PIF_VALUE: 24
PIF_VALUE: 0
PIF_VALUE: 23
PIF_VALUE: 21
PIF_VALUE: 24
PIF_VALUE: 21
PIF_VALUE: 20
PIF_VALUE: 13
PIF_VALUE: 1
PIF_VALUE: 1
PIF_VALUE: 20
PIF_VALUE: 24
PIF_VALUE: 17
PIF_VALUE: 21
PIF_VALUE: 24
PIF_VALUE: 23
PIF_VALUE: 20
PIF_VALUE: 24
PIF_VALUE: 3
PIF_VALUE: 23
PIF_VALUE: 6
PIF_VALUE: 0
PIF_VALUE: 31
PIF_VALUE: 23
PIF_VALUE: 1
PIF_VALUE: 2

## 2021-12-27 ASSESSMENT — PAIN DESCRIPTION - FREQUENCY
FREQUENCY: CONTINUOUS

## 2021-12-27 ASSESSMENT — PAIN DESCRIPTION - LOCATION
LOCATION: BUTTOCKS

## 2021-12-27 ASSESSMENT — PAIN SCALES - GENERAL
PAINLEVEL_OUTOF10: 5
PAINLEVEL_OUTOF10: 5
PAINLEVEL_OUTOF10: 6
PAINLEVEL_OUTOF10: 5
PAINLEVEL_OUTOF10: 6
PAINLEVEL_OUTOF10: 3
PAINLEVEL_OUTOF10: 7
PAINLEVEL_OUTOF10: 0
PAINLEVEL_OUTOF10: 5
PAINLEVEL_OUTOF10: 6
PAINLEVEL_OUTOF10: 3
PAINLEVEL_OUTOF10: 3
PAINLEVEL_OUTOF10: 7

## 2021-12-27 ASSESSMENT — PAIN DESCRIPTION - PROGRESSION
CLINICAL_PROGRESSION: GRADUALLY WORSENING

## 2021-12-27 ASSESSMENT — PAIN DESCRIPTION - DESCRIPTORS
DESCRIPTORS: DISCOMFORT

## 2021-12-27 ASSESSMENT — PAIN - FUNCTIONAL ASSESSMENT
PAIN_FUNCTIONAL_ASSESSMENT: PREVENTS OR INTERFERES SOME ACTIVE ACTIVITIES AND ADLS
PAIN_FUNCTIONAL_ASSESSMENT: PREVENTS OR INTERFERES SOME ACTIVE ACTIVITIES AND ADLS

## 2021-12-27 ASSESSMENT — PAIN DESCRIPTION - ONSET
ONSET: ON-GOING

## 2021-12-27 NOTE — PROGRESS NOTES
Podiatric Surgery Daily Progress Note      Admit Date: 12/1/2021      Code:Full Code    Patient seen and examined, labs and records reviewed    Subjective:     Patient seen at bedside this AM. Patient denies pain to bilateral lower extremities. Patient denies fever, chills, shortness of breath, chest pain. Patient denies any acute events overnight. Review of Systems: A 10 point review of systems was conducted, significant findings as noted in HPI. All other systems negative. Objective     BP (!) 155/72   Pulse 80   Temp 97.9 °F (36.6 °C) (Oral)   Resp 18   Ht 5' 11\" (1.803 m)   Wt 190 lb 0.6 oz (86.2 kg)   SpO2 92%   BMI 26.50 kg/m²     I/O:    Intake/Output Summary (Last 24 hours) at 12/27/2021 0459  Last data filed at 12/27/2021 0410  Gross per 24 hour   Intake 1230 ml   Output 3720 ml   Net -2490 ml              Wt Readings from Last 3 Encounters:   12/25/21 190 lb 0.6 oz (86.2 kg)   11/30/21 163 lb 3.2 oz (74 kg)   04/18/16 179 lb 14.3 oz (81.6 kg)       LABS:    Recent Labs     12/25/21  0555 12/26/21  0530   WBC 7.3 6.4   HGB 8.6* 8.3*   HCT 25.6* 24.8*    305        Recent Labs     12/26/21  0530      K 4.1      CO2 29   PHOS 3.0   BUN 10   CREATININE 0.6*        No results for input(s): PROT, INR, APTT in the last 72 hours. LOWER EXTREMITY EXAMINATION    Dressing to left LE intact. No strikethrough noted to the external dressing. Betadine discoloration noted to the internal layers of the dressing of the left LE.     VASCULAR:   DP and PT pulses are non-palpable b/l. Upon hand-held Doppler examination DP signals were noted to be monophasic and PT signals were noted to be nondopplerable. CFT slightly diminished to the distal digits of bilateral lower extremities. Skin temperature is warm to lukewarm to cool to the distal digits from proximal to distal.    No edema noted.  No pain with calf compression b/l.     NEUROLOGIC:   Gross and epicritic sensation is diminished b/l. Protective sensation is diminished at all pedal sites b/l.     DERMATOLOGIC:     Left lower extremity:  Full-thickness ulceration noted to the lateral aspect of the left foot. Wound measures approximately 3.2 cm x 2 cm x 0.5 cm. Wound base with exposed bone, with necrotic edges at the proximal and distal edges. Wound probes to bone with exposed fifth metatarsal. Necrotic appearance of 5th digit. No tracking or tunneling noted. No purulent drainage noted. No fluctuance or crepitus or malodor noted. Patient provided verbal consent for today's photos. Deep tissue injury noted 2/2 Prevalon boot strap to the anterior aspect of the ankle. Intact epithelialized tissue noted. No crepitus, erythema, drainage, or malodor noted. No open wound noted. Stable without signs of acute infection noted. Right lower extremity  Partial thickness ulceration noted to the lateral malleolus 2/2 pressure. No fluctuance, crepitus, malodor, or drainage noted. No acute signs infection noted. Partial thickness ulceration 2/2 pressure noted at the styloid process of the 5th metatarsal. Fibrotic tissue noted. No fluctuance or crepitus noted. No acute signs of infection noted. Nonblanchable erythema noted to dorsal aspect right midfoot, nonblanchable, skin intact. No open wound noted at this time. No fluctuance, crepitus, erythema, drainage, or malodor noted. MUSCULOSKELETAL:   Muscle strength 4/5 for all pedal muscle groups B/L LE. No pain with palpation of the left foot.      IMAGING:  XR LEFT FOOT 11/30/2021  Narrative   EXAMINATION:   THREE XRAY VIEWS OF THE LEFT FOOT       11/30/2021 1:44 pm       HISTORY:   ORDERING SYSTEM PROVIDED HISTORY: wound   TECHNOLOGIST PROVIDED HISTORY:   Reason for exam:->wound   Reason for Exam: blood sugar problem, wound check on lateral portion of foot   Acuity: Acute   Type of Exam: Initial       FINDINGS:   Osseous destruction along the articular margins of the 5th   metatarsophalangeal joint with associated lucency in the soft tissues. .   There is no evidence of fracture or dislocation. . The remaining joint spaces   appear well maintained. The remaining soft tissues are unremarkable.       Impression   Evidence of a septic joint involving the 5th metatarsal phalangeal joint with   associated osteomyelitis     ARTERIAL DUPLEX 12/2/21     Type of Study:        Extremities Arteries:Lower Extremities Arterial Duplex, VL LOWER EXTREMITY    ARTERIES DUPLEX BILATERAL.       Tech Comments   Right   The right ankle/brachial index is 0.0 (no Doppler signal could be heard at the   [facility] or the PT). The common femoral and profunda femoral arteries could not be visualized due   to bandages extending into the groin area. The mid superficial femoral artery has a short segmental occlusion and then is   reconstituted. Elevated velocities at the distal superficial femoral artery indicate a > 50%   stenosis by velocity criteria (velocity went from 15 to 298 cm/s). The posterior tibial artery is occluded. The distal anterior tibial artery is barely patent, with very low flow   (possibly occluded and then reconstituted). Left   The left ankle brachial index is 0 for the PT and the DP was not accessible   due to the bandages on the foot. The common femoral and profunda femoral artery could not be visualized due to   bandages extending into the groin area. The distal superficial femoral artery is occluded and then reconstituted. The posterior tibial artery, peroneal, and anterior tibial artery are   occluded. There were no previous studies to use for comparison.        ASSESSMENT/PLAN:   -Full-thickness ulceration; lateral left foot- Sands 4  -Osteomyelitis of the fifth metatarsal; left foot  -Partial thickness pressure ulceration x2, right foot; NPUAP Stage 2  -Pressure injury, dorsal right midfoot -NPUAP Stage I  -Critical limb ischemia; bilateral lower extremity  -Diabetes mellitus, type II  -History of noncompliance    -Patient seen and examined at bedside this AM.  -Hypertensive, otherwise VSS on 2 L of O2 via NC; no leukocytosis noted (WBC 7.5)  -All imaging reviewed; impressions noted above  -CRP 42.6 (12/20/21)  -Prealbumin 9.8 (12/13/21)  -Vascular surgery following; will await further stabilization with the general surgery team and plastic surgery team before offering vascular intervention.  -Plastic surgery following; plan for OR today to start reconstructive process  -Infectious disease following, currently on Unasyn  -Left lower extremity dressed with Betadine soaked gauze, DSD, and tape. -Right lower extremity dressed with Mepilex borders  -Prevalon boots reapplied. Patient is to wear at all times while in bed to prevent further deep tissue injury. Ankle strap removed from bilateral boot as the ankle straps were causing pressure to his feet. -Nonweightbearing to left lower extremity.  -Weightbearing as tolerated to right lower extremity. DISPO: Full-thickness ulceration with underlying osteomyelitis to the left fifth metatarsal. Critical limb ischemia to b/l LE. Wounds are stable at this time. Vascular following; awaiting further stabilization at this time. Plastic surgery, general surgery, and ID following. Patient will require podiatric surgical intervention but timing will depend on medical stability and vascular recommendations. Will continue to follow while in house. Patient assessment and plan discussed with Dr. Thom Orr DPM.    João Valdez 26 12/27/21  6:56 AM      Patient was seen and evaluated at bedside. Agree with residents assessment and treatment plan.   Thom Orr DPM

## 2021-12-27 NOTE — PROGRESS NOTES
Surgery Daily Progress Note  Lorrane Nail  CC:  Shayla's gangrene      Subjective :  Patient resting well. Afebrile and HDS. Good ostomy output. Ready for OR today. Objective    Infusions:   lactated ringers 100 mL/hr at 12/26/21 2339    sodium chloride      sodium chloride 25 mL (12/12/21 0850)    dextrose          I/O:I/O last 3 completed shifts: In: 110 [I.V.:10; IV Piggyback:100]  Out: 5152 [Urine:2625; Drains:1200]           Wt Readings from Last 1 Encounters:   12/25/21 190 lb 0.6 oz (86.2 kg)                 LABS:    Recent Labs     12/26/21  0530 12/27/21  0550   WBC 6.4 7.5   HGB 8.3* 8.7*   HCT 24.8* 26.1*   MCV 91.9 92.7    310        Recent Labs     12/25/21  0555 12/26/21  0530    141   K 4.0 4.1    103   CO2 29 29   PHOS 2.5 3.0   BUN 12 10   CREATININE 0.6* 0.6*      No results for input(s): AST, ALT, ALB, BILIDIR, BILITOT, ALKPHOS in the last 72 hours. No results for input(s): LIPASE, AMYLASE in the last 72 hours. No results for input(s): PROT, INR, APTT in the last 72 hours. No results for input(s): CKTOTAL, CKMB, CKMBINDEX, TROPONINI in the last 72 hours. Exam:BP (!) 155/72   Pulse 80   Temp 97.9 °F (36.6 °C) (Oral)   Resp 18   Ht 5' 11\" (1.803 m)   Wt 190 lb 0.6 oz (86.2 kg)   SpO2 92%   BMI 26.50 kg/m²      General appearance: alert, appears stated age and cooperative  Eyes: No scleral icterus, EOM grossly intact  Neck: trachea midline, no JVD, no lymphadenopathy, neck supple  Chest/Lungs: Equal excursion bilaterally, normal effort with no accessory muscle use on 2L NC  Cardiovascular: RRR, brisk capillary refill  Abdomen:  Soft, large surgical debridement area of the abdomen with veraflo Vac in place holding adequate suction and instilling normal saline. Colostomy is pink, viable and with brown stool in the bag. Skin: warm and dry, no rashes  Extremities: no edema, no cyanosis  Genitourinary:  Mcfadden in place with clear yellow urine, strip paste around penis without edema   Neuro: A&Ox3, no focal deficits, sensation intact      ASSESSMENT/PLAN: Pt. is a 61 y.o. male with Necrotizing fasciitis of the abdominal wall, gluteal region, and scrotum s/p wide excision of perineum, back, and abdominal wall. S/p multiple debridements and wound vac placement and changes intraoperatively with diverting colostomy creation (12/7). OR wound vac change (12/10, 12/13, 12/16, 12/21). - follow up surgical cultures from 12/23 - Candida   - Continue carb free diet with protein supplementation for goal >100g protein per day  following procedure. Dietitian on board to assist with macronutrient accounting and support   - sliding scale, mealtime insulin, and basal insulin for glucose control. Blood glucose 104-137 since yesterday morning.  - touched base with urology regarding walsh exchange. Recommend exchange in OR Today  - continue abx per ID - Unasyn until coverage obtained, then no further antibiotics recommended. - Patient to go to the OR today.     Shamika Esteves DO  PGY1, General Surgery  12/27/21  6:18 AM  [phone]

## 2021-12-27 NOTE — OP NOTE
[place] [address] SURGERY     OPERATIVE DICTATION    NAME: Devika Clement   MRN: [id number]  DATE: 12/27/2021     AGE: 61 y.o. LOCATION: [facility]    PREOPERATIVE DIAGNOSIS:  Shayla's gangrene with necrotizing fasciitis     POSTOPERATIVE DIAGNOSIS:  Same. OPERATION PERFORMED: 1) Dressing change under anesthesia       2) Application of Veraflo instillation vac therapy     SURGEON:  Montez Rascon MD    ASSISTANT: Briseida Can (PGY2)    ANESTHESIA: General     ESTIMATED BLOOD LOSS: Minimal     DRAINS:  Instillation vac     SPECIMENS: None     OPERATIVE INDICATIONS:  This is an unfortunate 61 y.o. male with a history of poorly controlled diabetes who developed Shayla's gangrene at an outside hospital.  He underwent debridement with both Urology and General surgery, however was noted to have additional crepitance. Given this, he was urgently transferred to our hospital and was taken to the operating room by general surgery for extensive debridement. Plastic surgery was consulted for assistance with management. He has stabilized and undergone diverting colostomy. He is brought to the operating room for dressing change under anesthesia given the complexity and pain. The risks, benefits, alternatives, outcomes, and personnel involved was performed with the patient. After all questions were answered to the patient's satisfaction, they agreed to proceed. OPERATIVE PROCEDURE:  The patient was brought to the operating room on his ICU bed and placed in the supine position on the operating room table. He underwent general anesthesia without difficulty, was then placed in the lithotomy position, and was prepped and draped in the usual sterile manner. A time-out was performed confirming the patient and the procedure to be performed. There was minimal granulation tissue, though his tissue edema has improved from the previous vac change.  Another complex wound vacuum device was then

## 2021-12-27 NOTE — BRIEF OP NOTE
Brief Postoperative Note      Patient: Mirza Jane  YOB: 1955  MRN: [id number]    Date of Procedure: 12/27/2021    Pre-Op Diagnosis: necrotizing fascciitis    Post-Op Diagnosis: Same       Procedure(s):  PERINEAL WOUND VAC CHANGE    Surgeon(s):  Bradly Horta MD    Assistant:  Resident: Arnaldo Cerda MD    Anesthesia: General    Estimated Blood Loss (mL): Minimal    Complications: None    Specimens:   * No specimens in log *    Implants:  * No implants in log *      Drains:   Negative Pressure Wound Therapy Abdomen Lower; Medial (Active)   Wound Type Surgical 12/27/21 0410   Unit Type Ultra VAC 12/27/21 0410   Dressing Type Black foam 12/27/21 0410   Cycle Continuous 12/27/21 0410   Target Pressure (mmHg) 125 12/27/21 0410   Irrigation Solution Sodium chloride 0.9% 12/26/21 2027   Canister changed? Yes 12/27/21 0410   Dressing Status Clean;Dry; Intact 12/27/21 0410   Drainage Amount None 12/23/21 1655   Output (ml) 200 ml 12/25/21 2314   Wound Assessment Other (Comment) 12/23/21 1655   Princess-wound Assessment Other (Comment) 12/23/21 1655       Negative Pressure Wound Therapy Perineum (Active)   Wound Type Surgical 12/27/21 0410   Unit Type Ultra VAC 12/26/21 0908   Dressing Type Black foam 12/27/21 0410   Cycle Continuous 12/27/21 0410   Target Pressure (mmHg) 125 12/27/21 0410   Instillation Volume  250 mL 12/21/21 1906   Canister changed? Yes 12/27/21 0410   Dressing Status Dry;Clean; Intact 12/27/21 0410   Drainage Description Serous 12/22/21 0944   Output (ml) 360 ml 12/27/21 0410   Wound Assessment Other (Comment) 12/23/21 1655   Princess-wound Assessment Other (Comment) 12/23/21 1655       Colostomy LUQ (Active)   Stomal Appliance 2 piece 12/27/21 1250   Flange Size (inches) 2 Inches 12/20/21 1552   Stoma  Assessment Pink;Moist 12/26/21 0908   Mucocutaneous Junction Intact 12/27/21 1250   Peristomal Assessment Intact 12/27/21 1250   Treatment Bag change;Site care 12/20/21 1552   Stool Appearance Soft 12/27/21 1250   Stool Color Brown 12/27/21 1250   Stool Amount Small 12/27/21 1250   Output (mL) 120 ml 12/27/21 1250       Urethral Catheter Non-latex 18 fr (Active)   Catheter Indications Perioperative use for selected surgical procedures 12/27/21 1250   Securement Device Date Changed 12/23/21 12/25/21 1503   Site Assessment No urethral drainage 12/26/21 0908   Urine Color Yellow 12/27/21 1250   Urine Appearance Clear 12/27/21 1250   Urine Odor Other (Comment) 12/21/21 1950   Output (mL) 365 mL 12/27/21 1250       [REMOVED] Negative Pressure Wound Therapy Abdomen Mid (Removed)   Wound Type Surgical 12/07/21 1600   Dressing Type Black foam 12/07/21 1600   Cycle Continuous 12/07/21 1600   Target Pressure (mmHg) 125 12/07/21 1600   Irrigation Solution Dakins 0.125% 12/06/21 0509   Canister changed? Yes 12/07/21 1600   Dressing Status Clean;Dry; Intact 12/07/21 1600   Dressing Changed Changed/New 12/06/21 0400   Output (ml) 200 ml 12/07/21 1600   Odor None 12/07/21 1600       [REMOVED] Negative Pressure Wound Therapy Groin Lower; Medial (Removed)   Wound Type Surgical 12/16/21 0930   Unit Type VAC Ulta 12/16/21 0930   Dressing Type Black foam 12/16/21 0930   Number of pieces used 2 12/13/21 1558   Cycle Continuous 12/16/21 0930   Target Pressure (mmHg) 125 12/16/21 0930   Irrigation Solution Sodium chloride 0.9% 12/16/21 0930   Instillation Volume  850 mL 12/15/21 0425   Canister changed? Yes 12/16/21 0056   Dressing Status Clean;Dry; Intact 12/16/21 0930   Dressing Changed Dressing reinforced 12/16/21 0930   Drainage Amount None 12/13/21 1659   Drainage Description Serous 12/12/21 0820   Output (ml) 900 ml 12/16/21 0056   Wound Assessment Other (Comment) 12/14/21 2039   Princess-wound Assessment Other (Comment) 12/13/21 1659   Odor None 12/13/21 0754       [REMOVED] NG/OG/NJ/NE Tube Orogastric 16 fr Center mouth (Removed)   Surrounding Skin Dry; Intact 12/01/21 2200   Securement device Yes 12/02/21 0800 Status Clamped 12/02/21 0800   Placement Verified by External Catheter Length 12/02/21 0800   NG/OG/NJ/NE External Measurement (cm) 50 cm 12/02/21 0800   Drainage Appearance Bile 12/01/21 2200   Tube Feeding Intake (mL) 0 ml 12/01/21 1800   Free Water Flush (mL) 0 mL 12/01/21 1800   Free Water Rate 0 12/01/21 0800   Residual Volume (ml) 0 ml 12/01/21 0000       [REMOVED] NG/OG/NJ/NE Tube Left nostril (Removed)       [REMOVED] NG/OG/NJ/NE Tube Orogastric 16 fr Center mouth (Removed)       Findings: NPWT exchange under anesthesia    Electronically signed by Celena Howell MD on 12/27/2021 at 2:18 PM

## 2021-12-27 NOTE — PROGRESS NOTES
Occupational Therapy  Pt Unavailable  Chart reviewed. Attempted to see pt for OT- pt off unit at OR at this time, so not available for OT. Will re-attempt as schedules allow.   Kristen Smith, MOT-OTR/L [id number]

## 2021-12-27 NOTE — CARE COORDINATION
CM following, pt to OR today for WV change and poss start of reconstruction. [facility]  LTACH following.   Electronically signed by Aimee Barth RN on 12/27/2021 at 3:29 [phone]

## 2021-12-27 NOTE — PROGRESS NOTES
General Surgery  Post-operative Note      Procedure(s) Performed: Perineal/ abdominal wound vac change    Subjective:   Patient's pain is controlled, denies nausea or vomiting. Tolerating diet. Voiding appropriately via Mcfadden. Having good ostomy output. Objective:  Anesthesia type: General      I/O    Intra op    Post op     Fluids  300 mL 400 mL     EBL minimal 0 mL     Urine Not recoreded 750 mL     Vitals:   Vitals:    12/27/21 1445 12/27/21 1450 12/27/21 1500 12/27/21 1510   BP: (!) 142/71 (!) 151/74 (!) 153/65    Pulse: 75 75 72    Resp: 22 23 23    Temp:   97 °F (36.1 °C)    TempSrc:   Temporal    SpO2:   98% 96%   Weight:       Height:           Physical Exam:  Post-op vital signs:  Stable   General appearance: alert, no acute distress, grooming appropriate  Eyes: No scleral icterus, EOM grossly intact  Neck: trachea midline, no JVD, no lymphadenopathy, neck supple  Chest/Lungs: normal effort with no accessory muscle use on 2L NC  Cardiovascular: RRR, brisk capillary refill distally  Abdomen: Soft, large surgical debridement area of the abdomen with veraflo Vac in place holding adequate suction and instilling normal saline. Colostomy is pink, viable and with brown stool in the bag. Skin: warm and dry, no rashes  Extremities: no edema, no cyanosis  Genitourinary: Mcfadden in place with clear yellow urine  Neuro: A&Ox3, no focal deficits, sensation intact    Assessment and Plan  Pt. is a 61 y.o. male with Necrotizing fasciitis of the abdominal wall, gluteal region, and scrotum s/p wide excision of perineum, back, and abdominal wall. S/p multiple debridements and wound vac placement and changes intraoperatively with diverting colostomy creation (12/7). OR wound vac change (12/10, 12/13, 12/16, 12/21, 12/23, 21/27) POD0.     Pain management: Roxicodone pain panel and scheduled tylenol   Cardiovasc: hemodynamically stable, will continue to monitor  Respiratory:  IS ordered to bedside, encourage hourly IS and deep breathing, wean oxygen as tolerated  Fluids:  , Diet: General diet low carb diet  : Urine output is adequate   Ambulation: OOB to chair, encourage ambulation  Prophylaxis: SCDs, SQH  Antibiotics: Unasyn  Wound: Local wound care      Veto Mcduffie DO  PGY1, General Surgery  12/27/21  6:04 PM  [phone]

## 2021-12-27 NOTE — PROGRESS NOTES
Patient awake and alert, tolerating ice chips at this time, states \"im hungry\" meets criteria to return to 5s.

## 2021-12-27 NOTE — PROGRESS NOTES
Patient to pacu 14 s/p PERINEAL WOUND VAC CHANGE, report received Luiz and DR Lani Escobar reported hemodynamically stable intra op.  All vitals stable upon arrival. denies pain

## 2021-12-27 NOTE — ANESTHESIA PRE PROCEDURE
Department of Anesthesiology  Preprocedure Note       Name:  Aydin Zarate   Age:  61 y.o.  :  1955                                          MRN:  [id number]         Date:  2021      Surgeon: Abhi Peter):  Flory Owens MD    Procedure: Procedure(s):  PERINEAL WOUND VAC CHANGE, POSSIBLE SPLIT THICKNESS SKNI GRAFT, POSSIBLE CLOSURE, POSSIBLE TISSUR TRANSFER, POSSIBLE FLAP    Medications prior to admission:   Prior to Admission medications    Medication Sig Start Date End Date Taking? Authorizing Provider   metFORMIN (GLUCOPHAGE) 500 MG tablet Take 500 mg by mouth daily (with breakfast)    Historical Provider, MD   meloxicam (MOBIC) 15 MG tablet Take 1 tablet by mouth daily 16   Edmund Santos DO       Current medications:    No current facility-administered medications for this visit. No current outpatient medications on file.      Facility-Administered Medications Ordered in Other Visits   Medication Dose Route Frequency Provider Last Rate Last Admin    lactated ringers infusion   IntraVENous Continuous Braxton Auguste,  mL/hr at 21 0911 New Bag at 21 0911    insulin lispro (1 Unit Dial) 3 Units  3 Units SubCUTAneous BID WC Skyla Espitia MD   3 Units at 21 0909    insulin glargine (LANTUS;BASAGLAR) injection pen 12 Units  12 Units SubCUTAneous QAM Skyla Espitia MD   12 Units at 21 0907    insulin regular (HUMULIN R;NOVOLIN R) injection 0-18 Units  0-18 Units SubCUTAneous 4x Daily AC & HS Skyla Espitia MD   2 Units at 21 1256    0.9 % sodium chloride infusion   IntraVENous PRN Skyla Espitia MD        heparin (porcine) injection 5,000 Units  5,000 Units SubCUTAneous 3 times per day Skyla Espitia MD   5,000 Units at 21 0630    alteplase (CATHFLO) injection 1 mg  1 mg IntraCATHeter PRN Skyla Espitia MD   1 mg at 21 0457    docusate sodium (COLACE) capsule 100 mg  100 mg Oral BID Skyla Espitia MD   100 mg at 21 3280  polyethylene glycol (GLYCOLAX) packet 17 g  17 g Oral Daily Jo Rivas MD   17 g at 12/26/21 0910    ipratropium-albuterol (DUONEB) nebulizer solution 1 ampule  1 ampule Inhalation Q4H PRN Jo Rivas MD   1 ampule at 12/10/21 1202    dextrose 50 % IV solution  12.5 g IntraVENous PRN Jo Rivas MD        amLODIPine (NORVASC) tablet 5 mg  5 mg Oral Daily Jo Rivas MD   5 mg at 12/27/21 2160    hydrALAZINE (APRESOLINE) injection 10 mg  10 mg IntraVENous Q6H PRN Jo Rivas MD        ampicillin-sulbactam (UNASYN) 3000 mg ivpb minibag  3,000 mg IntraVENous Q6H Jo Rivas MD   Stopped at 12/27/21 8296    oxyCODONE (ROXICODONE) immediate release tablet 5 mg  5 mg Oral Q4H PRN Jo Rivas MD   5 mg at 12/27/21 0415    Or    oxyCODONE (ROXICODONE) immediate release tablet 10 mg  10 mg Oral Q4H PRN Jo Rivas MD   10 mg at 12/26/21 1256    acetaminophen (TYLENOL) tablet 1,000 mg  1,000 mg Oral Q6H Jo Rivas MD   1,000 mg at 12/27/21 4846    sodium chloride flush 0.9 % injection 5-40 mL  5-40 mL IntraVENous 2 times per day Jo Rivas MD   10 mL at 12/26/21 2025    sodium chloride flush 0.9 % injection 5-40 mL  5-40 mL IntraVENous PRN Jo Rivas MD        0.9 % sodium chloride infusion  25 mL IntraVENous PRN Jo Rivas  mL/hr at 12/12/21 0850 25 mL at 12/12/21 0850    glucose (GLUTOSE) 40 % oral gel 15 g  15 g Oral PRN Jo Rivas MD        dextrose 50 % IV solution  12.5 g IntraVENous PRN Jo Rivas MD        glucagon (rDNA) injection 1 mg  1 mg IntraMUSCular PRN Jo Rivas MD        dextrose 5 % solution  100 mL/hr IntraVENous PRN Jo Rivas MD           Allergies:  No Known Allergies    Problem List:    Patient Active Problem List   Diagnosis Code    Diabetic acidosis without coma ([facility] E11.10    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    Acute respiratory failure with hypoxia ([facility]) J96.01    Necrotizing fasciitis ([facility] M72.6    Necrotizing soft tissue infection M79.89       Past Medical History:  No past medical history on file.     Past Surgical History:        Procedure Laterality Date    ABDOMEN SURGERY N/A 12/1/2021    WIDE EXCISION PERINEUM, BACK, ABDOMINAL WALL performed by Hi Marcial MD at [facility] Left 12/3/2021    ABDOMINAL WALL AND LEFT BUTTOCK DEBRIDEMENT, WOUND VAC PLACEMENT performed by Toño Zepeda MD at [facility] Left 12/5/2021    ABDOMINAL WALL AND LEFT BUTTOCK DEBRIDEMENT, WOUND VAC PLACEMENT performed by Toño Zepeda MD at [facility] N/A 12/7/2021    EVALUATION UNDER ANESTHESIA WITH DEBRIDEMENT ABDOMINAL WOUND AND LEFT BUTTOCK, WOUND VAC CHANGE performed by Toño Zepeda MD at [facility] Left 12/10/2021    ABDOMINAL WALL AND LEFT BUTTOCK WOUND VAC CHANGE WITH FURTHER DEBRIDEMENT ABDOMEN AND LEFT BUTTOCK WOUND performed by Toño Zepeda MD at [facility] N/A 12/13/2021    WOUND VAC CHANGE ABDOMEN AND LEFT BUTTOCK performed by Toño Zepeda MD at [facility] N/A 12/16/2021    2ND LOOK/ EVALUATION UNDER ANESTHESIA/ WOUND VAC CHANGE ABDOMINAL WALL AND PERINEUM performed by Tooñ Zepeda MD at [facility] N/A 12/21/2021    EVALUATION UNDER ANESTHESIA/ WOUND VAC CHANGE ABDOMINAL WALL AND PERINEUM performed by Toño Zepeda MD at [facility] N/A 12/23/2021    EVALUATION UNDER ANESTHESIA/ WOUND VAC [address] AND PERINEUM performed by Toño Zepeda MD at [address] Left 12/7/2021    LAPAROSCOPIC COLOSTOMY CREATION performed by Hi Marcial MD at [address] N/A 11/30/2021    DEBRIDEMENT OF SCROTAL JAYRO'S GANGRENE performed by Chai Elmore MD at [address] N/A 11/30/2021    PERINEAL SOFT TISSUE EXCISIONAL DEBRIDEMENT performed by Rad Hancock MD at [facility] OR       Social History:    Social History     Tobacco Use    Smoking status: Current Every Day Smoker    Smokeless tobacco: Never Used   Substance Use Topics    Alcohol use: Yes     Alcohol/week: 0.0 standard drinks     Comment: social                                 Ready to quit: Not Answered  Counseling given: Not Answered      Vital Signs (Current): There were no vitals filed for this visit.                                            BP Readings from Last 3 Encounters:   12/27/21 136/77   12/23/21 135/68   12/21/21 116/62       NPO Status:                                                                                 BMI:   Wt Readings from Last 3 Encounters:   12/27/21 191 lb 9.3 oz (86.9 kg)   11/30/21 163 lb 3.2 oz (74 kg)   04/18/16 179 lb 14.3 oz (81.6 kg)     There is no height or weight on file to calculate BMI.    CBC:   Lab Results   Component Value Date    WBC 7.5 12/27/2021    RBC 2.81 12/27/2021    HGB 8.7 12/27/2021    HCT 26.1 12/27/2021    MCV 92.7 12/27/2021    RDW 16.7 12/27/2021     12/27/2021       CMP:   Lab Results   Component Value Date     12/27/2021    K 4.0 12/27/2021    K 3.8 12/12/2021     12/27/2021    CO2 29 12/27/2021    BUN 11 12/27/2021    CREATININE 0.6 12/27/2021    GFRAA >60 12/27/2021    AGRATIO 0.7 11/30/2021    LABGLOM >60 12/27/2021    GLUCOSE 149 12/27/2021    PROT 4.8 12/10/2021    CALCIUM 7.9 12/27/2021    BILITOT 0.3 12/10/2021    ALKPHOS 170 12/10/2021    AST 13 12/10/2021    ALT 7 12/10/2021       POC Tests:   Recent Labs     12/27/21  1206   POCGLU 154*       Coags:   Lab Results   Component Value Date    PROTIME 10.7 12/21/2021    INR 0.95 12/21/2021       HCG (If Applicable): No results found for: PREGTESTUR, PREGSERUM, HCG, HCGQUANT     ABGs:   Lab Results   Component Value Date    PHART 7.227 12/09/2021    PO2ART 107.8 12/09/2021    KWU1MDY 42.4 12/09/2021    KKX2WWG 17.6 12/09/2021    BEART -10 12/09/2021    D0NIFGJX 97 12/09/2021 Type & Screen (If Applicable):  No results found for: LABABO, LABRH    Drug/Infectious Status (If Applicable):  No results found for: HIV, HEPCAB    COVID-19 Screening (If Applicable):   Lab Results   Component Value Date    COVID19 Not Detected 11/30/2021           Anesthesia Evaluation  Patient summary reviewed and Nursing notes reviewed no history of anesthetic complications:   Airway: Mallampati: II  TM distance: >3 FB   Neck ROM: full  Mouth opening: > = 3 FB Dental:    (+) upper dentures and lower dentures      Pulmonary:Negative Pulmonary ROS and normal exam                               Cardiovascular:  Exercise tolerance: good (>4 METS),       (-) CAD,  angina and  CHF                Neuro/Psych:   Negative Neuro/Psych ROS              GI/Hepatic/Renal:             Endo/Other:    (+) DiabetesType II DM, , .                 Abdominal:             Vascular: negative vascular ROS. Other Findings:               Anesthesia Plan      general     ASA 3       Induction: intravenous. MIPS: Postoperative opioids intended and Prophylactic antiemetics administered. Anesthetic plan and risks discussed with patient. Plan discussed with CRNA.     Attending anesthesiologist reviewed and agrees with Preprocedure content              Kyle Pinedo DO   12/27/2021

## 2021-12-28 ENCOUNTER — APPOINTMENT (OUTPATIENT)
Dept: VASCULAR LAB | Age: 66
DRG: 853 | End: 2021-12-28
Attending: INTERNAL MEDICINE

## 2021-12-28 LAB
ALBUMIN SERPL-MCNC: 2.7 G/DL (ref 3.4–5)
ANAEROBIC CULTURE: ABNORMAL
ANION GAP SERPL CALCULATED.3IONS-SCNC: 10 MMOL/L (ref 3–16)
BASOPHILS ABSOLUTE: 0.1 K/UL (ref 0–0.2)
BASOPHILS RELATIVE PERCENT: 0.9 %
BUN BLDV-MCNC: 8 MG/DL (ref 7–20)
CALCIUM SERPL-MCNC: 8 MG/DL (ref 8.3–10.6)
CHLORIDE BLD-SCNC: 104 MMOL/L (ref 99–110)
CO2: 28 MMOL/L (ref 21–32)
CREAT SERPL-MCNC: 0.6 MG/DL (ref 0.8–1.3)
EOSINOPHILS ABSOLUTE: 0.6 K/UL (ref 0–0.6)
EOSINOPHILS RELATIVE PERCENT: 9.2 %
GFR AFRICAN AMERICAN: >60
GFR NON-AFRICAN AMERICAN: >60
GLUCOSE BLD-MCNC: 107 MG/DL (ref 70–99)
GLUCOSE BLD-MCNC: 107 MG/DL (ref 70–99)
GLUCOSE BLD-MCNC: 115 MG/DL (ref 70–99)
GLUCOSE BLD-MCNC: 124 MG/DL (ref 70–99)
GLUCOSE BLD-MCNC: 170 MG/DL (ref 70–99)
GLUCOSE BLD-MCNC: 95 MG/DL (ref 70–99)
GRAM STAIN RESULT: ABNORMAL
HCT VFR BLD CALC: 25 % (ref 40.5–52.5)
HEMOGLOBIN: 8.3 G/DL (ref 13.5–17.5)
LYMPHOCYTES ABSOLUTE: 1.5 K/UL (ref 1–5.1)
LYMPHOCYTES RELATIVE PERCENT: 22 %
MAGNESIUM: 1.8 MG/DL (ref 1.8–2.4)
MCH RBC QN AUTO: 30.9 PG (ref 26–34)
MCHC RBC AUTO-ENTMCNC: 33.2 G/DL (ref 31–36)
MCV RBC AUTO: 93 FL (ref 80–100)
MONOCYTES ABSOLUTE: 0.7 K/UL (ref 0–1.3)
MONOCYTES RELATIVE PERCENT: 10.1 %
NEUTROPHILS ABSOLUTE: 3.9 K/UL (ref 1.7–7.7)
NEUTROPHILS RELATIVE PERCENT: 57.8 %
ORGANISM: ABNORMAL
PDW BLD-RTO: 17.3 % (ref 12.4–15.4)
PERFORMED ON: ABNORMAL
PERFORMED ON: NORMAL
PHOSPHORUS: 3.1 MG/DL (ref 2.5–4.9)
PLATELET # BLD: 292 K/UL (ref 135–450)
PMV BLD AUTO: 7.6 FL (ref 5–10.5)
POTASSIUM SERPL-SCNC: 4.2 MMOL/L (ref 3.5–5.1)
RBC # BLD: 2.69 M/UL (ref 4.2–5.9)
SODIUM BLD-SCNC: 142 MMOL/L (ref 136–145)
WBC # BLD: 6.8 K/UL (ref 4–11)
WOUND/ABSCESS: ABNORMAL

## 2021-12-28 PROCEDURE — 80069 RENAL FUNCTION PANEL: CPT

## 2021-12-28 PROCEDURE — 6360000002 HC RX W HCPCS: Performed by: STUDENT IN AN ORGANIZED HEALTH CARE EDUCATION/TRAINING PROGRAM

## 2021-12-28 PROCEDURE — 83735 ASSAY OF MAGNESIUM: CPT

## 2021-12-28 PROCEDURE — 6370000000 HC RX 637 (ALT 250 FOR IP): Performed by: STUDENT IN AN ORGANIZED HEALTH CARE EDUCATION/TRAINING PROGRAM

## 2021-12-28 PROCEDURE — 93970 EXTREMITY STUDY: CPT

## 2021-12-28 PROCEDURE — 97530 THERAPEUTIC ACTIVITIES: CPT

## 2021-12-28 PROCEDURE — 1200000000 HC SEMI PRIVATE

## 2021-12-28 PROCEDURE — 85025 COMPLETE CBC W/AUTO DIFF WBC: CPT

## 2021-12-28 PROCEDURE — 2580000003 HC RX 258: Performed by: STUDENT IN AN ORGANIZED HEALTH CARE EDUCATION/TRAINING PROGRAM

## 2021-12-28 PROCEDURE — 2700000000 HC OXYGEN THERAPY PER DAY

## 2021-12-28 PROCEDURE — 94761 N-INVAS EAR/PLS OXIMETRY MLT: CPT

## 2021-12-28 RX ORDER — MAGNESIUM SULFATE IN WATER 40 MG/ML
2000 INJECTION, SOLUTION INTRAVENOUS ONCE
Status: COMPLETED | OUTPATIENT
Start: 2021-12-28 | End: 2021-12-28

## 2021-12-28 RX ADMIN — MAGNESIUM SULFATE HEPTAHYDRATE 2000 MG: 2 INJECTION, SOLUTION INTRAVENOUS at 09:15

## 2021-12-28 RX ADMIN — ACETAMINOPHEN 1000 MG: 500 TABLET ORAL at 05:40

## 2021-12-28 RX ADMIN — OXYCODONE HYDROCHLORIDE 5 MG: 5 TABLET ORAL at 09:57

## 2021-12-28 RX ADMIN — OXYCODONE HYDROCHLORIDE 10 MG: 5 TABLET ORAL at 19:59

## 2021-12-28 RX ADMIN — INSULIN HUMAN 5 UNITS: 100 INJECTION, SOLUTION PARENTERAL at 13:15

## 2021-12-28 RX ADMIN — SODIUM CHLORIDE, PRESERVATIVE FREE 10 ML: 5 INJECTION INTRAVENOUS at 22:50

## 2021-12-28 RX ADMIN — INSULIN LISPRO 3 UNITS: 100 INJECTION, SOLUTION INTRAVENOUS; SUBCUTANEOUS at 18:46

## 2021-12-28 RX ADMIN — HEPARIN SODIUM 5000 UNITS: 5000 INJECTION INTRAVENOUS; SUBCUTANEOUS at 05:40

## 2021-12-28 RX ADMIN — ACETAMINOPHEN 1000 MG: 500 TABLET ORAL at 01:08

## 2021-12-28 RX ADMIN — OXYCODONE HYDROCHLORIDE 10 MG: 5 TABLET ORAL at 15:14

## 2021-12-28 RX ADMIN — AMPICILLIN SODIUM AND SULBACTAM SODIUM 3000 MG: 2; 1 INJECTION, POWDER, FOR SOLUTION INTRAMUSCULAR; INTRAVENOUS at 17:23

## 2021-12-28 RX ADMIN — DOCUSATE SODIUM 100 MG: 100 CAPSULE, LIQUID FILLED ORAL at 09:24

## 2021-12-28 RX ADMIN — HEPARIN SODIUM 5000 UNITS: 5000 INJECTION INTRAVENOUS; SUBCUTANEOUS at 22:45

## 2021-12-28 RX ADMIN — INSULIN LISPRO 3 UNITS: 100 INJECTION, SOLUTION INTRAVENOUS; SUBCUTANEOUS at 09:14

## 2021-12-28 RX ADMIN — AMPICILLIN SODIUM AND SULBACTAM SODIUM 3000 MG: 2; 1 INJECTION, POWDER, FOR SOLUTION INTRAMUSCULAR; INTRAVENOUS at 09:13

## 2021-12-28 RX ADMIN — ACETAMINOPHEN 1000 MG: 500 TABLET ORAL at 13:21

## 2021-12-28 RX ADMIN — SODIUM CHLORIDE, POTASSIUM CHLORIDE, SODIUM LACTATE AND CALCIUM CHLORIDE: 600; 310; 30; 20 INJECTION, SOLUTION INTRAVENOUS at 05:39

## 2021-12-28 RX ADMIN — DOCUSATE SODIUM 100 MG: 100 CAPSULE, LIQUID FILLED ORAL at 22:44

## 2021-12-28 RX ADMIN — AMLODIPINE BESYLATE 5 MG: 5 TABLET ORAL at 09:24

## 2021-12-28 RX ADMIN — HEPARIN SODIUM 5000 UNITS: 5000 INJECTION INTRAVENOUS; SUBCUTANEOUS at 17:17

## 2021-12-28 RX ADMIN — ACETAMINOPHEN 1000 MG: 500 TABLET ORAL at 18:44

## 2021-12-28 RX ADMIN — AMPICILLIN SODIUM AND SULBACTAM SODIUM 3000 MG: 2; 1 INJECTION, POWDER, FOR SOLUTION INTRAMUSCULAR; INTRAVENOUS at 22:48

## 2021-12-28 RX ADMIN — AMPICILLIN SODIUM AND SULBACTAM SODIUM 3000 MG: 2; 1 INJECTION, POWDER, FOR SOLUTION INTRAMUSCULAR; INTRAVENOUS at 03:43

## 2021-12-28 RX ADMIN — SODIUM CHLORIDE, PRESERVATIVE FREE 10 ML: 5 INJECTION INTRAVENOUS at 09:27

## 2021-12-28 RX ADMIN — OXYCODONE HYDROCHLORIDE 10 MG: 5 TABLET ORAL at 01:09

## 2021-12-28 RX ADMIN — POLYETHYLENE GLYCOL 3350 17 G: 17 POWDER, FOR SOLUTION ORAL at 09:24

## 2021-12-28 RX ADMIN — INSULIN HUMAN 2 UNITS: 100 INJECTION, SOLUTION PARENTERAL at 18:46

## 2021-12-28 ASSESSMENT — PAIN DESCRIPTION - DESCRIPTORS
DESCRIPTORS: DISCOMFORT
DESCRIPTORS: DISCOMFORT

## 2021-12-28 ASSESSMENT — PAIN SCALES - GENERAL
PAINLEVEL_OUTOF10: 5
PAINLEVEL_OUTOF10: 3
PAINLEVEL_OUTOF10: 3
PAINLEVEL_OUTOF10: 6
PAINLEVEL_OUTOF10: 7
PAINLEVEL_OUTOF10: 0
PAINLEVEL_OUTOF10: 7
PAINLEVEL_OUTOF10: 7
PAINLEVEL_OUTOF10: 6

## 2021-12-28 ASSESSMENT — PAIN DESCRIPTION - PAIN TYPE
TYPE: ACUTE PAIN;SURGICAL PAIN
TYPE: ACUTE PAIN;SURGICAL PAIN

## 2021-12-28 ASSESSMENT — PAIN DESCRIPTION - LOCATION
LOCATION: BUTTOCKS
LOCATION: BUTTOCKS

## 2021-12-28 ASSESSMENT — PAIN DESCRIPTION - PROGRESSION
CLINICAL_PROGRESSION: NOT CHANGED
CLINICAL_PROGRESSION: GRADUALLY WORSENING

## 2021-12-28 ASSESSMENT — PAIN DESCRIPTION - ORIENTATION
ORIENTATION: MID
ORIENTATION: MID

## 2021-12-28 ASSESSMENT — PAIN DESCRIPTION - ONSET
ONSET: ON-GOING
ONSET: ON-GOING

## 2021-12-28 ASSESSMENT — PAIN DESCRIPTION - FREQUENCY
FREQUENCY: CONTINUOUS
FREQUENCY: CONTINUOUS

## 2021-12-28 NOTE — PROGRESS NOTES
Uneventful shift. Patient remains a/ox4. VSS. Mcfadden and wound vac emptied and output adequately charted in flow sheet. Patient went to OR for wound vac change this shift. Wife upset d/t not receiving a post-op follow up. Charge RN made aware. Will escalate if needed. No other acute events . Alarm intact. CLWR. Will continue to monitor.

## 2021-12-28 NOTE — PROGRESS NOTES
Podiatric Surgery Daily Progress Note      Admit Date: 12/1/2021      Code:Full Code    Patient seen and examined, labs and records reviewed    Subjective:     Patient seen at bedside this AM. Patient denies pain to bilateral lower extremities. Patient denies fever, chills, shortness of breath, chest pain. Patient denies any acute events overnight. Review of Systems: A 10 point review of systems was conducted, significant findings as noted in HPI. All other systems negative. Objective     BP (!) 152/71   Pulse 75   Temp 98 °F (36.7 °C) (Oral)   Resp 20   Ht 5' 11\" (1.803 m)   Wt 201 lb 15.1 oz (91.6 kg)   SpO2 91%   BMI 28.17 kg/m²     I/O:    Intake/Output Summary (Last 24 hours) at 12/28/2021 0816  Last data filed at 12/28/2021 0517  Gross per 24 hour   Intake 550 ml   Output 2840 ml   Net -2290 ml              Wt Readings from Last 3 Encounters:   12/28/21 201 lb 15.1 oz (91.6 kg)   11/30/21 163 lb 3.2 oz (74 kg)   04/18/16 179 lb 14.3 oz (81.6 kg)       LABS:    Recent Labs     12/27/21  0550 12/28/21  0551   WBC 7.5 6.8   HGB 8.7* 8.3*   HCT 26.1* 25.0*    292        Recent Labs     12/28/21  0551      K 4.2      CO2 28   PHOS 3.1   BUN 8   CREATININE 0.6*        No results for input(s): PROT, INR, APTT in the last 72 hours. LOWER EXTREMITY EXAMINATION    Dressing to left LE intact. No strikethrough noted to the external dressing. Betadine discoloration noted to the internal layers of the dressing of the left LE.     VASCULAR:   DP and PT pulses are non-palpable b/l. Upon hand-held Doppler examination DP signals were noted to be monophasic and PT signals were noted to be nondopplerable. CFT slightly diminished to the distal digits of bilateral lower extremities. Skin temperature is warm to lukewarm to cool to the distal digits from proximal to distal.    No edema noted.  No pain with calf compression b/l.     NEUROLOGIC:   Gross and epicritic sensation is diminished b/l. Protective sensation is diminished at all pedal sites b/l.     DERMATOLOGIC:   Patient provided verbal consent for photos to be obtained today:    Left lower extremity:      Full-thickness ulceration noted to the lateral aspect of the left foot. Wound measures approximately 3.2 cm x 2 cm x 0.5 cm. Wound base with exposed bone, with necrotic edges at the proximal and distal edges. Wound probes to bone with exposed fifth metatarsal. Necrotic appearance of 5th digit. No tracking or tunneling noted. No purulent drainage noted. No fluctuance or crepitus or malodor noted. Deep tissue injury noted 2/2 Prevalon boot strap to the anterior aspect of the ankle. Intact epithelialized tissue noted. No crepitus, erythema, drainage, or malodor noted. No open wound noted. Stable without signs of acute infection noted. Right lower extremity      Partial thickness ulceration noted to the lateral malleolus 2/2 pressure. No fluctuance, crepitus, malodor, or drainage noted. No acute signs infection noted. Partial thickness ulceration 2/2 pressure noted at the styloid process of the 5th metatarsal. Fibrotic tissue noted. No fluctuance or crepitus noted. No acute signs of infection noted. Nonblanchable erythema noted to dorsal aspect right midfoot, nonblanchable, skin intact. No open wound noted at this time. No fluctuance, crepitus, erythema, drainage, or malodor noted. MUSCULOSKELETAL:   Muscle strength 4/5 for all pedal muscle groups B/L LE. No pain with palpation to bilateral lower extremity.      IMAGING:  XR LEFT FOOT 11/30/2021  Narrative   EXAMINATION:   THREE XRAY VIEWS OF THE LEFT FOOT       11/30/2021 1:44 pm       HISTORY:   ORDERING SYSTEM PROVIDED HISTORY: wound   TECHNOLOGIST PROVIDED HISTORY:   Reason for exam:->wound   Reason for Exam: blood sugar problem, wound check on lateral portion of foot   Acuity: Acute   Type of Exam: Initial       FINDINGS:   Osseous destruction along the articular margins of the 5th   metatarsophalangeal joint with associated lucency in the soft tissues. .   There is no evidence of fracture or dislocation. . The remaining joint spaces   appear well maintained. The remaining soft tissues are unremarkable.       Impression   Evidence of a septic joint involving the 5th metatarsal phalangeal joint with   associated osteomyelitis     ARTERIAL DUPLEX 12/2/21     Type of Study:        Extremities Arteries:Lower Extremities Arterial Duplex, VL LOWER EXTREMITY    ARTERIES DUPLEX BILATERAL.       Tech Comments   Right   The right ankle/brachial index is 0.0 (no Doppler signal could be heard at the   [facility] or the PT). The common femoral and profunda femoral arteries could not be visualized due   to bandages extending into the groin area. The mid superficial femoral artery has a short segmental occlusion and then is   reconstituted. Elevated velocities at the distal superficial femoral artery indicate a > 50%   stenosis by velocity criteria (velocity went from 15 to 298 cm/s). The posterior tibial artery is occluded. The distal anterior tibial artery is barely patent, with very low flow   (possibly occluded and then reconstituted). Left   The left ankle brachial index is 0 for the PT and the DP was not accessible   due to the bandages on the foot. The common femoral and profunda femoral artery could not be visualized due to   bandages extending into the groin area. The distal superficial femoral artery is occluded and then reconstituted. The posterior tibial artery, peroneal, and anterior tibial artery are   occluded. There were no previous studies to use for comparison.        ASSESSMENT/PLAN:   -Full-thickness ulceration; lateral left foot- Sands 4  -Osteomyelitis of the fifth metatarsal; left foot  -Deep Tissue Injury, left anterior ankle  -Partial thickness pressure ulceration x2, right foot; NPUAP Stage 2  -Pressure injury, dorsal right midfoot -NPUAP Stage I  -Critical limb ischemia; bilateral lower extremity  -Diabetes mellitus, type II  -History of noncompliance    -Patient seen and examined at bedside this AM.  -Hypertensive, otherwise VSS on 2 L of O2 via NC; no leukocytosis noted (WBC 6.8)  -All imaging reviewed; impressions noted above  -CRP 24.2 (12/27/21)  -Prealbumin 16.6 (12/27/21)  -Vascular surgery following; will await further stabilization with the general surgery team and plastic surgery team before offering vascular intervention.  -Plastic surgery following; plan for OR in 48 hours for plan dressing change and if improved granulation tissue with negative cultures, will begin the reconstructive process  -Infectious disease following, currently on Unasyn  -Left lower extremity dressed with Betadine soaked gauze, DSD, and tape. -Right lower extremity dressed with Mepilex borders  -Prevalon boots reapplied. Patient is to wear at all times while in bed to prevent further deep tissue injury. Ankle strap removed from bilateral boot as the ankle straps were causing pressure to his feet. -Nonweightbearing to left lower extremity.  -Weightbearing as tolerated to right lower extremity. DISPO: Full-thickness ulceration with underlying osteomyelitis to the left fifth metatarsal. Critical limb ischemia to b/l LE. Wounds are stable at this time. Vascular following; awaiting further stabilization at this time. Plastic surgery, general surgery, and ID following. Patient will require podiatric surgical intervention but timing will depend on medical stability and vascular recommendations. Will continue to follow while in house. Patient assessment and plan discussed with Dr. Leonardo Cordero DPM.    João Ferrara 26  12/28/21  8:16 AM      Patient was seen and evaluated at bedside. Agree with residents assessment and treatment plan.   Leonardo Cordero DPM

## 2021-12-28 NOTE — PROGRESS NOTES
Physical Therapy  Daily Treatment Note    Discharge Recommendations: Tasneem Sneed scored a 7/24 on the AM-PAC short mobility form. Current research shows that an AM-PAC score of 17 or less is typically not associated with a discharge to the patient's home setting. Based on the patient's AM-PAC score and their current functional mobility deficits, it is recommended that the patient have 3-5 sessions per week of Physical Therapy at d/c to increase the patient's independence. Please see assessment section for further patient specific details. Assessment:  Pt able to briefly achieve full stance this session with heavy assist x 2 and use of walker. Pt with good effort and participation despite c/o discomfort and feeling weak. Difficulty maintaining NWB L LE with transfers, but able to maintain during brief periods of standing. Pt seems very motivated to regain strength and function. Pt would benefit from continued IP PT at D/C. Plan is for LTACH. Equipment Needs: Defer to next level of care    Chart Reviewed: Yes     Other position/activity restrictions: up as tolerated, NWB L LE, WBAT R LE per podiatry note; per Dr. Emory Asher note 12/14: OOB with PT/OT   Additional Pertinent Hx: Tasneem Sneed is a 63 y.o. male with Necrotizing fasciitis of the abdominal wall, gluteal region, and scrotum s/p wide excision of perineum, back, and abdominal wall. returned to the OR for further debridement or right abdominal wall, and placement of testicle within a thigh flap (12/3), followed by minimal abdominal wall and perineum debridement, sutured protective skin flap in groin for right testicle, and placement of a wound VAC (12/5), s/p diverting colostomy and wound vac change (12/7). Plan for OR on 12/10 for further debridement and change of wound vac.  12/13 wound vac change      Diagnosis: necrotizing fasciitis   Treatment Diagnosis: impaired mobility    Subjective: Pt in bed initially.  \"I had a feeling we would be doing this today. \" Ready to work with therapy. Pain: 6/10 at wound site (bottom/perineum). RN has been addressing. Objective:    Bed mobility  Supine to sit: Min assist x 1, HOB up partially with use of rail  Sit to Supine: Dependent (Mod assist x 2), HOB flat  Scooting: Dependent (Max assist x 2) to scoot up towards HOB in supine  Rolling: To R and L (1-2x each) with Min assist x 1    Transfers  Sit to stand: Dependent (Max assist x 2) from bed to walker (x 2 trials)  Stand to sit: Dependent (Max assist x 2) for controlled descent onto bed (x 2 trials)  Other: Needing Max assist to maintain NWB L LE with sit <> stand    Balance  Sat EOB x 12 minutes with SBA to CGA. Mostly static. Static stance at walker 5-10 secs x 2 trials with Dependent assist (Mod assist x 1 + Min assist x 1). Pt able to maintain NWB L LE in static stance with walker. Patient Education  Reviewed NWB L LE. Expressed understanding, but needs assist with sit <> stand. Role of PT. Calling for assist with needs. Expressed understanding. Safety Devices  Pt left with needs in reach. In bed with bed alarm on. RN updated.      AM-PAC score  AM-PAC Inpatient Mobility Raw Score : 7  AM-PAC Inpatient T-Scale Score : 26.42  Mobility Inpatient CMS 0-100% Score: 92.36  Mobility Inpatient CMS G-Code Modifier : CM    Goals: (as determined and assessed by primary PT)  Time Frame for Short term goals: dc  Short term goal 1: Demo indep with HEP x 15 reps  ongoing   Short term goal 2: Pt will transfer supine <--> sit with min A x 1   ongoing   Short term goal 3: Pt will transfer sit to stand with mod A x 1 and achieve near upright posture, maintaining L LE NWB   ongoing      Plan:  Times per week: 2-5;    Current Treatment Recommendations: Strengthening,Balance Training,Functional Mobility Training,Transfer Training,Equipment Evaluation, Education, & procurement,Patient/Caregiver Education & Training,Home Exercise Program    Therapy Time    Individual Concurrent  Group  Co-treatment    Time In  1433          Time Out  1503            Minutes  30              Timed Code Treatment Minutes: 30  Total Treatment Minutes: 30    Will continue per plan of care. If patient is discharged prior to next treatment, this note will serve as the discharge summary. Bailee Baltazar #[id number]    Addendum: 0 goals met. Continue per POC. This note will serve as a discharge summary if patient is discharged from hospital before next treatment session.    Mike Cagle, PT   ]

## 2021-12-28 NOTE — FLOWSHEET NOTE
12/28/21 0928   Encounter Summary   Services provided to: Patient and family together   Referral/Consult From: Rounding   Continue Visiting   (12/28, nigel )   Complexity of Encounter Moderate   Length of Encounter 15 minutes   Routine   Type Follow up   Staff Joy MA

## 2021-12-28 NOTE — PROGRESS NOTES
Surgery Daily Progress Note  Magalie Piper  CC:  Necrotizing fascitis       Subjective :No acute events overnight. Patient to OR yesterday for another wound vac change. Tolerating diet. Pain well controlled. Urine output remains appropriate. Objective    Infusions:   lactated ringers 100 mL/hr at 12/28/21 0539    sodium chloride      sodium chloride 25 mL (12/12/21 0850)    dextrose          I/O:I/O last 3 completed shifts: In: 0430 [P.O.:360; I.V.:1310]  Out: 2385 [Urine:1290; Drains:970; Stool:125]           Wt Readings from Last 1 Encounters:   12/27/21 191 lb 9.3 oz (86.9 kg)                 LABS:    Recent Labs     12/27/21  0550 12/28/21  0551   WBC 7.5 6.8   HGB 8.7* 8.3*   HCT 26.1* 25.0*   MCV 92.7 93.0    292        Recent Labs     12/26/21  0530 12/27/21  0550    139   K 4.1 4.0    102   CO2 29 29   PHOS 3.0 3.0   BUN 10 11   CREATININE 0.6* 0.6*      No results for input(s): AST, ALT, ALB, BILIDIR, BILITOT, ALKPHOS in the last 72 hours. No results for input(s): LIPASE, AMYLASE in the last 72 hours. No results for input(s): PROT, INR, APTT in the last 72 hours. No results for input(s): CKTOTAL, CKMB, CKMBINDEX, TROPONINI in the last 72 hours. Exam:BP (!) 152/71   Pulse 75   Temp 98 °F (36.7 °C) (Oral)   Resp 20   Ht 5' 11\" (1.803 m)   Wt 191 lb 9.3 oz (86.9 kg)   SpO2 91%   BMI 26.72 kg/m²   General appearance: alert, appears stated age and cooperative  Eyes: No scleral icterus, EOM grossly intact  Neck: trachea midline, no JVD, no lymphadenopathy, neck supple  Chest/Lungs: normal effort with no accessory muscle use on 2L NC  Cardiovascular: RRR, brisk capillary refill distally  Abdomen: Soft, large surgical debridement area of the abdomen with veraflo Vac in place holding adequate suction and instilling normal saline. Colostomy is pink, viable and with brown stool in the bag.   Skin: warm and dry, no rashes  Extremities: no edema, no

## 2021-12-28 NOTE — FLOWSHEET NOTE
12/28/21 1438   Encounter Summary   Services provided to: Patient   Referral/Consult From: Rounding   Continue Visiting   (12/28, nigel )   Complexity of Encounter Moderate   Length of Encounter 15 minutes   Routine   Type Follow up   Assessment Calm; Approachable; Hopeful   Intervention Active listening;Explored feelings, thoughts, concerns   Outcome Comfort;Expressed gratitude   Staff [place], [place]

## 2021-12-28 NOTE — PROGRESS NOTES
Occupational Therapy  [facility]  Daily Treatment Note  NAME: Jack Bernard  : 1955  MRN: [id number]    Date of Service: 2021    Discharge Recommendations: Jack Bernard scored a 14/24 on the AM-PAC ADL Inpatient form. Current research shows that an AM-PAC score of 17 or less is typically not associated with a discharge to the patient's home setting. Based on the patient's AM-PAC score and their current ADL deficits, it is recommended that the patient have 3-5 sessions per week of Occupational Therapy at d/c to increase the patient's independence. Please see assessment section for further patient specific details. If patient discharges prior to next session this note will serve as a discharge summary. Please see below for the latest assessment towards goals. Assessment   Performance deficits / Impairments: Decreased functional mobility ; Decreased ADL status; Decreased high-level IADLs;Decreased endurance;Decreased strength  Assessment: Pt w/ positive affect, good participation. Completed sit<>stand 2x at EOB, pt req MaxA x2. Pt cont to function below baseline. Pt may benefit from contd skilled OT to maximize safety and IND w/ ADL and fxl mobiliyt while in acute setting, and after acute d/c prior to retn home. Will cont per POC  Treatment Diagnosis: Decreased activity tolerance, impaired ADLs and mobility  Prognosis: Fair  OT Education: OT Role;Plan of Care;Transfer Training;Precautions  Activity Tolerance  Activity Tolerance: Patient Tolerated treatment well;Patient limited by pain  Safety Devices  Type of devices: Left in bed;Call light within reach;Nurse notified         Patient Diagnosis(es): The primary encounter diagnosis was Transient alteration of awareness. A diagnosis of Necrotizing fasciitis ([address] was also pertinent to this visit. has no past medical history on file.    has a past surgical history that includes Ankle fracture surgery; Abdomen surgery (N/A, 12/1/2021); Scrotal surgery (N/A, 11/30/2021); Vagina surgery (N/A, 11/30/2021); Abdomen surgery (Left, 12/3/2021); Abdomen surgery (Left, 12/5/2021); colostomy (Left, 12/7/2021); Abdomen surgery (N/A, 12/7/2021); Abdomen surgery (Left, 12/10/2021); Abdomen surgery (N/A, 12/13/2021); Abdomen surgery (N/A, 12/16/2021); Abdomen surgery (N/A, 12/21/2021); Abdomen surgery (N/A, 12/23/2021); and Pressure ulcer debridement (N/A, 12/27/2021). Restrictions  Position Activity Restriction  Other position/activity restrictions: up as tolerated, NWB L LE, WBAT R LE per podiatry note; per Dr. Rodríguez Rowe note 12/14: OOB with PT/OT  Subjective   General  Chart Reviewed: Yes,Orders,Progress Notes  Patient assessed for rehabilitation services?: Yes  Additional Pertinent Hx: 63 y.o. M admitted 12/1 with necrotizing fasciitis to abdominal wall, gluteal region, and scrotum. s/p extensive wide excisions 12/1, 12/3, 12/5, 12/7 with wound vac placement. Colostomy creation 12/7. OR wound vac change 12/10;  OR wound vac change 12/13; OR wound vac change (12/16). . Podiatry: NWB L, FWB R. PMHx includes L foot fracture  Response to previous treatment: Patient with no complaints from previous session  Family / Caregiver Present: No  Referring Practitioner: Geovanny Vizcarra DO  Diagnosis: necrotizing fascitis  Subjective  Subjective: Pt semi-supine on OT/PT approach and agreed to tx. General Comment  Comments: Ava Hagen   Vital Signs  Patient Currently in Pain: Yes (6/10 \"buttocks\")   Orientation  Orientation  Overall Orientation Status: Within Functional Limits  Objective    ADL  Grooming:  (offered grooming at EOB - declined)  Toileting: Dependent/Total (Colostomy, Mcfadden)        Balance  Sitting Balance: Stand by assistance (12min at EOB)  Standing Balance: Dependent/Total (Cristin x1 + ModA x1 once upright w/ BUE supprt on RW)  Standing Balance  Time: 5-10sec 2x  Activity: sit<>stand w/ RW at EOB  Bed mobility  Rolling to Left: Minimal assistance  Rolling to Right: Minimal assistance  Supine to Sit: 2 Person assistance;Dependent/Total (ModA x2)  Sit to Supine: 2 Person assistance;Dependent/Total (ModA x2)  Scootin Person assistance;Dependent/Total  Transfers  Sit to stand: 2 Person assistance;Dependent/Total (MaxA x2)  Stand to sit: 2 Person assistance;Dependent/Total (MaxA x2)  Transfer Comments: sit<>stand at EOB w/ A x2, BUE support on RW.  Pt req A to maintain LLE NWB status in transition sit<>stand, in upright able to maintain                       Cognition  Overall Cognitive Status: WNL                                         Plan   Plan  Times per week: 2-5x  Times per day: Daily  Current Treatment Recommendations: Strengthening,ROM,Self-Care / ADL,Patient/Caregiver Education & Training,Safety Education & Training    AM-PAC Score        AM-[facility] Inpatient Daily Activity Raw Score: 14 (21)  AM-PAC Inpatient ADL T-Scale Score : 33.39 (21)  ADL Inpatient CMS 0-100% Score: 59.67 (21)  ADL Inpatient CMS G-Code Modifier : CK (21)    Goals  Short term goals  Time Frame for Short term goals: by D/C  Short term goal 1: Demonstrate independence with HEP - Not met  Short term goal 2: Perform sit to stand NWB LLE with mod assist x2 to progress transfers - Not met  Short term goal 3: Roll side to side in bed with SBA for pressure relief and ADLs - Not met       Therapy Time   Individual Concurrent Group Co-treatment   Time In 1433         Time Out 1501         Minutes 28         Timed Code Treatment Minutes: 1415 [facility]-OTR/L [id number]

## 2021-12-28 NOTE — FLOWSHEET NOTE
12/28/21 0928   Encounter Summary   Services provided to: Patient and family together   Referral/Consult From: Rounding   Continue Visiting   (12/28, nigel )   Complexity of Encounter Moderate   Length of Encounter 15 minutes   Routine   Type Follow up   Staff Arleen Heath MA

## 2021-12-28 NOTE — CARE COORDINATION
CM following, pt plan OR Thursday for cont reconstruction with plastics. Cont to be followed by [facility], and pending medicaid.   Electronically signed by Santina Kanner, RN on 12/28/2021 at 2:11 [phone]

## 2021-12-28 NOTE — PROGRESS NOTES
Patient A&Ox4. VSS on 2 L oxygen. Patient reports pain of 6/10 during the shift and was given oxycodone with benefit. Lower abdominal dressing and wound vac in place. Wound vac and walsh have each had decent output throughout the night. PICC dressing changed. Wound vac cannister replaced. All patient care needs addressed with no further needs at this time. Bed is locked and in lowest positioned with alarm on. Call light within reach. Will continue to monitor per protocol.      Electronically signed by Amee Gomez RN on 12/28/2021 at 4:52 AM

## 2021-12-28 NOTE — PLAN OF CARE
Problem: Sensory:  Goal: General experience of comfort will improve  Description: General experience of comfort will improve  12/28/2021 0746 by Ady Gamino RN  Outcome: Ongoing  Note: Patient reported 6-7/10 pain and was given oxycodone with some benefit. Patient was able to sleep during the night. Problem: Falls - Risk of:  Goal: Will remain free from falls  Description: Will remain free from falls  12/28/2021 0746 by Ady Gamino RN  Outcome: Ongoing  Note: Patient is on bedrest and has proper safety awareness.

## 2021-12-29 LAB
ALBUMIN SERPL-MCNC: 2.4 G/DL (ref 3.4–5)
ANION GAP SERPL CALCULATED.3IONS-SCNC: 5 MMOL/L (ref 3–16)
BASOPHILS ABSOLUTE: 0.1 K/UL (ref 0–0.2)
BASOPHILS RELATIVE PERCENT: 0.7 %
BUN BLDV-MCNC: 10 MG/DL (ref 7–20)
CALCIUM SERPL-MCNC: 8.1 MG/DL (ref 8.3–10.6)
CHLORIDE BLD-SCNC: 103 MMOL/L (ref 99–110)
CO2: 31 MMOL/L (ref 21–32)
CREAT SERPL-MCNC: 0.6 MG/DL (ref 0.8–1.3)
EOSINOPHILS ABSOLUTE: 0.8 K/UL (ref 0–0.6)
EOSINOPHILS RELATIVE PERCENT: 10.4 %
GFR AFRICAN AMERICAN: >60
GFR NON-AFRICAN AMERICAN: >60
GLUCOSE BLD-MCNC: 106 MG/DL (ref 70–99)
GLUCOSE BLD-MCNC: 121 MG/DL (ref 70–99)
GLUCOSE BLD-MCNC: 128 MG/DL (ref 70–99)
GLUCOSE BLD-MCNC: 136 MG/DL (ref 70–99)
GLUCOSE BLD-MCNC: 91 MG/DL (ref 70–99)
HCT VFR BLD CALC: 25.7 % (ref 40.5–52.5)
HEMOGLOBIN: 8.6 G/DL (ref 13.5–17.5)
LYMPHOCYTES ABSOLUTE: 1.4 K/UL (ref 1–5.1)
LYMPHOCYTES RELATIVE PERCENT: 18.9 %
MAGNESIUM: 1.7 MG/DL (ref 1.8–2.4)
MCH RBC QN AUTO: 31 PG (ref 26–34)
MCHC RBC AUTO-ENTMCNC: 33.3 G/DL (ref 31–36)
MCV RBC AUTO: 93 FL (ref 80–100)
MONOCYTES ABSOLUTE: 0.8 K/UL (ref 0–1.3)
MONOCYTES RELATIVE PERCENT: 10.3 %
NEUTROPHILS ABSOLUTE: 4.4 K/UL (ref 1.7–7.7)
NEUTROPHILS RELATIVE PERCENT: 59.7 %
PDW BLD-RTO: 17.5 % (ref 12.4–15.4)
PERFORMED ON: ABNORMAL
PERFORMED ON: NORMAL
PHOSPHORUS: 3.3 MG/DL (ref 2.5–4.9)
PLATELET # BLD: 287 K/UL (ref 135–450)
PMV BLD AUTO: 7.6 FL (ref 5–10.5)
POTASSIUM SERPL-SCNC: 3.9 MMOL/L (ref 3.5–5.1)
RBC # BLD: 2.76 M/UL (ref 4.2–5.9)
SODIUM BLD-SCNC: 139 MMOL/L (ref 136–145)
WBC # BLD: 7.4 K/UL (ref 4–11)

## 2021-12-29 PROCEDURE — 2580000003 HC RX 258: Performed by: STUDENT IN AN ORGANIZED HEALTH CARE EDUCATION/TRAINING PROGRAM

## 2021-12-29 PROCEDURE — 6370000000 HC RX 637 (ALT 250 FOR IP): Performed by: STUDENT IN AN ORGANIZED HEALTH CARE EDUCATION/TRAINING PROGRAM

## 2021-12-29 PROCEDURE — 6360000002 HC RX W HCPCS: Performed by: STUDENT IN AN ORGANIZED HEALTH CARE EDUCATION/TRAINING PROGRAM

## 2021-12-29 PROCEDURE — 6360000002 HC RX W HCPCS

## 2021-12-29 PROCEDURE — 85025 COMPLETE CBC W/AUTO DIFF WBC: CPT

## 2021-12-29 PROCEDURE — 80069 RENAL FUNCTION PANEL: CPT

## 2021-12-29 PROCEDURE — 1200000000 HC SEMI PRIVATE

## 2021-12-29 PROCEDURE — 83735 ASSAY OF MAGNESIUM: CPT

## 2021-12-29 RX ORDER — SODIUM CHLORIDE, SODIUM LACTATE, POTASSIUM CHLORIDE, CALCIUM CHLORIDE 600; 310; 30; 20 MG/100ML; MG/100ML; MG/100ML; MG/100ML
INJECTION, SOLUTION INTRAVENOUS CONTINUOUS
Status: DISCONTINUED | OUTPATIENT
Start: 2021-12-30 | End: 2022-01-05

## 2021-12-29 RX ORDER — MAGNESIUM SULFATE IN WATER 40 MG/ML
4000 INJECTION, SOLUTION INTRAVENOUS ONCE
Status: COMPLETED | OUTPATIENT
Start: 2021-12-29 | End: 2021-12-29

## 2021-12-29 RX ADMIN — INSULIN HUMAN 2 UNITS: 100 INJECTION, SOLUTION PARENTERAL at 09:57

## 2021-12-29 RX ADMIN — HEPARIN SODIUM 5000 UNITS: 5000 INJECTION INTRAVENOUS; SUBCUTANEOUS at 06:28

## 2021-12-29 RX ADMIN — ACETAMINOPHEN 1000 MG: 500 TABLET ORAL at 00:00

## 2021-12-29 RX ADMIN — ACETAMINOPHEN 1000 MG: 500 TABLET ORAL at 06:49

## 2021-12-29 RX ADMIN — OXYCODONE HYDROCHLORIDE 10 MG: 5 TABLET ORAL at 00:22

## 2021-12-29 RX ADMIN — SODIUM CHLORIDE, PRESERVATIVE FREE 10 ML: 5 INJECTION INTRAVENOUS at 10:01

## 2021-12-29 RX ADMIN — POLYETHYLENE GLYCOL 3350 17 G: 17 POWDER, FOR SOLUTION ORAL at 10:01

## 2021-12-29 RX ADMIN — INSULIN LISPRO 3 UNITS: 100 INJECTION, SOLUTION INTRAVENOUS; SUBCUTANEOUS at 09:56

## 2021-12-29 RX ADMIN — MAGNESIUM SULFATE HEPTAHYDRATE 4000 MG: 4 INJECTION, SOLUTION INTRAVENOUS at 13:11

## 2021-12-29 RX ADMIN — DOCUSATE SODIUM 100 MG: 100 CAPSULE, LIQUID FILLED ORAL at 09:58

## 2021-12-29 RX ADMIN — AMPICILLIN SODIUM AND SULBACTAM SODIUM 3000 MG: 2; 1 INJECTION, POWDER, FOR SOLUTION INTRAMUSCULAR; INTRAVENOUS at 03:40

## 2021-12-29 RX ADMIN — AMLODIPINE BESYLATE 5 MG: 5 TABLET ORAL at 09:58

## 2021-12-29 RX ADMIN — AMPICILLIN SODIUM AND SULBACTAM SODIUM 3000 MG: 2; 1 INJECTION, POWDER, FOR SOLUTION INTRAMUSCULAR; INTRAVENOUS at 10:01

## 2021-12-29 RX ADMIN — ACETAMINOPHEN 1000 MG: 500 TABLET ORAL at 17:44

## 2021-12-29 RX ADMIN — DOCUSATE SODIUM 100 MG: 100 CAPSULE, LIQUID FILLED ORAL at 20:13

## 2021-12-29 RX ADMIN — AMPICILLIN SODIUM AND SULBACTAM SODIUM 3000 MG: 2; 1 INJECTION, POWDER, FOR SOLUTION INTRAMUSCULAR; INTRAVENOUS at 15:53

## 2021-12-29 RX ADMIN — AMPICILLIN SODIUM AND SULBACTAM SODIUM 3000 MG: 2; 1 INJECTION, POWDER, FOR SOLUTION INTRAMUSCULAR; INTRAVENOUS at 20:13

## 2021-12-29 RX ADMIN — HEPARIN SODIUM 5000 UNITS: 5000 INJECTION INTRAVENOUS; SUBCUTANEOUS at 15:53

## 2021-12-29 RX ADMIN — HEPARIN SODIUM 5000 UNITS: 5000 INJECTION INTRAVENOUS; SUBCUTANEOUS at 20:18

## 2021-12-29 ASSESSMENT — PAIN DESCRIPTION - LOCATION
LOCATION: BUTTOCKS
LOCATION: BUTTOCKS

## 2021-12-29 ASSESSMENT — PAIN SCALES - GENERAL
PAINLEVEL_OUTOF10: 6
PAINLEVEL_OUTOF10: 7
PAINLEVEL_OUTOF10: 6
PAINLEVEL_OUTOF10: 6

## 2021-12-29 ASSESSMENT — PAIN DESCRIPTION - ONSET
ONSET: ON-GOING
ONSET: ON-GOING

## 2021-12-29 ASSESSMENT — PAIN DESCRIPTION - PROGRESSION
CLINICAL_PROGRESSION: NOT CHANGED

## 2021-12-29 ASSESSMENT — PAIN DESCRIPTION - PAIN TYPE
TYPE: ACUTE PAIN;SURGICAL PAIN
TYPE: ACUTE PAIN;SURGICAL PAIN

## 2021-12-29 ASSESSMENT — PAIN DESCRIPTION - ORIENTATION: ORIENTATION: MID

## 2021-12-29 ASSESSMENT — PAIN DESCRIPTION - FREQUENCY
FREQUENCY: CONTINUOUS
FREQUENCY: CONTINUOUS

## 2021-12-29 ASSESSMENT — PAIN DESCRIPTION - DESCRIPTORS
DESCRIPTORS: DISCOMFORT
DESCRIPTORS: DISCOMFORT

## 2021-12-29 NOTE — PROGRESS NOTES
Patient A&Ox4. VSS. Patient reports pain of 6-7/10 during the shift and was given oxycodone with benefit. Mcfadden and wound vac produced decent output during the shift. Used doppler to verify right radial pulse. No significant events overnight. All patient care needs addressed with no further needs at this time. Bed is locked and in lowest positioned with alarm on. Call light within reach. Will continue to monitor per protocol.      Electronically signed by Ashley Gavin RN on 12/29/2021 at 7:49 AM

## 2021-12-29 NOTE — PROGRESS NOTES
Podiatric Surgery Daily Progress Note      Admit Date: 12/1/2021      Code:Full Code    Patient seen and examined, labs and records reviewed    Subjective:     Patient seen at bedside this AM. Patient denies pain to bilateral lower extremities. Patient denies fever, chills, shortness of breath, chest pain. Patient denies any acute events overnight to bilateral lower extremities. Review of Systems: A 10 point review of systems was conducted, significant findings as noted in HPI. All other systems negative. Objective     /66   Pulse 76   Temp 98 °F (36.7 °C) (Oral)   Resp 20   Ht 5' 11\" (1.803 m)   Wt 201 lb 15.1 oz (91.6 kg)   SpO2 92%   BMI 28.17 kg/m²     I/O:    Intake/Output Summary (Last 24 hours) at 12/29/2021 0819  Last data filed at 12/29/2021 0348  Gross per 24 hour   Intake 2229 ml   Output 2425 ml   Net -196 ml              Wt Readings from Last 3 Encounters:   12/28/21 201 lb 15.1 oz (91.6 kg)   11/30/21 163 lb 3.2 oz (74 kg)   04/18/16 179 lb 14.3 oz (81.6 kg)       LABS:    Recent Labs     12/28/21  0551 12/29/21  0555   WBC 6.8 7.4   HGB 8.3* 8.6*   HCT 25.0* 25.7*    287        Recent Labs     12/29/21  0555      K 3.9      CO2 31   PHOS 3.3   BUN 10   CREATININE 0.6*        No results for input(s): PROT, INR, APTT in the last 72 hours. LOWER EXTREMITY EXAMINATION    Dressing to left LE intact. No strikethrough noted to the external dressing. Betadine discoloration noted to the internal layers of the dressing of the left LE.     VASCULAR:   DP and PT pulses are non-palpable b/l. Upon hand-held Doppler examination DP signals were noted to be monophasic and PT signals were noted to be nondopplerable. CFT slightly diminished to the distal digits of bilateral lower extremities. Skin temperature is warm to lukewarm to cool to the distal digits from proximal to distal.    No edema noted.  No pain with calf compression b/l.     NEUROLOGIC:   Gross and epicritic sensation is diminished b/l. Protective sensation is diminished at all pedal sites b/l.     DERMATOLOGIC:   Left lower extremity:  Full-thickness ulceration noted to the lateral aspect of the left foot. Wound measures approximately 3.2 cm x 2 cm x 0.5 cm. Wound base with exposed bone, with necrotic edges at the proximal and distal edges. Wound probes to bone with exposed fifth metatarsal. Necrotic appearance of 5th digit. No tracking or tunneling noted. No purulent drainage noted. No fluctuance or crepitus or malodor noted. Deep tissue injury noted 2/2 Prevalon boot strap to the anterior aspect of the ankle. Intact epithelialized tissue noted. No crepitus, erythema, drainage, or malodor noted. No open wound noted. Stable without signs of acute infection noted. Right lower extremity  Parital thickness ulceration noted to the lateral malleolus 2/2 pressure. No fluctuance, crepitus, malodor, or drainage noted. No acute signs infection noted. Partial thickness ulceration 2/2 pressure noted at the styloid process of the 5th metatarsal. Fibrotic tissue noted. No fluctuance or crepitus noted. No acute signs of infection noted. Nonblanchable erythema noted to dorsal aspect right midfoot, nonblanchable, skin intact. No open wound noted at this time. No fluctuance, crepitus, erythema, drainage, or malodor noted. MUSCULOSKELETAL:   Muscle strength 4/5 for all pedal muscle groups B/L LE. No pain with palpation to bilateral lower extremity.      IMAGING:  XR LEFT FOOT 11/30/2021  Narrative   EXAMINATION:   THREE XRAY VIEWS OF THE LEFT FOOT       11/30/2021 1:44 pm       HISTORY:   ORDERING SYSTEM PROVIDED HISTORY: wound   TECHNOLOGIST PROVIDED HISTORY:   Reason for exam:->wound   Reason for Exam: blood sugar problem, wound check on lateral portion of foot   Acuity: Acute   Type of Exam: Initial       FINDINGS:   Osseous destruction along the articular margins of the 5th   metatarsophalangeal joint with associated lucency in the soft tissues. .   There is no evidence of fracture or dislocation. . The remaining joint spaces   appear well maintained. The remaining soft tissues are unremarkable.       Impression   Evidence of a septic joint involving the 5th metatarsal phalangeal joint with   associated osteomyelitis     ARTERIAL DUPLEX 12/2/21     Type of Study:        Extremities Arteries:Lower Extremities Arterial Duplex, VL LOWER EXTREMITY    ARTERIES DUPLEX BILATERAL.       Tech Comments   Right   The right ankle/brachial index is 0.0 (no Doppler signal could be heard at the   [facility] or the PT). The common femoral and profunda femoral arteries could not be visualized due   to bandages extending into the groin area. The mid superficial femoral artery has a short segmental occlusion and then is   reconstituted. Elevated velocities at the distal superficial femoral artery indicate a > 50%   stenosis by velocity criteria (velocity went from 15 to 298 cm/s). The posterior tibial artery is occluded. The distal anterior tibial artery is barely patent, with very low flow   (possibly occluded and then reconstituted). Left   The left ankle brachial index is 0 for the PT and the DP was not accessible   due to the bandages on the foot. The common femoral and profunda femoral artery could not be visualized due to   bandages extending into the groin area. The distal superficial femoral artery is occluded and then reconstituted. The posterior tibial artery, peroneal, and anterior tibial artery are   occluded. There were no previous studies to use for comparison.        ASSESSMENT/PLAN:   -Full-thickness ulceration; lateral left foot- [place] 4  -Osteomyelitis of the fifth metatarsal; left foot  -Deep Tissue Injury, left anterior ankle  -Partial thickness pressure ulceration x2, right foot; NPUAP Stage 2  -Pressure injury, dorsal right midfoot -NPUAP Stage I  -Critical limb ischemia; bilateral lower extremity  -Diabetes mellitus, type II  -History of noncompliance    -Patient seen and examined at bedside this AM.  -VSS on 1 L of O2 via NC; no leukocytosis noted (WBC 7.4)  -All imaging reviewed; impressions noted above  -CRP 24.2 (12/27/21)  -Prealbumin 16.6 (12/27/21)  -Vascular surgery following; will await further stabilization with the general surgery team and plastic surgery team before offering vascular intervention.  -Plastic surgery following; plan for OR tomorrow for dressing change and if improved granulation tissue with negative cultures, will begin the reconstructive process  -Infectious disease following, currently on Unasyn  -Left lower extremity dressed with Betadine soaked gauze, DSD, and tape. -Right lower extremity dressed with Mepilex borders  -Prevalon boots reapplied. Patient is to wear at all times while in bed to prevent further deep tissue injury. Ankle strap removed from bilateral boot as the ankle straps were causing pressure to his feet. -Nonweightbearing to left lower extremity.  -Weightbearing as tolerated to right lower extremity. DISPO: Full-thickness ulceration with underlying osteomyelitis to the left fifth metatarsal. Critical limb ischemia to b/l LE. Wounds are stable at this time. Vascular following; awaiting further stabilization at this time. Plastic surgery, general surgery, and ID following. Patient will require podiatric surgical intervention but timing will depend on medical stability and vascular recommendations. Will continue to follow while in house. Patient examined and evaluated with Dr. Frederick Romeo DPM.    [place], [place]  12/29/21  8:19 AM      Patient was seen and evaluated at bedside. Agree with residents assessment and treatment plan.   Frederick Romeo DPM

## 2021-12-29 NOTE — CARE COORDINATION
CM following, plan for pt to go to OR tomorrow for [facility] change and poss STSG, [facility] following, pt cont to be pending medicaid at this time.   Electronically signed by Katelyn Arguello RN on 12/29/2021 at 3:46 [phone]

## 2021-12-29 NOTE — PROGRESS NOTES
Surgery Daily Progress Note  [place] [facility]  CC:  Necrotizing fascitis       Subjective :No acute events overnight. Patient denies any pain. Tolerating diet. Objective    Infusions:   sodium chloride      sodium chloride 25 mL (12/12/21 0850)    dextrose          I/O:I/O last 3 completed shifts: In: 2229 [P.O.:480; I.V.:1749]  Out: 3025 [Urine:2525; Drains:450; Stool:50]           Wt Readings from Last 1 Encounters:   12/28/21 201 lb 15.1 oz (91.6 kg)                 LABS:    Recent Labs     12/28/21  0551 12/29/21  0555   WBC 6.8 7.4   HGB 8.3* 8.6*   HCT 25.0* 25.7*   MCV 93.0 93.0    287        Recent Labs     12/27/21  0550 12/28/21  0551    142   K 4.0 4.2    104   CO2 29 28   PHOS 3.0 3.1   BUN 11 8   CREATININE 0.6* 0.6*      No results for input(s): AST, ALT, ALB, BILIDIR, BILITOT, ALKPHOS in the last 72 hours. No results for input(s): LIPASE, AMYLASE in the last 72 hours. No results for input(s): PROT, INR, APTT in the last 72 hours. No results for input(s): CKTOTAL, CKMB, CKMBINDEX, TROPONINI in the last 72 hours. Exam:/66   Pulse 76   Temp 98 °F (36.7 °C) (Oral)   Resp 20   Ht 5' 11\" (1.803 m)   Wt 201 lb 15.1 oz (91.6 kg)   SpO2 92%   BMI 28.17 kg/m²   General appearance: alert, appears stated age and cooperative  Eyes: No scleral icterus, EOM grossly intact  Neck: trachea midline, no JVD, no lymphadenopathy, neck supple  Chest/Lungs: normal effort with no accessory muscle use on 2L NC  Cardiovascular: RRR, brisk capillary refill distally  Abdomen: Soft, large surgical debridement area of the abdomen with veraflo Vac in place holding adequate suction and instilling normal saline. Colostomy is pink, viable and with brown stool in the bag. Skin: warm and dry, no rashes  Extremities: no edema, no cyanosis  Genitourinary: Mcfadden in place with clear yellow urine  Neuro: A&Ox3, no focal deficits, sensation intact    ASSESSMENT/PLAN: Pt. is a 61 y.o. male with Necrotizing fasciitis of the abdominal wall, gluteal region, and scrotum s/p wide excision of perineum, back, and abdominal wall. S/p multiple debridements and wound vac placement and changes intraoperatively with diverting colostomy creation (12/7). OR wound vac change (12/10, 12/13, 12/16, 12/21, 12/23, 21/27)     - continue instillation wound vac  - plan to return to OR for additional dressing change under anesthesia tomorrow  - if nutrition satisfactory and improved granulation tissue, will begin reconstructive process at that time  - continue 0 carb diet  - continue strict glucose control  - patient needs to increase activity, discussed at bedside yesterday.  PT/OT ordered      Anam Jones DO 12/29/2021 6:19 AM

## 2021-12-29 NOTE — PROGRESS NOTES
PRE-OP NOTE  Department of Surgery      Chief Complaint or Reason for Surgery: Necrotizing fasciitis    Procedure: Wound vac change under anesthesia, possible STSG, possible ATT  Expected time: 12/30, add-on    Plan  1. Diet: NPO at midnight  2. IVF: LR 100cc/hr  3. Antibiotics: Ampicillin-Sulbactam 3g IV q6hr  4. Labs to be drawn: Type and Screen, INR  5. Anesthesia: to see patient  6. Consent: to be obtained, placed in chart  7. Pulmonary: CXR: n/a  8. Cardiac: EKG: n/a  9. Pregnancy test: Tyler Hansen.  Smiley James MD, PGY-2  12/29/21  8:59 AM

## 2021-12-30 ENCOUNTER — ANESTHESIA (OUTPATIENT)
Dept: OPERATING ROOM | Age: 66
DRG: 853 | End: 2021-12-30

## 2021-12-30 ENCOUNTER — ANESTHESIA EVENT (OUTPATIENT)
Dept: OPERATING ROOM | Age: 66
DRG: 853 | End: 2021-12-30

## 2021-12-30 VITALS — OXYGEN SATURATION: 100 % | TEMPERATURE: 100.4 F | DIASTOLIC BLOOD PRESSURE: 54 MMHG | SYSTOLIC BLOOD PRESSURE: 98 MMHG

## 2021-12-30 LAB
ABO/RH: NORMAL
ALBUMIN SERPL-MCNC: 2.1 G/DL (ref 3.4–5)
ANION GAP SERPL CALCULATED.3IONS-SCNC: 9 MMOL/L (ref 3–16)
ANTIBODY SCREEN: NORMAL
BASOPHILS ABSOLUTE: 0 K/UL (ref 0–0.2)
BASOPHILS RELATIVE PERCENT: 0.7 %
BUN BLDV-MCNC: 8 MG/DL (ref 7–20)
CALCIUM SERPL-MCNC: 7.6 MG/DL (ref 8.3–10.6)
CHLORIDE BLD-SCNC: 101 MMOL/L (ref 99–110)
CO2: 26 MMOL/L (ref 21–32)
CREAT SERPL-MCNC: <0.5 MG/DL (ref 0.8–1.3)
EOSINOPHILS ABSOLUTE: 0.4 K/UL (ref 0–0.6)
EOSINOPHILS RELATIVE PERCENT: 6.8 %
GFR AFRICAN AMERICAN: >60
GFR NON-AFRICAN AMERICAN: >60
GLUCOSE BLD-MCNC: 125 MG/DL (ref 70–99)
GLUCOSE BLD-MCNC: 131 MG/DL (ref 70–99)
GLUCOSE BLD-MCNC: 137 MG/DL (ref 70–99)
GLUCOSE BLD-MCNC: 174 MG/DL (ref 70–99)
GLUCOSE BLD-MCNC: 98 MG/DL (ref 70–99)
HCT VFR BLD CALC: 21.9 % (ref 40.5–52.5)
HEMOGLOBIN: 7.1 G/DL (ref 13.5–17.5)
INR BLD: 1.12 (ref 0.88–1.12)
LYMPHOCYTES ABSOLUTE: 0.8 K/UL (ref 1–5.1)
LYMPHOCYTES RELATIVE PERCENT: 13.7 %
MAGNESIUM: 1.5 MG/DL (ref 1.8–2.4)
MCH RBC QN AUTO: 30.7 PG (ref 26–34)
MCHC RBC AUTO-ENTMCNC: 32.6 G/DL (ref 31–36)
MCV RBC AUTO: 94.3 FL (ref 80–100)
MONOCYTES ABSOLUTE: 0.5 K/UL (ref 0–1.3)
MONOCYTES RELATIVE PERCENT: 8.7 %
NEUTROPHILS ABSOLUTE: 4.2 K/UL (ref 1.7–7.7)
NEUTROPHILS RELATIVE PERCENT: 70.1 %
PDW BLD-RTO: 17.1 % (ref 12.4–15.4)
PERFORMED ON: ABNORMAL
PHOSPHORUS: 2.2 MG/DL (ref 2.5–4.9)
PLATELET # BLD: 254 K/UL (ref 135–450)
PMV BLD AUTO: 7.9 FL (ref 5–10.5)
POTASSIUM SERPL-SCNC: 4 MMOL/L (ref 3.5–5.1)
PROTHROMBIN TIME: 12.7 SEC (ref 9.9–12.7)
RBC # BLD: 2.32 M/UL (ref 4.2–5.9)
SODIUM BLD-SCNC: 136 MMOL/L (ref 136–145)
WBC # BLD: 6 K/UL (ref 4–11)

## 2021-12-30 PROCEDURE — 7100000001 HC PACU RECOVERY - ADDTL 15 MIN: Performed by: SURGERY

## 2021-12-30 PROCEDURE — 2709999900 HC NON-CHARGEABLE SUPPLY: Performed by: SURGERY

## 2021-12-30 PROCEDURE — 6370000000 HC RX 637 (ALT 250 FOR IP): Performed by: STUDENT IN AN ORGANIZED HEALTH CARE EDUCATION/TRAINING PROGRAM

## 2021-12-30 PROCEDURE — 85610 PROTHROMBIN TIME: CPT

## 2021-12-30 PROCEDURE — 2580000003 HC RX 258: Performed by: STUDENT IN AN ORGANIZED HEALTH CARE EDUCATION/TRAINING PROGRAM

## 2021-12-30 PROCEDURE — 86850 RBC ANTIBODY SCREEN: CPT

## 2021-12-30 PROCEDURE — 3600000014 HC SURGERY LEVEL 4 ADDTL 15MIN: Performed by: SURGERY

## 2021-12-30 PROCEDURE — 7100000000 HC PACU RECOVERY - FIRST 15 MIN: Performed by: SURGERY

## 2021-12-30 PROCEDURE — 1200000000 HC SEMI PRIVATE

## 2021-12-30 PROCEDURE — 86900 BLOOD TYPING SEROLOGIC ABO: CPT

## 2021-12-30 PROCEDURE — 6360000002 HC RX W HCPCS: Performed by: STUDENT IN AN ORGANIZED HEALTH CARE EDUCATION/TRAINING PROGRAM

## 2021-12-30 PROCEDURE — 86901 BLOOD TYPING SEROLOGIC RH(D): CPT

## 2021-12-30 PROCEDURE — 2500000003 HC RX 250 WO HCPCS

## 2021-12-30 PROCEDURE — 3700000001 HC ADD 15 MINUTES (ANESTHESIA): Performed by: SURGERY

## 2021-12-30 PROCEDURE — 85025 COMPLETE CBC W/AUTO DIFF WBC: CPT

## 2021-12-30 PROCEDURE — 80069 RENAL FUNCTION PANEL: CPT

## 2021-12-30 PROCEDURE — 2500000003 HC RX 250 WO HCPCS: Performed by: STUDENT IN AN ORGANIZED HEALTH CARE EDUCATION/TRAINING PROGRAM

## 2021-12-30 PROCEDURE — 6360000002 HC RX W HCPCS: Performed by: ANESTHESIOLOGY

## 2021-12-30 PROCEDURE — 6360000002 HC RX W HCPCS

## 2021-12-30 PROCEDURE — 3700000000 HC ANESTHESIA ATTENDED CARE: Performed by: SURGERY

## 2021-12-30 PROCEDURE — 15852 DRESSING CHANGE NOT FOR BURN: CPT | Performed by: SURGERY

## 2021-12-30 PROCEDURE — 3600000004 HC SURGERY LEVEL 4 BASE: Performed by: SURGERY

## 2021-12-30 PROCEDURE — 83735 ASSAY OF MAGNESIUM: CPT

## 2021-12-30 RX ORDER — PROPOFOL 10 MG/ML
INJECTION, EMULSION INTRAVENOUS PRN
Status: DISCONTINUED | OUTPATIENT
Start: 2021-12-30 | End: 2021-12-30 | Stop reason: SDUPTHER

## 2021-12-30 RX ORDER — FENTANYL CITRATE 50 UG/ML
50 INJECTION, SOLUTION INTRAMUSCULAR; INTRAVENOUS EVERY 5 MIN PRN
Status: DISCONTINUED | OUTPATIENT
Start: 2021-12-30 | End: 2021-12-30 | Stop reason: HOSPADM

## 2021-12-30 RX ORDER — ONDANSETRON 2 MG/ML
INJECTION INTRAMUSCULAR; INTRAVENOUS PRN
Status: DISCONTINUED | OUTPATIENT
Start: 2021-12-30 | End: 2021-12-30 | Stop reason: SDUPTHER

## 2021-12-30 RX ORDER — HYDRALAZINE HYDROCHLORIDE 20 MG/ML
5 INJECTION INTRAMUSCULAR; INTRAVENOUS EVERY 10 MIN PRN
Status: DISCONTINUED | OUTPATIENT
Start: 2021-12-30 | End: 2021-12-30 | Stop reason: HOSPADM

## 2021-12-30 RX ORDER — FENTANYL CITRATE 50 UG/ML
INJECTION, SOLUTION INTRAMUSCULAR; INTRAVENOUS PRN
Status: DISCONTINUED | OUTPATIENT
Start: 2021-12-30 | End: 2021-12-30 | Stop reason: SDUPTHER

## 2021-12-30 RX ORDER — MEPERIDINE HYDROCHLORIDE 25 MG/ML
12.5 INJECTION INTRAMUSCULAR; INTRAVENOUS; SUBCUTANEOUS EVERY 5 MIN PRN
Status: DISCONTINUED | OUTPATIENT
Start: 2021-12-30 | End: 2021-12-30 | Stop reason: HOSPADM

## 2021-12-30 RX ORDER — DEXAMETHASONE SODIUM PHOSPHATE 4 MG/ML
INJECTION, SOLUTION INTRA-ARTICULAR; INTRALESIONAL; INTRAMUSCULAR; INTRAVENOUS; SOFT TISSUE PRN
Status: DISCONTINUED | OUTPATIENT
Start: 2021-12-30 | End: 2021-12-30

## 2021-12-30 RX ORDER — FENTANYL CITRATE 50 UG/ML
25 INJECTION, SOLUTION INTRAMUSCULAR; INTRAVENOUS EVERY 5 MIN PRN
Status: COMPLETED | OUTPATIENT
Start: 2021-12-30 | End: 2021-12-30

## 2021-12-30 RX ORDER — LABETALOL HYDROCHLORIDE 5 MG/ML
5 INJECTION, SOLUTION INTRAVENOUS EVERY 10 MIN PRN
Status: DISCONTINUED | OUTPATIENT
Start: 2021-12-30 | End: 2021-12-30 | Stop reason: HOSPADM

## 2021-12-30 RX ORDER — MAGNESIUM SULFATE IN WATER 40 MG/ML
2000 INJECTION, SOLUTION INTRAVENOUS ONCE
Status: COMPLETED | OUTPATIENT
Start: 2021-12-30 | End: 2021-12-30

## 2021-12-30 RX ORDER — OXYCODONE HYDROCHLORIDE AND ACETAMINOPHEN 5; 325 MG/1; MG/1
1 TABLET ORAL
Status: DISCONTINUED | OUTPATIENT
Start: 2021-12-30 | End: 2021-12-30 | Stop reason: HOSPADM

## 2021-12-30 RX ORDER — LIDOCAINE HYDROCHLORIDE 20 MG/ML
INJECTION, SOLUTION INFILTRATION; PERINEURAL PRN
Status: DISCONTINUED | OUTPATIENT
Start: 2021-12-30 | End: 2021-12-30 | Stop reason: SDUPTHER

## 2021-12-30 RX ORDER — PROMETHAZINE HYDROCHLORIDE 25 MG/ML
6.25 INJECTION, SOLUTION INTRAMUSCULAR; INTRAVENOUS
Status: DISCONTINUED | OUTPATIENT
Start: 2021-12-30 | End: 2021-12-30 | Stop reason: HOSPADM

## 2021-12-30 RX ORDER — ONDANSETRON 2 MG/ML
4 INJECTION INTRAMUSCULAR; INTRAVENOUS
Status: DISCONTINUED | OUTPATIENT
Start: 2021-12-30 | End: 2021-12-30 | Stop reason: HOSPADM

## 2021-12-30 RX ADMIN — INSULIN HUMAN 5 UNITS: 100 INJECTION, SOLUTION PARENTERAL at 21:48

## 2021-12-30 RX ADMIN — ACETAMINOPHEN 1000 MG: 500 TABLET ORAL at 12:21

## 2021-12-30 RX ADMIN — AMPICILLIN SODIUM AND SULBACTAM SODIUM 3000 MG: 2; 1 INJECTION, POWDER, FOR SOLUTION INTRAMUSCULAR; INTRAVENOUS at 02:36

## 2021-12-30 RX ADMIN — AMPICILLIN SODIUM AND SULBACTAM SODIUM 3000 MG: 2; 1 INJECTION, POWDER, FOR SOLUTION INTRAMUSCULAR; INTRAVENOUS at 21:48

## 2021-12-30 RX ADMIN — INSULIN HUMAN 2 UNITS: 100 INJECTION, SOLUTION PARENTERAL at 12:20

## 2021-12-30 RX ADMIN — FENTANYL CITRATE 100 MCG: 50 INJECTION, SOLUTION INTRAMUSCULAR; INTRAVENOUS at 07:45

## 2021-12-30 RX ADMIN — MAGNESIUM SULFATE HEPTAHYDRATE 2000 MG: 2 INJECTION, SOLUTION INTRAVENOUS at 10:34

## 2021-12-30 RX ADMIN — SODIUM CHLORIDE, PRESERVATIVE FREE 10 ML: 5 INJECTION INTRAVENOUS at 20:34

## 2021-12-30 RX ADMIN — FENTANYL CITRATE 25 MCG: 50 INJECTION INTRAMUSCULAR; INTRAVENOUS at 08:59

## 2021-12-30 RX ADMIN — ACETAMINOPHEN 1000 MG: 500 TABLET ORAL at 17:57

## 2021-12-30 RX ADMIN — PROPOFOL 100 MG: 10 INJECTION, EMULSION INTRAVENOUS at 07:37

## 2021-12-30 RX ADMIN — PROPOFOL 100 MG: 10 INJECTION, EMULSION INTRAVENOUS at 07:38

## 2021-12-30 RX ADMIN — SODIUM PHOSPHATE, MONOBASIC, MONOHYDRATE AND SODIUM PHOSPHATE, DIBASIC, ANHYDROUS 20 MMOL: 276; 142 INJECTION, SOLUTION INTRAVENOUS at 10:32

## 2021-12-30 RX ADMIN — FENTANYL CITRATE 25 MCG: 50 INJECTION INTRAMUSCULAR; INTRAVENOUS at 09:04

## 2021-12-30 RX ADMIN — INSULIN HUMAN 2 UNITS: 100 INJECTION, SOLUTION PARENTERAL at 17:57

## 2021-12-30 RX ADMIN — LIDOCAINE HYDROCHLORIDE 100 MG: 20 INJECTION, SOLUTION INFILTRATION; PERINEURAL at 07:37

## 2021-12-30 RX ADMIN — AMPICILLIN SODIUM AND SULBACTAM SODIUM 3000 MG: 2; 1 INJECTION, POWDER, FOR SOLUTION INTRAMUSCULAR; INTRAVENOUS at 16:30

## 2021-12-30 RX ADMIN — INSULIN LISPRO 3 UNITS: 100 INJECTION, SOLUTION INTRAVENOUS; SUBCUTANEOUS at 17:57

## 2021-12-30 RX ADMIN — HEPARIN SODIUM 5000 UNITS: 5000 INJECTION INTRAVENOUS; SUBCUTANEOUS at 16:30

## 2021-12-30 RX ADMIN — HYDROMORPHONE HYDROCHLORIDE 0.25 MG: 1 INJECTION, SOLUTION INTRAMUSCULAR; INTRAVENOUS; SUBCUTANEOUS at 09:24

## 2021-12-30 RX ADMIN — OXYCODONE HYDROCHLORIDE 5 MG: 5 TABLET ORAL at 20:34

## 2021-12-30 RX ADMIN — FENTANYL CITRATE 25 MCG: 50 INJECTION INTRAMUSCULAR; INTRAVENOUS at 09:09

## 2021-12-30 RX ADMIN — DOCUSATE SODIUM 100 MG: 100 CAPSULE, LIQUID FILLED ORAL at 20:34

## 2021-12-30 RX ADMIN — SODIUM CHLORIDE, POTASSIUM CHLORIDE, SODIUM LACTATE AND CALCIUM CHLORIDE: 600; 310; 30; 20 INJECTION, SOLUTION INTRAVENOUS at 04:36

## 2021-12-30 RX ADMIN — SODIUM CHLORIDE, POTASSIUM CHLORIDE, SODIUM LACTATE AND CALCIUM CHLORIDE: 600; 310; 30; 20 INJECTION, SOLUTION INTRAVENOUS at 14:55

## 2021-12-30 RX ADMIN — ONDANSETRON 4 MG: 2 INJECTION INTRAMUSCULAR; INTRAVENOUS at 07:38

## 2021-12-30 RX ADMIN — FENTANYL CITRATE 25 MCG: 50 INJECTION INTRAMUSCULAR; INTRAVENOUS at 09:14

## 2021-12-30 RX ADMIN — HEPARIN SODIUM 5000 UNITS: 5000 INJECTION INTRAVENOUS; SUBCUTANEOUS at 21:48

## 2021-12-30 ASSESSMENT — PAIN DESCRIPTION - FREQUENCY: FREQUENCY: CONTINUOUS

## 2021-12-30 ASSESSMENT — PULMONARY FUNCTION TESTS
PIF_VALUE: 16
PIF_VALUE: 15
PIF_VALUE: 1
PIF_VALUE: 15
PIF_VALUE: 13
PIF_VALUE: 2
PIF_VALUE: 13
PIF_VALUE: 14
PIF_VALUE: 13
PIF_VALUE: 15
PIF_VALUE: 5
PIF_VALUE: 4
PIF_VALUE: 3
PIF_VALUE: 1
PIF_VALUE: 1
PIF_VALUE: 4
PIF_VALUE: 4
PIF_VALUE: 3
PIF_VALUE: 14
PIF_VALUE: 12
PIF_VALUE: 3
PIF_VALUE: 0
PIF_VALUE: 13
PIF_VALUE: 3
PIF_VALUE: 12
PIF_VALUE: 13
PIF_VALUE: 13
PIF_VALUE: 1
PIF_VALUE: 15
PIF_VALUE: 1
PIF_VALUE: 4
PIF_VALUE: 13
PIF_VALUE: 13
PIF_VALUE: 3
PIF_VALUE: 15
PIF_VALUE: 3
PIF_VALUE: 4
PIF_VALUE: 15
PIF_VALUE: 2
PIF_VALUE: 0
PIF_VALUE: 13
PIF_VALUE: 4
PIF_VALUE: 0
PIF_VALUE: 3
PIF_VALUE: 13

## 2021-12-30 ASSESSMENT — PAIN SCALES - GENERAL
PAINLEVEL_OUTOF10: 6
PAINLEVEL_OUTOF10: 0
PAINLEVEL_OUTOF10: 0
PAINLEVEL_OUTOF10: 5
PAINLEVEL_OUTOF10: 6
PAINLEVEL_OUTOF10: 5
PAINLEVEL_OUTOF10: 5
PAINLEVEL_OUTOF10: 4

## 2021-12-30 ASSESSMENT — PAIN DESCRIPTION - PAIN TYPE: TYPE: SURGICAL PAIN

## 2021-12-30 ASSESSMENT — PAIN DESCRIPTION - DESCRIPTORS
DESCRIPTORS: OTHER (COMMENT)
DESCRIPTORS: ACHING;SORE

## 2021-12-30 ASSESSMENT — PAIN DESCRIPTION - ONSET: ONSET: ON-GOING

## 2021-12-30 ASSESSMENT — PAIN DESCRIPTION - LOCATION
LOCATION: SACRUM;PERINEUM
LOCATION: PERINEUM

## 2021-12-30 NOTE — ANESTHESIA PRE PROCEDURE
Department of Anesthesiology  Preprocedure Note       Name:  Adan Caicedo   Age:  61 y.o.  :  1955                                          MRN:  [id number]         Date:  2021      Surgeon: Alyson Gay):  Claudia Carter MD    Procedure: Procedure(s):  PERINEAL / ABDOMINAL WOUND VAC CHANGE, POSSIBLE SPLIT THICKNESS SKIN GRAFT, POSSIBLE WOUND CLOSURE, POSSIBLE TISSUE TRANSFER, POSSIBLE FLAP    Medications prior to admission:   Prior to Admission medications    Medication Sig Start Date End Date Taking? Authorizing Provider   metFORMIN (GLUCOPHAGE) 500 MG tablet Take 500 mg by mouth daily (with breakfast)    Historical Provider, MD   meloxicam (MOBIC) 15 MG tablet Take 1 tablet by mouth daily 16   Ariel Back, DO       Current medications:    No current facility-administered medications for this visit. No current outpatient medications on file.      Facility-Administered Medications Ordered in Other Visits   Medication Dose Route Frequency Provider Last Rate Last Admin    lactated ringers infusion   IntraVENous Continuous Rosezena Siemens,  mL/hr at 21 0436 New Bag at 21 0436    insulin glargine (LANTUS;BASAGLAR) injection pen 10 Units  10 Units SubCUTAneous QAM [facility], DO   10 Units at 21 0956    insulin lispro (1 Unit Dial) 3 Units  3 Units SubCUTAneous BID WC Rosezena Siemens, MD   3 Units at 21 0956    insulin regular (HUMULIN R;NOVOLIN R) injection 0-18 Units  0-18 Units SubCUTAneous 4x Daily AC & HS Rosezena Siemens, MD   2 Units at 21 0957    0.9 % sodium chloride infusion   IntraVENous PRN Rosezena Siemens, MD        heparin (porcine) injection 5,000 Units  5,000 Units SubCUTAneous 3 times per day Rosezena Siemens, MD   5,000 Units at 21    alteplase (CATHFLO) injection 1 mg  1 mg IntraCATHeter PRN Rosezena Siemens, MD   1 mg at 21    docusate sodium (COLACE) capsule 100 mg  100 mg Oral BID Rosezena Siemens, MD   100 mg at 12/29/21 2013    polyethylene glycol (GLYCOLAX) packet 17 g  17 g Oral Daily Holly Lewis MD   17 g at 12/29/21 1001    ipratropium-albuterol (DUONEB) nebulizer solution 1 ampule  1 ampule Inhalation Q4H PRN Holly Lewis MD   1 ampule at 12/10/21 1202    dextrose 50 % IV solution  12.5 g IntraVENous PRN Holly Lewis MD        amLODIPine (NORVASC) tablet 5 mg  5 mg Oral Daily Holly Lewis MD   5 mg at 12/29/21 4470    hydrALAZINE (APRESOLINE) injection 10 mg  10 mg IntraVENous Q6H PRN Holly Lewis MD        ampicillin-sulbactam (UNASYN) 3000 mg ivpb minibag  3,000 mg IntraVENous Q6H Holly Lewis MD   Stopped at 12/30/21 0310    oxyCODONE (ROXICODONE) immediate release tablet 5 mg  5 mg Oral Q4H PRN Holly Lewis MD   5 mg at 12/28/21 0957    Or    oxyCODONE (ROXICODONE) immediate release tablet 10 mg  10 mg Oral Q4H PRN Holly Lewis MD   10 mg at 12/29/21 0022    acetaminophen (TYLENOL) tablet 1,000 mg  1,000 mg Oral Q6H Holly Lewis MD   1,000 mg at 12/29/21 1744    sodium chloride flush 0.9 % injection 5-40 mL  5-40 mL IntraVENous 2 times per day Holly Lewis MD   10 mL at 12/29/21 1001    sodium chloride flush 0.9 % injection 5-40 mL  5-40 mL IntraVENous PRN Holly Lewis MD        0.9 % sodium chloride infusion  25 mL IntraVENous PRN Holly Lewis  mL/hr at 12/12/21 0850 25 mL at 12/12/21 0850    glucose (GLUTOSE) 40 % oral gel 15 g  15 g Oral PRN Holly Lewis MD        dextrose 50 % IV solution  12.5 g IntraVENous PRN Holly Lewis MD        glucagon (rDNA) injection 1 mg  1 mg IntraMUSCular PRN Holly Lewis MD        dextrose 5 % solution  100 mL/hr IntraVENous PRN Holly Lewis MD           Allergies:  No Known Allergies    Problem List:    Patient Active Problem List   Diagnosis Code    [facility]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    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    Acute respiratory failure with hypoxia ([facility]) J96.01    Necrotizing fasciitis [address] OR    VAGINA SURGERY N/A 11/30/2021    PERINEAL SOFT TISSUE EXCISIONAL DEBRIDEMENT performed by Adis Hurtado MD at [place] [address] History:    Social History     Tobacco Use    Smoking status: Current Every Day Smoker    Smokeless tobacco: Never Used   Substance Use Topics    Alcohol use: Yes     Alcohol/week: 0.0 standard drinks     Comment: social                                 Ready to quit: Not Answered  Counseling given: Not Answered      Vital Signs (Current): There were no vitals filed for this visit.                                            BP Readings from Last 3 Encounters:   12/30/21 122/63   12/27/21 (!) 96/56   12/23/21 135/68       NPO Status:                                                                                 BMI:   Wt Readings from Last 3 Encounters:   12/28/21 201 lb 15.1 oz (91.6 kg)   11/30/21 163 lb 3.2 oz (74 kg)   04/18/16 179 lb 14.3 oz (81.6 kg)     There is no height or weight on file to calculate BMI.    CBC:   Lab Results   Component Value Date    WBC 6.0 12/30/2021    RBC 2.32 12/30/2021    HGB 7.1 12/30/2021    HCT 21.9 12/30/2021    MCV 94.3 12/30/2021    RDW 17.1 12/30/2021     12/30/2021       CMP:   Lab Results   Component Value Date     12/30/2021    K 4.0 12/30/2021    K 3.8 12/12/2021     12/30/2021    CO2 26 12/30/2021    BUN 8 12/30/2021    CREATININE <0.5 12/30/2021    GFRAA >60 12/30/2021    AGRATIO 0.7 11/30/2021    LABGLOM >60 12/30/2021    GLUCOSE 98 12/30/2021    PROT 4.8 12/10/2021    CALCIUM 7.6 12/30/2021    BILITOT 0.3 12/10/2021    ALKPHOS 170 12/10/2021    AST 13 12/10/2021    ALT 7 12/10/2021       POC Tests:   Recent Labs     12/29/21  2017   POCGLU 91       Coags:   Lab Results   Component Value Date    PROTIME 12.7 12/30/2021    INR 1.12 12/30/2021       HCG (If Applicable): No results found for: PREGTESTUR, PREGSERUM, HCG, HCGQUANT     ABGs:   Lab Results   Component Value Date    PHART 7.227 12/09/2021 PO2ART 107.8 12/09/2021    ZNO8HMP 42.4 12/09/2021    NCW7GMA 17.6 12/09/2021    BEART -10 12/09/2021    E1ZDPKWU 97 12/09/2021        Type & Screen (If Applicable):  No results found for: LABABO, LABRH    Drug/Infectious Status (If Applicable):  No results found for: HIV, HEPCAB    COVID-19 Screening (If Applicable):   Lab Results   Component Value Date    COVID19 Not Detected 11/30/2021           Anesthesia Evaluation  Patient summary reviewed and Nursing notes reviewed no history of anesthetic complications:   Airway: Mallampati: II  TM distance: >3 FB   Neck ROM: full  Mouth opening: > = 3 FB Dental:    (+) upper dentures and lower dentures      Pulmonary:Negative Pulmonary ROS and normal exam                               Cardiovascular:  Exercise tolerance: good (>4 METS),       (-) CAD,  angina and  CHF                Neuro/Psych:   Negative Neuro/Psych ROS              GI/Hepatic/Renal:            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 Endo/Other:    (+) DiabetesType II DM, , .                 Abdominal:             Vascular: negative vascular ROS. Other Findings:               Anesthesia Plan      general     ASA 3       Induction: intravenous. MIPS: Postoperative opioids intended and Prophylactic antiemetics administered. Anesthetic plan and risks discussed with patient. Plan discussed with CRNA.     Attending anesthesiologist reviewed and agrees with Preprocedure content              Jonh Neville DO   12/30/2021

## 2021-12-30 NOTE — PROGRESS NOTES
Patient alert and oriented. VSS. Mcfadden in place with adequate output. Wound vac cannister replaced output adequatel. Ostomy did not have enough output to empty. Patient NPO at  00. Bed locked and in lowest position. Will continue to monitor.

## 2021-12-30 NOTE — PROGRESS NOTES
Podiatric Surgery Daily Progress Note      Admit Date: 12/1/2021      Code:Full Code    Patient seen and examined, labs and records reviewed    Subjective:     Patient seen at bedside this AM. Patient denies pain to bilateral lower extremities. Patient denies fever, chills, shortness of breath, chest pain. Patient states that his procedure this morning went well. Patient denies any acute events overnight to bilateral lower extremities. Review of Systems: A 10 point review of systems was conducted, significant findings as noted in HPI. All other systems negative. Objective     BP (!) 140/60   Pulse 64   Temp 97.8 °F (36.6 °C) (Temporal)   Resp 13   Ht 5' 11\" (1.803 m)   Wt 201 lb 15.1 oz (91.6 kg)   SpO2 95%   BMI 28.17 kg/m²     I/O:    Intake/Output Summary (Last 24 hours) at 12/30/2021 1021  Last data filed at 12/30/2021 0945  Gross per 24 hour   Intake --   Output 1750 ml   Net -1750 ml              Wt Readings from Last 3 Encounters:   12/28/21 201 lb 15.1 oz (91.6 kg)   11/30/21 163 lb 3.2 oz (74 kg)   04/18/16 179 lb 14.3 oz (81.6 kg)       LABS:    Recent Labs     12/29/21  0555 12/30/21  0617   WBC 7.4 6.0   HGB 8.6* 7.1*   HCT 25.7* 21.9*    254        Recent Labs     12/30/21  0653      K 4.0      CO2 26   PHOS 2.2*   BUN 8   CREATININE <0.5*        Recent Labs     12/30/21  0653   INR 1.12       LOWER EXTREMITY EXAMINATION    Dressing to left LE intact. No strikethrough noted to the external dressing. Betadine discoloration noted to the internal layers of the dressing of the left LE.     VASCULAR:   DP and PT pulses are non-palpable b/l. Upon hand-held Doppler examination DP signals were noted to be monophasic and PT signals were noted to be nondopplerable. CFT slightly diminished to the distal digits of bilateral lower extremities. Skin temperature is warm to cool to the distal digits from proximal to distal.    No edema noted.  No pain with calf compression b/l.     NEUROLOGIC:   Gross and epicritic sensation is diminished b/l. Protective sensation is diminished at all pedal sites b/l.     DERMATOLOGIC:   Patient provided verbal consent for photos to be obtained today:    Left lower extremity:    Full-thickness ulceration noted to the lateral aspect of the left foot. Wound measures approximately 3.2 cm x 2 cm x 0.5 cm. Wound base with exposed 5th metatarsal bone, with necrotic edges at the proximal and distal edges. Necrotic appearance of 5th digit. No tracking or tunneling noted. No purulent drainage noted. No fluctuance or crepitus or malodor noted. Deep tissue injury noted 2/2 Prevalon boot strap to the anterior aspect of the ankle. Intact epithelialized tissue noted. No crepitus, erythema, drainage, or malodor noted. No open wound noted. Stable without signs of acute infection noted. Right lower extremity    Parital thickness ulceration noted to the lateral malleolus 2/2 pressure. No fluctuance, crepitus, malodor, or drainage noted. No acute signs infection noted. Partial thickness ulceration 2/2 pressure noted at the styloid process of the 5th metatarsal. Fibrotic tissue noted. No fluctuance or crepitus noted. No acute signs of infection noted. Nonblanchable erythema noted to dorsal aspect right midfoot, nonblanchable, skin intact. No open wound noted at this time. No fluctuance, crepitus, erythema, drainage, or malodor noted. MUSCULOSKELETAL:   Muscle strength 4/5 for all pedal muscle groups B/L LE. No pain with palpation to bilateral lower extremity.      IMAGING:  XR LEFT FOOT 11/30/2021  Narrative   EXAMINATION:   THREE XRAY VIEWS OF THE LEFT FOOT       11/30/2021 1:44 pm       HISTORY:   ORDERING SYSTEM PROVIDED HISTORY: wound   TECHNOLOGIST PROVIDED HISTORY:   Reason for exam:->wound   Reason for Exam: blood sugar problem, wound check on lateral portion of foot   Acuity: Acute   Type of Exam: Initial       FINDINGS:   Osseous destruction along the articular margins of the 5th   metatarsophalangeal joint with associated lucency in the soft tissues. .   There is no evidence of fracture or dislocation. . The remaining joint spaces   appear well maintained. The remaining soft tissues are unremarkable.       Impression   Evidence of a septic joint involving the 5th metatarsal phalangeal joint with   associated osteomyelitis     ARTERIAL DUPLEX 12/2/21     Type of Study:        Extremities Arteries:Lower Extremities Arterial Duplex, VL LOWER EXTREMITY    ARTERIES DUPLEX BILATERAL.       Tech Comments   Right   The right ankle/brachial index is 0.0 (no Doppler signal could be heard at the   [facility] or the PT). The common femoral and profunda femoral arteries could not be visualized due   to bandages extending into the groin area. The mid superficial femoral artery has a short segmental occlusion and then is   reconstituted. Elevated velocities at the distal superficial femoral artery indicate a > 50%   stenosis by velocity criteria (velocity went from 15 to 298 cm/s). The posterior tibial artery is occluded. The distal anterior tibial artery is barely patent, with very low flow   (possibly occluded and then reconstituted). Left   The left ankle brachial index is 0 for the PT and the DP was not accessible   due to the bandages on the foot. The common femoral and profunda femoral artery could not be visualized due to   bandages extending into the groin area. The distal superficial femoral artery is occluded and then reconstituted. The posterior tibial artery, peroneal, and anterior tibial artery are   occluded. There were no previous studies to use for comparison.        ASSESSMENT/PLAN:   -Full-thickness ulceration; lateral left foot- Sands 4  -Osteomyelitis of the fifth metatarsal; left foot  -Deep Tissue Injury, left anterior ankle  -Partial thickness pressure ulceration x2, right foot; NPUAP Stage 2  -Pressure injury, dorsal right midfoot -NPUAP Stage I  -Critical limb ischemia; bilateral lower extremity  -Diabetes mellitus, type II  -History of noncompliance    -Patient seen and examined at bedside this AM.  -Hypertensive otherwise VSS on 3 L of O2 via NC; no leukocytosis noted (WBC 6.0)  -All imaging reviewed; impressions noted above  -CRP 24.2 (12/27/21)  -Prealbumin 16.6 (12/27/21)  -Vascular surgery following; will await further stabilization with the general surgery team and plastic surgery team before offering vascular intervention.  -Plastic surgery following; wound vac change today, will plan for OR wound vac change vs starting reconstruction process  -Infectious disease following, currently on Unasyn  -Left lower extremity dressed with Betadine soaked gauze, DSD, and tape. -Right lower extremity dressed with Mepilex borders  -Prevalon boots reapplied. Patient is to wear at all times while in bed to prevent further deep tissue injury. Ankle strap removed from bilateral boot as the ankle straps were causing pressure to his feet. -Nonweightbearing to left lower extremity.  -Weightbearing as tolerated to right lower extremity. DISPO: Full-thickness ulceration with underlying osteomyelitis to the left fifth metatarsal. Critical limb ischemia to b/l LE. Wounds are stable at this time. Vascular following; awaiting further stabilization at this time. Plastic surgery, general surgery, and ID following. Patient will require podiatric surgical intervention but timing will depend on medical stability and vascular recommendations. Will continue to follow while in house. Patient assessment and plan was discussed with Dr. Rodolfo Rincon DPM.    Clemente Agee DPM   Podiatric Resident PGY1  Pager [phone] or [employer]  12/30/2021, 10:21 AM      Patient was seen and evaluated at bedside. Agree with residents assessment and treatment plan.   Rodolfo Rincon DPM

## 2021-12-30 NOTE — OP NOTE
[place] [address] SURGERY     OPERATIVE DICTATION    NAME: Yu Scott   MRN: [id number]  DATE: 12/30/2021     AGE: 61 y.o. LOCATION: [facility]    PREOPERATIVE DIAGNOSIS:  Shayla's gangrene with necrotizing fasciitis     POSTOPERATIVE DIAGNOSIS:  Same. OPERATION PERFORMED: 1) Dressing change under anesthesia       2) Application of Veraflo instillation vac therapy     SURGEON:  Delores Sarabia MD    ASSISTANT: Raleigh Lindsay (PGY1)    ANESTHESIA: General     ESTIMATED BLOOD LOSS: Minimal     DRAINS:  Instillation vac     SPECIMENS: None     OPERATIVE INDICATIONS:  This is an unfortunate 61 y.o. male with a history of poorly controlled diabetes who developed Shayla's gangrene at an outside hospital.  He underwent debridement with both Urology and General surgery, however was noted to have additional crepitance. Given this, he was urgently transferred to our hospital and was taken to the operating room by general surgery for extensive debridement. Plastic surgery was consulted for assistance with management. He has stabilized and undergone diverting colostomy. He is brought to the operating room for dressing change under anesthesia given the complexity and pain. The risks, benefits, alternatives, outcomes, and personnel involved was performed with the patient. After all questions were answered to the patient's satisfaction, they agreed to proceed. OPERATIVE PROCEDURE:  The patient was brought to the operating room on his ICU bed and placed in the supine position on the operating room table. He underwent general anesthesia without difficulty, was then placed in the lithotomy position, and was prepped and draped in the usual sterile manner. A time-out was performed confirming the patient and the procedure to be performed. There was minimal granulation tissue, though his tissue edema has improved from the previous vac change.  Another complex wound vacuum device was then fashioned and applied with good seal.    The patient was then placed supine, extubated, and taken back to the regular floor in stable condition. There were no immediate complications and the patient tolerated the procedure well. At the end of the case, all counts were correct. PLAN: Will return for additional planned dressing change under anesthesia next week and if nutrition satisfactory and improved granulation tissue with negative cultures, will begin the reconstructive process.     Raymond Troy MD

## 2021-12-30 NOTE — PROGRESS NOTES
Surgery Daily Progress Note  Chana Pavel  CC:  Necrotizing fascitis       Subjective :  Patient rested well overnight. No acute events overnight. Patient denies any pain. NPO since midnight in anticipation of procedure today. Objective    Infusions:   lactated ringers 100 mL/hr at 12/30/21 0436    sodium chloride      sodium chloride 25 mL (12/12/21 0850)    dextrose          I/O:I/O last 3 completed shifts:  In: -   Out: 775 [Urine:275; Drains:500]           Wt Readings from Last 1 Encounters:   12/28/21 201 lb 15.1 oz (91.6 kg)                 LABS:    Recent Labs     12/28/21  0551 12/29/21  0555   WBC 6.8 7.4   HGB 8.3* 8.6*   HCT 25.0* 25.7*   MCV 93.0 93.0    287        Recent Labs     12/28/21  0551 12/29/21  0555    139   K 4.2 3.9    103   CO2 28 31   PHOS 3.1 3.3   BUN 8 10   CREATININE 0.6* 0.6*      No results for input(s): AST, ALT, ALB, BILIDIR, BILITOT, ALKPHOS in the last 72 hours. No results for input(s): LIPASE, AMYLASE in the last 72 hours. No results for input(s): PROT, INR, APTT in the last 72 hours. No results for input(s): CKTOTAL, CKMB, CKMBINDEX, TROPONINI in the last 72 hours. Exam:/63   Pulse 85   Temp 98 °F (36.7 °C) (Oral)   Resp 18   Ht 5' 11\" (1.803 m)   Wt 201 lb 15.1 oz (91.6 kg)   SpO2 96%   BMI 28.17 kg/m²   General appearance: alert, appears stated age and cooperative  Eyes: No scleral icterus, EOM grossly intact  Neck: trachea midline, no JVD, no lymphadenopathy, neck supple  Chest/Lungs: normal effort with no accessory muscle use on 2L NC  Cardiovascular: RRR, brisk capillary refill distally  Abdomen: Soft, large surgical debridement area of the abdomen with veraflo Vac in place holding adequate suction and instilling normal saline. Colostomy is pink, viable and with brown stool in the bag. Skin: warm and dry, no rashes  Extremities: no edema, no cyanosis  Genitourinary:  Mcfadden in place with clear yellow urine  Neuro: A&Ox3, no focal deficits, sensation intact    ASSESSMENT/PLAN: Pt. is a 61 y.o. male with Necrotizing fasciitis of the abdominal wall, gluteal region, and scrotum s/p wide excision of perineum, back, and abdominal wall. S/p multiple debridements and wound vac placement and changes intraoperatively with diverting colostomy creation (12/7). OR wound vac change (12/10, 12/13, 12/16, 12/21, 12/23, 21/27)     - continue instillation wound vac  - plan to return to OR for additional dressing change under anesthesia today, may begin reconstructive process today  - continue 0 carb diet  - continue strict glucose control  - patient needs to increase activity, PT/OT ordered    Lisa Murphy DO, MS  PGY1, General Surgery  12/30/21  6:12 AM    I am post-call today and will not be on campus. Please contact Dr. Jayro King at [phone] for questions or concerns regarding this patient.

## 2021-12-30 NOTE — PROGRESS NOTES
PACU Transfer Note    Vitals:    12/30/21 0945   BP: (!) 140/60   Pulse: 64   Resp: 13   Temp: 97.8 °F (36.6 °C)   SpO2: 95%       In: -   Out: 400 [Urine:400]    Pain assessment: Pain Level: 5    Report given to Receiving unit RN.    12/30/2021 9:56 AM

## 2021-12-30 NOTE — CARE COORDINATION
CM following,  OR today will need further OR time plan Tuesday 1/4. [facility] following.   Electronically signed by Santina Kanner, RN on 12/30/2021 at 4:17 PM  [phone]

## 2021-12-30 NOTE — ANESTHESIA POSTPROCEDURE EVALUATION
Department of Anesthesiology  Postprocedure Note    Patient: Mirna Rowland  MRN: [id number]  [place]: 1955  Date of evaluation: 12/30/2021  Time:  10:36 AM     Procedure Summary     Date: 12/30/21 Room / Location: [address]    Anesthesia Start: 2260 Anesthesia Stop: 9580    Procedure: PERINEAL / ABDOMINAL WOUND VAC CHANGE, (N/A ) Diagnosis: (necrotizing fascciitis)    Surgeons: Digna Ramos MD Responsible Provider: Ankur Castro DO    Anesthesia Type: general ASA Status: 3          Anesthesia Type: general    Arcadio Phase I: Arcadio Score: 9    Arcadio Phase II:      Last vitals: Reviewed and per EMR flowsheets.        Anesthesia Post Evaluation    Patient location during evaluation: PACU  Patient participation: complete - patient participated  Level of consciousness: awake and alert  Pain score: 5  Airway patency: patent  Nausea & Vomiting: no nausea and no vomiting  Cardiovascular status: blood pressure returned to baseline  Respiratory status: acceptable  Hydration status: euvolemic  Multimodal analgesia pain management approach

## 2021-12-30 NOTE — PROGRESS NOTES
Uneventful shift. Patient remains a/ox4. VSS. Mcfadden and wound vac emptied and output adequately charted in flow sheet. Ostomy did not have enough output to empty. Patient NPO at  00 for OR tomorrow. No other acute events. Bed locked and in lowest position. Alarm intact. CLWR. Will continue to monitor.

## 2021-12-30 NOTE — PROGRESS NOTES
Department of Surgery    Post Op Note  Subjective:  Denies nausea or vomiting. Pain tolerable. No other complaints offered. Eating    Objective:    Anesthesia type: General      Exam:   VITALS:  /63   Pulse 69   Temp 97 °F (36.1 °C) (Oral)   Resp 16   Ht 5' 11\" (1.803 m)   Wt 201 lb 15.1 oz (91.6 kg)   SpO2 95%   BMI 28.17 kg/m²   24HR INTAKE/OUTPUT:    Intake/Output Summary (Last 24 hours) at 12/30/2021 1049  Last data filed at 12/30/2021 0945  Gross per 24 hour   Intake --   Output 1750 ml   Net -1750 ml     Post-op vital signs:  Stable     Exam:General appearance: alert, appears stated age and cooperative  Lungs: symmetrical chest rise  Heart:S1S2  Abdomen: Soft, large surgical debridement area of the abdomen/gluteal region with veraflo Vac in place holding adequate suction and instilling normal saline. Colostomy is pink, viable and with brown stool in the bag. Settings: -125 mm Hg with instillation of 50 ml of normal saline, dwell for 10 and suction 3.5 hours.     Mcfadden intact with pale yellow urine    Assessment and Plan  Pt is a 61year old male with necrotizing fascitis s/p perineal/abdominal wound vac change POD #0    Pain management  FeNa:  Diet - zero carb diet, Fluids NS @ 100ml/hour   Endo: strict glucose monitoring and sliding scale  :  Urine output is adequate  Activity:  High air loss bed  Respiratory:  IS at bedside, encourage hourly IS and deep breathing  Prophylaxis: AC boots    Plan for return to OR on Tuesday

## 2021-12-31 LAB
ALBUMIN SERPL-MCNC: 2.6 G/DL (ref 3.4–5)
ANION GAP SERPL CALCULATED.3IONS-SCNC: 7 MMOL/L (ref 3–16)
BASOPHILS ABSOLUTE: 0.1 K/UL (ref 0–0.2)
BASOPHILS RELATIVE PERCENT: 0.8 %
BUN BLDV-MCNC: 19 MG/DL (ref 7–20)
CALCIUM SERPL-MCNC: 7.9 MG/DL (ref 8.3–10.6)
CHLORIDE BLD-SCNC: 103 MMOL/L (ref 99–110)
CO2: 29 MMOL/L (ref 21–32)
CREAT SERPL-MCNC: 0.6 MG/DL (ref 0.8–1.3)
EOSINOPHILS ABSOLUTE: 0.5 K/UL (ref 0–0.6)
EOSINOPHILS RELATIVE PERCENT: 7.1 %
GFR AFRICAN AMERICAN: >60
GFR NON-AFRICAN AMERICAN: >60
GLUCOSE BLD-MCNC: 104 MG/DL (ref 70–99)
GLUCOSE BLD-MCNC: 133 MG/DL (ref 70–99)
GLUCOSE BLD-MCNC: 162 MG/DL (ref 70–99)
GLUCOSE BLD-MCNC: 84 MG/DL (ref 70–99)
GLUCOSE BLD-MCNC: 90 MG/DL (ref 70–99)
HCT VFR BLD CALC: 25.2 % (ref 40.5–52.5)
HEMOGLOBIN: 8.5 G/DL (ref 13.5–17.5)
LYMPHOCYTES ABSOLUTE: 1.2 K/UL (ref 1–5.1)
LYMPHOCYTES RELATIVE PERCENT: 15.8 %
MAGNESIUM: 1.9 MG/DL (ref 1.8–2.4)
MCH RBC QN AUTO: 31.2 PG (ref 26–34)
MCHC RBC AUTO-ENTMCNC: 33.5 G/DL (ref 31–36)
MCV RBC AUTO: 93.1 FL (ref 80–100)
MONOCYTES ABSOLUTE: 0.7 K/UL (ref 0–1.3)
MONOCYTES RELATIVE PERCENT: 9.6 %
NEUTROPHILS ABSOLUTE: 4.9 K/UL (ref 1.7–7.7)
NEUTROPHILS RELATIVE PERCENT: 66.7 %
PDW BLD-RTO: 17.2 % (ref 12.4–15.4)
PERFORMED ON: ABNORMAL
PERFORMED ON: ABNORMAL
PERFORMED ON: NORMAL
PERFORMED ON: NORMAL
PHOSPHORUS: 3.2 MG/DL (ref 2.5–4.9)
PLATELET # BLD: 342 K/UL (ref 135–450)
PMV BLD AUTO: 7.8 FL (ref 5–10.5)
POTASSIUM SERPL-SCNC: 4 MMOL/L (ref 3.5–5.1)
RBC # BLD: 2.71 M/UL (ref 4.2–5.9)
SODIUM BLD-SCNC: 139 MMOL/L (ref 136–145)
WBC # BLD: 7.3 K/UL (ref 4–11)

## 2021-12-31 PROCEDURE — 2580000003 HC RX 258: Performed by: STUDENT IN AN ORGANIZED HEALTH CARE EDUCATION/TRAINING PROGRAM

## 2021-12-31 PROCEDURE — 80069 RENAL FUNCTION PANEL: CPT

## 2021-12-31 PROCEDURE — 6370000000 HC RX 637 (ALT 250 FOR IP): Performed by: STUDENT IN AN ORGANIZED HEALTH CARE EDUCATION/TRAINING PROGRAM

## 2021-12-31 PROCEDURE — 6360000002 HC RX W HCPCS: Performed by: STUDENT IN AN ORGANIZED HEALTH CARE EDUCATION/TRAINING PROGRAM

## 2021-12-31 PROCEDURE — 6360000002 HC RX W HCPCS

## 2021-12-31 PROCEDURE — 36592 COLLECT BLOOD FROM PICC: CPT

## 2021-12-31 PROCEDURE — 94761 N-INVAS EAR/PLS OXIMETRY MLT: CPT

## 2021-12-31 PROCEDURE — 85025 COMPLETE CBC W/AUTO DIFF WBC: CPT

## 2021-12-31 PROCEDURE — 2700000000 HC OXYGEN THERAPY PER DAY

## 2021-12-31 PROCEDURE — 99024 POSTOP FOLLOW-UP VISIT: CPT | Performed by: SURGERY

## 2021-12-31 PROCEDURE — 1200000000 HC SEMI PRIVATE

## 2021-12-31 PROCEDURE — 83735 ASSAY OF MAGNESIUM: CPT

## 2021-12-31 PROCEDURE — 94669 MECHANICAL CHEST WALL OSCILL: CPT

## 2021-12-31 RX ORDER — MAGNESIUM SULFATE 1 G/100ML
1000 INJECTION INTRAVENOUS ONCE
Status: COMPLETED | OUTPATIENT
Start: 2021-12-31 | End: 2021-12-31

## 2021-12-31 RX ADMIN — INSULIN HUMAN 5 UNITS: 100 INJECTION, SOLUTION PARENTERAL at 13:04

## 2021-12-31 RX ADMIN — INSULIN LISPRO 3 UNITS: 100 INJECTION, SOLUTION INTRAVENOUS; SUBCUTANEOUS at 18:10

## 2021-12-31 RX ADMIN — OXYCODONE HYDROCHLORIDE 10 MG: 5 TABLET ORAL at 21:19

## 2021-12-31 RX ADMIN — SODIUM CHLORIDE, POTASSIUM CHLORIDE, SODIUM LACTATE AND CALCIUM CHLORIDE: 600; 310; 30; 20 INJECTION, SOLUTION INTRAVENOUS at 03:07

## 2021-12-31 RX ADMIN — ACETAMINOPHEN 1000 MG: 500 TABLET ORAL at 18:10

## 2021-12-31 RX ADMIN — SODIUM CHLORIDE, PRESERVATIVE FREE 10 ML: 5 INJECTION INTRAVENOUS at 21:20

## 2021-12-31 RX ADMIN — ACETAMINOPHEN 1000 MG: 500 TABLET ORAL at 13:04

## 2021-12-31 RX ADMIN — OXYCODONE HYDROCHLORIDE 10 MG: 5 TABLET ORAL at 08:44

## 2021-12-31 RX ADMIN — DOCUSATE SODIUM 100 MG: 100 CAPSULE, LIQUID FILLED ORAL at 08:43

## 2021-12-31 RX ADMIN — INSULIN HUMAN 2 UNITS: 100 INJECTION, SOLUTION PARENTERAL at 08:43

## 2021-12-31 RX ADMIN — AMPICILLIN SODIUM AND SULBACTAM SODIUM 3000 MG: 2; 1 INJECTION, POWDER, FOR SOLUTION INTRAMUSCULAR; INTRAVENOUS at 21:24

## 2021-12-31 RX ADMIN — MAGNESIUM SULFATE HEPTAHYDRATE 1000 MG: 1 INJECTION, SOLUTION INTRAVENOUS at 10:10

## 2021-12-31 RX ADMIN — AMPICILLIN SODIUM AND SULBACTAM SODIUM 3000 MG: 2; 1 INJECTION, POWDER, FOR SOLUTION INTRAMUSCULAR; INTRAVENOUS at 15:37

## 2021-12-31 RX ADMIN — SODIUM CHLORIDE, PRESERVATIVE FREE 10 ML: 5 INJECTION INTRAVENOUS at 08:50

## 2021-12-31 RX ADMIN — ACETAMINOPHEN 1000 MG: 500 TABLET ORAL at 06:02

## 2021-12-31 RX ADMIN — AMLODIPINE BESYLATE 5 MG: 5 TABLET ORAL at 08:43

## 2021-12-31 RX ADMIN — HEPARIN SODIUM 5000 UNITS: 5000 INJECTION INTRAVENOUS; SUBCUTANEOUS at 21:19

## 2021-12-31 RX ADMIN — AMPICILLIN SODIUM AND SULBACTAM SODIUM 3000 MG: 2; 1 INJECTION, POWDER, FOR SOLUTION INTRAMUSCULAR; INTRAVENOUS at 08:42

## 2021-12-31 RX ADMIN — HEPARIN SODIUM 5000 UNITS: 5000 INJECTION INTRAVENOUS; SUBCUTANEOUS at 15:34

## 2021-12-31 RX ADMIN — POLYETHYLENE GLYCOL 3350 17 G: 17 POWDER, FOR SOLUTION ORAL at 08:43

## 2021-12-31 RX ADMIN — DOCUSATE SODIUM 100 MG: 100 CAPSULE, LIQUID FILLED ORAL at 21:19

## 2021-12-31 RX ADMIN — INSULIN LISPRO 3 UNITS: 100 INJECTION, SOLUTION INTRAVENOUS; SUBCUTANEOUS at 08:43

## 2021-12-31 RX ADMIN — AMPICILLIN SODIUM AND SULBACTAM SODIUM 3000 MG: 2; 1 INJECTION, POWDER, FOR SOLUTION INTRAMUSCULAR; INTRAVENOUS at 04:26

## 2021-12-31 RX ADMIN — HEPARIN SODIUM 5000 UNITS: 5000 INJECTION INTRAVENOUS; SUBCUTANEOUS at 06:02

## 2021-12-31 ASSESSMENT — PAIN SCALES - GENERAL
PAINLEVEL_OUTOF10: 7
PAINLEVEL_OUTOF10: 6
PAINLEVEL_OUTOF10: 7
PAINLEVEL_OUTOF10: 4

## 2021-12-31 ASSESSMENT — PAIN DESCRIPTION - LOCATION
LOCATION: BUTTOCKS
LOCATION: BUTTOCKS

## 2021-12-31 ASSESSMENT — PAIN DESCRIPTION - PAIN TYPE
TYPE: SURGICAL PAIN
TYPE: SURGICAL PAIN

## 2021-12-31 ASSESSMENT — PAIN DESCRIPTION - ORIENTATION: ORIENTATION: LEFT

## 2021-12-31 NOTE — PROGRESS NOTES
Uneventful shift. Patient remains a/ox4. VSS. Mcfadden, ostomy, and wound vac emptied and output adequately charted in flow sheet. Patient to OR today for wound vac change. Tolerated well. No other acute events. Bed locked and in lowest position. Alarm intact. CLWR. Will continue to monitor.

## 2021-12-31 NOTE — PLAN OF CARE
Problem: Bowel/Gastric:  Goal: Gastrointestinal status for postoperative course will improve  Description: Gastrointestinal status for postoperative course will improve  12/31/2021 0037 by Ping Baer RN  Outcome: Ongoing  Pt with colostomy with soft pasty stool in bag. Pt is tolerating diet. Abdomen soft with active bowel sounds. Problem: Skin Integrity:  Goal: Demonstration of wound healing without infection will improve  Description: Demonstration of wound healing without infection will improve  12/31/2021 0037 by Ping Baer, RN  Outcome: Ongoing  Pt has instill wound vac in place to perineum, groin. Dressing remains intact. Left foot dsg cdi. Prevalon boots in place. Problem: Falls - Risk of:  Goal: Will remain free from falls  Description: Will remain free from falls  Pt remains in fall precautions. Bed locked in low position with alarm set. No attempts made to get oob. Problem: Pain:  Goal: Pain level will decrease  Description: Pain level will decrease  Pt c/o 6/10 pain earlier. Medicated with prn oxycodone with relief. Pt has been sleeping for intervals.

## 2021-12-31 NOTE — PROGRESS NOTES
Mcfadden in place with clear yellow urine  Neuro: A&Ox3, no focal deficits, sensation intact    ASSESSMENT/PLAN: Pt. is a 61 y.o. male with Necrotizing fasciitis of the abdominal wall, gluteal region, and scrotum s/p wide excision of perineum, back, and abdominal wall. S/p multiple debridements and wound vac placement and changes intraoperatively with diverting colostomy creation (12/7). OR wound vac change (12/10, 12/13, 12/16, 12/21, 12/23, 21/27, 12/30)     - Continue instillation wound vac  - Plan to return to OR Tuesday for the start of the reconstructive process. - Process will start with reconstruction of the abdomen prior to perineum with a STSG planned   - Continue strict glucose control including Zero carb diet  - patient needs to increase activity    Saravanan Patrick MD, MPH  PGY-3 General Surgery  12/31/21  6:58 AM     I saw and independently examined the patient today. I agree with the history of present illness, past medical/surgical histories, family history, social history, medication list and allergies as listed. The review of systems is as noted above. My physical exam confirms the findings listed above. Review of labs, pathology reports, radiology reports and medical records confirm the findings noted above. I edited the note where appropriate in italics, strikethrough font, or underline. Increase activity and protein intake. OR next week.     Quique Roblero MD  [address] [place] [address] [address] Reconstructive Surgery  [phone]  12/31/21

## 2021-12-31 NOTE — PROGRESS NOTES
Podiatric Surgery Daily Progress Note      Admit Date: 12/1/2021      Code:Full Code    Patient seen and examined, labs and records reviewed    Subjective:     Patient seen at bedside this AM. Patient denies pain to bilateral lower extremities. Patient denies fever, chills, shortness of breath, chest pain. Review of Systems: A 10 point review of systems was conducted, significant findings as noted in HPI. All other systems negative. Objective     BP (!) 162/77   Pulse 79   Temp 98.2 °F (36.8 °C) (Oral)   Resp 16   Ht 5' 11\" (1.803 m)   Wt 201 lb 15.1 oz (91.6 kg)   SpO2 95%   BMI 28.17 kg/m²     I/O:    Intake/Output Summary (Last 24 hours) at 12/31/2021 0920  Last data filed at 12/31/2021 4270  Gross per 24 hour   Intake 868 ml   Output 1875 ml   Net -1007 ml              Wt Readings from Last 3 Encounters:   12/28/21 201 lb 15.1 oz (91.6 kg)   11/30/21 163 lb 3.2 oz (74 kg)   04/18/16 179 lb 14.3 oz (81.6 kg)       LABS:    Recent Labs     12/30/21  0617 12/31/21  0449   WBC 6.0 7.3   HGB 7.1* 8.5*   HCT 21.9* 25.2*    342        Recent Labs     12/31/21  0449      K 4.0      CO2 29   PHOS 3.2   BUN 19   CREATININE 0.6*        Recent Labs     12/30/21  0653   INR 1.12       LOWER EXTREMITY EXAMINATION    Dressing to left LE intact. No strikethrough noted to the external dressing. Betadine discoloration noted to the internal layers of the dressing of the left LE.     VASCULAR:   DP and PT pulses are non-palpable b/l. Upon hand-held Doppler examination DP signals were noted to be monophasic and PT signals were noted to be nondopplerable. CFT slightly diminished to the distal digits of bilateral lower extremities. Skin temperature is warm to cool to the distal digits from proximal to distal.    No edema noted. No pain with calf compression b/l.     NEUROLOGIC:   Gross and epicritic sensation is diminished b/l.  Protective sensation is diminished at all pedal sites b/l.     DERMATOLOGIC:     Left lower extremity:  Full-thickness ulceration noted to the lateral aspect of the left foot. Wound measures approximately 3.2 cm x 2 cm x 0.5 cm. Wound base with exposed 5th metatarsal bone, with necrotic edges at the proximal and distal edges. Necrotic appearance of 5th digit. No tracking or tunneling noted. No purulent drainage noted. No fluctuance or crepitus or malodor noted. Deep tissue injury noted 2/2 Prevalon boot strap to the anterior aspect of the ankle. Intact epithelialized tissue noted. No crepitus, erythema, drainage, or malodor noted. No open wound noted. Stable without signs of acute infection noted. Right lower extremity:  Parital thickness ulceration noted to the lateral malleolus 2/2 pressure. No fluctuance, crepitus, malodor, or drainage noted. No acute signs infection noted. Partial thickness ulceration 2/2 pressure noted at the styloid process of the 5th metatarsal. Fibrotic tissue noted. No fluctuance or crepitus noted. No acute signs of infection noted. Nonblanchable erythema noted to dorsal aspect right midfoot, nonblanchable, skin intact. No open wound noted at this time. No fluctuance, crepitus, erythema, drainage, or malodor noted. MUSCULOSKELETAL:   Muscle strength 4/5 for all pedal muscle groups B/L LE. No pain with palpation to bilateral lower extremity. IMAGING:  XR LEFT FOOT 11/30/2021  Narrative   EXAMINATION:   THREE XRAY VIEWS OF THE LEFT FOOT       11/30/2021 1:44 pm       HISTORY:   ORDERING SYSTEM PROVIDED HISTORY: wound   TECHNOLOGIST PROVIDED HISTORY:   Reason for exam:->wound   Reason for Exam: blood sugar problem, wound check on lateral portion of foot   Acuity: Acute   Type of Exam: Initial       FINDINGS:   Osseous destruction along the articular margins of the 5th   metatarsophalangeal joint with associated lucency in the soft tissues. .   There is no evidence of fracture or dislocation.  . The remaining joint spaces appear well maintained. The remaining soft tissues are unremarkable.       Impression   Evidence of a septic joint involving the 5th metatarsal phalangeal joint with   associated osteomyelitis     ARTERIAL DUPLEX 12/2/21     Type of Study:        Extremities Arteries:Lower Extremities Arterial Duplex, VL LOWER EXTREMITY    ARTERIES DUPLEX BILATERAL.       Tech Comments   Right   The right ankle/brachial index is 0.0 (no Doppler signal could be heard at the   [facility] or the PT). The common femoral and profunda femoral arteries could not be visualized due   to bandages extending into the groin area. The mid superficial femoral artery has a short segmental occlusion and then is   reconstituted. Elevated velocities at the distal superficial femoral artery indicate a > 50%   stenosis by velocity criteria (velocity went from 15 to 298 cm/s). The posterior tibial artery is occluded. The distal anterior tibial artery is barely patent, with very low flow   (possibly occluded and then reconstituted). Left   The left ankle brachial index is 0 for the PT and the DP was not accessible   due to the bandages on the foot. The common femoral and profunda femoral artery could not be visualized due to   bandages extending into the groin area. The distal superficial femoral artery is occluded and then reconstituted. The posterior tibial artery, peroneal, and anterior tibial artery are   occluded. There were no previous studies to use for comparison.        ASSESSMENT/PLAN:   -Full-thickness ulceration; lateral left foot- Sands 4  -Osteomyelitis of the fifth metatarsal; left foot  -Deep Tissue Injury, left anterior ankle  -Partial thickness pressure ulceration x2, right foot; NPUAP Stage 2  -Pressure injury, dorsal right midfoot -NPUAP Stage I  -Critical limb ischemia; bilateral lower extremity  -Diabetes mellitus, type II  -History of noncompliance    -Patient seen and examined at bedside this AM.  -Hypertensive

## 2021-12-31 NOTE — PROGRESS NOTES
VSS. Pt has been watching TV and sleeping most of the night. Pt c/o pain earlier and medicated with prn oxycodone with relief. Instill wound vac remains in place to perineum, gluteal area and abd. Mcfadden draining well and and ostomy with soft stool. No needs at this time. Will continue to monitor.   Electronically signed by Andrea Patino RN on 12/31/21 at 2:48 AM EST

## 2022-01-01 LAB
ALBUMIN SERPL-MCNC: 2.6 G/DL (ref 3.4–5)
ANION GAP SERPL CALCULATED.3IONS-SCNC: 3 MMOL/L (ref 3–16)
BASOPHILS ABSOLUTE: 0.1 K/UL (ref 0–0.2)
BASOPHILS RELATIVE PERCENT: 1.3 %
BUN BLDV-MCNC: 24 MG/DL (ref 7–20)
CALCIUM SERPL-MCNC: 8.1 MG/DL (ref 8.3–10.6)
CHLORIDE BLD-SCNC: 104 MMOL/L (ref 99–110)
CO2: 32 MMOL/L (ref 21–32)
CREAT SERPL-MCNC: 0.6 MG/DL (ref 0.8–1.3)
EOSINOPHILS ABSOLUTE: 0.4 K/UL (ref 0–0.6)
EOSINOPHILS RELATIVE PERCENT: 4.1 %
GFR AFRICAN AMERICAN: >60
GFR NON-AFRICAN AMERICAN: >60
GLUCOSE BLD-MCNC: 120 MG/DL (ref 70–99)
GLUCOSE BLD-MCNC: 126 MG/DL (ref 70–99)
GLUCOSE BLD-MCNC: 129 MG/DL (ref 70–99)
GLUCOSE BLD-MCNC: 136 MG/DL (ref 70–99)
GLUCOSE BLD-MCNC: 73 MG/DL (ref 70–99)
HCT VFR BLD CALC: 25.5 % (ref 40.5–52.5)
HEMOGLOBIN: 8.5 G/DL (ref 13.5–17.5)
LYMPHOCYTES ABSOLUTE: 0.9 K/UL (ref 1–5.1)
LYMPHOCYTES RELATIVE PERCENT: 11 %
MAGNESIUM: 1.6 MG/DL (ref 1.8–2.4)
MCH RBC QN AUTO: 31.5 PG (ref 26–34)
MCHC RBC AUTO-ENTMCNC: 33.5 G/DL (ref 31–36)
MCV RBC AUTO: 93.9 FL (ref 80–100)
MONOCYTES ABSOLUTE: 0.8 K/UL (ref 0–1.3)
MONOCYTES RELATIVE PERCENT: 9.9 %
NEUTROPHILS ABSOLUTE: 6.3 K/UL (ref 1.7–7.7)
NEUTROPHILS RELATIVE PERCENT: 73.7 %
PDW BLD-RTO: 16.8 % (ref 12.4–15.4)
PERFORMED ON: ABNORMAL
PERFORMED ON: NORMAL
PHOSPHORUS: 2.9 MG/DL (ref 2.5–4.9)
PLATELET # BLD: 305 K/UL (ref 135–450)
PMV BLD AUTO: 7.9 FL (ref 5–10.5)
POTASSIUM SERPL-SCNC: 3.9 MMOL/L (ref 3.5–5.1)
RBC # BLD: 2.71 M/UL (ref 4.2–5.9)
SODIUM BLD-SCNC: 139 MMOL/L (ref 136–145)
WBC # BLD: 8.5 K/UL (ref 4–11)

## 2022-01-01 PROCEDURE — 6370000000 HC RX 637 (ALT 250 FOR IP): Performed by: STUDENT IN AN ORGANIZED HEALTH CARE EDUCATION/TRAINING PROGRAM

## 2022-01-01 PROCEDURE — 6360000002 HC RX W HCPCS: Performed by: STUDENT IN AN ORGANIZED HEALTH CARE EDUCATION/TRAINING PROGRAM

## 2022-01-01 PROCEDURE — 36592 COLLECT BLOOD FROM PICC: CPT

## 2022-01-01 PROCEDURE — 80069 RENAL FUNCTION PANEL: CPT

## 2022-01-01 PROCEDURE — 94669 MECHANICAL CHEST WALL OSCILL: CPT

## 2022-01-01 PROCEDURE — 2580000003 HC RX 258: Performed by: STUDENT IN AN ORGANIZED HEALTH CARE EDUCATION/TRAINING PROGRAM

## 2022-01-01 PROCEDURE — 83735 ASSAY OF MAGNESIUM: CPT

## 2022-01-01 PROCEDURE — 6360000002 HC RX W HCPCS

## 2022-01-01 PROCEDURE — 1200000000 HC SEMI PRIVATE

## 2022-01-01 PROCEDURE — 85025 COMPLETE CBC W/AUTO DIFF WBC: CPT

## 2022-01-01 RX ORDER — MAGNESIUM SULFATE IN WATER 40 MG/ML
4000 INJECTION, SOLUTION INTRAVENOUS ONCE
Status: COMPLETED | OUTPATIENT
Start: 2022-01-01 | End: 2022-01-01

## 2022-01-01 RX ADMIN — DOCUSATE SODIUM 100 MG: 100 CAPSULE, LIQUID FILLED ORAL at 21:07

## 2022-01-01 RX ADMIN — AMPICILLIN SODIUM AND SULBACTAM SODIUM 3000 MG: 2; 1 INJECTION, POWDER, FOR SOLUTION INTRAMUSCULAR; INTRAVENOUS at 16:55

## 2022-01-01 RX ADMIN — INSULIN LISPRO 3 UNITS: 100 INJECTION, SOLUTION INTRAVENOUS; SUBCUTANEOUS at 09:04

## 2022-01-01 RX ADMIN — INSULIN HUMAN 2 UNITS: 100 INJECTION, SOLUTION PARENTERAL at 16:57

## 2022-01-01 RX ADMIN — POLYETHYLENE GLYCOL 3350 17 G: 17 POWDER, FOR SOLUTION ORAL at 08:55

## 2022-01-01 RX ADMIN — DOCUSATE SODIUM 100 MG: 100 CAPSULE, LIQUID FILLED ORAL at 08:54

## 2022-01-01 RX ADMIN — AMPICILLIN SODIUM AND SULBACTAM SODIUM 3000 MG: 2; 1 INJECTION, POWDER, FOR SOLUTION INTRAMUSCULAR; INTRAVENOUS at 03:30

## 2022-01-01 RX ADMIN — SODIUM CHLORIDE, PRESERVATIVE FREE 10 ML: 5 INJECTION INTRAVENOUS at 08:55

## 2022-01-01 RX ADMIN — AMLODIPINE BESYLATE 5 MG: 5 TABLET ORAL at 08:55

## 2022-01-01 RX ADMIN — HEPARIN SODIUM 5000 UNITS: 5000 INJECTION INTRAVENOUS; SUBCUTANEOUS at 21:08

## 2022-01-01 RX ADMIN — OXYCODONE HYDROCHLORIDE 10 MG: 5 TABLET ORAL at 21:07

## 2022-01-01 RX ADMIN — ACETAMINOPHEN 1000 MG: 500 TABLET ORAL at 19:33

## 2022-01-01 RX ADMIN — OXYCODONE HYDROCHLORIDE 10 MG: 5 TABLET ORAL at 16:55

## 2022-01-01 RX ADMIN — ACETAMINOPHEN 1000 MG: 500 TABLET ORAL at 11:55

## 2022-01-01 RX ADMIN — AMPICILLIN SODIUM AND SULBACTAM SODIUM 3000 MG: 2; 1 INJECTION, POWDER, FOR SOLUTION INTRAMUSCULAR; INTRAVENOUS at 09:01

## 2022-01-01 RX ADMIN — OXYCODONE HYDROCHLORIDE 10 MG: 5 TABLET ORAL at 11:55

## 2022-01-01 RX ADMIN — HEPARIN SODIUM 5000 UNITS: 5000 INJECTION INTRAVENOUS; SUBCUTANEOUS at 05:28

## 2022-01-01 RX ADMIN — HYDRALAZINE HYDROCHLORIDE 10 MG: 20 INJECTION INTRAMUSCULAR; INTRAVENOUS at 11:55

## 2022-01-01 RX ADMIN — INSULIN LISPRO 3 UNITS: 100 INJECTION, SOLUTION INTRAVENOUS; SUBCUTANEOUS at 16:58

## 2022-01-01 RX ADMIN — MAGNESIUM SULFATE HEPTAHYDRATE 4000 MG: 4 INJECTION, SOLUTION INTRAVENOUS at 12:09

## 2022-01-01 RX ADMIN — HEPARIN SODIUM 5000 UNITS: 5000 INJECTION INTRAVENOUS; SUBCUTANEOUS at 16:56

## 2022-01-01 RX ADMIN — SODIUM CHLORIDE, PRESERVATIVE FREE 10 ML: 5 INJECTION INTRAVENOUS at 21:08

## 2022-01-01 RX ADMIN — AMPICILLIN SODIUM AND SULBACTAM SODIUM 3000 MG: 2; 1 INJECTION, POWDER, FOR SOLUTION INTRAMUSCULAR; INTRAVENOUS at 21:12

## 2022-01-01 ASSESSMENT — PAIN DESCRIPTION - PAIN TYPE: TYPE: SURGICAL PAIN

## 2022-01-01 ASSESSMENT — PAIN SCALES - GENERAL
PAINLEVEL_OUTOF10: 7
PAINLEVEL_OUTOF10: 6
PAINLEVEL_OUTOF10: 7
PAINLEVEL_OUTOF10: 5
PAINLEVEL_OUTOF10: 6
PAINLEVEL_OUTOF10: 7

## 2022-01-01 ASSESSMENT — PAIN DESCRIPTION - FREQUENCY: FREQUENCY: CONTINUOUS

## 2022-01-01 ASSESSMENT — PAIN DESCRIPTION - LOCATION: LOCATION: BUTTOCKS;GROIN

## 2022-01-01 ASSESSMENT — PAIN DESCRIPTION - DESCRIPTORS: DESCRIPTORS: PRESSURE;ACHING

## 2022-01-01 ASSESSMENT — PAIN DESCRIPTION - ORIENTATION: ORIENTATION: MID;RIGHT;LEFT

## 2022-01-01 ASSESSMENT — PAIN DESCRIPTION - ONSET: ONSET: ON-GOING

## 2022-01-01 ASSESSMENT — PAIN DESCRIPTION - PROGRESSION: CLINICAL_PROGRESSION: NOT CHANGED

## 2022-01-01 ASSESSMENT — PAIN - FUNCTIONAL ASSESSMENT: PAIN_FUNCTIONAL_ASSESSMENT: PREVENTS OR INTERFERES SOME ACTIVE ACTIVITIES AND ADLS

## 2022-01-01 NOTE — PROGRESS NOTES
Podiatric Surgery Daily Progress Note      Admit Date: 12/1/2021      Code:Full Code    Patient seen and examined, labs and records reviewed    Subjective:     Patient seen at bedside this AM. Patient denies pain to bilateral lower extremities. Patient denies fever, chills, shortness of breath, chest pain. Review of Systems: A 10 point review of systems was conducted, significant findings as noted in HPI. All other systems negative. Objective     /70   Pulse 84   Temp 98.8 °F (37.1 °C) (Oral)   Resp 16   Ht 5' 11\" (1.803 m)   Wt 201 lb 15.1 oz (91.6 kg)   SpO2 92%   BMI 28.17 kg/m²     I/O:    Intake/Output Summary (Last 24 hours) at 1/1/2022 0742  Last data filed at 1/1/2022 0330  Gross per 24 hour   Intake 1140 ml   Output 1650 ml   Net -510 ml              Wt Readings from Last 3 Encounters:   12/28/21 201 lb 15.1 oz (91.6 kg)   11/30/21 163 lb 3.2 oz (74 kg)   04/18/16 179 lb 14.3 oz (81.6 kg)       LABS:    Recent Labs     12/31/21  0449 01/01/22  0525   WBC 7.3 8.5   HGB 8.5* 8.5*   HCT 25.2* 25.5*    305        Recent Labs     01/01/22  0525      K 3.9      CO2 32   PHOS 2.9   BUN 24*   CREATININE 0.6*        Recent Labs     12/30/21  0653   INR 1.12       LOWER EXTREMITY EXAMINATION    Dressing to left LE intact. No strikethrough noted to the external dressing. Betadine discoloration noted to the internal layers of the dressing of the left LE.     VASCULAR:   DP and PT pulses are non-palpable b/l. Upon hand-held Doppler examination DP signals were noted to be monophasic and PT signals were noted to be nondopplerable. CFT slightly diminished to the distal digits of bilateral lower extremities. Skin temperature is warm to cool to the distal digits from proximal to distal.    No edema noted. No pain with calf compression b/l.     NEUROLOGIC:   Gross and epicritic sensation is diminished b/l.  Protective sensation is diminished at all pedal sites b/l.     DERMATOLOGIC: Left lower extremity:  Full-thickness ulceration noted to the lateral aspect of the left foot. Wound measures approximately 3.2 cm x 2 cm x 0.5 cm. Wound base with exposed 5th metatarsal bone, with necrotic edges at the proximal and distal edges. Necrotic appearance of 5th digit. No tracking or tunneling noted. No purulent drainage noted. No fluctuance or crepitus or malodor noted. Deep tissue injury noted 2/2 Prevalon boot strap to the anterior aspect of the ankle. Intact epithelialized tissue noted. No crepitus, erythema, drainage, or malodor noted. No open wound noted. Stable without signs of acute infection noted. Right lower extremity:  Parital thickness ulceration noted to the lateral malleolus 2/2 pressure. No fluctuance, crepitus, malodor, or drainage noted. No acute signs infection noted. Partial thickness ulceration 2/2 pressure noted at the styloid process of the 5th metatarsal. Fibrotic tissue noted. No fluctuance or crepitus noted. No acute signs of infection noted. Nonblanchable erythema noted to dorsal aspect right midfoot, nonblanchable, skin intact. No open wound noted at this time. No fluctuance, crepitus, erythema, drainage, or malodor noted. MUSCULOSKELETAL:   Muscle strength 4/5 for all pedal muscle groups B/L LE. No pain with palpation to bilateral lower extremity. IMAGING:  XR LEFT FOOT 11/30/2021  Narrative   EXAMINATION:   THREE XRAY VIEWS OF THE LEFT FOOT       11/30/2021 1:44 pm       HISTORY:   ORDERING SYSTEM PROVIDED HISTORY: wound   TECHNOLOGIST PROVIDED HISTORY:   Reason for exam:->wound   Reason for Exam: blood sugar problem, wound check on lateral portion of foot   Acuity: Acute   Type of Exam: Initial       FINDINGS:   Osseous destruction along the articular margins of the 5th   metatarsophalangeal joint with associated lucency in the soft tissues. .   There is no evidence of fracture or dislocation. . The remaining joint spaces   appear well maintained. The remaining soft tissues are unremarkable.       Impression   Evidence of a septic joint involving the 5th metatarsal phalangeal joint with   associated osteomyelitis     ARTERIAL DUPLEX 12/2/21     Type of Study:        Extremities Arteries:Lower Extremities Arterial Duplex, VL LOWER EXTREMITY    ARTERIES DUPLEX BILATERAL.       Tech Comments   Right   The right ankle/brachial index is 0.0 (no Doppler signal could be heard at the   [facility] or the PT). The common femoral and profunda femoral arteries could not be visualized due   to bandages extending into the groin area. The mid superficial femoral artery has a short segmental occlusion and then is   reconstituted. Elevated velocities at the distal superficial femoral artery indicate a > 50%   stenosis by velocity criteria (velocity went from 15 to 298 cm/s). The posterior tibial artery is occluded. The distal anterior tibial artery is barely patent, with very low flow   (possibly occluded and then reconstituted). Left   The left ankle brachial index is 0 for the PT and the DP was not accessible   due to the bandages on the foot. The common femoral and profunda femoral artery could not be visualized due to   bandages extending into the groin area. The distal superficial femoral artery is occluded and then reconstituted. The posterior tibial artery, peroneal, and anterior tibial artery are   occluded. There were no previous studies to use for comparison.        ASSESSMENT/PLAN:   -Full-thickness ulceration; lateral left foot- Sands 4  -Osteomyelitis of the fifth metatarsal; left foot  -Deep Tissue Injury, left anterior ankle  -Partial thickness pressure ulceration x2, right foot; NPUAP Stage 2  -Pressure injury, dorsal right midfoot -NPUAP Stage I  -Critical limb ischemia; bilateral lower extremity  -Diabetes mellitus, type II  -History of noncompliance    -Patient seen and examined at bedside this AM.  -Hypertensive otherwise VSS on 2 L of O2 via NC; no leukocytosis noted (WBC 8.5)  -All imaging reviewed; impressions noted above  -CRP 24.2 (12/27/21)  -Prealbumin 16.6 (12/27/21)  -Vascular surgery following; will await further stabilization with the general surgery team and plastic surgery team before offering vascular intervention.  -Plastic surgery following  -Infectious disease following, currently on Unasyn  -Left lower extremity dressed with Betadine soaked gauze, DSD, and tape. -Right lower extremity dressed with Mepilex borders  -Prevalon boots reapplied. Patient is to wear at all times while in bed to prevent further deep tissue injury. Ankle strap removed from bilateral boot as the ankle straps were causing pressure to his feet. -Nonweightbearing to left lower extremity.  -Weightbearing as tolerated to right lower extremity. DISPO: Full-thickness ulceration with underlying osteomyelitis to the left fifth metatarsal. Critical limb ischemia to b/l LE. Wounds are stable at this time. Vascular following; awaiting further stabilization at this time. Plastic surgery, and ID following. Patient will require podiatric surgical intervention but timing will depend on medical stability and vascular recommendations. Will continue to follow while in house.     Patient examined and evaluated bedside with JEANETTE Hurtado DPM  Podiatric Resident, PGY-2  Pager #: [phone] or Perfect Serve

## 2022-01-01 NOTE — PROGRESS NOTES
Uneventful shift. Patient remains a/ox4. VSS. Mcfadden, ostomy, and wound vac emptied and output adequately charted in flow sheet. Per MD Mary Gaitan patient needs more protein in diet and agreed to the meals that are listed on patient's door and in room. Also provided patient's wife with no/low carb, high protein items that she can purchase and bring in. CHG line care and bath given. Sheets and gown changed. Patient turned when needed. No other acute events. Bed locked and in lowest position. Alarm intact. CLWR. Will continue to monitor.

## 2022-01-01 NOTE — PROGRESS NOTES
Plastic Surgery   Daily Progress Note  Marilyn Jung     CC:  Necrotizing fascitis     Subjective :  No issues overnight, patient rested well. Afebrile and HDS. Patient with good PO intake, denies nausea/vomiting. Objective    Infusions:   lactated ringers 100 mL/hr at 12/31/21 0307    sodium chloride      sodium chloride 25 mL (12/12/21 0850)    dextrose        I/O:I/O last 3 completed shifts: In: 1140 [P.O.:1140]  Out: 4828 [Urine:1425; Drains:225]           Wt Readings from Last 1 Encounters:   12/28/21 201 lb 15.1 oz (91.6 kg)             LABS:    Recent Labs     12/31/21  0449 01/01/22  0525   WBC 7.3 8.5   HGB 8.5* 8.5*   HCT 25.2* 25.5*   MCV 93.1 93.9    305        Recent Labs     12/31/21  0449 01/01/22  0525    139   K 4.0 3.9    104   CO2 29 32   PHOS 3.2 2.9   BUN 19 24*   CREATININE 0.6* 0.6*      No results for input(s): AST, ALT, ALB, BILIDIR, BILITOT, ALKPHOS in the last 72 hours. No results for input(s): LIPASE, AMYLASE in the last 72 hours. Recent Labs     12/30/21  0653   INR 1.12      No results for input(s): CKTOTAL, CKMB, CKMBINDEX, TROPONINI in the last 72 hours. Exam  /70   Pulse 84   Temp 98.8 °F (37.1 °C) (Oral)   Resp 16   Ht 5' 11\" (1.803 m)   Wt 201 lb 15.1 oz (91.6 kg)   SpO2 92%   BMI 28.17 kg/m²      General appearance: alert, appears stated age and cooperative  Eyes: No scleral icterus, EOM grossly intact  Neck: trachea midline, no JVD, no lymphadenopathy, neck supple  Chest/Lungs: normal effort with no accessory muscle use on 2L NC  Cardiovascular: RRR, brisk capillary refill distally  Abdomen: Soft, large surgical debridement area of the abdomen with veraflo Vac in place holding adequate suction and instilling normal saline. Colostomy is pink, viable and with brown stool in the bag. Skin: warm and dry, no rashes  Extremities: no edema, no cyanosis  Genitourinary:  Mcfadden in place with clear yellow urine  Neuro: A&Ox3, no focal deficits, sensation intact    ASSESSMENT/PLAN: Pt. is a 61 y.o. male with Necrotizing fasciitis of the abdominal wall, gluteal region, and scrotum s/p wide excision of perineum, back, and abdominal wall. S/p multiple debridements and wound vac placement and changes intraoperatively with diverting colostomy creation (12/7). OR wound vac change (12/10, 12/13, 12/16, 12/21, 12/23, 21/27, 12/30). - Continue instillation wound vac  - Plan to return to OR Tuesday for the start of the reconstructive process.       - Process will start with reconstruction of the abdomen prior to perineum with a STSG planned   - Continue strict glucose control including Zero carb diet  - patient needs to increase activity    Jerod Obregon DO  PGY1, General Surgery  01/01/22  7:06 AM  [phone]

## 2022-01-02 LAB
GLUCOSE BLD-MCNC: 100 MG/DL (ref 70–99)
GLUCOSE BLD-MCNC: 114 MG/DL (ref 70–99)
GLUCOSE BLD-MCNC: 78 MG/DL (ref 70–99)
GLUCOSE BLD-MCNC: 89 MG/DL (ref 70–99)
GLUCOSE BLD-MCNC: 96 MG/DL (ref 70–99)
PERFORMED ON: ABNORMAL
PERFORMED ON: ABNORMAL
PERFORMED ON: NORMAL

## 2022-01-02 PROCEDURE — 2580000003 HC RX 258: Performed by: STUDENT IN AN ORGANIZED HEALTH CARE EDUCATION/TRAINING PROGRAM

## 2022-01-02 PROCEDURE — 2700000000 HC OXYGEN THERAPY PER DAY

## 2022-01-02 PROCEDURE — 6360000002 HC RX W HCPCS: Performed by: STUDENT IN AN ORGANIZED HEALTH CARE EDUCATION/TRAINING PROGRAM

## 2022-01-02 PROCEDURE — 6370000000 HC RX 637 (ALT 250 FOR IP): Performed by: STUDENT IN AN ORGANIZED HEALTH CARE EDUCATION/TRAINING PROGRAM

## 2022-01-02 PROCEDURE — 99024 POSTOP FOLLOW-UP VISIT: CPT | Performed by: SURGERY

## 2022-01-02 PROCEDURE — 94669 MECHANICAL CHEST WALL OSCILL: CPT

## 2022-01-02 PROCEDURE — 1200000000 HC SEMI PRIVATE

## 2022-01-02 RX ADMIN — AMPICILLIN SODIUM AND SULBACTAM SODIUM 3000 MG: 2; 1 INJECTION, POWDER, FOR SOLUTION INTRAMUSCULAR; INTRAVENOUS at 04:00

## 2022-01-02 RX ADMIN — ACETAMINOPHEN 1000 MG: 500 TABLET ORAL at 06:27

## 2022-01-02 RX ADMIN — HEPARIN SODIUM 5000 UNITS: 5000 INJECTION INTRAVENOUS; SUBCUTANEOUS at 15:11

## 2022-01-02 RX ADMIN — AMLODIPINE BESYLATE 5 MG: 5 TABLET ORAL at 09:40

## 2022-01-02 RX ADMIN — INSULIN LISPRO 3 UNITS: 100 INJECTION, SOLUTION INTRAVENOUS; SUBCUTANEOUS at 17:44

## 2022-01-02 RX ADMIN — AMPICILLIN SODIUM AND SULBACTAM SODIUM 3000 MG: 2; 1 INJECTION, POWDER, FOR SOLUTION INTRAMUSCULAR; INTRAVENOUS at 22:00

## 2022-01-02 RX ADMIN — HEPARIN SODIUM 5000 UNITS: 5000 INJECTION INTRAVENOUS; SUBCUTANEOUS at 06:27

## 2022-01-02 RX ADMIN — SODIUM CHLORIDE, PRESERVATIVE FREE 10 ML: 5 INJECTION INTRAVENOUS at 09:41

## 2022-01-02 RX ADMIN — POLYETHYLENE GLYCOL 3350 17 G: 17 POWDER, FOR SOLUTION ORAL at 09:39

## 2022-01-02 RX ADMIN — HEPARIN SODIUM 5000 UNITS: 5000 INJECTION INTRAVENOUS; SUBCUTANEOUS at 21:53

## 2022-01-02 RX ADMIN — AMPICILLIN SODIUM AND SULBACTAM SODIUM 3000 MG: 2; 1 INJECTION, POWDER, FOR SOLUTION INTRAMUSCULAR; INTRAVENOUS at 15:13

## 2022-01-02 RX ADMIN — DOCUSATE SODIUM 100 MG: 100 CAPSULE, LIQUID FILLED ORAL at 09:40

## 2022-01-02 RX ADMIN — DOCUSATE SODIUM 100 MG: 100 CAPSULE, LIQUID FILLED ORAL at 21:52

## 2022-01-02 RX ADMIN — OXYCODONE HYDROCHLORIDE 5 MG: 5 TABLET ORAL at 21:52

## 2022-01-02 RX ADMIN — AMPICILLIN SODIUM AND SULBACTAM SODIUM 3000 MG: 2; 1 INJECTION, POWDER, FOR SOLUTION INTRAMUSCULAR; INTRAVENOUS at 09:45

## 2022-01-02 RX ADMIN — SODIUM CHLORIDE, POTASSIUM CHLORIDE, SODIUM LACTATE AND CALCIUM CHLORIDE: 600; 310; 30; 20 INJECTION, SOLUTION INTRAVENOUS at 02:34

## 2022-01-02 RX ADMIN — SODIUM CHLORIDE, PRESERVATIVE FREE 10 ML: 5 INJECTION INTRAVENOUS at 20:10

## 2022-01-02 RX ADMIN — ACETAMINOPHEN 1000 MG: 500 TABLET ORAL at 00:00

## 2022-01-02 RX ADMIN — ACETAMINOPHEN 1000 MG: 500 TABLET ORAL at 15:11

## 2022-01-02 RX ADMIN — INSULIN LISPRO 3 UNITS: 100 INJECTION, SOLUTION INTRAVENOUS; SUBCUTANEOUS at 09:40

## 2022-01-02 ASSESSMENT — PAIN DESCRIPTION - ORIENTATION
ORIENTATION: RIGHT;LEFT;MID
ORIENTATION: RIGHT;LEFT;MID

## 2022-01-02 ASSESSMENT — PAIN DESCRIPTION - PAIN TYPE
TYPE: SURGICAL PAIN
TYPE: SURGICAL PAIN

## 2022-01-02 ASSESSMENT — PAIN DESCRIPTION - ONSET
ONSET: ON-GOING
ONSET: ON-GOING

## 2022-01-02 ASSESSMENT — PAIN DESCRIPTION - DESCRIPTORS
DESCRIPTORS: ACHING;PRESSURE
DESCRIPTORS: ACHING;PRESSURE

## 2022-01-02 ASSESSMENT — PAIN DESCRIPTION - LOCATION
LOCATION: BUTTOCKS;GROIN
LOCATION: BUTTOCKS;GROIN

## 2022-01-02 ASSESSMENT — PAIN DESCRIPTION - FREQUENCY
FREQUENCY: CONTINUOUS
FREQUENCY: CONTINUOUS

## 2022-01-02 ASSESSMENT — PAIN DESCRIPTION - PROGRESSION
CLINICAL_PROGRESSION: NOT CHANGED
CLINICAL_PROGRESSION: NOT CHANGED

## 2022-01-02 ASSESSMENT — PAIN SCALES - GENERAL
PAINLEVEL_OUTOF10: 6
PAINLEVEL_OUTOF10: 6
PAINLEVEL_OUTOF10: 0
PAINLEVEL_OUTOF10: 6
PAINLEVEL_OUTOF10: 6
PAINLEVEL_OUTOF10: 5

## 2022-01-02 NOTE — PROGRESS NOTES
Podiatric Surgery Daily Progress Note      Admit Date: 12/1/2021      Code:Full Code    Patient seen and examined, labs and records reviewed    Subjective:     Patient seen at bedside this AM. Patient denies pain to bilateral lower extremities. Patient denies fever, chills, shortness of breath, chest pain. Review of Systems: A 10 point review of systems was conducted, significant findings as noted in HPI. All other systems negative. Objective     BP (!) 179/84   Pulse 88   Temp 98 °F (36.7 °C) (Oral)   Resp 18   Ht 5' 11\" (1.803 m)   Wt 201 lb 15.1 oz (91.6 kg)   SpO2 92%   BMI 28.17 kg/m²     I/O:    Intake/Output Summary (Last 24 hours) at 1/2/2022 0746  Last data filed at 1/2/2022 0403  Gross per 24 hour   Intake --   Output 1600 ml   Net -1600 ml              Wt Readings from Last 3 Encounters:   12/28/21 201 lb 15.1 oz (91.6 kg)   11/30/21 163 lb 3.2 oz (74 kg)   04/18/16 179 lb 14.3 oz (81.6 kg)       LABS:    Recent Labs     12/31/21  0449 01/01/22  0525   WBC 7.3 8.5   HGB 8.5* 8.5*   HCT 25.2* 25.5*    305        Recent Labs     01/01/22  0525      K 3.9      CO2 32   PHOS 2.9   BUN 24*   CREATININE 0.6*        No results for input(s): PROT, INR, APTT in the last 72 hours. LOWER EXTREMITY EXAMINATION    Dressing to left LE intact. No strikethrough noted to the external dressing. Betadine discoloration noted to the internal layers of the dressing of the left LE.     VASCULAR:   DP and PT pulses are non-palpable b/l. Upon hand-held Doppler examination DP signals were noted to be monophasic and PT signals were noted to be nondopplerable. CFT slightly diminished to the distal digits of bilateral lower extremities. Skin temperature is warm to cool to the distal digits from proximal to distal.    No edema noted. No pain with calf compression b/l.     NEUROLOGIC:   Gross and epicritic sensation is diminished b/l.  Protective sensation is diminished at all pedal sites b/l.     DERMATOLOGIC:     Left lower extremity:  Full-thickness ulceration noted to the lateral aspect of the left foot. Wound measures approximately 3.2 cm x 2 cm x 0.5 cm. Wound base with exposed 5th metatarsal bone, with necrotic edges at the proximal and distal edges. Necrotic appearance of 5th digit. No tracking or tunneling noted. No purulent drainage noted. No fluctuance or crepitus or malodor noted. Deep tissue injury noted 2/2 Prevalon boot strap to the anterior aspect of the ankle. Intact epithelialized tissue noted. No crepitus, erythema, drainage, or malodor noted. No open wound noted. Stable without signs of acute infection noted. Right lower extremity:  Parital thickness ulceration noted to the lateral malleolus 2/2 pressure. No fluctuance, crepitus, malodor, or drainage noted. No acute signs infection noted. Partial thickness ulceration 2/2 pressure noted at the styloid process of the 5th metatarsal. Fibrotic tissue noted. No fluctuance or crepitus noted. No acute signs of infection noted. Nonblanchable erythema noted to dorsal aspect right midfoot, nonblanchable, skin intact. No open wound noted at this time. No fluctuance, crepitus, erythema, drainage, or malodor noted. MUSCULOSKELETAL:   Muscle strength 4/5 for all pedal muscle groups B/L LE. No pain with palpation to bilateral lower extremity. IMAGING:  XR LEFT FOOT 11/30/2021  Narrative   EXAMINATION:   THREE XRAY VIEWS OF THE LEFT FOOT       11/30/2021 1:44 pm       HISTORY:   ORDERING SYSTEM PROVIDED HISTORY: wound   TECHNOLOGIST PROVIDED HISTORY:   Reason for exam:->wound   Reason for Exam: blood sugar problem, wound check on lateral portion of foot   Acuity: Acute   Type of Exam: Initial       FINDINGS:   Osseous destruction along the articular margins of the 5th   metatarsophalangeal joint with associated lucency in the soft tissues. .   There is no evidence of fracture or dislocation.  . The remaining joint spaces appear well maintained. The remaining soft tissues are unremarkable.       Impression   Evidence of a septic joint involving the 5th metatarsal phalangeal joint with   associated osteomyelitis     ARTERIAL DUPLEX 12/2/21     Type of Study:        Extremities Arteries:Lower Extremities Arterial Duplex, VL LOWER EXTREMITY    ARTERIES DUPLEX BILATERAL.       Tech Comments   Right   The right ankle/brachial index is 0.0 (no Doppler signal could be heard at the   [facility] or the PT). The common femoral and profunda femoral arteries could not be visualized due   to bandages extending into the groin area. The mid superficial femoral artery has a short segmental occlusion and then is   reconstituted. Elevated velocities at the distal superficial femoral artery indicate a > 50%   stenosis by velocity criteria (velocity went from 15 to 298 cm/s). The posterior tibial artery is occluded. The distal anterior tibial artery is barely patent, with very low flow   (possibly occluded and then reconstituted). Left   The left ankle brachial index is 0 for the PT and the DP was not accessible   due to the bandages on the foot. The common femoral and profunda femoral artery could not be visualized due to   bandages extending into the groin area. The distal superficial femoral artery is occluded and then reconstituted. The posterior tibial artery, peroneal, and anterior tibial artery are   occluded. There were no previous studies to use for comparison.        ASSESSMENT/PLAN:   -Full-thickness ulceration; lateral left foot- Sands 4  -Osteomyelitis of the fifth metatarsal; left foot  -Deep Tissue Injury, left anterior ankle  -Partial thickness pressure ulceration x2, right foot; NPUAP Stage 2  -Pressure injury, dorsal right midfoot -NPUAP Stage I  -Critical limb ischemia; bilateral lower extremity  -Diabetes mellitus, type II  -History of noncompliance    -Patient seen and examined at bedside this AM.  -Hypertensive otherwise VSS on 2 L of O2 via NC; no leukocytosis noted (WBC 8.5)  -All imaging reviewed; impressions noted above  -CRP 24.2 (12/27/21)  -Prealbumin 16.6 (12/27/21)  -Vascular surgery following; will await further stabilization with the general surgery team and plastic surgery team before offering vascular intervention.  -Plastic surgery following  -Infectious disease following, currently on Unasyn  -Left lower extremity dressed with Betadine soaked gauze, DSD, and tape. -Right lower extremity dressed with Mepilex borders  -Prevalon boots reapplied. Patient is to wear at all times while in bed to prevent further deep tissue injury. Ankle strap removed from bilateral boot as the ankle straps were causing pressure to his feet. -Nonweightbearing to left lower extremity.  -Weightbearing as tolerated to right lower extremity. DISPO: Full-thickness ulceration with underlying osteomyelitis to the left fifth metatarsal. Critical limb ischemia to b/l LE. Wounds are stable at this time. Vascular following; awaiting further stabilization at this time. Plastic surgery, and ID following. Patient will require podiatric surgical intervention but timing will depend on medical stability and vascular recommendations. Will continue to follow while in house.     Discussed assessment and plan with JEANETTE Jett DPM  Podiatric Resident, PGY-2  Pager #: [phone] or Perfect Serve

## 2022-01-02 NOTE — PLAN OF CARE
Podiatric Surgery Plan of Care Note  Yu Scott      Spoke with Margaux Lemos MD regarding weight bearing status of left lower extremity. Given benefit of increased activity, patient may now be partial weight bearing to the heel only of the left lower extremity in surgical shoe with assistive devices as needed. Patient will eventually need to be nonweight bearing to the left lower extremity following podiatric surgical intervention. Podiatry will change weight bearing status as needed.      Discussed with Dr. Long Ramírez DPM.    Cornelius Art DPM  Podiatric Resident, PGY-2  Pager #: [phone] or Perfect Serve

## 2022-01-02 NOTE — PROGRESS NOTES
Uneventful shift. Patient remains a/ox4. VSS. Mcfadden and wound vac emptied and output adequately charted in flow sheet. MD spoke with this RN regarding weight bearing status. Per podiatry note patient can heel weight bear on LLE so progressive ambulation can occur. Patient in agreement and in good spirits. Patient to return to OR on Tuesday for beginning reconstruction. No other acute events. Bed locked and in lowest position. Alarm intact. CLWR. Will continue to monitor.

## 2022-01-02 NOTE — PROGRESS NOTES
Plastic Surgery   Daily Progress Note  Walt Dane     CC:  Necrotizing fascitis     Subjective :  Patient rested well overnight. He remains afebrile and HDS. There was a wound vac leak, which was repaired by the surgery team. Patient continued to do exercises in bed, and was able to stand with PT/OT for 10-15 min by the bedside, which he reports was a welcomed exertion that he tolerated well. Objective    Infusions:   lactated ringers 100 mL/hr at 01/02/22 0234    sodium chloride      sodium chloride 25 mL (12/12/21 0850)    dextrose        I/O:I/O last 3 completed shifts:  In: -   Out: 1425 [Urine:1125; Drains:300]           Wt Readings from Last 1 Encounters:   12/28/21 201 lb 15.1 oz (91.6 kg)             LABS:    Recent Labs     12/31/21  0449 01/01/22  0525   WBC 7.3 8.5   HGB 8.5* 8.5*   HCT 25.2* 25.5*   MCV 93.1 93.9    305        Recent Labs     12/31/21  0449 01/01/22  0525    139   K 4.0 3.9    104   CO2 29 32   PHOS 3.2 2.9   BUN 19 24*   CREATININE 0.6* 0.6*      No results for input(s): AST, ALT, ALB, BILIDIR, BILITOT, ALKPHOS in the last 72 hours. No results for input(s): LIPASE, AMYLASE in the last 72 hours. Recent Labs     12/30/21  0653   INR 1.12      No results for input(s): CKTOTAL, CKMB, CKMBINDEX, TROPONINI in the last 72 hours. Exam  BP (!) 179/84   Pulse 88   Temp 98 °F (36.7 °C) (Oral)   Resp 18   Ht 5' 11\" (1.803 m)   Wt 201 lb 15.1 oz (91.6 kg)   SpO2 92%   BMI 28.17 kg/m²      General appearance: alert, appears stated age and cooperative  Eyes: No scleral icterus, EOM grossly intact  Neck: trachea midline, no JVD, no lymphadenopathy, neck supple  Chest/Lungs: normal effort with no accessory muscle use on 2L NC  Cardiovascular: RRR, brisk capillary refill distally  Abdomen: Soft, large surgical debridement area of the abdomen with veraflo Vac in place holding adequate suction and instilling normal saline.  Colostomy is pink, viable and with brown stool in the bag. Skin: warm and dry, no rashes  Extremities: no edema, no cyanosis  Genitourinary: Mcfadden in place with clear yellow urine  Neuro: A&Ox3, no focal deficits, sensation intact    ASSESSMENT/PLAN: Pt. is a 61 y.o. male with Necrotizing fasciitis of the abdominal wall, gluteal region, and scrotum s/p wide excision of perineum, back, and abdominal wall. S/p multiple debridements and wound vac placement and changes intraoperatively with diverting colostomy creation (12/7). OR wound vac change (12/10, 12/13, 12/16, 12/21, 12/23, 21/27, 12/30). - Continue instillation wound vac  - Plan to return to OR Tuesday for the start of the reconstructive process. - Process will start with reconstruction of the abdomen prior to perineum with a STSG planned   - Continue strict glucose control including Zero carb diet  - patient needs to increase activity    Malorie Enriquez DO  PGY1, General Surgery  01/02/22  6:46 AM  [phone]    I saw and independently examined the patient today. I agree with the history of present illness, past medical/surgical histories, family history, social history, medication list and allergies as listed. The review of systems is as noted above. My physical exam confirms the findings listed above. Review of labs, pathology reports, radiology reports and medical records confirm the findings noted above. I edited the note where appropriate in italics, strikethrough font, or underline. Doing well - improving nutrition per patient. Plan for OR on Tuesday for coverage of his abdominal wound (STSG). Will then apply vac to perineum and anticipate flap reconstruction for this later this week if nutrition optimized.     Kimmy Mueller MD  [address] Reconstructive Surgery  [phone]  01/02/22

## 2022-01-02 NOTE — PROGRESS NOTES
Pt woundvac machine went off tonight, Rn tried to maneuvered but unsuccessful. Resident was called, dr Gianna Patel came to see pt at bedside and fixed the leak. Surgical dressing intact now. MD was also made aware of increasing swelling on specifically R arm other than generalized swelling. Mcfadden and ostomy in place.  Will continue to monitor

## 2022-01-02 NOTE — PROGRESS NOTES
Patient alert and oriented times 4 but forgetful at times. Patient had increased BP and PRN medications  Given. Patient stood at the side of the bed for a few minutes this shift. Tolerated well. Will continue to monitor.

## 2022-01-03 ENCOUNTER — APPOINTMENT (OUTPATIENT)
Dept: GENERAL RADIOLOGY | Age: 67
DRG: 853 | End: 2022-01-03
Attending: INTERNAL MEDICINE

## 2022-01-03 ENCOUNTER — ANESTHESIA (OUTPATIENT)
Dept: OPERATING ROOM | Age: 67
DRG: 853 | End: 2022-01-03

## 2022-01-03 ENCOUNTER — ANESTHESIA EVENT (OUTPATIENT)
Dept: OPERATING ROOM | Age: 67
DRG: 853 | End: 2022-01-03

## 2022-01-03 VITALS — SYSTOLIC BLOOD PRESSURE: 113 MMHG | TEMPERATURE: 97.5 F | DIASTOLIC BLOOD PRESSURE: 62 MMHG | OXYGEN SATURATION: 100 %

## 2022-01-03 LAB
ABO/RH: NORMAL
ALBUMIN SERPL-MCNC: 2.5 G/DL (ref 3.4–5)
ANION GAP SERPL CALCULATED.3IONS-SCNC: 7 MMOL/L (ref 3–16)
ANTIBODY SCREEN: NORMAL
BASOPHILS ABSOLUTE: 0.1 K/UL (ref 0–0.2)
BASOPHILS RELATIVE PERCENT: 0.7 %
BUN BLDV-MCNC: 19 MG/DL (ref 7–20)
C-REACTIVE PROTEIN: 109.1 MG/L (ref 0–5.1)
CALCIUM SERPL-MCNC: 8 MG/DL (ref 8.3–10.6)
CHLORIDE BLD-SCNC: 100 MMOL/L (ref 99–110)
CO2: 31 MMOL/L (ref 21–32)
CREAT SERPL-MCNC: <0.5 MG/DL (ref 0.8–1.3)
EOSINOPHILS ABSOLUTE: 0.1 K/UL (ref 0–0.6)
EOSINOPHILS RELATIVE PERCENT: 1.4 %
FUNGUS (MYCOLOGY) CULTURE: ABNORMAL
FUNGUS (MYCOLOGY) CULTURE: ABNORMAL
FUNGUS STAIN: ABNORMAL
FUNGUS STAIN: ABNORMAL
GFR AFRICAN AMERICAN: >60
GFR NON-AFRICAN AMERICAN: >60
GLUCOSE BLD-MCNC: 104 MG/DL (ref 70–99)
GLUCOSE BLD-MCNC: 129 MG/DL (ref 70–99)
GLUCOSE BLD-MCNC: 149 MG/DL (ref 70–99)
GLUCOSE BLD-MCNC: 91 MG/DL (ref 70–99)
GLUCOSE BLD-MCNC: 94 MG/DL (ref 70–99)
GLUCOSE BLD-MCNC: 96 MG/DL (ref 70–99)
HCT VFR BLD CALC: 25.1 % (ref 40.5–52.5)
HEMOGLOBIN: 8.4 G/DL (ref 13.5–17.5)
LYMPHOCYTES ABSOLUTE: 1 K/UL (ref 1–5.1)
LYMPHOCYTES RELATIVE PERCENT: 12.6 %
MAGNESIUM: 1.6 MG/DL (ref 1.8–2.4)
MCH RBC QN AUTO: 31.5 PG (ref 26–34)
MCHC RBC AUTO-ENTMCNC: 33.7 G/DL (ref 31–36)
MCV RBC AUTO: 93.5 FL (ref 80–100)
MONOCYTES ABSOLUTE: 1.1 K/UL (ref 0–1.3)
MONOCYTES RELATIVE PERCENT: 14.4 %
NEUTROPHILS ABSOLUTE: 5.7 K/UL (ref 1.7–7.7)
NEUTROPHILS RELATIVE PERCENT: 70.9 %
ORGANISM: ABNORMAL
ORGANISM: ABNORMAL
PDW BLD-RTO: 16.4 % (ref 12.4–15.4)
PERFORMED ON: ABNORMAL
PERFORMED ON: NORMAL
PERFORMED ON: NORMAL
PHOSPHORUS: 2.9 MG/DL (ref 2.5–4.9)
PLATELET # BLD: 307 K/UL (ref 135–450)
PMV BLD AUTO: 7.8 FL (ref 5–10.5)
POTASSIUM SERPL-SCNC: 3.9 MMOL/L (ref 3.5–5.1)
RBC # BLD: 2.68 M/UL (ref 4.2–5.9)
SODIUM BLD-SCNC: 138 MMOL/L (ref 136–145)
TRANSFERRIN: 137 MG/DL (ref 200–360)
WBC # BLD: 8 K/UL (ref 4–11)

## 2022-01-03 PROCEDURE — 6370000000 HC RX 637 (ALT 250 FOR IP): Performed by: STUDENT IN AN ORGANIZED HEALTH CARE EDUCATION/TRAINING PROGRAM

## 2022-01-03 PROCEDURE — 6360000002 HC RX W HCPCS: Performed by: STUDENT IN AN ORGANIZED HEALTH CARE EDUCATION/TRAINING PROGRAM

## 2022-01-03 PROCEDURE — 94669 MECHANICAL CHEST WALL OSCILL: CPT

## 2022-01-03 PROCEDURE — C9290 INJ, BUPIVACAINE LIPOSOME: HCPCS | Performed by: SURGERY

## 2022-01-03 PROCEDURE — 36592 COLLECT BLOOD FROM PICC: CPT

## 2022-01-03 PROCEDURE — 3600000014 HC SURGERY LEVEL 4 ADDTL 15MIN: Performed by: SURGERY

## 2022-01-03 PROCEDURE — 80069 RENAL FUNCTION PANEL: CPT

## 2022-01-03 PROCEDURE — 71045 X-RAY EXAM CHEST 1 VIEW: CPT

## 2022-01-03 PROCEDURE — 6370000000 HC RX 637 (ALT 250 FOR IP): Performed by: SURGERY

## 2022-01-03 PROCEDURE — 2580000003 HC RX 258: Performed by: STUDENT IN AN ORGANIZED HEALTH CARE EDUCATION/TRAINING PROGRAM

## 2022-01-03 PROCEDURE — 3700000001 HC ADD 15 MINUTES (ANESTHESIA): Performed by: SURGERY

## 2022-01-03 PROCEDURE — 2700000000 HC OXYGEN THERAPY PER DAY

## 2022-01-03 PROCEDURE — 86901 BLOOD TYPING SEROLOGIC RH(D): CPT

## 2022-01-03 PROCEDURE — 83735 ASSAY OF MAGNESIUM: CPT

## 2022-01-03 PROCEDURE — 15100 SPLT AGRFT T/A/L 1ST 100SQCM: CPT | Performed by: SURGERY

## 2022-01-03 PROCEDURE — 86900 BLOOD TYPING SEROLOGIC ABO: CPT

## 2022-01-03 PROCEDURE — 86850 RBC ANTIBODY SCREEN: CPT

## 2022-01-03 PROCEDURE — 97605 NEG PRS WND THER DME<=50SQCM: CPT | Performed by: SURGERY

## 2022-01-03 PROCEDURE — 94640 AIRWAY INHALATION TREATMENT: CPT

## 2022-01-03 PROCEDURE — 2500000003 HC RX 250 WO HCPCS: Performed by: STUDENT IN AN ORGANIZED HEALTH CARE EDUCATION/TRAINING PROGRAM

## 2022-01-03 PROCEDURE — 86140 C-REACTIVE PROTEIN: CPT

## 2022-01-03 PROCEDURE — 3700000000 HC ANESTHESIA ATTENDED CARE: Performed by: SURGERY

## 2022-01-03 PROCEDURE — 6360000002 HC RX W HCPCS: Performed by: FAMILY MEDICINE

## 2022-01-03 PROCEDURE — 2709999900 HC NON-CHARGEABLE SUPPLY: Performed by: SURGERY

## 2022-01-03 PROCEDURE — 6360000002 HC RX W HCPCS: Performed by: NURSE ANESTHETIST, CERTIFIED REGISTERED

## 2022-01-03 PROCEDURE — 7100000000 HC PACU RECOVERY - FIRST 15 MIN: Performed by: SURGERY

## 2022-01-03 PROCEDURE — 3600000004 HC SURGERY LEVEL 4 BASE: Performed by: SURGERY

## 2022-01-03 PROCEDURE — 85025 COMPLETE CBC W/AUTO DIFF WBC: CPT

## 2022-01-03 PROCEDURE — 1200000000 HC SEMI PRIVATE

## 2022-01-03 PROCEDURE — 15101 SPLT AGRFT T/A/L EA ADDL 100: CPT | Performed by: SURGERY

## 2022-01-03 PROCEDURE — 2500000003 HC RX 250 WO HCPCS: Performed by: NURSE ANESTHETIST, CERTIFIED REGISTERED

## 2022-01-03 PROCEDURE — 84466 ASSAY OF TRANSFERRIN: CPT

## 2022-01-03 PROCEDURE — 0HR7X74 REPLACEMENT OF ABDOMEN SKIN WITH AUTOLOGOUS TISSUE SUBSTITUTE, PARTIAL THICKNESS, EXTERNAL APPROACH: ICD-10-PCS | Performed by: SURGERY

## 2022-01-03 PROCEDURE — 7100000001 HC PACU RECOVERY - ADDTL 15 MIN: Performed by: SURGERY

## 2022-01-03 PROCEDURE — 6360000002 HC RX W HCPCS: Performed by: SURGERY

## 2022-01-03 RX ORDER — OXYCODONE HYDROCHLORIDE 5 MG/1
10 TABLET ORAL PRN
Status: DISCONTINUED | OUTPATIENT
Start: 2022-01-03 | End: 2022-01-03 | Stop reason: HOSPADM

## 2022-01-03 RX ORDER — MEPERIDINE HYDROCHLORIDE 25 MG/ML
12.5 INJECTION INTRAMUSCULAR; INTRAVENOUS; SUBCUTANEOUS EVERY 5 MIN PRN
Status: DISCONTINUED | OUTPATIENT
Start: 2022-01-03 | End: 2022-01-03 | Stop reason: HOSPADM

## 2022-01-03 RX ORDER — PHENYLEPHRINE HYDROCHLORIDE 10 MG/ML
INJECTION INTRAVENOUS PRN
Status: DISCONTINUED | OUTPATIENT
Start: 2022-01-03 | End: 2022-01-03 | Stop reason: SDUPTHER

## 2022-01-03 RX ORDER — PROPOFOL 10 MG/ML
INJECTION, EMULSION INTRAVENOUS PRN
Status: DISCONTINUED | OUTPATIENT
Start: 2022-01-03 | End: 2022-01-03 | Stop reason: SDUPTHER

## 2022-01-03 RX ORDER — LIDOCAINE HYDROCHLORIDE 20 MG/ML
INJECTION, SOLUTION INTRAVENOUS PRN
Status: DISCONTINUED | OUTPATIENT
Start: 2022-01-03 | End: 2022-01-03 | Stop reason: SDUPTHER

## 2022-01-03 RX ORDER — MAGNESIUM SULFATE IN WATER 40 MG/ML
2000 INJECTION, SOLUTION INTRAVENOUS ONCE
Status: COMPLETED | OUTPATIENT
Start: 2022-01-03 | End: 2022-01-03

## 2022-01-03 RX ORDER — DIPHENHYDRAMINE HYDROCHLORIDE 50 MG/ML
12.5 INJECTION INTRAMUSCULAR; INTRAVENOUS
Status: DISCONTINUED | OUTPATIENT
Start: 2022-01-03 | End: 2022-01-03 | Stop reason: HOSPADM

## 2022-01-03 RX ORDER — LABETALOL HYDROCHLORIDE 5 MG/ML
5 INJECTION, SOLUTION INTRAVENOUS EVERY 10 MIN PRN
Status: DISCONTINUED | OUTPATIENT
Start: 2022-01-03 | End: 2022-01-03 | Stop reason: HOSPADM

## 2022-01-03 RX ORDER — MINERAL OIL
OIL (ML) MISCELLANEOUS PRN
Status: DISCONTINUED | OUTPATIENT
Start: 2022-01-03 | End: 2022-01-03 | Stop reason: ALTCHOICE

## 2022-01-03 RX ORDER — FENTANYL CITRATE 50 UG/ML
50 INJECTION, SOLUTION INTRAMUSCULAR; INTRAVENOUS EVERY 5 MIN PRN
Status: DISCONTINUED | OUTPATIENT
Start: 2022-01-03 | End: 2022-01-03 | Stop reason: HOSPADM

## 2022-01-03 RX ORDER — OXYCODONE HYDROCHLORIDE 5 MG/1
5 TABLET ORAL PRN
Status: DISCONTINUED | OUTPATIENT
Start: 2022-01-03 | End: 2022-01-03 | Stop reason: HOSPADM

## 2022-01-03 RX ORDER — PROMETHAZINE HYDROCHLORIDE 25 MG/ML
6.25 INJECTION, SOLUTION INTRAMUSCULAR; INTRAVENOUS PRN
Status: DISCONTINUED | OUTPATIENT
Start: 2022-01-03 | End: 2022-01-03 | Stop reason: HOSPADM

## 2022-01-03 RX ORDER — SUCCINYLCHOLINE/SOD CL,ISO/PF 200MG/10ML
SYRINGE (ML) INTRAVENOUS PRN
Status: DISCONTINUED | OUTPATIENT
Start: 2022-01-03 | End: 2022-01-03 | Stop reason: SDUPTHER

## 2022-01-03 RX ORDER — EPINEPHRINE NASAL SOLUTION 1 MG/ML
SOLUTION NASAL PRN
Status: DISCONTINUED | OUTPATIENT
Start: 2022-01-03 | End: 2022-01-03 | Stop reason: ALTCHOICE

## 2022-01-03 RX ORDER — ONDANSETRON 2 MG/ML
INJECTION INTRAMUSCULAR; INTRAVENOUS PRN
Status: DISCONTINUED | OUTPATIENT
Start: 2022-01-03 | End: 2022-01-03 | Stop reason: SDUPTHER

## 2022-01-03 RX ORDER — FENTANYL CITRATE 50 UG/ML
INJECTION, SOLUTION INTRAMUSCULAR; INTRAVENOUS PRN
Status: DISCONTINUED | OUTPATIENT
Start: 2022-01-03 | End: 2022-01-03 | Stop reason: SDUPTHER

## 2022-01-03 RX ORDER — MIDAZOLAM HYDROCHLORIDE 1 MG/ML
INJECTION INTRAMUSCULAR; INTRAVENOUS PRN
Status: DISCONTINUED | OUTPATIENT
Start: 2022-01-03 | End: 2022-01-03 | Stop reason: SDUPTHER

## 2022-01-03 RX ADMIN — AMPICILLIN SODIUM AND SULBACTAM SODIUM 3000 MG: 2; 1 INJECTION, POWDER, FOR SOLUTION INTRAMUSCULAR; INTRAVENOUS at 21:07

## 2022-01-03 RX ADMIN — LIDOCAINE HYDROCHLORIDE 100 MG: 20 INJECTION, SOLUTION INTRAVENOUS at 10:37

## 2022-01-03 RX ADMIN — MIDAZOLAM HYDROCHLORIDE 1 MG: 2 INJECTION, SOLUTION INTRAMUSCULAR; INTRAVENOUS at 10:29

## 2022-01-03 RX ADMIN — HEPARIN SODIUM 5000 UNITS: 5000 INJECTION INTRAVENOUS; SUBCUTANEOUS at 22:10

## 2022-01-03 RX ADMIN — ACETAMINOPHEN 1000 MG: 500 TABLET ORAL at 06:20

## 2022-01-03 RX ADMIN — SODIUM CHLORIDE, POTASSIUM CHLORIDE, SODIUM LACTATE AND CALCIUM CHLORIDE: 600; 310; 30; 20 INJECTION, SOLUTION INTRAVENOUS at 10:54

## 2022-01-03 RX ADMIN — INSULIN HUMAN 2 UNITS: 100 INJECTION, SOLUTION PARENTERAL at 22:06

## 2022-01-03 RX ADMIN — Medication 160 MG: at 10:37

## 2022-01-03 RX ADMIN — INSULIN LISPRO 3 UNITS: 100 INJECTION, SOLUTION INTRAVENOUS; SUBCUTANEOUS at 17:52

## 2022-01-03 RX ADMIN — DOCUSATE SODIUM 100 MG: 100 CAPSULE, LIQUID FILLED ORAL at 09:17

## 2022-01-03 RX ADMIN — INSULIN HUMAN 2 UNITS: 100 INJECTION, SOLUTION PARENTERAL at 17:52

## 2022-01-03 RX ADMIN — AMLODIPINE BESYLATE 5 MG: 5 TABLET ORAL at 09:17

## 2022-01-03 RX ADMIN — PHENYLEPHRINE HYDROCHLORIDE 100 MCG: 10 INJECTION INTRAVENOUS at 11:09

## 2022-01-03 RX ADMIN — PHENYLEPHRINE HYDROCHLORIDE 100 MCG: 10 INJECTION INTRAVENOUS at 11:42

## 2022-01-03 RX ADMIN — AMPICILLIN SODIUM AND SULBACTAM SODIUM 3000 MG: 2; 1 INJECTION, POWDER, FOR SOLUTION INTRAMUSCULAR; INTRAVENOUS at 04:44

## 2022-01-03 RX ADMIN — HEPARIN SODIUM 5000 UNITS: 5000 INJECTION INTRAVENOUS; SUBCUTANEOUS at 15:26

## 2022-01-03 RX ADMIN — POTASSIUM PHOSPHATE, MONOBASIC AND POTASSIUM PHOSPHATE, DIBASIC 10 MMOL: 224; 236 INJECTION, SOLUTION, CONCENTRATE INTRAVENOUS at 15:30

## 2022-01-03 RX ADMIN — HEPARIN SODIUM 5000 UNITS: 5000 INJECTION INTRAVENOUS; SUBCUTANEOUS at 06:20

## 2022-01-03 RX ADMIN — OXYCODONE HYDROCHLORIDE 5 MG: 5 TABLET ORAL at 19:45

## 2022-01-03 RX ADMIN — ACETAMINOPHEN 1000 MG: 500 TABLET ORAL at 00:12

## 2022-01-03 RX ADMIN — PROPOFOL 150 MG: 10 INJECTION, EMULSION INTRAVENOUS at 10:37

## 2022-01-03 RX ADMIN — ACETAMINOPHEN 1000 MG: 500 TABLET ORAL at 17:54

## 2022-01-03 RX ADMIN — FENTANYL CITRATE 50 MCG: 50 INJECTION, SOLUTION INTRAMUSCULAR; INTRAVENOUS at 11:10

## 2022-01-03 RX ADMIN — PHENYLEPHRINE HYDROCHLORIDE 100 MCG: 10 INJECTION INTRAVENOUS at 11:27

## 2022-01-03 RX ADMIN — SODIUM CHLORIDE, POTASSIUM CHLORIDE, SODIUM LACTATE AND CALCIUM CHLORIDE: 600; 310; 30; 20 INJECTION, SOLUTION INTRAVENOUS at 00:36

## 2022-01-03 RX ADMIN — ONDANSETRON 4 MG: 2 INJECTION INTRAMUSCULAR; INTRAVENOUS at 11:51

## 2022-01-03 RX ADMIN — SODIUM CHLORIDE, PRESERVATIVE FREE 10 ML: 5 INJECTION INTRAVENOUS at 20:12

## 2022-01-03 RX ADMIN — FENTANYL CITRATE 50 MCG: 50 INJECTION, SOLUTION INTRAMUSCULAR; INTRAVENOUS at 10:37

## 2022-01-03 RX ADMIN — HYDROMORPHONE HYDROCHLORIDE 0.5 MG: 1 INJECTION, SOLUTION INTRAMUSCULAR; INTRAVENOUS; SUBCUTANEOUS at 13:38

## 2022-01-03 RX ADMIN — HYDROMORPHONE HYDROCHLORIDE 0.5 MG: 1 INJECTION, SOLUTION INTRAMUSCULAR; INTRAVENOUS; SUBCUTANEOUS at 13:31

## 2022-01-03 RX ADMIN — MIDAZOLAM HYDROCHLORIDE 1 MG: 2 INJECTION, SOLUTION INTRAMUSCULAR; INTRAVENOUS at 11:10

## 2022-01-03 RX ADMIN — ACETAMINOPHEN 1000 MG: 500 TABLET ORAL at 13:32

## 2022-01-03 RX ADMIN — MAGNESIUM SULFATE HEPTAHYDRATE 2000 MG: 2 INJECTION, SOLUTION INTRAVENOUS at 15:40

## 2022-01-03 RX ADMIN — AMPICILLIN SODIUM AND SULBACTAM SODIUM 3000 MG: 2; 1 INJECTION, POWDER, FOR SOLUTION INTRAMUSCULAR; INTRAVENOUS at 09:20

## 2022-01-03 RX ADMIN — SODIUM CHLORIDE, POTASSIUM CHLORIDE, SODIUM LACTATE AND CALCIUM CHLORIDE: 600; 310; 30; 20 INJECTION, SOLUTION INTRAVENOUS at 20:11

## 2022-01-03 RX ADMIN — PHENYLEPHRINE HYDROCHLORIDE 100 MCG: 10 INJECTION INTRAVENOUS at 11:34

## 2022-01-03 RX ADMIN — AMPICILLIN SODIUM AND SULBACTAM SODIUM 3000 MG: 2; 1 INJECTION, POWDER, FOR SOLUTION INTRAMUSCULAR; INTRAVENOUS at 15:35

## 2022-01-03 RX ADMIN — IPRATROPIUM BROMIDE AND ALBUTEROL SULFATE 1 AMPULE: .5; 2.5 SOLUTION RESPIRATORY (INHALATION) at 13:46

## 2022-01-03 RX ADMIN — PHENYLEPHRINE HYDROCHLORIDE 100 MCG: 10 INJECTION INTRAVENOUS at 11:20

## 2022-01-03 ASSESSMENT — PULMONARY FUNCTION TESTS
PIF_VALUE: 18
PIF_VALUE: 18
PIF_VALUE: 25
PIF_VALUE: 18
PIF_VALUE: 25
PIF_VALUE: 27
PIF_VALUE: 18
PIF_VALUE: 18
PIF_VALUE: 24
PIF_VALUE: 24
PIF_VALUE: 38
PIF_VALUE: 23
PIF_VALUE: 23
PIF_VALUE: 0
PIF_VALUE: 26
PIF_VALUE: 18
PIF_VALUE: 22
PIF_VALUE: 23
PIF_VALUE: 3
PIF_VALUE: 0
PIF_VALUE: 27
PIF_VALUE: 15
PIF_VALUE: 21
PIF_VALUE: 26
PIF_VALUE: 16
PIF_VALUE: 18
PIF_VALUE: 2
PIF_VALUE: 23
PIF_VALUE: 18
PIF_VALUE: 18
PIF_VALUE: 24
PIF_VALUE: 27
PIF_VALUE: 31
PIF_VALUE: 22
PIF_VALUE: 28
PIF_VALUE: 25
PIF_VALUE: 25
PIF_VALUE: 26
PIF_VALUE: 21
PIF_VALUE: 18
PIF_VALUE: 34
PIF_VALUE: 2
PIF_VALUE: 23
PIF_VALUE: 25
PIF_VALUE: 23
PIF_VALUE: 25
PIF_VALUE: 26
PIF_VALUE: 19
PIF_VALUE: 18
PIF_VALUE: 24
PIF_VALUE: 0
PIF_VALUE: 19
PIF_VALUE: 25
PIF_VALUE: 11
PIF_VALUE: 7
PIF_VALUE: 31
PIF_VALUE: 0
PIF_VALUE: 18
PIF_VALUE: 23
PIF_VALUE: 23
PIF_VALUE: 27
PIF_VALUE: 22
PIF_VALUE: 26
PIF_VALUE: 18
PIF_VALUE: 23
PIF_VALUE: 18
PIF_VALUE: 27
PIF_VALUE: 23
PIF_VALUE: 27
PIF_VALUE: 23
PIF_VALUE: 0
PIF_VALUE: 27
PIF_VALUE: 18
PIF_VALUE: 23
PIF_VALUE: 1
PIF_VALUE: 26
PIF_VALUE: 18
PIF_VALUE: 28
PIF_VALUE: 21
PIF_VALUE: 23
PIF_VALUE: 27
PIF_VALUE: 1
PIF_VALUE: 18
PIF_VALUE: 18
PIF_VALUE: 0
PIF_VALUE: 18
PIF_VALUE: 3
PIF_VALUE: 26
PIF_VALUE: 26
PIF_VALUE: 24
PIF_VALUE: 0
PIF_VALUE: 23
PIF_VALUE: 21
PIF_VALUE: 18
PIF_VALUE: 26
PIF_VALUE: 18
PIF_VALUE: 25
PIF_VALUE: 26
PIF_VALUE: 27
PIF_VALUE: 18
PIF_VALUE: 22
PIF_VALUE: 23
PIF_VALUE: 19
PIF_VALUE: 23
PIF_VALUE: 24
PIF_VALUE: 18
PIF_VALUE: 23
PIF_VALUE: 27
PIF_VALUE: 23
PIF_VALUE: 27
PIF_VALUE: 18
PIF_VALUE: 1
PIF_VALUE: 21
PIF_VALUE: 0
PIF_VALUE: 23
PIF_VALUE: 18
PIF_VALUE: 26
PIF_VALUE: 23

## 2022-01-03 ASSESSMENT — PAIN DESCRIPTION - PAIN TYPE
TYPE: ACUTE PAIN;SURGICAL PAIN
TYPE: SURGICAL PAIN
TYPE: SURGICAL PAIN
TYPE: ACUTE PAIN;SURGICAL PAIN

## 2022-01-03 ASSESSMENT — PAIN DESCRIPTION - LOCATION
LOCATION: ABDOMEN
LOCATION: ABDOMEN
LOCATION: BACK;BUTTOCKS;GROIN
LOCATION: BACK;BUTTOCKS;GROIN
LOCATION: PERINEUM

## 2022-01-03 ASSESSMENT — PAIN DESCRIPTION - FREQUENCY
FREQUENCY: CONTINUOUS

## 2022-01-03 ASSESSMENT — PAIN DESCRIPTION - DESCRIPTORS
DESCRIPTORS: ACHING;SORE
DESCRIPTORS: ACHING
DESCRIPTORS: ACHING

## 2022-01-03 ASSESSMENT — PAIN DESCRIPTION - ONSET
ONSET: ON-GOING

## 2022-01-03 ASSESSMENT — PAIN DESCRIPTION - ORIENTATION
ORIENTATION: MID;RIGHT;LEFT;LOWER
ORIENTATION: MID;RIGHT;LEFT;LOWER

## 2022-01-03 ASSESSMENT — PAIN SCALES - GENERAL
PAINLEVEL_OUTOF10: 5
PAINLEVEL_OUTOF10: 6
PAINLEVEL_OUTOF10: 6
PAINLEVEL_OUTOF10: 7
PAINLEVEL_OUTOF10: 6
PAINLEVEL_OUTOF10: 6
PAINLEVEL_OUTOF10: 5
PAINLEVEL_OUTOF10: 5
PAINLEVEL_OUTOF10: 0
PAINLEVEL_OUTOF10: 7
PAINLEVEL_OUTOF10: 6

## 2022-01-03 ASSESSMENT — PAIN DESCRIPTION - PROGRESSION: CLINICAL_PROGRESSION: NOT CHANGED

## 2022-01-03 NOTE — PROGRESS NOTES
Patient remains a/ox4. VSS. Went to OR for skin graft and wound vac placement. Patient now has 2 wound vacs and has thigh skin graft site with tegaderm and guaze. Received mag rider and k phos rider when patient returned to room. Patient arousable and alert. No other acute events. Bed locked and in lowest position. Alarm intact. CLWR. Will continue to monitor.

## 2022-01-03 NOTE — PROGRESS NOTES
Anesthesia at bedside. Consent signed. Mcfadden emptied and Stat lock applied.     Recent Labs     01/03/22  0735 01/03/22  1026   POCGLU 96 104*

## 2022-01-03 NOTE — PROGRESS NOTES
Plastic Surgery  Post-operative Note      Procedure(s) Performed: STSG of abdominal wound measuring 44x11, and wound vac change to gluteal area. Subjective:   Patient's pain is controlled, denies nausea or vomiting. Tolerating diet. OOB, ambulating and voiding appropriately. Denies flatus or BM at this time. Objective:  Anesthesia type: General      I/O    Intra op    Post op     Fluids  200 mL 200 mL     EBL Minimal 0 mL     Urine 200 mL 200 mL     Vitals:   Vitals:    01/03/22 1347 01/03/22 1400 01/03/22 1415 01/03/22 1524   BP:  137/72 (!) 144/75 138/78   Pulse:  81 88 83   Resp: 22 22 18 16   Temp:   98.7 °F (37.1 °C) 97.7 °F (36.5 °C)   TempSrc:   Temporal Oral   SpO2: 99% 97% 93% 97%   Weight:       Height:           Physical Exam:  Post-op vital signs:  Stable   General appearance: alert, appears stated age and cooperative  Eyes: No scleral icterus, EOM grossly intact  Neck: trachea midline, no JVD, no lymphadenopathy, neck supple  Chest/Lungs: normal effort with no accessory muscle use on 3L NC  Cardiovascular: RRR, brisk capillary refill distally  Abdomen: Soft, large surgical debridement area of the abdomen with veraflo Vac in place holding adequate suction and instilling normal saline. Colostomy is pink, viable and with brown stool in the bag. Skin: warm and dry, no rashes  Extremities: no edema, no cyanosis  Genitourinary: Mcfadden in place with clear yellow urine  Neuro: A&Ox3, no focal deficits, sensation intact    Assessment and Plan  Pt. is a 61 y.o. male with Necrotizing fasciitis of the abdominal wall, gluteal region, and scrotum s/p wide excision of perineum, back, and abdominal wall. S/p multiple debridements and wound vac placement and changes intraoperatively with diverting colostomy creation (12/7). OR wound vac change (12/10, 12/13, 12/16, 12/21, 12/23, 12/27, 12/30).  S/P STSG to abdominal wound, and gluteal wound vac change (1/3) POD 0    Pain management: Roxicodone pain panel and scheduled tylenol PRN  Cardiovasc: hemodynamically stable, will continue to monitor  Respiratory:  IS ordered to bedside, encourage hourly IS and deep breathing, wean oxygen as tolerated  Fluids:   cc/hr, Diet: General diet, ZERO CARB DIET  : Urine output is adequate   Ambulation: OOB to chair, encourage ambulation  Prophylaxis: SCDs, lovenox  Antibiotics: Unasyn  Wound: Local wound care      Misty Lynch DO, MS  PGY1, General Surgery  01/03/22  4:36 PM  [phone]

## 2022-01-03 NOTE — PROGRESS NOTES
Podiatric Surgery Daily Progress Note      Admit Date: 12/1/2021      Code:Full Code    Patient seen and examined, labs and records reviewed    Subjective:     Patient seen at bedside this AM. Patient denies pain to bilateral lower extremities. Patient denies fever, chills, shortness of breath, chest pain. Review of Systems: A 10 point review of systems was conducted, significant findings as noted in HPI. All other systems negative. Objective     BP (!) 156/74   Pulse 90   Temp 98.2 °F (36.8 °C) (Oral)   Resp 18   Ht 5' 11\" (1.803 m)   Wt 201 lb 15.1 oz (91.6 kg)   SpO2 92%   BMI 28.17 kg/m²     I/O:    Intake/Output Summary (Last 24 hours) at 1/3/2022 0653  Last data filed at 1/3/2022 0025  Gross per 24 hour   Intake 1320 ml   Output 1075 ml   Net 245 ml              Wt Readings from Last 3 Encounters:   12/28/21 201 lb 15.1 oz (91.6 kg)   11/30/21 163 lb 3.2 oz (74 kg)   04/18/16 179 lb 14.3 oz (81.6 kg)       LABS:    Recent Labs     01/01/22  0525 01/03/22  0559   WBC 8.5 8.0   HGB 8.5* 8.4*   HCT 25.5* 25.1*    307        Recent Labs     01/03/22  0559      K 3.9      CO2 31   PHOS 2.9   BUN 19   CREATININE <0.5*        No results for input(s): PROT, INR, APTT in the last 72 hours. LOWER EXTREMITY EXAMINATION    Dressing to left LE intact. No strikethrough noted to the external dressing. Betadine discoloration noted to the internal layers of the dressing of the left LE.     VASCULAR:   DP and PT pulses are non-palpable b/l. Upon hand-held Doppler examination DP signals were noted to be monophasic and PT signals were noted to be nondopplerable. CFT slightly diminished to the distal digits of bilateral lower extremities. Skin temperature is warm to cool to the distal digits from proximal to distal.    No edema noted. No pain with calf compression b/l.     NEUROLOGIC:   Gross and epicritic sensation is diminished b/l.  Protective sensation is diminished at all pedal sites b/l.     DERMATOLOGIC:     Left lower extremity:  Full-thickness ulceration noted to the lateral aspect of the left foot. Wound measures approximately 3.2 cm x 2 cm x 0.5 cm. Wound base with exposed 5th metatarsal bone, with necrotic edges at the proximal and distal edges. Necrotic appearance of 5th digit. No tracking or tunneling noted. No purulent drainage noted. No fluctuance or crepitus or malodor noted. Deep tissue injury noted 2/2 Prevalon boot strap to the anterior aspect of the ankle. Intact epithelialized tissue noted. No crepitus, erythema, drainage, or malodor noted. No open wound noted. Stable without signs of acute infection noted. Right lower extremity:  Parital thickness ulceration noted to the lateral malleolus 2/2 pressure. No fluctuance, crepitus, malodor, or drainage noted. No acute signs infection noted. Partial thickness ulceration 2/2 pressure noted at the styloid process of the 5th metatarsal. Fibrotic tissue noted. No fluctuance or crepitus noted. No acute signs of infection noted. Nonblanchable erythema noted to dorsal aspect right midfoot, nonblanchable, skin intact. No open wound noted at this time. No fluctuance, crepitus, erythema, drainage, or malodor noted. MUSCULOSKELETAL:   Muscle strength 4/5 for all pedal muscle groups B/L LE. No pain with palpation to bilateral lower extremity. IMAGING:  XR LEFT FOOT 11/30/2021  Narrative   EXAMINATION:   THREE XRAY VIEWS OF THE LEFT FOOT       11/30/2021 1:44 pm       HISTORY:   ORDERING SYSTEM PROVIDED HISTORY: wound   TECHNOLOGIST PROVIDED HISTORY:   Reason for exam:->wound   Reason for Exam: blood sugar problem, wound check on lateral portion of foot   Acuity: Acute   Type of Exam: Initial       FINDINGS:   Osseous destruction along the articular margins of the 5th   metatarsophalangeal joint with associated lucency in the soft tissues. .   There is no evidence of fracture or dislocation.  . The remaining joint spaces appear well maintained. The remaining soft tissues are unremarkable.       Impression   Evidence of a septic joint involving the 5th metatarsal phalangeal joint with   associated osteomyelitis     ARTERIAL DUPLEX 12/2/21     Type of Study:        Extremities Arteries:Lower Extremities Arterial Duplex, VL LOWER EXTREMITY    ARTERIES DUPLEX BILATERAL.       Tech Comments   Right   The right ankle/brachial index is 0.0 (no Doppler signal could be heard at the   [facility] or the PT). The common femoral and profunda femoral arteries could not be visualized due   to bandages extending into the groin area. The mid superficial femoral artery has a short segmental occlusion and then is   reconstituted. Elevated velocities at the distal superficial femoral artery indicate a > 50%   stenosis by velocity criteria (velocity went from 15 to 298 cm/s). The posterior tibial artery is occluded. The distal anterior tibial artery is barely patent, with very low flow   (possibly occluded and then reconstituted). Left   The left ankle brachial index is 0 for the PT and the DP was not accessible   due to the bandages on the foot. The common femoral and profunda femoral artery could not be visualized due to   bandages extending into the groin area. The distal superficial femoral artery is occluded and then reconstituted. The posterior tibial artery, peroneal, and anterior tibial artery are   occluded. There were no previous studies to use for comparison.        ASSESSMENT/PLAN:   -Full-thickness ulceration; lateral left foot- Sands 4  -Osteomyelitis of the fifth metatarsal; left foot  -Deep Tissue Injury, left anterior ankle  -Partial thickness pressure ulceration x2, right foot; NPUAP Stage 2  -Pressure injury, dorsal right midfoot -NPUAP Stage I  -Critical limb ischemia; bilateral lower extremity  -Diabetes mellitus, type II  -History of noncompliance    -Patient seen and examined at bedside this AM.  -Hypertensive otherwise VSS on 2 L of O2 via NC; no leukocytosis noted (WBC 8.0)  -All imaging reviewed; impressions noted above  -.1 (1/3/22)  -Prealbumin 16.6 (12/27/21)  -Vascular surgery following; will await further stabilization with the general surgery team and plastic surgery team before offering vascular intervention.  -Plastic surgery following  -Infectious disease following, currently on Unasyn  -Using a #15 blade, I excisionally debrided the necrotic and nonviable tissue down to and including subcutaneous tissue. No bleeding noted. Patient tolerated well.   -Left lower extremity dressed with Betadine soaked gauze, DSD, and tape. -Right lower extremity dressed with Mepilex borders  -Prevalon boots reapplied. Patient is to wear at all times while in bed to prevent further deep tissue injury. Ankle strap removed from bilateral boot as the ankle straps were causing pressure to his feet.  -Partial weight bearing to heel of left lower extremity  -Weightbearing as tolerated to right lower extremity. DISPO: Full-thickness ulceration with underlying osteomyelitis to the left fifth metatarsal. Critical limb ischemia to b/l LE. Wounds are stable at this time. Vascular following; awaiting further stabilization at this time. Plastic surgery, and ID following. Patient will require podiatric surgical intervention but timing will depend on medical stability and vascular recommendations. Will continue to follow while in house. Patient examined and evaluated at bedside with JEANETTE Gtz DPM  Podiatric Resident, PGY-2  Pager #: [phone] or Perfect Serve    Patient was seen and evaluated at bedside. Agree with residents assessment and treatment plan.   Georgia Maradiaga DPM

## 2022-01-03 NOTE — ANESTHESIA PRE PROCEDURE
Department of Anesthesiology  Preprocedure Note       Name:  Jack Bernard   Age:  61 y.o.  :  1955                                          MRN:  [id number]         Date:  1/3/2022      Surgeon: Annamarie Bianchi):  Angel Luis Jang MD    Procedure: Procedure(s):  SPLIT THICKNESS SKIN GRAFT, POSSIBLE ADJACENT TISSUE TRANSFER, POSSIBLE FLAP. WOUND VAC PLACEMENT. GLUTEAL FLAP    Medications prior to admission:   Prior to Admission medications    Medication Sig Start Date End Date Taking? Authorizing Provider   metFORMIN (GLUCOPHAGE) 500 MG tablet Take 500 mg by mouth daily (with breakfast)    Historical Provider, MD   meloxicam (MOBIC) 15 MG tablet Take 1 tablet by mouth daily 16   Raven Mendez DO       Current medications:    No current facility-administered medications for this visit. No current outpatient medications on file.      Facility-Administered Medications Ordered in Other Visits   Medication Dose Route Frequency Provider Last Rate Last Admin    magnesium sulfate 2000 mg in 50 mL IVPB premix  2,000 mg IntraVENous Once Khalil Cosier, DO        potassium phosphate 10 mmol in dextrose 5 % 250 mL IVPB  10 mmol IntraVENous Once Khalil Cosier, DO        meperidine (DEMEROL) injection 12.5 mg  12.5 mg IntraVENous Q5 Min PRN Lynn Demarco MD        HYDROmorphone (DILAUDID) injection 0.25 mg  0.25 mg IntraVENous Q5 Min PRN Lynn Demarco MD        fentaNYL (SUBLIMAZE) injection 50 mcg  50 mcg IntraVENous Q5 Min PRN Lynn Demarco MD        HYDROmorphone (DILAUDID) injection 0.25 mg  0.25 mg IntraVENous Q5 Min PRN Lynn Demarco MD        HYDROmorphone (DILAUDID) injection 0.5 mg  0.5 mg IntraVENous Q5 Min PRN Lynn Demarco MD        oxyCODONE (ROXICODONE) immediate release tablet 5 mg  5 mg Oral PRN Lynn Demarco MD        Or    oxyCODONE (ROXICODONE) immediate release tablet 10 mg  10 mg Oral PRN Lynn Demarco MD        [facility]) injection 6.25 mg  6.25 mg IntraVENous PRN Nohelia Ernandez MD        diphenhydrAMINE (BENADRYL) injection 12.5 mg  12.5 mg IntraVENous Once PRN Nohelia Ernandez MD        labetalol (NORMODYNE;TRANDATE) injection 5 mg  5 mg IntraVENous Q10 Min PRN Nohelia Ernandez MD        lactated ringers infusion   IntraVENous Continuous Penelope Velazquez  mL/hr at 01/03/22 0036 New Bag at 01/03/22 0036    insulin glargine (LANTUS;BASAGLAR) injection pen 10 Units  10 Units SubCUTAneous QAM Akanksha Umana DO   10 Units at 01/02/22 0940    insulin lispro (1 Unit Dial) 3 Units  3 Units SubCUTAneous BID WC Penelope Velazquez MD   3 Units at 01/02/22 1744    insulin regular (HUMULIN R;NOVOLIN R) injection 0-18 Units  0-18 Units SubCUTAneous 4x Daily AC & HS Penelope Velazquez MD   2 Units at 01/01/22 1657    0.9 % sodium chloride infusion   IntraVENous PRN Penelope Velazquez MD        heparin (porcine) injection 5,000 Units  5,000 Units SubCUTAneous 3 times per day Penelope Velazquez MD   5,000 Units at 01/03/22 0746    alteplase (CATHFLO) injection 1 mg  1 mg IntraCATHeter PRN Penelope Velazquez MD   1 mg at 12/25/21 0457    docusate sodium (COLACE) capsule 100 mg  100 mg Oral BID Penelope Velazquez MD   100 mg at 01/03/22 2702    polyethylene glycol (GLYCOLAX) packet 17 g  17 g Oral Daily Penelope Velazquez MD   17 g at 01/02/22 0939    ipratropium-albuterol (DUONEB) nebulizer solution 1 ampule  1 ampule Inhalation Q4H PRN Penelope Velazquez MD   1 ampule at 12/10/21 1202    dextrose 50 % IV solution  12.5 g IntraVENous PRN Penelope Velazquez MD        amLODIPine (NORVASC) tablet 5 mg  5 mg Oral Daily Penelope Velazquez MD   5 mg at 01/03/22 3732    hydrALAZINE (APRESOLINE) injection 10 mg  10 mg IntraVENous Q6H PRN Penelope Velazquez MD   10 mg at 01/01/22 1155    ampicillin-sulbactam (UNASYN) 3000 mg ivpb minibag  3,000 mg IntraVENous Q6H Penelope Velazquez  mL/hr at 01/03/22 0920 3,000 mg at 01/03/22 0920    oxyCODONE (ROXICODONE) immediate release tablet 5 mg  5 mg Oral Q4H PRN Mayra Todd MD   5 mg at 01/02/22 2152    Or    oxyCODONE (ROXICODONE) immediate release tablet 10 mg  10 mg Oral Q4H PRN Mayra Todd MD   10 mg at 01/01/22 2107    acetaminophen (TYLENOL) tablet 1,000 mg  1,000 mg Oral Q6H Mayra Todd MD   1,000 mg at 01/03/22 1666    sodium chloride flush 0.9 % injection 5-40 mL  5-40 mL IntraVENous 2 times per day Mayra Todd MD   10 mL at 01/02/22 2010    sodium chloride flush 0.9 % injection 5-40 mL  5-40 mL IntraVENous PRN Mayra Todd MD        0.9 % sodium chloride infusion  25 mL IntraVENous PRN Mayra Todd  mL/hr at 12/12/21 0850 25 mL at 12/12/21 0850    glucose (GLUTOSE) 40 % oral gel 15 g  15 g Oral PRN Mayra Todd MD        dextrose 50 % IV solution  12.5 g IntraVENous PRN Mayra Todd MD        glucagon (rDNA) injection 1 mg  1 mg IntraMUSCular PRN Mayra Todd MD        dextrose 5 % solution  100 mL/hr IntraVENous PRN Mayra Todd MD           Allergies:  No Known Allergies    Problem List:    Patient Active Problem List   Diagnosis Code    Diabetic acidosis without coma ([facility] [address] E11.10    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     Past Surgical History:        Procedure Laterality Date    ABDOMEN SURGERY N/A 12/1/2021    WIDE EXCISION PERINEUM, BACK, ABDOMINAL WALL performed by Ziggy Varela MD at [facility] Left 12/3/2021    ABDOMINAL WALL AND LEFT BUTTOCK DEBRIDEMENT, WOUND VAC PLACEMENT performed by Lord Jose Angel MD [facility] Left 12/5/2021    ABDOMINAL WALL AND LEFT BUTTOCK DEBRIDEMENT, WOUND VAC PLACEMENT performed by Lord Jose Angel MD at [facility] N/A 12/7/2021    EVALUATION UNDER ANESTHESIA WITH DEBRIDEMENT ABDOMINAL WOUND AND LEFT BUTTOCK, WOUND VAC CHANGE performed by Jamie Rodríguez MD at [facility] Left 12/10/2021    ABDOMINAL WALL AND LEFT BUTTOCK WOUND VAC CHANGE WITH FURTHER DEBRIDEMENT ABDOMEN AND LEFT BUTTOCK WOUND performed by Jamie Rodríguez MD at [facility] N/A 12/13/2021    WOUND VAC CHANGE ABDOMEN AND LEFT BUTTOCK performed by Jamie Rodríguez MD at [facility] N/A 12/16/2021    2ND LOOK/ EVALUATION UNDER ANESTHESIA/ WOUND VAC CHANGE ABDOMINAL WALL AND PERINEUM performed by Jamie Rodríguez MD at [facility] N/A 12/21/2021    EVALUATION UNDER ANESTHESIA/ WOUND VAC CHANGE ABDOMINAL WALL AND PERINEUM performed by Jamie Rodríguez MD at [facility] N/A 12/23/2021    EVALUATION UNDER ANESTHESIA/ WOUND VAC [address] AND PERINEUM performed by Jamie Rodríguez MD at [address] Left 12/7/2021    LAPAROSCOPIC COLOSTOMY CREATION performed by Arabella Cartwright MD at [address] N/A 12/27/2021    PERINEAL WOUND VAC CHANGE performed by Jamie Rodríguez MD at [address] N/A 12/30/2021    PERINEAL / ABDOMINAL WOUND VAC CHANGE, performed by Jamie Rodríguez MD at [address] N/A 11/30/2021    DEBRIDEMENT OF SCROTAL JAYRO'S GANGRENE performed by Pasquale Berman MD at [address] N/A 11/30/2021    PERINEAL SOFT TISSUE EXCISIONAL DEBRIDEMENT performed by Farrah Ruiz MD at [facility] History:    Social History     Tobacco Use    Smoking status: Current Every Day Smoker    Smokeless tobacco: Never Used   Substance Use Topics    Alcohol use: Yes     Alcohol/week: 0.0 standard drinks     Comment: social                                 Ready to quit: Not Answered  Counseling given: Not Answered      Vital Signs (Current): There were no vitals filed for this visit.                                            BP Readings from Last 3 Encounters:   01/03/22 (!) 154/75   12/30/21 (!) 98/54   12/27/21 (!) 96/56       NPO Status:                                                                                 BMI:   Wt Readings from Last 3 Encounters:   12/28/21 201 lb 15.1 oz (91.6 kg)   11/30/21 163 lb 3.2 oz (74 kg)   04/18/16 179 lb 14.3 oz (81.6 kg)     There is no height or weight on file to calculate BMI.    CBC:   Lab Results   Component Value Date    WBC 8.0 01/03/2022    RBC 2.68 01/03/2022    HGB 8.4 01/03/2022    HCT 25.1 01/03/2022    MCV 93.5 01/03/2022    RDW 16.4 01/03/2022     01/03/2022       CMP:   Lab Results   Component Value Date     01/03/2022    K 3.9 01/03/2022    K 3.8 12/12/2021     01/03/2022    CO2 31 01/03/2022    BUN 19 01/03/2022    CREATININE <0.5 01/03/2022    GFRAA >60 01/03/2022    AGRATIO 0.7 11/30/2021    LABGLOM >60 01/03/2022    GLUCOSE 91 01/03/2022    PROT 4.8 12/10/2021    CALCIUM 8.0 01/03/2022    BILITOT 0.3 12/10/2021    ALKPHOS 170 12/10/2021    AST 13 12/10/2021    ALT 7 12/10/2021       POC Tests:   Recent Labs     01/03/22  0735   POCGLU 96       Coags:   Lab Results   Component Value Date    PROTIME 12.7 12/30/2021    INR 1.12 12/30/2021       HCG (If Applicable): No results found for: PREGTESTUR, PREGSERUM, HCG, HCGQUANT     ABGs:   Lab Results   Component Value Date    PHART 7.227 12/09/2021    PO2ART 107.8 12/09/2021    VAR0PDU 42.4 12/09/2021    ERM6SCK 17.6 12/09/2021    BEART -10 12/09/2021    B8TNZOSS 97 12/09/2021        Type & Screen (If Applicable):  No results found for: LABABO, LABRH    Drug/Infectious Status (If Applicable):  No results found for: HIV, HEPCAB    COVID-19 Screening (If Applicable):   Lab Results   Component Value Date    COVID19 Not Detected 11/30/2021           Anesthesia Evaluation  Patient summary reviewed and Nursing notes reviewed no history of anesthetic complications:   Airway: Mallampati: II  TM distance: >3 FB   Neck ROM: full  Mouth opening: > = 3 FB Dental:    (+) upper dentures and lower dentures      Pulmonary:Negative Pulmonary ROS and normal exam                               Cardiovascular:  Exercise tolerance: good (>4 METS),       (-) CAD,  angina and  CHF      Rhythm: regular  Rate: normal                    Neuro/Psych:   Negative Neuro/Psych ROS              GI/Hepatic/Renal:            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 Endo/Other:    (+) DiabetesType II DM, , .                 Abdominal:             Vascular: negative vascular ROS. Other Findings:               Anesthesia Plan      general     ASA 2       Induction: intravenous. MIPS: Postoperative opioids intended and Prophylactic antiemetics administered. Anesthetic plan and risks discussed with patient. Plan discussed with CRNA.     Attending anesthesiologist reviewed and agrees with Angelica Gr MD   1/3/2022

## 2022-01-03 NOTE — PROGRESS NOTES
Pt arrived from 5314 to PACU for pre op. Attached to monitor.   Pt gave name//what procedure he is having

## 2022-01-03 NOTE — ANESTHESIA POSTPROCEDURE EVALUATION
Department of Anesthesiology  Postprocedure Note    Patient: Byron Duke  MRN: [id number]  YOB: 1955  Date of evaluation: 1/3/2022  Time:  3:11 PM     Procedure Summary     Date: 01/03/22 Room / Location: [address] [place] [address] / [facility]    Anesthesia Start: 4926 Anesthesia Stop: 3461    Procedures:       SPLIT THICKNESS SKIN GRAFT TO ABDOMEN WOUND [place]. 44x 11 (N/A )      GLUTEAL WOUND VAC PLACEMENT (N/A ) Diagnosis: (NECROTIZING FASCIITIS PERINEUM AND ABDOMINAL WALL)    Surgeons: Michael Spatz, MD Responsible Provider: Ara Salinas MD    Anesthesia Type: general ASA Status: 2          Anesthesia Type: general    Arcadio Phase I: Arcadio Score: 9    Arcadio Phase II:      Last vitals: Reviewed and per EMR flowsheets.        Anesthesia Post Evaluation    Patient location during evaluation: PACU  Level of consciousness: awake  Complications: no  Multimodal analgesia pain management approach

## 2022-01-03 NOTE — PROGRESS NOTES
Plastic Surgery   Daily Progress Note  Justina Lorenzo     CC:  Necrotizing fascitis     Subjective :  Patient rested well overnight. He remains afebrile and HDS. There was a wound vac leak, which was repaired by the surgery team. Patient okay to bear weight on heals to attempt to increase strength. Pain controlled this morning. Tolerating a diet. Objective    Infusions:   lactated ringers 100 mL/hr at 01/03/22 0036    sodium chloride      sodium chloride 25 mL (12/12/21 0850)    dextrose        I/O:I/O last 3 completed shifts: In: 1200 [P.O.:1200]  Out: 1425 [Urine:925; Drains:500]           Wt Readings from Last 1 Encounters:   12/28/21 201 lb 15.1 oz (91.6 kg)             LABS:    Recent Labs     01/01/22  0525   WBC 8.5   HGB 8.5*   HCT 25.5*   MCV 93.9           Recent Labs     01/01/22  0525      K 3.9      CO2 32   PHOS 2.9   BUN 24*   CREATININE 0.6*      No results for input(s): AST, ALT, ALB, BILIDIR, BILITOT, ALKPHOS in the last 72 hours. No results for input(s): LIPASE, AMYLASE in the last 72 hours. No results for input(s): PROT, INR, APTT in the last 72 hours. No results for input(s): CKTOTAL, CKMB, CKMBINDEX, TROPONINI in the last 72 hours. Exam  BP (!) 156/74   Pulse 90   Temp 98.2 °F (36.8 °C) (Oral)   Resp 18   Ht 5' 11\" (1.803 m)   Wt 201 lb 15.1 oz (91.6 kg)   SpO2 92%   BMI 28.17 kg/m²      General appearance: alert, appears stated age and cooperative  Eyes: No scleral icterus, EOM grossly intact  Neck: trachea midline, no JVD, no lymphadenopathy, neck supple  Chest/Lungs: normal effort with no accessory muscle use on 2L NC  Cardiovascular: RRR, brisk capillary refill distally  Abdomen: Soft, large surgical debridement area of the abdomen with veraflo Vac in place holding adequate suction and instilling normal saline. Colostomy is pink, viable and with brown stool in the bag.   Skin: warm and dry, no rashes  Extremities: no edema, no cyanosis  Genitourinary: Mcfadden in place with clear yellow urine  Neuro: A&Ox3, no focal deficits, sensation intact    ASSESSMENT/PLAN: Pt. is a 61 y.o. male with Necrotizing fasciitis of the abdominal wall, gluteal region, and scrotum s/p wide excision of perineum, back, and abdominal wall. S/p multiple debridements and wound vac placement and changes intraoperatively with diverting colostomy creation (12/7). OR wound vac change (12/10, 12/13, 12/16, 12/21, 12/23, 12/27, 12/30). - Continue instillation wound vac  - Plan to return to OR tomorrow for the start of the reconstructive process.       - Process will start with reconstruction of the abdomen prior to perineum with a STSG planned   - Continue strict glucose control including Zero carb diet  - patient needs to increase activity, will continue to engage PT/OT, okay to bear weight per podiatry team (heals)    Jennifer Wu DO, [address]  PGY1, General Surgery  01/03/22  6:23 AM  [phone]

## 2022-01-03 NOTE — PROGRESS NOTES
Physical Therapy and Occupational Therapy  No Treatment    Chart reviewed. Attempted to see pt for PT/OT this AM.  Pt currently in OR for SPLIT THICKNESS SKIN GRAFT, POSSIBLE ADJACENT TISSUE TRANSFER, POSSIBLE FLAP. WOUND VAC PLACEMENT. GLUTEAL FLAP. Will try back later today as time and schedule allow. If not, will try back per PT/OT plans of care. RN notified of attempt.      Pawan Reeves [address]

## 2022-01-03 NOTE — PROGRESS NOTES
PACU Transfer to Floor Note    Current Allergies: Patient has no known allergies. Pt meets criteria as per Chivo Score and ASPAN Standards to transfer to next phase of care. Recent Labs     01/03/22  1026 01/03/22  1240   POCGLU 104* 94       Vitals:    01/03/22 1415   BP: (!) 144/75   Pulse: 88   Resp: 18   Temp: 98.7 °F (37.1 °C)   SpO2: 93%     Vitals within 20% of pt's admission vitals as per CHIVO SCORE    SpO2: 93 %    O2 Flow Rate (L/min): 3 L/min      Intake/Output Summary (Last 24 hours) at 1/3/2022 1434  Last data filed at 1/3/2022 1415  Gross per 24 hour   Intake 1425 ml   Output 1010 ml   Net 415 ml     Wound Vac to abdomen. Surgical incison to right thigh (skin harvest site) is covered with transparent film and rolled gauze. Pain assessment:  present - adequately treated (Pt has received IV pain meds in PACU)    Pain Level: 6    Pt has walsh catheter. Handoff report given via phone call to [facility]. Family (wife Omar Payne) updated via phone call.       1/3/2022 2:34 PM

## 2022-01-04 LAB
ALBUMIN SERPL-MCNC: 2.3 G/DL (ref 3.4–5)
ANION GAP SERPL CALCULATED.3IONS-SCNC: 4 MMOL/L (ref 3–16)
BASOPHILS ABSOLUTE: 0 K/UL (ref 0–0.2)
BASOPHILS RELATIVE PERCENT: 0.8 %
BUN BLDV-MCNC: 18 MG/DL (ref 7–20)
CALCIUM SERPL-MCNC: 8.1 MG/DL (ref 8.3–10.6)
CHLORIDE BLD-SCNC: 103 MMOL/L (ref 99–110)
CO2: 32 MMOL/L (ref 21–32)
CREAT SERPL-MCNC: <0.5 MG/DL (ref 0.8–1.3)
EOSINOPHILS ABSOLUTE: 0.2 K/UL (ref 0–0.6)
EOSINOPHILS RELATIVE PERCENT: 2.9 %
GFR AFRICAN AMERICAN: >60
GFR NON-AFRICAN AMERICAN: >60
GLUCOSE BLD-MCNC: 105 MG/DL (ref 70–99)
GLUCOSE BLD-MCNC: 120 MG/DL (ref 70–99)
GLUCOSE BLD-MCNC: 125 MG/DL (ref 70–99)
GLUCOSE BLD-MCNC: 142 MG/DL (ref 70–99)
GLUCOSE BLD-MCNC: 79 MG/DL (ref 70–99)
GLUCOSE BLD-MCNC: 82 MG/DL (ref 70–99)
HCT VFR BLD CALC: 24.5 % (ref 40.5–52.5)
HEMOGLOBIN: 8 G/DL (ref 13.5–17.5)
LYMPHOCYTES ABSOLUTE: 1 K/UL (ref 1–5.1)
LYMPHOCYTES RELATIVE PERCENT: 15.2 %
MAGNESIUM: 1.7 MG/DL (ref 1.8–2.4)
MCH RBC QN AUTO: 30.3 PG (ref 26–34)
MCHC RBC AUTO-ENTMCNC: 32.7 G/DL (ref 31–36)
MCV RBC AUTO: 92.8 FL (ref 80–100)
MONOCYTES ABSOLUTE: 1 K/UL (ref 0–1.3)
MONOCYTES RELATIVE PERCENT: 16.3 %
NEUTROPHILS ABSOLUTE: 4.1 K/UL (ref 1.7–7.7)
NEUTROPHILS RELATIVE PERCENT: 64.8 %
PDW BLD-RTO: 16.1 % (ref 12.4–15.4)
PERFORMED ON: ABNORMAL
PERFORMED ON: NORMAL
PERFORMED ON: NORMAL
PHOSPHORUS: 3.4 MG/DL (ref 2.5–4.9)
PLATELET # BLD: 287 K/UL (ref 135–450)
PMV BLD AUTO: 7.7 FL (ref 5–10.5)
POTASSIUM SERPL-SCNC: 4.1 MMOL/L (ref 3.5–5.1)
RBC # BLD: 2.64 M/UL (ref 4.2–5.9)
SODIUM BLD-SCNC: 139 MMOL/L (ref 136–145)
WBC # BLD: 6.4 K/UL (ref 4–11)

## 2022-01-04 PROCEDURE — 83735 ASSAY OF MAGNESIUM: CPT

## 2022-01-04 PROCEDURE — 80069 RENAL FUNCTION PANEL: CPT

## 2022-01-04 PROCEDURE — 94669 MECHANICAL CHEST WALL OSCILL: CPT

## 2022-01-04 PROCEDURE — 6370000000 HC RX 637 (ALT 250 FOR IP): Performed by: STUDENT IN AN ORGANIZED HEALTH CARE EDUCATION/TRAINING PROGRAM

## 2022-01-04 PROCEDURE — 6360000002 HC RX W HCPCS: Performed by: STUDENT IN AN ORGANIZED HEALTH CARE EDUCATION/TRAINING PROGRAM

## 2022-01-04 PROCEDURE — 1200000000 HC SEMI PRIVATE

## 2022-01-04 PROCEDURE — 94761 N-INVAS EAR/PLS OXIMETRY MLT: CPT

## 2022-01-04 PROCEDURE — 2580000003 HC RX 258: Performed by: STUDENT IN AN ORGANIZED HEALTH CARE EDUCATION/TRAINING PROGRAM

## 2022-01-04 PROCEDURE — 2700000000 HC OXYGEN THERAPY PER DAY

## 2022-01-04 PROCEDURE — 85025 COMPLETE CBC W/AUTO DIFF WBC: CPT

## 2022-01-04 RX ORDER — MAGNESIUM SULFATE IN WATER 40 MG/ML
2000 INJECTION, SOLUTION INTRAVENOUS ONCE
Status: COMPLETED | OUTPATIENT
Start: 2022-01-04 | End: 2022-01-04

## 2022-01-04 RX ADMIN — OXYCODONE HYDROCHLORIDE 10 MG: 5 TABLET ORAL at 12:10

## 2022-01-04 RX ADMIN — MAGNESIUM SULFATE HEPTAHYDRATE 2000 MG: 2 INJECTION, SOLUTION INTRAVENOUS at 08:50

## 2022-01-04 RX ADMIN — SODIUM CHLORIDE, PRESERVATIVE FREE 10 ML: 5 INJECTION INTRAVENOUS at 20:23

## 2022-01-04 RX ADMIN — OXYCODONE HYDROCHLORIDE 10 MG: 5 TABLET ORAL at 00:35

## 2022-01-04 RX ADMIN — DOCUSATE SODIUM 100 MG: 100 CAPSULE, LIQUID FILLED ORAL at 08:49

## 2022-01-04 RX ADMIN — INSULIN HUMAN 2 UNITS: 100 INJECTION, SOLUTION PARENTERAL at 08:53

## 2022-01-04 RX ADMIN — AMPICILLIN SODIUM AND SULBACTAM SODIUM 3000 MG: 2; 1 INJECTION, POWDER, FOR SOLUTION INTRAMUSCULAR; INTRAVENOUS at 14:40

## 2022-01-04 RX ADMIN — ACETAMINOPHEN 1000 MG: 500 TABLET ORAL at 23:27

## 2022-01-04 RX ADMIN — AMPICILLIN SODIUM AND SULBACTAM SODIUM 3000 MG: 2; 1 INJECTION, POWDER, FOR SOLUTION INTRAMUSCULAR; INTRAVENOUS at 09:05

## 2022-01-04 RX ADMIN — ACETAMINOPHEN 1000 MG: 500 TABLET ORAL at 18:33

## 2022-01-04 RX ADMIN — AMLODIPINE BESYLATE 5 MG: 5 TABLET ORAL at 08:49

## 2022-01-04 RX ADMIN — ACETAMINOPHEN 1000 MG: 500 TABLET ORAL at 06:28

## 2022-01-04 RX ADMIN — OXYCODONE HYDROCHLORIDE 10 MG: 5 TABLET ORAL at 16:04

## 2022-01-04 RX ADMIN — HEPARIN SODIUM 5000 UNITS: 5000 INJECTION INTRAVENOUS; SUBCUTANEOUS at 23:24

## 2022-01-04 RX ADMIN — HEPARIN SODIUM 5000 UNITS: 5000 INJECTION INTRAVENOUS; SUBCUTANEOUS at 06:28

## 2022-01-04 RX ADMIN — SODIUM CHLORIDE, POTASSIUM CHLORIDE, SODIUM LACTATE AND CALCIUM CHLORIDE: 600; 310; 30; 20 INJECTION, SOLUTION INTRAVENOUS at 07:32

## 2022-01-04 RX ADMIN — HEPARIN SODIUM 5000 UNITS: 5000 INJECTION INTRAVENOUS; SUBCUTANEOUS at 14:38

## 2022-01-04 RX ADMIN — ACETAMINOPHEN 1000 MG: 500 TABLET ORAL at 00:35

## 2022-01-04 RX ADMIN — AMPICILLIN SODIUM AND SULBACTAM SODIUM 3000 MG: 2; 1 INJECTION, POWDER, FOR SOLUTION INTRAMUSCULAR; INTRAVENOUS at 20:29

## 2022-01-04 RX ADMIN — OXYCODONE HYDROCHLORIDE 10 MG: 5 TABLET ORAL at 08:16

## 2022-01-04 RX ADMIN — DOCUSATE SODIUM 100 MG: 100 CAPSULE, LIQUID FILLED ORAL at 20:24

## 2022-01-04 RX ADMIN — POLYETHYLENE GLYCOL 3350 17 G: 17 POWDER, FOR SOLUTION ORAL at 08:49

## 2022-01-04 RX ADMIN — OXYCODONE HYDROCHLORIDE 10 MG: 5 TABLET ORAL at 20:24

## 2022-01-04 RX ADMIN — INSULIN LISPRO 3 UNITS: 100 INJECTION, SOLUTION INTRAVENOUS; SUBCUTANEOUS at 08:54

## 2022-01-04 RX ADMIN — AMPICILLIN SODIUM AND SULBACTAM SODIUM 3000 MG: 2; 1 INJECTION, POWDER, FOR SOLUTION INTRAMUSCULAR; INTRAVENOUS at 03:14

## 2022-01-04 ASSESSMENT — PAIN SCALES - GENERAL
PAINLEVEL_OUTOF10: 7
PAINLEVEL_OUTOF10: 5
PAINLEVEL_OUTOF10: 0
PAINLEVEL_OUTOF10: 3
PAINLEVEL_OUTOF10: 7
PAINLEVEL_OUTOF10: 0

## 2022-01-04 ASSESSMENT — PAIN DESCRIPTION - PROGRESSION: CLINICAL_PROGRESSION: NOT CHANGED

## 2022-01-04 NOTE — OP NOTE
[place] [address] SURGERY     OPERATIVE DICTATION    NAME: Stefani Foster   MRN: [id number]  DATE: 01/03/2022    AGE: 61 y.o. LOCATION: [facility]    PREOPERATIVE DIAGNOSIS:  Shyala's gangrene with necrotizing fasciitis     POSTOPERATIVE DIAGNOSIS:  Same. OPERATION PERFORMED: 1) Split thickness skin graft to abdomen (44 x 11 cm)       2) Application of Instillation Wound Vac to perineal wound     SURGEON:  Sussy Tovar MD    ANESTHESIA: General     ESTIMATED BLOOD LOSS: Minimal     DRAINS:  Instillation vac     SPECIMENS: None     OPERATIVE INDICATIONS:  This is an unfortunate 61 y.o. male with a history of poorly controlled diabetes who developed Shayla's gangrene at an outside hospital.  He underwent debridement with both Urology and General surgery, however was noted to have additional crepitance. Given this, he was urgently transferred to our hospital and was taken to the operating room by general surgery for extensive debridement. Plastic surgery was consulted for assistance with management. He has stabilized and undergone diverting colostomy. He has been optimized during his hospitalization and he brought to the operating room today for potential skin grafting to the [facility]. The risks, benefits, alternatives, outcomes, and personnel involved was performed with the patient. After all questions were answered to the patient's satisfaction, they agreed to proceed. OPERATIVE PROCEDURE:  The patient was brought to the operating room on his hospital bed and placed in the supine position on the operating room table. He underwent general anesthesia without difficulty, was then placed in the lithotomy position, and was prepped and draped in the usual sterile manner. A time-out was performed confirming the patient and the procedure to be performed. The wounds had improved appearance from previous with improved granulation.   Thus, the decision was made to proceed with skin grafting to the abdomen. The wound was prepped with pulse lavage with saline. Once completed, attention was then directed to the right thigh. A split thickness skin graft was harvested with a Misty dermatome. The graft was then meshed in a 3:1 manner and secured in position using staples and 4-0 Chromic sutures. The donor site had hemostasis obtained with epinephrine soaked Telfa. Postoperative analgesia was provided with Exparel. The donor site was then dressed with a Xeroform that was sutured into position followed by a Tegaderm. The graft was bolstered with a wound vac that was protected with Adaptic. There was an excellent seal on the vac. A perineal instillation vac with Cleanse Choice was then applied with excellent seal.  The patient was then placed supine, extubated, and taken back to the regular floor in stable condition. There were no immediate complications and the patient tolerated the procedure well. At the end of the case, all counts were correct. PLAN: Will return for potential gluteal flap reconstruction in 48 hours for the gluteal/perineal wound.     Yeny Loving MD

## 2022-01-04 NOTE — PLAN OF CARE
Problem: Falls - Risk of:  Goal: Will remain free from falls  Description: Will remain free from falls  Note: Patient has remained free of all falls and injuries throughout shift. Bed alarm is on and bed is locked and in lowest position. Call light within reach and patient calls out appropriately. Will continue to monitor for further needs. Problem: Pain:  Goal: Pain level will decrease  Description: Pain level will decrease  Note: Patient pain is controlled with schedule and prn pain medication. Will continue to monitor for further pain needs.

## 2022-01-04 NOTE — PROGRESS NOTES
Podiatric Surgery Daily Progress Note      Admit Date: 12/1/2021      Code:Full Code    Patient seen and examined, labs and records reviewed    Subjective:     Patient seen at bedside this AM. Patient denies pain to bilateral lower extremities. Patient denies fever, chills, shortness of breath, chest pain. Review of Systems: A 10 point review of systems was conducted, significant findings as noted in HPI. All other systems negative. Objective     /63   Pulse 75   Temp 98.1 °F (36.7 °C) (Oral)   Resp 18   Ht 5' 11\" (1.803 m)   Wt 201 lb 15.1 oz (91.6 kg)   SpO2 96%   BMI 28.17 kg/m²     I/O:    Intake/Output Summary (Last 24 hours) at 1/4/2022 0611  Last data filed at 1/3/2022 2000  Gross per 24 hour   Intake 1065 ml   Output 660 ml   Net 405 ml              Wt Readings from Last 3 Encounters:   12/28/21 201 lb 15.1 oz (91.6 kg)   11/30/21 163 lb 3.2 oz (74 kg)   04/18/16 179 lb 14.3 oz (81.6 kg)       LABS:    Recent Labs     01/03/22  0559   WBC 8.0   HGB 8.4*   HCT 25.1*           Recent Labs     01/03/22  0559      K 3.9      CO2 31   PHOS 2.9   BUN 19   CREATININE <0.5*        No results for input(s): PROT, INR, APTT in the last 72 hours. LOWER EXTREMITY EXAMINATION    Dressing to left LE intact. No strikethrough noted to the external dressing. Betadine discoloration noted to the internal layers of the dressing of the left LE.     VASCULAR:   DP and PT pulses are non-palpable b/l. Upon hand-held Doppler examination DP signals were noted to be monophasic and PT signals were noted to be nondopplerable. CFT slightly diminished to the distal digits of bilateral lower extremities. Skin temperature is warm to cool to the distal digits from proximal to distal.    No edema noted. No pain with calf compression b/l.     NEUROLOGIC:   Gross and epicritic sensation is diminished b/l.  Protective sensation is diminished at all pedal sites b/l.     DERMATOLOGIC:     Left lower extremity:  Full-thickness ulceration noted to the lateral aspect of the left foot. Wound measures approximately 3.2 cm x 2 cm x 0.5 cm. Wound base with exposed 5th metatarsal bone, with necrotic edges at the proximal and distal edges. Necrotic appearance of 5th digit. No tracking or tunneling noted. No purulent drainage noted. No fluctuance or crepitus or malodor noted. Deep tissue injury noted 2/2 Prevalon boot strap to the anterior aspect of the ankle. Intact epithelialized tissue noted. No crepitus, erythema, drainage, or malodor noted. No open wound noted. Stable without signs of acute infection noted. Right lower extremity:  Parital thickness ulceration noted to the lateral malleolus 2/2 pressure. No fluctuance, crepitus, malodor, or drainage noted. No acute signs infection noted. Partial thickness ulceration 2/2 pressure noted at the styloid process of the 5th metatarsal. Fibrotic tissue noted. No fluctuance or crepitus noted. No acute signs of infection noted. Nonblanchable erythema noted to dorsal aspect right midfoot, nonblanchable, skin intact. No open wound noted at this time. No fluctuance, crepitus, erythema, drainage, or malodor noted. MUSCULOSKELETAL:   Muscle strength 4/5 for all pedal muscle groups B/L LE. No pain with palpation to bilateral lower extremity. IMAGING:  XR LEFT FOOT 11/30/2021  Narrative   EXAMINATION:   THREE XRAY VIEWS OF THE LEFT FOOT       11/30/2021 1:44 pm       HISTORY:   ORDERING SYSTEM PROVIDED HISTORY: wound   TECHNOLOGIST PROVIDED HISTORY:   Reason for exam:->wound   Reason for Exam: blood sugar problem, wound check on lateral portion of foot   Acuity: Acute   Type of Exam: Initial       FINDINGS:   Osseous destruction along the articular margins of the 5th   metatarsophalangeal joint with associated lucency in the soft tissues. .   There is no evidence of fracture or dislocation. . The remaining joint spaces   appear well maintained.  The remaining soft tissues are unremarkable.       Impression   Evidence of a septic joint involving the 5th metatarsal phalangeal joint with   associated osteomyelitis     ARTERIAL DUPLEX 12/2/21     Type of Study:        Extremities Arteries:Lower Extremities Arterial Duplex, VL LOWER EXTREMITY    ARTERIES DUPLEX BILATERAL.       Tech Comments   Right   The right ankle/brachial index is 0.0 (no Doppler signal could be heard at the   [facility] or the PT). The common femoral and profunda femoral arteries could not be visualized due   to bandages extending into the groin area. The mid superficial femoral artery has a short segmental occlusion and then is   reconstituted. Elevated velocities at the distal superficial femoral artery indicate a > 50%   stenosis by velocity criteria (velocity went from 15 to 298 cm/s). The posterior tibial artery is occluded. The distal anterior tibial artery is barely patent, with very low flow   (possibly occluded and then reconstituted). Left   The left ankle brachial index is 0 for the PT and the DP was not accessible   due to the bandages on the foot. The common femoral and profunda femoral artery could not be visualized due to   bandages extending into the groin area. The distal superficial femoral artery is occluded and then reconstituted. The posterior tibial artery, peroneal, and anterior tibial artery are   occluded. There were no previous studies to use for comparison.        ASSESSMENT/PLAN:   -Full-thickness ulceration; lateral left foot- Sands 4  -Osteomyelitis of the fifth metatarsal; left foot  -Deep Tissue Injury, left anterior ankle  -Partial thickness pressure ulceration x2, right foot; NPUAP Stage 2  -Pressure injury, dorsal right midfoot -NPUAP Stage I  -Critical limb ischemia; bilateral lower extremity  -Diabetes mellitus, type II  -History of noncompliance    -Patient seen and examined at bedside this AM.  -VSS on 2 L of O2 via NC; no leukocytosis noted (WBC 8.0)  -All imaging reviewed; impressions noted above  -.1 (1/3/22)  -Prealbumin 16.6 (12/27/21)  -Vascular surgery following; will await further stabilization with the general surgery team and plastic surgery team before offering vascular intervention.  -Plastic surgery following  -Infectious disease following, currently on Unasyn  -Left lower extremity dressed with Betadine soaked gauze, DSD, and tape. -Right lower extremity dressed with Mepilex borders  -Prevalon boots reapplied. Patient is to wear at all times while in bed to prevent further deep tissue injury. Ankle strap removed from bilateral boot as the ankle straps were causing pressure to his feet.  -Partial weight bearing to heel of left lower extremity  -Weightbearing as tolerated to right lower extremity. DISPO: Full-thickness ulceration with underlying osteomyelitis to the left fifth metatarsal. Critical limb ischemia to b/l LE. Wounds are stable at this time. Vascular following; awaiting further stabilization at this time. Plastic surgery, and ID following. Patient will require podiatric surgical intervention but timing will depend on medical stability and vascular recommendations. Will continue to follow while in house. Discussed assessment and plan with JEANETTE Rosenthal DPM  Podiatric Resident, PGY-2  Pager #: [phone] or Perfect Serve      Patient was seen and evaluated at bedside. Agree with residents assessment and treatment plan.   Kassie Tellez DPM

## 2022-01-04 NOTE — PROGRESS NOTES
Patient alert and oriented x4. VSS. Wound vac in place to continuous suction to abdominal wound. Skin graft site with bloody drainage noted. Surgical team told this RN to leave site exposed in order to dry site. BS stable throughout shift. Pain controlled with scheduled and prn pain medication. Tolerating diet with no nausea complaints. Patient denies any other needs at this time. Bed is in the lowest position, call light and bedside table within reach. Patient bed alarm is on. Will continue to monitor for changes in patient status.      Electronically signed by Lakeisha Rincon RN on 1/4/2022 at 3:38 PM

## 2022-01-04 NOTE — FLOWSHEET NOTE
responded to unit by phone. Rapid Response canceled.      01/04/22 0348   Encounter Summary   Services provided to: Patient not available  (response to nurse via phone)   Referral/Consult From: Multi-disciplinary team   Continue Visiting   (1/4, )   Complexity of Encounter Low   Length of Encounter 15 minutes   Crisis   Type Rapid response

## 2022-01-04 NOTE — PROGRESS NOTES
Plastic Surgery   Daily Progress Note  Tempie Hazard     CC:  Necrotizing fascitis     Subjective :  Patient's pain is well controlled this morning. Remain afebrile and HDS overnight. Rapid response called when patient was briefly difficult to arouse however he remained HDS the entire time. Alert and oriented. Objective    Infusions:   lactated ringers 100 mL/hr at 01/03/22 2011    sodium chloride      sodium chloride 25 mL (12/12/21 0850)    dextrose        I/O:I/O last 3 completed shifts: In: 1185 [P.O.:360; I.V.:825]  Out: 860 [Urine:710; Drains:150]           Wt Readings from Last 1 Encounters:   12/28/21 201 lb 15.1 oz (91.6 kg)             LABS:    Recent Labs     01/03/22  0559   WBC 8.0   HGB 8.4*   HCT 25.1*   MCV 93.5           Recent Labs     01/03/22  0559      K 3.9      CO2 31   PHOS 2.9   BUN 19   CREATININE <0.5*      No results for input(s): AST, ALT, ALB, BILIDIR, BILITOT, ALKPHOS in the last 72 hours. No results for input(s): LIPASE, AMYLASE in the last 72 hours. No results for input(s): PROT, INR, APTT in the last 72 hours. No results for input(s): CKTOTAL, CKMB, CKMBINDEX, TROPONINI in the last 72 hours. Exam  /63   Pulse 75   Temp 98.1 °F (36.7 °C) (Oral)   Resp 18   Ht 5' 11\" (1.803 m)   Wt 201 lb 15.1 oz (91.6 kg)   SpO2 96%   BMI 28.17 kg/m²      General appearance: alert, appears stated age and cooperative  Eyes: No scleral icterus, EOM grossly intact  Neck: trachea midline, no JVD, no lymphadenopathy, neck supple  Chest/Lungs: normal effort with no accessory muscle use on 3L NC  Cardiovascular: RRR, brisk capillary refill distally  Abdomen: Soft, large surgical debridement area of the abdomen with veraflo Vac in place holding adequate suction and instilling normal saline. Colostomy is pink, viable and with brown stool in the bag.   Skin: warm and dry, no rashes  Extremities: no edema, no cyanosis, right lower extremity donor site with minimal weeping but hemostatic. Genitourinary: Mcfadden in place with clear yellow urine  Neuro: A&Ox3, no focal deficits, sensation intact    ASSESSMENT/PLAN:   Pt. is a 61 y. o. male with Necrotizing fasciitis of the abdominal wall, gluteal region, and scrotum s/p wide excision of perineum, back, and abdominal wall. S/p multiple debridements and wound vac placement and changes intraoperatively with diverting colostomy creation (12/7). OR wound vac change (12/10, 12/13, 12/16, 12/21, 12/23, 12/27, 12/30).  S/P STSG to abdominal wound, and gluteal wound vac change (1/3) POD 1    - Continue instillation wound vac  - Plan to return to OR later this week for further reconstruction  - Continue strict glucose control including Zero carb diet  - patient needs to increase activity, will continue to engage PT/OT, okay to bear weight per podiatry team (heals)    Dorothy Farrell DO, [address]  PGY1, General Surgery  01/04/22  6:04 AM  [phone]

## 2022-01-04 NOTE — SIGNIFICANT EVENT
Significant Event       Rapid response called at 0327hrs for [address] reported that pt was unresponsive for a few seconds. When ICU team presented to the room and reviewed, pt was AO x3 and reported he felt fine w/ no new issues. Vitals were stable. Rapid response was canceled.        Mary Grace Godinez MD   Internal medicine resident, PGY-1

## 2022-01-04 NOTE — PROGRESS NOTES
Patient found unresponsive unable to arouse, Rapide response activated. Patient came back to himself , awake ,alert and oriented. Rapid response cancelled. VS taken and arestable, Patient assessed blood sugar checked :142. Rapide response team and surgery resident @ bedside. Will continue to monitor

## 2022-01-05 ENCOUNTER — ANESTHESIA EVENT (OUTPATIENT)
Dept: OPERATING ROOM | Age: 67
DRG: 853 | End: 2022-01-05

## 2022-01-05 LAB
ALBUMIN SERPL-MCNC: 2.4 G/DL (ref 3.4–5)
ALBUMIN SERPL-MCNC: 2.5 G/DL (ref 3.4–5)
ANION GAP SERPL CALCULATED.3IONS-SCNC: 7 MMOL/L (ref 3–16)
ANION GAP SERPL CALCULATED.3IONS-SCNC: 9 MMOL/L (ref 3–16)
BASOPHILS ABSOLUTE: 0 K/UL (ref 0–0.2)
BASOPHILS RELATIVE PERCENT: 0.7 %
BUN BLDV-MCNC: 16 MG/DL (ref 7–20)
BUN BLDV-MCNC: 16 MG/DL (ref 7–20)
CALCIUM SERPL-MCNC: 8 MG/DL (ref 8.3–10.6)
CALCIUM SERPL-MCNC: 8.3 MG/DL (ref 8.3–10.6)
CHLORIDE BLD-SCNC: 100 MMOL/L (ref 99–110)
CHLORIDE BLD-SCNC: 102 MMOL/L (ref 99–110)
CO2: 29 MMOL/L (ref 21–32)
CO2: 30 MMOL/L (ref 21–32)
CREAT SERPL-MCNC: <0.5 MG/DL (ref 0.8–1.3)
CREAT SERPL-MCNC: <0.5 MG/DL (ref 0.8–1.3)
EOSINOPHILS ABSOLUTE: 0.4 K/UL (ref 0–0.6)
EOSINOPHILS RELATIVE PERCENT: 7 %
GFR AFRICAN AMERICAN: >60
GFR AFRICAN AMERICAN: >60
GFR NON-AFRICAN AMERICAN: >60
GFR NON-AFRICAN AMERICAN: >60
GLUCOSE BLD-MCNC: 103 MG/DL (ref 70–99)
GLUCOSE BLD-MCNC: 83 MG/DL (ref 70–99)
GLUCOSE BLD-MCNC: 88 MG/DL (ref 70–99)
GLUCOSE BLD-MCNC: 92 MG/DL (ref 70–99)
GLUCOSE BLD-MCNC: 98 MG/DL (ref 70–99)
GLUCOSE BLD-MCNC: 98 MG/DL (ref 70–99)
HCT VFR BLD CALC: 26.3 % (ref 40.5–52.5)
HEMOGLOBIN: 8.6 G/DL (ref 13.5–17.5)
LYMPHOCYTES ABSOLUTE: 1.3 K/UL (ref 1–5.1)
LYMPHOCYTES RELATIVE PERCENT: 19.9 %
MAGNESIUM: 1.8 MG/DL (ref 1.8–2.4)
MAGNESIUM: 1.8 MG/DL (ref 1.8–2.4)
MCH RBC QN AUTO: 30.4 PG (ref 26–34)
MCHC RBC AUTO-ENTMCNC: 32.8 G/DL (ref 31–36)
MCV RBC AUTO: 92.9 FL (ref 80–100)
MONOCYTES ABSOLUTE: 0.9 K/UL (ref 0–1.3)
MONOCYTES RELATIVE PERCENT: 14.6 %
NEUTROPHILS ABSOLUTE: 3.6 K/UL (ref 1.7–7.7)
NEUTROPHILS RELATIVE PERCENT: 57.8 %
PDW BLD-RTO: 16.3 % (ref 12.4–15.4)
PERFORMED ON: ABNORMAL
PERFORMED ON: NORMAL
PHOSPHORUS: 2.7 MG/DL (ref 2.5–4.9)
PHOSPHORUS: 3 MG/DL (ref 2.5–4.9)
PLATELET # BLD: 292 K/UL (ref 135–450)
PMV BLD AUTO: 7.6 FL (ref 5–10.5)
POTASSIUM SERPL-SCNC: 3.9 MMOL/L (ref 3.5–5.1)
POTASSIUM SERPL-SCNC: 3.9 MMOL/L (ref 3.5–5.1)
RBC # BLD: 2.83 M/UL (ref 4.2–5.9)
SODIUM BLD-SCNC: 138 MMOL/L (ref 136–145)
SODIUM BLD-SCNC: 139 MMOL/L (ref 136–145)
WBC # BLD: 6.3 K/UL (ref 4–11)

## 2022-01-05 PROCEDURE — 6370000000 HC RX 637 (ALT 250 FOR IP): Performed by: STUDENT IN AN ORGANIZED HEALTH CARE EDUCATION/TRAINING PROGRAM

## 2022-01-05 PROCEDURE — 80069 RENAL FUNCTION PANEL: CPT

## 2022-01-05 PROCEDURE — 2700000000 HC OXYGEN THERAPY PER DAY

## 2022-01-05 PROCEDURE — 1200000000 HC SEMI PRIVATE

## 2022-01-05 PROCEDURE — 97530 THERAPEUTIC ACTIVITIES: CPT

## 2022-01-05 PROCEDURE — 6360000002 HC RX W HCPCS: Performed by: STUDENT IN AN ORGANIZED HEALTH CARE EDUCATION/TRAINING PROGRAM

## 2022-01-05 PROCEDURE — 2580000003 HC RX 258: Performed by: STUDENT IN AN ORGANIZED HEALTH CARE EDUCATION/TRAINING PROGRAM

## 2022-01-05 PROCEDURE — 97110 THERAPEUTIC EXERCISES: CPT

## 2022-01-05 PROCEDURE — 94669 MECHANICAL CHEST WALL OSCILL: CPT

## 2022-01-05 PROCEDURE — 2580000003 HC RX 258: Performed by: SURGERY

## 2022-01-05 PROCEDURE — 6370000000 HC RX 637 (ALT 250 FOR IP): Performed by: NURSE PRACTITIONER

## 2022-01-05 PROCEDURE — 97116 GAIT TRAINING THERAPY: CPT

## 2022-01-05 PROCEDURE — 85025 COMPLETE CBC W/AUTO DIFF WBC: CPT

## 2022-01-05 PROCEDURE — 6360000002 HC RX W HCPCS: Performed by: NURSE PRACTITIONER

## 2022-01-05 PROCEDURE — 83735 ASSAY OF MAGNESIUM: CPT

## 2022-01-05 RX ORDER — MAGNESIUM SULFATE IN WATER 40 MG/ML
2000 INJECTION, SOLUTION INTRAVENOUS ONCE
Status: COMPLETED | OUTPATIENT
Start: 2022-01-05 | End: 2022-01-05

## 2022-01-05 RX ORDER — SODIUM CHLORIDE, SODIUM LACTATE, POTASSIUM CHLORIDE, CALCIUM CHLORIDE 600; 310; 30; 20 MG/100ML; MG/100ML; MG/100ML; MG/100ML
INJECTION, SOLUTION INTRAVENOUS CONTINUOUS
Status: DISCONTINUED | OUTPATIENT
Start: 2022-01-05 | End: 2022-01-09

## 2022-01-05 RX ORDER — POTASSIUM CHLORIDE 750 MG/1
10 TABLET, EXTENDED RELEASE ORAL ONCE
Status: COMPLETED | OUTPATIENT
Start: 2022-01-05 | End: 2022-01-05

## 2022-01-05 RX ADMIN — OXYCODONE HYDROCHLORIDE 10 MG: 5 TABLET ORAL at 11:13

## 2022-01-05 RX ADMIN — DOCUSATE SODIUM 100 MG: 100 CAPSULE, LIQUID FILLED ORAL at 20:31

## 2022-01-05 RX ADMIN — ACETAMINOPHEN 1000 MG: 500 TABLET ORAL at 11:13

## 2022-01-05 RX ADMIN — POLYETHYLENE GLYCOL 3350 17 G: 17 POWDER, FOR SOLUTION ORAL at 09:04

## 2022-01-05 RX ADMIN — HEPARIN SODIUM 5000 UNITS: 5000 INJECTION INTRAVENOUS; SUBCUTANEOUS at 15:08

## 2022-01-05 RX ADMIN — OXYCODONE HYDROCHLORIDE 10 MG: 5 TABLET ORAL at 20:31

## 2022-01-05 RX ADMIN — HEPARIN SODIUM 5000 UNITS: 5000 INJECTION INTRAVENOUS; SUBCUTANEOUS at 05:29

## 2022-01-05 RX ADMIN — POTASSIUM CHLORIDE 10 MEQ: 750 TABLET, EXTENDED RELEASE ORAL at 11:46

## 2022-01-05 RX ADMIN — OXYCODONE HYDROCHLORIDE 10 MG: 5 TABLET ORAL at 15:08

## 2022-01-05 RX ADMIN — AMPICILLIN SODIUM AND SULBACTAM SODIUM 3000 MG: 2; 1 INJECTION, POWDER, FOR SOLUTION INTRAMUSCULAR; INTRAVENOUS at 09:04

## 2022-01-05 RX ADMIN — SODIUM CHLORIDE, POTASSIUM CHLORIDE, SODIUM LACTATE AND CALCIUM CHLORIDE: 600; 310; 30; 20 INJECTION, SOLUTION INTRAVENOUS at 19:35

## 2022-01-05 RX ADMIN — AMPICILLIN SODIUM AND SULBACTAM SODIUM 3000 MG: 2; 1 INJECTION, POWDER, FOR SOLUTION INTRAMUSCULAR; INTRAVENOUS at 15:13

## 2022-01-05 RX ADMIN — OXYCODONE HYDROCHLORIDE 10 MG: 5 TABLET ORAL at 07:19

## 2022-01-05 RX ADMIN — INSULIN LISPRO 3 UNITS: 100 INJECTION, SOLUTION INTRAVENOUS; SUBCUTANEOUS at 09:14

## 2022-01-05 RX ADMIN — ACETAMINOPHEN 1000 MG: 500 TABLET ORAL at 23:21

## 2022-01-05 RX ADMIN — ACETAMINOPHEN 1000 MG: 500 TABLET ORAL at 05:29

## 2022-01-05 RX ADMIN — HEPARIN SODIUM 5000 UNITS: 5000 INJECTION INTRAVENOUS; SUBCUTANEOUS at 23:20

## 2022-01-05 RX ADMIN — SODIUM CHLORIDE, PRESERVATIVE FREE 10 ML: 5 INJECTION INTRAVENOUS at 20:32

## 2022-01-05 RX ADMIN — MAGNESIUM SULFATE HEPTAHYDRATE 2000 MG: 2 INJECTION, SOLUTION INTRAVENOUS at 11:47

## 2022-01-05 RX ADMIN — AMPICILLIN SODIUM AND SULBACTAM SODIUM 3000 MG: 2; 1 INJECTION, POWDER, FOR SOLUTION INTRAMUSCULAR; INTRAVENOUS at 20:31

## 2022-01-05 RX ADMIN — DOCUSATE SODIUM 100 MG: 100 CAPSULE, LIQUID FILLED ORAL at 09:04

## 2022-01-05 RX ADMIN — AMPICILLIN SODIUM AND SULBACTAM SODIUM 3000 MG: 2; 1 INJECTION, POWDER, FOR SOLUTION INTRAMUSCULAR; INTRAVENOUS at 03:37

## 2022-01-05 RX ADMIN — AMLODIPINE BESYLATE 5 MG: 5 TABLET ORAL at 09:04

## 2022-01-05 ASSESSMENT — PAIN SCALES - GENERAL
PAINLEVEL_OUTOF10: 6
PAINLEVEL_OUTOF10: 0
PAINLEVEL_OUTOF10: 3
PAINLEVEL_OUTOF10: 7
PAINLEVEL_OUTOF10: 5
PAINLEVEL_OUTOF10: 0
PAINLEVEL_OUTOF10: 6
PAINLEVEL_OUTOF10: 7
PAINLEVEL_OUTOF10: 5
PAINLEVEL_OUTOF10: 7
PAINLEVEL_OUTOF10: 7

## 2022-01-05 ASSESSMENT — PAIN DESCRIPTION - LOCATION
LOCATION: ABDOMEN;BUTTOCKS;LEG
LOCATION: ABDOMEN;BUTTOCKS;LEG

## 2022-01-05 ASSESSMENT — PAIN DESCRIPTION - PAIN TYPE
TYPE: SURGICAL PAIN

## 2022-01-05 ASSESSMENT — PAIN DESCRIPTION - ORIENTATION
ORIENTATION: RIGHT;MID
ORIENTATION: RIGHT;MID

## 2022-01-05 ASSESSMENT — PAIN DESCRIPTION - PROGRESSION: CLINICAL_PROGRESSION: NOT CHANGED

## 2022-01-05 NOTE — PROGRESS NOTES
RN asked wound care to evaluate blister on L Inner Thigh, blister intact, fluid filled, wiped with non-sting barrier wipe.  Continue to monitor

## 2022-01-05 NOTE — PROGRESS NOTES
Podiatric Surgery Daily Progress Note      Admit Date: 12/1/2021      Code:Full Code    Patient seen and examined, labs and records reviewed    Subjective:     Patient seen at bedside this AM. Patient denies pain to bilateral lower extremities. Patient denies fever, chills, shortness of breath, chest pain. Review of Systems: A 10 point review of systems was conducted, significant findings as noted in HPI. All other systems negative. Objective     BP (!) 147/65   Pulse 71   Temp 97.9 °F (36.6 °C) (Oral)   Resp 16   Ht 5' 11\" (1.803 m)   Wt 201 lb 15.1 oz (91.6 kg)   SpO2 98%   BMI 28.17 kg/m²     I/O:    Intake/Output Summary (Last 24 hours) at 1/5/2022 6826  Last data filed at 1/5/2022 0323  Gross per 24 hour   Intake 360 ml   Output 725 ml   Net -365 ml              Wt Readings from Last 3 Encounters:   12/28/21 201 lb 15.1 oz (91.6 kg)   11/30/21 163 lb 3.2 oz (74 kg)   04/18/16 179 lb 14.3 oz (81.6 kg)       LABS:    Recent Labs     01/03/22  0559 01/04/22  0648   WBC 8.0 6.4   HGB 8.4* 8.0*   HCT 25.1* 24.5*    287        Recent Labs     01/04/22  0648      K 4.1      CO2 32   PHOS 3.4   BUN 18   CREATININE <0.5*        No results for input(s): PROT, INR, APTT in the last 72 hours. LOWER EXTREMITY EXAMINATION    Dressing to left LE intact. No strikethrough noted to the external dressing. Betadine discoloration noted to the internal layers of the dressing of the left LE.     VASCULAR:   DP and PT pulses are non-palpable b/l. Upon hand-held Doppler examination DP signals were noted to be monophasic and PT signals were noted to be nondopplerable. CFT slightly diminished to the distal digits of bilateral lower extremities. Skin temperature is warm to cool to the distal digits from proximal to distal.    No edema noted. No pain with calf compression b/l.     NEUROLOGIC:   Gross and epicritic sensation is diminished b/l.  Protective sensation is diminished at all pedal sites b/l.     DERMATOLOGIC:     Left lower extremity:  Full-thickness ulceration noted to the lateral aspect of the left foot. Wound measures approximately 3.2 cm x 2 cm x 0.5 cm. Wound base with exposed 5th metatarsal bone, with necrotic edges at the proximal and distal edges. Necrotic appearance of 5th digit. No tracking or tunneling noted. No purulent drainage noted. No fluctuance or crepitus or malodor noted. Deep tissue injury noted 2/2 Prevalon boot strap to the anterior aspect of the ankle. Intact epithelialized tissue noted. No crepitus, erythema, drainage, or malodor noted. No open wound noted. Stable without signs of acute infection noted. Right lower extremity:  Parital thickness ulceration noted to the lateral malleolus 2/2 pressure. No fluctuance, crepitus, malodor, or drainage noted. No acute signs infection noted. Partial thickness ulceration 2/2 pressure noted at the styloid process of the 5th metatarsal. Fibrotic tissue noted. No fluctuance or crepitus noted. No acute signs of infection noted. Nonblanchable erythema noted to dorsal aspect right midfoot, nonblanchable, skin intact. No open wound noted at this time. No fluctuance, crepitus, erythema, drainage, or malodor noted. MUSCULOSKELETAL:   Muscle strength 4/5 for all pedal muscle groups B/L LE. No pain with palpation to bilateral lower extremity. IMAGING:  XR LEFT FOOT 11/30/2021  Narrative   EXAMINATION:   THREE XRAY VIEWS OF THE LEFT FOOT       11/30/2021 1:44 pm       HISTORY:   ORDERING SYSTEM PROVIDED HISTORY: wound   TECHNOLOGIST PROVIDED HISTORY:   Reason for exam:->wound   Reason for Exam: blood sugar problem, wound check on lateral portion of foot   Acuity: Acute   Type of Exam: Initial       FINDINGS:   Osseous destruction along the articular margins of the 5th   metatarsophalangeal joint with associated lucency in the soft tissues. .   There is no evidence of fracture or dislocation.  . The remaining joint spaces appear well maintained. The remaining soft tissues are unremarkable.       Impression   Evidence of a septic joint involving the 5th metatarsal phalangeal joint with   associated osteomyelitis     ARTERIAL DUPLEX 12/2/21     Type of Study:        Extremities Arteries:Lower Extremities Arterial Duplex, VL LOWER EXTREMITY    ARTERIES DUPLEX BILATERAL.       Tech Comments   Right   The right ankle/brachial index is 0.0 (no Doppler signal could be heard at the   [facility] or the PT). The common femoral and profunda femoral arteries could not be visualized due   to bandages extending into the groin area. The mid superficial femoral artery has a short segmental occlusion and then is   reconstituted. Elevated velocities at the distal superficial femoral artery indicate a > 50%   stenosis by velocity criteria (velocity went from 15 to 298 cm/s). The posterior tibial artery is occluded. The distal anterior tibial artery is barely patent, with very low flow   (possibly occluded and then reconstituted). Left   The left ankle brachial index is 0 for the PT and the DP was not accessible   due to the bandages on the foot. The common femoral and profunda femoral artery could not be visualized due to   bandages extending into the groin area. The distal superficial femoral artery is occluded and then reconstituted. The posterior tibial artery, peroneal, and anterior tibial artery are   occluded. There were no previous studies to use for comparison.        ASSESSMENT/PLAN:   -Full-thickness ulceration; lateral left foot- Sands 4  -Osteomyelitis of the fifth metatarsal; left foot  -Deep Tissue Injury, left anterior ankle  -Partial thickness pressure ulceration x2, right foot; NPUAP Stage 2  -Pressure injury, dorsal right midfoot -NPUAP Stage I  -Critical limb ischemia; bilateral lower extremity  -Diabetes mellitus, type II  -History of noncompliance    -Patient seen and examined at bedside this AM.  -Hypertensive, otherwise VSS on 3 L of O2 via NC; no leukocytosis noted (WBC 6.3)  -All imaging reviewed; impressions noted above  -.1 (1/3/22)  -Prealbumin 16.6 (12/27/21)  -Vascular surgery following; will await further stabilization with the general surgery team and plastic surgery team before offering vascular intervention.  -Plastic surgery following  -Infectious disease following, currently on Unasyn  -Left lower extremity dressed with Betadine soaked gauze, DSD, and tape. -Right lower extremity dressed with Mepilex borders  -Prevalon boots reapplied. Patient is to wear at all times while in bed to prevent further deep tissue injury. Ankle strap removed from bilateral boot as the ankle straps were causing pressure to his feet.  -Partial weight bearing to heel of left lower extremity  -Weightbearing as tolerated to right lower extremity. DISPO: Full-thickness ulceration with underlying osteomyelitis to the left fifth metatarsal. Critical limb ischemia to b/l LE. Wounds are stable at this time. Vascular following; awaiting further stabilization at this time. Plastic surgery, and ID following. Patient will require podiatric surgical intervention but timing will depend on medical stability and vascular recommendations. Will continue to follow while in house. Patient examined and evaluated at bedside with JEANETTE Uribe DPM  Podiatric Resident, PGY-2  Pager #: [phone] or Perfect Serve    Patient was seen and evaluated at bedside. Agree with residents assessment and treatment plan.   Shreyas Maher DPM

## 2022-01-05 NOTE — PROGRESS NOTES
Plastic Surgery   Daily Progress Note  Chavis Pel     CC:  Necrotizing fascitis     Subjective :  Patient's pain is well controlled this morning. Wound vac leaking overnight, reinforced and now working without issues. No other complaints. Afebrile, hemodynamically stable. Objective    Infusions:   lactated ringers 100 mL/hr at 01/04/22 0732    sodium chloride      sodium chloride 25 mL (12/12/21 0850)    dextrose        I/O:I/O last 3 completed shifts: In: 3518 [P.O.:600; I.V.:825]  Out: 1610 [Urine:1160; Drains:450]           Wt Readings from Last 1 Encounters:   12/28/21 201 lb 15.1 oz (91.6 kg)             LABS:    Recent Labs     01/03/22  0559 01/04/22  0648   WBC 8.0 6.4   HGB 8.4* 8.0*   HCT 25.1* 24.5*   MCV 93.5 92.8    287        Recent Labs     01/03/22  0559 01/04/22  0648    139   K 3.9 4.1    103   CO2 31 32   PHOS 2.9 3.4   BUN 19 18   CREATININE <0.5* <0.5*      No results for input(s): AST, ALT, ALB, BILIDIR, BILITOT, ALKPHOS in the last 72 hours. No results for input(s): LIPASE, AMYLASE in the last 72 hours. No results for input(s): PROT, INR, APTT in the last 72 hours. No results for input(s): CKTOTAL, CKMB, CKMBINDEX, TROPONINI in the last 72 hours. Exam  BP (!) 144/72   Pulse 94   Temp 98 °F (36.7 °C) (Oral)   Resp 18   Ht 5' 11\" (1.803 m)   Wt 201 lb 15.1 oz (91.6 kg)   SpO2 98%   BMI 28.17 kg/m²      General appearance: alert, appears stated age and cooperative  Eyes: No scleral icterus, EOM grossly intact  Neck: trachea midline, no JVD, no lymphadenopathy, neck supple  Chest/Lungs: normal effort with no accessory muscle use on 3L NC  Cardiovascular: RRR, brisk capillary refill distally  Abdomen: Soft, large surgical debridement area of the abdomen with veraflo Vac in place holding adequate suction and instilling normal saline. Colostomy is pink, viable and with brown stool in the bag.   Skin: warm and dry, no rashes  Extremities: no edema, no cyanosis, right lower extremity donor site with minimal weeping but hemostatic. Genitourinary: Mcfadden in place with clear yellow urine  Neuro: A&Ox3, no focal deficits, sensation intact    ASSESSMENT/PLAN:   Pt. is a 61 y. o. male with Necrotizing fasciitis of the abdominal wall, gluteal region, and scrotum s/p wide excision of perineum, back, and abdominal wall. S/p multiple debridements and wound vac placement and changes intraoperatively with diverting colostomy creation (12/7). OR wound vac change (12/10, 12/13, 12/16, 12/21, 12/23, 12/27, 12/30). S/P STSG to abdominal wound, and gluteal wound vac change (1/3)    - Continue instillation wound vac  - Plan to return to OR Friday for further reconstruction  - Continue strict glucose control including Zero carb diet  - patient needs to increase activity, will continue to engage PT/OT, okay to bear weight per podiatry team (heals).     Richardson Boyd DO, MS  PGY1, General Surgery  01/04/22  10:50 PM  [phone]

## 2022-01-05 NOTE — PROGRESS NOTES
VSS, IVF and IV abx infused. R thigh open to air, wound vacs x2 intact. Wound vac to back side was leaking early this morning, MD made aware and was reinforced, working without issues. Bilateral boots on. Pt denies n/v, pain managed with medication. Fall precaution in place, call light within reach. O2 on 3L. Uneventful night with pt.

## 2022-01-05 NOTE — CARE COORDINATION
Cm following, pt cont with [place] and plan for ORT Friday for further reconstruction. [facility] following cont to pend medicaid. Working with PT OT.   Electronically signed by Patricia Fernandez RN on 1/5/2022 at 3:28 PM   [phone]

## 2022-01-05 NOTE — PROGRESS NOTES
Occupational Therapy  Facility/Department: [facility]'[address]  Daily Treatment Note  NAME: Simone Crenshaw  : 1955  MRN: [id number]    Date of Service: 2022    Discharge Recommendations:    Simone Crenshaw scored a 15/24 on the AM-PAC ADL Inpatient form. Current research shows that an AM-PAC score of 17 or less is typically not associated with a discharge to the patient's home setting. Based on the patient's AM-PAC score and their current ADL deficits, it is recommended that the patient have 3-5 sessions per week of Occupational Therapy at d/c to increase the patient's independence. Please see assessment section for further patient specific details. If patient discharges prior to next session this note will serve as a discharge summary. Please see below for the latest assessment towards goals. Assessment   Performance deficits / Impairments: Decreased functional mobility ; Decreased ADL status; Decreased high-level IADLs;Decreased endurance;Decreased strength  Assessment: pt continues to present below baseline requiring min-mod assist for bed mobility and decreased activity tolerance. Pt barriers consist of LE weakness and pain at surgical site and wounds. Cont. OT POC. .  Treatment Diagnosis: Decreased activity tolerance, impaired ADLs and mobility  Prognosis: Fair  Decision Making: High Complexity  OT Education: OT Role;Plan of Care;Transfer Training;Precautions  Patient Education: veb. understanding  Barriers to Learning: none  REQUIRES OT FOLLOW UP: Yes  Activity Tolerance  Activity Tolerance: Patient Tolerated treatment well;Patient limited by pain  Activity Tolerance: pt limited by pain, wound vac tubing and sensitivity of skin graft sight on R thigh  Safety Devices  Safety Devices in place: Yes  Type of devices: Left in bed;Call light within reach;Nurse notified; Bed alarm in place         Patient Diagnosis(es): The primary encounter diagnosis was Transient alteration of awareness.  A diagnosis of Necrotizing fasciitis ([facility] [address] was also pertinent to this visit. has a past medical history of Colostomy in place [facility]), Current every day smoker, Fourniers gangrene, Necrotizing fasciitis ([facility] [address] Osteomyelitis of left foot ([facility] [address] Patellofemoral pain syndrome of right knee, and Type 2 diabetes mellitus ([facility] [address] has a past surgical history that includes Ankle fracture surgery; Abdomen surgery (N/A, 12/01/2021); Scrotal surgery (N/A, 11/30/2021); Vagina surgery (N/A, 11/30/2021); Abdomen surgery (Left, 12/03/2021); Abdomen surgery (Left, 12/05/2021); colostomy (Left, 12/07/2021); Abdomen surgery (N/A, 12/07/2021); Abdomen surgery (Left, 12/10/2021); Abdomen surgery (N/A, 12/13/2021); Abdomen surgery (N/A, 12/16/2021); Abdomen surgery (N/A, 12/21/2021); Abdomen surgery (N/A, 12/23/2021); Pressure ulcer debridement (N/A, 12/27/2021); Pressure ulcer debridement (N/A, 12/30/2021); Skin graft (N/A, 1/3/2022); and Muscle Repair (N/A, 1/3/2022). Restrictions  Position Activity Restriction  Other position/activity restrictions: WBAT R LE, Heel WB L LE with surgical shoe per podiatry note; per Dr. Alex Ramos note 12/14: OOB with PT/OT  Subjective   General  Chart Reviewed: Yes  Patient assessed for rehabilitation services?: Yes  Additional Pertinent Hx: 63 y.o. M admitted 12/1 with necrotizing fasciitis to abdominal wall, gluteal region, and scrotum. s/p extensive wide excisions 12/1, 12/3, 12/5, 12/7 with wound vac placement. Colostomy creation 12/7. OR wound vac change 12/10;  OR wound vac change 12/13; OR wound vac change (12/16). . Podiatry: NWB L, FWB R. PMHx includes L foot fracture  Response to previous treatment: Patient with no complaints from previous session  Family / Caregiver Present: No  Referring Practitioner: Jorge Lee DO  Diagnosis: necrotizing fascitis  Subjective  Subjective: pt resting in bed upon arrival. \"I'll see what I can do\"  General Comment  Comments: Jeewls Araujo   Vital Signs  Patient Currently in Pain: Yes (3/10 at rest, increased with mobility, not rated)   Orientation  Orientation  Overall Orientation Status: Within Functional Limits  Objective    ADL  Additional Comments: declined ADLs        Balance  Sitting Balance: Stand by assistance (10 min EOB)  Standing Balance  Comment: OOB activity not attempted 2/2 pt declining, \"I already did it earlier and I'm tired\"  Bed mobility  Supine to Sit:  (HOB raised, handrails used, min assist for trunk and touching assist for LE towards L side. Slow, effortful and painful.)  Sit to Supine: Minimal assistance (min A for LLE management)  Comment: attempted supine>sit towards R side however unable 2/2 discomfort of catheter and wound on thigh. Pt successful towards L side.  pt unable to complete lateral scoot EOB, likely 2/2 unsupportive surface (pressure relief air mattress)                                               Type of ROM/Therapeutic Exercise  Type of ROM/Therapeutic Exercise: AROM  Comment: sitting unsupported EOB: 5 reps of prayer stretch>shoulder/scapular retraction,  5 reps of cervical flex/ext, lateral flex and 5 reps of shoulder elevation                    Plan   Plan  Times per week: 2-5x  Times per day: Daily  Current Treatment Recommendations: Strengthening,ROM,Self-Care / ADL,Patient/Caregiver Education & Training,Safety Education & Training,Functional Mobility Training,Endurance Training                                                    AM-PAC Score        AM-[facility] Inpatient Daily Activity Raw Score: 15 (01/05/22 1455)  AM-PAC Inpatient ADL T-Scale Score : 34.69 (01/05/22 1455)  ADL Inpatient CMS 0-100% Score: 56.46 (01/05/22 1455)  ADL Inpatient CMS G-Code Modifier : CK (01/05/22 1455)    Goals  Short term goals  Time Frame for Short term goals: by D/C  Short term goal 1: Demonstrate independence with HEP - Not met  Short term goal 2: Perform sit to stand NWB LLE with mod assist x2 to progress transfers - Not met  Short term goal 3: Roll side to side in bed with SBA for pressure relief and ADLs - Not met       Therapy Time   Individual Concurrent Group Co-treatment   Time In [address]         Time Out 1442         Minutes [address] [place], [place]

## 2022-01-05 NOTE — PROGRESS NOTES
Physical Therapy  Facility/Department: [facility]'[address] SURGERY  Daily Treatment Note  NAME: Devika Clement  : 1955  MRN: [id number]    Date of Service: 2022    Discharge Recommendations: Devika Clement scored a  on the AM-PAC short mobility form. Current research shows that an AM-PAC score of 17 or less is typically not associated with a discharge to the patient's home setting. Based on the patient's AM-PAC score and their current functional mobility deficits, it is recommended that the patient have 3-5 sessions per week of Physical Therapy at d/c to increase the patient's independence.  Please see assessment section for further patient specific details.         PT Equipment Recommendations  Other: defer d/c recs to next level of care    Assessment   Assessment: Pt was limited by severe pain at surgical sites and generalized deconditioning requiring extra time to perform all therapeutic activities. Presented with poor trunk and LE control during bed mobility with the HOB elevated and use of the handrails to get EOB. Requiring assistance and sequencing cues throughout. Transfers and gait training were unsteady and unsafe; requiring intermittent assistance for balance. Demonstrated poor LLE WB compliance limiting gait progression. Treatment Diagnosis: impaired mobility  Prognosis: Good  Decision Making: Medium Complexity  PT Education: PT Role;Functional Mobility Training  Patient Education: pt demos good understanding  REQUIRES PT FOLLOW UP: Yes     Patient Diagnosis(es): The primary encounter diagnosis was Transient alteration of awareness. A diagnosis of Necrotizing fasciitis ([address] was also pertinent to this visit. has a past medical history of Colostomy in place [facility]), Current every day smoker, Fourniers gangrene, Necrotizing fasciitis ([address] Osteomyelitis of left foot ([address] Patellofemoral pain syndrome of right knee, and Type 2 diabetes mellitus ([address]    has a past surgical history that includes Ankle fracture surgery; Abdomen surgery (N/A, 12/01/2021); Scrotal surgery (N/A, 11/30/2021); Vagina surgery (N/A, 11/30/2021); Abdomen surgery (Left, 12/03/2021); Abdomen surgery (Left, 12/05/2021); colostomy (Left, 12/07/2021); Abdomen surgery (N/A, 12/07/2021); Abdomen surgery (Left, 12/10/2021); Abdomen surgery (N/A, 12/13/2021); Abdomen surgery (N/A, 12/16/2021); Abdomen surgery (N/A, 12/21/2021); Abdomen surgery (N/A, 12/23/2021); Pressure ulcer debridement (N/A, 12/27/2021); Pressure ulcer debridement (N/A, 12/30/2021); Skin graft (N/A, 1/3/2022); and Muscle Repair (N/A, 1/3/2022). Restrictions  Position Activity Restriction  Other position/activity restrictions: patient may now be partial weight bearing to the heel only of the left lower extremity in surgical shoe with assistive devices as needed per podiatry note 1/2 . WBAT R LE per podiatry note; per Dr. Shravan Chinchilla note 12/14: OOB with PT/OT  Subjective   General  Additional Pertinent Hx: Amaris Mayer is a 63 y.o. male with Necrotizing fasciitis of the abdominal wall, gluteal region, and scrotum s/p wide excision of perineum, back, and abdominal wall. returned to the OR for further debridement or right abdominal wall, and placement of testicle within a thigh flap (12/3), followed by minimal abdominal wall and perineum debridement, sutured protective skin flap in groin for right testicle, and placement of a wound VAC (12/5), s/p diverting colostomy and wound vac change (12/7). Plan for OR on 12/10 for further debridement and change of wound vac.  12/13 wound vac change  Referring Practitioner: Aron Sandoval DO  Subjective  Subjective: Pt was in bed willing to participate c/o severe pain at surgical site. RN aware. Vitals  161/78 BP   78 BPM  94% Spo2 on 2Ls.    Orientation  Orientation  Overall Orientation Status: Within Normal Limits  Objective   Bed mobility  Supine to Sit: Moderate assistance (HOB elevated using handrails + min assist for trunk.)  Transfers  Sit to Stand: Minimal Assistance (bed elevated + min assist for posterior LOB)  Stand Pivot Transfers: Contact guard assistance  Ambulation  Ambulation?: Yes  WB Status: Partial WB through Left heel in surgical shoe. WBAT through RLE. Ambulation 1  Assistance: Contact guard assistance to Min assist  Quality of Gait: slow and unsteady. Poor WB compliance  Distance: x2ft     Balance  Sitting - Static: Good  Sitting - Dynamic: Fair  Standing - Static: Fair;-  Standing - Dynamic: Poor  Comments: Sitting EOB for approx 15 mins.   Exercises  Straight Leg Raise: x10 B AAROM  Heelslides: x10 B  Ankle Pumps: x20 B     AM-PAC Score  AM-PAC Inpatient Mobility Raw Score : 11 (01/05/22 0921)  AM-PAC Inpatient T-Scale Score : 33.86 (01/05/22 0921)  Mobility Inpatient CMS 0-100% Score: 72.57 (01/05/22 0921)  Mobility Inpatient CMS G-Code Modifier : CL (01/05/22 7148)    Goals  Short term goals  Time Frame for Short term goals: dc  Short term goal 1: Demo indep with HEP x 15 reps  ongoing  Short term goal 2: Pt will transfer supine <--> sit with min A x 1   ongoing  Short term goal 3: Pt will transfer sit to stand with mod A x 1 and achieve near upright posture, maintaining L LE NWB   ongoing  Patient Goals   Patient goals : hopes to go home at [address] per week: 2-5  Specific instructions for Next Treatment: -  Current Treatment Recommendations: Strengthening,Balance Training,Functional Mobility Training,Transfer Training,Equipment Evaluation, Education, & procurement,Patient/Caregiver Education & Training,Home Exercise Program  Safety Devices  Type of devices: Call light within reach,Nurse notified,Chair alarm in place     Therapy Time   Individual Concurrent Group Co-treatment   Time In 0805         Time Out 0905         Minutes 60         Timed Code Treatment Minutes: [address] Zuluaga, PTA

## 2022-01-05 NOTE — PROGRESS NOTES
Plastric Surgery  PRE-OP NOTE  Resident Note     Procedure: Wound evaluation, split thickness skin graft of abdominal wall, possible wound VAC placement. Consent: Obtained from patient     Labs    Recent Labs     01/04/22  0648 01/05/22  0535   WBC 6.4 6.3   HGB 8.0* 8.6*   HCT 24.5* 26.3*   MCV 92.8 92.9    292     Recent Labs     01/05/22  0535 01/05/22  0854    138   K 3.9 3.9    100   CO2 30 29   PHOS 3.0 2.7   BUN 16 16   CREATININE <0.5* <0.5*      No results for input(s): AST, ALT, ALB, BILIDIR, BILITOT, ALKPHOS in the last 72 hours. No results for input(s): LIPASE, AMYLASE in the last 72 hours. No results for input(s): PROT, INR, APTT in the last 72 hours. No results for input(s): CKTOTAL, CKMB, CKMBINDEX, TROPONINI in the last 72 hours. Orders:   1. Diet: NPO at midnight,  IVF: LR at 76  2. Pre op Medications: Patient on scheduled ABx, no need for additional perioperative coverage   3. Ensure 2 functional peripheral IVs are available prior to transport to the OR   4.  Anesthesia to see patient    Beata Godinez MD, MPH  PGY-3 General Surgery  01/05/22  6:38 PM

## 2022-01-05 NOTE — PROGRESS NOTES
Patient alert and oriented x4. VSS with the exception of elevated bp, MD aware and patient medicated per orders. O2 sats remain <90% on 2L. RLE skin graft site open to air with serous drainage noted. Wound vacs remain in place to continuous suction with no issues. Mcfadden in place with adequate yellow, clear output noted. Ostomy with minimal output throughout shift. No complaints of nausea. Pain controlled with scheduled and prn pain medication. Patient denies any other needs at this time. Bed is in the lowest position, call light and bedside table within reach. Patient bed alarm is on. Will continue to monitor for changes in patient status.      Electronically signed by Nishi Jansen RN on 1/5/2022 at 5:01 PM

## 2022-01-05 NOTE — PLAN OF CARE
Problem: Falls - Risk of:  Goal: Will remain free from falls  Description: Will remain free from falls  Note: Patient has remained free of all falls and injuries throughout shift. Patients bed is locked and in lowest position. Bedside table and call light within reach and patient calls out appropriately. Will continue to monitor for further needs. Problem: Pain:  Goal: Pain level will decrease  Description: Pain level will decrease  Note: Patient has complained of surgical site pain throughout shift. PO scheduled and prn pain medication given with benefit. Will continue to monitor for further pain needs throughout shift.

## 2022-01-06 ENCOUNTER — ANESTHESIA (OUTPATIENT)
Dept: OPERATING ROOM | Age: 67
DRG: 853 | End: 2022-01-06

## 2022-01-06 VITALS — TEMPERATURE: 96.8 F | OXYGEN SATURATION: 100 % | DIASTOLIC BLOOD PRESSURE: 57 MMHG | SYSTOLIC BLOOD PRESSURE: 94 MMHG

## 2022-01-06 LAB
ALBUMIN SERPL-MCNC: 2.7 G/DL (ref 3.4–5)
ANION GAP SERPL CALCULATED.3IONS-SCNC: 10 MMOL/L (ref 3–16)
BASOPHILS ABSOLUTE: 0.1 K/UL (ref 0–0.2)
BASOPHILS RELATIVE PERCENT: 0.8 %
BUN BLDV-MCNC: 13 MG/DL (ref 7–20)
CALCIUM SERPL-MCNC: 8 MG/DL (ref 8.3–10.6)
CHLORIDE BLD-SCNC: 101 MMOL/L (ref 99–110)
CO2: 28 MMOL/L (ref 21–32)
CREAT SERPL-MCNC: <0.5 MG/DL (ref 0.8–1.3)
EOSINOPHILS ABSOLUTE: 0.3 K/UL (ref 0–0.6)
EOSINOPHILS RELATIVE PERCENT: 5.2 %
GFR AFRICAN AMERICAN: >60
GFR NON-AFRICAN AMERICAN: >60
GLUCOSE BLD-MCNC: 109 MG/DL (ref 70–99)
GLUCOSE BLD-MCNC: 110 MG/DL (ref 70–99)
GLUCOSE BLD-MCNC: 120 MG/DL (ref 70–99)
GLUCOSE BLD-MCNC: 134 MG/DL (ref 70–99)
GLUCOSE BLD-MCNC: 136 MG/DL (ref 70–99)
HCT VFR BLD CALC: 27.1 % (ref 40.5–52.5)
HEMOGLOBIN: 9.1 G/DL (ref 13.5–17.5)
LYMPHOCYTES ABSOLUTE: 1.1 K/UL (ref 1–5.1)
LYMPHOCYTES RELATIVE PERCENT: 17.4 %
MAGNESIUM: 1.9 MG/DL (ref 1.8–2.4)
MCH RBC QN AUTO: 31.1 PG (ref 26–34)
MCHC RBC AUTO-ENTMCNC: 33.4 G/DL (ref 31–36)
MCV RBC AUTO: 93 FL (ref 80–100)
MONOCYTES ABSOLUTE: 0.7 K/UL (ref 0–1.3)
MONOCYTES RELATIVE PERCENT: 10.6 %
NEUTROPHILS ABSOLUTE: 4.2 K/UL (ref 1.7–7.7)
NEUTROPHILS RELATIVE PERCENT: 66 %
PDW BLD-RTO: 16.1 % (ref 12.4–15.4)
PERFORMED ON: ABNORMAL
PHOSPHORUS: 2.7 MG/DL (ref 2.5–4.9)
PLATELET # BLD: 322 K/UL (ref 135–450)
PMV BLD AUTO: 7.6 FL (ref 5–10.5)
POTASSIUM SERPL-SCNC: 4.1 MMOL/L (ref 3.5–5.1)
RBC # BLD: 2.91 M/UL (ref 4.2–5.9)
SODIUM BLD-SCNC: 139 MMOL/L (ref 136–145)
WBC # BLD: 6.3 K/UL (ref 4–11)

## 2022-01-06 PROCEDURE — 6370000000 HC RX 637 (ALT 250 FOR IP): Performed by: SURGERY

## 2022-01-06 PROCEDURE — 2580000003 HC RX 258: Performed by: STUDENT IN AN ORGANIZED HEALTH CARE EDUCATION/TRAINING PROGRAM

## 2022-01-06 PROCEDURE — 80069 RENAL FUNCTION PANEL: CPT

## 2022-01-06 PROCEDURE — 0HR8X74 REPLACEMENT OF BUTTOCK SKIN WITH AUTOLOGOUS TISSUE SUBSTITUTE, PARTIAL THICKNESS, EXTERNAL APPROACH: ICD-10-PCS | Performed by: SURGERY

## 2022-01-06 PROCEDURE — 6370000000 HC RX 637 (ALT 250 FOR IP): Performed by: STUDENT IN AN ORGANIZED HEALTH CARE EDUCATION/TRAINING PROGRAM

## 2022-01-06 PROCEDURE — 2500000003 HC RX 250 WO HCPCS: Performed by: STUDENT IN AN ORGANIZED HEALTH CARE EDUCATION/TRAINING PROGRAM

## 2022-01-06 PROCEDURE — 3700000000 HC ANESTHESIA ATTENDED CARE: Performed by: SURGERY

## 2022-01-06 PROCEDURE — 7100000001 HC PACU RECOVERY - ADDTL 15 MIN: Performed by: SURGERY

## 2022-01-06 PROCEDURE — C9290 INJ, BUPIVACAINE LIPOSOME: HCPCS | Performed by: SURGERY

## 2022-01-06 PROCEDURE — 6360000002 HC RX W HCPCS: Performed by: ANESTHESIOLOGY

## 2022-01-06 PROCEDURE — 1200000000 HC SEMI PRIVATE

## 2022-01-06 PROCEDURE — 85025 COMPLETE CBC W/AUTO DIFF WBC: CPT

## 2022-01-06 PROCEDURE — 13101 CMPLX RPR TRUNK 2.6-7.5 CM: CPT | Performed by: SURGERY

## 2022-01-06 PROCEDURE — 2580000003 HC RX 258: Performed by: SURGERY

## 2022-01-06 PROCEDURE — 6360000002 HC RX W HCPCS: Performed by: STUDENT IN AN ORGANIZED HEALTH CARE EDUCATION/TRAINING PROGRAM

## 2022-01-06 PROCEDURE — 2500000003 HC RX 250 WO HCPCS: Performed by: NURSE ANESTHETIST, CERTIFIED REGISTERED

## 2022-01-06 PROCEDURE — 15100 SPLT AGRFT T/A/L 1ST 100SQCM: CPT | Performed by: SURGERY

## 2022-01-06 PROCEDURE — 3600000014 HC SURGERY LEVEL 4 ADDTL 15MIN: Performed by: SURGERY

## 2022-01-06 PROCEDURE — 3700000001 HC ADD 15 MINUTES (ANESTHESIA): Performed by: SURGERY

## 2022-01-06 PROCEDURE — 0HXJXZZ TRANSFER LEFT UPPER LEG SKIN, EXTERNAL APPROACH: ICD-10-PCS | Performed by: SURGERY

## 2022-01-06 PROCEDURE — 83735 ASSAY OF MAGNESIUM: CPT

## 2022-01-06 PROCEDURE — 14301 TIS TRNFR ANY 30.1-60 SQ CM: CPT | Performed by: SURGERY

## 2022-01-06 PROCEDURE — 3600000004 HC SURGERY LEVEL 4 BASE: Performed by: SURGERY

## 2022-01-06 PROCEDURE — 6360000002 HC RX W HCPCS: Performed by: SURGERY

## 2022-01-06 PROCEDURE — 14302 TIS TRNFR ADDL 30 SQ CM: CPT | Performed by: SURGERY

## 2022-01-06 PROCEDURE — 0HRHX74 REPLACEMENT OF RIGHT UPPER LEG SKIN WITH AUTOLOGOUS TISSUE SUBSTITUTE, PARTIAL THICKNESS, EXTERNAL APPROACH: ICD-10-PCS | Performed by: SURGERY

## 2022-01-06 PROCEDURE — 6360000002 HC RX W HCPCS: Performed by: NURSE ANESTHETIST, CERTIFIED REGISTERED

## 2022-01-06 PROCEDURE — 7100000000 HC PACU RECOVERY - FIRST 15 MIN: Performed by: SURGERY

## 2022-01-06 PROCEDURE — 2709999900 HC NON-CHARGEABLE SUPPLY: Performed by: SURGERY

## 2022-01-06 RX ORDER — DIPHENHYDRAMINE HYDROCHLORIDE 50 MG/ML
12.5 INJECTION INTRAMUSCULAR; INTRAVENOUS
Status: DISCONTINUED | OUTPATIENT
Start: 2022-01-06 | End: 2022-01-06 | Stop reason: HOSPADM

## 2022-01-06 RX ORDER — MEPERIDINE HYDROCHLORIDE 25 MG/ML
12.5 INJECTION INTRAMUSCULAR; INTRAVENOUS; SUBCUTANEOUS EVERY 5 MIN PRN
Status: DISCONTINUED | OUTPATIENT
Start: 2022-01-06 | End: 2022-01-06 | Stop reason: HOSPADM

## 2022-01-06 RX ORDER — EPINEPHRINE NASAL SOLUTION 1 MG/ML
SOLUTION NASAL PRN
Status: DISCONTINUED | OUTPATIENT
Start: 2022-01-06 | End: 2022-01-06 | Stop reason: ALTCHOICE

## 2022-01-06 RX ORDER — MAGNESIUM SULFATE IN WATER 40 MG/ML
2000 INJECTION, SOLUTION INTRAVENOUS ONCE
Status: COMPLETED | OUTPATIENT
Start: 2022-01-06 | End: 2022-01-06

## 2022-01-06 RX ORDER — OXYCODONE HYDROCHLORIDE 5 MG/1
5 TABLET ORAL PRN
Status: DISCONTINUED | OUTPATIENT
Start: 2022-01-06 | End: 2022-01-06 | Stop reason: HOSPADM

## 2022-01-06 RX ORDER — OXYCODONE HYDROCHLORIDE 5 MG/1
10 TABLET ORAL PRN
Status: DISCONTINUED | OUTPATIENT
Start: 2022-01-06 | End: 2022-01-06 | Stop reason: HOSPADM

## 2022-01-06 RX ORDER — FENTANYL CITRATE 50 UG/ML
50 INJECTION, SOLUTION INTRAMUSCULAR; INTRAVENOUS EVERY 5 MIN PRN
Status: DISCONTINUED | OUTPATIENT
Start: 2022-01-06 | End: 2022-01-06 | Stop reason: HOSPADM

## 2022-01-06 RX ORDER — LABETALOL HYDROCHLORIDE 5 MG/ML
5 INJECTION, SOLUTION INTRAVENOUS EVERY 10 MIN PRN
Status: DISCONTINUED | OUTPATIENT
Start: 2022-01-06 | End: 2022-01-06 | Stop reason: HOSPADM

## 2022-01-06 RX ORDER — LIDOCAINE HYDROCHLORIDE 20 MG/ML
INJECTION, SOLUTION INTRAVENOUS PRN
Status: DISCONTINUED | OUTPATIENT
Start: 2022-01-06 | End: 2022-01-06 | Stop reason: SDUPTHER

## 2022-01-06 RX ORDER — MIDAZOLAM HYDROCHLORIDE 1 MG/ML
INJECTION INTRAMUSCULAR; INTRAVENOUS PRN
Status: DISCONTINUED | OUTPATIENT
Start: 2022-01-06 | End: 2022-01-06 | Stop reason: SDUPTHER

## 2022-01-06 RX ORDER — METOCLOPRAMIDE HYDROCHLORIDE 5 MG/ML
10 INJECTION INTRAMUSCULAR; INTRAVENOUS
Status: DISCONTINUED | OUTPATIENT
Start: 2022-01-06 | End: 2022-01-06 | Stop reason: HOSPADM

## 2022-01-06 RX ORDER — PROMETHAZINE HYDROCHLORIDE 25 MG/ML
6.25 INJECTION, SOLUTION INTRAMUSCULAR; INTRAVENOUS PRN
Status: DISCONTINUED | OUTPATIENT
Start: 2022-01-06 | End: 2022-01-06 | Stop reason: HOSPADM

## 2022-01-06 RX ORDER — MAGNESIUM HYDROXIDE 1200 MG/15ML
LIQUID ORAL CONTINUOUS PRN
Status: COMPLETED | OUTPATIENT
Start: 2022-01-06 | End: 2022-01-06

## 2022-01-06 RX ORDER — HYDRALAZINE HYDROCHLORIDE 20 MG/ML
5 INJECTION INTRAMUSCULAR; INTRAVENOUS EVERY 10 MIN PRN
Status: DISCONTINUED | OUTPATIENT
Start: 2022-01-06 | End: 2022-01-06 | Stop reason: HOSPADM

## 2022-01-06 RX ORDER — FENTANYL CITRATE 50 UG/ML
INJECTION, SOLUTION INTRAMUSCULAR; INTRAVENOUS PRN
Status: DISCONTINUED | OUTPATIENT
Start: 2022-01-06 | End: 2022-01-06 | Stop reason: SDUPTHER

## 2022-01-06 RX ORDER — MINERAL OIL
OIL (ML) MISCELLANEOUS PRN
Status: DISCONTINUED | OUTPATIENT
Start: 2022-01-06 | End: 2022-01-06 | Stop reason: ALTCHOICE

## 2022-01-06 RX ORDER — PROMETHAZINE HYDROCHLORIDE 25 MG/ML
6.25 INJECTION, SOLUTION INTRAMUSCULAR; INTRAVENOUS
Status: DISCONTINUED | OUTPATIENT
Start: 2022-01-06 | End: 2022-01-06 | Stop reason: HOSPADM

## 2022-01-06 RX ORDER — ONDANSETRON 2 MG/ML
INJECTION INTRAMUSCULAR; INTRAVENOUS PRN
Status: DISCONTINUED | OUTPATIENT
Start: 2022-01-06 | End: 2022-01-06 | Stop reason: SDUPTHER

## 2022-01-06 RX ORDER — ROCURONIUM BROMIDE 10 MG/ML
INJECTION, SOLUTION INTRAVENOUS PRN
Status: DISCONTINUED | OUTPATIENT
Start: 2022-01-06 | End: 2022-01-06 | Stop reason: SDUPTHER

## 2022-01-06 RX ORDER — MORPHINE SULFATE 4 MG/ML
1 INJECTION, SOLUTION INTRAMUSCULAR; INTRAVENOUS EVERY 5 MIN PRN
Status: DISCONTINUED | OUTPATIENT
Start: 2022-01-06 | End: 2022-01-06 | Stop reason: HOSPADM

## 2022-01-06 RX ADMIN — AMPICILLIN SODIUM AND SULBACTAM SODIUM 3000 MG: 2; 1 INJECTION, POWDER, FOR SOLUTION INTRAMUSCULAR; INTRAVENOUS at 03:40

## 2022-01-06 RX ADMIN — SODIUM PHOSPHATE, MONOBASIC, MONOHYDRATE 15 MMOL: 276; 142 INJECTION, SOLUTION INTRAVENOUS at 11:31

## 2022-01-06 RX ADMIN — INSULIN HUMAN 2 UNITS: 100 INJECTION, SOLUTION PARENTERAL at 21:49

## 2022-01-06 RX ADMIN — FENTANYL CITRATE 100 MCG: 50 INJECTION, SOLUTION INTRAMUSCULAR; INTRAVENOUS at 15:00

## 2022-01-06 RX ADMIN — DOCUSATE SODIUM 100 MG: 100 CAPSULE, LIQUID FILLED ORAL at 21:30

## 2022-01-06 RX ADMIN — SUGAMMADEX 200 MG: 100 INJECTION, SOLUTION INTRAVENOUS at 17:09

## 2022-01-06 RX ADMIN — SODIUM CHLORIDE, POTASSIUM CHLORIDE, SODIUM LACTATE AND CALCIUM CHLORIDE: 600; 310; 30; 20 INJECTION, SOLUTION INTRAVENOUS at 07:20

## 2022-01-06 RX ADMIN — HEPARIN SODIUM 5000 UNITS: 5000 INJECTION INTRAVENOUS; SUBCUTANEOUS at 05:56

## 2022-01-06 RX ADMIN — LIDOCAINE HYDROCHLORIDE 100 MG: 20 INJECTION, SOLUTION INTRAVENOUS at 15:03

## 2022-01-06 RX ADMIN — OXYCODONE HYDROCHLORIDE 10 MG: 5 TABLET ORAL at 09:37

## 2022-01-06 RX ADMIN — ONDANSETRON 4 MG: 2 INJECTION INTRAMUSCULAR; INTRAVENOUS at 15:00

## 2022-01-06 RX ADMIN — SODIUM CHLORIDE, POTASSIUM CHLORIDE, SODIUM LACTATE AND CALCIUM CHLORIDE: 600; 310; 30; 20 INJECTION, SOLUTION INTRAVENOUS at 22:26

## 2022-01-06 RX ADMIN — HYDROMORPHONE HYDROCHLORIDE 0.5 MG: 1 INJECTION, SOLUTION INTRAMUSCULAR; INTRAVENOUS; SUBCUTANEOUS at 17:39

## 2022-01-06 RX ADMIN — ROCURONIUM BROMIDE 50 MG: 10 INJECTION INTRAVENOUS at 15:15

## 2022-01-06 RX ADMIN — PHENYLEPHRINE HYDROCHLORIDE 300 MCG: 10 INJECTION, SOLUTION INTRAMUSCULAR; INTRAVENOUS; SUBCUTANEOUS at 15:46

## 2022-01-06 RX ADMIN — AMPICILLIN SODIUM AND SULBACTAM SODIUM 3000 MG: 2; 1 INJECTION, POWDER, FOR SOLUTION INTRAMUSCULAR; INTRAVENOUS at 21:38

## 2022-01-06 RX ADMIN — HYDROMORPHONE HYDROCHLORIDE 0.5 MG: 1 INJECTION, SOLUTION INTRAMUSCULAR; INTRAVENOUS; SUBCUTANEOUS at 17:34

## 2022-01-06 RX ADMIN — HYDROMORPHONE HYDROCHLORIDE 0.5 MG: 1 INJECTION, SOLUTION INTRAMUSCULAR; INTRAVENOUS; SUBCUTANEOUS at 18:18

## 2022-01-06 RX ADMIN — MIDAZOLAM HYDROCHLORIDE 2 MG: 2 INJECTION, SOLUTION INTRAMUSCULAR; INTRAVENOUS at 15:00

## 2022-01-06 RX ADMIN — SODIUM CHLORIDE, POTASSIUM CHLORIDE, SODIUM LACTATE AND CALCIUM CHLORIDE: 600; 310; 30; 20 INJECTION, SOLUTION INTRAVENOUS at 14:57

## 2022-01-06 RX ADMIN — SODIUM CHLORIDE, PRESERVATIVE FREE 10 ML: 5 INJECTION INTRAVENOUS at 21:30

## 2022-01-06 RX ADMIN — PHENYLEPHRINE HYDROCHLORIDE 200 MCG: 10 INJECTION, SOLUTION INTRAMUSCULAR; INTRAVENOUS; SUBCUTANEOUS at 15:44

## 2022-01-06 RX ADMIN — OXYCODONE HYDROCHLORIDE 5 MG: 5 TABLET ORAL at 00:04

## 2022-01-06 RX ADMIN — DOCUSATE SODIUM 100 MG: 100 CAPSULE, LIQUID FILLED ORAL at 09:37

## 2022-01-06 RX ADMIN — PHENYLEPHRINE HYDROCHLORIDE 200 MCG: 10 INJECTION, SOLUTION INTRAMUSCULAR; INTRAVENOUS; SUBCUTANEOUS at 15:41

## 2022-01-06 RX ADMIN — SUGAMMADEX 200 MG: 100 INJECTION, SOLUTION INTRAVENOUS at 16:57

## 2022-01-06 RX ADMIN — MAGNESIUM SULFATE HEPTAHYDRATE 2000 MG: 2 INJECTION, SOLUTION INTRAVENOUS at 09:45

## 2022-01-06 RX ADMIN — OXYCODONE HYDROCHLORIDE 5 MG: 5 TABLET ORAL at 21:49

## 2022-01-06 RX ADMIN — SODIUM CHLORIDE 25 ML: 9 INJECTION, SOLUTION INTRAVENOUS at 21:37

## 2022-01-06 RX ADMIN — AMPICILLIN SODIUM AND SULBACTAM SODIUM 3000 MG: 2; 1 INJECTION, POWDER, FOR SOLUTION INTRAMUSCULAR; INTRAVENOUS at 09:42

## 2022-01-06 RX ADMIN — HEPARIN SODIUM 5000 UNITS: 5000 INJECTION INTRAVENOUS; SUBCUTANEOUS at 21:30

## 2022-01-06 RX ADMIN — ROCURONIUM BROMIDE 100 MG: 10 INJECTION INTRAVENOUS at 15:06

## 2022-01-06 RX ADMIN — SODIUM CHLORIDE, POTASSIUM CHLORIDE, SODIUM LACTATE AND CALCIUM CHLORIDE: 600; 310; 30; 20 INJECTION, SOLUTION INTRAVENOUS at 16:18

## 2022-01-06 RX ADMIN — AMLODIPINE BESYLATE 5 MG: 5 TABLET ORAL at 09:37

## 2022-01-06 ASSESSMENT — PAIN SCALES - GENERAL
PAINLEVEL_OUTOF10: 7
PAINLEVEL_OUTOF10: 8
PAINLEVEL_OUTOF10: 9
PAINLEVEL_OUTOF10: 5
PAINLEVEL_OUTOF10: 5
PAINLEVEL_OUTOF10: 4
PAINLEVEL_OUTOF10: 5
PAINLEVEL_OUTOF10: 5
PAINLEVEL_OUTOF10: 7
PAINLEVEL_OUTOF10: 8
PAINLEVEL_OUTOF10: 6
PAINLEVEL_OUTOF10: 3
PAINLEVEL_OUTOF10: 0
PAINLEVEL_OUTOF10: 7
PAINLEVEL_OUTOF10: 6

## 2022-01-06 ASSESSMENT — PULMONARY FUNCTION TESTS
PIF_VALUE: 33
PIF_VALUE: 26
PIF_VALUE: 33
PIF_VALUE: 32
PIF_VALUE: 10
PIF_VALUE: 2
PIF_VALUE: 6
PIF_VALUE: 32
PIF_VALUE: 32
PIF_VALUE: 6
PIF_VALUE: 32
PIF_VALUE: 15
PIF_VALUE: 32
PIF_VALUE: 22
PIF_VALUE: 32
PIF_VALUE: 25
PIF_VALUE: 32
PIF_VALUE: 25
PIF_VALUE: 32
PIF_VALUE: 21
PIF_VALUE: 22
PIF_VALUE: 2
PIF_VALUE: 32
PIF_VALUE: 2
PIF_VALUE: 32
PIF_VALUE: 15
PIF_VALUE: 15
PIF_VALUE: 25
PIF_VALUE: 32
PIF_VALUE: 22
PIF_VALUE: 32
PIF_VALUE: 25
PIF_VALUE: 0
PIF_VALUE: 33
PIF_VALUE: 32
PIF_VALUE: 22
PIF_VALUE: 32
PIF_VALUE: 15
PIF_VALUE: 25
PIF_VALUE: 32
PIF_VALUE: 1
PIF_VALUE: 15
PIF_VALUE: 32
PIF_VALUE: 25
PIF_VALUE: 25
PIF_VALUE: 32
PIF_VALUE: 33
PIF_VALUE: 1
PIF_VALUE: 25
PIF_VALUE: 32
PIF_VALUE: 2
PIF_VALUE: 32
PIF_VALUE: 34
PIF_VALUE: 22
PIF_VALUE: 22
PIF_VALUE: 32
PIF_VALUE: 17
PIF_VALUE: 32
PIF_VALUE: 16
PIF_VALUE: 32
PIF_VALUE: 25
PIF_VALUE: 32
PIF_VALUE: 20
PIF_VALUE: 18
PIF_VALUE: 15
PIF_VALUE: 19
PIF_VALUE: 15
PIF_VALUE: 22
PIF_VALUE: 32
PIF_VALUE: 1
PIF_VALUE: 15
PIF_VALUE: 33
PIF_VALUE: 32
PIF_VALUE: 21
PIF_VALUE: 32

## 2022-01-06 ASSESSMENT — PAIN DESCRIPTION - ORIENTATION
ORIENTATION: MID
ORIENTATION: MID
ORIENTATION: RIGHT;LEFT
ORIENTATION: MID
ORIENTATION: RIGHT;LEFT

## 2022-01-06 ASSESSMENT — PAIN DESCRIPTION - PROGRESSION
CLINICAL_PROGRESSION: NOT CHANGED

## 2022-01-06 ASSESSMENT — PAIN DESCRIPTION - FREQUENCY
FREQUENCY: CONTINUOUS

## 2022-01-06 ASSESSMENT — PAIN DESCRIPTION - LOCATION
LOCATION: BUTTOCKS
LOCATION: BUTTOCKS;ABDOMEN
LOCATION: ABDOMEN
LOCATION: BUTTOCKS
LOCATION: ABDOMEN

## 2022-01-06 ASSESSMENT — PAIN DESCRIPTION - PAIN TYPE
TYPE: SURGICAL PAIN
TYPE: ACUTE PAIN;SURGICAL PAIN
TYPE: SURGICAL PAIN
TYPE: ACUTE PAIN;SURGICAL PAIN

## 2022-01-06 ASSESSMENT — PAIN - FUNCTIONAL ASSESSMENT
PAIN_FUNCTIONAL_ASSESSMENT: PREVENTS OR INTERFERES WITH MANY ACTIVE NOT PASSIVE ACTIVITIES

## 2022-01-06 ASSESSMENT — PAIN DESCRIPTION - DESCRIPTORS
DESCRIPTORS: DISCOMFORT;ACHING
DESCRIPTORS: DISCOMFORT
DESCRIPTORS: DISCOMFORT;ACHING
DESCRIPTORS: DISCOMFORT

## 2022-01-06 ASSESSMENT — PAIN DESCRIPTION - ONSET
ONSET: ON-GOING

## 2022-01-06 NOTE — PROGRESS NOTES
NUTRITION NOTE   Admission Date: 12/1/2021     Type and Reason for Visit: Reassess    NUTRITION RECOMMENDATIONS:   PO DIET: NPO per surgery; restart 3CC (no concentrated sweets) diet post-op as appropriate. ONS: Restart wound healing ONS BID    NUTRITION ASSESSMENT:  Pt currently NPO for procedure and possible wound vac placement. Nutritionally stable, previously. RD to monitor for diet advancement post-op and need for ONS to promote wound healing. Patient admitted d/t necrotizing fasciitis of the abdominal wall, gluteal region, and scrotum. He is s/p multiple debridements and wound vac placement and changes intraoperatively with diverting colostomy creation (12/7). OR wound vac change (12/10, 12/13, 12/16, 12/21, 12/23, 12/27, 12/30).  S/P STSG to abdominal wound, and gluteal wound vac change (1/3)    PMH significant for: T2DM    MALNUTRITION ASSESSMENT  Context of Malnutrition: Acute Illness   Malnutrition Status: No malnutrition    NUTRITION DIAGNOSIS   · Increased nutrient needs related to increase demand for energy/nutrients as evidenced by wounds    [address] and/or Nutrient Delivery:  Start Oral Diet,Start Oral Nutrition Supplement  Nutrition Education/Counseling:  No recommendation at this time      The patient will still be monitored per nutrition standards of care. Consult dietitian if nutrition interventions essential to patient care is needed.      Rudolph Brady, [address] [place] [address],   Vini:  [phone]  Office:  [phone]

## 2022-01-06 NOTE — PROGRESS NOTES
Brought to PACU 15 at 1445 for preop. Anesthesia CRNA here and Dr. Angeal Law. OR nurse here with consent signed for todays procedure. Pt states understanding of todays surgery. Pain level is tolerable.  Back to OR

## 2022-01-06 NOTE — PROGRESS NOTES
Podiatric Surgery Daily Progress Note      Admit Date: 12/1/2021      Code:Full Code    Patient seen and examined, labs and records reviewed    Subjective:     Patient seen at bedside this AM. Patient denies pain to bilateral lower extremities. Patient denies fever, chills, shortness of breath, chest pain. Review of Systems: A 10 point review of systems was conducted, significant findings as noted in HPI. All other systems negative. Objective     BP (!) 150/71   Pulse 82   Temp 97.5 °F (36.4 °C) (Oral)   Resp 16   Ht 5' 11\" (1.803 m)   Wt 201 lb 15.1 oz (91.6 kg)   SpO2 92%   BMI 28.17 kg/m²     I/O:    Intake/Output Summary (Last 24 hours) at 1/6/2022 0718  Last data filed at 1/6/2022 0345  Gross per 24 hour   Intake 710 ml   Output 1350 ml   Net -640 ml              Wt Readings from Last 3 Encounters:   12/28/21 201 lb 15.1 oz (91.6 kg)   11/30/21 163 lb 3.2 oz (74 kg)   04/18/16 179 lb 14.3 oz (81.6 kg)       LABS:    Recent Labs     01/05/22  0535 01/06/22  0550   WBC 6.3 6.3   HGB 8.6* 9.1*   HCT 26.3* 27.1*    322        Recent Labs     01/06/22  0550      K 4.1      CO2 28   PHOS 2.7   BUN 13   CREATININE <0.5*        No results for input(s): PROT, INR, APTT in the last 72 hours. LOWER EXTREMITY EXAMINATION    Dressing to left LE intact. No strikethrough noted to the external dressing. Betadine discoloration noted to the internal layers of the dressing of the left LE.     VASCULAR:   DP and PT pulses are non-palpable b/l. Upon hand-held Doppler examination DP signals were noted to be monophasic and PT signals were noted to be nondopplerable. CFT slightly diminished to the distal digits of bilateral lower extremities. Skin temperature is warm to cool to the distal digits from proximal to distal.    No edema noted. No pain with calf compression b/l.     NEUROLOGIC:   Gross and epicritic sensation is diminished b/l.  Protective sensation is diminished at all pedal sites b/l.     DERMATOLOGIC:     Left lower extremity:  Full-thickness ulceration noted to the lateral aspect of the left foot. Wound measures approximately 3.2 cm x 2 cm x 0.5 cm. Wound base with exposed 5th metatarsal bone, with necrotic edges at the proximal and distal edges. Necrotic appearance of 5th digit. No tracking or tunneling noted. No purulent drainage noted. No fluctuance or crepitus or malodor noted. Deep tissue injury noted 2/2 Prevalon boot strap to the anterior aspect of the ankle. Intact epithelialized tissue noted. No crepitus, erythema, drainage, or malodor noted. No open wound noted. Stable without signs of acute infection noted. Right lower extremity:  Parital thickness ulceration noted to the lateral malleolus 2/2 pressure. No fluctuance, crepitus, malodor, or drainage noted. No acute signs infection noted. Partial thickness ulceration 2/2 pressure noted at the styloid process of the 5th metatarsal. Fibrotic tissue noted. No fluctuance or crepitus noted. No acute signs of infection noted. Nonblanchable erythema noted to dorsal aspect right midfoot, nonblanchable, skin intact. No open wound noted at this time. No fluctuance, crepitus, erythema, drainage, or malodor noted. MUSCULOSKELETAL:   Muscle strength 4/5 for all pedal muscle groups B/L LE. No pain with palpation to bilateral lower extremity. IMAGING:  XR LEFT FOOT 11/30/2021  Narrative   EXAMINATION:   THREE XRAY VIEWS OF THE LEFT FOOT       11/30/2021 1:44 pm       HISTORY:   ORDERING SYSTEM PROVIDED HISTORY: wound   TECHNOLOGIST PROVIDED HISTORY:   Reason for exam:->wound   Reason for Exam: blood sugar problem, wound check on lateral portion of foot   Acuity: Acute   Type of Exam: Initial       FINDINGS:   Osseous destruction along the articular margins of the 5th   metatarsophalangeal joint with associated lucency in the soft tissues. .   There is no evidence of fracture or dislocation.  . The remaining joint spaces appear well maintained. The remaining soft tissues are unremarkable.       Impression   Evidence of a septic joint involving the 5th metatarsal phalangeal joint with   associated osteomyelitis     ARTERIAL DUPLEX 12/2/21     Type of Study:        Extremities Arteries:Lower Extremities Arterial Duplex, VL LOWER EXTREMITY    ARTERIES DUPLEX BILATERAL.       Tech Comments   Right   The right ankle/brachial index is 0.0 (no Doppler signal could be heard at the   [facility] or the PT). The common femoral and profunda femoral arteries could not be visualized due   to bandages extending into the groin area. The mid superficial femoral artery has a short segmental occlusion and then is   reconstituted. Elevated velocities at the distal superficial femoral artery indicate a > 50%   stenosis by velocity criteria (velocity went from 15 to 298 cm/s). The posterior tibial artery is occluded. The distal anterior tibial artery is barely patent, with very low flow   (possibly occluded and then reconstituted). Left   The left ankle brachial index is 0 for the PT and the DP was not accessible   due to the bandages on the foot. The common femoral and profunda femoral artery could not be visualized due to   bandages extending into the groin area. The distal superficial femoral artery is occluded and then reconstituted. The posterior tibial artery, peroneal, and anterior tibial artery are   occluded. There were no previous studies to use for comparison.        ASSESSMENT/PLAN:   -Full-thickness ulceration; lateral left foot- [place] 4  -Osteomyelitis of the fifth metatarsal; left foot  -Deep Tissue Injury, left anterior ankle  -Partial thickness pressure ulceration x2, right foot; NPUAP Stage 2  -Pressure injury, dorsal right midfoot -NPUAP Stage I  -Critical limb ischemia; bilateral lower extremity  -Diabetes mellitus, type II  -History of noncompliance    -Patient seen and examined at bedside this AM.  -Hypertensive, otherwise VSS on 2 L of O2 via NC; no leukocytosis noted (WBC 6.3)  -All imaging reviewed; impressions noted above  -.1 (1/3/22)  -Prealbumin 16.6 (12/27/21)  -Vascular surgery following; will await further stabilization with the general surgery team and plastic surgery team before offering vascular intervention.  -Plastic surgery following  -Infectious disease following, currently on Unasyn  -Left lower extremity dressed with Betadine soaked gauze, DSD, and tape. -Right lower extremity dressing left clean, dry, intact  -Prevalon boots reapplied. Patient is to wear at all times while in bed to prevent further deep tissue injury. Ankle strap removed from bilateral boot as the ankle straps were causing pressure to his feet.  -Partial weight bearing to heel of left lower extremity  -Weightbearing as tolerated to right lower extremity. DISPO: Full-thickness ulceration with underlying osteomyelitis to the left fifth metatarsal. Critical limb ischemia to b/l LE. Wounds are stable at this time. Vascular following; awaiting further stabilization at this time. Plastic surgery, and ID following. Patient will require podiatric surgical intervention but timing will depend on medical stability and vascular recommendations. Will continue to follow while in house. Discussed assessment and plan with JEANETTE Cameron DPM  Podiatric Resident, PGY-2  Pager #: [phone] or Perfect Serve      Patient was seen and evaluated at bedside. Agree with residents assessment and treatment plan.   Kathleen Allen DPM

## 2022-01-06 NOTE — ANESTHESIA PRE PROCEDURE
Department of Anesthesiology  Preprocedure Note       Name:  Devika Clement   Age:  61 y.o.  :  1955                                          MRN:  [id number]         Date:  2022      Surgeon: Iris Caruso):  Montez Rascon MD    Procedure: Procedure(s):  WOUND EVALUATION, SPLIT THICKNESS SKIN GRAFT OF ABDOMINAL WALL, POSSIBLE WOUND VAC PLACEMENT    Medications prior to admission:   Prior to Admission medications    Medication Sig Start Date End Date Taking? Authorizing Provider   metFORMIN (GLUCOPHAGE) 500 MG tablet Take 500 mg by mouth daily (with breakfast)    Historical Provider, MD   meloxicam (MOBIC) 15 MG tablet Take 1 tablet by mouth daily 16   Janie Figueroa DO       Current medications:    No current facility-administered medications for this visit. No current outpatient medications on file.      Facility-Administered Medications Ordered in Other Visits   Medication Dose Route Frequency Provider Last Rate Last Admin    magnesium sulfate 2000 mg in 50 mL IVPB premix  2,000 mg IntraVENous Once Reji Costello DO 25 mL/hr at 22 0945 2,000 mg at 22 0945    sodium phosphate 15 mmol in dextrose 5 % 250 mL IVPB  15 mmol IntraVENous Once Reji Costello DO        meperidine (DEMEROL) injection 12.5 mg  12.5 mg IntraVENous Q5 Min PRN Radha Fry MD        HYDROmorphone (DILAUDID) injection 0.25 mg  0.25 mg IntraVENous Q5 Min PRN Radha Fry MD        fentaNYL (SUBLIMAZE) injection 50 mcg  50 mcg IntraVENous Q5 Min PRN Radha Fry MD        HYDROmorphone (DILAUDID) injection 0.25 mg  0.25 mg IntraVENous Q5 Min PRN Radha Fry MD        HYDROmorphone (DILAUDID) injection 0.5 mg  0.5 mg IntraVENous Q5 Min PRN Radha Fry MD        oxyCODONE (ROXICODONE) immediate release tablet 5 mg  5 mg Oral PRN Radha Fry MD        Or    oxyCODONE (ROXICODONE) immediate release tablet 10 mg  10 mg Oral PRN Radha Fry MD        [facility]) injection 6.25 mg  6.25 mg IntraVENous PRN Roscoe Agudelo MD        diphenhydrAMINE (BENADRYL) injection 12.5 mg  12.5 mg IntraVENous Once PRN Roscoe Agudelo MD        labetalol (NORMODYNE;TRANDATE) injection 5 mg  5 mg IntraVENous Q10 Min PRN Roscoe Agudelo MD        lactated ringers infusion   IntraVENous Continuous Michael Fontaine MD 75 mL/hr at 01/06/22 0720 New Bag at 01/06/22 0720    insulin glargine (LANTUS;BASAGLAR) injection pen 10 Units  10 Units SubCUTAneous QAM Saravanan Kim DO   10 Units at 01/05/22 0915    insulin lispro (1 Unit Dial) 3 Units  3 Units SubCUTAneous BID WC Molly Carreno MD   3 Units at 01/05/22 0914    insulin regular (HUMULIN R;NOVOLIN R) injection 0-18 Units  0-18 Units SubCUTAneous 4x Daily AC & HS Molly Carreno MD   2 Units at 01/04/22 0853    0.9 % sodium chloride infusion   IntraVENous PRN Molly Carreno MD        heparin (porcine) injection 5,000 Units  5,000 Units SubCUTAneous 3 times per day Molly Carreno MD   5,000 Units at 01/06/22 0556    alteplase (CATHFLO) injection 1 mg  1 mg IntraCATHeter PRN Molly Carreno MD   1 mg at 12/25/21 0457    docusate sodium (COLACE) capsule 100 mg  100 mg Oral BID Molly Carreno MD   100 mg at 01/06/22 9854    polyethylene glycol (GLYCOLAX) packet 17 g  17 g Oral Daily Molly Carreno MD   17 g at 01/05/22 0904    ipratropium-albuterol (DUONEB) nebulizer solution 1 ampule  1 ampule Inhalation Q4H PRN Molly Carreno MD   1 ampule at 01/03/22 1346    dextrose 50 % IV solution  12.5 g IntraVENous PRN Molly Carreno MD        amLODIPine (NORVASC) tablet 5 mg  5 mg Oral Daily Molly Carreno MD   5 mg at 01/06/22 3957    hydrALAZINE (APRESOLINE) injection 10 mg  10 mg IntraVENous Q6H PRN Molly Carreno MD   10 mg at 01/01/22 1155    ampicillin-sulbactam (UNASYN) 3000 mg ivpb minibag  3,000 mg IntraVENous Q6H Molly Carreno MD   Stopped at 01/06/22 1012    oxyCODONE (ROXICODONE) immediate release tablet 5 mg  5 mg Oral Q4H PRN Pooja Reyna MD   5 mg at 01/06/22 0004    Or    oxyCODONE (ROXICODONE) immediate release tablet 10 mg  10 mg Oral Q4H PRN Pooja Reyna MD   10 mg at 01/06/22 8408    acetaminophen (TYLENOL) tablet 1,000 mg  1,000 mg Oral Q6H Pooja Reyna MD   1,000 mg at 01/05/22 2321    sodium chloride flush 0.9 % injection 5-40 mL  5-40 mL IntraVENous 2 times per day Pooja Reyna MD   10 mL at 01/05/22 2032    sodium chloride flush 0.9 % injection 5-40 mL  5-40 mL IntraVENous PRN Pooja Reyna MD        0.9 % sodium chloride infusion  25 mL IntraVENous PRN Pooja Reyna  mL/hr at 12/12/21 0850 25 mL at 12/12/21 0850    glucose (GLUTOSE) 40 % oral gel 15 g  15 g Oral PRN Pooja Reyna MD        dextrose 50 % IV solution  12.5 g IntraVENous PRN Pooja Reyna MD        glucagon (rDNA) injection 1 mg  1 mg IntraMUSCular PRN Pooja Reyna MD        dextrose 5 % solution  100 mL/hr IntraVENous PRN Pooja Reyna MD           Allergies:  No Known Allergies    Problem List:    Patient Active Problem List   Diagnosis Code    Diabetic acidosis without coma ([address] E11.10    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    Acute respiratory failure with hypoxia (HCC) J96.01    Necrotizing fasciitis ([address] M72.6    Necrotizing soft tissue infection M79.89       Past Medical History:        Diagnosis Date    Colostomy in place [facility]) 12/07/2021    placed    Current every day smoker     Fourniers gangrene     N[facility]     Osteomyelitis of left foot ([address] 11/30/2021    Patellofemoral pain syndrome of right knee     Type 2 diabetes mellitus ([facility]        Past Surgical History:        Procedure Laterality Date    ABDOMEN SURGERY N/A 12/01/2021    WIDE EXCISION PERINEUM, BACK, ABDOMINAL WALL performed by Juan Pablo Flood MD at [facility] Left 12/03/2021    ABDOMINAL WALL AND LEFT BUTTOCK DEBRIDEMENT, WOUND VAC PLACEMENT performed by Lesli Gilbert MD at [facility]ERY Left 12/05/2021    ABDOMINAL WALL AND LEFT BUTTOCK DEBRIDEMENT, WOUND VAC PLACEMENT performed by Sussy Tovar MD at [facility] N/A 12/07/2021    EVALUATION UNDER ANESTHESIA WITH DEBRIDEMENT ABDOMINAL WOUND AND LEFT BUTTOCK, WOUND VAC CHANGE performed by Sussy Tovar MD at 2005 [facility] Left 12/10/2021    ABDOMINAL WALL AND LEFT BUTTOCK WOUND VAC CHANGE WITH FURTHER DEBRIDEMENT ABDOMEN AND LEFT BUTTOCK WOUND performed by Sussy Tovar MD at [address] [facility] N/A 12/13/2021    WOUND VAC CHANGE ABDOMEN AND LEFT BUTTOCK performed by Sussy Tovar MD at 2005 [facility] N/A 12/16/2021    2ND LOOK/ EVALUATION UNDER ANESTHESIA/ WOUND VAC CHANGE ABDOMINAL WALL AND PERINEUM performed by Sussy Tovar MD at 2005 [facility] N/A 12/21/2021    EVALUATION UNDER ANESTHESIA/ WOUND VAC CHANGE ABDOMINAL WALL AND PERINEUM performed by Sussy Tovar MD at [address] [facility] N/A 12/23/2021    EVALUATION UNDER ANESTHESIA/ WOUND VAC CHANGE/ POSSIBLE PARTIAL CLOSURE/ ABDOMINAL WALL AND PERINEUM performed by Sussy Tovar MD at [address] Left 12/07/2021    LAPAROSCOPIC COLOSTOMY CREATION performed by Maynor Wright MD at [address] [facility] N/A 1/3/2022    GLUTEAL WOUND VAC PLACEMENT performed by Sussy Tovar MD at [address] [facility] N/A 12/27/2021    PERINEAL WOUND VAC CHANGE performed by Sussy Tovar MD at [address] [facility] N/A 12/30/2021    PERINEAL / ABDOMINAL WOUND VAC CHANGE, performed by Sussy Tovar MD at [address] [place] [address] N/A 11/30/2021    DEBRIDEMENT OF SCROTAL JAYRO'S GANGRENE performed by Faustino Jones MD at [address] [facility] 1/3/2022    SPLIT THICKNESS SKIN GRAFT TO ABDOMEN WOUND VAC PLACEMENT.  44x 11 performed by Sussy Tovar MD at [facility] N/A 11/30/2021    PERINEAL SOFT TISSUE EXCISIONAL DEBRIDEMENT performed by Luisito Nash MD at [place] [address] History:    Social History     Tobacco Use    Smoking status: Current Every Day Smoker    Smokeless tobacco: Never Used   Substance Use Topics    Alcohol use: Yes     Alcohol/week: 0.0 standard drinks     Comment: social                                 Ready to quit: Not Answered  Counseling given: Not Answered      Vital Signs (Current): There were no vitals filed for this visit.                                            BP Readings from Last 3 Encounters:   01/06/22 (!) 162/82   01/03/22 113/62   12/30/21 (!) 98/54       NPO Status:                                                                                 BMI:   Wt Readings from Last 3 Encounters:   12/28/21 201 lb 15.1 oz (91.6 kg)   11/30/21 163 lb 3.2 oz (74 kg)   04/18/16 179 lb 14.3 oz (81.6 kg)     There is no height or weight on file to calculate BMI.    CBC:   Lab Results   Component Value Date    WBC 6.3 01/06/2022    RBC 2.91 01/06/2022    HGB 9.1 01/06/2022    HCT 27.1 01/06/2022    MCV 93.0 01/06/2022    RDW 16.1 01/06/2022     01/06/2022       CMP:   Lab Results   Component Value Date     01/06/2022    K 4.1 01/06/2022    K 3.8 12/12/2021     01/06/2022    CO2 28 01/06/2022    BUN 13 01/06/2022    CREATININE <0.5 01/06/2022    GFRAA >60 01/06/2022    AGRATIO 0.7 11/30/2021    LABGLOM >60 01/06/2022    GLUCOSE 110 01/06/2022    PROT 4.8 12/10/2021    CALCIUM 8.0 01/06/2022    BILITOT 0.3 12/10/2021    ALKPHOS 170 12/10/2021    AST 13 12/10/2021    ALT 7 12/10/2021       POC Tests:   Recent Labs     01/06/22  0749   POCGLU 109*       Coags:   Lab Results   Component Value Date    PROTIME 12.7 12/30/2021    INR 1.12 12/30/2021       HCG (If Applicable): No results found for: PREGTESTUR, PREGSERUM, HCG, HCGQUANT     ABGs:   Lab Results   Component Value Date    PHART 7.227 12/09/2021    PO2ART 107.8 12/09/2021    FDU3BHG 42.4 12/09/2021    ZUK9FLI 17.6 2021    BEART -10 2021    W3EIRKOM 97 2021        Type & Screen (If Applicable):  No results found for: LABABO, LABRH    Drug/Infectious Status (If Applicable):  No results found for: HIV, HEPCAB    COVID-19 Screening (If Applicable):   Lab Results   Component Value Date    COVID19 Not Detected 2021           Anesthesia Evaluation  Patient summary reviewed and Nursing notes reviewed no history of anesthetic complications:   Airway: Mallampati: II  TM distance: >3 FB   Neck ROM: full  Mouth opening: > = 3 FB Dental:    (+) upper dentures and lower dentures      Pulmonary:Negative Pulmonary ROS and normal exam                               Cardiovascular:  Exercise tolerance: good (>4 METS),       (-) CAD,  angina and  CHF      Rhythm: regular  Rate: normal                    Neuro/Psych:   Negative Neuro/Psych ROS              GI/Hepatic/Renal:            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 Endo/Other:    (+) DiabetesType II DM, , .                 Abdominal:             Vascular: negative vascular ROS. Other Findings:               Anesthesia Plan      general     ASA 2       Induction: intravenous. MIPS: Postoperative opioids intended and Prophylactic antiemetics administered. Anesthetic plan and risks discussed with patient. Plan discussed with CRNA.     Attending anesthesiologist reviewed and agrees with Yumiko Briggs MD   2022        Department of Anesthesiology  Preprocedure Note       Name:  Robert Silva   Age:  61 y.o.  :  1955                                          MRN:  [id number]         Date:  2022      Surgeon: Prasanth Tolentino):  Trish Gill MD    Procedure: Procedure(s):  WOUND EVALUATION, SPLIT THICKNESS SKIN GRAFT OF ABDOMINAL WALL, POSSIBLE WOUND VAC PLACEMENT    Medications prior to admission:   Prior to Admission medications    Medication Sig Start Date End Date Taking?  Authorizing Provider   metFORMIN (GLUCOPHAGE) 500 MG tablet Take 500 mg by mouth daily (with breakfast)    Historical Provider, MD   meloxicam (MOBIC) 15 MG tablet Take 1 tablet by mouth daily 2/18/16   Jen Yoder DO       Current medications:    Current Facility-Administered Medications   Medication Dose Route Frequency Provider Last Rate Last Admin    magnesium sulfate 2000 mg in 50 mL IVPB premix  2,000 mg IntraVENous Once Starla Osorio, DO 25 mL/hr at 01/06/22 0945 2,000 mg at 01/06/22 0945    sodium phosphate 15 mmol in dextrose 5 % 250 mL IVPB  15 mmol IntraVENous Once [place]kwadwo Osorio, DO        meperidine (DEMEROL) injection 12.5 mg  12.5 mg IntraVENous Q5 Min PRN Mary Small MD        HYDROmorphone (DILAUDID) injection 0.25 mg  0.25 mg IntraVENous Q5 Min PRN Mary Small MD        fentaNYL (SUBLIMAZE) injection 50 mcg  50 mcg IntraVENous Q5 Min PRN Mary Small MD        HYDROmorphone (DILAUDID) injection 0.25 mg  0.25 mg IntraVENous Q5 Min PRN Mary Small MD        HYDROmorphone (DILAUDID) injection 0.5 mg  0.5 mg IntraVENous Q5 Min PRN Mary Small MD        oxyCODONE (ROXICODONE) immediate release tablet 5 mg  5 mg Oral PRN Mary Small MD        Or    oxyCODONE (ROXICODONE) immediate release tablet 10 mg  10 mg Oral PRN Mary Small MD        promethazine (PHENERGAN) injection 6.25 mg  6.25 mg IntraVENous PRN Mary Small MD        diphenhydrAMINE (BENADRYL) injection 12.5 mg  12.5 mg IntraVENous Once PRN Mary Small MD        labetalol (NORMODYNE;TRANDATE) injection 5 mg  5 mg IntraVENous Q10 Min PRHAYES Small MD        lactated ringers infusion   IntraVENous Continuous Deonte Allen MD 75 mL/hr at 01/06/22 0720 New Bag at 01/06/22 0720    insulin glargine (LANTUS;BASAGLAR) injection pen 10 Units  10 Units SubCUTAneous QAM Viktor Gotti DO   10 Units at 01/05/22 0915    insulin lispro (1 Unit Dial) 3 Units  3 Units Xi Noel  mL/hr at 12/12/21 0850 25 mL at 12/12/21 0850    glucose (GLUTOSE) 40 % oral gel 15 g  15 g Oral PRN Xi Noel MD        dextrose 50 % IV solution  12.5 g IntraVENous PRN Xi Noel MD        glucagon (rDNA) injection 1 mg  1 mg IntraMUSCular PRN Xi Noel MD        dextrose 5 % solution  100 mL/hr IntraVENous PRN Xi Noel MD           Allergies:  No Known Allergies    Problem List:    Patient Active Problem List   Diagnosis Code    Diabetic acidosis without coma ([address] E11.10    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    Acute respiratory failure with hypoxia ([facility]) J96.01    Necrotizing fasciitis ([address] M72.6    Necrotizing soft tissue infection M79.89       Past Medical History:        Diagnosis Date    Col[facility] in place [facility]) 12/07/2021    placed    Current every day smoker     Fourniers gangrene     Necrotizing fasciitis ([address]     Osteomyelitis of left foot ([address] 11/30/2021    Patellofemoral pain syndrome of right knee     Type 2 diabetes mellitus ([address]        Past Surgical History:        Procedure Laterality Date    ABDOMEN SURGERY N/A 12/01/2021    WIDE EXCISION PERINEUM, BACK, ABDOMINAL WALL performed by Ahsan Olivarez MD at [facility] Left 12/03/2021    ABDOMINAL WALL AND LEFT BUTTOCK DEBRIDEMENT, WOUND VAC PLACEMENT performed by Jonh Beauchamp MD at [facility] Left 12/05/2021    ABDOMINAL WALL AND LEFT BUTTOCK DEBRIDEMENT, WOUND VAC PLACEMENT performed by Jonh Beauchamp MD at [facility] N/A 12/07/2021    EVALUATION UNDER ANESTHESIA WITH DEBRIDEMENT ABDOMINAL WOUND AND LEFT BUTTOCK, WOUND VAC CHANGE performed by Jonh Beauchamp MD at [address] [facility] Left 12/10/2021    ABDOMINAL WALL AND LEFT BUTTOCK WOUND VAC CHANGE WITH FURTHER DEBRIDEMENT ABDOMEN AND LEFT BUTTOCK WOUND performed by Jonh Beauchamp MD at [address] [facility] N/A 12/13/2021    WOUND VAC CHANGE ABDOMEN AND LEFT BUTTOCK performed by Carlos Willams MD at [address] [facility] N/A 12/16/2021    2ND LOOK/ EVALUATION UNDER ANESTHESIA/ WOUND VAC CHANGE ABDOMINAL WALL AND PERINEUM performed by Carlos Willams MD at [address] [facility] N/A 12/21/2021    EVALUATION UNDER ANESTHESIA/ WOUND VAC CHANGE ABDOMINAL WALL AND PERINEUM performed by Carlos Willams MD at [address] [facility] N/A 12/23/2021    EVALUATION UNDER ANESTHESIA/ WOUND VAC CHANGE/ POSSIBLE PARTIAL CLOSURE/ ABDOMINAL WALL AND PERINEUM performed by Carlos Willams MD at [address] Left 12/07/2021    LAPAROSCOPIC COLOSTOMY CREATION performed by Diamond Romero MD at [address] [facility] N/A 1/3/2022    GLUTEAL WOUND VAC PLACEMENT performed by Carlos Willams MD at [address] [facility] N/A 12/27/2021    PERINEAL WOUND VAC CHANGE performed by Carlos Willams MD at [address] [facility] N/A 12/30/2021    PERINEAL / ABDOMINAL WOUND VAC CHANGE, performed by Carlos Willams MD at [address] [place] [address] N/A 11/30/2021    DEBRIDEMENT OF SCROTAL JAYRO'S GANGRENE performed by Alicia Welsh MD at [address] [facility] 1/3/2022    SPLIT THICKNESS SKIN GRAFT TO ABDOMEN WOUND VAC PLACEMENT. 44x 11 performed by Carlos Willams MD at [address] [facility] N/A 11/30/2021    PERINEAL SOFT TISSUE EXCISIONAL DEBRIDEMENT performed by Angelia Sacks, MD at [address] [facility] History:    Social History     Tobacco Use    Smoking status: Current Every Day Smoker    Smokeless tobacco: Never Used   Substance Use Topics    Alcohol use:  Yes     Alcohol/week: 0.0 standard drinks     Comment: social                                 Ready to quit: Not Answered  Counseling given: Not Answered      Vital Signs (Current):   Vitals:    01/05/22 1935 01/05/22 2323 01/06/22 0345 01/06/22 0820   BP: (!) 158/76 (!) 148/76 (!) 150/71 (!) 162/82   Pulse: 82 79 82 83   Resp: 16 16 16 16   Temp: 97.7 °F (36.5 °C) 97.8 °F (36.6 °C) 97.5 °F (36.4 °C) 97.6 °F (36.4 °C)   TempSrc: Oral Oral Oral Oral   SpO2: 93% 93% 92% 93%   Weight:       Height:                                                  BP Readings from Last 3 Encounters:   01/06/22 (!) 162/82   01/03/22 113/62   12/30/21 (!) 98/54       NPO Status: Time of last liquid consumption: 2300                        Time of last solid consumption: 2300                        Date of last liquid consumption:  (12/22/21)                        Date of last solid food consumption: 12/22/21    BMI:   Wt Readings from Last 3 Encounters:   12/28/21 201 lb 15.1 oz (91.6 kg)   11/30/21 163 lb 3.2 oz (74 kg)   04/18/16 179 lb 14.3 oz (81.6 kg)     Body mass index is 28.17 kg/m².     CBC:   Lab Results   Component Value Date    WBC 6.3 01/06/2022    RBC 2.91 01/06/2022    HGB 9.1 01/06/2022    HCT 27.1 01/06/2022    MCV 93.0 01/06/2022    RDW 16.1 01/06/2022     01/06/2022       CMP:   Lab Results   Component Value Date     01/06/2022    K 4.1 01/06/2022    K 3.8 12/12/2021     01/06/2022    CO2 28 01/06/2022    BUN 13 01/06/2022    CREATININE <0.5 01/06/2022    GFRAA >60 01/06/2022    AGRATIO 0.7 11/30/2021    LABGLOM >60 01/06/2022    GLUCOSE 110 01/06/2022    PROT 4.8 12/10/2021    CALCIUM 8.0 01/06/2022    BILITOT 0.3 12/10/2021    ALKPHOS 170 12/10/2021    AST 13 12/10/2021    ALT 7 12/10/2021       POC Tests:   Recent Labs     01/06/22  0749   POCGLU 109*       Coags:   Lab Results   Component Value Date    PROTIME 12.7 12/30/2021    INR 1.12 12/30/2021       HCG (If Applicable): No results found for: PREGTESTUR, PREGSERUM, HCG, HCGQUANT     ABGs:   Lab Results   Component Value Date    PHART 7.227 12/09/2021    PO2ART 107.8 12/09/2021    UCJ2GNA 42.4 12/09/2021    CTK3EQH 17.6 12/09/2021    BEART -10 12/09/2021    C3IWGUIH 97 12/09/2021        Type & Screen (If Applicable):  No results found for: LABABO, LABRH    Drug/Infectious Status (If Applicable):  No results found for: HIV, HEPCAB    COVID-19 Screening (If Applicable):   Lab Results   Component Value Date    COVID19 Not Detected 11/30/2021           Anesthesia Evaluation    Airway: Mallampati: II  TM distance: >3 FB   Neck ROM: full  Mouth opening: > = 3 FB Dental:          Pulmonary:                              Cardiovascular:            Rhythm: regular  Rate: normal                    Neuro/Psych:               GI/Hepatic/Renal:             Endo/Other:    (+) Diabetes, . Abdominal:             Vascular: Other Findings:             Anesthesia Plan      general     ASA 3       Induction: intravenous. Anesthetic plan and risks discussed with patient. Plan discussed with CRNA.                 Edward Yeboah MD   1/6/2022

## 2022-01-06 NOTE — PROGRESS NOTES
Surgery Daily Progress Note  Amaris Mayer  CC:  Necrotizing fasciitis      Subjective :No acute events overnight. Wound vac continues to intermittently leak. Holding suction now. Glucose with improved control. Remains afebrile with mild HTN. Urine output remains appropriate      Objective    Infusions:   lactated ringers 75 mL/hr at 01/05/22 1935    sodium chloride      sodium chloride 25 mL (12/12/21 0850)    dextrose          I/O:I/O last 3 completed shifts: In: 1070 [P.O.:1070]  Out: 1325 [Urine:1325]           Wt Readings from Last 1 Encounters:   12/28/21 201 lb 15.1 oz (91.6 kg)                 LABS:    Recent Labs     01/04/22  0648 01/05/22  0535   WBC 6.4 6.3   HGB 8.0* 8.6*   HCT 24.5* 26.3*   MCV 92.8 92.9    292        Recent Labs     01/05/22  0535 01/05/22  0854    138   K 3.9 3.9    100   CO2 30 29   PHOS 3.0 2.7   BUN 16 16   CREATININE <0.5* <0.5*      No results for input(s): AST, ALT, ALB, BILIDIR, BILITOT, ALKPHOS in the last 72 hours. No results for input(s): LIPASE, AMYLASE in the last 72 hours. No results for input(s): PROT, INR, APTT in the last 72 hours. No results for input(s): CKTOTAL, CKMB, CKMBINDEX, TROPONINI in the last 72 hours. Exam:BP (!) 150/71   Pulse 82   Temp 97.5 °F (36.4 °C) (Oral)   Resp 16   Ht 5' 11\" (1.803 m)   Wt 201 lb 15.1 oz (91.6 kg)   SpO2 92%   BMI 28.17 kg/m²   General appearance: alert, appears stated age and cooperative  Eyes: No scleral icterus, EOM grossly intact  Neck: trachea midline, no JVD, no lymphadenopathy, neck supple  Chest/Lungs: normal effort with no accessory muscle use on 3L NC  Cardiovascular: RRR, brisk capillary refill distally  Abdomen: Soft, large surgical debridement area of the abdomen with veraflo Vac in place holding adequate suction and instilling normal saline. Colostomy is pink, viable and with brown stool in the bag.   Skin: warm and dry, no rashes  Extremities: no edema, no cyanosis, right lower extremity donor site with minimal weeping but hemostatic. Genitourinary: Mcfadden in place with clear yellow urine  Neuro: A&Ox3, no focal deficits, sensation intact    ASSESSMENT/PLAN: Pt. is a 61 y.o. male with Necrotizing fasciitis of the abdominal wall, gluteal region, and scrotum s/p wide excision of perineum, back, and abdominal wall. S/p multiple debridements and wound vac placement and changes intraoperatively with diverting colostomy creation (12/7). OR wound vac change (12/10, 12/13, 12/16, 12/21, 12/23, 12/27, 12/30).  S/P STSG to abdominal wound, and gluteal wound vac change (1/3)    - continue to work with PT/OT to increase strength  - continue zero carb diet  - anticipate patient to return to OR today for continued reconstruction  - consent on chart        JAGJIT Eubanks - CNP 1/6/2022 6:20 AM  [phone]

## 2022-01-06 NOTE — PROGRESS NOTES
VSS, pain managed with medication, denies n/v. IVF and IV abx infused, tolerating diet. Ostomy bag intact with good amount of output. Mcfadden, wound vac x2 CDI. R thigh open to air. Pt on 2L NC. Fall precaution in place. Uneventful night with pt. NPO since midnight.

## 2022-01-06 NOTE — ANESTHESIA PRE PROCEDURE
Department of Anesthesiology  Preprocedure Note       Name:  Justina Lorenzo   Age:  61 y.o.  :  1955                                          MRN:  [id number]         Date:  2022      Surgeon: Bandar Tran):  Laly Ann MD    Procedure: Procedure(s):  WOUND EVALUATION, SPLIT THICKNESS SKIN GRAFT OF ABDOMINAL WALL, POSSIBLE WOUND VAC PLACEMENT    Medications prior to admission:   Prior to Admission medications    Medication Sig Start Date End Date Taking?  Authorizing Provider   metFORMIN (GLUCOPHAGE) 500 MG tablet Take 500 mg by mouth daily (with breakfast)    Historical Provider, MD   meloxicam (MOBIC) 15 MG tablet Take 1 tablet by mouth daily 16   James Guerrero DO       Current medications:    Current Facility-Administered Medications   Medication Dose Route Frequency Provider Last Rate Last Admin    sodium phosphate 15 mmol in dextrose 5 % 250 mL IVPB  15 mmol IntraVENous Once Richardson Belling, DO 62.5 mL/hr at 22 1131 15 mmol at 22 1131    meperidine (DEMEROL) injection 12.5 mg  12.5 mg IntraVENous Q5 Min PRN Allison Méndez MD        HYDROmorphone (DILAUDID) injection 0.25 mg  0.25 mg IntraVENous Q5 Min PRN Allison Méndez MD        fentaNYL (SUBLIMAZE) injection 50 mcg  50 mcg IntraVENous Q5 Min PRN Allison Méndez MD        HYDROmorphone (DILAUDID) injection 0.25 mg  0.25 mg IntraVENous Q5 Min PRN Allison Méndez MD        HYDROmorphone (DILAUDID) injection 0.5 mg  0.5 mg IntraVENous Q5 Min PRHAYES Méndez MD        oxyCODONE (ROXICODONE) immediate release tablet 5 mg  5 mg Oral PRN Allison Méndez MD        Or    oxyCODONE (ROXICODONE) immediate release tablet 10 mg  10 mg Oral PRN Allison Méndez MD        [facility]) injection 6.25 mg  6.25 mg IntraVENous PRN Allison Méndez MD        diphenhydrAMINE (BENADRYL) injection 12.5 mg  12.5 mg IntraVENous Once PRN Allison Méndez MD        labetalol (NORMODYNE;TRANDATE) injection 5 mg  5 mg IntraVENous Q10 Min PRN Lonnie Frank MD        HYDROmorphone (DILAUDID) injection 0.25 mg  0.25 mg IntraVENous Q5 Min PRN Ingrid Sena MD        HYDROmorphone (DILAUDID) injection 0.5 mg  0.5 mg IntraVENous Q5 Min PRN Ingrid Sena MD        morphine injection 1 mg  1 mg IntraVENous Q5 Min PRN Ingrid Sena MD        HYDROmorphone (DILAUDID) injection 0.5 mg  0.5 mg IntraVENous Q5 Min PRN Ingrid Sena MD        oxyCODONE (ROXICODONE) immediate release tablet 5 mg  5 mg Oral PRN Ingrid Sena MD        Or    oxyCODONE (ROXICODONE) immediate release tablet 10 mg  10 mg Oral PRN Ingrid Sena MD        diphenhydrAMINE (BENADRYL) injection 12.5 mg  12.5 mg IntraVENous Once PRN Ingrid Sena MD        metoclopramide (REGLAN) injection 10 mg  10 mg IntraVENous Once PRN Ingrid Sena MD        [facility]) injection 6.25 mg  6.25 mg IntraVENous Once PRN Ingrid Sena MD        labetalol (NORMODYNE;TRANDATE) injection 5 mg  5 mg IntraVENous Q10 Min PRN Ingrid Sena MD        hydrALAZINE (APRESOLINE) injection 5 mg  5 mg IntraVENous Q10 Min PRN Ingrid Sena MD        meperidine (DEMEROL) injection 12.5 mg  12.5 mg IntraVENous Q5 Min PRN Ingrid Sena MD        lactated ringers infusion   IntraVENous Continuous Valorie Crawford MD 75 mL/hr at 01/06/22 0720 New Bag at 01/06/22 0720    insulin glargine (LANTUS;BASAGLAR) injection pen 10 Units  10 Units SubCUTAneous QAM Stuart [place], DO   10 Units at 01/05/22 0915    insulin lispro (1 Unit Dial) 3 Units  3 Units SubCUTAneous BID WC Nova Clinton MD   3 Units at 01/05/22 0914    insulin regular (HUMULIN R;NOVOLIN R) injection 0-18 Units  0-18 Units SubCUTAneous 4x Daily AC & HS Nova Clinton MD   2 Units at 01/04/22 0853    0.9 % sodium chloride infusion   IntraVENous PRN Nova Clinton MD        heparin (porcine) injection 5,000 Units  5,000 Units SubCUTAneous 3 times per day oNva Clinton MD   5,000 Units at 01/06/22 0556    alteplase (CATHFLO) injection 1 mg  1 mg IntraCATHeter PRN Timothy Molina MD   1 mg at 12/25/21 0457    docusate sodium (COLACE) capsule 100 mg  100 mg Oral BID Timothy Molina MD   100 mg at 01/06/22 6944    polyethylene glycol (GLYCOLAX) packet 17 g  17 g Oral Daily Timothy Molina MD   17 g at 01/05/22 0904    ipratropium-albuterol (DUONEB) nebulizer solution 1 ampule  1 ampule Inhalation Q4H PRN Timothy Molina MD   1 ampule at 01/03/22 1346    dextrose 50 % IV solution  12.5 g IntraVENous PRN Timothy Molina MD        amLODIPine (NORVASC) tablet 5 mg  5 mg Oral Daily Timothy Molina MD   5 mg at 01/06/22 9413    hydrALAZINE (APRESOLINE) injection 10 mg  10 mg IntraVENous Q6H PRN Timothy Molina MD   10 mg at 01/01/22 1155    ampicillin-sulbactam (UNASYN) 3000 mg ivpb minibag  3,000 mg IntraVENous Q6H Timothy Molina MD   Stopped at 01/06/22 1012    oxyCODONE (ROXICODONE) immediate release tablet 5 mg  5 mg Oral Q4H PRN Timothy Molina MD   5 mg at 01/06/22 0004    Or    oxyCODONE (ROXICODONE) immediate release tablet 10 mg  10 mg Oral Q4H PRN Timothy Molina MD   10 mg at 01/06/22 7609    acetaminophen (TYLENOL) tablet 1,000 mg  1,000 mg Oral Q6H Timothy Molina MD   1,000 mg at 01/05/22 2321    sodium chloride flush 0.9 % injection 5-40 mL  5-40 mL IntraVENous 2 times per day Timothy Molina MD   10 mL at 01/05/22 2032    sodium chloride flush 0.9 % injection 5-40 mL  5-40 mL IntraVENous PRN Timothy Molina MD        0.9 % sodium chloride infusion  25 mL IntraVENous PRN Timothy Molina  mL/hr at 12/12/21 0850 25 mL at 12/12/21 0850    glucose (GLUTOSE) 40 % oral gel 15 g  15 g Oral PRN Timothy Molina MD        dextrose 50 % IV solution  12.5 g IntraVENous PRN Timothy Molina MD        glucagon (rDNA) injection 1 mg  1 mg IntraMUSCular PRN Timothy Molina MD        dextrose 5 % solution  100 mL/hr IntraVENous PRN Timothy Molina MD           Allergies:  No Known Allergies    Problem List:    Patient Active Problem List   Diagnosis Code    Diabetic acidosis without coma ([facility] E11.10    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    Acute respiratory failure with hypoxia ([address] J96.01    Necrotizing fasciitis ([address] M72.6    Necrotizing soft tissue infection M79.89       Past Medical History:        Diagnosis Date    Colostomy in place [facility]) 12/07/2021    placed    Current every day smoker     Fourniers gangrene     Necrotizing fasciitis ([facility]     Osteomyelitis of left foot ([facility] 11/30/2021    Patellofemoral pain syndrome of right knee     Type 2 diabetes mellitus ([facility]        Past Surgical History:        Procedure Laterality Date    ABDOMEN SURGERY N/A 12/01/2021    WIDE EXCISION PERINEUM, BACK, ABDOMINAL WALL performed by Ame Daily MD at [facility] 12/03/2021    ABDOMINAL WALL AND LEFT BUTTOCK DEBRIDEMENT, WOUND VAC PLACEMENT performed by Angel Luis Jang MD at [facility] 12/05/2021    ABDOMINAL WALL AND LEFT BUTTOCK DEBRIDEMENT, WOUND VAC PLACEMENT performed by Angel Luis Jang MD at [facility] N/A 12/07/2021    EVALUATION UNDER ANESTHESIA WITH DEBRIDEMENT ABDOMINAL WOUND AND LEFT BUTTOCK, WOUND VAC CHANGE performed by Angel Luis Jang MD at [facility] 12/10/2021    ABDOMINAL WALL AND LEFT BUTTOCK WOUND VAC CHANGE WITH FURTHER DEBRIDEMENT ABDOMEN AND LEFT BUTTOCK WOUND performed by Angel Luis Jang MD at [facility] N/A 12/13/2021    WOUND VAC CHANGE ABDOMEN AND LEFT BUTTOCK performed by Angel Luis Jang MD at [facility] N/A 12/16/2021    2ND LOOK/ EVALUATION UNDER ANESTHESIA/ WOUND VAC CHANGE ABDOMINAL WALL AND PERINEUM performed by Angel Luis Jang MD at [facility] N/A 12/21/2021    EVALUATION UNDER ANESTHESIA/ WOUND VAC CHANGE ABDOMINAL WALL AND PERINEUM performed by Angel Luis Jang MD at [address] [facility] N/A 12/23/2021 EVALUATION UNDER ANESTHESIA/ WOUND VAC CHANGE/ POSSIBLE PARTIAL CLOSURE/ ABDOMINAL WALL AND PERINEUM performed by Luli Chan MD at [address] Left 12/07/2021    LAPAROSCOPIC COLOSTOMY CREATION performed by Mohsen Jacobs MD at [facility] N/A 1/3/2022    GLUTEAL WOUND VAC PLACEMENT performed by Luli Chan MD at [address] N/A 12/27/2021    PERINEAL WOUND VAC CHANGE performed by Luli Chan MD at [address] N/A 12/30/2021    PERINEAL / ABDOMINAL WOUND VAC CHANGE, performed by Luli Chan MD at [address] [address] N/A 11/30/2021    DEBRIDEMENT OF SCROTAL JAYRO'S GANGRENE performed by Colton King MD at [address] 1/3/2022    SPLIT THICKNESS SKIN GRAFT TO ABDOMEN WOUND VAC PLACEMENT. 44x 11 performed by Luli Chan MD at [facility] N/A 11/30/2021    PERINEAL SOFT TISSUE EXCISIONAL DEBRIDEMENT performed by Emmanuel Velez MD at [place] [address] History:    Social History     Tobacco Use    Smoking status: Current Every Day Smoker    Smokeless tobacco: Never Used   Substance Use Topics    Alcohol use:  Yes     Alcohol/week: 0.0 standard drinks     Comment: social                                 Ready to quit: Not Answered  Counseling given: Not Answered      Vital Signs (Current):   Vitals:    01/06/22 0820 01/06/22 1213 01/06/22 1232 01/06/22 1445   BP: (!) 162/82 (!) 176/78 (!) 141/69 (!) 141/72   Pulse: 83 105  98   Resp: 16 16  16   Temp: 97.6 °F (36.4 °C) 98.2 °F (36.8 °C)  98 °F (36.7 °C)   TempSrc: Oral Oral     SpO2: 93% 90%  93%   Weight:       Height:                                                  BP Readings from Last 3 Encounters:   01/06/22 (!) 141/72   01/03/22 113/62   12/30/21 (!) 98/54       NPO Status: Time of last liquid consumption: 2300                        Time of last solid consumption: 2300 Date of last liquid consumption:  (12/22/21)                        Date of last solid food consumption: 12/22/21    BMI:   Wt Readings from Last 3 Encounters:   12/28/21 201 lb 15.1 oz (91.6 kg)   11/30/21 163 lb 3.2 oz (74 kg)   04/18/16 179 lb 14.3 oz (81.6 kg)     Body mass index is 28.17 kg/m².     CBC:   Lab Results   Component Value Date    WBC 6.3 01/06/2022    RBC 2.91 01/06/2022    HGB 9.1 01/06/2022    HCT 27.1 01/06/2022    MCV 93.0 01/06/2022    RDW 16.1 01/06/2022     01/06/2022       CMP:   Lab Results   Component Value Date     01/06/2022    K 4.1 01/06/2022    K 3.8 12/12/2021     01/06/2022    CO2 28 01/06/2022    BUN 13 01/06/2022    CREATININE <0.5 01/06/2022    GFRAA >60 01/06/2022    AGRATIO 0.7 11/30/2021    LABGLOM >60 01/06/2022    GLUCOSE 110 01/06/2022    PROT 4.8 12/10/2021    CALCIUM 8.0 01/06/2022    BILITOT 0.3 12/10/2021    ALKPHOS 170 12/10/2021    AST 13 12/10/2021    ALT 7 12/10/2021       POC Tests:   Recent Labs     01/06/22  1215   POCGLU 120*       Coags:   Lab Results   Component Value Date    PROTIME 12.7 12/30/2021    INR 1.12 12/30/2021       HCG (If Applicable): No results found for: PREGTESTUR, PREGSERUM, HCG, HCGQUANT     ABGs:   Lab Results   Component Value Date    PHART 7.227 12/09/2021    PO2ART 107.8 12/09/2021    VIW1SYX 42.4 12/09/2021    CHR9TWL 17.6 12/09/2021    BEART -10 12/09/2021    F6MZDBAG 97 12/09/2021        Type & Screen (If Applicable):  No results found for: LABABO, LABRH    Drug/Infectious Status (If Applicable):  No results found for: HIV, HEPCAB    COVID-19 Screening (If Applicable):   Lab Results   Component Value Date    COVID19 Not Detected 11/30/2021           Anesthesia Evaluation  Patient summary reviewed and Nursing notes reviewed no history of anesthetic complications:   Airway: Mallampati: II  TM distance: >3 FB   Neck ROM: full  Mouth opening: > = 3 FB Dental:    (+) edentulous      Pulmonary: ROS comment: Smoker   Cardiovascular:Negative CV ROS                      Neuro/Psych:   Negative Neuro/Psych ROS              GI/Hepatic/Renal: Neg GI/Hepatic/Renal ROS            Endo/Other:    (+) DiabetesType II DM, , .                  ROS comment: Necrotizing fasciitis Abdominal:             Vascular: negative vascular ROS. Other Findings:             Anesthesia Plan      general     ASA 1    (82-year-old male presents for WOUND EVALUATION, SPLIT THICKNESS SKIN GRAFT OF ABDOMINAL WALL, POSSIBLE WOUND VAC PLACEMENT. Plan general anesthesia with ASA standard monitors. Questions answered. Patient agreeable with anesthetic plan.  )  Induction: intravenous. Anesthetic plan and risks discussed with patient. Plan discussed with CRNA.     Attending anesthesiologist reviewed and agrees with Nicholos Kocher, MD   1/6/2022

## 2022-01-06 NOTE — BRIEF OP NOTE
Brief Postoperative Note      Patient: Walt Vela  YOB: 1955  MRN: [id number]    Date of Procedure: 1/6/2022    Pre-Op Diagnosis: necrotizing fascciitis    Post-Op Diagnosis: Same       Procedure(s):  LEFT POSTERIOR THIGH ADVANCEMENT 13X7CM; CLOSURE PERINEUM 7.2CM; SKIN GRAFT 7. 1CMX7.2CM TO LEFT BUTTOCK; APPLICATION OF WOUND VACUUM DEVICE    Surgeon(s):  Lance Short MD    Assistant:  Resident: Carlita Adams DO    Anesthesia: General    Estimated Blood Loss (mL): Minimal    Complications: None    Specimens:   * No specimens in log *    Implants:  * No implants in log *      Drains:   Negative Pressure Wound Therapy Abdomen Lower; Medial (Active)   Wound Type Surgical 01/05/22 1935   Unit Type Ultra VAC 01/05/22 1935   Dressing Type Black foam 01/05/22 1935   Cycle Continuous 01/05/22 1935   Target Pressure (mmHg) 125 01/05/22 1935   Irrigation Solution Sodium chloride 0.9% 01/03/22 1236   Canister changed? Yes 12/30/21 1034   Dressing Status Changed 01/03/22 1335   Dressing Changed Changed/New 01/03/22 1335   Drainage Amount Small 12/31/21 0746   Drainage Description Serosanguinous 12/31/21 0746   Output (ml) 100 ml 01/04/22 0331   Wound Assessment Other (Comment) 01/03/22 0756   Princess-wound Assessment Red 01/03/22 0756   Odor Mild 01/03/22 0756       Negative Pressure Wound Therapy Perineum (Active)       Colostomy LUQ (Active)   Stomal Appliance Clean;Dry; Intact 01/05/22 0900   Flange Size (inches) 2 Inches 12/20/21 1552   Stoma  Assessment Pink 01/06/22 0820   Mucocutaneous Junction Intact 01/03/22 0756   Peristomal Assessment Clean; Intact 01/06/22 0820   Treatment Bag change;Site care 12/20/21 1552   Stool Appearance Soft 01/05/22 1935   Stool Color Brown 01/05/22 0323   Stool Amount Small 01/05/22 0323   Output (mL) 0 ml 01/03/22 2000       Urethral Catheter Non-latex 18 fr (Active)   Catheter Indications Perioperative use for selected surgical procedures 01/06/22 0820   Securement Device Date Changed 01/03/22 01/03/22 1025   Site Assessment No urethral drainage 01/06/22 0820   Urine Color Yellow 01/06/22 0820   Urine Appearance Cloudy 01/06/22 0820   Urine Odor Other (Comment) 01/06/22 0820   Output (mL) 400 mL 01/06/22 0345       [REMOVED] Negative Pressure Wound Therapy Abdomen Mid (Removed)   Wound Type Surgical 12/07/21 1600   Dressing Type Black foam 12/07/21 1600   Cycle Continuous 12/07/21 1600   Target Pressure (mmHg) 125 12/07/21 1600   Irrigation Solution Dakins 0.125% 12/06/21 0509   Canister changed? Yes 12/07/21 1600   Dressing Status Clean;Dry; Intact 12/07/21 1600   Dressing Changed Changed/New 12/06/21 0400   Output (ml) 200 ml 12/07/21 1600   Odor None 12/07/21 1600       [REMOVED] Negative Pressure Wound Therapy Groin Lower; Medial (Removed)   Wound Type Surgical 12/16/21 0930   Unit Type VAC Ulta 12/16/21 0930   Dressing Type Black foam 12/16/21 0930   Number of pieces used 2 12/13/21 1558   Cycle Continuous 12/16/21 0930   Target Pressure (mmHg) 125 12/16/21 0930   Irrigation Solution Sodium chloride 0.9% 12/16/21 0930   Instillation Volume  850 mL 12/15/21 0425   Canister changed? Yes 12/16/21 0056   Dressing Status Clean;Dry; Intact 12/16/21 0930   Dressing Changed Dressing reinforced 12/16/21 0930   Drainage Amount None 12/13/21 1659   Drainage Description Serous 12/12/21 0820   Output (ml) 900 ml 12/16/21 0056   Wound Assessment Other (Comment) 12/14/21 2039   Princess-wound Assessment Other (Comment) 12/13/21 1659   Odor None 12/13/21 0754       [REMOVED] Negative Pressure Wound Therapy Perineum (Removed)   Wound Type Surgical 01/06/22 0820   Unit Type Ulta VAC 01/06/22 0820   Dressing Type Black foam 01/06/22 0820   Cycle Continuous 01/06/22 0820   Target Pressure (mmHg) 125 01/06/22 0820   Irrigation Solution Sodium chloride 0.9% 01/01/22 0330   Instillation Volume  250 mL 12/21/21 1906   Canister changed? Yes 12/31/21 1154   Dressing Status Clean;Dry; Intact 01/04/22 4676 Drainage Description Serous 12/30/21 1034   Output (ml) 200 ml 01/04/22 0331   Wound Assessment Other (Comment) 01/03/22 0756   Princess-wound Assessment Other (Comment) 01/03/22 0756   Odor None 01/03/22 0756       [REMOVED] NG/OG/NJ/NE Tube Orogastric 16 fr Center mouth (Removed)   Surrounding Skin Dry; Intact 12/01/21 2200   Securement device Yes 12/02/21 0800   Status Clamped 12/02/21 0800   Placement Verified by External Catheter Length 12/02/21 0800   NG/OG/NJ/NE External Measurement (cm) 50 cm 12/02/21 0800   Drainage Appearance Bile 12/01/21 2200   Tube Feeding Intake (mL) 0 ml 12/01/21 1800   Free Water Flush (mL) 0 mL 12/01/21 1800   Free Water Rate 0 12/01/21 0800   Residual Volume (ml) 0 ml 12/01/21 0000       [REMOVED] NG/OG/NJ/NE Tube Left nostril (Removed)       [REMOVED] NG/OG/NJ/NE Tube Orogastric 16 fr Center mouth (Removed)       Findings: Wound vac placed to gluteal region over site of closure and STSG.  Donor site with Xeroform gauze and tegaderm to remain in place until taken down by the surgical team.     Electronically signed by Suzie Priest DO on 1/6/2022 at 5:12 PM

## 2022-01-06 NOTE — ANESTHESIA POSTPROCEDURE EVALUATION
Department of Anesthesiology  Postprocedure Note    Patient: Itzel Sosa  MRN: [id number]  YOB: 1955  Date of evaluation: 1/6/2022  Time:  5:30 PM     Procedure Summary     Date: 01/06/22 Room / Location: [address] [place] [address]    Anesthesia Start: 6046 Anesthesia Stop: 4327    Procedure: LEFT POSTERIOR THIGH ADVANCEMENT 13X7CM; CLOSURE PERINEUM 7.2CM; SKIN GRAFT 7. 1CMX7.2CM TO LEFT BUTTOCK; APPLICATION OF WOUND VACUUM DEVICE (N/A Buttocks) Diagnosis: (necrotizing fascciitis)    Surgeons: Carlos Willams MD Responsible Provider: Candelaria Jean MD    Anesthesia Type: general ASA Status: 3          Anesthesia Type: general    Arcadio Phase I: Arcadio Score: 9    Arcadio Phase II:      Last vitals: Reviewed and per EMR flowsheets.        Anesthesia Post Evaluation    Patient location during evaluation: PACU  Patient participation: complete - patient participated  Level of consciousness: awake and alert  Pain score: 5  Airway patency: patent  Nausea & Vomiting: no nausea and no vomiting  Complications: no  Cardiovascular status: hemodynamically stable  Respiratory status: acceptable  Hydration status: euvolemic

## 2022-01-07 LAB
ALBUMIN SERPL-MCNC: 2.5 G/DL (ref 3.4–5)
ANION GAP SERPL CALCULATED.3IONS-SCNC: 10 MMOL/L (ref 3–16)
BASOPHILS ABSOLUTE: 0.1 K/UL (ref 0–0.2)
BASOPHILS RELATIVE PERCENT: 0.8 %
BUN BLDV-MCNC: 12 MG/DL (ref 7–20)
CALCIUM SERPL-MCNC: 7.9 MG/DL (ref 8.3–10.6)
CHLORIDE BLD-SCNC: 103 MMOL/L (ref 99–110)
CO2: 27 MMOL/L (ref 21–32)
CREAT SERPL-MCNC: 0.5 MG/DL (ref 0.8–1.3)
EOSINOPHILS ABSOLUTE: 0.1 K/UL (ref 0–0.6)
EOSINOPHILS RELATIVE PERCENT: 1.5 %
GFR AFRICAN AMERICAN: >60
GFR NON-AFRICAN AMERICAN: >60
GLUCOSE BLD-MCNC: 109 MG/DL (ref 70–99)
GLUCOSE BLD-MCNC: 118 MG/DL (ref 70–99)
GLUCOSE BLD-MCNC: 125 MG/DL (ref 70–99)
GLUCOSE BLD-MCNC: 87 MG/DL (ref 70–99)
GLUCOSE BLD-MCNC: 95 MG/DL (ref 70–99)
HCT VFR BLD CALC: 26 % (ref 40.5–52.5)
HEMOGLOBIN: 8.7 G/DL (ref 13.5–17.5)
LYMPHOCYTES ABSOLUTE: 1 K/UL (ref 1–5.1)
LYMPHOCYTES RELATIVE PERCENT: 14.8 %
MAGNESIUM: 1.8 MG/DL (ref 1.8–2.4)
MCH RBC QN AUTO: 30.9 PG (ref 26–34)
MCHC RBC AUTO-ENTMCNC: 33.6 G/DL (ref 31–36)
MCV RBC AUTO: 91.9 FL (ref 80–100)
MONOCYTES ABSOLUTE: 0.8 K/UL (ref 0–1.3)
MONOCYTES RELATIVE PERCENT: 11.5 %
NEUTROPHILS ABSOLUTE: 4.9 K/UL (ref 1.7–7.7)
NEUTROPHILS RELATIVE PERCENT: 71.4 %
PDW BLD-RTO: 16.3 % (ref 12.4–15.4)
PERFORMED ON: ABNORMAL
PERFORMED ON: ABNORMAL
PERFORMED ON: NORMAL
PERFORMED ON: NORMAL
PHOSPHORUS: 3 MG/DL (ref 2.5–4.9)
PLATELET # BLD: 317 K/UL (ref 135–450)
PMV BLD AUTO: 7.9 FL (ref 5–10.5)
POTASSIUM SERPL-SCNC: 4.2 MMOL/L (ref 3.5–5.1)
RBC # BLD: 2.82 M/UL (ref 4.2–5.9)
SODIUM BLD-SCNC: 140 MMOL/L (ref 136–145)
WBC # BLD: 6.8 K/UL (ref 4–11)

## 2022-01-07 PROCEDURE — 97530 THERAPEUTIC ACTIVITIES: CPT

## 2022-01-07 PROCEDURE — 36592 COLLECT BLOOD FROM PICC: CPT

## 2022-01-07 PROCEDURE — 97110 THERAPEUTIC EXERCISES: CPT

## 2022-01-07 PROCEDURE — 6370000000 HC RX 637 (ALT 250 FOR IP): Performed by: STUDENT IN AN ORGANIZED HEALTH CARE EDUCATION/TRAINING PROGRAM

## 2022-01-07 PROCEDURE — 83735 ASSAY OF MAGNESIUM: CPT

## 2022-01-07 PROCEDURE — 6360000002 HC RX W HCPCS: Performed by: STUDENT IN AN ORGANIZED HEALTH CARE EDUCATION/TRAINING PROGRAM

## 2022-01-07 PROCEDURE — 2580000003 HC RX 258: Performed by: STUDENT IN AN ORGANIZED HEALTH CARE EDUCATION/TRAINING PROGRAM

## 2022-01-07 PROCEDURE — 1200000000 HC SEMI PRIVATE

## 2022-01-07 PROCEDURE — 2580000003 HC RX 258: Performed by: SURGERY

## 2022-01-07 PROCEDURE — 85025 COMPLETE CBC W/AUTO DIFF WBC: CPT

## 2022-01-07 PROCEDURE — 80069 RENAL FUNCTION PANEL: CPT

## 2022-01-07 RX ORDER — MAGNESIUM SULFATE IN WATER 40 MG/ML
2000 INJECTION, SOLUTION INTRAVENOUS ONCE
Status: COMPLETED | OUTPATIENT
Start: 2022-01-07 | End: 2022-01-07

## 2022-01-07 RX ADMIN — OXYCODONE HYDROCHLORIDE 5 MG: 5 TABLET ORAL at 00:59

## 2022-01-07 RX ADMIN — ACETAMINOPHEN 1000 MG: 500 TABLET ORAL at 05:00

## 2022-01-07 RX ADMIN — ACETAMINOPHEN 1000 MG: 500 TABLET ORAL at 18:10

## 2022-01-07 RX ADMIN — ACETAMINOPHEN 1000 MG: 500 TABLET ORAL at 12:30

## 2022-01-07 RX ADMIN — DOCUSATE SODIUM 100 MG: 100 CAPSULE, LIQUID FILLED ORAL at 21:29

## 2022-01-07 RX ADMIN — SODIUM CHLORIDE, PRESERVATIVE FREE 10 ML: 5 INJECTION INTRAVENOUS at 21:29

## 2022-01-07 RX ADMIN — OXYCODONE HYDROCHLORIDE 10 MG: 5 TABLET ORAL at 12:30

## 2022-01-07 RX ADMIN — OXYCODONE HYDROCHLORIDE 10 MG: 5 TABLET ORAL at 18:10

## 2022-01-07 RX ADMIN — HEPARIN SODIUM 5000 UNITS: 5000 INJECTION INTRAVENOUS; SUBCUTANEOUS at 21:29

## 2022-01-07 RX ADMIN — ACETAMINOPHEN 1000 MG: 500 TABLET ORAL at 00:54

## 2022-01-07 RX ADMIN — AMPICILLIN SODIUM AND SULBACTAM SODIUM 3000 MG: 2; 1 INJECTION, POWDER, FOR SOLUTION INTRAMUSCULAR; INTRAVENOUS at 18:13

## 2022-01-07 RX ADMIN — AMPICILLIN SODIUM AND SULBACTAM SODIUM 3000 MG: 2; 1 INJECTION, POWDER, FOR SOLUTION INTRAMUSCULAR; INTRAVENOUS at 05:07

## 2022-01-07 RX ADMIN — INSULIN HUMAN 2 UNITS: 100 INJECTION, SOLUTION PARENTERAL at 09:27

## 2022-01-07 RX ADMIN — SODIUM CHLORIDE, POTASSIUM CHLORIDE, SODIUM LACTATE AND CALCIUM CHLORIDE: 600; 310; 30; 20 INJECTION, SOLUTION INTRAVENOUS at 09:22

## 2022-01-07 RX ADMIN — SODIUM CHLORIDE, PRESERVATIVE FREE 10 ML: 5 INJECTION INTRAVENOUS at 09:25

## 2022-01-07 RX ADMIN — SODIUM CHLORIDE, PRESERVATIVE FREE 10 ML: 5 INJECTION INTRAVENOUS at 05:01

## 2022-01-07 RX ADMIN — DOCUSATE SODIUM 100 MG: 100 CAPSULE, LIQUID FILLED ORAL at 09:25

## 2022-01-07 RX ADMIN — POLYETHYLENE GLYCOL 3350 17 G: 17 POWDER, FOR SOLUTION ORAL at 09:25

## 2022-01-07 RX ADMIN — HEPARIN SODIUM 5000 UNITS: 5000 INJECTION INTRAVENOUS; SUBCUTANEOUS at 05:00

## 2022-01-07 RX ADMIN — OXYCODONE HYDROCHLORIDE 10 MG: 5 TABLET ORAL at 06:56

## 2022-01-07 RX ADMIN — AMPICILLIN SODIUM AND SULBACTAM SODIUM 3000 MG: 2; 1 INJECTION, POWDER, FOR SOLUTION INTRAMUSCULAR; INTRAVENOUS at 12:36

## 2022-01-07 RX ADMIN — INSULIN LISPRO 3 UNITS: 100 INJECTION, SOLUTION INTRAVENOUS; SUBCUTANEOUS at 09:26

## 2022-01-07 RX ADMIN — SODIUM CHLORIDE 25 ML: 9 INJECTION, SOLUTION INTRAVENOUS at 05:06

## 2022-01-07 RX ADMIN — MAGNESIUM SULFATE HEPTAHYDRATE 2000 MG: 2 INJECTION, SOLUTION INTRAVENOUS at 09:31

## 2022-01-07 RX ADMIN — AMLODIPINE BESYLATE 5 MG: 5 TABLET ORAL at 09:25

## 2022-01-07 RX ADMIN — HEPARIN SODIUM 5000 UNITS: 5000 INJECTION INTRAVENOUS; SUBCUTANEOUS at 16:24

## 2022-01-07 ASSESSMENT — PAIN DESCRIPTION - FREQUENCY
FREQUENCY: CONTINUOUS

## 2022-01-07 ASSESSMENT — PAIN DESCRIPTION - DESCRIPTORS
DESCRIPTORS: BURNING
DESCRIPTORS: DISCOMFORT;ACHING
DESCRIPTORS: ACHING
DESCRIPTORS: BURNING

## 2022-01-07 ASSESSMENT — PAIN DESCRIPTION - ORIENTATION
ORIENTATION: RIGHT;LEFT
ORIENTATION: LEFT
ORIENTATION: RIGHT
ORIENTATION: LEFT
ORIENTATION: RIGHT;LEFT
ORIENTATION: RIGHT

## 2022-01-07 ASSESSMENT — PAIN DESCRIPTION - PROGRESSION
CLINICAL_PROGRESSION: GRADUALLY WORSENING
CLINICAL_PROGRESSION: NOT CHANGED
CLINICAL_PROGRESSION: NOT CHANGED
CLINICAL_PROGRESSION: GRADUALLY WORSENING
CLINICAL_PROGRESSION: NOT CHANGED
CLINICAL_PROGRESSION: NOT CHANGED

## 2022-01-07 ASSESSMENT — PAIN - FUNCTIONAL ASSESSMENT
PAIN_FUNCTIONAL_ASSESSMENT: PREVENTS OR INTERFERES WITH MANY ACTIVE NOT PASSIVE ACTIVITIES
PAIN_FUNCTIONAL_ASSESSMENT: PREVENTS OR INTERFERES WITH MANY ACTIVE NOT PASSIVE ACTIVITIES
PAIN_FUNCTIONAL_ASSESSMENT: PREVENTS OR INTERFERES SOME ACTIVE ACTIVITIES AND ADLS
PAIN_FUNCTIONAL_ASSESSMENT: PREVENTS OR INTERFERES SOME ACTIVE ACTIVITIES AND ADLS
PAIN_FUNCTIONAL_ASSESSMENT: PREVENTS OR INTERFERES WITH MANY ACTIVE NOT PASSIVE ACTIVITIES
PAIN_FUNCTIONAL_ASSESSMENT: PREVENTS OR INTERFERES WITH MANY ACTIVE NOT PASSIVE ACTIVITIES

## 2022-01-07 ASSESSMENT — PAIN SCALES - GENERAL
PAINLEVEL_OUTOF10: 5
PAINLEVEL_OUTOF10: 4
PAINLEVEL_OUTOF10: 5
PAINLEVEL_OUTOF10: 0
PAINLEVEL_OUTOF10: 7
PAINLEVEL_OUTOF10: 5
PAINLEVEL_OUTOF10: 7
PAINLEVEL_OUTOF10: 4
PAINLEVEL_OUTOF10: 7
PAINLEVEL_OUTOF10: 0
PAINLEVEL_OUTOF10: 0
PAINLEVEL_OUTOF10: 5
PAINLEVEL_OUTOF10: 0

## 2022-01-07 ASSESSMENT — PAIN DESCRIPTION - ONSET
ONSET: ON-GOING

## 2022-01-07 ASSESSMENT — PAIN DESCRIPTION - LOCATION
LOCATION: BUTTOCKS
LOCATION: LEG
LOCATION: LEG
LOCATION: BUTTOCKS
LOCATION: BUTTOCKS
LOCATION: LEG

## 2022-01-07 ASSESSMENT — PAIN SCALES - WONG BAKER: WONGBAKER_NUMERICALRESPONSE: 0

## 2022-01-07 NOTE — PROGRESS NOTES
Patient is A&O x4.  RA, sat 96%. No complaints of SOB. Medicated for c/o pain as needed. Respirations appear to easy and unlabored. Lungs clear. Respirations easy with no complaints of cough. No complaints of nausea/vomiting/diarrhea. Up with lift to the bathroom/BSC as needed. See doc flow sheets for skin/wound assessments. IVF infusing per right hand PIV as ordered. Right arm PICC line intact and flushed with brisk blood return noted. Mcfadden intact. Tolerating diabetic diet. Plan of care and safety measures reviewed with the patient. Call light in reach and bed alarm in place. Will continue to monitor.   Electronically signed by Oseas Lea RN on 1/6/2022 at 11:45 PM

## 2022-01-07 NOTE — PROGRESS NOTES
Occupational Therapy  Facility/Department: [facility]  Daily Treatment Note  NAME: Farhad Ariza  : 1955  MRN: [id number]    Date of Service: 2022    Discharge Recommendations: Farhad Ariza scored a 15/24 on the AM-PAC ADL Inpatient form. Current research shows that an AM-PAC score of 17 or less is typically not associated with a discharge to the patient's home setting. Based on the patient's AM-PAC score and their current ADL deficits, it is recommended that the patient have 3-5 sessions per week of Occupational Therapy at d/c to increase the patient's independence. Please see assessment section for further patient specific details. If patient discharges prior to next session this note will serve as a discharge summary. Please see below for the latest assessment towards goals. OT Equipment Recommendations  Other: defer to d/c setting    Assessment   Performance deficits / Impairments: Decreased functional mobility ; Decreased ADL status; Decreased high-level IADLs;Decreased endurance;Decreased strength  Assessment: Pt cont to function below baseline, ModA x2 for rolling to L side and partial supine to sit, unable to sit up completely 2/2 RLE pain. Completing 2 attempts. Completing grooming ADLs in bed. Increased time and effort for all tasks. Increased time and assist with lined management, IV, 2 wound vacs, walsh. Would benefit from cont skilled OT while in acute care and at NE. Treatment Diagnosis: Decreased activity tolerance, impaired ADLs and mobility  OT Education: OT Role;Plan of Care;Precautions  Patient Education: verb understanding  Barriers to Learning: none  REQUIRES OT FOLLOW UP: Yes  Activity Tolerance  Activity Tolerance: Patient limited by pain; Patient Tolerated treatment well  Safety Devices  Safety Devices in place: Yes  Type of devices: Left in bed;Call light within reach;Nurse notified; Bed alarm in place         Patient Diagnosis(es): The primary encounter diagnosis was Transient alteration of awareness. A diagnosis of Necrotizing fasciitis ([facility] [address] was also pertinent to this visit. has a past medical history of Colostomy in place [facility]), Current every day smoker, Fourniers gangrene, Necrotizing fasciitis ([facility] [address] Osteomyelitis of left foot ([facility] [address] Patellofemoral pain syndrome of right knee, and Type 2 diabetes mellitus ([facility] [address] has a past surgical history that includes Ankle fracture surgery; Abdomen surgery (N/A, 12/01/2021); Scrotal surgery (N/A, 11/30/2021); Vagina surgery (N/A, 11/30/2021); Abdomen surgery (Left, 12/03/2021); Abdomen surgery (Left, 12/05/2021); colostomy (Left, 12/07/2021); Abdomen surgery (N/A, 12/07/2021); Abdomen surgery (Left, 12/10/2021); Abdomen surgery (N/A, 12/13/2021); Abdomen surgery (N/A, 12/16/2021); Abdomen surgery (N/A, 12/21/2021); Abdomen surgery (N/A, 12/23/2021); Pressure ulcer debridement (N/A, 12/27/2021); Pressure ulcer debridement (N/A, 12/30/2021); Skin graft (N/A, 1/3/2022); Muscle Repair (N/A, 1/3/2022); and Skin graft (N/A, 1/6/2022). Restrictions  Position Activity Restriction  Other position/activity restrictions: WBAT R LE, Heel WB L LE with surgical shoe per podiatry note; per Dr. Sanchez Fresh note 12/14: OOB with PT/OT  Subjective   General  Chart Reviewed: Yes  Patient assessed for rehabilitation services?: Yes  Additional Pertinent Hx: 63 y.o. M admitted 12/1 with necrotizing fasciitis to abdominal wall, gluteal region, and scrotum. s/p extensive wide excisions 12/1, 12/3, 12/5, 12/7 with wound vac placement. Colostomy creation 12/7. OR wound vac change 12/10;  OR wound vac change 12/13; OR wound vac change (12/16). . Podiatry: NWB L, FWB R. PMHx includes L foot fracture  Response to previous treatment: Patient with no complaints from previous session  Family / Caregiver Present: No  Referring Practitioner: Aisha Perikns DO  Diagnosis: necrotizing fascitis  Subjective  Subjective:  In bed, agreeable and motivated for session  General Comment  Comments: . Orientation     Objective    ADL  Feeding: Setup;Supervision  Grooming: Setup;Stand by assistance  LE Dressing: Dependent/Total  Toileting:  (walsh)        Balance  Sitting Balance: Unable to assess(comment)  Standing Balance: Unable to assess(comment)  Bed mobility  Rolling to Left: Moderate assistance;2 Person assistance  Comment: attempting supine to sit this date towards left side. Unable 2/2 pain and discomfort RLE. Unable to achieve full sitting position ~75%. Able to completly roll to L and partial sit with 2 attempts.                           Cognition  Overall Cognitive Status: [facility]                                         Plan   Plan  Times per week: 2-5  Times per day: Daily  Current Treatment Recommendations: Strengthening,ROM,Self-Care / ADL,Patient/Caregiver Education & Training,Safety Education & Training,Functional Mobility Training,Endurance Training    AM-PAC Score        AM-[facility] Inpatient Daily Activity Raw Score: 15 (01/07/22 1142)  AM-PAC Inpatient ADL T-Scale Score : 34.69 (01/07/22 1142)  ADL Inpatient CMS 0-100% Score: 56.46 (01/07/22 1142)  ADL Inpatient CMS G-Code Modifier : CK (01/07/22 1142)    Goals  Short term goals  Time Frame for Short term goals: by D/C  Short term goal 1: Demonstrate independence with HEP - Not met  Short term goal 2: Perform sit to stand NWB LLE with mod assist x2 to progress transfers - Not met  Short term goal 3: Roll side to side in bed with SBA for pressure relief and ADLs - Not met       Therapy Time   Individual Concurrent Group Co-treatment   Time In 1100         Time Out 1130         Minutes 30              Timed Code Treatment Minutes: 30  Total Treatment Minutes: [address] [facility],[address] [facility], OT

## 2022-01-07 NOTE — CARE COORDINATION
CM following, plan for OR Monday, then if pt stable plan for DC Tuesday. Yinka Ruiz [phone] at [facility] she has submitted all paper work for [place] pending. She is aware of DC planned Tuesday.    Electronically signed by Destiny Lynch RN on 1/7/2022 at 3:22 [phone]

## 2022-01-07 NOTE — PROGRESS NOTES
Plastic Surgery  Post-operative Note      Procedure(s) Performed: Left posterior thigh advancement flap, closure perineum, skin graft to left buttock, application of wound VAC device    Subjective: The patient states he is well controlled pain. He is still feeling groggy after surgery. He is tolerating minimal diet, no nausea/vomiting. States he does not have much appetite. Objective:  Anesthesia type: General      I/O    Intra op    Post op     Fluids  1600 mL 400 mL     EBL 25 mL 0 mL     Urine 800 mL Not recorded     Vitals:   Vitals:    01/06/22 1830 01/06/22 1845 01/06/22 2048 01/06/22 2050   BP: (!) 141/67 126/67 (!) 149/80    Pulse: 80 79 89    Resp: 19 18 20    Temp:  97 °F (36.1 °C) 97.9 °F (36.6 °C)    TempSrc:  Temporal Oral    SpO2:  96% (!) 83% 94%   Weight:       Height:           Physical Exam:  Post-op vital signs:  Stable   General appearance: alert, appears stated age and cooperative  Eyes: No scleral icterus, EOM grossly intact  Neck: trachea midline, no JVD, no lymphadenopathy, neck supple  Chest/Lungs: normal effort with no accessory muscle use on 5L NC  Cardiovascular: RRR, brisk capillary refill distally  Abdomen: Soft, large surgical debridement area of the abdomen with veraflo Vac in place holding adequate suction and instilling normal saline. Colostomy is pink, viable and with brown stool in the bag. Skin: warm and dry, no rashes  Extremities: no edema, no cyanosis, right lower extremity donor site wrapped  Genitourinary: Mcfadden in place with clear yellow urine  Neuro: A&Ox3, no focal deficits, sensation intact    Assessment and Plan  Pt. is a 61 y.o. male with Necrotizing fasciitis of the abdominal wall, gluteal region, and scrotum s/p wide excision of perineum, back, and abdominal wall. S/p multiple debridements and wound vac placement and changes intraoperatively with diverting colostomy creation (12/7). OR wound vac change (12/10, 12/13, 12/16, 12/21, 12/23, 12/27, 12/30).  S/P

## 2022-01-07 NOTE — PROGRESS NOTES
Physical Therapy  Facility/Department: [facility] SURGERY  Daily Treatment Note  NAME: Gabrielle Allen  : 1955  MRN: [id number]    Date of Service: 2022    Discharge Recommendations:    Gabrielle Allen scored a 9/24 on the AM-PAC short mobility form. Current research shows that an AM-PAC score of 17 or less is typically not associated with a discharge to the patient's home setting. Based on the patient's AM-PAC score and their current functional mobility deficits, it is recommended that the patient have 3-5 sessions per week of Physical Therapy at d/c to increase the patient's independence. Please see assessment section for further patient specific details. PT Equipment Recommendations  Other: defer d/c recs to next level of care    Assessment   Assessment: Patient with increased difficulty with bed mobility today as he was moved to sand bed for improved wound healing. He was unable to come to full sitting position due to pain and fear of falling out of bed which is higher despite verbal cues for encouragement and motivation. Patient motivated to participate in PT and able to complete exercises in bed. Will continue to follow while in acute care setting to improve functional mobility. Treatment Diagnosis: impaired mobility  Prognosis: Good  PT Education: PT Role;Functional Mobility Training  Patient Education: pt demos good understanding  REQUIRES PT FOLLOW UP: Yes  Activity Tolerance  Activity Tolerance: Patient limited by pain     Patient Diagnosis(es): The primary encounter diagnosis was Transient alteration of awareness. A diagnosis of Necrotizing fasciitis ([address] was also pertinent to this visit. has a past medical history of Colostomy in place [facility]), Current every day smoker, [place] gangrene, Necrotizing fasciitis ([address] Osteomyelitis of left foot ([address] Patellofemoral pain syndrome of right knee, and Type 2 diabetes mellitus ([address]    has a past surgical history that includes Ankle fracture surgery; Abdomen surgery (N/A, 12/01/2021); Scrotal surgery (N/A, 11/30/2021); Vagina surgery (N/A, 11/30/2021); Abdomen surgery (Left, 12/03/2021); Abdomen surgery (Left, 12/05/2021); colostomy (Left, 12/07/2021); Abdomen surgery (N/A, 12/07/2021); Abdomen surgery (Left, 12/10/2021); Abdomen surgery (N/A, 12/13/2021); Abdomen surgery (N/A, 12/16/2021); Abdomen surgery (N/A, 12/21/2021); Abdomen surgery (N/A, 12/23/2021); Pressure ulcer debridement (N/A, 12/27/2021); Pressure ulcer debridement (N/A, 12/30/2021); Skin graft (N/A, 1/3/2022); Muscle Repair (N/A, 1/3/2022); and Skin graft (N/A, 1/6/2022). Restrictions  Position Activity Restriction  Other position/activity restrictions: WBAT R LE, Heel WB L LE with surgical shoe per podiatry note; per Dr. Jimmy Madden note 12/14: OOB with PT/OT  Subjective   General  Chart Reviewed: Yes  Additional Pertinent Hx: Anisha Perez is a 63 y.o. male with Necrotizing fasciitis of the abdominal wall, gluteal region, and scrotum s/p wide excision of perineum, back, and abdominal wall. returned to the OR for further debridement or right abdominal wall, and placement of testicle within a thigh flap (12/3), followed by minimal abdominal wall and perineum debridement, sutured protective skin flap in groin for right testicle, and placement of a wound VAC (12/5), s/p diverting colostomy and wound vac change (12/7). Plan for OR on 12/10 for further debridement and change of wound vac.  12/13 wound vac change  Response To Previous Treatment: Patient with no complaints from previous session. Family / Caregiver Present: No  Referring Practitioner: Rosemarie Angry, DO  Subjective  Subjective: Patient supine in bed upon arrival, agreeable to PT treatment. Reports mild improvement in pain followin surgery yesterday.   Pain Screening  Patient Currently in Pain: Yes (pain with mobility, not rated, RN aware)  Vital Signs  Patient Currently in Pain: Yes (pain with mobility, not rated, RN aware)       Orientation  Orientation  Overall Orientation Status: Within Normal Limits  Cognition   Cognition  Overall Cognitive Status: WFL  Objective   Bed mobility  Rolling to Left: Moderate assistance;2 Person assistance  Scootin Person assistance;Dependent/Total (max assist x 2 to scoot toward head of bed)  Comment: attempting supine to sit this date towards left side x 2 trials; patient unable to come to full sitting position due to pain and discomfort in addition to fear of falling from higher bed; patient able to obtain approximately 75% full sit  Transfers  Comment: unable at this time due to patient unable to obtain full sitting position on EOB  Ambulation  Ambulation?: No        Exercises  Straight Leg Raise: x10 reps bilaterally, AROM  Heelslides: x10 reps bilaterally, AROM  Hip Abduction: x10 reps bilaterally  Ankle Pumps: x20 reps bilaterally  Comments: patient reports independence with performing exercises as tolerated throughout the day                      OutComes Score                                                     AM-PAC Score  AM-PAC Inpatient Mobility Raw Score : 9 (22)  AM-PAC Inpatient T-Scale Score : 30.55 (22)  Mobility Inpatient CMS 0-100% Score: 81.38 (22)  Mobility Inpatient CMS G-Code Modifier : CM (22)          Goals  Short term goals  Time Frame for Short term goals: dc  Short term goal 1: Demo indep with HEP x 15 reps  ongoing  Short term goal 2: Pt will transfer supine <--> sit with min A x 1   ongoing  Short term goal 3: Pt will transfer sit to stand with mod A x 1 and achieve near upright posture, maintaining L LE NWB   ongoing  Patient Goals   Patient goals : hopes to go home at [address] per week: 2-5  Specific instructions for Next Treatment: -  Current Treatment Recommendations: Strengthening,Balance Training,Functional Mobility Training,Transfer Training,Equipment Evaluation, Education, & procurement,Patient/Caregiver Education & Training,Home Exercise Program  Safety Devices  Type of devices: Call light within reach,Nurse notified,Left in bed     Therapy Time   Individual Concurrent Group Co-treatment   Time In 1688         Time Out 1131         Minutes 33         Timed Code Treatment Minutes: 35 Minutes     If patient discharges prior to next session this note will serve as a discharge summary. Please see below for the latest assessment towards goals.    [address]

## 2022-01-07 NOTE — PROGRESS NOTES
Surgery Daily Progress Note  Aydin Zarate  CC:  Necrotizing fasciitis      Subjective :  No acute events overnight. Wound vacs holding adequate suction. Remains afebrile and HDS. Urine output remains appropriate. Objective    Infusions:   lactated ringers 75 mL/hr at 01/06/22 2340    sodium chloride      sodium chloride 25 mL (01/07/22 0506)    dextrose          I/O:I/O last 3 completed shifts: In: 8661 [P.O.:710; I.V.:1600]  Out: 2175 [Urine:2150; Blood:25]           Wt Readings from Last 1 Encounters:   12/28/21 201 lb 15.1 oz (91.6 kg)                 LABS:    Recent Labs     01/06/22  0550 01/07/22  0505   WBC 6.3 6.8   HGB 9.1* 8.7*   HCT 27.1* 26.0*   MCV 93.0 91.9    317        Recent Labs     01/06/22  0550 01/07/22  0505    140   K 4.1 4.2    103   CO2 28 27   PHOS 2.7 3.0   BUN 13 12   CREATININE <0.5* 0.5*      No results for input(s): AST, ALT, ALB, BILIDIR, BILITOT, ALKPHOS in the last 72 hours. No results for input(s): LIPASE, AMYLASE in the last 72 hours. No results for input(s): PROT, INR, APTT in the last 72 hours. No results for input(s): CKTOTAL, CKMB, CKMBINDEX, TROPONINI in the last 72 hours. Exam:BP (!) 145/72   Pulse 80   Temp 97.6 °F (36.4 °C) (Oral)   Resp 18   Ht 5' 11\" (1.803 m)   Wt 201 lb 15.1 oz (91.6 kg)   SpO2 94%   BMI 28.17 kg/m²   General appearance: alert, appears stated age and cooperative  Eyes: No scleral icterus, EOM grossly intact  Neck: trachea midline, no JVD, no lymphadenopathy, neck supple  Chest/Lungs: normal effort with no accessory muscle use on 3L NC  Cardiovascular: RRR, brisk capillary refill distally  Abdomen: Soft, large surgical debridement area of the abdomen with veraflo Vac in place holding adequate suction and instilling normal saline. Colostomy is pink, viable and with brown stool in the bag.    Skin: warm and dry, no rashes  Extremities: no edema, no cyanosis, right lower extremity donor site with minimal weeping but hemostatic. Genitourinary: Mcfadden in place with clear yellow urine  Neuro: A&Ox3, no focal deficits, sensation intact    ASSESSMENT/PLAN: Pt. is a 61 y.o. male with Necrotizing fasciitis of the abdominal wall, gluteal region, and scrotum s/p wide excision of perineum, back, and abdominal wall. S/p multiple debridements and wound vac placement and changes intraoperatively with diverting colostomy creation (12/7). OR wound vac change (12/10, 12/13, 12/16, 12/21, 12/23, 12/27, 12/30).  S/P STSG to abdominal wound, and gluteal wound vac change (1/3), gluteal partial closure and STSG with wound vac application (1/6)    - continue to work with PT/OT to increase strength  - continue zero carb diet  - Glucose 120-135, need better control today  - anticipate patient to return to OR Monday for closure of perineal wound and wound assessments  - Continue to work on [address] [place] [address] with , patient awaiting medicaid approval. LTACH pending medicaid approval.  - Anticipate discharge early next week, if possible, to facility.      Joseph Tse DO, MS  PGY1, General Surgery  01/07/22  6:28 AM  [phone]

## 2022-01-07 NOTE — PROGRESS NOTES
PACU Transfer to Floor Note    Current Allergies: Patient has no known allergies. Pt meets criteria as per Chivo Score and ASPAN Standards to transfer to next phase of care. Recent Labs     01/06/22  1215 01/06/22  1726   POCGLU 120* 136*       Vitals:    01/06/22 1845   BP: 126/67   Pulse: 79   Resp: 18   Temp: 97 °F (36.1 °C)   SpO2: 96%     Vitals within 20% of pt's admission vitals as per CHIVO SCORE    SpO2: 96 %    O2 Flow Rate (L/min): 5 L/min      Intake/Output Summary (Last 24 hours) at 1/6/2022 1921  Last data filed at 1/6/2022 1714  Gross per 24 hour   Intake 1600 ml   Output 1575 ml   Net 25 ml       Pain assessment:  level of pain (1-10, 10 severe),     Pain Level: 7 (pt falling asleep easily)        Handoff report given at bedside.    Family updated and directed to pt room      1/6/2022 7:21 PM

## 2022-01-07 NOTE — PROGRESS NOTES
Podiatric Surgery Daily Progress Note      Admit Date: 12/1/2021      Code:Full Code    Patient seen and examined, labs and records reviewed    Subjective:     Patient seen at bedside this AM. Patient denies pain to bilateral lower extremities. Patient denies fever, chills, shortness of breath, chest pain. Review of Systems: A 10 point review of systems was conducted, significant findings as noted in HPI. All other systems negative. Objective     BP (!) 145/72   Pulse 80   Temp 97.6 °F (36.4 °C) (Oral)   Resp 18   Ht 5' 11\" (1.803 m)   Wt 201 lb 15.1 oz (91.6 kg)   SpO2 94%   BMI 28.17 kg/m²     I/O:    Intake/Output Summary (Last 24 hours) at 1/7/2022 0626  Last data filed at 1/7/2022 0500  Gross per 24 hour   Intake 03994.98 ml   Output 1125 ml   Net 9358.98 ml              Wt Readings from Last 3 Encounters:   12/28/21 201 lb 15.1 oz (91.6 kg)   11/30/21 163 lb 3.2 oz (74 kg)   04/18/16 179 lb 14.3 oz (81.6 kg)       LABS:    Recent Labs     01/06/22  0550 01/07/22  0505   WBC 6.3 6.8   HGB 9.1* 8.7*   HCT 27.1* 26.0*    317        Recent Labs     01/07/22  0505      K 4.2      CO2 27   PHOS 3.0   BUN 12   CREATININE 0.5*        No results for input(s): PROT, INR, APTT in the last 72 hours. LOWER EXTREMITY EXAMINATION    Dressing to left LE intact. No strikethrough noted to the external dressing. Betadine discoloration noted to the internal layers of the dressing of the left LE.     VASCULAR:   DP and PT pulses are non-palpable b/l. Upon hand-held Doppler examination DP signals were noted to be monophasic and PT signals were noted to be nondopplerable. CFT slightly diminished to the distal digits of bilateral lower extremities. Skin temperature is warm to cool to the distal digits from proximal to distal.    No edema noted. No pain with calf compression b/l.     NEUROLOGIC:   Gross and epicritic sensation is diminished b/l.  Protective sensation is diminished at all pedal sites b/l.     DERMATOLOGIC:     Left lower extremity:  Full-thickness ulceration noted to the lateral aspect of the left foot. Wound measures approximately 3.2 cm x 2 cm x 0.5 cm. Wound base with exposed 5th metatarsal bone, with necrotic edges at the proximal and distal edges. Necrotic appearance of 5th digit. No tracking or tunneling noted. No purulent drainage noted. No fluctuance or crepitus or malodor noted. Deep tissue injury noted 2/2 Prevalon boot strap to the anterior aspect of the ankle. Intact epithelialized tissue noted. No crepitus, erythema, drainage, or malodor noted. No open wound noted. Stable without signs of acute infection noted. Right lower extremity:  Parital thickness ulceration noted to the lateral malleolus 2/2 pressure. No fluctuance, crepitus, malodor, or drainage noted. No acute signs infection noted. Partial thickness ulceration 2/2 pressure noted at the styloid process of the 5th metatarsal. Fibrotic tissue noted. No fluctuance or crepitus noted. No acute signs of infection noted. Nonblanchable erythema noted to dorsal aspect right midfoot, nonblanchable, skin intact. No open wound noted at this time. No fluctuance, crepitus, erythema, drainage, or malodor noted. MUSCULOSKELETAL:   Muscle strength 4/5 for all pedal muscle groups B/L LE. No pain with palpation to bilateral lower extremity. IMAGING:  XR LEFT FOOT 11/30/2021  Narrative   EXAMINATION:   THREE XRAY VIEWS OF THE LEFT FOOT       11/30/2021 1:44 pm       HISTORY:   ORDERING SYSTEM PROVIDED HISTORY: wound   TECHNOLOGIST PROVIDED HISTORY:   Reason for exam:->wound   Reason for Exam: blood sugar problem, wound check on lateral portion of foot   Acuity: Acute   Type of Exam: Initial       FINDINGS:   Osseous destruction along the articular margins of the 5th   metatarsophalangeal joint with associated lucency in the soft tissues. .   There is no evidence of fracture or dislocation.  . The remaining joint spaces   appear well maintained. The remaining soft tissues are unremarkable.       Impression   Evidence of a septic joint involving the 5th metatarsal phalangeal joint with   associated osteomyelitis     ARTERIAL DUPLEX 12/2/21     Type of Study:        Extremities Arteries:Lower Extremities Arterial Duplex, VL LOWER EXTREMITY    ARTERIES DUPLEX BILATERAL.       Tech Comments   Right   The right ankle/brachial index is 0.0 (no Doppler signal could be heard at the   [facility] or the PT). The common femoral and profunda femoral arteries could not be visualized due   to bandages extending into the groin area. The mid superficial femoral artery has a short segmental occlusion and then is   reconstituted. Elevated velocities at the distal superficial femoral artery indicate a > 50%   stenosis by velocity criteria (velocity went from 15 to 298 cm/s). The posterior tibial artery is occluded. The distal anterior tibial artery is barely patent, with very low flow   (possibly occluded and then reconstituted). Left   The left ankle brachial index is 0 for the PT and the DP was not accessible   due to the bandages on the foot. The common femoral and profunda femoral artery could not be visualized due to   bandages extending into the groin area. The distal superficial femoral artery is occluded and then reconstituted. The posterior tibial artery, peroneal, and anterior tibial artery are   occluded. There were no previous studies to use for comparison.        ASSESSMENT/PLAN:   -Full-thickness ulceration; lateral left foot- Sands 4  -Osteomyelitis of the fifth metatarsal; left foot  -Deep Tissue Injury, left anterior ankle  -Partial thickness pressure ulceration x2, right foot; NPUAP Stage 2  -Pressure injury, dorsal right midfoot -NPUAP Stage I  -Critical limb ischemia; bilateral lower extremity  -Diabetes mellitus, type II  -History of noncompliance    -Patient seen and examined at bedside this AM.  -Hypertensive, otherwise VSS on 4 L of O2 via NC; no leukocytosis noted (WBC 6.8)  -All imaging reviewed; impressions noted above  -.1 (1/3/22)  -Prealbumin 16.6 (12/27/21)  -Vascular surgery following; will await further stabilization with the general surgery team and plastic surgery team before offering vascular intervention.  -Plastic surgery following  -Infectious disease following, currently on Unasyn  -Left lower extremity applied consisting of Betadine soaked gauze, DSD, and tape. -Right lower extremity dressing applied consisting of mepilex borders.  -Prevalon boots reapplied. Patient is to wear at all times while in bed to prevent further deep tissue injury. Ankle strap removed from bilateral boot as the ankle straps were causing pressure to his feet.  -Partial weight bearing to heel of left lower extremity  -Weightbearing as tolerated to right lower extremity. DISPO: Full-thickness ulceration with underlying osteomyelitis to the left fifth metatarsal. Peripheral arterial disease; b/l LE. Patient will need podiatric surgical intervention for right LE, however will require further vascular workup prior to intervention. May be performed as outpatient if patient is discharged prior to intervention. Will continue to follow while in house. Patient examined and evaluated at bedside with JEANETTE Yu DPM  Podiatric Resident, PGY-2  Pager #: [phone] or [employer]      Patient was seen and evaluated at bedside. Agree with residents assessment and treatment plan.   Nesha Rascon DPM

## 2022-01-07 NOTE — PLAN OF CARE
Pt free from falls this shift. Fall precautions in place at all times. Call light always withinreach. Pt able and agreeable to contact for safety appropriately. Bed alarm on. Pt free from falls this shift. Fall precautions in place at all times. Call light always within reach. Pt able and agreeable to contact for safety appropriately. Pain/discomfort being managed with PRN analgesics per MD orders. Pt able to express presence and absence of pain and rate pain appropriately using numerical scale.

## 2022-01-07 NOTE — PROGRESS NOTES
Patient to pacu 11 s/p LEFT POSTERIOR THIGH ADVANCEMENT 13X7CM; CLOSURE PERINEUM 7.2CM; SKIN GRAFT 7. 1CMX7.2CM TO LEFT BUTTOCK; APPLICATION OF WOUND VACUUM DEVICE, report received from CRNA, reported hemodynamically stable intra op.  All vitals stable upon arrival. Patient on non rebreathe at this time

## 2022-01-08 LAB
ALBUMIN SERPL-MCNC: 2.4 G/DL (ref 3.4–5)
ANION GAP SERPL CALCULATED.3IONS-SCNC: 5 MMOL/L (ref 3–16)
BASOPHILS ABSOLUTE: 0 K/UL (ref 0–0.2)
BASOPHILS RELATIVE PERCENT: 0.6 %
BUN BLDV-MCNC: 9 MG/DL (ref 7–20)
CALCIUM SERPL-MCNC: 8.1 MG/DL (ref 8.3–10.6)
CHLORIDE BLD-SCNC: 102 MMOL/L (ref 99–110)
CO2: 33 MMOL/L (ref 21–32)
CREAT SERPL-MCNC: <0.5 MG/DL (ref 0.8–1.3)
EOSINOPHILS ABSOLUTE: 0.4 K/UL (ref 0–0.6)
EOSINOPHILS RELATIVE PERCENT: 4.7 %
GFR AFRICAN AMERICAN: >60
GFR NON-AFRICAN AMERICAN: >60
GLUCOSE BLD-MCNC: 105 MG/DL (ref 70–99)
GLUCOSE BLD-MCNC: 106 MG/DL (ref 70–99)
GLUCOSE BLD-MCNC: 106 MG/DL (ref 70–99)
GLUCOSE BLD-MCNC: 90 MG/DL (ref 70–99)
GLUCOSE BLD-MCNC: 93 MG/DL (ref 70–99)
GLUCOSE BLD-MCNC: 99 MG/DL (ref 70–99)
HCT VFR BLD CALC: 26.5 % (ref 40.5–52.5)
HEMOGLOBIN: 8.8 G/DL (ref 13.5–17.5)
LYMPHOCYTES ABSOLUTE: 1.2 K/UL (ref 1–5.1)
LYMPHOCYTES RELATIVE PERCENT: 15.3 %
MAGNESIUM: 1.8 MG/DL (ref 1.8–2.4)
MCH RBC QN AUTO: 30.4 PG (ref 26–34)
MCHC RBC AUTO-ENTMCNC: 33.1 G/DL (ref 31–36)
MCV RBC AUTO: 92.1 FL (ref 80–100)
MONOCYTES ABSOLUTE: 1 K/UL (ref 0–1.3)
MONOCYTES RELATIVE PERCENT: 12.9 %
NEUTROPHILS ABSOLUTE: 5.3 K/UL (ref 1.7–7.7)
NEUTROPHILS RELATIVE PERCENT: 66.5 %
PDW BLD-RTO: 16.5 % (ref 12.4–15.4)
PERFORMED ON: ABNORMAL
PERFORMED ON: NORMAL
PERFORMED ON: NORMAL
PHOSPHORUS: 2.4 MG/DL (ref 2.5–4.9)
PLATELET # BLD: 338 K/UL (ref 135–450)
PMV BLD AUTO: 7.6 FL (ref 5–10.5)
POTASSIUM SERPL-SCNC: 3.8 MMOL/L (ref 3.5–5.1)
RBC # BLD: 2.88 M/UL (ref 4.2–5.9)
SODIUM BLD-SCNC: 140 MMOL/L (ref 136–145)
WBC # BLD: 7.9 K/UL (ref 4–11)

## 2022-01-08 PROCEDURE — 94761 N-INVAS EAR/PLS OXIMETRY MLT: CPT

## 2022-01-08 PROCEDURE — 2700000000 HC OXYGEN THERAPY PER DAY

## 2022-01-08 PROCEDURE — 80069 RENAL FUNCTION PANEL: CPT

## 2022-01-08 PROCEDURE — 1200000000 HC SEMI PRIVATE

## 2022-01-08 PROCEDURE — 6370000000 HC RX 637 (ALT 250 FOR IP): Performed by: STUDENT IN AN ORGANIZED HEALTH CARE EDUCATION/TRAINING PROGRAM

## 2022-01-08 PROCEDURE — 2580000003 HC RX 258: Performed by: STUDENT IN AN ORGANIZED HEALTH CARE EDUCATION/TRAINING PROGRAM

## 2022-01-08 PROCEDURE — 6360000002 HC RX W HCPCS: Performed by: STUDENT IN AN ORGANIZED HEALTH CARE EDUCATION/TRAINING PROGRAM

## 2022-01-08 PROCEDURE — 83735 ASSAY OF MAGNESIUM: CPT

## 2022-01-08 PROCEDURE — 2580000003 HC RX 258: Performed by: SURGERY

## 2022-01-08 PROCEDURE — 85025 COMPLETE CBC W/AUTO DIFF WBC: CPT

## 2022-01-08 PROCEDURE — 36592 COLLECT BLOOD FROM PICC: CPT

## 2022-01-08 PROCEDURE — 94669 MECHANICAL CHEST WALL OSCILL: CPT

## 2022-01-08 RX ORDER — POTASSIUM CHLORIDE 20 MEQ/1
20 TABLET, EXTENDED RELEASE ORAL ONCE
Status: COMPLETED | OUTPATIENT
Start: 2022-01-08 | End: 2022-01-08

## 2022-01-08 RX ORDER — MAGNESIUM SULFATE IN WATER 40 MG/ML
2000 INJECTION, SOLUTION INTRAVENOUS ONCE
Status: COMPLETED | OUTPATIENT
Start: 2022-01-08 | End: 2022-01-08

## 2022-01-08 RX ADMIN — INSULIN LISPRO 3 UNITS: 100 INJECTION, SOLUTION INTRAVENOUS; SUBCUTANEOUS at 17:36

## 2022-01-08 RX ADMIN — MAGNESIUM SULFATE HEPTAHYDRATE 2000 MG: 2 INJECTION, SOLUTION INTRAVENOUS at 09:10

## 2022-01-08 RX ADMIN — DOCUSATE SODIUM 100 MG: 100 CAPSULE, LIQUID FILLED ORAL at 21:23

## 2022-01-08 RX ADMIN — SODIUM CHLORIDE, PRESERVATIVE FREE 10 ML: 5 INJECTION INTRAVENOUS at 00:29

## 2022-01-08 RX ADMIN — OXYCODONE HYDROCHLORIDE 10 MG: 5 TABLET ORAL at 06:46

## 2022-01-08 RX ADMIN — SODIUM CHLORIDE, PRESERVATIVE FREE 10 ML: 5 INJECTION INTRAVENOUS at 09:06

## 2022-01-08 RX ADMIN — AMPICILLIN SODIUM AND SULBACTAM SODIUM 3000 MG: 2; 1 INJECTION, POWDER, FOR SOLUTION INTRAMUSCULAR; INTRAVENOUS at 17:21

## 2022-01-08 RX ADMIN — INSULIN LISPRO 3 UNITS: 100 INJECTION, SOLUTION INTRAVENOUS; SUBCUTANEOUS at 09:05

## 2022-01-08 RX ADMIN — ACETAMINOPHEN 1000 MG: 500 TABLET ORAL at 06:41

## 2022-01-08 RX ADMIN — ACETAMINOPHEN 1000 MG: 500 TABLET ORAL at 17:21

## 2022-01-08 RX ADMIN — SODIUM CHLORIDE, PRESERVATIVE FREE 10 ML: 5 INJECTION INTRAVENOUS at 21:37

## 2022-01-08 RX ADMIN — POTASSIUM CHLORIDE 20 MEQ: 1500 TABLET, EXTENDED RELEASE ORAL at 09:04

## 2022-01-08 RX ADMIN — AMPICILLIN SODIUM AND SULBACTAM SODIUM 3000 MG: 2; 1 INJECTION, POWDER, FOR SOLUTION INTRAMUSCULAR; INTRAVENOUS at 06:44

## 2022-01-08 RX ADMIN — AMLODIPINE BESYLATE 5 MG: 5 TABLET ORAL at 09:04

## 2022-01-08 RX ADMIN — HEPARIN SODIUM 5000 UNITS: 5000 INJECTION INTRAVENOUS; SUBCUTANEOUS at 13:06

## 2022-01-08 RX ADMIN — AMPICILLIN SODIUM AND SULBACTAM SODIUM 3000 MG: 2; 1 INJECTION, POWDER, FOR SOLUTION INTRAMUSCULAR; INTRAVENOUS at 00:30

## 2022-01-08 RX ADMIN — SODIUM CHLORIDE, POTASSIUM CHLORIDE, SODIUM LACTATE AND CALCIUM CHLORIDE: 600; 310; 30; 20 INJECTION, SOLUTION INTRAVENOUS at 00:41

## 2022-01-08 RX ADMIN — POLYETHYLENE GLYCOL 3350 17 G: 17 POWDER, FOR SOLUTION ORAL at 09:04

## 2022-01-08 RX ADMIN — OXYCODONE HYDROCHLORIDE 10 MG: 5 TABLET ORAL at 19:30

## 2022-01-08 RX ADMIN — DOCUSATE SODIUM 100 MG: 100 CAPSULE, LIQUID FILLED ORAL at 09:04

## 2022-01-08 RX ADMIN — OXYCODONE HYDROCHLORIDE 10 MG: 5 TABLET ORAL at 13:08

## 2022-01-08 RX ADMIN — HEPARIN SODIUM 5000 UNITS: 5000 INJECTION INTRAVENOUS; SUBCUTANEOUS at 21:38

## 2022-01-08 RX ADMIN — HEPARIN SODIUM 5000 UNITS: 5000 INJECTION INTRAVENOUS; SUBCUTANEOUS at 06:41

## 2022-01-08 RX ADMIN — AMPICILLIN SODIUM AND SULBACTAM SODIUM 3000 MG: 2; 1 INJECTION, POWDER, FOR SOLUTION INTRAMUSCULAR; INTRAVENOUS at 11:36

## 2022-01-08 ASSESSMENT — PAIN SCALES - GENERAL
PAINLEVEL_OUTOF10: 7
PAINLEVEL_OUTOF10: 7
PAINLEVEL_OUTOF10: 4
PAINLEVEL_OUTOF10: 7
PAINLEVEL_OUTOF10: 7
PAINLEVEL_OUTOF10: 6

## 2022-01-08 ASSESSMENT — PAIN DESCRIPTION - FREQUENCY: FREQUENCY: INTERMITTENT

## 2022-01-08 ASSESSMENT — PAIN DESCRIPTION - LOCATION: LOCATION: LEG

## 2022-01-08 ASSESSMENT — PAIN DESCRIPTION - ORIENTATION: ORIENTATION: RIGHT

## 2022-01-08 ASSESSMENT — PAIN DESCRIPTION - ONSET: ONSET: OTHER (COMMENT)

## 2022-01-08 ASSESSMENT — PAIN DESCRIPTION - PAIN TYPE: TYPE: SURGICAL PAIN

## 2022-01-08 ASSESSMENT — PAIN DESCRIPTION - DESCRIPTORS: DESCRIPTORS: BURNING

## 2022-01-08 NOTE — PLAN OF CARE
Problem: Infection - Surgical Site:  Goal: Will show no infection signs and symptoms  Note: Patient has not shown any new signs or symptoms of infection. Skin integrity has remained intact with the exception of surgical sites and graft site. Will continue to monitor. Problem: Pain:  Description: Pain management should include both nonpharmacologic and pharmacologic interventions. Goal: Pain level will decrease  Note: Patient has not reported any pain yet during shift. PRN pain medication is available if needed. Will continue to monitor.

## 2022-01-08 NOTE — OP NOTE
[place] [address] SURGERY     OPERATIVE DICTATION    NAME: Amaris Mayer   MRN: [id number]  DATE: 01/06/2022    AGE: 61 y.o. LOCATION: [facility]    PREOPERATIVE DIAGNOSIS:  Shayla's gangrene with necrotizing fasciitis     POSTOPERATIVE DIAGNOSIS:  Same. OPERATION PERFORMED: 1) Complex closure of perineum (7.2 cm)       2) Left posterior thigh advancement flap (wound: 13 x 5 cm; flap: 13 x 7 cm)       3) Skin graft to left buttock (7.1 x 7.2 cm)       4) Application of wound vacuum device     SURGEON:  Toño Zepeda MD    ASSISTANT: Scott Zaman (PGY1)    ANESTHESIA: General     ESTIMATED BLOOD LOSS: Minimal     DRAINS: Vac Instillation vac     SPECIMENS: None     OPERATIVE INDICATIONS:  This is an unfortunate 61 y.o. male with a history of poorly controlled diabetes who developed Shayla's gangrene at an outside hospital.  He underwent debridement with both Urology and General surgery, however was noted to have additional crepitance. Given this, he was urgently transferred to our hospital and was taken to the operating room by general surgery for extensive debridement. Plastic surgery was consulted for assistance with management. He has stabilized and undergone diverting colostomy. He has been optimized during his hospitalization and underwent coverage of his abdominal wound. He is brought back to the operating room today for reconstruction of his gluteal/perineal wounds. The risks, benefits, alternatives, outcomes, and personnel involved was performed with the patient. After all questions were answered to the patient's satisfaction, they agreed to proceed. OPERATIVE PROCEDURE:  The patient was brought to the operating room on his hospital bed and underwent general anesthesia without difficulty. He was then placed in the prone position, and was prepped and draped in the usual sterile manner.   A time-out was performed confirming the patient and the procedure to be performed. The wounds were evaluated and there was improved granulation tissue. Thus, the wounds were cleansed with 3L of saline solution using a Pulse Lavage. Upon completion, the outer nonviable skin was excised with a 15 blade. A left posterior thigh advancement flap was created with a backcut allowing for mobilization of this lower buttock/upper thigh tissue. The flap was sutured into position using 2-0 and 3-0 PDS sutures as well as 4-0 Chromic sutures. This allowed for excellent coverage of the outer aspect of the buttock save for all but the most superior aspect of the wound. My concern with attempting to cover this aspect by the sacrum with the flap was there would be significant tension and I was worried about skin breakdown. Thus, a split thickness skin graft was to be utilized to cover this area of exposed gluteus marylin musculature. Attention was then directed to the right lateral thigh. A split thickness skin graft was harvested with a Misty dermatome. The graft was then meshed in a 1.5:1 manner and sutured into position using 4-0 Chromic sutures. The donor site had hemostasis obtained with epinephrine soaked Telfa. Postoperative analgesia was provided with Exparel. The donor site was then dressed with a Xeroform that was sutured into position followed by a Tegaderm. The perineal wound was then widely undermined and closed in layers using 2-0 PDS sutures as well as 3-0 and 4-0 Chromic sutures. The graft was bolstered with a wound vac that was protected with Adaptic. There was an excellent seal on the vac. PLAN: Will return for vac removal given the patient's discomfort in a few days.     Haven Flynn MD

## 2022-01-08 NOTE — PROGRESS NOTES
VSS. Pt has been sleeping for intervals. Pt did not eat much of dinner. Pain controlled with tylenol and oxycodone. Wound vac remains intact as well as thigh donor site dsg. Mcfadden and colostomy with good output. Pt sleeping now. Will continue to monitor.   Electronically signed by Nikki Betancourt RN on 1/8/22 at 2:25 AM EST

## 2022-01-08 NOTE — PROGRESS NOTES
Surgery Daily Progress Note  Lorrane Nail  CC:  Necrotizing fasciitis      Subjective :  No acute events overnight. Wound vacs holding adequate suction. Remains afebrile and HDS. Urine output remains appropriate. Objective    Infusions:   lactated ringers 75 mL/hr at 01/08/22 0041    sodium chloride      sodium chloride 25 mL (01/07/22 0506)    dextrose          I/O:I/O last 3 completed shifts: In: 21080 [P.O.:540; I.V.:5329.2; IV Piggyback:4854.7]  Out: 1525 [Urine:1500; Blood:25]           Wt Readings from Last 1 Encounters:   12/28/21 201 lb 15.1 oz (91.6 kg)                 LABS:    Recent Labs     01/07/22  0505 01/08/22  0546   WBC 6.8 7.9   HGB 8.7* 8.8*   HCT 26.0* 26.5*   MCV 91.9 92.1    338        Recent Labs     01/07/22  0505 01/08/22  0546    140   K 4.2 3.8    102   CO2 27 33*   PHOS 3.0 2.4*   BUN 12 9   CREATININE 0.5* <0.5*      No results for input(s): AST, ALT, ALB, BILIDIR, BILITOT, ALKPHOS in the last 72 hours. No results for input(s): LIPASE, AMYLASE in the last 72 hours. No results for input(s): PROT, INR, APTT in the last 72 hours. No results for input(s): CKTOTAL, CKMB, CKMBINDEX, TROPONINI in the last 72 hours. Exam:BP (!) 141/67   Pulse 76   Temp 97.2 °F (36.2 °C) (Oral)   Resp 16   Ht 5' 11\" (1.803 m)   Wt 201 lb 15.1 oz (91.6 kg)   SpO2 96%   BMI 28.17 kg/m²   General appearance: alert, appears stated age and cooperative  Eyes: No scleral icterus, EOM grossly intact  Neck: trachea midline, no JVD, no lymphadenopathy, neck supple  Chest/Lungs: normal effort with no accessory muscle use on 3L NC  Cardiovascular: RRR, brisk capillary refill distally  Abdomen: Soft, large surgical debridement area of the abdomen with veraflo Vac in place holding adequate suction and instilling normal saline. Colostomy is pink, viable and with brown stool in the bag.    Skin: warm and dry, no rashes  Extremities: no edema, no cyanosis, right anterior and posterior lower extremity donor site with xeroform in place  Genitourinary: Mcfadden in place with clear yellow urine  Neuro: A&Ox3, no focal deficits, sensation intact    ASSESSMENT/PLAN: Pt. is a 61 y.o. male with Necrotizing fasciitis of the abdominal wall, gluteal region, and scrotum s/p wide excision of perineum, back, and abdominal wall. S/p multiple debridements and wound vac placement and changes intraoperatively with diverting colostomy creation (12/7). OR wound vac change (12/10, 12/13, 12/16, 12/21, 12/23, 12/27, 12/30).  S/P STSG to abdominal wound, and gluteal wound vac change (1/3), gluteal partial closure and STSG with wound vac application (1/6)    - continue to work with PT/OT to increase strength  - continue zero carb diet  - Glucose 80s-120s, well-controlled  - anticipate patient to return to OR Monday for closure of perineal wound and wound assessments  - SW on board, patient accepted to [facility] discharge early next week    Starla Osorio DO, MS  PGY1, General Surgery  01/08/22  6:48 AM    I am post-call today and will not be on campus. Please contact Dr. Bartlett at [phone] for questions or concerns regarding this patient.

## 2022-01-08 NOTE — PROGRESS NOTES
Patient A&OX4. VSS. Patient has been sleeping off and on all day. Patient has been able to tolerate diet with no issue, but has not been eating as much. Pain rated 7/10 has been controlled with PRN oxycodone. Wound vac dressing remains intact. PICC line remains CDI, IVF infusing with intermittent IVABX infusions per orders. Mcfadden and colostomy with adequate output. All patient needs have been met at this time. Call light and bedside table are within reach if needed. Will continue to monitor.      Electronically signed by Shahid Baker RN on 1/8/2022 at 1:38 PM

## 2022-01-08 NOTE — PROGRESS NOTES
Hair dryer was used on xeroform site for 2 minutes on medium setting per order. Will complete again in 4 hours.      Electronically signed by Elliot Evangelista RN on 1/8/2022 at 2:55 PM

## 2022-01-08 NOTE — PROGRESS NOTES
Podiatric Surgery Daily Progress Note      Admit Date: 12/1/2021      Code:Full Code    Patient seen and examined, labs and records reviewed    Subjective:     Patient seen at bedside this AM with Dr. Nesha Rascon. Patient denies pain to bilateral lower extremities. Patient denies fever, chills, shortness of breath, chest pain. Review of Systems: A 10 point review of systems was conducted, significant findings as noted in HPI. All other systems negative. Objective     BP (!) 147/72   Pulse 81   Temp 98.2 °F (36.8 °C) (Oral)   Resp 16   Ht 5' 11\" (1.803 m)   Wt 201 lb 15.1 oz (91.6 kg)   SpO2 97%   BMI 28.17 kg/m²     I/O:    Intake/Output Summary (Last 24 hours) at 1/8/2022 0737  Last data filed at 1/8/2022 0647  Gross per 24 hour   Intake 1875 ml   Output 800 ml   Net 1075 ml              Wt Readings from Last 3 Encounters:   12/28/21 201 lb 15.1 oz (91.6 kg)   11/30/21 163 lb 3.2 oz (74 kg)   04/18/16 179 lb 14.3 oz (81.6 kg)       LABS:    Recent Labs     01/07/22  0505 01/08/22  0546   WBC 6.8 7.9   HGB 8.7* 8.8*   HCT 26.0* 26.5*    338        Recent Labs     01/08/22  0546      K 3.8      CO2 33*   PHOS 2.4*   BUN 9   CREATININE <0.5*        No results for input(s): PROT, INR, APTT in the last 72 hours. LOWER EXTREMITY EXAMINATION    Dressing to left LE intact. No strikethrough noted to the external dressing. Betadine stains noted to the internal layers of the dressing of the left LE.     VASCULAR:   DP and PT pulses are non-palpable b/l. Upon hand-held Doppler examination DP signals were noted to be monophasic and PT signals were noted to be nondopplerable. CFT slightly diminished to the distal digits of bilateral lower extremities. Skin temperature is warm to cool to the distal digits from proximal to distal.    No edema noted. No pain with calf compression b/l.     NEUROLOGIC:   Gross and epicritic sensation is diminished b/l.  Protective sensation is diminished at all pedal sites b/l.     DERMATOLOGIC:     Left lower extremity:  Full-thickness ulceration noted to the lateral aspect of the left foot. Wound measures approximately 3.2 cm x 2 cm x 0.5 cm. Wound base with exposed 5th metatarsal bone, with necrotic edges at the proximal and distal edges. Necrotic appearance of 5th digit. No tracking or tunneling noted. No purulent drainage noted. No fluctuance or crepitus or malodor noted. Deep tissue injury noted 2/2 Prevalon boot strap to the anterior aspect of the ankle. Intact epithelialized tissue noted. No crepitus, erythema, drainage, or malodor noted. No open wound noted. Stable without signs of acute infection noted. Right lower extremity:  Parital thickness ulceration noted to the lateral malleolus 2/2 pressure. No fluctuance, crepitus, malodor, or drainage noted. No acute signs infection noted. Partial thickness ulceration 2/2 pressure noted at the styloid process of the 5th metatarsal. Fibrotic tissue noted. No fluctuance or crepitus noted. No acute signs of infection noted. Nonblanchable erythema noted to dorsal aspect right midfoot, nonblanchable, skin intact. No open wound noted at this time. No fluctuance, crepitus, erythema, drainage, or malodor noted. MUSCULOSKELETAL:   Muscle strength 4/5 for all pedal muscle groups B/L LE. No pain with palpation to bilateral lower extremity. IMAGING:  XR LEFT FOOT 11/30/2021  Narrative   EXAMINATION:   THREE XRAY VIEWS OF THE LEFT FOOT       11/30/2021 1:44 pm       HISTORY:   ORDERING SYSTEM PROVIDED HISTORY: wound   TECHNOLOGIST PROVIDED HISTORY:   Reason for exam:->wound   Reason for Exam: blood sugar problem, wound check on lateral portion of foot   Acuity: Acute   Type of Exam: Initial       FINDINGS:   Osseous destruction along the articular margins of the 5th   metatarsophalangeal joint with associated lucency in the soft tissues. .   There is no evidence of fracture or dislocation.  . The remaining joint spaces   appear well maintained. The remaining soft tissues are unremarkable.       Impression   Evidence of a septic joint involving the 5th metatarsal phalangeal joint with   associated osteomyelitis     ARTERIAL DUPLEX 12/2/21     Type of Study:        Extremities Arteries:Lower Extremities Arterial Duplex, VL LOWER EXTREMITY    ARTERIES DUPLEX BILATERAL.       Tech Comments   Right   The right ankle/brachial index is 0.0 (no Doppler signal could be heard at the   [facility] or the PT). The common femoral and profunda femoral arteries could not be visualized due   to bandages extending into the groin area. The mid superficial femoral artery has a short segmental occlusion and then is   reconstituted. Elevated velocities at the distal superficial femoral artery indicate a > 50%   stenosis by velocity criteria (velocity went from 15 to 298 cm/s). The posterior tibial artery is occluded. The distal anterior tibial artery is barely patent, with very low flow   (possibly occluded and then reconstituted). Left   The left ankle brachial index is 0 for the PT and the DP was not accessible   due to the bandages on the foot. The common femoral and profunda femoral artery could not be visualized due to   bandages extending into the groin area. The distal superficial femoral artery is occluded and then reconstituted. The posterior tibial artery, peroneal, and anterior tibial artery are   occluded. There were no previous studies to use for comparison.        ASSESSMENT/PLAN:   -Full-thickness ulceration; lateral left foot- Sands 4  -Osteomyelitis of the fifth metatarsal; left foot  -Deep Tissue Injury, left anterior ankle  -Partial thickness pressure ulceration x2, right foot; NPUAP Stage 2  -Pressure injury, dorsal right midfoot -NPUAP Stage I  -Critical limb ischemia; bilateral lower extremity  -Diabetes mellitus, type II  -History of noncompliance    -Patient seen and examined at bedside this AM.  -Hypertensive, otherwise VSS on 3 L of O2 via NC; no leukocytosis noted (WBC 7.9)  -All imaging reviewed; impressions noted above  -Vascular surgery following; will await further wound closure and stabilization with the general/plastic surgery team before offering vascular intervention  -Infectious disease following, currently on Unasyn  -Left lower extremity dressing applied consisting of Betadine soaked gauze, DSD, and tape. -Right lower extremity dressing applied consisting of mepilex borders.  -Prevalon boots reapplied. Patient is to wear at all times while in bed to prevent further deep tissue injury. Ankle strap removed from bilateral boot as the ankle straps were causing pressure to the dorsal aspect of his feet/ankles  -Partial weight bearing to heel of left lower extremity in post-op shoe  -Weightbearing as tolerated to right lower extremity      DISPO: Full-thickness ulceration with underlying osteomyelitis to the left fifth metatarsal. Peripheral arterial disease; b/l LE. Patient will need podiatric surgical intervention for left LE, however will require further vascular workup prior to podiatric intervention. May be performed as outpatient if patient is discharged. Will continue to follow while in house. Patient examined and evaluated at bedside with JEANETTE Tom DPM  Podiatric Resident PGY3  Pager [phone] or [employer]  1/8/2022, 7:40 AM      Patient was seen and evaluated at bedside. Agree with residents assessment and treatment plan.   Virginie Reynoso DPM

## 2022-01-09 LAB
ALBUMIN SERPL-MCNC: 2.5 G/DL (ref 3.4–5)
ANION GAP SERPL CALCULATED.3IONS-SCNC: 5 MMOL/L (ref 3–16)
BASOPHILS ABSOLUTE: 0.1 K/UL (ref 0–0.2)
BASOPHILS RELATIVE PERCENT: 0.7 %
BUN BLDV-MCNC: 8 MG/DL (ref 7–20)
CALCIUM SERPL-MCNC: 8.2 MG/DL (ref 8.3–10.6)
CHLORIDE BLD-SCNC: 103 MMOL/L (ref 99–110)
CO2: 33 MMOL/L (ref 21–32)
CREAT SERPL-MCNC: <0.5 MG/DL (ref 0.8–1.3)
EOSINOPHILS ABSOLUTE: 0.2 K/UL (ref 0–0.6)
EOSINOPHILS RELATIVE PERCENT: 2.5 %
GFR AFRICAN AMERICAN: >60
GFR NON-AFRICAN AMERICAN: >60
GLUCOSE BLD-MCNC: 114 MG/DL (ref 70–99)
GLUCOSE BLD-MCNC: 132 MG/DL (ref 70–99)
GLUCOSE BLD-MCNC: 171 MG/DL (ref 70–99)
GLUCOSE BLD-MCNC: 96 MG/DL (ref 70–99)
GLUCOSE BLD-MCNC: 96 MG/DL (ref 70–99)
HCT VFR BLD CALC: 27.3 % (ref 40.5–52.5)
HEMOGLOBIN: 8.9 G/DL (ref 13.5–17.5)
LYMPHOCYTES ABSOLUTE: 1.1 K/UL (ref 1–5.1)
LYMPHOCYTES RELATIVE PERCENT: 14.6 %
MAGNESIUM: 1.7 MG/DL (ref 1.8–2.4)
MCH RBC QN AUTO: 30 PG (ref 26–34)
MCHC RBC AUTO-ENTMCNC: 32.7 G/DL (ref 31–36)
MCV RBC AUTO: 91.7 FL (ref 80–100)
MONOCYTES ABSOLUTE: 0.8 K/UL (ref 0–1.3)
MONOCYTES RELATIVE PERCENT: 11 %
NEUTROPHILS ABSOLUTE: 5.4 K/UL (ref 1.7–7.7)
NEUTROPHILS RELATIVE PERCENT: 71.2 %
PDW BLD-RTO: 16.3 % (ref 12.4–15.4)
PERFORMED ON: ABNORMAL
PERFORMED ON: NORMAL
PHOSPHORUS: 2.5 MG/DL (ref 2.5–4.9)
PLATELET # BLD: 374 K/UL (ref 135–450)
PMV BLD AUTO: 7.5 FL (ref 5–10.5)
POTASSIUM SERPL-SCNC: 4 MMOL/L (ref 3.5–5.1)
RBC # BLD: 2.98 M/UL (ref 4.2–5.9)
SODIUM BLD-SCNC: 141 MMOL/L (ref 136–145)
WBC # BLD: 7.6 K/UL (ref 4–11)

## 2022-01-09 PROCEDURE — 94761 N-INVAS EAR/PLS OXIMETRY MLT: CPT

## 2022-01-09 PROCEDURE — 2580000003 HC RX 258: Performed by: STUDENT IN AN ORGANIZED HEALTH CARE EDUCATION/TRAINING PROGRAM

## 2022-01-09 PROCEDURE — 6370000000 HC RX 637 (ALT 250 FOR IP): Performed by: STUDENT IN AN ORGANIZED HEALTH CARE EDUCATION/TRAINING PROGRAM

## 2022-01-09 PROCEDURE — 83735 ASSAY OF MAGNESIUM: CPT

## 2022-01-09 PROCEDURE — 85025 COMPLETE CBC W/AUTO DIFF WBC: CPT

## 2022-01-09 PROCEDURE — 2580000003 HC RX 258: Performed by: SURGERY

## 2022-01-09 PROCEDURE — 94669 MECHANICAL CHEST WALL OSCILL: CPT

## 2022-01-09 PROCEDURE — 6360000002 HC RX W HCPCS

## 2022-01-09 PROCEDURE — 6360000002 HC RX W HCPCS: Performed by: STUDENT IN AN ORGANIZED HEALTH CARE EDUCATION/TRAINING PROGRAM

## 2022-01-09 PROCEDURE — 1200000000 HC SEMI PRIVATE

## 2022-01-09 PROCEDURE — 2700000000 HC OXYGEN THERAPY PER DAY

## 2022-01-09 PROCEDURE — 80069 RENAL FUNCTION PANEL: CPT

## 2022-01-09 PROCEDURE — 2500000003 HC RX 250 WO HCPCS

## 2022-01-09 PROCEDURE — 2580000003 HC RX 258

## 2022-01-09 RX ORDER — MAGNESIUM SULFATE IN WATER 40 MG/ML
2000 INJECTION, SOLUTION INTRAVENOUS ONCE
Status: COMPLETED | OUTPATIENT
Start: 2022-01-09 | End: 2022-01-09

## 2022-01-09 RX ORDER — MAGNESIUM SULFATE IN WATER 40 MG/ML
2000 INJECTION, SOLUTION INTRAVENOUS ONCE
Status: DISCONTINUED | OUTPATIENT
Start: 2022-01-09 | End: 2022-01-09 | Stop reason: SDUPTHER

## 2022-01-09 RX ORDER — SODIUM CHLORIDE, SODIUM LACTATE, POTASSIUM CHLORIDE, CALCIUM CHLORIDE 600; 310; 30; 20 MG/100ML; MG/100ML; MG/100ML; MG/100ML
INJECTION, SOLUTION INTRAVENOUS CONTINUOUS
Status: DISCONTINUED | OUTPATIENT
Start: 2022-01-10 | End: 2022-01-11 | Stop reason: HOSPADM

## 2022-01-09 RX ADMIN — AMLODIPINE BESYLATE 5 MG: 5 TABLET ORAL at 09:10

## 2022-01-09 RX ADMIN — HEPARIN SODIUM 5000 UNITS: 5000 INJECTION INTRAVENOUS; SUBCUTANEOUS at 13:35

## 2022-01-09 RX ADMIN — ACETAMINOPHEN 1000 MG: 500 TABLET ORAL at 00:09

## 2022-01-09 RX ADMIN — DOCUSATE SODIUM 100 MG: 100 CAPSULE, LIQUID FILLED ORAL at 09:10

## 2022-01-09 RX ADMIN — AMPICILLIN SODIUM AND SULBACTAM SODIUM 3000 MG: 2; 1 INJECTION, POWDER, FOR SOLUTION INTRAMUSCULAR; INTRAVENOUS at 07:00

## 2022-01-09 RX ADMIN — POTASSIUM PHOSPHATE, MONOBASIC AND POTASSIUM PHOSPHATE, DIBASIC 20 MMOL: 224; 236 INJECTION, SOLUTION, CONCENTRATE INTRAVENOUS at 09:33

## 2022-01-09 RX ADMIN — AMPICILLIN SODIUM AND SULBACTAM SODIUM 3000 MG: 2; 1 INJECTION, POWDER, FOR SOLUTION INTRAMUSCULAR; INTRAVENOUS at 13:41

## 2022-01-09 RX ADMIN — SODIUM CHLORIDE, PRESERVATIVE FREE 10 ML: 5 INJECTION INTRAVENOUS at 09:10

## 2022-01-09 RX ADMIN — POLYETHYLENE GLYCOL 3350 17 G: 17 POWDER, FOR SOLUTION ORAL at 09:10

## 2022-01-09 RX ADMIN — AMPICILLIN SODIUM AND SULBACTAM SODIUM 3000 MG: 2; 1 INJECTION, POWDER, FOR SOLUTION INTRAMUSCULAR; INTRAVENOUS at 18:45

## 2022-01-09 RX ADMIN — DOCUSATE SODIUM 100 MG: 100 CAPSULE, LIQUID FILLED ORAL at 21:52

## 2022-01-09 RX ADMIN — INSULIN HUMAN 2 UNITS: 100 INJECTION, SOLUTION PARENTERAL at 18:50

## 2022-01-09 RX ADMIN — OXYCODONE HYDROCHLORIDE 10 MG: 5 TABLET ORAL at 18:58

## 2022-01-09 RX ADMIN — SODIUM CHLORIDE, POTASSIUM CHLORIDE, SODIUM LACTATE AND CALCIUM CHLORIDE: 600; 310; 30; 20 INJECTION, SOLUTION INTRAVENOUS at 23:59

## 2022-01-09 RX ADMIN — ACETAMINOPHEN 1000 MG: 500 TABLET ORAL at 18:45

## 2022-01-09 RX ADMIN — ACETAMINOPHEN 1000 MG: 500 TABLET ORAL at 13:34

## 2022-01-09 RX ADMIN — SODIUM CHLORIDE, PRESERVATIVE FREE 10 ML: 5 INJECTION INTRAVENOUS at 21:51

## 2022-01-09 RX ADMIN — AMPICILLIN SODIUM AND SULBACTAM SODIUM 3000 MG: 2; 1 INJECTION, POWDER, FOR SOLUTION INTRAMUSCULAR; INTRAVENOUS at 00:13

## 2022-01-09 RX ADMIN — ACETAMINOPHEN 1000 MG: 500 TABLET ORAL at 06:46

## 2022-01-09 RX ADMIN — INSULIN LISPRO 3 UNITS: 100 INJECTION, SOLUTION INTRAVENOUS; SUBCUTANEOUS at 18:48

## 2022-01-09 RX ADMIN — INSULIN LISPRO 3 UNITS: 100 INJECTION, SOLUTION INTRAVENOUS; SUBCUTANEOUS at 09:31

## 2022-01-09 RX ADMIN — HEPARIN SODIUM 5000 UNITS: 5000 INJECTION INTRAVENOUS; SUBCUTANEOUS at 21:53

## 2022-01-09 RX ADMIN — SODIUM CHLORIDE, POTASSIUM CHLORIDE, SODIUM LACTATE AND CALCIUM CHLORIDE: 600; 310; 30; 20 INJECTION, SOLUTION INTRAVENOUS at 06:46

## 2022-01-09 RX ADMIN — HEPARIN SODIUM 5000 UNITS: 5000 INJECTION INTRAVENOUS; SUBCUTANEOUS at 06:45

## 2022-01-09 RX ADMIN — MAGNESIUM SULFATE HEPTAHYDRATE 2000 MG: 2 INJECTION, SOLUTION INTRAVENOUS at 14:26

## 2022-01-09 RX ADMIN — OXYCODONE HYDROCHLORIDE 10 MG: 5 TABLET ORAL at 00:09

## 2022-01-09 RX ADMIN — INSULIN HUMAN 5 UNITS: 100 INJECTION, SOLUTION PARENTERAL at 13:42

## 2022-01-09 ASSESSMENT — PAIN DESCRIPTION - FREQUENCY: FREQUENCY: INTERMITTENT

## 2022-01-09 ASSESSMENT — PAIN SCALES - GENERAL
PAINLEVEL_OUTOF10: 0
PAINLEVEL_OUTOF10: 6
PAINLEVEL_OUTOF10: 7
PAINLEVEL_OUTOF10: 3
PAINLEVEL_OUTOF10: 7

## 2022-01-09 ASSESSMENT — PAIN DESCRIPTION - ORIENTATION: ORIENTATION: RIGHT

## 2022-01-09 ASSESSMENT — PAIN DESCRIPTION - LOCATION: LOCATION: LEG

## 2022-01-09 ASSESSMENT — PAIN DESCRIPTION - PROGRESSION: CLINICAL_PROGRESSION: GRADUALLY WORSENING

## 2022-01-09 ASSESSMENT — PAIN DESCRIPTION - DESCRIPTORS: DESCRIPTORS: BURNING

## 2022-01-09 ASSESSMENT — PAIN DESCRIPTION - PAIN TYPE: TYPE: SURGICAL PAIN

## 2022-01-09 NOTE — PROGRESS NOTES
Surgery Daily Progress Note  Shayla Bedolla  CC:  Necrotizing fasciitis      Subjective :  No acute events overnight. Remains afebrile, hemodynamically stable on 3L NC. UOP remains adequate. Wound vacs remain with adequate suction. Objective    Infusions:   lactated ringers 75 mL/hr at 01/08/22 0041    sodium chloride      sodium chloride 25 mL (01/07/22 0506)    dextrose          I/O:I/O last 3 completed shifts: In: 1995 [P.O.:910; I.V.:1085]  Out: 1200 [Urine:1150; Stool:50]           Wt Readings from Last 1 Encounters:   12/28/21 201 lb 15.1 oz (91.6 kg)                 LABS:    Recent Labs     01/07/22  0505 01/08/22  0546   WBC 6.8 7.9   HGB 8.7* 8.8*   HCT 26.0* 26.5*   MCV 91.9 92.1    338        Recent Labs     01/07/22  0505 01/08/22  0546    140   K 4.2 3.8    102   CO2 27 33*   PHOS 3.0 2.4*   BUN 12 9   CREATININE 0.5* <0.5*      No results for input(s): AST, ALT, ALB, BILIDIR, BILITOT, ALKPHOS in the last 72 hours. No results for input(s): LIPASE, AMYLASE in the last 72 hours. No results for input(s): PROT, INR, APTT in the last 72 hours. No results for input(s): CKTOTAL, CKMB, CKMBINDEX, TROPONINI in the last 72 hours. Exam:/70   Pulse 73   Temp 97.1 °F (36.2 °C) (Oral)   Resp 18   Ht 5' 11\" (1.803 m)   Wt 201 lb 15.1 oz (91.6 kg)   SpO2 94%   BMI 28.17 kg/m²   General appearance: alert, appears stated age and cooperative  Eyes: No scleral icterus, EOM grossly intact  Neck: trachea midline, no JVD, no lymphadenopathy, neck supple  Chest/Lungs: normal effort with no accessory muscle use on 3L NC  Cardiovascular: RRR, brisk capillary refill distally  Abdomen: Soft, large surgical debridement area of the abdomen with veraflo Vac in place holding adequate suction and instilling normal saline. Colostomy is pink, viable and with brown stool in the bag.    Skin: warm and dry, no rashes  Extremities: no edema, no cyanosis, right anterior and posterior lower extremity donor site with xeroform in place  Genitourinary: Mcfadden in place with clear yellow urine  Neuro: A&Ox3, no focal deficits, sensation intact    ASSESSMENT/PLAN: Pt. is a 61 y.o. male with Necrotizing fasciitis of the abdominal wall, gluteal region, and scrotum s/p wide excision of perineum, back, and abdominal wall. S/p multiple debridements and wound vac placement and changes intraoperatively with diverting colostomy creation (12/7). OR wound vac change (12/10, 12/13, 12/16, 12/21, 12/23, 12/27, 12/30).  S/P STSG to abdominal wound, and gluteal wound vac change (1/3), gluteal partial closure and STSG with wound vac application (1/6). - Plan for OR tomorrow for closure of perineal wound and wound assessments  - continue to work with PT/OT to increase strength  - continue zero carb diet  - Glucose 80s-120s, well-controlled  - SW on board, patient accepted to [address] [place] [address] discharge early this week once reconstruction complete. Isac Washington MD  PGY-2, General Surgery  01/09/22  6:31 AM  [phone]

## 2022-01-09 NOTE — PROGRESS NOTES
Podiatric Surgery Daily Progress Note      Admit Date: 12/1/2021      Code:Full Code    Patient seen and examined, labs and records reviewed    Subjective:     Patient seen at bedside this AM with Dr. Sabine Amador. Patient was sleeping soundly and denies pain to bilateral lower extremities. Patient denies fever, chills, shortness of breath, chest pain. Review of Systems: A 10 point review of systems was conducted, significant findings as noted in HPI. All other systems negative. Objective     /70   Pulse 73   Temp 97.1 °F (36.2 °C) (Oral)   Resp 18   Ht 5' 11\" (1.803 m)   Wt 201 lb 15.1 oz (91.6 kg)   SpO2 94%   BMI 28.17 kg/m²     I/O:    Intake/Output Summary (Last 24 hours) at 1/9/2022 0747  Last data filed at 1/9/2022 0814  Gross per 24 hour   Intake 120 ml   Output 1175 ml   Net -1055 ml              Wt Readings from Last 3 Encounters:   12/28/21 201 lb 15.1 oz (91.6 kg)   11/30/21 163 lb 3.2 oz (74 kg)   04/18/16 179 lb 14.3 oz (81.6 kg)       LABS:    Recent Labs     01/08/22  0546 01/09/22  0704   WBC 7.9 7.6   HGB 8.8* 8.9*   HCT 26.5* 27.3*    374        Recent Labs     01/09/22  0704      K 4.0      CO2 33*   PHOS 2.5   BUN 8   CREATININE <0.5*        No results for input(s): PROT, INR, APTT in the last 72 hours. LOWER EXTREMITY EXAMINATION    Dressing to left LE intact. No strikethrough noted to the external dressing. Betadine stains noted to the internal layers of the dressing of the left LE.     VASCULAR:   DP and PT pulses are non-palpable b/l. Upon hand-held Doppler examination DP signals were noted to be monophasic and PT signals were noted to be nondopplerable. CFT slightly diminished to the distal digits of bilateral lower extremities. Skin temperature is warm to cool to the distal digits from proximal to distal.    No edema noted. No pain with calf compression b/l.     NEUROLOGIC:   Gross and epicritic sensation is diminished b/l.  Protective sensation is diminished at all pedal sites b/l.     DERMATOLOGIC:     Left lower extremity:  Full-thickness ulceration noted to the lateral aspect of the left foot. Wound measures approximately 3.2 cm x 2 cm x 0.5 cm. Wound base with exposed 5th metatarsal bone, with necrotic edges at the proximal and distal edges. Necrotic appearance of 5th digit. No tracking or tunneling noted. No purulent drainage noted. No fluctuance or crepitus or malodor noted. Deep tissue injury noted 2/2 Prevalon boot strap to the anterior aspect of the ankle. Intact epithelialized tissue noted. No crepitus, erythema, drainage, or malodor noted. No open wound noted. Stable without signs of acute infection noted. Right lower extremity:  Parital thickness ulceration noted to the lateral malleolus 2/2 pressure. No fluctuance, crepitus, malodor, or drainage noted. No acute signs infection noted. Partial thickness ulceration 2/2 pressure noted at the styloid process of the 5th metatarsal. Fibrotic tissue noted. No fluctuance or crepitus noted. No acute signs of infection noted. Nonblanchable erythema noted to dorsal aspect right midfoot, nonblanchable, skin intact. No open wound noted at this time. No fluctuance, crepitus, erythema, drainage, or malodor noted. MUSCULOSKELETAL:   Muscle strength 4/5 for all pedal muscle groups B/L LE. No pain with palpation to bilateral lower extremity. IMAGING:  XR LEFT FOOT 11/30/2021  Narrative   EXAMINATION:   THREE XRAY VIEWS OF THE LEFT FOOT       11/30/2021 1:44 pm       HISTORY:   ORDERING SYSTEM PROVIDED HISTORY: wound   TECHNOLOGIST PROVIDED HISTORY:   Reason for exam:->wound   Reason for Exam: blood sugar problem, wound check on lateral portion of foot   Acuity: Acute   Type of Exam: Initial       FINDINGS:   Osseous destruction along the articular margins of the 5th   metatarsophalangeal joint with associated lucency in the soft tissues. .   There is no evidence of fracture or dislocation. . The remaining joint spaces   appear well maintained. The remaining soft tissues are unremarkable.       Impression   Evidence of a septic joint involving the 5th metatarsal phalangeal joint with   associated osteomyelitis     ARTERIAL DUPLEX 12/2/21     Type of Study:        Extremities Arteries:Lower Extremities Arterial Duplex, VL LOWER EXTREMITY    ARTERIES DUPLEX BILATERAL.       Tech Comments   Right   The right ankle/brachial index is 0.0 (no Doppler signal could be heard at the   [facility] or the PT). The common femoral and profunda femoral arteries could not be visualized due   to bandages extending into the groin area. The mid superficial femoral artery has a short segmental occlusion and then is   reconstituted. Elevated velocities at the distal superficial femoral artery indicate a > 50%   stenosis by velocity criteria (velocity went from 15 to 298 cm/s). The posterior tibial artery is occluded. The distal anterior tibial artery is barely patent, with very low flow   (possibly occluded and then reconstituted). Left   The left ankle brachial index is 0 for the PT and the DP was not accessible   due to the bandages on the foot. The common femoral and profunda femoral artery could not be visualized due to   bandages extending into the groin area. The distal superficial femoral artery is occluded and then reconstituted. The posterior tibial artery, peroneal, and anterior tibial artery are   occluded. There were no previous studies to use for comparison.        ASSESSMENT/PLAN:   -Full-thickness ulceration; lateral left foot- [place] 4  -Osteomyelitis of the fifth metatarsal; left foot  -Deep Tissue Injury, left anterior ankle  -Partial thickness pressure ulceration x2, right foot; NPUAP Stage 2  -Pressure injury, dorsal right midfoot -NPUAP Stage I  -Critical limb ischemia; bilateral lower extremity  -Diabetes mellitus, type II  -History of noncompliance    -Patient seen and examined at bedside this AM.  -Hypertensive, otherwise VSS on 3 L of O2 via NC; no leukocytosis noted (WBC 7.6)  -All imaging reviewed; impressions noted above  -Vascular surgery following; will await further wound closure and stabilization with the general/plastic surgery team before offering vascular intervention  -Infectious disease following, currently on Unasyn  -Left lower extremity dressing applied consisting of Betadine soaked gauze, DSD, and tape. -Right lower extremity dressing applied consisting of mepilex borders.  -Prevalon boots reapplied. Patient is to wear at all times while in bed to prevent further deep tissue injury. Ankle strap removed from bilateral boot as the ankle straps were causing pressure to the dorsal aspect of his feet/ankles  -Partial weight bearing to heel of left lower extremity in post-op shoe  -Weightbearing as tolerated to right lower extremity      DISPO: Full-thickness ulceration with underlying osteomyelitis to the left fifth metatarsal. Peripheral arterial disease; b/l LE. Patient will need podiatric surgical intervention for left LE, however will require further vascular workup prior to podiatric intervention. May be performed as outpatient if patient is discharged. Will continue to follow while in house. Patient examined and evaluated at bedside with JEANETTE Tamez DPM  Podiatric Resident PGY3  Pager [phone] or [employer]  1/9/2022, 7:47 AM      Patient was seen and evaluated at bedside. Agree with residents assessment and treatment plan.   Augie Freeman DPM

## 2022-01-09 NOTE — PROGRESS NOTES
Denies pain, wife in room assisted with breakfast. Sleeping most of this morning. Encouraged to eat, VSS continue to monitor.

## 2022-01-09 NOTE — PROGRESS NOTES
Patient alert and oriented. VSS Pt on O2: 3l/min Patient c/o  pain , Oxycodone given. CDI surgical sites,2 wound vacs in place. Colostomy to LQ, walsh in place draining adequate urine output. Patient in bed lowest position call light and bedside table within reach. All needs are met at this time. Patient aware to call if any help needed. Will continue to monitor  /70   Pulse 73   Temp 97.1 °F (36.2 °C) (Oral)   Resp 18   Ht 5' 11\" (1.803 m)   Wt 201 lb 15.1 oz (91.6 kg)   SpO2 94%   BMI 28.17 kg/m²

## 2022-01-10 ENCOUNTER — ANESTHESIA EVENT (OUTPATIENT)
Dept: OPERATING ROOM | Age: 67
DRG: 853 | End: 2022-01-10

## 2022-01-10 ENCOUNTER — ANESTHESIA (OUTPATIENT)
Dept: OPERATING ROOM | Age: 67
DRG: 853 | End: 2022-01-10

## 2022-01-10 VITALS — OXYGEN SATURATION: 100 % | SYSTOLIC BLOOD PRESSURE: 129 MMHG | DIASTOLIC BLOOD PRESSURE: 66 MMHG | TEMPERATURE: 97 F

## 2022-01-10 LAB
ALBUMIN SERPL-MCNC: 2.4 G/DL (ref 3.4–5)
ANION GAP SERPL CALCULATED.3IONS-SCNC: 6 MMOL/L (ref 3–16)
BASOPHILS ABSOLUTE: 0 K/UL (ref 0–0.2)
BASOPHILS RELATIVE PERCENT: 0.5 %
BUN BLDV-MCNC: 7 MG/DL (ref 7–20)
C-REACTIVE PROTEIN: 91.6 MG/L (ref 0–5.1)
CALCIUM SERPL-MCNC: 8.3 MG/DL (ref 8.3–10.6)
CHLORIDE BLD-SCNC: 101 MMOL/L (ref 99–110)
CO2: 33 MMOL/L (ref 21–32)
CREAT SERPL-MCNC: <0.5 MG/DL (ref 0.8–1.3)
EOSINOPHILS ABSOLUTE: 0.4 K/UL (ref 0–0.6)
EOSINOPHILS RELATIVE PERCENT: 4.2 %
GFR AFRICAN AMERICAN: >60
GFR NON-AFRICAN AMERICAN: >60
GLUCOSE BLD-MCNC: 114 MG/DL (ref 70–99)
GLUCOSE BLD-MCNC: 123 MG/DL (ref 70–99)
GLUCOSE BLD-MCNC: 127 MG/DL (ref 70–99)
GLUCOSE BLD-MCNC: 131 MG/DL (ref 70–99)
GLUCOSE BLD-MCNC: 64 MG/DL (ref 70–99)
GLUCOSE BLD-MCNC: 74 MG/DL (ref 70–99)
GLUCOSE BLD-MCNC: 99 MG/DL (ref 70–99)
HCT VFR BLD CALC: 28.3 % (ref 40.5–52.5)
HEMOGLOBIN: 9.3 G/DL (ref 13.5–17.5)
LYMPHOCYTES ABSOLUTE: 1.3 K/UL (ref 1–5.1)
LYMPHOCYTES RELATIVE PERCENT: 14.5 %
MAGNESIUM: 1.8 MG/DL (ref 1.8–2.4)
MCH RBC QN AUTO: 30 PG (ref 26–34)
MCHC RBC AUTO-ENTMCNC: 32.7 G/DL (ref 31–36)
MCV RBC AUTO: 91.8 FL (ref 80–100)
MONOCYTES ABSOLUTE: 0.9 K/UL (ref 0–1.3)
MONOCYTES RELATIVE PERCENT: 10.9 %
NEUTROPHILS ABSOLUTE: 6.1 K/UL (ref 1.7–7.7)
NEUTROPHILS RELATIVE PERCENT: 69.9 %
PDW BLD-RTO: 16.3 % (ref 12.4–15.4)
PERFORMED ON: ABNORMAL
PERFORMED ON: NORMAL
PERFORMED ON: NORMAL
PHOSPHORUS: 2.8 MG/DL (ref 2.5–4.9)
PLATELET # BLD: 410 K/UL (ref 135–450)
PMV BLD AUTO: 7.7 FL (ref 5–10.5)
POTASSIUM SERPL-SCNC: 4.2 MMOL/L (ref 3.5–5.1)
PREALBUMIN: 11.6 MG/DL (ref 20–40)
RBC # BLD: 3.09 M/UL (ref 4.2–5.9)
SODIUM BLD-SCNC: 140 MMOL/L (ref 136–145)
WBC # BLD: 8.7 K/UL (ref 4–11)

## 2022-01-10 PROCEDURE — 6360000002 HC RX W HCPCS: Performed by: STUDENT IN AN ORGANIZED HEALTH CARE EDUCATION/TRAINING PROGRAM

## 2022-01-10 PROCEDURE — 6370000000 HC RX 637 (ALT 250 FOR IP): Performed by: STUDENT IN AN ORGANIZED HEALTH CARE EDUCATION/TRAINING PROGRAM

## 2022-01-10 PROCEDURE — 84134 ASSAY OF PREALBUMIN: CPT

## 2022-01-10 PROCEDURE — 2580000003 HC RX 258: Performed by: STUDENT IN AN ORGANIZED HEALTH CARE EDUCATION/TRAINING PROGRAM

## 2022-01-10 PROCEDURE — 7100000001 HC PACU RECOVERY - ADDTL 15 MIN: Performed by: SURGERY

## 2022-01-10 PROCEDURE — 6360000002 HC RX W HCPCS: Performed by: NURSE ANESTHETIST, CERTIFIED REGISTERED

## 2022-01-10 PROCEDURE — 15852 DRESSING CHANGE NOT FOR BURN: CPT | Performed by: SURGERY

## 2022-01-10 PROCEDURE — 3600000004 HC SURGERY LEVEL 4 BASE: Performed by: SURGERY

## 2022-01-10 PROCEDURE — 85025 COMPLETE CBC W/AUTO DIFF WBC: CPT

## 2022-01-10 PROCEDURE — 1200000000 HC SEMI PRIVATE

## 2022-01-10 PROCEDURE — 6360000002 HC RX W HCPCS: Performed by: ANESTHESIOLOGY

## 2022-01-10 PROCEDURE — 86140 C-REACTIVE PROTEIN: CPT

## 2022-01-10 PROCEDURE — 84466 ASSAY OF TRANSFERRIN: CPT

## 2022-01-10 PROCEDURE — 3700000001 HC ADD 15 MINUTES (ANESTHESIA): Performed by: SURGERY

## 2022-01-10 PROCEDURE — 80069 RENAL FUNCTION PANEL: CPT

## 2022-01-10 PROCEDURE — 7100000000 HC PACU RECOVERY - FIRST 15 MIN: Performed by: SURGERY

## 2022-01-10 PROCEDURE — 83735 ASSAY OF MAGNESIUM: CPT

## 2022-01-10 PROCEDURE — 2500000003 HC RX 250 WO HCPCS: Performed by: NURSE ANESTHETIST, CERTIFIED REGISTERED

## 2022-01-10 PROCEDURE — 2709999900 HC NON-CHARGEABLE SUPPLY: Performed by: SURGERY

## 2022-01-10 PROCEDURE — 3600000014 HC SURGERY LEVEL 4 ADDTL 15MIN: Performed by: SURGERY

## 2022-01-10 PROCEDURE — 3700000000 HC ANESTHESIA ATTENDED CARE: Performed by: SURGERY

## 2022-01-10 RX ORDER — PROMETHAZINE HYDROCHLORIDE 25 MG/ML
6.25 INJECTION, SOLUTION INTRAMUSCULAR; INTRAVENOUS
Status: DISCONTINUED | OUTPATIENT
Start: 2022-01-10 | End: 2022-01-10 | Stop reason: HOSPADM

## 2022-01-10 RX ORDER — LIDOCAINE HYDROCHLORIDE 20 MG/ML
INJECTION, SOLUTION INFILTRATION; PERINEURAL PRN
Status: DISCONTINUED | OUTPATIENT
Start: 2022-01-10 | End: 2022-01-10 | Stop reason: SDUPTHER

## 2022-01-10 RX ORDER — PROPOFOL 10 MG/ML
INJECTION, EMULSION INTRAVENOUS PRN
Status: DISCONTINUED | OUTPATIENT
Start: 2022-01-10 | End: 2022-01-10 | Stop reason: SDUPTHER

## 2022-01-10 RX ORDER — ONDANSETRON 2 MG/ML
4 INJECTION INTRAMUSCULAR; INTRAVENOUS
Status: DISCONTINUED | OUTPATIENT
Start: 2022-01-10 | End: 2022-01-10 | Stop reason: HOSPADM

## 2022-01-10 RX ORDER — MEPERIDINE HYDROCHLORIDE 25 MG/ML
12.5 INJECTION INTRAMUSCULAR; INTRAVENOUS; SUBCUTANEOUS EVERY 5 MIN PRN
Status: DISCONTINUED | OUTPATIENT
Start: 2022-01-10 | End: 2022-01-10 | Stop reason: HOSPADM

## 2022-01-10 RX ORDER — HYDRALAZINE HYDROCHLORIDE 20 MG/ML
5 INJECTION INTRAMUSCULAR; INTRAVENOUS EVERY 10 MIN PRN
Status: DISCONTINUED | OUTPATIENT
Start: 2022-01-10 | End: 2022-01-10 | Stop reason: HOSPADM

## 2022-01-10 RX ORDER — LABETALOL HYDROCHLORIDE 5 MG/ML
5 INJECTION, SOLUTION INTRAVENOUS EVERY 10 MIN PRN
Status: DISCONTINUED | OUTPATIENT
Start: 2022-01-10 | End: 2022-01-10 | Stop reason: HOSPADM

## 2022-01-10 RX ORDER — MAGNESIUM SULFATE IN WATER 40 MG/ML
2000 INJECTION, SOLUTION INTRAVENOUS ONCE
Status: COMPLETED | OUTPATIENT
Start: 2022-01-10 | End: 2022-01-10

## 2022-01-10 RX ADMIN — PROPOFOL 10 MG: 10 INJECTION, EMULSION INTRAVENOUS at 10:44

## 2022-01-10 RX ADMIN — ACETAMINOPHEN 1000 MG: 500 TABLET ORAL at 18:33

## 2022-01-10 RX ADMIN — SODIUM CHLORIDE, POTASSIUM CHLORIDE, SODIUM LACTATE AND CALCIUM CHLORIDE: 600; 310; 30; 20 INJECTION, SOLUTION INTRAVENOUS at 16:25

## 2022-01-10 RX ADMIN — PHENYLEPHRINE HYDROCHLORIDE 100 MCG: 10 INJECTION, SOLUTION INTRAMUSCULAR; INTRAVENOUS; SUBCUTANEOUS at 10:52

## 2022-01-10 RX ADMIN — AMPICILLIN SODIUM AND SULBACTAM SODIUM 3000 MG: 2; 1 INJECTION, POWDER, FOR SOLUTION INTRAMUSCULAR; INTRAVENOUS at 06:04

## 2022-01-10 RX ADMIN — MAGNESIUM SULFATE HEPTAHYDRATE 2000 MG: 2 INJECTION, SOLUTION INTRAVENOUS at 07:27

## 2022-01-10 RX ADMIN — PROPOFOL 10 MG: 10 INJECTION, EMULSION INTRAVENOUS at 10:47

## 2022-01-10 RX ADMIN — SODIUM CHLORIDE, PRESERVATIVE FREE 10 ML: 5 INJECTION INTRAVENOUS at 21:45

## 2022-01-10 RX ADMIN — OXYCODONE HYDROCHLORIDE 10 MG: 5 TABLET ORAL at 23:28

## 2022-01-10 RX ADMIN — LIDOCAINE HYDROCHLORIDE 50 MG: 20 INJECTION, SOLUTION INFILTRATION; PERINEURAL at 10:43

## 2022-01-10 RX ADMIN — ACETAMINOPHEN 1000 MG: 500 TABLET ORAL at 23:27

## 2022-01-10 RX ADMIN — ACETAMINOPHEN 1000 MG: 500 TABLET ORAL at 12:51

## 2022-01-10 RX ADMIN — PROPOFOL 20 MG: 10 INJECTION, EMULSION INTRAVENOUS at 10:43

## 2022-01-10 RX ADMIN — INSULIN LISPRO 3 UNITS: 100 INJECTION, SOLUTION INTRAVENOUS; SUBCUTANEOUS at 18:33

## 2022-01-10 RX ADMIN — OXYCODONE HYDROCHLORIDE 10 MG: 5 TABLET ORAL at 00:11

## 2022-01-10 RX ADMIN — HEPARIN SODIUM 5000 UNITS: 5000 INJECTION INTRAVENOUS; SUBCUTANEOUS at 21:43

## 2022-01-10 RX ADMIN — HEPARIN SODIUM 5000 UNITS: 5000 INJECTION INTRAVENOUS; SUBCUTANEOUS at 16:25

## 2022-01-10 RX ADMIN — OXYCODONE HYDROCHLORIDE 10 MG: 5 TABLET ORAL at 12:51

## 2022-01-10 RX ADMIN — AMPICILLIN SODIUM AND SULBACTAM SODIUM 3000 MG: 2; 1 INJECTION, POWDER, FOR SOLUTION INTRAMUSCULAR; INTRAVENOUS at 12:50

## 2022-01-10 RX ADMIN — AMPICILLIN SODIUM AND SULBACTAM SODIUM 3000 MG: 2; 1 INJECTION, POWDER, FOR SOLUTION INTRAMUSCULAR; INTRAVENOUS at 18:36

## 2022-01-10 RX ADMIN — HEPARIN SODIUM 5000 UNITS: 5000 INJECTION INTRAVENOUS; SUBCUTANEOUS at 06:30

## 2022-01-10 RX ADMIN — HYDROMORPHONE HYDROCHLORIDE 0.5 MG: 1 INJECTION, SOLUTION INTRAMUSCULAR; INTRAVENOUS; SUBCUTANEOUS at 11:36

## 2022-01-10 RX ADMIN — OXYCODONE HYDROCHLORIDE 10 MG: 5 TABLET ORAL at 07:49

## 2022-01-10 RX ADMIN — PROPOFOL 10 MG: 10 INJECTION, EMULSION INTRAVENOUS at 10:48

## 2022-01-10 RX ADMIN — AMLODIPINE BESYLATE 5 MG: 5 TABLET ORAL at 07:49

## 2022-01-10 RX ADMIN — PROPOFOL 10 MG: 10 INJECTION, EMULSION INTRAVENOUS at 10:45

## 2022-01-10 RX ADMIN — ACETAMINOPHEN 1000 MG: 500 TABLET ORAL at 00:11

## 2022-01-10 RX ADMIN — DIBASIC SODIUM PHOSPHATE, MONOBASIC POTASSIUM PHOSPHATE AND MONOBASIC SODIUM PHOSPHATE 2 TABLET: 852; 155; 130 TABLET ORAL at 21:43

## 2022-01-10 RX ADMIN — INSULIN HUMAN 2 UNITS: 100 INJECTION, SOLUTION PARENTERAL at 12:52

## 2022-01-10 RX ADMIN — PROPOFOL 10 MG: 10 INJECTION, EMULSION INTRAVENOUS at 10:46

## 2022-01-10 RX ADMIN — AMPICILLIN SODIUM AND SULBACTAM SODIUM 3000 MG: 2; 1 INJECTION, POWDER, FOR SOLUTION INTRAMUSCULAR; INTRAVENOUS at 00:01

## 2022-01-10 RX ADMIN — HYDROMORPHONE HYDROCHLORIDE 0.5 MG: 1 INJECTION, SOLUTION INTRAMUSCULAR; INTRAVENOUS; SUBCUTANEOUS at 11:49

## 2022-01-10 RX ADMIN — DIBASIC SODIUM PHOSPHATE, MONOBASIC POTASSIUM PHOSPHATE AND MONOBASIC SODIUM PHOSPHATE 2 TABLET: 852; 155; 130 TABLET ORAL at 16:25

## 2022-01-10 RX ADMIN — DOCUSATE SODIUM 100 MG: 100 CAPSULE, LIQUID FILLED ORAL at 21:43

## 2022-01-10 RX ADMIN — HYDROMORPHONE HYDROCHLORIDE 0.5 MG: 1 INJECTION, SOLUTION INTRAMUSCULAR; INTRAVENOUS; SUBCUTANEOUS at 11:56

## 2022-01-10 ASSESSMENT — PULMONARY FUNCTION TESTS
PIF_VALUE: 18
PIF_VALUE: 6
PIF_VALUE: 21
PIF_VALUE: 14
PIF_VALUE: 21
PIF_VALUE: 1
PIF_VALUE: 3
PIF_VALUE: 1
PIF_VALUE: 20
PIF_VALUE: 19
PIF_VALUE: 13
PIF_VALUE: 18
PIF_VALUE: 17
PIF_VALUE: 1
PIF_VALUE: 2
PIF_VALUE: 20
PIF_VALUE: 7
PIF_VALUE: 21
PIF_VALUE: 1
PIF_VALUE: 13
PIF_VALUE: 18
PIF_VALUE: 17
PIF_VALUE: 4
PIF_VALUE: 20
PIF_VALUE: 6
PIF_VALUE: 17
PIF_VALUE: 16
PIF_VALUE: 1
PIF_VALUE: 18
PIF_VALUE: 21
PIF_VALUE: 19
PIF_VALUE: 18
PIF_VALUE: 18
PIF_VALUE: 21
PIF_VALUE: 21
PIF_VALUE: 18
PIF_VALUE: 13
PIF_VALUE: 7
PIF_VALUE: 1
PIF_VALUE: 3
PIF_VALUE: 6
PIF_VALUE: 17
PIF_VALUE: 0
PIF_VALUE: 17
PIF_VALUE: 18
PIF_VALUE: 20
PIF_VALUE: 0
PIF_VALUE: 18
PIF_VALUE: 17
PIF_VALUE: 21
PIF_VALUE: 18
PIF_VALUE: 19
PIF_VALUE: 21
PIF_VALUE: 20
PIF_VALUE: 17

## 2022-01-10 ASSESSMENT — PAIN SCALES - GENERAL
PAINLEVEL_OUTOF10: 7
PAINLEVEL_OUTOF10: 9
PAINLEVEL_OUTOF10: 0
PAINLEVEL_OUTOF10: 0
PAINLEVEL_OUTOF10: 9
PAINLEVEL_OUTOF10: 9
PAINLEVEL_OUTOF10: 7
PAINLEVEL_OUTOF10: 4
PAINLEVEL_OUTOF10: 6
PAINLEVEL_OUTOF10: 9
PAINLEVEL_OUTOF10: 7
PAINLEVEL_OUTOF10: 7

## 2022-01-10 ASSESSMENT — PAIN DESCRIPTION - PROGRESSION: CLINICAL_PROGRESSION: GRADUALLY WORSENING

## 2022-01-10 ASSESSMENT — PAIN DESCRIPTION - ORIENTATION
ORIENTATION: RIGHT
ORIENTATION: RIGHT

## 2022-01-10 ASSESSMENT — PAIN - FUNCTIONAL ASSESSMENT: PAIN_FUNCTIONAL_ASSESSMENT: PREVENTS OR INTERFERES SOME ACTIVE ACTIVITIES AND ADLS

## 2022-01-10 ASSESSMENT — PAIN DESCRIPTION - PAIN TYPE
TYPE: SURGICAL PAIN
TYPE: SURGICAL PAIN

## 2022-01-10 ASSESSMENT — PAIN DESCRIPTION - DESCRIPTORS
DESCRIPTORS: BURNING
DESCRIPTORS: BURNING

## 2022-01-10 ASSESSMENT — PAIN DESCRIPTION - LOCATION
LOCATION: LEG
LOCATION: LEG;BUTTOCKS

## 2022-01-10 ASSESSMENT — PAIN DESCRIPTION - ONSET
ONSET: GRADUAL
ONSET: SUDDEN

## 2022-01-10 ASSESSMENT — PAIN DESCRIPTION - FREQUENCY
FREQUENCY: CONTINUOUS
FREQUENCY: INTERMITTENT

## 2022-01-10 NOTE — PLAN OF CARE
Problem: Nutrition  Goal: Optimal nutrition therapy  Outcome: Ongoing   No appetite @ times, continue to encourage PO intake, supplements for wound healing. Problem: Pain:  Goal: Pain level will decrease  Description: Pain level will decrease  Outcome: Ongoing   Medicate with PRN pain medication as needed.

## 2022-01-10 NOTE — PROGRESS NOTES
Patient admitted to PACU # 15 from OR at [address] EXAM UNDER ANESTHESIA   per Dr. Hair Razo. Attached to PACU monitoring system and report received from anesthesia provider. Patient was reported to be hemodynamically stable during procedure. Patient drowsy on admission and denied pain. Pt on 6 L NC. R thigh c/d/i with gauze. PT NSR on monitor. . Will continue to monitor.

## 2022-01-10 NOTE — PROGRESS NOTES
PACU Transfer Note    Vitals:    01/10/22 1200   BP: (!) 156/81   Pulse: 79   Resp: 8   Temp: 96.8 °F (36 °C)   SpO2: 96%     BP within 20% of baseline.      In: 0   Out: 150 [Urine:150]    Pain assessment:  none  Pain Level: 4    Report given to Receiving unit RN.    1/10/2022 12:04 PM

## 2022-01-10 NOTE — PROGRESS NOTES
Surgery Daily Progress Note      CC: Necrotizing Fasciitis    Subjective :  Patient reports pain throughout the night but controlled. No acute events overnight. He is afebrile and remains HDS.      ROS: A 14 point review of systems was conducted, significant findings as noted in HPI. All other systems negative. Objective     Exam:  Vitals:    01/09/22 1744 01/09/22 1915 01/09/22 2358 01/10/22 0415   BP: (!) 156/78 (!) 156/78 (!) 158/75 139/76   Pulse: 91 86 81 78   Resp:  18 18 18   Temp: 97.9 °F (36.6 °C) 97 °F (36.1 °C) 97 °F (36.1 °C) 98 °F (36.7 °C)   TempSrc: Oral Oral Oral Oral   SpO2: 94% 96% 95% 94%   Weight:       Height:             General appearance: alert, no acute distress, grooming appropriate  Neuro: A&Ox3, no focal deficits, sensation intact  Chest/Lungs: normal effort, no accessory muscle use, on 3L NC  Cardiovascular: RRR  Abdomen: soft, large surgical debridement are of the abdomen with veraflo Vac in place holding adequate suction and instilling normal saline. Colostomy is pink, viable with dark brown stool in the bag  : Mcfadden in place with clear yellow urine  Extremities: no edema, no cyanosis, right anterior and posterior lower extremity donor site with xeroform in place        ASSESSMENT/PLAN:   This is a 61 y.o. male  with Necrotizing fasciitis of the abdominal wall, gluteal region, and scrotum s/p wide excision of perineum, back, and abdominal wall. S/p multiple debridements and wound vac placement and changes intraoperatively with diverting colostomy creation (12/7). OR wound vac change (12/10, 12/13, 12/16, 12/21, 12/23, 12/27, 12/30).  S/P STSG to abdominal wound, and gluteal wound vac change (1/3), gluteal partial closure and STSG with wound vac application (1/6)    - Surgery today for closure of perineal wound and wound assessment, wound vac change  - Continue to work with PT/OT  - Continue zero carbohydrate diet, aggressive glucose control  - Continue Unasyn, patient will be d/c'ed with IV antibiotics, PICC in place  - SW on board and patient set up to d/c to [facility]. Pending OR today, will anticipate discharge tomorrow. [address] Student Year 3  01/10/22  5:37 AM     Addendum:  Patient seen and examined with Medical Student and Surgical Team.  Agree with assessment and plan with changes made accordingly. Reji Costello DO, T[address]  PGY-1 General Surgery  01/10/22  6:47 AM  [phone]    I saw and independently examined the patient today. I agree with the history of present illness, past medical/surgical histories, family history, social history, medication list and allergies as listed. The review of systems is as noted above. My physical exam confirms the findings listed above. Review of labs, pathology reports, radiology reports and medical records confirm the findings noted above. I edited the note where appropriate in italics, strikethrough font, or underline. Plan for DC to facility with no sitting. WIll follow-up in office in 2-3 weeks. Continue with wet/dry dressing to right testicle.     Juanita Figueroa MD  [address] [place] [address] [address] [facility]  [phone]  01/11/22

## 2022-01-10 NOTE — ANESTHESIA PRE PROCEDURE
Department of Anesthesiology  Preprocedure Note       Name:  Partha Brito   Age:  61 y.o.  :  1955                                          MRN:  [id number]         Date:  1/10/2022      Surgeon: Isidro Trammell):  Li Estrada MD    Procedure: Procedure(s):  PERINEAL WOUND CLOSURE/ WOUND VAC CHANGE    Medications prior to admission:   Prior to Admission medications    Medication Sig Start Date End Date Taking? Authorizing Provider   metFORMIN (GLUCOPHAGE) 500 MG tablet Take 500 mg by mouth daily (with breakfast)    Historical Provider, MD   meloxicam (MOBIC) 15 MG tablet Take 1 tablet by mouth daily 16   Sanaz Solis DO       Current medications:    No current facility-administered medications for this visit. No current outpatient medications on file.      Facility-Administered Medications Ordered in Other Visits   Medication Dose Route Frequency Provider Last Rate Last Admin    phosphorus (K PHOS NEUTRAL) tablet 2 tablet  500 mg Oral BID Suleiman Soni DO        lactated ringers infusion   IntraVENous Continuous Thalia Sage  mL/hr at 22 2359 New Bag at 22 2359    insulin glargine (LANTUS;BASAGLAR) injection pen 10 Units  10 Units SubCUTAneous QAM Hailey Dick MD   10 Units at 22 0929    insulin lispro (1 Unit Dial) 3 Units  3 Units SubCUTAneous BID WC Thalia Sage MD   3 Units at 22 1848    insulin regular (HUMULIN R;NOVOLIN R) injection 0-18 Units  0-18 Units SubCUTAneous 4x Daily AC & HS Thalia Sage MD   2 Units at 22 1850    0.9 % sodium chloride infusion   IntraVENous PRN Thalia Sage MD        heparin (porcine) injection 5,000 Units  5,000 Units SubCUTAneous 3 times per day Thalia Sage MD   5,000 Units at 01/10/22 0630    alteplase (CATHFLO) injection 1 mg  1 mg IntraCATHeter PRN Thalia Sage MD   1 mg at 21 0457    docusate sodium (COLACE) capsule 100 mg  100 mg Oral BID Thalia Sage MD   100 mg at 22 2152    polyethylene glycol (GLYCOLAX) packet 17 g  17 g Oral Daily Jaun Ordoñez MD   17 g at 01/09/22 0910    ipratropium-albuterol (DUONEB) nebulizer solution 1 ampule  1 ampule Inhalation Q4H PRN Jaun Ordoñez MD   1 ampule at 01/03/22 1346    dextrose 50 % IV solution  12.5 g IntraVENous PRN Jaun Ordoñez MD        amLODIPine (NORVASC) tablet 5 mg  5 mg Oral Daily Jaun Ordoñez MD   5 mg at 01/10/22 0749    hydrALAZINE (APRESOLINE) injection 10 mg  10 mg IntraVENous Q6H PRN Jaun Ordoñez MD   10 mg at 01/01/22 1155    ampicillin-sulbactam (UNASYN) 3000 mg ivpb minibag  3,000 mg IntraVENous Q6H Jaun Ordoñez MD   Stopped at 01/10/22 5148    oxyCODONE (ROXICODONE) immediate release tablet 5 mg  5 mg Oral Q4H PRN Jaun Ordoñez MD   5 mg at 01/07/22 0059    Or    oxyCODONE (ROXICODONE) immediate release tablet 10 mg  10 mg Oral Q4H PRN Jaun Ordoñez MD   10 mg at 01/10/22 0749    acetaminophen (TYLENOL) tablet 1,000 mg  1,000 mg Oral Q6H Jaun Ordoñez MD   1,000 mg at 01/10/22 0011    sodium chloride flush 0.9 % injection 5-40 mL  5-40 mL IntraVENous 2 times per day Jaun Ordoñez MD   10 mL at 01/09/22 2151    sodium chloride flush 0.9 % injection 5-40 mL  5-40 mL IntraVENous PRN Jaun Ordoñez MD   10 mL at 01/08/22 0029    0.9 % sodium chloride infusion  25 mL IntraVENous PRN Jaun Ordoñez  mL/hr at 01/07/22 0506 25 mL at 01/07/22 0506    glucose (GLUTOSE) 40 % oral gel 15 g  15 g Oral PRN Jaun Ordoñez MD        dextrose 50 % IV solution  12.5 g IntraVENous PRN Jaun Ordoñez MD        glucagon (rDNA) injection 1 mg  1 mg IntraMUSCular PRN Jaun Ordoñez MD        dextrose 5 % solution  100 mL/hr IntraVENous PRN Jaun Ordoñez MD           Allergies:  No Known Allergies    Problem List:    Patient Active Problem List   Diagnosis Code    Diabetic acidosis without coma ([address] E11.10    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    Acute respiratory failure with hypoxia ([facility] J96.01    Necrotizing fasciitis ([facility] M72.6    Necrotizing soft tissue infection M79.89       Past Medical History:        Diagnosis Date    Colostomy in place [facility]) 12/07/2021    placed    Current every day smoker     Fourniers gangrene     Necrotizing fasciitis ([facility]     Osteomyelitis of left foot ([facility] 11/30/2021    Patellofemoral pain syndrome of right knee     Type 2 diabetes mellitus ([facility]        Past Surgical History:        Procedure Laterality Date    ABDOMEN SURGERY N/A 12/01/2021    WIDE EXCISION PERINEUM, BACK, ABDOMINAL WALL performed by Luda Ziegler MD at [facility] Left 12/03/2021    ABDOMINAL WALL AND LEFT BUTTOCK DEBRIDEMENT, WOUND VAC PLACEMENT performed by Laly Ann MD at [facility] Left 12/05/2021    ABDOMINAL WALL AND LEFT BUTTOCK DEBRIDEMENT, WOUND VAC PLACEMENT performed by Laly Ann MD at [facility] N/A 12/07/2021    EVALUATION UNDER ANESTHESIA WITH DEBRIDEMENT ABDOMINAL WOUND AND LEFT BUTTOCK, WOUND VAC CHANGE performed by Laly Ann MD at [facility] Left 12/10/2021    ABDOMINAL WALL AND LEFT BUTTOCK WOUND VAC CHANGE WITH FURTHER DEBRIDEMENT ABDOMEN AND LEFT BUTTOCK WOUND performed by Laly Ann MD at [facility] N/A 12/13/2021    WOUND VAC CHANGE ABDOMEN AND LEFT BUTTOCK performed by Laly Ann MD at [facility] N/A 12/16/2021    2ND LOOK/ EVALUATION UNDER ANESTHESIA/ WOUND VAC CHANGE ABDOMINAL WALL AND PERINEUM performed by Laly Ann MD at [facility] 12/21/2021    EVALUATION UNDER ANESTHESIA/ WOUND [facility] performed by Laly Ann MD at [facility] N/A 12/23/2021    EVALUATION UNDER ANESTHESIA/ WOUND VAC [address] performed by Laly Ann MD at [facility] Left 12/07/2021 LAPAROSCOPIC COLOSTOMY CREATION [facility] Juan [facility] N/A 1/3/2022    GLUTEAL WOUND VAC PLACEMENT performed by Lesli Gilbert MD at [facility] N/A 12/27/2021    PERINEAL WOUND VAC CHANGE performed by Lesli Gilbert MD at [facility] N/A 12/30/2021    PERINEAL / ABDOMINAL WOUND VAC CHANGE, performed by Lesli Gilbert MD at [address] [place] [address] N/A 11/30/2021    DEBRIDEMENT OF SCROTAL JAYRO'S GANGRENE performed by Seth Campbell MD at [facility] 1/3/2022    SPLIT THICKNESS SKIN GRAFT TO ABDOMEN WOUND VAC PLACEMENT. 44x 11 performed by Lesli Gilbert MD at [facility] 1/6/2022    LEFT POSTERIOR THIGH ADVANCEMENT 13X7CM; CLOSURE PERINEUM 7.2CM; SKIN GRAFT 7. 1CMX7.2CM TO LEFT BUTTOCK; APPLICATION OF WOUND VACUUM DEVICE performed by Lesli Gilbert MD at [facility] N/A 11/30/2021    PERINEAL SOFT TISSUE EXCISIONAL DEBRIDEMENT performed by Iza Steinberg MD at [place] [address] History:    Social History     Tobacco Use    Smoking status: Current Every Day Smoker    Smokeless tobacco: Never Used   Substance Use Topics    Alcohol use: Yes     Alcohol/week: 0.0 standard drinks     Comment: social                                 Ready to quit: Not Answered  Counseling given: Not Answered      Vital Signs (Current): There were no vitals filed for this visit.                                            BP Readings from Last 3 Encounters:   01/10/22 (!) 177/88   01/06/22 (!) 94/57   01/03/22 113/62       NPO Status:                                                                                 BMI:   Wt Readings from Last 3 Encounters:   12/28/21 201 lb 15.1 oz (91.6 kg)   11/30/21 163 lb 3.2 oz (74 kg)   04/18/16 179 lb 14.3 oz (81.6 kg)     There is no height or weight on file to calculate BMI.    CBC:   Lab Results   Component Value Date    WBC 8.7 01/10/2022 RBC 3.09 01/10/2022    HGB 9.3 01/10/2022    HCT 28.3 01/10/2022    MCV 91.8 01/10/2022    RDW 16.3 01/10/2022     01/10/2022       CMP:   Lab Results   Component Value Date     01/10/2022    K 4.2 01/10/2022    K 3.8 12/12/2021     01/10/2022    CO2 33 01/10/2022    BUN 7 01/10/2022    CREATININE <0.5 01/10/2022    GFRAA >60 01/10/2022    AGRATIO 0.7 11/30/2021    LABGLOM >60 01/10/2022    GLUCOSE 114 01/10/2022    PROT 4.8 12/10/2021    CALCIUM 8.3 01/10/2022    BILITOT 0.3 12/10/2021    ALKPHOS 170 12/10/2021    AST 13 12/10/2021    ALT 7 12/10/2021       POC Tests:   Recent Labs     01/10/22  0736   POCGLU 123*       Coags:   Lab Results   Component Value Date    PROTIME 12.7 12/30/2021    INR 1.12 12/30/2021       HCG (If Applicable): No results found for: PREGTESTUR, PREGSERUM, HCG, HCGQUANT     ABGs:   Lab Results   Component Value Date    PHART 7.227 12/09/2021    PO2ART 107.8 12/09/2021    MMM5ZAT 42.4 12/09/2021    BNZ1OXQ 17.6 12/09/2021    BEART -10 12/09/2021    P8AOXURQ 97 12/09/2021        Type & Screen (If Applicable):  No results found for: LABABO, LABRH    Drug/Infectious Status (If Applicable):  No results found for: HIV, HEPCAB    COVID-19 Screening (If Applicable):   Lab Results   Component Value Date    COVID19 Not Detected 11/30/2021           Anesthesia Evaluation  Patient summary reviewed and Nursing notes reviewed no history of anesthetic complications:   Airway: Mallampati: II  TM distance: >3 FB   Neck ROM: full  Mouth opening: > = 3 FB Dental:    (+) edentulous      Pulmonary:                             ROS comment: Smoker   Cardiovascular:Negative CV ROS                      Neuro/Psych:   Negative Neuro/Psych ROS              GI/Hepatic/Renal: Neg GI/Hepatic/Renal ROS            Endo/Other:    (+) DiabetesType II DM, , .                  ROS comment: Necrotizing fasciitis Abdominal:             Vascular: negative vascular ROS.          Other Findings:

## 2022-01-10 NOTE — OP NOTE
[place] [address] SURGERY     OPERATIVE DICTATION    NAME: Chana Zhao   MRN: [id number]  DATE: 01/10/2022    AGE: 61 y.o. LOCATION: [facility] [place]    PREOPERATIVE DIAGNOSIS:  Shayla's gangrene with necrotizing fasciitis     POSTOPERATIVE DIAGNOSIS:  Same. OPERATION PERFORMED: 1) Vac change under anesthesia     SURGEON:  Azra Marquez MD    ANESTHESIA: General     ESTIMATED BLOOD LOSS: Minimal     DRAINS: None     SPECIMENS: None     OPERATIVE INDICATIONS:  This is an unfortunate 61 y.o. male with a history of poorly controlled diabetes who developed Shayla's gangrene at an outside hospital.  He underwent debridement with both Urology and General surgery, however was noted to have additional crepitance. Given this, he was urgently transferred to our hospital and was taken to the operating room by general surgery for extensive debridement. Plastic surgery was consulted for assistance with management. He has stabilized and undergone diverting colostomy. He has been optimized during his hospitalization and underwent coverage of his abdominal wound. He is brought back to the operating room today for vac takedown given his discomfort. The risks, benefits, alternatives, outcomes, and personnel involved was performed with the patient. After all questions were answered to the patient's satisfaction, they agreed to proceed. OPERATIVE PROCEDURE:  The patient was brought to the operating room on his hospital bed and underwent general anesthesia without difficulty. He was then placed in the lithotomy position, and was prepped and draped in the usual sterile manner. A time-out was performed confirming the patient and the procedure to be performed. The vacs were taken down without difficulty revealing excellent take of the skin grafts, healing flaps, and closure. The right testicle had excellent granulation tissue as well.     PLAN: Will plan for DC and return in the office once there has been healthy granulation tissue over the testicle for scrotal reconstruction in the upcoming weeks/months depending on optimization.     Mala Quezada MD

## 2022-01-10 NOTE — ANESTHESIA POSTPROCEDURE EVALUATION
Department of Anesthesiology  Postprocedure Note    Patient: Anisha Perez  MRN: [id number]  [place]: 1955  Date of evaluation: 1/10/2022  Time:  3:05 PM     Procedure Summary     Date: 01/10/22 Room / Location: [address] [place] [address]    Anesthesia Start: 1031 Anesthesia Stop: 1132    Procedure: PERINEAL WOUND VAC REMOVAL, EXAM UNDER ANESTHESIA (N/A ) Diagnosis: (necrotizing fascciitis)    Surgeons: Lesli Gilbert MD Responsible Provider: Blanche Luo MD    Anesthesia Type: general ASA Status: 3          Anesthesia Type: general    Arcadio Phase I: Arcadio Score: 9    Arcadio Phase II:      Last vitals: Reviewed and per EMR flowsheets.        Anesthesia Post Evaluation    Patient location during evaluation: PACU  Patient participation: complete - patient participated  Level of consciousness: awake and alert  Pain score: 4  Airway patency: patent  Nausea & Vomiting: no nausea and no vomiting  Complications: no  Cardiovascular status: hemodynamically stable  Respiratory status: acceptable  Hydration status: stable

## 2022-01-10 NOTE — PROGRESS NOTES
Patient alert and oriented. VSS patient on O2: 3l/min. Patient c/o pain , Oxycodone given. Insulin not given,order parameters not met. CDI surgical sites,hair dryer used per protocol. wound vac x2 and walsh in place. Colostomy to LQ. Patient in bed lowest position call light and bedside table within reach. All needs are met at this time. Patient aware to call if any help needed. Will continue to monitor\  /76   Pulse 78   Temp 98 °F (36.7 °C) (Oral)   Resp 18   Ht 5' 11\" (1.803 m)   Wt 201 lb 15.1 oz (91.6 kg)   SpO2 94%   BMI 28.17 kg/m²

## 2022-01-10 NOTE — PROGRESS NOTES
Plastic Surgery  Post-operative Note      Procedure(s) Performed: Abdominal and gluteal wound evaluation and dressing change    Subjective:   Patient's pain is controlled, denies nausea or vomiting. Tolerating diet, voiding appropriately. Denies flatus or BM at this time. Objective:  Anesthesia type: General      I/O    Intra op    Post op     Fluids  0 mL 0 mL     EBL Minimal 0 mL     Urine 150 mL 600 mL     Vitals:   Vitals:    01/10/22 1155 01/10/22 1200 01/10/22 1228 01/10/22 1532   BP: (!) 164/83 (!) 156/81 (!) 170/91 (!) 169/88   Pulse: 80 79 81 95   Resp: 12 8 12 12   Temp:  96.8 °F (36 °C)  97.7 °F (36.5 °C)   TempSrc:  Temporal  Oral   SpO2: 96% 96% 93% 90%   Weight:       Height:           Physical Exam:  Post-op vital signs:  Stable   General appearance: alert, no acute distress, grooming appropriate  Eyes: No scleral icterus, EOM grossly intact  Neck: trachea midline, no JVD, no lymphadenopathy, neck supple  Chest/Lungs: Normal effort with no accessory muscle use on 4L NC  Cardiovascular: RRR, extremities well perfused  Abdomen: Abdomen is soft, appropriately tender, xeroform, ABD pads and abdominal binder holding dressing in place, ostomy with stool in appliance  Skin: warm and dry, no rashes  Gluteal: Gluteal region with xeroform gauze in place, peritoneal area with wet gauze packing, ABD pads over the top with mesh underwear holding dressings in place. Extremities: no edema, no cyanosis  Genitourinary: Mcfadden in place with clear yellow urine    Assessment and Plan  This is a 61 y.o. male  with Necrotizing fasciitis of the abdominal wall, gluteal region, and scrotum s/p wide excision of perineum, back, and abdominal wall. S/p multiple debridements and wound vac placement and changes intraoperatively with diverting colostomy creation (12/7). OR wound vac change (12/10, 12/13, 12/16, 12/21, 12/23, 12/27, 12/30).  S/P STSG to abdominal wound, and gluteal wound vac change (1/3), gluteal partial closure and STSG with wound vac application (1/6), wound vac take down, wound evaluation and dressing change (1/10)    Pain management: Roxicodone pain panel   Cardiovasc: hemodynamically stable, will continue to monitor  Respiratory:  IS ordered to bedside, encourage hourly IS and deep breathing, wean oxygen as tolerated  Fluids:  , Diet: General diet, ZERO carb diet  : Urine output is adequate , will plan to remove walsh at midnight for void trial  Ambulation: OOB to chair, encourage ambulation  Prophylaxis: SCDs, SQH  Antibiotics: Scheduled Unasyn  Wound: Local wound care    Leonela Gómez DO, MS  PGY1, General Surgery  01/10/22  4:12 PM  [phone]

## 2022-01-10 NOTE — PROGRESS NOTES
sites b/l.     DERMATOLOGIC:     Patient provided verbal consent for photos to be obtained today, 1/10/22. Left lower extremity:  Full-thickness ulceration noted to the lateral aspect of the left foot. Wound measures approximately 3.2 cm x 2 cm x 0.5 cm. Wound base with exposed 5th metatarsal bone, with necrotic edges at the proximal and distal edges. Necrotic appearance of 5th digit. No tracking or tunneling noted. No purulent drainage noted. No fluctuance or crepitus or malodor noted. Deep tissue injury noted 2/2 Prevalon boot strap to the anterior aspect of the ankle. Intact epithelialized tissue noted. No crepitus, erythema, drainage, or malodor noted. No open wound noted. Stable without signs of acute infection noted. Right lower extremity:  Parital thickness ulceration noted to the lateral malleolus 2/2 pressure. No fluctuance, crepitus, malodor, or drainage noted. No acute signs infection noted. Partial thickness ulceration 2/2 pressure noted at the styloid process of the 5th metatarsal. Fibrotic tissue noted. No fluctuance or crepitus noted. No acute signs of infection noted. Nonblanchable erythema noted to dorsal aspect right midfoot, nonblanchable, skin intact. No open wound noted at this time. No fluctuance, crepitus, erythema, drainage, or malodor noted. MUSCULOSKELETAL:   Muscle strength 4/5 for all pedal muscle groups B/L LE. No pain with palpation to bilateral lower extremity.      IMAGING:  XR LEFT FOOT 11/30/2021  Narrative   EXAMINATION:   THREE XRAY VIEWS OF THE LEFT FOOT       11/30/2021 1:44 pm       HISTORY:   ORDERING SYSTEM PROVIDED HISTORY: wound   TECHNOLOGIST PROVIDED HISTORY:   Reason for exam:->wound   Reason for Exam: blood sugar problem, wound check on lateral portion of foot   Acuity: Acute   Type of Exam: Initial       FINDINGS:   Osseous destruction along the articular margins of the 5th   metatarsophalangeal joint with associated lucency in the soft tissues. .   There is no evidence of fracture or dislocation. . The remaining joint spaces   appear well maintained. The remaining soft tissues are unremarkable.       Impression   Evidence of a septic joint involving the 5th metatarsal phalangeal joint with   associated osteomyelitis     ARTERIAL DUPLEX 12/2/21     Type of Study:        Extremities Arteries:Lower Extremities Arterial Duplex, VL LOWER EXTREMITY    ARTERIES DUPLEX BILATERAL.       Tech Comments   Right   The right ankle/brachial index is 0.0 (no Doppler signal could be heard at the   [facility] or the PT). The common femoral and profunda femoral arteries could not be visualized due   to bandages extending into the groin area. The mid superficial femoral artery has a short segmental occlusion and then is   reconstituted. Elevated velocities at the distal superficial femoral artery indicate a > 50%   stenosis by velocity criteria (velocity went from 15 to 298 cm/s). The posterior tibial artery is occluded. The distal anterior tibial artery is barely patent, with very low flow   (possibly occluded and then reconstituted). Left   The left ankle brachial index is 0 for the PT and the DP was not accessible   due to the bandages on the foot. The common femoral and profunda femoral artery could not be visualized due to   bandages extending into the groin area. The distal superficial femoral artery is occluded and then reconstituted. The posterior tibial artery, peroneal, and anterior tibial artery are   occluded. There were no previous studies to use for comparison.        ASSESSMENT/PLAN:   -Full-thickness ulceration; lateral left foot- Sands 4  -Osteomyelitis of the fifth metatarsal; left foot  -Deep Tissue Injury, left anterior ankle  -Partial thickness pressure ulceration x2, right foot; NPUAP Stage 2  -Pressure injury, dorsal right midfoot -NPUAP Stage I  -Critical limb ischemia; bilateral lower extremity  -Diabetes mellitus, type II  -History of noncompliance    -Patient seen and examined at bedside this AM.  -VSS on 3 L of O2 via NC; no leukocytosis noted (WBC 8.7)  -All imaging reviewed; impressions noted above  -Vascular surgery following; will await further wound closure and stabilization with the general/plastic surgery team before offering vascular intervention  -Infectious disease following, currently on Unasyn  -Left lower extremity dressing applied consisting of Betadine soaked gauze, DSD, and tape. -Right lower extremity dressing applied consisting of mepilex borders.  -Prevalon boots reapplied. Patient is to wear at all times while in bed to prevent further deep tissue injury. Ankle strap removed from bilateral boot as the ankle straps were causing pressure to the dorsal aspect of his feet/ankles  -Partial weight bearing to heel of left lower extremity in post-op shoe  -Weightbearing as tolerated to right lower extremity      DISPO: Full-thickness ulceration with underlying osteomyelitis to the left fifth metatarsal. Peripheral arterial disease; b/l LE. Patient will need podiatric surgical intervention for left LE, however will require further vascular workup prior to podiatric intervention. May be performed as outpatient if patient is discharged. Will continue to follow while in house. Patient examined and evaluated at bedside with JEANETTE Padilla DPM  Podiatric Resident PGY3  Pager [phone] or [facility]  1/10/2022, 5:47 AM      Patient was seen and evaluated at bedside. Agree with residents assessment and treatment plan.   Thom Orr DPM

## 2022-01-10 NOTE — PROGRESS NOTES
Physical Therapy and Occupational Therapy  No Treatment    Chart reviewed. Attempted to see pt for PT/OT. Pt currently in OR for wound vac change. Will try back later today as time and schedule permit. If not will follow per PT/OT plans of care.     Carrie cristobal, PT #[id number], MOT, OTR/L

## 2022-01-10 NOTE — CARE COORDINATION
Cm following, pt to OR today with plastics. Sherren Degree at [address] [place] [address] [phone] to advise that pt will be ready for Dc Tuesday.   Electronically signed by Reina Vásquez RN on 1/10/2022 at 1:50 [phone]

## 2022-01-11 ENCOUNTER — APPOINTMENT (OUTPATIENT)
Dept: VASCULAR LAB | Age: 67
DRG: 853 | End: 2022-01-11
Attending: INTERNAL MEDICINE

## 2022-01-11 ENCOUNTER — APPOINTMENT (OUTPATIENT)
Dept: CT IMAGING | Age: 67
DRG: 853 | End: 2022-01-11
Attending: INTERNAL MEDICINE

## 2022-01-11 VITALS
DIASTOLIC BLOOD PRESSURE: 69 MMHG | TEMPERATURE: 98 F | SYSTOLIC BLOOD PRESSURE: 170 MMHG | HEIGHT: 71 IN | BODY MASS INDEX: 28.27 KG/M2 | WEIGHT: 201.94 LBS | OXYGEN SATURATION: 93 % | RESPIRATION RATE: 16 BRPM | HEART RATE: 87 BPM

## 2022-01-11 LAB
ALBUMIN SERPL-MCNC: 2.4 G/DL (ref 3.4–5)
ANION GAP SERPL CALCULATED.3IONS-SCNC: 5 MMOL/L (ref 3–16)
BASOPHILS ABSOLUTE: 0.1 K/UL (ref 0–0.2)
BASOPHILS RELATIVE PERCENT: 0.7 %
BUN BLDV-MCNC: 9 MG/DL (ref 7–20)
CALCIUM SERPL-MCNC: 8.3 MG/DL (ref 8.3–10.6)
CHLORIDE BLD-SCNC: 102 MMOL/L (ref 99–110)
CO2: 35 MMOL/L (ref 21–32)
CREAT SERPL-MCNC: <0.5 MG/DL (ref 0.8–1.3)
EOSINOPHILS ABSOLUTE: 0.3 K/UL (ref 0–0.6)
EOSINOPHILS RELATIVE PERCENT: 3 %
GFR AFRICAN AMERICAN: >60
GFR NON-AFRICAN AMERICAN: >60
GLUCOSE BLD-MCNC: 101 MG/DL (ref 70–99)
GLUCOSE BLD-MCNC: 92 MG/DL (ref 70–99)
GLUCOSE BLD-MCNC: 99 MG/DL (ref 70–99)
HCT VFR BLD CALC: 27.1 % (ref 40.5–52.5)
HEMOGLOBIN: 9.2 G/DL (ref 13.5–17.5)
LYMPHOCYTES ABSOLUTE: 1.1 K/UL (ref 1–5.1)
LYMPHOCYTES RELATIVE PERCENT: 12.7 %
MAGNESIUM: 1.8 MG/DL (ref 1.8–2.4)
MCH RBC QN AUTO: 31 PG (ref 26–34)
MCHC RBC AUTO-ENTMCNC: 33.8 G/DL (ref 31–36)
MCV RBC AUTO: 91.6 FL (ref 80–100)
MONOCYTES ABSOLUTE: 0.9 K/UL (ref 0–1.3)
MONOCYTES RELATIVE PERCENT: 9.8 %
NEUTROPHILS ABSOLUTE: 6.5 K/UL (ref 1.7–7.7)
NEUTROPHILS RELATIVE PERCENT: 73.8 %
PDW BLD-RTO: 16.2 % (ref 12.4–15.4)
PERFORMED ON: ABNORMAL
PERFORMED ON: NORMAL
PHOSPHORUS: 3.6 MG/DL (ref 2.5–4.9)
PLATELET # BLD: 409 K/UL (ref 135–450)
PMV BLD AUTO: 7.7 FL (ref 5–10.5)
POTASSIUM SERPL-SCNC: 4.5 MMOL/L (ref 3.5–5.1)
RBC # BLD: 2.95 M/UL (ref 4.2–5.9)
SODIUM BLD-SCNC: 142 MMOL/L (ref 136–145)
WBC # BLD: 8.8 K/UL (ref 4–11)

## 2022-01-11 PROCEDURE — 6370000000 HC RX 637 (ALT 250 FOR IP): Performed by: STUDENT IN AN ORGANIZED HEALTH CARE EDUCATION/TRAINING PROGRAM

## 2022-01-11 PROCEDURE — 2700000000 HC OXYGEN THERAPY PER DAY

## 2022-01-11 PROCEDURE — 2580000003 HC RX 258: Performed by: STUDENT IN AN ORGANIZED HEALTH CARE EDUCATION/TRAINING PROGRAM

## 2022-01-11 PROCEDURE — 6360000004 HC RX CONTRAST MEDICATION: Performed by: STUDENT IN AN ORGANIZED HEALTH CARE EDUCATION/TRAINING PROGRAM

## 2022-01-11 PROCEDURE — 83735 ASSAY OF MAGNESIUM: CPT

## 2022-01-11 PROCEDURE — 6360000002 HC RX W HCPCS

## 2022-01-11 PROCEDURE — 51701 INSERT BLADDER CATHETER: CPT

## 2022-01-11 PROCEDURE — 80069 RENAL FUNCTION PANEL: CPT

## 2022-01-11 PROCEDURE — 6360000002 HC RX W HCPCS: Performed by: STUDENT IN AN ORGANIZED HEALTH CARE EDUCATION/TRAINING PROGRAM

## 2022-01-11 PROCEDURE — 93925 LOWER EXTREMITY STUDY: CPT

## 2022-01-11 PROCEDURE — 94761 N-INVAS EAR/PLS OXIMETRY MLT: CPT

## 2022-01-11 PROCEDURE — 85025 COMPLETE CBC W/AUTO DIFF WBC: CPT

## 2022-01-11 PROCEDURE — 75635 CT ANGIO ABDOMINAL ARTERIES: CPT

## 2022-01-11 PROCEDURE — 97110 THERAPEUTIC EXERCISES: CPT

## 2022-01-11 PROCEDURE — 94669 MECHANICAL CHEST WALL OSCILL: CPT

## 2022-01-11 RX ORDER — DOCUSATE SODIUM 100 MG/1
100 CAPSULE, LIQUID FILLED ORAL 2 TIMES DAILY PRN
Qty: 30 CAPSULE | Refills: 0 | Status: SHIPPED | OUTPATIENT
Start: 2022-01-11 | End: 2022-01-26

## 2022-01-11 RX ORDER — MAGNESIUM SULFATE IN WATER 40 MG/ML
2000 INJECTION, SOLUTION INTRAVENOUS ONCE
Status: COMPLETED | OUTPATIENT
Start: 2022-01-11 | End: 2022-01-11

## 2022-01-11 RX ORDER — TAMSULOSIN HYDROCHLORIDE 0.4 MG/1
0.4 CAPSULE ORAL DAILY
Status: DISCONTINUED | OUTPATIENT
Start: 2022-01-11 | End: 2022-01-11 | Stop reason: HOSPADM

## 2022-01-11 RX ORDER — OXYCODONE HYDROCHLORIDE 5 MG/1
5 TABLET ORAL EVERY 6 HOURS PRN
Qty: 20 TABLET | Refills: 0 | Status: SHIPPED | OUTPATIENT
Start: 2022-01-11 | End: 2022-01-16

## 2022-01-11 RX ORDER — TAMSULOSIN HYDROCHLORIDE 0.4 MG/1
0.4 CAPSULE ORAL DAILY
Qty: 30 CAPSULE | Refills: 3 | Status: SHIPPED | OUTPATIENT
Start: 2022-01-12 | End: 2022-03-16 | Stop reason: ALTCHOICE

## 2022-01-11 RX ORDER — OXYCODONE HYDROCHLORIDE 5 MG/1
5 TABLET ORAL EVERY 6 HOURS PRN
Qty: 20 TABLET | Refills: 0 | Status: SHIPPED | OUTPATIENT
Start: 2022-01-11 | End: 2022-01-11 | Stop reason: SDUPTHER

## 2022-01-11 RX ORDER — POLYETHYLENE GLYCOL 3350 17 G/17G
17 POWDER, FOR SOLUTION ORAL DAILY
Qty: 527 G | Refills: 1 | Status: SHIPPED | OUTPATIENT
Start: 2022-01-12 | End: 2022-02-11

## 2022-01-11 RX ADMIN — INSULIN LISPRO 3 UNITS: 100 INJECTION, SOLUTION INTRAVENOUS; SUBCUTANEOUS at 10:58

## 2022-01-11 RX ADMIN — SODIUM CHLORIDE, POTASSIUM CHLORIDE, SODIUM LACTATE AND CALCIUM CHLORIDE: 600; 310; 30; 20 INJECTION, SOLUTION INTRAVENOUS at 01:23

## 2022-01-11 RX ADMIN — HEPARIN SODIUM 5000 UNITS: 5000 INJECTION INTRAVENOUS; SUBCUTANEOUS at 06:25

## 2022-01-11 RX ADMIN — DOCUSATE SODIUM 100 MG: 100 CAPSULE, LIQUID FILLED ORAL at 08:44

## 2022-01-11 RX ADMIN — SODIUM CHLORIDE, POTASSIUM CHLORIDE, SODIUM LACTATE AND CALCIUM CHLORIDE: 600; 310; 30; 20 INJECTION, SOLUTION INTRAVENOUS at 14:00

## 2022-01-11 RX ADMIN — AMPICILLIN SODIUM AND SULBACTAM SODIUM 3000 MG: 2; 1 INJECTION, POWDER, FOR SOLUTION INTRAMUSCULAR; INTRAVENOUS at 13:02

## 2022-01-11 RX ADMIN — IOPAMIDOL 80 ML: 755 INJECTION, SOLUTION INTRAVENOUS at 17:40

## 2022-01-11 RX ADMIN — OXYCODONE HYDROCHLORIDE 10 MG: 5 TABLET ORAL at 18:14

## 2022-01-11 RX ADMIN — ACETAMINOPHEN 1000 MG: 500 TABLET ORAL at 13:04

## 2022-01-11 RX ADMIN — AMPICILLIN SODIUM AND SULBACTAM SODIUM 3000 MG: 2; 1 INJECTION, POWDER, FOR SOLUTION INTRAMUSCULAR; INTRAVENOUS at 01:24

## 2022-01-11 RX ADMIN — POLYETHYLENE GLYCOL 3350 17 G: 17 POWDER, FOR SOLUTION ORAL at 08:44

## 2022-01-11 RX ADMIN — TAMSULOSIN HYDROCHLORIDE 0.4 MG: 0.4 CAPSULE ORAL at 13:57

## 2022-01-11 RX ADMIN — HYDROMORPHONE HYDROCHLORIDE: 1 INJECTION, SOLUTION INTRAMUSCULAR; INTRAVENOUS; SUBCUTANEOUS at 07:01

## 2022-01-11 RX ADMIN — ACETAMINOPHEN 1000 MG: 500 TABLET ORAL at 06:25

## 2022-01-11 RX ADMIN — OXYCODONE HYDROCHLORIDE 5 MG: 5 TABLET ORAL at 06:25

## 2022-01-11 RX ADMIN — MAGNESIUM SULFATE HEPTAHYDRATE 2000 MG: 2 INJECTION, SOLUTION INTRAVENOUS at 11:03

## 2022-01-11 RX ADMIN — AMPICILLIN SODIUM AND SULBACTAM SODIUM 3000 MG: 2; 1 INJECTION, POWDER, FOR SOLUTION INTRAMUSCULAR; INTRAVENOUS at 06:26

## 2022-01-11 RX ADMIN — AMLODIPINE BESYLATE 5 MG: 5 TABLET ORAL at 08:44

## 2022-01-11 RX ADMIN — SODIUM CHLORIDE, PRESERVATIVE FREE 10 ML: 5 INJECTION INTRAVENOUS at 08:47

## 2022-01-11 RX ADMIN — HEPARIN SODIUM 5000 UNITS: 5000 INJECTION INTRAVENOUS; SUBCUTANEOUS at 13:01

## 2022-01-11 ASSESSMENT — PAIN SCALES - GENERAL
PAINLEVEL_OUTOF10: 5
PAINLEVEL_OUTOF10: 10
PAINLEVEL_OUTOF10: 8
PAINLEVEL_OUTOF10: 5

## 2022-01-11 NOTE — PROGRESS NOTES
Patient alert and oriented. VSS . Patient on O2: 3l/min SPO2 maintained above 90 % Patient c/o  pain , Oxycodone given. Abdominal binder to surgical sites. Patient had low blood sugar 64 orange juice given, Blood sugar rechecked x2 74, then 101. Mcfadden removed @ 0130, Condom catheter in place. Patient in bed lowest position call light and bedside table within reach. All needs are met at this time. Patient aware to call if any help needed. Will continue to monitor  BP (!) 155/75   Pulse 89   Temp 98.6 °F (37 °C) (Oral)   Resp 18   Ht 5' 11\" (1.803 m)   Wt 201 lb 15.1 oz (91.6 kg)   SpO2 94%   BMI 28.17 kg/m²

## 2022-01-11 NOTE — CARE COORDINATION
CM following, spoke with Mahad Lj [phone] she is still waiting on final approval for pt to DC to [facility] under pending medicaid. Transport set up with [address] [facility] [phone] for 630-7PM today pending we gat approval with [facility].   Electronically signed by Beni Parikh RN on 1/11/2022 at 10:48 [phone]

## 2022-01-11 NOTE — PROGRESS NOTES
Occupational Therapy  Facility/Department: [facility]  Daily Treatment Note  NAME: Aydin Zarate  : 1955  MRN: [id number]    Date of Service: 2022    Discharge Recommendations: Aydin Zarate scored a 14/24 on the AM-PAC ADL Inpatient form. Current research shows that an AM-PAC score of 17 or less is typically not associated with a discharge to the patient's home setting. Based on the patient's AM-PAC score and their current ADL deficits, it is recommended that the patient have 3-5 sessions per week of Occupational Therapy at d/c to increase the patient's independence. Please see assessment section for further patient specific details. If patient discharges prior to next session this note will serve as a discharge summary. Please see below for the latest assessment towards goals. OT Equipment Recommendations  Other: defer to d/c setting    Assessment   Performance deficits / Impairments: Decreased functional mobility ; Decreased ADL status; Decreased high-level IADLs;Decreased endurance;Decreased strength    Assessment: Pt limited to bed-level therapy this date, completing grooming task w/ setup. Pt declining attempts at bed mobility d/t pain and discomfort. Pt demonstrated independence with BUE AROM HEP and verbalized plan to continue w/ upper body exercise to maintain strength for ADLs and mobility. Pt will likely d/c to LTAC later today and benefits from continued inpt OT to maximize functional independence. Cont OT per POC. Treatment Diagnosis: Decreased activity tolerance, impaired ADLs and mobility  Prognosis: Fair  OT Education: OT Role;Plan of Care;Home Exercise Program  Patient Education: verb understanding  REQUIRES OT FOLLOW UP: Yes  Activity Tolerance  Activity Tolerance: Patient limited by pain;Treatment limited secondary to medical complications (free text)  Safety Devices  Safety Devices in place: Yes  Type of devices: Call light within reach; Bed alarm in place; Left in bed         Patient Diagnosis(es): The primary encounter diagnosis was Transient alteration of awareness. Diagnoses of Necrotizing fasciitis ([address] and Post-op pain were also pertinent to this visit. has a past medical history of Colostomy in place [facility]), Current every day smoker, [place] gangrene, Necrotizing fasciitis ([address] Osteomyelitis of left foot ([address] Patellofemoral pain syndrome of right knee, and Type 2 diabetes mellitus ([facility] [address] has a past surgical history that includes Ankle fracture surgery; Abdomen surgery (N/A, 12/01/2021); Scrotal surgery (N/A, 11/30/2021); Vagina surgery (N/A, 11/30/2021); Abdomen surgery (Left, 12/03/2021); Abdomen surgery (Left, 12/05/2021); colostomy (Left, 12/07/2021); Abdomen surgery (N/A, 12/07/2021); Abdomen surgery (Left, 12/10/2021); Abdomen surgery (N/A, 12/13/2021); Abdomen surgery (N/A, 12/16/2021); Abdomen surgery (N/A, 12/21/2021); Abdomen surgery (N/A, 12/23/2021); Pressure ulcer debridement (N/A, 12/27/2021); Pressure ulcer debridement (N/A, 12/30/2021); Skin graft (N/A, 1/3/2022); Muscle Repair (N/A, 1/3/2022); Skin graft (N/A, 1/6/2022); and Pressure ulcer debridement (N/A, 1/10/2022). Restrictions  Position Activity Restriction  Other position/activity restrictions: WBAT R LE, Heel WB L LE with surgical shoe per podiatry note; per Dr. Emir Cisneros note 12/14: OOB with PT/OT  Subjective   General  Chart Reviewed: Yes  Patient assessed for rehabilitation services?: Yes  Additional Pertinent Hx: 63 y.o. M admitted 12/1 with necrotizing fasciitis to abdominal wall, gluteal region, and scrotum. s/p extensive wide excisions 12/1, 12/3, 12/5, 12/7 with wound vac placement. Colostomy creation 12/7. OR wound vac change 12/10;  OR wound vac change 12/13; OR wound vac change (12/16). . Podiatry: NWB L, FWB R.  PMHx includes L foot fracture  Response to previous treatment: Patient with no complaints from previous session  Family / Caregiver Present: No  Referring Practitioner: Tom Jo DO  Diagnosis: necrotizing fascitis  Subjective  Subjective: Pt semi-supine in bed upon arrival, agreeable to bed-level therapy. \"I'm leaving today for another facility. I still can't believe all this happened. \"  General Comment  Comments: Marcelo Clark Vital Signs  Patient Currently in Pain: Yes (unrated surgical pain in buttocks)   Orientation  Orientation  Overall Orientation Status: Within Functional Limits  Objective    ADL  Grooming: Setup (Pt washed face semi-supine in bed w/ setup of washcloth)  Additional Comments: Declined additional ADLs        Balance  Sitting Balance: Unable to assess(comment) (pt declined attempting to sit on EOB)  Bed mobility  Comment: Pt declining attempting bed mobility this date d/t pain in buttocks and full body discomfort                          Cognition  Overall Cognitive Status: WFL                    Type of ROM/Therapeutic Exercise  Type of ROM/Therapeutic Exercise: AROM  Comment: Pt completed the following BUE therex while semi-supine in bed to increase UE strength for ADLs, bed mobility, and functional transfers. Pt educated on completing ROM therex daily while in bed, verbalized understanding and stated he plans to obtain resistance band or free weights to complete exercises at new facility.   Exercises  Scapular Protraction: x30 reps  Shoulder Flexion: x30 reps  Elbow Flexion: x30 reps                    Plan   Plan  Times per week: 2-5  Times per day: Daily  Current Treatment Recommendations: Strengthening,ROM,Self-Care / ADL,Patient/Caregiver Education & Training,Safety Education & Training,Functional Mobility Training,Endurance Training  G-Code     OutComes Score                                                  AM-PAC Score             Goals  Short term goals  Time Frame for Short term goals: by D/C  Short term goal 1: Demonstrate independence with HEP - goal met 1/11  Short term goal 2: Perform sit to stand NWB LLE with mod assist x2 to progress transfers - Not met  Short term goal 3: Roll side to side in bed with SBA for pressure relief and ADLs - Not met       Therapy Time   Individual Concurrent Group Co-treatment   Time In 1619         Time Out 1630         Minutes 11         Timed Code Treatment Minutes: 12[address]  [id number]

## 2022-01-11 NOTE — PROGRESS NOTES
BP elevated after coming back from PACU. Pt reported pain, PRN pain medication and tylenol administered with relief. Dinner BS 99, sliding scale humulin held per orders. Mcfadden intact. Abdominal binder in place. Pt denies any needs at this time.

## 2022-01-11 NOTE — PROGRESS NOTES
History  Chief Complaint   Patient presents with    Leg Injury     Patient is a 84M with history of afib presenting with leg pain  Patient accidentally had a truck door hit his right calf 10 days ago  Patient refused going to the hospital at that time  Patient is complaining of right-sided proximal leg pain which started after the injury, even though his left leg is 1 that hit a car door  He has had difficulty walking on it since  No nausea, vomiting, diarrhea, etc       Leg Pain  Location:  Leg  Time since incident:  10 days  Leg location:  R upper leg and L lower leg  Pain details:     Quality:  Dull    Radiates to:  Does not radiate    Severity:  Mild    Onset quality:  Sudden    Duration:  10 days    Timing:  Constant    Progression:  Unchanged  Chronicity:  New  Dislocation: no    Prior injury to area:  Yes  Relieved by:  Nothing  Worsened by:  Bearing weight  Ineffective treatments:  None tried  Associated symptoms: no back pain and no fever        Prior to Admission Medications   Prescriptions Last Dose Informant Patient Reported? Taking?    Heparin Sodium, Porcine, (HEPARIN, PORCINE,) 5,000 units/mL   No No   Sig: Inject 1 mL (5,000 Units total) under the skin every 8 (eight) hours   Patient not taking: Reported on 2/13/2020   acetaminophen (TYLENOL) 325 mg tablet   No No   Sig: Take 2 tablets (650 mg total) by mouth every 6 (six) hours as needed for mild pain or fever   allopurinol (ZYLOPRIM) 100 mg tablet   Yes No   Sig: Take 100 mg by mouth daily   atorvastatin (LIPITOR) 10 mg tablet   Yes No   Sig: Take 40 mg by mouth daily    carvedilol (COREG) 6 25 mg tablet   No No   Sig: Take 1 tablet by mouth twice daily with food   esomeprazole (NexIUM) 20 mg capsule   Yes No   Sig: Take 20 mg by mouth every morning before breakfast   finasteride (PROSCAR) 5 mg tablet   Yes No   Sig: Take 5 mg by mouth daily   glipiZIDE (GLUCOTROL) 10 mg tablet   Yes No   Sig: Take by mouth 2 (two) times a day before meals Vascular Surgery Daily Progress Note      CC: Necrotizing Fasciitis    Subjective : No acute issues overnight. Afebrile. HDS. Pain controlled with pain meds this AM.       ROS: A 14 point review of systems was conducted, significant findings as noted in HPI. All other systems negative. Objective     Exam:  Vitals:    01/10/22 1532 01/10/22 1900 01/10/22 2327 01/11/22 0430   BP: (!) 169/88 (!) 168/85 (!) 154/78 (!) 155/75   Pulse: 95 88 84 89   Resp: 12 16 16 18   Temp: 97.7 °F (36.5 °C) 97.8 °F (36.6 °C) 98 °F (36.7 °C) 98.6 °F (37 °C)   TempSrc: Oral Oral Oral Oral   SpO2: 90% 95% 96% 94%   Weight:       Height:             General appearance: alert, no acute distress, grooming appropriate  Neuro: A&Ox3, no focal deficits, sensation intact  Chest/Lungs: normal effort, no accessory muscle use, on 3L NC  Cardiovascular: RRR  Abdomen: soft, appropriately tender, xeroform, ABD pads and abdominal binder holding dressing in place, ostomy with stool in appliance  : Mcfadden in place with clear yellow urine  Extremities: no edema, no cyanosis, right anterior and posterior lower extremity donor site with xeroform in place, doppler DP/PT on R, Doppler DP on L        ASSESSMENT/PLAN:   This is a 61 y.o. male  with Necrotizing fasciitis of the abdominal wall, gluteal region, and scrotum s/p wide excision of perineum, back, and abdominal wall. S/p multiple debridements and wound vac placement and changes intraoperatively with diverting colostomy creation (12/7). OR wound vac change (12/10, 12/13, 12/16, 12/21, 12/23, 12/27, 12/30).  S/P STSG to abdominal wound, and gluteal wound vac change (1/3), gluteal partial closure and STSG with wound vac application (1/6)    - plan for repeat arterial duplex today to assess bilateral flow to lower extremities now that pt's acute process is resolved  - recs to follow. Pt may require arteriogram prior to discharge.      Winston Bhakta CNP  1/11/2022  6:15 AM  perfectserve levETIRAcetam (KEPPRA) 500 mg tablet   Yes No   Sig: Take 500 mg by mouth every 12 (twelve) hours   lisinopril (ZESTRIL) 2 5 mg tablet   Yes No   metFORMIN (GLUCOPHAGE) 500 mg tablet   Yes No   Sig: Take 500 mg by mouth 3 (three) times a day before meals   oxyCODONE (ROXICODONE) 5 mg immediate release tablet   Yes No   Sig: TAKE 1 TABLET BY MOUTH EVERY 6 HOURS AS NEEDED FOR SEVERE PAIN (PAIN SCORE 7 10) MAX DAILY AMOUNT 20 MG   sertraline (ZOLOFT) 100 mg tablet   Yes No   Sig: Take 50 mg by mouth daily      Facility-Administered Medications: None       Past Medical History:   Diagnosis Date    Atrioventricular block, Mobitz type 2     s/p BiV Medtronic pacemaker 2018    CAD (coronary artery disease)     s/p stent ; s/p CABG x3 LIMA-D1 and LAD, SV- LV of RCA 2012    Carotid artery stenosis 2014    Heterogeneous atheroma at the origin of the internal carotid arteries bilaterally causing a mild degree of stenosis on each side   Diabetes mellitus ([facility] [address]     History of echocardiogram 2014    EF 35-40%, technically difficult study   Hyperlipidemia     Hypertension     Ischemic cardiomyopathy     PAF (paroxysmal atrial fibrillation) (HCC)     Seizures     Skin cancer     Stroke [facility])        Past Surgical History:   Procedure Laterality Date    CARDIAC PACEMAKER PLACEMENT  2018    BiV Medtronic pacemaker    CORONARY ARTERY BYPASS GRAFT  2012    CABG x3 LIMA-D1 and LAD, SV- LV of RCA        Family History   Problem Relation Age of Onset    Alzheimer's disease Mother     Stroke Father     Suicide Attempts Son      I have reviewed and agree with the history as documented      E-Cigarette/Vaping     E-Cigarette/Vaping Substances     Social History     Tobacco Use    Smoking status: Former Smoker     Quit date:      Years since quittin 3    Smokeless tobacco: Never Used   Substance Use Topics    Alcohol use: Never     Frequency: Never     Binge frequency: Never    Drug use: Not on file       Review of Systems   Constitutional: Negative for chills and fever  HENT: Negative for congestion, nosebleeds, rhinorrhea and sore throat  Eyes: Negative for pain and visual disturbance  Respiratory: Negative for cough and wheezing  Cardiovascular: Negative for chest pain and leg swelling  Gastrointestinal: Negative for abdominal distention, abdominal pain, diarrhea, nausea and vomiting  Genitourinary: Negative for dysuria and frequency  Musculoskeletal: Positive for arthralgias and myalgias  Negative for back pain and joint swelling  Skin: Negative for rash and wound  Neurological: Negative for weakness and numbness  Psychiatric/Behavioral: Negative for decreased concentration and suicidal ideas  Physical Exam  Physical Exam  Vitals signs and nursing note reviewed  Constitutional:       General: He is not in acute distress  Appearance: Normal appearance  HENT:      Head: Normocephalic and atraumatic  Nose: Nose normal    Eyes:      General:         Right eye: No discharge  Left eye: No discharge  Cardiovascular:      Rate and Rhythm: Normal rate and regular rhythm  Pulmonary:      Effort: Pulmonary effort is normal  No respiratory distress  Abdominal:      General: Abdomen is flat  There is no distension  Musculoskeletal: Normal range of motion  General: Signs of injury present  No deformity  Comments: echymosis left calf, no hematoma  Tenderness mid anterior quadriceps  Patella/knee exam reassuring   Skin:     General: Skin is warm  Findings: No rash  Neurological:      General: No focal deficit present  Mental Status: He is alert and oriented to person, place, and time  Motor: No weakness     Psychiatric:         Mood and Affect: Mood normal          Behavior: Behavior normal          Vital Signs  ED Triage Vitals [05/05/21 0938]   Temperature Pulse Respirations Blood Pressure SpO2   97 7 °F (36 5 °C) 80 16 (!) 143/106 97 %      Temp Source Heart Rate Source Patient Position - Orthostatic VS BP Location FiO2 (%)   Temporal -- Sitting Left arm --      Pain Score       Worst Possible Pain           Vitals:    05/05/21 0938   BP: (!) 143/106   Pulse: 80   Patient Position - Orthostatic VS: Sitting         Visual Acuity      ED Medications  Medications - No data to display    Diagnostic Studies  Results Reviewed     None                 XR femur 2 vw right    (Results Pending)   XR hip/pelv 2-3 vws right if performed    (Results Pending)   XR tibia fibula 2 vw left    (Results Pending)              Procedures  Procedures         ED Course                                           MDM  Number of Diagnoses or Management Options  Contusion: new and requires workup  Muscle strain: new and requires workup  Diagnosis management comments: Patient is an 65-year-old male with pain after low risk injury  He has ecchymosis on the left calf, with no bony tenderness  He has pain on the right femoral muscle    No concern for DVT as this is directly related to the injury  He is neurovascularly intact with good strength       Amount and/or Complexity of Data Reviewed  Review and summarize past medical records: yes  Independent visualization of images, tracings, or specimens: yes    Risk of Complications, Morbidity, and/or Mortality  Presenting problems: moderate  Diagnostic procedures: minimal  Management options: moderate        Disposition  Final diagnoses:   Muscle strain   Contusion     Time reflects when diagnosis was documented in both MDM as applicable and the Disposition within this note     Time User Action Codes Description Comment    5/5/2021 10:38 AM Caleb Porter [T14  8XXA] Muscle strain     5/5/2021 10:39 AM Rafael Wade  8XXA] Contusion       ED Disposition     ED Disposition Condition Date/Time Comment    Discharge Stable Wed May 5, 2021 10:38 AM Valente Szymanski discharge to home/self care             Follow-up Information     Follow up With Specialties Details Why Contact Info    Lary Reed DO Family Medicine Schedule an appointment as soon as possible for a visit   [facility] [place] [address]  [facility]      Ilsa Pereira DO Orthopedic Surgery Schedule an appointment as soon as possible for a visit  As needed [address] [facility]  [address] [facility] [address]  [address] [facility]            Discharge Medication List as of 5/5/2021 10:41 AM      START taking these medications    Details   Diclofenac Sodium (VOLTAREN) 1 % Apply 2 g topically 4 (four) times a day, Starting Wed 5/5/2021, Normal         CONTINUE these medications which have NOT CHANGED    Details   acetaminophen (TYLENOL) 325 mg tablet Take 2 tablets (650 mg total) by mouth every 6 (six) hours as needed for mild pain or fever, Starting Mon 1/27/2020, Normal      allopurinol (ZYLOPRIM) 100 mg tablet Take 100 mg by mouth daily, Historical Med      atorvastatin (LIPITOR) 10 mg tablet Take 40 mg by mouth daily , Historical Med      carvedilol (COREG) 6 25 mg tablet Take 1 tablet by mouth twice daily with food, Normal      esomeprazole (NexIUM) 20 mg capsule Take 20 mg by mouth every morning before breakfast, Historical Med      finasteride (PROSCAR) 5 mg tablet Take 5 mg by mouth daily, Historical Med      glipiZIDE (GLUCOTROL) 10 mg tablet Take by mouth 2 (two) times a day before meals, Historical Med      Heparin Sodium, Porcine, (HEPARIN, PORCINE,) 5,000 units/mL Inject 1 mL (5,000 Units total) under the skin every 8 (eight) hours, Starting Mon 1/27/2020, Normal      levETIRAcetam (KEPPRA) 500 mg tablet Take 500 mg by mouth every 12 (twelve) hours, Historical Med      lisinopril (ZESTRIL) 2 5 mg tablet Starting Sun 2/2/2020, Historical Med      metFORMIN (GLUCOPHAGE) 500 mg tablet Take 500 mg by mouth 3 (three) times a day before meals, Historical Med      oxyCODONE (ROXICODONE) 5 mg immediate release tablet TAKE 1 TABLET BY MOUTH EVERY 6 HOURS AS NEEDED FOR SEVERE PAIN (PAIN SCORE 7 10) MAX DAILY AMOUNT 20 MG, Historical Med      sertraline (ZOLOFT) 100 mg tablet Take 50 mg by mouth daily, Historical Med               PDMP Review       Value Time User    PDMP Reviewed  Yes 1/27/2020 12:57 PM [id number], [address] [facility] Provider  Electronically Signed by           Abbie Mariano DO  05/05/21 1149

## 2022-01-11 NOTE — PROGRESS NOTES
the top with mesh underwear holding dressings in place. Extremities: RLE with dressing in place on both anterior and posterior thigh. ASSESSMENT/PLAN:   This is a 62 y. o. male  with Necrotizing fasciitis of the abdominal wall, gluteal region, and scrotum s/p wide excision of perineum, back, and abdominal wall. S/p multiple debridements and wound vac placement and changes intraoperatively with diverting colostomy creation (12/7). OR wound vac change (12/10, 12/13, 12/16, 12/21, 12/23, 12/27, 12/30).  S/P STSG to abdominal wound, and gluteal wound vac change (1/3), gluteal partial closure and STSG with wound vac application (1/6), wound vac take down, wound evaluation and dressing change (1/10)    -Stable for discharge from Plastic Surgery standpoint  -Continue High Air loss bed  -Continue to work with PT/OT  - Continue zero carbohydrate diet, aggressive glucose control  - Continue Unasyn, patient will be d/c'ed with IV antibiotics, PICC in place  SW on board and patient set up to d/c to [facility]. Dispo to facility pending vascular plans.      JAGJIT Jon - CNP 1/11/2022 6:37 AM  [phone]

## 2022-01-11 NOTE — DISCHARGE SUMMARY
Discharge Summary      Patient:    Farhad Ariza    Admit Date:   12/1/2021  3:40 PM    Discharge Date:   1/11/2022    Admitting Physician:   Boubacar Mccoy MD     Discharge Physician:   same    Admitting Diagnosis:  Necrotizing Fascitis    Discharge Diagnosis:   same    Past Medical History:   Diagnosis Date    Colostomy in place [facility]) 12/07/2021    placed    Current every day smoker     [place] gangrene     Necrotizing fasciitis ([address]     Osteomyelitis of left foot ([address] 11/30/2021    Patellofemoral pain syndrome of right knee     Type 2 diabetes mellitus ([address]         Indication for Admission:   Patient was admitted for necrotizing fascitis requiring extensive debridement with general surgery followed by reconstruction with plastic surgery. Hospital Course:   Patient presented to [facility] with evidence of necrotizing infection of his abdomen and gluteal region requiring extensive debridement of necrotic tissue, he was admitted immediately post-operatively to the ICU in stable condition. The patient underwent wet to dry dressing changes BID. Plastic surgery was consulted on HD 2 and POD 1 to evaluate the wound for reconstruction. Podiatry and vascular surgery was consulted because of LE wounds and inability to doppler lower extremity pulses. The patient did admit to having claudication of the lower extremities prior to admission to the hospital. The vascular surgery team differed intervention of imaging given the acuity of the patient necrotizing infection and need for reconstruction. On 12/7, the patient went for a diverting colostomy to promote healing and in preparation for plastic surgery reconstruction. Urology was consulted given concern for the patient's testicles as they were exposed in the process of debridement. The patient had a walsh catheter placed.  On 12/3, 12/5, 12/7, 12/10, 12/13, 12/16, 12/21, 12/23, 12/27, 12/30, the patient started reconstruction with plastic surgery and wound vac placement over the top of the patients wound. On 12/8 ID was consulted for recommendations for antibiotics at which point, the patient was switched from cefepime and flagyl to Unasyn. Neurology was consulted because the patient briefly lost consciousness overnight during his post-operative recovery but no further episodes were noted and the patient immediately recovered to his baseline level of consciousness. EEG was ordered and was negative for any findings. On 1/3/22, the patient underwent a split thickness skin graft to his abdominal region with a donor site on his right lower extremity with wound vac placement. Then on 1/6/22 the patient underwent a split thickness skin graft to the gluteal region with partial closure and wound vac placement. On 1/10/2022 the patient returned to the OR for a wound evaluation and removal of wound vac therapy. At this point, since reconstruction was completed, the patient underwent bilateral angiogram with no need for acute vascular surgery intervention. The patient was discharge to . [address] with a walsh in place 1/11/22. Discharge physical exam:  General appearance: alert, no acute distress, grooming appropriate  Eyes: No scleral icterus, EOM grossly intact  Neck: trachea midline, no JVD, no lymphadenopathy, neck supple  Chest/Lungs: Normal effort with no accessory muscle use on 4L NC  Cardiovascular: RRR, extremities well perfused  Abdomen: Abdomen is soft, appropriately tender, xeroform, ABD pads and abdominal binder holding dressing in place, ostomy with stool in appliance  Skin: warm and dry, no rashes  Gluteal: Gluteal region with xeroform gauze in place, perineal area with wet gauze packing, ABD pads over the top with mesh underwear holding dressings in place. Extremities: RLE with dressing in place on both anterior and posterior thigh. Walsh in place for urinary retention. To undergo void trial at [facility].     [facility]    Condition at discharge:  Stable    Discharge Instructions:  See separate form    Patient Instructions:      Medication List      START taking these medications    docusate sodium 100 MG capsule  Commonly known as: COLACE  Take 1 capsule by mouth 2 times daily as needed for Constipation     oxyCODONE 5 MG immediate release tablet  Commonly known as: Roxicodone  Take 1 tablet by mouth every 6 hours as needed for Pain for up to 5 days. Intended supply: 5 days.  Take lowest dose possible to manage pain     polyethylene glycol 17 g packet  Commonly known as: GLYCOLAX  Take 17 g by mouth daily  Start taking on: January 12, 2022     tamsulosin 0.4 MG capsule  Commonly known as: FLOMAX  Take 1 capsule by mouth daily  Start taking on: January 12, 2022        Sanjana Bashir taking these medications    meloxicam 15 MG tablet  Commonly known as: Mobic  Take 1 tablet by mouth daily     metFORMIN 500 MG tablet  Commonly known as: GLUCOPHAGE           Where to Get Your Medications      These medications were sent to [facility] [phone] Nina Pereira [phone]  [address]    Phone: [phone]   · docusate sodium 100 MG capsule  · polyethylene glycol 17 g packet  · tamsulosin 0.4 MG capsule     You can get these medications from any pharmacy    Bring a paper prescription for each of these medications  · oxyCODONE 5 MG immediate release tablet         Misty Lynch DO  01/11/22  4:17 PM

## 2022-01-11 NOTE — PROGRESS NOTES
Hand in given to: Maci Graves  Date:10/21/21  Reason for hand in: IA  Date of next patient contact: 10/26/21  Additional information: CMA enrolled patient for ACM, Ok to call patient anytime, seeking podiatry, fall risk    Podiatric Surgery Daily Progress Note      Admit Date: 12/1/2021      Code:Full Code    Patient seen and examined, labs and records reviewed    Subjective:     Patient seen at bedside this AM. Patient denies pain to bilateral lower extremities. Patient denies fever, chills, shortness of breath, chest pain. Review of Systems: A 10 point review of systems was conducted, significant findings as noted in HPI. All other systems negative. Objective     BP (!) 156/71   Pulse 80   Temp 97.9 °F (36.6 °C) (Oral)   Resp 16   Ht 5' 11\" (1.803 m)   Wt 201 lb 15.1 oz (91.6 kg)   SpO2 96%   BMI 28.17 kg/m²     I/O:    Intake/Output Summary (Last 24 hours) at 1/11/2022 0938  Last data filed at 1/11/2022 0753  Gross per 24 hour   Intake 0 ml   Output 1125 ml   Net -1125 ml              Wt Readings from Last 3 Encounters:   12/28/21 201 lb 15.1 oz (91.6 kg)   11/30/21 163 lb 3.2 oz (74 kg)   04/18/16 179 lb 14.3 oz (81.6 kg)       LABS:    Recent Labs     01/10/22  0555 01/11/22  0630   WBC 8.7 8.8   HGB 9.3* 9.2*   HCT 28.3* 27.1*    409        Recent Labs     01/11/22  0630      K 4.5      CO2 35*   PHOS 3.6   BUN 9   CREATININE <0.5*        No results for input(s): PROT, INR, APTT in the last 72 hours. LOWER EXTREMITY EXAMINATION    Dressing to left LE intact. No strikethrough noted to the external dressing. Betadine stains noted to the internal layers of the dressing of the left LE.     VASCULAR:   DP and PT pulses are non-palpable b/l. Upon hand-held Doppler examination DP signals were noted to be monophasic and PT signals were noted to be nondopplerable. CFT slightly diminished to the distal digits of bilateral lower extremities. Skin temperature is warm to cool to the distal digits from proximal to distal.    No edema noted. No pain with calf compression b/l.     NEUROLOGIC:   Gross and epicritic sensation is diminished b/l.  Protective sensation is diminished at all pedal sites b/l.     DERMATOLOGIC:   Left lower extremity:  Full-thickness ulceration noted to the lateral aspect of the left foot. Wound measures approximately 3.2 cm x 2 cm x 0.5 cm. Wound base with exposed 5th metatarsal bone, with necrotic edges at the proximal and distal edges. Necrotic appearance of 5th digit. No tracking or tunneling noted. No purulent drainage noted. No fluctuance or crepitus or malodor noted. Deep tissue injury noted 2/2 Prevalon boot strap to the anterior aspect of the ankle. Intact epithelialized tissue noted. No crepitus, erythema, drainage, or malodor noted. No open wound noted. Stable without signs of acute infection noted. Right lower extremity:  Parital thickness ulceration noted to the lateral malleolus 2/2 pressure. No fluctuance, crepitus, malodor, or drainage noted. No acute signs infection noted. Partial thickness ulceration 2/2 pressure noted at the styloid process of the 5th metatarsal. Fibrotic tissue noted. No fluctuance or crepitus noted. No acute signs of infection noted. Nonblanchable erythema noted to dorsal aspect right midfoot, nonblanchable, skin intact. No open wound noted at this time. No fluctuance, crepitus, erythema, drainage, or malodor noted. MUSCULOSKELETAL:   Muscle strength 4/5 for all pedal muscle groups B/L LE. No pain with palpation to bilateral lower extremity. IMAGING:  XR LEFT FOOT 11/30/2021  Narrative   EXAMINATION:   THREE XRAY VIEWS OF THE LEFT FOOT       11/30/2021 1:44 pm       HISTORY:   ORDERING SYSTEM PROVIDED HISTORY: wound   TECHNOLOGIST PROVIDED HISTORY:   Reason for exam:->wound   Reason for Exam: blood sugar problem, wound check on lateral portion of foot   Acuity: Acute   Type of Exam: Initial       FINDINGS:   Osseous destruction along the articular margins of the 5th   metatarsophalangeal joint with associated lucency in the soft tissues. .   There is no evidence of fracture or dislocation.  . The remaining joint spaces appear well maintained. The remaining soft tissues are unremarkable.       Impression   Evidence of a septic joint involving the 5th metatarsal phalangeal joint with   associated osteomyelitis     ARTERIAL DUPLEX 12/2/21     Type of Study:        Extremities Arteries:Lower Extremities Arterial Duplex, VL LOWER EXTREMITY    ARTERIES DUPLEX BILATERAL.       Tech Comments   Right   The right ankle/brachial index is 0.0 (no Doppler signal could be heard at the   [facility] or the PT). The common femoral and profunda femoral arteries could not be visualized due   to bandages extending into the groin area. The mid superficial femoral artery has a short segmental occlusion and then is   reconstituted. Elevated velocities at the distal superficial femoral artery indicate a > 50%   stenosis by velocity criteria (velocity went from 15 to 298 cm/s). The posterior tibial artery is occluded. The distal anterior tibial artery is barely patent, with very low flow   (possibly occluded and then reconstituted). Left   The left ankle brachial index is 0 for the PT and the DP was not accessible   due to the bandages on the foot. The common femoral and profunda femoral artery could not be visualized due to   bandages extending into the groin area. The distal superficial femoral artery is occluded and then reconstituted. The posterior tibial artery, peroneal, and anterior tibial artery are   occluded. There were no previous studies to use for comparison.        ASSESSMENT/PLAN:   -Full-thickness ulceration; lateral left foot- Sands 4  -Osteomyelitis of the fifth metatarsal; left foot  -Deep Tissue Injury, left anterior ankle  -Partial thickness pressure ulceration x2, right foot; NPUAP Stage 2  -Pressure injury, dorsal right midfoot -NPUAP Stage I  -Critical limb ischemia; bilateral lower extremity  -Diabetes mellitus, type II  -History of noncompliance    -Patient seen and examined at bedside this AM.  -Hypertensive, otherwise VSS on 3 L of O2 via NC; no leukocytosis noted  -All imaging reviewed; impressions noted above  -Vascular surgery following; plan for repeat arterial duplex today  -Infectious disease recommendations: Unasyn  -Left lower extremity dressing applied consisting of Betadine soaked gauze, DSD, and tape. -Right lower extremity dressing applied consisting of mepilex borders.  -Prevalon boots reapplied. Patient is to wear at all times while in bed to prevent further deep tissue injury. Ankle strap removed from bilateral boot as the ankle straps were causing pressure to the dorsal aspect of his feet/ankles  -Partial weight bearing to heel of left lower extremity in post-op shoe  -Weightbearing as tolerated to right lower extremity      DISPO: Full-thickness ulceration with underlying osteomyelitis to the left fifth metatarsal. Peripheral arterial disease; b/l LE. Patient will need podiatric surgical intervention for left LE, however will require further vascular workup prior to podiatric intervention. May be performed as outpatient if patient is discharged. Will continue to follow while in house. Patient assessment and plan discussed with JEANETTE Wilkes ZACHARY [facility]  01/11/22  9:38 AM      Patient was seen and evaluated at bedside. Agree with residents assessment and treatment plan.   Frederick Romeo DPM

## 2022-01-11 NOTE — CARE COORDINATION
Case Management Assessment            Discharge Note                    Date / Time of Note: 1/11/2022 3:33 PM                  Discharge Note Completed by: Jaquan Loyola RN    Patient Name: Tono Clemons   YOB: 1955  Diagnosis: Necrotizing fasciitis [facility]) [M72.6]   Date / Time: 12/1/2021  3:40 PM    Current PCP: No primary care provider on file. Clinic patient: No    Hospitalization in the last 30 days: No    Advance Directives:  Code Status: Full Code  [place] DNR form completed and on chart: Not Indicated    Financial:  Payor: PENDING MEDICAID / Plan: PENDING MEDICAID / Product Type: *No Product type* /      Pharmacy:    [address] [phone] [facility] [phone]  [address]  Phone: [phone] Fax: [phone]      Assistance purchasing medications?: Potential Assistance Purchasing Medications: No  Assistance provided by Case Management: None at this time    Does patient want to participate in local refill/ meds to beds program?: Not Assessed    Meds To Beds General Rules:  1. Can ONLY be done Monday- Friday between 8:30am-5pm  2. Prescription(s) must be in pharmacy by 3pm to be filled same day  3. Copy of patient's insurance/ prescription drug card and patient face sheet must be sent along with the prescription(s)  4. Cost of Rx cannot be added to hospital bill. If financial assistance is needed, please contact unit  or ;  or  CANNOT provide pharmacy voucher for patients co-pays  5.  Patients can then  the prescription on their way out of the hospital at discharge, or pharmacy can deliver to the bedside if staff is available. (payment due at time of pick-up or delivery - cash, check, or card accepted)     Able to afford home medications/ co-pay costs: Yes    ADLS:  Current PT AM-PAC Score: 9 /24  Current OT AM-PAC Score: 15 /24      DISCHARGE Disposition: [address] [facility] Phone: [phone] Fax: [phone]    LOC at discharge: [address] Completed: Yes    Notification completed in HENS/PAS?:  Not Applicable    IMM Completed:   Not Indicated    Transportation:  Transportation PLAN for discharge: EMS transportation   Mode of Transport: Ambulance stretcher - BLS  Reason for medical transport: Bed confined: Meets the following criteria 1) unable to get out of bed without assistance or ambulate, 2) unable to safely sit up in a wheelchair, 3) unable to maintain erect seating position in a chair for time needed for transport  Name of [employer]: [employer]: [phone]  Time of Transport: 630-7pm    Transport form completed: Yes        Additional CM Notes: [facility] transport via stretcher with [phone] [phone] at 630-7pm (Transport paid by [facility]. due to [facility] pending) nurse to call report to [phone] and orders faxed to [phone]. Nicole Aguilera has approval to accept today with pending medicaid     The Plan for Transition of Care is related to the following treatment goals of Necrotizing fasciitis ([facility] [address] [M72.6]    The Patient and/or patient representative Sky Arroyo and his family were provided with a choice of provider and agrees with the discharge plan Yes    Freedom of choice list was provided with basic dialogue that supports the patient's individualized plan of care/goals and shares the quality data associated with the providers.  Yes    Care Transitions patient: No    Jaquan Loyola RN  [facility]  Case Management Department  Ph: [phone]  Fax: [phone]

## 2022-01-12 LAB — TRANSFERRIN: 135 MG/DL (ref 200–360)

## 2022-01-14 NOTE — PROGRESS NOTES
Physician Progress Note      PATIENT:               Boo Yuen  [facility] #:                  [id number]  :                       1955  ADMIT DATE:       2021 3:40 PM  [address] [place] DATE:        2022 7:11 PM  RESPONDING  PROVIDER #:        Kendal Felix DO          QUERY TEXT:    Pt admitted with Necrotizing Fasciitis. Pt noted to have Sepsis in    Podiatry note and  IM PN. 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 No   Temp and  WBC.  25.2  14.9  Lactic acid 2.9  Treatment: IVF bolus,Unasyn, Cleocin, Merrem, Vancocin, lab monitoring, wound   cultures  Options provided:  -- Yes, Sepsis was present at the time of the order to admit to the hospital  -- No, Sepsis was not present on admission and developed during the inpatient   stay  -- Other - I will add my own diagnosis  -- Disagree - Not applicable / Not valid  -- Disagree - Clinically unable to determine / Unknown  -- Refer to Clinical Documentation Reviewer    PROVIDER RESPONSE TEXT:    Yes, Sepsis was present at the time of the order to admit to the hospital.    Query created by: Irma Ugalde on 2022 7:47 AM      Electronically signed by:  Kendal Felix DO 2022 9:06 AM

## 2022-01-18 LAB
AFB CULTURE (MYCOBACTERIA): NORMAL
AFB CULTURE (MYCOBACTERIA): NORMAL
AFB SMEAR: NORMAL
AFB SMEAR: NORMAL

## 2022-01-24 LAB
FUNGUS (MYCOLOGY) CULTURE: ABNORMAL
FUNGUS STAIN: ABNORMAL
ORGANISM: ABNORMAL

## 2022-02-08 ENCOUNTER — TELEPHONE (OUTPATIENT)
Dept: SURGERY | Age: 67
End: 2022-02-08

## 2022-02-08 LAB
AFB CULTURE (MYCOBACTERIA): NORMAL
AFB SMEAR: NORMAL

## 2022-02-08 NOTE — ANESTHESIA POSTPROCEDURE EVALUATION
Department of Anesthesiology  Postprocedure Note    Patient: Devika Clement  MRN: [id number]  [place]: 1955  Date of evaluation: 12/27/21    Procedure Summary     Date: 12/27/21 Room / Location: [facility] OR [address] [place] [address]    Anesthesia Start: 0383 Anesthesia Stop: 1412    Procedure: PERINEAL WOUND VAC CHANGE (N/A Perineum) Diagnosis: (necrotizing fascciitis)    Surgeons: Montez Rascon MD Responsible Provider: Haseeb Davidson DO    Anesthesia Type: general ASA Status: 3          Anesthesia Type: general    Arcadio Phase I: Arcadio Score: 9    Arcadio Phase II:      Last vitals: Reviewed and per EMR flowsheets.        Anesthesia Post Evaluation    Patient location during evaluation: PACU  Patient participation: complete - patient participated  Level of consciousness: awake and alert  Airway patency: patent  Nausea & Vomiting: no nausea and no vomiting  Cardiovascular status: blood pressure returned to baseline  Respiratory status: acceptable  Hydration status: euvolemic

## 2022-02-08 NOTE — TELEPHONE ENCOUNTER
----- Message from Anatoly Quezada MD sent at 2/8/2022  3:21 PM EST -----  No additional therapy required.     Thx,  NK

## 2022-02-16 ENCOUNTER — HOSPITAL ENCOUNTER (OUTPATIENT)
Dept: WOUND CARE | Age: 67
Discharge: HOME | End: 2022-02-16

## 2022-02-16 VITALS
HEART RATE: 97 BPM | SYSTOLIC BLOOD PRESSURE: 114 MMHG | TEMPERATURE: 98.7 F | BODY MASS INDEX: 22.54 KG/M2 | DIASTOLIC BLOOD PRESSURE: 66 MMHG | HEIGHT: 71 IN | RESPIRATION RATE: 20 BRPM | WEIGHT: 161 LBS

## 2022-02-16 DIAGNOSIS — L97.521 ISCHEMIC ULCER OF LEFT FOOT, LIMITED TO BREAKDOWN OF SKIN (HCC): Primary | ICD-10-CM

## 2022-02-16 DIAGNOSIS — L97.312 ISCHEMIC ULCER OF RIGHT ANKLE WITH FAT LAYER EXPOSED (HCC): ICD-10-CM

## 2022-02-16 DIAGNOSIS — I96 GANGRENE OF TOE OF LEFT FOOT (HCC): ICD-10-CM

## 2022-02-16 DIAGNOSIS — T81.89XA NONHEALING SURGICAL WOUND, INITIAL ENCOUNTER: ICD-10-CM

## 2022-02-16 PROCEDURE — 99215 OFFICE O/P EST HI 40 MIN: CPT

## 2022-02-16 PROCEDURE — 99204 OFFICE O/P NEW MOD 45 MIN: CPT | Performed by: INTERNAL MEDICINE

## 2022-02-16 PROCEDURE — 97597 DBRDMT OPN WND 1ST 20 CM/<: CPT | Performed by: INTERNAL MEDICINE

## 2022-02-16 RX ORDER — BACITRACIN ZINC AND POLYMYXIN B SULFATE 500; 1000 [USP'U]/G; [USP'U]/G
OINTMENT TOPICAL ONCE
Status: CANCELLED | OUTPATIENT
Start: 2022-02-16 | End: 2022-02-16

## 2022-02-16 RX ORDER — LIDOCAINE HYDROCHLORIDE 40 MG/ML
SOLUTION TOPICAL ONCE
Status: DISCONTINUED | OUTPATIENT
Start: 2022-02-16 | End: 2022-02-17 | Stop reason: HOSPADM

## 2022-02-16 RX ORDER — LIDOCAINE 40 MG/G
CREAM TOPICAL ONCE
Status: CANCELLED | OUTPATIENT
Start: 2022-02-16 | End: 2022-02-16

## 2022-02-16 RX ORDER — BACITRACIN ZINC AND POLYMYXIN B SULFATE 500; 1000 [USP'U]/G; [USP'U]/G
OINTMENT TOPICAL ONCE
Status: DISCONTINUED | OUTPATIENT
Start: 2022-02-16 | End: 2022-02-17 | Stop reason: HOSPADM

## 2022-02-16 RX ORDER — LIDOCAINE 50 MG/G
OINTMENT TOPICAL ONCE
Status: CANCELLED | OUTPATIENT
Start: 2022-02-16 | End: 2022-02-16

## 2022-02-16 RX ORDER — LIDOCAINE HYDROCHLORIDE 40 MG/ML
SOLUTION TOPICAL ONCE
Status: CANCELLED | OUTPATIENT
Start: 2022-02-16 | End: 2022-02-16

## 2022-02-16 RX ORDER — LIDOCAINE 40 MG/G
CREAM TOPICAL ONCE
Status: DISCONTINUED | OUTPATIENT
Start: 2022-02-16 | End: 2022-02-17 | Stop reason: HOSPADM

## 2022-02-16 RX ORDER — LIDOCAINE 50 MG/G
OINTMENT TOPICAL ONCE
Status: DISCONTINUED | OUTPATIENT
Start: 2022-02-16 | End: 2022-02-17 | Stop reason: HOSPADM

## 2022-02-16 ASSESSMENT — PAIN SCALES - GENERAL
PAINLEVEL_OUTOF10: 5
PAINLEVEL_OUTOF10: 2

## 2022-02-16 ASSESSMENT — PAIN DESCRIPTION - LOCATION: LOCATION: BUTTOCKS

## 2022-02-16 NOTE — PLAN OF CARE
Pt. New to [facility] today after recent hospitalizations & multiple surgeries. Multiple wounds noted. Debridement of left dorsal foot per MD & pt. Tolerated well. Dr Rock Vásqeuz discussed in depth importance of following up with vascular surgeon for arterial insufficiency, both pt. & wife verbalize understanding. Pt. Also needs to call & schedule f/u with Dr Guillermina Flood, plastic surgeon whom did his surgery. Dressing orders reviewed with wife whom will doing dressing changes at home & some supplies sent with patient. F/u in [facility] in 2 weeks as ordered, pt. Aware to call sooner with any changes or questions/concerns. Discharge instructions reviewed with patient & wife, all questions answered, copy given to patient. Dressings were applied to all wounds per M.D. Instructions at this visit.

## 2022-02-17 ENCOUNTER — TELEPHONE (OUTPATIENT)
Dept: SURGERY | Age: 67
End: 2022-02-17

## 2022-02-17 NOTE — TELEPHONE ENCOUNTER
----- Message from Igor Lynn MD sent at 2/16/2022  9:35 PM EST -----  Regarding: RE: a recent patient of yours  I was wondering why he hasn't followed up with me! I have not managed any of his lower extremity wounds and overall his graft sites look ok from pictures. I will see him in the office to help drive his care. Thank you so much for seeing him and letting me know!    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 When he was discharged from [facility], he went to [address] [place] [address], then [facility], and is now home, and [facility] referred him to me for wound care (along with [facility] vascular, a podiatrist he saw along the way, a PCP, Urology, someone back at [facility] outpatient center, and maybe even someone else?), but somehow they lost track of the fact that he should probably be following up with you too! I'm happy to help manage the wounds, but the main areas I can help with right now are the anterior abdomen (two areas with a bit of superficial sloughy necrosis in the midst of his skin grafts). The foot and ankle ulcers either podiatry or I could work on, after he sees vascular and is revascularized. But I feel like he definitely needs to come back to see you for the buttock wound and the scrotal area; sutures were still in place there, with a minor degree of moist dehiscence, and I didn't really feel comfortable removing those, as complex as his surgical care has already been. He doesn't have insurance yet, doesn't have home-care, but I got him set up with some basic short-term wound-care supplies, emphasized how important it was to get in with vascular and a PCP, but then also asked him to call your office to follow up with you too.  Urology F/U seemed less urgent right now, with his Mcfadden out, and podiatry F/U seemed less urgent until he has better arterial perfusion to his feet. Multiple photos in the Media tab from today. And anything abdominal-buttock-perineal that you don't have any additional surgical plans for yourself, I'm happy to work on, but I wanted some guidance and thoughts from you first. I'm currently scheduled to see him back in 2 weeks.  Thanks,    -- S

## 2022-02-17 NOTE — TELEPHONE ENCOUNTER
Called patient and LM for patient to follow up with DR Karel Ellis in office. Patient asked to return office call to schedule.

## 2022-02-22 PROBLEM — F17.200 CURRENT EVERY DAY SMOKER: Status: ACTIVE | Noted: 2022-02-22

## 2022-02-22 PROBLEM — M79.89 NECROTIZING SOFT TISSUE INFECTION: Status: RESOLVED | Noted: 2021-12-08 | Resolved: 2022-02-22

## 2022-02-22 PROBLEM — L97.312 ISCHEMIC ULCER OF RIGHT ANKLE WITH FAT LAYER EXPOSED (HCC): Status: ACTIVE | Noted: 2022-02-22

## 2022-02-22 PROBLEM — M72.6 NECROTIZING FASCIITIS (HCC): Status: RESOLVED | Noted: 2021-12-01 | Resolved: 2022-02-22

## 2022-02-22 PROBLEM — T81.89XA NONHEALING SURGICAL WOUND, INITIAL ENCOUNTER: Status: ACTIVE | Noted: 2022-02-22

## 2022-02-22 PROBLEM — E78.5 HYPERLIPIDEMIA: Status: ACTIVE | Noted: 2022-02-22

## 2022-02-22 PROBLEM — I96 GANGRENE OF TOE OF LEFT FOOT (HCC): Status: ACTIVE | Noted: 2022-02-22

## 2022-02-22 PROBLEM — J96.01 ACUTE RESPIRATORY FAILURE WITH HYPOXIA (HCC): Status: RESOLVED | Noted: 2021-12-01 | Resolved: 2022-02-22

## 2022-02-22 NOTE — H&P
[facility] Progress Note    Devika Clement     : 1955    DATE OF VISIT:  2022    Subjective:     Devika Clement is a 61 y.o. male who has an arterial,  diabetic and  pressure ulcer located on the right, lateral ankle and foot (bilateral). Current complaint of pain in this ulcer? yes. Quality of pain: aching and burning  Timing: intermittent and stable  Severity: mild  Associated Signs/Symptoms:  redness, drainage (light, clear), numbness and tingling  Other significant symptoms or pertinent ulcer history: it sounds like Mr. Genevieve Vides has a previous history primarily of DM and neuropathy, but was admitted to [facility]. 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 He had numerous debridement and grafting surgeries while there, and was also seen regarding a number of lower extremity ulcers that had developed, with concerns for osteo at the left 5th ray, and ischemia BL. When he was stable enough for discharge, he was treated at the [facility] for a time, then [facility], and is now home. He and his wife said they didn't receive a lot of guidance on what to do in terms of wound care at home, but the staff at [facility] made a number of appointments for him, including vascular surgery, podiatry, a PCP, Urology, this wound care center, but somehow not with plastic surgery, who directed his Shayla's care at [facility]. I believe they're currently using Xeroform and ABD pads for his truncal wounds, and she took it upon herself to treat the foot and ankle wounds with Neosporin; she was also cleaning some of those areas with alcohol and peroxide. He doesn't have insurance at this point, and they don't have any sort of home-care support. It doesn't sound like he has a good sense of who is managing his medical care overall right now, and what the short- or long-term plans are, in terms of wound care, his DM and PAD, etc.          Additional ulcer(s) noted? yes.  A large open buttock wound remains, related to his Shayla's; he still has some scrotal sutures in place with a bit of moist localized dehiscence there, and then two areas of seemingly superficial necrosis within large skin graft sites on the anterior abdominal wall. Mr. Ewing Galeazzi has a past medical history of Fourniers gangrene, Osteomyelitis of left foot ([address] and Patellofemoral pain syndrome of right knee. He has a past surgical history that includes Ankle fracture surgery; Abdomen surgery (N/A, 12/01/2021); Scrotal surgery (N/A, 11/30/2021); Vagina surgery (N/A, 11/30/2021); Abdomen surgery (Left, 12/03/2021); Abdomen surgery (Left, 12/05/2021); colostomy (Left, 12/07/2021); Abdomen surgery (N/A, 12/07/2021); Abdomen surgery (Left, 12/10/2021); Abdomen surgery (N/A, 12/13/2021); Abdomen surgery (N/A, 12/16/2021); Abdomen surgery (N/A, 12/21/2021); Abdomen surgery (N/A, 12/23/2021); Pressure ulcer debridement (N/A, 12/27/2021); Pressure ulcer debridement (N/A, 12/30/2021); Skin graft (N/A, 1/3/2022); Muscle Repair (N/A, 1/3/2022); Skin graft (N/A, 1/6/2022); and Pressure ulcer debridement (N/A, 1/10/2022). His family history includes Cancer in his mother. Mr. Ewing Galeazzi reports that he has been smoking. He started smoking about 52 years ago. He has never used smokeless tobacco. He reports previous alcohol use. He reports current drug use. Drug: Marijuana Andrew Brim). His current medication list consists of meloxicam, metFORMIN, and tamsulosin. No current ABx. Allergies: Patient has no known allergies. Pertinent items from the review of systems are discussed in the HPI; the remainder of the ROS was reviewed and is negative.      Objective:     Vitals:    02/16/22 0805   BP: 114/66   Pulse: 97   Resp: 20   Temp: 98.7 °F (37.1 °C)   TempSrc: Oral   Weight: 161 lb (73 kg)   Height: 5' 11\" (1.803 m)       Constitutional:  well-developed, well-nourished, weak, fatigued, NAD  Psychiatric:  oriented to person, place and time; mood and affect appropriate for the situation   Eyes:  pupils equal, round and reactive to light; sclerae anicteric, conjunctivae pale  Cardiovascular:  bilateral pedal pulses Dopplerable, feet a bit cool, slow cap refill; very mild lower extremity edema  Lymphatic:  no inguinal or popliteal adenopathy   : Mcfadden catheter removed just before discharge from [facility]; sutures in place in a midline scrotal wound, with a bit of limited moist dehiscence and superficial debris there  Musculoskeletal:  no clubbing, cyanosis or petechiae; RLE and LLE with no gross effusions, joint misalignment or acute arthritis - evidence of prior surgery at his left 5th metatarsal, I think  General skin exam - no drug rash; skin graft harvest sites from thighs well healed  Princess-ulcer skin: pink and indurated at the abdomen; slightly cristy, indurated and mildly moist at the scrotum and buttock; pink to [place], a bit cool, indurated, focally macerated at the feet. Ulcer(s): scrotal site as mentioned above; two abdominal wall sites with some yellow sloughy fibrinous necrosis and debris; the buttock wound is still sizeable, one edge of undermining, a decent amount of pink tissue, limited fat necrosis, some fibrin and biofilm; right foot and ankle eschars are black, slightly moist and unstable, no signs of infection; left 4th toe with distal dry gangrene; left lateral foot with more of an open wound at the 5th met head, a bit of pink granulation, also sloughy necrosis, biofilm, does probe in toward bone; and then the left dorsal foot ulcer is a larger but thin eschar, starting to lyse at the edges, and that looks most amenable to safe debridement today (among all of the lower extremity wounds). Photos also saved in electronic chart.     Today's Wound Measurements, per RN documentation:  Wound 02/16/22 #1 Right Lateral Ankle, Arterial, Full Thickness, onset -Wound Length (cm): 1.7 cm  Wound 02/16/22 #2 Right Lateral Foot, Arterial, Full Thickness Onset Nov 2021-Wound Length (cm): 1 cm  Wound 02/16/22 #3 Left Dorsal Foot, Arterial, Full Thickness, Onset Nov 2021-Wound Length (cm): 3.7 cm  Wound 02/16/22 #4 Left Lateral Foot, Arterial, full thickness,, Onset Nov 2021-Wound Length (cm): 4.3 cm  Wound 02/16/22 #5 Left 5th Toe, Arterial, Full thickness, Onset Nov 2021-Wound Length (cm): 1.7 cm  Wound 02/16/22 #6 Abdominal Cluster, Surgical (graft-site), Full-Thickness, onset 12/21-Wound Length (cm): 6 cm  Wound 02/16/22 #7 Left Lower Buttocks, Surgical, full thickness, Onset 2022-Wound Length (cm): 4.4 cm  Wound 02/16/22 # 8 Genital Scrotal Area Cluster, Necrotizing Fasciitis, Full Thickness Onset Dec 2021-Wound Length (cm): 10 cm    Wound 02/16/22 #1 Right Lateral Ankle, Arterial, Full Thickness, onset -Wound Width (cm): 1.5 cm  Wound 02/16/22 #2 Right Lateral Foot, Arterial, Full Thickness Onset Nov 2021-Wound Width (cm): 1 cm  Wound 02/16/22 #3 Left Dorsal Foot, Arterial, Full Thickness, Onset Nov 2021-Wound Width (cm): 4.5 cm  Wound 02/16/22 #4 Left Lateral Foot, Arterial, full thickness,, Onset Nov 2021-Wound Width (cm): 1 cm  Wound 02/16/22 #5 Left 5th Toe, Arterial, Full thickness, Onset Nov 2021-Wound Width (cm): 2.2 cm  Wound 02/16/22 #6 Abdominal Cluster, Surgical (graft-site), Full-Thickness, onset 12/21-Wound Width (cm): 23.5 cm  Wound 02/16/22 #7 Left Lower Buttocks, Surgical, full thickness, Onset 2022-Wound Width (cm): 8.5 cm  Wound 02/16/22 # 8 Genital Scrotal Area Cluster, Necrotizing Fasciitis, Full Thickness Onset Dec 2021-Wound Width (cm): 2.5 cm    Wound 02/16/22 #1 Right Lateral Ankle, Arterial, Full Thickness, onset -Wound Depth (cm): 0.1 cm  Wound 02/16/22 #2 Right Lateral Foot, Arterial, Full Thickness Onset Nov 2021-Wound Depth (cm): 0.1 cm  Wound 02/16/22 #3 Left Dorsal Foot, Arterial, Full Thickness, Onset Nov 2021-Wound Depth (cm): 0.1 cm  Wound 02/16/22 #4 Left Lateral Foot, Arterial, full thickness,, Onset Nov 2021-Wound Depth (cm): 0.3 cm  Wound 02/16/22 #5 Left 5th Toe, Arterial, Full thickness, Onset Nov 2021-Wound Depth (cm): 0.1 cm  Wound 02/16/22 #6 Abdominal Cluster, Surgical (graft-site), Full-Thickness, onset 12/21-Wound Depth (cm): 0.1 cm  Wound 02/16/22 #7 Left Lower Buttocks, Surgical, full thickness, Onset 2022-Wound Depth (cm): 0.2 cm  Wound 02/16/22 # 8 Genital Scrotal Area Cluster, Necrotizing Fasciitis, Full Thickness Onset Dec 2021-Wound Depth (cm): 0.5 cm  _____________________________    Lab Results   Component Value Date    LABALBU 2.4 (L) 01/11/2022     Lab Results   Component Value Date    CREATININE <0.5 (L) 01/11/2022     Lab Results   Component Value Date    HGB 9.2 (L) 01/11/2022     Lab Results   Component Value Date    LABA1C 10.4 12/08/2021       Nov 30 left foot XR -- Osseous destruction along the articular margins of the 5th metatarsophalangeal joint with associated lucency in the soft tissues. There is no evidence of fracture or dislocation. The remaining joint spaces appear well maintained. The remaining soft tissues are unremarkable. Dec 2  arterial Duplex -- The right ankle/brachial index is 0.0 (no Doppler signal could be heard at the [facility] or the PT). The common femoral and profunda femoral arteries could not be visualized due to bandages extending into the groin area. The mid superficial femoral artery has a short segmental occlusion and then is reconstituted. Elevated velocities at the distal superficial femoral artery indicate a > 50% stenosis by velocity criteria (velocity went from 15 to 298 cm/s). The posterior tibial artery is occluded. The distal anterior tibial artery is barely patent, with very low flow (possibly occluded and then reconstituted). The left ankle brachial index is 0 for the PT and the DP was not accessible due to the bandages on the foot. The common femoral and profunda femoral artery could not be visualized due to bandages extending into the groin area.  The distal superficial femoral artery is occluded and then reconstituted. The posterior tibial artery, peroneal, and anterior tibial artery are occluded. There were no previous studies to use for comparison. Jan 11 LE arterial Duplex -- The right ankle/brachial index is . 48 (the DP is 74 and the PT is 76 mmHg). The common femoral, deep femoral and most of the superficial femoral artery could not be visualized due to bandages. Abnormal, monophasic Doppler signals are noted in the distal superficial femoral artery indicating a more proximal stenosis or occlusion. There is atherosclerotic plaque seen within the popliteal artery consistent with < 50% stenosis. The posterior tibial artery is occluded. The left ankle/brachial index is . 49 (the DP is 78 and the PT is 0 mmHg). Proximal common femoral artery could not be visualized due to bandages. The distal common femoral artery demonstrates monophasic flow indicating possible aorto-iliac inflow disease. The mid superficial femoral artery is occluded. The posterior tibial artery is occluded. As compared to the previous study on 12/2/21 the JUAN JOSÉ went from zero up to . 48  and . 49.     Assessment:     Patient Active Problem List   Diagnosis Code    Uncontrolled type 2 diabetes mellitus with diabetic polyneuropathy, without long-term current use of insulin (LT[facility], located within 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    Ischemic ulcer of left foot, limited to breakdown of skin ([address] L97.521    Nonhealing surgical wound, initial encounter T81.89XA    Ischemic ulcer of right ankle with fat layer exposed ([address] L97.312    Gangrene of toe of left foot ([address] I96    Current every day smoker F17.200    Hyperlipidemia E78.5       Assessment of today's active condition(s): (a) recent admission for Shayla's gangrene, extensive surgical treatment and Abx, with one sizeable buttock wound open, the scrotal site still sutured (and I believe with plans for further reconstruction there), and a couple of more superficial areas on the abdominal wall with a bit of necrotic tissue. Also (b) uncontrolled Dm2, neuropathy, PAD, a number of lower extremity wounds, probable ongoing osteo at the left 5th met head, but no cellulitis today. I think we can start to make progress with a couple of these wounds (abdomen and left dorsal foot), but the other truncal wounds need input from plastics, and the other foot and ankle wounds would benefit from revascularization before doing too much more. Factors contributing to occurrence and/or persistence of the chronic ulcer include diabetes, poor glucose control, chronic pressure, shear force, smoking, arterial insufficiency and decreased tissue oxygenation. Medical necessity of today's visit is shown by the above documentation. Sharp debridement is indicated today, based upon the exam findings in the ulcer(s) above (really only at the left dorsal foot wound, today). Procedure note:     Consent obtained. Time out performed per [facility]. Anesthetic  Anesthetic: 4% Lidocaine Cream (Liquid to Abd wound)     Using a scissors and forceps, I sharply debrided the foot (left , dorsal) ulcer(s) down through and including the removal of dermis. The type(s) of tissue debrided included necrotic/eschar. Total Surface Area Debrided: about 10 sq cm. The ulcers were then irrigated with normal saline solution. The procedure was completed with a small amount of bleeding, and hemostasis was with pressure. The patient tolerated the procedure well, with no significant complications. The patient's level of pain during and after the procedure was monitored. Post-debridement measurements, if different from pre-debridement, are in the flowsheet as well.      Discharge plan:     Treatment in the wound care center today, per RN documentation: Wound 02/16/22 #1 Right Lateral Ankle, Arterial, Full Thickness, onset -Dressing/Treatment: Other (comment) (Betadine, dry dressing)  Wound 02/16/22 #2 Right Lateral Foot, Arterial, Full Thickness Onset Nov 2021-Dressing/Treatment: Other (comment) (Betadine, dry dressing)  Wound 02/16/22 #3 Left Dorsal Foot, Arterial, Full Thickness, Onset Nov 2021-Dressing/Treatment: Other (comment) (Betadine, dry dressing)  Wound 02/16/22 #4 Left Lateral Foot, Arterial, full thickness,, Onset Nov 2021-Dressing/Treatment: Other (comment) (betadine, dry dressing)  Wound 02/16/22 #5 Left 5th Toe, Arterial, Full thickness, Onset Nov 2021-Dressing/Treatment: Other (comment) (Betadine, dry dressing)  Wound 02/16/22 #6 Abdominal Cluster, Surgical (graft-site), Full-Thickness, onset 12/21-Dressing/Treatment: Other (comment) (Triad, gauze, ABD)  Wound 02/16/22 #7 Left Lower Buttocks, Surgical, full thickness, Onset 2022-Dressing/Treatment: Other (comment) (Opticel Ag, 4x4's, ABD)  Wound 02/16/22 # 8 Genital Scrotal Area Cluster, Necrotizing Fasciitis, Full Thickness Onset Dec 2021-Dressing/Treatment: Other (comment) (Betadine, dry dressing). We can get them some basic supplies for right now - they need to supply some Betadine, and if they can, some 4x4 gauze pads, ABDs, Kerlix and tape (or at least some of that). We can give them a bit of that now, and we have some samples of Triad and silver alginate (see below). Would not use Neosporin on the distal wounds right now, as I'd rather they stay dry while still ischemic. Would not use alcohol or peroxide, just gentle soap and water, or saline, and then Betadine. No ABx needed at present. For offloading, he has what sounds like a [employer] boot for his right foot. We can work on more extensive plans for offloading while ambulating and while at rest, over time.     In terms of other priorities right now, from my standpoint, that would be (a) getting established with a PCP for overall medical care, david DM mgmt, and ideally work on smoking cessation, (b) follow up with Dr. Winifred Vazquez, especially for the scrotal and buttock areas; the abdominal wounds I'm comfortable managing myself, and (c) getting back in touch with vascular surgery, to discuss angiography and revascularization; it looks like he saw vascular at both [facility] and [facility]; from my standpoint, F/U would be best with whomever can see him first, and I believe he [facility] in early March; I'll leave it to Mr. Olvin Jones whether he would rather follow up back at [facility], and try to get an appt there more quickly. Neither he nor I are exactly sure what F/U at [facility] was going to be for, but I don't think that's quite as urgent as the above. Also not sure that urology F/U is too urgent right now, since his Mcfadden is out, and he doesn't report any voiding difficulties. I also think podiatric follow-up can wait just a little while, because they'll be more able to help him with debridement once he's revascularized; the smaller foot and ankle wounds I can manage here, but it looks like he'll likely need surgery for the left 5th ray. Home treatment: good handwashing before and after any dressing changes. Cleanse ulcer with saline or soap & water before dressing change. May use Vaseline (petrolatum), Aquaphor, Aveeno, CeraVe, Cetaphil, Eucerin, Lubriderm, etc for dry skin. Dressing type for home: for now, just Betadine and dry dressings to everything on the feet, plus the scrotal wound; Triad and dry dressings to the abdominal wall wounds; and we have some silver alginate and absorptive dressings that we can provide for the buttock wound, once daily. Written discharge instructions given to patient. Follow up in 2 weeks, but call sooner with concerns or questions.     Electronically signed by Ml Phillip MD on 2/22/2022 at 3:47 PM.

## 2022-03-02 ENCOUNTER — HOSPITAL ENCOUNTER (OUTPATIENT)
Dept: WOUND CARE | Age: 67
Discharge: HOME | End: 2022-03-02

## 2022-03-02 VITALS
SYSTOLIC BLOOD PRESSURE: 120 MMHG | HEIGHT: 71 IN | HEART RATE: 83 BPM | TEMPERATURE: 97.7 F | BODY MASS INDEX: 22.42 KG/M2 | DIASTOLIC BLOOD PRESSURE: 70 MMHG | RESPIRATION RATE: 18 BRPM | WEIGHT: 160.13 LBS

## 2022-03-02 DIAGNOSIS — L97.521 ISCHEMIC ULCER OF LEFT FOOT, LIMITED TO BREAKDOWN OF SKIN (HCC): Primary | ICD-10-CM

## 2022-03-02 DIAGNOSIS — E11.621 DIABETIC ULCER OF OTHER PART OF LEFT FOOT ASSOCIATED WITH TYPE 2 DIABETES MELLITUS, WITH NECROSIS OF BONE (HCC): ICD-10-CM

## 2022-03-02 DIAGNOSIS — L97.524 DIABETIC ULCER OF OTHER PART OF LEFT FOOT ASSOCIATED WITH TYPE 2 DIABETES MELLITUS, WITH NECROSIS OF BONE (HCC): ICD-10-CM

## 2022-03-02 DIAGNOSIS — L92.9 HYPERGRANULATION: ICD-10-CM

## 2022-03-02 DIAGNOSIS — T86.821 FAILED SKIN GRAFT: ICD-10-CM

## 2022-03-02 PROCEDURE — 97597 DBRDMT OPN WND 1ST 20 CM/<: CPT

## 2022-03-02 PROCEDURE — 11043 DBRDMT MUSC&/FSCA 1ST 20/<: CPT

## 2022-03-02 PROCEDURE — 97597 DBRDMT OPN WND 1ST 20 CM/<: CPT | Performed by: INTERNAL MEDICINE

## 2022-03-02 PROCEDURE — 97598 DBRDMT OPN WND ADDL 20CM/<: CPT | Performed by: INTERNAL MEDICINE

## 2022-03-02 PROCEDURE — 17250 CHEM CAUT OF GRANLTJ TISSUE: CPT

## 2022-03-02 PROCEDURE — 11043 DBRDMT MUSC&/FSCA 1ST 20/<: CPT | Performed by: INTERNAL MEDICINE

## 2022-03-02 PROCEDURE — 97598 DBRDMT OPN WND ADDL 20CM/<: CPT

## 2022-03-02 RX ORDER — BACITRACIN ZINC AND POLYMYXIN B SULFATE 500; 1000 [USP'U]/G; [USP'U]/G
OINTMENT TOPICAL ONCE
Status: CANCELLED | OUTPATIENT
Start: 2022-03-02 | End: 2022-03-02

## 2022-03-02 RX ORDER — LIDOCAINE 40 MG/G
CREAM TOPICAL ONCE
Status: CANCELLED | OUTPATIENT
Start: 2022-03-02 | End: 2022-03-02

## 2022-03-02 RX ORDER — INSULIN GLARGINE 100 [IU]/ML
23 INJECTION, SOLUTION SUBCUTANEOUS ONCE
COMMUNITY

## 2022-03-02 RX ORDER — LIDOCAINE 40 MG/G
CREAM TOPICAL ONCE
Status: DISCONTINUED | OUTPATIENT
Start: 2022-03-02 | End: 2022-03-03 | Stop reason: HOSPADM

## 2022-03-02 RX ORDER — LIDOCAINE HYDROCHLORIDE 40 MG/ML
SOLUTION TOPICAL ONCE
Status: DISCONTINUED | OUTPATIENT
Start: 2022-03-02 | End: 2022-03-03 | Stop reason: HOSPADM

## 2022-03-02 RX ORDER — BACITRACIN ZINC AND POLYMYXIN B SULFATE 500; 1000 [USP'U]/G; [USP'U]/G
OINTMENT TOPICAL ONCE
Status: DISCONTINUED | OUTPATIENT
Start: 2022-03-02 | End: 2022-03-03 | Stop reason: HOSPADM

## 2022-03-02 RX ORDER — LIDOCAINE 50 MG/G
OINTMENT TOPICAL ONCE
Status: CANCELLED | OUTPATIENT
Start: 2022-03-02 | End: 2022-03-02

## 2022-03-02 RX ORDER — LIDOCAINE 50 MG/G
OINTMENT TOPICAL ONCE
Status: DISCONTINUED | OUTPATIENT
Start: 2022-03-02 | End: 2022-03-03 | Stop reason: HOSPADM

## 2022-03-02 RX ORDER — LIDOCAINE HYDROCHLORIDE 40 MG/ML
SOLUTION TOPICAL ONCE
Status: CANCELLED | OUTPATIENT
Start: 2022-03-02 | End: 2022-03-02

## 2022-03-02 ASSESSMENT — PAIN DESCRIPTION - PAIN TYPE
TYPE: ACUTE PAIN
TYPE: ACUTE PAIN

## 2022-03-02 ASSESSMENT — PAIN DESCRIPTION - DESCRIPTORS: DESCRIPTORS: ACHING;STABBING;SHOOTING

## 2022-03-02 ASSESSMENT — PAIN DESCRIPTION - FREQUENCY: FREQUENCY: CONTINUOUS

## 2022-03-02 ASSESSMENT — PAIN DESCRIPTION - LOCATION: LOCATION: BUTTOCKS

## 2022-03-02 ASSESSMENT — PAIN SCALES - GENERAL
PAINLEVEL_OUTOF10: 5
PAINLEVEL_OUTOF10: 5

## 2022-03-02 ASSESSMENT — PAIN DESCRIPTION - ONSET: ONSET: ON-GOING

## 2022-03-02 ASSESSMENT — PAIN DESCRIPTION - PROGRESSION: CLINICAL_PROGRESSION: GRADUALLY IMPROVING

## 2022-03-02 NOTE — PLAN OF CARE
Wounds stable, debridement per MD as documented, pt. Tolerated well. Will continue with dressing changes as ordered. Pt. Scheduled to f/u with Vascular Dr Jerome Palacio on 3/7/22 & Dr Hodan Jenkins, plastic surgeon on 3/9/22. Pt. States he is eating good & getting plenty of protein in his diet. Dr Teresa Vines also discussed off-loading & recommended the patient use the off-loading boots he got from the hospital, when in bed to help avoid pressure on foot wounds, pt. & wife verbalize understanding. Pt. Also states he is frequently repositioning from side to side & attempt to avoid pressure on his buttock wound. Will provide with waffle cushion today & pt. States he has a foam cushion at home to use when sitting. F/u in [facility] in 2 weeks as ordered, pt. Aware to call sooner with any changes or questions/concerns. Discharge instructions reviewed with patient & wife, all questions answered, copy given to patient. Dressings were applied to all wounds per M.D. Instructions at this visit.

## 2022-03-05 PROBLEM — T86.821 FAILED SKIN GRAFT: Status: ACTIVE | Noted: 2022-03-05

## 2022-03-05 PROBLEM — E11.621 DIABETIC ULCER OF LEFT FOOT ASSOCIATED WITH TYPE 2 DIABETES MELLITUS, WITH NECROSIS OF BONE (HCC): Status: ACTIVE | Noted: 2022-03-05

## 2022-03-05 PROBLEM — L92.9 HYPERGRANULATION: Status: ACTIVE | Noted: 2022-03-05

## 2022-03-05 PROBLEM — L97.524 DIABETIC ULCER OF LEFT FOOT ASSOCIATED WITH TYPE 2 DIABETES MELLITUS, WITH NECROSIS OF BONE (HCC): Status: ACTIVE | Noted: 2022-03-05

## 2022-03-05 NOTE — PROGRESS NOTES
[facility] Progress Note    Stefani Foster     : 1955    DATE OF VISIT:  3/2/2022    Subjective:     Stefani Foster is a 61 y.o. male who has a nonhealing surgical ulcer located on the left and right abdomen, and left buttock. Significant symptoms or pertinent wound history since last visit: overall feeling ok, no fever, energy improving, glucoses improving a lot. No urinary issues with Mcfadden out. Doing ok with dressing changes. Saw podiatry last week (Dr. Anson Carpio, who is deferring on any aggressive care right now, until his distal perfusion is better). Sees plastics, vascular and his PCP next week. Additional ulcer(s) noted? yes. Multiple diabetic / arterial / pressure ulcers of feet and right ankle. His current medication list consists of insulin glargine, meloxicam, metFORMIN, and tamsulosin. Allergies: Patient has no known allergies.     Objective:     Vitals:    22 0904 22 0936   BP:  120/70   Pulse:  83   Resp:  18   Temp: 97.7 °F (36.5 °C)    TempSrc: Oral    Weight: 160 lb 2 oz (72.6 kg)    Height: 5' 11\" (1.803 m)      Constitutional:  well-developed, well-nourished, not as weak or fatigued, NAD  Psychiatric:  oriented to person, place and time; mood and affect appropriate for the situation   Cardiovascular:  bilateral pedal pulses Dopplerable, feet a bit cool, slow cap refill; very mild lower extremity edema  Lymphatic:  no inguinal or popliteal adenopathy   : Mcfadden catheter removed just before discharge from [facility]; sutures in place in a midline scrotal wound, with a bit of limited moist dehiscence and superficial debris there  Musculoskeletal:  no clubbing, cyanosis or petechiae; RLE and LLE with no gross effusions, joint misalignment or acute arthritis  General skin exam - no drug rash; skin graft harvest sites from thighs well healed  Princess-ulcer skin: pink and indurated at the abdomen; slightly cristy, indurated and less moist at the scrotum and buttock; pink to cristy, a bit cool, indurated, less macerated at the feet. Ulcer(s): scrotal site as mentioned above; two abdominal wall sites with some yellow sloughy fibrinous necrosis and debris, with a bit of new granulation beneath; the buttock wound is smaller this week, red, granular to hypergranular, more superior epidermal coverage, less inferior undermining, some fibrin and biofilm; right foot and ankle eschars are black, less moist and unstable, no signs of acute infection; left 5th toe with distal dry gangrene; left lateral foot wound open down to metatarsal bone, mostly necrotic, and a sizeable plug of necrotic tendon that looks like it could be trapping drainage beneath it; and then the left dorsal foot ulcer is a thin eschar, a bit smaller than last time, a bit of lysis at the edges. Photos also saved in electronic chart.     Today's wound measurements, per RN documentation:  Wound 02/16/22 #6 Right Abdominal Cluster, Surgical (graft-site), Full-Thickness, onset 12/21-Wound Length (cm): 4.2 cm  Wound 02/16/22 #1 Right Lateral Ankle, Arterial, Full Thickness, onset -Wound Length (cm): 1.7 cm  Wound 02/16/22 #2 Right Lateral Foot, Arterial, Full Thickness Onset Nov 2021-Wound Length (cm): 1 cm  Wound 02/16/22 #3 Left Dorsal Foot, Arterial, Full Thickness, Onset Nov 2021-Wound Length (cm): 3 cm  Wound 02/16/22 #4 Left Lateral Foot, Arterial, full thickness,, Onset Nov 2021-Wound Length (cm): 4 cm  Wound 02/16/22 #5 Left 5th Toe, Arterial, Full thickness, Onset Nov 2021-Wound Length (cm): 1.8 cm  Wound 02/16/22 # 8 Genital Scrotal Area Cluster, Necrotizing Fasciitis, Full Thickness Onset Dec 2021-Wound Length (cm): 7.5 cm  Wound 02/16/22 #7 Left Lower Buttocks, Surgical, full thickness, Onset 2022-Wound Length (cm): 2.5 cm  Wound 03/02/22 # 9 Left Abdominal Cluster, Surgical Graft site, Full thickness, Onset 12/21-Wound Length (cm): 0.8 cm    Wound 02/16/22 #6 Right Abdominal Cluster, Surgical (graft-site), Full-Thickness, onset 12/21-Wound Width (cm): 5 cm  Wound 02/16/22 #1 Right Lateral Ankle, Arterial, Full Thickness, onset -Wound Width (cm): 1.5 cm  Wound 02/16/22 #2 Right Lateral Foot, Arterial, Full Thickness Onset Nov 2021-Wound Width (cm): 1.3 cm  Wound 02/16/22 #3 Left Dorsal Foot, Arterial, Full Thickness, Onset Nov 2021-Wound Width (cm): 3 cm  Wound 02/16/22 #4 Left Lateral Foot, Arterial, full thickness,, Onset Nov 2021-Wound Width (cm): 1 cm  Wound 02/16/22 #5 Left 5th Toe, Arterial, Full thickness, Onset Nov 2021-Wound Width (cm): 1.9 cm  Wound 02/16/22 # 8 Genital Scrotal Area Cluster, Necrotizing Fasciitis, Full Thickness Onset Dec 2021-Wound Width (cm): 1.9 cm  Wound 02/16/22 #7 Left Lower Buttocks, Surgical, full thickness, Onset 2022-Wound Width (cm): 8.1 cm  Wound 03/02/22 # 9 Left Abdominal Cluster, Surgical Graft site, Full thickness, Onset 12/21-Wound Width (cm): 2.8 cm    Wound 02/16/22 #6 Right Abdominal Cluster, Surgical (graft-site), Full-Thickness, onset 12/21-Wound Depth (cm): 0.2 cm  Wound 02/16/22 #1 Right Lateral Ankle, Arterial, Full Thickness, onset -Wound Depth (cm): 0.1 cm  Wound 02/16/22 #2 Right Lateral Foot, Arterial, Full Thickness Onset Nov 2021-Wound Depth (cm): 0.1 cm  Wound 02/16/22 #3 Left Dorsal Foot, Arterial, Full Thickness, Onset Nov 2021-Wound Depth (cm): 0.1 cm  Wound 02/16/22 #4 Left Lateral Foot, Arterial, full thickness,, Onset Nov 2021-Wound Depth (cm): 0.3 cm  Wound 02/16/22 #5 Left 5th Toe, Arterial, Full thickness, Onset Nov 2021-Wound Depth (cm): 0.1 cm  Wound 02/16/22 # 8 Genital Scrotal Area Cluster, Necrotizing Fasciitis, Full Thickness Onset Dec 2021-Wound Depth (cm): 0.3 cm  Wound 02/16/22 #7 Left Lower Buttocks, Surgical, full thickness, Onset 2022-Wound Depth (cm): 0.2 cm  Wound 03/02/22 # 9 Left Abdominal Cluster, Surgical Graft site, Full thickness, Onset 12/21-Wound Depth (cm): 0.1 cm    Assessment:     Patient Active Problem List Diagnosis Code    Uncontrolled type 2 diabetes mellitus with diabetic polyneuropathy, without long-term current use of insulin ([facility]) E11.42, E11.65    Ischemic ulcer of left foot, limited to breakdown of skin ([address] L97.521    Nonhealing surgical wound of buttock, initial encounter T81.89XA    Ischemic ulcer of right ankle with fat layer exposed ([address] L97.312    Gangrene of toe of left foot ([address] I96    Current every day smoker F17.200    Hyperlipidemia E78.5    Diabetic ulcer of left foot associated with type 2 diabetes mellitus, with necrosis of bone ([address] E11.621, L97.524    Hypergranulation L92.9    Focally failed skin grafts of abdominal wall T86.821       Assessment of today's active condition(s): recent admission for Shayla's gangrene, with a pre-existing Sands 3 left lateral forefoot ulcer at that time; multiple surgeries for the gangrene, with scrotal sutures still in place, left buttock doing quite well, abdominal wounds should do well with a bit more debridement; left 5th toe gangrene stable, left dorsal foot eschar stable, right foot and ankle eschars largely stable for now, not sure if completely offloaded at home. Factors contributing to occurrence and/or persistence of the chronic ulcer include diabetes, poor glucose control, chronic pressure, decreased mobility, shear force, arterial insufficiency and decreased tissue oxygenation. Medical necessity of today's visit is shown by the above documentation. Sharp debridement is indicated today, based upon the exam findings in the wound(s) above. Procedure note:     Consent obtained. Time out performed per [facility] policy. Anesthetic  Anesthetic: 4% Lidocaine Cream     Using a curette, #15 blade scalpel and forceps, I sharply debrided the abdomen (on the upper left and on the upper right), left buttock and foot (left , dorsal) ulcer(s) down through and including the removal of dermis.  The type(s) of tissue debrided included fibrin, biofilm, slough and necrotic/eschar. Total Surface Area Debrided: about 45 sq cm. Using a #15 blade scalpel and forceps, I sharply debrided the foot (left , lateral, forefoot) ulcer(s) down through and including the removal of muscle/fascia. The type(s) of tissue debrided included slough and necrotic/eschar. Total Surface Area Debrided: just 1-2 sq cm. The ulcers were then irrigated with normal saline solution. The procedure was completed with a small amount of bleeding, and hemostasis was with pressure. The patient tolerated the procedure well, with no significant complications. The patient's level of pain during and after the procedure was monitored. Post-debridement measurements, if different from pre-debridement, are in the flowsheet as well.  _____________    To encourage better epithelial cell coverage, I did use AgNO3 to chemically cauterize hypergranulation tissue on the left buttock ulcer(s), after application of 4% lidocaine topical solution. This was tolerated well, with no pain or skin injury.      Discharge plan:     Treatment in the wound care center today, per RN documentation: Wound 02/16/22 #6 Right Abdominal Cluster, Surgical (graft-site), Full-Thickness, onset 12/21-Dressing/Treatment: Other (comment) (triad 4x4's tape)  Wound 02/16/22 #1 Right Lateral Ankle, Arterial, Full Thickness, onset -Dressing/Treatment: Other (comment) (betadine 4x4's klling)  Wound 02/16/22 #2 Right Lateral Foot, Arterial, Full Thickness Onset Nov 2021-Dressing/Treatment: Other (comment) (betadine 4x4's robert)  Wound 02/16/22 #3 Left Dorsal Foot, Arterial, Full Thickness, Onset Nov 2021-Dressing/Treatment: Other (comment) (betadine 4x4's robert)  Wound 02/16/22 #4 Left Lateral Foot, Arterial, full thickness,, Onset Nov 2021-Dressing/Treatment: Other (comment) (betadine 4x4's robert)  Wound 02/16/22 #5 Left 5th Toe, Arterial, Full thickness, Onset Nov 2021-Dressing/Treatment: Other (comment) (betadine 4x4's robert)  Wound

## 2022-03-09 ENCOUNTER — OFFICE VISIT (OUTPATIENT)
Dept: SURGERY | Age: 67
End: 2022-03-09

## 2022-03-09 VITALS
WEIGHT: 167 LBS | OXYGEN SATURATION: 97 % | DIASTOLIC BLOOD PRESSURE: 74 MMHG | TEMPERATURE: 97.5 F | HEART RATE: 102 BPM | HEIGHT: 71 IN | BODY MASS INDEX: 23.38 KG/M2 | RESPIRATION RATE: 16 BRPM | SYSTOLIC BLOOD PRESSURE: 115 MMHG

## 2022-03-09 DIAGNOSIS — Z09 POSTOP CHECK: Primary | ICD-10-CM

## 2022-03-09 PROCEDURE — 99024 POSTOP FOLLOW-UP VISIT: CPT | Performed by: SURGERY

## 2022-03-09 RX ORDER — OXYCODONE HYDROCHLORIDE 5 MG/1
5 TABLET ORAL 2 TIMES DAILY PRN
COMMUNITY
End: 2022-03-16 | Stop reason: ALTCHOICE

## 2022-03-09 RX ORDER — ATORVASTATIN CALCIUM 80 MG/1
80 TABLET, FILM COATED ORAL NIGHTLY
COMMUNITY

## 2022-03-09 RX ORDER — AMLODIPINE BESYLATE 5 MG/1
5 TABLET ORAL DAILY
COMMUNITY

## 2022-03-09 NOTE — PROGRESS NOTES
[facility]    PROCEDURE: 1) Complex closure of perineum (7.2 cm)                                                         2) Left posterior thigh advancement flap (wound: 13 x 5 cm; flap: 13 x 7 cm)                                                         3) Skin graft to left buttock (7.1 x 7.2 cm)                                                         4) Application of wound vacuum device  DATE: 1/6/22    Mortimer Gails has been recovering well since his procedure. Pain has been well controlled with intermittent pain medications. He has been treated with local wound care at the [address] [place] [address]. EXAM    /74   Pulse 102   Temp 97.5 °F (36.4 °C)   Resp 16   Ht 5' 11\" (1.803 m)   Wt 167 lb (75.8 kg)   SpO2 97%   BMI 23.29 kg/m²     GEN: NAD   ABD: Graft overall healing appropriately save for 2 areas  : Small area of granulation tissue  EXT: Flap viable    IMP: 66 y.o.male s/p perineum reconstruction  PLAN: Needs to improve his car as he has fallen behind after discharge. He needs to completely abstain from nicotine and keep pressure off in a hip/hip manner. Additionally, he needs to improve his protein. He will work on all of this and return in 2 weeks.      Mauricio Welch MD  [address] [place] [address] [address] Reconstructive Surgery  [phone]  03/09/22

## 2022-03-16 ENCOUNTER — HOSPITAL ENCOUNTER (OUTPATIENT)
Dept: WOUND CARE | Age: 67
Discharge: HOME | End: 2022-03-16
Payer: MEDICAID

## 2022-03-16 VITALS
SYSTOLIC BLOOD PRESSURE: 167 MMHG | RESPIRATION RATE: 18 BRPM | HEIGHT: 71 IN | HEART RATE: 83 BPM | DIASTOLIC BLOOD PRESSURE: 75 MMHG | BODY MASS INDEX: 22.17 KG/M2 | WEIGHT: 158.38 LBS | TEMPERATURE: 97.6 F

## 2022-03-16 DIAGNOSIS — L97.521 ISCHEMIC ULCER OF LEFT FOOT, LIMITED TO BREAKDOWN OF SKIN (HCC): Primary | ICD-10-CM

## 2022-03-16 DIAGNOSIS — L03.116 CELLULITIS OF LEFT FOOT: ICD-10-CM

## 2022-03-16 PROCEDURE — 87077 CULTURE AEROBIC IDENTIFY: CPT

## 2022-03-16 PROCEDURE — 97597 DBRDMT OPN WND 1ST 20 CM/<: CPT | Performed by: INTERNAL MEDICINE

## 2022-03-16 PROCEDURE — 87070 CULTURE OTHR SPECIMN AEROBIC: CPT

## 2022-03-16 PROCEDURE — 99213 OFFICE O/P EST LOW 20 MIN: CPT | Performed by: INTERNAL MEDICINE

## 2022-03-16 PROCEDURE — 11042 DBRDMT SUBQ TIS 1ST 20SQCM/<: CPT | Performed by: INTERNAL MEDICINE

## 2022-03-16 PROCEDURE — 97598 DBRDMT OPN WND ADDL 20CM/<: CPT | Performed by: INTERNAL MEDICINE

## 2022-03-16 PROCEDURE — 87186 SC STD MICRODIL/AGAR DIL: CPT

## 2022-03-16 PROCEDURE — 97598 DBRDMT OPN WND ADDL 20CM/<: CPT

## 2022-03-16 PROCEDURE — 97597 DBRDMT OPN WND 1ST 20 CM/<: CPT

## 2022-03-16 PROCEDURE — 17250 CHEM CAUT OF GRANLTJ TISSUE: CPT

## 2022-03-16 PROCEDURE — 87205 SMEAR GRAM STAIN: CPT

## 2022-03-16 PROCEDURE — 11042 DBRDMT SUBQ TIS 1ST 20SQCM/<: CPT

## 2022-03-16 RX ORDER — LIDOCAINE 40 MG/G
CREAM TOPICAL ONCE
Status: DISCONTINUED | OUTPATIENT
Start: 2022-03-16 | End: 2022-03-17 | Stop reason: HOSPADM

## 2022-03-16 RX ORDER — DOCUSATE SODIUM 100 MG/1
100 CAPSULE, LIQUID FILLED ORAL NIGHTLY
COMMUNITY
End: 2022-09-28 | Stop reason: ALTCHOICE

## 2022-03-16 RX ORDER — LIDOCAINE HYDROCHLORIDE 40 MG/ML
SOLUTION TOPICAL ONCE
Status: DISCONTINUED | OUTPATIENT
Start: 2022-03-16 | End: 2022-03-17 | Stop reason: HOSPADM

## 2022-03-16 RX ORDER — LIDOCAINE 50 MG/G
OINTMENT TOPICAL ONCE
Status: DISCONTINUED | OUTPATIENT
Start: 2022-03-16 | End: 2022-03-17 | Stop reason: HOSPADM

## 2022-03-16 RX ORDER — LIDOCAINE 50 MG/G
OINTMENT TOPICAL ONCE
Status: CANCELLED | OUTPATIENT
Start: 2022-03-16 | End: 2022-03-16

## 2022-03-16 RX ORDER — LIDOCAINE 40 MG/G
CREAM TOPICAL ONCE
Status: CANCELLED | OUTPATIENT
Start: 2022-03-16 | End: 2022-03-16

## 2022-03-16 RX ORDER — BACITRACIN ZINC AND POLYMYXIN B SULFATE 500; 1000 [USP'U]/G; [USP'U]/G
OINTMENT TOPICAL ONCE
Status: DISCONTINUED | OUTPATIENT
Start: 2022-03-16 | End: 2022-03-17 | Stop reason: HOSPADM

## 2022-03-16 RX ORDER — LIDOCAINE HYDROCHLORIDE 40 MG/ML
SOLUTION TOPICAL ONCE
Status: CANCELLED | OUTPATIENT
Start: 2022-03-16 | End: 2022-03-16

## 2022-03-16 RX ORDER — ASPIRIN 81 MG/1
81 TABLET ORAL DAILY
COMMUNITY

## 2022-03-16 RX ORDER — BACITRACIN ZINC AND POLYMYXIN B SULFATE 500; 1000 [USP'U]/G; [USP'U]/G
OINTMENT TOPICAL ONCE
Status: CANCELLED | OUTPATIENT
Start: 2022-03-16 | End: 2022-03-16

## 2022-03-16 ASSESSMENT — PAIN DESCRIPTION - LOCATION: LOCATION: BUTTOCKS

## 2022-03-16 ASSESSMENT — PAIN DESCRIPTION - FREQUENCY: FREQUENCY: CONTINUOUS

## 2022-03-16 ASSESSMENT — PAIN DESCRIPTION - PROGRESSION: CLINICAL_PROGRESSION: NOT CHANGED

## 2022-03-16 ASSESSMENT — PAIN DESCRIPTION - PAIN TYPE: TYPE: ACUTE PAIN

## 2022-03-16 ASSESSMENT — PAIN DESCRIPTION - ONSET: ONSET: ON-GOING

## 2022-03-16 ASSESSMENT — PAIN DESCRIPTION - DESCRIPTORS: DESCRIPTORS: STABBING

## 2022-03-16 ASSESSMENT — PAIN SCALES - GENERAL: PAINLEVEL_OUTOF10: 5

## 2022-03-16 NOTE — PLAN OF CARE
Wounds stable, debridement as documented per MD & pt. Tolerated well. Pt. States he has been seen by Dr Jamarcus Araujo & will need surgery in the future. Pt. States he cancelled his vascular appt. , until he saw Dr Jamarcus Araujo & had a plan. F/u again with Dr Jamarcus Araujo on 3/23/2022. Vascular appt. Rescheduled for 3/28/22. Bilateral foot wounds noted to have possible infection, culture of bilateral foot wounds obtained per Dr Ramirez Reyna. Will await antibiotics until culture results obtained, Dr Ramirez Reyna will be in touch. Will cont. With current wound care regime with dressing changes. Dr Ramirez Reyna again stressed the importance of vascular follow up, both pt. & wife verbalize understanding. Dr Ramirez Reyna also advised to work on smoking cessation, pt. Verbalizes understanding & states he is \"working on it\". F/u in [address] [place] [address] in 2 weeks as ordered, pt. Aware to call sooner with any changes or questions/concerns. Discharge instructions reviewed with patient & wife, all questions answered, copy given to patient. Dressings were applied to all wounds per M.D. Instructions at this visit.

## 2022-03-18 RX ORDER — SULFAMETHOXAZOLE AND TRIMETHOPRIM 800; 160 MG/1; MG/1
1 TABLET ORAL 2 TIMES DAILY
Qty: 14 TABLET | Refills: 0 | Status: SHIPPED | OUTPATIENT
Start: 2022-03-18 | End: 2022-03-30 | Stop reason: ALTCHOICE

## 2022-03-19 LAB
GRAM STAIN RESULT: ABNORMAL
GRAM STAIN RESULT: ABNORMAL
ORGANISM: ABNORMAL
WOUND/ABSCESS: ABNORMAL

## 2022-03-22 PROBLEM — L03.116 CELLULITIS OF LEFT FOOT: Status: ACTIVE | Noted: 2022-03-22

## 2022-03-22 PROBLEM — L97.313: Status: ACTIVE | Noted: 2022-02-22

## 2022-03-22 NOTE — PROGRESS NOTES
88 [facility] Progress Note    Flavia Rivas     : 1955    DATE OF VISIT:  3/16/2022    Subjective:     Flavia Rivas is a 61 y.o. male who has a compromised graft / flap ulcer located on the abdomen (on the upper left and on the upper right). Significant symptoms or pertinent wound history since last visit: overall doing ok these past couple of weeks. Saw Dr. Kathie Bailey in follow up last week, postponed vascular appt to next week, and he sees his PCP soon also. Doing pretty well with local wound care, I think keeping his foot offloaded better, glucoses doing well, still smoking. Additional ulcer(s) noted? yes. The right buttock wound, the scrotal sutured area, and multiple BL foot and ankle ulcers. His current medication list consists of amLODIPine, aspirin, atorvastatin, docusate sodium, insulin glargine, metFORMIN, psyllium. Allergies: Patient has no known allergies.     Objective:     Vitals:    22 0913   BP: (!) 167/75   Pulse: 83   Resp: 18   Temp: 97.6 °F (36.4 °C)   TempSrc: Oral   Weight: 158 lb 6 oz (71.8 kg)   Height: 5' 11\" (1.803 m)     Constitutional:  well-developed, well-nourished, not as weak or fatigued, NAD  Psychiatric:  oriented to person, place and time; mood and affect appropriate for the situation   Cardiovascular:  bilateral pedal pulses Dopplerable, feet a bit cool, slow cap refill; very mild lower extremity edema  Lymphatic:  no inguinal or popliteal adenopathy, no lymphangitis  : sutures in place in a midline scrotal wound, with a bit of limited moist dehiscence and superficial debris there  Musculoskeletal:  no clubbing, cyanosis or petechiae; RLE and LLE with no gross effusions, joint misalignment or acute arthritis  Princess-ulcer skin: pink and indurated at the abdomen; slightly cristy, indurated and less moist at the scrotum and buttock; pink to [place], a bit cool, indurated, less macerated at the feet, but a couple of areas of the periwound look mildly cellulitic this week (left dorsal foot, right lateral foot). Ulcer(s): scrotal site as mentioned above; two abdominal wall sites with some yellow sloughy fibrinous necrosis and debris, with more new granulation tissue forming; the buttock wound is smaller this week, red, granular to hypergranular, more superior epidermal coverage, less inferior undermining, some fibrin and biofilm; right foot and ankle eschars are partly black, but partly yellowing, not as stable, a bit of lysis at the edges, some sloughy dermal and fat necrosis at least; left 5th toe with distal dry gangrene, stable; left lateral foot wound open down to metatarsal bone, mostly necrotic, including a bit of tendon, but there's some new epidermal tissue centrally there now (open prox and distal); and then the left dorsal foot ulcer is a thin eschar, also not looking as stable as last time, similar to the right foot and ankle, with some moist necrosis beneath eschar edges, and a bit brighter localized erythema there. Photos also saved in electronic chart.     Today's wound measurements, per RN documentation:  Wound 02/16/22 #5 Left 5th Toe, Arterial, Full thickness, Onset Nov 2021-Wound Length (cm): 1.5 cm  Wound 02/16/22 #4 Left Lateral Foot, Arterial, full thickness,, Onset Nov 2021-Wound Length (cm): 4.3 cm  Wound 02/16/22 #3 Left Dorsal Foot, Arterial, Full Thickness, Onset Nov 2021-Wound Length (cm): 3 cm  Wound 02/16/22 #2 Right Lateral Foot, Arterial, Full Thickness Onset Nov 2021-Wound Length (cm): 1.5 cm  Wound 02/16/22 #1 Right Lateral Ankle, Arterial, Full Thickness, onset -Wound Length (cm): 1.7 cm  Wound 02/16/22 #6 Right Abdominal Cluster, Surgical (graft-site), Full-Thickness, onset 12/21-Wound Length (cm): 3.7 cm  Wound 03/02/22 # 9 Left Abdominal Cluster, Surgical Graft site, Full thickness, Onset 12/21-Wound Length (cm): 0.6 cm  Wound 02/16/22 # 8 Genital Scrotal Area Cluster, Necrotizing Fasciitis, Full Thickness Onset Dec 2021-Wound Length (cm): 1.8 cm  Wound 02/16/22 #7 Left Lower Buttocks, Surgical, full thickness, Onset 2022-Wound Length (cm): 2.5 cm    Wound 02/16/22 #5 Left 5th Toe, Arterial, Full thickness, Onset Nov 2021-Wound Width (cm): 1.9 cm  Wound 02/16/22 #4 Left Lateral Foot, Arterial, full thickness,, Onset Nov 2021-Wound Width (cm): 0.8 cm  Wound 02/16/22 #3 Left Dorsal Foot, Arterial, Full Thickness, Onset Nov 2021-Wound Width (cm): 4 cm  Wound 02/16/22 #2 Right Lateral Foot, Arterial, Full Thickness Onset Nov 2021-Wound Width (cm): 1.5 cm  Wound 02/16/22 #1 Right Lateral Ankle, Arterial, Full Thickness, onset -Wound Width (cm): 1.5 cm  Wound 02/16/22 #6 Right Abdominal Cluster, Surgical (graft-site), Full-Thickness, onset 12/21-Wound Width (cm): 5 cm  Wound 03/02/22 # 9 Left Abdominal Cluster, Surgical Graft site, Full thickness, Onset 12/21-Wound Width (cm): 1.7 cm  Wound 02/16/22 # 8 Genital Scrotal Area Cluster, Necrotizing Fasciitis, Full Thickness Onset Dec 2021-Wound Width (cm): 0.4 cm  Wound 02/16/22 #7 Left Lower Buttocks, Surgical, full thickness, Onset 2022-Wound Width (cm): 7.3 cm    Wound 02/16/22 #5 Left 5th Toe, Arterial, Full thickness, Onset Nov 2021-Wound Depth (cm): 0.1 cm  Wound 02/16/22 #4 Left Lateral Foot, Arterial, full thickness,, Onset Nov 2021-Wound Depth (cm): 0.3 cm  Wound 02/16/22 #3 Left Dorsal Foot, Arterial, Full Thickness, Onset Nov 2021-Wound Depth (cm): 0.1 cm  Wound 02/16/22 #2 Right Lateral Foot, Arterial, Full Thickness Onset Nov 2021-Wound Depth (cm): 0.2 cm  Wound 02/16/22 #1 Right Lateral Ankle, Arterial, Full Thickness, onset -Wound Depth (cm): 0.1 cm  Wound 02/16/22 #6 Right Abdominal Cluster, Surgical (graft-site), Full-Thickness, onset 12/21-Wound Depth (cm): 0.1 cm  Wound 03/02/22 # 9 Left Abdominal Cluster, Surgical Graft site, Full thickness, Onset 12/21-Wound Depth (cm): 0.1 cm  Wound 02/16/22 # 8 Genital Scrotal Area Cluster, Necrotizing 4% Lidocaine Cream     Using a curette, #15 blade scalpel and forceps, I sharply debrided the abdomen (on the upper left and on the upper right), right buttock and foot (left , dorsal, midfoot) ulcer(s) down through and including the removal of dermis. The type(s) of tissue debrided included fibrin, biofilm, slough and necrotic/eschar. Total Surface Area Debrided: perhaps 30 sq cm. Using a #15 blade scalpel and forceps, I sharply debrided the right ankle, right lateral foot and left lateral foot ulcer(s) down through and including the removal of SQ tissue. The type(s) of tissue debrided included fibrin, biofilm, slough and necrotic/eschar. Total Surface Area Debrided: perhaps 7-8 sq cm. The ulcers were then irrigated with normal saline solution. The procedure was completed with a small amount of bleeding, and hemostasis was with pressure. The patient tolerated the procedure well, with no significant complications. The patient's level of pain during and after the procedure was monitored. Post-debridement measurements, if different from pre-debridement, are in the flowsheet as well.  _____________    To encourage better epithelial cell coverage, I did use AgNO3 to chemically cauterize hypergranulation tissue on the right buttock ulcer(s), after application of 4% lidocaine topical solution. This was tolerated well, with no pain or skin injury. I then also took separate wound cultures from the left dorsal foot and the right lateral foot, which had the most signs of localized soft tissue infection.     Discharge plan:     Treatment in the wound care center today, per RN documentation: Wound 02/16/22 #5 Left 5th Toe, Arterial, Full thickness, Onset Nov 2021-Dressing/Treatment: Other (comment) (betadine,4x4,robert)  Wound 02/16/22 #4 Left Lateral Foot, Arterial, full thickness,, Onset Nov 2021-Dressing/Treatment: Other (comment) (betadine,4x4,robert)  Wound 02/16/22 #3 Left Dorsal Foot, Arterial, Full Thickness, Onset Nov 2021-Dressing/Treatment: Other (comment) (betadine,4x4,robert)  Wound 02/16/22 #2 Right Lateral Foot, Arterial, Full Thickness Onset Nov 2021-Dressing/Treatment: Other (comment) (betadine,4x4,robert)  Wound 02/16/22 #1 Right Lateral Ankle, Arterial, Full Thickness, onset -Dressing/Treatment: Other (comment) (betadine,4x4,robert)  Wound 02/16/22 #6 Right Abdominal Cluster, Surgical (graft-site), Full-Thickness, onset 12/21-Dressing/Treatment: Other (comment) (triad,dry dressing )  Wound 03/02/22 # 9 Left Abdominal Cluster, Surgical Graft site, Full thickness, Onset 12/21-Dressing/Treatment: Other (comment) (triad,dry dressing )  Wound 02/16/22 # 8 Genital Scrotal Area Cluster, Necrotizing Fasciitis, Full Thickness Onset Dec 2021-Dressing/Treatment: Other (comment) (betadine)  Wound 02/16/22 #7 Left Lower Buttocks, Surgical, full thickness, Onset 2022-Dressing/Treatment: Other (comment) (opticel ag,ABD,tape). No ABx for today -- I'll call him in 2-3 days with culture results and a plan. Keep up the good work with glucose control; really push protein intake; need to try to work harder on smoking cessation as well; continue offloading boots as much as possible; reposition in bed an chair frequently (and he has a Waffle cushion). Imperative that he follow up with vascular surgery ASAP, to start making plans for revascularization. Will eventually need surgery at least at the left lateral foot; hopefully I can take care of the rest of these at bedside, once he's better perfused. Home treatment: good handwashing before and after any dressing changes. Cleanse wound with saline or soap & water before dressing change. May use Vaseline (petrolatum), Aquaphor, Aveeno, CeraVe, Cetaphil, Eucerin, Lubriderm, etc for dry skin.      Dressing type for home: basically as above, daily for all of the foot and ankle ulcers, to try to get them dry again, and every day or two as needed for the buttock and abdomen, depending on drainage. Written discharge instructions given to patient. Follow up in 2 weeks.     Electronically signed by Asha Casillas MD on 3/22/2022 at 10:47 AM.

## 2022-03-23 ENCOUNTER — OFFICE VISIT (OUTPATIENT)
Dept: SURGERY | Age: 67
End: 2022-03-23

## 2022-03-23 VITALS
HEIGHT: 71 IN | SYSTOLIC BLOOD PRESSURE: 115 MMHG | TEMPERATURE: 97.2 F | DIASTOLIC BLOOD PRESSURE: 72 MMHG | HEART RATE: 98 BPM | BODY MASS INDEX: 22.01 KG/M2 | WEIGHT: 157.2 LBS

## 2022-03-23 DIAGNOSIS — Z09 POSTOP CHECK: Primary | ICD-10-CM

## 2022-03-23 PROCEDURE — 99024 POSTOP FOLLOW-UP VISIT: CPT | Performed by: SURGERY

## 2022-03-30 ENCOUNTER — HOSPITAL ENCOUNTER (OUTPATIENT)
Dept: WOUND CARE | Age: 67
Discharge: HOME | End: 2022-03-30
Payer: MEDICAID

## 2022-03-30 VITALS
WEIGHT: 157 LBS | DIASTOLIC BLOOD PRESSURE: 73 MMHG | TEMPERATURE: 98.2 F | HEART RATE: 71 BPM | BODY MASS INDEX: 21.98 KG/M2 | HEIGHT: 71 IN | RESPIRATION RATE: 20 BRPM | SYSTOLIC BLOOD PRESSURE: 146 MMHG

## 2022-03-30 DIAGNOSIS — L97.521 ISCHEMIC ULCER OF LEFT FOOT, LIMITED TO BREAKDOWN OF SKIN (HCC): Primary | ICD-10-CM

## 2022-03-30 PROCEDURE — 17250 CHEM CAUT OF GRANLTJ TISSUE: CPT | Performed by: INTERNAL MEDICINE

## 2022-03-30 PROCEDURE — 17250 CHEM CAUT OF GRANLTJ TISSUE: CPT

## 2022-03-30 PROCEDURE — 97598 DBRDMT OPN WND ADDL 20CM/<: CPT

## 2022-03-30 PROCEDURE — 97597 DBRDMT OPN WND 1ST 20 CM/<: CPT | Performed by: INTERNAL MEDICINE

## 2022-03-30 PROCEDURE — 97597 DBRDMT OPN WND 1ST 20 CM/<: CPT

## 2022-03-30 RX ORDER — LIDOCAINE 40 MG/G
CREAM TOPICAL ONCE
Status: DISCONTINUED | OUTPATIENT
Start: 2022-03-30 | End: 2022-03-31 | Stop reason: HOSPADM

## 2022-03-30 RX ORDER — LIDOCAINE 40 MG/G
CREAM TOPICAL ONCE
Status: CANCELLED | OUTPATIENT
Start: 2022-03-30 | End: 2022-03-30

## 2022-03-30 RX ORDER — LIDOCAINE HYDROCHLORIDE 40 MG/ML
SOLUTION TOPICAL ONCE
Status: CANCELLED | OUTPATIENT
Start: 2022-03-30 | End: 2022-03-30

## 2022-03-30 RX ORDER — LIDOCAINE HYDROCHLORIDE 40 MG/ML
SOLUTION TOPICAL ONCE
Status: DISCONTINUED | OUTPATIENT
Start: 2022-03-30 | End: 2022-03-31 | Stop reason: HOSPADM

## 2022-03-30 RX ORDER — BACITRACIN ZINC AND POLYMYXIN B SULFATE 500; 1000 [USP'U]/G; [USP'U]/G
OINTMENT TOPICAL ONCE
Status: DISCONTINUED | OUTPATIENT
Start: 2022-03-30 | End: 2022-03-31 | Stop reason: HOSPADM

## 2022-03-30 RX ORDER — LIDOCAINE 50 MG/G
OINTMENT TOPICAL ONCE
Status: DISCONTINUED | OUTPATIENT
Start: 2022-03-30 | End: 2022-03-31 | Stop reason: HOSPADM

## 2022-03-30 RX ORDER — LIDOCAINE 50 MG/G
OINTMENT TOPICAL ONCE
Status: CANCELLED | OUTPATIENT
Start: 2022-03-30 | End: 2022-03-30

## 2022-03-30 RX ORDER — BACITRACIN ZINC AND POLYMYXIN B SULFATE 500; 1000 [USP'U]/G; [USP'U]/G
OINTMENT TOPICAL ONCE
Status: CANCELLED | OUTPATIENT
Start: 2022-03-30 | End: 2022-03-30

## 2022-03-30 ASSESSMENT — PAIN SCALES - GENERAL
PAINLEVEL_OUTOF10: 0
PAINLEVEL_OUTOF10: 0

## 2022-03-30 ASSESSMENT — PAIN DESCRIPTION - PROGRESSION: CLINICAL_PROGRESSION: RAPIDLY IMPROVING

## 2022-03-30 NOTE — PLAN OF CARE
Wounds stable, debridement as documented, Ag Nitrate to left buttock wound per MD. Will change to using antibiotic ointment on right sided abdomen, otherwise will cont. With current wound care regime with dressings on remaining wounds. Pt. States he did follow up with Dr Bao White since last visit & scheduled to f/u for 1 month. No surgical procedures planned for this time. Dr Bao White advised to quit smoking prior to any further surgery. Pt. States he is wanting to quit smoking. Dr Ozzy Correa discussed plan with patient & wife to pick a date & work on decreasing cigarettes. Pamphlets given & discussed, both pt. & wife verbalize understanding. Pt. States he cancelled his vascular appt. D/t lack of insurance & concerns of cost. Dr Ozzy Correa strongly encouraged patient to reschedule appt. & to ask for financial assistance paperwork to complete, both pt. & wife verbalize understanding. F/u in [facility] in 2 weeks as ordered, pt. Aware to call sooner with any changes or questions/concerns. Discharge instructions reviewed with patient & wife, all questions answered, copy given to patient. Dressings were applied to all wounds per M.D. Instructions at this visit.

## 2022-04-05 NOTE — PROGRESS NOTES
[facility] [place] Progress Note    Mirza Jane     : 1955    DATE OF VISIT:  3/30/2022    Subjective:     Mirza Jane is a 61 y.o. male who has a number of pressure / diabetic / arterial ulcers located on the right ankle and foot (bilateral). Significant symptoms or pertinent wound history since last visit: doing ok overall. Took a week of Bactrim for the soft tissue infection around a couple of the foot ulcers last week, no side effects, and there's less drainage and redness now. Cancelled his appointment with vascular surgery. Did see Dr. 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 Smoking about a pack per day now, would like to quit, doesn't want to try medications or nicotine replacement. Did meet with his PCP. Additional ulcer(s) noted? yes. The open buttock wound, and two areas of open abdominal wall wound, doing better overall. His current medication list consists of amLODIPine, aspirin, atorvastatin, docusate sodium, insulin glargine, metFORMIN, and psyllium. Allergies: Patient has no known allergies.     Objective:     Vitals:    22 1538   BP: (!) 146/73   Pulse: 71   Resp: 20   Temp: 98.2 °F (36.8 °C)   TempSrc: Oral   Weight: 157 lb (71.2 kg)   Height: 5' 11\" (1.803 m)     Constitutional:  well-developed, well-nourished, not as weak or fatigued, NAD  Psychiatric:  oriented to person, place and time; mood and affect appropriate for the situation   Cardiovascular:  bilateral pedal pulses Dopplerable, feet a bit cool, slow cap refill; very mild lower extremity edema  Lymphatic:  no inguinal or popliteal adenopathy, no lymphangitis, no clear cellulitis this week  : sutures in place in a midline scrotal wound, with a bit of limited moist dehiscence and superficial debris there  Musculoskeletal:  no clubbing, cyanosis or petechiae; RLE and LLE with no gross effusions, joint misalignment or acute arthritis  Princess-ulcer skin: pink and indurated with a bit of hyperkeratosis at the abdomen; slightly cristy, indurated and less moist at the scrotum and buttock; pink to [place], a bit cool, indurated, less macerated at the feet. Ulcer(s): scrotal site as mentioned above; left abdominal site delicately healed, right abdominal site smaller, more red and granular with recent cautious debridement, some recurrent fibrin and biofilm, no signs of infection; the buttock wound is smaller this week, red, granular to hypergranular, more superior epidermal coverage, less inferior undermining, some fibrin and biofilm; all of the foot and ankle ulcers are largely necrotic, left lateral forefoot still able to be probed to bone, left dorsal foot with a bit of drier skin necrosis lysing away; no pus, less drainage overall. Photos also saved in electronic chart.     Today's wound measurements, per RN documentation:  Wound 03/02/22 # 9 Left Abdominal Cluster, Surgical Graft site, Full thickness, Onset 12/21-Wound Length (cm): 0.7 cm  Wound 02/16/22 #1 Right Lateral Ankle, Arterial, Full Thickness, onset -Wound Length (cm): 1.2 cm  Wound 02/16/22 #2 Right Lateral Foot, Arterial, Full Thickness Onset Nov 2021-Wound Length (cm): 1.5 cm  Wound 02/16/22 #3 Left Dorsal Foot, Arterial, Full Thickness, Onset Nov 2021-Wound Length (cm): 2 cm  Wound 02/16/22 #4 Left Lateral Foot, Arterial, full thickness,, Onset Nov 2021-Wound Length (cm): 3 cm  Wound 02/16/22 #5 Left 5th Toe, Arterial, Full thickness, Onset Nov 2021-Wound Length (cm): 1.5 cm  Wound 02/16/22 #6 Right Abdominal Cluster, Surgical (graft-site), Full-Thickness, onset 12/21-Wound Length (cm): 3.3 cm  Wound 02/16/22 #7 Left Lower Buttocks, Surgical, full thickness, Onset 2022-Wound Length (cm): 1.9 cm  Wound 02/16/22 # 8 Genital Scrotal Area Cluster, Necrotizing Fasciitis, Full Thickness Onset Dec 2021-Wound Length (cm): 0.5 cm    Wound 03/02/22 # 9 Left Abdominal Cluster, Surgical Graft site, Full thickness, Onset 12/21-Wound Width (cm): 1 cm  Wound 02/16/22 #1 Right Lateral Ankle, Arterial, Full Thickness, onset -Wound Width (cm): 1 cm  Wound 02/16/22 #2 Right Lateral Foot, Arterial, Full Thickness Onset Nov 2021-Wound Width (cm): 1.5 cm  Wound 02/16/22 #3 Left Dorsal Foot, Arterial, Full Thickness, Onset Nov 2021-Wound Width (cm): 4 cm  Wound 02/16/22 #4 Left Lateral Foot, Arterial, full thickness,, Onset Nov 2021-Wound Width (cm): 0.7 cm  Wound 02/16/22 #5 Left 5th Toe, Arterial, Full thickness, Onset Nov 2021-Wound Width (cm): 1.9 cm  Wound 02/16/22 #6 Right Abdominal Cluster, Surgical (graft-site), Full-Thickness, onset 12/21-Wound Width (cm): 3.4 cm  Wound 02/16/22 #7 Left Lower Buttocks, Surgical, full thickness, Onset 2022-Wound Width (cm): 6 cm  Wound 02/16/22 # 8 Genital Scrotal Area Cluster, Necrotizing Fasciitis, Full Thickness Onset Dec 2021-Wound Width (cm): 0.3 cm    Wound 03/02/22 # 9 Left Abdominal Cluster, Surgical Graft site, Full thickness, Onset 12/21-Wound Depth (cm): 0.1 cm  Wound 02/16/22 #1 Right Lateral Ankle, Arterial, Full Thickness, onset -Wound Depth (cm): 0.1 cm  Wound 02/16/22 #2 Right Lateral Foot, Arterial, Full Thickness Onset Nov 2021-Wound Depth (cm): 0.1 cm  Wound 02/16/22 #3 Left Dorsal Foot, Arterial, Full Thickness, Onset Nov 2021-Wound Depth (cm): 0.1 cm  Wound 02/16/22 #4 Left Lateral Foot, Arterial, full thickness,, Onset Nov 2021-Wound Depth (cm): 0.3 cm  Wound 02/16/22 #5 Left 5th Toe, Arterial, Full thickness, Onset Nov 2021-Wound Depth (cm): 0.1 cm  Wound 02/16/22 #6 Right Abdominal Cluster, Surgical (graft-site), Full-Thickness, onset 12/21-Wound Depth (cm): 0.1 cm  Wound 02/16/22 #7 Left Lower Buttocks, Surgical, full thickness, Onset 2022-Wound Depth (cm): 0.1 cm  Wound 02/16/22 # 8 Genital Scrotal Area Cluster, Necrotizing Fasciitis, Full Thickness Onset Dec 2021-Wound Depth (cm): 0.1 cm   _________________    WCx from 2 weeks ago with Serratia and Corynebacterium. Assessment:     Patient Active Problem List   Diagnosis Code    Uncontrolled type 2 diabetes mellitus with diabetic polyneuropathy, without long-term current use of insulin ([facility]) E11.42, E11.65    Ischemic ulcer of left foot, limited to breakdown of skin ([address] L97.521    Nonhealing surgical wound of buttock, initial encounter T81.89XA    Ischemic ulcer of right ankle with necrosis of muscle ([address] L97.313    Gangrene of toe of left foot ([address] I96    Current every day smoker F17.200    Hyperlipidemia E78.5    Diabetic ulcer of left foot associated with type 2 diabetes mellitus, with necrosis of bone ([address] E11.621, L97.524    Hypergranulation L92.9    Focally failed skin grafts of abdominal wall T86.821    Cellulitis of left foot L03.116       Assessment of today's active condition(s): previously uncontrolled but improving DM2, neuropathy, PAD, multiple nonhealing foot and ankle ulcers with ischemia, necrosis, recent soft tissue infection, and 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 Ongoing tobacco use. 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 Factors contributing to occurrence and/or persistence of the chronic ulcer include diabetes, poor glucose control, chronic pressure, shear force, smoking, arterial insufficiency and decreased tissue oxygenation. Medical necessity of today's visit is shown by the above documentation. Sharp debridement is indicated today, based upon the exam findings in the wound(s) above. (just at a couple of areas)    Procedure note:     Consent obtained.  Time out performed per [facility] policy. Anesthetic  Anesthetic: 4% Lidocaine Cream     Using a curette, #15 blade scalpel and forceps, I sharply debrided the abdomen (on the upper right), left buttock and foot (left , dorsal) ulcer(s) down through and including the removal of dermis. The type(s) of tissue debrided included fibrin, biofilm and necrotic/eschar. Total Surface Area Debrided: about 15 sq cm. The ulcers were then irrigated with normal saline solution. The procedure was completed with a small amount of bleeding, and hemostasis was with pressure. The patient tolerated the procedure well, with no significant complications. The patient's level of pain during and after the procedure was monitored. Post-debridement measurements, if different from pre-debridement, are in the flowsheet as well.  _______________    To encourage better epithelial cell coverage, I did use AgNO3 to chemically cauterize hypergranulation tissue on the left buttock ulcer(s), after application of 4% lidocaine topical solution. This was tolerated well, with no pain or skin injury. Discharge plan:     Treatment in the wound care center today, per RN documentation: Wound 03/02/22 # 9 Left Abdominal Cluster, Surgical Graft site, Full thickness, Onset 12/21-Dressing/Treatment: Other (comment) (PSO, 4x4's, medipore tape)  Wound 02/16/22 #1 Right Lateral Ankle, Arterial, Full Thickness, onset -Dressing/Treatment: Other (comment) (betadine, 4x4's, robert)  Wound 02/16/22 #2 Right Lateral Foot, Arterial, Full Thickness Onset Nov 2021-Dressing/Treatment: Other (comment) (Betadine, 4x4's, robert)  Wound 02/16/22 #3 Left Dorsal Foot, Arterial, Full Thickness, Onset Nov 2021-Dressing/Treatment: Other (comment) (betadine, 4x4's.  robert)  Wound 02/16/22 #4 Left Lateral Foot, Arterial, full thickness,, Onset Nov 2021-Dressing/Treatment: Other (comment) (betadine, 4x4's, robert)  Wound 02/16/22 #5 Left 5th Toe, Arterial, Full thickness, Onset Nov 2021-Dressing/Treatment: Other (comment) (betadine, 4x4's, robert)  Wound 02/16/22 #6 Right Abdominal Cluster, Surgical (graft-site), Full-Thickness, onset 12/21-Dressing/Treatment: Other (comment) (PSO, 4x4's, medipore tape)  Wound 02/16/22 #7 Left Lower Buttocks, Surgical, full thickness, Onset 2022-Dressing/Treatment: Other (comment) (silver alginate, ABD, medipore tape)  Wound 02/16/22 # 8 Genital Scrotal Area Cluster, Necrotizing Fasciitis, Full Thickness Onset Dec 2021-Dressing/Treatment: Other (comment) (betadine, open to air). Keep up the good work with dietary changes and glucose control. No additional Abx right now. Keep focused on offloading (position changes in bed; Waffle cushion in chair; HeelMedix boots when resting in chair or bed). Important to reschedule with vascular surgery ASAP, ideally to get both lower extremities revascularized, prior to more aggressive debridement there, and surgery at least at the left 5th ray, with an eye to avoiding major amputation. Discussed a couple of other ideas for smoking cessation, gave him some patient education info today, also recommended that he try a smoking cessation calendar at home, trying to drop by one cigarette per day, for two out of every three days (that would gradually get him down to zero cigarettes in about 30 days, which was his goal). Home treatment: good handwashing before and after any dressing changes. Cleanse wound with saline or soap & water before dressing change. May use Vaseline (petrolatum), Aquaphor, Aveeno, CeraVe, Cetaphil, Eucerin, Lubriderm, etc for dry skin. Dressing type for home: as above, once daily. Written discharge instructions given to patient. Follow up in 2 weeks (I offered him 1 or 2 weeks, he prefers the latter, given financial constraints and multiple people to follow up with, which is completely understandable).     Electronically signed by Chao Michelle MD on 4/5/2022 at 4:24 PM.

## 2022-04-13 ENCOUNTER — HOSPITAL ENCOUNTER (OUTPATIENT)
Dept: WOUND CARE | Age: 67
Discharge: HOME | End: 2022-04-13
Payer: MEDICAID

## 2022-04-13 VITALS
HEIGHT: 71 IN | WEIGHT: 155.13 LBS | HEART RATE: 104 BPM | DIASTOLIC BLOOD PRESSURE: 73 MMHG | SYSTOLIC BLOOD PRESSURE: 128 MMHG | TEMPERATURE: 97.2 F | BODY MASS INDEX: 21.72 KG/M2 | RESPIRATION RATE: 20 BRPM

## 2022-04-13 DIAGNOSIS — L97.521 ISCHEMIC ULCER OF LEFT FOOT, LIMITED TO BREAKDOWN OF SKIN (HCC): Primary | ICD-10-CM

## 2022-04-13 PROCEDURE — 17250 CHEM CAUT OF GRANLTJ TISSUE: CPT

## 2022-04-13 PROCEDURE — 97597 DBRDMT OPN WND 1ST 20 CM/<: CPT | Performed by: INTERNAL MEDICINE

## 2022-04-13 PROCEDURE — 97597 DBRDMT OPN WND 1ST 20 CM/<: CPT

## 2022-04-13 RX ORDER — LIDOCAINE 50 MG/G
OINTMENT TOPICAL ONCE
Status: DISCONTINUED | OUTPATIENT
Start: 2022-04-13 | End: 2022-04-14 | Stop reason: HOSPADM

## 2022-04-13 RX ORDER — LIDOCAINE 50 MG/G
OINTMENT TOPICAL ONCE
Status: CANCELLED | OUTPATIENT
Start: 2022-04-13 | End: 2022-04-13

## 2022-04-13 RX ORDER — LIDOCAINE 40 MG/G
CREAM TOPICAL ONCE
Status: CANCELLED | OUTPATIENT
Start: 2022-04-13 | End: 2022-04-13

## 2022-04-13 RX ORDER — LIDOCAINE HYDROCHLORIDE 40 MG/ML
SOLUTION TOPICAL ONCE
Status: DISCONTINUED | OUTPATIENT
Start: 2022-04-13 | End: 2022-04-14 | Stop reason: HOSPADM

## 2022-04-13 RX ORDER — LIDOCAINE HYDROCHLORIDE 40 MG/ML
SOLUTION TOPICAL ONCE
Status: CANCELLED | OUTPATIENT
Start: 2022-04-13 | End: 2022-04-13

## 2022-04-13 RX ORDER — BACITRACIN ZINC AND POLYMYXIN B SULFATE 500; 1000 [USP'U]/G; [USP'U]/G
OINTMENT TOPICAL ONCE
Status: CANCELLED | OUTPATIENT
Start: 2022-04-13 | End: 2022-04-13

## 2022-04-13 RX ORDER — LIDOCAINE 40 MG/G
CREAM TOPICAL ONCE
Status: DISCONTINUED | OUTPATIENT
Start: 2022-04-13 | End: 2022-04-14 | Stop reason: HOSPADM

## 2022-04-13 RX ORDER — BACITRACIN ZINC AND POLYMYXIN B SULFATE 500; 1000 [USP'U]/G; [USP'U]/G
OINTMENT TOPICAL ONCE
Status: DISCONTINUED | OUTPATIENT
Start: 2022-04-13 | End: 2022-04-14 | Stop reason: HOSPADM

## 2022-04-13 ASSESSMENT — PAIN DESCRIPTION - PAIN TYPE: TYPE: CHRONIC PAIN

## 2022-04-13 ASSESSMENT — PAIN DESCRIPTION - ONSET: ONSET: ON-GOING

## 2022-04-13 ASSESSMENT — PAIN DESCRIPTION - DESCRIPTORS: DESCRIPTORS: SHARP;STABBING

## 2022-04-13 ASSESSMENT — PAIN DESCRIPTION - LOCATION: LOCATION: BUTTOCKS

## 2022-04-13 ASSESSMENT — PAIN - FUNCTIONAL ASSESSMENT: PAIN_FUNCTIONAL_ASSESSMENT: PREVENTS OR INTERFERES SOME ACTIVE ACTIVITIES AND ADLS

## 2022-04-13 ASSESSMENT — PAIN SCALES - GENERAL: PAINLEVEL_OUTOF10: 4

## 2022-04-13 ASSESSMENT — PAIN DESCRIPTION - PROGRESSION: CLINICAL_PROGRESSION: GRADUALLY IMPROVING

## 2022-04-13 ASSESSMENT — PAIN DESCRIPTION - FREQUENCY: FREQUENCY: INTERMITTENT

## 2022-04-13 NOTE — PLAN OF CARE
Left abdominal wound healed. Right side abdominal wound & Left buttock wounds showing improvement, debridement as documented & Ag Nitrate to buttock per MD & pt. Tolerated well. Debridement of left dorsal foot wound. Remaining foot and ankle wounds stable, no debridement. Will cont. With current wound care regime with dressings. Pt. Scheduled with Dr Niko Small on 4/20/22. Pt. Scheduled with vascular on 4/25/22. Pt. States he is working on smoking cessation, currently at 15 cigarettes/day. Pt. States is working on cutting down more. Also reinforced importance of increasing protein in diet to assist with wound healing. F/u in [facility] in 1 week as ordered, pt. Aware to call sooner with any changes or questions/concerns. Discharge instructions reviewed with patient & wife, all questions answered, copy given to patient. Dressings were applied to all wounds per M.D. Instructions at this visit.

## 2022-04-19 PROBLEM — L03.116 CELLULITIS OF LEFT FOOT: Status: RESOLVED | Noted: 2022-03-22 | Resolved: 2022-04-19

## 2022-04-19 NOTE — PROGRESS NOTES
[address] [facility] Progress Note    Licha Sultana     : 1955    DATE OF VISIT:  2022    Subjective:     Licha Sultana is a 61 y.o. male who has an open surgical ulcer located on the left buttock. Significant symptoms or pertinent wound history since last visit: doing ok overall when it comes to the buttock (and abdominal) wound. No F/C/D. Rescheduled with vascular surgery for a follow-up appt with them. Sees Dr. Abad Robert again later this week. Has cut down on smoking a bit more, down to about 3/4 pack per day, in his estimate. Additional ulcer(s) noted? yes. Left dorsal foot, left lateral forefoot, right lateral midfoot, right lateral ankle, plus the left 5th toe gangrene. The left abdomen is healed this week, right is making progress. His current medication list consists of amLODIPine, aspirin, atorvastatin, docusate sodium, insulin glargine, metFORMIN, and psyllium. Allergies: Patient has no known allergies.     Objective:     Vitals:    22 1241   BP: 128/73   Pulse: 104   Resp: 20   Temp: 97.2 °F (36.2 °C)   TempSrc: Oral   Weight: 155 lb 2 oz (70.4 kg)   Height: 5' 11\" (1.803 m)     Constitutional:  well-developed, well-nourished, not as weak or fatigued, NAD  Psychiatric:  oriented to person, place and time; mood and affect appropriate for the situation   Cardiovascular:  bilateral pedal pulses Dopplerable, feet a bit cool, slow cap refill; very mild lower extremity edema  Lymphatic:  no inguinal or popliteal adenopathy, no lymphangitis, no cellulitis this week  : sutures in place in a midline scrotal wound, with a bit of limited moist dehiscence and superficial debris there  Musculoskeletal:  no clubbing, cyanosis or petechiae; RLE and LLE with no gross effusions, joint misalignment or acute arthritis  Princess-ulcer skin: pink and indurated with a bit of hyperkeratosis at the abdomen; slightly cristy, indurated and less moist at the scrotum and buttock; pink to cristy, a bit cool, indurated, less macerated at the feet. Ulcer(s): scrotal site as mentioned above; left abdominal site healed, right abdominal site smaller, more red and granular with recent cautious debridement, some recurrent fibrin and biofilm, no signs of infection, some peeling hyperkeratosis; the buttock wound is smaller again this week, red, granular to hypergranular, more superior epidermal coverage, less inferior undermining, some fibrin and biofilm; all of the foot and ankle ulcers are largely necrotic, left dorsal foot with a bit of drier skin necrosis lysing away; no pus, less drainage overall. The left lateral forefoot looks to be healed this week, but I'm not sure that I can believe that everything under the surface is truly healthy and intact -- I think it's still more likely that he has underlying 5th metatarsal osteo that was partially treated by the recent Bactrim for soft tissue infection. Photos also saved in electronic chart.     Today's wound measurements, per RN documentation:  Wound 02/16/22 #5 Left 5th Toe, Arterial, Full thickness, Onset Nov 2021-Wound Length (cm): 2 cm  Wound 02/16/22 #4 Left Lateral Foot, Arterial, full thickness,, Onset Nov 2021-Wound Length (cm): 0 cm (MD to measure)  Wound 02/16/22 #1 Right Lateral Ankle, Arterial, Full Thickness, onset -Wound Length (cm): 1.5 cm  Wound 02/16/22 #2 Right Lateral Foot, Arterial, Full Thickness Onset Nov 2021-Wound Length (cm): 1.4 cm  Wound 02/16/22 #3 Left Dorsal Foot, Arterial, Full Thickness, Onset Nov 2021-Wound Length (cm): 2.2 cm  Wound 02/16/22 # 8 Genital Scrotal Area Cluster, Necrotizing Fasciitis, Full Thickness Onset Dec 2021-Wound Length (cm): 0.5 cm  [REMOVED] Wound 03/02/22 # 9 Left Abdominal Cluster, Surgical Graft site, Full thickness, Onset 12/21-Wound Length (cm): 0 cm  Wound 02/16/22 #6 Right Abdominal Cluster, Surgical (graft-site), Full-Thickness, onset 12/21-Wound Length (cm): 1 cm  Wound 02/16/22 #7 Left Lower Buttocks, Surgical, full thickness, Onset 2022-Wound Length (cm): 1.9 cm    Wound 02/16/22 #5 Left 5th Toe, Arterial, Full thickness, Onset Nov 2021-Wound Width (cm): 2 cm  Wound 02/16/22 #4 Left Lateral Foot, Arterial, full thickness,, Onset Nov 2021-Wound Width (cm): 0 cm  Wound 02/16/22 #1 Right Lateral Ankle, Arterial, Full Thickness, onset -Wound Width (cm): 1.3 cm  Wound 02/16/22 #2 Right Lateral Foot, Arterial, Full Thickness Onset Nov 2021-Wound Width (cm): 1.3 cm  Wound 02/16/22 #3 Left Dorsal Foot, Arterial, Full Thickness, Onset Nov 2021-Wound Width (cm): 4.2 cm  Wound 02/16/22 # 8 Genital Scrotal Area Cluster, Necrotizing Fasciitis, Full Thickness Onset Dec 2021-Wound Width (cm): 0.3 cm  [REMOVED] Wound 03/02/22 # 9 Left Abdominal Cluster, Surgical Graft site, Full thickness, Onset 12/21-Wound Width (cm): 0 cm  Wound 02/16/22 #6 Right Abdominal Cluster, Surgical (graft-site), Full-Thickness, onset 12/21-Wound Width (cm): 2.3 cm  Wound 02/16/22 #7 Left Lower Buttocks, Surgical, full thickness, Onset 2022-Wound Width (cm): 3.5 cm    Wound 02/16/22 #5 Left 5th Toe, Arterial, Full thickness, Onset Nov 2021-Wound Depth (cm): 0.1 cm  Wound 02/16/22 #4 Left Lateral Foot, Arterial, full thickness,, Onset Nov 2021-Wound Depth (cm): 0 cm  Wound 02/16/22 #1 Right Lateral Ankle, Arterial, Full Thickness, onset -Wound Depth (cm): 0.1 cm  Wound 02/16/22 #2 Right Lateral Foot, Arterial, Full Thickness Onset Nov 2021-Wound Depth (cm): 0.1 cm  Wound 02/16/22 #3 Left Dorsal Foot, Arterial, Full Thickness, Onset Nov 2021-Wound Depth (cm): 0.1 cm  Wound 02/16/22 # 8 Genital Scrotal Area Cluster, Necrotizing Fasciitis, Full Thickness Onset Dec 2021-Wound Depth (cm): 0.1 cm  [REMOVED] Wound 03/02/22 # 9 Left Abdominal Cluster, Surgical Graft site, Full thickness, Onset 12/21-Wound Depth (cm): 0 cm  Wound 02/16/22 #6 Right Abdominal Cluster, Surgical (graft-site), Full-Thickness, onset 12/21-Wound Depth (cm): 0.1 cm  Wound 02/16/22 #7 Left Lower Buttocks, Surgical, full thickness, Onset 2022-Wound Depth (cm): 0.1 cm    Assessment:     Patient Active Problem List   Diagnosis Code    Uncontrolled type 2 diabetes mellitus with diabetic polyneuropathy, without long-term current use of insulin ([facility]) E11.42, E11.65    Ischemic ulcer of left foot, limited to breakdown of skin ([address] L97.521    Nonhealing surgical wound of buttock, initial encounter T81.89XA    Ischemic ulcer of right ankle with necrosis of muscle ([address] L97.313    Gangrene of toe of left foot ([address] I96    Current every day smoker F17.200    Hyperlipidemia E78.5    Diabetic ulcer of left foot associated with type 2 diabetes mellitus, with necrosis of bone ([facility]) E11.621, L97.524    Hypergranulation L92.9    Focally failed skin grafts of abdominal wall T86.821       Assessment of today's active condition(s): Hx Shayla's gangrene several months ago, treated with multiple OR debridements and extensive grafting -- left abdomen healed, right abdomen and left buttock healing, scrotum still in need of more surgery, at Dr. Marleen Guadalupe discretion. Also DM, neuropathy, PAD, multiple nonhealing foot and ankle ulcers and toe gangrene. Recent soft tissue infection resolved; I think probably still some osteo in the left 5th met, despite the skin coverage there this week. Still in need of revascularization, on both sides if possible, ideally the left side first. Factors contributing to occurrence and/or persistence of the chronic ulcer include diabetes, poor glucose control, chronic pressure, decreased mobility, shear force, smoking, arterial insufficiency and decreased tissue oxygenation. Medical necessity of today's visit is shown by the above documentation. Sharp debridement is indicated today, based upon the exam findings in the wound(s) above. Procedure note:     Consent obtained. Time out performed per [facility] policy.     Anesthetic  Anesthetic: 4% Lidocaine Cream     Using a curette, forceps and # 10 blade scalpel, I sharply debrided the abdomen (on the lower right), left buttock and foot (right, dorsal, midfoot) ulcer(s) down through and including the removal of dermis. The type(s) of tissue debrided included fibrin, biofilm, slough and necrotic/eschar. Total Surface Area Debrided: about 16 sq cm. The ulcers were then irrigated with normal saline solution. The procedure was completed with a small amount of bleeding, and hemostasis was with pressure. The patient tolerated the procedure well, with no significant complications. The patient's level of pain during and after the procedure was monitored. Post-debridement measurements, if different from pre-debridement, are in the flowsheet as well.  ___________    To encourage better epithelial cell coverage, I did use AgNO3 to chemically cauterize hypergranulation tissue on the left buttock ulcer(s), after application of 4% lidocaine topical solution. This was tolerated well, with no pain or skin injury.      Discharge plan:     Treatment in the wound care center today, per RN documentation: Wound 02/16/22 #5 Left 5th Toe, Arterial, Full thickness, Onset Nov 2021-Dressing/Treatment: Other (comment) (betadine 4x4's robert tape)  Wound 02/16/22 #1 Right Lateral Ankle, Arterial, Full Thickness, onset -Dressing/Treatment: Other (comment) (betadine 4x4's robert tape)  Wound 02/16/22 #2 Right Lateral Foot, Arterial, Full Thickness Onset Nov 2021-Dressing/Treatment: Other (comment) (betadine 4x4's robert tape)  Wound 02/16/22 #3 Left Dorsal Foot, Arterial, Full Thickness, Onset Nov 2021-Dressing/Treatment: Other (comment) (betadine 4x4's robert tape)  Wound 02/16/22 # 8 Genital Scrotal Area Cluster, Necrotizing Fasciitis, Full Thickness Onset Dec 2021-Dressing/Treatment:  (betadine)  Wound 02/16/22 #6 Right Abdominal Cluster, Surgical (graft-site), Full-Thickness, onset 12/21-Dressing/Treatment: Other (comment) (antibiotic ointment 4x4's tape)  Wound 02/16/22 #7 Left Lower Buttocks, Surgical, full thickness, Onset 2022-Dressing/Treatment: Other (comment) (silver alginate ABD tape). Keep up the good work with improved glucose control. Keep working on tapering down cigarettes -- I'd like to see him down to less than a half pack per day by the end of the month. Keep up with offloading with his Waffle cushion and [employer]el[employer] boots. No additional Abx right now. Await vascular follow-up, hopefully with plans for LE angio and interventions. Podiatric surgery planned once the left side is revascularized. Plastics F/U with Dr. Shaun Wetzel as scheduled. Keep a close eye on that left lateral forefoot - if it starts to look more swollen, more red, etc, call me. Home treatment: good handwashing before and after any dressing changes. Cleanse wound with saline or soap & water before dressing change. May use Vaseline (petrolatum), Aquaphor, Aveeno, CeraVe, Cetaphil, Eucerin, Lubriderm, etc for dry skin. Dressing type for home: as above, once daily. Written discharge instructions given to patient. Follow up in 2 weeks, but call sooner with concerns or questions.     Electronically signed by Ashanti De Santiago MD on 4/19/2022 at 3:29 PM.

## 2022-04-20 ENCOUNTER — OFFICE VISIT (OUTPATIENT)
Dept: SURGERY | Age: 67
End: 2022-04-20

## 2022-04-20 VITALS
OXYGEN SATURATION: 95 % | DIASTOLIC BLOOD PRESSURE: 80 MMHG | BODY MASS INDEX: 22.06 KG/M2 | WEIGHT: 157.6 LBS | SYSTOLIC BLOOD PRESSURE: 129 MMHG | HEIGHT: 71 IN | RESPIRATION RATE: 16 BRPM | TEMPERATURE: 97.2 F | HEART RATE: 95 BPM

## 2022-04-20 DIAGNOSIS — Z09 POSTOP CHECK: Primary | ICD-10-CM

## 2022-04-20 DIAGNOSIS — M72.6 NECROTIZING FASCIITIS (HCC): ICD-10-CM

## 2022-04-20 PROCEDURE — 99024 POSTOP FOLLOW-UP VISIT: CPT | Performed by: SURGERY

## 2022-04-20 NOTE — PROGRESS NOTES
[facility]    PROCEDURE: 1) Complex closure of perineum (7.2 cm)                                                         2) Left posterior thigh advancement flap (wound: 13 x 5 cm; flap: 13 x 7 cm)                                                         3) Skin graft to left buttock (7.1 x 7.2 cm)                                                         4) Application of wound vacuum device  DATE: 1/6/22    Hodges Leyden has been recovering satisfactorily since his procedure. Pain has been well controlled without pain medications. He notes that he is continuing to smoke.     PMHx:   Past Medical History:   Diagnosis Date    Cellulitis of left foot 3/22/2022    Shayla's gangrene     Fourniers gangrene     Osteomyelitis of left foot ([address] 11/30/2021    Patellofemoral pain syndrome of right knee      PSHx:   Past Surgical History:   Procedure Laterality Date    ABDOMEN SURGERY N/A 12/01/2021    WIDE EXCISION PERINEUM, BACK, ABDOMINAL WALL performed by Huong Stacy MD at [facility] Left 12/03/2021    ABDOMINAL WALL AND LEFT BUTTOCK DEBRIDEMENT, WOUND VAC PLACEMENT performed by Hal Bean MD at [facility] Left 12/05/2021    ABDOMINAL WALL AND LEFT BUTTOCK DEBRIDEMENT, WOUND VAC PLACEMENT performed by Hal Bean MD at [facility] N/A 12/07/2021    EVALUATION UNDER ANESTHESIA WITH DEBRIDEMENT ABDOMINAL WOUND AND LEFT BUTTOCK, WOUND VAC CHANGE performed by Hal Bean MD at [facility] 12/10/2021    ABDOMINAL WALL AND LEFT BUTTOCK WOUND VAC CHANGE WITH FURTHER DEBRIDEMENT ABDOMEN AND LEFT BUTTOCK WOUND performed by Hal Bean MD at [facility] N/A 12/13/2021    WOUND VAC CHANGE ABDOMEN AND LEFT BUTTOCK performed by Hal Bean MD at [facility] N/A 12/16/2021    2ND LOOK/ EVALUATION UNDER ANESTHESIA/ WOUND VAC CHANGE ABDOMINAL WALL AND PERINEUM performed by Sakina Santos MD at [facility] N/A 12/21/2021    EVALUATION UNDER ANESTHESIA/ WOUND VAC CHANGE ABDOMINAL WALL AND PERINEUM performed by Sakina Santos MD at [facility] N/A 12/23/2021    EVALUATION UNDER ANESTHESIA/ WOUND VAC CHANGE/ POSSIBLE PARTIAL [address] AND PERINEUM performed by Sakina Santos MD at [address] Left 12/07/2021    LAPAROSCOPIC COLOSTOMY CREATION performed by Harriet Guallpa MD at [facility] Left     many years ago   [facility] N/A 01/03/2022    GLUTEAL WOUND VAC PLACEMENT performed by Sakina Santos MD at [facility] N/A 12/27/2021    PERINEAL WOUND VAC CHANGE performed by Sakina Santos MD at [facility] N/A 12/30/2021    PERINEAL / ABDOMINAL WOUND VAC CHANGE, performed by Sakina Santos MD at [facility] N/A 01/10/2022    PERINEAL WOUND VAC REMOVAL, EXAM UNDER ANESTHESIA performed by Sakina Santos MD at [address] [address] N/A 11/30/2021    DEBRIDEMENT OF SCROTAL JAYRO'S GANGRENE performed by Ross Betts MD at [facility] 01/03/2022    SPLIT THICKNESS SKIN GRAFT TO ABDOMEN WOUND VAC PLACEMENT. 44x 11 performed by Sakina Santos MD at [facility] 01/06/2022    LEFT POSTERIOR THIGH ADVANCEMENT 13X7CM; CLOSURE PERINEUM 7.2CM; SKIN GRAFT 7. 1CMX7.2CM TO LEFT BUTTOCK; APPLICATION OF WOUND VACUUM DEVICE performed by Sakina Santos MD at [facility] N/A 11/30/2021    PERINEAL SOFT TISSUE EXCISIONAL DEBRIDEMENT performed by Esther Coles MD at [facility] OR     Allergy: No Known Allergies    SHx:   Social History     Socioeconomic History    Marital status:      Spouse name: Not on file    Number of children: 11    Years of education: Not on file    Highest education level: Not on file   Occupational History    Not on file   Tobacco Use    Smoking status: Current Every Day Smoker     Start date: 1/1/1970    Smokeless tobacco: Never Used   Substance and Sexual Activity    Alcohol use: Not Currently     Alcohol/week: 0.0 standard drinks    Drug use: Yes     Types: Marijuana Garon Kettle)     Comment: 1 hit once a day    Sexual activity: Yes     Partners: Female   Other Topics Concern    Not on file   Social History Narrative    Not on file     Social Determinants of Health     Financial Resource Strain:     Difficulty of Paying Living Expenses: Not on file   Food Insecurity:     Worried About Running Out of Food in the Last Year: Not on file    Nghia of Food in the Last Year: Not on file   Transportation Needs:     Lack of Transportation (Medical): Not on file    Lack of Transportation (Non-Medical):  Not on file   Physical Activity:     Days of Exercise per Week: Not on file    Minutes of Exercise per Session: Not on file   Stress:     Feeling of Stress : Not on file   Social Connections:     Frequency of Communication with Friends and Family: Not on file    Frequency of Social Gatherings with Friends and Family: Not on file    Attends Hinduism Services: Not on file    Active Member of [address] [place] [address] or Organizations: Not on file    Attends Club or Organization Meetings: Not on file    Marital Status: Not on file   Intimate Partner Violence:     Fear of Current or Ex-Partner: Not on file    Emotionally Abused: Not on file    Physically Abused: Not on file    Sexually Abused: Not on file   Housing Stability:     Unable to Pay for Housing in the Last Year: Not on file    Number of [place] in the Last Year: Not on file    Unstable Housing in the Last Year: Not on file     FHx: Family history reviewed and is noncontributory  Meds:   Current Outpatient Medications   Medication Sig Dispense Refill    aspirin 81 MG EC tablet Take 81 mg by mouth daily      docusate sodium (COLACE) 100 MG capsule Take 100 mg by mouth daily      psyllium (KONSYL) 28.3 % PACK Take 1 packet by mouth daily      atorvastatin (LIPITOR) 80 MG tablet Take 80 mg by mouth nightly      amLODIPine (NORVASC) 5 MG tablet Take 5 mg by mouth daily      insulin glargine (LANTUS) 100 UNIT/ML injection vial Inject 10 Units into the skin nightly      metFORMIN (GLUCOPHAGE) 500 MG tablet Take 500 mg by mouth daily (with breakfast)       No current facility-administered medications for this visit. ROS   Constitutional: Negative for chills and fever. HENT: Negative for congestion, facial swelling, and voice change. Eyes: Negative for photophobia and visual disturbance. Respiratory: Negative for apnea, cough, chest tightness and shortness of breath. Cardiovascular: Negative for chest pain and palpitations. Gastrointestinal: Negative for dysphagia and early satiety. Genitourinary: Negative for difficulty urinating, dysuria, flank pain, frequency and hematuria. Musculoskeletal: Negative for new gait problem, joint swelling and myalgias. Skin: Negative for color change, pallor and rash. Endocrine: negative for tremors, temperature intolerance or polydipsia. Allergic/Immunologic: Negative for new environmental or food allergies. Neurological: Negative for dizziness, seizures, speech difficulty, numbness. Hematological: Negative for adenopathy. Psychiatric/Behavioral: Negative for agitation and confusion. EXAM    /80   Pulse 95   Temp 97.2 °F (36.2 °C)   Resp 16   Ht 5' 11\" (1.803 m)   Wt 157 lb 9.6 oz (71.5 kg)   SpO2 95%   BMI 21.98 kg/m²     GEN: NAD   ABD: Graft overall healing appropriately save for 2 areas  Some prolapse of the stoma  : Small area of granulation tissue  EXT: Flap viable    IMP: 66 y.o.male s/p perineum reconstruction  PLAN: Needs to continue to work to improve his nutrition and care as he has fallen behind after discharge. Additionally, he needs to completely abstain from nicotine and keep pressure off in a hip/hip manner.  We stressed both of these again and will return in 3 months for evaluation. We again emphasized that if he had any issues, he should return sooner. He may follow-up with Dr. Samuel Ghotra regarding any issues with the stoma- and we stated that reversal would likely only be performed once he has completely abstained from nicotine use.     Sofia Flores MD  [address] [place] [address] [address] Reconstructive Surgery  [phone]  04/20/22

## 2022-04-20 NOTE — Clinical Note
Having some prolapse of the stoma and wanted to discuss reversal.  Personally, I don't think he's ready yet because he is smoking again and there is a small area that has granulation tissue on the inferior aspect of his leg. Let me know what you think. Thanks!  NK

## 2022-04-27 ENCOUNTER — HOSPITAL ENCOUNTER (OUTPATIENT)
Dept: WOUND CARE | Age: 67
Discharge: HOME | End: 2022-04-27
Payer: MEDICAID

## 2022-04-27 VITALS
RESPIRATION RATE: 18 BRPM | SYSTOLIC BLOOD PRESSURE: 132 MMHG | WEIGHT: 157.38 LBS | TEMPERATURE: 97.1 F | HEIGHT: 71 IN | DIASTOLIC BLOOD PRESSURE: 71 MMHG | HEART RATE: 102 BPM | BODY MASS INDEX: 22.03 KG/M2

## 2022-04-27 DIAGNOSIS — L97.521 ISCHEMIC ULCER OF LEFT FOOT, LIMITED TO BREAKDOWN OF SKIN (HCC): Primary | ICD-10-CM

## 2022-04-27 PROCEDURE — 11042 DBRDMT SUBQ TIS 1ST 20SQCM/<: CPT

## 2022-04-27 PROCEDURE — 11042 DBRDMT SUBQ TIS 1ST 20SQCM/<: CPT | Performed by: INTERNAL MEDICINE

## 2022-04-27 PROCEDURE — 97597 DBRDMT OPN WND 1ST 20 CM/<: CPT | Performed by: INTERNAL MEDICINE

## 2022-04-27 PROCEDURE — 17250 CHEM CAUT OF GRANLTJ TISSUE: CPT

## 2022-04-27 PROCEDURE — 97597 DBRDMT OPN WND 1ST 20 CM/<: CPT

## 2022-04-27 RX ORDER — LIDOCAINE 40 MG/G
CREAM TOPICAL ONCE
Status: DISCONTINUED | OUTPATIENT
Start: 2022-04-27 | End: 2022-04-28 | Stop reason: HOSPADM

## 2022-04-27 RX ORDER — LIDOCAINE HYDROCHLORIDE 40 MG/ML
SOLUTION TOPICAL ONCE
Status: CANCELLED | OUTPATIENT
Start: 2022-04-27 | End: 2022-04-27

## 2022-04-27 RX ORDER — LIDOCAINE 50 MG/G
OINTMENT TOPICAL ONCE
Status: DISCONTINUED | OUTPATIENT
Start: 2022-04-27 | End: 2022-04-28 | Stop reason: HOSPADM

## 2022-04-27 RX ORDER — LIDOCAINE 50 MG/G
OINTMENT TOPICAL ONCE
Status: CANCELLED | OUTPATIENT
Start: 2022-04-27 | End: 2022-04-27

## 2022-04-27 RX ORDER — BACITRACIN ZINC AND POLYMYXIN B SULFATE 500; 1000 [USP'U]/G; [USP'U]/G
OINTMENT TOPICAL ONCE
Status: DISCONTINUED | OUTPATIENT
Start: 2022-04-27 | End: 2022-04-28 | Stop reason: HOSPADM

## 2022-04-27 RX ORDER — LIDOCAINE HYDROCHLORIDE 40 MG/ML
SOLUTION TOPICAL ONCE
Status: DISCONTINUED | OUTPATIENT
Start: 2022-04-27 | End: 2022-04-28 | Stop reason: HOSPADM

## 2022-04-27 RX ORDER — LIDOCAINE 40 MG/G
CREAM TOPICAL ONCE
Status: CANCELLED | OUTPATIENT
Start: 2022-04-27 | End: 2022-04-27

## 2022-04-27 RX ORDER — BACITRACIN ZINC AND POLYMYXIN B SULFATE 500; 1000 [USP'U]/G; [USP'U]/G
OINTMENT TOPICAL ONCE
Status: CANCELLED | OUTPATIENT
Start: 2022-04-27 | End: 2022-04-27

## 2022-04-27 NOTE — PLAN OF CARE
Right abdomen dry, no debridement, change to using antibiotic ointment mixed with vaseline, apply daily. Scrotal area healed. Left Buttock wound with good improvement, debridement per MD, will change to using antibiotic ointment if wound dry, Silver Alginate if wound too moist, change dressing once daily. Bilateral foot wounds holding their own, debridement per MD as documented in flowsheet. Pt. & wife state the vascular surgeon cancelled their appt. He was scheduled for on 4/25/22 & is now rescheduled for 5/16/2022. Pt. & wife aware to notify Dr Jemma Bailey if any changes noted with s/s of infection. Patient states he has been smoking 10 cigarettes/day & is continuing to work on cutting down. Dr Jemma Bailey encouraged to continue working on decreasing with a goal of 7 cigarettes/day by his next appt., pt. Agreeable. F/u in [address] [place] [address] in 2 weeks as ordered, pt. Aware to call sooner with any changes or questions/concerns. Discharge instructions reviewed with patient & wife, all questions answered, copy given to patient. Dressings were applied to all wounds per M.D. Instructions at this visit.

## 2022-05-04 NOTE — PROGRESS NOTES
[facility] Progress Note    Jatinder Zamora     : 1955    DATE OF VISIT:  2022    Subjective:     Jatinder Zamora is a 61 y.o. male who has a nonhealing surgical ulcer located on the abdomen (on the lower right) and left buttock. Significant symptoms or pertinent wound history since last visit: doing ok overall. Followed up with Dr. Tara Wolf recently, scrotal area healed, still planning for reconstructive surgery there, but I think at least not until he is able to quit smoking. Down to 10 cigarettes per day now. Sees Dr. Tara Wolf again in July, I believe. Has not seen vascular yet. Eating ok, glucoses doing well, no fevers, no side effects from recent ABx. Additional ulcer(s) noted? yes. Still multiple diabetic / pressure / ischemic wounds on feet and right ankle. His current medication list consists of amLODIPine, aspirin, atorvastatin, docusate sodium, insulin glargine, metFORMIN, and psyllium. Allergies: Patient has no known allergies. Objective:     Vitals:    22 1301   BP: 132/71   Pulse: 102   Resp: 18   Temp: 97.1 °F (36.2 °C)   TempSrc: Oral   Weight: 157 lb 6 oz (71.4 kg)   Height: 5' 11\" (1.803 m)     Constitutional:  well-developed, well-nourished, NAD   Psychiatric:  oriented to person, place and time; mood and affect appropriate for the situation   Cardiovascular:  bilateral pedal pulses Dopplerable, feet a bit cool, slow cap refill; very mild lower extremity edema  Lymphatic:  no inguinal or popliteal adenopathy, no lymphangitis, no cellulitis this week  Musculoskeletal:  no clubbing, cyanosis or petechiae; RLE and LLE with no gross effusions, joint misalignment or acute arthritis  Princess-ulcer skin: pink and indurated with a bit of hyperkeratosis at the abdomen; slightly cristy, indurated and less moist at the buttock; pink to [place], a bit cool, indurated, focally macerated at the feet.   Ulcer(s): left abdominal site healed, right abdominal site smaller, more red and granular with recent cautious debridement, some recurrent fibrin and biofilm, no signs of infection, some peeling hyperkeratosis; the buttock wound is smaller again this week, red, granular to hypergranular, more superior epidermal coverage, less inferior undermining, some fibrin and biofilm; all of the foot and ankle ulcers are largely necrotic, left dorsal foot with a bit of drier skin necrosis lysing away; no pus, less drainage overall. The left lateral forefoot looks to be healed again this week (with a good deal of callus), but I'm not sure that I can believe that everything under the surface is truly healthy and intact -- I think it's still more likely that he has underlying 5th metatarsal osteo that was partially treated by the recent Bactrim for soft tissue infection. Right ankle and right foot and left 5th toe with a bit of moist necrosis again this week, no definite pus, no bone exposure, but the right foot ulcer has a bit more depth. Photos also saved in electronic chart.     Today's wound measurements, per RN documentation:  Wound 02/16/22 #3 Left Dorsal Foot, Arterial, Full Thickness, Onset Nov 2021-Wound Length (cm): 2 cm  Wound 02/16/22 #4 Left Lateral Foot, Arterial, full thickness,, Onset Nov 2021-Wound Length (cm): 0 cm  Wound 02/16/22 #5 Left 5th Toe, Arterial, Full thickness, Onset Nov 2021-Wound Length (cm): 2.4 cm  Wound 02/16/22 #1 Right Lateral Ankle, Arterial, Full Thickness, onset -Wound Length (cm): 1.7 cm  Wound 02/16/22 #2 Right Lateral Foot, Arterial, Full Thickness Onset Nov 2021-Wound Length (cm): 1.5 cm  [REMOVED] Wound 02/16/22 # 8 Genital Scrotal Area Cluster, Necrotizing Fasciitis, Full Thickness Onset Dec 2021-Wound Length (cm): 0 cm  Wound 02/16/22 #6 Right Abdominal Cluster, Surgical (graft-site), Full-Thickness, onset 12/21-Wound Length (cm): 0.3 cm  Wound 02/16/22 #7 Left Lower Buttocks, Surgical, full thickness, Onset 2022-Wound Length (cm): 1.2 cm  Wound 04/27/22 #10 Left 2nd Toe, Arterial, Full Thickness, Onset 04/25/2022-Wound Length (cm): 1 cm    Wound 02/16/22 #3 Left Dorsal Foot, Arterial, Full Thickness, Onset Nov 2021-Wound Width (cm): 4.5 cm  Wound 02/16/22 #4 Left Lateral Foot, Arterial, full thickness,, Onset Nov 2021-Wound Width (cm): 0 cm  Wound 02/16/22 #5 Left 5th Toe, Arterial, Full thickness, Onset Nov 2021-Wound Width (cm): 2 cm  Wound 02/16/22 #1 Right Lateral Ankle, Arterial, Full Thickness, onset -Wound Width (cm): 1.5 cm  Wound 02/16/22 #2 Right Lateral Foot, Arterial, Full Thickness Onset Nov 2021-Wound Width (cm): 1.8 cm  [REMOVED] Wound 02/16/22 # 8 Genital Scrotal Area Cluster, Necrotizing Fasciitis, Full Thickness Onset Dec 2021-Wound Width (cm): 0 cm  Wound 02/16/22 #6 Right Abdominal Cluster, Surgical (graft-site), Full-Thickness, onset 12/21-Wound Width (cm): 0.5 cm  Wound 02/16/22 #7 Left Lower Buttocks, Surgical, full thickness, Onset 2022-Wound Width (cm): 2.2 cm  Wound 04/27/22 #10 Left 2nd Toe, Arterial, Full Thickness, Onset 04/25/2022-Wound Width (cm): 0.6 cm    Wound 02/16/22 #3 Left Dorsal Foot, Arterial, Full Thickness, Onset Nov 2021-Wound Depth (cm): 0.1 cm  Wound 02/16/22 #4 Left Lateral Foot, Arterial, full thickness,, Onset Nov 2021-Wound Depth (cm): 0 cm  Wound 02/16/22 #5 Left 5th Toe, Arterial, Full thickness, Onset Nov 2021-Wound Depth (cm): 0.1 cm  Wound 02/16/22 #1 Right Lateral Ankle, Arterial, Full Thickness, onset -Wound Depth (cm): 0.1 cm  Wound 02/16/22 #2 Right Lateral Foot, Arterial, Full Thickness Onset Nov 2021-Wound Depth (cm): 0.2 cm  [REMOVED] Wound 02/16/22 # 8 Genital Scrotal Area Cluster, Necrotizing Fasciitis, Full Thickness Onset Dec 2021-Wound Depth (cm): 0 cm  Wound 02/16/22 #6 Right Abdominal Cluster, Surgical (graft-site), Full-Thickness, onset 12/21-Wound Depth (cm): 0.1 cm  Wound 02/16/22 #7 Left Lower Buttocks, Surgical, full thickness, Onset 2022-Wound Depth (cm): 0.1 cm  Wound 04/27/22 #10 Left 2nd Toe, Arterial, Full Thickness, Onset 04/25/2022-Wound Depth (cm): 0.1 cm    Assessment:     Patient Active Problem List   Diagnosis Code    Uncontrolled type 2 diabetes mellitus with diabetic polyneuropathy, without long-term current use of insulin ([facility]) E11.42, E11.65    Ischemic ulcer of left foot, limited to breakdown of skin ([address] L97.521    Nonhealing surgical wound of buttock, initial encounter T81.89XA    Ischemic ulcer of right ankle with necrosis of muscle ([address] L97.313    Gangrene of toe of left foot ([address] I96    Current every day smoker F17.200    Hyperlipidemia E78.5    Diabetic ulcer of left foot associated with type 2 diabetes mellitus, with necrosis of bone ([address] E11.621, L97.524    Hypergranulation L92.9    Focally failed skin grafts of abdominal wall T86.821       Assessment of today's active condition(s): Hx Shayla's gangrene several months ago, left buttock wound getting closer to being healed, right abdomen also doing very well. Pre-existing [address] 3 ulcer at left lateral forefoot with underlying osteo, closed right now, but I'm not sure truly healthy and healed beneath. Then since the Shayla's admission, development of partial gangrene of the left 5th toe, plus BL foot and right ankle ulcers. Still has not had any vascular work done, and remains at high risk of recurrent soft tissue infection there, osteo, loss of limb. Factors contributing to occurrence and/or persistence of the chronic ulcer include diabetes, poor glucose control, chronic pressure, decreased mobility, shear force, smoking, arterial insufficiency, decreased tissue oxygenation and non-adherence. Medical necessity of today's visit is shown by the above documentation. Sharp debridement is indicated today, based upon the exam findings in the wound(s) above. Procedure note:     Consent obtained. Time out performed per [facility] policy.     Anesthetic  Anesthetic: 4% Lidocaine Cream     Using a curette, #15 blade scalpel and forceps, I sharply debrided the left buttock and foot (left , dorsal) ulcer(s) down through and including the removal of dermis. The type(s) of tissue debrided included fibrin, biofilm and necrotic/eschar. Total Surface Area Debrided: about 8 sq cm. Using a #15 blade scalpel and forceps, I sharply debrided the right, lateral ankle, foot (right, lateral) and left, 5th toe ulcer(s) down through and including the removal of subcutaneous tissue. The type(s) of tissue debrided included slough and necrotic/eschar. Total Surface Area Debrided: 5 sq cm. Normally I would not debride these areas when still ischemic, but there was some moist unstable necrosis that I believe will be more likely to contribute to recurrent infection, if not dealt with. The ulcers were then irrigated with normal saline solution. The procedure was completed with a small amount of bleeding, and hemostasis was with pressure. The patient tolerated the procedure well, with no significant complications. The patient's level of pain during and after the procedure was monitored. Post-debridement measurements, if different from pre-debridement, are in the flowsheet as well.  ________________    To encourage better epithelial cell coverage, I did use AgNO3 to chemically cauterize hypergranulation tissue on the left buttock ulcer(s), after application of 4% lidocaine topical solution. This was tolerated well, with no pain or skin injury.      Discharge plan:     Treatment in the wound care center today, per RN documentation: Wound 02/16/22 #3 Left Dorsal Foot, Arterial, Full Thickness, Onset Nov 2021-Dressing/Treatment: Other (comment) (Betadine, 4x4's, robert)  Wound 02/16/22 #4 Left Lateral Foot, Arterial, full thickness,, Onset Nov 2021-Dressing/Treatment: Other (comment) (Betadine, 4x4's, robert)  Wound 02/16/22 #5 Left 5th Toe, Arterial, Full thickness, Onset Nov 2021-Dressing/Treatment: Other (comment) (Betadine, 4x4's, robert)  Wound 02/16/22 #1 Right Lateral Ankle, Arterial, Full Thickness, onset -Dressing/Treatment: Other (comment) (Betadine, 4x4's, robert)  Wound 02/16/22 #2 Right Lateral Foot, Arterial, Full Thickness Onset Nov 2021-Dressing/Treatment: Other (comment) (Betadine, 4x4's, robert)  Wound 02/16/22 #6 Right Abdominal Cluster, Surgical (graft-site), Full-Thickness, onset 12/21-Dressing/Treatment:  (Aqupahor per patient)  Wound 02/16/22 #7 Left Lower Buttocks, Surgical, full thickness, Onset 2022-Dressing/Treatment: Other (comment) (Opticel Ag, ABD, paper tape)  Wound 04/27/22 #10 Left 2nd Toe, Arterial, Full Thickness, Onset 04/25/2022-Dressing/Treatment: Other (comment) (Betadine, 4x4's, robert). Keep up the good work with protein intake, glucose control, offloading. FU with Dr. Marlon Ferrera as scheduled. Important to see vascular surgery ASAP, get any needed updated imaging, then revascularization, then we can work more on debridement, re-eval for osteo, and wound healing. Both sides are really a priority at this point. Important to keep work on smoking cessation. Even if he can only decrease by a couple of cigarettes per day, per week, he'd be completely quit by [facility] -- a good goal, if he's willing to work on it. Home treatment: good handwashing before and after any dressing changes. Cleanse wound with saline or soap & water before dressing change. May use Vaseline (petrolatum), Aquaphor, Aveeno, CeraVe, Cetaphil, Eucerin, Lubriderm, etc for dry skin. Dressing type for home: basically as above (try to keep the abdomen and buttock just slightly moist, try to force all of the foot and ankle wounds to be drier), once daily. Written discharge instructions given to patient. Follow up in 2 weeks.     Electronically signed by Merle Garay MD on 5/4/2022 at 9:08 AM.

## 2022-05-11 ENCOUNTER — HOSPITAL ENCOUNTER (OUTPATIENT)
Dept: WOUND CARE | Age: 67
Discharge: HOME | End: 2022-05-11
Payer: MEDICAID

## 2022-05-11 VITALS
RESPIRATION RATE: 18 BRPM | WEIGHT: 159.4 LBS | TEMPERATURE: 97.4 F | HEART RATE: 82 BPM | BODY MASS INDEX: 22.31 KG/M2 | DIASTOLIC BLOOD PRESSURE: 69 MMHG | SYSTOLIC BLOOD PRESSURE: 125 MMHG | HEIGHT: 71 IN

## 2022-05-11 DIAGNOSIS — L97.521 ISCHEMIC ULCER OF LEFT FOOT, LIMITED TO BREAKDOWN OF SKIN (HCC): Primary | ICD-10-CM

## 2022-05-11 PROCEDURE — 11043 DBRDMT MUSC&/FSCA 1ST 20/<: CPT

## 2022-05-11 PROCEDURE — 11042 DBRDMT SUBQ TIS 1ST 20SQCM/<: CPT | Performed by: INTERNAL MEDICINE

## 2022-05-11 PROCEDURE — 11042 DBRDMT SUBQ TIS 1ST 20SQCM/<: CPT

## 2022-05-11 PROCEDURE — 11043 DBRDMT MUSC&/FSCA 1ST 20/<: CPT | Performed by: INTERNAL MEDICINE

## 2022-05-11 RX ORDER — LIDOCAINE 40 MG/G
CREAM TOPICAL ONCE
Status: DISCONTINUED | OUTPATIENT
Start: 2022-05-11 | End: 2022-05-12 | Stop reason: HOSPADM

## 2022-05-11 RX ORDER — BACITRACIN ZINC AND POLYMYXIN B SULFATE 500; 1000 [USP'U]/G; [USP'U]/G
OINTMENT TOPICAL ONCE
Status: CANCELLED | OUTPATIENT
Start: 2022-05-11 | End: 2022-05-11

## 2022-05-11 RX ORDER — LIDOCAINE 50 MG/G
OINTMENT TOPICAL ONCE
Status: CANCELLED | OUTPATIENT
Start: 2022-05-11 | End: 2022-05-11

## 2022-05-11 RX ORDER — LIDOCAINE 50 MG/G
OINTMENT TOPICAL ONCE
Status: DISCONTINUED | OUTPATIENT
Start: 2022-05-11 | End: 2022-05-12 | Stop reason: HOSPADM

## 2022-05-11 RX ORDER — SULFAMETHOXAZOLE AND TRIMETHOPRIM 800; 160 MG/1; MG/1
1 TABLET ORAL 2 TIMES DAILY
Qty: 20 TABLET | Refills: 0 | Status: SHIPPED | OUTPATIENT
Start: 2022-05-11 | End: 2022-05-21

## 2022-05-11 RX ORDER — LIDOCAINE HYDROCHLORIDE 40 MG/ML
SOLUTION TOPICAL ONCE
Status: DISCONTINUED | OUTPATIENT
Start: 2022-05-11 | End: 2022-05-12 | Stop reason: HOSPADM

## 2022-05-11 RX ORDER — LIDOCAINE HYDROCHLORIDE 40 MG/ML
SOLUTION TOPICAL ONCE
Status: CANCELLED | OUTPATIENT
Start: 2022-05-11 | End: 2022-05-11

## 2022-05-11 RX ORDER — BACITRACIN ZINC AND POLYMYXIN B SULFATE 500; 1000 [USP'U]/G; [USP'U]/G
OINTMENT TOPICAL ONCE
Status: DISCONTINUED | OUTPATIENT
Start: 2022-05-11 | End: 2022-05-12 | Stop reason: HOSPADM

## 2022-05-11 RX ORDER — LIDOCAINE 40 MG/G
CREAM TOPICAL ONCE
Status: CANCELLED | OUTPATIENT
Start: 2022-05-11 | End: 2022-05-11

## 2022-05-11 ASSESSMENT — PAIN DESCRIPTION - ONSET: ONSET: ON-GOING

## 2022-05-11 ASSESSMENT — PAIN DESCRIPTION - LOCATION: LOCATION: BUTTOCKS

## 2022-05-11 ASSESSMENT — PAIN DESCRIPTION - DESCRIPTORS: DESCRIPTORS: DISCOMFORT

## 2022-05-11 ASSESSMENT — PAIN SCALES - GENERAL
PAINLEVEL_OUTOF10: 0
PAINLEVEL_OUTOF10: 5

## 2022-05-11 ASSESSMENT — PAIN - FUNCTIONAL ASSESSMENT: PAIN_FUNCTIONAL_ASSESSMENT: PREVENTS OR INTERFERES SOME ACTIVE ACTIVITIES AND ADLS

## 2022-05-11 ASSESSMENT — PAIN DESCRIPTION - PAIN TYPE: TYPE: CHRONIC PAIN

## 2022-05-11 ASSESSMENT — PAIN DESCRIPTION - FREQUENCY: FREQUENCY: INTERMITTENT

## 2022-05-11 NOTE — PLAN OF CARE
Right foot wounds with some redness noted. Script for Bactrim sent to pharmacy per Dr Ronald Lindsay. Debridement on right ankle & lateral foot, left lateral foot per MD & pt. Tolerated well. Wound care orders updated. Pt. Scheduled with vascular for 5/16/22. Pt. States he is down to smoking 5 cigarettes/day with a goal of 0 cigarettes by 6/1/22. F/u in [facility] in 2 weeks as ordered, pt. Aware to call sooner with any changes or questions/concerns. Discharge instructions reviewed with patient & wife, all questions answered, copy given to patient. Dressings were applied to all wounds per M.D. Instructions at this visit.

## 2022-05-17 NOTE — PROGRESS NOTES
88 [facility] Progress Note    Altaf Matthew     : 1955    DATE OF VISIT:  2022    Subjective:     Altaf Matthew is a 61 y.o. male who has an open surgical ulcer located on the abdomen (on the lower right) and left buttock. Significant symptoms or pertinent wound history since last visit: feeling ok overall, no F/C/D, no abd pain. Seeing vascular surgery next week. Down to 5 cigarettes per day. Doing well with offloading and protein intake. Additional ulcer(s) noted? yes. Multiple diabetic / ischemic / pressure ulcers on feet and right ankle. His current medication list consists of amLODIPine, aspirin, atorvastatin, docusate sodium, insulin glargine, metFORMIN, psyllium. Allergies: Patient has no known allergies. Objective:     Vitals:    22 1307   BP: 125/69   Pulse: 82   Resp: 18   Temp: 97.4 °F (36.3 °C)   TempSrc: Oral   Weight: 159 lb 6.4 oz (72.3 kg)   Height: 5' 11\" (1.803 m)     Constitutional:  well-developed, well-nourished, NAD   Psychiatric:  oriented to person, place and time; mood and affect appropriate for the situation   Cardiovascular:  bilateral pedal pulses Dopplerable, feet a bit cool, slow cap refill; very mild lower extremity edema  Lymphatic:  no inguinal or popliteal adenopathy, no lymphangitis, but I think a bit of recurrent cellulitis this week around the right ankle and right lateral foot  Musculoskeletal:  no clubbing, cyanosis or petechiae; RLE and LLE with no gross effusions, joint misalignment or acute arthritis  Princess-ulcer skin: pink and indurated with a bit of hyperkeratosis at the abdomen; slightly cristy, indurated and less moist at the buttock; pink to Hong Nima, a bit cool, indurated, less macerated at the feet, and then that redness around the two right foot-ankle ulcers.   Ulcer(s): left abdominal site healed, right abdominal site smaller, almost healed, red, granular, some peeling hyperkeratosis; the buttock wound is smaller again this week, inferior portion nearly healed with some hyperkeratosis, but now a more superior focus of inflamed granulation and what looks like a very small cutaneous abscess / sinus tract; all of the foot and ankle ulcers are largely necrotic, left dorsal foot with a bit of drier skin necrosis lysing away; no pus, less drainage overall. The left lateral forefoot is now more obviously open as some crusted exudate and hyperkeratosis peeled away, with a bit of underlying fat necrosis, pink tissue and focal bone expxosure. Right ankle and right foot and left 5th toe with a bit of moist necrosis again this week, I think a bit of pus at the right ankle and foot again, no bone exposure, and that left 5th toe definitely looks more stable again. Photos also saved in electronic chart.     Today's wound measurements, per RN documentation:  Wound 02/16/22 #6 Right Abdominal Cluster, Surgical (graft-site), Full-Thickness, onset 12/21-Wound Length (cm): 0.6 cm (remeasured per MD)  Wound 02/16/22 #1 Right Lateral Ankle, Arterial, Full Thickness, onset -Wound Length (cm): 1.3 cm  Wound 02/16/22 #2 Right Lateral Foot, Arterial, Full Thickness Onset Nov 2021-Wound Length (cm): 2.6 cm  Wound 02/16/22 #3 Left Dorsal Foot, Arterial, Full Thickness, Onset Nov 2021-Wound Length (cm): 2 cm  Wound 02/16/22 #5 Left 5th Toe, Arterial, Full thickness, Onset Nov 2021-Wound Length (cm): 2 cm  Wound 02/16/22 #4 Left Lateral Foot, Arterial, full thickness,, Onset Nov 2021-Wound Length (cm): 0 cm  Wound 04/27/22 #10 Left 2nd Toe, Arterial, Full Thickness, Onset 04/25/2022-Wound Length (cm): 0.9 cm  [REMOVED] Wound 02/16/22 #7 Left Lower Buttocks, Surgical, full thickness, Onset 2022-Wound Length (cm): 0 cm  Wound 05/11/22 #11 Left Upper Buttocks Cluster, Abcess, partial (onset unkown)-Wound Length (cm): 2 cm    Wound 02/16/22 #6 Right Abdominal Cluster, Surgical (graft-site), Full-Thickness, onset 12/21-Wound Width (cm): 0.6 cm  Wound 02/16/22 #1 Right Lateral Ankle, Arterial, Full Thickness, onset -Wound Width (cm): 1.4 cm  Wound 02/16/22 #2 Right Lateral Foot, Arterial, Full Thickness Onset Nov 2021-Wound Width (cm): 1.6 cm  Wound 02/16/22 #3 Left Dorsal Foot, Arterial, Full Thickness, Onset Nov 2021-Wound Width (cm): 4.4 cm  Wound 02/16/22 #5 Left 5th Toe, Arterial, Full thickness, Onset Nov 2021-Wound Width (cm): 1.4 cm  Wound 02/16/22 #4 Left Lateral Foot, Arterial, full thickness,, Onset Nov 2021-Wound Width (cm): 0 cm  Wound 04/27/22 #10 Left 2nd Toe, Arterial, Full Thickness, Onset 04/25/2022-Wound Width (cm): 0.9 cm  [REMOVED] Wound 02/16/22 #7 Left Lower Buttocks, Surgical, full thickness, Onset 2022-Wound Width (cm): 0 cm  Wound 05/11/22 #11 Left Upper Buttocks Cluster, Abcess, partial (onset unkown)-Wound Width (cm): 0.5 cm    Wound 02/16/22 #6 Right Abdominal Cluster, Surgical (graft-site), Full-Thickness, onset 12/21-Wound Depth (cm): 0.1 cm  Wound 02/16/22 #1 Right Lateral Ankle, Arterial, Full Thickness, onset -Wound Depth (cm): 0.1 cm  Wound 02/16/22 #2 Right Lateral Foot, Arterial, Full Thickness Onset Nov 2021-Wound Depth (cm): 0.4 cm  Wound 02/16/22 #3 Left Dorsal Foot, Arterial, Full Thickness, Onset Nov 2021-Wound Depth (cm): 0.1 cm  Wound 02/16/22 #5 Left 5th Toe, Arterial, Full thickness, Onset Nov 2021-Wound Depth (cm): 0.1 cm  Wound 02/16/22 #4 Left Lateral Foot, Arterial, full thickness,, Onset Nov 2021-Wound Depth (cm): 0 cm  Wound 04/27/22 #10 Left 2nd Toe, Arterial, Full Thickness, Onset 04/25/2022-Wound Depth (cm): 0.1 cm  [REMOVED] Wound 02/16/22 #7 Left Lower Buttocks, Surgical, full thickness, Onset 2022-Wound Depth (cm): 0 cm  Wound 05/11/22 #11 Left Upper Buttocks Cluster, Abcess, partial (onset unkown)-Wound Depth (cm): 0.3 cm    Assessment:     Patient Active Problem List   Diagnosis Code    Uncontrolled type 2 diabetes mellitus with diabetic polyneuropathy, without long-term current use of insulin ([facility] [address] E11.42, E11.65    Ischemic ulcer of left foot, limited to breakdown of skin ([address] L97.521    Nonhealing surgical wound of buttock, initial encounter T81.89XA    Ischemic ulcer of right ankle with necrosis of muscle ([address] L97.313    Gangrene of toe of left foot ([address] I96    Current every day smoker F17.200    Hyperlipidemia E78.5    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 Two other diabetic / ischemic ulcers on the left foot looking stable, but the ones on the right side are more moist and sloughy and inflamed again, with a bit of recurrent local cellulitis. Factors contributing to occurrence and/or persistence of the chronic ulcer include diabetes, poor glucose control, chronic pressure, decreased mobility, shear force, smoking, arterial insufficiency, decreased tissue oxygenation and non-adherence. Medical necessity of today's visit is shown by the above documentation. Sharp debridement is indicated today, based upon the exam findings in the wound(s) above. Procedure note:     Consent obtained. Time out performed per [facility] policy. Anesthetic  Anesthetic: 4% Lidocaine Cream     Using a curette, #15 blade scalpel and forceps, I sharply debrided the foot (left , lateral, forefoot) ulcer(s) down through and including the removal of subcutaneous tissue. The type(s) of tissue debrided included fibrin, biofilm and necrotic/eschar.  Total Surface Area Debrided: 2 sq cm. Using a #15 blade scalpel and scissors, I sharply debrided the right ankle and foot (right) ulcer(s) down through and including the removal of muscle/fascia. The type(s) of tissue debrided included biofilm, slough and necrotic/eschar. Total Surface Area Debrided: 5 sq cm. The ulcers were then irrigated with normal saline solution. The procedure was completed with a small amount of bleeding, and hemostasis was with pressure. The patient tolerated the procedure well, with no significant complications. The patient's level of pain during and after the procedure was monitored. Post-debridement measurements, if different from pre-debridement, are in the flowsheet as well. Discharge plan:     Treatment in the wound care center today, per RN documentation: Wound 02/16/22 #6 Right Abdominal Cluster, Surgical (graft-site), Full-Thickness, onset 12/21-Dressing/Treatment: Other (comment)  Wound 02/16/22 #1 Right Lateral Ankle, Arterial, Full Thickness, onset -Dressing/Treatment: Other (comment) (betadine. 4x4's, robert)  Wound 02/16/22 #2 Right Lateral Foot, Arterial, Full Thickness Onset Nov 2021-Dressing/Treatment: Other (comment) (betadine. 4x4's, robert)  Wound 02/16/22 #3 Left Dorsal Foot, Arterial, Full Thickness, Onset Nov 2021-Dressing/Treatment: Other (comment) (betadine. 4x4's, robert)  Wound 02/16/22 #5 Left 5th Toe, Arterial, Full thickness, Onset Nov 2021-Dressing/Treatment: Other (comment) (betadine. 4x4's, robert)  Wound 02/16/22 #4 Left Lateral Foot, Arterial, full thickness,, Onset Nov 2021-Dressing/Treatment: Other (comment) (betadine. 4x4's, robert)  Wound 04/27/22 #10 Left 2nd Toe, Arterial, Full Thickness, Onset 04/25/2022-Dressing/Treatment: Other (comment) (betadine. 4x4's, robert)  Wound 05/11/22 #11 Left Upper Buttocks Cluster, Abcess, partial (onset unkown)-Dressing/Treatment: Other (comment) (PSO, small mepilex border).     I called in another week of Bactrim for right foot and ankle, and left buttock soft tissue infection. Keep working on cutting down on smoking. No change in offloading plans for now. Continue to work on glucose control and protein intake. Be sure to follow up with vascular -- I think he may well need BL LE arterial interventions, then definitely podiatric surgery for the left foot, and either OR or bedside debridement for the right side. Home treatment: good handwashing before and after any dressing changes. Cleanse wound with saline or soap & water before dressing change. May use Vaseline (petrolatum), Aquaphor, Aveeno, CeraVe, Cetaphil, Eucerin, Lubriderm, etc for dry skin. Dressing type for home: basically as above, once daily. Written discharge instructions given to patient. Follow up in 2 weeks.     Electronically signed by Cinthia Sanabria MD on 5/17/2022 at 5:57 PM.

## 2022-05-18 ENCOUNTER — HOSPITAL ENCOUNTER (OUTPATIENT)
Dept: WOUND CARE | Age: 67
Discharge: HOME | End: 2022-05-18

## 2022-05-25 ENCOUNTER — HOSPITAL ENCOUNTER (OUTPATIENT)
Dept: WOUND CARE | Age: 67
Discharge: HOME | End: 2022-05-25

## 2022-05-25 VITALS
SYSTOLIC BLOOD PRESSURE: 148 MMHG | DIASTOLIC BLOOD PRESSURE: 83 MMHG | TEMPERATURE: 99 F | RESPIRATION RATE: 20 BRPM | BODY MASS INDEX: 22.03 KG/M2 | WEIGHT: 157.38 LBS | HEIGHT: 71 IN | HEART RATE: 89 BPM

## 2022-05-25 DIAGNOSIS — L97.513: ICD-10-CM

## 2022-05-25 DIAGNOSIS — I70.235 ATHEROSCLEROSIS OF NATIVE ARTERIES OF RIGHT LEG WITH ULCERATION OF OTHER PART OF FOOT (HCC): ICD-10-CM

## 2022-05-25 DIAGNOSIS — I70.245 ATHEROSCLEROSIS OF NATIVE ARTERIES OF LEFT LEG WITH ULCERATION OF OTHER PART OF FOOT (HCC): ICD-10-CM

## 2022-05-25 DIAGNOSIS — L97.522 ISCHEMIC ULCER OF LEFT FOOT WITH FAT LAYER EXPOSED (HCC): ICD-10-CM

## 2022-05-25 DIAGNOSIS — L97.522 ISCHEMIC TOE ULCER, LEFT, WITH FAT LAYER EXPOSED (HCC): ICD-10-CM

## 2022-05-25 DIAGNOSIS — L97.521 ISCHEMIC ULCER OF LEFT FOOT, LIMITED TO BREAKDOWN OF SKIN (HCC): Primary | ICD-10-CM

## 2022-05-25 PROCEDURE — 97597 DBRDMT OPN WND 1ST 20 CM/<: CPT | Performed by: INTERNAL MEDICINE

## 2022-05-25 PROCEDURE — 11042 DBRDMT SUBQ TIS 1ST 20SQCM/<: CPT

## 2022-05-25 PROCEDURE — 11042 DBRDMT SUBQ TIS 1ST 20SQCM/<: CPT | Performed by: INTERNAL MEDICINE

## 2022-05-25 PROCEDURE — 97597 DBRDMT OPN WND 1ST 20 CM/<: CPT

## 2022-05-25 RX ORDER — LIDOCAINE 40 MG/G
CREAM TOPICAL ONCE
Status: DISCONTINUED | OUTPATIENT
Start: 2022-05-25 | End: 2022-05-26 | Stop reason: HOSPADM

## 2022-05-25 RX ORDER — LIDOCAINE HYDROCHLORIDE 40 MG/ML
SOLUTION TOPICAL ONCE
Status: CANCELLED | OUTPATIENT
Start: 2022-05-25 | End: 2022-05-25

## 2022-05-25 RX ORDER — BACITRACIN ZINC AND POLYMYXIN B SULFATE 500; 1000 [USP'U]/G; [USP'U]/G
OINTMENT TOPICAL ONCE
Status: CANCELLED | OUTPATIENT
Start: 2022-05-25 | End: 2022-05-25

## 2022-05-25 RX ORDER — LIDOCAINE 50 MG/G
OINTMENT TOPICAL ONCE
Status: DISCONTINUED | OUTPATIENT
Start: 2022-05-25 | End: 2022-05-26 | Stop reason: HOSPADM

## 2022-05-25 RX ORDER — BACITRACIN ZINC AND POLYMYXIN B SULFATE 500; 1000 [USP'U]/G; [USP'U]/G
OINTMENT TOPICAL ONCE
Status: DISCONTINUED | OUTPATIENT
Start: 2022-05-25 | End: 2022-05-26 | Stop reason: HOSPADM

## 2022-05-25 RX ORDER — LIDOCAINE 50 MG/G
OINTMENT TOPICAL ONCE
Status: CANCELLED | OUTPATIENT
Start: 2022-05-25 | End: 2022-05-25

## 2022-05-25 RX ORDER — LIDOCAINE HYDROCHLORIDE 40 MG/ML
SOLUTION TOPICAL ONCE
Status: DISCONTINUED | OUTPATIENT
Start: 2022-05-25 | End: 2022-05-26 | Stop reason: HOSPADM

## 2022-05-25 RX ORDER — LIDOCAINE 40 MG/G
CREAM TOPICAL ONCE
Status: CANCELLED | OUTPATIENT
Start: 2022-05-25 | End: 2022-05-25

## 2022-05-25 ASSESSMENT — PAIN DESCRIPTION - PROGRESSION: CLINICAL_PROGRESSION: GRADUALLY WORSENING

## 2022-05-25 ASSESSMENT — PAIN DESCRIPTION - FREQUENCY: FREQUENCY: INTERMITTENT

## 2022-05-25 ASSESSMENT — PAIN - FUNCTIONAL ASSESSMENT: PAIN_FUNCTIONAL_ASSESSMENT: PREVENTS OR INTERFERES SOME ACTIVE ACTIVITIES AND ADLS

## 2022-05-25 ASSESSMENT — PAIN SCALES - GENERAL: PAINLEVEL_OUTOF10: 3

## 2022-05-25 ASSESSMENT — PAIN DESCRIPTION - DESCRIPTORS: DESCRIPTORS: DISCOMFORT;DULL

## 2022-05-25 ASSESSMENT — PAIN DESCRIPTION - PAIN TYPE: TYPE: CHRONIC PAIN

## 2022-05-25 ASSESSMENT — PAIN DESCRIPTION - LOCATION: LOCATION: BUTTOCKS

## 2022-05-25 ASSESSMENT — PAIN DESCRIPTION - ONSET: ONSET: ON-GOING

## 2022-06-07 PROBLEM — L97.522 ISCHEMIC ULCER OF LEFT FOOT WITH FAT LAYER EXPOSED (HCC): Status: ACTIVE | Noted: 2022-02-16

## 2022-06-07 PROBLEM — L92.9 HYPERGRANULATION: Status: RESOLVED | Noted: 2022-03-05 | Resolved: 2022-06-07

## 2022-06-07 PROBLEM — L97.513 ISCHEMIC ULCER OF RIGHT FOOT WITH NECROSIS OF MUSCLE (HCC): Status: ACTIVE | Noted: 2022-06-07

## 2022-06-07 PROBLEM — I70.245 ATHEROSCLEROSIS OF NATIVE ARTERIES OF LEFT LEG WITH ULCERATION OF OTHER PART OF FOOT (HCC): Status: ACTIVE | Noted: 2022-06-07

## 2022-06-07 PROBLEM — T86.821 FAILED SKIN GRAFT: Status: RESOLVED | Noted: 2022-03-05 | Resolved: 2022-06-07

## 2022-06-07 PROBLEM — I70.235 ATHEROSCLEROSIS OF NATIVE ARTERIES OF RIGHT LEG WITH ULCERATION OF OTHER PART OF FOOT (HCC): Status: ACTIVE | Noted: 2022-06-07

## 2022-06-07 PROBLEM — L97.522 ISCHEMIC TOE ULCER, LEFT, WITH FAT LAYER EXPOSED (HCC): Status: ACTIVE | Noted: 2022-06-07

## 2022-06-07 NOTE — PROGRESS NOTES
88 [facility] Progress Note    Devyn Hermosillo     : 1955    DATE OF VISIT:  2022    Subjective:     Devyn Hermosillo is a 61 y.o. male who has an arterial and  diabetic ulcer located on the right ankle and foot (bilateral). Significant symptoms or pertinent wound history since last visit: feeling ok overall, not a lot of foot pain. Had a recent course of Bactrim for right foot and buttock soft tissue infection, tolerated it well. Smoking less, trying to stop by . Met with vascular surgery earlier this week, anticipates being scheduled for angio and intervention soon (presumably on the left side first, but right side eventually). Additional ulcer(s) noted? no. Abdomen and buttock seem healed now. His current medication list consists of amLODIPine, aspirin, atorvastatin, docusate sodium, insulin glargine, metFORMIN, and psyllium. Allergies: Patient has no known allergies. Objective:     Vitals:    22 1348   BP: (!) 148/83   Pulse: 89   Resp: 20   Temp: 99 °F (37.2 °C)   TempSrc: Oral   Weight: 157 lb 6 oz (71.4 kg)   Height: 5' 11\" (1.803 m)     Constitutional:  well-developed, well-nourished, NAD   Cardiovascular:  bilateral pedal pulses Dopplerable, feet a bit cool, slow cap refill; very mild lower extremity edema  Lymphatic:  no inguinal or popliteal adenopathy, no lymphangitis, and I think no active cellulitis this week  Musculoskeletal:  no clubbing, cyanosis or petechiae; RLE and LLE with no gross effusions, joint misalignment or acute arthritis  Princess-ulcer skin: pink and indurated with a bit of hyperkeratosis at the abdomen; slightly cristy, indurated and less moist at the buttock; pink to Hong Nima, a bit cool, indurated, less macerated at the feet, and then less redness around the two right foot-ankle ulcers. Ulcer(s):   -- left abdominal site healed, right abdominal site I think just barely healed.   -- the buttock wound looks delicately healed this week also, with minor abscess resolved. -- Left dorsal foot with some ongoing lysis of skin and SQ necrosis, smaller, a bit of red tissue. -- Left lateral foot with a very small wound now, a bit of fibrin and biofilm, still a small focus of exposed bone. -- Left 5th toe dry gangrene stable, just a bit of proximal moisture. -- Left 2nd toe eschar seems stable, a bit of presumed depth, and that IPJ does look a bit inflamed. -- Right lateral ankle a bit smaller, a bit of red tissue, a bit of fat necrosis, slough, fascial exposure. -- Right lateral midfoot still a bit inflamed, all necrotic, fat and fascial tissue, a bit of depth, no bone frankly exposed, but it's close.  Photos also saved in electronic chart.     Today's wound measurements, per RN documentation:  Wound 02/16/22 #1 Right Lateral Ankle, Arterial, Full Thickness, onset -Wound Length (cm): 1.5 cm  Wound 02/16/22 #2 Right Lateral Foot, Arterial, Full Thickness Onset Nov 2021-Wound Length (cm): 2.5 cm  Wound 02/16/22 #3 Left Dorsal Foot, Arterial, Full Thickness, Onset Nov 2021-Wound Length (cm): 2.2 cm  Wound 02/16/22 #4 Left Lateral Foot, Arterial, full thickness,, Onset Nov 2021-Wound Length (cm): 1 cm  Wound 02/16/22 #5 Left 5th Toe, Arterial, Full thickness, Onset Nov 2021-Wound Length (cm): 2 cm  Wound 04/27/22 #10 Left 2nd Toe, Arterial, Full Thickness, Onset 04/25/2022-Wound Length (cm): 0.6 cm  [REMOVED] Wound 02/16/22 #6 Right Abdominal Cluster, Surgical (graft-site), Full-Thickness, onset 12/21-Wound Length (cm): 0 cm (Healed per MD)  [REMOVED] Wound 05/11/22 #11 Left Upper Buttocks Cluster, Abcess, partial (onset unkown)-Wound Length (cm): 0 cm    Wound 02/16/22 #1 Right Lateral Ankle, Arterial, Full Thickness, onset -Wound Width (cm): 1.3 cm  Wound 02/16/22 #2 Right Lateral Foot, Arterial, Full Thickness Onset Nov 2021-Wound Width (cm): 1.5 cm  Wound 02/16/22 #3 Left Dorsal Foot, Arterial, Full Thickness, Onset Nov 2021-Wound Width (cm): 4 cm  Wound 02/16/22 #4 Left Lateral Foot, Arterial, full thickness,, Onset Nov 2021-Wound Width (cm): 0.5 cm  Wound 02/16/22 #5 Left 5th Toe, Arterial, Full thickness, Onset Nov 2021-Wound Width (cm): 1.6 cm  Wound 04/27/22 #10 Left 2nd Toe, Arterial, Full Thickness, Onset 04/25/2022-Wound Width (cm): 0.7 cm  [REMOVED] Wound 02/16/22 #6 Right Abdominal Cluster, Surgical (graft-site), Full-Thickness, onset 12/21-Wound Width (cm): 0 cm  [REMOVED] Wound 05/11/22 #11 Left Upper Buttocks Cluster, Abcess, partial (onset unkown)-Wound Width (cm): 0 cm    Wound 02/16/22 #1 Right Lateral Ankle, Arterial, Full Thickness, onset -Wound Depth (cm): 0.1 cm  Wound 02/16/22 #2 Right Lateral Foot, Arterial, Full Thickness Onset Nov 2021-Wound Depth (cm): 0.4 cm  Wound 02/16/22 #3 Left Dorsal Foot, Arterial, Full Thickness, Onset Nov 2021-Wound Depth (cm): 0.1 cm  Wound 02/16/22 #4 Left Lateral Foot, Arterial, full thickness,, Onset Nov 2021-Wound Depth (cm): 0.4 cm  Wound 02/16/22 #5 Left 5th Toe, Arterial, Full thickness, Onset Nov 2021-Wound Depth (cm): 0.1 cm  Wound 04/27/22 #10 Left 2nd Toe, Arterial, Full Thickness, Onset 04/25/2022-Wound Depth (cm): 0.1 cm  [REMOVED] Wound 02/16/22 #6 Right Abdominal Cluster, Surgical (graft-site), Full-Thickness, onset 12/21-Wound Depth (cm): 0 cm  [REMOVED] Wound 05/11/22 #11 Left Upper Buttocks Cluster, Abcess, partial (onset unkown)-Wound Depth (cm): 0 cm    Assessment:     Patient Active Problem List   Diagnosis Code    Uncontrolled type 2 diabetes mellitus with diabetic polyneuropathy, without long-term current use of insulin ([facility]) E11.42, E11.65    Ischemic ulcer of left foot with fat layer exposed ([facility] [address] L97.522    Nonhealing surgical wound of buttock, initial encounter T81.89XA    Ischemic ulcer of right ankle with necrosis of muscle ([address] L97.313    Gangrene of toe of left foot ([address] I96    Current every day smoker F17.200    Hyperlipidemia E78.5    Diabetic ulcer of left foot associated with type 2 diabetes mellitus, with necrosis of bone ([facility]) E11.621, L97.524    Ischemic toe ulcer, left, with fat layer exposed ([address] L97.522    Ischemic ulcer of right foot with necrosis of muscle ([facility]) L97.513    Atherosclerosis of native arteries of left leg with ulceration of foot ([address] I70.245    Atherosclerosis of native arteries of right leg with ulceration of foot ([facility]) I70.235       Assessment of today's active condition(s): DM, neuropathy, PAD, tobacco use, Hx of a Sands 3 process at the left lateral foot for quite some time. 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 But then additional areas of pressure / diabetic / ischemic necrosis on both feet and the right ankle, with occasional soft tissue infection, and definite osteo in at least the left 5th ray. Factors contributing to occurrence and/or persistence of the chronic ulcer include diabetes, poor glucose control, chronic pressure, decreased mobility, shear force, smoking, arterial insufficiency and decreased tissue oxygenation. Medical necessity of today's visit is shown by the above documentation. Sharp debridement is indicated today, based upon the exam findings in the wound(s) above. Procedure note:     Consent obtained. Time out performed per [facility] policy. Topical anesthesia -- 4% lidocaine cream.    Using a curette, #15 blade scalpel and forceps, I sharply debrided the foot (left , lateral) and left, 2nd toe ulcer(s) down through and including the removal of dermis. The type(s) of tissue debrided included fibrin, biofilm and necrotic/eschar. Total Surface Area Debrided: 1 sq cm. Using a curette, forceps and # 10 blade scalpel, I sharply debrided the right ankle, right lateral foot and left dorsal foot ulcer(s) down through and including the removal of subcutaneous tissue.  The type(s) of tissue debrided included fibrin, biofilm, after any dressing changes. Cleanse wound with saline or soap & water before dressing change. May use Vaseline (petrolatum), Aquaphor, Aveeno, CeraVe, Cetaphil, Eucerin, Lubriderm, etc for dry skin. Dressing type for home: as above, once daily. Written discharge instructions given to patient. Follow up in 2 weeks (unless angio is then, in which case I can see him in three).     Electronically signed by Francia Johansen MD on 6/7/2022 at 4:32 PM.

## 2022-06-08 ENCOUNTER — HOSPITAL ENCOUNTER (OUTPATIENT)
Dept: WOUND CARE | Age: 67
Discharge: HOME | End: 2022-06-08

## 2022-06-08 VITALS
DIASTOLIC BLOOD PRESSURE: 72 MMHG | RESPIRATION RATE: 18 BRPM | SYSTOLIC BLOOD PRESSURE: 134 MMHG | HEART RATE: 89 BPM | TEMPERATURE: 98.1 F

## 2022-06-08 DIAGNOSIS — L97.523 ISCHEMIC ULCER OF LEFT FOOT WITH NECROSIS OF MUSCLE (HCC): ICD-10-CM

## 2022-06-08 DIAGNOSIS — L97.313 ISCHEMIC ULCER OF RIGHT ANKLE WITH NECROSIS OF MUSCLE (HCC): ICD-10-CM

## 2022-06-08 DIAGNOSIS — L97.522 ISCHEMIC TOE ULCER, LEFT, WITH FAT LAYER EXPOSED (HCC): Primary | ICD-10-CM

## 2022-06-08 DIAGNOSIS — I96 GANGRENE OF TOE OF LEFT FOOT (HCC): ICD-10-CM

## 2022-06-08 DIAGNOSIS — L97.513: ICD-10-CM

## 2022-06-08 DIAGNOSIS — L97.522 ISCHEMIC ULCER OF LEFT FOOT WITH FAT LAYER EXPOSED (HCC): ICD-10-CM

## 2022-06-08 PROCEDURE — 97597 DBRDMT OPN WND 1ST 20 CM/<: CPT

## 2022-06-08 PROCEDURE — 97597 DBRDMT OPN WND 1ST 20 CM/<: CPT | Performed by: INTERNAL MEDICINE

## 2022-06-08 PROCEDURE — 11043 DBRDMT MUSC&/FSCA 1ST 20/<: CPT | Performed by: INTERNAL MEDICINE

## 2022-06-08 PROCEDURE — 11043 DBRDMT MUSC&/FSCA 1ST 20/<: CPT

## 2022-06-08 RX ORDER — BACITRACIN ZINC AND POLYMYXIN B SULFATE 500; 1000 [USP'U]/G; [USP'U]/G
OINTMENT TOPICAL ONCE
Status: DISCONTINUED | OUTPATIENT
Start: 2022-06-08 | End: 2022-06-09 | Stop reason: HOSPADM

## 2022-06-08 RX ORDER — LIDOCAINE HYDROCHLORIDE 40 MG/ML
SOLUTION TOPICAL ONCE
Status: CANCELLED | OUTPATIENT
Start: 2022-06-08 | End: 2022-06-08

## 2022-06-08 RX ORDER — LIDOCAINE HYDROCHLORIDE 40 MG/ML
SOLUTION TOPICAL ONCE
Status: DISCONTINUED | OUTPATIENT
Start: 2022-06-08 | End: 2022-06-09 | Stop reason: HOSPADM

## 2022-06-08 RX ORDER — LIDOCAINE 50 MG/G
OINTMENT TOPICAL ONCE
Status: CANCELLED | OUTPATIENT
Start: 2022-06-08 | End: 2022-06-08

## 2022-06-08 RX ORDER — LIDOCAINE 40 MG/G
CREAM TOPICAL ONCE
Status: CANCELLED | OUTPATIENT
Start: 2022-06-08 | End: 2022-06-08

## 2022-06-08 RX ORDER — LIDOCAINE 50 MG/G
OINTMENT TOPICAL ONCE
Status: DISCONTINUED | OUTPATIENT
Start: 2022-06-08 | End: 2022-06-09 | Stop reason: HOSPADM

## 2022-06-08 RX ORDER — BACITRACIN ZINC AND POLYMYXIN B SULFATE 500; 1000 [USP'U]/G; [USP'U]/G
OINTMENT TOPICAL ONCE
Status: CANCELLED | OUTPATIENT
Start: 2022-06-08 | End: 2022-06-08

## 2022-06-08 RX ORDER — LIDOCAINE 40 MG/G
CREAM TOPICAL ONCE
Status: DISCONTINUED | OUTPATIENT
Start: 2022-06-08 | End: 2022-06-09 | Stop reason: HOSPADM

## 2022-06-08 ASSESSMENT — PAIN DESCRIPTION - LOCATION: LOCATION: FOOT

## 2022-06-08 ASSESSMENT — PAIN SCALES - GENERAL: PAINLEVEL_OUTOF10: 3

## 2022-06-08 ASSESSMENT — PAIN - FUNCTIONAL ASSESSMENT: PAIN_FUNCTIONAL_ASSESSMENT: PREVENTS OR INTERFERES SOME ACTIVE ACTIVITIES AND ADLS

## 2022-06-08 ASSESSMENT — PAIN DESCRIPTION - FREQUENCY: FREQUENCY: INTERMITTENT

## 2022-06-08 ASSESSMENT — PAIN DESCRIPTION - ONSET: ONSET: ON-GOING

## 2022-06-08 ASSESSMENT — PAIN DESCRIPTION - DESCRIPTORS: DESCRIPTORS: THROBBING

## 2022-06-08 ASSESSMENT — PAIN DESCRIPTION - PAIN TYPE: TYPE: CHRONIC PAIN

## 2022-06-08 ASSESSMENT — PAIN DESCRIPTION - ORIENTATION: ORIENTATION: RIGHT

## 2022-06-08 NOTE — PLAN OF CARE
Patient states he has not been smoking cigarettes, is vaping, but not inhaling. Patient & wife also state they called Vascular at [facility], but has not been scheduled for intervention yet. Wound Care center will call to work on getting patient scheduled for vascular intervention. Wounds debrided per MD as documented. Dressing orders updated. F/u in [facility] in 1 week as ordered, pt. Aware to call sooner with any changes or questions/concerns. Discharge instructions reviewed with patient & wife, all questions answered, copy given to patient. Dressings were applied to all wounds per M.D. Instructions at this visit.

## 2022-06-15 ENCOUNTER — HOSPITAL ENCOUNTER (OUTPATIENT)
Dept: WOUND CARE | Age: 67
Discharge: HOME | End: 2022-06-15

## 2022-06-15 VITALS
TEMPERATURE: 97.7 F | HEIGHT: 71 IN | SYSTOLIC BLOOD PRESSURE: 133 MMHG | RESPIRATION RATE: 18 BRPM | BODY MASS INDEX: 22.54 KG/M2 | WEIGHT: 161 LBS | HEART RATE: 77 BPM | DIASTOLIC BLOOD PRESSURE: 75 MMHG

## 2022-06-15 DIAGNOSIS — I96 GANGRENE OF TOE OF LEFT FOOT (HCC): ICD-10-CM

## 2022-06-15 DIAGNOSIS — L97.313 ISCHEMIC ULCER OF RIGHT ANKLE WITH NECROSIS OF MUSCLE (HCC): ICD-10-CM

## 2022-06-15 DIAGNOSIS — L97.522 ISCHEMIC ULCER OF LEFT FOOT WITH FAT LAYER EXPOSED (HCC): ICD-10-CM

## 2022-06-15 DIAGNOSIS — L97.513: ICD-10-CM

## 2022-06-15 DIAGNOSIS — L97.522 ISCHEMIC TOE ULCER, LEFT, WITH FAT LAYER EXPOSED (HCC): Primary | ICD-10-CM

## 2022-06-15 LAB
GLUCOSE BLD-MCNC: 478 MG/DL (ref 70–99)
PERFORMED ON: ABNORMAL

## 2022-06-15 PROCEDURE — 87077 CULTURE AEROBIC IDENTIFY: CPT

## 2022-06-15 PROCEDURE — 97597 DBRDMT OPN WND 1ST 20 CM/<: CPT

## 2022-06-15 PROCEDURE — 87070 CULTURE OTHR SPECIMN AEROBIC: CPT

## 2022-06-15 PROCEDURE — 11043 DBRDMT MUSC&/FSCA 1ST 20/<: CPT

## 2022-06-15 PROCEDURE — 87205 SMEAR GRAM STAIN: CPT

## 2022-06-15 PROCEDURE — 99213 OFFICE O/P EST LOW 20 MIN: CPT | Performed by: INTERNAL MEDICINE

## 2022-06-15 PROCEDURE — 87186 SC STD MICRODIL/AGAR DIL: CPT

## 2022-06-15 PROCEDURE — 97597 DBRDMT OPN WND 1ST 20 CM/<: CPT | Performed by: INTERNAL MEDICINE

## 2022-06-15 PROCEDURE — 11043 DBRDMT MUSC&/FSCA 1ST 20/<: CPT | Performed by: INTERNAL MEDICINE

## 2022-06-15 RX ORDER — LIDOCAINE HYDROCHLORIDE 40 MG/ML
SOLUTION TOPICAL ONCE
Status: DISCONTINUED | OUTPATIENT
Start: 2022-06-15 | End: 2022-06-16 | Stop reason: HOSPADM

## 2022-06-15 RX ORDER — LIDOCAINE 50 MG/G
OINTMENT TOPICAL ONCE
Status: CANCELLED | OUTPATIENT
Start: 2022-06-15 | End: 2022-06-15

## 2022-06-15 RX ORDER — BACITRACIN ZINC AND POLYMYXIN B SULFATE 500; 1000 [USP'U]/G; [USP'U]/G
OINTMENT TOPICAL ONCE
Status: CANCELLED | OUTPATIENT
Start: 2022-06-15 | End: 2022-06-15

## 2022-06-15 RX ORDER — SULFAMETHOXAZOLE AND TRIMETHOPRIM 800; 160 MG/1; MG/1
1 TABLET ORAL 2 TIMES DAILY
Qty: 28 TABLET | Refills: 0 | Status: ON HOLD
Start: 2022-06-15 | End: 2022-06-29 | Stop reason: HOSPADM

## 2022-06-15 RX ORDER — LIDOCAINE HYDROCHLORIDE 40 MG/ML
SOLUTION TOPICAL ONCE
Status: CANCELLED | OUTPATIENT
Start: 2022-06-15 | End: 2022-06-15

## 2022-06-15 RX ORDER — LIDOCAINE 50 MG/G
OINTMENT TOPICAL ONCE
Status: DISCONTINUED | OUTPATIENT
Start: 2022-06-15 | End: 2022-06-16 | Stop reason: HOSPADM

## 2022-06-15 RX ORDER — BACITRACIN ZINC AND POLYMYXIN B SULFATE 500; 1000 [USP'U]/G; [USP'U]/G
OINTMENT TOPICAL ONCE
Status: DISCONTINUED | OUTPATIENT
Start: 2022-06-15 | End: 2022-06-16 | Stop reason: HOSPADM

## 2022-06-15 RX ORDER — LIDOCAINE 40 MG/G
CREAM TOPICAL ONCE
Status: DISCONTINUED | OUTPATIENT
Start: 2022-06-15 | End: 2022-06-16 | Stop reason: HOSPADM

## 2022-06-15 RX ORDER — LIDOCAINE 40 MG/G
CREAM TOPICAL ONCE
Status: CANCELLED | OUTPATIENT
Start: 2022-06-15 | End: 2022-06-15

## 2022-06-15 NOTE — PLAN OF CARE
Patient had vascular intervention per Dr Marcelo Pacheco 6/14/22 on left. Patient will also need intervention on right. Left lateral foot & 5th toe noted to have bone exposure today. Left anterior ankle/foot with tendon exposure & purulent drainage noted, debridement & wound culture per MD. Script for Bactrim sent to pharmacy per MD, will be in touch later this week if a change in antibiotic needed after culture results obtained. Patient & wife both advised to go to the ER if pt. Develops fever, chills, increased redness up ankle/leg, increased pain, odor, or overall not feeling well, both verbalize understanding. Dr Jaun Lee will be in touch with Dr Paul Morales pts. Podiatrist regarding need for surgical debridement. Pt. Provided CAM boot today for immobilization of left ankle & foot. Wound dressing orders updated per MD. F/u in [facility] in 1 week as ordered, pt. Aware to call sooner with any changes or questions/concerns. Discharge instructions reviewed with patient & wife, all questions answered, copy given to patient. Dressings were applied to all wounds per M.D. Instructions at this visit.

## 2022-06-17 LAB
GRAM STAIN RESULT: ABNORMAL
ORGANISM: ABNORMAL
WOUND/ABSCESS: ABNORMAL

## 2022-06-21 ENCOUNTER — HOSPITAL ENCOUNTER (INPATIENT)
Age: 67
LOS: 10 days | Discharge: HOME HEALTH | DRG: 854 | End: 2022-07-01
Attending: INTERNAL MEDICINE | Admitting: INTERNAL MEDICINE

## 2022-06-21 ENCOUNTER — TELEPHONE (OUTPATIENT)
Dept: WOUND CARE | Age: 67
End: 2022-06-21

## 2022-06-21 DIAGNOSIS — L97.429 DIABETIC ULCER OF LEFT MIDFOOT ASSOCIATED WITH TYPE 2 DIABETES MELLITUS, UNSPECIFIED ULCER STAGE (HCC): ICD-10-CM

## 2022-06-21 DIAGNOSIS — M86.9 OSTEOMYELITIS OF FIFTH TOE OF LEFT FOOT (HCC): ICD-10-CM

## 2022-06-21 DIAGNOSIS — E11.621 DIABETIC ULCER OF LEFT MIDFOOT ASSOCIATED WITH TYPE 2 DIABETES MELLITUS, UNSPECIFIED ULCER STAGE (HCC): ICD-10-CM

## 2022-06-21 DIAGNOSIS — M86.9 OSTEOMYELITIS OF SECOND TOE OF LEFT FOOT (HCC): ICD-10-CM

## 2022-06-21 LAB
ANION GAP SERPL CALCULATED.3IONS-SCNC: 10 MMOL/L (ref 3–16)
BUN BLDV-MCNC: 33 MG/DL (ref 7–20)
CALCIUM SERPL-MCNC: 10.1 MG/DL (ref 8.3–10.6)
CHLORIDE BLD-SCNC: 98 MMOL/L (ref 99–110)
CO2: 26 MMOL/L (ref 21–32)
CREAT SERPL-MCNC: 1.2 MG/DL (ref 0.8–1.3)
GFR AFRICAN AMERICAN: >60
GFR NON-AFRICAN AMERICAN: >60
GLUCOSE BLD-MCNC: 213 MG/DL (ref 70–99)
GLUCOSE BLD-MCNC: 234 MG/DL (ref 70–99)
GLUCOSE BLD-MCNC: 250 MG/DL (ref 70–99)
HCT VFR BLD CALC: 31.7 % (ref 40.5–52.5)
HEMOGLOBIN: 10.8 G/DL (ref 13.5–17.5)
LACTIC ACID: 1.8 MMOL/L (ref 0.4–2)
MAGNESIUM: 1.7 MG/DL (ref 1.8–2.4)
MCH RBC QN AUTO: 29.8 PG (ref 26–34)
MCHC RBC AUTO-ENTMCNC: 34.2 G/DL (ref 31–36)
MCV RBC AUTO: 87.2 FL (ref 80–100)
PDW BLD-RTO: 14.3 % (ref 12.4–15.4)
PERFORMED ON: ABNORMAL
PERFORMED ON: ABNORMAL
PLATELET # BLD: 336 K/UL (ref 135–450)
PMV BLD AUTO: 8.1 FL (ref 5–10.5)
POTASSIUM SERPL-SCNC: 5 MMOL/L (ref 3.5–5.1)
RBC # BLD: 3.64 M/UL (ref 4.2–5.9)
SODIUM BLD-SCNC: 134 MMOL/L (ref 136–145)
WBC # BLD: 12.8 K/UL (ref 4–11)

## 2022-06-21 PROCEDURE — 80048 BASIC METABOLIC PNL TOTAL CA: CPT

## 2022-06-21 PROCEDURE — 2580000003 HC RX 258: Performed by: INTERNAL MEDICINE

## 2022-06-21 PROCEDURE — 83735 ASSAY OF MAGNESIUM: CPT

## 2022-06-21 PROCEDURE — 1200000000 HC SEMI PRIVATE

## 2022-06-21 PROCEDURE — 6360000002 HC RX W HCPCS: Performed by: INTERNAL MEDICINE

## 2022-06-21 PROCEDURE — 85027 COMPLETE CBC AUTOMATED: CPT

## 2022-06-21 PROCEDURE — 6370000000 HC RX 637 (ALT 250 FOR IP): Performed by: INTERNAL MEDICINE

## 2022-06-21 PROCEDURE — 87070 CULTURE OTHR SPECIMN AEROBIC: CPT

## 2022-06-21 PROCEDURE — 87186 SC STD MICRODIL/AGAR DIL: CPT

## 2022-06-21 PROCEDURE — 83605 ASSAY OF LACTIC ACID: CPT

## 2022-06-21 PROCEDURE — 83036 HEMOGLOBIN GLYCOSYLATED A1C: CPT

## 2022-06-21 PROCEDURE — 36415 COLL VENOUS BLD VENIPUNCTURE: CPT

## 2022-06-21 PROCEDURE — 87077 CULTURE AEROBIC IDENTIFY: CPT

## 2022-06-21 PROCEDURE — 87205 SMEAR GRAM STAIN: CPT

## 2022-06-21 PROCEDURE — 87040 BLOOD CULTURE FOR BACTERIA: CPT

## 2022-06-21 RX ORDER — ONDANSETRON 2 MG/ML
4 INJECTION INTRAMUSCULAR; INTRAVENOUS EVERY 6 HOURS PRN
Status: DISCONTINUED | OUTPATIENT
Start: 2022-06-21 | End: 2022-07-01 | Stop reason: HOSPADM

## 2022-06-21 RX ORDER — INSULIN LISPRO 100 [IU]/ML
0-3 INJECTION, SOLUTION INTRAVENOUS; SUBCUTANEOUS NIGHTLY
Status: DISCONTINUED | OUTPATIENT
Start: 2022-06-21 | End: 2022-07-01 | Stop reason: HOSPADM

## 2022-06-21 RX ORDER — ENOXAPARIN SODIUM 100 MG/ML
40 INJECTION SUBCUTANEOUS DAILY
Status: DISCONTINUED | OUTPATIENT
Start: 2022-06-21 | End: 2022-07-01 | Stop reason: HOSPADM

## 2022-06-21 RX ORDER — SODIUM CHLORIDE 0.9 % (FLUSH) 0.9 %
5-40 SYRINGE (ML) INJECTION PRN
Status: DISCONTINUED | OUTPATIENT
Start: 2022-06-21 | End: 2022-07-01 | Stop reason: HOSPADM

## 2022-06-21 RX ORDER — SODIUM CHLORIDE 9 MG/ML
INJECTION, SOLUTION INTRAVENOUS PRN
Status: DISCONTINUED | OUTPATIENT
Start: 2022-06-21 | End: 2022-07-01 | Stop reason: HOSPADM

## 2022-06-21 RX ORDER — AMLODIPINE BESYLATE 5 MG/1
5 TABLET ORAL DAILY
Status: DISCONTINUED | OUTPATIENT
Start: 2022-06-22 | End: 2022-07-01 | Stop reason: HOSPADM

## 2022-06-21 RX ORDER — ACETAMINOPHEN 650 MG/1
650 SUPPOSITORY RECTAL EVERY 6 HOURS PRN
Status: DISCONTINUED | OUTPATIENT
Start: 2022-06-21 | End: 2022-07-01 | Stop reason: HOSPADM

## 2022-06-21 RX ORDER — INSULIN GLARGINE 100 [IU]/ML
10 INJECTION, SOLUTION SUBCUTANEOUS NIGHTLY
Status: DISCONTINUED | OUTPATIENT
Start: 2022-06-21 | End: 2022-06-26

## 2022-06-21 RX ORDER — POLYETHYLENE GLYCOL 3350 17 G/17G
17 POWDER, FOR SOLUTION ORAL DAILY PRN
Status: DISCONTINUED | OUTPATIENT
Start: 2022-06-21 | End: 2022-07-01 | Stop reason: HOSPADM

## 2022-06-21 RX ORDER — ATORVASTATIN CALCIUM 80 MG/1
80 TABLET, FILM COATED ORAL NIGHTLY
Status: DISCONTINUED | OUTPATIENT
Start: 2022-06-21 | End: 2022-07-01 | Stop reason: HOSPADM

## 2022-06-21 RX ORDER — INSULIN LISPRO 100 [IU]/ML
0-6 INJECTION, SOLUTION INTRAVENOUS; SUBCUTANEOUS
Status: DISCONTINUED | OUTPATIENT
Start: 2022-06-21 | End: 2022-07-01 | Stop reason: HOSPADM

## 2022-06-21 RX ORDER — SODIUM CHLORIDE 0.9 % (FLUSH) 0.9 %
5-40 SYRINGE (ML) INJECTION EVERY 12 HOURS SCHEDULED
Status: DISCONTINUED | OUTPATIENT
Start: 2022-06-21 | End: 2022-07-01 | Stop reason: HOSPADM

## 2022-06-21 RX ORDER — ACETAMINOPHEN 325 MG/1
650 TABLET ORAL EVERY 6 HOURS PRN
Status: DISCONTINUED | OUTPATIENT
Start: 2022-06-21 | End: 2022-07-01 | Stop reason: HOSPADM

## 2022-06-21 RX ORDER — ASPIRIN 81 MG/1
81 TABLET ORAL DAILY
Status: DISCONTINUED | OUTPATIENT
Start: 2022-06-22 | End: 2022-07-01 | Stop reason: HOSPADM

## 2022-06-21 RX ORDER — DEXTROSE MONOHYDRATE 50 MG/ML
100 INJECTION, SOLUTION INTRAVENOUS PRN
Status: DISCONTINUED | OUTPATIENT
Start: 2022-06-21 | End: 2022-07-01 | Stop reason: HOSPADM

## 2022-06-21 RX ORDER — ONDANSETRON 4 MG/1
4 TABLET, ORALLY DISINTEGRATING ORAL EVERY 8 HOURS PRN
Status: DISCONTINUED | OUTPATIENT
Start: 2022-06-21 | End: 2022-07-01 | Stop reason: HOSPADM

## 2022-06-21 RX ADMIN — VANCOMYCIN HYDROCHLORIDE 1250 MG: 10 INJECTION, POWDER, LYOPHILIZED, FOR SOLUTION INTRAVENOUS at 21:45

## 2022-06-21 RX ADMIN — ENOXAPARIN SODIUM 40 MG: 100 INJECTION SUBCUTANEOUS at 18:46

## 2022-06-21 RX ADMIN — Medication 10 ML: at 21:25

## 2022-06-21 RX ADMIN — INSULIN LISPRO 1 UNITS: 100 INJECTION, SOLUTION INTRAVENOUS; SUBCUTANEOUS at 21:24

## 2022-06-21 RX ADMIN — INSULIN LISPRO 2 UNITS: 100 INJECTION, SOLUTION INTRAVENOUS; SUBCUTANEOUS at 18:45

## 2022-06-21 RX ADMIN — ATORVASTATIN CALCIUM 80 MG: 80 TABLET, FILM COATED ORAL at 21:25

## 2022-06-21 RX ADMIN — PIPERACILLIN AND TAZOBACTAM 3375 MG: 3; .375 INJECTION, POWDER, LYOPHILIZED, FOR SOLUTION INTRAVENOUS at 18:44

## 2022-06-21 RX ADMIN — INSULIN GLARGINE 10 UNITS: 100 INJECTION, SOLUTION SUBCUTANEOUS at 21:24

## 2022-06-21 RX ADMIN — SODIUM CHLORIDE 25 ML: 9 INJECTION, SOLUTION INTRAVENOUS at 18:44

## 2022-06-21 ASSESSMENT — PAIN DESCRIPTION - LOCATION: LOCATION: BUTTOCKS

## 2022-06-21 ASSESSMENT — PAIN SCALES - GENERAL: PAINLEVEL_OUTOF10: 3

## 2022-06-21 NOTE — CONSULTS
Pharmacy Note  Vancomycin Consult    Ayde Nair is a 61 y.o. male started on Vancomycin for Skin and Soft tissue infection; consult received from Dr. Connie Vizcaino to manage therapy. Also receiving the following antibiotics: Zosyn. Allergies:  Shrimp flavor       Recent Labs     06/21/22  1704   CREATININE 1.2       Recent Labs     06/21/22  1704   WBC 12.8*       Estimated Creatinine Clearance: 64 mL/min (based on SCr of 1.2 mg/dL). No intake or output data in the 24 hours ending 06/21/22 1828    Wt Readings from Last 1 Encounters:   06/15/22 161 lb (73 kg)         There is no height or weight on file to calculate BMI. Culture Date      Source                       Results  Pending    Loading dose (critically ill or in ICU, require dialysis or renal replacement therapy): Vancomycin 25 mg/kg IVPB x 1 (maximum 2500 mg). Maintenance dose: 15 mg/kg (maximum: 2000 mg/dose and 4500 mg/day) starting at the next dosing interval determined by renal function  Pulse dose: fluctuating renal function, MARVIN, ESRD   Goal Vancomycin trough: 10-15 mcg/mL or 15-20 mcg/mL   Goal Vancomycin AUC: 400-600     Assessment/Plan:  Will initiate Vancomycin with a one time loading dose of 1250 mg x1, followed by 750 mg IV every 12 hours. Calculated  Vancomycin trough ordered for 6/22 @ 1800. Timing of trough level will be determined based on culture results, renal function, and clinical response. Kitty Allison [facility]      Thank you for the consult.

## 2022-06-21 NOTE — CONSULTS
Pharmacy Note  Vancomycin Consult     Ct Agustin is a 61 y.o. male started on Vancomycin for Skin and Soft tissue infection; consult received from Dr. Giovanna Sacks to manage therapy. Also receiving the following antibiotics: Zosyn.     Allergies:  Shrimp flavor             Recent Labs     06/21/22  1704   CREATININE 1.2             Recent Labs     06/21/22  1704   WBC 12.8*         Estimated Creatinine Clearance: 64 mL/min (based on SCr of 1.2 mg/dL).     No intake or output data in the 24 hours ending 06/21/22 1828         Wt Readings from Last 1 Encounters:   06/15/22 161 lb (73 kg)          There is no height or weight on file to calculate BMI.     Culture Date      Source                       Results  Pending     Loading dose (critically ill or in ICU, require dialysis or renal replacement therapy): Vancomycin 25 mg/kg IVPB x 1 (maximum 2500 mg). Maintenance dose: 15 mg/kg (maximum: 2000 mg/dose and 4500 mg/day) starting at the next dosing interval determined by renal function  Pulse dose: fluctuating renal function, MARVIN, ESRD   Goal Vancomycin trough: 10-15 mcg/mL or 15-20 mcg/mL   Goal Vancomycin AUC: 400-600      Assessment/Plan:  Will initiate Vancomycin with a one time loading dose of 1250 mg x1, followed by 750 mg IV every 12 hours. Calculated  Vancomycin trough ordered for 6/22 @ 1800. Timing of trough level will be determined based on culture results, renal function, and clinical response.      [facility]        Thank you for the consult. 6/22 1801  Vanc lv = 9.2 mcg/mL ~9.5 hr post dose  Calculated AUC = 492  Estimated Creatinine Clearance: 64 mL/min (based on SCr of 1.2 mg/dL).   Continue with current Vancomycin dosing of 750 mg q12h  Bridget Christine PharmD  6/22/2022 at 6:52 PM

## 2022-06-21 NOTE — PROGRESS NOTES
JAMEY Lenz      6/21/22 4:31 PM  [phone] Hospital or Facility: [facility] From: Fernandez Eisenberg RE: Courtney Golden 1955 RM: [id number] Direct admit from podiatry office has arrived. Please place orders!  Need Callback: NO CALLBACK REQ C5 MED SURG / [address]

## 2022-06-21 NOTE — CONSULTS
[address] [id number] Continence Nurse  Consult Note       NAME:  Monie Hanna  MEDICAL RECORD NUMBER:  [id number]  AGE: 61 y.o. GENDER: male  : 1955  TODAY'S DATE:  2022    Subjective;   I've been working with Dr. Rex Wright. Spouse at bedside. Reason for WOCN Evaluation and Assessment:   Multiple wounds on feet and sacrum. Has new colostomy as last  by Dr. Evelyn Godinez. Monie Hanna is a 61 y.o. male referred by:   [] Physician  [x] Nursing  [] Other:     Wound Identification:  Wound Type:diabetic  Contributing Factors: diabetes, poor glucose control, chronic pressure, shear force and arterial insufficiency    Wound History: 61 y.o. male who presented to [facility] with foot infection. Patient has been seeing podiatry for several diabetic foot wounds. Dr. Robbin Chen notes,  2022 he was treated at the [facility], [facility] for a time, then [facility], and is now home admitted to [facility] [address] in late 2021. 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 A large open buttock wound remains, related to his Shayla's; he still has some scrotal sutures in place with a bit of moist localized dehiscence there, and then two areas of seemingly superficial necrosis within large skin graft sites on the anterior abdominal wall    Current Wound Care Treatment: open to air at time of evaluation, did have Alginate Ag dry dressing on.     Patient Goal of Care:  [x] Wound Healing  [] Odor Control  [] Palliative Care  [] Pain Control   [] Other:         PAST MEDICAL HISTORY        Diagnosis Date    Cellulitis of left foot 3/22/2022    Focally failed skin grafts of abdominal wall 3/5/2022    Shayla's gangrene     Fourniers gangrene     Osteomyelitis of left foot ([facility] [address] 2021    Patellofemoral pain syndrome of right knee        PAST SURGICAL HISTORY    Past Surgical History:   Procedure Laterality Date    ABDOMEN SURGERY N/A 12/01/2021    WIDE EXCISION PERINEUM, BACK, ABDOMINAL WALL performed by Malachi Lin MD at [facility] Left 12/03/2021    ABDOMINAL WALL AND LEFT BUTTOCK DEBRIDEMENT, WOUND VAC PLACEMENT performed by Digna Ramos MD at [facility] 12/05/2021    ABDOMINAL WALL AND LEFT BUTTOCK DEBRIDEMENT, WOUND VAC PLACEMENT performed by Digna Ramos MD at [facility] N/A 12/07/2021    EVALUATION UNDER ANESTHESIA WITH DEBRIDEMENT ABDOMINAL WOUND AND LEFT BUTTOCK, WOUND VAC CHANGE performed by Digna Ramos MD at [facility] Left 12/10/2021    ABDOMINAL WALL AND LEFT BUTTOCK WOUND VAC CHANGE WITH FURTHER DEBRIDEMENT ABDOMEN AND LEFT BUTTOCK WOUND performed by Digna Ramos MD at [facility] N/A 12/13/2021    WOUND VAC CHANGE ABDOMEN AND LEFT BUTTOCK performed by Digna Ramos MD at [facility] N/A 12/16/2021    2ND LOOK/ EVALUATION UNDER ANESTHESIA/ WOUND VAC CHANGE ABDOMINAL WALL AND PERINEUM performed by Digna Ramos MD at [facility] N/A 12/21/2021    EVALUATION UNDER ANESTHESIA/ WOUND VAC CHANGE ABDOMINAL WALL AND PERINEUM performed by Digna Ramos MD at [facility] N/A 12/23/2021    EVALUATION UNDER ANESTHESIA/ WOUND VAC [address] AND PERINEUM performed by Digna Ramos MD at [address] 12/07/2021    LAPAROSCOPIC COLOSTOMY CREATION performed by Malachi Lin MD at [facility] Left     many years ago   [facility] N/A 01/03/2022    GLUTEAL WOUND VAC PLACEMENT performed by Digna Ramos MD at [facility] N/A 12/27/2021    PERINEAL WOUND VAC CHANGE performed by Digna Ramos MD at [address]  N/A 12/30/2021    PERINEAL / ABDOMINAL WOUND VAC CHANGE, performed by Digna Ramos MD at [address]  N/A 01/10/2022    PERINEAL WOUND VAC REMOVAL, EXAM UNDER ANESTHESIA performed by Israel Fenton MD at [address] N/A 11/30/2021    DEBRIDEMENT OF SCROTAL JAYRO'S GANGRENE performed by Kimmy Mcdonald MD at [address] 01/03/2022    SPLIT THICKNESS SKIN GRAFT TO ABDOMEN WOUND VAC PLACEMENT. 44x 11 performed by Israel Fenton MD at [address] 01/06/2022    LEFT POSTERIOR THIGH ADVANCEMENT 13X7CM; CLOSURE PERINEUM 7.2CM; SKIN GRAFT 7. 1CMX7.2CM TO LEFT BUTTOCK; APPLICATION OF WOUND VACUUM DEVICE performed by Israel Fenton MD at [facility] N/A 11/30/2021    PERINEAL SOFT TISSUE EXCISIONAL DEBRIDEMENT performed by Jose C Larkin MD at [facility] OR       FAMILY HISTORY    Family History   Problem Relation Age of Onset    Cancer Mother        SOCIAL HISTORY    Social History     Tobacco Use    Smoking status: Current Every Day Smoker     Start date: 1/1/1970    Smokeless tobacco: Never Used   Substance Use Topics    Alcohol use: Not Currently     Alcohol/week: 0.0 standard drinks    Drug use: Yes     Types: Marijuana [place])     Comment: 1 hit once a day       ALLERGIES    Allergies   Allergen Reactions    Shrimp Flavor Swelling     Throat swells up when eats shrimp       MEDICATIONS    No current facility-administered medications on file prior to encounter.      Current Outpatient Medications on File Prior to Encounter   Medication Sig Dispense Refill    sulfamethoxazole-trimethoprim (BACTRIM DS) 800-160 MG per tablet Take 1 tablet by mouth 2 times daily for 14 days 28 tablet 0    aspirin 81 MG EC tablet Take 81 mg by mouth daily      docusate sodium (COLACE) 100 MG capsule Take 100 mg by mouth at bedtime       psyllium (KONSYL) 28.3 % PACK Take 1 packet by mouth daily      atorvastatin (LIPITOR) 80 MG tablet Take 80 mg by mouth nightly      amLODIPine (NORVASC) 5 MG tablet Take 5 mg by mouth daily      insulin glargine (LANTUS) 100 UNIT/ML injection vial Inject 10 Units into the skin nightly      metFORMIN (GLUCOPHAGE) 500 MG tablet Take 500 mg by mouth daily (with breakfast) Stopped on until Friday 6/17/22         Objective;lying in bed    /75   Pulse (!) 101   Temp 99.5 °F (37.5 °C) (Oral)   Resp 18   SpO2 98%     LABS:  WBC:    Lab Results   Component Value Date    WBC 12.8 06/21/2022     H/H:    Lab Results   Component Value Date    HGB 10.8 06/21/2022    HCT 31.7 06/21/2022     PTT:  No results found for: APTT, PTT[APTT}  PT/INR:    Lab Results   Component Value Date    PROTIME 12.7 12/30/2021    INR 1.12 12/30/2021     HgBA1c:    Lab Results   Component Value Date    LABA1C 10.4 12/08/2021       Assessment; Wounds to top of left foot slough exposed, rt lateral foot slough present right ankle slough present & moist. 5th rt toe scab, 2nd rt toe open showing bone dry, left buttocks scab over.    Reese Risk Score: Reese Scale Score: 19    Patient Active Problem List   Diagnosis    Uncontrolled type 2 diabetes mellitus with diabetic polyneuropathy, without long-term current use of insulin (HCC)    Ischemic ulcer of left foot with fat layer exposed ([address]    Nonhealing surgical wound of buttock, initial encounter    Ischemic ulcer of right ankle with necrosis of muscle (HCC)    Gangrene of toe of left foot (HCC)    Current every day smoker    Hyperlipidemia    Diabetic ulcer of left foot associated with type 2 diabetes mellitus, with necrosis of bone ([address]    Ischemic toe ulcer, left, with fat layer exposed ([address]    Ischemic ulcer of right foot with necrosis of muscle ([address]    Atherosclerosis of native arteries of left leg with ulceration of foot ([address]    Atherosclerosis of native arteries of right leg with ulceration of foot ([address]    Osteomyelitis ([address]       Measurements:  Wound 12/09/21 Toe (Comment  which one) ulcer to fifth toe per podietry (Active)   Number of days: 194       Wound 02/16/22 #1 Right Lateral Ankle, Arterial, Full Thickness, onset  (Active)   Wound Image   06/21/22 1852   Wound Etiology Diabetic 06/21/22 1852   Dressing Status New dressing applied 06/21/22 1852   Wound Cleansed Cleansed with saline 06/21/22 1852   Dressing/Treatment Dry dressing;Alginate with Ag 06/21/22 1852   Offloading for Diabetic Foot Ulcers Offloading ordered 06/21/22 1852   Dressing Change Due 06/22/22 06/21/22 1852   Wound Length (cm) 0.6 cm 06/21/22 1852   Wound Width (cm) 0.6 cm 06/21/22 1852   Wound Depth (cm) 0.1 cm 06/21/22 1852   Wound Surface Area (cm^2) 0.36 cm^2 06/21/22 1852   Change in Wound Size % (l*w) 85.88 06/21/22 1852   Wound Volume (cm^3) 0.036 cm^3 06/21/22 1852   Wound Healing % 86 06/21/22 1852   Post-Procedure Length (cm) 0.6 cm 06/15/22 1456   Post-Procedure Width (cm) 0.8 cm 06/15/22 1456   Post-Procedure Depth (cm) 0.2 cm 06/15/22 1456   Post-Procedure Surface Area (cm^2) 0.48 cm^2 06/15/22 1456   Post-Procedure Volume (cm^3) 0.096 cm^3 06/15/22 1456   Distance Tunneling (cm) 0 cm 06/15/22 1456   Tunneling Position ___ O'Clock 0 06/08/22 1330   Undermining Starts ___ O'Clock 0 06/08/22 1330   Undermining Ends___ O'Clock 0 06/08/22 1330   Undermining Maxium Distance (cm) 0 06/15/22 1456   Wound Assessment Dry 06/21/22 1852   Drainage Amount None 06/21/22 1852   Odor None 06/15/22 1418   Princess-wound Assessment Blanchable erythema 06/21/22 1852   Wound Thickness Description not for Pressure Injury Partial thickness 06/21/22 1852   Number of days: 125               Wound 02/16/22 #2 Right Lateral Foot, Arterial, Full Thickness Onset Nov 2021 (Active)   Wound Image   06/21/22 1852   Wound Etiology Diabetic 06/21/22 1852   Dressing Status New dressing applied 06/21/22 1852   Wound Cleansed Cleansed with saline 06/21/22 1852   Dressing/Treatment Alginate with Ag 06/21/22 1852   Dressing Change Due 06/22/22 06/21/22 1852   Wound Length (cm) 1.5 cm 06/21/22 1852   Wound Width (cm) 2.5 cm 06/21/22 1852   Wound Depth (cm) 0.1 cm 06/21/22 1852   Wound Surface Area (cm^2) 3.75 cm^2 06/21/22 1852   Change in Wound Size % (l*w) -275 06/21/22 1852   Wound Volume (cm^3) 0.375 cm^3 06/21/22 1852   Wound Healing % -275 06/21/22 1852   Post-Procedure Length (cm) 2.7 cm 06/08/22 1422   Post-Procedure Width (cm) 1.8 cm 06/08/22 1422   Post-Procedure Depth (cm) 0.5 cm 06/08/22 1422   Post-Procedure Surface Area (cm^2) 4.86 cm^2 06/08/22 1422   Post-Procedure Volume (cm^3) 2.43 cm^3 06/08/22 1422   Distance Tunneling (cm) 0 cm 06/15/22 1418   Tunneling Position ___ O'Clock 0 06/08/22 1330   Undermining Starts ___ O'Clock 7 06/15/22 1418   Undermining Ends___ O'Clock 11 06/15/22 1418   Undermining Maxium Distance (cm) 0.9 06/15/22 1418   Wound Assessment Pink/red;Slough 06/21/22 1852   Drainage Amount Small 06/21/22 1852   Drainage Description Serous 06/21/22 1852   Odor None 06/21/22 1852   Princess-wound Assessment Blanchable erythema 06/21/22 1852   Margins Attached edges 06/21/22 1852   Wound Thickness Description not for Pressure Injury Partial thickness 06/21/22 1852   Number of days: 125    RT lateral foot         Wound 02/16/22 #3 Left Dorsal Foot, Arterial, Full Thickness, Onset Nov 2021 (Active)   Wound Image   06/21/22 1852   Wound Etiology Diabetic 06/21/22 1852   Dressing Status New dressing applied 06/21/22 1852   Wound Cleansed Cleansed with saline 06/21/22 1852   Dressing/Treatment Alginate with Ag;Eye pad 06/21/22 1852   Offloading for Diabetic Foot Ulcers Offloading ordered 06/21/22 1852   Dressing Change Due 06/22/22 06/21/22 1852   Wound Length (cm) 3 cm 06/21/22 1852   Wound Width (cm) 3.4 cm 06/21/22 1852   Wound Depth (cm) 0.1 cm 06/21/22 1852   Wound Surface Area (cm^2) 10.2 cm^2 06/21/22 1852   Change in Wound Size % (l*w) 38.74 06/21/22 1852   Wound Volume (cm^3) 1.02 cm^3 06/21/22 1852   Wound Healing % 39 06/21/22 1852   Post-Procedure Length (cm) 2.5 cm 06/15/22 1456   Post-Procedure Width (cm) 3.2 cm 06/15/22 1456   Post-Procedure Depth (cm) 0.5 cm 06/15/22 1456   Post-Procedure Surface Area (cm^2) 8 cm^2 06/15/22 1456   Post-Procedure Volume (cm^3) 4 cm^3 06/15/22 1456   Distance Tunneling (cm) 0 cm 06/15/22 1456   Tunneling Position ___ O'Clock 0 06/15/22 1418   Undermining Starts ___ O'Clock 0 06/15/22 1418   Undermining Ends___ O'Clock 0 06/15/22 1418   Undermining Maxium Distance (cm) 0 06/15/22 1456   Wound Assessment Pink/red;Slough; Exposed structure tendon 06/21/22 1852   Drainage Amount Small 06/21/22 1852   Drainage Description Purulent 06/21/22 1852   Odor None 06/21/22 1852   Princess-wound Assessment Blanchable erythema; Intact 06/21/22 1852   Wound Thickness Description not for Pressure Injury Partial thickness 06/21/22 1852   Number of days: 125    Left dorsal foot           Wound 02/16/22 #4 Left Lateral Foot, Arterial, full thickness,, Onset Nov 2021 (Active)   Wound Image   05/25/22 1352   Wound Etiology Arterial 06/15/22 1418   Dressing Status New dressing applied 06/15/22 1536   Wound Cleansed Cleansed with saline 06/15/22 1418   Dressing/Treatment Other (comment) 06/15/22 1536   Wound Length (cm) 1.1 cm 06/15/22 1418   Wound Width (cm) 0.5 cm 06/15/22 1418   Wound Depth (cm) 0.1 cm 06/15/22 1418   Wound Surface Area (cm^2) 0.55 cm^2 06/15/22 1418   Change in Wound Size % (l*w) 87.21 06/15/22 1418   Wound Volume (cm^3) 0.055 cm^3 06/15/22 1418   Wound Healing % 96 06/15/22 1418   Post-Procedure Length (cm) 1.1 cm 06/15/22 1456   Post-Procedure Width (cm) 0.5 cm 06/15/22 1456   Post-Procedure Depth (cm) 0.5 cm 06/15/22 1456   Post-Procedure Surface Area (cm^2) 0.55 cm^2 06/15/22 1456   Post-Procedure Volume (cm^3) 0.275 cm^3 06/15/22 1456   Distance Tunneling (cm) 0 cm 06/15/22 1456   Tunneling Position ___ O'Clock 0 06/08/22 1330   Undermining Starts ___ O'Clock 0 06/08/22 1330   Undermining Ends___ O'Clock 0 06/08/22 1330   Undermining Maxium Distance (cm) 0 06/15/22 1456   Wound Assessment Exposed structure bone;Pink/red 06/15/22 1456   Drainage Amount Scant 06/15/22 1456   Drainage Description Purulent 06/15/22 1456   Odor None 06/15/22 1456   Princess-wound Assessment Intact 06/15/22 1456   Number of days: 125    left lateral     Wound 02/16/22 #5 Left 5th Toe, Arterial, Full thickness, Onset Nov 2021 (Active)   Wound Image   06/21/22 1852   Wound Etiology Diabetic 06/21/22 1852   Dressing Status New dressing applied 06/21/22 1852   Wound Cleansed Cleansed with saline 06/21/22 1852   Dressing/Treatment Betadine swabs/povidone iodine;Dry dressing 06/21/22 1852   Dressing Change Due 06/22/22 06/21/22 1852   Wound Length (cm) 2 cm 06/21/22 1852   Wound Width (cm) 1.6 cm 06/21/22 1852   Wound Depth (cm) 0.1 cm 06/21/22 1852   Wound Surface Area (cm^2) 3.2 cm^2 06/21/22 1852   Change in Wound Size % (l*w) 14.44 06/21/22 1852   Wound Volume (cm^3) 0.32 cm^3 06/21/22 1852   Wound Healing % 14 06/21/22 1852   Post-Procedure Length (cm) 1.5 cm 06/15/22 1456   Post-Procedure Width (cm) 1.5 cm 06/15/22 1456   Post-Procedure Depth (cm) 0.2 cm 06/15/22 1456   Post-Procedure Surface Area (cm^2) 2.25 cm^2 06/15/22 1456   Post-Procedure Volume (cm^3) 0.45 cm^3 06/15/22 1456   Distance Tunneling (cm) 0 cm 06/15/22 1456   Tunneling Position ___ O'Clock 0 06/15/22 1418   Undermining Starts ___ O'Clock 0 06/15/22 1418   Undermining Ends___ O'Clock 0 06/15/22 1418   Undermining Maxium Distance (cm) 0 06/15/22 1456   Wound Assessment Exposed structure bone 06/21/22 1852   Drainage Amount None 06/21/22 1852   Drainage Description Serosanguinous 06/15/22 1456   Odor None 06/21/22 1852   Princess-wound Assessment Intact 06/15/22 1418   Wound Thickness Description not for Pressure Injury Partial thickness 06/21/22 1852   Number of days: 125    left 5th toe         Wound 04/27/22 #10 Left 2nd Toe, Arterial, Full Thickness, Onset 04/25/2022 (Active)   Wound Image   06/21/22 1852   Wound Etiology Diabetic 06/21/22 1852 Dressing Status New dressing applied 06/21/22 1852   Wound Cleansed Cleansed with saline 06/21/22 1852   Dressing/Treatment Dry dressing;Betadine swabs/povidone iodine 06/21/22 1852   Offloading for Diabetic Foot Ulcers Offloading ordered 06/21/22 1852   Dressing Change Due 06/22/22 06/21/22 1852   Wound Length (cm) 0.9 cm 06/21/22 1852   Wound Width (cm) 1.2 cm 06/21/22 1852   Wound Depth (cm) 0.1 cm 06/21/22 1852   Wound Surface Area (cm^2) 1.08 cm^2 06/21/22 1852   Change in Wound Size % (l*w) -80 06/21/22 1852   Wound Volume (cm^3) 0.108 cm^3 06/21/22 1852   Wound Healing % -80 06/21/22 1852   Post-Procedure Length (cm) 0.8 cm 06/08/22 1422   Post-Procedure Width (cm) 1 cm 06/08/22 1422   Post-Procedure Depth (cm) 0.1 cm 06/08/22 1422   Post-Procedure Surface Area (cm^2) 0.8 cm^2 06/08/22 1422   Post-Procedure Volume (cm^3) 0.08 cm^3 06/08/22 1422   Distance Tunneling (cm) 0 cm 06/08/22 1422   Tunneling Position ___ O'Clock 0 06/08/22 1330   Undermining Starts ___ O'Clock 0 06/08/22 1330   Undermining Ends___ O'Clock 0 06/08/22 1330   Undermining Maxium Distance (cm) 0 06/08/22 1422   Wound Assessment Eschar dry 06/21/22 1852   Drainage Amount None 06/21/22 1852   Odor None 06/15/22 1418   Princess-wound Assessment Intact 06/15/22 1418   Wound Thickness Description not for Pressure Injury Partial thickness 06/21/22 1852   Number of days: 55    left 2nd toe         Wound 06/21/22 Buttocks Left dry scab with old healed scaring (Active)   Wound Etiology Diabetic 06/21/22 1852   Dressing Status New dressing applied 06/21/22 1852   Wound Cleansed Cleansed with saline 06/21/22 1852   Dressing/Treatment Foam 06/21/22 1852   Dressing Change Due 06/22/22 06/21/22 1852   Wound Length (cm) 3.5 cm 06/21/22 1852   Wound Width (cm) 4 cm 06/21/22 1852   Wound Depth (cm) 0.1 cm 06/21/22 1852   Wound Surface Area (cm^2) 14 cm^2 06/21/22 1852   Wound Volume (cm^3) 1.4 cm^3 06/21/22 1852   Wound Assessment Dry 06/21/22 1852   Wound Thickness Description not for Pressure Injury Partial thickness 06/21/22 1852   Number of days: 0     Incision 12/05/21 Anterior; Left (Active)   Number of days: 198        06/21/22 1904   Skin Integumentary    Skin Integumentary (WDL) X   Skin Color Pale   Skin Condition/Temp Warm   Skin Integrity Scars (comment)   Location upper pelvic region                 Skin Integrity Site 2   Skin Integrity Location 2 Other (comment)  (scab)   Location 2 left buttocks    Preventative Dressing Yes  (foam)   Assessed this shift           Yes                     Skin Integrity Site 3   Skin Integrity Location 3 Other (comment)  (3 stitches)    Location 3 right groin   Preventative Dressing No   Assessed this shift   Yes                       Stoma RLQ  Ostomy 32mm= 1 1/4  Flange one piece #63591(patient's)  Facility Convac Flip placed on patient #[id number]    Placed 12- by Dr. Terry Cortés. New flange bag placed on patient. Patient happy with present device. Talked about sizing to cut hole in flange to fit snug against stoma. Ostomy Plan of Care  [x] Supplies/Instructions left in room  [x] Patient using home supplies  [x] Brand/supplies at bedside [address] [place] [address] - Patient to empty appliance when 1/3 to 1/2 full with assistance of the staff. Cleanse inside and outside of the drain spout prior to rolling closed. Change appliance every 3-5 days or 1-2 times a week. Call for problems with seal [phone]  [x] Current pouching system Colloplast      Response to treatment:  Well tolerated by patient.      Pain Assessment:  Severity:  0 / 10  Quality of pain: N/A  Wound Pain Timing/Severity: none  Premedicated: No    Plan   Plan of Care: Wound 04/27/22 #10 Left 2nd Toe, Arterial, Full Thickness, Onset 04/25/2022-Dressing/Treatment: Dry dressing,Betadine swabs/povidone iodine  Wound 02/16/22 #1 Right Lateral Ankle, Arterial, Full Thickness, onset -Dressing/Treatment: Dry dressing,Alginate with Ag  Wound 02/16/22 #2 Right Lateral Foot, Arterial, Full Thickness Onset Nov 2021-Dressing/Treatment: Alginate with Ag  Wound 02/16/22 #3 Left Dorsal Foot, Arterial, Full Thickness, Onset Nov 2021-Dressing/Treatment: Alginate with Ag,Eye pad  Wound 02/16/22 #5 Left 5th Toe, Arterial, Full thickness, Onset Nov 2021-Dressing/Treatment: Betadine swabs/povidone iodine,Dry dressing  Wound 06/21/22 Buttocks Left dry scab with old healed scaring-Dressing/Treatment: Foam      Recommend;  Cleanse bi lateral feet with normal saline daily. Apply Alginate Ag to right ankle, right lateral foot,  Left dorsal foot, cover with dry dressing  Apply Betadine to left 2nd toe, 3rd toe, buttocks left cover dry dressing       Dr. Torie Preston in to evaluate patient with floor nurse  and wound care nurse. All in agreement of above treatment. Specialty Bed Required : Yes   [x] Low Air Loss   [x] Pressure Redistribution  [] Fluid Immersion  [] Bariatric  [] Total Pressure Relief  [] Other:     Current Diet: ADULT DIET; Regular; 5 carb choices (75 gm/meal)  Dietician consult:  Yes    Discharge Plan:  Placement for patient upon discharge: home with support    Patient appropriate for Outpatient [address]: Yes    Referrals:  [x]  / discharge planner  following  [] [employer] [facility]  [] Supplies  [] Other    Patient/Caregiver Teaching:Education on fitting flange to size of stoma.   Level of patient/caregiver understanding able to:   [] Indicates understanding       [] Needs reinforcement  [] Unsuccessful      [x] Verbal Understanding  [] Demonstrated understanding       [] No evidence of learning  [] Refused teaching         [] N/A       Electronically signed by Néstor Smith RN, BSN on 6/21/2022 at 7:07 PM

## 2022-06-21 NOTE — H&P
Hospital Medicine History & Physical      PCP: No primary care provider on file. Date of Admission: 6/21/2022    Date of Service: Pt seen/examined on 06/21/22 and Admitted to Inpatient with expected LOS greater than two midnights due to medical therapy. Chief Complaint:  Foot infection      History Of Present Illness:    61 y.o. male who presented to [facility] with foot infection. Patient has been seeing podiatry for several diabetic foot wounds. The largest one the dorsum of his left foot has worsened over the week. He has been on bactrim for this. His blood sugars have been quite elevated at home. He is due to see his PCP later this week to adjust his insulin further. He denies fevers, chills, chest pain, SOB or nausea. He has minimal sensation in his feet from the diabetes.     Past Medical History:          Diagnosis Date    Cellulitis of left foot 3/22/2022    Focally failed skin grafts of abdominal wall 3/5/2022    Shayla's gangrene     Fourniers gangrene     Osteomyelitis of left foot ([address] 11/30/2021    Patellofemoral pain syndrome of right knee        Past Surgical History:          Procedure Laterality Date    ABDOMEN SURGERY N/A 12/01/2021    WIDE EXCISION PERINEUM, BACK, ABDOMINAL WALL performed by Salima Fisher MD at [facility] Left 12/03/2021    ABDOMINAL WALL AND LEFT BUTTOCK DEBRIDEMENT, WOUND VAC PLACEMENT performed by Racquel Montana MD at [facility] Left 12/05/2021    ABDOMINAL WALL AND LEFT BUTTOCK DEBRIDEMENT, WOUND VAC PLACEMENT performed by Racquel Montana MD at [facility] N/A 12/07/2021    EVALUATION UNDER ANESTHESIA WITH DEBRIDEMENT ABDOMINAL WOUND AND LEFT BUTTOCK, WOUND VAC CHANGE performed by Racquel Montana MD at [facility] Left 12/10/2021    ABDOMINAL WALL AND LEFT BUTTOCK WOUND VAC CHANGE WITH FURTHER DEBRIDEMENT ABDOMEN AND LEFT BUTTOCK WOUND performed by Racquel Montana MD at [address] [place] [place] [address] ABDOMEN SURGERY N/A 12/13/2021    WOUND VAC CHANGE ABDOMEN AND LEFT BUTTOCK performed by Jaun Recinos MD at [facility] N/A 12/16/2021    2ND LOOK/ EVALUATION UNDER ANESTHESIA/ WOUND VAC CHANGE ABDOMINAL WALL AND PERINEUM performed by Jaun Recinos MD at [facility] N/A 12/21/2021    EVALUATION UNDER ANESTHESIA/ WOUND VAC CHANGE ABDOMINAL WALL AND PERINEUM performed by Jaun Recinos MD at [facility] N/A 12/23/2021    EVALUATION UNDER ANESTHESIA/ WOUND VAC [address] AND PERINEUM performed by Jaun Recinos MD at [facility] Left 12/07/2021    LAPAROSCOPIC COLOSTOMY CREATION performed by Morenita Escalante MD at [facility] Left     many years ago   [facility] N/A 01/03/2022    GLUTEAL WOUND VAC PLACEMENT performed by Jaun Recinos MD at [facility] N/A 12/27/2021    PERINEAL WOUND VAC CHANGE performed by Jaun Recinos MD at [address] [facility] N/A 12/30/2021    PERINEAL / ABDOMINAL WOUND VAC CHANGE, performed by Jaun Recinos MD at [address] [facility] N/A 01/10/2022    PERINEAL WOUND VAC REMOVAL, EXAM UNDER ANESTHESIA performed by Jaun Recinos MD at [address] [address] N/A 11/30/2021    DEBRIDEMENT OF SCROTAL JAYRO'S GANGRENE performed by Lori To MD at [facility] 01/03/2022    SPLIT THICKNESS SKIN GRAFT TO ABDOMEN WOUND VAC PLACEMENT. 44x 11 performed [facility]inos MD [facility] 01/06/2022    LEFT POSTERIOR THIGH ADVANCEMENT 13X7CM; CLOSURE PERINEUM 7.2CM; SKIN GRAFT 7. 1CMX7.2CM TO LEFT BUTTOCK; APPLICATION OF WOUND VACUUM DEVICE performed by Jaun Recinos MD at [address] [facility] N/A 11/30/2021    PERINEAL SOFT TISSUE EXCISIONAL DEBRIDEMENT performed by Nelwyn Harada, MD at [facility] OR       Medications Prior to Admission:      Prior to Admission medications    Medication Sig Start Date End Date Taking? Authorizing Provider   sulfamethoxazole-trimethoprim (BACTRIM DS) 800-160 MG per tablet Take 1 tablet by mouth 2 times daily for 14 days 6/15/22 6/29/22  Anh Biggs MD   aspirin 81 MG EC tablet Take 81 mg by mouth daily    Historical Provider, MD   docusate sodium (COLACE) 100 MG capsule Take 100 mg by mouth at bedtime     Historical Provider, MD   psyllium (KONSYL) 28.3 % PACK Take 1 packet by mouth daily    Historical Provider, MD   atorvastatin (LIPITOR) 80 MG tablet Take 80 mg by mouth nightly    Historical Provider, MD   amLODIPine (NORVASC) 5 MG tablet Take 5 mg by mouth daily    Historical Provider, MD   insulin glargine (LANTUS) 100 UNIT/ML injection vial Inject 10 Units into the skin nightly    Historical Provider, MD   metFORMIN (GLUCOPHAGE) 500 MG tablet Take 500 mg by mouth daily (with breakfast) Stopped on until Friday 6/17/22    Historical Provider, MD       Allergies:  Shrimp flavor    Social History:      The patient currently lives at home    TOBACCO:   reports that he has been smoking. He started smoking about 52 years ago. He has never used smokeless tobacco.  ETOH:   reports previous alcohol use. E-Cigarettes/Vaping Use     Questions Responses    E-Cigarette/Vaping Use     Start Date     Passive Exposure     Quit Date     Counseling Given     Comments             Family History:      Reviewed in detail and negative for DM, CAD, CVA. Positive as follows:        Problem Relation Age of Onset    Cancer Mother        REVIEW OF SYSTEMS COMPLETED:   Pertinent positives as noted in the HPI. All other systems reviewed and negative. PHYSICAL EXAM PERFORMED:    /75   Pulse (!) 101   Temp 99.5 °F (37.5 °C) (Oral)   Resp 18   SpO2 98%     General appearance:  No apparent distress, appears stated age and cooperative. HEENT:  Normal cephalic, atraumatic without obvious deformity. Pupils equal, round, and reactive to light.   Extra ocular muscles intact. Conjunctivae/corneas clear. Neck: Supple, with full range of motion. No jugular venous distention. Trachea midline. Respiratory:  Normal respiratory effort. Clear to auscultation, bilaterally without Rales/Wheezes/Rhonchi. Cardiovascular:  Regular rate and rhythm with normal S1/S2 without murmurs, rubs or gallops. Abdomen: Soft, non-tender, non-distended with normal bowel sounds. Musculoskeletal:  No clubbing, cyanosis or edema bilaterally. Full range of motion without deformity. Skin: Skin color, texture, turgor normal. Large ulceration on dorsum of left foot. Several toes gangrenous. Neurologic:  Neurovascularly intact without any focal sensory/motor deficits. Cranial nerves: II-XII intact, grossly non-focal.  Psychiatric:  Alert and oriented, thought content appropriate, normal insight  Capillary Refill: Brisk,3 seconds, normal  Peripheral Pulses: +2 palpable, equal bilaterally       Labs:     Recent Labs     06/21/22  1704   WBC 12.8*   HGB 10.8*   HCT 31.7*        Recent Labs     06/21/22  1704   *   K 5.0   CL 98*   CO2 26   BUN 33*   CREATININE 1.2   CALCIUM 10.1     No results for input(s): AST, ALT, BILIDIR, BILITOT, ALKPHOS in the last 72 hours. No results for input(s): INR in the last 72 hours. No results for input(s): Adonica Hoose in the last 72 hours.     Urinalysis:    No results found for: Hailey Manzanares, BACTERIA, [facility], BLOODU, Conda Idol, [facility]    Radiology:     CXR: I have reviewed the CXR with the following interpretation: none  EKG:  I have reviewed the EKG with the following interpretation: none    MRI FOOT LEFT WO CONTRAST    (Results Pending)   LASER SKIN PERFUSION PRESSURE STUDY    (Results Pending)       Consults:    IP CONSULT TO INFECTIOUS DISEASES  IP CONSULT TO PODIATRY    ASSESSMENT:    Active Hospital Problems    Diagnosis Date Noted    Osteomyelitis ([facility] [M86.9] 06/21/2022     Priority: Medium         PLAN:  Sepsis 2/2 osteomyelitis  - presented with leukocytosis, tachycardia. Lactate pending  - associated with diabetic foot infection  - Podiatry consulted  - ID consulted  - blood, wound cultures ordered  - started on vanc, zosyn    DMII  - poorly controlled  - holding home metformin  - lantus with SSI ordered    HTN  - well controlled  - continue norvasc    HLD  - continue statin    DVT Prophylaxis: lovenox  Diet: ADULT DIET; Regular; 5 carb choices (75 gm/meal)  Code Status: Full Code    PT/OT Eval Status: not ordered    Dispo - at least two days       Colton See MD    Thank you No primary care provider on file. for the opportunity to be involved in this patient's care. If you have any questions or concerns please feel free to contact me at [phone].

## 2022-06-21 NOTE — PROGRESS NOTES
4 Eyes Skin Assessment     The patient is being assess for   Admission    I agree that 2 RN's have performed a thorough Head to Toe Skin Assessment on the patient. ALL assessment sites listed below have been assessed. Areas assessed for pressure by both nurses:   [x]   Head, Face, and Ears   [x]   Shoulders, Back, and Chest, Abdomen  [x]   Arms, Elbows, and Hands   [x]   Coccyx, Sacrum, and Ischium  [x]   Legs, Feet, and Heels        Skin Assessed Under all Medical Devices by both nurses:  NA              All Mepilex Borders were peeled back and area peeked at by both nurses:  No: NA  Please list where Mepilex Borders are located:     open Unstagable wound top L foot, open unstagable wound L foot second toe, R ankle open unstagable wound, R lateral edge of foot open unstagable wound,  Old skin graft scar lower abd R hip to L hip, L foot pinky toe unstagable and L lateral side of foot unstagable. Healing scar/scab on buttocks, Scrotum 3 stitches and reddness/moisture   **SHARE this note so that the co-signing nurse is able to place an eSignature**    Co-signer eSignature: Electronically signed by Caira Woodall RN on 6/21/22 at 6:51 PM EDT    Does the Patient have Skin Breakdown related to pressure?   Yes LDA WOUND CARE was Initiated documentation include the Princess-wound, Wound Assessment, Measurements, Dressing Treatment, Drainage, and Color\",              Reese Prevention initiated:  Yes   Wound Care Orders initiated:  Yes      [address]  nurse consulted for Pressure Injury (Stage 3,4, Unstageable, DTI, NWPT, Complex wounds)and New or Established Ostomies:  Yes      Primary Nurse eSignature: Electronically signed by Toby Hawley RN on 6/21/22 at 6:29 PM EDT

## 2022-06-21 NOTE — TELEPHONE ENCOUNTER
Writer called patient to follow up on Dr Loyd Adler recommendation after his appt. This am. Patient states he is awaiting a bed to open for admission to [facility]. Patient does not anticipate being to his [facility] appt. On 6/22/22 d/t pending admission per Dr Juan White. Dr Gurmeet Pal updated on above.  Patient aware to f/u in [facility] as recommended after discharge from hospital.

## 2022-06-22 ENCOUNTER — APPOINTMENT (OUTPATIENT)
Dept: VASCULAR LAB | Age: 67
DRG: 854 | End: 2022-06-22
Attending: INTERNAL MEDICINE

## 2022-06-22 ENCOUNTER — HOSPITAL ENCOUNTER (OUTPATIENT)
Dept: WOUND CARE | Age: 67
Discharge: HOME | End: 2022-06-22

## 2022-06-22 ENCOUNTER — APPOINTMENT (OUTPATIENT)
Dept: MRI IMAGING | Age: 67
DRG: 854 | End: 2022-06-22
Attending: INTERNAL MEDICINE

## 2022-06-22 LAB
BASOPHILS ABSOLUTE: 0.1 K/UL (ref 0–0.2)
BASOPHILS RELATIVE PERCENT: 0.7 %
EOSINOPHILS ABSOLUTE: 0.5 K/UL (ref 0–0.6)
EOSINOPHILS RELATIVE PERCENT: 4.8 %
ESTIMATED AVERAGE GLUCOSE: 243.2 MG/DL
GLUCOSE BLD-MCNC: 167 MG/DL (ref 70–99)
GLUCOSE BLD-MCNC: 214 MG/DL (ref 70–99)
GLUCOSE BLD-MCNC: 222 MG/DL (ref 70–99)
GLUCOSE BLD-MCNC: 246 MG/DL (ref 70–99)
HBA1C MFR BLD: 10.1 %
HCT VFR BLD CALC: 31.3 % (ref 40.5–52.5)
HEMOGLOBIN: 10.7 G/DL (ref 13.5–17.5)
LYMPHOCYTES ABSOLUTE: 2.2 K/UL (ref 1–5.1)
LYMPHOCYTES RELATIVE PERCENT: 19.4 %
MCH RBC QN AUTO: 29.6 PG (ref 26–34)
MCHC RBC AUTO-ENTMCNC: 34.2 G/DL (ref 31–36)
MCV RBC AUTO: 86.7 FL (ref 80–100)
MONOCYTES ABSOLUTE: 1.2 K/UL (ref 0–1.3)
MONOCYTES RELATIVE PERCENT: 11 %
NEUTROPHILS ABSOLUTE: 7.1 K/UL (ref 1.7–7.7)
NEUTROPHILS RELATIVE PERCENT: 64.1 %
PDW BLD-RTO: 14.2 % (ref 12.4–15.4)
PERFORMED ON: ABNORMAL
PLATELET # BLD: 304 K/UL (ref 135–450)
PMV BLD AUTO: 7.8 FL (ref 5–10.5)
RBC # BLD: 3.61 M/UL (ref 4.2–5.9)
VANCOMYCIN TROUGH: 9.2 UG/ML (ref 10–20)
WBC # BLD: 11.1 K/UL (ref 4–11)

## 2022-06-22 PROCEDURE — 99221 1ST HOSP IP/OBS SF/LOW 40: CPT | Performed by: INTERNAL MEDICINE

## 2022-06-22 PROCEDURE — 93923 UPR/LXTR ART STDY 3+ LVLS: CPT

## 2022-06-22 PROCEDURE — 80202 ASSAY OF VANCOMYCIN: CPT

## 2022-06-22 PROCEDURE — 6370000000 HC RX 637 (ALT 250 FOR IP): Performed by: INTERNAL MEDICINE

## 2022-06-22 PROCEDURE — 85025 COMPLETE CBC W/AUTO DIFF WBC: CPT

## 2022-06-22 PROCEDURE — 6360000002 HC RX W HCPCS: Performed by: INTERNAL MEDICINE

## 2022-06-22 PROCEDURE — 73718 MRI LOWER EXTREMITY W/O DYE: CPT

## 2022-06-22 PROCEDURE — 2580000003 HC RX 258: Performed by: INTERNAL MEDICINE

## 2022-06-22 PROCEDURE — 1200000000 HC SEMI PRIVATE

## 2022-06-22 PROCEDURE — 36415 COLL VENOUS BLD VENIPUNCTURE: CPT

## 2022-06-22 RX ORDER — HYDROPHILIC CREAM
PASTE (GRAM) TOPICAL DAILY
Status: DISCONTINUED | OUTPATIENT
Start: 2022-06-22 | End: 2022-07-01 | Stop reason: HOSPADM

## 2022-06-22 RX ORDER — POVIDONE-IODINE 10 MG/ML
SOLUTION TOPICAL DAILY
Status: DISCONTINUED | OUTPATIENT
Start: 2022-06-22 | End: 2022-07-01 | Stop reason: HOSPADM

## 2022-06-22 RX ORDER — BACITRACIN ZINC AND POLYMYXIN B SULFATE 500; 1000 [USP'U]/G; [USP'U]/G
OINTMENT TOPICAL 2 TIMES DAILY
Status: DISCONTINUED | OUTPATIENT
Start: 2022-06-22 | End: 2022-07-01 | Stop reason: HOSPADM

## 2022-06-22 RX ORDER — INSULIN LISPRO 100 [IU]/ML
3 INJECTION, SOLUTION INTRAVENOUS; SUBCUTANEOUS
Status: DISCONTINUED | OUTPATIENT
Start: 2022-06-22 | End: 2022-06-26

## 2022-06-22 RX ADMIN — BACITRACIN ZINC AND POLYMYXIN B SULFATE 14.2 G: at 21:23

## 2022-06-22 RX ADMIN — INSULIN LISPRO 1 UNITS: 100 INJECTION, SOLUTION INTRAVENOUS; SUBCUTANEOUS at 20:57

## 2022-06-22 RX ADMIN — PIPERACILLIN AND TAZOBACTAM 3375 MG: 3; .375 INJECTION, POWDER, LYOPHILIZED, FOR SOLUTION INTRAVENOUS at 02:55

## 2022-06-22 RX ADMIN — ASPIRIN 81 MG: 81 TABLET, COATED ORAL at 08:30

## 2022-06-22 RX ADMIN — INSULIN LISPRO 2 UNITS: 100 INJECTION, SOLUTION INTRAVENOUS; SUBCUTANEOUS at 08:33

## 2022-06-22 RX ADMIN — Medication 10 ML: at 08:39

## 2022-06-22 RX ADMIN — VANCOMYCIN HYDROCHLORIDE 750 MG: 750 INJECTION, POWDER, LYOPHILIZED, FOR SOLUTION INTRAVENOUS at 08:33

## 2022-06-22 RX ADMIN — PIPERACILLIN AND TAZOBACTAM 3375 MG: 3; .375 INJECTION, POWDER, LYOPHILIZED, FOR SOLUTION INTRAVENOUS at 11:34

## 2022-06-22 RX ADMIN — ATORVASTATIN CALCIUM 80 MG: 80 TABLET, FILM COATED ORAL at 20:56

## 2022-06-22 RX ADMIN — VANCOMYCIN HYDROCHLORIDE 750 MG: 750 INJECTION, POWDER, LYOPHILIZED, FOR SOLUTION INTRAVENOUS at 21:02

## 2022-06-22 RX ADMIN — INSULIN LISPRO 2 UNITS: 100 INJECTION, SOLUTION INTRAVENOUS; SUBCUTANEOUS at 12:18

## 2022-06-22 RX ADMIN — CEFEPIME 2000 MG: 2 INJECTION, POWDER, FOR SOLUTION INTRAMUSCULAR; INTRAVENOUS at 17:58

## 2022-06-22 RX ADMIN — INSULIN LISPRO 3 UNITS: 100 INJECTION, SOLUTION INTRAVENOUS; SUBCUTANEOUS at 17:53

## 2022-06-22 RX ADMIN — AMLODIPINE BESYLATE 5 MG: 5 TABLET ORAL at 08:30

## 2022-06-22 RX ADMIN — INSULIN LISPRO 2 UNITS: 100 INJECTION, SOLUTION INTRAVENOUS; SUBCUTANEOUS at 17:53

## 2022-06-22 RX ADMIN — INSULIN GLARGINE 10 UNITS: 100 INJECTION, SOLUTION SUBCUTANEOUS at 20:56

## 2022-06-22 RX ADMIN — ENOXAPARIN SODIUM 40 MG: 100 INJECTION SUBCUTANEOUS at 08:30

## 2022-06-22 ASSESSMENT — PAIN SCALES - GENERAL: PAINLEVEL_OUTOF10: 0

## 2022-06-22 NOTE — CARE COORDINATION
CASE MANAGEMENT INITIAL ASSESSMENT      Reviewed chart and completed assessment with patient:at bedside  Family present: no  Explained Case Management role/services. yes    Primary contact information:    Health Care Decision Maker :   Primary Decision Maker: Marybeth Yuen Spouse - [phone]    Secondary Decision Maker: Dickey Soulier - Brother/Sister - [phone]          Can this person be reached and be able to respond quickly, such as within a few minutes or hours? Yes      Admit date/status:6/21 IP  Diagnosis:Osteo  Is this a Readmission?:  No      Insurance:None   Precert required for SNF: No       3 night stay required: No    Living arrangements, Adls, care needs, prior to admission:Lives w spouse in apartment w [address]. Uses walker- does not drive. No services- no insurance. Wound and ID consults pending    Durable Medical Equipment at home:  Walker_x_Cane__RTS__ BSC__Shower Chair__  02__ HHN__ CPAP__  BiPap__  Hospital Bed__ W/C___ Other_____    Services in the home and/or outpatient, prior to admission:none    Current PCP:None                                Medications: Prescription coverage? noWill pt require financial assistance with medications Yes   Transportation needs: pending dispo    ·     PT/OT recs:none    Hospital Exemption Notification (HEN):na    Barriers to discharge: no insurance    Plan/comments:pending clinical course- ID consult pending. Call to [facility] aid to notify pt has no payer. Tentative referral to [facility] for howard.       ECOC on chart for MD giovanni Montgomery RN

## 2022-06-22 NOTE — CONSULTS
Department of Podiatric Surgery  Dr. Rex [facility]  Consult Note        Reason for Consult:  Osteomyelitis left foot   Requesting Physician: Dr. Pratima Downing MD    CHIEF COMPLAINT:  Left foot infection     History Obtained From:  patient    HISTORY OF PRESENT ILLNESS:                The patient is a 61 y.o. male who presented after being seen in my office after referral back from Dr. Carlos Pappas at [facility]. Patient was on Bactrim due to left dorsum of foot ulcer worsening. In office noted to have exposed 5th metatarsa, 2nd toe PIPJ and purulent drainage from top of left foot. He recently had revascularization to left leg at [facility] with angioplasty of left SFA on 6/21/22 but has not had any procedure to right leg. His wife has been doing local wound care on both feet.      Past Medical History:        Diagnosis Date    Cellulitis of left foot 3/22/2022    Focally failed skin grafts of abdominal wall 3/5/2022    Shayla's gangrene     Fourniers gangrene     Osteomyelitis of left foot ([address] 11/30/2021    Patellofemoral pain syndrome of right knee      Past Surgical History:        Procedure Laterality Date    ABDOMEN SURGERY N/A 12/01/2021    WIDE EXCISION PERINEUM, BACK, ABDOMINAL WALL performed by Citlalli Leblanc MD at [address] [facility] Left 12/03/2021    ABDOMINAL WALL AND LEFT BUTTOCK DEBRIDEMENT, WOUND VAC PLACEMENT performed by Claudia Carter MD at [address] [facility] Left 12/05/2021    ABDOMINAL WALL AND LEFT BUTTOCK DEBRIDEMENT, WOUND VAC PLACEMENT performed by Claudia Carter MD at [address] [facility] N/A 12/07/2021    EVALUATION UNDER ANESTHESIA WITH DEBRIDEMENT ABDOMINAL WOUND AND LEFT BUTTOCK, WOUND VAC CHANGE performed by Claudia Carter MD at [address] [facility] Left 12/10/2021    ABDOMINAL WALL AND LEFT BUTTOCK WOUND VAC CHANGE WITH FURTHER DEBRIDEMENT ABDOMEN AND LEFT BUTTOCK WOUND performed by Claudia Carter MD at [facility]ERY N/A 12/13/2021    WOUND VAC CHANGE ABDOMEN AND LEFT BUTTOCK performed by Karla Weems MD at [facility] N/A 12/16/2021    2ND LOOK/ EVALUATION UNDER ANESTHESIA/ WOUND VAC CHANGE ABDOMINAL WALL AND PERINEUM performed by Karla Weems MD at [facility] N/A 12/21/2021    EVALUATION UNDER ANESTHESIA/ WOUND VAC CHANGE ABDOMINAL WALL AND PERINEUM performed by Karla Weems MD at [facility] N/A 12/23/2021    EVALUATION UNDER ANESTHESIA/ WOUND VAC [address] AND PERINEUM performed by Karla Weems MD at [facility] Left 12/07/2021    LAPAROSCOPIC COLOSTOMY CREATION performed by Velia Dumont MD at [address] [facility] Left     many years ago   [facility] N/A 01/03/2022    GLUTEAL WOUND VAC PLACEMENT performed by Karla Weems MD at [facility] N/A 12/27/2021    PERINEAL WOUND VAC CHANGE performed by Karla Weems MD at [address] [facility] N/A 12/30/2021    PERINEAL / ABDOMINAL WOUND VAC CHANGE, performed by Karla Weems MD at [address] [facility] N/A 01/10/2022    PERINEAL WOUND VAC REMOVAL, EXAM UNDER ANESTHESIA performed by Karla Weems MD at [address] [address] N/A 11/30/2021    DEBRIDEMENT OF SCROTAL JAYRO'S GANGRENE performed by Keo Rice MD at [facility] 01/03/2022    SPLIT THICKNESS SKIN GRAFT TO ABDOMEN WOUND VAC PLACEMENT. 44x 11 performed by Karla Weems MD at [facility] 01/06/2022    LEFT POSTERIOR THIGH ADVANCEMENT 13X7CM; CLOSURE PERINEUM 7.2CM; SKIN GRAFT 7. 1CMX7.2CM TO LEFT BUTTOCK; APPLICATION OF WOUND VACUUM DEVICE performed by Karla Weems MD at [address] [facility] N/A 11/30/2021    PERINEAL SOFT TISSUE EXCISIONAL DEBRIDEMENT performed by Arvin Rodgers MD at [facility] OR     Current Medications:   Current Facility-Administered Medications: zinc oxide (TRIAD HYDROPHILIC) paste, , Topical, Daily  povidone-iodine 10 % swab, , Topical, Daily  bacitracin-polymyxin b (POLYSPORIN) ointment, , Topical, BID  insulin lispro (HUMALOG) injection vial 3 Units, 3 Units, SubCUTAneous, TID WC  cefepime (MAXIPIME) 2000 mg IVPB minibag, 2,000 mg, IntraVENous, Q12H  amLODIPine (NORVASC) tablet 5 mg, 5 mg, Oral, Daily  aspirin EC tablet 81 mg, 81 mg, Oral, Daily  atorvastatin (LIPITOR) tablet 80 mg, 80 mg, Oral, Nightly  sodium chloride flush 0.9 % injection 5-40 mL, 5-40 mL, IntraVENous, 2 times per day  sodium chloride flush 0.9 % injection 5-40 mL, 5-40 mL, IntraVENous, PRN  0.9 % sodium chloride infusion, , IntraVENous, PRN  enoxaparin (LOVENOX) injection 40 mg, 40 mg, SubCUTAneous, Daily  ondansetron (ZOFRAN-ODT) disintegrating tablet 4 mg, 4 mg, Oral, Q8H PRN **OR** ondansetron (ZOFRAN) injection 4 mg, 4 mg, IntraVENous, Q6H PRN  polyethylene glycol (GLYCOLAX) packet 17 g, 17 g, Oral, Daily PRN  acetaminophen (TYLENOL) tablet 650 mg, 650 mg, Oral, Q6H PRN **OR** acetaminophen (TYLENOL) suppository 650 mg, 650 mg, Rectal, Q6H PRN  glucose chewable tablet 16 g, 4 tablet, Oral, PRN  dextrose bolus 10% 125 mL, 125 mL, IntraVENous, PRN **OR** dextrose bolus 10% 250 mL, 250 mL, IntraVENous, PRN  glucagon (rDNA) injection 1 mg, 1 mg, IntraMUSCular, PRN  dextrose 5 % solution, 100 mL/hr, IntraVENous, PRN  insulin glargine (LANTUS) injection vial 10 Units, 10 Units, SubCUTAneous, Nightly  insulin lispro (HUMALOG) injection vial 0-6 Units, 0-6 Units, SubCUTAneous, TID WC  insulin lispro (HUMALOG) injection vial 0-3 Units, 0-3 Units, SubCUTAneous, Nightly  vancomycin (VANCOCIN) 750 mg in dextrose 5 % 250 mL IVPB, 750 mg, IntraVENous, Q12H  Allergies:  Shrimp flavor    Social History:   TOBACCO:   reports that he has been smoking. He started smoking about 52 years ago. He has never used smokeless tobacco.  ETOH:   reports previous alcohol use. DRUGS:   reports current drug use. Drug: Marijuana Marvin Mustard). Family History:       Problem Relation Age of Onset    Cancer Mother      REVIEW OF SYSTEMS:    CONSTITUTIONAL:  Negative for fever chills nausea or vomiting. Denies any chest pain or shortness of breath     PHYSICAL EXAM:    VITALS:  /72   Pulse 64   Temp 98.3 °F (36.8 °C) (Oral)   Resp 18   Ht 5' 11\" (1.803 m)   Wt 161 lb (73 kg)   SpO2 97%   BMI 22.45 kg/m²   Gen: AAO x3, NAD male   HEENT: normocephalic PERRLA   Lower extremity evaluation: Neuro: no clonus is noted. Muscular:  5/5 MS for all compartments. Vascular dorsalis pedis and posterior tibial pulses are non palpable bilateral. CFT less than 3 seconds to left toe and foot is warm. Right foot warm to cool. Absent hairgrowth noted to b/l lower legs. Left lateral foot ulcer with exposed 5th metatarsal shaft - no purulent drainage. 5th toe left has area of necrosis. Left 2nd toe ulcer with exposed bone and grey discoloration of phalanx head. Dorsal left foot wound with yellow thick purulent drainage with exposed extensor tendons. Right foot lateral 5th styloid process ulcer is full-thickness to SQ and deep tissue. Ulcer right lateral ankle is full thickness to SQ and deep tissue.             Sed rate  81      DATA:    CBC with Differential:    Lab Results   Component Value Date    WBC 11.1 06/22/2022    RBC 3.61 06/22/2022    HGB 10.7 06/22/2022    HCT 31.3 06/22/2022     06/22/2022    MCV 86.7 06/22/2022    MCH 29.6 06/22/2022    MCHC 34.2 06/22/2022    RDW 14.2 06/22/2022    BANDSPCT 1 12/10/2021    METASPCT 1 12/09/2021    LYMPHOPCT 19.4 06/22/2022    PROMYELOPCT 2 12/04/2021    MONOPCT 11.0 06/22/2022    MYELOPCT 1 12/09/2021    BASOPCT 0.7 06/22/2022    MONOSABS 1.2 06/22/2022    LYMPHSABS 2.2 06/22/2022    EOSABS 0.5 06/22/2022    BASOSABS 0.1 06/22/2022     CMP:    Lab Results   Component Value Date     06/21/2022    K 5.0 06/21/2022    K 3.8 12/12/2021    CL 98 06/21/2022    CO2 26 06/21/2022 BUN 33 06/21/2022    CREATININE 1.2 06/21/2022    GFRAA >60 06/21/2022    AGRATIO 0.7 11/30/2021    LABGLOM >60 06/21/2022    GLUCOSE 250 06/21/2022    PROT 4.8 12/10/2021    LABALBU 2.4 01/11/2022    CALCIUM 10.1 06/21/2022    BILITOT 0.3 12/10/2021    ALKPHOS 170 12/10/2021    AST 13 12/10/2021    ALT 7 12/10/2021     PT/INR:    Lab Results   Component Value Date    PROTIME 12.7 12/30/2021    INR 1.12 12/30/2021     PTT:  No results found for: APTT, PTT[APTT}  HgBA1c:    Lab Results   Component Value Date    LABA1C 10.1 06/21/2022     Wound Culture: in process   Radiology Review:  MRI left foot in process     Other diagnostic test: Laser profusion:    Right Impression   Laser Skin Perfusion Pressure study at the right dorsum of the foot is 97 ;   wound healing likely . This is considered to have a good probability for   healing. Laser Skin Perfusion Pressure study at the right lateral ankle is 28 ; wound   healing unlikely . This is considered to have a poor probability for healing. Laser Skin Perfusion Pressure study at the right medial calf is 59 ; wound   healing likely . This is considered to have a good probability for healing. Laser Skin Perfusion Pressure study at the right distal lateral calf is 21 ;   wound healing unlikely . This is considered to have a poor probability for   healing. Laser Skin Perfusion Pressure study at the right proximal lateral calf is 35 ;   wound healing likely . This is considered to have a good probability for   healing. Laser Skin Perfusion Pressure study at the right distal thigh is 100 ; wound   healing likely . This is considered to have a good probability for healing. Pulse Volume Recording at the foot level reveals severely abnormal waveforms. Pulse Volume Recording at the ankle level reveals moderately abnormal   waveforms. Left Impression   Laser Skin Perfusion Pressure study at the left dorsum of the foot is 73 ;   wound healing likely .  This is considered to have a good probability for   healing. Laser Skin Perfusion Pressure study at the left lateral plantar area of the   foot is 54 ; wound healing likely . This is considered to have a good   probability for healing. Laser Skin Perfusion Pressure study at the left lateral ankle is 74 ; wound   healing likely . This is considered to have a good probability for healing. Pulse Volume Recording at the level reveals moderately abnormal waveforms. There is no previous exam for comparison.       Conclusions        Summary        Laser skin perfusion pressures demonstrate poor wound healing within the    right lateral ankle and right lateral calf areas. The remaining segments    within the bilateral ankles demonstrate a good probability for healing.        Pulse volume recordings are moderate to severally abnormal bilaterally. IMPRESSION/RECOMMENDATIONS: this is a 61year old male with:   1. Osteomyelitis left 2nd toe and probable 5th metatarsal shaft left - ID consulted. MRI pending. At minimum will need 2nd toe amputation and likely 5th ray amputation and I&D dorsal foot left foot. 2. PVD bilateral lower extremities - recent SFA stenting 6/21/2022 at [facility] with improved profusion. Right lower leg - needs vascular intervention. Laser profusion reviewed and left noted to have adequate healing potential but right decreased lateral foot and calf. May need vascular consult vs out patient follow-up at [facility] for right lower leg. 3.Multiple arterial ulcer bilateral lower legs -  Continue wound nurse recs for dressing changes. 4. Diabetes type 2 with neuropathy, uncontrolled Hba1c 10.1  5.  Smoker- reviewed cessation of smoking with patient

## 2022-06-22 NOTE — CONSULTS
Eduarda Peralta MD at [facility] N/A 12/16/2021    2ND LOOK/ EVALUATION UNDER ANESTHESIA/ WOUND VAC CHANGE ABDOMINAL WALL AND PERINEUM performed by Eduarda Peralta MD at [facility] N/A 12/21/2021    EVALUATION UNDER ANESTHESIA/ WOUND VAC CHANGE ABDOMINAL WALL AND PERINEUM performed by Eduarda Peralta MD at [facility] N/A 12/23/2021    EVALUATION UNDER ANESTHESIA/ WOUND VAC [address] AND PERINEUM performed by Eduarda Peralta MD at [address] Left 12/07/2021    LAPAROSCOPIC COLOSTOMY CREATION performed by Blaire Culp MD at [facility] Left     many years ago   [address] N/A 01/03/2022    GLUTEAL WOUND VAC PLACEMENT performed by Eduarda Peralta MD at [address] N/A 12/27/2021    PERINEAL WOUND VAC CHANGE performed by Eduarda Peralta MD at [address] N/A 12/30/2021    PERINEAL / ABDOMINAL WOUND VAC CHANGE, performed by Eduarda Peralta MD at [address] N/A 01/10/2022    PERINEAL WOUND VAC REMOVAL, EXAM UNDER ANESTHESIA performed by Eduarda Peralta MD at [address] [address] N/A 11/30/2021    DEBRIDEMENT OF SCROTAL JAYRO'S GANGRENE performed by Filbert Boast, MD at [address] [facility] 01/03/2022    SPLIT THICKNESS SKIN GRAFT TO ABDOMEN WOUND VAC PLACEMENT. 44x 11 performed by Eduarda Peralta MD at [address] 01/06/2022    LEFT POSTERIOR THIGH ADVANCEMENT 13X7CM; CLOSURE PERINEUM 7.2CM; SKIN GRAFT 7. 1CMX7.2CM TO LEFT BUTTOCK; APPLICATION OF WOUND VACUUM DEVICE performed by Eduarda Peralta MD at [facility] N/A 11/30/2021    PERINEAL SOFT TISSUE EXCISIONAL DEBRIDEMENT performed by Lashell Patel MD at [place] [address] History:    TOBACCO:   reports that he has been smoking. He started smoking about 52 years ago.  He has never used smokeless tobacco.  ETOH: reports previous alcohol use. There is no history of illicit drug use or other significant epidemiologic exposures.       Family History:       Problem Relation Age of Onset    Cancer Mother        Current Medications:    Current Facility-Administered Medications: zinc oxide (TRIAD HYDROPHILIC) paste, , Topical, Daily  povidone-iodine 10 % swab, , Topical, Daily  bacitracin-polymyxin b (POLYSPORIN) ointment, , Topical, BID  amLODIPine (NORVASC) tablet 5 mg, 5 mg, Oral, Daily  aspirin EC tablet 81 mg, 81 mg, Oral, Daily  atorvastatin (LIPITOR) tablet 80 mg, 80 mg, Oral, Nightly  sodium chloride flush 0.9 % injection 5-40 mL, 5-40 mL, IntraVENous, 2 times per day  sodium chloride flush 0.9 % injection 5-40 mL, 5-40 mL, IntraVENous, PRN  0.9 % sodium chloride infusion, , IntraVENous, PRN  enoxaparin (LOVENOX) injection 40 mg, 40 mg, SubCUTAneous, Daily  ondansetron (ZOFRAN-ODT) disintegrating tablet 4 mg, 4 mg, Oral, Q8H PRN **OR** ondansetron (ZOFRAN) injection 4 mg, 4 mg, IntraVENous, Q6H PRN  polyethylene glycol (GLYCOLAX) packet 17 g, 17 g, Oral, Daily PRN  acetaminophen (TYLENOL) tablet 650 mg, 650 mg, Oral, Q6H PRN **OR** acetaminophen (TYLENOL) suppository 650 mg, 650 mg, Rectal, Q6H PRN  glucose chewable tablet 16 g, 4 tablet, Oral, PRN  dextrose bolus 10% 125 mL, 125 mL, IntraVENous, PRN **OR** dextrose bolus 10% 250 mL, 250 mL, IntraVENous, PRN  glucagon (rDNA) injection 1 mg, 1 mg, IntraMUSCular, PRN  dextrose 5 % solution, 100 mL/hr, IntraVENous, PRN  piperacillin-tazobactam (ZOSYN) 3,375 mg in dextrose 5 % 50 mL IVPB extended infusion (mini-bag), 3,375 mg, IntraVENous, Q8H  insulin glargine (LANTUS) injection vial 10 Units, 10 Units, SubCUTAneous, Nightly  insulin lispro (HUMALOG) injection vial 0-6 Units, 0-6 Units, SubCUTAneous, TID WC  insulin lispro (HUMALOG) injection vial 0-3 Units, 0-3 Units, SubCUTAneous, Nightly  vancomycin (VANCOCIN) 750 mg in dextrose 5 % 250 mL IVPB, 750 mg, IntraVENous, Q12H      Allergies   Allergen Reactions    Shrimp Flavor Swelling     Throat swells up when eats shrimp        REVIEW OF SYSTEMS:    CONSTITUTIONAL:   Fever denied    EYES:  negative for eye discharge, acute visual disturbance and icterus  HEENT:  negative for acute hearing loss, tinnitus, ear drainage, sinus pressure, nasal congestion, epistaxis and snoring  RESPIRATORY:  No hemoptysis  CARDIOVASCULAR:  negative for chest pain  GASTROINTESTINAL:   Colostomy  Abd pain denied   GENITOURINARY:  negative for frequency, dysuria, urinary incontinence, decreased urine volume, and hematuria  HEMATOLOGIC/LYMPHATIC:  negative for easy bruising, bleeding and lymphadenopathy  ALLERGIC/IMMUNOLOGIC:  negative for recurrent infections, angioedema, anaphylaxis and drug reactions  ENDOCRINE:  negative for weight changes and diabetic symptoms including polyuria, polydipsia and polyphagia  MUSCULOSKELETAL:   Per HPI   NEUROLOGICAL:  negative for headaches, slurred speech, unilateral weakness  PSYCHIATRIC/BEHAVIORAL: negative for hallucinations, behavioral problems, confusion and agitation. Objective:   PHYSICAL EXAM:      VITALS:  /76   Pulse 71   Temp 98 °F (36.7 °C) (Oral)   Resp 18   SpO2 98%      24HR INTAKE/OUTPUT:      Intake/Output Summary (Last 24 hours) at 6/22/2022 1333  Last data filed at 6/22/2022 1249  Gross per 24 hour   Intake 480 ml   Output 1075 ml   Net -595 ml     CONSTITUTIONAL:  Awake, alert, cooperative, no apparent distress, and appears stated age  [de-identified]: NCAT, PERRL, EOMI. Sclera white, conjunctiva full. OP with moist mucosal membranes, no thrush, tongue protrudes midline  NECK:  Supple, symmetrical, trachea midline, no adenopathy  LUNGS:  no increased work of breathing   ABDOMEN:  normal bowel sounds, soft, flat, NT PSYCHIATRIC: Oriented to person place and time. No obvious depression or anxiety. MUSCULOSKELETAL: No obvious misalignment or effusion of the joints.  No clubbing, cyanosis of the digits. L foot warm. Pedal pulses not palpable   Wound with necrotic eschar dorsal L foot  Full thickness wound dorsal 2nd toe with palpable bone   SKIN:  normal skin color, texture, turgor and no redness, warmth, or swelling. No palpable nodules or stigmata of embolic phenomenon  NEUROLOGIC: nonfocal exam  ACCESS:   PIV site ok       DATA:    Old records have been reviewed    CBC:  Recent Labs     06/21/22  1704 06/22/22  0511   WBC 12.8* 11.1*   RBC 3.64* 3.61*   HGB 10.8* 10.7*   HCT 31.7* 31.3*    304   MCV 87.2 86.7   MCH 29.8 29.6   MCHC 34.2 34.2   RDW 14.3 14.2      BMP:  Recent Labs     06/21/22  1704   *   K 5.0   CL 98*   CO2 26   BUN 33*   CREATININE 1.2   CALCIUM 10.1   GLUCOSE 250*        Cultures:   6/21 BC x2 NGTD   Wound culture foot NGTD, GS no organisms       Radiology Review:  All pertinent images / reports were reviewed as a part of this visit.        Assessment:     Patient Active Problem List   Diagnosis    Uncontrolled type 2 diabetes mellitus with diabetic polyneuropathy, without long-term current use of insulin ([address]    Ischemic ulcer of left foot with fat layer exposed ([address]    Nonhealing surgical wound of buttock, initial encounter    Ischemic ulcer of right ankle with necrosis of muscle (HCC)    Gangrene of toe of left foot ([address]    Current every day smoker    Hyperlipidemia    Diabetic ulcer of left foot associated with type 2 diabetes mellitus, with necrosis of bone ([address]    Ischemic toe ulcer, left, with fat layer exposed ([address]    Ischemic ulcer of right foot with necrosis of muscle ([address]    Atherosclerosis of native arteries of left leg with ulceration of foot ([address]    Atherosclerosis of native arteries of right leg with ulceration of foot ([address]    Osteomyelitis ([address]         Multiple medical problems including PAD   S/p stenting of L SFA 6/14/22    Chronic L foot wounds with exposed bone and suspected chronic OM   No guiding micro data  Without signs of systemic toxicity      NKDA       -agree with MRI   -check ESR, CRP   -laser perfusion ordered   -empiric abx. Avoid combo vanc-Zosyn due to risk of AoCKD. Will use cefepime in place of Zosyn     Discussed medical vs surgical management of chronic OM. Low likelihood of success with medical treatment of chronic OM  Will regroup after perfusion and MRI results and discussion w Podiatry     Questions addressed         Mac Arenas M.D. Thank you for the opportunity to participate in the care of your patient.     Please do not hesitate to contact me:   [phone] office

## 2022-06-22 NOTE — PROGRESS NOTES
Wound Care nurse finished retaking pictures to update note from yesterday. Patient and spouse in good mood had no questions. Satisfied with care. Colostomy bag attached, stool formed, soft within bag. Seal holding well. Infectious  Disease Dr. Humberto Reynolds evaluated patient's feet today. Perfusion and  MRI have been completed. Dr. Humberto Reynolds will discuss options with patient and spouse.

## 2022-06-22 NOTE — CARE COORDINATION
[facility]    Referral received from  to follow for home care services.      Will ask for staffing approval pending planned dc date    [facility] notified to follow possible iv    No insurance; no PCP    Jac Merrill RN, BSN CTN  [facility] [phone]

## 2022-06-22 NOTE — PROGRESS NOTES
Hospitalist Progress Note      PCP: No primary care provider on file. Date of Admission: 6/21/2022    Chief Complaint: foot infection    Hospital Course:   61 y.o. male who presented to [facility] with foot infection. Patient has been seeing podiatry for several diabetic foot wounds. The largest one the dorsum of his left foot has worsened over the week. He has been on bactrim for this. His blood sugars have been quite elevated at home. He is due to see his PCP later this week to adjust his insulin further. He denies fevers, chills, chest pain, SOB or nausea. He has minimal sensation in his feet from the diabetes. Subjective: awaiting podiatry recs. No new complaints.        Medications:  Reviewed    Infusion Medications    sodium chloride 25 mL (06/21/22 1844)    dextrose       Scheduled Medications    zinc oxide   Topical Daily    povidone-iodine   Topical Daily    bacitracin-polymyxin b   Topical BID    insulin lispro  3 Units SubCUTAneous TID WC    amLODIPine  5 mg Oral Daily    aspirin  81 mg Oral Daily    atorvastatin  80 mg Oral Nightly    sodium chloride flush  5-40 mL IntraVENous 2 times per day    enoxaparin  40 mg SubCUTAneous Daily    insulin glargine  10 Units SubCUTAneous Nightly    insulin lispro  0-6 Units SubCUTAneous TID WC    insulin lispro  0-3 Units SubCUTAneous Nightly    vancomycin  750 mg IntraVENous Q12H     PRN Meds: sodium chloride flush, sodium chloride, ondansetron **OR** ondansetron, polyethylene glycol, acetaminophen **OR** acetaminophen, glucose, dextrose bolus **OR** dextrose bolus, glucagon (rDNA), dextrose      Intake/Output Summary (Last 24 hours) at 6/22/2022 1445  Last data filed at 6/22/2022 1249  Gross per 24 hour   Intake 480 ml   Output 1075 ml   Net -595 ml       Physical Exam Performed:    /72   Pulse 64   Temp 98.3 °F (36.8 °C) (Oral)   Resp 18   SpO2 97%     General appearance:  No apparent distress, appears stated age and cooperative. HEENT:  Normal cephalic, atraumatic without obvious deformity. Pupils equal, round, and reactive to light. Extra ocular muscles intact. Conjunctivae/corneas clear. Neck: Supple, with full range of motion. No jugular venous distention. Trachea midline. Respiratory:  Normal respiratory effort. Clear to auscultation, bilaterally without Rales/Wheezes/Rhonchi. Cardiovascular:  Regular rate and rhythm with normal S1/S2 without murmurs, rubs or gallops. Abdomen: Soft, non-tender, non-distended with normal bowel sounds. Musculoskeletal:  No clubbing, cyanosis or edema bilaterally. Full range of motion without deformity. Skin: Skin color, texture, turgor normal. Large ulceration on dorsum of left foot. Several toes gangrenous. Neurologic:  Neurovascularly intact without any focal sensory/motor deficits. Cranial nerves: II-XII intact, grossly non-focal.  Psychiatric:  Alert and oriented, thought content appropriate, normal insight  Capillary Refill: Brisk,3 seconds, normal  Peripheral Pulses: +2 palpable, equal bilaterally     Labs:   Recent Labs     06/21/22  1704 06/22/22  0511   WBC 12.8* 11.1*   HGB 10.8* 10.7*   HCT 31.7* 31.3*    304     Recent Labs     06/21/22  1704   *   K 5.0   CL 98*   CO2 26   BUN 33*   CREATININE 1.2   CALCIUM 10.1     No results for input(s): AST, ALT, BILIDIR, BILITOT, ALKPHOS in the last 72 hours. No results for input(s): INR in the last 72 hours. No results for input(s): [place] in the last 72 hours. Urinalysis:    No results found for: Aletta [facility], BACTERIA, RBCUA, BLOODU, [address], [address]    Radiology:  LASER SKIN PERFUSION PRESSURE STUDY         MRI FOOT LEFT WO CONTRAST    (Results Pending)           Assessment/Plan:    Active Hospital Problems    Diagnosis     Osteomyelitis ([address] [M86.9]      Priority: Medium     Sepsis 2/2 osteomyelitis  - presented with leukocytosis, tachycardia.  Lactate WNL  - associated with diabetic foot infection  - Podiatry consulted  - ID consulted  - blood, wound cultures ordered  - MRI foot ordered  - started on vanc, zosyn     DMII  - poorly controlled  - holding home metformin  - continue basal bolus insulin     HTN  - well controlled  - continue norvasc     HLD  - continue statin    DVT Prophylaxis: lovenox  Diet: ADULT DIET;  Regular; 5 carb choices (75 gm/meal)  Code Status: Full Code    PT/OT Eval Status: not ordered    Dispo - pending podiatry Shahid Mcpherson MD

## 2022-06-23 ENCOUNTER — ANESTHESIA (OUTPATIENT)
Dept: OPERATING ROOM | Age: 67
DRG: 854 | End: 2022-06-23

## 2022-06-23 ENCOUNTER — ANESTHESIA EVENT (OUTPATIENT)
Dept: OPERATING ROOM | Age: 67
DRG: 854 | End: 2022-06-23

## 2022-06-23 LAB
GLUCOSE BLD-MCNC: 163 MG/DL (ref 70–99)
GLUCOSE BLD-MCNC: 170 MG/DL (ref 70–99)
GLUCOSE BLD-MCNC: 191 MG/DL (ref 70–99)
GLUCOSE BLD-MCNC: 194 MG/DL (ref 70–99)
GLUCOSE BLD-MCNC: 315 MG/DL (ref 70–99)
GLUCOSE BLD-MCNC: 374 MG/DL (ref 70–99)
PERFORMED ON: ABNORMAL

## 2022-06-23 PROCEDURE — 87070 CULTURE OTHR SPECIMN AEROBIC: CPT

## 2022-06-23 PROCEDURE — 2709999900 HC NON-CHARGEABLE SUPPLY: Performed by: PODIATRIST

## 2022-06-23 PROCEDURE — 0Y6Y0Z0 DETACHMENT AT LEFT 5TH TOE, COMPLETE, OPEN APPROACH: ICD-10-PCS | Performed by: PODIATRIST

## 2022-06-23 PROCEDURE — 87205 SMEAR GRAM STAIN: CPT

## 2022-06-23 PROCEDURE — 2580000003 HC RX 258: Performed by: INTERNAL MEDICINE

## 2022-06-23 PROCEDURE — 6370000000 HC RX 637 (ALT 250 FOR IP): Performed by: INTERNAL MEDICINE

## 2022-06-23 PROCEDURE — 87206 SMEAR FLUORESCENT/ACID STAI: CPT

## 2022-06-23 PROCEDURE — 2500000003 HC RX 250 WO HCPCS: Performed by: PODIATRIST

## 2022-06-23 PROCEDURE — 99232 SBSQ HOSP IP/OBS MODERATE 35: CPT | Performed by: INTERNAL MEDICINE

## 2022-06-23 PROCEDURE — 2580000003 HC RX 258: Performed by: PODIATRIST

## 2022-06-23 PROCEDURE — 87102 FUNGUS ISOLATION CULTURE: CPT

## 2022-06-23 PROCEDURE — 88305 TISSUE EXAM BY PATHOLOGIST: CPT

## 2022-06-23 PROCEDURE — 7100000001 HC PACU RECOVERY - ADDTL 15 MIN: Performed by: PODIATRIST

## 2022-06-23 PROCEDURE — 3700000000 HC ANESTHESIA ATTENDED CARE: Performed by: PODIATRIST

## 2022-06-23 PROCEDURE — 6360000002 HC RX W HCPCS: Performed by: PODIATRIST

## 2022-06-23 PROCEDURE — 87106 FUNGI IDENTIFICATION YEAST: CPT

## 2022-06-23 PROCEDURE — 6370000000 HC RX 637 (ALT 250 FOR IP): Performed by: PODIATRIST

## 2022-06-23 PROCEDURE — 3600000013 HC SURGERY LEVEL 3 ADDTL 15MIN: Performed by: PODIATRIST

## 2022-06-23 PROCEDURE — 1200000000 HC SEMI PRIVATE

## 2022-06-23 PROCEDURE — 7100000000 HC PACU RECOVERY - FIRST 15 MIN: Performed by: PODIATRIST

## 2022-06-23 PROCEDURE — 3600000003 HC SURGERY LEVEL 3 BASE: Performed by: PODIATRIST

## 2022-06-23 PROCEDURE — 87075 CULTR BACTERIA EXCEPT BLOOD: CPT

## 2022-06-23 PROCEDURE — 87015 SPECIMEN INFECT AGNT CONCNTJ: CPT

## 2022-06-23 PROCEDURE — 0LBW0ZZ EXCISION OF LEFT FOOT TENDON, OPEN APPROACH: ICD-10-PCS | Performed by: PODIATRIST

## 2022-06-23 PROCEDURE — 87077 CULTURE AEROBIC IDENTIFY: CPT

## 2022-06-23 PROCEDURE — 0Y6S0Z0 DETACHMENT AT LEFT 2ND TOE, COMPLETE, OPEN APPROACH: ICD-10-PCS | Performed by: PODIATRIST

## 2022-06-23 PROCEDURE — 87116 MYCOBACTERIA CULTURE: CPT

## 2022-06-23 PROCEDURE — 6360000002 HC RX W HCPCS: Performed by: INTERNAL MEDICINE

## 2022-06-23 PROCEDURE — 3700000001 HC ADD 15 MINUTES (ANESTHESIA): Performed by: PODIATRIST

## 2022-06-23 PROCEDURE — 6360000002 HC RX W HCPCS: Performed by: NURSE PRACTITIONER

## 2022-06-23 PROCEDURE — 2580000003 HC RX 258: Performed by: NURSE ANESTHETIST, CERTIFIED REGISTERED

## 2022-06-23 PROCEDURE — 88300 SURGICAL PATH GROSS: CPT

## 2022-06-23 PROCEDURE — 6360000002 HC RX W HCPCS: Performed by: NURSE ANESTHETIST, CERTIFIED REGISTERED

## 2022-06-23 PROCEDURE — 2500000003 HC RX 250 WO HCPCS: Performed by: NURSE ANESTHETIST, CERTIFIED REGISTERED

## 2022-06-23 RX ORDER — DEXAMETHASONE SODIUM PHOSPHATE 4 MG/ML
INJECTION, SOLUTION INTRA-ARTICULAR; INTRALESIONAL; INTRAMUSCULAR; INTRAVENOUS; SOFT TISSUE PRN
Status: DISCONTINUED | OUTPATIENT
Start: 2022-06-23 | End: 2022-06-23 | Stop reason: SDUPTHER

## 2022-06-23 RX ORDER — PROPOFOL 10 MG/ML
INJECTION, EMULSION INTRAVENOUS PRN
Status: DISCONTINUED | OUTPATIENT
Start: 2022-06-23 | End: 2022-06-23 | Stop reason: SDUPTHER

## 2022-06-23 RX ORDER — ONDANSETRON 2 MG/ML
INJECTION INTRAMUSCULAR; INTRAVENOUS PRN
Status: DISCONTINUED | OUTPATIENT
Start: 2022-06-23 | End: 2022-06-23 | Stop reason: SDUPTHER

## 2022-06-23 RX ORDER — SODIUM CHLORIDE 9 MG/ML
INJECTION, SOLUTION INTRAVENOUS PRN
Status: DISCONTINUED | OUTPATIENT
Start: 2022-06-23 | End: 2022-06-23 | Stop reason: HOSPADM

## 2022-06-23 RX ORDER — BUPIVACAINE HYDROCHLORIDE 5 MG/ML
INJECTION, SOLUTION EPIDURAL; INTRACAUDAL PRN
Status: DISCONTINUED | OUTPATIENT
Start: 2022-06-23 | End: 2022-06-23 | Stop reason: ALTCHOICE

## 2022-06-23 RX ORDER — MEPERIDINE HYDROCHLORIDE 50 MG/ML
12.5 INJECTION INTRAMUSCULAR; INTRAVENOUS; SUBCUTANEOUS EVERY 5 MIN PRN
Status: DISCONTINUED | OUTPATIENT
Start: 2022-06-23 | End: 2022-06-23 | Stop reason: HOSPADM

## 2022-06-23 RX ORDER — SODIUM CHLORIDE 0.9 % (FLUSH) 0.9 %
5-40 SYRINGE (ML) INJECTION PRN
Status: DISCONTINUED | OUTPATIENT
Start: 2022-06-23 | End: 2022-06-23 | Stop reason: HOSPADM

## 2022-06-23 RX ORDER — SODIUM CHLORIDE 0.9 % (FLUSH) 0.9 %
5-40 SYRINGE (ML) INJECTION EVERY 12 HOURS SCHEDULED
Status: DISCONTINUED | OUTPATIENT
Start: 2022-06-23 | End: 2022-06-23 | Stop reason: HOSPADM

## 2022-06-23 RX ORDER — SODIUM CHLORIDE 9 MG/ML
25 INJECTION, SOLUTION INTRAVENOUS PRN
Status: DISCONTINUED | OUTPATIENT
Start: 2022-06-23 | End: 2022-06-23 | Stop reason: HOSPADM

## 2022-06-23 RX ORDER — SODIUM CHLORIDE, SODIUM LACTATE, POTASSIUM CHLORIDE, CALCIUM CHLORIDE 600; 310; 30; 20 MG/100ML; MG/100ML; MG/100ML; MG/100ML
INJECTION, SOLUTION INTRAVENOUS CONTINUOUS PRN
Status: DISCONTINUED | OUTPATIENT
Start: 2022-06-23 | End: 2022-06-23 | Stop reason: SDUPTHER

## 2022-06-23 RX ORDER — ONDANSETRON 2 MG/ML
4 INJECTION INTRAMUSCULAR; INTRAVENOUS
Status: DISCONTINUED | OUTPATIENT
Start: 2022-06-23 | End: 2022-06-23 | Stop reason: HOSPADM

## 2022-06-23 RX ORDER — LIDOCAINE HYDROCHLORIDE 10 MG/ML
INJECTION, SOLUTION EPIDURAL; INFILTRATION; INTRACAUDAL; PERINEURAL PRN
Status: DISCONTINUED | OUTPATIENT
Start: 2022-06-23 | End: 2022-06-23 | Stop reason: ALTCHOICE

## 2022-06-23 RX ORDER — OXYCODONE HYDROCHLORIDE 5 MG/1
10 TABLET ORAL PRN
Status: DISCONTINUED | OUTPATIENT
Start: 2022-06-23 | End: 2022-06-23 | Stop reason: HOSPADM

## 2022-06-23 RX ORDER — FAMOTIDINE 10 MG/ML
20 INJECTION, SOLUTION INTRAVENOUS ONCE
Status: DISCONTINUED | OUTPATIENT
Start: 2022-06-23 | End: 2022-06-23 | Stop reason: HOSPADM

## 2022-06-23 RX ORDER — LIDOCAINE HYDROCHLORIDE 20 MG/ML
INJECTION, SOLUTION INFILTRATION; PERINEURAL PRN
Status: DISCONTINUED | OUTPATIENT
Start: 2022-06-23 | End: 2022-06-23 | Stop reason: SDUPTHER

## 2022-06-23 RX ORDER — SODIUM CHLORIDE, SODIUM LACTATE, POTASSIUM CHLORIDE, CALCIUM CHLORIDE 600; 310; 30; 20 MG/100ML; MG/100ML; MG/100ML; MG/100ML
INJECTION, SOLUTION INTRAVENOUS CONTINUOUS
Status: DISCONTINUED | OUTPATIENT
Start: 2022-06-23 | End: 2022-06-23 | Stop reason: HOSPADM

## 2022-06-23 RX ORDER — OXYCODONE HYDROCHLORIDE 5 MG/1
5 TABLET ORAL PRN
Status: DISCONTINUED | OUTPATIENT
Start: 2022-06-23 | End: 2022-06-23 | Stop reason: HOSPADM

## 2022-06-23 RX ADMIN — VANCOMYCIN HYDROCHLORIDE 750 MG: 750 INJECTION, POWDER, LYOPHILIZED, FOR SOLUTION INTRAVENOUS at 12:01

## 2022-06-23 RX ADMIN — INSULIN GLARGINE 10 UNITS: 100 INJECTION, SOLUTION SUBCUTANEOUS at 21:45

## 2022-06-23 RX ADMIN — PROPOFOL 10 MG: 10 INJECTION, EMULSION INTRAVENOUS at 17:15

## 2022-06-23 RX ADMIN — ENOXAPARIN SODIUM 40 MG: 100 INJECTION SUBCUTANEOUS at 08:08

## 2022-06-23 RX ADMIN — INSULIN LISPRO 3 UNITS: 100 INJECTION, SOLUTION INTRAVENOUS; SUBCUTANEOUS at 21:44

## 2022-06-23 RX ADMIN — CEFEPIME 2000 MG: 2 INJECTION, POWDER, FOR SOLUTION INTRAMUSCULAR; INTRAVENOUS at 03:49

## 2022-06-23 RX ADMIN — PROPOFOL 10 MG: 10 INJECTION, EMULSION INTRAVENOUS at 17:35

## 2022-06-23 RX ADMIN — PROPOFOL 10 MG: 10 INJECTION, EMULSION INTRAVENOUS at 17:40

## 2022-06-23 RX ADMIN — DEXAMETHASONE SODIUM PHOSPHATE 4 MG: 4 INJECTION, SOLUTION INTRAMUSCULAR; INTRAVENOUS at 17:13

## 2022-06-23 RX ADMIN — PROPOFOL 10 MG: 10 INJECTION, EMULSION INTRAVENOUS at 17:25

## 2022-06-23 RX ADMIN — AMLODIPINE BESYLATE 5 MG: 5 TABLET ORAL at 08:06

## 2022-06-23 RX ADMIN — HYDROMORPHONE HYDROCHLORIDE 0.5 MG: 1 INJECTION, SOLUTION INTRAMUSCULAR; INTRAVENOUS; SUBCUTANEOUS at 21:14

## 2022-06-23 RX ADMIN — PROPOFOL 10 MG: 10 INJECTION, EMULSION INTRAVENOUS at 17:04

## 2022-06-23 RX ADMIN — BACITRACIN ZINC AND POLYMYXIN B SULFATE 14.2 G: at 21:28

## 2022-06-23 RX ADMIN — ASPIRIN 81 MG: 81 TABLET, COATED ORAL at 08:06

## 2022-06-23 RX ADMIN — PROPOFOL 40 MG: 10 INJECTION, EMULSION INTRAVENOUS at 16:56

## 2022-06-23 RX ADMIN — INSULIN LISPRO 1 UNITS: 100 INJECTION, SOLUTION INTRAVENOUS; SUBCUTANEOUS at 08:31

## 2022-06-23 RX ADMIN — PROPOFOL 20 MG: 10 INJECTION, EMULSION INTRAVENOUS at 17:01

## 2022-06-23 RX ADMIN — PROPOFOL 10 MG: 10 INJECTION, EMULSION INTRAVENOUS at 17:30

## 2022-06-23 RX ADMIN — INSULIN LISPRO 3 UNITS: 100 INJECTION, SOLUTION INTRAVENOUS; SUBCUTANEOUS at 08:25

## 2022-06-23 RX ADMIN — LIDOCAINE HYDROCHLORIDE 60 MG: 20 INJECTION, SOLUTION INFILTRATION; PERINEURAL at 16:52

## 2022-06-23 RX ADMIN — ATORVASTATIN CALCIUM 80 MG: 80 TABLET, FILM COATED ORAL at 21:14

## 2022-06-23 RX ADMIN — PROPOFOL 10 MG: 10 INJECTION, EMULSION INTRAVENOUS at 17:50

## 2022-06-23 RX ADMIN — PROPOFOL 10 MG: 10 INJECTION, EMULSION INTRAVENOUS at 17:20

## 2022-06-23 RX ADMIN — ONDANSETRON 4 MG: 2 INJECTION INTRAMUSCULAR; INTRAVENOUS at 17:13

## 2022-06-23 RX ADMIN — PROPOFOL 50 MG: 10 INJECTION, EMULSION INTRAVENOUS at 16:52

## 2022-06-23 RX ADMIN — PROPOFOL 10 MG: 10 INJECTION, EMULSION INTRAVENOUS at 17:10

## 2022-06-23 RX ADMIN — PROPOFOL 10 MG: 10 INJECTION, EMULSION INTRAVENOUS at 17:45

## 2022-06-23 RX ADMIN — Medication 10 ML: at 08:35

## 2022-06-23 RX ADMIN — SODIUM CHLORIDE, SODIUM LACTATE, POTASSIUM CHLORIDE, AND CALCIUM CHLORIDE: .6; .31; .03; .02 INJECTION, SOLUTION INTRAVENOUS at 17:03

## 2022-06-23 ASSESSMENT — LIFESTYLE VARIABLES: SMOKING_STATUS: 1

## 2022-06-23 ASSESSMENT — PAIN SCALES - GENERAL
PAINLEVEL_OUTOF10: 6
PAINLEVEL_OUTOF10: 5
PAINLEVEL_OUTOF10: 6

## 2022-06-23 ASSESSMENT — ENCOUNTER SYMPTOMS: SHORTNESS OF BREATH: 0

## 2022-06-23 NOTE — CARE COORDINATION
6/23/22 Pt from home, FC notified pt has no insurance, follow for possible IV antibiotic needs, and possible [facility] howard. Update: Pt over income for Medicaid and [facility] not likely to be able to meet his needs with howard visits as that would only be 1-2 visits. Pt will likely have to do OP wound care and IV antibiotics, will follow.

## 2022-06-23 NOTE — ANESTHESIA PRE PROCEDURE
Department of Anesthesiology  Preprocedure Note       Name:  Evelina Mishra   Age:  61 y.o.  :  1955                                          MRN:  [id number]         Date:  2022      Surgeon: Yeni Ch):  Estrada Bermudez DPM    Procedure: Procedure(s):  LEFT FIFTH RAY AMPUTATION, LEFT SECOND TOE AMPUTATION, INCISION AND DEBRIBEMENT OF LEFT MIDFOOT    Medications prior to admission:   Prior to Admission medications    Medication Sig Start Date End Date Taking?  Authorizing Provider   sulfamethoxazole-trimethoprim (BACTRIM DS) 800-160 MG per tablet Take 1 tablet by mouth 2 times daily for 14 days 6/15/22 6/29/22  Wayne Macario MD   aspirin 81 MG EC tablet Take 81 mg by mouth daily    Historical Provider, MD   docusate sodium (COLACE) 100 MG capsule Take 100 mg by mouth at bedtime     Historical Provider, MD   psyllium (KONSYL) 28.3 % PACK Take 1 packet by mouth daily    Historical Provider, MD   atorvastatin (LIPITOR) 80 MG tablet Take 80 mg by mouth nightly    Historical Provider, MD   amLODIPine (NORVASC) 5 MG tablet Take 5 mg by mouth daily    Historical Provider, MD   insulin glargine (LANTUS) 100 UNIT/ML injection vial Inject 10 Units into the skin nightly    Historical Provider, MD   metFORMIN (GLUCOPHAGE) 500 MG tablet Take 500 mg by mouth daily (with breakfast) Stopped on until 22    Historical Provider, MD       Current medications:    Current Facility-Administered Medications   Medication Dose Route Frequency Provider Last Rate Last Admin    zinc oxide (TRIAD HYDROPHILIC) paste   Topical Daily Joyce Reyes MD        povidone-iodine 10 % swab   Topical Daily Joyce Reyes MD        bacitracin-polymyxin b (POLYSPORIN) ointment   Topical BID Joyce Reyes MD   14.2 g at 22    insulin lispro (HUMALOG) injection vial 3 Units  3 Units SubCUTAneous TID WC Joyce Reyes MD   3 Units at 22 0825    cefepime (MAXIPIME) 2000 mg IVPB minibag  2,000 mg IntraVENous Q12H Ros Mcclelland MD   Stopped at 06/23/22 0459    amLODIPine (NORVASC) tablet 5 mg  5 mg Oral Daily Tennille Vasquez MD   5 mg at 06/23/22 0806    aspirin EC tablet 81 mg  81 mg Oral Daily Tennille Vasquez MD   81 mg at 06/23/22 0806    atorvastatin (LIPITOR) tablet 80 mg  80 mg Oral Nightly Tennille Vasquez MD   80 mg at 06/22/22 2056    sodium chloride flush 0.9 % injection 5-40 mL  5-40 mL IntraVENous 2 times per day Tennille Vasquez MD   10 mL at 06/23/22 0835    sodium chloride flush 0.9 % injection 5-40 mL  5-40 mL IntraVENous PRN Tennille Vasquez MD        0.9 % sodium chloride infusion   IntraVENous PRN Tennille Vasquez  mL/hr at 06/21/22 1844 25 mL at 06/21/22 1844    enoxaparin (LOVENOX) injection 40 mg  40 mg SubCUTAneous Daily Tennille Vasquez MD   40 mg at 06/23/22 0808    ondansetron (ZOFRAN-ODT) disintegrating tablet 4 mg  4 mg Oral Q8H PRN Tennille Vasquez MD        Or    ondansetron [facility]) injection 4 mg  4 mg IntraVENous Q6H PRN Tennille Vasquez MD        polyethylene glycol [facility]) packet 17 g  17 g Oral Daily PRN Tennille Vasquez MD        acetaminophen (TYLENOL) tablet 650 mg  650 mg Oral Q6H PRN Tennille Vasquez MD        Or    acetaminophen (TYLENOL) suppository 650 mg  650 mg Rectal Q6H PRN Tennille Vasquez MD        glucose chewable tablet 16 g  4 tablet Oral PRN Tennille Vasquez MD        dextrose bolus 10% 125 mL  125 mL IntraVENous PRN Tennille Vasquez MD        Or    dextrose bolus 10% 250 mL  250 mL IntraVENous PRN Tennille Vasquez MD        glucagon (rDNA) injection 1 mg  1 mg IntraMUSCular PRN Tennille Vasquez MD        dextrose 5 % solution  100 mL/hr IntraVENous PRN Tennille Vasquez MD        insulin glargine (LANTUS) injection vial 10 Units  10 Units SubCUTAneous Nightly Tennille Vasquez MD   10 Units at 06/22/22 2056    insulin lispro (HUMALOG) injection vial 0-6 Units  0-6 Units SubCUTAneous TID  Tennille Vasquez MD   1 Units at 06/23/22 0831    insulin lispro (HUMALOG) injection vial 0-3 Units  0-3 Units SubCUTAneous Nightly Estrada Day MD   1 Units at 06/22/22 2057    vancomycin (VANCOCIN) 750 mg in dextrose 5 % 250 mL IVPB  750 mg IntraVENous Q12H Estrada Day MD   Stopped at 06/23/22 1453       Allergies:     Allergies   Allergen Reactions    Shrimp Flavor Swelling     Throat swells up when eats shrimp       Problem List:    Patient Active Problem List   Diagnosis Code    Uncontrolled type 2 diabetes mellitus with diabetic polyneuropathy, without long-term current use of insulin (HCC) E11.42, E11.65    Ischemic ulcer of left foot with fat layer exposed ([address] L97.522    Nonhealing surgical wound of buttock, initial encounter T81.89XA    Ischemic ulcer of right ankle with necrosis of muscle ([address] L97.313    Gangrene of toe of left foot ([address] I96    Current every day smoker F17.200    Hyperlipidemia E78.5    Diabetic ulcer of left foot associated with type 2 diabetes mellitus, with necrosis of bone ([address] E11.621, L97.524    Ischemic toe ulcer, left, with fat layer exposed ([address] L97.522    Ischemic ulcer of right foot with necrosis of muscle (HCC) L97.513    Atherosclerosis of native arteries of left leg with ulceration of foot (HCC) I70.245    Atherosclerosis of native arteries of right leg with ulceration of foot ([address] I70.235    Osteomyelitis ([address] M86.9       Past Medical History:        Diagnosis Date    Cellulitis of left foot 3/22/2022    Focally failed skin grafts of abdominal wall 3/5/2022    Shayla's gangrene     Fourniers gangrene     Osteomyelitis of left foot ([address] 11/30/2021    Patellofemoral pain syndrome of right knee        Past Surgical History:        Procedure Laterality Date    ABDOMEN SURGERY N/A 12/01/2021    WIDE EXCISION PERINEUM, BACK, ABDOMINAL WALL performed by Jayla Guevara MD at [facility] Left 12/03/2021    ABDOMINAL WALL AND LEFT BUTTOCK DEBRIDEMENT, WOUND VAC PLACEMENT performed by Nadia Harsh Myers MD at [facility] Left 12/05/2021    ABDOMINAL WALL AND LEFT BUTTOCK DEBRIDEMENT, WOUND VAC PLACEMENT performed by Virgilio Be MD at [address] [facility] N/A 12/07/2021    EVALUATION UNDER ANESTHESIA WITH DEBRIDEMENT ABDOMINAL WOUND AND LEFT BUTTOCK, WOUND VAC CHANGE performed by Virgilio Be MD at [address] [facility] Left 12/10/2021    ABDOMINAL WALL AND LEFT BUTTOCK WOUND VAC CHANGE WITH FURTHER DEBRIDEMENT ABDOMEN AND LEFT BUTTOCK WOUND performed by Virgilio Be MD at [address] [facility] N/A 12/13/2021    WOUND VAC CHANGE ABDOMEN AND LEFT BUTTOCK performed by Virgilio Be MD at [address] [facility] N/A 12/16/2021    2ND LOOK/ EVALUATION UNDER ANESTHESIA/ WOUND VAC CHANGE ABDOMINAL WALL AND PERINEUM performed by Virgilio Be MD at [address] [facility] N/A 12/21/2021    EVALUATION UNDER ANESTHESIA/ WOUND VAC CHANGE ABDOMINAL WALL AND PERINEUM performed by Virgilio Be MD at [address] [facility] N/A 12/23/2021    EVALUATION UNDER ANESTHESIA/ WOUND VAC [address] AND PERINEUM performed by Virgilio Be MD at [address] 12/07/2021    LAPAROSCOPIC COLOSTOMY CREATION performed by Aura Hughes MD at [address] [facility] Left     many years ago   [facility] N/A 01/03/2022    GLUTEAL WOUND VAC PLACEMENT performed by Virgilio Be MD at [address] [facility] N/A 12/27/2021    PERINEAL WOUND VAC CHANGE performed by Virgilio Be MD at [address] [facility] N/A 12/30/2021    PERINEAL / ABDOMINAL WOUND VAC CHANGE, performed by Virgilio Be MD at [address] [facility] N/A 01/10/2022    PERINEAL WOUND VAC REMOVAL, EXAM UNDER ANESTHESIA performed by Virgilio Be MD at [address] [place] [address] N/A 11/30/2021    DEBRIDEMENT OF SCROTAL JAYRO'S GANGRENE performed by Jessi Wise MD at [facility] N/A 01/03/2022    SPLIT THICKNESS SKIN GRAFT TO ABDOMEN WOUND VAC PLACEMENT. 44x 11 performed by Licha Abdi MD at [facility] 01/06/2022    LEFT POSTERIOR THIGH ADVANCEMENT 13X7CM; CLOSURE PERINEUM 7.2CM; SKIN GRAFT 7. 1CMX7.2CM TO LEFT BUTTOCK; APPLICATION OF WOUND VACUUM DEVICE performed by Licha Abdi MD at [facility] N/A 11/30/2021    PERINEAL SOFT TISSUE EXCISIONAL DEBRIDEMENT performed by Joesph Song MD at [place] [address] History:    Social History     Tobacco Use    Smoking status: Current Every Day Smoker     Start date: 1/1/1970    Smokeless tobacco: Never Used   Substance Use Topics    Alcohol use: Not Currently     Alcohol/week: 0.0 standard drinks                                Ready to quit: Not Answered  Counseling given: Not Answered      Vital Signs (Current):   Vitals:    06/22/22 1931 06/22/22 2329 06/23/22 0745 06/23/22 1432   BP: (!) 165/76 (!) 150/76 (!) 150/78 125/69   Pulse: 79 69 69 76   Resp: 18 16 15 16   Temp: 98.8 °F (37.1 °C) 99.4 °F (37.4 °C) 98.7 °F (37.1 °C) 99.3 °F (37.4 °C)   TempSrc: Oral Oral Oral Oral   SpO2: 98% 99% 97% 99%   Weight:       Height:                                                  BP Readings from Last 3 Encounters:   06/23/22 125/69   06/15/22 133/75   06/08/22 134/72       NPO Status:  0800, see mar for meds today                                                                               BMI:   Wt Readings from Last 3 Encounters:   06/22/22 161 lb (73 kg)   06/15/22 161 lb (73 kg)   05/25/22 157 lb 6 oz (71.4 kg)     Body mass index is 22.45 kg/m².     CBC:   Lab Results   Component Value Date    WBC 11.1 06/22/2022    RBC 3.61 06/22/2022    HGB 10.7 06/22/2022    HCT 31.3 06/22/2022    MCV 86.7 06/22/2022    RDW 14.2 06/22/2022     06/22/2022       CMP:   Lab Results   Component Value Date     06/21/2022    K 5.0 06/21/2022    K 3.8 12/12/2021    CL 98 06/21/2022    CO2 26 06/21/2022    BUN 33 06/21/2022    CREATININE 1.2 06/21/2022    GFRAA >60 06/21/2022    AGRATIO 0.7 11/30/2021    LABGLOM >60 06/21/2022    GLUCOSE 250 06/21/2022    PROT 4.8 12/10/2021    CALCIUM 10.1 06/21/2022    BILITOT 0.3 12/10/2021    ALKPHOS 170 12/10/2021    AST 13 12/10/2021    ALT 7 12/10/2021       POC Tests:   Recent Labs     06/23/22  1129   POCGLU 194*       Coags:   Lab Results   Component Value Date    PROTIME 12.7 12/30/2021    INR 1.12 12/30/2021       HCG (If Applicable): No results found for: PREGTESTUR, PREGSERUM, HCG, HCGQUANT     ABGs:   Lab Results   Component Value Date    PHART 7.227 12/09/2021    PO2ART 107.8 12/09/2021    TQI2GPR 42.4 12/09/2021    PZI6HJD 17.6 12/09/2021    BEART -10 12/09/2021    P9FGALAW 97 12/09/2021        Type & Screen (If Applicable):  No results found for: LABABO, LABRH    Drug/Infectious Status (If Applicable):  No results found for: HIV, HEPCAB    COVID-19 Screening (If Applicable):   Lab Results   Component Value Date    COVID19 Not Detected 11/30/2021           Anesthesia Evaluation  Patient summary reviewed and Nursing notes reviewed no history of anesthetic complications:   Airway: Mallampati: II  TM distance: >3 FB   Neck ROM: full  Mouth opening: > = 3 FB   Dental:    (+) upper dentures and lower dentures      Pulmonary: breath sounds clear to auscultation  (+) current smoker (\"on and off\")    (-) COPD, asthma, shortness of breath, recent URI and sleep apnea          Patient did not smoke on day of surgery.                  Cardiovascular:    (+) hypertension:, hyperlipidemia    (-) pacemaker, valvular problems/murmurs, past MI, CAD, CABG/stent, dysrhythmias,  angina and  CHF    ECG reviewed  Rhythm: regular  Rate: normal           Beta Blocker:  Not on Beta Blocker         Neuro/Psych:   (+) neuromuscular disease (polyneuropathy, hands and feet):,    (-) seizures, TIA and CVA           GI/Hepatic/Renal: Neg GI/Hepatic/Renal ROS       (-) GERD, liver disease, no renal disease, bowel prep and no morbid obesity       Endo/Other:    (+) DiabetesType II DM, using insulin, : arthritis:., no malignancy/cancer. (-) hypothyroidism, hyperthyroidism, no malignancy/cancer               Abdominal:       Abdomen: soft. Vascular:   + PVD, aortic or cerebral (lle vasc stenting), . Other Findings:           Anesthesia Plan      general     ASA 3       Induction: intravenous. MIPS: Postoperative opioids intended and Prophylactic antiemetics administered. Anesthetic plan and risks discussed with patient. Plan discussed with CRNA. This pre-anesthesia assessment may be used as a history and physical.    DOS STAFF ADDENDUM:    Pt seen and examined, chart reviewed (including anesthesia, drug and allergy history). No interval changes to history and physical examination. Anesthetic plan, risks, benefits, alternatives, and personnel involved discussed with patient. Patient verbalized an understanding and agrees to proceed.       Jayro Garces MD  June 23, 2022  4:24 PM      Jayro Garces MD   6/23/2022

## 2022-06-23 NOTE — PROGRESS NOTES
Patient returned from surgery. Alert and oriented x 4. Wound vac in place and running on the left foot. Vs wnl. Will continue to monitor.

## 2022-06-23 NOTE — PROGRESS NOTES
Infectious Disease Follow up Notes    CC :  OM foot      Antibiotics:   Cefepime 2g q12  vanc 750 q12     Admit Date:   6/21/2022  Hospital Day: 3    Subjective:   He remains afebrile   Minimal pain.     He seems to be tolerating the abx well so far     Objective:     Patient Vitals for the past 8 hrs:   BP Temp Temp src Pulse Resp SpO2   06/23/22 0745 (!) 150/78 98.7 °F (37.1 °C) Oral 69 15 97 %       EXAM:  General:   Alert, oriented, NAD    HEENT:   NCAT, PERRL, scler anicteric    ABD: Soft, flat, NT     EXT:  L foot dressed  SKIN: No focal rash           LINE:   PIV in place         Scheduled Meds:   zinc oxide   Topical Daily    povidone-iodine   Topical Daily    bacitracin-polymyxin b   Topical BID    insulin lispro  3 Units SubCUTAneous TID WC    cefepime  2,000 mg IntraVENous Q12H    amLODIPine  5 mg Oral Daily    aspirin  81 mg Oral Daily    atorvastatin  80 mg Oral Nightly    sodium chloride flush  5-40 mL IntraVENous 2 times per day    enoxaparin  40 mg SubCUTAneous Daily    insulin glargine  10 Units SubCUTAneous Nightly    insulin lispro  0-6 Units SubCUTAneous TID WC    insulin lispro  0-3 Units SubCUTAneous Nightly    vancomycin  750 mg IntraVENous Q12H       Continuous Infusions:   sodium chloride 25 mL (06/21/22 1844)    dextrose            Data Review:    Lab Results   Component Value Date    WBC 11.1 (H) 06/22/2022    HGB 10.7 (L) 06/22/2022    HCT 31.3 (L) 06/22/2022    MCV 86.7 06/22/2022     06/22/2022     Lab Results   Component Value Date    CREATININE 1.2 06/21/2022    BUN 33 (H) 06/21/2022     (L) 06/21/2022    K 5.0 06/21/2022    CL 98 (L) 06/21/2022    CO2 26 06/21/2022       Hepatic Function Panel:   Lab Results   Component Value Date    ALKPHOS 170 12/10/2021    ALT 7 12/10/2021    AST 13 12/10/2021    PROT 4.8 12/10/2021    BILITOT 0.3 12/10/2021    BILIDIR <0.2 12/10/2021 IBILI see below 12/10/2021    LABALBU 2.4 01/11/2022         MICRO:  6/21 BC x2 NGTD   Wound culture light growth S marcescens       IMAGING:    MRI L foot   Impression   Acute osteomyelitis of the 5th metatarsal and 5th distal phalanx.       Suspected acute osteomyelitis of the 2nd proximal and middle phalanges.       There is bone marrow edema in the cuboid, medial and intermediate cuneiforms,   as well as the navicular.  There appears to be a soft tissue defect of the   dorsum of the midfoot at the level of these bones.  This marrow signal   abnormality could represent reactive osteitis or early acute osteomyelitis.       Nonspecific bone marrow edema in the 1st-3rd metatarsal heads and the 1st   distal phalanx.       No well-defined drainable abscess is seen.       Generalized muscle edema along with subcutaneous edema.        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 2 diabetes mellitus, with necrosis of bone ([address] 03/05/2022    Nonhealing surgical wound of buttock, initial encounter 02/22/2022    Ischemic ulcer of right ankle with necrosis of muscle ([address] 02/22/2022    Gangrene of toe of left foot ([address] 02/22/2022    Current every day smoker 02/22/2022    Hyperlipidemia 02/22/2022    Ischemic ulcer of left foot with fat layer exposed ([address] 02/16/2022    Uncontrolled type 2 diabetes mellitus with diabetic polyneuropathy, without long-term current use of insulin ([address] 11/30/2021       Multiple medical problems including PAD   S/p stenting of L SFA 6/14/22     Chronic L foot wounds with exposed bone and chronic OM of 2 toe and 5th ray   Klebsiella isolated in wound culture      NKDA     Plan:     Note plan for amp of 2nd toe and 5th ray  He remains at risk of further limb loss and BKA     Pending final cultures, surgical resection and clinical improvement, continue vanc and cefepime       Discussed with patient/family, all questions answered        Meenakshi Lara MD  Phone: [phone]   Fax : [phone]

## 2022-06-23 NOTE — BRIEF OP NOTE
Brief Postoperative Note      Patient: Tiffany Robert  YOB: 1955  MRN: [id number]    Date of Procedure: 6/23/2022    Pre-Op Diagnosis: Osteomyelitis of fifth toe of left foot, osteomyelitis of second toe of left foot, diabetic ulcer with infection left midfoot    Post-Op Diagnosis: Same       Procedure(s):  LEFT FIFTH RAY AMPUTATION, LEFT SECOND TOE AMPUTATION, INCISION AND DEBRIBEMENT OF LEFT MIDFOOT to extensor tendons left foot, application of wound vac left foot     Surgeon(s):  Asher Anders DPM    Assistant:  Surgical Assistant: Tho Busch    Anesthesia: Monitor Anesthesia Care    Estimated Blood Loss (mL): less than 67OZ    Complications: None    Specimens:   ID Type Source Tests Collected by Time Destination   1 : SWAB LEFT FOOT WOUND Specimen Foot CULTURE, FUNGUS, CULTURE, SURGICAL, CULTURE WITH SMEAR, ACID FAST [facility] [address] [place] [address] 6/23/2022 1729    A : LEFT SECOND RAY Specimen Toe SURGICAL PATHOLOGY Asher Anders DPM 6/23/2022 1706    B : LEFT FIFTH TOE Specimen Toe SURGICAL PATHOLOGY Asher Anders DPM 6/23/2022 1706    C : LEFT FIFTH METATARSAL CLEARANCE FRAGMENT Bone Bone SURGICAL PATHOLOGY Asher Anders DPM 6/23/2022 1728        Implants:  * No implants in log *      Drains:   Colostomy RLQ (Active)   Stomal Appliance 1 piece 06/23/22 1615   Stoma  Assessment Moist 06/23/22 0800   Peristomal Assessment Clean, dry & intact 06/23/22 1615   Treatment Bag change; Heat applied 06/21/22 1912   Stool Appearance Loose 06/23/22 0800   Stool Color Brown 06/23/22 0800   Stool Amount Medium 06/23/22 0800   Output (mL) 100 ml 06/21/22 1912       Findings:same as  Above     Electronically signed by Asher Anders DPM on 6/23/2022 at 6:05 PM

## 2022-06-23 NOTE — ANESTHESIA POSTPROCEDURE EVALUATION
Department of Anesthesiology  Postprocedure Note    Patient: Paz Varela  MRN: [id number]  YOB: 1955  Date of evaluation: 6/23/2022      Procedure Summary     Date: 06/23/22 [facility]    Anesthesia Start: 1648 Anesthesia Stop: 1807    Procedure: LEFT FIFTH RAY AMPUTATION, LEFT SECOND TOE AMPUTATION, INCISION AND DEBRIBEMENT OF LEFT MIDFOOT (Left Foot) Diagnosis:       Osteomyelitis of fifth toe of left foot ([address]      Osteomyelitis of second toe of left foot ([address]      Diabetic ulcer of left midfoot associated with type 2 diabetes mellitus, unspecified ulcer stage (HCC)      (Osteomyelitis of fifth toe of left foot, osteomyelitis of second toe of left foot, diabetic ulcer with infection left midfoot)    Surgeons: Henry Arnold DPM Responsible Provider: Melina Agudelo MD    Anesthesia Type: general ASA Status: 3          Anesthesia Type: No value filed.     Arcadio Phase I: Arcadio Score: 10    Arcadio Phase II:      Vitals:    06/23/22 0745 06/23/22 1432 06/23/22 1615 06/23/22 1804   BP: (!) 150/78 125/69  (!) 126/59   Pulse: 69 76 74 72   Resp: 15 16 16 16   Temp: 98.7 °F (37.1 °C) 99.3 °F (37.4 °C) 97.2 °F (36.2 °C) 98 °F (36.7 °C)   TempSrc: Oral Oral Temporal Temporal   SpO2: 97% 99% 98% 99%   Weight:       Height:         Anesthesia Post Evaluation    Patient location during evaluation: bedside  Patient participation: complete - patient participated  Level of consciousness: awake and alert  Airway patency: patent  Nausea & Vomiting: no nausea  Complications: no  Cardiovascular status: hemodynamically stable  Respiratory status: acceptable  Hydration status: euvolemic

## 2022-06-23 NOTE — PROGRESS NOTES
Per Dr. Dirk Cloud the patient will be having surgery on left foot tonight at 1900. Patient spoke with Dr. Dirk Cloud with this nurse present and verbalized undertanding.

## 2022-06-23 NOTE — PROGRESS NOTES
Hospitalist Progress Note      PCP: No primary care provider on file. Date of Admission: 6/21/2022    Chief Complaint: foot infection    Hospital Course:   61 y.o. male who presented to [facility] with foot infection. Patient has been seeing podiatry for several diabetic foot wounds. The largest one the dorsum of his left foot has worsened over the week. He has been on bactrim for this. His blood sugars have been quite elevated at home. He is due to see his PCP later this week to adjust his insulin further. He denies fevers, chills, chest pain, SOB or nausea. He has minimal sensation in his feet from the diabetes. Subjective: awaiting podiatry recs. No new complaints.        Medications:  Reviewed    Infusion Medications    sodium chloride 25 mL (06/21/22 1844)    dextrose       Scheduled Medications    zinc oxide   Topical Daily    povidone-iodine   Topical Daily    bacitracin-polymyxin b   Topical BID    insulin lispro  3 Units SubCUTAneous TID WC    cefepime  2,000 mg IntraVENous Q12H    amLODIPine  5 mg Oral Daily    aspirin  81 mg Oral Daily    atorvastatin  80 mg Oral Nightly    sodium chloride flush  5-40 mL IntraVENous 2 times per day    enoxaparin  40 mg SubCUTAneous Daily    insulin glargine  10 Units SubCUTAneous Nightly    insulin lispro  0-6 Units SubCUTAneous TID WC    insulin lispro  0-3 Units SubCUTAneous Nightly    vancomycin  750 mg IntraVENous Q12H     PRN Meds: sodium chloride flush, sodium chloride, ondansetron **OR** ondansetron, polyethylene glycol, acetaminophen **OR** acetaminophen, glucose, dextrose bolus **OR** dextrose bolus, glucagon (rDNA), dextrose      Intake/Output Summary (Last 24 hours) at 6/23/2022 1259  Last data filed at 6/23/2022 1030  Gross per 24 hour   Intake 240 ml   Output 2400 ml   Net -2160 ml       Physical Exam Performed:    BP (!) 150/78   Pulse 69   Temp 98.7 °F (37.1 °C) (Oral)   Resp 15   Ht 5' 11\" (1.803 m)   Wt 161 lb (73 kg) SpO2 97%   BMI 22.45 kg/m²     General appearance:  No apparent distress, appears stated age and cooperative. HEENT:  Normal cephalic, atraumatic without obvious deformity. Pupils equal, round, and reactive to light. Extra ocular muscles intact. Conjunctivae/corneas clear. Neck: Supple, with full range of motion. No jugular venous distention. Trachea midline. Respiratory:  Normal respiratory effort. Clear to auscultation, bilaterally without Rales/Wheezes/Rhonchi. Cardiovascular:  Regular rate and rhythm with normal S1/S2 without murmurs, rubs or gallops. Abdomen: Soft, non-tender, non-distended with normal bowel sounds. Musculoskeletal:  No clubbing, cyanosis or edema bilaterally. Full range of motion without deformity. Skin: Skin color, texture, turgor normal. Large ulceration on dorsum of left foot. Several toes gangrenous. Neurologic:  Neurovascularly intact without any focal sensory/motor deficits. Cranial nerves: II-XII intact, grossly non-focal.  Psychiatric:  Alert and oriented, thought content appropriate, normal insight  Capillary Refill: Brisk,3 seconds, normal  Peripheral Pulses: +2 palpable, equal bilaterally     Labs:   Recent Labs     06/21/22  1704 06/22/22  0511   WBC 12.8* 11.1*   HGB 10.8* 10.7*   HCT 31.7* 31.3*    304     Recent Labs     06/21/22  1704   *   K 5.0   CL 98*   CO2 26   BUN 33*   CREATININE 1.2   CALCIUM 10.1     No results for input(s): AST, ALT, BILIDIR, BILITOT, ALKPHOS in the last 72 hours. No results for input(s): INR in the last 72 hours. No results for input(s): Helga Menendez in the last 72 hours. Urinalysis:    No results found for: Ronni Lasso, BACTERIA, RBCUA, BLOODU, [address], [address]    Radiology:  MRI FOOT LEFT WO CONTRAST   Preliminary Result   Acute osteomyelitis of the 5th metatarsal and 5th distal phalanx. Suspected acute osteomyelitis of the 2nd proximal and middle phalanges.       There is bone marrow edema in the cuboid, medial and intermediate cuneiforms,   as well as the navicular. There appears to be a soft tissue defect of the   dorsum of the midfoot at the level of these bones. This marrow signal   abnormality could represent reactive osteitis or early acute osteomyelitis. Nonspecific bone marrow edema in the 1st-3rd metatarsal heads and the 1st   distal phalanx. No well-defined drainable abscess is seen. Generalized muscle edema along with subcutaneous edema. LASER SKIN PERFUSION PRESSURE STUDY   Final Result              Assessment/Plan:    Active Hospital Problems    Diagnosis     Osteomyelitis ([facility] [address] [M86.9]      Priority: Medium     Sepsis 2/2 osteomyelitis  - presented with leukocytosis, tachycardia. Lactate WNL  - associated with diabetic foot infection  - Podiatry consulted  - ID consulted  - blood, wound cultures ordered  - MRI foot showing several areas of osteomyelitis  - continue vanc, cefepime  - plan for OR today     DMII  - poorly controlled  - holding home metformin  - continue SSI     HTN  - well controlled  - continue norvasc     HLD  - continue statin    DVT Prophylaxis: lovenox  Diet: ADULT DIET;  Regular; 5 carb choices (75 gm/meal)  Code Status: Full Code    PT/OT Eval Status: pending surgery    Dispo - home in possibly 5 days    Julee Banda MD

## 2022-06-23 NOTE — PROGRESS NOTES
Department of Podiatric Surgery  Dr. Leslee Zambrano  Progress Note      Called patient to review his MRI results. MRI : left foot reveals:   Acute osteomyelitis of the 5th metatarsal and 5th distal phalanx.       Suspected acute osteomyelitis of the 2nd proximal and middle phalanges.       There is bone marrow edema in the cuboid, medial and intermediate cuneiforms,   as well as the navicular.  There appears to be a soft tissue defect of the   dorsum of the midfoot at the level of these bones.  This marrow signal   abnormality could represent reactive osteitis or early acute osteomyelitis.       Nonspecific bone marrow edema in the 1st-3rd metatarsal heads and the 1st   distal phalanx.       No well-defined drainable abscess is seen.       Generalized muscle edema along with subcutaneous edema. Reviewed recommendation for 2nd toe amputation, 5th toe and partial metatarsal amputation and I&D to left dorsal foot. Reviewed surgical and non surgical risks and benefits. Patient is in agreement for surgical procedure. Placed NPO.

## 2022-06-23 NOTE — PLAN OF CARE
Problem: Safety - Adult  Goal: Free from fall injury  Outcome: Progressing  Bed in lowest position, wheels locked, 2/4 side rails up, nonskid footwear on. Bed/ chair check alarm in place, call light within reach. Pt instructed to call out when needing assistance. Pt stated understanding. Nurse will continue to monitor.

## 2022-06-23 NOTE — PROGRESS NOTES
A: Telephone report given to [place], all current drips, treatments, skin issues, and plan of care were reviewed by both RN's, patient transferred in stable condition.

## 2022-06-24 LAB
GLUCOSE BLD-MCNC: 182 MG/DL (ref 70–99)
GLUCOSE BLD-MCNC: 192 MG/DL (ref 70–99)
GLUCOSE BLD-MCNC: 283 MG/DL (ref 70–99)
GLUCOSE BLD-MCNC: 306 MG/DL (ref 70–99)
GRAM STAIN RESULT: ABNORMAL
ORGANISM: ABNORMAL
PERFORMED ON: ABNORMAL
WOUND/ABSCESS: ABNORMAL

## 2022-06-24 PROCEDURE — 6370000000 HC RX 637 (ALT 250 FOR IP): Performed by: NURSE PRACTITIONER

## 2022-06-24 PROCEDURE — 6360000002 HC RX W HCPCS: Performed by: NURSE PRACTITIONER

## 2022-06-24 PROCEDURE — 1200000000 HC SEMI PRIVATE

## 2022-06-24 PROCEDURE — 2580000003 HC RX 258: Performed by: PODIATRIST

## 2022-06-24 PROCEDURE — 97162 PT EVAL MOD COMPLEX 30 MIN: CPT

## 2022-06-24 PROCEDURE — 6370000000 HC RX 637 (ALT 250 FOR IP): Performed by: PODIATRIST

## 2022-06-24 PROCEDURE — 6360000002 HC RX W HCPCS: Performed by: PODIATRIST

## 2022-06-24 PROCEDURE — 97530 THERAPEUTIC ACTIVITIES: CPT

## 2022-06-24 PROCEDURE — 99231 SBSQ HOSP IP/OBS SF/LOW 25: CPT | Performed by: INTERNAL MEDICINE

## 2022-06-24 RX ORDER — NALOXONE HYDROCHLORIDE 0.4 MG/ML
0.4 INJECTION, SOLUTION INTRAMUSCULAR; INTRAVENOUS; SUBCUTANEOUS PRN
Status: DISCONTINUED | OUTPATIENT
Start: 2022-06-24 | End: 2022-07-01 | Stop reason: HOSPADM

## 2022-06-24 RX ORDER — OXYCODONE HYDROCHLORIDE 5 MG/1
5 TABLET ORAL EVERY 8 HOURS PRN
Status: DISCONTINUED | OUTPATIENT
Start: 2022-06-24 | End: 2022-07-01 | Stop reason: HOSPADM

## 2022-06-24 RX ADMIN — VANCOMYCIN HYDROCHLORIDE 750 MG: 750 INJECTION, POWDER, LYOPHILIZED, FOR SOLUTION INTRAVENOUS at 00:27

## 2022-06-24 RX ADMIN — ATORVASTATIN CALCIUM 80 MG: 80 TABLET, FILM COATED ORAL at 20:20

## 2022-06-24 RX ADMIN — VANCOMYCIN HYDROCHLORIDE 750 MG: 750 INJECTION, POWDER, LYOPHILIZED, FOR SOLUTION INTRAVENOUS at 12:18

## 2022-06-24 RX ADMIN — INSULIN LISPRO 3 UNITS: 100 INJECTION, SOLUTION INTRAVENOUS; SUBCUTANEOUS at 16:55

## 2022-06-24 RX ADMIN — INSULIN LISPRO 3 UNITS: 100 INJECTION, SOLUTION INTRAVENOUS; SUBCUTANEOUS at 09:06

## 2022-06-24 RX ADMIN — AMLODIPINE BESYLATE 5 MG: 5 TABLET ORAL at 08:58

## 2022-06-24 RX ADMIN — HYDROMORPHONE HYDROCHLORIDE 0.25 MG: 1 INJECTION, SOLUTION INTRAMUSCULAR; INTRAVENOUS; SUBCUTANEOUS at 16:24

## 2022-06-24 RX ADMIN — Medication 10 ML: at 20:31

## 2022-06-24 RX ADMIN — CEFEPIME 2000 MG: 2 INJECTION, POWDER, FOR SOLUTION INTRAMUSCULAR; INTRAVENOUS at 15:52

## 2022-06-24 RX ADMIN — INSULIN GLARGINE 10 UNITS: 100 INJECTION, SOLUTION SUBCUTANEOUS at 20:26

## 2022-06-24 RX ADMIN — INSULIN LISPRO 4 UNITS: 100 INJECTION, SOLUTION INTRAVENOUS; SUBCUTANEOUS at 09:07

## 2022-06-24 RX ADMIN — INSULIN LISPRO 3 UNITS: 100 INJECTION, SOLUTION INTRAVENOUS; SUBCUTANEOUS at 12:28

## 2022-06-24 RX ADMIN — INSULIN LISPRO 1 UNITS: 100 INJECTION, SOLUTION INTRAVENOUS; SUBCUTANEOUS at 12:31

## 2022-06-24 RX ADMIN — CEFEPIME 2000 MG: 2 INJECTION, POWDER, FOR SOLUTION INTRAMUSCULAR; INTRAVENOUS at 02:58

## 2022-06-24 RX ADMIN — BACITRACIN ZINC AND POLYMYXIN B SULFATE 14.2 G: at 23:45

## 2022-06-24 RX ADMIN — OXYCODONE 5 MG: 5 TABLET ORAL at 23:21

## 2022-06-24 RX ADMIN — ASPIRIN 81 MG: 81 TABLET, COATED ORAL at 08:57

## 2022-06-24 RX ADMIN — HYDROMORPHONE HYDROCHLORIDE 0.5 MG: 1 INJECTION, SOLUTION INTRAMUSCULAR; INTRAVENOUS; SUBCUTANEOUS at 01:04

## 2022-06-24 RX ADMIN — INSULIN LISPRO 1 UNITS: 100 INJECTION, SOLUTION INTRAVENOUS; SUBCUTANEOUS at 20:25

## 2022-06-24 RX ADMIN — Medication 10 ML: at 08:59

## 2022-06-24 RX ADMIN — VANCOMYCIN HYDROCHLORIDE 750 MG: 750 INJECTION, POWDER, LYOPHILIZED, FOR SOLUTION INTRAVENOUS at 23:22

## 2022-06-24 RX ADMIN — ENOXAPARIN SODIUM 40 MG: 100 INJECTION SUBCUTANEOUS at 08:57

## 2022-06-24 ASSESSMENT — PAIN SCALES - GENERAL
PAINLEVEL_OUTOF10: 6
PAINLEVEL_OUTOF10: 7
PAINLEVEL_OUTOF10: 5
PAINLEVEL_OUTOF10: 5
PAINLEVEL_OUTOF10: 6
PAINLEVEL_OUTOF10: 7

## 2022-06-24 ASSESSMENT — PAIN DESCRIPTION - PAIN TYPE: TYPE: CHRONIC PAIN

## 2022-06-24 ASSESSMENT — PAIN DESCRIPTION - DESCRIPTORS
DESCRIPTORS: ACHING
DESCRIPTORS: SHOOTING;DISCOMFORT
DESCRIPTORS: ACHING

## 2022-06-24 ASSESSMENT — PAIN DESCRIPTION - ORIENTATION
ORIENTATION: MID
ORIENTATION: LEFT

## 2022-06-24 ASSESSMENT — PAIN DESCRIPTION - LOCATION
LOCATION: BUTTOCKS
LOCATION: SACRUM
LOCATION: FOOT;LEG

## 2022-06-24 ASSESSMENT — PAIN DESCRIPTION - ONSET: ONSET: ON-GOING

## 2022-06-24 NOTE — PROGRESS NOTES
Department of Podiatric Surgery  Dr. Marsha Briggs  Progress Note        Reason for Consult:  Osteomyelitis left foot   Requesting Physician: Dr. Mary Brooke MD    CHIEF COMPLAINT:  Left foot infection     History Obtained From:  patient    HISTORY OF PRESENT ILLNESS:                The patient is a 61 y.o. male is POD #1 s/p partial left fifth ray amputation. Patient states he is doing well. Concerned about how to manage this foot and antibiotics following discharge as he does not have insurance.    Past Medical History:        Diagnosis Date    Cellulitis of left foot 3/22/2022    Focally failed skin grafts of abdominal wall 3/5/2022    Shayla's gangrene     Fourniers gangrene     Osteomyelitis of left foot ([address] 11/30/2021    Patellofemoral pain syndrome of right knee      Past Surgical History:        Procedure Laterality Date    ABDOMEN SURGERY N/A 12/01/2021    WIDE EXCISION PERINEUM, BACK, ABDOMINAL WALL performed by Inocencia Key MD at [facility] 12/03/2021    ABDOMINAL WALL AND LEFT BUTTOCK DEBRIDEMENT, WOUND VAC PLACEMENT performed by Israel Fenton MD at [facility] 12/05/2021    ABDOMINAL WALL AND LEFT BUTTOCK DEBRIDEMENT, WOUND VAC PLACEMENT performed by Israel Fenton MD at [facility] N/A 12/07/2021    EVALUATION UNDER ANESTHESIA WITH DEBRIDEMENT ABDOMINAL WOUND AND LEFT BUTTOCK, WOUND VAC CHANGE performed by Israel Fenton MD at [facility] 12/10/2021    ABDOMINAL WALL AND LEFT BUTTOCK WOUND VAC CHANGE WITH FURTHER DEBRIDEMENT ABDOMEN AND LEFT BUTTOCK WOUND performed by Israel Fenton MD at [facility] N/A 12/13/2021    WOUND VAC CHANGE ABDOMEN AND LEFT BUTTOCK performed by Israel Fenotn MD at [facility] N/A 12/16/2021    2ND LOOK/ EVALUATION UNDER ANESTHESIA/ WOUND VAC CHANGE ABDOMINAL WALL AND PERINEUM performed by Israel Fenton MD at [address] [place] [address] 12/21/2021    EVALUATION UNDER ANESTHESIA/ WOUND VAC CHANGE ABDOMINAL WALL AND PERINEUM performed by Karla Weems MD at [facility] N/A 12/23/2021    EVALUATION UNDER ANESTHESIA/ WOUND VAC CHANGE/ POSSIBLE PARTIAL [address] AND PERINEUM performed by Karla Weems MD at [address] Left 12/07/2021    LAPAROSCOPIC COLOSTOMY CREATION performed by Velia Dumont MD at [address] [facility] Left     many years ago   [facility] N/A 01/03/2022    GLUTEAL WOUND VAC PLACEMENT performed by Karla Weems MD at [facility] N/A 12/27/2021    PERINEAL WOUND VAC CHANGE performed by Karla Weems MD at [facility] N/A 12/30/2021    PERINEAL / ABDOMINAL WOUND VAC CHANGE, performed by Karla Weems MD at [facility] N/A 01/10/2022    PERINEAL WOUND VAC REMOVAL, EXAM UNDER ANESTHESIA performed by Karla Weems MD at [address] N/A 11/30/2021    DEBRIDEMENT OF SCROTAL JAYRO'S GANGRENE performed by Keo Rice MD at [facility] 01/03/2022    SPLIT THICKNESS SKIN GRAFT TO ABDOMEN WOUND VAC PLACEMENT. 44x 11 performed by Karla Weems MD at [facility] 01/06/2022    LEFT POSTERIOR THIGH ADVANCEMENT 13X7CM; CLOSURE PERINEUM 7.2CM; SKIN GRAFT 7. 1CMX7.2CM TO LEFT BUTTOCK; APPLICATION OF WOUND VACUUM DEVICE performed by Karla Weems MD at [facility] Left 6/23/2022    LEFT FIFTH RAY AMPUTATION, LEFT SECOND TOE AMPUTATION, INCISION AND DEBRIBEMENT OF LEFT MIDFOOT; APPLICATION OF WOUND VAC  performed by Marcelina Anguiano DPM at [address] N/A 11/30/2021    PERINEAL SOFT TISSUE EXCISIONAL DEBRIDEMENT performed by Arvin Rodgers MD at [address] OR     Current Medications:   Current Facility-Administered Medications: HYDROmorphone (DILAUDID) injection 0.25 mg, 0.25 mg, IntraVENous, Q4H PRN **OR** HYDROmorphone (DILAUDID) injection 0.5 mg, 0.5 mg, IntraVENous, Q4H PRN  zinc oxide (TRIAD HYDROPHILIC) paste, , Topical, Daily  povidone-iodine 10 % swab, , Topical, Daily  bacitracin-polymyxin b (POLYSPORIN) ointment, , Topical, BID  insulin lispro (HUMALOG) injection vial 3 Units, 3 Units, SubCUTAneous, TID WC  cefepime (MAXIPIME) 2000 mg IVPB minibag, 2,000 mg, IntraVENous, Q12H  amLODIPine (NORVASC) tablet 5 mg, 5 mg, Oral, Daily  aspirin EC tablet 81 mg, 81 mg, Oral, Daily  atorvastatin (LIPITOR) tablet 80 mg, 80 mg, Oral, Nightly  sodium chloride flush 0.9 % injection 5-40 mL, 5-40 mL, IntraVENous, 2 times per day  sodium chloride flush 0.9 % injection 5-40 mL, 5-40 mL, IntraVENous, PRN  0.9 % sodium chloride infusion, , IntraVENous, PRN  enoxaparin (LOVENOX) injection 40 mg, 40 mg, SubCUTAneous, Daily  ondansetron (ZOFRAN-ODT) disintegrating tablet 4 mg, 4 mg, Oral, Q8H PRN **OR** ondansetron (ZOFRAN) injection 4 mg, 4 mg, IntraVENous, Q6H PRN  polyethylene glycol (GLYCOLAX) packet 17 g, 17 g, Oral, Daily PRN  acetaminophen (TYLENOL) tablet 650 mg, 650 mg, Oral, Q6H PRN **OR** acetaminophen (TYLENOL) suppository 650 mg, 650 mg, Rectal, Q6H PRN  glucose chewable tablet 16 g, 4 tablet, Oral, PRN  dextrose bolus 10% 125 mL, 125 mL, IntraVENous, PRN **OR** dextrose bolus 10% 250 mL, 250 mL, IntraVENous, PRN  glucagon (rDNA) injection 1 mg, 1 mg, IntraMUSCular, PRN  dextrose 5 % solution, 100 mL/hr, IntraVENous, PRN  insulin glargine (LANTUS) injection vial 10 Units, 10 Units, SubCUTAneous, Nightly  insulin lispro (HUMALOG) injection vial 0-6 Units, 0-6 Units, SubCUTAneous, TID WC  insulin lispro (HUMALOG) injection vial 0-3 Units, 0-3 Units, SubCUTAneous, Nightly  vancomycin (VANCOCIN) 750 mg in dextrose 5 % 250 mL IVPB, 750 mg, IntraVENous, Q12H  Allergies:  Shrimp flavor    Social History:   TOBACCO:   reports that he has been smoking. He started smoking about 52 years ago.  He has never used smokeless tobacco.  ETOH: reports previous alcohol use. DRUGS:   reports current drug use. Drug: Marijuana Maadelata Mayitohang). Family History:       Problem Relation Age of Onset    Cancer Mother      REVIEW OF SYSTEMS:    CONSTITUTIONAL:  Negative for fever chills nausea or vomiting. Denies any chest pain or shortness of breath     PHYSICAL EXAM:    VITALS:  /71   Pulse 74   Temp 99 °F (37.2 °C) (Oral)   Resp 18   Ht 5' 11\" (1.803 m)   Wt 161 lb (73 kg)   SpO2 98%   BMI 22.45 kg/m²   Gen: AAO x3, NAD male   HEENT: normocephalic PERRLA   Lower extremity evaluation: NPWT in place and functioning to the left foot. Right foot lateral 5th styloid process ulcer is full-thickness to SQ and deep tissue. Ulcer right lateral ankle is full thickness to SQ and deep tissue.             Sed rate  81      DATA:    CBC with Differential:    Lab Results   Component Value Date    WBC 11.1 06/22/2022    RBC 3.61 06/22/2022    HGB 10.7 06/22/2022    HCT 31.3 06/22/2022     06/22/2022    MCV 86.7 06/22/2022    MCH 29.6 06/22/2022    MCHC 34.2 06/22/2022    RDW 14.2 06/22/2022    BANDSPCT 1 12/10/2021    METASPCT 1 12/09/2021    LYMPHOPCT 19.4 06/22/2022    PROMYELOPCT 2 12/04/2021    MONOPCT 11.0 06/22/2022    MYELOPCT 1 12/09/2021    BASOPCT 0.7 06/22/2022    MONOSABS 1.2 06/22/2022    LYMPHSABS 2.2 06/22/2022    EOSABS 0.5 06/22/2022    BASOSABS 0.1 06/22/2022     CMP:    Lab Results   Component Value Date     06/21/2022    K 5.0 06/21/2022    K 3.8 12/12/2021    CL 98 06/21/2022    CO2 26 06/21/2022    BUN 33 06/21/2022    CREATININE 1.2 06/21/2022    GFRAA >60 06/21/2022    AGRATIO 0.7 11/30/2021    LABGLOM >60 06/21/2022    GLUCOSE 250 06/21/2022    PROT 4.8 12/10/2021    LABALBU 2.4 01/11/2022    CALCIUM 10.1 06/21/2022    BILITOT 0.3 12/10/2021    ALKPHOS 170 12/10/2021    AST 13 12/10/2021    ALT 7 12/10/2021     PT/INR:    Lab Results   Component Value Date    PROTIME 12.7 12/30/2021    INR 1.12 12/30/2021     PTT:  No results found for: APTT, PTT[APTT}  HgBA1c:    Lab Results   Component Value Date    LABA1C 10.1 06/21/2022     Wound Culture: in process   Radiology Review:  MRI left foot in process     Other diagnostic test: Laser profusion:    Right Impression   Laser Skin Perfusion Pressure study at the right dorsum of the foot is 97 ;   wound healing likely . This is considered to have a good probability for   healing. Laser Skin Perfusion Pressure study at the right lateral ankle is 28 ; wound   healing unlikely . This is considered to have a poor probability for healing. Laser Skin Perfusion Pressure study at the right medial calf is 59 ; wound   healing likely . This is considered to have a good probability for healing. Laser Skin Perfusion Pressure study at the right distal lateral calf is 21 ;   wound healing unlikely . This is considered to have a poor probability for   healing. Laser Skin Perfusion Pressure study at the right proximal lateral calf is 35 ;   wound healing likely . This is considered to have a good probability for   healing. Laser Skin Perfusion Pressure study at the right distal thigh is 100 ; wound   healing likely . This is considered to have a good probability for healing. Pulse Volume Recording at the foot level reveals severely abnormal waveforms. Pulse Volume Recording at the ankle level reveals moderately abnormal   waveforms. Left Impression   Laser Skin Perfusion Pressure study at the left dorsum of the foot is 73 ;   wound healing likely . This is considered to have a good probability for   healing. Laser Skin Perfusion Pressure study at the left lateral plantar area of the   foot is 54 ; wound healing likely . This is considered to have a good   probability for healing. Laser Skin Perfusion Pressure study at the left lateral ankle is 74 ; wound   healing likely . This is considered to have a good probability for healing.    Pulse Volume Recording at the level reveals moderately abnormal waveforms. There is no previous exam for comparison.       Conclusions        Summary        Laser skin perfusion pressures demonstrate poor wound healing within the    right lateral ankle and right lateral calf areas. The remaining segments    within the bilateral ankles demonstrate a good probability for healing.        Pulse volume recordings are moderate to severally abnormal bilaterally. IMPRESSION/RECOMMENDATIONS: this is a 61year old male with:   1. Osteomyelitis left 2nd toe and probable 5th metatarsal shaft left - one day s/p partial fifth ray amputation and toe debridement. NPWT in place and functioning. Will require NPWT following discharge. Non weight bearing to the left foot. Discussed this with patient and wife today. 2. PVD bilateral lower extremities - recent S[facility]usion. Right lower leg - needs vascular intervention. Laser profusion reviewed and left noted to have adequate healing potential but right decreased lateral foot and calf. May need vascular consult vs out patient follow-up at [facility] for right lower leg. 3.Multiple arterial ulcer bilateral lower legs -  Continue wound nurse recs for dressing changes. 4. Diabetes type 2 with neuropathy, uncontrolled Hba1c 10.1  5.  Smoker- reviewed cessation of smoking with patient

## 2022-06-24 NOTE — PROGRESS NOTES
Infectious Disease Follow up Notes    CC :  OM foot      Antibiotics:   Cefepime 2g q12  vanc 750 q12     Admit Date:   6/21/2022  Hospital Day: 4    Subjective:   He remains afebrile   Foot surgery last night with 2nd toe and partial 5th ray amp     Objective:     Patient Vitals for the past 8 hrs:   BP Temp Temp src Pulse Resp SpO2   06/24/22 0800 112/66 97.6 °F (36.4 °C) Oral 63 17 97 %         Scheduled Meds:   zinc oxide   Topical Daily    povidone-iodine   Topical Daily    bacitracin-polymyxin b   Topical BID    insulin lispro  3 Units SubCUTAneous TID WC    cefepime  2,000 mg IntraVENous Q12H    amLODIPine  5 mg Oral Daily    aspirin  81 mg Oral Daily    atorvastatin  80 mg Oral Nightly    sodium chloride flush  5-40 mL IntraVENous 2 times per day    enoxaparin  40 mg SubCUTAneous Daily    insulin glargine  10 Units SubCUTAneous Nightly    insulin lispro  0-6 Units SubCUTAneous TID WC    insulin lispro  0-3 Units SubCUTAneous Nightly    vancomycin  750 mg IntraVENous Q12H       Continuous Infusions:   sodium chloride 25 mL (06/21/22 1844)    dextrose            Data Review:    Lab Results   Component Value Date    WBC 11.1 (H) 06/22/2022    HGB 10.7 (L) 06/22/2022    HCT 31.3 (L) 06/22/2022    MCV 86.7 06/22/2022     06/22/2022     Lab Results   Component Value Date    CREATININE 1.2 06/21/2022    BUN 33 (H) 06/21/2022     (L) 06/21/2022    K 5.0 06/21/2022    CL 98 (L) 06/21/2022    CO2 26 06/21/2022       Hepatic Function Panel:   Lab Results   Component Value Date    ALKPHOS 170 12/10/2021    ALT 7 12/10/2021    AST 13 12/10/2021    PROT 4.8 12/10/2021    BILITOT 0.3 12/10/2021    BILIDIR <0.2 12/10/2021    IBILI see below 12/10/2021    LABALBU 2.4 01/11/2022         MICRO:  6/21 BC x2 neg   Wound culture light growth S marcescens   Serratia marcescens   Antibiotic Interpretation Microscan  Method Medium    Diabetic ulcer of left foot associated with type 2 diabetes mellitus, with necrosis of bone ([address] 03/05/2022    Nonhealing surgical wound of buttock, initial encounter 02/22/2022    Ischemic ulcer of right ankle with necrosis of muscle ([address] 02/22/2022    Gangrene of toe of left foot ([address] 02/22/2022    Current every day smoker 02/22/2022    Hyperlipidemia 02/22/2022    Ischemic ulcer of left foot with fat layer exposed ([address] 02/16/2022    Uncontrolled type 2 diabetes mellitus with diabetic polyneuropathy, without long-term current use of insulin ([address] 11/30/2021       Multiple medical problems including PAD   S/p stenting of L SFA 6/14/22     Chronic L foot wounds with exposed bone and chronic OM of 2 toe and 5th ray   Klebsiella isolated in wound culture   S/p 2nd toe and partial 5th ry amp on 6/23/22.   Operative culture and path are pending      NKDA     Plan:     Pending final cultures and path from clearance fragment, continue vanc and cefepime           Cathy Cochran MD  Phone: [phone]   Fax : [phone]

## 2022-06-24 NOTE — PROGRESS NOTES
Wound Care re visited patient on colostomy care. Placed 12- by Dr. Mariano Cabrales.  Ted Velazquez 32mm= 1 1/4  Flange one piece #[id number](patient's)  Facility Convac Flip placed on patient #[id number]       Patient since Wound Care RN has seen has had on 6/23/22 by Ander Dunlap DPM.      OPERATION PERFORMED:  1. Left second toe amputation. 2.  Left fifth toe and partial fifth metatarsal amputation. 3.  Incision and debridement left dorsal foot down to and including  extensor tendons. The left fifth toe and fifth metatarsal where he has chronic ulceration with exposed fifth  metatarsal shaft necrotic ulceration noted to the fifth digit. The fifth metatarsal  shaft was resected down to the area that was exposed within the  ulceration. After all infected and necrotic tissue was noted to be removed from left dorsal foot a wound VAC with white form was then applied to thedorsal aspect of the foot.  trying to get patient admitted to ARU for rehabilitation. Will continue to monitor. Call to [facility] [phone] and Free samples ordered and sent to patients home:   #   [phone] convex flange    Spoke to representative and she will send our sample kit along with information for financial assistance.

## 2022-06-24 NOTE — PROGRESS NOTES
Pt admitted on 6/21 w/ OM left ft. S/P  2nd and fifth toe Amp this afternoon. Nothing in [facility] for Pain.    Pt w/ C/o pain 6/10 - currently takes nothing @ home

## 2022-06-24 NOTE — PROGRESS NOTES
Hospitalist Progress Note      PCP: No primary care provider on file. Date of Admission: 6/21/2022    Chief Complaint: foot infection    Hospital Course:   61 y.o. male who presented to [facility] with foot infection. Patient has been seeing podiatry for several diabetic foot wounds. The largest one the dorsum of his left foot has worsened over the week. He has been on bactrim for this. His blood sugars have been quite elevated at home. He is due to see his PCP later this week to adjust his insulin further. He denies fevers, chills, chest pain, SOB or nausea. He has minimal sensation in his feet from the diabetes. Subjective: awaiting podiatry recs. No new complaints.        Medications:  Reviewed    Infusion Medications    sodium chloride 25 mL (06/21/22 1844)    dextrose       Scheduled Medications    zinc oxide   Topical Daily    povidone-iodine   Topical Daily    bacitracin-polymyxin b   Topical BID    insulin lispro  3 Units SubCUTAneous TID WC    cefepime  2,000 mg IntraVENous Q12H    amLODIPine  5 mg Oral Daily    aspirin  81 mg Oral Daily    atorvastatin  80 mg Oral Nightly    sodium chloride flush  5-40 mL IntraVENous 2 times per day    enoxaparin  40 mg SubCUTAneous Daily    insulin glargine  10 Units SubCUTAneous Nightly    insulin lispro  0-6 Units SubCUTAneous TID WC    insulin lispro  0-3 Units SubCUTAneous Nightly    vancomycin  750 mg IntraVENous Q12H     PRN Meds: HYDROmorphone **OR** HYDROmorphone, sodium chloride flush, sodium chloride, ondansetron **OR** ondansetron, polyethylene glycol, acetaminophen **OR** acetaminophen, glucose, dextrose bolus **OR** dextrose bolus, glucagon (rDNA), dextrose      Intake/Output Summary (Last 24 hours) at 6/24/2022 1323  Last data filed at 6/24/2022 0445  Gross per 24 hour   Intake 300 ml   Output 850 ml   Net -550 ml       Physical Exam Performed:    /66   Pulse 63   Temp 97.6 °F (36.4 °C) (Oral)   Resp 17   Ht 5' 11\" (1.803 m)   Wt 161 lb (73 kg)   SpO2 97%   BMI 22.45 kg/m²     General appearance:  No apparent distress, appears stated age and cooperative. HEENT:  Normal cephalic, atraumatic without obvious deformity. Pupils equal, round, and reactive to light. Extra ocular muscles intact. Conjunctivae/corneas clear. Neck: Supple, with full range of motion. No jugular venous distention. Trachea midline. Respiratory:  Normal respiratory effort. Clear to auscultation, bilaterally without Rales/Wheezes/Rhonchi. Cardiovascular:  Regular rate and rhythm with normal S1/S2 without murmurs, rubs or gallops. Abdomen: Soft, non-tender, non-distended with normal bowel sounds. Musculoskeletal:  No clubbing, cyanosis or edema bilaterally. Full range of motion without deformity. Skin: Skin color, texture, turgor normal. Large ulceration on dorsum of left foot. Several toes gangrenous. Neurologic:  Neurovascularly intact without any focal sensory/motor deficits. Cranial nerves: II-XII intact, grossly non-focal.  Psychiatric:  Alert and oriented, thought content appropriate, normal insight  Capillary Refill: Brisk,3 seconds, normal  Peripheral Pulses: +2 palpable, equal bilaterally     Labs:   Recent Labs     06/21/22  1704 06/22/22  0511   WBC 12.8* 11.1*   HGB 10.8* 10.7*   HCT 31.7* 31.3*    304     Recent Labs     06/21/22  1704   *   K 5.0   CL 98*   CO2 26   BUN 33*   CREATININE 1.2   CALCIUM 10.1     No results for input(s): AST, ALT, BILIDIR, BILITOT, ALKPHOS in the last 72 hours. No results for input(s): INR in the last 72 hours. No results for input(s): Jody Ill in the last 72 hours. Urinalysis:    No results found for: Shun Collar, BACTERIA, RBCUA, BLOODU, [address], [address]    Radiology:  MRI FOOT LEFT WO CONTRAST   Final Result   Acute osteomyelitis of the 5th metatarsal and 5th distal phalanx. Suspected acute osteomyelitis of the 2nd proximal and middle phalanges.       There is bone marrow edema in the cuboid, medial and intermediate cuneiforms,   as well as the navicular. There appears to be a soft tissue defect of the   dorsum of the midfoot at the level of these bones. This marrow signal   abnormality could represent reactive osteitis or early acute osteomyelitis. Nonspecific bone marrow edema in the 1st-3rd metatarsal heads and the 1st   distal phalanx. No well-defined drainable abscess is seen. Generalized muscle edema along with subcutaneous edema. LASER SKIN PERFUSION PRESSURE STUDY   Final Result              Assessment/Plan:    Active Hospital Problems    Diagnosis     Osteomyelitis ([facility] [address] [M86.9]      Priority: Medium     Sepsis 2/2 osteomyelitis  - presented with leukocytosis, tachycardia. Lactate WNL  - associated with diabetic foot infection  - Podiatry consulted  - ID consulted  - blood, wound cultures ordered  - MRI foot showing several areas of osteomyelitis  - continue vanc, cefepime  - plan for OR today     DMII  - poorly controlled  - holding home metformin  - continue SSI     HTN  - well controlled  - continue norvasc     HLD  - continue statin    DVT Prophylaxis: lovenox  Diet: ADULT DIET; Regular; 4 carb choices (60 gm/meal)  Code Status: Full Code    PT/OT Eval Status: pending surgery    Dispo - home in possibly 5 days    Nicolás Early MD        Hospitalist Progress Note      PCP: No primary care provider on file. Date of Admission: 6/21/2022    Chief Complaint: foot infection    Hospital Course:   61 y.o. male who presented to [facility] with foot infection. Patient has been seeing podiatry for several diabetic foot wounds. The largest one the dorsum of his left foot has worsened over the week. He has been on bactrim for this. His blood sugars have been quite elevated at home. He is due to see his PCP later this week to adjust his insulin further. He denies fevers, chills, chest pain, SOB or nausea.  He has minimal sensation in his feet from the diabetes. Subjective: awaiting podiatry recs. No new complaints. Medications:  Reviewed    Infusion Medications    sodium chloride 25 mL (06/21/22 1844)    dextrose       Scheduled Medications    zinc oxide   Topical Daily    povidone-iodine   Topical Daily    bacitracin-polymyxin b   Topical BID    insulin lispro  3 Units SubCUTAneous TID     cefepime  2,000 mg IntraVENous Q12H    amLODIPine  5 mg Oral Daily    aspirin  81 mg Oral Daily    atorvastatin  80 mg Oral Nightly    sodium chloride flush  5-40 mL IntraVENous 2 times per day    enoxaparin  40 mg SubCUTAneous Daily    insulin glargine  10 Units SubCUTAneous Nightly    insulin lispro  0-6 Units SubCUTAneous TID WC    insulin lispro  0-3 Units SubCUTAneous Nightly    vancomycin  750 mg IntraVENous Q12H     PRN Meds: HYDROmorphone **OR** HYDROmorphone, sodium chloride flush, sodium chloride, ondansetron **OR** ondansetron, polyethylene glycol, acetaminophen **OR** acetaminophen, glucose, dextrose bolus **OR** dextrose bolus, glucagon (rDNA), dextrose      Intake/Output Summary (Last 24 hours) at 6/24/2022 1324  Last data filed at 6/24/2022 0445  Gross per 24 hour   Intake 300 ml   Output 850 ml   Net -550 ml       Physical Exam Performed:    /66   Pulse 63   Temp 97.6 °F (36.4 °C) (Oral)   Resp 17   Ht 5' 11\" (1.803 m)   Wt 161 lb (73 kg)   SpO2 97%   BMI 22.45 kg/m²     General appearance:  No apparent distress, appears stated age and cooperative. HEENT:  Normal cephalic, atraumatic without obvious deformity. Pupils equal, round, and reactive to light. Extra ocular muscles intact. Conjunctivae/corneas clear. Neck: Supple, with full range of motion. No jugular venous distention. Trachea midline. Respiratory:  Normal respiratory effort. Clear to auscultation, bilaterally without Rales/Wheezes/Rhonchi. Cardiovascular:  Regular rate and rhythm with normal S1/S2 without murmurs, rubs or gallops.   Abdomen: Soft, Diagnosis     Osteomyelitis ([facility]) [M86.9]      Priority: Medium     Sepsis 2/2 osteomyelitis  - presented with leukocytosis, tachycardia. Lactate WNL  - associated with diabetic foot infection  - Podiatry consulted  - ID consulted  - blood, wound cultures ordered  - MRI foot showing several areas of osteomyelitis  - continue vanc, cefepime  - s/p OR for amputation of 2nd and 5th digits  - wound vac in place on dorsum of foot     DMII  - poorly controlled  - holding home metformin  - continue SSI     HTN  - well controlled  - continue norvasc     HLD  - continue statin    DVT Prophylaxis: lovenox  Diet: ADULT DIET;  Regular; 4 carb choices (60 gm/meal)  Code Status: Full Code    PT/OT Eval Status: ordered    Dispo - unclear given numerous barriers    Luci Capone MD

## 2022-06-24 NOTE — CARE COORDINATION
Pt has no insurance and likely can not do daily infusions at the OP infusion center as he will be struggling to get around, pt currently has a wound vac and needs wound care and PT/OT, Pt over income for Medicaid, referral made to [facility] for possible [facility] stay. Will follow.

## 2022-06-24 NOTE — PROGRESS NOTES
Physical Therapy  Facility/Department: [place] [address] - MED SURG/ORTHO  Physical Therapy Initial Assessment    Name: Sumanth Mulligan  : 1955  MRN: [id number]  Date of Service: 2022    Discharge Recommendations:  IP Rehab   PT Equipment Recommendations  Equipment Needed:  (if pt discharges home will need wheelchair and transport home to navigate steps)      Patient Diagnosis(es): Diagnoses of Osteomyelitis of fifth toe of left foot ([address] Osteomyelitis of second toe of left foot ([address] and Diabetic ulcer of left midfoot associated with type 2 diabetes mellitus, unspecified ulcer stage ([address] were pertinent to this visit. Past Medical History:  has a past medical history of Cellulitis of left foot, Focally failed skin grafts of abdominal wall, Shayla's gangrene, Fourniers gangrene, Osteomyelitis of left foot ([address] and Patellofemoral pain syndrome of right knee. Past Surgical History:  has a past surgical history that includes Foot fracture surgery (Left); Abdomen surgery (N/A, 2021); Scrotal surgery (N/A, 2021); Vagina surgery (N/A, 2021); Abdomen surgery (Left, 2021); Abdomen surgery (Left, 2021); colostomy (Left, 2021); Abdomen surgery (N/A, 2021); Abdomen surgery (Left, 12/10/2021); Abdomen surgery (N/A, 2021); Abdomen surgery (N/A, 2021); Abdomen surgery (N/A, 2021); Abdomen surgery (N/A, 2021); Pressure ulcer debridement (N/A, 2021); Pressure ulcer debridement (N/A, 2021); Skin graft (N/A, 2022); Muscle Repair (N/A, 2022); Skin graft (N/A, 2022); Pressure ulcer debridement (N/A, 01/10/2022); and Toe amputation (Left, 2022). Assessment   Body Structures, Functions, Activity Limitations Requiring Skilled Therapeutic Intervention: Decreased functional mobility ; Decreased strength;Decreased balance; Increased pain  Assessment: Pt is a 62 y/o male who presents s/p L 2nd and 5th toe amputation.  Pt was independent prior to admit living with wife in 2nd floor apartment with multiple steps to enter (5+7). Pt is NWB LLE with wound vac. Pt currently requires min A for sit-stands but is unable to hop. Pt is currently functioning well below his baseline and would benefit from continued skilled PT to address current limitations. Recommend IP rehab for continued therapy. Treatment Diagnosis: impaired functional mobility  Therapy Prognosis: Good  Decision Making: Medium Complexity  Requires PT Follow-Up: Yes  Activity Tolerance  Activity Tolerance: Patient tolerated evaluation without incident;Patient limited by fatigue     Plan   Plan  Plan: 1 time a day 7 days a week  Current Treatment Recommendations: Strengthening,Balance training,Functional mobility training,Gait training,Transfer training,Therapeutic activities,Endurance training,Home exercise program,Safety education & training,Stair training  Safety Devices  Type of Devices: All ora prominences offloaded,Bed alarm in place,Call light within reach,Gait belt,Patient at risk for falls,Left in bed,Nurse notified     Restrictions  Restrictions/Precautions  Restrictions/Precautions: Weight Bearing,Fall Risk  Lower Extremity Weight Bearing Restrictions  Left Lower Extremity Weight Bearing: Non Weight Bearing  Position Activity Restriction  Other position/activity restrictions: ostomy, wound vac to L foot     Subjective   General  Chart Reviewed: Yes  Patient assessed for rehabilitation services?: Yes  Family / Caregiver Present: No  Referring Practitioner: Apryl Yun DPM  Referral Date : 06/23/22  Diagnosis: osteomyelitis  Follows Commands: Within Functional Limits  General Comment  Comments: Pt supine in bed upon arrival. RN cleared pt for therapy. Subjective  Subjective: Pt agreeable to evaluation. Reports 5/10 pain in L foot and buttocks.          Social/Functional History  Social/Functional History  Lives With: Spouse  Type of Home: Apartment (2nd floor)  Home Layout: One level  Home Access: Stairs to enter without rails  Entrance Stairs - Number of Steps: 1+2+1+1+ 7 up to 2nd level apartment with bilateral handrails  Bathroom Shower/Tub: Tub/Shower unit,Shower chair with back  Bathroom Toilet: Standard (vanity on L side)  Bathroom Accessibility: Walker accessible  Home Equipment: Waynetta Bears, rolling,Crutches  Has the patient had two or more falls in the past year or any fall with injury in the past year?: No  ADL Assistance: Independent (wife assists intermittently as needed)  Homemaking Responsibilities: No (wife completes)  Ambulation Assistance: Independent (with RW)  Transfer Assistance: Independent  Active : No  Patient's  Info: wife able to transport  Occupation: Retired  Type of Occupation: rehabbed apartments    Objective   Heart Rate: 63  Heart Rate Source: Monitor  BP: 112/66  BP Location: Left upper arm  BP Method: Automatic  Patient Position: Supine  MAP (Calculated): 81.33  Resp: 17  SpO2: 97 %  O2 Device: None (Room air)        Gross Assessment  AROM: Within functional limits  Strength: Generally decreased, functional (except RUE which pt reports is painful and weak since an injury 2 years ago)  Sensation: Intact   Bed Mobility Training  Bed Mobility Training: Yes  Supine to Sit: Supervision  Sit to Supine: Supervision  Balance  Sitting: Intact  Standing: Impaired  Standing - Static: Constant support; Fair (standing at walker with min A and cues for NWB - pt able to maintain NWB with cues; static standing 1 minute)  Transfer Training  Transfer Training: Yes  Sit to Stand: Minimum assistance (from EOB to walker; cues for hand placement)  Stand to Sit: Minimum assistance  Gait Training  Gait Training: No (unable to attempt hopping due to weakness)    AM-PAC Score  AM-PAC Inpatient Mobility Raw Score : 14 (06/24/22 1213)  AM-PAC Inpatient T-Scale Score : 38.1 (06/24/22 1213)  Mobility Inpatient CMS 0-100% Score: 61.29 (06/24/22 1213)  Mobility Inpatient CMS G-Code Modifier : CL (06/24/22 1213)        Goals  Short Term Goals  Time Frame for Short term goals: 5 days (6/29/22)  Short term goal 1: Pt will perform bed mobility mod I  Short term goal 2: Pt will perform transfers with CGA  Short term goal 3: Pt will propel wheelchair 100' with supervision  Short term goal 4: By 6/26/22, pt will tolerate 15 reps of LE ther ex for improved strength and activity tolerance  Short term goal 5: pt will negotiate 4 steps with HR and mod A  Patient Goals   Patient goals : \"to go back home\"     Education  Patient Education  Education Given To: Patient  Education Provided: Role of Therapy;Plan of Care;Precautions;Transfer Training  Education Method: Demonstration;Verbal  Education Outcome: Verbalized understanding    Therapy Time   Individual Concurrent Group Co-treatment   Time In 1024         Time Out 1116         Minutes 52         Timed Code Treatment Minutes: [address] [facility],   [facility]

## 2022-06-25 LAB
BLOOD CULTURE, ROUTINE: NORMAL
CULTURE, BLOOD 2: NORMAL
GLUCOSE BLD-MCNC: 166 MG/DL (ref 70–99)
GLUCOSE BLD-MCNC: 190 MG/DL (ref 70–99)
GLUCOSE BLD-MCNC: 209 MG/DL (ref 70–99)
GLUCOSE BLD-MCNC: 283 MG/DL (ref 70–99)
PERFORMED ON: ABNORMAL

## 2022-06-25 PROCEDURE — 1200000000 HC SEMI PRIVATE

## 2022-06-25 PROCEDURE — 6370000000 HC RX 637 (ALT 250 FOR IP): Performed by: PODIATRIST

## 2022-06-25 PROCEDURE — 6370000000 HC RX 637 (ALT 250 FOR IP): Performed by: NURSE PRACTITIONER

## 2022-06-25 PROCEDURE — 6360000002 HC RX W HCPCS: Performed by: PODIATRIST

## 2022-06-25 PROCEDURE — 2580000003 HC RX 258: Performed by: PODIATRIST

## 2022-06-25 PROCEDURE — 2500000003 HC RX 250 WO HCPCS: Performed by: PODIATRIST

## 2022-06-25 PROCEDURE — 6360000002 HC RX W HCPCS: Performed by: NURSE PRACTITIONER

## 2022-06-25 RX ADMIN — POVIDONE-IODINE: 0.01 SWAB TOPICAL at 10:29

## 2022-06-25 RX ADMIN — OXYCODONE 5 MG: 5 TABLET ORAL at 20:25

## 2022-06-25 RX ADMIN — OXYCODONE 5 MG: 5 TABLET ORAL at 07:42

## 2022-06-25 RX ADMIN — INSULIN GLARGINE 10 UNITS: 100 INJECTION, SOLUTION SUBCUTANEOUS at 20:22

## 2022-06-25 RX ADMIN — ATORVASTATIN CALCIUM 80 MG: 80 TABLET, FILM COATED ORAL at 20:22

## 2022-06-25 RX ADMIN — Medication 10 ML: at 15:30

## 2022-06-25 RX ADMIN — CEFEPIME 2000 MG: 2 INJECTION, POWDER, FOR SOLUTION INTRAMUSCULAR; INTRAVENOUS at 04:13

## 2022-06-25 RX ADMIN — BACITRACIN ZINC AND POLYMYXIN B SULFATE 14.2 G: at 20:27

## 2022-06-25 RX ADMIN — INSULIN LISPRO 3 UNITS: 100 INJECTION, SOLUTION INTRAVENOUS; SUBCUTANEOUS at 18:05

## 2022-06-25 RX ADMIN — INSULIN LISPRO 1 UNITS: 100 INJECTION, SOLUTION INTRAVENOUS; SUBCUTANEOUS at 20:23

## 2022-06-25 RX ADMIN — AMLODIPINE BESYLATE 5 MG: 5 TABLET ORAL at 09:12

## 2022-06-25 RX ADMIN — HYDROMORPHONE HYDROCHLORIDE 0.25 MG: 1 INJECTION, SOLUTION INTRAMUSCULAR; INTRAVENOUS; SUBCUTANEOUS at 05:34

## 2022-06-25 RX ADMIN — HYDROMORPHONE HYDROCHLORIDE 0.25 MG: 1 INJECTION, SOLUTION INTRAMUSCULAR; INTRAVENOUS; SUBCUTANEOUS at 12:39

## 2022-06-25 RX ADMIN — Medication 10 ML: at 12:38

## 2022-06-25 RX ADMIN — Medication: at 10:21

## 2022-06-25 RX ADMIN — Medication 10 ML: at 09:12

## 2022-06-25 RX ADMIN — INSULIN LISPRO 1 UNITS: 100 INJECTION, SOLUTION INTRAVENOUS; SUBCUTANEOUS at 12:31

## 2022-06-25 RX ADMIN — CEFEPIME 2000 MG: 2 INJECTION, POWDER, FOR SOLUTION INTRAMUSCULAR; INTRAVENOUS at 15:32

## 2022-06-25 RX ADMIN — BACITRACIN ZINC AND POLYMYXIN B SULFATE 14.2 G: at 10:22

## 2022-06-25 RX ADMIN — INSULIN LISPRO 3 UNITS: 100 INJECTION, SOLUTION INTRAVENOUS; SUBCUTANEOUS at 09:15

## 2022-06-25 RX ADMIN — INSULIN LISPRO 2 UNITS: 100 INJECTION, SOLUTION INTRAVENOUS; SUBCUTANEOUS at 09:16

## 2022-06-25 RX ADMIN — ENOXAPARIN SODIUM 40 MG: 100 INJECTION SUBCUTANEOUS at 09:14

## 2022-06-25 RX ADMIN — SODIUM CHLORIDE: 9 INJECTION, SOLUTION INTRAVENOUS at 15:32

## 2022-06-25 RX ADMIN — ASPIRIN 81 MG: 81 TABLET, COATED ORAL at 09:12

## 2022-06-25 RX ADMIN — INSULIN LISPRO 3 UNITS: 100 INJECTION, SOLUTION INTRAVENOUS; SUBCUTANEOUS at 12:31

## 2022-06-25 ASSESSMENT — PAIN DESCRIPTION - DESCRIPTORS: DESCRIPTORS: STABBING

## 2022-06-25 ASSESSMENT — PAIN - FUNCTIONAL ASSESSMENT: PAIN_FUNCTIONAL_ASSESSMENT: ACTIVITIES ARE NOT PREVENTED

## 2022-06-25 ASSESSMENT — PAIN DESCRIPTION - LOCATION
LOCATION: ARM
LOCATION: FOOT
LOCATION: FOOT

## 2022-06-25 ASSESSMENT — PAIN DESCRIPTION - ORIENTATION
ORIENTATION: RIGHT
ORIENTATION: LEFT
ORIENTATION: LEFT

## 2022-06-25 ASSESSMENT — PAIN SCALES - GENERAL
PAINLEVEL_OUTOF10: 5

## 2022-06-25 NOTE — PROGRESS NOTES
Foot on RLE was cleaned and dressed per wound care orders. Foot was washed with NS, and triad and polysporin were applied per direction. LLE is post operative with wound vac placement.

## 2022-06-25 NOTE — PROGRESS NOTES
Physical Therapy Attempt    Attempted to see patient for therapy. Pt declined to participate due to having visitors and pt reports, \"I can't do much anyway because I can't put weight on this leg. \" Will follow up as schedule permits.     Alex Johansen,   [facility]

## 2022-06-25 NOTE — PROGRESS NOTES
Hospitalist Progress Note      PCP: No primary care provider on file. Date of Admission: 6/21/2022    Chief Complaint: foot infection    Hospital Course:   61 y.o. male who presented to [facility] with foot infection. Patient has been seeing podiatry for several diabetic foot wounds. The largest one the dorsum of his left foot has worsened over the week. He has been on bactrim for this. His blood sugars have been quite elevated at home. He is due to see his PCP later this week to adjust his insulin further. He denies fevers, chills, chest pain, SOB or nausea. He has minimal sensation in his feet from the diabetes. Subjective: awaiting podiatry recs. No new complaints.        Medications:  Reviewed    Infusion Medications    sodium chloride 25 mL (06/21/22 1844)    dextrose       Scheduled Medications    zinc oxide   Topical Daily    povidone-iodine   Topical Daily    bacitracin-polymyxin b   Topical BID    insulin lispro  3 Units SubCUTAneous TID WC    cefepime  2,000 mg IntraVENous Q12H    amLODIPine  5 mg Oral Daily    aspirin  81 mg Oral Daily    atorvastatin  80 mg Oral Nightly    sodium chloride flush  5-40 mL IntraVENous 2 times per day    enoxaparin  40 mg SubCUTAneous Daily    insulin glargine  10 Units SubCUTAneous Nightly    insulin lispro  0-6 Units SubCUTAneous TID WC    insulin lispro  0-3 Units SubCUTAneous Nightly     PRN Meds: naloxone, oxyCODONE, HYDROmorphone **OR** HYDROmorphone, sodium chloride flush, sodium chloride, ondansetron **OR** ondansetron, polyethylene glycol, acetaminophen **OR** acetaminophen, glucose, dextrose bolus **OR** dextrose bolus, glucagon (rDNA), dextrose      Intake/Output Summary (Last 24 hours) at 6/25/2022 1308  Last data filed at 6/25/2022 1238  Gross per 24 hour   Intake 360 ml   Output 700 ml   Net -340 ml       Physical Exam Performed:    /68   Pulse 63   Temp 97.8 °F (36.6 °C) (Oral)   Resp 18   Ht 5' 11\" (1.803 m)   Wt 161 lb (73 kg)   SpO2 97%   BMI 22.45 kg/m²     General appearance:  No apparent distress, appears stated age and cooperative. HEENT:  Normal cephalic, atraumatic without obvious deformity. Pupils equal, round, and reactive to light. Extra ocular muscles intact. Conjunctivae/corneas clear. Neck: Supple, with full range of motion. No jugular venous distention. Trachea midline. Respiratory:  Normal respiratory effort. Clear to auscultation, bilaterally without Rales/Wheezes/Rhonchi. Cardiovascular:  Regular rate and rhythm with normal S1/S2 without murmurs, rubs or gallops. Abdomen: Soft, non-tender, non-distended with normal bowel sounds. Musculoskeletal:  No clubbing, cyanosis or edema bilaterally. Full range of motion without deformity. Skin: Skin color, texture, turgor normal. Large ulceration on dorsum of left foot. Several toes gangrenous. Neurologic:  Neurovascularly intact without any focal sensory/motor deficits. Cranial nerves: II-XII intact, grossly non-focal.  Psychiatric:  Alert and oriented, thought content appropriate, normal insight  Capillary Refill: Brisk,3 seconds, normal  Peripheral Pulses: +2 palpable, equal bilaterally     Labs:   No results for input(s): WBC, HGB, HCT, PLT in the last 72 hours. No results for input(s): NA, K, CL, CO2, BUN, CREATININE, CALCIUM, PHOS in the last 72 hours. Invalid input(s): MAGNES  No results for input(s): AST, ALT, BILIDIR, BILITOT, ALKPHOS in the last 72 hours. No results for input(s): INR in the last 72 hours. No results for input(s): [place] Gazella in the last 72 hours. Urinalysis:    No results found for: Juni Plants, BACTERIA, RBCUA, BLOODU, Genevia Puffer, [address]    Radiology:  MRI FOOT LEFT WO CONTRAST   Final Result   Acute osteomyelitis of the 5th metatarsal and 5th distal phalanx. Suspected acute osteomyelitis of the 2nd proximal and middle phalanges.       There is bone marrow edema in the cuboid, medial and intermediate cuneiforms,   as well as the navicular. There appears to be a soft tissue defect of the   dorsum of the midfoot at the level of these bones. This marrow signal   abnormality could represent reactive osteitis or early acute osteomyelitis. Nonspecific bone marrow edema in the 1st-3rd metatarsal heads and the 1st   distal phalanx. No well-defined drainable abscess is seen. Generalized muscle edema along with subcutaneous edema. LASER SKIN PERFUSION PRESSURE STUDY   Final Result              Assessment/Plan:    Active Hospital Problems    Diagnosis     Osteomyelitis ([facility] [address] [M86.9]      Priority: Medium     Sepsis 2/2 osteomyelitis  - presented with leukocytosis, tachycardia. Lactate WNL  - associated with diabetic foot infection  - Podiatry consulted  - ID consulted  - blood, wound cultures ordered  - MRI foot showing several areas of osteomyelitis  - continue vanc, cefepime  - plan for OR today     DMII  - poorly controlled  - holding home metformin  - continue SSI     HTN  - well controlled  - continue norvasc     HLD  - continue statin    DVT Prophylaxis: lovenox  Diet: ADULT DIET; Regular; 4 carb choices (60 gm/meal)  Code Status: Full Code    PT/OT Eval Status: pending surgery    Dispo - home in possibly 5 days    Jak Flores MD        Hospitalist Progress Note      PCP: No primary care provider on file. Date of Admission: 6/21/2022    Chief Complaint: foot infection    Hospital Course:   61 y.o. male who presented to [facility] with foot infection. Patient has been seeing podiatry for several diabetic foot wounds. The largest one the dorsum of his left foot has worsened over the week. He has been on bactrim for this. His blood sugars have been quite elevated at home. He is due to see his PCP later this week to adjust his insulin further. He denies fevers, chills, chest pain, SOB or nausea. He has minimal sensation in his feet from the diabetes.     Subjective: awaiting podiatry recs. No new complaints. Medications:  Reviewed    Infusion Medications    sodium chloride 25 mL (06/21/22 1844)    dextrose       Scheduled Medications    zinc oxide   Topical Daily    povidone-iodine   Topical Daily    bacitracin-polymyxin b   Topical BID    insulin lispro  3 Units SubCUTAneous TID WC    cefepime  2,000 mg IntraVENous Q12H    amLODIPine  5 mg Oral Daily    aspirin  81 mg Oral Daily    atorvastatin  80 mg Oral Nightly    sodium chloride flush  5-40 mL IntraVENous 2 times per day    enoxaparin  40 mg SubCUTAneous Daily    insulin glargine  10 Units SubCUTAneous Nightly    insulin lispro  0-6 Units SubCUTAneous TID WC    insulin lispro  0-3 Units SubCUTAneous Nightly     PRN Meds: naloxone, oxyCODONE, HYDROmorphone **OR** HYDROmorphone, sodium chloride flush, sodium chloride, ondansetron **OR** ondansetron, polyethylene glycol, acetaminophen **OR** acetaminophen, glucose, dextrose bolus **OR** dextrose bolus, glucagon (rDNA), dextrose      Intake/Output Summary (Last 24 hours) at 6/25/2022 1308  Last data filed at 6/25/2022 1238  Gross per 24 hour   Intake 360 ml   Output 700 ml   Net -340 ml       Physical Exam Performed:    /68   Pulse 63   Temp 97.8 °F (36.6 °C) (Oral)   Resp 18   Ht 5' 11\" (1.803 m)   Wt 161 lb (73 kg)   SpO2 97%   BMI 22.45 kg/m²     General appearance:  No apparent distress, appears stated age and cooperative. HEENT:  Normal cephalic, atraumatic without obvious deformity. Pupils equal, round, and reactive to light. Extra ocular muscles intact. Conjunctivae/corneas clear. Neck: Supple, with full range of motion. No jugular venous distention. Trachea midline. Respiratory:  Normal respiratory effort. Clear to auscultation, bilaterally without Rales/Wheezes/Rhonchi. Cardiovascular:  Regular rate and rhythm with normal S1/S2 without murmurs, rubs or gallops. Abdomen: Soft, non-tender, non-distended with normal bowel sounds.   Musculoskeletal: No clubbing, cyanosis or edema bilaterally. Full range of motion without deformity. Skin: Skin color, texture, turgor normal. Large ulceration on dorsum of left foot. Several toes gangrenous. Neurologic:  Neurovascularly intact without any focal sensory/motor deficits. Cranial nerves: II-XII intact, grossly non-focal.  Psychiatric:  Alert and oriented, thought content appropriate, normal insight  Capillary Refill: Brisk,3 seconds, normal  Peripheral Pulses: +2 palpable, equal bilaterally     Labs:   No results for input(s): WBC, HGB, HCT, PLT in the last 72 hours. No results for input(s): NA, K, CL, CO2, BUN, CREATININE, CALCIUM, PHOS in the last 72 hours. Invalid input(s): MAGNES  No results for input(s): AST, ALT, BILIDIR, BILITOT, ALKPHOS in the last 72 hours. No results for input(s): INR in the last 72 hours. No results for input(s): Alberto Pears in the last 72 hours. Urinalysis:    No results found for: Cem Drone, BACTERIA, RBCUA, BLOODU, [address], [address]    Radiology:  MRI FOOT LEFT WO CONTRAST   Final Result   Acute osteomyelitis of the 5th metatarsal and 5th distal phalanx. Suspected acute osteomyelitis of the 2nd proximal and middle phalanges. There is bone marrow edema in the cuboid, medial and intermediate cuneiforms,   as well as the navicular. There appears to be a soft tissue defect of the   dorsum of the midfoot at the level of these bones. This marrow signal   abnormality could represent reactive osteitis or early acute osteomyelitis. Nonspecific bone marrow edema in the 1st-3rd metatarsal heads and the 1st   distal phalanx. No well-defined drainable abscess is seen. Generalized muscle edema along with subcutaneous edema.          LASER SKIN PERFUSION PRESSURE STUDY   Final Result              Assessment/Plan:    Active Hospital Problems    Diagnosis     [facility] [M86.9]      Priority: Medium     Sepsis 2/2 osteomyelitis  - presented with leukocytosis, tachycardia. Lactate WNL  - associated with diabetic foot infection  - Podiatry consulted  - ID consulted  - blood, wound cultures ordered  - MRI foot showing several areas of osteomyelitis  - continue vanc, cefepime  - s/p OR for amputation of 2nd and 5th digits  - wound vac in place on dorsum of foot     DMII  - poorly controlled  - holding home metformin  - continue SSI     HTN  - well controlled  - continue norvasc     HLD  - continue statin    DVT Prophylaxis: lovenox  Diet: ADULT DIET;  Regular; 4 carb choices (60 gm/meal)  Code Status: Full Code    PT/OT Eval Status: ordered    Dispo - unclear given numerous barriers    Jim Seip, MD

## 2022-06-26 PROBLEM — L97.523: Status: ACTIVE | Noted: 2022-02-16

## 2022-06-26 PROBLEM — T81.89XA NONHEALING SURGICAL WOUND, INITIAL ENCOUNTER: Status: RESOLVED | Noted: 2022-02-22 | Resolved: 2022-06-26

## 2022-06-26 LAB
GLUCOSE BLD-MCNC: 181 MG/DL (ref 70–99)
GLUCOSE BLD-MCNC: 202 MG/DL (ref 70–99)
GLUCOSE BLD-MCNC: 231 MG/DL (ref 70–99)
GLUCOSE BLD-MCNC: 237 MG/DL (ref 70–99)
PERFORMED ON: ABNORMAL

## 2022-06-26 PROCEDURE — 6370000000 HC RX 637 (ALT 250 FOR IP): Performed by: PODIATRIST

## 2022-06-26 PROCEDURE — 6360000002 HC RX W HCPCS: Performed by: PODIATRIST

## 2022-06-26 PROCEDURE — 1200000000 HC SEMI PRIVATE

## 2022-06-26 PROCEDURE — 6370000000 HC RX 637 (ALT 250 FOR IP): Performed by: INTERNAL MEDICINE

## 2022-06-26 PROCEDURE — 97110 THERAPEUTIC EXERCISES: CPT

## 2022-06-26 PROCEDURE — 2580000003 HC RX 258: Performed by: PODIATRIST

## 2022-06-26 PROCEDURE — 6370000000 HC RX 637 (ALT 250 FOR IP): Performed by: NURSE PRACTITIONER

## 2022-06-26 PROCEDURE — 97116 GAIT TRAINING THERAPY: CPT

## 2022-06-26 RX ORDER — INSULIN LISPRO 100 [IU]/ML
4 INJECTION, SOLUTION INTRAVENOUS; SUBCUTANEOUS
Status: DISCONTINUED | OUTPATIENT
Start: 2022-06-26 | End: 2022-07-01 | Stop reason: HOSPADM

## 2022-06-26 RX ORDER — INSULIN GLARGINE 100 [IU]/ML
13 INJECTION, SOLUTION SUBCUTANEOUS NIGHTLY
Status: DISCONTINUED | OUTPATIENT
Start: 2022-06-26 | End: 2022-06-29

## 2022-06-26 RX ADMIN — BACITRACIN ZINC AND POLYMYXIN B SULFATE 14.2 G: at 10:23

## 2022-06-26 RX ADMIN — CEFEPIME 2000 MG: 2 INJECTION, POWDER, FOR SOLUTION INTRAMUSCULAR; INTRAVENOUS at 03:20

## 2022-06-26 RX ADMIN — INSULIN GLARGINE 13 UNITS: 100 INJECTION, SOLUTION SUBCUTANEOUS at 21:24

## 2022-06-26 RX ADMIN — INSULIN LISPRO 2 UNITS: 100 INJECTION, SOLUTION INTRAVENOUS; SUBCUTANEOUS at 12:13

## 2022-06-26 RX ADMIN — INSULIN LISPRO 1 UNITS: 100 INJECTION, SOLUTION INTRAVENOUS; SUBCUTANEOUS at 21:24

## 2022-06-26 RX ADMIN — INSULIN LISPRO 3 UNITS: 100 INJECTION, SOLUTION INTRAVENOUS; SUBCUTANEOUS at 12:12

## 2022-06-26 RX ADMIN — BACITRACIN ZINC AND POLYMYXIN B SULFATE 14.2 G: at 21:25

## 2022-06-26 RX ADMIN — OXYCODONE 5 MG: 5 TABLET ORAL at 15:34

## 2022-06-26 RX ADMIN — AMLODIPINE BESYLATE 5 MG: 5 TABLET ORAL at 08:07

## 2022-06-26 RX ADMIN — Medication 10 ML: at 08:08

## 2022-06-26 RX ADMIN — INSULIN LISPRO 2 UNITS: 100 INJECTION, SOLUTION INTRAVENOUS; SUBCUTANEOUS at 18:16

## 2022-06-26 RX ADMIN — Medication: at 10:24

## 2022-06-26 RX ADMIN — INSULIN LISPRO 2 UNITS: 100 INJECTION, SOLUTION INTRAVENOUS; SUBCUTANEOUS at 08:11

## 2022-06-26 RX ADMIN — ENOXAPARIN SODIUM 40 MG: 100 INJECTION SUBCUTANEOUS at 08:09

## 2022-06-26 RX ADMIN — CEFEPIME 2000 MG: 2 INJECTION, POWDER, FOR SOLUTION INTRAMUSCULAR; INTRAVENOUS at 15:32

## 2022-06-26 RX ADMIN — ASPIRIN 81 MG: 81 TABLET, COATED ORAL at 08:07

## 2022-06-26 RX ADMIN — INSULIN LISPRO 4 UNITS: 100 INJECTION, SOLUTION INTRAVENOUS; SUBCUTANEOUS at 18:16

## 2022-06-26 RX ADMIN — Medication 10 ML: at 15:30

## 2022-06-26 RX ADMIN — ATORVASTATIN CALCIUM 80 MG: 80 TABLET, FILM COATED ORAL at 21:24

## 2022-06-26 RX ADMIN — POVIDONE-IODINE: 0.01 SWAB TOPICAL at 10:24

## 2022-06-26 RX ADMIN — SODIUM CHLORIDE: 9 INJECTION, SOLUTION INTRAVENOUS at 15:31

## 2022-06-26 RX ADMIN — INSULIN LISPRO 3 UNITS: 100 INJECTION, SOLUTION INTRAVENOUS; SUBCUTANEOUS at 08:10

## 2022-06-26 ASSESSMENT — PAIN DESCRIPTION - ORIENTATION: ORIENTATION: LEFT

## 2022-06-26 ASSESSMENT — PAIN SCALES - GENERAL
PAINLEVEL_OUTOF10: 4
PAINLEVEL_OUTOF10: 6

## 2022-06-26 ASSESSMENT — PAIN DESCRIPTION - LOCATION: LOCATION: FOOT

## 2022-06-26 NOTE — PROGRESS NOTES
[facility] Progress Note    Hodges Leyden     : 1955    DATE OF VISIT:  6/15/2022    Subjective:     Hodges Leyden is a 61 y.o. male who has an arterial and  diabetic ulcer located on the bilateral ankle and foot (bilateral). Current complaint of pain in this ulcer? yes, mostly the right foot and left ankle  Quality of pain: aching, dull and throbbing  Timing: intermittent and increasing in frequency  Severity: mild-moderate  Associated Signs/Symptoms:  increased drainage from the left dorsal foot / anterior ankle, also some dull redness and swelling there  Other significant symptoms or pertinent ulcer history: had revascularization of his LLE yesterday at [facility] (angiopalsty and stent of his SFA; his REY was open, peroneal occluded distally, PTA apparently had a ). Did ok, no significant esperanza-procedure complications. Has follow-up scheduled with vascular in a couple of weeks, and then presumably plans to open up the right side as well. No F/C/D, no wound malodor, doing ok with current dressings and offloading. Vaping, but not smoking. Glucoses generally ok         Additional ulcer(s) noted? no.      Mr. Irma Redman has a past medical history of Cellulitis of left foot, Focally failed skin grafts of abdominal wall, Shayla's gangrene, Fourniers gangrene, Osteomyelitis of left foot ([address] and Patellofemoral pain syndrome of right knee. He has a past surgical history that includes Foot fracture surgery (Left); Abdomen surgery (N/A, 2021); Scrotal surgery (N/A, 2021); Vagina surgery (N/A, 2021); Abdomen surgery (Left, 2021); Abdomen surgery (Left, 2021); colostomy (Left, 2021); Abdomen surgery (N/A, 2021); Abdomen surgery (Left, 12/10/2021); Abdomen surgery (N/A, 2021); Abdomen surgery (N/A, 2021); Abdomen surgery (N/A, 2021); Abdomen surgery (N/A, 2021);  Pressure ulcer debridement (N/A, 2021); Pressure ulcer debridement (N/A, 12/30/2021); Skin graft (N/A, 01/03/2022); Muscle Repair (N/A, 01/03/2022); Skin graft (N/A, 01/06/2022); Pressure ulcer debridement (N/A, 01/10/2022); Toe amputation (Left, 06/23/2022); and vascular surgery (Left, 06/14/2022). His family history includes Cancer in his mother. Mr. Liya Nelson reports that he has been smoking. He started smoking about 52 years ago. He has never used smokeless tobacco. He reports previous alcohol use. He reports current drug use. Drug: Marijuana Myrtis Regulus). His current medication list consists of amLODIPine, aspirin, atorvastatin, docusate sodium, insulin glargine, metFORMIN, psyllium. Allergies: Shrimp flavor    Pertinent items from the review of systems are discussed in the HPI; the remainder of the ROS was reviewed and is negative.      Objective:     Vitals:    06/15/22 1418   BP: 133/75   Pulse: 77   Resp: 18   Temp: 97.7 °F (36.5 °C)   TempSrc: Oral   Weight: 161 lb (73 kg)   Height: 5' 11\" (1.803 m)       Constitutional:  well-developed, well-nourished, NAD   Cardiovascular:  bilateral pedal pulses Dopplerable, but left REY stronger than last time; left foot much warmer than at prior visits, with improved cap refill, maybe a bit more edema then  Lymphatic:  no inguinal or popliteal adenopathy, no lymphangitis, but a dull red cellulitis around the left dorsal foot / anterior ankle ulcer  Musculoskeletal:  no clubbing, cyanosis or petechiae; RLE and LLE with no gross effusions, joint misalignment or acute arthritis  Princess-ulcer skin:  pink to [place], a bit cool, indurated, less macerated at the feet, but dull cellulitis around the left dorsal foot  Ulcer(s):   --          Left dorsal foot with more necrosis this week, including fascia, with significant tendon exposure, some purulence expressed from along the tendon sheaths  --          Left lateral foot with a very small wound now, a bit of fibrin and biofilm, a bit of trapped pus, still a small focus of exposed bone. --          Left 5th toe dry gangrene with a bit of eschar lysis now. --          Left 2nd toe eschar seems a bit less stable, a bit of presumed depth, and that IPJ does look a bit inflamed. --          Right lateral ankle a bit smaller, a bit of red tissue, a bit of fat necrosis, slough. --          Right lateral midfoot still a bit inflamed, all necrotic, fat and fascial tissue, a bit of depth and tunneling, no bone frankly exposed, but it's close.  Photos also saved in electronic chart.     Today's Wound Measurements, per RN documentation:  Wound 04/27/22 #10 Left 2nd Toe, Arterial, Full Thickness, Onset 04/25/2022-Wound Length (cm): 0.7 cm  Wound 02/16/22 #1 Right Lateral Ankle, Arterial, Full Thickness, onset -Wound Length (cm): 0.6 cm  Wound 02/16/22 #2 Right Lateral Foot, Arterial, Full Thickness Onset Nov 2021-Wound Length (cm): 2.7 cm  Wound 02/16/22 #3 Left Dorsal Foot, Arterial, Full Thickness, Onset Nov 2021-Wound Length (cm): 2.5 cm  Wound 02/16/22 #4 Left Lateral Foot, Arterial, full thickness,, Onset Nov 2021-Wound Length (cm): 1.1 cm (remeasured per MD)  Wound 02/16/22 #5 Left 5th Toe, Arterial, Full thickness, Onset Nov 2021-Wound Length (cm): 1.5 cm    Wound 04/27/22 #10 Left 2nd Toe, Arterial, Full Thickness, Onset 04/25/2022-Wound Width (cm): 0.5 cm  Wound 02/16/22 #1 Right Lateral Ankle, Arterial, Full Thickness, onset -Wound Width (cm): 0.8 cm  Wound 02/16/22 #2 Right Lateral Foot, Arterial, Full Thickness Onset Nov 2021-Wound Width (cm): 1.8 cm  Wound 02/16/22 #3 Left Dorsal Foot, Arterial, Full Thickness, Onset Nov 2021-Wound Width (cm): 3.2 cm  Wound 02/16/22 #4 Left Lateral Foot, Arterial, full thickness,, Onset Nov 2021-Wound Width (cm): 0.5 cm (remeasured per MD)  Wound 02/16/22 #5 Left 5th Toe, Arterial, Full thickness, Onset Nov 2021-Wound Width (cm): 1.5 cm    Wound 04/27/22 #10 Left 2nd Toe, Arterial, Full Thickness, Onset 04/25/2022-Wound Depth (cm): 0.1 osteo; definitely a flare of soft tissue infection at the left dorsal foot this week; at least with some LLE revascularization done now, so a better candidate for left 5th ray surgery and more aggressive debridement on that side; right side stable for now. Factors contributing to occurrence and/or persistence of the chronic ulcer include edema, diabetes, poor glucose control, chronic pressure, shear force, smoking, arterial insufficiency and decreased tissue oxygenation. Medical necessity of today's visit is shown by the above documentation. Sharp debridement is indicated today, based upon the exam findings in the ulcer(s) above. Procedure note:     Consent obtained. Time out performed per [facility]. Anesthetic  Anesthetic: 4% Lidocaine Cream     Using a curette, #15 blade scalpel and scissors, I sharply debrided the foot (left , lateral, forefoot) and left, 5th toe ulcer(s) down through and including the removal of dermis. The type(s) of tissue debrided included fibrin, slough and necrotic/eschar. Total Surface Area Debrided: 2 sq cm. Using a curette, #15 blade scalpel and forceps, I sharply debrided the left, anterior ankle ulcer(s) down through and including the removal of muscle/fascia. The type(s) of tissue debrided included fibrin, biofilm, slough and necrotic/eschar. Total Surface Area Debrided: 8 sq cm. The ulcers were then irrigated with normal saline solution. The procedure was completed with a small amount of bleeding, and hemostasis was with pressure. The patient tolerated the procedure well, with no significant complications. The patient's level of pain during and after the procedure was monitored. Post-debridement measurements, if different from pre-debridement, are in the flowsheet as well.      Discharge plan:     Treatment in the wound care center today, per RN documentation: Wound 04/27/22 #10 Left 2nd Toe, Arterial, Full Thickness, Onset 04/25/2022-Dressing/Treatment: Other (comment) (betadine dry dressing)  Wound 02/16/22 #1 Right Lateral Ankle, Arterial, Full Thickness, onset -Dressing/Treatment: Other (comment) (triad PSO 4x4's kerlix)  Wound 02/16/22 #2 Right Lateral Foot, Arterial, Full Thickness Onset Nov 2021-Dressing/Treatment: Other (comment) (betadine 4x4's robert)  Wound 02/16/22 #3 Left Dorsal Foot, Arterial, Full Thickness, Onset Nov 2021-Dressing/Treatment: Other (comment) (triad PSO 4x4's robert)  Wound 02/16/22 #4 Left Lateral Foot, Arterial, full thickness,, Onset Nov 2021-Dressing/Treatment: Other (comment) (betadine dry dressing)  Wound 02/16/22 #5 Left 5th Toe, Arterial, Full thickness, Onset Nov 2021-Dressing/Treatment: Other (comment) (betadine dry dressing). I got a wound culture from that left anterior ankle / dorsal foot ulcer today. Calling in a script for empiric Bactrim, based on prior cultures; I'll call him in a couple of days with culture results, and any needed change in Abx. Between now and then, if he has any fever, shaking chills, vomiting, increased LLE pain or redness, report to the ER. I'll speak with Dr. Flora Bal, see if she can see him in the coming days for an office follow-up, to plan hopefully definitve surgery on the left side (5th toe amp, 5th met resection, dorsal foot debridement, 2nd toe debridement or amp). Also needs to F/U with vascular in a couple of weeks, and still needs revascularization on that side, when able. Keep up with protein intake and glucose control; keep working on decreasing nicotine intake. Home treatment: good handwashing before and after any dressing changes. Cleanse ulcer with saline or soap & water before dressing change. May use Vaseline (petrolatum), Aquaphor, Aveeno, CeraVe, Cetaphil, Eucerin, Lubriderm, etc for dry skin. Dressing type for home: as above, once daily. Written discharge instructions given to patient.  Follow up in 1-2 weeks, depending on what happens after his appt with

## 2022-06-26 NOTE — PROGRESS NOTES
Physical Therapy  Facility/Department: [facility] C5 - MED SURG/ORTHO  Daily Treatment Note  NAME: Shana Barlow  : 1955  MRN: [id number]    Date of Service: 2022    Discharge Recommendations:  IP Rehab      If pt discharges prior to next PT session this note will serve as discharge summary. Patient Diagnosis(es): Diagnoses of Osteomyelitis of fifth toe of left foot ([facility] Osteomyelitis of second toe of left foot ([facility] and Diabetic ulcer of left midfoot associated with type 2 diabetes mellitus, unspecified ulcer stage ([facility] were pertinent to this visit. Assessment   Assessment: Pt seen for therapeutic exercises to BLEs 15 reps each, transfer training with CG, Gait training with SW keeping LLE NWB 2 steps forward and 2 steps back with min assist. RUE discomfort with WB on walker, decreased strength and endurance limited ambulation. Pt will benefit from skilled PT to address deficits. Recommend IP Rehab at discharge  Activity Tolerance: Patient tolerated evaluation without incident;Patient limited by fatigue     Plan    Plan: 1 time a day 7 days a week  Current Treatment Recommendations: Strengthening;Balance training;Functional mobility training;Gait training;Transfer training; Therapeutic activities; Endurance training;Home exercise program;Safety education & training;Stair training     Restrictions  Restrictions/Precautions: Weight Bearing,Fall Risk  Left Lower Extremity Weight Bearing: Non Weight Bearing  Other position/activity restrictions: ostomy, wound vac to L foot     Subjective    Subjective  Subjective: Pt agrees to therapy  Pain: Pt reports 5/10 LLE pain, reports RUE \"hurts like heck\" when he puts wt on arm using walker.  Pt offered emotional support and rest. Decreased pain reported with rest  Orientation  Overall Orientation Status: Within Functional Limits     Objective   Vitals: /70  HR 75  SpO2 96%     Bed Mobility Training  Bed Mobility Training: Yes  Supine to Sit: Modified independent  Sit to Supine: Modified independent    Transfer Training  Transfer Training: Yes  Sit to Stand: Contact-guard assistance  Stand to Sit: Contact-guard assistance    Gait Training  Gait Training: Yes  Left Side Weight Bearing: Non-weight bearing  Gait  Overall Level of Assistance: Minimum assistance  Base of Support: Shift to right  Speed/Luna: Slow  Step Length: Right shortened (NWB LLE)  Gait Abnormalities:  (decreased foot clearance RLE, maintains NWB LLE)  Distance (ft):  (2 steps forward + 2 steps back)  Assistive Device: Gait belt;Walker     PT Exercises  Exercise Treatment:   Ankle pumps: 15 x B    Quad sets: 15 x B    Gluteal sets: 15 x B     SLR: 15 x B    Hip abd/add: 15 x B with manual resistance     Safety Devices  Type of Devices: All ora prominences offloaded;Bed alarm in place;Call light within reach;Gait belt;Patient at risk for falls; Left in bed;Nurse notified; All fall risk precautions in place   Goals  Short Term Goals  Time Frame for Short term goals: 5 days (6/29/22)  Short term goal 1: Pt will perform bed mobility mod I: MET 6/26  Short term goal 2: Pt will perform transfers with CGA: 6/26 MET, new goal to perform transfers SBA  Short term goal 3: Pt will propel wheelchair 100' with supervision: 6/26 not assessed  Short term goal 4: By 6/26/22, pt will tolerate 15 reps of LE ther ex for improved strength and activity tolerance: 6/26 MET, ONGOING  Short term goal 5: pt will negotiate 4 steps with HR and mod A: 6/26 Unable to attempt  Long Term Goals  Long term goal 1: Short term goal 6: pt to amb 10 ft with SW, NWB LLE: 6/26 pt took 4 steps with SW, NWB LLE, min assist  Patient Goals   Patient goals : \"to go back home\"    Education  Patient Education  Education Given To: Patient  Education Provided: Role of Therapy;Plan of Care;Precautions;Transfer Training  Education Provided Comments: Pt educated in compensatory gait with SW for NWB LLE.  He voices and demonstrates understanding  Education Method: Demonstration;Verbal  Education Outcome: Verbalized understanding;Demonstrated understanding  Therapy Time   Individual Concurrent Group Co-treatment   Time In 1450         Time Out [address], PT

## 2022-06-26 NOTE — PROGRESS NOTES
Hospitalist Progress Note      PCP: No primary care provider on file. Date of Admission: 6/21/2022    Chief Complaint: foot infection    Hospital Course:   61 y.o. male who presented to [facility] with foot infection. Patient has been seeing podiatry for several diabetic foot wounds. The largest one the dorsum of his left foot has worsened over the week. He has been on bactrim for this. His blood sugars have been quite elevated at home. He is due to see his PCP later this week to adjust his insulin further. He denies fevers, chills, chest pain, SOB or nausea. He has minimal sensation in his feet from the diabetes. Subjective: awaiting podiatry recs. No new complaints.        Medications:  Reviewed    Infusion Medications    sodium chloride 100 mL/hr at 06/25/22 1532    dextrose       Scheduled Medications    insulin glargine  13 Units SubCUTAneous Nightly    insulin lispro  4 Units SubCUTAneous TID WC    zinc oxide   Topical Daily    povidone-iodine   Topical Daily    bacitracin-polymyxin b   Topical BID    cefepime  2,000 mg IntraVENous Q12H    amLODIPine  5 mg Oral Daily    aspirin  81 mg Oral Daily    atorvastatin  80 mg Oral Nightly    sodium chloride flush  5-40 mL IntraVENous 2 times per day    enoxaparin  40 mg SubCUTAneous Daily    insulin lispro  0-6 Units SubCUTAneous TID WC    insulin lispro  0-3 Units SubCUTAneous Nightly     PRN Meds: naloxone, oxyCODONE, HYDROmorphone **OR** HYDROmorphone, sodium chloride flush, sodium chloride, ondansetron **OR** ondansetron, polyethylene glycol, acetaminophen **OR** acetaminophen, glucose, dextrose bolus **OR** dextrose bolus, glucagon (rDNA), dextrose      Intake/Output Summary (Last 24 hours) at 6/26/2022 1502  Last data filed at 6/26/2022 1335  Gross per 24 hour   Intake 600 ml   Output 1700 ml   Net -1100 ml       Physical Exam Performed:    BP (!) 144/70   Pulse 75   Temp 98.4 °F (36.9 °C) (Oral)   Resp 18   Ht 5' 11\" (1.803 m) Wt 161 lb (73 kg)   SpO2 96%   BMI 22.45 kg/m²     General appearance:  No apparent distress, appears stated age and cooperative. HEENT:  Normal cephalic, atraumatic without obvious deformity. Pupils equal, round, and reactive to light. Extra ocular muscles intact. Conjunctivae/corneas clear. Neck: Supple, with full range of motion. No jugular venous distention. Trachea midline. Respiratory:  Normal respiratory effort. Clear to auscultation, bilaterally without Rales/Wheezes/Rhonchi. Cardiovascular:  Regular rate and rhythm with normal S1/S2 without murmurs, rubs or gallops. Abdomen: Soft, non-tender, non-distended with normal bowel sounds. Musculoskeletal:  No clubbing, cyanosis or edema bilaterally. Full range of motion without deformity. Skin: Skin color, texture, turgor normal. Large ulceration on dorsum of left foot. Several toes gangrenous. Neurologic:  Neurovascularly intact without any focal sensory/motor deficits. Cranial nerves: II-XII intact, grossly non-focal.  Psychiatric:  Alert and oriented, thought content appropriate, normal insight  Capillary Refill: Brisk,3 seconds, normal  Peripheral Pulses: +2 palpable, equal bilaterally     Labs:   No results for input(s): WBC, HGB, HCT, PLT in the last 72 hours. No results for input(s): NA, K, CL, CO2, BUN, CREATININE, CALCIUM, PHOS in the last 72 hours. Invalid input(s): MAGNES  No results for input(s): AST, ALT, BILIDIR, BILITOT, ALKPHOS in the last 72 hours. No results for input(s): INR in the last 72 hours. No results for input(s): Ana Paulaabelino [place] in the last 72 hours. Urinalysis:    No results found for: Domenicoa Kyle, BACTERIA, RBCUA, BLOODU, [address], [address]    Radiology:  MRI FOOT LEFT WO CONTRAST   Final Result   Acute osteomyelitis of the 5th metatarsal and 5th distal phalanx. Suspected acute osteomyelitis of the 2nd proximal and middle phalanges.       There is bone marrow edema in the cuboid, medial and intermediate cuneiforms,   as well as the navicular. There appears to be a soft tissue defect of the   dorsum of the midfoot at the level of these bones. This marrow signal   abnormality could represent reactive osteitis or early acute osteomyelitis. Nonspecific bone marrow edema in the 1st-3rd metatarsal heads and the 1st   distal phalanx. No well-defined drainable abscess is seen. Generalized muscle edema along with subcutaneous edema. LASER SKIN PERFUSION PRESSURE STUDY   Final Result              Assessment/Plan:    Active Hospital Problems    Diagnosis     Osteomyelitis ([facility] [M86.9]      Priority: Medium     Sepsis 2/2 osteomyelitis  - presented with leukocytosis, tachycardia. Lactate WNL  - associated with diabetic foot infection  - Podiatry consulted  - ID consulted  - blood, wound cultures ordered  - MRI foot showing several areas of osteomyelitis  - continue vanc, cefepime  - plan for OR today     DMII  - poorly controlled  - holding home metformin  - continue SSI     HTN  - well controlled  - continue norvasc     HLD  - continue statin    DVT Prophylaxis: lovenox  Diet: ADULT DIET; Regular; 4 carb choices (60 gm/meal)  Code Status: Full Code    PT/OT Eval Status: pending surgery    Dispo - home in possibly 5 days    Taylor Arechiga MD        Hospitalist Progress Note      PCP: No primary care provider on file. Date of Admission: 6/21/2022    Chief Complaint: foot infection    Hospital Course:   61 y.o. male who presented to [facility] with foot infection. Patient has been seeing podiatry for several diabetic foot wounds. The largest one the dorsum of his left foot has worsened over the week. He has been on bactrim for this. His blood sugars have been quite elevated at home. He is due to see his PCP later this week to adjust his insulin further. He denies fevers, chills, chest pain, SOB or nausea. He has minimal sensation in his feet from the diabetes.     Subjective: awaiting podiatry recs. No new complaints. Medications:  Reviewed    Infusion Medications    sodium chloride 100 mL/hr at 06/25/22 1532    dextrose       Scheduled Medications    insulin glargine  13 Units SubCUTAneous Nightly    insulin lispro  4 Units SubCUTAneous TID     zinc oxide   Topical Daily    povidone-iodine   Topical Daily    bacitracin-polymyxin b   Topical BID    cefepime  2,000 mg IntraVENous Q12H    amLODIPine  5 mg Oral Daily    aspirin  81 mg Oral Daily    atorvastatin  80 mg Oral Nightly    sodium chloride flush  5-40 mL IntraVENous 2 times per day    enoxaparin  40 mg SubCUTAneous Daily    insulin lispro  0-6 Units SubCUTAneous TID WC    insulin lispro  0-3 Units SubCUTAneous Nightly     PRN Meds: naloxone, oxyCODONE, HYDROmorphone **OR** HYDROmorphone, sodium chloride flush, sodium chloride, ondansetron **OR** ondansetron, polyethylene glycol, acetaminophen **OR** acetaminophen, glucose, dextrose bolus **OR** dextrose bolus, glucagon (rDNA), dextrose      Intake/Output Summary (Last 24 hours) at 6/26/2022 1502  Last data filed at 6/26/2022 1335  Gross per 24 hour   Intake 600 ml   Output 1700 ml   Net -1100 ml       Physical Exam Performed:    BP (!) 144/70   Pulse 75   Temp 98.4 °F (36.9 °C) (Oral)   Resp 18   Ht 5' 11\" (1.803 m)   Wt 161 lb (73 kg)   SpO2 96%   BMI 22.45 kg/m²     General appearance:  No apparent distress, appears stated age and cooperative. HEENT:  Normal cephalic, atraumatic without obvious deformity. Pupils equal, round, and reactive to light. Extra ocular muscles intact. Conjunctivae/corneas clear. Neck: Supple, with full range of motion. No jugular venous distention. Trachea midline. Respiratory:  Normal respiratory effort. Clear to auscultation, bilaterally without Rales/Wheezes/Rhonchi. Cardiovascular:  Regular rate and rhythm with normal S1/S2 without murmurs, rubs or gallops.   Abdomen: Soft, non-tender, non-distended with normal bowel sounds. Musculoskeletal:  No clubbing, cyanosis or edema bilaterally. Full range of motion without deformity. Skin: Skin color, texture, turgor normal. Large ulceration on dorsum of left foot. Several toes gangrenous. Neurologic:  Neurovascularly intact without any focal sensory/motor deficits. Cranial nerves: II-XII intact, grossly non-focal.  Psychiatric:  Alert and oriented, thought content appropriate, normal insight  Capillary Refill: Brisk,3 seconds, normal  Peripheral Pulses: +2 palpable, equal bilaterally     Labs:   No results for input(s): WBC, HGB, HCT, PLT in the last 72 hours. No results for input(s): NA, K, CL, CO2, BUN, CREATININE, CALCIUM, PHOS in the last 72 hours. Invalid input(s): MAGNES  No results for input(s): AST, ALT, BILIDIR, BILITOT, ALKPHOS in the last 72 hours. No results for input(s): INR in the last 72 hours. No results for input(s): Magdalene Ayala in the last 72 hours. Urinalysis:    No results found for: Lanis Aquas, BACTERIA, RBCUA, BLOODU, [address], [address]    Radiology:  MRI FOOT LEFT WO CONTRAST   Final Result   Acute osteomyelitis of the 5th metatarsal and 5th distal phalanx. Suspected acute osteomyelitis of the 2nd proximal and middle phalanges. There is bone marrow edema in the cuboid, medial and intermediate cuneiforms,   as well as the navicular. There appears to be a soft tissue defect of the   dorsum of the midfoot at the level of these bones. This marrow signal   abnormality could represent reactive osteitis or early acute osteomyelitis. Nonspecific bone marrow edema in the 1st-3rd metatarsal heads and the 1st   distal phalanx. No well-defined drainable abscess is seen. Generalized muscle edema along with subcutaneous edema.          LASER SKIN PERFUSION PRESSURE STUDY   Final Result              Assessment/Plan:    Active Hospital Problems    Diagnosis     [facility]omyel[facility] ([facility] [M86.9]      Priority: Medium     Sepsis 2/2 osteomyelitis  - presented with leukocytosis, tachycardia. Lactate WNL  - associated with diabetic foot infection  - Podiatry consulted  - ID consulted  - blood, wound cultures ordered  - MRI foot showing several areas of osteomyelitis  - continue vanc, cefepime  - s/p OR for amputation of 2nd and 5th digits  - wound vac in place on dorsum of foot     DMII  - poorly controlled  - holding home metformin  - continue SSI     HTN  - well controlled  - continue norvasc     HLD  - continue statin    DVT Prophylaxis: lovenox  Diet: ADULT DIET;  Regular; 4 carb choices (60 gm/meal)  Code Status: Full Code    PT/OT Eval Status: ordered    Dispo - unclear given numerous barriers    Cami Lazcano MD

## 2022-06-26 NOTE — PROGRESS NOTES
[facility] Progress Note    Ozzy Garcia     : 1955    DATE OF VISIT:  2022    Subjective:     Ozzy Garcia is a 61 y.o. male who has an arterial and  diabetic ulcer located on the bilateral ankle and foot (bilateral). Significant symptoms or pertinent wound history since last visit: feeling ok overall. No F/C/D, stable drainage, mild swelling, glucoses doing ok, hasn't smoked this week. Hasn't heard back from vascular surgery yet about potential plans for angiography / intervention. Additional ulcer(s) noted? no.      His current medication list consists of amLODIPine, aspirin, atorvastatin, docusate sodium, insulin glargine, metFORMIN, psyllium. Allergies: Shrimp flavor    Objective:     Vitals:    22 1330   BP: 134/72   Pulse: 89   Resp: 18   Temp: 98.1 °F (36.7 °C)   TempSrc: Oral     Constitutional:  well-developed, well-nourished, NAD   Cardiovascular:  bilateral pedal pulses Dopplerable, feet a bit cool, slow cap refill; mild lower extremity edema  Lymphatic:  no inguinal or popliteal adenopathy, no lymphangitis, and I think no active cellulitis this week  Musculoskeletal:  no clubbing, cyanosis or petechiae; RLE and LLE with no gross effusions, joint misalignment or acute arthritis  Princess-ulcer skin: pink and indurated with a bit of hyperkeratosis at the abdomen; slightly rcisty, indurated and dry at the buttock; pink to [place], a bit cool, indurated, less macerated at the feet, and then less redness around the two right foot-ankle ulcers. Ulcer(s):   --          left abdominal site healed, right abdominal site healed. --          the buttock wound also healed this week  --          Left dorsal foot with some ongoing lysis of skin and SQ necrosis, but now with some unhealthy fascial necrosis as well  --          Left lateral foot with a very small wound now, a bit of fibrin and biofilm, still a small focus of exposed bone.   --          Left 5th toe dry gangrene stable, just a bit of proximal moisture. --          Left 2nd toe eschar seems stable, a bit of presumed depth, and that IPJ does look a bit inflamed. --          Right lateral ankle a bit smaller, a bit of red tissue, a bit of fat necrosis, slough, fascial exposure. --          Right lateral midfoot still a bit inflamed, all necrotic, fat and fascial tissue, a bit of depth and tunneling, no bone frankly exposed, but it's close.  Photos also saved in electronic chart.     Today's wound measurements, per RN documentation:  Wound 04/27/22 #10 Left 2nd Toe, Arterial, Full Thickness, Onset 04/25/2022-Wound Length (cm): 1 cm  Wound 02/16/22 #3 Left Dorsal Foot, Arterial, Full Thickness, Onset Nov 2021-Wound Length (cm): 1.6 cm  Wound 02/16/22 #4 Left Lateral Foot, Arterial, full thickness,, Onset Nov 2021-Wound Length (cm): 0.7 cm (remeasured per MD)  Wound 02/16/22 #5 Left 5th Toe, Arterial, Full thickness, Onset Nov 2021-Wound Length (cm): 2.5 cm  Wound 02/16/22 #1 Right Lateral Ankle, Arterial, Full Thickness, onset -Wound Length (cm): 1.5 cm  Wound 02/16/22 #2 Right Lateral Foot, Arterial, Full Thickness Onset Nov 2021-Wound Length (cm): 2.7 cm    Wound 04/27/22 #10 Left 2nd Toe, Arterial, Full Thickness, Onset 04/25/2022-Wound Width (cm): 1.2 cm  Wound 02/16/22 #3 Left Dorsal Foot, Arterial, Full Thickness, Onset Nov 2021-Wound Width (cm): 3 cm  Wound 02/16/22 #4 Left Lateral Foot, Arterial, full thickness,, Onset Nov 2021-Wound Width (cm): 0.2 cm  Wound 02/16/22 #5 Left 5th Toe, Arterial, Full thickness, Onset Nov 2021-Wound Width (cm): 2 cm  Wound 02/16/22 #1 Right Lateral Ankle, Arterial, Full Thickness, onset -Wound Width (cm): 1.3 cm  Wound 02/16/22 #2 Right Lateral Foot, Arterial, Full Thickness Onset Nov 2021-Wound Width (cm): 1.8 cm    Wound 04/27/22 #10 Left 2nd Toe, Arterial, Full Thickness, Onset 04/25/2022-Wound Depth (cm): 0.1 cm  Wound 02/16/22 #3 Left Dorsal Foot, Arterial, Full Thickness, Onset Nov 2021-Wound Depth (cm): 0.2 cm  Wound 02/16/22 #4 Left Lateral Foot, Arterial, full thickness,, Onset Nov 2021-Wound Depth (cm): 0.2 cm  Wound 02/16/22 #5 Left 5th Toe, Arterial, Full thickness, Onset Nov 2021-Wound Depth (cm): 0.1 cm  Wound 02/16/22 #1 Right Lateral Ankle, Arterial, Full Thickness, onset -Wound Depth (cm): 0.1 cm  Wound 02/16/22 #2 Right Lateral Foot, Arterial, Full Thickness Onset Nov 2021-Wound Depth (cm): 0.2 cm    Assessment:     Patient Active Problem List   Diagnosis Code    Uncontrolled type 2 diabetes mellitus with diabetic polyneuropathy, without long-term current use of insulin ([facility]) E11.42, E11.65    Ischemic ulcer of left foot with necrosis of muscle ([facility]) L97.523    Nonhealing surgical wound of buttock, initial encounter T81.89XA    Ischemic ulcer of right ankle with necrosis of muscle ([facility]) L97.313    Gangrene of toe of left foot ([facility]) I96    Current every day smoker F17.200    Hyperlipidemia E78.5    Diabetic ulcer of left foot associated with type 2 diabetes mellitus, with necrosis of bone ([facility]) E11.621, L97.524    Ischemic toe ulcer, left, with fat layer exposed ([facility]) L97.522    Ischemic ulcer of right foot with necrosis of muscle ([facility]) L97.513    Atherosclerosis of native arteries of left leg with ulceration of foot ([facility]) I70.245    Atherosclerosis of native arteries of right leg with ulceration of foot ([facility]) I70.235    Osteomyelitis ([facility]) M86.9       Assessment of today's active condition(s): DM2, neuropathy, PAD, multiple nonhealing ankle and foot ulcers, the left lateral forefoot definitely with chronic osteo, possibly at the 2nd and 5th toes also; no clear soft tissue infection this week; in need of revascularization, L>R in priority, then plans for more aggressive debridement in the OR, possible IV Abx for residual osteo, etc. Factors contributing to occurrence and/or persistence of the chronic ulcer include edema, diabetes, poor glucose control, chronic pressure, shear force, smoking, arterial insufficiency and decreased tissue oxygenation. Medical necessity of today's visit is shown by the above documentation. Sharp debridement is indicated today, based upon the exam findings in the wound(s) above. Procedure note:     Consent obtained. Time out performed per [facility] policy. Anesthetic  Anesthetic: 4% Lidocaine Cream     Using a curette, #15 blade scalpel and forceps, I sharply debrided the right ankle and foot (left , lateral, forefoot) ulcer(s) down through and including the removal of dermis. The type(s) of tissue debrided included fibrin, biofilm and necrotic/eschar. Total Surface Area Debrided: 2 sq cm. Using a curette, #15 blade scalpel and forceps, I sharply debrided the left anterior ankle and foot (right, lateral, midfoot) ulcer(s) down through and including the removal of muscle/fascia. The type(s) of tissue debrided included fibrin, biofilm, slough and necrotic/eschar. Total Surface Area Debrided: 6 sq cm. The ulcers were then irrigated with normal saline solution. The procedure was completed with a small amount of bleeding, and hemostasis was with pressure. The patient tolerated the procedure well, with no significant complications. The patient's level of pain during and after the procedure was monitored. Post-debridement measurements, if different from pre-debridement, are in the flowsheet as well.     Discharge plan:     Treatment in the wound care center today, per RN documentation: Wound 04/27/22 #10 Left 2nd Toe, Arterial, Full Thickness, Onset 04/25/2022-Dressing/Treatment: Other (comment) (betadine,4x4,kerlix)  Wound 02/16/22 #3 Left Dorsal Foot, Arterial, Full Thickness, Onset Nov 2021-Dressing/Treatment: Other (comment) (triad/PSO,4x4,kerlix)  Wound 02/16/22 #4 Left Lateral Foot, Arterial, full thickness,, Onset Nov 2021-Dressing/Treatment: Other (comment) (betadine,4x4,kerlix)  Wound 02/16/22 #5 Left 5th Toe,

## 2022-06-27 LAB
ANION GAP SERPL CALCULATED.3IONS-SCNC: 7 MMOL/L (ref 3–16)
BASOPHILS ABSOLUTE: 0.1 K/UL (ref 0–0.2)
BASOPHILS RELATIVE PERCENT: 0.8 %
BUN BLDV-MCNC: 26 MG/DL (ref 7–20)
CALCIUM SERPL-MCNC: 9.7 MG/DL (ref 8.3–10.6)
CHLORIDE BLD-SCNC: 101 MMOL/L (ref 99–110)
CO2: 30 MMOL/L (ref 21–32)
CREAT SERPL-MCNC: 0.7 MG/DL (ref 0.8–1.3)
EKG ATRIAL RATE: 78 BPM
EKG DIAGNOSIS: NORMAL
EKG P AXIS: 48 DEGREES
EKG P-R INTERVAL: 172 MS
EKG Q-T INTERVAL: 430 MS
EKG QRS DURATION: 96 MS
EKG QTC CALCULATION (BAZETT): 490 MS
EKG R AXIS: -29 DEGREES
EKG T AXIS: 28 DEGREES
EKG VENTRICULAR RATE: 78 BPM
EOSINOPHILS ABSOLUTE: 0.4 K/UL (ref 0–0.6)
EOSINOPHILS RELATIVE PERCENT: 3.7 %
GFR AFRICAN AMERICAN: >60
GFR NON-AFRICAN AMERICAN: >60
GLUCOSE BLD-MCNC: 144 MG/DL (ref 70–99)
GLUCOSE BLD-MCNC: 165 MG/DL (ref 70–99)
GLUCOSE BLD-MCNC: 182 MG/DL (ref 70–99)
GLUCOSE BLD-MCNC: 194 MG/DL (ref 70–99)
GLUCOSE BLD-MCNC: 195 MG/DL (ref 70–99)
HCT VFR BLD CALC: 30.6 % (ref 40.5–52.5)
HEMOGLOBIN: 10.4 G/DL (ref 13.5–17.5)
LYMPHOCYTES ABSOLUTE: 1.7 K/UL (ref 1–5.1)
LYMPHOCYTES RELATIVE PERCENT: 14.8 %
MCH RBC QN AUTO: 30.1 PG (ref 26–34)
MCHC RBC AUTO-ENTMCNC: 33.9 G/DL (ref 31–36)
MCV RBC AUTO: 88.7 FL (ref 80–100)
MONOCYTES ABSOLUTE: 0.8 K/UL (ref 0–1.3)
MONOCYTES RELATIVE PERCENT: 6.6 %
NEUTROPHILS ABSOLUTE: 8.6 K/UL (ref 1.7–7.7)
NEUTROPHILS RELATIVE PERCENT: 74.1 %
PDW BLD-RTO: 14 % (ref 12.4–15.4)
PERFORMED ON: ABNORMAL
PLATELET # BLD: 372 K/UL (ref 135–450)
PMV BLD AUTO: 7 FL (ref 5–10.5)
POTASSIUM SERPL-SCNC: 4.2 MMOL/L (ref 3.5–5.1)
RBC # BLD: 3.45 M/UL (ref 4.2–5.9)
SODIUM BLD-SCNC: 138 MMOL/L (ref 136–145)
WBC # BLD: 11.6 K/UL (ref 4–11)

## 2022-06-27 PROCEDURE — 93010 ELECTROCARDIOGRAM REPORT: CPT | Performed by: INTERNAL MEDICINE

## 2022-06-27 PROCEDURE — 6370000000 HC RX 637 (ALT 250 FOR IP): Performed by: NURSE PRACTITIONER

## 2022-06-27 PROCEDURE — 80048 BASIC METABOLIC PNL TOTAL CA: CPT

## 2022-06-27 PROCEDURE — 1200000000 HC SEMI PRIVATE

## 2022-06-27 PROCEDURE — 6370000000 HC RX 637 (ALT 250 FOR IP): Performed by: INTERNAL MEDICINE

## 2022-06-27 PROCEDURE — 36415 COLL VENOUS BLD VENIPUNCTURE: CPT

## 2022-06-27 PROCEDURE — 2580000003 HC RX 258: Performed by: PODIATRIST

## 2022-06-27 PROCEDURE — 6360000002 HC RX W HCPCS: Performed by: PODIATRIST

## 2022-06-27 PROCEDURE — 93005 ELECTROCARDIOGRAM TRACING: CPT | Performed by: INTERNAL MEDICINE

## 2022-06-27 PROCEDURE — 97116 GAIT TRAINING THERAPY: CPT

## 2022-06-27 PROCEDURE — 97110 THERAPEUTIC EXERCISES: CPT

## 2022-06-27 PROCEDURE — 85025 COMPLETE CBC W/AUTO DIFF WBC: CPT

## 2022-06-27 PROCEDURE — 99232 SBSQ HOSP IP/OBS MODERATE 35: CPT | Performed by: INTERNAL MEDICINE

## 2022-06-27 PROCEDURE — 6360000002 HC RX W HCPCS: Performed by: NURSE PRACTITIONER

## 2022-06-27 PROCEDURE — 6370000000 HC RX 637 (ALT 250 FOR IP): Performed by: PODIATRIST

## 2022-06-27 RX ORDER — CIPROFLOXACIN 500 MG/1
500 TABLET, FILM COATED ORAL EVERY 12 HOURS SCHEDULED
Status: DISCONTINUED | OUTPATIENT
Start: 2022-06-27 | End: 2022-07-01 | Stop reason: HOSPADM

## 2022-06-27 RX ADMIN — HYDROMORPHONE HYDROCHLORIDE 0.5 MG: 1 INJECTION, SOLUTION INTRAMUSCULAR; INTRAVENOUS; SUBCUTANEOUS at 17:05

## 2022-06-27 RX ADMIN — ATORVASTATIN CALCIUM 80 MG: 80 TABLET, FILM COATED ORAL at 20:46

## 2022-06-27 RX ADMIN — INSULIN LISPRO 4 UNITS: 100 INJECTION, SOLUTION INTRAVENOUS; SUBCUTANEOUS at 12:51

## 2022-06-27 RX ADMIN — POVIDONE-IODINE: 0.01 SWAB TOPICAL at 09:24

## 2022-06-27 RX ADMIN — Medication 10 ML: at 07:59

## 2022-06-27 RX ADMIN — INSULIN LISPRO 4 UNITS: 100 INJECTION, SOLUTION INTRAVENOUS; SUBCUTANEOUS at 17:07

## 2022-06-27 RX ADMIN — AMLODIPINE BESYLATE 5 MG: 5 TABLET ORAL at 07:59

## 2022-06-27 RX ADMIN — INSULIN LISPRO 1 UNITS: 100 INJECTION, SOLUTION INTRAVENOUS; SUBCUTANEOUS at 08:00

## 2022-06-27 RX ADMIN — BACITRACIN ZINC AND POLYMYXIN B SULFATE 14.2 G: at 09:22

## 2022-06-27 RX ADMIN — CIPROFLOXACIN 500 MG: 500 TABLET, FILM COATED ORAL at 20:47

## 2022-06-27 RX ADMIN — INSULIN GLARGINE 13 UNITS: 100 INJECTION, SOLUTION SUBCUTANEOUS at 20:46

## 2022-06-27 RX ADMIN — OXYCODONE 5 MG: 5 TABLET ORAL at 03:45

## 2022-06-27 RX ADMIN — Medication: at 09:21

## 2022-06-27 RX ADMIN — Medication 10 ML: at 20:47

## 2022-06-27 RX ADMIN — INSULIN LISPRO 1 UNITS: 100 INJECTION, SOLUTION INTRAVENOUS; SUBCUTANEOUS at 20:46

## 2022-06-27 RX ADMIN — CEFEPIME 2000 MG: 2 INJECTION, POWDER, FOR SOLUTION INTRAMUSCULAR; INTRAVENOUS at 03:45

## 2022-06-27 RX ADMIN — ASPIRIN 81 MG: 81 TABLET, COATED ORAL at 07:59

## 2022-06-27 RX ADMIN — INSULIN LISPRO 4 UNITS: 100 INJECTION, SOLUTION INTRAVENOUS; SUBCUTANEOUS at 08:00

## 2022-06-27 RX ADMIN — ENOXAPARIN SODIUM 40 MG: 100 INJECTION SUBCUTANEOUS at 07:59

## 2022-06-27 RX ADMIN — HYDROMORPHONE HYDROCHLORIDE 0.5 MG: 1 INJECTION, SOLUTION INTRAMUSCULAR; INTRAVENOUS; SUBCUTANEOUS at 09:21

## 2022-06-27 RX ADMIN — INSULIN LISPRO 1 UNITS: 100 INJECTION, SOLUTION INTRAVENOUS; SUBCUTANEOUS at 17:08

## 2022-06-27 RX ADMIN — INSULIN LISPRO 1 UNITS: 100 INJECTION, SOLUTION INTRAVENOUS; SUBCUTANEOUS at 12:51

## 2022-06-27 RX ADMIN — OXYCODONE 5 MG: 5 TABLET ORAL at 20:50

## 2022-06-27 ASSESSMENT — PAIN DESCRIPTION - DESCRIPTORS: DESCRIPTORS: STABBING

## 2022-06-27 ASSESSMENT — PAIN DESCRIPTION - LOCATION
LOCATION: FOOT

## 2022-06-27 ASSESSMENT — PAIN SCALES - GENERAL
PAINLEVEL_OUTOF10: 6
PAINLEVEL_OUTOF10: 7
PAINLEVEL_OUTOF10: 6
PAINLEVEL_OUTOF10: 7
PAINLEVEL_OUTOF10: 0
PAINLEVEL_OUTOF10: 4

## 2022-06-27 ASSESSMENT — PAIN DESCRIPTION - ORIENTATION
ORIENTATION: LEFT

## 2022-06-27 ASSESSMENT — PAIN DESCRIPTION - ONSET: ONSET: ON-GOING

## 2022-06-27 ASSESSMENT — PAIN DESCRIPTION - FREQUENCY: FREQUENCY: INTERMITTENT

## 2022-06-27 ASSESSMENT — PAIN DESCRIPTION - PAIN TYPE: TYPE: CHRONIC PAIN

## 2022-06-27 NOTE — CARE COORDINATION
[facility] - Acute Rehab Unit   After review, this patient is felt to be:       []  Appropriate for Acute Inpatient Rehab    []  Appropriate for Acute Inpatient Rehab Pending Insurance Authorization    [x]  Not appropriate for Acute Inpatient Rehab    []  Referral received and ARU reviewing patient; Evaluation ongoing. Upon further review of pt including d/c plan, therapy progress, and qualifications for acute rehab - pt is not appropriate for ARU.  left for CM.   Please call with any questions and thank you for this referral.     Brisa Bentley  #[id number]  Speech-Language Pathologist  [address] [place] [address] Unit  Desk: [phone]

## 2022-06-27 NOTE — PROGRESS NOTES
[facility]  Follow-up Progress Note       NAME:  Mirna Rowland  MEDICAL RECORD NUMBER:  [id number]  AGE:  61 y.o. GENDER:  male  :  1955  TODAY'S DATE:  2022    Subjective: Can I have pain medication. Wound Identification:  Wound Type:   Dorsal left foot wound VAC, diabetic ulcers to right foot, colostomy. Wound Care re visited patient on colostomy care. Placed 2021 by Dr. Omero Nguyen.  Nathalie Opoka 32mm= 1   Flange one piece #62072(patient's)  Facility Convac Flip placed on patient #[id number]     Colostomy not changed this time.     Patient since Wound Care RN has seen, has had on 22 by Jordan Costa DPM.      OPERATION PERFORMED:  1.  Left second toe amputation. 2.  Left fifth toe and partial fifth metatarsal amputation. 3.  Incision and debridement left dorsal foot down to and including  extensor tendons. The left fifth toe and fifth metatarsal where he has chronic ulceration with exposed fifth  metatarsal shaft necrotic ulceration noted to the fifth digit.  The fifth metatarsal  shaft was resected down to the area that was exposed within the  ulceration. After all infected and necrotic tissue was noted to be removed from left dorsal foot a wound VAC with white form was then applied to thedorsal aspect of the foot.     Dressing to left dorsal foot wound VAC changed. Contributing Factors: edema, diabetes, poor glucose control, chronic pressure, decreased mobility and shear force        Patient Goal of Care:  [x] Wound Healing  [] Odor Control  [] Palliative Care  [] Pain Control   [] Other:     Objective:lying in bed    BP (!) 142/74   Pulse 68   Temp 98.4 °F (36.9 °C) (Oral)   Resp 16   Ht 5' 11\" (1.803 m)   Wt 161 lb (73 kg)   SpO2 96%   BMI 22.45 kg/m²   Reese Risk Score: Reese Scale Score: 16  Assessment: bone present to dorsal left foot,  2nd toes 4 stitches,  5th toe ampt.  11 stitches,  Goes down lateral left foot. Right lateral foot slough present as is in right ankle wound. Measurements:  Negative Pressure Wound Therapy Foot Left;Lateral (Active)   $ Standard NPWT >50 sq cm PER TX $ Yes 06/27/22 1017   Wound Type Diabetic foot ulcer 06/27/22 1017   Unit Type rental [id number] 06/27/22 1017   Dressing Type White Foam 06/27/22 1017   Number of pieces used 1 06/27/22 1017   Number of pieces removed 1 06/27/22 1017   Cycle Continuous 06/27/22 1017   Target Pressure (mmHg) 125 06/27/22 1017   Intensity 1 06/27/22 1017   Dressing Status New dressing applied 06/27/22 1017   Dressing Changed Changed/New 06/27/22 1017   Drainage Amount Scant 06/27/22 1017   Drainage Description Serous 06/27/22 1017   Dressing Change Due 06/29/22 06/27/22 1017   Wound Assessment Fibrinous; Slough 06/27/22 1017   Princess-wound Assessment Blanchable erythema 06/27/22 1017   Shape round 06/27/22 1017   Odor None 06/27/22 1017   Number of days: 4       Wound 12/09/21 Toe (Comment  which one) ulcer to fifth toe per podietry (Active)   Number of days: 199       Wound 02/16/22 #1 Right Lateral Ankle, Arterial, Full Thickness, onset  (Active)   Wound Image   06/27/22 1017   Wound Etiology Diabetic 06/27/22 1017   Dressing Status New dressing applied 06/27/22 1017   Wound Cleansed Cleansed with saline 06/27/22 1017   Dressing/Treatment Pharmaceutical agent (see MAR) 06/27/22 1017   Offloading for Diabetic Foot Ulcers Offloading ordered 06/27/22 1017   Dressing Change Due 06/28/22 06/27/22 1017   Wound Length (cm) 0.6 cm 06/21/22 1852   Wound Width (cm) 0.6 cm 06/21/22 1852   Wound Depth (cm) 0.1 cm 06/21/22 1852   Wound Surface Area (cm^2) 0.36 cm^2 06/21/22 1852   Change in Wound Size % (l*w) 85.88 06/21/22 1852   Wound Volume (cm^3) 0.036 cm^3 06/21/22 1852   Wound Healing % 86 06/21/22 1852   Post-Procedure Length (cm) 0.6 cm 06/15/22 1456   Post-Procedure Width (cm) 0.8 cm 06/15/22 1456   Post-Procedure Depth (cm) 0.2 cm 06/15/22 1456 Post-Procedure Surface Area (cm^2) 0.48 cm^2 06/15/22 1456   Post-Procedure Volume (cm^3) 0.096 cm^3 06/15/22 1456   Distance Tunneling (cm) 0 cm 06/24/22 1141   Tunneling Position ___ O'Clock 0 06/24/22 1141   Undermining Starts ___ O'Clock 0 06/24/22 1141   Undermining Ends___ O'Clock 0 06/24/22 1141   Undermining Maxium Distance (cm) 0 06/24/22 1141   Wound Assessment Pink/red;Slough 06/26/22 2125   Drainage Amount Small 06/26/22 2125   Drainage Description Serous 06/26/22 2125   Odor None 06/26/22 2125   Princess-wound Assessment Blanchable erythema 06/26/22 2125   Wound Thickness Description not for Pressure Injury Partial thickness 06/24/22 1141   Number of days: 131    rt ankly         Wound 02/16/22 #2 Right Lateral Foot, Arterial, Full Thickness Onset Nov 2021 (Active)   Wound Image   06/27/22 1017   Wound Etiology Diabetic 06/27/22 1017   Dressing Status New dressing applied 06/27/22 1017   Wound Cleansed Cleansed with saline 06/27/22 1017   Dressing/Treatment Pharmaceutical agent (see MAR) 06/27/22 1017   Offloading for Diabetic Foot Ulcers Offloading ordered 06/27/22 1017   Dressing Change Due 06/28/22 06/27/22 1017   Wound Length (cm) 1.5 cm 06/27/22 1017   Wound Width (cm) 2.5 cm 06/27/22 1017   Wound Depth (cm) 0.1 cm 06/27/22 1017   Wound Surface Area (cm^2) 3.75 cm^2 06/27/22 1017   Change in Wound Size % (l*w) -275 06/27/22 1017   Wound Volume (cm^3) 0.375 cm^3 06/27/22 1017   Wound Healing % -275 06/27/22 1017   Post-Procedure Length (cm) 2.7 cm 06/08/22 1422   Post-Procedure Width (cm) 1.8 cm 06/08/22 1422   Post-Procedure Depth (cm) 0.5 cm 06/08/22 1422   Post-Procedure Surface Area (cm^2) 4.86 cm^2 06/08/22 1422   Post-Procedure Volume (cm^3) 2.43 cm^3 06/08/22 1422   Distance Tunneling (cm) 0 cm 06/24/22 1141   Tunneling Position ___ O'Clock 0 06/24/22 1141   Undermining Starts ___ O'Clock 7 06/24/22 1141   Undermining Ends___ O'Clock 11 06/24/22 1141   Undermining Maxium Distance (cm) 0.9 06/24/22 1141   Wound Assessment Pink/red;Slough 06/27/22 1017   Drainage Amount Small 06/27/22 1017   Drainage Description Serous 06/27/22 1017   Odor None 06/27/22 1017   Princess-wound Assessment Blanchable erythema 06/27/22 1017   Margins Attached edges 06/27/22 1017   Wound Thickness Description not for Pressure Injury Partial thickness 06/27/22 1017   Number of days: 131    Rt lateral foot         Wound 02/16/22  NPWT #3 Left Dorsal Foot, Arterial, Full Thickness, Onset Nov 2021 (Active)   Wound Image   06/27/22 1017   Wound Etiology Diabetic 06/27/22 1017   Dressing Status New dressing applied 06/27/22 1017   Wound Cleansed Cleansed with saline 06/27/22 1017   Dressing/Treatment Negative pressure wound therapy 06/27/22 1017   Offloading for Diabetic Foot Ulcers Offloading ordered 06/27/22 1017   Dressing Change Due 06/29/22 06/27/22 1017   Wound Length (cm) 2.5 cm 06/27/22 1017   Wound Width (cm) 3 cm 06/27/22 1017   Wound Depth (cm) 0.5 cm 06/27/22 1017   Wound Surface Area (cm^2) 7.5 cm^2 06/27/22 1017   Change in Wound Size % (l*w) 54.95 06/27/22 1017   Wound Volume (cm^3) 3.75 cm^3 06/27/22 1017   Wound Healing % -125 06/27/22 1017   Post-Procedure Length (cm) 2.5 cm 06/15/22 1456   Post-Procedure Width (cm) 3.2 cm 06/15/22 1456   Post-Procedure Depth (cm) 0.5 cm 06/15/22 1456   Post-Procedure Surface Area (cm^2) 8 cm^2 06/15/22 1456   Post-Procedure Volume (cm^3) 4 cm^3 06/15/22 1456   Distance Tunneling (cm) 0 cm 06/24/22 1141   Tunneling Position ___ O'Clock 0 06/24/22 1141   Undermining Starts ___ O'Clock 0 06/24/22 1141   Undermining Ends___ O'Clock 0 06/24/22 1141   Undermining Maxium Distance (cm) 0 06/24/22 1141   Wound Assessment Pink/red;Slough; Exposed structure tendon 06/27/22 1017   Drainage Amount Small 06/27/22 1017   Drainage Description Purulent 06/27/22 1017   Odor None 06/27/22 1017   Princess-wound Assessment Blanchable erythema; Intact 06/27/22 1017   Margins Attached edges 06/27/22 1017 Wound Thickness Description not for Pressure Injury Full thickness 06/27/22 1017   Number of days: 131    Dorsal foot left         Wound 02/16/22 #4 Left Lateral Foot, Arterial, full thickness,, Onset Nov 2021 (Active)   Wound Image   05/25/22 1352   Wound Etiology Arterial 06/24/22 1141   Dressing Status Clean;Dry; Intact 06/24/22 1141   Wound Cleansed Cleansed with saline 06/24/22 1141   Dressing/Treatment Other (comment) 06/24/22 1141   Wound Length (cm) 1.1 cm 06/15/22 1418   Wound Width (cm) 0.5 cm 06/15/22 1418   Wound Depth (cm) 0.1 cm 06/15/22 1418   Wound Surface Area (cm^2) 0.55 cm^2 06/15/22 1418   Change in Wound Size % (l*w) 87.21 06/15/22 1418   Wound Volume (cm^3) 0.055 cm^3 06/15/22 1418   Wound Healing % 96 06/15/22 1418   Post-Procedure Length (cm) 1.1 cm 06/15/22 1456   Post-Procedure Width (cm) 0.5 cm 06/15/22 1456   Post-Procedure Depth (cm) 0.5 cm 06/15/22 1456   Post-Procedure Surface Area (cm^2) 0.55 cm^2 06/15/22 1456   Post-Procedure Volume (cm^3) 0.275 cm^3 06/15/22 1456   Distance Tunneling (cm) 0 cm 06/24/22 1141   Tunneling Position ___ O'Clock 0 06/24/22 1141   Undermining Starts ___ O'Clock 0 06/24/22 1141   Undermining Ends___ O'Clock 0 06/24/22 1141   Undermining Maxium Distance (cm) 0 06/24/22 1141   Wound Assessment Exposed structure bone;Pink/red 06/24/22 1141   Drainage Amount Scant 06/24/22 1141   Drainage Description Purulent 06/24/22 1141   Odor None 06/24/22 1141   Princess-wound Assessment Intact 06/24/22 1141   Number of days: 131       Wound 02/16/22 #5 Left 5th Toe, Arterial, Full thickness, Onset Nov 2021 (Active)   Wound Image   06/27/22 1017   Wound Etiology Diabetic 06/27/22 1017   Dressing Status New dressing applied 06/27/22 1017   Wound Cleansed Cleansed with saline 06/27/22 1017   Dressing/Treatment Pharmaceutical agent (see MAR) 06/27/22 1017   Offloading for Diabetic Foot Ulcers Offloading ordered 06/27/22 1017   Dressing Change Due 06/28/22 06/27/22 1017   Wound Length (cm) 6.5 cm 06/27/22 1017   Wound Width (cm) 0.1 cm 06/27/22 1017   Wound Depth (cm) 0.5 cm 06/27/22 1017   Wound Surface Area (cm^2) 0.65 cm^2 06/27/22 1017   Change in Wound Size % (l*w) 82.62 06/27/22 1017   Wound Volume (cm^3) 0.325 cm^3 06/27/22 1017   Wound Healing % 13 06/27/22 1017   Post-Procedure Length (cm) 1.5 cm 06/15/22 1456   Post-Procedure Width (cm) 1.5 cm 06/15/22 1456   Post-Procedure Depth (cm) 0.2 cm 06/15/22 1456   Post-Procedure Surface Area (cm^2) 2.25 cm^2 06/15/22 1456   Post-Procedure Volume (cm^3) 0.45 cm^3 06/15/22 1456   Distance Tunneling (cm) 0 cm 06/24/22 1141   Tunneling Position ___ O'Clock 0 06/24/22 1141   Undermining Starts ___ O'Clock 0 06/24/22 1141   Undermining Ends___ O'Clock 0 06/24/22 1141   Undermining Maxium Distance (cm) 0 06/24/22 1141   Wound Assessment Exposed structure bone 06/27/22 1017   Drainage Amount Small 06/27/22 1017   Drainage Description Serosanguinous 06/27/22 1017   Odor None 06/27/22 1017   Princess-wound Assessment Blanchable erythema 06/27/22 1017   Margins Attached edges 06/27/22 1017   Wound Thickness Description not for Pressure Injury Full thickness 06/27/22 1017   Number of days: 131    5th toe and lateral right foot  11 stitches         Wound 04/27/22 #10 Left 2nd Toe, Arterial, Full Thickness, Onset 04/25/2022 sx 6/23 amp. (Active)   Wound Image   06/27/22 1017   Wound Etiology Diabetic 06/27/22 1017   Dressing Status New dressing applied 06/27/22 1017   Wound Cleansed Cleansed with saline 06/27/22 1017   Dressing/Treatment Xeroform;Tegaderm/transparent film dressing;Dry dressing;Roll gauze; Ace wrap 06/27/22 1017   Offloading for Diabetic Foot Ulcers Offloading ordered 06/27/22 1017   Dressing Change Due 06/28/22 06/27/22 1017   Wound Length (cm) 2 cm 06/27/22 1017   Wound Width (cm) 0.1 cm 06/27/22 1017   Wound Depth (cm) 0 cm 06/27/22 1017   Wound Surface Area (cm^2) 0.2 cm^2 06/27/22 1017   Change in Wound Size % (l*w) 66.67 06/27/22 1017   Wound Volume (cm^3) 0 cm^3 06/27/22 1017   Wound Healing % 100 06/27/22 1017   Post-Procedure Length (cm) 0.8 cm 06/08/22 1422   Post-Procedure Width (cm) 1 cm 06/08/22 1422   Post-Procedure Depth (cm) 0.1 cm 06/08/22 1422   Post-Procedure Surface Area (cm^2) 0.8 cm^2 06/08/22 1422   Post-Procedure Volume (cm^3) 0.08 cm^3 06/08/22 1422   Distance Tunneling (cm) 0 cm 06/24/22 1141   Tunneling Position ___ O'Clock 0 06/24/22 1141   Undermining Starts ___ O'Clock 0 06/24/22 1141   Undermining Ends___ O'Clock 0 06/24/22 1141   Undermining Maxium Distance (cm) 0 06/24/22 1141   Wound Assessment Other (Comment) 06/27/22 1017   Drainage Amount None 06/27/22 1017   Odor None 06/27/22 1017   Princess-wound Assessment Intact 06/27/22 1017   Margins Attached edges 06/27/22 1017   Wound Thickness Description not for Pressure Injury Partial thickness 06/24/22 1141   Number of days: 60    left 2nd toe amp         Wound 06/21/22 Buttocks Left dry scab with old healed scaring (Active)   Wound Image   06/22/22 1758   Wound Etiology Diabetic 06/24/22 1141   Dressing Status Clean;Dry; Intact 06/26/22 1956   Wound Cleansed Cleansed with saline 06/25/22 1032   Dressing/Treatment Other (comment); Pharmaceutical agent (see MAR) 06/25/22 1942   Dressing Change Due 06/27/22 06/26/22 1956   Wound Length (cm) 3.5 cm 06/21/22 1852   Wound Width (cm) 4 cm 06/21/22 1852   Wound Depth (cm) 0.1 cm 06/21/22 1852   Wound Surface Area (cm^2) 14 cm^2 06/21/22 1852   Wound Volume (cm^3) 1.4 cm^3 06/21/22 1852   Wound Assessment Dry;Pink/red 06/25/22 1032   Drainage Amount None 06/26/22 1956   Odor None 06/26/22 1956   Princess-wound Assessment Blanchable erythema 06/25/22 1032   Wound Thickness Description not for Pressure Injury Partial thickness 06/24/22 1141   Number of days: 5     Incision 12/05/21 Anterior; Left (Active)   Number of days: 204       Response to treatment:  Poorly tolerated by patient.      Pain Assessment:  Severity:  6 / 10  Quality of pain: aching, burning  Wound Pain Timing/Severity: intermittent  Premedicated: Yes  Plan:   Plan of Care: Wound 04/27/22 #10 Left 2nd Toe, Arterial, Full Thickness, Onset 04/25/2022 sx 6/23 amp.-Dressing/Treatment: Xeroform,Tegaderm/transparent film dressing,Dry dressing,Roll gauze,Ace wrap  Wound 02/16/22 #1 Right Lateral Ankle, Arterial, Full Thickness, onset -Dressing/Treatment: Pharmaceutical agent (see MAR)  Wound 02/16/22 #2 Right Lateral Foot, Arterial, Full Thickness Onset Nov 2021-Dressing/Treatment: Pharmaceutical agent (see MAR)  Wound 02/16/22  NPWT #3 Left Dorsal Foot, Arterial, Full Thickness, Onset Nov 2021-Dressing/Treatment: Negative pressure wound therapy  Wound 02/16/22 #5 Left 5th Toe, Arterial, Full thickness, Onset Nov 2021-Dressing/Treatment: Pharmaceutical agent (see MAR)  Wound 06/21/22 Buttocks Left dry scab with old healed scaring-Dressing/Treatment: Other (comment),Pharmaceutical agent (see MAR)  Wound 02/16/22 #4 Left Lateral Foot, Arterial, full thickness,, Onset Nov 2021-Dressing/Treatment: Other (comment) (betadine dry dressing)        NPWT VAC Changed Mom, Wed, Fri.      Cleanse left dorsal foot wound with normal saline, pat dry, apply barrier wipe then hydrocolloid to wound edges. Apply VAC drape to wound edges.  Insert 1 white  Foam to attach vacuum suction to. Cover with VAC drape to seal.     1 pieces of VAC white foam used for dorsal foot left wound dressing Cleanse   Treatment is 125 mmHg continuous suction.     Change cannister once a week or when full.  Not changed today.   If suction fails or patient is discharged:  -remove vac dressing  -cleanse wound with normal saline  -pack wound with saline moistened guaze and change every 12 hours.    Call wound care for deterioration [phone] or call [phone] and leave message.          Recommend   Left foot (#10 )  2nd ampt.  Toe  And (#4 & #5) 5th amp toe area  Down lateral left foot cleanse with normal saline, pat dry, apply Xeroform daily. Cover dry dressing, roll guaze, ace wrap. Daily right foot lateral foot and ankle cleanse normal saline, pat dry, apply PSO, Triad paste cover dry dressing roll guaze wrap ace wrap. Specialty Bed Required : Yes   [x] Low Air Loss   [x] Pressure Redistribution  [] Fluid Immersion  [] Bariatric  [] Total Pressure Relief  [] Other:     Current Diet: ADULT DIET; Regular; 4 carb choices (60 gm/meal)  Dietician consult:  Yes    Discharge Plan:  Placement for patient upon discharge: home with support   Patient appropriate for Outpatient [address]: Yes    Referrals:  [x]  following  [] [address] [employer]  [] Supplies  [] Other    Patient/Caregiver Teaching:Keep bi lateral feet elevated for pressure reduction.   Level of patient/caregiver understanding able to:   [] Indicates understanding       [] Needs reinforcement  [] Unsuccessful      [x] Verbal Understanding  [] Demonstrated understanding       [] No evidence of learning  [] Refused teaching         [] N/A       Electronically signed by Ara Mcgarry RN, on 6/27/2022 at 10:41 AM

## 2022-06-27 NOTE — PROGRESS NOTES
Infectious Disease Follow up Notes    CC :  OM foot      Antibiotics:   Cefepime 2g q12    Admit Date:   6/21/2022  Hospital Day: 7    Subjective:   He remains afebrile   No pain in the feet  Lots of questions re DM management, diet provided here. Seems to be tolerating the abx well so far     Objective:     No data found. Alert, oriented, NAD  NCAT, PERRL, sclera anicteric  Neck supple, symmetrical   Abd soft, flat, NT   VAC L foot  Both feet wrapped - photos reviewed and included below.   Exposed tendons dorsal L foot  No ascending cellulitis   PIV in place     L foot               R foot           Scheduled Meds:   insulin glargine  13 Units SubCUTAneous Nightly    insulin lispro  4 Units SubCUTAneous TID WC    zinc oxide   Topical Daily    povidone-iodine   Topical Daily    bacitracin-polymyxin b   Topical BID    cefepime  2,000 mg IntraVENous Q12H    amLODIPine  5 mg Oral Daily    aspirin  81 mg Oral Daily    atorvastatin  80 mg Oral Nightly    sodium chloride flush  5-40 mL IntraVENous 2 times per day    enoxaparin  40 mg SubCUTAneous Daily    insulin lispro  0-6 Units SubCUTAneous TID WC    insulin lispro  0-3 Units SubCUTAneous Nightly       Continuous Infusions:   sodium chloride 100 mL/hr at 06/26/22 1531    dextrose            Data Review:    Lab Results   Component Value Date    WBC 11.1 (H) 06/22/2022    HGB 10.7 (L) 06/22/2022    HCT 31.3 (L) 06/22/2022    MCV 86.7 06/22/2022     06/22/2022     Lab Results   Component Value Date    CREATININE 1.2 06/21/2022    BUN 33 (H) 06/21/2022     (L) 06/21/2022    K 5.0 06/21/2022    CL 98 (L) 06/21/2022    CO2 26 06/21/2022       Hepatic Function Panel:   Lab Results   Component Value Date    ALKPHOS 170 12/10/2021    ALT 7 12/10/2021    AST 13 12/10/2021    PROT 4.8 12/10/2021    BILITOT 0.3 12/10/2021    BILIDIR <0.2 12/10/2021    IBILI see below 12/10/2021    LABALBU 2.4 01/11/2022         MICRO:  6/21 BC x2 neg   Wound culture light growth S marcescens   Serratia marcescens   Antibiotic Interpretation Microscan  Method Status    ceFAZolin Resistant >=64 mcg/mL BACTERIAL SUSCEPTIBILITY PANEL BY LIDIA     cefepime Sensitive <=0.12 mcg/mL BACTERIAL SUSCEPTIBILITY PANEL BY LIDIA     cefTRIAXone Sensitive <=0.25 mcg/mL BACTERIAL SUSCEPTIBILITY PANEL BY LIDIA     ciprofloxacin Sensitive <=0.25 mcg/mL BACTERIAL SUSCEPTIBILITY PANEL BY LIDIA     ertapenem Sensitive <=0.12 mcg/mL BACTERIAL SUSCEPTIBILITY PANEL BY LIDIA     gentamicin Sensitive <=1 mcg/mL BACTERIAL SUSCEPTIBILITY PANEL BY LIDIA     levofloxacin Sensitive <=0.12 mcg/mL BACTERIAL SUSCEPTIBILITY PANEL BY LIDIA     trimethoprim-sulfamethoxazole Sensitive <=20 mcg/mL BACTERIAL SUSCEPTIBILITY PANEL BY LIDIA       6/23 Operative culture light growth S marcescens, GS with no organisms seen       IMAGING:    MRI L foot   Impression   Acute osteomyelitis of the 5th metatarsal and 5th distal phalanx.       Suspected acute osteomyelitis of the 2nd proximal and middle phalanges.       There is bone marrow edema in the cuboid, medial and intermediate cuneiforms,   as well as the navicular.  There appears to be a soft tissue defect of the   dorsum of the midfoot at the level of these bones.  This marrow signal   abnormality could represent reactive osteitis or early acute osteomyelitis.       Nonspecific bone marrow edema in the 1st-3rd metatarsal heads and the 1st   distal phalanx.       No well-defined drainable abscess is seen.       Generalized muscle edema along with subcutaneous edema.        Assessment:     Patient Active Problem List    Diagnosis Date Noted    [facility] 06/21/2022     Priority: Medium    Ischemic toe ulcer, left, with fat layer exposed ([facility] 06/07/2022     Priority: Medium    Ischemic ulcer of right foot with necrosis of muscle ([facility] [address] 06/07/2022     Priority: Medium    Atherosclerosis of native arteries of left leg with ulceration of foot ([address] 06/07/2022     Priority: Medium    Atherosclerosis of native arteries of right leg with ulceration of foot ([address] 06/07/2022     Priority: Medium    Cellulitis of left foot 03/22/2022    Diabetic ulcer of left foot associated with type 2 diabetes mellitus, with necrosis of bone ([address] 03/05/2022    Ischemic ulcer of right ankle with necrosis of muscle ([address] 02/22/2022    Gangrene of toe of left foot ([address] 02/22/2022    Current every day smoker 02/22/2022    Hyperlipidemia 02/22/2022    Ischemic ulcer of left foot with necrosis of muscle ([address] 02/16/2022    Uncontrolled type 2 diabetes mellitus with diabetic polyneuropathy, without long-term current use of insulin ([address] 11/30/2021       Multiple medical problems including PAD   S/p stenting of L SFA 6/14/22     Chronic L foot wounds with exposed bone and chronic OM of 2 toe and 5th ray   Klebsiella isolated in wound culture   S/p 2nd toe and partial 5th ray amp on 6/23/22. Operative culture with same organism; path clearance fragment is still pending      NKDA     Plan:     Change to po cipro.   Alternatives to FQ include po Bactrim and IV therapy (ceftriaxone or other)  Check baseline QT  Update labs     LOT abx will be determined by path and wound healing     DM educator consulted     D/w RN Carmelo Sacks, MD  Phone: [phone]   Fax : [phone]

## 2022-06-27 NOTE — PROGRESS NOTES
Physical Therapy  Facility/Department: [place] [address] - MED SURG/ORTHO  Daily Treatment Note  NAME: Kamini Vasquez  : 1955  MRN: [id number]    Date of Service: 2022    Discharge Recommendations:  IP Rehab       AM-[facility] Mobility Inpatient   How much difficulty turning over in bed?: None  How much difficulty sitting down on / standing up from a chair with arms?: A Little  How much difficulty moving from lying on back to sitting on side of bed?: A Little  How much help from another person moving to and from a bed to a chair?: A Little  How much help from another person needed to walk in hospital room?: A Lot  How much help from another person for climbing 3-5 steps with a railing?: Total  AM-PAC Inpatient Mobility Raw Score : 16  AM-PAC Inpatient T-Scale Score : 40.78  Mobility Inpatient CMS 0-100% Score: 54.16  Mobility Inpatient CMS G-Code Modifier : CK    Patient Diagnosis(es): Diagnoses of Osteomyelitis of fifth toe of left foot ([address] Osteomyelitis of second toe of left foot ([address] and Diabetic ulcer of left midfoot associated with type 2 diabetes mellitus, unspecified ulcer stage ([address] were pertinent to this visit. Assessment   Assessment: Pt seen for therapeutic exercises  BLE 15 reps each, transfer training with CG/Tramaine , Gait training with SW keeping LLE NWB 5' X 2 with min assist limited by decreased strength and endurance. Pt will benefit from skilled PT to address deficits. Recommend IP Rehab at discharge  Activity Tolerance: Patient tolerated evaluation without incident;Patient limited by fatigue     Plan    Plan  Plan: 1 time a day 7 days a week  Current Treatment Recommendations: Strengthening;Balance training;Functional mobility training;Gait training;Transfer training; Therapeutic activities; Endurance training;Home exercise program;Safety education & training;Stair training     Restrictions  Restrictions/Precautions  Restrictions/Precautions: Weight Bearing,Fall Risk  Lower Extremity Weight assessed  Short term goal 4: By 6/26/22, pt will tolerate 15 reps of LE ther ex for improved strength and activity tolerance: 6/26 MET, ONGOING  Short term goal 5: pt will negotiate 4 steps with HR and mod A: 6/26 Unable to attempt  Long Term Goals  Long term goal 1: Short term goal 6: pt to amb 10 ft with SW, NWB LLE: 6/26 pt took 4 steps with SW, NWB LLE, min assist  Patient Goals   Patient goals : \"to go back home\"    Education  Patient Education  Education Given To: Patient  Education Provided: Role of Therapy;Plan of Care;Precautions;Transfer Training  Education Provided Comments: Pt educated in compensatory gait with SW for NWB LLE. He voices and demonstrates understanding  Education Method: Demonstration;Verbal  Education Outcome: Verbalized understanding;Demonstrated understanding    Therapy Time   Individual Concurrent Group Co-treatment   Time In 1530         Time Out 1600         Minutes 30               If pt is unable to be seen after this session, please let this note serve as discharge summary. Please see case management note for discharge disposition. Thank you.     [employer], PAT#[id number]

## 2022-06-27 NOTE — PLAN OF CARE
Pt educated on the need to turn every 2 hours to prevent any skin integrity breakdown. Pt verbalizes understanding. Pt declines assistance repositioning, turns self. See documentation.

## 2022-06-27 NOTE — CARE COORDINATION
6/27/22 Working on barriers, pt over income for Medicaid, has wound vac, IV antibiotics, and wound care needs, as well as PT/OT needs, financial counseling to reassess pt. [facility]U declined [facility]ard referral and so did [facility]. Pt reports " I passed out"

## 2022-06-27 NOTE — PROGRESS NOTES
Department of Podiatric Surgery  Dr. Yee Reach  Progress Note      SUBJECTIVE  POD #4  5th ray amputation, 2nd toe amputation and debridement of left dorsal foot with wound vac application. He denies any fever chills nausea or vomiting he is concerned about the stairs to get into his home. OBJECTIVE    Physical    VITALS:  /61   Pulse 77   Temp 99.1 °F (37.3 °C) (Oral)   Resp 16   Ht 5' 11\" (1.803 m)   Wt 161 lb (73 kg)   SpO2 96%   BMI 22.45 kg/m²   Gen: AAO x3, NAD pleasant male. CFT less than 3 seconds to all digits.    Dressing recently changed by nurse so did not remove dressing today       Data    CBC with Differential:    Lab Results   Component Value Date    WBC 11.6 06/27/2022    RBC 3.45 06/27/2022    HGB 10.4 06/27/2022    HCT 30.6 06/27/2022     06/27/2022    MCV 88.7 06/27/2022    MCH 30.1 06/27/2022    MCHC 33.9 06/27/2022    RDW 14.0 06/27/2022    BANDSPCT 1 12/10/2021    METASPCT 1 12/09/2021    LYMPHOPCT 14.8 06/27/2022    PROMYELOPCT 2 12/04/2021    MONOPCT 6.6 06/27/2022    MYELOPCT 1 12/09/2021    BASOPCT 0.8 06/27/2022    MONOSABS 0.8 06/27/2022    LYMPHSABS 1.7 06/27/2022    EOSABS 0.4 06/27/2022    BASOSABS 0.1 06/27/2022     CMP:    Lab Results   Component Value Date     06/27/2022    K 4.2 06/27/2022    K 3.8 12/12/2021     06/27/2022    CO2 30 06/27/2022    BUN 26 06/27/2022    CREATININE 0.7 06/27/2022    GFRAA >60 06/27/2022    AGRATIO 0.7 11/30/2021    LABGLOM >60 06/27/2022    GLUCOSE 182 06/27/2022    PROT 4.8 12/10/2021    LABALBU 2.4 01/11/2022    CALCIUM 9.7 06/27/2022    BILITOT 0.3 12/10/2021    ALKPHOS 170 12/10/2021    AST 13 12/10/2021    ALT 7 12/10/2021   Serratia marcescens (1)    Antibiotic Interpretation Microscan  Method Status    ceFAZolin Resistant >=64 mcg/mL BACTERIAL SUSCEPTIBILITY PANEL BY LIDIA     cefepime Sensitive <=0.12 mcg/mL BACTERIAL SUSCEPTIBILITY PANEL BY LIDIA     cefTRIAXone Sensitive <=0.25 mcg/mL BACTERIAL SUSCEPTIBILITY PANEL BY LIDIA     ciprofloxacin Sensitive <=0.25 mcg/mL BACTERIAL SUSCEPTIBILITY PANEL BY LIDIA     ertapenem Sensitive <=0.12 mcg/mL BACTERIAL SUSCEPTIBILITY PANEL BY LIDIA     gentamicin Sensitive <=1 mcg/mL BACTERIAL SUSCEPTIBILITY PANEL BY LIDIA     levofloxacin Sensitive <=0.12 mcg/mL BACTERIAL SUSCEPTIBILITY PANEL BY LIDIA     trimethoprim-sulfamethoxazole Sensitive <=20 mcg/mL BACTERIAL SUSCEPTIBILITY PANEL BY LIDIA         Wound Culture: Serratia marcescens Abnormal      Current Inpatient Medications    Current Facility-Administered Medications: ciprofloxacin (CIPRO) tablet 500 mg, 500 mg, Oral, 2 times per day  insulin glargine (LANTUS) injection vial 13 Units, 13 Units, SubCUTAneous, Nightly  insulin lispro (HUMALOG) injection vial 4 Units, 4 Units, SubCUTAneous, TID WC  naloxone (NARCAN) injection 0.4 mg, 0.4 mg, IntraVENous, PRN  oxyCODONE (ROXICODONE) immediate release tablet 5 mg, 5 mg, Oral, Q8H PRN  HYDROmorphone (DILAUDID) injection 0.25 mg, 0.25 mg, IntraVENous, Q4H PRN **OR** HYDROmorphone (DILAUDID) injection 0.5 mg, 0.5 mg, IntraVENous, Q4H PRN  zinc oxide (TRIAD HYDROPHILIC) paste, , Topical, Daily  povidone-iodine 10 % swab, , Topical, Daily  bacitracin-polymyxin b (POLYSPORIN) ointment, , Topical, BID  amLODIPine (NORVASC) tablet 5 mg, 5 mg, Oral, Daily  aspirin EC tablet 81 mg, 81 mg, Oral, Daily  atorvastatin (LIPITOR) tablet 80 mg, 80 mg, Oral, Nightly  sodium chloride flush 0.9 % injection 5-40 mL, 5-40 mL, IntraVENous, 2 times per day  sodium chloride flush 0.9 % injection 5-40 mL, 5-40 mL, IntraVENous, PRN  0.9 % sodium chloride infusion, , IntraVENous, PRN  enoxaparin (LOVENOX) injection 40 mg, 40 mg, SubCUTAneous, Daily  ondansetron (ZOFRAN-ODT) disintegrating tablet 4 mg, 4 mg, Oral, Q8H PRN **OR** ondansetron (ZOFRAN) injection 4 mg, 4 mg, IntraVENous, Q6H PRN  polyethylene glycol (GLYCOLAX) packet 17 g, 17 g, Oral, Daily PRN  acetaminophen (TYLENOL) tablet 650 mg, 650 mg, Oral, Q6H PRN **OR** acetaminophen (TYLENOL) suppository 650 mg, 650 mg, Rectal, Q6H PRN  glucose chewable tablet 16 g, 4 tablet, Oral, PRN  dextrose bolus 10% 125 mL, 125 mL, IntraVENous, PRN **OR** dextrose bolus 10% 250 mL, 250 mL, IntraVENous, PRN  glucagon (rDNA) injection 1 mg, 1 mg, IntraMUSCular, PRN  dextrose 5 % solution, 100 mL/hr, IntraVENous, PRN  insulin lispro (HUMALOG) injection vial 0-6 Units, 0-6 Units, SubCUTAneous, TID WC  insulin lispro (HUMALOG) injection vial 0-3 Units, 0-3 Units, SubCUTAneous, Nightly    ASSESSMENT AND PLAN  This is a 61year old :   Osteomyelitis left 2nd toe, 5th toe metatarsal - POD # 4  Amputation left 2nd toe 5th partial ray and debidement to tendon left foot with wound vac application. He is able to be Weightbearing to left heel in cam walker boot with crutches or walker. Okay when other services are agreeable.

## 2022-06-27 NOTE — PROGRESS NOTES
Hospitalist Progress Note      PCP: No primary care provider on file. Date of Admission: 6/21/2022    Chief Complaint: foot infection    Hospital Course: 61 y.o. male who presented to [facility] with foot infection. Patient has been seeing podiatry for several diabetic foot wounds. The largest one the dorsum of his left foot has worsened over the week. He has been on bactrim for this. His blood sugars have been quite elevated at home. He is due to see his PCP later this week to adjust his insulin further. He denies fevers, chills, chest pain, SOB or nausea. He has minimal sensation in his feet from the diabetes     Subjective: denies chest pain, dyspnea, n/v/d, dysuria, fever, chills, headache. Complains of pain in bilateral feet.        Medications:  Reviewed    Infusion Medications    sodium chloride 100 mL/hr at 06/26/22 1531    dextrose       Scheduled Medications    ciprofloxacin  500 mg Oral 2 times per day    insulin glargine  13 Units SubCUTAneous Nightly    insulin lispro  4 Units SubCUTAneous TID WC    zinc oxide   Topical Daily    povidone-iodine   Topical Daily    bacitracin-polymyxin b   Topical BID    amLODIPine  5 mg Oral Daily    aspirin  81 mg Oral Daily    atorvastatin  80 mg Oral Nightly    sodium chloride flush  5-40 mL IntraVENous 2 times per day    enoxaparin  40 mg SubCUTAneous Daily    insulin lispro  0-6 Units SubCUTAneous TID WC    insulin lispro  0-3 Units SubCUTAneous Nightly     PRN Meds: naloxone, oxyCODONE, HYDROmorphone **OR** HYDROmorphone, sodium chloride flush, sodium chloride, ondansetron **OR** ondansetron, polyethylene glycol, acetaminophen **OR** acetaminophen, glucose, dextrose bolus **OR** dextrose bolus, glucagon (rDNA), dextrose      Intake/Output Summary (Last 24 hours) at 6/27/2022 1429  Last data filed at 6/27/2022 1134  Gross per 24 hour   Intake 50 ml   Output 1000 ml   Net -950 ml       Physical Exam Performed:    BP (!) 142/74   Pulse 68 Temp 98.4 °F (36.9 °C) (Oral)   Resp 16   Ht 5' 11\" (1.803 m)   Wt 161 lb (73 kg)   SpO2 96%   BMI 22.45 kg/m²     General appearance: Resting in bed comfortably. Wound care at bedside working on wound vac. No apparent distress, appears stated age and cooperative. HEENT: Pupils equal, round, and reactive to light. Conjunctivae/corneas clear. Neck: Supple, with full range of motion. No jugular venous distention. Trachea midline. Respiratory: Normal respiratory effort. Clear to auscultation, bilaterally without Rales/Wheezes/Rhonchi. Room air  Cardiovascular: Regular rate and rhythm with normal S1/S2 without murmurs, rubs or gallops. Abdomen: Soft, non-tender, non-distended with normal bowel sounds. Musculoskeletal: No clubbing, cyanosis or edema bilaterally. Full range of motion without deformity. Wound vac left foot; wound dressing right foot. Skin: Skin color, texture, turgor normal. No rashes or lesions. Neurologic: Neurovascularly intact without any focal sensory/motor deficits. Cranial nerves: II-XII intact, grossly non-focal.  Psychiatric: Alert and oriented, thought content appropriate, normal insight  Capillary Refill: Brisk,3 seconds, normal   Peripheral Pulses: +2 palpable, equal bilaterally       Labs:   Recent Labs     06/27/22  1320   WBC 11.6*   HGB 10.4*   HCT 30.6*        Recent Labs     06/27/22  1320      K 4.2      CO2 30   BUN 26*   CREATININE 0.7*   CALCIUM 9.7     No results for input(s): AST, ALT, BILIDIR, BILITOT, ALKPHOS in the last 72 hours. No results for input(s): INR in the last 72 hours. No results for input(s): Marilu Xiomara in the last 72 hours. Urinalysis:    No results found for: Shira Learn, BACTERIA, RBCUA, BLOODU, [address], [address]    Radiology:  MRI FOOT LEFT WO CONTRAST   Final Result   Acute osteomyelitis of the 5th metatarsal and 5th distal phalanx. Suspected acute osteomyelitis of the 2nd proximal and middle phalanges. There is bone marrow edema in the cuboid, medial and intermediate cuneiforms,   as well as the navicular. There appears to be a soft tissue defect of the   dorsum of the midfoot at the level of these bones. This marrow signal   abnormality could represent reactive osteitis or early acute osteomyelitis. Nonspecific bone marrow edema in the 1st-3rd metatarsal heads and the 1st   distal phalanx. No well-defined drainable abscess is seen. Generalized muscle edema along with subcutaneous edema. LASER SKIN PERFUSION PRESSURE STUDY   Final Result          Assessment/Plan:    Active Hospital Problems    Diagnosis     Osteomyelitis ([facility] [address] [M86.9]      Priority: Medium     Sepsis, secondary to osteomyelitis, present on arrival  -Presented with leukocytosis, tachycardia. Lactic acid was normal  -Blood cultures  -Wound culture shows serratia marcesens  -Continue cefepime,   -NPWT to left mid-foot  -Sutures at amputation sites intact  -Podiatry consulted, appreciate recommendations  -ID consulted, appreciate recommendations    Foot wounds  -Antibiotics, treatments, and consults as above  -PT/OT  -On 6/23 underwent partial left 5th ray amputation and left 2nd toe amputation   -Pain management    DMII, uncontrolled  -With neuropathy, and diabetic ulcers  -A1C 10.1 (6/2022)  -Hold home regimen  -Basal bolus dose and sliding scale insulin  -Monitor and adjust as needed    Hypertension, controlled  -Continue amlodipine    PVD  -Pt with history of CLI  -Left SFA stent 6/21/22 with Dr. Kamryn Gaxiola at [address] [place] [address] need follow up as outpatient to     PAD  -Pt with multiple arterial ulcers on BLE  -Follows with vascular as outpatient    Tobacco abuse  -Counseled on cessation    DVT Prophylaxis: lovenox  Diet: ADULT DIET;  Regular; 4 carb choices (60 gm/meal)  Code Status: Full Code    PT/OT Eval Status: following    Dispo - >2 days    JAGJIT Cabral - CNP

## 2022-06-28 LAB
ANAEROBIC CULTURE: ABNORMAL
CULTURE SURGICAL: ABNORMAL
GLUCOSE BLD-MCNC: 152 MG/DL (ref 70–99)
GLUCOSE BLD-MCNC: 192 MG/DL (ref 70–99)
GLUCOSE BLD-MCNC: 206 MG/DL (ref 70–99)
GLUCOSE BLD-MCNC: 206 MG/DL (ref 70–99)
GRAM STAIN RESULT: ABNORMAL
ORGANISM: ABNORMAL
PERFORMED ON: ABNORMAL

## 2022-06-28 PROCEDURE — 2580000003 HC RX 258: Performed by: PODIATRIST

## 2022-06-28 PROCEDURE — 6370000000 HC RX 637 (ALT 250 FOR IP): Performed by: INTERNAL MEDICINE

## 2022-06-28 PROCEDURE — 6360000002 HC RX W HCPCS: Performed by: PODIATRIST

## 2022-06-28 PROCEDURE — 97110 THERAPEUTIC EXERCISES: CPT

## 2022-06-28 PROCEDURE — 6370000000 HC RX 637 (ALT 250 FOR IP): Performed by: NURSE PRACTITIONER

## 2022-06-28 PROCEDURE — 1200000000 HC SEMI PRIVATE

## 2022-06-28 PROCEDURE — 97116 GAIT TRAINING THERAPY: CPT

## 2022-06-28 PROCEDURE — 97530 THERAPEUTIC ACTIVITIES: CPT

## 2022-06-28 PROCEDURE — 6370000000 HC RX 637 (ALT 250 FOR IP): Performed by: PODIATRIST

## 2022-06-28 RX ADMIN — INSULIN LISPRO 1 UNITS: 100 INJECTION, SOLUTION INTRAVENOUS; SUBCUTANEOUS at 17:45

## 2022-06-28 RX ADMIN — OXYCODONE 5 MG: 5 TABLET ORAL at 09:18

## 2022-06-28 RX ADMIN — ATORVASTATIN CALCIUM 80 MG: 80 TABLET, FILM COATED ORAL at 20:52

## 2022-06-28 RX ADMIN — Medication 10 ML: at 20:53

## 2022-06-28 RX ADMIN — POVIDONE-IODINE: 0.01 SWAB TOPICAL at 09:19

## 2022-06-28 RX ADMIN — CIPROFLOXACIN 500 MG: 500 TABLET, FILM COATED ORAL at 20:52

## 2022-06-28 RX ADMIN — INSULIN LISPRO 4 UNITS: 100 INJECTION, SOLUTION INTRAVENOUS; SUBCUTANEOUS at 12:38

## 2022-06-28 RX ADMIN — ENOXAPARIN SODIUM 40 MG: 100 INJECTION SUBCUTANEOUS at 09:18

## 2022-06-28 RX ADMIN — INSULIN LISPRO 2 UNITS: 100 INJECTION, SOLUTION INTRAVENOUS; SUBCUTANEOUS at 09:20

## 2022-06-28 RX ADMIN — INSULIN LISPRO 4 UNITS: 100 INJECTION, SOLUTION INTRAVENOUS; SUBCUTANEOUS at 17:44

## 2022-06-28 RX ADMIN — OXYCODONE 5 MG: 5 TABLET ORAL at 17:49

## 2022-06-28 RX ADMIN — INSULIN LISPRO 2 UNITS: 100 INJECTION, SOLUTION INTRAVENOUS; SUBCUTANEOUS at 12:38

## 2022-06-28 RX ADMIN — CIPROFLOXACIN 500 MG: 500 TABLET, FILM COATED ORAL at 09:18

## 2022-06-28 RX ADMIN — Medication: at 09:19

## 2022-06-28 RX ADMIN — AMLODIPINE BESYLATE 5 MG: 5 TABLET ORAL at 09:18

## 2022-06-28 RX ADMIN — BACITRACIN ZINC AND POLYMYXIN B SULFATE 14.2 G: at 20:55

## 2022-06-28 RX ADMIN — ASPIRIN 81 MG: 81 TABLET, COATED ORAL at 09:18

## 2022-06-28 RX ADMIN — BACITRACIN ZINC AND POLYMYXIN B SULFATE 14.2 G: at 09:18

## 2022-06-28 RX ADMIN — Medication 10 ML: at 09:19

## 2022-06-28 RX ADMIN — INSULIN GLARGINE 13 UNITS: 100 INJECTION, SOLUTION SUBCUTANEOUS at 20:51

## 2022-06-28 RX ADMIN — INSULIN LISPRO 4 UNITS: 100 INJECTION, SOLUTION INTRAVENOUS; SUBCUTANEOUS at 09:19

## 2022-06-28 RX ADMIN — INSULIN LISPRO 1 UNITS: 100 INJECTION, SOLUTION INTRAVENOUS; SUBCUTANEOUS at 20:51

## 2022-06-28 ASSESSMENT — PAIN SCALES - GENERAL
PAINLEVEL_OUTOF10: 5
PAINLEVEL_OUTOF10: 6
PAINLEVEL_OUTOF10: 5

## 2022-06-28 ASSESSMENT — PAIN DESCRIPTION - LOCATION
LOCATION: FOOT

## 2022-06-28 ASSESSMENT — PAIN DESCRIPTION - ORIENTATION
ORIENTATION: LEFT
ORIENTATION: RIGHT;LEFT
ORIENTATION: LEFT

## 2022-06-28 NOTE — PROGRESS NOTES
Comprehensive Nutrition Assessment    Type and Reason for Visit:  Initial,RD Nutrition Re-Screen/LOS    Nutrition Recommendations/Plan:   1. Continue carb control diet and encourage PO intake   2. RD to add glucerna BID   3. Monitor for diet education questions   4. Monitor nutrition adequacy, pertinent labs, bowel habits, wt changes, and clinical progress     Malnutrition Assessment:  Malnutrition Status: At risk for malnutrition (Comment) (06/28/22 1411)    Context:  Chronic Illness     Findings of the 6 clinical characteristics of malnutrition:  Energy Intake:  No significant decrease in energy intake  Weight Loss:  Greater than 10% over 6 months     Muscle Mass Loss:  Mild muscle mass loss Clavicles (pectoralis & deltoids),Temples (temporalis)    Nutrition Assessment:    LOS assessment: Pt w/ DM admitted on 6/21 with foot infection, on 6/23 underwent partial left 5th ray amputation and left 2nd toe amputation. On carb control diet with PO intakes of % of meals. Pt reports good intake/ appetite PTA. Pt notes intentional wt loss d/t not drinking beer since november. Pt was 190 lb on 12/24/21 and currently 161 lb (-15% wt change x 6 months). Pt drinks glucerna at home, RD to add BID. Provided pt and pt's wife w/ DM diet education. Discussed whole grains and servings of carbohydrates. Encoruaged pt to check portions of foods to help w/ pt's BS contorl at home. Continue to encourage PO intake, will continue to monitor. Nutrition Related Findings:    No updated labs today. Colostomy Wound Type: Diabetic Ulcer,Multiple,Surgical Incision       Current Nutrition Intake & Therapies:    Average Meal Intake: %  Average Supplements Intake: None Ordered  ADULT DIET;  Regular; 5 carb choices (75 gm/meal)  ADULT ORAL NUTRITION SUPPLEMENT; Breakfast, Dinner; Diabetic Oral Supplement    Anthropometric Measures:  Height: 5' 11\" (180.3 cm)  Ideal Body Weight (IBW): 172 lbs (78 kg)       Current Body Weight: 161 lb (73 kg), 93.6 % IBW. Weight Source: Bed Scale  Current BMI (kg/m2): 22.5  Usual Body Weight: 190 lb (86.2 kg) (12/24/21)  % Weight Change (Calculated): -15.3                 BMI Categories: Normal Weight (BMI 18.5-24. 9)    Estimated Daily Nutrient Needs:  Energy Requirements Based On: Kcal/kg (25-30 kcals/kg)  Weight Used for Energy Requirements: Current (73 kg)  Energy (kcal/day): 4458-1987  Weight Used for Protein Requirements: Current (1-1.2 g/kg)  Protein (g/day): 61-73 g  Method Used for Fluid Requirements: 1 ml/kcal    Nutrition Diagnosis:   · Increased nutrient needs related to increase demand for energy/nutrients as evidenced by wounds    Nutrition Interventions:   Food and/or Nutrient Delivery: Continue Current Diet,Start Oral Nutrition Supplement  Nutrition Education/Counseling: Education completed  Coordination of Nutrition Care: Continue to monitor while inpatient       Goals:     Goals: PO intake 50% or greater,prior to discharge       Nutrition Monitoring and Evaluation:   Behavioral-Environmental Outcomes: Beliefs and Attitutes,Readiness for Change  Food/Nutrient Intake Outcomes: Food and Nutrient Intake,Supplement Intake  Physical Signs/Symptoms Outcomes: Biochemical Data,Weight,Nutrition Focused Physical Findings,Fluid Status or Edema,Nausea or Vomiting    Discharge Planning:    Continue current diet,Continue Oral Nutrition Supplement     Tania Cancino [address], [address] [place] [address],   Contact: Office: [phone]; [address]: [phone]

## 2022-06-28 NOTE — PROGRESS NOTES
Hospitalist Progress Note      PCP: No primary care provider on file. Date of Admission: 6/21/2022    Chief Complaint: foot infection    Hospital Course: 61 y.o. male who presented to [facility] with foot infection. Patient has been seeing podiatry for several diabetic foot wounds. The largest one the dorsum of his left foot has worsened over the week. He has been on bactrim for this. His blood sugars have been quite elevated at home. He is due to see his PCP later this week to adjust his insulin further. He denies fevers, chills, chest pain, SOB or nausea. He has minimal sensation in his feet from the diabetes     Subjective: denies chest pain, dyspnea, n/v/d, dysuria, fever, chills, headache. Complains of pain in bilateral feet.        Medications:  Reviewed    Infusion Medications    sodium chloride 100 mL/hr at 06/26/22 1531    dextrose       Scheduled Medications    ciprofloxacin  500 mg Oral 2 times per day    insulin glargine  13 Units SubCUTAneous Nightly    insulin lispro  4 Units SubCUTAneous TID WC    zinc oxide   Topical Daily    povidone-iodine   Topical Daily    bacitracin-polymyxin b   Topical BID    amLODIPine  5 mg Oral Daily    aspirin  81 mg Oral Daily    atorvastatin  80 mg Oral Nightly    sodium chloride flush  5-40 mL IntraVENous 2 times per day    enoxaparin  40 mg SubCUTAneous Daily    insulin lispro  0-6 Units SubCUTAneous TID WC    insulin lispro  0-3 Units SubCUTAneous Nightly     PRN Meds: naloxone, oxyCODONE, HYDROmorphone **OR** HYDROmorphone, sodium chloride flush, sodium chloride, ondansetron **OR** ondansetron, polyethylene glycol, acetaminophen **OR** acetaminophen, glucose, dextrose bolus **OR** dextrose bolus, glucagon (rDNA), dextrose      Intake/Output Summary (Last 24 hours) at 6/28/2022 1306  Last data filed at 6/28/2022 0816  Gross per 24 hour   Intake 480 ml   Output 1075 ml   Net -595 ml       Physical Exam Performed:    BP (!) 143/69   Pulse 69 Temp 98.7 °F (37.1 °C) (Oral)   Resp 16   Ht 5' 11\" (1.803 m)   Wt 161 lb (73 kg)   SpO2 97%   BMI 22.45 kg/m²     General appearance: Resting in bed comfortably. Wound care at bedside working on wound vac. No apparent distress, appears stated age and cooperative. HEENT: Pupils equal, round, and reactive to light. Conjunctivae/corneas clear. Neck: Supple, with full range of motion. No jugular venous distention. Trachea midline. Respiratory: Normal respiratory effort. Clear to auscultation, bilaterally without Rales/Wheezes/Rhonchi. Room air  Cardiovascular: Regular rate and rhythm with normal S1/S2 without murmurs, rubs or gallops. Abdomen: Soft, non-tender, non-distended with normal bowel sounds. Musculoskeletal: No clubbing, cyanosis or edema bilaterally. Full range of motion without deformity. Wound vac left foot; wound dressing right foot. Skin: Skin color, texture, turgor normal. No rashes or lesions. Neurologic: Neurovascularly intact without any focal sensory/motor deficits. Cranial nerves: II-XII intact, grossly non-focal.  Psychiatric: Alert and oriented, thought content appropriate, normal insight  Capillary Refill: Brisk,3 seconds, normal   Peripheral Pulses: +2 palpable, equal bilaterally       Labs:   Recent Labs     06/27/22  1320   WBC 11.6*   HGB 10.4*   HCT 30.6*        Recent Labs     06/27/22  1320      K 4.2      CO2 30   BUN 26*   CREATININE 0.7*   CALCIUM 9.7     No results for input(s): AST, ALT, BILIDIR, BILITOT, ALKPHOS in the last 72 hours. No results for input(s): INR in the last 72 hours. No results for input(s): [place] in the last 72 hours. Urinalysis:    No results found for: Cristina Ramona, BACTERIA, RBCUA, BLOODU, [address], [address]    Radiology:  MRI FOOT LEFT WO CONTRAST   Final Result   Acute osteomyelitis of the 5th metatarsal and 5th distal phalanx. Suspected acute osteomyelitis of the 2nd proximal and middle phalanges. There is bone marrow edema in the cuboid, medial and intermediate cuneiforms,   as well as the navicular. There appears to be a soft tissue defect of the   dorsum of the midfoot at the level of these bones. This marrow signal   abnormality could represent reactive osteitis or early acute osteomyelitis. Nonspecific bone marrow edema in the 1st-3rd metatarsal heads and the 1st   distal phalanx. No well-defined drainable abscess is seen. Generalized muscle edema along with subcutaneous edema. LASER SKIN PERFUSION PRESSURE STUDY   Final Result          Assessment/Plan:    Active Hospital Problems    Diagnosis     Osteomyelitis ([facility] [address] [M86.9]      Priority: Medium     Sepsis, secondary to osteomyelitis, present on arrival  -Presented with leukocytosis, tachycardia. Lactic acid was normal  -Blood cultures  -Wound culture shows serratia marcesens  -Continue cefepime,   -NPWT to left mid-foot  -Sutures at amputation sites intact  -Podiatry consulted, appreciate recommendations  -ID consulted, appreciate recommendations    Foot wounds  -Antibiotics, treatments, and consults as above  -PT/OT  -On 6/23 underwent partial left 5th ray amputation and left 2nd toe amputation   -Pain management    DMII, uncontrolled  -With neuropathy, and diabetic ulcers  -A1C 10.1 (6/2022)  -Hold home regimen  -Basal bolus dose and sliding scale insulin  -Carb control diet  -Monitor and adjust as needed    Hypertension, controlled  -Continue amlodipine    PVD/PAD  -Pt with history of CLI  -Left SFA stent 6/21/22 with Dr. Larry Tai at [address] with multiple arterial ulcers on BLE  -Follows with vascular as outpatient    Tobacco abuse  -Counseled on cessation    DVT Prophylaxis: lovenox  Diet: ADULT DIET;  Regular; 5 carb choices (75 gm/meal)  ADULT ORAL NUTRITION SUPPLEMENT; Breakfast, Dinner; Diabetic Oral Supplement  Code Status: Full Code    PT/OT Eval Status: following    Dispo - possibly tomorrow     Collette Au APRN - CNP

## 2022-06-28 NOTE — DISCHARGE INSTR - COC
Continuity of Care Form    Patient Name: Estrella Santiago   :  1955  MRN:  [id number]    Admit date:  2022  Discharge date:  ***    Code Status Order: Full Code   Advance Directives:   Advance Care Flowsheet Documentation       Date/Time Healthcare Directive Type of Healthcare Directive Copy in [address] Agent's Name Healthcare Agent's Phone Number    22 [phone] Unknown, patient unable to respond due to medical condition -- -- -- -- --            Admitting Physician:  No admitting provider for patient encounter. PCP: No primary care provider on file. Discharging Nurse: [facility] Unit/Room#: 0521/0521-01  Discharging Unit Phone Number: ***    Emergency Contact:   Extended Emergency Contact Information  Primary Emergency Contact: Shea Hernandez  Address: [facility] [address]           [address] Phone: [phone]  Work Phone: [phone]  Mobile Phone: [phone]  Relation: Spouse  Secondary Emergency Contact: [place], [address] Phone: [phone]  Mobile Phone: [phone]  Relation: Brother/Sister   needed?  No    Past Surgical History:  Past Surgical History:   Procedure Laterality Date    ABDOMEN SURGERY N/A 2021    WIDE EXCISION PERINEUM, BACK, ABDOMINAL WALL performed by Bertha Lazar MD at [place] [address]. Left 2021    ABDOMINAL WALL AND LEFT BUTTOCK DEBRIDEMENT, WOUND VAC PLACEMENT performed by Mora Wood MD at [place] [address]. Left 2021    ABDOMINAL WALL AND LEFT BUTTOCK DEBRIDEMENT, WOUND VAC PLACEMENT performed by Mora Wood MD at [place] [address]. N/A 2021    EVALUATION UNDER ANESTHESIA WITH DEBRIDEMENT ABDOMINAL WOUND AND LEFT BUTTOCK, WOUND VAC CHANGE performed by Mora Wood MD at [place] [address]. Left 12/10/2021    ABDOMINAL WALL AND LEFT BUTTOCK WOUND VAC CHANGE WITH FURTHER DEBRIDEMENT ABDOMEN AND LEFT BUTTOCK WOUND performed by Jaun Recinos MD at [facility]. N/A 12/13/2021    WOUND VAC CHANGE ABDOMEN AND LEFT BUTTOCK performed by Jaun Recinos MD at [facility]. N/A 12/16/2021    2ND LOOK/ EVALUATION UNDER ANESTHESIA/ WOUND VAC CHANGE ABDOMINAL WALL AND PERINEUM performed by Jaun Recinos MD at [facility] 60. N/A 12/21/2021    EVALUATION UNDER ANESTHESIA/ WOUND VAC CHANGE ABDOMINAL WALL AND PERINEUM performed by Jaun Recinos MD at [facility]. N/A 12/23/2021    EVALUATION UNDER ANESTHESIA/ WOUND VAC [address] performed by Jaun Recinos MD at [address] [place] [address] 12/07/2021    LAPAROSCOPIC COLOSTOMY CREATION performed by Morenita Escalante MD at [address] [place] [address]     many years ago    MUSCLE REPAIR N/A 01/03/2022    GLUTEAL WOUND VAC PLACEMENT performed by Jaun Recinos MD at [address] [place] [address] N/A 12/27/2021    PERINEAL WOUND VAC CHANGE performed by Jaun Recinos MD at [address] [place] [address] N/A 12/30/2021    PERINEAL / ABDOMINAL WOUND VAC CHANGE, performed by Jaun Recinos MD at [address] [place] [address] N/A 01/10/2022    PERINEAL WOUND VAC REMOVAL, EXAM UNDER ANESTHESIA performed by Jaun Recinos MD at [place] [address] 11/30/2021    DEBRIDEMENT OF SCROTAL JAYRO'S GANGRENE performed by Lori To MD at [facility] 01/03/2022    SPLIT THICKNESS SKIN GRAFT TO ABDOMEN WOUND VAC PLACEMENT. 44x 11 performed by Jaun Recinos MD at [address] 01/06/2022    LEFT POSTERIOR THIGH ADVANCEMENT 13X7CM; CLOSURE PERINEUM 7.2CM; SKIN GRAFT 7. 1CMX7.2CM TO LEFT BUTTOCK; APPLICATION OF WOUND VACUUM DEVICE performed by Jaun Recinos MD at [address] 06/23/2022    LEFT FIFTH RAY AMPUTATION, LEFT SECOND TOE AMPUTATION, INCISION AND DEBRIBEMENT OF LEFT MIDFOOT; APPLICATION OF WOUND VAC  [facility] 11/30/2021    PERINEAL SOFT TISSUE EXCISIONAL DEBRIDEMENT performed by Guilherme Guan MD at [address] [facility] Left 06/14/2022    SFA angioplasty and stent; REY open, peroneal occludes distally, PTA has a        Immunization History: There is no immunization history on file for this patient.     Active Problems:  Patient Active Problem List   Diagnosis Code    Uncontrolled type 2 diabetes mellitus with diabetic polyneuropathy, without long-term current use of insulin (HCC) E11.42, E11.65    Ischemic ulcer of left foot with necrosis of muscle ([address] L97.523    Ischemic ulcer of right ankle with necrosis of muscle ([address] L97.313    Gangrene of toe of left foot ([address] I96    Current every day smoker F17.200    Hyperlipidemia E78.5    Diabetic ulcer of left foot associated with type 2 diabetes mellitus, with necrosis of bone ([address] E11.621, L97.524    Cellulitis of left foot L03.116    Ischemic toe ulcer, left, with fat layer exposed ([address] L97.522    Ischemic ulcer of right foot with necrosis of muscle ([address] L97.513    Atherosclerosis of native arteries of left leg with ulceration of foot ([facility]) I70.245    Atherosclerosis of native arteries of right leg with ulceration of foot ([facility]) I70.235    Osteomyelitis ([facility]) M86.9       Isolation/Infection:   Isolation            No Isolation          Patient Infection Status       None to display            Nurse Assessment:  Last Vital Signs: BP (!) 143/69   Pulse 69   Temp 98.7 °F (37.1 °C) (Oral)   Resp 16   Ht 5' 11\" (1.803 m)   Wt 161 lb (73 kg)   SpO2 97%   BMI 22.45 kg/m²     Last documented pain score (0-10 scale): Pain Level: 5  Last Weight:   Wt Readings from Last 1 Encounters:   06/22/22 161 lb (73 kg)     Mental Status:  {IP PT MENTAL STATUS:20030}    IV Access:  {[facility] IV ACCESS:202472382}    Nursing Mobility/ADLs:  Walking   {[facility] DME GEJS:461888977}  Transfer  {[facility] DME ZCKT:557778022}  Bathing  {[facility] DME YOTS:481867668}  Dressing  {[facility] DME VOQA:943405695}  Toileting  {[facility] DME QPHK:156994996}  Feeding  {[facility] DME XWGJ:970164079}  Med Admin  {[facility] DME OKFX:709923985}  Med Delivery   { SAMANTHA MED Delivery:905100961}    Wound Care Documentation and Therapy:  Negative Pressure Wound Therapy Foot Left;Lateral (Active)   $ Standard NPWT >50 sq cm PER TX $ Yes 07/01/22 1006   Wound Type Diabetic foot ulcer 07/01/22 1006   Unit Type rental [id number] 07/01/22 1006   Dressing Type Non adherent contact layer;Black Foam 07/01/22 1006   Number of pieces used 1 07/01/22 1006   Number of pieces removed 2 07/01/22 1006   Cycle Continuous 07/01/22 1006   Target Pressure (mmHg) 125 07/01/22 1006   Intensity 1 07/01/22 1006   Dressing Status New dressing applied 07/01/22 1006   Dressing Changed Changed/New 07/01/22 1006   Drainage Amount Scant 07/01/22 1006   Drainage Description Sanguinous 07/01/22 1006   Dressing Change Due 07/04/22 07/01/22 1006   Wound Assessment Fibrinous; Slough 07/01/22 1006   Princess-wound Assessment Blanchable erythema 07/01/22 1006   Shape round 07/01/22 1006   Odor None 07/01/22 1006   Number of days: 8       Wound 12/09/21 Toe (Comment  which one) ulcer to fifth toe per podietry (Active)   Number of days: 203       Wound 02/16/22 #1 Right Lateral Ankle, Arterial, Full Thickness, onset  (Active)   Wound Image   07/01/22 1006   Wound Etiology Diabetic 07/01/22 1006   Dressing Status New dressing applied 07/01/22 1006   Wound Cleansed Cleansed with saline 07/01/22 1006   Dressing/Treatment Gauze dressing/dressing sponge; Ace wrap 07/01/22 1006   Offloading for Diabetic Foot Ulcers Offloading ordered 07/01/22 1006   Dressing Change Due 07/02/22 07/01/22 1006   Wound Length (cm) 1.2 cm 07/01/22 1006   Wound Width (cm) 1 cm 07/01/22 1006   Wound Depth (cm) 0.1 cm 07/01/22 1006   Wound Surface Area (cm^2) 1.2 cm^2 07/01/22 1006   Change in Wound Size % (l*w) 52.94 07/01/22 1006   Wound Volume (cm^3) 0.12 cm^3 07/01/22 1006   Wound Healing % 53 07/01/22 1006   Post-Procedure Length (cm) 0.6 cm 06/15/22 1456   Post-Procedure Width (cm) 0.8 cm 06/15/22 1456   Post-Procedure Depth (cm) 0.2 cm 06/15/22 1456   Post-Procedure Surface Area (cm^2) 0.48 cm^2 06/15/22 1456   Post-Procedure Volume (cm^3) 0.096 cm^3 06/15/22 1456   Distance Tunneling (cm) 0 cm 06/24/22 1141   Tunneling Position ___ O'Clock 0 06/24/22 1141   Undermining Starts ___ O'Clock 0 06/24/22 1141   Undermining Ends___ O'Clock 0 06/24/22 1141   Undermining Maxium Distance (cm) 0 06/24/22 1141   Wound Assessment [place]/red;Slough 07/01/22 1006   Drainage Amount Scant 07/01/22 1006   Drainage Description Yellow;Purulent 07/01/22 1006   Odor None 07/01/22 1006   Princess-wound Assessment Intact 07/01/22 1006   Margins Attached edges 07/01/22 1006   Wound Thickness Description not for Pressure Injury Partial thickness 07/01/22 1006   Number of days: 135    rt ankle         Wound 02/16/22 #2 Right Lateral Foot, Arterial, Full Thickness Onset Nov 2021 (Active)   Wound Image   07/01/22 1006   Wound Etiology Diabetic 07/01/22 1006   Dressing Status New dressing applied 07/01/22 1006   Wound Cleansed Cleansed with saline 07/01/22 1006   Dressing/Treatment Gauze dressing/dressing sponge; Ace wrap 07/01/22 1006   Offloading for Diabetic Foot Ulcers Offloading ordered 07/01/22 1006   Dressing Change Due 07/02/22 07/01/22 1006   Wound Length (cm) 1.7 cm 07/01/22 1006   Wound Width (cm) 2.5 cm 07/01/22 1006   Wound Depth (cm) 0.1 cm 07/01/22 1006   Wound Surface Area (cm^2) 4.25 cm^2 07/01/22 1006   Change in Wound Size % (l*w) -325 07/01/22 1006   Wound Volume (cm^3) 0.425 cm^3 07/01/22 1006   Wound Healing % -325 07/01/22 1006   Post-Procedure Length (cm) 2.7 cm 06/08/22 1422   Post-Procedure Width (cm) 1.8 cm 06/08/22 1422   Post-Procedure Depth (cm) 0.5 cm 06/08/22 1422   Post-Procedure Surface Area (cm^2) 4.86 cm^2 06/08/22 1422   Post-Procedure Volume (cm^3) 2.43 cm^3 06/08/22 1422   Distance Tunneling (cm) 0 cm 06/24/22 1141   Tunneling Position ___ O'Clock 0 06/24/22 1141   Undermining Starts ___ O'Clock 7 06/24/22 1141   Undermining Ends___ O'Clock 11 06/24/22 1141   Undermining Maxium Distance (cm) 0.9 06/24/22 1141   Wound Assessment [place]/red;Slough 07/01/22 1006   Drainage Amount Scant 07/01/22 1006   Drainage Description Yellow;Purulent 07/01/22 1006   Odor None 07/01/22 1006   Princess-wound Assessment Intact 07/01/22 1006   Margins Attached edges 07/01/22 1006   Wound Thickness Description not for Pressure Injury Partial thickness 07/01/22 1006   Number of days: 135    rt lateral foot         Wound 02/16/22  NPWT #3 Left Dorsal Foot, Arterial, Full Thickness, Onset Nov 2021 (Active)   Wound Image   07/01/22 0934   Wound Etiology Diabetic 07/01/22 0934   Dressing Status New dressing applied 07/01/22 0934   Wound Cleansed Cleansed with saline 07/01/22 0934   Dressing/Treatment Negative pressure wound therapy; Ace wrap 07/01/22 0934   Offloading for Diabetic Foot Ulcers Offloading ordered 07/01/22 0934   Dressing Change Due 07/04/22 07/01/22 0934   Wound Length (cm) 2.5 cm 07/01/22 0934   Wound Width (cm) 2.6 cm 07/01/22 0934   Wound Depth (cm) 0.4 cm 07/01/22 0934   Wound Surface Area (cm^2) 6.5 cm^2 07/01/22 0934   Change in Wound Size % (l*w) 60.96 07/01/22 0934   Wound Volume (cm^3) 2.6 cm^3 07/01/22 0934   Wound Healing % -56 07/01/22 0934   Post-Procedure Length (cm) 2.5 cm 06/15/22 1456   Post-Procedure Width (cm) 3.2 cm 06/15/22 1456   Post-Procedure Depth (cm) 0.5 cm 06/15/22 1456   Post-Procedure Surface Area (cm^2) 8 cm^2 06/15/22 1456   Post-Procedure Volume (cm^3) 4 cm^3 06/15/22 1456   Distance Tunneling (cm) 0 cm 06/24/22 1141   Tunneling Position ___ O'Clock 0 06/24/22 1141   Undermining Starts ___ O'Clock 0 06/24/22 1141   Undermining Ends___ O'Clock 0 06/24/22 1141   Undermining Maxium Distance (cm) 0 06/24/22 1141   Wound Assessment Pink/red;Slough; Exposed structure tendon 07/01/22 0934   Drainage Amount Small 07/01/22 0934   Drainage Description Purulent 07/01/22 0934   Odor None 07/01/22 0934   Princess-wound Assessment Blanchable erythema; Intact 07/01/22 0934   Margins Attached edges 07/01/22 0934   Wound Thickness Description not for Pressure Injury Full thickness 07/01/22 0934   Number of days: 135    dorsal foot                Wound 02/16/22 #5 Left 5th Toe, Arterial, Full thickness, Onset Nov 2021 (Active)   Wound Image   07/01/22 1006   Wound Etiology Diabetic 07/01/22 1006   Dressing Status New dressing applied 07/01/22 1006   Wound Cleansed Cleansed with saline 07/01/22 1006   Dressing/Treatment Xeroform;Dry dressing;Roll gauze; Ace wrap 07/01/22 1006   Offloading for Diabetic Foot Ulcers Offloading ordered 06/29/22 0919   Dressing Change Due 07/01/22 06/29/22 0919   Wound Length (cm) 6 cm 07/01/22 1006   Wound Width (cm) 0.1 cm 07/01/22 1006   Wound Depth (cm) 0.1 cm 07/01/22 1006   Wound Surface Area (cm^2) 0.6 cm^2 07/01/22 1006   Change in Wound Size % (l*w) 83.96 07/01/22 1006   Wound Volume (cm^3) 0.06 cm^3 07/01/22 1006   Wound Healing % 84 07/01/22 1006   Post-Procedure Length (cm) 1.5 cm 06/15/22 1456   Post-Procedure Width (cm) 1.5 cm 06/15/22 1456   Post-Procedure Depth (cm) 0.2 cm 06/15/22 1456   Post-Procedure Surface Area (cm^2) 2.25 cm^2 06/15/22 1456   Post-Procedure Volume (cm^3) 0.45 cm^3 06/15/22 1456   Distance Tunneling (cm) 0 cm 06/24/22 1141   Tunneling Position ___ O'Clock 0 06/24/22 1141   Undermining Starts ___ O'Clock 0 06/24/22 1141   Undermining Ends___ O'Clock 0 06/24/22 1141   Undermining Maxium Distance (cm) 0 06/24/22 1141   Wound Assessment Other (Comment) 07/01/22 1006   Drainage Amount None 07/01/22 1006   Drainage Description Serosanguinous 06/27/22 1017   Odor None 07/01/22 1006   Princess-wound Assessment Blanchable erythema 07/01/22 1006   Margins Attached edges 07/01/22 1006   Wound Thickness Description not for Pressure Injury Partial thickness 07/01/22 1006   Number of days: 135    left 5th toe         Wound 04/27/22 #10 Left 2nd Toe, Arterial, Full Thickness, Onset 04/25/2022 sx 6/23 amp. (Active)   Wound Image   07/01/22 0934   Wound Etiology Diabetic 07/01/22 0934   Dressing Status New dressing applied 07/01/22 0934   Wound Cleansed Cleansed with saline 07/01/22 0934   Dressing/Treatment Xeroform;Dry dressing 07/01/22 0934   Offloading for Diabetic Foot Ulcers Offloading ordered 07/01/22 0934   Dressing Change Due 07/02/22 07/01/22 0934   Wound Length (cm) 0.1 cm 07/01/22 0934   Wound Width (cm) 2 cm 07/01/22 0934   Wound Depth (cm) 0 cm 06/29/22 0919   Wound Surface Area (cm^2) 0.2 cm^2 07/01/22 0934   Change in Wound Size % (l*w) 66.67 07/01/22 0934   Wound Volume (cm^3) 0 cm^3 06/29/22 0919   Wound Healing % 100 06/29/22 0919   Post-Procedure Length (cm) 0.8 cm 06/08/22 1422   Post-Procedure Width (cm) 1 cm 06/08/22 1422   Post-Procedure Depth (cm) 0.1 cm 06/08/22 1422   Post-Procedure Surface Area (cm^2) 0.8 cm^2 06/08/22 1422   Post-Procedure Volume (cm^3) 0.08 cm^3 06/08/22 1422   Distance Tunneling (cm) 0 cm 06/24/22 1141   Tunneling Position ___ O'Clock 0 06/24/22 1141   Undermining Starts ___ O'Clock 0 06/24/22 1141   Undermining Ends___ O'Clock 0 06/24/22 1141   Undermining Maxium Distance (cm) 0 06/24/22 1141   Wound Assessment Other (Comment) 07/01/22 0934   Drainage Amount None 07/01/22 0934   Odor None 07/01/22 0934   Princess-wound Assessment Intact 07/01/22 0934   Margins Attached edges 07/01/22 0934   Wound Thickness Description not for Pressure Injury Partial thickness 07/01/22 0934   Number of days: 64    2nd toe amp. Wound 06/21/22 Buttocks Left dry scab with old healed scaring (Active)   Wound Image   06/22/22 1758   Wound Etiology Diabetic 06/24/22 1141   Dressing Status Clean;Dry; Intact 07/01/22 0745   Wound Cleansed Cleansed with saline 06/25/22 1032   Dressing/Treatment Other (comment); Pharmaceutical agent (see MAR) 06/25/22 1942   Dressing Change Due 06/27/22 06/27/22 0757   Wound Length (cm) 3.5 cm 06/21/22 1852   Wound Width (cm) 4 cm 06/21/22 1852   Wound Depth (cm) 0.1 cm 06/21/22 1852   Wound Surface Area (cm^2) 14 cm^2 06/21/22 1852   Wound Volume (cm^3) 1.4 cm^3 06/21/22 1852   Wound Assessment Dry;Pink/red 06/29/22 0857   Drainage Amount None 06/29/22 0857   Odor None 06/27/22 0757   Princess-wound Assessment Blanchable erythema 06/25/22 1032   Wound Thickness Description not for Pressure Injury Partial thickness 06/24/22 1141   Number of days: 9     Response to treatment:  Well tolerated by patient. Pain Assessment:  Severity:  0 / 10  Quality of pain: N/A  Wound Pain Timing/Severity: none  Premedicated: No  Plan:   Plan of Care: Wound 04/27/22 #10 Left 2nd Toe, Arterial, Full Thickness, Onset 04/25/2022 sx 6/23 amp.-Dressing/Treatment: Meenu Philippe dressing  Wound 02/16/22 #1 Right Lateral Ankle, Arterial, Full Thickness, onset -Dressing/Treatment: Gauze dressing/dressing sponge,Ace wrap  Wound 02/16/22 #2 Right Lateral Foot, Arterial, Full Thickness Onset Nov 2021-Dressing/Treatment: Gauze dressing/dressing sponge,Ace wrap  Wound 02/16/22  NPWT #3 Left Dorsal Foot, Arterial, Full Thickness, Onset Nov 2021-Dressing/Treatment: Negative pressure wound therapy,Ace wrap  Wound 02/16/22 #5 Left 5th Toe, Arterial, Full thickness, Onset Nov 2021-Dressing/Treatment: Xeroform,Dry dressing,Roll gauze,Ace wrap  Wound 06/21/22 Buttocks Left dry scab with old healed scaring-Dressing/Treatment: Other (comment),Pharmaceutical agent (see MAR)  Wound 02/16/22 #4 Left Lateral Foot, Arterial, full thickness,, Onset Nov 2021-Dressing/Treatment: Ace wrap      NPWT VAC Changed Mom, Wed, Fri. Cleanse left dorsal foot wound with normal saline, pat dry, apply barrier wipe to wound edges. Apply VAC drape to wound edges.     1 piece non adherent dressing top of dorsal foot wound. 1 pieces of VAC black foam used for dorsal foot left wound dressing. Cover with VAC drape to seal.       Treatment is 125 mmHg continuous suction. Change cannister once a week or when full. Not changed today. If suction fails or patient is discharged:  -remove vac dressing  -cleanse wound with normal saline  -pack wound with saline moistened guaze and change every 12 hours. Call wound care for deterioration [phone] or call [phone] and leave message. Recommend   Left foot (#10 )  2nd ampt. Toe  And (#4 & #5) 5th amp toe area  Down lateral left foot cleanse with normal saline, pat dry, apply Xeroform daily. Cover dry dressing, roll guaze, ace wrap    Daily right foot lateral foot and ankle cleanse normal saline, pat dry, apply PSO, Triad paste cover dry dressing roll guaze wrap ace wrap. Elimination:  Continence: Bowel: {YES / ZW:75777}  Bladder: {YES / H}  Urinary Catheter: {Urinary Catheter:249307742}   Colostomy/Ileostomy/Ileal Conduit: {YES / VQ:16304}  Colostomy RLQ-Stomal Appliance: 1 piece  Colostomy RLQ-Flange Size (inches):  (1 \"  32 mm)  Colostomy RLQ-Stoma  Assessment: Moist,Pink,Red  Colostomy RLQ-Peristomal Assessment: Clean, dry & intact  Colostomy RLQ-Treatment: Bag change  Colostomy RLQ-Stool Appearance: Loose  Colostomy RLQ-Stool Color: Brown  Colostomy RLQ-Stool Amount: Large  Colostomy RLQ-Output (mL): 0 ml    Stoma RLQ  Ostomy 32mm= 1   Flange one piece #70171(patient's)  Facility Convac Flip placed on patient #[id number]     Colostomy not changed this time. Date of Last BM: ***    Intake/Output Summary (Last 24 hours) at 2022 1143  Last data filed at 2022 0816  Gross per 24 hour   Intake 480 ml   Output 1075 ml   Net -595 ml     I/O last 3 completed shifts:   In: 240 [P.O.:240]  Out: 1275 [Urine:1275]    Safety Concerns:     [facility] Safety Concerns:995719310}    Impairments/Disabilities:      [address] SINGH Impairments/Disabilities:140701135}    Nutrition Therapy:  Current Nutrition Therapy:   [address] [place] SAMANTHA Diet List:484874890}    Routes of Feeding: {[facility]P DME Other Feedings:661869900}  Liquids: {Slp liquid thickness:36080}  Daily Fluid Restriction: {CHP DME Yes amt example:243734022}  Last Modified Barium Swallow with Video (Video Swallowing Test): {Done Not Done [facility]:183993409}    Treatments at the Time of Hospital Discharge:   Respiratory Treatments: ***  Oxygen Therapy:  {Therapy; copd oxygen:92809}  Ventilator:    { CC Vent NFWO:666736192}    Rehab Therapies: {THERAPEUTIC INTERVENTION:0847357904}  Weight Bearing Status/Restrictions: [address] [place] CC Weight Bearin}  Other Medical Equipment (for information only, NOT a DME order):  {EQUIPMENT:360584624}  Other Treatments: ***    Patient's personal belongings (please select all that are sent with patient):  {[facility] DME Belongings:434744168}    RN SIGNATURE:  {Esignature:882230929}    CASE MANAGEMENT/SOCIAL WORK SECTION    Inpatient Status Date: 22     Readmission Risk Assessment Score:  Readmission Risk              Risk of Unplanned Readmission:  17           Discharging to Facility/ Agency   Discharging to Facility/ Agency   Name:  [facility] care    Address: [address] [place] [address], [address] [place] [address]  Phone: [phone]  Fax: [phone]     WOUND Care Appointment with  on  at 2:45 pm.     / signature: Electronically signed by Dominik Kim RN on 22 at 3:35 PM EDT    PHYSICIAN SECTION    Prognosis: Good    Condition at Discharge: Stable    Rehab Potential (if transferring to Rehab): Good    Recommended Labs or Other Treatments After Discharge: SN to educate family/patient: Wound vac to left foot; wound care to amputation sites and right foot.  PO antibiotics per ID recommendations (Cipro 500mg BID through 72)    Physician Certification: I certify the above information and transfer of Tiffany Robert is necessary for the continuing treatment of the diagnosis listed and that he requires [place] for less 30 days.      Update Admission H&P: No change in H&P    PHYSICIAN SIGNATURE:  Electronically signed by JAGJIT Sharma CNP on 6/29/22 at 9:00 AM EDT

## 2022-06-28 NOTE — CARE COORDINATION
6/28/22 Pt will likely qualify for Medicaid for placement only. Asked the ARU to look at this pt again and also asked [facility] to review. Will follow. Update: [facility] can not accept pending Medicaid. Will need ID final recs for antibiotics and duration.  Pt does not have a pending Medicaid case number yet that could take a few days, pt only has an application number currently which is  [id number]

## 2022-06-28 NOTE — PROGRESS NOTES
Physical Therapy  Facility/Department: [facility] C5 - MED SURG/ORTHO  Daily Treatment Note  NAME: Tiffany Robert  : 1955  MRN: [id number]    Date of Service: 2022    Discharge Recommendations:  IP Rehab     [facility] 6 Clicks Inpatient Mobility:  AM-PAC Mobility Inpatient   How much difficulty turning over in bed?: None  How much difficulty sitting down on / standing up from a chair with arms?: A Little  How much difficulty moving from lying on back to sitting on side of bed?: A Little  How much help from another person moving to and from a bed to a chair?: A Little  How much help from another person needed to walk in hospital room?: A Little  How much help from another person for climbing 3-5 steps with a railing?: Total  AM-PAC Inpatient Mobility Raw Score : 17  AM-PAC Inpatient T-Scale Score : 42.13  Mobility Inpatient CMS 0-100% Score: 50.57  Mobility Inpatient CMS G-Code Modifier : CK      Patient Diagnosis(es): Diagnoses of Osteomyelitis of fifth toe of left foot ([address] Osteomyelitis of second toe of left foot ([address] and Diabetic ulcer of left midfoot associated with type 2 diabetes mellitus, unspecified ulcer stage ([address] were pertinent to this visit. Assessment   Assessment: Pt seen for therapeutic exercises to BLEs 15 reps each, transfer training with CGA , Gait training with SW heel only WB in boot LLE 25 rw' with sba. Pt will benefit from skilled PT to address deficits. Recommend IP Rehab at discharge  Activity Tolerance: Patient tolerated evaluation without incident     Plan    Plan  Plan: 1 time a day 7 days a week  Current Treatment Recommendations: Strengthening;Balance training;Functional mobility training;Gait training;Transfer training; Therapeutic activities; Endurance training;Home exercise program;Safety education & training;Stair training     Restrictions  Restrictions/Precautions  Restrictions/Precautions: Weight Bearing,Fall Risk  Lower Extremity Weight Bearing Restrictions  Left Lower Extremity Weight Bearing: Partial Weight Bearing: heel only in boot  Position Activity Restriction  Other position/activity restrictions: ostomy, wound vac to L foot     Subjective    Subjective  Subjective: Pt agrees to therapy  Pain: no c/o pain made to writer  Orientation  Overall Orientation Status: Within Normal Limits     Objective   Vitals   (132/74  HR 77 O2 98% RA)  Bed Mobility Training  Bed Mobility Training: Yes  Supine to Sit: Modified independent  Sit to Supine: Modified independent  Balance  Sitting: Intact  Standing: Impaired  Standing - Static: Constant support; Fair (rw)  Standing - Dynamic: Fair (rw)  Transfer Training  Transfer Training: Yes  Sit to Stand: Contact-guard assistance  Stand to Sit: Contact-guard assistance  Gait Training  Gait Training: Yes  Left Side Weight Bearing:  (N.O.: partial (heel only while in boot) WB)  Gait  Overall Level of Assistance: Contact-guard assistance  Interventions: Verbal cues; Safety awareness training  Speed/Luna: Slow  Gait Abnormalities: Decreased step clearance  Distance (ft): 25 Feet (2x)  Assistive Device: Gait belt;Walker, rolling     PT Exercises  Exercise Treatment: supine exs: AP, QS, GS, heelsides, abd X 15 BLE in bed     Safety Devices  Type of Devices: All ora prominences offloaded;Call light within reach;Gait belt;Nurse notified; Patient at risk for falls; Left in bed;Bed alarm in place       Goals  Short Term Goals  Time Frame for Short term goals: 5 days (6/29/22)  Short term goal 1: Pt will perform bed mobility mod I: MET 6/26  Short term goal 2: Pt will perform transfers with CGA: 6/26 MET, new goal to perform transfers SBA  - 6/28 cga  Short term goal 3: Pt will propel wheelchair 100' with supervision: 6/28 not assessed  Short term goal 4: By 6/26/22, pt will tolerate 15 reps of LE ther ex for improved strength and activity tolerance: 6/28 MET, ONGOING  Short term goal 5: pt will negotiate 4 steps with HR and mod A: 6/28 Unable to attempt  Long Term Goals  Long term goal 1: Short term goal 6: pt to amb 10 ft with SW, NWB LLE: 6/26 pt took 4 steps with SW, NWB LLE, min assist  - 6/28 25' rw sba  Patient Goals   Patient goals : Patel Arce go back home\" - 6/28 not met    Education  Patient Education  Education Given To: Patient  Education Provided: Role of Therapy;Plan of Care;Precautions;Transfer Training  Education Provided Comments: Pt educated in compensatory gait with SW for NWB LLE.  He voices and demonstrates understanding  Education Method: Demonstration;Verbal  Education Outcome: Verbalized understanding;Demonstrated understanding    Therapy Time   Individual Concurrent Group Co-treatment   Time In 3287         Time Out 1606         Minutes 39         Timed Code Treatment Minutes: [address]

## 2022-06-28 NOTE — PROGRESS NOTES
Department of Podiatric  Surgery  Dr. Terrence Dick  Progress Note      SUBJECTIVE: POD # 5 left 2nd toe, 5th ray and debridement of dorsal foot to tendon left foot.      OBJECTIVE    Physical    VITALS:  /73   Pulse 80   Temp 98.5 °F (36.9 °C) (Oral)   Resp 16   Ht 5' 11\" (1.803 m)   Wt 161 lb (73 kg)   SpO2 97%   BMI 22.45 kg/m²   Gen: AAO x3, NAD   Dressing intact left foot          Data    CBC with Differential:    Lab Results   Component Value Date    WBC 11.6 06/27/2022    RBC 3.45 06/27/2022    HGB 10.4 06/27/2022    HCT 30.6 06/27/2022     06/27/2022    MCV 88.7 06/27/2022    MCH 30.1 06/27/2022    MCHC 33.9 06/27/2022    RDW 14.0 06/27/2022    BANDSPCT 1 12/10/2021    METASPCT 1 12/09/2021    LYMPHOPCT 14.8 06/27/2022    PROMYELOPCT 2 12/04/2021    MONOPCT 6.6 06/27/2022    MYELOPCT 1 12/09/2021    BASOPCT 0.8 06/27/2022    MONOSABS 0.8 06/27/2022    LYMPHSABS 1.7 06/27/2022    EOSABS 0.4 06/27/2022    BASOSABS 0.1 06/27/2022     CMP:    Lab Results   Component Value Date     06/27/2022    K 4.2 06/27/2022    K 3.8 12/12/2021     06/27/2022    CO2 30 06/27/2022    BUN 26 06/27/2022    CREATININE 0.7 06/27/2022    GFRAA >60 06/27/2022    AGRATIO 0.7 11/30/2021    LABGLOM >60 06/27/2022    GLUCOSE 182 06/27/2022    PROT 4.8 12/10/2021    LABALBU 2.4 01/11/2022    CALCIUM 9.7 06/27/2022    BILITOT 0.3 12/10/2021    ALKPHOS 170 12/10/2021    AST 13 12/10/2021    ALT 7 12/10/2021     Wound Culture:  Serratia marcescens (1)     Antibiotic Interpretation Microscan   Method Status     ceFAZolin Resistant >=64 mcg/mL BACTERIAL SUSCEPTIBILITY PANEL BY LIDIA       cefepime Sensitive <=0.12 mcg/mL BACTERIAL SUSCEPTIBILITY PANEL BY LIDIA       cefTRIAXone Sensitive <=0.25 mcg/mL BACTERIAL SUSCEPTIBILITY PANEL BY LIDIA       ciprofloxacin Sensitive <=0.25 mcg/mL BACTERIAL SUSCEPTIBILITY PANEL BY LIDIA       ertapenem Sensitive <=0.12 mcg/mL BACTERIAL SUSCEPTIBILITY PANEL BY LIDIA       gentamicin Sensitive <=1 mcg/mL BACTERIAL SUSCEPTIBILITY PANEL BY LIDIA       levofloxacin Sensitive <=0.12 mcg/mL BACTERIAL SUSCEPTIBILITY PANEL BY LIDIA       trimethoprim-sulfamethoxazole Sensitive <=20 mcg/mL BACTERIAL SUSCEPTIBILITY PANEL BY LIDIA             Wound Culture: Serratia marcescens Abnormal      Current Inpatient Medications    Current Facility-Administered Medications: ciprofloxacin (CIPRO) tablet 500 mg, 500 mg, Oral, 2 times per day  insulin glargine (LANTUS) injection vial 13 Units, 13 Units, SubCUTAneous, Nightly  insulin lispro (HUMALOG) injection vial 4 Units, 4 Units, SubCUTAneous, TID WC  naloxone (NARCAN) injection 0.4 mg, 0.4 mg, IntraVENous, PRN  oxyCODONE (ROXICODONE) immediate release tablet 5 mg, 5 mg, Oral, Q8H PRN  HYDROmorphone (DILAUDID) injection 0.25 mg, 0.25 mg, IntraVENous, Q4H PRN **OR** HYDROmorphone (DILAUDID) injection 0.5 mg, 0.5 mg, IntraVENous, Q4H PRN  zinc oxide (TRIAD HYDROPHILIC) paste, , Topical, Daily  povidone-iodine 10 % swab, , Topical, Daily  bacitracin-polymyxin b (POLYSPORIN) ointment, , Topical, BID  amLODIPine (NORVASC) tablet 5 mg, 5 mg, Oral, Daily  aspirin EC tablet 81 mg, 81 mg, Oral, Daily  atorvastatin (LIPITOR) tablet 80 mg, 80 mg, Oral, Nightly  sodium chloride flush 0.9 % injection 5-40 mL, 5-40 mL, IntraVENous, 2 times per day  sodium chloride flush 0.9 % injection 5-40 mL, 5-40 mL, IntraVENous, PRN  0.9 % sodium chloride infusion, , IntraVENous, PRN  enoxaparin (LOVENOX) injection 40 mg, 40 mg, SubCUTAneous, Daily  ondansetron (ZOFRAN-ODT) disintegrating tablet 4 mg, 4 mg, Oral, Q8H PRN **OR** ondansetron (ZOFRAN) injection 4 mg, 4 mg, IntraVENous, Q6H PRN  polyethylene glycol (GLYCOLAX) packet 17 g, 17 g, Oral, Daily PRN  acetaminophen (TYLENOL) tablet 650 mg, 650 mg, Oral, Q6H PRN **OR** acetaminophen (TYLENOL) suppository 650 mg, 650 mg, Rectal, Q6H PRN  glucose chewable tablet 16 g, 4 tablet, Oral, PRN  dextrose bolus 10% 125 mL, 125 mL, IntraVENous, PRN **OR** dextrose bolus 10% 250 mL, 250 mL, IntraVENous, PRN  glucagon (rDNA) injection 1 mg, 1 mg, IntraMUSCular, PRN  dextrose 5 % solution, 100 mL/hr, IntraVENous, PRN  insulin lispro (HUMALOG) injection vial 0-6 Units, 0-6 Units, SubCUTAneous, TID WC  insulin lispro (HUMALOG) injection vial 0-3 Units, 0-3 Units, SubCUTAneous, Nightly    ASSESSMENT AND PLAN    Osteomyelitis left 2nd toe, 5th toe metatarsal - POD # 5. Discussed with Dr. Cloyde Gaucher ID and long term abx- possible cipro. Recommend follow up with Dr. Frederick Nair wound care at [facility]. May need biologic graft after wound vac. Attempting to get howard wound vac. He is able to be Weightbearing to left heel in cam walker boot with crutches or walker. Okay when other services are agreeable.

## 2022-06-29 ENCOUNTER — HOSPITAL ENCOUNTER (OUTPATIENT)
Dept: WOUND CARE | Age: 67
Discharge: HOME | End: 2022-06-29

## 2022-06-29 DIAGNOSIS — L97.522 ISCHEMIC TOE ULCER, LEFT, WITH FAT LAYER EXPOSED (HCC): ICD-10-CM

## 2022-06-29 DIAGNOSIS — L97.513: ICD-10-CM

## 2022-06-29 DIAGNOSIS — L97.524 DIABETIC ULCER OF OTHER PART OF LEFT FOOT ASSOCIATED WITH TYPE 2 DIABETES MELLITUS, WITH NECROSIS OF BONE (HCC): ICD-10-CM

## 2022-06-29 DIAGNOSIS — L97.313 ISCHEMIC ULCER OF RIGHT ANKLE WITH NECROSIS OF MUSCLE (HCC): ICD-10-CM

## 2022-06-29 DIAGNOSIS — L97.522 ISCHEMIC ULCER OF LEFT FOOT WITH FAT LAYER EXPOSED (HCC): ICD-10-CM

## 2022-06-29 DIAGNOSIS — E11.621 DIABETIC ULCER OF OTHER PART OF LEFT FOOT ASSOCIATED WITH TYPE 2 DIABETES MELLITUS, WITH NECROSIS OF BONE (HCC): ICD-10-CM

## 2022-06-29 DIAGNOSIS — I96 GANGRENE OF TOE OF LEFT FOOT (HCC): ICD-10-CM

## 2022-06-29 LAB
GLUCOSE BLD-MCNC: 164 MG/DL (ref 70–99)
GLUCOSE BLD-MCNC: 175 MG/DL (ref 70–99)
GLUCOSE BLD-MCNC: 222 MG/DL (ref 70–99)
GLUCOSE BLD-MCNC: 248 MG/DL (ref 70–99)
PERFORMED ON: ABNORMAL

## 2022-06-29 PROCEDURE — 1200000000 HC SEMI PRIVATE

## 2022-06-29 PROCEDURE — 6370000000 HC RX 637 (ALT 250 FOR IP): Performed by: INTERNAL MEDICINE

## 2022-06-29 PROCEDURE — 6360000002 HC RX W HCPCS: Performed by: PODIATRIST

## 2022-06-29 PROCEDURE — 97530 THERAPEUTIC ACTIVITIES: CPT

## 2022-06-29 PROCEDURE — 2580000003 HC RX 258: Performed by: PODIATRIST

## 2022-06-29 PROCEDURE — 6370000000 HC RX 637 (ALT 250 FOR IP): Performed by: PODIATRIST

## 2022-06-29 PROCEDURE — 6360000002 HC RX W HCPCS: Performed by: NURSE PRACTITIONER

## 2022-06-29 PROCEDURE — 6370000000 HC RX 637 (ALT 250 FOR IP)

## 2022-06-29 PROCEDURE — 97110 THERAPEUTIC EXERCISES: CPT

## 2022-06-29 PROCEDURE — 97116 GAIT TRAINING THERAPY: CPT

## 2022-06-29 PROCEDURE — 97166 OT EVAL MOD COMPLEX 45 MIN: CPT

## 2022-06-29 PROCEDURE — 99232 SBSQ HOSP IP/OBS MODERATE 35: CPT | Performed by: INTERNAL MEDICINE

## 2022-06-29 PROCEDURE — 6370000000 HC RX 637 (ALT 250 FOR IP): Performed by: NURSE PRACTITIONER

## 2022-06-29 PROCEDURE — 97535 SELF CARE MNGMENT TRAINING: CPT

## 2022-06-29 RX ORDER — CIPROFLOXACIN 500 MG/1
500 TABLET, FILM COATED ORAL EVERY 12 HOURS SCHEDULED
Qty: 22 TABLET | Refills: 0 | Status: SHIPPED | OUTPATIENT
Start: 2022-06-29 | End: 2022-07-13

## 2022-06-29 RX ORDER — CIPROFLOXACIN 500 MG/1
500 TABLET, FILM COATED ORAL EVERY 12 HOURS SCHEDULED
Qty: 20 TABLET | Refills: 0 | Status: SHIPPED | OUTPATIENT
Start: 2022-06-29 | End: 2022-06-29

## 2022-06-29 RX ORDER — INSULIN GLARGINE 100 [IU]/ML
16 INJECTION, SOLUTION SUBCUTANEOUS NIGHTLY
Status: DISCONTINUED | OUTPATIENT
Start: 2022-06-29 | End: 2022-07-01 | Stop reason: HOSPADM

## 2022-06-29 RX ADMIN — ATORVASTATIN CALCIUM 80 MG: 80 TABLET, FILM COATED ORAL at 20:22

## 2022-06-29 RX ADMIN — INSULIN LISPRO 2 UNITS: 100 INJECTION, SOLUTION INTRAVENOUS; SUBCUTANEOUS at 17:23

## 2022-06-29 RX ADMIN — BACITRACIN ZINC AND POLYMYXIN B SULFATE 14.2 G: at 16:30

## 2022-06-29 RX ADMIN — Medication: at 16:30

## 2022-06-29 RX ADMIN — INSULIN GLARGINE 16 UNITS: 100 INJECTION, SOLUTION SUBCUTANEOUS at 20:14

## 2022-06-29 RX ADMIN — Medication 10 ML: at 20:22

## 2022-06-29 RX ADMIN — OXYCODONE 5 MG: 5 TABLET ORAL at 09:10

## 2022-06-29 RX ADMIN — CIPROFLOXACIN 500 MG: 500 TABLET, FILM COATED ORAL at 20:22

## 2022-06-29 RX ADMIN — OXYCODONE 5 MG: 5 TABLET ORAL at 20:22

## 2022-06-29 RX ADMIN — INSULIN LISPRO 4 UNITS: 100 INJECTION, SOLUTION INTRAVENOUS; SUBCUTANEOUS at 11:52

## 2022-06-29 RX ADMIN — INSULIN LISPRO 4 UNITS: 100 INJECTION, SOLUTION INTRAVENOUS; SUBCUTANEOUS at 17:23

## 2022-06-29 RX ADMIN — Medication 10 ML: at 01:04

## 2022-06-29 RX ADMIN — ENOXAPARIN SODIUM 40 MG: 100 INJECTION SUBCUTANEOUS at 08:59

## 2022-06-29 RX ADMIN — CIPROFLOXACIN 500 MG: 500 TABLET, FILM COATED ORAL at 08:59

## 2022-06-29 RX ADMIN — INSULIN LISPRO 1 UNITS: 100 INJECTION, SOLUTION INTRAVENOUS; SUBCUTANEOUS at 09:00

## 2022-06-29 RX ADMIN — INSULIN LISPRO 4 UNITS: 100 INJECTION, SOLUTION INTRAVENOUS; SUBCUTANEOUS at 09:00

## 2022-06-29 RX ADMIN — INSULIN LISPRO 1 UNITS: 100 INJECTION, SOLUTION INTRAVENOUS; SUBCUTANEOUS at 20:15

## 2022-06-29 RX ADMIN — ASPIRIN 81 MG: 81 TABLET, COATED ORAL at 08:59

## 2022-06-29 RX ADMIN — Medication 10 ML: at 08:59

## 2022-06-29 RX ADMIN — AMLODIPINE BESYLATE 5 MG: 5 TABLET ORAL at 08:59

## 2022-06-29 RX ADMIN — INSULIN LISPRO 2 UNITS: 100 INJECTION, SOLUTION INTRAVENOUS; SUBCUTANEOUS at 11:52

## 2022-06-29 RX ADMIN — HYDROMORPHONE HYDROCHLORIDE 0.25 MG: 1 INJECTION, SOLUTION INTRAMUSCULAR; INTRAVENOUS; SUBCUTANEOUS at 01:03

## 2022-06-29 ASSESSMENT — PAIN SCALES - GENERAL
PAINLEVEL_OUTOF10: 5
PAINLEVEL_OUTOF10: 6
PAINLEVEL_OUTOF10: 5
PAINLEVEL_OUTOF10: 5

## 2022-06-29 ASSESSMENT — PAIN DESCRIPTION - LOCATION: LOCATION: FOOT

## 2022-06-29 ASSESSMENT — PAIN DESCRIPTION - ORIENTATION: ORIENTATION: LEFT

## 2022-06-29 NOTE — PROGRESS NOTES
Infectious Disease Follow up Notes    CC :  OM foot      Antibiotics:   Cipro 500 po BID     Admit Date:   6/21/2022  Hospital Day: 9    Subjective:   He remains afebrile   Feels well, no new concerns     Objective:     Patient Vitals for the past 8 hrs:   BP Temp Temp src Pulse Resp SpO2   06/29/22 0940 -- -- -- -- 14 --   06/29/22 0857 (!) 161/76 97.4 °F (36.3 °C) Axillary 75 16 96 %       Alert, oriented, NAD  NCAT, PERRL, sclera anicteric  Neck supple, symmetrical   Abd soft, flat, NT   VAC L foot  Both feet wrapped - photos reviewed.   Exposed tendons dorsal L foot  No ascending cellulitis   PIV in place       Scheduled Meds:   insulin glargine  16 Units SubCUTAneous Nightly    ciprofloxacin  500 mg Oral 2 times per day    insulin lispro  4 Units SubCUTAneous TID WC    zinc oxide   Topical Daily    povidone-iodine   Topical Daily    bacitracin-polymyxin b   Topical BID    amLODIPine  5 mg Oral Daily    aspirin  81 mg Oral Daily    atorvastatin  80 mg Oral Nightly    sodium chloride flush  5-40 mL IntraVENous 2 times per day    enoxaparin  40 mg SubCUTAneous Daily    insulin lispro  0-6 Units SubCUTAneous TID WC    insulin lispro  0-3 Units SubCUTAneous Nightly       Continuous Infusions:   sodium chloride 100 mL/hr at 06/26/22 1531    dextrose            Data Review:    Lab Results   Component Value Date    WBC 11.6 (H) 06/27/2022    HGB 10.4 (L) 06/27/2022    HCT 30.6 (L) 06/27/2022    MCV 88.7 06/27/2022     06/27/2022     Lab Results   Component Value Date    CREATININE 0.7 (L) 06/27/2022    BUN 26 (H) 06/27/2022     06/27/2022    K 4.2 06/27/2022     06/27/2022    CO2 30 06/27/2022       Hepatic Function Panel:   Lab Results   Component Value Date    ALKPHOS 170 12/10/2021    ALT 7 12/10/2021    AST 13 12/10/2021    PROT 4.8 12/10/2021    BILITOT 0.3 12/10/2021    BILIDIR <0.2 12/10/2021    IBILI see below 12/10/2021    LABALBU 2.4 01/11/2022         MICRO:  6/21 BC x2 neg   Wound culture light growth S marcescens   Serratia marcescens   Antibiotic Interpretation Microscan  Method Status    ceFAZolin Resistant >=64 mcg/mL BACTERIAL SUSCEPTIBILITY PANEL BY LIDIA     cefepime Sensitive <=0.12 mcg/mL BACTERIAL SUSCEPTIBILITY PANEL BY LIDIA     cefTRIAXone Sensitive <=0.25 mcg/mL BACTERIAL SUSCEPTIBILITY PANEL BY LIDIA     ciprofloxacin Sensitive <=0.25 mcg/mL BACTERIAL SUSCEPTIBILITY PANEL BY LIDIA     ertapenem Sensitive <=0.12 mcg/mL BACTERIAL SUSCEPTIBILITY PANEL BY LIDIA     gentamicin Sensitive <=1 mcg/mL BACTERIAL SUSCEPTIBILITY PANEL BY LIDIA     levofloxacin Sensitive <=0.12 mcg/mL BACTERIAL SUSCEPTIBILITY PANEL BY LIDIA     trimethoprim-sulfamethoxazole Sensitive <=20 mcg/mL BACTERIAL SUSCEPTIBILITY PANEL BY LIDIA       6/23 Operative culture light growth S marcescens, GS with no organisms seen       IMAGING:    MRI L foot   Impression   Acute osteomyelitis of the 5th metatarsal and 5th distal phalanx.       Suspected acute osteomyelitis of the 2nd proximal and middle phalanges.       There is bone marrow edema in the cuboid, medial and intermediate cuneiforms,   as well as the navicular.  There appears to be a soft tissue defect of the   dorsum of the midfoot at the level of these bones.  This marrow signal   abnormality could represent reactive osteitis or early acute osteomyelitis.       Nonspecific bone marrow edema in the 1st-3rd metatarsal heads and the 1st   distal phalanx.       No well-defined drainable abscess is seen.       Generalized muscle edema along with subcutaneous edema.        Assessment:     Patient Active Problem List    Diagnosis Date Noted    Osteomyelitis ([facility] 06/21/2022     Priority: Medium    Ischemic toe ulcer, left, with fat layer exposed ([address] 06/07/2022     Priority: Medium    Ischemic ulcer of right foot with necrosis of muscle ([facility] 06/07/2022     Priority: Medium   

## 2022-06-29 NOTE — CARE COORDINATION
[facility] - Acute Rehab Unit   After review, this patient is felt to be:       []  Appropriate for Acute Inpatient Rehab    []  Appropriate for Acute Inpatient Rehab Pending Insurance Authorization    []  Not appropriate for Acute Inpatient Rehab     [x]  Referral received and ARU reviewing patient; Evaluation ongoing. Await OT eval  BUT patient now is recommended for home. Will notify DCP with further updates.  Thank you for the Kajal Godinez RN

## 2022-06-29 NOTE — CARE COORDINATION
Plan poss ARU?- No ins- Looking like overresourced for [facility]. Discussed w ARU liaison. Will need OT eval- ordered. Needs VAC. ID eval ABX whether PO or IV. CM following. Shayna Bolanos RN     [facility] has declined pt. Unclear if ARU can accept. Will explore any poss wound vac options- but without C, unclear how pt would manage. Discussed w CM mgr- states to [email or web address]. Call to facility and discussed situation without PHI. States would need financial info from pt. Discussed w pt- he is in agreement to referral and understands he will need to submit financial info to facility. ABX likely to be PO at DC. Continue to follow. Shayna Bolanos RN     1394 Long conversation w pt and spouse at bedside. Discussed DC options in review:  ARU, [place], or home w VAC and likely follow up at [facility]. Provided with [address] form to be completed if DC to home- will return to CM when completed. Shayna Bolanos RN     [facility][note 3] accept except as private pay- pt informed. Await decision from ARU. Pt has [address] form for completion. Aware of DC order- planning continues.   Shayna Bolanos RN

## 2022-06-29 NOTE — PLAN OF CARE
Problem: Discharge Planning  Goal: Discharge to home or other facility with appropriate resources  Outcome: Progressing  Flowsheets (Taken 6/28/2022 2048 by Maddie Evans RN)  Discharge to home or other facility with appropriate resources: Refer to discharge planning if patient needs post-hospital services based on physician order or complex needs related to functional status, cognitive ability or social support system     Problem: Pain  Goal: Verbalizes/displays adequate comfort level or baseline comfort level  Outcome: Progressing     Problem: Safety - Adult  Goal: Free from fall injury  Outcome: Progressing

## 2022-06-29 NOTE — PROGRESS NOTES
[address] [place] [address] [id number] Continence Nurse  Follow-up Progress Note       NAME:  Aftab Sharma  MEDICAL RECORD NUMBER:  [id number]  AGE:  61 y.o. GENDER:  male  :  1955  TODAY'S DATE:  2022    Subjective:  I'm O.K. Wound Identification:  Wound Type: Dorsal left foot wound VAC, diabetic ulcers to right foot, colostomy     Wound Care re visited patient on colostomy care.  Placed 2021 by Dr. Deepak Don 32mm= 1   Flange one piece #[id number](patient's)  Facility Convac Flip placed on patient #[id number]     Colostomy not changed this time.     Patient since Wound Care RN has seen, has had on 22 by Savanna Gputa DPM.      OPERATION PERFORMED:  1.  Left second toe amputation. 2.  Left fifth toe and partial fifth metatarsal amputation. 3.  Incision and debridement left dorsal foot down to and including  extensor tendons. The left fifth toe and fifth metatarsal where he has chronic ulceration with exposed fifth  metatarsal shaft necrotic ulceration noted to the fifth digit.  The fifth metatarsal  shaft was resected down to the area that was exposed within the  ulceration. After all infected and necrotic tissue was noted to be removed from left dorsal foot a wound VAC with white form was then applied to thedorsal aspect of the foot.     Dressing to left dorsal foot wound VAC changed    Factors:  diabetes, poor glucose control, chronic pressure, shear force and arterial insufficiency    Patient Goal of Care:  [x] Wound Healing  [] Odor Control  [] Palliative Care  [x] Pain Control   [] Other:     Objective:lying in bed. BP (!) 161/76   Pulse 75   Temp 97.4 °F (36.3 °C) (Axillary)   Resp 16   Ht 5' 11\" (1.803 m)   Wt 161 lb (73 kg)   SpO2 96%   BMI 22.45 kg/m²   Reese Risk Score: Reese Scale Score: 19  Assessment: Sough present in wound. Princess wound red.    Measurements:  Negative Pressure Wound Therapy Foot Left;Lateral (Active)   $ Standard NPWT >50 sq cm PER TX $ Yes 06/29/22 0919   Wound Type Diabetic foot ulcer 06/29/22 0919   Unit Type rental [id number] 06/29/22 0919   Dressing Type Non adherent contact layer;Black Foam 06/29/22 0919   Number of pieces used 2 06/29/22 0919   Number of pieces removed 1 06/29/22 0919   Cycle Continuous 06/29/22 0919   Target Pressure (mmHg) 125 06/29/22 0919   Intensity 1 06/29/22 0919   Dressing Status New dressing applied 06/29/22 0919   Dressing Changed Changed/New 06/29/22 0919   Drainage Amount Scant 06/29/22 0919   Drainage Description Sanguinous 06/29/22 0919   Dressing Change Due 07/01/22 06/29/22 0919   Wound Assessment Fibrinous; Slough 06/29/22 0919   Princess-wound Assessment Blanchable erythema 06/29/22 0919   Shape round  06/29/22 0919   Odor None 06/29/22 0919   Number of days: 6       Wound 12/09/21 Toe (Comment  which one) ulcer to fifth toe per podietry (Active)   Number of days: 201       Wound 02/16/22 #1 Right Lateral Ankle, Arterial, Full Thickness, onset  (Active)   Wound Image   06/27/22 1017   Wound Etiology Diabetic 06/27/22 1017   Dressing Status Clean;Dry; Intact 06/28/22 2048   Wound Cleansed Cleansed with saline 06/28/22 2048   Dressing/Treatment Pharmaceutical agent (see MAR); Gauze dressing/dressing sponge; Ace wrap 06/28/22 2048   Offloading for Diabetic Foot Ulcers Offloading ordered 06/27/22 1017   Dressing Change Due 06/28/22 06/27/22 1017   Wound Length (cm) 0.6 cm 06/21/22 1852   Wound Width (cm) 0.6 cm 06/21/22 1852   Wound Depth (cm) 0.1 cm 06/21/22 1852   Wound Surface Area (cm^2) 0.36 cm^2 06/21/22 1852   Change in Wound Size % (l*w) 85.88 06/21/22 1852   Wound Volume (cm^3) 0.036 cm^3 06/21/22 1852   Wound Healing % 86 06/21/22 1852   Post-Procedure Length (cm) 0.6 cm 06/15/22 1456   Post-Procedure Width (cm) 0.8 cm 06/15/22 1456   Post-Procedure Depth (cm) 0.2 cm 06/15/22 1456   Post-Procedure Surface Area (cm^2) 0.48 cm^2 06/15/22 1456   Post-Procedure Volume (cm^3) 0.096 cm^3 06/15/22 1456   Distance Tunneling (cm) 0 cm 06/24/22 1141   Tunneling Position ___ O'Clock 0 06/24/22 1141   Undermining Starts ___ O'Clock 0 06/24/22 1141   Undermining Ends___ O'Clock 0 06/24/22 1141   Undermining Maxium Distance (cm) 0 06/24/22 1141   Wound Assessment [place]/red;Slough 06/28/22 2048   Drainage Amount Scant 06/28/22 2048   Drainage Description Yellow;Purulent 06/28/22 2048   Odor None 06/28/22 2048   Princess-wound Assessment Intact 06/28/22 2048   Wound Thickness Description not for Pressure Injury Partial thickness 06/24/22 1141   Number of days: 132       Wound 02/16/22 #2 Right Lateral Foot, Arterial, Full Thickness Onset Nov 2021 (Active)   Wound Image   06/27/22 1017   Wound Etiology Diabetic 06/27/22 1017   Dressing Status Clean;Dry; Intact 06/28/22 2048   Wound Cleansed Cleansed with saline 06/28/22 2048   Dressing/Treatment Pharmaceutical agent (see MAR); Gauze dressing/dressing sponge; Ace wrap 06/28/22 2048   Offloading for Diabetic Foot Ulcers Offloading ordered 06/27/22 1017   Dressing Change Due 06/28/22 06/27/22 1017   Wound Length (cm) 1.5 cm 06/27/22 1017   Wound Width (cm) 2.5 cm 06/27/22 1017   Wound Depth (cm) 0.1 cm 06/27/22 1017   Wound Surface Area (cm^2) 3.75 cm^2 06/27/22 1017   Change in Wound Size % (l*w) -275 06/27/22 1017   Wound Volume (cm^3) 0.375 cm^3 06/27/22 1017   Wound Healing % -275 06/27/22 1017   Post-Procedure Length (cm) 2.7 cm 06/08/22 1422   Post-Procedure Width (cm) 1.8 cm 06/08/22 1422   Post-Procedure Depth (cm) 0.5 cm 06/08/22 1422   Post-Procedure Surface Area (cm^2) 4.86 cm^2 06/08/22 1422   Post-Procedure Volume (cm^3) 2.43 cm^3 06/08/22 1422   Distance Tunneling (cm) 0 cm 06/24/22 1141   Tunneling Position ___ O'Clock 0 06/24/22 1141   Undermining Starts ___ O'Clock 7 06/24/22 1141   Undermining Ends___ O'Clock 11 06/24/22 1141   Undermining Maxium Distance (cm) 0.9 06/24/22 1141   Wound Assessment [place]/red;Slough 06/28/22 2048   Drainage Amount Scant 06/28/22 2048   Drainage Description Yellow;Purulent 06/28/22 2048   Odor None 06/28/22 2048   Princess-wound Assessment Intact 06/28/22 2048   Margins Attached edges 06/27/22 1017   Wound Thickness Description not for Pressure Injury Partial thickness 06/27/22 1017   Number of days: 132       Wound 02/16/22  NPWT #3 Left Dorsal Foot, Arterial, Full Thickness, Onset Nov 2021 (Active)   Wound Image   06/29/22 0919   Wound Etiology Diabetic 06/29/22 0919   Dressing Status Clean;Dry; Intact 06/29/22 0919   Wound Cleansed Cleansed with saline 06/29/22 0919   Dressing/Treatment Negative pressure wound therapy; Ace wrap 06/29/22 0919   Offloading for Diabetic Foot Ulcers Offloading ordered 06/29/22 0919   Dressing Change Due 07/01/22 06/29/22 0919   Wound Length (cm) 2.6 cm 06/29/22 0919   Wound Width (cm) 2 cm 06/29/22 0919   Wound Depth (cm) 0.4 cm 06/29/22 0919   Wound Surface Area (cm^2) 5.2 cm^2 06/29/22 0919   Change in Wound Size % (l*w) 68.77 06/29/22 0919   Wound Volume (cm^3) 2.08 cm^3 06/29/22 0919   Wound Healing % -25 06/29/22 0919   Post-Procedure Length (cm) 2.5 cm 06/15/22 1456   Post-Procedure Width (cm) 3.2 cm 06/15/22 1456   Post-Procedure Depth (cm) 0.5 cm 06/15/22 1456   Post-Procedure Surface Area (cm^2) 8 cm^2 06/15/22 1456   Post-Procedure Volume (cm^3) 4 cm^3 06/15/22 1456   Distance Tunneling (cm) 0 cm 06/24/22 1141   Tunneling Position ___ O'Clock 0 06/24/22 1141   Undermining Starts ___ O'Clock 0 06/24/22 1141   Undermining Ends___ O'Clock 0 06/24/22 1141   Undermining Maxium Distance (cm) 0 06/24/22 1141   Wound Assessment Pink/red;Slough; Exposed structure tendon 06/29/22 0919   Drainage Amount Small 06/29/22 0919   Drainage Description Purulent 06/29/22 0919   Odor None 06/29/22 0919   Princess-wound Assessment Blanchable erythema; Intact 06/29/22 0919   Margins Attached edges 06/29/22 0919   Wound Thickness Description not for Pressure Injury Full thickness 06/29/22 0919   Number of days: 132    Dorsal left Post-Procedure Depth (cm) 0.1 cm 06/08/22 1422   Post-Procedure Surface Area (cm^2) 0.8 cm^2 06/08/22 1422   Post-Procedure Volume (cm^3) 0.08 cm^3 06/08/22 1422   Distance Tunneling (cm) 0 cm 06/24/22 1141   Tunneling Position ___ O'Clock 0 06/24/22 1141   Undermining Starts ___ O'Clock 0 06/24/22 1141   Undermining Ends___ O'Clock 0 06/24/22 1141   Undermining Maxium Distance (cm) 0 06/24/22 1141   Wound Assessment Other (Comment) 06/29/22 0919   Drainage Amount None 06/29/22 0919   Odor None 06/29/22 0919   Princess-wound Assessment Intact 06/29/22 0919   Margins Attached edges 06/29/22 0919   Wound Thickness Description not for Pressure Injury Partial thickness 06/29/22 0919   Number of days: 62    2nd toe left foot         Wound 06/21/22 Buttocks Left dry scab with old healed scaring (Active)   Wound Image   06/22/22 1758   Wound Etiology Diabetic 06/24/22 1141   Dressing Status Clean;Dry; Intact 06/27/22 0757   Wound Cleansed Cleansed with saline 06/25/22 1032   Dressing/Treatment Other (comment); Pharmaceutical agent (see MAR) 06/25/22 1942   Dressing Change Due 06/27/22 06/27/22 0757   Wound Length (cm) 3.5 cm 06/21/22 1852   Wound Width (cm) 4 cm 06/21/22 1852   Wound Depth (cm) 0.1 cm 06/21/22 1852   Wound Surface Area (cm^2) 14 cm^2 06/21/22 1852   Wound Volume (cm^3) 1.4 cm^3 06/21/22 1852   Wound Assessment Dry;Pink/red 06/25/22 1032   Drainage Amount None 06/27/22 0757   Odor None 06/27/22 0757   Princess-wound Assessment Blanchable erythema 06/25/22 1032   Wound Thickness Description not for Pressure Injury Partial thickness 06/24/22 1141   Number of days: 7     Incision 12/05/21 Anterior; Left (Active)   Number of days: 205       Response to treatment:  Well tolerated by patient.      Pain Assessment:  Severity:  0 / 10  Quality of pain: N/A  Wound Pain Timing/Severity: none  Premedicated: No  Plan:   Plan of Care: Wound 04/27/22 #10 Left 2nd Toe, Arterial, Full Thickness, Onset 04/25/2022 sx 6/23 amp.-Dressing/Treatment: Raynelle Tasia gauze  Wound 02/16/22 #1 Right Lateral Ankle, Arterial, Full Thickness, onset -Dressing/Treatment: Pharmaceutical agent (see MAR),Gauze dressing/dressing sponge,Ace wrap  Wound 02/16/22 #2 Right Lateral Foot, Arterial, Full Thickness Onset Nov 2021-Dressing/Treatment: Pharmaceutical agent (see MAR),Gauze dressing/dressing sponge,Ace wrap  Wound 02/16/22  NPWT #3 Left Dorsal Foot, Arterial, Full Thickness, Onset Nov 2021-Dressing/Treatment: Negative pressure wound therapy,Ace wrap (non adherent dressing under sponge)  Wound 02/16/22 #5 Left 5th Toe, Arterial, Full thickness, Onset Nov 2021-Dressing/Treatment: Xeroform,Dry dressing,Roll gauze,Ace wrap  Wound 06/21/22 Buttocks Left dry scab with old healed scaring-Dressing/Treatment: Other (comment),Pharmaceutical agent (see MAR)  Wound 02/16/22 #4 Left Lateral Foot, Arterial, full thickness,, Onset Nov 2021-Dressing/Treatment: Ace wrap    NPWT VAC Changed Mom, Wed, Fri.      Cleanse left dorsal foot wound with normal saline, pat dry, apply barrier wipe to wound edges. Apply VAC drape to wound edges.    1 piece non adherent dressing top of dorsal foot wound. 1 pieces of VAC black foam used for dorsal foot left wound dressing. Cover with VAC drape to seal.       Treatment is 125 mmHg continuous suction.     Change cannister once a week or when full.  Not changed today.   If suction fails or patient is discharged:  -remove vac dressing  -cleanse wound with normal saline  -pack wound with saline moistened guaze and change every 12 hours.    Call wound care for deterioration [phone] or call [phone] and leave message.     Recommend   Left foot (#10 )  2nd ampt. Toe  And (#4 & #5) 5th amp toe area  Down lateral left foot cleanse with normal saline, pat dry, apply Xeroform daily.   Cover dry dressing, roll guaze, ace wrapDaily right foot lateral foot and ankle cleanse normal saline, pat dry, apply PSO, Triad paste cover dry dressing roll guaze wrap ace wrap. Specialty Bed Required : Yes   [x] Low Air Loss   [x] Pressure Redistribution  [] Fluid Immersion  [] Bariatric  [] Total Pressure Relief  [] Other:     Current Diet: ADULT DIET;  Regular; 5 carb choices (75 gm/meal)  ADULT ORAL NUTRITION SUPPLEMENT; Breakfast, Dinner; Diabetic Oral Supplement  Dietician consult:  Yes    Discharge Plan:  Placement for patient upon discharge: home with support   Patient appropriate for Outpatient [address] [place] [address] Street: Yes    Referrals:  [x]  following  [] [facility]  [] Supplies  [] Other    Patient/Caregiver Teaching:  Level of patient/caregiver understanding able to:   [] Indicates understanding       [] Needs reinforcement  [] Unsuccessful      [] Verbal Understanding  [] Demonstrated understanding       [] No evidence of learning  [] Refused teaching         [x] N/A       Electronically signed by Haylee Zuniga RN, on 6/29/2022 at 9:55 AM

## 2022-06-29 NOTE — DISCHARGE SUMMARY
Hospital Medicine Discharge Summary    Patient ID: Anastasia Abbott      Patient's PCP: No primary care provider on file. Admit Date: 6/21/2022     Discharge Date: 6/29/22    Admitting Provider: No admitting provider for patient encounter. Discharge Provider: Felicia Cabot, APRN - CNP     Discharge Diagnoses: Active Hospital Problems    Diagnosis     Osteomyelitis [facility]) [M86.9]      Priority: Medium       The patient was seen and examined on day of discharge and this discharge summary is in conjunction with any daily progress note from day of discharge. Hospital Course: 61 y. o. male who presented to [facility] with foot infection. Patient has been seeing podiatry for several diabetic foot wounds. The largest one the dorsum of his left foot has worsened over the week. He has been on bactrim for this. His blood sugars have been quite elevated at home. He is due to see his PCP later this week to adjust his insulin further. He denies fevers, chills, chest pain, SOB or nausea. He has minimal sensation in his feet from the diabetes     Sepsis, secondary to osteomyelitis, present on arrival. Clinically improving. Presented with leukocytosis, tachycardia. Lactic acid was normal. Blood cultures were negative. Wound culture positive for serratia marcesens, was placed on cefepime. Podiatry and ID were consulted. On discharge will take Cipro 500mg BID through 7/10 per ID recommendations.     Bilateral foot wounds, stable. Antibiotics, treatments, and consults as above. PT/OT followed. On 6/23 underwent partial left 5th ray amputation and left 2nd toe amputation. Pain management. He will follow up with wound care as an outpatient, including for wound vac that is on left foot. At time of discharge, he has a wound vac on the left foot; two separate areas of sutures from previously mentioned amputations; and wound dressing on right foot     DMII, uncontrolled with neuropathy, and diabetic ulcers.  A1C 10.1 Component Value Date    WBC 11.6 06/27/2022    HGB 10.4 06/27/2022    HCT 30.6 06/27/2022     06/27/2022       Renal:    Lab Results   Component Value Date     06/27/2022    K 4.2 06/27/2022    K 3.8 12/12/2021     06/27/2022    CO2 30 06/27/2022    BUN 26 06/27/2022    CREATININE 0.7 06/27/2022    CALCIUM 9.7 06/27/2022    PHOS 3.6 01/11/2022         Significant Diagnostic Studies    Radiology:   MRI FOOT LEFT WO CONTRAST   Final Result   Acute osteomyelitis of the 5th metatarsal and 5th distal phalanx. Suspected acute osteomyelitis of the 2nd proximal and middle phalanges. There is bone marrow edema in the cuboid, medial and intermediate cuneiforms,   as well as the navicular. There appears to be a soft tissue defect of the   dorsum of the midfoot at the level of these bones. This marrow signal   abnormality could represent reactive osteitis or early acute osteomyelitis. Nonspecific bone marrow edema in the 1st-3rd metatarsal heads and the 1st   distal phalanx. No well-defined drainable abscess is seen. Generalized muscle edema along with subcutaneous edema. LASER SKIN PERFUSION PRESSURE STUDY   Final Result             Consults:     IP CONSULT TO INFECTIOUS DISEASES  IP CONSULT TO PODIATRY  IP CONSULT TO PHARMACY  IP CONSULT TO DIABETES EDUCATOR    Disposition:  SNF? Condition at Discharge: Stable    Discharge Instructions/Follow-up:  PCP within 1 week.  Wound care as needed    Code Status:  Full Code     Activity: activity as tolerated    Diet: regular diet      Discharge Medications:     Current Discharge Medication List           Details   ciprofloxacin (CIPRO) 500 MG tablet Take 1 tablet by mouth every 12 hours for 11 days  Qty: 22 tablet, Refills: 0              Details   aspirin 81 MG EC tablet Take 81 mg by mouth daily      docusate sodium (COLACE) 100 MG capsule Take 100 mg by mouth at bedtime       psyllium (KONSYL) 28.3 % PACK Take 1 packet by mouth daily      atorvastatin (LIPITOR) 80 MG tablet Take 80 mg by mouth nightly      amLODIPine (NORVASC) 5 MG tablet Take 5 mg by mouth daily      insulin glargine (LANTUS) 100 UNIT/ML injection vial Inject 10 Units into the skin nightly      metFORMIN (GLUCOPHAGE) 500 MG tablet Take 500 mg by mouth daily (with breakfast) Stopped on until Friday 6/17/22             Time Spent on discharge is more than 45 minutes in the examination, evaluation, counseling and review of medications and discharge plan. Signed:    JAGJIT Birch - CNP   6/29/2022      Thank you No primary care provider on file. for the opportunity to be involved in this patient's care. If you have any questions or concerns, please feel free to contact me at [phone].

## 2022-06-29 NOTE — PROGRESS NOTES
Physical Therapy  Facility/Department: [place] [address] - MED SURG/ORTHO  Daily Treatment Note  NAME: Austin Brush  : 1955  MRN: [id number]    Date of Service: 2022    Discharge Recommendations:  Home with 24 hr Supervision/Assist, may need RW if does not own RW     AM-[facility] Mobility Inpatient   How much difficulty turning over in bed?: None  How much difficulty sitting down on / standing up from a chair with arms?: A Little  How much difficulty moving from lying on back to sitting on side of bed?: None  How much help from another person moving to and from a bed to a chair?: A Little  How much help from another person needed to walk in hospital room?: A Little  How much help from another person for climbing 3-5 steps with a railing?: A Little  AM-[facility] Inpatient Mobility Raw Score : 20  AM-PAC Inpatient T-Scale Score : 47.67  Mobility Inpatient CMS 0-100% Score: 35.83  Mobility Inpatient CMS G-Code Modifier : CJ       Patient Diagnosis(es): Diagnoses of Osteomyelitis of fifth toe of left foot ([facility] [address] Osteomyelitis of second toe of left foot ([address] and Diabetic ulcer of left midfoot associated with type 2 diabetes mellitus, unspecified ulcer stage ([facility] [address] were pertinent to this visit. Assessment   Assessment: Pt seen for gait training on level surfaces and steps today wearing the cam boot heel weight bearing and using the RW. Pt is grossly CGA/SBA and has 24 hr supervision A at home. Feel pt would be safe for home D/C given the recent change in WB  Activity Tolerance: Patient tolerated evaluation without incident     Plan    Plan  Plan: 1 time a day 7 days a week  Current Treatment Recommendations: Strengthening;Balance training;Functional mobility training;Gait training;Transfer training; Therapeutic activities; Endurance training;Home exercise program;Safety education & training;Stair training     Restrictions  Restrictions/Precautions  Restrictions/Precautions: Weight Bearing,Fall Risk  Lower Extremity Weight Bearing Restrictions  Left Lower Extremity Weight Bearing: Partial Weight Bearing  Partial Weight Bearing Percentage Or Pounds: allowed heel WB LLE  Position Activity Restriction  Other position/activity restrictions: ostomy, wound vac to L foot. Per Podiatry 6/28/22:He is able to be Weightbearing to left heel in cam walker boot with crutches or walker     Subjective    Subjective  Subjective: Pt agrees to therapy  Pain: no c/o pain made to writer  Orientation  Overall Orientation Status: Within Normal Limits     Objective   Vitals: /71, HR 81 and O2 98%     Bed Mobility Training  Bed Mobility Training: Yes  Supine to Sit: Modified independent  Sit to Supine: Modified independent  Balance  Sitting: Intact  Standing: Impaired  Standing - Static: Good (with RW)  Standing - Dynamic: Good (with RW)  Transfer Training  Transfer Training: Yes  Interventions: Verbal cues; Safety awareness training  Sit to Stand: Contact-guard assistance  Stand to Sit: Contact-guard assistance;Stand-by assistance  Bed to Chair: Contact-guard assistance;Stand-by assistance (with RW)  Gait Training  Gait Training: Yes  Gait  Overall Level of Assistance: Contact-guard assistance;Stand-by assistance  Interventions: Verbal cues; Safety awareness training  Base of Support: Shift to right  Speed/Luna: Slow  Gait Abnormalities: Decreased step clearance  Distance (ft): 25 Feet (5' + 5')  Assistive Device: Gait belt;Walker, rolling; Other (comment) (HWB in cam boot)  Rail Use: Both  Stairs - Level of Assistance: Stand-by assistance;Contact-guard assistance  Number of Stairs Trained: 6     PT Exercises  Exercise Treatment: seated exs: LAQ, marches X 15 BLE supine exs: AP, heelslides, abd, SLR X 15 BLE     Safety Devices  Type of Devices: All ora prominences offloaded;Call light within reach;Gait belt;Nurse notified; Patient at risk for falls; Left in bed;Bed alarm in place (refused chair)       Goals  Short Term Goals  Time Frame for Short term

## 2022-06-29 NOTE — PROGRESS NOTES
No  Patient's  Info: wife able to transport  Occupation: Retired  Type of Occupation: rehabbed apartments     Objective   Heart Rate: 81  Heart Rate Source: Monitor  BP: 138/71  BP Location: Left upper arm  BP Method: Automatic  Patient Position: Semi fowlers  MAP (Calculated): 93.33  Resp: 16  SpO2: 98 %  O2 Device: None (Room air)           Safety Devices  Type of Devices: All ora prominences offloaded;Call light within reach;Gait belt;Nurse notified; Patient at risk for falls; Left in bed;Bed alarm in place  Toilet Transfers  Toilet Transfers Comments: Pt declined need, stating that he does not use toilet at home. When asked if he feels he could improve ADL skills, pt stated that he prefers assistance from spouse. AROM: Within functional limits  Strength: Within functional limits  Coordination: Within functional limits  Tone: Normal  Sensation: Intact  ADL  Feeding: Independent  Grooming: Setup  LE Dressing: Dependent/Total  LE Dressing Skilled Clinical Factors: R shoe and L cam boot  Toileting: Maximum assistance  Toileting Skilled Clinical Factors: urinal     Activity Tolerance  Activity Tolerance: Patient tolerated evaluation without incident  Bed mobility  Supine to Sit: Modified independent (HOB elevated)  Sit to Supine: Modified independent  Transfers  Stand Pivot Transfers: Supervision (RW and L cam boot, R shoe)  Sit to stand: Supervision  Stand to sit: Supervision  Transfer Comments: Pt maintained heel WB on L foot consistently. Cognition  Overall Cognitive Status: WFL  Orientation  Overall Orientation Status: Within Functional Limits      Education Given To: Patient; Family  Education Provided: Plan of Care;Role of Therapy;Transfer Training;Equipment;Precautions; ADL Adaptive Strategies  Education Method: Verbal  Education Outcome: Verbalized understanding    Disease Specific Education: Pt educated on importance of OOB mobility, prevention of complications of bedrest, and general safety

## 2022-06-29 NOTE — CARE COORDINATION
[facility] - Acute Rehab Unit   After review, this patient is felt to be:       []  Appropriate for Acute Inpatient Rehab    []  Appropriate for Acute Inpatient Rehab Pending Insurance Authorization    []  Not appropriate for Acute Inpatient Rehab    [x]  Referral received and ARU re-reviewing patient; Evaluation ongoing, requested OT eval and IV therapy duration.  Message left with C5 CM HELIO Wright RN [Initial Evaluation] : an initial evaluation of [[email or web address]] : meeting new PCP

## 2022-06-30 LAB
GLUCOSE BLD-MCNC: 132 MG/DL (ref 70–99)
GLUCOSE BLD-MCNC: 153 MG/DL (ref 70–99)
GLUCOSE BLD-MCNC: 188 MG/DL (ref 70–99)
GLUCOSE BLD-MCNC: 207 MG/DL (ref 70–99)
PERFORMED ON: ABNORMAL

## 2022-06-30 PROCEDURE — 6370000000 HC RX 637 (ALT 250 FOR IP): Performed by: NURSE PRACTITIONER

## 2022-06-30 PROCEDURE — 6370000000 HC RX 637 (ALT 250 FOR IP): Performed by: INTERNAL MEDICINE

## 2022-06-30 PROCEDURE — 6370000000 HC RX 637 (ALT 250 FOR IP)

## 2022-06-30 PROCEDURE — 6370000000 HC RX 637 (ALT 250 FOR IP): Performed by: PODIATRIST

## 2022-06-30 PROCEDURE — 2580000003 HC RX 258: Performed by: PODIATRIST

## 2022-06-30 PROCEDURE — 6360000002 HC RX W HCPCS: Performed by: PODIATRIST

## 2022-06-30 PROCEDURE — 1200000000 HC SEMI PRIVATE

## 2022-06-30 RX ADMIN — INSULIN LISPRO 4 UNITS: 100 INJECTION, SOLUTION INTRAVENOUS; SUBCUTANEOUS at 17:29

## 2022-06-30 RX ADMIN — CIPROFLOXACIN 500 MG: 500 TABLET, FILM COATED ORAL at 08:35

## 2022-06-30 RX ADMIN — INSULIN LISPRO 4 UNITS: 100 INJECTION, SOLUTION INTRAVENOUS; SUBCUTANEOUS at 12:37

## 2022-06-30 RX ADMIN — OXYCODONE 5 MG: 5 TABLET ORAL at 14:41

## 2022-06-30 RX ADMIN — INSULIN LISPRO 1 UNITS: 100 INJECTION, SOLUTION INTRAVENOUS; SUBCUTANEOUS at 12:37

## 2022-06-30 RX ADMIN — BACITRACIN ZINC AND POLYMYXIN B SULFATE 14.2 G: at 20:44

## 2022-06-30 RX ADMIN — ATORVASTATIN CALCIUM 80 MG: 80 TABLET, FILM COATED ORAL at 20:44

## 2022-06-30 RX ADMIN — INSULIN LISPRO 1 UNITS: 100 INJECTION, SOLUTION INTRAVENOUS; SUBCUTANEOUS at 20:46

## 2022-06-30 RX ADMIN — INSULIN LISPRO 1 UNITS: 100 INJECTION, SOLUTION INTRAVENOUS; SUBCUTANEOUS at 17:29

## 2022-06-30 RX ADMIN — Medication: at 08:36

## 2022-06-30 RX ADMIN — ENOXAPARIN SODIUM 40 MG: 100 INJECTION SUBCUTANEOUS at 08:35

## 2022-06-30 RX ADMIN — INSULIN GLARGINE 16 UNITS: 100 INJECTION, SOLUTION SUBCUTANEOUS at 20:44

## 2022-06-30 RX ADMIN — Medication 10 ML: at 08:39

## 2022-06-30 RX ADMIN — OXYCODONE 5 MG: 5 TABLET ORAL at 20:44

## 2022-06-30 RX ADMIN — ASPIRIN 81 MG: 81 TABLET, COATED ORAL at 08:35

## 2022-06-30 RX ADMIN — OXYCODONE 5 MG: 5 TABLET ORAL at 05:20

## 2022-06-30 RX ADMIN — BACITRACIN ZINC AND POLYMYXIN B SULFATE 14.2 G: at 08:35

## 2022-06-30 RX ADMIN — CIPROFLOXACIN 500 MG: 500 TABLET, FILM COATED ORAL at 20:44

## 2022-06-30 RX ADMIN — AMLODIPINE BESYLATE 5 MG: 5 TABLET ORAL at 08:35

## 2022-06-30 RX ADMIN — INSULIN LISPRO 4 UNITS: 100 INJECTION, SOLUTION INTRAVENOUS; SUBCUTANEOUS at 08:40

## 2022-06-30 ASSESSMENT — PAIN SCALES - GENERAL
PAINLEVEL_OUTOF10: 6
PAINLEVEL_OUTOF10: 7
PAINLEVEL_OUTOF10: 0
PAINLEVEL_OUTOF10: 5

## 2022-06-30 ASSESSMENT — PAIN DESCRIPTION - LOCATION: LOCATION: FOOT

## 2022-06-30 ASSESSMENT — PAIN DESCRIPTION - DESCRIPTORS: DESCRIPTORS: ACHING

## 2022-06-30 ASSESSMENT — PAIN DESCRIPTION - ORIENTATION: ORIENTATION: LEFT

## 2022-06-30 NOTE — PLAN OF CARE
Problem: Pain  Goal: Verbalizes/displays adequate comfort level or baseline comfort level  Outcome: Progressing  Flowsheets (Taken 6/29/2022 2018)  Verbalizes/displays adequate comfort level or baseline comfort level: Encourage patient to monitor pain and request assistance       Problem: Safety - Adult  Goal: Free from fall injury  Outcome: Progressing     Problem: Skin/Tissue Integrity  Goal: Absence of new skin breakdown  Description: 1.   Monitor for areas of redness and/or skin breakdown  Outcome: Progressing

## 2022-06-30 NOTE — PROGRESS NOTES
Progress note; Discussed with patient and spouse on how to send the home wound negative pressure therapy machine will work. Spouse understands to read through all material for newer therapy devices the company will  at their home or they may have to send back once completed through [address] [place] [address]. Care Coordinator is continuing to set up discharge at this time. If patient does not leave at this time Wound Care will change dressing tomorrow.

## 2022-06-30 NOTE — CARE COORDINATION
6/30/22 [facility] has officially declined this referral. Will speak with pt about going home. Update: Spoke to pt and he feels comfortable going home. Pt's wife has howard Vac form that she is working on filling out and will return it to us here. Call placed to The wound care clinic at [place] to coordinate vac dressing change, left VM, waiting for return phone call, will follow. Spoke to the [facility] and they stated they are not able to do Vac dressing changes. Pt does have an appointment with them July 6th at 2:45. Call placed to  to see what she wants us to do about the Vac dressing given that the wound care center will not manage the dressing changes. Dr Bong Gallagher asked that [facility] provide a couple howard visits for this pt to change the Vac dressing and then for the pt to follow up on July 6th to see what Chau Hutchison wants to do with the Vac. Will follow Call placed to [facility] to see if they are willing, waiting for a response     Update: Waiting on vac approval, just need MD signature on script to fax to [facility].

## 2022-06-30 NOTE — CARE COORDINATION
6/30/22 Call placed to [facility] with [employer], left VM to inquire about status of VAC approval. Still waiting on approval, will follow.

## 2022-06-30 NOTE — CARE COORDINATION
[facility] Acute Rehab [facility]   After review, this patient is felt to be:       []  Appropriate for Acute Inpatient Rehab    []  Appropriate for Acute Inpatient Rehab Pending Insurance Authorization    [x]  Not appropriate for Acute Inpatient Rehab , patient does not have OT needs. []  Referral received and ARU reviewing patient; Evaluation ongoing.        Discussed with Harish Liz CM Thank you for the Kwadwo Jimenez RN

## 2022-06-30 NOTE — CARE COORDINATION
[facility]    DC order noted, all docs needed have been faxed to [facility] for home care services.     Home care to see patient within 24-48 hrs    3 howard visits [address], [place]  [facility] [phone]

## 2022-06-30 NOTE — PROGRESS NOTES
Physical Therapy Attempt    Attempted to see patient for therapy. Pt reports he is leaving today after his wound vac is changed. Reports no questions or concerns regarding mobility at home including navigating stairs to get into his apartment. Will continue to follow if pt does not discharge today.     [address],   [facility]

## 2022-06-30 NOTE — DISCHARGE SUMMARY
Hospital Medicine Discharge Summary    Patient ID: Dayron Rico      Patient's PCP: No primary care provider on file. Admit Date: 6/21/2022     Discharge Date: 6/30/22    Admitting Provider: No admitting provider for patient encounter. Discharge Provider: JAGJIT Rainey CNP     Discharge Diagnoses: Active Hospital Problems    Diagnosis     Osteomyelitis [facility]) [M86.9]      Priority: Medium       The patient was seen and examined on day of discharge and this discharge summary is in conjunction with any daily progress note from day of discharge. Hospital Course: Hospital Course: 61 y. o. male who presented to [facility] with foot infection. Patient has been seeing podiatry for several diabetic foot wounds. The largest one the dorsum of his left foot has worsened over the week. He has been on bactrim for this. His blood sugars have been quite elevated at home. He is due to see his PCP later this week to adjust his insulin further. He denies fevers, chills, chest pain, SOB or nausea. He has minimal sensation in his feet from the diabetes      Sepsis, secondary to osteomyelitis, present on arrival. Clinically improving. Presented with leukocytosis, tachycardia. Lactic acid was normal. Blood cultures were negative. Wound culture positive for serratia marcesens, was placed on cefepime. Podiatry and ID were consulted. On discharge will take Cipro 500mg BID through 7/10 per ID recommendations.     Bilateral foot wounds, stable. Antibiotics, treatments, and consults as above. PT/OT followed. On 6/23 underwent partial left 5th ray amputation and left 2nd toe amputation. Pain management. He will follow up with wound care as an outpatient, including for wound vac that is on left foot.  At time of discharge, he has a wound vac on the left foot; two separate areas of sutures from previously mentioned amputations; and wound dressing on right foot     DMII, uncontrolled with neuropathy, and diabetic ulcers. A1C 10.1 (6/2022). Held home regimen while admitted, and was treated with basal bolus dose, sliding scale insulin, and carb control diet. Can resume home regimen on discharge. Recommend following up with PCP for further adjustments of medications.      Hypertension, controlled. Continue home dose amlodipine     PVD/PAD. Pt with history of CLI. Left SFA stent 6/21/22 with Dr. Mayra Velez, he will follow up with him as an outpatient.      Tobacco abuse. Counseled on cessation      Physical Exam Performed:     /77   Pulse 77   Temp 97.3 °F (36.3 °C) (Axillary)   Resp 16   Ht 5' 11\" (1.803 m)   Wt 161 lb (73 kg)   SpO2 97%   BMI 22.45 kg/m²       General appearance: No apparent distress, appears stated age and cooperative. HEENT: Normal cephalic, atraumatic without obvious deformity. Pupils equal, round, and reactive to light. Extra ocular muscles intact. Conjunctivae/corneas clear. Neck: Supple, with full range of motion. No jugular venous distention. Trachea midline. Respiratory: Normal respiratory effort. Clear to auscultation, bilaterally without Rales/Wheezes/Rhonchi. Room air  Cardiovascular: Regular rate and rhythm with normal S1/S2 without murmurs, rubs or gallops. Abdomen: Soft, non-tender, non-distended with normal bowel sounds. Musculoskeletal: No clubbing, cyanosis or edema bilaterally. Full range of motion without deformity. Skin: Skin color, texture, turgor normal. No rashes or lesions. Neurologic: Neurovascularly intact without any focal sensory/motor deficits. Cranial nerves: II-XII intact, grossly non-focal.  Psychiatric: Alert and oriented, thought content appropriate, normal insight  Capillary Refill: Brisk,< 3 seconds   Peripheral Pulses: +2 palpable, equal bilaterally       Labs:  For convenience and continuity at follow-up the following most recent labs are provided:      CBC:    Lab Results   Component Value Date/Time    WBC 11.6 06/27/2022 01:20 PM    HGB 10.4 06/27/2022 01:20 PM    HCT 30.6 06/27/2022 01:20 PM     06/27/2022 01:20 PM       Renal:    Lab Results   Component Value Date/Time     06/27/2022 01:20 PM    K 4.2 06/27/2022 01:20 PM    K 3.8 12/12/2021 04:30 AM     06/27/2022 01:20 PM    CO2 30 06/27/2022 01:20 PM    BUN 26 06/27/2022 01:20 PM    CREATININE 0.7 06/27/2022 01:20 PM    CALCIUM 9.7 06/27/2022 01:20 PM    PHOS 3.6 01/11/2022 06:30 AM         Significant Diagnostic Studies    Radiology:   MRI FOOT LEFT WO CONTRAST   Final Result   Acute osteomyelitis of the 5th metatarsal and 5th distal phalanx. Suspected acute osteomyelitis of the 2nd proximal and middle phalanges. There is bone marrow edema in the cuboid, medial and intermediate cuneiforms,   as well as the navicular. There appears to be a soft tissue defect of the   dorsum of the midfoot at the level of these bones. This marrow signal   abnormality could represent reactive osteitis or early acute osteomyelitis. Nonspecific bone marrow edema in the 1st-3rd metatarsal heads and the 1st   distal phalanx. No well-defined drainable abscess is seen. Generalized muscle edema along with subcutaneous edema. LASER SKIN PERFUSION PRESSURE STUDY   Final Result             Consults:     IP CONSULT TO INFECTIOUS DISEASES  IP CONSULT TO PODIATRY  IP CONSULT TO PHARMACY  IP CONSULT TO DIABETES EDUCATOR    Disposition: home with home health     Condition at Discharge: Stable    Discharge Instructions/Follow-up:  PCP within 1 week.  Wound care 7/6/22    Code Status:  Full Code     Activity: activity as tolerated    Diet: diabetic diet      Discharge Medications:     Current Discharge Medication List           Details   ciprofloxacin (CIPRO) 500 MG tablet Take 1 tablet by mouth every 12 hours for 11 days  Qty: 22 tablet, Refills: 0              Details   aspirin 81 MG EC tablet Take 81 mg by mouth daily      docusate sodium (COLACE) 100 MG capsule Take 100 mg by mouth at bedtime       psyllium (KONSYL) 28.3 % PACK Take 1 packet by mouth daily      atorvastatin (LIPITOR) 80 MG tablet Take 80 mg by mouth nightly      amLODIPine (NORVASC) 5 MG tablet Take 5 mg by mouth daily      insulin glargine (LANTUS) 100 UNIT/ML injection vial Inject 10 Units into the skin nightly      metFORMIN (GLUCOPHAGE) 500 MG tablet Take 500 mg by mouth daily (with breakfast) Stopped on until Friday 6/17/22             Time Spent on discharge is more than 45 minutes in the examination, evaluation, counseling and review of medications and discharge plan. Signed:    JAGJIT Grace CNP   6/30/2022      Thank you No primary care provider on file. for the opportunity to be involved in this patient's care. If you have any questions or concerns, please feel free to contact me at [phone].

## 2022-07-01 VITALS
OXYGEN SATURATION: 98 % | HEIGHT: 71 IN | HEART RATE: 68 BPM | SYSTOLIC BLOOD PRESSURE: 147 MMHG | TEMPERATURE: 98 F | BODY MASS INDEX: 22.54 KG/M2 | DIASTOLIC BLOOD PRESSURE: 75 MMHG | RESPIRATION RATE: 16 BRPM | WEIGHT: 161 LBS

## 2022-07-01 LAB
GLUCOSE BLD-MCNC: 120 MG/DL (ref 70–99)
GLUCOSE BLD-MCNC: 135 MG/DL (ref 70–99)
PERFORMED ON: ABNORMAL
PERFORMED ON: ABNORMAL

## 2022-07-01 PROCEDURE — 6360000002 HC RX W HCPCS: Performed by: PODIATRIST

## 2022-07-01 PROCEDURE — 6370000000 HC RX 637 (ALT 250 FOR IP): Performed by: INTERNAL MEDICINE

## 2022-07-01 PROCEDURE — 6370000000 HC RX 637 (ALT 250 FOR IP): Performed by: PODIATRIST

## 2022-07-01 PROCEDURE — 6370000000 HC RX 637 (ALT 250 FOR IP): Performed by: NURSE PRACTITIONER

## 2022-07-01 PROCEDURE — 2580000003 HC RX 258: Performed by: PODIATRIST

## 2022-07-01 RX ADMIN — CIPROFLOXACIN 500 MG: 500 TABLET, FILM COATED ORAL at 08:55

## 2022-07-01 RX ADMIN — ASPIRIN 81 MG: 81 TABLET, COATED ORAL at 08:55

## 2022-07-01 RX ADMIN — Medication 10 ML: at 08:55

## 2022-07-01 RX ADMIN — INSULIN LISPRO 4 UNITS: 100 INJECTION, SOLUTION INTRAVENOUS; SUBCUTANEOUS at 12:03

## 2022-07-01 RX ADMIN — AMLODIPINE BESYLATE 5 MG: 5 TABLET ORAL at 08:55

## 2022-07-01 RX ADMIN — OXYCODONE 5 MG: 5 TABLET ORAL at 06:28

## 2022-07-01 RX ADMIN — ENOXAPARIN SODIUM 40 MG: 100 INJECTION SUBCUTANEOUS at 08:55

## 2022-07-01 RX ADMIN — INSULIN LISPRO 4 UNITS: 100 INJECTION, SOLUTION INTRAVENOUS; SUBCUTANEOUS at 09:53

## 2022-07-01 ASSESSMENT — PAIN DESCRIPTION - ORIENTATION: ORIENTATION: LEFT

## 2022-07-01 ASSESSMENT — PAIN SCALES - GENERAL
PAINLEVEL_OUTOF10: 6
PAINLEVEL_OUTOF10: 4

## 2022-07-01 ASSESSMENT — PAIN DESCRIPTION - PAIN TYPE: TYPE: CHRONIC PAIN

## 2022-07-01 ASSESSMENT — PAIN DESCRIPTION - LOCATION: LOCATION: FOOT

## 2022-07-01 ASSESSMENT — PAIN - FUNCTIONAL ASSESSMENT: PAIN_FUNCTIONAL_ASSESSMENT: ACTIVITIES ARE NOT PREVENTED

## 2022-07-01 ASSESSMENT — PAIN DESCRIPTION - FREQUENCY: FREQUENCY: INTERMITTENT

## 2022-07-01 ASSESSMENT — PAIN DESCRIPTION - DESCRIPTORS: DESCRIPTORS: ACHING

## 2022-07-01 NOTE — CARE COORDINATION
Case Management  Discharge Summary      DISCHARGE TO: Home with [address]:  [facility]     NEW DURABLE MEDICAL EQUIPMENT ORDERED: 3M/[employer] Wound Vac     TRANSPORTATION: Family     ORDERS FAXED TO:  [facility] [place]    HENS: N-A    PRE-CERTIFICATION OBTAINED: N-A    NURSE RESPONSIBLE FOR COMPLETION OF PAGE ONE OF CONTINUITY OF CARE (SAMANTHA) FORM, RECONCILIATION OF MEDICATIONS, AND TO PLACE A COPY OF FORMS IN PATIENT'S HARD CHART. NURSE TO ENSURE THAT ANY WRITTEN PRESCRIPTIONS ARE GIVEN TO PT UPON DISCHARGE. Patient aware of and agreeable to all arrangements and states no further discharge planning needs.

## 2022-07-01 NOTE — DISCHARGE SUMMARY
Hospital Medicine Discharge Summary    Patient ID: Mike Espinoza      Patient's PCP: No primary care provider on file. Admit Date: 6/21/2022     Discharge Date: 6/30/22    Admitting Provider: No admitting provider for patient encounter. Discharge Provider: JAGJIT Mcgowan CNP     Discharge Diagnoses: Active Hospital Problems    Diagnosis     Osteomyelitis [facility]) [M86.9]      Priority: Medium       The patient was seen and examined on day of discharge and this discharge summary is in conjunction with any daily progress note from day of discharge. Hospital Course: Hospital Course: 61 y. o. male who [facility]. Patient has been seeing podiatry for several diabetic foot wounds. The largest one the dorsum of his left foot has worsened over the week. He has been on bactrim for this. His blood sugars have been quite elevated at home. He is due to see his PCP later this week to adjust his insulin further. He denies fevers, chills, chest pain, SOB or nausea. He has minimal sensation in his feet from the diabetes      Sepsis, secondary to osteomyelitis, present on arrival. Clinically improving. Presented with leukocytosis, tachycardia. Lactic acid was normal. Blood cultures were negative. Wound culture positive for serratia marcesens, was placed on cefepime. Podiatry and ID were consulted. On discharge will take Cipro 500mg BID through 7/10 per ID recommendations.     Bilateral foot wounds, stable. Antibiotics, treatments, and consults as above. PT/OT followed. On 6/23 underwent partial left 5th ray amputation and left 2nd toe amputation. Pain management. He will follow up with wound care as an outpatient, including for wound vac that is on left foot.  At time of discharge, he has a wound vac on the left foot; two separate areas of sutures from previously mentioned amputations; and wound dressing on right foot     DMII, uncontrolled with neuropathy, and diabetic ulcers. A1C 10.1 (6/2022). Held home regimen while admitted, and was treated with basal bolus dose, sliding scale insulin, and carb control diet. Can resume home regimen on discharge. Recommend following up with PCP for further adjustments of medications.      Hypertension, controlled. Continue home dose amlodipine     PVD/PAD. Pt with history of CLI. Left SFA stent 6/21/22 with Dr. Rafal Partida, he will follow up with him as an outpatient.      Tobacco abuse. Counseled on cessation      Physical Exam Performed:     BP (!) 147/75   Pulse 68   Temp 98 °F (36.7 °C) (Oral)   Resp 16   Ht 5' 11\" (1.803 m)   Wt 161 lb (73 kg)   SpO2 98%   BMI 22.45 kg/m²       General appearance: No apparent distress, appears stated age and cooperative. HEENT: Normal cephalic, atraumatic without obvious deformity. Pupils equal, round, and reactive to light. Extra ocular muscles intact. Conjunctivae/corneas clear. Neck: Supple, with full range of motion. No jugular venous distention. Trachea midline. Respiratory: Normal respiratory effort. Clear to auscultation, bilaterally without Rales/Wheezes/Rhonchi. Room air  Cardiovascular: Regular rate and rhythm with normal S1/S2 without murmurs, rubs or gallops. Abdomen: Soft, non-tender, non-distended with normal bowel sounds. Musculoskeletal: No clubbing, cyanosis or edema bilaterally. Full range of motion without deformity. Skin: Skin color, texture, turgor normal. No rashes or lesions. Neurologic: Neurovascularly intact without any focal sensory/motor deficits. Cranial nerves: II-XII intact, grossly non-focal.  Psychiatric: Alert and oriented, thought content appropriate, normal insight  Capillary Refill: Brisk,< 3 seconds   Peripheral Pulses: +2 palpable, equal bilaterally       Labs:  For convenience and continuity at follow-up the following most recent labs are provided:      CBC:    Lab Results   Component Value Date/Time    WBC 11.6 06/27/2022 01:20 PM    HGB 10.4 06/27/2022 01:20 PM    HCT 30.6 06/27/2022 01:20 PM     06/27/2022 01:20 PM       Renal:    Lab Results   Component Value Date/Time     06/27/2022 01:20 PM    K 4.2 06/27/2022 01:20 PM    K 3.8 12/12/2021 04:30 AM     06/27/2022 01:20 PM    CO2 30 06/27/2022 01:20 PM    BUN 26 06/27/2022 01:20 PM    CREATININE 0.7 06/27/2022 01:20 PM    CALCIUM 9.7 06/27/2022 01:20 PM    PHOS 3.6 01/11/2022 06:30 AM         Significant Diagnostic Studies    Radiology:   MRI FOOT LEFT WO CONTRAST   Final Result   Acute osteomyelitis of the 5th metatarsal and 5th distal phalanx. Suspected acute osteomyelitis of the 2nd proximal and middle phalanges. There is bone marrow edema in the cuboid, medial and intermediate cuneiforms,   as well as the navicular. There appears to be a soft tissue defect of the   dorsum of the midfoot at the level of these bones. This marrow signal   abnormality could represent reactive osteitis or early acute osteomyelitis. Nonspecific bone marrow edema in the 1st-3rd metatarsal heads and the 1st   distal phalanx. No well-defined drainable abscess is seen. Generalized muscle edema along with subcutaneous edema. LASER SKIN PERFUSION PRESSURE STUDY   Final Result             Consults:     IP CONSULT TO INFECTIOUS DISEASES  IP CONSULT TO PODIATRY  IP CONSULT TO PHARMACY  IP CONSULT TO DIABETES EDUCATOR  IP CONSULT TO HOME CARE NEEDS    Disposition: home with home health     Condition at Discharge: Stable    Discharge Instructions/Follow-up:  PCP within 1 week.  Wound care 7/6/22    Code Status:  Full Code     Activity: activity as tolerated    Diet: diabetic diet      Discharge Medications:     Discharge Medication List as of 7/1/2022 10:59 AM           Details   ciprofloxacin (CIPRO) 500 MG tablet Take 1 tablet by mouth every 12 hours for 11 days, Disp-22 tablet, R-0Normal              Details   aspirin 81 MG EC tablet Take 81 mg by mouth dailyHistorical Med      docusate sodium (COLACE) 100 MG capsule Take 100 mg by mouth at bedtime Historical Med      psyllium (KONSYL) 28.3 % PACK Take 1 packet by mouth dailyHistorical Med      atorvastatin (LIPITOR) 80 MG tablet Take 80 mg by mouth nightlyHistorical Med      amLODIPine (NORVASC) 5 MG tablet Take 5 mg by mouth dailyHistorical Med      insulin glargine (LANTUS) 100 UNIT/ML injection vial Inject 10 Units into the skin nightlyHistorical Med      metFORMIN (GLUCOPHAGE) 500 MG tablet Take 500 mg by mouth daily (with breakfast) Stopped on until Friday 6/17/22Historical Med             Time Spent on discharge is more than 45 minutes in the examination, evaluation, counseling and review of medications and discharge plan. Signed:    JAGJIT Adrian CNP   7/1/2022      Thank you No primary care provider on file. for the opportunity to be involved in this patient's care. If you have any questions or concerns, please feel free to contact me at [phone].

## 2022-07-01 NOTE — PROGRESS NOTES
Patient's NPWT changed to portable by floor nurse, Korin Viveros. SAMANTHA completed by Wound Care nurse after NPWT dressing changed along with right foot dressings. Rental VAC  [id number] PLACED  In dirty utility room for . Taken off rental sheet.

## 2022-07-01 NOTE — CARE COORDINATION
Pt will get wound vac dressing changed here today and will d/c home after with home vac, [facility] will start care on Monday for dressing change. Pt has follow up appointment with Dr. Rex Wright on July 6th at 2:45, pt aware of this appointment time.

## 2022-07-01 NOTE — PROGRESS NOTES
stitches. Top of left foot tendons showing, slough. Right foot lateral foot sough present , ankle slough present. Measurements:  Negative Pressure Wound Therapy Foot Left;Lateral (Active)   $ Standard NPWT >50 sq cm PER TX $ Yes 07/01/22 1006   Wound Type Diabetic foot ulcer 07/01/22 1006   Unit Type rental [id number] 07/01/22 1006   Dressing Type Non adherent contact layer;Black Foam 07/01/22 1006   Number of pieces used 1 07/01/22 1006   Number of pieces removed 2 07/01/22 1006   Cycle Continuous 07/01/22 1006   Target Pressure (mmHg) 125 07/01/22 1006   Intensity 1 07/01/22 1006   Dressing Status New dressing applied 07/01/22 1006   Dressing Changed Changed/New 07/01/22 1006   Drainage Amount Scant 07/01/22 1006   Drainage Description Sanguinous 07/01/22 1006   Dressing Change Due 07/04/22 07/01/22 1006   Wound Assessment Fibrinous; Slough 07/01/22 1006   Princess-wound Assessment Blanchable erythema 07/01/22 1006   Shape round 07/01/22 1006   Odor None 07/01/22 1006   Number of days: 8       Wound 12/09/21 Toe (Comment  which one) ulcer to fifth toe per podietry (Active)   Number of days: 203       Wound 02/16/22 #1 Right Lateral Ankle, Arterial, Full Thickness, onset  (Active)   Wound Image   07/01/22 1006   Wound Etiology Diabetic 07/01/22 1006   Dressing Status New dressing applied 07/01/22 1006   Wound Cleansed Cleansed with saline 07/01/22 1006   Dressing/Treatment Gauze dressing/dressing sponge; Ace wrap 07/01/22 1006   Offloading for Diabetic Foot Ulcers Offloading ordered 07/01/22 1006   Dressing Change Due 07/02/22 07/01/22 1006   Wound Length (cm) 1.2 cm 07/01/22 1006   Wound Width (cm) 1 cm 07/01/22 1006   Wound Depth (cm) 0.1 cm 07/01/22 1006   Wound Surface Area (cm^2) 1.2 cm^2 07/01/22 1006   Change in Wound Size % (l*w) 52.94 07/01/22 1006   Wound Volume (cm^3) 0.12 cm^3 07/01/22 1006   Wound Healing % 53 07/01/22 1006   Post-Procedure Length (cm) 0.6 cm 06/15/22 1456   Post-Procedure Width (cm) 0.8 cm 06/15/22 1456   Post-Procedure Depth (cm) 0.2 cm 06/15/22 1456   Post-Procedure Surface Area (cm^2) 0.48 cm^2 06/15/22 1456   Post-Procedure Volume (cm^3) 0.096 cm^3 06/15/22 1456   Distance Tunneling (cm) 0 cm 06/24/22 1141   Tunneling Position ___ O'Clock 0 06/24/22 1141   Undermining Starts ___ O'Clock 0 06/24/22 1141   Undermining Ends___ O'Clock 0 06/24/22 1141   Undermining Maxium Distance (cm) 0 06/24/22 1141   Wound Assessment [place]/red;Slough 07/01/22 1006   Drainage Amount Scant 07/01/22 1006   Drainage Description Yellow;Purulent 07/01/22 1006   Odor None 07/01/22 1006   Princess-wound Assessment Intact 07/01/22 1006   Margins Attached edges 07/01/22 1006   Wound Thickness Description not for Pressure Injury Partial thickness 07/01/22 1006   Number of days: 135    rt ankle         Wound 02/16/22 #2 Right Lateral Foot, Arterial, Full Thickness Onset Nov 2021 (Active)   Wound Image   07/01/22 1006   Wound Etiology Diabetic 07/01/22 1006   Dressing Status New dressing applied 07/01/22 1006   Wound Cleansed Cleansed with saline 07/01/22 1006   Dressing/Treatment Gauze dressing/dressing sponge; Ace wrap 07/01/22 1006   Offloading for Diabetic Foot Ulcers Offloading ordered 07/01/22 1006   Dressing Change Due 07/02/22 07/01/22 1006   Wound Length (cm) 1.7 cm 07/01/22 1006   Wound Width (cm) 2.5 cm 07/01/22 1006   Wound Depth (cm) 0.1 cm 07/01/22 1006   Wound Surface Area (cm^2) 4.25 cm^2 07/01/22 1006   Change in Wound Size % (l*w) -325 07/01/22 1006   Wound Volume (cm^3) 0.425 cm^3 07/01/22 1006   Wound Healing % -325 07/01/22 1006   Post-Procedure Length (cm) 2.7 cm 06/08/22 1422   Post-Procedure Width (cm) 1.8 cm 06/08/22 1422   Post-Procedure Depth (cm) 0.5 cm 06/08/22 1422   Post-Procedure Surface Area (cm^2) 4.86 cm^2 06/08/22 1422   Post-Procedure Volume (cm^3) 2.43 cm^3 06/08/22 1422   Distance Tunneling (cm) 0 cm 06/24/22 1141   Tunneling Position ___ O'Clock 0 06/24/22 1141   Undermining Starts ___ O'Clock 7 06/24/22 1141   Undermining Ends___ O'Clock 11 06/24/22 1141   Undermining Maxium Distance (cm) 0.9 06/24/22 1141   Wound Assessment [place]/red;Slough 07/01/22 1006   Drainage Amount Scant 07/01/22 1006   Drainage Description Yellow;Purulent 07/01/22 1006   Odor None 07/01/22 1006   Princess-wound Assessment Intact 07/01/22 1006   Margins Attached edges 07/01/22 1006   Wound Thickness Description not for Pressure Injury Partial thickness 07/01/22 1006   Number of days: 135    rt lateral foot         Wound 02/16/22  NPWT #3 Left Dorsal Foot, Arterial, Full Thickness, Onset Nov 2021 (Active)   Wound Image   07/01/22 0934   Wound Etiology Diabetic 07/01/22 0934   Dressing Status New dressing applied 07/01/22 0934   Wound Cleansed Cleansed with saline 07/01/22 0934   Dressing/Treatment Negative pressure wound therapy; Ace wrap 07/01/22 0934   Offloading for Diabetic Foot Ulcers Offloading ordered 07/01/22 0934   Dressing Change Due 07/04/22 07/01/22 0934   Wound Length (cm) 2.5 cm 07/01/22 0934   Wound Width (cm) 2.6 cm 07/01/22 0934   Wound Depth (cm) 0.4 cm 07/01/22 0934   Wound Surface Area (cm^2) 6.5 cm^2 07/01/22 0934   Change in Wound Size % (l*w) 60.96 07/01/22 0934   Wound Volume (cm^3) 2.6 cm^3 07/01/22 0934   Wound Healing % -56 07/01/22 0934   Post-Procedure Length (cm) 2.5 cm 06/15/22 1456   Post-Procedure Width (cm) 3.2 cm 06/15/22 1456   Post-Procedure Depth (cm) 0.5 cm 06/15/22 1456   Post-Procedure Surface Area (cm^2) 8 cm^2 06/15/22 1456   Post-Procedure Volume (cm^3) 4 cm^3 06/15/22 1456   Distance Tunneling (cm) 0 cm 06/24/22 1141   Tunneling Position ___ O'Clock 0 06/24/22 1141   Undermining Starts ___ O'Clock 0 06/24/22 1141   Undermining Ends___ O'Clock 0 06/24/22 1141   Undermining Maxium Distance (cm) 0 06/24/22 1141   Wound Assessment Pink/red;Slough; Exposed structure tendon 07/01/22 0934   Drainage Amount Small 07/01/22 0934   Drainage Description Purulent 07/01/22 0934   Odor None 07/01/22 9160   Princess-wound Assessment Blanchable erythema; Intact 07/01/22 0934   Margins Attached edges 07/01/22 0934   Wound Thickness Description not for Pressure Injury Full thickness 07/01/22 0934   Number of days: 135    dorsal foot                Wound 02/16/22 #5 Left 5th Toe, Arterial, Full thickness, Onset Nov 2021 (Active)   Wound Image   07/01/22 1006   Wound Etiology Diabetic 07/01/22 1006   Dressing Status New dressing applied 07/01/22 1006   Wound Cleansed Cleansed with saline 07/01/22 1006   Dressing/Treatment Xeroform;Dry dressing;Roll gauze; Ace wrap 07/01/22 1006   Offloading for Diabetic Foot Ulcers Offloading ordered 06/29/22 0919   Dressing Change Due 07/01/22 06/29/22 0919   Wound Length (cm) 6 cm 07/01/22 1006   Wound Width (cm) 0.1 cm 07/01/22 1006   Wound Depth (cm) 0.1 cm 07/01/22 1006   Wound Surface Area (cm^2) 0.6 cm^2 07/01/22 1006   Change in Wound Size % (l*w) 83.96 07/01/22 1006   Wound Volume (cm^3) 0.06 cm^3 07/01/22 1006   Wound Healing % 84 07/01/22 1006   Post-Procedure Length (cm) 1.5 cm 06/15/22 1456   Post-Procedure Width (cm) 1.5 cm 06/15/22 1456   Post-Procedure Depth (cm) 0.2 cm 06/15/22 1456   Post-Procedure Surface Area (cm^2) 2.25 cm^2 06/15/22 1456   Post-Procedure Volume (cm^3) 0.45 cm^3 06/15/22 1456   Distance Tunneling (cm) 0 cm 06/24/22 1141   Tunneling Position ___ O'Clock 0 06/24/22 1141   Undermining Starts ___ O'Clock 0 06/24/22 1141   Undermining Ends___ O'Clock 0 06/24/22 1141   Undermining Maxium Distance (cm) 0 06/24/22 1141   Wound Assessment Other (Comment) 07/01/22 1006   Drainage Amount None 07/01/22 1006   Drainage Description Serosanguinous 06/27/22 1017   Odor None 07/01/22 1006   Princess-wound Assessment Blanchable erythema 07/01/22 1006   Margins Attached edges 07/01/22 1006   Wound Thickness Description not for Pressure Injury Partial thickness 07/01/22 1006   Number of days: 135    left 5th toe         Wound 04/27/22 #10 Left 2nd Toe, Arterial, Full Thickness, Onset 04/25/2022 sx 6/23 amp. (Active)   Wound Image   07/01/22 0934   Wound Etiology Diabetic 07/01/22 0934   Dressing Status New dressing applied 07/01/22 0934   Wound Cleansed Cleansed with saline 07/01/22 0934   Dressing/Treatment Xeroform;Dry dressing 07/01/22 0934   Offloading for Diabetic Foot Ulcers Offloading ordered 07/01/22 0934   Dressing Change Due 07/02/22 07/01/22 0934   Wound Length (cm) 0.1 cm 07/01/22 0934   Wound Width (cm) 2 cm 07/01/22 0934   Wound Depth (cm) 0 cm 06/29/22 0919   Wound Surface Area (cm^2) 0.2 cm^2 07/01/22 0934   Change in Wound Size % (l*w) 66.67 07/01/22 0934   Wound Volume (cm^3) 0 cm^3 06/29/22 0919   Wound Healing % 100 06/29/22 0919   Post-Procedure Length (cm) 0.8 cm 06/08/22 1422   Post-Procedure Width (cm) 1 cm 06/08/22 1422   Post-Procedure Depth (cm) 0.1 cm 06/08/22 1422   Post-Procedure Surface Area (cm^2) 0.8 cm^2 06/08/22 1422   Post-Procedure Volume (cm^3) 0.08 cm^3 06/08/22 1422   Distance Tunneling (cm) 0 cm 06/24/22 1141   Tunneling Position ___ O'Clock 0 06/24/22 1141   Undermining Starts ___ O'Clock 0 06/24/22 1141   Undermining Ends___ O'Clock 0 06/24/22 1141   Undermining Maxium Distance (cm) 0 06/24/22 1141   Wound Assessment Other (Comment) 07/01/22 0934   Drainage Amount None 07/01/22 0934   Odor None 07/01/22 0934   Princess-wound Assessment Intact 07/01/22 0934   Margins Attached edges 07/01/22 0934   Wound Thickness Description not for Pressure Injury Partial thickness 07/01/22 0934   Number of days: 64    2nd toe amp. Wound 06/21/22 Buttocks Left dry scab with old healed scaring (Active)   Wound Image   06/22/22 1758   Wound Etiology Diabetic 06/24/22 1141   Dressing Status Clean;Dry; Intact 07/01/22 0745   Wound Cleansed Cleansed with saline 06/25/22 1032   Dressing/Treatment Other (comment); Pharmaceutical agent (see MAR) 06/25/22 1942   Dressing Change Due 06/27/22 06/27/22 0757   Wound Length (cm) 3.5 cm 06/21/22 1852   Wound Width (cm) 4 cm 06/21/22 1852   Wound Depth (cm) 0.1 cm 06/21/22 1852   Wound Surface Area (cm^2) 14 cm^2 06/21/22 1852   Wound Volume (cm^3) 1.4 cm^3 06/21/22 1852   Wound Assessment Dry;Pink/red 06/29/22 0857   Drainage Amount None 06/29/22 0857   Odor None 06/27/22 0757   Princess-wound Assessment Blanchable erythema 06/25/22 1032   Wound Thickness Description not for Pressure Injury Partial thickness 06/24/22 1141   Number of days: 9     Response to treatment:  Well tolerated by patient. Pain Assessment:  Severity:  0 / 10  Quality of pain: N/A  Wound Pain Timing/Severity: none  Premedicated: No  Plan:   Plan of Care: Wound 04/27/22 #10 Left 2nd Toe, Arterial, Full Thickness, Onset 04/25/2022 sx 6/23 amp.-Dressing/Treatment: Estefania Flavors dressing  Wound 02/16/22 #1 Right Lateral Ankle, Arterial, Full Thickness, onset -Dressing/Treatment: Gauze dressing/dressing sponge,Ace wrap  Wound 02/16/22 #2 Right Lateral Foot, Arterial, Full Thickness Onset Nov 2021-Dressing/Treatment: Gauze dressing/dressing sponge,Ace wrap  Wound 02/16/22  NPWT #3 Left Dorsal Foot, Arterial, Full Thickness, Onset Nov 2021-Dressing/Treatment: Negative pressure wound therapy,Ace wrap  Wound 02/16/22 #5 Left 5th Toe, Arterial, Full thickness, Onset Nov 2021-Dressing/Treatment: Xeroform,Dry dressing,Roll gauze,Ace wrap  Wound 06/21/22 Buttocks Left dry scab with old healed scaring-Dressing/Treatment: Other (comment),Pharmaceutical agent (see MAR)  Wound 02/16/22 #4 Left Lateral Foot, Arterial, full thickness,, Onset Nov 2021-Dressing/Treatment: Ace wrap      NPWT VAC Changed Mom, Wed, Fri.      Cleanse left dorsal foot wound with normal saline, pat dry, apply barrier wipe to wound edges. Apply VAC drape to wound edges.    1 piece non adherent dressing top of dorsal foot wound. 1 pieces of VAC black foam used for dorsal foot left wound dressing.     Cover with VAC drape to seal.       Treatment is 125 mmHg continuous suction.     Change cannister once a week or when full.  Not changed today.   If suction fails or patient is discharged:  -remove vac dressing  -cleanse wound with normal saline  -pack wound with saline moistened guaze and change every 12 hours.    Call wound care for deterioration [phone] or call [phone] and leave message.     Recommend   Left foot (#10 )  2nd ampt. Toe  And (#4 & #5) 5th amp toe area  Down lateral left foot cleanse with normal saline, pat dry, apply Xeroform daily.  Cover dry dressing, roll guaze, ace wrap    Daily right foot lateral foot and ankle cleanse normal saline, pat dry, apply PSO, Triad paste cover dry dressing roll guaze wrap ace wrap.        Specialty Bed Required : Yes   [x] Low Air Loss   [x] Pressure Redistribution  [] Fluid Immersion  [] Bariatric  [] Total Pressure Relief  [] Other:     Current Diet: ADULT DIET;  Regular; 5 carb choices (75 gm/meal)  ADULT ORAL NUTRITION SUPPLEMENT; Breakfast, Dinner; Diabetic Oral Supplement  Dietician consult:  Yes    Discharge Plan:  Placement for patient upon discharge: home with support   Patient appropriate for Outpatient [address]: Yes    Referrals:  [x]  following  [] [address] [email or web address]  [] Supplies  [] Other    Patient/Caregiver Teaching:  Level of patient/caregiver understanding able to:   [] Indicates understanding       [] Needs reinforcement  [] Unsuccessful      [] Verbal Understanding  [] Demonstrated understanding       [] No evidence of learning  [] Refused teaching         [x] N/A       Electronically signed by Deandre Rodriguez RN, on 7/1/2022 at 10:29 AM

## 2022-07-01 NOTE — CARE COORDINATION
[facility] scheduled for start of care Monday    Jovan Paulson RN, BSN CTN  [facility] [phone]

## 2022-07-01 NOTE — PROGRESS NOTES
Discharge instuctions reviewed with pt and spouse. Home wound vac in place, gave info on how to all if something happens to it. Hard copy of percocet given to wife. All meds reviewed and questions answered. Pt's wife knowlegeable of how to change the rt foot dressing, all extra supplies given to patient. IV removed per protocol, pt ben well. No further questions.

## 2022-07-01 NOTE — PLAN OF CARE
Electrolytes maintained within normal limits  Outcome: Completed  Goal: Hemodynamic stability and optimal renal function maintained  Outcome: Completed  Goal: Glucose maintained within prescribed range  Outcome: Completed     Problem: Hematologic - Adult  Goal: Maintains hematologic stability  Outcome: Completed     Problem: Respiratory - Adult  Goal: Achieves optimal ventilation and oxygenation  Outcome: Completed     Problem: Cardiovascular - Adult  Goal: Maintains optimal cardiac output and hemodynamic stability  Outcome: Completed     Problem: Genitourinary - Adult  Goal: Absence of urinary retention  Outcome: Completed     Problem: Discharge Planning  Goal: Discharge to home or other facility with appropriate resources  Outcome: Completed     Problem: Pain  Goal: Verbalizes/displays adequate comfort level or baseline comfort level  Outcome: Completed     Problem: Safety - Adult  Goal: Free from fall injury  Outcome: Completed     Problem: Skin/Tissue Integrity  Goal: Absence of new skin breakdown  Description: 1. Monitor for areas of redness and/or skin breakdown  2. Assess vascular access sites hourly  3. Every 4-6 hours minimum:  Change oxygen saturation probe site  4. Every 4-6 hours:  If on nasal continuous positive airway pressure, respiratory therapy assess nares and determine need for appliance change or resting period.   Outcome: Completed     Problem: Chronic Conditions and Co-morbidities  Goal: Patient's chronic conditions and co-morbidity symptoms are monitored and maintained or improved  Outcome: Completed     Problem: ABCDS Injury Assessment  Goal: Absence of physical injury  Outcome: Completed     Problem: Nutrition Deficit:  Goal: Optimize nutritional status  Outcome: Completed     Problem: Skin/Tissue Integrity - Adult  Goal: Skin integrity remains intact  Outcome: Completed  Goal: Incisions, wounds, or drain sites healing without S/S of infection  Outcome: Completed     Problem: Musculoskeletal - Adult  Goal: Return mobility to safest level of function  Outcome: Completed  Goal: Return ADL status to a safe level of function  Outcome: Completed     Problem: Gastrointestinal - Adult  Goal: Minimal or absence of nausea and vomiting  Outcome: Completed  Goal: Maintains or returns to baseline bowel function  Outcome: Completed  Goal: Maintains adequate nutritional intake  Outcome: Completed  Goal: Establish and maintain optimal ostomy function  Outcome: Completed     Problem: Infection - Adult  Goal: Absence of infection at discharge  Outcome: Completed     Problem: Metabolic/Fluid and Electrolytes - Adult  Goal: Electrolytes maintained within normal limits  Outcome: Completed  Goal: Hemodynamic stability and optimal renal function maintained  Outcome: Completed  Goal: Glucose maintained within prescribed range  Outcome: Completed     Problem: Hematologic - Adult  Goal: Maintains hematologic stability  Outcome: Completed     Problem: Respiratory - Adult  Goal: Achieves optimal ventilation and oxygenation  Outcome: Completed     Problem: Cardiovascular - Adult  Goal: Maintains optimal cardiac output and hemodynamic stability  Outcome: Completed     Problem: Genitourinary - Adult  Goal: Absence of urinary retention  Outcome: Completed

## 2022-07-06 ENCOUNTER — HOSPITAL ENCOUNTER (OUTPATIENT)
Dept: WOUND CARE | Age: 67
Discharge: HOME | End: 2022-07-06

## 2022-07-06 VITALS
HEIGHT: 71 IN | BODY MASS INDEX: 22.4 KG/M2 | TEMPERATURE: 98.2 F | SYSTOLIC BLOOD PRESSURE: 147 MMHG | DIASTOLIC BLOOD PRESSURE: 75 MMHG | WEIGHT: 160 LBS | RESPIRATION RATE: 18 BRPM | HEART RATE: 104 BPM

## 2022-07-06 DIAGNOSIS — E11.621 DIABETIC ULCER OF LEFT MIDFOOT ASSOCIATED WITH TYPE 2 DIABETES MELLITUS, WITH NECROSIS OF MUSCLE (HCC): ICD-10-CM

## 2022-07-06 DIAGNOSIS — E11.621 DIABETIC ULCER OF OTHER PART OF LEFT FOOT ASSOCIATED WITH TYPE 2 DIABETES MELLITUS, WITH NECROSIS OF BONE (HCC): ICD-10-CM

## 2022-07-06 DIAGNOSIS — L97.423 DIABETIC ULCER OF LEFT MIDFOOT ASSOCIATED WITH TYPE 2 DIABETES MELLITUS, WITH NECROSIS OF MUSCLE (HCC): ICD-10-CM

## 2022-07-06 DIAGNOSIS — L97.313 ISCHEMIC ULCER OF RIGHT ANKLE WITH NECROSIS OF MUSCLE (HCC): ICD-10-CM

## 2022-07-06 DIAGNOSIS — L97.514 ISCHEMIC ULCER OF RIGHT FOOT WITH NECROSIS OF BONE (HCC): ICD-10-CM

## 2022-07-06 DIAGNOSIS — L97.522 ISCHEMIC TOE ULCER, LEFT, WITH FAT LAYER EXPOSED (HCC): Primary | ICD-10-CM

## 2022-07-06 DIAGNOSIS — I96 GANGRENE OF TOE OF LEFT FOOT (HCC): ICD-10-CM

## 2022-07-06 DIAGNOSIS — L97.524 DIABETIC ULCER OF OTHER PART OF LEFT FOOT ASSOCIATED WITH TYPE 2 DIABETES MELLITUS, WITH NECROSIS OF BONE (HCC): ICD-10-CM

## 2022-07-06 DIAGNOSIS — L97.523 ISCHEMIC ULCER OF LEFT FOOT WITH NECROSIS OF MUSCLE (HCC): ICD-10-CM

## 2022-07-06 DIAGNOSIS — L97.513: ICD-10-CM

## 2022-07-06 PROCEDURE — 99214 OFFICE O/P EST MOD 30 MIN: CPT | Performed by: INTERNAL MEDICINE

## 2022-07-06 PROCEDURE — 97605 NEG PRS WND THER DME<=50SQCM: CPT

## 2022-07-06 PROCEDURE — 11043 DBRDMT MUSC&/FSCA 1ST 20/<: CPT

## 2022-07-06 PROCEDURE — 97605 NEG PRS WND THER DME<=50SQCM: CPT | Performed by: INTERNAL MEDICINE

## 2022-07-06 PROCEDURE — 11043 DBRDMT MUSC&/FSCA 1ST 20/<: CPT | Performed by: INTERNAL MEDICINE

## 2022-07-06 RX ORDER — LIDOCAINE 40 MG/G
CREAM TOPICAL ONCE
Status: DISCONTINUED | OUTPATIENT
Start: 2022-07-06 | End: 2022-07-07 | Stop reason: HOSPADM

## 2022-07-06 RX ORDER — LIDOCAINE HYDROCHLORIDE 40 MG/ML
SOLUTION TOPICAL ONCE
Status: CANCELLED | OUTPATIENT
Start: 2022-07-06 | End: 2022-07-06

## 2022-07-06 RX ORDER — LIDOCAINE HYDROCHLORIDE 40 MG/ML
SOLUTION TOPICAL ONCE
Status: DISCONTINUED | OUTPATIENT
Start: 2022-07-06 | End: 2022-07-07 | Stop reason: HOSPADM

## 2022-07-06 RX ORDER — BACITRACIN ZINC AND POLYMYXIN B SULFATE 500; 1000 [USP'U]/G; [USP'U]/G
OINTMENT TOPICAL ONCE
Status: CANCELLED | OUTPATIENT
Start: 2022-07-06 | End: 2022-07-06

## 2022-07-06 RX ORDER — LIDOCAINE 50 MG/G
OINTMENT TOPICAL ONCE
Status: CANCELLED | OUTPATIENT
Start: 2022-07-06 | End: 2022-07-06

## 2022-07-06 RX ORDER — BACITRACIN ZINC AND POLYMYXIN B SULFATE 500; 1000 [USP'U]/G; [USP'U]/G
OINTMENT TOPICAL ONCE
Status: DISCONTINUED | OUTPATIENT
Start: 2022-07-06 | End: 2022-07-07 | Stop reason: HOSPADM

## 2022-07-06 RX ORDER — LIDOCAINE 40 MG/G
CREAM TOPICAL ONCE
Status: CANCELLED | OUTPATIENT
Start: 2022-07-06 | End: 2022-07-06

## 2022-07-06 RX ORDER — CIPROFLOXACIN 500 MG/1
500 TABLET, FILM COATED ORAL EVERY 12 HOURS
Qty: 28 TABLET | Refills: 0 | Status: SHIPPED | OUTPATIENT
Start: 2022-07-10 | End: 2022-07-24

## 2022-07-06 RX ORDER — LIDOCAINE 50 MG/G
OINTMENT TOPICAL ONCE
Status: DISCONTINUED | OUTPATIENT
Start: 2022-07-06 | End: 2022-07-07 | Stop reason: HOSPADM

## 2022-07-06 ASSESSMENT — PAIN - FUNCTIONAL ASSESSMENT: PAIN_FUNCTIONAL_ASSESSMENT: PREVENTS OR INTERFERES SOME ACTIVE ACTIVITIES AND ADLS

## 2022-07-06 ASSESSMENT — PAIN DESCRIPTION - ONSET: ONSET: ON-GOING

## 2022-07-06 ASSESSMENT — PAIN SCALES - GENERAL: PAINLEVEL_OUTOF10: 4

## 2022-07-06 ASSESSMENT — PAIN DESCRIPTION - PAIN TYPE: TYPE: ACUTE PAIN

## 2022-07-06 ASSESSMENT — PAIN DESCRIPTION - FREQUENCY: FREQUENCY: INTERMITTENT

## 2022-07-06 ASSESSMENT — PAIN DESCRIPTION - LOCATION: LOCATION: FOOT

## 2022-07-06 ASSESSMENT — PAIN DESCRIPTION - ORIENTATION: ORIENTATION: RIGHT

## 2022-07-06 ASSESSMENT — PAIN DESCRIPTION - DESCRIPTORS: DESCRIPTORS: SORE;TENDER

## 2022-07-06 NOTE — PLAN OF CARE
Left 2nd & 5th toe amp. Sites with sutures intact, will have wife apply betadine & dry dressing daily. Right lateral foot wound with bone exposure noted & Right lateral ankle wound stable, no debridement, will change to having wife apply betadine, dry dressing, change daily. Left dorsal foot/ankle wound with tendon exposure noted, debridement per MD, will apply adaptic over tendon, cont. With NPWT & changed to intermittent pressure. On license of [facility] assisting with dressing changes & teaching wife. Pt. Instructed to wear the CAM boot at all times for immobilization of tendon & assist with wound healing. Pt. To cont. With Cipro antibiotics as ordered per Dr Kim Foote 7/6/22. Pt. Scheduled with vascular on 7/8/22. Both pt. & wife aware vascular intervention on right lower extremity needed prior to surgical debridement on right lateral foot wound. F/u in [facility] in 1 week as ordered, pt. Aware to call sooner with any changes or questions/concerns. Discharge instructions reviewed with patient & wife, all questions answered, copy given to patient. Dressings were applied to all wounds per M.D. Instructions at this visit.

## 2022-07-06 NOTE — PLAN OF CARE
[facility] Physician Orders and Discharge [facility]  [address], [address]  [place], [facility]  Telephone: [phone]      Fax: [phone]        Your home care [address] [place] [address] Dr care .     Your wound-care supplies will be provided by:  Patient .     NAME: Vic Morales  DATE of BIRTH:  1955  PRIMARY DIAGNOSIS FOR WOUND CARE CENTER:  Non-healing surgical, Arterial & Diabetic ulcers  .     Wound cleansing:   Do not scrub or use excessive force. Wash hands with soap and water before and after dressing changes. Prior to applying a clean dressing, cleanse wound with normal saline, wound cleanser, or mild soap and water. Ask your physician or nurse before getting the wound(s) wet in the shower.                Wound care for home:     Left Dorsal Foot/ankle:  Drape to esperanza-wound & under bridge   Adaptic over wound (NOT Xeroform)  Black foam over adaptic & bridge to lower leg  Cover with drape   -125 mmHg intermittent pressure   HHC to change dressing on Friday & Monday, [facility] will change on Wednesday    Right lateral foot & ankle wounds:  Left 2nd toe & 5th toe amp. Site:  Betadine to incision sites & wounds  Cover with dry dressing   Change dressing once daily         Attempt to wear the off-loading boots at night when in bed to help prevent further pressure on foot wounds.         Please note, all wounds (unless stated otherwise here) were mechanically debrided at the time of cleansing here in the wound-care center today, so a small amount of pain, drainage or bleeding from that process might be expected, and is normal.      All products for home use, including multiple products for a single wound if applicable, are medically necessary in order to achieve the best chance at timely wound healing.  See provider documentation for details if needed.     Substituted dressings applied in the [facility] today, if applicable:     N/a     New orders for this week (labs, imaging, medications, etc.):      Continue Cipro antibiotic as directed at discharge from hospital.   New script for Cipro for an additional 2 weeks sent to pharmacy per Dr Etelvina Mcbride 7/6/22    Wear the CAM boot for left foot to help with immobilization of left ankle at [address] Advise vascular that right foot/leg is needing intervention prior to being able to have debridement on right foot per Dr Etelvina Mcbride    If you develop a fever, chills, increased redness up ankle/leg, increased pain or overall not feeling well go to the emergency room.        Additional instructions for specific diagnoses:     Use waffle cushion when you are sitting.      Pt. Scheduled to f/u with Dr Jameson Cuba again 7/2022. Vascular intervention on left per Dr Mayra Velez 6/14/22, 7/8/22     General comments for arterial ulcers:  *  Moisturize your skin regularly with Vaseline, Aquaphor, Aveeno, CeraVe, Cetaphil, Eucerin, Lubriderm, etc; but keep the skin between your toes dry. [employer] allow your wound-care doctor or podiatrist to cut nails, calluses & corns. *  Be sure to always wear footwear that fits well, and NEVER go without shoes and socks. *  Be sure to adhere to any recommendations from your PCP or endocrinologist when it comes to high blood pressure, cholesterol, diet and exercise. If you have questions, please ask. *  If you smoke, your wound can not heal properly -- please talk with us when you're ready to quit. *  Do not soak your feet unless specifically instructed to do so by us. [facility] sure you stay well-hydrated, and maintain good protein intake. Theadora Martinet as much as you can tolerate. Exercise is part of the treatment of peripheral arterial disease. *  If you arent taking an aspirin or other related medication (Plavix, etc), please ask your primary care physician or vascular surgeon if you should.   [facility] sure you know when your vascular surgeon needs to follow-up with you, or needs to get any follow-up circulation tests.        F/U Appointment is with Dr. Alexandro Lamar in 1 week, on                                   at                       .     Your nurse  is HELIO Rice.      If we applied slip-resistant hospital socks today, be sure to remove them at least once a day to inspect your toes or feet, even if you're not changing the wraps or dressings underneath.  If you see anything concerning (redness, excess moisture, etc), please call and let us know right away.     Should you experience any significant changes in your wound(s) (including redness, increased warmth, increased pain, increased drainage, odor, or fever) or have questions about your wound care, please contact the [address] [place] [address] at [phone] Monday-Thursday from 8:00 am - 4:30 pm, or Friday from 8:00 am - 2:30 pm.  If you need help with your wound outside these hours and cannot wait until we are again available, contact your home-care company (if applicable), your PCP, or go to the nearest emergency room

## 2022-07-06 NOTE — PROGRESS NOTES
Demonstrated to wife and patient application of placing NPWT to left dorsal foot wound. Pt and wife's questions were answered. Instructed patient and wife to call [address] [place] [address],[address], Home Care, or NPWT customer service line with any questions or problems.

## 2022-07-12 PROBLEM — L97.423 DIABETIC ULCER OF LEFT MIDFOOT ASSOCIATED WITH TYPE 2 DIABETES MELLITUS, WITH NECROSIS OF MUSCLE (HCC): Status: ACTIVE | Noted: 2022-02-16

## 2022-07-12 PROBLEM — L97.522 ISCHEMIC TOE ULCER, LEFT, WITH FAT LAYER EXPOSED (HCC): Status: RESOLVED | Noted: 2022-06-07 | Resolved: 2022-07-12

## 2022-07-12 PROBLEM — M86.272 SUBACUTE OSTEOMYELITIS OF LEFT FOOT (HCC): Status: ACTIVE | Noted: 2022-06-21

## 2022-07-12 PROBLEM — L97.514 ISCHEMIC ULCER OF RIGHT FOOT WITH NECROSIS OF BONE (HCC): Status: ACTIVE | Noted: 2022-06-07

## 2022-07-12 PROBLEM — L03.116 CELLULITIS OF LEFT FOOT: Status: RESOLVED | Noted: 2022-03-22 | Resolved: 2022-07-12

## 2022-07-12 PROBLEM — I96 GANGRENE OF TOE OF LEFT FOOT (HCC): Status: RESOLVED | Noted: 2022-02-22 | Resolved: 2022-07-12

## 2022-07-12 PROBLEM — E11.621 DIABETIC ULCER OF LEFT MIDFOOT ASSOCIATED WITH TYPE 2 DIABETES MELLITUS, WITH NECROSIS OF MUSCLE (HCC): Status: ACTIVE | Noted: 2022-02-16

## 2022-07-12 NOTE — PROGRESS NOTES
Lev 30 Progress Note    Marcio Gomez     : 1955    DATE OF VISIT:  2022    Subjective:     Marcio Gomez is a 61 y.o. male who has an arterial and  diabetic ulcer located on the foot (left , dorsal, midfoot). Current complaint of pain in this ulcer? yes. Quality of pain: aching, burning and throbbing  Timing: intermittent and stable  Severity: mild  Associated Signs/Symptoms:  drainage (moderate, clear)  Other significant symptoms or pertinent ulcer history: I haven't seen Mr. April Hicks in a few weeks, as he was admitted directly to [facility] after following up with Dr. Gato Schilling for surgical planning for the left foot, once that side was finally revascularized. She ended up doing a complete amputation of the left 2nd toe with primary closure, an amputation of the left 5th toe and debridement of the adjacent forefoot ulcer including some metatarsal bone, also with primary closure, and then a debridement of the left dorsal foot wound. He had some preop imaging, cultures and routine labs of course. Was seen by Dr. Liana Mayes from [facility], who discussed with him a course of IV CTX or oral Cipro after discharge (he opted for the latter), until he could get back to see me. In speaking with Dr. Gato Schilling now, I think there's a very good chance the left 5th met osteo is resolved, but there were concerns about early acute osteo of left midfoot bones, beneath that necrotic wound bed. Currently having NPWT at home, with the help of home-care, at least for now. Has not yet seen vascular in follow-up after his LLE revascularization, but that's scheduled soon. Current local care is Adaptic and VAC to the left dorsal foot, nothing to the left foot suture lines, and Triad + PSO to the right foot and ankle wounds. He was also told not to wear the immobilizer boot in bed, only to ambulate. He's back to smoking a few cigarettes per day, also still vapes, but \"doesn't inhale\". Glucoses had improved somewhat from Nov to Jan, but his A1c hasn't improved further in recent months. Additional ulcer(s) noted? no.      Mr. Yas Hyde has a past medical history of Cellulitis of left foot, Focally failed skin grafts of abdominal wall, Shayla's gangrene, Fourniers gangrene, Gangrene of toe of left foot ([address] Osteomyelitis of left foot ([address] and Patellofemoral pain syndrome of right knee. He has a past surgical history that includes Foot fracture surgery (Left); Abdomen surgery (N/A, 12/01/2021); Scrotal surgery (N/A, 11/30/2021); Vagina surgery (N/A, 11/30/2021); Abdomen surgery (Left, 12/03/2021); Abdomen surgery (Left, 12/05/2021); colostomy (Left, 12/07/2021); Abdomen surgery (N/A, 12/07/2021); Abdomen surgery (Left, 12/10/2021); Abdomen surgery (N/A, 12/13/2021); Abdomen surgery (N/A, 12/16/2021); Abdomen surgery (N/A, 12/21/2021); Abdomen surgery (N/A, 12/23/2021); Pressure ulcer debridement (N/A, 12/27/2021); Pressure ulcer debridement (N/A, 12/30/2021); Skin graft (N/A, 01/03/2022); Muscle Repair (N/A, 01/03/2022); Skin graft (N/A, 01/06/2022); Pressure ulcer debridement (N/A, 01/10/2022); Toe amputation (Left, 06/23/2022); and vascular surgery (Left, 06/14/2022). His family history includes Cancer in his mother. Mr. Yas Hyde reports that he has been smoking. He started smoking about 52 years ago. He has never used smokeless tobacco. He reports previous alcohol use. He reports current drug use. Drug: Marijuana [employer] His current medication list consists of amLODIPine, aspirin, atorvastatin, ciprofloxacin, docusate sodium, insulin glargine, metFORMIN, oxyCODONE, and psyllium. This is POD 13 of Cipro, since the left foot surgery. Allergies: Shrimp flavor    Pertinent items from the review of systems are discussed in the HPI; the remainder of the ROS was reviewed and is negative.      Objective:     Vitals:    07/06/22 1336   BP: (!) 147/75   Pulse: (!) 104   Resp: 18 Temp: 98.2 °F (36.8 °C)   TempSrc: Oral   Weight: 160 lb (72.6 kg)   Height: 5' 11\" (1.803 m)       Constitutional:  well-developed, well-nourished, NAD   Cardiovascular:  bilateral pedal pulses Dopplerable, but left REY stronger than last time; left foot much warmer than at prior visits, with improved cap refill, less swelling now too  Lymphatic:  no inguinal or popliteal adenopathy, no lymphangitis, and a resolved cellulitis  Musculoskeletal:  no clubbing, cyanosis or petechiae; RLE and LLE with no gross effusions, joint misalignment or acute arthritis  Princess-ulcer skin:  slightly moist hyperkeratosis at the right foot and ankle; some contact dermatitis at the left dorsal foot; cristy and indurated at the sutured sites  Ulcer(s):   --          Left dorsal foot with a bit of new granulation, two exposed tendons, superficially necrotic, a couple of tunnels where they run beneath the wound edges, a bit of fibrin and biofilm, no pus, no bone exposure  --          Left lateral foot sutures intact, that site looks rather good. Left 5th toe amp site is incorporated here. --          Left 2nd toe amp site sutures intact, but the skin edges there are a bit darker purple, not sure if from recent bleeding or some microvascular ischemia. --          Right lateral ankle a bit larger again, partly pink, partly sloughy yellow. --          Right lateral midfoot about the same size, maybe less inflamed, a bit of red tissue, but now also some very obvious exposed bone with a rough surface (5th met base).   Photos also saved in electronic chart.     Today's Wound Measurements, per RN documentation:  Wound 02/16/22 #3 Left Dorsal Foot, Arterial, Full Thickness, Onset Nov 2021-Wound Length (cm): 2.5 cm  Wound 02/16/22 #1 Right Lateral Ankle, Arterial, Full Thickness, onset -Wound Length (cm): 0.9 cm  Wound 02/16/22 #2 Right Lateral Foot, Arterial, Full Thickness Onset Nov 2021-Wound Length (cm): 2.4 cm    Wound 02/16/22 #3 Left Dorsal Foot, Arterial, Full Thickness, Onset Nov 2021-Wound Width (cm): 2.7 cm  Wound 02/16/22 #1 Right Lateral Ankle, Arterial, Full Thickness, onset -Wound Width (cm): 1 cm  Wound 02/16/22 #2 Right Lateral Foot, Arterial, Full Thickness Onset Nov 2021-Wound Width (cm): 1.4 cm    Wound 02/16/22 #3 Left Dorsal Foot, Arterial, Full Thickness, Onset Nov 2021-Wound Depth (cm): 0.4 cm  Wound 02/16/22 #1 Right Lateral Ankle, Arterial, Full Thickness, onset -Wound Depth (cm): 0.4 cm  Wound 02/16/22 #2 Right Lateral Foot, Arterial, Full Thickness Onset Nov 2021-Wound Depth (cm): 0.3 cm  _____________________________    Lab Results   Component Value Date    LABALBU 2.4 (L) 01/11/2022     Lab Results   Component Value Date    CREATININE 0.7 (L) 06/27/2022     Lab Results   Component Value Date    HGB 10.4 (L) 06/27/2022     Lab Results   Component Value Date    LABA1C 10.1 06/21/2022       Soraida 15 left midfoot bedside WCx here had Serratia, (S) to his pre-admission Bactrim and his current Cipro. From his recent admission --    June 21 BCx negative x 2. June 21 left foot ulcer repeat WCx still with that same Serratia. June 22 MRI left foot -- Acute osteomyelitis of the 5th metatarsal and 5th distal phalanx. Suspected acute osteomyelitis of the 2nd proximal and middle phalanges. There is bone marrow edema in the cuboid, medial and intermediate cuneiforms, as well as the navicular. There appears to be a soft tissue defect of the dorsum of the midfoot at the level of these bones.  This marrow signal abnormality could represent reactive osteitis or early acute osteomyelitis. Nonspecific bone marrow edema in the 1st-3rd metatarsal heads and the 1st distal phalanx. No well-defined drainable abscess is seen. Generalized muscle edema along with subcutaneous edema. June 23 left foot surgical Cx with the Serratia, and later growth of Candida parapsilosis. Not sure exactly which site. ..     June 23 OR path --      -- Left second ray, amputation: Acute osteomyelitis. Bone and soft tissue margins negative for significant acute inflammation. -- Left fifth toe, amputation: Ulceration with associated acute/chronic inflammation and granulation tissue. Bone and soft tissue margins negative for significant acute inflammation. -- Left fifth metatarsal clearance fragment, excision: Chondro-osseous, fibroadipose tissue and skin negative for significant acute inflammation. Assessment:     Patient Active Problem List   Diagnosis Code    Uncontrolled type 2 diabetes mellitus with diabetic polyneuropathy, without long-term current use of insulin ([facility]) E11.42, E11.65    Diabetic ulcer of left midfoot associated with type 2 diabetes mellitus, with necrosis of muscle ([address] E11.621, L97.423    Ischemic ulcer of right ankle with necrosis of muscle ([address] L97.313    Current every day smoker F17.200    Hyperlipidemia E78.5    Diabetic ulcer of left foot associated with type 2 diabetes mellitus, with necrosis of bone ([address] E11.621, L97.524    Ischemic ulcer of right foot with necrosis of bone ([address] L97.514    Atherosclerosis of native arteries of left leg with ulceration of foot ([address] I70.245    Atherosclerosis of native arteries of right leg with ulceration of foot ([facility]) I70.235    Subacute osteomyelitis of left foot ([facility]) M86.272       Assessment of today's active condition(s): uncontrolled DM2, neuropathy, PAD, ongoing nicotine use and poor glucose control, with multiple nonhealing foot and ankle ulcers. Left side recently revascularized. Now with a left 2nd toe amp, left 5th toe amp, left 5th metatarsal debridement, and left dorsal foot debridement -- soft tissue infection looks to be resolved, 2nd and 5th ray osteo likely cured, but could have residual osteo in the midfoot. Also with ongoing necrosis and ischemia of right foot and ankle ulcers, and now exposed bone at the right foot, basically diagnostic of osteo there.  Factors contributing to occurrence and/or persistence of the chronic ulcer include diabetes, poor glucose control, chronic pressure, shear force, smoking, arterial insufficiency and decreased tissue oxygenation. Medical necessity of today's visit is shown by the above documentation. Sharp debridement is indicated today, based upon the exam findings in the ulcer(s) above. Procedure note:     Consent obtained. Time out performed per [address] policy. Anesthetic  Anesthetic: 4% Lidocaine Cream     Using a curette, scissors and forceps, I sharply debrided the foot (left , dorsal, midfoot) ulcer(s) down through and including the removal of muscle/fascia. The type(s) of tissue debrided included fibrin, biofilm, slough and necrotic/eschar. Total Surface Area Debrided: 6 sq cm. The ulcers were then irrigated with normal saline solution. The procedure was completed with a small amount of bleeding, and hemostasis was with pressure. The patient tolerated the procedure well, with no significant complications. The patient's level of pain during and after the procedure was monitored. Post-debridement measurements, if different from pre-debridement, are in the flowsheet as well. Discharge plan:     Treatment in the wound care center today, per RN documentation: Wound 02/16/22 #3 Left Dorsal Foot, Arterial, Full Thickness, Onset Nov 2021-Dressing/Treatment: Other (comment) (adaptic NPWT)  Wound 02/16/22 #1 Right Lateral Ankle, Arterial, Full Thickness, onset -Dressing/Treatment: Other (comment) (betadine 4x4's kerlix ace (no compression))  Wound 02/16/22 #2 Right Lateral Foot, Arterial, Full Thickness Onset Nov 2021-Dressing/Treatment: Other (comment) (betadine 4x4's kerlix). Discussed risks and benefits of ongoing Abx, and I think it's most prudent to give him another 2 weeks of cipro at this point, for the possible residual midfoot osteo.  If he has osteo there that's curable with Abx, and we don't cure it, there is a high likelihood of a BKA. Watch for signs of allergy, Candidiasis, Cdiff, arthropathy. No symptoms of those at this point. I don't think we necessarily need to treat the Candida in that one OR culture (it was only obtained after prior antibiotics, and it wasn't of bone (or if it WAS a bone culture, it was bone that was cured of osteo surgically). Important to get back to vascular surgery ASAP, for follow up of the LLE angio, and planning of RLE angio. He will need OR debridement of that right side at some point, but not until after he's been revascularized, and ideally not until he's been off of Abx for a couple of weeks. D/W Dr. Sueann Buerger. Go back to Betadine and gauze only, to those right foot and ankle ulcers, plus the left foot suture lines, once daily. I actually recommend that he wear that immobilizer boot 24/7 apart from wound care, to immobilize those ankle tendons and give them a better chance of granulating over. Imperative that he get his nicotine use down to zero as soon as he can, and work harder on more consistent glucoses. I'll try to see if we can secure a sample skin substitute for the left foot, to get some better tendon coverage (TheraSkin?), but I'm not sure if I'll be able to. I can remove left foot sutures in a week or two, if ok with Dr. Sueann Buerger. Home treatment: good handwashing before and after any dressing changes. Cleanse ulcer with saline or soap & water before dressing change. May use Vaseline (petrolatum), Aquaphor, Aveeno, CeraVe, Cetaphil, Eucerin, Lubriderm, etc for dry skin. Dressing type for home: for the left dorsal foot, periwound Betadine, Adaptic over tendons, black foam, standard drape, 125 mmHg, will change to intermittent negative pressure to try to speed things up, three times weekly. Written discharge instructions given to patient. Follow up in 1 week, unless vascular F/U conflicts with that, in which case that takes precedence.     Electronically signed by

## 2022-07-13 ENCOUNTER — HOSPITAL ENCOUNTER (OUTPATIENT)
Dept: WOUND CARE | Age: 67
Discharge: HOME | End: 2022-07-13

## 2022-07-13 VITALS
HEIGHT: 71 IN | DIASTOLIC BLOOD PRESSURE: 66 MMHG | HEART RATE: 113 BPM | SYSTOLIC BLOOD PRESSURE: 106 MMHG | RESPIRATION RATE: 20 BRPM | BODY MASS INDEX: 22.32 KG/M2 | TEMPERATURE: 97.9 F

## 2022-07-13 DIAGNOSIS — E11.621 DIABETIC ULCER OF LEFT MIDFOOT ASSOCIATED WITH TYPE 2 DIABETES MELLITUS, WITH NECROSIS OF MUSCLE (HCC): ICD-10-CM

## 2022-07-13 DIAGNOSIS — E11.621 DIABETIC ULCER OF OTHER PART OF LEFT FOOT ASSOCIATED WITH TYPE 2 DIABETES MELLITUS, WITH NECROSIS OF BONE (HCC): ICD-10-CM

## 2022-07-13 DIAGNOSIS — L97.313 ISCHEMIC ULCER OF RIGHT ANKLE WITH NECROSIS OF MUSCLE (HCC): ICD-10-CM

## 2022-07-13 DIAGNOSIS — L97.514 ISCHEMIC ULCER OF RIGHT FOOT WITH NECROSIS OF BONE (HCC): Primary | ICD-10-CM

## 2022-07-13 DIAGNOSIS — L97.423 DIABETIC ULCER OF LEFT MIDFOOT ASSOCIATED WITH TYPE 2 DIABETES MELLITUS, WITH NECROSIS OF MUSCLE (HCC): ICD-10-CM

## 2022-07-13 DIAGNOSIS — L97.524 DIABETIC ULCER OF OTHER PART OF LEFT FOOT ASSOCIATED WITH TYPE 2 DIABETES MELLITUS, WITH NECROSIS OF BONE (HCC): ICD-10-CM

## 2022-07-13 PROCEDURE — 11043 DBRDMT MUSC&/FSCA 1ST 20/<: CPT

## 2022-07-13 PROCEDURE — 97605 NEG PRS WND THER DME<=50SQCM: CPT

## 2022-07-13 PROCEDURE — 11043 DBRDMT MUSC&/FSCA 1ST 20/<: CPT | Performed by: INTERNAL MEDICINE

## 2022-07-13 RX ORDER — LIDOCAINE 40 MG/G
CREAM TOPICAL ONCE
Status: DISCONTINUED | OUTPATIENT
Start: 2022-07-13 | End: 2022-07-14 | Stop reason: HOSPADM

## 2022-07-13 RX ORDER — BACITRACIN ZINC AND POLYMYXIN B SULFATE 500; 1000 [USP'U]/G; [USP'U]/G
OINTMENT TOPICAL ONCE
Status: CANCELLED | OUTPATIENT
Start: 2022-07-13 | End: 2022-07-13

## 2022-07-13 RX ORDER — LIDOCAINE HYDROCHLORIDE 40 MG/ML
SOLUTION TOPICAL ONCE
Status: CANCELLED | OUTPATIENT
Start: 2022-07-13 | End: 2022-07-13

## 2022-07-13 RX ORDER — BACITRACIN ZINC AND POLYMYXIN B SULFATE 500; 1000 [USP'U]/G; [USP'U]/G
OINTMENT TOPICAL ONCE
Status: DISCONTINUED | OUTPATIENT
Start: 2022-07-13 | End: 2022-07-14 | Stop reason: HOSPADM

## 2022-07-13 RX ORDER — LIDOCAINE 50 MG/G
OINTMENT TOPICAL ONCE
Status: CANCELLED | OUTPATIENT
Start: 2022-07-13 | End: 2022-07-13

## 2022-07-13 RX ORDER — LIDOCAINE HYDROCHLORIDE 40 MG/ML
SOLUTION TOPICAL ONCE
Status: DISCONTINUED | OUTPATIENT
Start: 2022-07-13 | End: 2022-07-14 | Stop reason: HOSPADM

## 2022-07-13 RX ORDER — LIDOCAINE 50 MG/G
OINTMENT TOPICAL ONCE
Status: DISCONTINUED | OUTPATIENT
Start: 2022-07-13 | End: 2022-07-14 | Stop reason: HOSPADM

## 2022-07-13 RX ORDER — LIDOCAINE 40 MG/G
CREAM TOPICAL ONCE
Status: CANCELLED | OUTPATIENT
Start: 2022-07-13 | End: 2022-07-13

## 2022-07-13 ASSESSMENT — PAIN DESCRIPTION - ORIENTATION: ORIENTATION: RIGHT

## 2022-07-13 ASSESSMENT — PAIN DESCRIPTION - DESCRIPTORS: DESCRIPTORS: SHOOTING;SHARP;SORE

## 2022-07-13 ASSESSMENT — PAIN DESCRIPTION - FREQUENCY: FREQUENCY: INTERMITTENT

## 2022-07-13 ASSESSMENT — PAIN DESCRIPTION - LOCATION: LOCATION: FOOT

## 2022-07-13 ASSESSMENT — PAIN - FUNCTIONAL ASSESSMENT: PAIN_FUNCTIONAL_ASSESSMENT: PREVENTS OR INTERFERES SOME ACTIVE ACTIVITIES AND ADLS

## 2022-07-13 ASSESSMENT — PAIN SCALES - GENERAL: PAINLEVEL_OUTOF10: 3

## 2022-07-13 ASSESSMENT — PAIN DESCRIPTION - PROGRESSION: CLINICAL_PROGRESSION: NOT CHANGED

## 2022-07-13 ASSESSMENT — PAIN DESCRIPTION - PAIN TYPE: TYPE: CHRONIC PAIN

## 2022-07-13 ASSESSMENT — PAIN DESCRIPTION - ONSET: ONSET: ON-GOING

## 2022-07-13 NOTE — PLAN OF CARE
[facility] Physician Orders and Discharge 800 [facility]  [address], [address]  [place], [facility]  Telephone: [phone]      Fax: [phone]        Your home care [address] [place] [address] Dr care .     Your wound-care supplies will be provided by:  Patient .     NAME: Barbara Zuñiga  DATE of BIRTH:  1955  PRIMARY DIAGNOSIS FOR WOUND CARE CENTER:  Non-healing surgical, Arterial & Diabetic ulcers  .     Wound cleansing:   Do not scrub or use excessive force. Wash hands with soap and water before and after dressing changes. Prior to applying a clean dressing, cleanse wound with normal saline, wound cleanser, or mild soap and water. Ask your physician or nurse before getting the wound(s) wet in the shower.                Wound care for home:     Left Dorsal Foot/ankle:  Drape to esperanza-wound & under bridge   Adaptic over wound (NOT Xeroform)  Black foam over adaptic & BRIDGE TO LOWER LEG (DO NOT PLACE SUCKER PAD OVER WOUND)  Cover with drape   -125 mmHg intermittent pressure   HHC to change dressing on Friday & Monday, [address] will change on Wednesday     Right lateral foot & ankle wounds:  Left 2nd toe & 5th toe amp. Site:  Betadine to incision sites & wounds  Cover with dry dressing   Change dressing once daily         Attempt to wear the off-loading boots at night when in bed to help prevent further pressure on foot wounds.         Please note, all wounds (unless stated otherwise here) were mechanically debrided at the time of cleansing here in the wound-care center today, so a small amount of pain, drainage or bleeding from that process might be expected, and is normal.      All products for home use, including multiple products for a single wound if applicable, are medically necessary in order to achieve the best chance at timely wound healing.  See provider documentation for details if needed.     Substituted dressings applied in the [address] today, if applicable:     N/a     New orders for this week (labs, imaging, medications, etc.):      Continue Cipro antibiotic as directed at discharge from hospital.   New script for Cipro for an additional 2 weeks sent to pharmacy per Dr Judy Prater 7/6/22     Wear the CAM boot for left foot to help with immobilization of left ankle at ALL TIMES.      Advise vascular that right foot/leg is needing intervention prior to being able to have debridement on right foot per Dr Judy Prater     If you develop a fever, chills, increased redness up ankle/leg, increased pain or overall not feeling well go to the emergency room.        Additional instructions for specific diagnoses:     Use waffle cushion when you are sitting.      Pt. Scheduled to f/u with Dr Jb Salcido again 7/2022. Vascular intervention on left per Dr Lamonte Munoz 6/14/22, 7/8/22     General comments for arterial ulcers:  *  Moisturize your skin regularly with Vaseline, Aquaphor, Aveeno, CeraVe, Cetaphil, Eucerin, Lubriderm, etc; but keep the skin between your toes dry. Zhao Allen allow your wound-care doctor or podiatrist to cut nails, calluses & corns. *  Be sure to always wear footwear that fits well, and NEVER go without shoes and socks. *  Be sure to adhere to any recommendations from your PCP or endocrinologist when it comes to high blood pressure, cholesterol, diet and exercise. If you have questions, please ask. *  If you smoke, your wound can not heal properly -- please talk with us when you're ready to quit. *  Do not soak your feet unless specifically instructed to do so by us. [facility] sure you stay well-hydrated, and maintain good protein intake. Chelle Guerra as much as you can tolerate. Exercise is part of the treatment of peripheral arterial disease. *  If you arent taking an aspirin or other related medication (Plavix, etc), please ask your primary care physician or vascular surgeon if you should.   [facility] sure you know when your vascular surgeon needs to follow-up with you, or needs to get any follow-up circulation tests.        F/U Appointment is with Dr. Kim Foote in 1 week, on                                   at                       .     Your nurse  is HELIO Rice.      If we applied slip-resistant hospital socks today, be sure to remove them at least once a day to inspect your toes or feet, even if you're not changing the wraps or dressings underneath.  If you see anything concerning (redness, excess moisture, etc), please call and let us know right away.     Should you experience any significant changes in your wound(s) (including redness, increased warmth, increased pain, increased drainage, odor, or fever) or have questions about your wound care, please contact the [facility] at [phone] Monday-Thursday from 8:00 am - 4:30 pm, or Friday from 8:00 am - 2:30 pm.  If you need help with your wound outside these hours and cannot wait until we are again available, contact your home-care company (if applicable), your PCP, or go to the nearest emergency room

## 2022-07-13 NOTE — PLAN OF CARE
Right lateral ankle & foot wounds stable, no debridement, cont. With betadine as ordered. Left 2nd toe & lateral foot amputation incision sites stable, cont. With betadine as ordered. Left dorsal foot/ankle wound with tendon exposure, debridement per MD, cont. With NPWT as ordered. Pt. Wearing CAM boot for immobilization of foot/ankle. Pt. States he missed his vascular appt. Last week d/t nausea r/t fasting for a PCP appt. Pt. Rescheduled with vascular on 7/22/22. F/u in [address] [place] [address] in 1 week as ordered, pt. Aware to call sooner with any changes or questions/concerns. Discharge instructions reviewed with patient & wife, all questions answered, copy given to patient. Dressings were applied to all wounds per M.D. Instructions at this visit.

## 2022-07-14 NOTE — DISCHARGE INSTRUCTIONS
215 [facility] Physician Orders and Discharge [address], [address]  [place], [facility]  Telephone: [phone]      Fax: [phone] home care company:   N/a . Your wound-care supplies will be provided by:  Patient . NAME:  Sumanth Mulligan   YOB: 1955  PRIMARY DIAGNOSIS FOR WOUND CARE CENTER:  Dehisced surgical, Arterial & Diabetic ulcers  . Wound cleansing:   Do not scrub or use excessive force. Wash hands with soap and water before and after dressing changes. Prior to applying a clean dressing, cleanse wound with normal saline, wound cleanser, or mild soap and water. Ask your physician or nurse before getting the wound(s) wet in the shower. Wound care for home:     Left Dorsal Foot/ankle:  Drape to esperanza-wound & under bridge   Adaptic over wound   Black foam over adaptic & BRIDGE TO LOWER LEG (DO NOT PLACE SUCKER PAD OVER WOUND)  Make sure there is drape under any black foam to protect skin  Cover black foam with drape   -125 mmHg intermittent pressure   Change dressing on Friday & Monday, [facility] will change on Wednesday       Left lateral foot amp. Site:  Apply antibiotic ointment mixed with Triad to wound  Cover with dry dressing  Change once daily       Right lateral foot & ankle wounds:  Left 2nd toe amp. Site:  Betadine to incision site & wounds  Cover with dry dressing   Change dressing once daily         Attempt to wear the off-loading boots at night when in bed to help prevent further pressure on foot wounds.          Please note, all wounds (unless stated otherwise here) were mechanically debrided at the time of cleansing here in the wound-care center today, so a small amount of pain, drainage or bleeding from that process might be expected, and is normal.      All products for home use, including multiple products for a single wound if applicable, are medically necessary in order to achieve the best chance at timely wound healing. See provider documentation for details if needed. Substituted dressings applied in the [address] [place] [address],[address] today, if applicable:     N/a     New orders for this week (labs, imaging, medications, etc.):      Continue Cipro antibiotic as directed, until completely gone on 7/25/22, then stop. Wear the CAM boot for left foot to help with immobilization of left ankle at [address] Advise vascular that right foot/leg is needing intervention prior to being able to have debridement on right foot per Dr Vita Senior. On 7/22/22     If you develop a fever, chills, increased redness up ankle/leg, increased pain or overall not feeling well go to the emergency room. Additional instructions for specific diagnoses:     Use waffle cushion when you are sitting. Pt. Scheduled to f/u with Dr Aliyah Sigala again 7/2022. Vascular intervention on left per Dr Prateek Zhao 6/14/22, 7/8/22     General comments for arterial ulcers:  *  Moisturize your skin regularly with Vaseline, Aquaphor, Aveeno, CeraVe, Cetaphil, Eucerin, Lubriderm, etc; but keep the skin between your toes dry. *  Always allow your wound-care doctor or podiatrist to cut nails, calluses & corns. *  Be sure to always wear footwear that fits well, and NEVER go without shoes and socks. *  Be sure to adhere to any recommendations from your PCP or endocrinologist when it comes to high blood pressure, cholesterol, diet and exercise. If you have questions, please ask. *  If you smoke, your wound can not heal properly -- please talk with us when you're ready to quit. *  Do not soak your feet unless specifically instructed to do so by us. *  Make sure you stay well-hydrated, and maintain good protein intake. *  Walk as much as you can tolerate. Exercise is part of the treatment of peripheral arterial disease.   *  If you arent taking an aspirin or other related medication (Plavix, etc), please ask your primary care physician

## 2022-07-20 ENCOUNTER — HOSPITAL ENCOUNTER (OUTPATIENT)
Dept: WOUND CARE | Age: 67
Discharge: HOME | End: 2022-07-20

## 2022-07-20 VITALS
HEART RATE: 95 BPM | WEIGHT: 158 LBS | SYSTOLIC BLOOD PRESSURE: 128 MMHG | RESPIRATION RATE: 20 BRPM | BODY MASS INDEX: 22.12 KG/M2 | TEMPERATURE: 98.8 F | DIASTOLIC BLOOD PRESSURE: 68 MMHG | HEIGHT: 71 IN

## 2022-07-20 DIAGNOSIS — L97.524 DIABETIC ULCER OF OTHER PART OF LEFT FOOT ASSOCIATED WITH TYPE 2 DIABETES MELLITUS, WITH NECROSIS OF BONE (HCC): ICD-10-CM

## 2022-07-20 DIAGNOSIS — L97.423 DIABETIC ULCER OF LEFT MIDFOOT ASSOCIATED WITH TYPE 2 DIABETES MELLITUS, WITH NECROSIS OF MUSCLE (HCC): ICD-10-CM

## 2022-07-20 DIAGNOSIS — L97.313 ISCHEMIC ULCER OF RIGHT ANKLE WITH NECROSIS OF MUSCLE (HCC): ICD-10-CM

## 2022-07-20 DIAGNOSIS — E11.621 DIABETIC ULCER OF OTHER PART OF LEFT FOOT ASSOCIATED WITH TYPE 2 DIABETES MELLITUS, WITH NECROSIS OF BONE (HCC): ICD-10-CM

## 2022-07-20 DIAGNOSIS — E11.621 DIABETIC ULCER OF LEFT MIDFOOT ASSOCIATED WITH TYPE 2 DIABETES MELLITUS, WITH NECROSIS OF MUSCLE (HCC): ICD-10-CM

## 2022-07-20 DIAGNOSIS — L97.514 ISCHEMIC ULCER OF RIGHT FOOT WITH NECROSIS OF BONE (HCC): Primary | ICD-10-CM

## 2022-07-20 PROCEDURE — 11043 DBRDMT MUSC&/FSCA 1ST 20/<: CPT

## 2022-07-20 PROCEDURE — 97605 NEG PRS WND THER DME<=50SQCM: CPT | Performed by: INTERNAL MEDICINE

## 2022-07-20 PROCEDURE — 97605 NEG PRS WND THER DME<=50SQCM: CPT

## 2022-07-20 PROCEDURE — 11043 DBRDMT MUSC&/FSCA 1ST 20/<: CPT | Performed by: INTERNAL MEDICINE

## 2022-07-20 RX ORDER — LIDOCAINE 50 MG/G
OINTMENT TOPICAL ONCE
Status: CANCELLED | OUTPATIENT
Start: 2022-07-20 | End: 2022-07-20

## 2022-07-20 RX ORDER — BACITRACIN ZINC AND POLYMYXIN B SULFATE 500; 1000 [USP'U]/G; [USP'U]/G
OINTMENT TOPICAL ONCE
Status: CANCELLED | OUTPATIENT
Start: 2022-07-20 | End: 2022-07-20

## 2022-07-20 RX ORDER — LIDOCAINE 40 MG/G
CREAM TOPICAL ONCE
Status: CANCELLED | OUTPATIENT
Start: 2022-07-20 | End: 2022-07-20

## 2022-07-20 RX ORDER — LIDOCAINE HYDROCHLORIDE 40 MG/ML
SOLUTION TOPICAL ONCE
Status: DISCONTINUED | OUTPATIENT
Start: 2022-07-20 | End: 2022-07-21 | Stop reason: HOSPADM

## 2022-07-20 RX ORDER — LIDOCAINE 50 MG/G
OINTMENT TOPICAL ONCE
Status: DISCONTINUED | OUTPATIENT
Start: 2022-07-20 | End: 2022-07-21 | Stop reason: HOSPADM

## 2022-07-20 RX ORDER — LIDOCAINE 40 MG/G
CREAM TOPICAL ONCE
Status: DISCONTINUED | OUTPATIENT
Start: 2022-07-20 | End: 2022-07-21 | Stop reason: HOSPADM

## 2022-07-20 RX ORDER — LIDOCAINE HYDROCHLORIDE 40 MG/ML
SOLUTION TOPICAL ONCE
Status: CANCELLED | OUTPATIENT
Start: 2022-07-20 | End: 2022-07-20

## 2022-07-20 RX ORDER — BACITRACIN ZINC AND POLYMYXIN B SULFATE 500; 1000 [USP'U]/G; [USP'U]/G
OINTMENT TOPICAL ONCE
Status: DISCONTINUED | OUTPATIENT
Start: 2022-07-20 | End: 2022-07-21 | Stop reason: HOSPADM

## 2022-07-20 ASSESSMENT — PAIN DESCRIPTION - DESCRIPTORS: DESCRIPTORS: SHARP;BURNING

## 2022-07-20 ASSESSMENT — PAIN DESCRIPTION - LOCATION: LOCATION: BUTTOCKS

## 2022-07-20 ASSESSMENT — PAIN SCALES - GENERAL: PAINLEVEL_OUTOF10: 2

## 2022-07-20 ASSESSMENT — PAIN DESCRIPTION - ONSET: ONSET: ON-GOING

## 2022-07-20 ASSESSMENT — PAIN - FUNCTIONAL ASSESSMENT: PAIN_FUNCTIONAL_ASSESSMENT: PREVENTS OR INTERFERES SOME ACTIVE ACTIVITIES AND ADLS

## 2022-07-20 ASSESSMENT — PAIN DESCRIPTION - PAIN TYPE: TYPE: CHRONIC PAIN

## 2022-07-20 ASSESSMENT — PAIN DESCRIPTION - FREQUENCY: FREQUENCY: INTERMITTENT

## 2022-07-20 NOTE — PLAN OF CARE
Right foot & ankle wounds stable, no debridement, cont. With betadine & dry dressing as ordered. Pt. States he is seeing vascular next week on 7/22/22. Left 2nd toe amp. Site suture removed per MD & pt. Tolerated well, cont. With betadine & dry dressing as ordered. Left lateral foot amp. Site dehisced, bone exposure noted, debridement per MD & pt. Tolerated well. Will start Triad/antibiotic ointment mixed to wound, cover with dry dressing, change once daily. Left dorsal foot/ankle wound with tendon exposure, debridement per MD, cont. With NPWT as ordered. Pt. Using CAM boot at all times for immobilization of foot/ankle & off-loading. Pt. States he \"fell off the wagon\" with smoking, but will be moving & isn't able to smoke in the new apt. States he will be using a vape instead. Dr Rex Wright again encouraged patient to refrain from using nicotine to assist with overall health & wound healing, both pt. & wife verbalize understanding. F/u in [address] [place] [address],[address] in 1 week as ordered, pt. Aware to call sooner with any changes or questions/concerns. Discharge instructions reviewed with patient & wife, all questions answered, copy given to patient. Dressings were applied to all wounds per M.D. Instructions at this visit.

## 2022-07-25 ENCOUNTER — OFFICE VISIT (OUTPATIENT)
Dept: SURGERY | Age: 67
End: 2022-07-25

## 2022-07-25 VITALS
OXYGEN SATURATION: 98 % | TEMPERATURE: 97.3 F | HEART RATE: 108 BPM | HEIGHT: 71 IN | BODY MASS INDEX: 22.12 KG/M2 | WEIGHT: 158 LBS | SYSTOLIC BLOOD PRESSURE: 116 MMHG | DIASTOLIC BLOOD PRESSURE: 66 MMHG

## 2022-07-25 DIAGNOSIS — M72.6 NECROTIZING FASCIITIS (HCC): ICD-10-CM

## 2022-07-25 DIAGNOSIS — Z09 POSTOP CHECK: Primary | ICD-10-CM

## 2022-07-25 LAB
FUNGUS (MYCOLOGY) CULTURE: ABNORMAL
FUNGUS STAIN: ABNORMAL
ORGANISM: ABNORMAL

## 2022-07-25 PROCEDURE — 99213 OFFICE O/P EST LOW 20 MIN: CPT | Performed by: SURGERY

## 2022-07-25 NOTE — PROGRESS NOTES
[facility]    PROCEDURE: 1) Complex closure of perineum (7.2 cm)                                                         2) Left posterior thigh advancement flap (wound: 13 x 5 cm; flap: 13 x 7 cm)                                                         3) Skin graft to left buttock (7.1 x 7.2 cm)                                                         4) Application of wound vacuum device  DATE: 1/6/22    Shana Barlow has been recovering satisfactorily since his procedure. Pain has been well controlled without pain medications. He notes that he is continuing to smoke. Since his last evaluation, he notes that he has been overall feeling better, he is continuing to smoke.     PMHx:   Past Medical History:   Diagnosis Date    Cellulitis of left foot 3/22/2022    Focally failed skin grafts of abdominal wall 3/5/2022    Shayla's gangrene     Fourniers gangrene     Gangrene of toe of left foot ([address] 2/22/2022    Osteomyelitis of left foot ([address] 11/30/2021    Patellofemoral pain syndrome of right knee      PSHx:   Past Surgical History:   Procedure Laterality Date    ABDOMEN SURGERY N/A 12/01/2021    WIDE EXCISION PERINEUM, BACK, ABDOMINAL WALL performed by Tank Monet MD at [place] [address]. Left 12/03/2021    ABDOMINAL WALL AND LEFT BUTTOCK DEBRIDEMENT, WOUND VAC PLACEMENT performed by Shauna Funk MD at [place] [address]. Left 12/05/2021    ABDOMINAL WALL AND LEFT BUTTOCK DEBRIDEMENT, WOUND VAC PLACEMENT performed by Shauna Funk MD at [place] [address]. N/A 12/07/2021    EVALUATION UNDER ANESTHESIA WITH DEBRIDEMENT ABDOMINAL WOUND AND LEFT BUTTOCK, WOUND VAC CHANGE performed by Shauna Funk MD at [place] [address]. Left 12/10/2021    ABDOMINAL WALL AND LEFT BUTTOCK WOUND VAC CHANGE WITH FURTHER DEBRIDEMENT ABDOMEN AND LEFT BUTTOCK WOUND performed by Shauna Funk MD at [facility] [address] 12/13/2021    WOUND VAC CHANGE ABDOMEN AND LEFT BUTTOCK performed by Padilla Rose MD at [facility]. N/A 12/16/2021    2ND LOOK/ EVALUATION UNDER ANESTHESIA/ WOUND VAC CHANGE ABDOMINAL WALL AND PERINEUM performed by Padilla Rose MD at [facility]. N/A 12/21/2021    EVALUATION UNDER ANESTHESIA/ WOUND VAC CHANGE ABDOMINAL WALL AND PERINEUM performed by Padilla Rose MD at [facility]. N/A 12/23/2021    EVALUATION UNDER ANESTHESIA/ WOUND VAC [address] performed by Padilla Rose MD at [address] [place] [address] 12/07/2021    LAPAROSCOPIC COLOSTOMY CREATION performed by Renay Crenshaw MD at [address] [place] [address]     many years ago    MUSCLE REPAIR N/A 01/03/2022    GLUTEAL WOUND VAC PLACEMENT performed by Padilla Rose MD at [address] [place] [address] N/A 12/27/2021    PERINEAL WOUND VAC CHANGE performed by Padilla Rose MD at [address] [place] [address] N/A 12/30/2021    PERINEAL / ABDOMINAL WOUND VAC CHANGE, performed by Padilla Rose MD at [address] [place] [address] N/A 01/10/2022    PERINEAL WOUND VAC REMOVAL, EXAM UNDER ANESTHESIA performed by Padilla Rose MD at [place] [address] 11/30/2021    DEBRIDEMENT OF SCROTAL JAYRO'S GANGRENE performed by Luis Baires MD at [facility] 01/03/2022    SPLIT THICKNESS SKIN GRAFT TO ABDOMEN WOUND VAC PLACEMENT. 44x 11 performed by Padilla Rose MD at [address] 01/06/2022    LEFT POSTERIOR THIGH ADVANCEMENT 13X7CM; CLOSURE PERINEUM 7.2CM; SKIN GRAFT 7. 1CMX7.2CM TO LEFT BUTTOCK; APPLICATION OF WOUND VACUUM DEVICE performed by Padilla Rose MD at [place] [address] Left 06/23/2022    LEFT FIFTH RAY AMPUTATION, LEFT SECOND TOE AMPUTATION, INCISION AND DEBRIBEMENT OF LEFT MIDFOOT; APPLICATION OF WOUND VAC  performed by Shanna Kerr DPM at [address] [place] [address] 11/30/2021    PERINEAL SOFT TISSUE EXCISIONAL DEBRIDEMENT performed by Michaela Blum MD at [address] Left 06/14/2022    SFA angioplasty and stent; REY open, peroneal occludes distally, PTA has a      Allergy:   Allergies   Allergen Reactions    Shrimp Flavor Swelling     Throat swells up when eats shrimp       SHx:   Social History     Socioeconomic History    Marital status:      Spouse name: Not on file    Number of children: 5    Years of education: Not on file    Highest education level: Not on file   Occupational History    Not on file   Tobacco Use    Smoking status: Every Day     Types: Cigarettes     Start date: 1/1/1970    Smokeless tobacco: Never   Substance and Sexual Activity    Alcohol use: Not Currently     Alcohol/week: 0.0 standard drinks    Drug use: Yes     Types: Marijuana (Weed)     Comment: 1 hit once a day    Sexual activity: Yes     Partners: Female   Other Topics Concern    Not on file   Social History Narrative    Not on file     Social Determinants of Health     Financial Resource Strain: Not on file   Food Insecurity: Not on file   Transportation Needs: Not on file   Physical Activity: Not on file   Stress: Not on file   Social Connections: Not on file   Intimate Partner Violence: Not on file   Housing Stability: Not on file     FHx: Family history reviewed and is noncontributory  Meds:   Current Outpatient Medications   Medication Sig Dispense Refill    OXYCODONE ER PO Take by mouth every 6 hours as needed      aspirin 81 MG EC tablet Take 81 mg by mouth daily      docusate sodium (COLACE) 100 MG capsule Take 100 mg by mouth at bedtime       psyllium (KONSYL) 28.3 % PACK Take 1 packet by mouth daily      atorvastatin (LIPITOR) 80 MG tablet Take 80 mg by mouth nightly      amLODIPine (NORVASC) 5 MG tablet Take 5 mg by mouth daily      insulin glargine (LANTUS) 100 UNIT/ML injection vial Inject 18 Units into the skin once       metFORMIN (GLUCOPHAGE) 500 MG tablet Take 500 mg by mouth in the morning and at bedtime Stopped on until Friday 6/17/22       No current facility-administered medications for this visit. ROS   Constitutional: Negative for chills and fever. HENT: Negative for congestion, facial swelling, and voice change. Eyes: Negative for photophobia and visual disturbance. Respiratory: Negative for apnea, cough, chest tightness and shortness of breath. Cardiovascular: Negative for chest pain and palpitations. Gastrointestinal: Negative for dysphagia and early satiety. Genitourinary: Negative for difficulty urinating, dysuria, flank pain, frequency and hematuria. Musculoskeletal: Negative for new gait problem, joint swelling and myalgias. Skin: Negative for color change, pallor and rash. Endocrine: negative for tremors, temperature intolerance or polydipsia. Allergic/Immunologic: Negative for new environmental or food allergies. Neurological: Negative for dizziness, seizures, speech difficulty, numbness. Hematological: Negative for adenopathy. Psychiatric/Behavioral: Negative for agitation and confusion. EXAM    /66   Pulse (!) 108   Temp 97.3 °F (36.3 °C)   Ht 5' 11\" (1.803 m)   Wt 158 lb (71.7 kg)   SpO2 98%   BMI 22.04 kg/m²     GEN: NAD   ABD: Graft overall healing appropriately save for 2 areas  Some prolapse of the stoma  : Small area of granulation tissue  EXT: Flap viable    IMP: 66 y.o.male s/p perineum reconstruction  PLAN: He needs to completely abstain from nicotine and keep pressure off in a hip/hip manner. We stressed both of these again and will return in 3 months for evaluation. We again emphasized that if he had any issues, he should return sooner.    Rip Garcia MD  [address] [place] [address] [address] Reconstructive Surgery  [phone]  07/25/22

## 2022-07-26 NOTE — PROGRESS NOTES
[facility] Progress Note    Estrella Santiago     : 1955    DATE OF VISIT:  2022    Subjective:     Estrella Santiago is a 61 y.o. male who has a diabetic ulcer located on the foot (left , dorsal, midfoot). Significant symptoms or pertinent wound history since last visit: did not go to his vascular appt this past week because he wasn't feeling well that day (GI upset, he thinks from taking meds on an empty stomach that AM, because he needed to be fasting for blood work). To be rescheduled. Doing ok with the VAC on the left foot. No F/C/D. Seems to be tolerating Cipro well; no sore throat or mouth, rash, diarrhea, new joint pains. Additional ulcer(s) noted? yes. The right ankle and lateral foot; plus the left 2nd toe and left lateral forefoot surgical sites. His current medication list consists of amLODIPine, aspirin, atorvastatin, docusate sodium, insulin glargine, metFORMIN, oxyCODONE, and psyllium. Still on Cipro also, POD 20 now.     Allergies: Shrimp flavor    Objective:     Vitals:    22 1402   BP: 106/66   Pulse: (!) 113   Resp: 20   Temp: 97.9 °F (36.6 °C)   TempSrc: Oral   Weight: Comment: no wt   Height: 5' 11\" (1.803 m)     Constitutional:  well-developed, well-nourished, NAD, fatigued  Cardiovascular:  bilateral pedal pulses Dopplerable, but left REY stronger than last time; left foot much warmer than at prior visits, with improved cap refill, less swelling now too  Lymphatic:  no inguinal or popliteal adenopathy, no lymphangitis, and a resolved cellulitis  Musculoskeletal:  no clubbing, cyanosis or petechiae; RLE and LLE with no gross effusions, joint misalignment or acute arthritis  Princess-ulcer skin:  slightly moist hyperkeratosis at the right foot and ankle; some contact dermatitis at the left dorsal foot; less cristy and indurated at the sutured sites  Ulcer(s):   --          Left dorsal foot with a bit of new granulation, two exposed tendons, one of them grossly necrotic, a couple of tunnels where they run beneath the wound edges are filling in, a bit of fibrin and biofilm, no pus, no bone exposure  --          Left lateral foot eschar / hemorrhagic crust looks stable. Left 5th toe amp site is incorporated here. --          Left 2nd toe amp site with one remaining suture, hemorrhagic crust dry and stable. --          Right lateral ankle partly pink, partly sloughy yellow. --          Right lateral midfoot about the same size, maybe less inflamed, a bit more red tissue, but now also some exposed bone with a rough surface (5th met base). Photos also saved in electronic chart.     Today's wound measurements, per RN documentation:  Wound 02/16/22 #2 Right Lateral Foot, Arterial, Full Thickness Onset Nov 2021-Wound Length (cm): 2.2 cm  Wound 02/16/22 #1 Right Lateral Ankle, Arterial, Full Thickness, onset -Wound Length (cm): 0.7 cm  Wound 02/16/22 #3 Left Dorsal Foot, Arterial, Full Thickness, Onset Nov 2021-Wound Length (cm): 3.4 cm    Wound 02/16/22 #2 Right Lateral Foot, Arterial, Full Thickness Onset Nov 2021-Wound Width (cm): 1.4 cm  Wound 02/16/22 #1 Right Lateral Ankle, Arterial, Full Thickness, onset -Wound Width (cm): 0.6 cm  Wound 02/16/22 #3 Left Dorsal Foot, Arterial, Full Thickness, Onset Nov 2021-Wound Width (cm): 3 cm    Wound 02/16/22 #2 Right Lateral Foot, Arterial, Full Thickness Onset Nov 2021-Wound Depth (cm): 0.3 cm  Wound 02/16/22 #1 Right Lateral Ankle, Arterial, Full Thickness, onset -Wound Depth (cm): 0.3 cm  Wound 02/16/22 #3 Left Dorsal Foot, Arterial, Full Thickness, Onset Nov 2021-Wound Depth (cm): 0.4 cm    Assessment:     Patient Active Problem List   Diagnosis Code    Uncontrolled type 2 diabetes mellitus with diabetic polyneuropathy, without long-term current use of insulin ([facility]) E11.42, E11.65    Diabetic ulcer of left midfoot associated with type 2 diabetes mellitus, with necrosis of muscle ([facility]) E11.621, L97.423    Ischemic ulcer of right ankle with necrosis of muscle ([facility] L97.313    Current every day smoker F17.200    Hyperlipidemia E78.5    Diabetic ulcer of left foot associated with type 2 diabetes mellitus, with necrosis of bone ([facility]) E11.621, L97.524    Ischemic ulcer of right foot with necrosis of bone ([facility]) L97.514    Atherosclerosis of native arteries of left leg with ulceration of foot ([facility]) I70.245    Atherosclerosis of native arteries of right leg with ulceration of foot ([facility]) I70.235    Subacute osteomyelitis of left foot ([facility]) M86.272       Assessment of today's active condition(s): uncontrolled DM2, neuropathy, PAD, multiple BL foot and ankle ulcers, complicated by osteo at at least the left 5th ray, left 2nd toe, right 5th met base, and possibly acute osteo at the left midfoot bones. Left partial 5th ray amp and 2nd toe amp 3 weeks ago, on Cipro for the possibility of residual midfoot osteo. Left side revascularized, right side still in need of that before planning OR debridement there, and trying to encourage the left midfoot ulcer to heal with debridement and NPWT. Factors contributing to occurrence and/or persistence of the chronic ulcer include diabetes, poor glucose control, chronic pressure, shear force, smoking, arterial insufficiency, and decreased tissue oxygenation. Medical necessity of today's visit is shown by the above documentation. Sharp debridement is indicated today, based upon the exam findings in the wound(s) above. Procedure note:     Consent obtained. Time out performed per [facility] policy. Anesthetic  Anesthetic: 4% Lidocaine Cream     Using a curette, #15 blade scalpel, and forceps, I sharply debrided the foot (left , dorsal, midfoot) ulcer(s) down through and including the removal of muscle/fascia. The type(s) of tissue debrided included fibrin, biofilm, slough, and necrotic/eschar. Total Surface Area Debrided: 10 sq cm. The ulcers were then irrigated with normal saline solution.  The procedure was completed with a small amount of bleeding, and hemostasis was with pressure. The patient tolerated the procedure well, with no significant complications. The patient's level of pain during and after the procedure was monitored. Post-debridement measurements, if different from pre-debridement, are in the flowsheet as well. Discharge plan:     Treatment in the wound care center today, per RN documentation: Wound 02/16/22 #2 Right Lateral Foot, Arterial, Full Thickness Onset Nov 2021-Dressing/Treatment: Other (comment) (betadine 4x4's kerlix)  Wound 02/16/22 #1 Right Lateral Ankle, Arterial, Full Thickness, onset -Dressing/Treatment: Other (comment) (betadine 4x4's kerlix)  Wound 02/16/22 #3 Left Dorsal Foot, Arterial, Full Thickness, Onset Nov 2021-Dressing/Treatment: Other (comment) (adaptic NPWT). Continue Betadine to left 2nd toe and left 5th ray amp sites as well. One more week of Cipro; call me if rash, GI upset, sore throat, new joint pains, etc.     When the left foot looks healthier (no tendon necrosis), I think I'm able to secure one sample TheraSkin graft for him, to use underneath ongoing NPWT. Reschedule vascular appt, hopefully to plan revascularization of the right side, and then eventually plan for OR debridement of the right foot, anticipating more Abx needed after that surgery, for presumed residual 5th met osteo. It remains of paramount importance that he get to zero nicotine intake ASAP, and continue to work harder on glucose control. Still very much at risk of major amputation, on either side. Home treatment: good handwashing before and after any dressing changes. Cleanse wound with saline or soap & water before dressing change. May use Vaseline (petrolatum), Aquaphor, Aveeno, CeraVe, Cetaphil, Eucerin, Lubriderm, etc for dry skin. Dressing type for home:  as above ,  daily for the Betadine dressings, and TIW for the NPWT .  Written discharge instructions given to

## 2022-07-27 ENCOUNTER — HOSPITAL ENCOUNTER (OUTPATIENT)
Dept: WOUND CARE | Age: 67
Discharge: HOME | End: 2022-07-27

## 2022-07-27 VITALS
WEIGHT: 158 LBS | HEIGHT: 71 IN | BODY MASS INDEX: 22.12 KG/M2 | DIASTOLIC BLOOD PRESSURE: 53 MMHG | HEART RATE: 60 BPM | SYSTOLIC BLOOD PRESSURE: 148 MMHG | TEMPERATURE: 98.1 F | OXYGEN SATURATION: 100 % | RESPIRATION RATE: 18 BRPM

## 2022-07-27 DIAGNOSIS — L97.514 ISCHEMIC ULCER OF RIGHT FOOT WITH NECROSIS OF BONE (HCC): Primary | ICD-10-CM

## 2022-07-27 DIAGNOSIS — L97.423 DIABETIC ULCER OF LEFT MIDFOOT ASSOCIATED WITH TYPE 2 DIABETES MELLITUS, WITH NECROSIS OF MUSCLE (HCC): ICD-10-CM

## 2022-07-27 DIAGNOSIS — E11.621 DIABETIC ULCER OF OTHER PART OF LEFT FOOT ASSOCIATED WITH TYPE 2 DIABETES MELLITUS, WITH NECROSIS OF BONE (HCC): ICD-10-CM

## 2022-07-27 DIAGNOSIS — L97.313 ISCHEMIC ULCER OF RIGHT ANKLE WITH NECROSIS OF MUSCLE (HCC): ICD-10-CM

## 2022-07-27 DIAGNOSIS — E11.621 DIABETIC ULCER OF LEFT MIDFOOT ASSOCIATED WITH TYPE 2 DIABETES MELLITUS, WITH NECROSIS OF MUSCLE (HCC): ICD-10-CM

## 2022-07-27 DIAGNOSIS — L97.524 DIABETIC ULCER OF OTHER PART OF LEFT FOOT ASSOCIATED WITH TYPE 2 DIABETES MELLITUS, WITH NECROSIS OF BONE (HCC): ICD-10-CM

## 2022-07-27 PROCEDURE — 97597 DBRDMT OPN WND 1ST 20 CM/<: CPT | Performed by: INTERNAL MEDICINE

## 2022-07-27 PROCEDURE — 97597 DBRDMT OPN WND 1ST 20 CM/<: CPT

## 2022-07-27 PROCEDURE — 97605 NEG PRS WND THER DME<=50SQCM: CPT

## 2022-07-27 RX ORDER — LIDOCAINE 50 MG/G
OINTMENT TOPICAL ONCE
Status: CANCELLED | OUTPATIENT
Start: 2022-07-27 | End: 2022-07-27

## 2022-07-27 RX ORDER — LIDOCAINE 40 MG/G
CREAM TOPICAL ONCE
Status: DISCONTINUED | OUTPATIENT
Start: 2022-07-27 | End: 2022-07-28 | Stop reason: HOSPADM

## 2022-07-27 RX ORDER — LIDOCAINE HYDROCHLORIDE 40 MG/ML
SOLUTION TOPICAL ONCE
Status: CANCELLED | OUTPATIENT
Start: 2022-07-27 | End: 2022-07-27

## 2022-07-27 RX ORDER — BACITRACIN ZINC AND POLYMYXIN B SULFATE 500; 1000 [USP'U]/G; [USP'U]/G
OINTMENT TOPICAL ONCE
Status: DISCONTINUED | OUTPATIENT
Start: 2022-07-27 | End: 2022-07-28 | Stop reason: HOSPADM

## 2022-07-27 RX ORDER — LIDOCAINE HYDROCHLORIDE 40 MG/ML
SOLUTION TOPICAL ONCE
Status: DISCONTINUED | OUTPATIENT
Start: 2022-07-27 | End: 2022-07-28 | Stop reason: HOSPADM

## 2022-07-27 RX ORDER — LIDOCAINE 40 MG/G
CREAM TOPICAL ONCE
Status: CANCELLED | OUTPATIENT
Start: 2022-07-27 | End: 2022-07-27

## 2022-07-27 RX ORDER — BACITRACIN ZINC AND POLYMYXIN B SULFATE 500; 1000 [USP'U]/G; [USP'U]/G
OINTMENT TOPICAL ONCE
Status: CANCELLED | OUTPATIENT
Start: 2022-07-27 | End: 2022-07-27

## 2022-07-27 RX ORDER — LIDOCAINE 50 MG/G
OINTMENT TOPICAL ONCE
Status: DISCONTINUED | OUTPATIENT
Start: 2022-07-27 | End: 2022-07-28 | Stop reason: HOSPADM

## 2022-07-27 NOTE — PLAN OF CARE
Patient states he saw vascular & is awaiting to be scheduled for intervention on right leg. Wounds stable, debridement of left dorsal foot/ankle wound per MD & pt. Tolerated well. TheraSkin applied to anterior foot/ankle wound per MD. Will cont. With current wound care regime with dressings & NPWT as ordered. Wife to only change NPWT once on Saturday this week. Pt. Has completed antibiotics & no further antibiotics needed at this time. F/u in [address] [place] [address],[address] in 1 week as ordered, pt. Aware to call sooner with any changes or questions/concerns. Discharge instructions reviewed with patient & wife, all questions answered, copy given to patient. Dressings were applied to all wounds per M.D. Instructions at this visit.

## 2022-07-27 NOTE — DISCHARGE INSTRUCTIONS
[facility] Physician Orders and Discharge [address]  [address], [address]  [place]  Telephone: [phone]      Fax: [phone] home care company:   N/a . Your wound-care supplies will be provided by:  Patient . NAME:  Ct Agustin   YOB: 1955  PRIMARY DIAGNOSIS FOR WOUND CARE CENTER:  Dehisced surgical, Arterial & Diabetic ulcers  . Wound cleansing:  Do not scrub or use excessive force. Wash hands with soap and water before and after dressing changes. Prior to applying a clean dressing, cleanse wound with normal saline, wound cleanser, or mild soap and water. Ask your physician or nurse before getting the wound(s) wet in the shower. Wound care for home:     Left Dorsal Foot/ankle:  Vashe, Betadine esperanza-wound, TheraSkin, skin prep esperanza-wound, Mepitel Ag, steri-strips applied per MD    Drape to esperanza-wound & under bridge  Adaptic over wound & Mepitel Ag (CUT THE SIZE OF THE MEPITEL Ag)  Black foam over adaptic & BRIDGE TO LOWER LEG (DO NOT PLACE SUCKER PAD OVER WOUND)  Make sure there is drape under any black foam to protect skin  Cover black foam with drape  -125 mmHg intermittent pressure  Change dressing on Saturday evening, WCC will change on Wednesday        Left lateral foot amp. Site:  Apply antibiotic ointment mixed with Triad to wound  Cover with dry dressing  Change once daily         Right lateral foot & ankle wounds:  Left 2nd toe amp. Site:  Betadine to incision site & wounds  Cover with dry dressing   Change dressing once daily        Attempt to wear the off-loading boots at night when in bed to help prevent further pressure on foot wounds.         Please note, all wounds (unless stated otherwise here) were mechanically debrided at the time of cleansing here in the wound-care center today, so a small amount of pain, drainage or bleeding from that process might be expected, and is normal.     All products for home use, including multiple products for a single wound if applicable, are medically necessary in order to achieve the best chance at timely wound healing. See provider documentation for details if needed. Substituted dressings applied in the [facility] today, if applicable:     N/a     New orders for this week (labs, imaging, medications, etc.):      Wear the CAM boot for left foot to help with immobilization of left ankle at [address] If you develop a fever, chills, increased redness up ankle/leg, increased pain or overall not feeling well go to the emergency room. Additional instructions for specific diagnoses:     Use waffle cushion when you are sitting. Pt. Scheduled to f/u with Dr Danita Quan again 7/2022. Vascular intervention on left per Dr Suhas Sosa 6/14/22, 7/8/22     General comments for arterial ulcers:  *  Moisturize your skin regularly with Vaseline, Aquaphor, Aveeno, CeraVe, Cetaphil, Eucerin, Lubriderm, etc; but keep the skin between your toes dry. *  Always allow your wound-care doctor or podiatrist to cut nails, calluses & corns. *  Be sure to always wear footwear that fits well, and NEVER go without shoes and socks. *  Be sure to adhere to any recommendations from your PCP or endocrinologist when it comes to high blood pressure, cholesterol, diet and exercise. If you have questions, please ask. *  If you smoke, your wound can not heal properly -- please talk with us when you're ready to quit. *  Do not soak your feet unless specifically instructed to do so by us. *  Make sure you stay well-hydrated, and maintain good protein intake. *  Walk as much as you can tolerate. Exercise is part of the treatment of peripheral arterial disease. *  If you arent taking an aspirin or other related medication (Plavix, etc), please ask your primary care physician or vascular surgeon if you should.   *  Make sure you know when your vascular surgeon needs to follow-up with you, or needs to get any follow-up circulation tests. F/U Appointment is with Dr. Qing Estevez in 1 week, on                                   at                       .     Your nurse  is HELIO Rice. If we applied slip-resistant hospital socks today, be sure to remove them at least once a day to inspect your toes or feet, even if you're not changing the wraps or dressings underneath. If you see anything concerning (redness, excess moisture, etc), please call and let us know right away.      Should you experience any significant changes in your wound(s) (including redness, increased warmth, increased pain, increased drainage, odor, or fever) or have questions about your wound care, please contact the [facility] at [phone] Monday-Thursday from 8:00 am - 4:30 pm, or Friday from 8:00 am - 2:30 pm.  If you need help with your wound outside these hours and cannot wait until we are again available, contact your home-care company (if applicable), your PCP, or go to the nearest emergency room

## 2022-07-28 NOTE — DISCHARGE INSTRUCTIONS
[address] [facility] Physician Orders and Discharge [address], [address]  [place], [facility]  Telephone: [phone]      Fax: [phone] home care company:   N/a . Your wound-care supplies will be provided by:  Patient      NAME:  Sheri Engel   YOB: 1955  PRIMARY DIAGNOSIS FOR WOUND CARE CENTER:  Dehisced surgical, Arterial & Diabetic ulcers  . Wound cleansing:  Do not scrub or use excessive force. Wash hands with soap and water before and after dressing changes. Prior to applying a clean dressing, cleanse wound with normal saline, wound cleanser, or mild soap and water. Ask your physician or nurse before getting the wound(s) wet in the shower. Wound care for home:     Left Dorsal Foot/ankle:  Vashe applied per MD  Drape to esperanza-wound & under bridge  Adaptic over wound   Black foam over adaptic & BRIDGE TO LOWER LEG (DO NOT PLACE SUCKER PAD OVER WOUND)  Make sure there is drape under any black foam to protect skin  Cover black foam with drape  -125 mmHg intermittent pressure  Change dressing on Friday and Monday, [facility] will change on Wednesday        Left lateral foot and 2nd toe amp. sites, Right lateral foot & ankle wounds:  Betadine   Cover with dry dressing   Change dressing once daily        Attempt to wear the off-loading boots at night when in bed to help prevent further pressure on foot wounds. Please note, all wounds (unless stated otherwise here) were mechanically debrided at the time of cleansing here in the wound-care center today, so a small amount of pain, drainage or bleeding from that process might be expected, and is normal.     All products for home use, including multiple products for a single wound if applicable, are medically necessary in order to achieve the best chance at timely wound healing. See provider documentation for details if needed.      Substituted dressings applied in the [facility] today, if applicable:     N/a     New orders for this week (labs, imaging, medications, etc.):      Wear the CAM boot for left foot to help with immobilization of left ankle at [address] If you develop a fever, chills, increased redness up ankle/leg, increased pain or overall not feeling well go to the emergency room. Additional instructions for specific diagnoses:     Use waffle cushion when you are sitting. Pt. Scheduled to f/u with Dr Elizabeth Mtz again 7/2022. Vascular intervention on left per Dr Felicity Isabel 6/14/22, 7/8/22     General comments for arterial ulcers:  *  Moisturize your skin regularly with Vaseline, Aquaphor, Aveeno, CeraVe, Cetaphil, Eucerin, Lubriderm, etc; but keep the skin between your toes dry. *  Always allow your wound-care doctor or podiatrist to cut nails, calluses & corns. *  Be sure to always wear footwear that fits well, and NEVER go without shoes and socks. *  Be sure to adhere to any recommendations from your PCP or endocrinologist when it comes to high blood pressure, cholesterol, diet and exercise. If you have questions, please ask. *  If you smoke, your wound can not heal properly -- please talk with us when you're ready to quit. *  Do not soak your feet unless specifically instructed to do so by us. *  Make sure you stay well-hydrated, and maintain good protein intake. *  Walk as much as you can tolerate. Exercise is part of the treatment of peripheral arterial disease. *  If you arent taking an aspirin or other related medication (Plavix, etc), please ask your primary care physician or vascular surgeon if you should. *  Make sure you know when your vascular surgeon needs to follow-up with you, or needs to get any follow-up circulation tests. F/U Appointment is with Dr. Dayron Bob in 1 week, on                                   at                       .     Your nurse  is HELIO Rice.      If we applied slip-resistant hospital socks today, be sure to remove them at least once a day to inspect your toes or feet, even if you're not changing the wraps or dressings underneath. If you see anything concerning (redness, excess moisture, etc), please call and let us know right away.      Should you experience any significant changes in your wound(s) (including redness, increased warmth, increased pain, increased drainage, odor, or fever) or have questions about your wound care, please contact the [address] [place] [address] at [phone] Monday-Thursday from 8:00 am - 4:30 pm, or Friday from 8:00 am - 2:30 pm.  If you need help with your wound outside these hours and cannot wait until we are again available, contact your home-care company (if applicable), your PCP, or go to the nearest emergency room

## 2022-08-02 NOTE — PROGRESS NOTES
[facility] Progress Note    Marcio Gomez     : 1955    DATE OF VISIT:  2022    Subjective:     Marcio Gomez is a 61 y.o. male who has an arterial and  diabetic ulcer located on the foot (left , dorsal, midfoot). Significant symptoms or pertinent wound history since last visit: feeling pretty well overall, no fever, tolerated recent Cipro (apart from that one day with N/V, when he had to take it on an empty stomach). Vascular appt rescheduled for next week I believe. Doing ok with wound VAC. Additional ulcer(s) noted? yes. Right lateral foot and ankle; left 2nd toe amp site likely healed, but left lateral foot partly dehisced. His current medication list consists of amLODIPine, aspirin, atorvastatin, docusate sodium, insulin glargine, metFORMIN, oxyCODONE, and psyllium. Finishing about 4 weeks of post-op Cipro.     Allergies: Shrimp flavor    Objective:     Vitals:    22 1347   BP: 128/68   Pulse: 95   Resp: 20   Temp: 98.8 °F (37.1 °C)   TempSrc: Oral   Weight: 158 lb (71.7 kg)   Height: 5' 11\" (1.803 m)     Constitutional:  well-developed, well-nourished, NAD, fatigued  Cardiovascular:  bilateral pedal pulses Dopplerable, but left REY stronger than last time; left foot much warmer than at prior visits, with improved cap refill, less swelling now too  Lymphatic:  no inguinal or popliteal adenopathy, no lymphangitis, and a resolved cellulitis  Musculoskeletal:  no clubbing, cyanosis or petechiae; RLE and LLE with no gross effusions, joint misalignment or acute arthritis  Princess-ulcer skin:  slightly moist hyperkeratosis at the right foot and ankle; some contact dermatitis at the left dorsal foot; less cristy and indurated at the sutured sites  Ulcer(s):   --          Left dorsal foot with a bit of new granulation, two exposed tendons, one of them partly necrotic, a couple of tunnels where they run beneath the wound edges are filling in, a bit of fibrin and biofilm, no pus, no bone exposure, a bit of new epidermal coverage this week  --          Left lateral foot eschar / hemorrhagic crust with a bit of dehiscence this week, possible tiny focus of palpable bone proximally, but not visible. --          Left 2nd toe amp site with the hemorrhagic crust dry and stable. --          Right lateral ankle partly pink, partly sloughy yellow. --          Right lateral midfoot about the same size, maybe less inflamed, a bit more red tissue, but now also some exposed bone with a rough surface (5th met base). Photos also saved in electronic chart.     Today's wound measurements, per RN documentation:  Wound 02/16/22 #3 Left Dorsal Foot, Arterial, Full Thickness, Onset Nov 2021-Wound Length (cm): 3.5 cm  Wound 02/16/22 #1 Right Lateral Ankle, Arterial, Full Thickness, onset -Wound Length (cm): 0.6 cm  Wound 02/16/22 #2 Right Lateral Foot, Arterial, Full Thickness Onset Nov 2021-Wound Length (cm): 2.4 cm    Wound 02/16/22 #3 Left Dorsal Foot, Arterial, Full Thickness, Onset Nov 2021-Wound Width (cm): 3 cm  Wound 02/16/22 #1 Right Lateral Ankle, Arterial, Full Thickness, onset -Wound Width (cm): 0.6 cm  Wound 02/16/22 #2 Right Lateral Foot, Arterial, Full Thickness Onset Nov 2021-Wound Width (cm): 1.3 cm    Wound 02/16/22 #3 Left Dorsal Foot, Arterial, Full Thickness, Onset Nov 2021-Wound Depth (cm): 0.3 cm  Wound 02/16/22 #1 Right Lateral Ankle, Arterial, Full Thickness, onset -Wound Depth (cm): 0.2 cm  Wound 02/16/22 #2 Right Lateral Foot, Arterial, Full Thickness Onset Nov 2021-Wound Depth (cm): 0.5 cm    Assessment:     Patient Active Problem List   Diagnosis Code    Uncontrolled type 2 diabetes mellitus with diabetic polyneuropathy, without long-term current use of insulin ([facility]) E11.42, E11.65    Diabetic ulcer of left midfoot associated with type 2 diabetes mellitus, with necrosis of muscle ([facility]) E11.621, L97.423    Ischemic ulcer of right ankle with necrosis of muscle ([facility] [address] L97.313    Current every day smoker F17.200    Hyperlipidemia E78.5    Diabetic ulcer of left foot associated with type 2 diabetes mellitus, with necrosis of bone (HCC) E11.621, L97.524    Ischemic ulcer of right foot with necrosis of bone (HCC) L97.514    Atherosclerosis of native arteries of left leg with ulceration of foot (HCC) I70.245    Atherosclerosis of native arteries of right leg with ulceration of foot (HCC) I70.235    Subacute osteomyelitis of left foot ([facility]) M86.272       Assessment of today's active condition(s): uncontrolled Dm2, neuropathy, PAD, multiple foot and ankle ulcers; left 2nd toe amp site good, left dorsal foot I think starting to improve, left lateral foot with a recent partial ray amp, slight dehiscence; right foot and ankle at least stable until he gets revascularized there. Factors contributing to occurrence and/or persistence of the chronic ulcer include poor glucose control, shear force, smoking, arterial insufficiency, and decreased tissue oxygenation. Medical necessity of today's visit is shown by the above documentation. Sharp debridement is indicated today, based upon the exam findings in the wound(s) above. Procedure note:     Consent obtained. Time out performed per [facility] policy. Anesthetic  Anesthetic: 4% Lidocaine Cream     Using a curette, #15 blade scalpel, and forceps, I sharply debrided the foot (left , dorsal) ulcer(s) down through and including the removal of muscle/fascia. The type(s) of tissue debrided included fibrin, biofilm, slough, and necrotic/eschar. Total Surface Area Debrided: 10 sq cm. The ulcers were then irrigated with normal saline solution. The procedure was completed with a small amount of bleeding, and hemostasis was with pressure. The patient tolerated the procedure well, with no significant complications. The patient's level of pain during and after the procedure was monitored.  Post-debridement measurements, if different from pre-debridement, are in the flowsheet as well. Discharge plan:     Treatment in the wound care center today, per RN documentation: Wound 02/16/22 #3 Left Dorsal Foot, Arterial, Full Thickness, Onset Nov 2021-Dressing/Treatment: Other (comment) (adaptic NPWT)  Wound 02/16/22 #1 Right Lateral Ankle, Arterial, Full Thickness, onset -Dressing/Treatment: Other (comment) (betadine 4x4's kerlix)  Wound 02/16/22 #2 Right Lateral Foot, Arterial, Full Thickness Onset Nov 2021-Dressing/Treatment: Other (comment) (betadine 4x4's kerlix)  Wound 07/20/22 #12 Left lateral foot amp. site, Dehisced surgical, Full Thickness, onset 7/20/22-Dressing/Treatment: Other (comment) (PSO triad 4x4's kerlix ace). Left 2nd toe amp site also just gets Betadine. Vascular FU within the next week, and hopefully to plan RLE revascularization. Complete 4 weeks of Cipro. Needs to redouble his efforts with nicotine cessation and glucose control. If the left dorsal foot looks one step better next week, I have a donated TheraSkin graft that I could place. Home treatment: good handwashing before and after any dressing changes. Cleanse wound with saline or soap & water before dressing change. May use Vaseline (petrolatum), Aquaphor, Aveeno, CeraVe, Cetaphil, Eucerin, Lubriderm, etc for dry skin. Dressing type for home:  as above ,  daily for the right foot and ankle, and the left 2nd toe amp site, TIW for the NPWT dressing on the left dorsal foot . Written discharge instructions given to patient. Follow up in 1 week.     Electronically signed by Shirley Shepherd MD on 8/2/2022 at 11:40 AM.

## 2022-08-02 NOTE — PROGRESS NOTES
[facility] Progress Note    Adan Caicedo     : 1955    DATE OF VISIT:  2022    Subjective:     Adan Caicedo is a 61 y.o. male who has a diabetic ulcer located on the foot (left , dorsal). Significant symptoms or pertinent wound history since last visit: feeling pretty well overall, completed Cipro, no fever, no delayed side effects from the Abx. Still smoking, but has a plan on how to cut down again. Saw vascular surgery last week, no word yet on when his RLE angio will be. Saw Dr. Precious Pelayo as well, no plans for scrotal surgery and ostomy takedown until he's off nicotine. Additional ulcer(s) noted? yes. Right lateral ankle and foot; left 2nd toe amp site still looks healed; left lateral foot definitely with a focal dehiscence and mild skin necrosis, debris. His current medication list consists of amLODIPine, aspirin, atorvastatin, docusate sodium, insulin glargine, metFORMIN, oxyCODONE, and psyllium.     Allergies: Shrimp flavor    Objective:     Vitals:    22 1409 22 1413   BP: (!) 148/53    Pulse: 60    Resp: 18    Temp: 98.1 °F (36.7 °C)    TempSrc: Oral    SpO2: 100%    Weight:  158 lb (71.7 kg)   Height:  5' 11\" (1.803 m)       Constitutional:  well-developed, well-nourished, NAD, fatigued  Cardiovascular:  bilateral pedal pulses Dopplerable, but left REY stronger than last time; left foot much warmer than at prior visits, with improved cap refill, less swelling now too  Lymphatic:  no inguinal or popliteal adenopathy, no lymphangitis, and a resolved cellulitis  Musculoskeletal:  no clubbing, cyanosis or petechiae; RLE and LLE with no gross effusions, joint misalignment or acute arthritis  Princess-ulcer skin:  slightly moist hyperkeratosis at the right foot and ankle; some contact dermatitis at the left dorsal foot; less cristy and indurated at the sutured sites  Ulcer(s):   --          Left dorsal foot with a bit of new granulation, one exposed tendon, not obviously necrotic, a couple of tunnels where they run beneath the wound edges are filling in, a bit of fibrin and biofilm, no pus, no bone exposure, a bit more new epidermal coverage this week  --          Left lateral foot eschar / hemorrhagic crust with a bit of dehiscence this week, possible tiny focus of palpable bone proximally, but not visible, a bit of skin necrosis and debris  --          Left 2nd toe amp site with the hemorrhagic crust dry and stable. --          Right lateral ankle partly pink, partly sloughy yellow, smaller and less inflamed. --          Right lateral midfoot about the same size, maybe less inflamed, a bit more red tissue, but now also some exposed bone with a rough surface (5th met base). Photos also saved in electronic chart.     Today's wound measurements, per RN documentation:  Wound 02/16/22 #3 Left Dorsal Foot, Arterial, Full Thickness, Onset Nov 2021-Wound Length (cm): 3 cm  Wound 02/16/22 #1 Right Lateral Ankle, Arterial, Full Thickness, onset -Wound Length (cm): 0.5 cm  Wound 02/16/22 #2 Right Lateral Foot, Arterial, Full Thickness Onset Nov 2021-Wound Length (cm): 1.3 cm    Wound 02/16/22 #3 Left Dorsal Foot, Arterial, Full Thickness, Onset Nov 2021-Wound Width (cm): 3 cm  Wound 02/16/22 #1 Right Lateral Ankle, Arterial, Full Thickness, onset -Wound Width (cm): 0.5 cm  Wound 02/16/22 #2 Right Lateral Foot, Arterial, Full Thickness Onset Nov 2021-Wound Width (cm): 1 cm    Wound 02/16/22 #3 Left Dorsal Foot, Arterial, Full Thickness, Onset Nov 2021-Wound Depth (cm): 0.2 cm  Wound 02/16/22 #1 Right Lateral Ankle, Arterial, Full Thickness, onset -Wound Depth (cm): 0.3 cm  Wound 02/16/22 #2 Right Lateral Foot, Arterial, Full Thickness Onset Nov 2021-Wound Depth (cm): 0.3 cm    Assessment:     Patient Active Problem List   Diagnosis Code    Uncontrolled type 2 diabetes mellitus with diabetic polyneuropathy, without long-term current use of insulin ([facility]) E11.42, E11.65 Diabetic ulcer of left midfoot associated with type 2 diabetes mellitus, with necrosis of muscle (HCC) E11.621, L97.423    Ischemic ulcer of right ankle with necrosis of muscle (HCC) L97.313    Current every day smoker F17.200    Hyperlipidemia E78.5    Diabetic ulcer of left foot associated with type 2 diabetes mellitus, with necrosis of bone (HCC) E11.621, L97.524    Ischemic ulcer of right foot with necrosis of bone (HCC) L97.514    Atherosclerosis of native arteries of left leg with ulceration of foot (HCC) I70.245    Atherosclerosis of native arteries of right leg with ulceration of foot (HCC) I70.235    Subacute osteomyelitis of left foot ([facility]) M86.272       Assessment of today's active condition(s): uncontrolled Dm2, neuropathy, PAD, revascularized on the left, in need of it on the right, with those two ulcers being treated conservatively until then; left 2nd toe amp site doing fine, left 5th partial ray amp site with a minor dehiscence, hopefully not heading toward recurrent osteo there, and the left dorsal foot wound definitely doing better after more debridements, Abx and NPWT, and I think in good enough shape to see some benefit from a TheraSkin this week. Factors contributing to occurrence and/or persistence of the chronic ulcer include diabetes, poor glucose control, shear force, smoking, arterial insufficiency, and decreased tissue oxygenation. Medical necessity of today's visit is shown by the above documentation. Sharp debridement is indicated today, based upon the exam findings in the wound(s) above. Procedure note:     Consent obtained. Time out performed per [facility] policy. Anesthetic  Anesthetic: 4% Lidocaine Cream     Using a curette, #15 blade scalpel, and forceps, I sharply debrided the foot (left , dorsal, lateral) ulcer(s) down through and including the removal of dermis. The type(s) of tissue debrided included fibrin, biofilm, and necrotic/eschar.  Total Surface Area Debrided: 9 sq cm.    The ulcers were then irrigated with normal saline solution. The procedure was completed with a small amount of bleeding, and hemostasis was with pressure. The patient tolerated the procedure well, with no significant complications. The patient's level of pain during and after the procedure was monitored. Post-debridement measurements, if different from pre-debridement, are in the flowsheet as well.  ______________    Because this patient's left dorsal foot diabetic ulcer has failed to respond to standard wound-care measures (including treatment of infection, debridement of necrosis, treatment of edema, provision of a moist wound environment, good compliance with offloading) for more than 4 weeks, I recommended TheraSkin as adjunctive therapy to help speed healing of this ulcer. As per previous documentation, peripheral pulse exam is acceptable, and/or JUAN JOSÉ is at least 0.65. There is no evidence of untreated infection today. Mr. Dorita Miller does currently smoke. He has been counseled on the effects of smoking on wound healing, and following that counseling, he HAS cut down somewhat, is trying to cut down more in the next couple of weeks. After cleansing the left dorsal foot ulcer with saline, spraying the ulcer with HClO and applying Skin-Prep to the esperanza-wound skin, a 13 sqcm piece of TheraSkin was cut to fit the ulcer, placed into the ulcer bed, affixed to the surrounding skin with Steri-Strips, and covered with Mepitel Ag. Thirteen sqcm of the product was used in the ulcer, and 0 sqcm of the product was discarded. If applicable, any wastage is due to the fact the smallest available product was larger than what was needed for his ulcer(s). The patient tolerated the procedure well, without complications.      [Note -- graft generously donated by [facility], not to be billed to the patient.]    Discharge plan:     Treatment in the wound care center today, per RN documentation: Wound 02/16/22 #3 Left Dorsal Foot, Arterial, Full Thickness, Onset Nov 2021-Dressing/Treatment: Other (comment) (adaptic NPWT)  Wound 02/16/22 #1 Right Lateral Ankle, Arterial, Full Thickness, onset -Dressing/Treatment: Other (comment) (betadine dry dressing)  Wound 02/16/22 #2 Right Lateral Foot, Arterial, Full Thickness Onset Nov 2021-Dressing/Treatment: Other (comment) (betadine dry dressing). Betadine to the left 2nd toe amputation stump also; Triad and PSO with a dry dressing to the left lateral foot. Continue to work on smoking / nicotine cessation, protein intake, glucose control. No additional Abx right now. For the [facility] change this weekend, be careful not to dislodge the Mepitel - change out only the Adaptic and the VAC foam itself. Await plans for RLE revascularization, then right foot and ankle imaging, then anticipate him going back to the OR for additional debridement, at least at the right 5th met base. Home treatment: good handwashing before and after any dressing changes. Cleanse wound with saline or soap & water before dressing change. May use Vaseline (petrolatum), Aquaphor, Aveeno, CeraVe, Cetaphil, Eucerin, Lubriderm, etc for dry skin. Dressing type for home: as above,  daily for the left lateral foot, left 2nd toe amp site, right foot and ankle, and then just once on approx Saturday for the left foot VAC . Written discharge instructions given to patient. Follow up in 1 week.     Electronically signed by Jaden Back MD on 8/2/2022 at 11:49 AM.

## 2022-08-03 ENCOUNTER — HOSPITAL ENCOUNTER (OUTPATIENT)
Dept: WOUND CARE | Age: 67
Discharge: HOME | End: 2022-08-03

## 2022-08-03 VITALS
TEMPERATURE: 98 F | HEIGHT: 71 IN | HEART RATE: 87 BPM | BODY MASS INDEX: 22.04 KG/M2 | RESPIRATION RATE: 20 BRPM | SYSTOLIC BLOOD PRESSURE: 141 MMHG | DIASTOLIC BLOOD PRESSURE: 69 MMHG

## 2022-08-03 DIAGNOSIS — L97.313 ISCHEMIC ULCER OF RIGHT ANKLE WITH NECROSIS OF MUSCLE (HCC): ICD-10-CM

## 2022-08-03 DIAGNOSIS — E11.621 DIABETIC ULCER OF LEFT MIDFOOT ASSOCIATED WITH TYPE 2 DIABETES MELLITUS, WITH NECROSIS OF MUSCLE (HCC): ICD-10-CM

## 2022-08-03 DIAGNOSIS — L97.423 DIABETIC ULCER OF LEFT MIDFOOT ASSOCIATED WITH TYPE 2 DIABETES MELLITUS, WITH NECROSIS OF MUSCLE (HCC): ICD-10-CM

## 2022-08-03 DIAGNOSIS — L97.524 DIABETIC ULCER OF OTHER PART OF LEFT FOOT ASSOCIATED WITH TYPE 2 DIABETES MELLITUS, WITH NECROSIS OF BONE (HCC): ICD-10-CM

## 2022-08-03 DIAGNOSIS — L97.514 ISCHEMIC ULCER OF RIGHT FOOT WITH NECROSIS OF BONE (HCC): Primary | ICD-10-CM

## 2022-08-03 DIAGNOSIS — E11.621 DIABETIC ULCER OF OTHER PART OF LEFT FOOT ASSOCIATED WITH TYPE 2 DIABETES MELLITUS, WITH NECROSIS OF BONE (HCC): ICD-10-CM

## 2022-08-03 PROCEDURE — 97605 NEG PRS WND THER DME<=50SQCM: CPT

## 2022-08-03 PROCEDURE — 97605 NEG PRS WND THER DME<=50SQCM: CPT | Performed by: INTERNAL MEDICINE

## 2022-08-03 RX ORDER — LIDOCAINE 50 MG/G
OINTMENT TOPICAL ONCE
Status: CANCELLED | OUTPATIENT
Start: 2022-08-03 | End: 2022-08-03

## 2022-08-03 RX ORDER — LIDOCAINE 40 MG/G
CREAM TOPICAL ONCE
Status: CANCELLED | OUTPATIENT
Start: 2022-08-03 | End: 2022-08-03

## 2022-08-03 RX ORDER — LIDOCAINE 40 MG/G
CREAM TOPICAL ONCE
Status: DISCONTINUED | OUTPATIENT
Start: 2022-08-03 | End: 2022-08-04 | Stop reason: HOSPADM

## 2022-08-03 RX ORDER — BACITRACIN ZINC AND POLYMYXIN B SULFATE 500; 1000 [USP'U]/G; [USP'U]/G
OINTMENT TOPICAL ONCE
Status: CANCELLED | OUTPATIENT
Start: 2022-08-03 | End: 2022-08-03

## 2022-08-03 RX ORDER — LIDOCAINE HYDROCHLORIDE 40 MG/ML
SOLUTION TOPICAL ONCE
Status: CANCELLED | OUTPATIENT
Start: 2022-08-03 | End: 2022-08-03

## 2022-08-03 NOTE — PLAN OF CARE
Wound stimulated with 27g needle per MD.  Will continue with NPWT dressing. Patient had stent placed per Dr. Borges Holiday yesterday. Discharge instructions reviewed with patient, all questions answered, copy given to patient. Dressings were applied to all wounds per M.D. Instructions at this visit.

## 2022-08-04 NOTE — DISCHARGE INSTRUCTIONS
215 [facility] Physician Orders and Discharge 800 [facility]  [address], [address]  [place], [facility]  Telephone: [phone]      Fax: [phone] home care company:   N/a . Your wound-care supplies will be provided by:  Patient     NAME:  Charlene Verdugo   YOB: 1955  PRIMARY DIAGNOSIS FOR WOUND CARE CENTER:  Dehisced surgical, Arterial & Diabetic ulcers  . Wound cleansing:  Do not scrub or use excessive force. Wash hands with soap and water before and after dressing changes. Prior to applying a clean dressing, cleanse wound with normal saline, wound cleanser, or mild soap and water. Ask your physician or nurse before getting the wound(s) wet in the shower. Wound care for home:     Left Dorsal Foot/ankle:  Vashe applied per MD  Drape to esperanza-wound & under bridge  Adaptic over wound   Black foam over adaptic & BRIDGE TO LOWER LEG (DO NOT PLACE SUCKER PAD OVER WOUND)  Make sure there is drape under any black foam to protect skin  Cover black foam with drape  -125 mmHg intermittent pressure  Change dressing on Friday and Monday, [address] will change on Wednesday      Right lateral ankle & foot wounds:  Triad mixed with polysporin to wound   Cover with dry dressing  Change dressing once daily         Left lateral foot and 2nd toe amp. sites:  Betadine  Cover with dry dressing   Change dressing once daily        Attempt to wear the off-loading boots at night when in bed to help prevent further pressure on foot wounds.         Please note, all wounds (unless stated otherwise here) were mechanically debrided at the time of cleansing here in the wound-care center today, so a small amount of pain, drainage or bleeding from that process might be expected, and is normal.     All products for home use, including multiple products for a single wound if applicable, are medically necessary in order to achieve the best chance at timely wound healing. See provider documentation for details if needed. Substituted dressings applied in the [facility] today, if applicable:     N/a     New orders for this week (labs, imaging, medications, etc.):      Work on decreasing smoking to less than 3/4 of a pack by next weeks visit. Continue to wear the aircast boot on left foot to help with immobilization of left ankle at [address] If you develop a fever, chills, increased redness up ankle/leg, increased pain or overall not feeling well go to the emergency room. Additional instructions for specific diagnoses:     Use waffle cushion when you are sitting. Pt. Scheduled to f/u with Dr Aliyah Sigala again 7/2022. Vascular intervention on left per Dr Prateek Zhao 6/14/22, 7/8/22     General comments for arterial ulcers:  *  Moisturize your skin regularly with Vaseline, Aquaphor, Aveeno, CeraVe, Cetaphil, Eucerin, Lubriderm, etc; but keep the skin between your toes dry. *  Always allow your wound-care doctor or podiatrist to cut nails, calluses & corns. *  Be sure to always wear footwear that fits well, and NEVER go without shoes and socks. *  Be sure to adhere to any recommendations from your PCP or endocrinologist when it comes to high blood pressure, cholesterol, diet and exercise. If you have questions, please ask. *  If you smoke, your wound can not heal properly -- please talk with us when you're ready to quit. *  Do not soak your feet unless specifically instructed to do so by us. *  Make sure you stay well-hydrated, and maintain good protein intake. *  Walk as much as you can tolerate. Exercise is part of the treatment of peripheral arterial disease. *  If you arent taking an aspirin or other related medication (Plavix, etc), please ask your primary care physician or vascular surgeon if you should. *  Make sure you know when your vascular surgeon needs to follow-up with you, or needs to get any follow-up circulation tests.         F/U Appointment is with Dr. Carson Dowling in 1 week, on                                   at                       .     Your nurse  is HELIO Rice. If we applied slip-resistant hospital socks today, be sure to remove them at least once a day to inspect your toes or feet, even if you're not changing the wraps or dressings underneath. If you see anything concerning (redness, excess moisture, etc), please call and let us know right away.      Should you experience any significant changes in your wound(s) (including redness, increased warmth, increased pain, increased drainage, odor, or fever) or have questions about your wound care, please contact the [employer] at [phone] Monday-Thursday from 8:00 am - 4:30 pm, or Friday from 8:00 am - 2:30 pm.  If you need help with your wound outside these hours and cannot wait until we are again available, contact your home-care company (if applicable), your PCP, or go to the nearest emergency room

## 2022-08-09 LAB
AFB CULTURE (MYCOBACTERIA): NORMAL
AFB SMEAR: NORMAL

## 2022-08-10 ENCOUNTER — HOSPITAL ENCOUNTER (OUTPATIENT)
Dept: WOUND CARE | Age: 67
Discharge: HOME | End: 2022-08-10

## 2022-08-10 VITALS
BODY MASS INDEX: 21.47 KG/M2 | DIASTOLIC BLOOD PRESSURE: 68 MMHG | HEART RATE: 101 BPM | WEIGHT: 153.38 LBS | HEIGHT: 71 IN | TEMPERATURE: 98 F | RESPIRATION RATE: 18 BRPM | SYSTOLIC BLOOD PRESSURE: 129 MMHG

## 2022-08-10 DIAGNOSIS — L97.313 ISCHEMIC ULCER OF RIGHT ANKLE WITH NECROSIS OF MUSCLE (HCC): ICD-10-CM

## 2022-08-10 DIAGNOSIS — E11.622 DIABETIC ULCER OF RIGHT ANKLE ASSOCIATED WITH TYPE 2 DIABETES MELLITUS, WITH NECROSIS OF MUSCLE (HCC): ICD-10-CM

## 2022-08-10 DIAGNOSIS — E11.621 DIABETIC ULCER OF LEFT MIDFOOT ASSOCIATED WITH TYPE 2 DIABETES MELLITUS, WITH NECROSIS OF MUSCLE (HCC): ICD-10-CM

## 2022-08-10 DIAGNOSIS — E11.621 DIABETIC ULCER OF OTHER PART OF LEFT FOOT ASSOCIATED WITH TYPE 2 DIABETES MELLITUS, WITH NECROSIS OF BONE (HCC): ICD-10-CM

## 2022-08-10 DIAGNOSIS — L97.524 DIABETIC ULCER OF OTHER PART OF LEFT FOOT ASSOCIATED WITH TYPE 2 DIABETES MELLITUS, WITH NECROSIS OF BONE (HCC): ICD-10-CM

## 2022-08-10 DIAGNOSIS — L97.423 DIABETIC ULCER OF LEFT MIDFOOT ASSOCIATED WITH TYPE 2 DIABETES MELLITUS, WITH NECROSIS OF MUSCLE (HCC): ICD-10-CM

## 2022-08-10 DIAGNOSIS — L97.514 ISCHEMIC ULCER OF RIGHT FOOT WITH NECROSIS OF BONE (HCC): Primary | ICD-10-CM

## 2022-08-10 DIAGNOSIS — E11.621 DIABETIC ULCER OF RIGHT MIDFOOT ASSOCIATED WITH TYPE 2 DIABETES MELLITUS, WITH NECROSIS OF BONE (HCC): ICD-10-CM

## 2022-08-10 DIAGNOSIS — L97.313 DIABETIC ULCER OF RIGHT ANKLE ASSOCIATED WITH TYPE 2 DIABETES MELLITUS, WITH NECROSIS OF MUSCLE (HCC): ICD-10-CM

## 2022-08-10 DIAGNOSIS — L97.414 DIABETIC ULCER OF RIGHT MIDFOOT ASSOCIATED WITH TYPE 2 DIABETES MELLITUS, WITH NECROSIS OF BONE (HCC): ICD-10-CM

## 2022-08-10 PROCEDURE — 97597 DBRDMT OPN WND 1ST 20 CM/<: CPT

## 2022-08-10 PROCEDURE — 97605 NEG PRS WND THER DME<=50SQCM: CPT

## 2022-08-10 PROCEDURE — 11042 DBRDMT SUBQ TIS 1ST 20SQCM/<: CPT | Performed by: INTERNAL MEDICINE

## 2022-08-10 PROCEDURE — 11042 DBRDMT SUBQ TIS 1ST 20SQCM/<: CPT

## 2022-08-10 PROCEDURE — 97597 DBRDMT OPN WND 1ST 20 CM/<: CPT | Performed by: INTERNAL MEDICINE

## 2022-08-10 PROCEDURE — 97605 NEG PRS WND THER DME<=50SQCM: CPT | Performed by: INTERNAL MEDICINE

## 2022-08-10 RX ORDER — BACITRACIN ZINC AND POLYMYXIN B SULFATE 500; 1000 [USP'U]/G; [USP'U]/G
OINTMENT TOPICAL ONCE
Status: DISCONTINUED | OUTPATIENT
Start: 2022-08-10 | End: 2022-08-11 | Stop reason: HOSPADM

## 2022-08-10 RX ORDER — LIDOCAINE 50 MG/G
OINTMENT TOPICAL ONCE
Status: CANCELLED | OUTPATIENT
Start: 2022-08-10 | End: 2022-08-10

## 2022-08-10 RX ORDER — LIDOCAINE 40 MG/G
CREAM TOPICAL ONCE
Status: CANCELLED | OUTPATIENT
Start: 2022-08-10 | End: 2022-08-10

## 2022-08-10 RX ORDER — LIDOCAINE HYDROCHLORIDE 40 MG/ML
SOLUTION TOPICAL ONCE
Status: CANCELLED | OUTPATIENT
Start: 2022-08-10 | End: 2022-08-10

## 2022-08-10 RX ORDER — BACITRACIN ZINC AND POLYMYXIN B SULFATE 500; 1000 [USP'U]/G; [USP'U]/G
OINTMENT TOPICAL ONCE
Status: CANCELLED | OUTPATIENT
Start: 2022-08-10 | End: 2022-08-10

## 2022-08-10 RX ORDER — LIDOCAINE 50 MG/G
OINTMENT TOPICAL ONCE
Status: DISCONTINUED | OUTPATIENT
Start: 2022-08-10 | End: 2022-08-11 | Stop reason: HOSPADM

## 2022-08-10 RX ORDER — LIDOCAINE 40 MG/G
CREAM TOPICAL ONCE
Status: DISCONTINUED | OUTPATIENT
Start: 2022-08-10 | End: 2022-08-11 | Stop reason: HOSPADM

## 2022-08-10 RX ORDER — LIDOCAINE HYDROCHLORIDE 40 MG/ML
SOLUTION TOPICAL ONCE
Status: DISCONTINUED | OUTPATIENT
Start: 2022-08-10 | End: 2022-08-11 | Stop reason: HOSPADM

## 2022-08-10 NOTE — PLAN OF CARE
Wounds stable & showing improvement, debridement per MD & pt. Tolerated well. Will change to using Triad/PSO to right lateral foot & ankle wounds. Continue with betadine to left 2nd toe amp. Site & lateral foot. Left dorsal foot/ankle with TheraSkin noted to be in place, tendon exposure remains with some new granulation noted, debridement per MD & pt. Tolerated well. Will cont. With current wound care regime with dressings & NPWT as ordered. Pt. Wearing an aircast boot for immobilization of foot/ankle. Dr Stephanie Lee again reinforced importance of continuing to work on smoking cessation, pt. Verbalizes understanding. Dr Stephanie Lee encouraged to decrease to 3/4 ppd by next visit, pt. Agreeable. F/u in [facility] in 1 week as ordered, pt. Aware to call sooner with any changes or questions/concerns. Discharge instructions reviewed with patient & wife, all questions answered, copy given to patient. Dressings were applied to all wounds per M.D. Instructions at this visit.

## 2022-08-11 NOTE — DISCHARGE INSTRUCTIONS
215 [facility] Physician Orders and Discharge 800 [facility]  [address], [address]  [place], [facility]  Telephone: [phone]      Fax: [phone] home care company:   N/a . Your wound-care supplies will be provided by:  Patient     NAME:  Say Hobbs   YOB: 1955  PRIMARY DIAGNOSIS FOR WOUND CARE CENTER:  Dehisced surgical, Arterial & Diabetic ulcers  . Wound cleansing:  Do not scrub or use excessive force. Wash hands with soap and water before and after dressing changes. Prior to applying a clean dressing, cleanse wound with normal saline, wound cleanser, or mild soap and water. Ask your physician or nurse before getting the wound(s) wet in the shower. Wound care for home:     Left Dorsal Foot/ankle wound:  Vashe spray   Drape to esperanza-wound & under bridge  Adaptic over wound   Black foam over adaptic & BRIDGE TO LOWER LEG (DO NOT PLACE SUCKER PAD OVER WOUND)  Make sure there is drape under any black foam to protect skin  Cover black foam with drape  -125 mmHg intermittent pressure  Change dressing on Friday and Monday, [address] will change on Wednesday        Right lateral ankle & foot wounds:  Triad mixed with polysporin to wound  Cover with dry dressing  Change dressing once daily          Left lateral foot and 2nd toe amp. sites:  Betadine  Cover with dry dressing   Change dressing once daily        Attempt to wear the off-loading boots at night when in bed to help prevent further pressure on foot wounds.         Please note, all wounds (unless stated otherwise here) were mechanically debrided at the time of cleansing here in the wound-care center today, so a small amount of pain, drainage or bleeding from that process might be expected, and is normal.     All products for home use, including multiple products for a single wound if applicable, are medically necessary in order to achieve the best chance at timely wound healing. See provider documentation for details if needed. Substituted dressings applied in the [address] [place] [address] [address] today, if applicable:     N/a     New orders for this week (labs, imaging, medications, etc.):      Work on decreasing smoking to less than 3/4 of a pack by next weeks visit. Continue to wear the aircast boot on left foot to help with immobilization of left ankle at [address] If you develop a fever, chills, increased redness up ankle/leg, increased pain or overall not feeling well go to the emergency room. Additional instructions for specific diagnoses:     Use waffle cushion when you are sitting. Pt. Scheduled to f/u with Dr Deepa So again 7/2022. Vascular intervention on left per Dr Edna Ramírez 6/14/22, 7/8/22     General comments for arterial ulcers:  *  Moisturize your skin regularly with Vaseline, Aquaphor, Aveeno, CeraVe, Cetaphil, Eucerin, Lubriderm, etc; but keep the skin between your toes dry. *  Always allow your wound-care doctor or podiatrist to cut nails, calluses & corns. *  Be sure to always wear footwear that fits well, and NEVER go without shoes and socks. *  Be sure to adhere to any recommendations from your PCP or endocrinologist when it comes to high blood pressure, cholesterol, diet and exercise. If you have questions, please ask. *  If you smoke, your wound can not heal properly -- please talk with us when you're ready to quit. *  Do not soak your feet unless specifically instructed to do so by us. *  Make sure you stay well-hydrated, and maintain good protein intake. *  Walk as much as you can tolerate. Exercise is part of the treatment of peripheral arterial disease. *  If you arent taking an aspirin or other related medication (Plavix, etc), please ask your primary care physician or vascular surgeon if you should. *  Make sure you know when your vascular surgeon needs to follow-up with you, or needs to get any follow-up circulation tests.         F/U Appointment is with Dr. Carlos Pappas in 1 week, on                                   at                       .     Your nurse  is HELIO Rice. If we applied slip-resistant hospital socks today, be sure to remove them at least once a day to inspect your toes or feet, even if you're not changing the wraps or dressings underneath. If you see anything concerning (redness, excess moisture, etc), please call and let us know right away.      Should you experience any significant changes in your wound(s) (including redness, increased warmth, increased pain, increased drainage, odor, or fever) or have questions about your wound care, please contact the [employer] at [phone] Monday-Thursday from 8:00 am - 4:30 pm, or Friday from 8:00 am - 2:30 pm.  If you need help with your wound outside these hours and cannot wait until we are again available, contact your home-care company (if applicable), your PCP, or go to the nearest emergency room

## 2022-08-16 NOTE — PROGRESS NOTES
[address] Progress Note    Katelin Gamble     : 1955    DATE OF VISIT:  8/3/2022    Subjective:     Katelin Gamble is a 61 y.o. male who has a diabetic ulcer located on the foot (left , dorsal, midfoot). Significant symptoms or pertinent wound history since last visit: mostly good news this week; had his RLE revascularization just yesterday (angioplasty of his SFA, no immediate complications noted, and he's feeling fine today). VAC seemed to do well over the left foot theraskin from last week, just changed once over the weekend per my instructions. Mild swelling and pruritus there, no severe pain, no fever. Still smoking and using his vape device unfortunately, but says his glucoses are doing better. Additional ulcer(s) noted? yes. Right lateral ankle, right lateral midfoot; and then the left 2nd toe amp site and left 5th ray amp site are borderline healed, a bit back and forth each week with hemorrhagic crust, minor eschar. His current medication list consists of amLODIPine, aspirin, atorvastatin, docusate sodium, insulin glargine, metFORMIN, oxyCODONE, and psyllium.     Allergies: Shrimp flavor    Objective:     Vitals:    22 1339   BP: (!) 141/69   Pulse: 87   Resp: 20   Temp: 98 °F (36.7 °C)   TempSrc: Oral   Height: 5' 11\" (1.803 m)     Constitutional:  well-developed, well-nourished, NAD, fatigued  Cardiovascular:  bilateral pedal pulses Dopplerable, now both feet much warmer than at prior visits, with improved cap refill, mild swelling now   Lymphatic:  no inguinal or popliteal adenopathy, no lymphangitis, and no cellulitis  Musculoskeletal:  no clubbing, cyanosis or petechiae; RLE and LLE with no gross effusions, joint misalignment or acute arthritis  Princess-ulcer skin:  drier hyperkeratosis at the right foot and ankle; some contact dermatitis at the left dorsal foot; less cristy and indurated at the sutured sites  Ulcer(s):   --          Left dorsal foot with the TheraSkin in place, probably 80-90% well adhered, just a bit of lifting medial, a bit of presumed fibrin and biofilm, minor skin irritation  --          Left lateral foot eschar / hemorrhagic crust with a bit of dehiscence this week, I think better than last week, bone not obviously exposed now  --          Left 2nd toe amp site with the hemorrhagic crust loosening a bit. --          Right lateral ankle partly pink, partly sloughy yellow, smaller and less inflamed. --          Right lateral midfoot about the same size, maybe less inflamed, a bit more red tissue, but now also some exposed bone with a rough surface (5th met base). Photos also saved in electronic chart.     Today's Wound Measurements, per RN documentation:  Wound 02/16/22 #1 Right Lateral Ankle, Arterial, Full Thickness, onset -Wound Length (cm): 0.7 cm  Wound 02/16/22 #2 Right Lateral Foot, Arterial, Full Thickness Onset Nov 2021-Wound Length (cm): 2.3 cm  Wound 02/16/22 #3 Left Dorsal Foot, Arterial, Full Thickness, Onset Nov 2021-Wound Length (cm):  (Left in place for MD to measure)    Wound 02/16/22 #1 Right Lateral Ankle, Arterial, Full Thickness, onset -Wound Width (cm): 0.5 cm  Wound 02/16/22 #2 Right Lateral Foot, Arterial, Full Thickness Onset Nov 2021-Wound Width (cm): 0.7 cm    Wound 02/16/22 #1 Right Lateral Ankle, Arterial, Full Thickness, onset -Wound Depth (cm): 0.3 cm  Wound 02/16/22 #2 Right Lateral Foot, Arterial, Full Thickness Onset Nov 2021-Wound Depth (cm): 0.2 cm    Assessment:     Patient Active Problem List   Diagnosis Code    Uncontrolled type 2 diabetes mellitus with diabetic polyneuropathy, without long-term current use of insulin ([facility]) E11.42, E11.65    Diabetic ulcer of left midfoot associated with type 2 diabetes mellitus, with necrosis of muscle ([address] E11.621, L97.423    Ischemic ulcer of right ankle with necrosis of muscle ([address] L97.313    Current every day smoker F17.200    Hyperlipidemia E78.5    Diabetic ulcer of left foot associated with type 2 diabetes mellitus, with necrosis of bone ([facility] E11.621, L97.524    Ischemic ulcer of right foot with necrosis of bone ([facility]) L97.514    Atherosclerosis of native arteries of left leg with ulceration of foot ([facility] I70.245    Atherosclerosis of native arteries of right leg with ulceration of foot ([facility] I70.235    Subacute osteomyelitis of left foot ([facility]) M86.272       Assessment of today's active condition(s): uncontrolled DM2, neuropathy, tobacco use, PAD, BL foot and ankle ulcers; two areas of amputation on the left side, and hopefully those sites can heal soon; left dorsal foot treated with debridement, Abx for possible acute osteo, then VAC therapy and now 1 week after a TheraSkin, so far, so good. Two chronic right sided ulcers finally just revascularized yesterday; I've never detected bone in the ankle ulcer, so hopefully I can work to get that healed here soon, but he'll need to go back to the OR at some point for the right foot, with presumed osteo there, but I'd like to delay that a bit if we can, to give his improved perfusion time to really benefit him. Factors contributing to occurrence and/or persistence of the chronic ulcer include edema, diabetes, poor glucose control, shear force, smoking, arterial insufficiency, and decreased tissue oxygenation. Medical necessity of today's visit is shown by the above documentation. Sharp debridement is not indicated today, based upon the exam findings in the wound(s) above. Procedure note:     Consent obtained. Time out performed per [facility] policy. Anesthetic  Anesthetic: 4% Lidocaine Cream     I just used a 27g needle to puncture a few tiny holes through the graft and into the underlying wound bed, to encourage a small amount of bleeding (which stopped spontaneously), and allow the wound bed to continue to be stimulated by the graft material. No real debridement done today.      Discharge plan:     Treatment in the wound care center today, per RN documentation: [REMOVED] Wound 07/20/22 #12 Left lateral foot amp. site, Dehisced surgical, Full Thickness, onset 7/20/22-Dressing/Treatment: Other (comment) (betadine dry dressing)  Wound 02/16/22 #1 Right Lateral Ankle, Arterial, Full Thickness, onset -Dressing/Treatment: Other (comment) (betadine dry dressing)  Wound 02/16/22 #2 Right Lateral Foot, Arterial, Full Thickness Onset Nov 2021-Dressing/Treatment: Other (comment) (betadine dry dressing)  Wound 02/16/22 #3 Left Dorsal Foot, Arterial, Full Thickness, Onset Nov 2021-Dressing/Treatment: Other (comment) (adaptic, standard NPWT at 125 mmHg continuous negative pressure). No additional Abx right now. I'll update Dr. Laura Rai about the status of his wounds, and the RLE revascularization. Perhaps repeat XR and OR plans there in a few weeks? Will keep a close eye on that side. Anticipate a bit more debridement next week. Imperative that he keep working on glucoses, work harder on decreasing nicotine use. These wounds are still very much potentially limb-threatening if deep infection should spread, and abstinence from nicotine will go a long way toward getting him healed. Home treatment: good handwashing before and after any dressing changes. Cleanse wound with saline or soap & water before dressing change. May use Vaseline (petrolatum), Aquaphor, Aveeno, CeraVe, Cetaphil, Eucerin, Lubriderm, etc for dry skin. Dressing type for home: as above,  three times weekly for the [facility], daily for everything that's getting betadine . Written discharge instructions given to patient. Follow up in 1 week.     Electronically signed by Tommy Shine MD on 8/16/2022 at 2:29 PM.

## 2022-08-17 ENCOUNTER — HOSPITAL ENCOUNTER (OUTPATIENT)
Dept: WOUND CARE | Age: 67
Discharge: HOME | End: 2022-08-17

## 2022-08-17 VITALS
DIASTOLIC BLOOD PRESSURE: 71 MMHG | HEART RATE: 95 BPM | TEMPERATURE: 99.3 F | RESPIRATION RATE: 18 BRPM | HEIGHT: 71 IN | WEIGHT: 156.13 LBS | BODY MASS INDEX: 21.86 KG/M2 | SYSTOLIC BLOOD PRESSURE: 132 MMHG

## 2022-08-17 DIAGNOSIS — E11.621 DIABETIC ULCER OF LEFT MIDFOOT ASSOCIATED WITH TYPE 2 DIABETES MELLITUS, WITH NECROSIS OF MUSCLE (HCC): ICD-10-CM

## 2022-08-17 DIAGNOSIS — L97.313 ISCHEMIC ULCER OF RIGHT ANKLE WITH NECROSIS OF MUSCLE (HCC): ICD-10-CM

## 2022-08-17 DIAGNOSIS — L97.423 DIABETIC ULCER OF LEFT MIDFOOT ASSOCIATED WITH TYPE 2 DIABETES MELLITUS, WITH NECROSIS OF MUSCLE (HCC): ICD-10-CM

## 2022-08-17 DIAGNOSIS — L97.524 DIABETIC ULCER OF OTHER PART OF LEFT FOOT ASSOCIATED WITH TYPE 2 DIABETES MELLITUS, WITH NECROSIS OF BONE (HCC): ICD-10-CM

## 2022-08-17 DIAGNOSIS — L97.514 ISCHEMIC ULCER OF RIGHT FOOT WITH NECROSIS OF BONE (HCC): Primary | ICD-10-CM

## 2022-08-17 DIAGNOSIS — E11.621 DIABETIC ULCER OF OTHER PART OF LEFT FOOT ASSOCIATED WITH TYPE 2 DIABETES MELLITUS, WITH NECROSIS OF BONE (HCC): ICD-10-CM

## 2022-08-17 PROCEDURE — 97597 DBRDMT OPN WND 1ST 20 CM/<: CPT | Performed by: INTERNAL MEDICINE

## 2022-08-17 PROCEDURE — 97605 NEG PRS WND THER DME<=50SQCM: CPT

## 2022-08-17 PROCEDURE — 97597 DBRDMT OPN WND 1ST 20 CM/<: CPT

## 2022-08-17 RX ORDER — BACITRACIN ZINC AND POLYMYXIN B SULFATE 500; 1000 [USP'U]/G; [USP'U]/G
OINTMENT TOPICAL ONCE
Status: DISCONTINUED | OUTPATIENT
Start: 2022-08-17 | End: 2022-08-18 | Stop reason: HOSPADM

## 2022-08-17 RX ORDER — LIDOCAINE 50 MG/G
OINTMENT TOPICAL ONCE
Status: DISCONTINUED | OUTPATIENT
Start: 2022-08-17 | End: 2022-08-18 | Stop reason: HOSPADM

## 2022-08-17 RX ORDER — LIDOCAINE 40 MG/G
CREAM TOPICAL ONCE
Status: DISCONTINUED | OUTPATIENT
Start: 2022-08-17 | End: 2022-08-18 | Stop reason: HOSPADM

## 2022-08-17 RX ORDER — LIDOCAINE HYDROCHLORIDE 40 MG/ML
SOLUTION TOPICAL ONCE
Status: CANCELLED | OUTPATIENT
Start: 2022-08-17 | End: 2022-08-17

## 2022-08-17 RX ORDER — BACITRACIN ZINC AND POLYMYXIN B SULFATE 500; 1000 [USP'U]/G; [USP'U]/G
OINTMENT TOPICAL ONCE
Status: CANCELLED | OUTPATIENT
Start: 2022-08-17 | End: 2022-08-17

## 2022-08-17 RX ORDER — LIDOCAINE 40 MG/G
CREAM TOPICAL ONCE
Status: CANCELLED | OUTPATIENT
Start: 2022-08-17 | End: 2022-08-17

## 2022-08-17 RX ORDER — LIDOCAINE 50 MG/G
OINTMENT TOPICAL ONCE
Status: CANCELLED | OUTPATIENT
Start: 2022-08-17 | End: 2022-08-17

## 2022-08-17 RX ORDER — LIDOCAINE HYDROCHLORIDE 40 MG/ML
SOLUTION TOPICAL ONCE
Status: DISCONTINUED | OUTPATIENT
Start: 2022-08-17 | End: 2022-08-18 | Stop reason: HOSPADM

## 2022-08-17 NOTE — PLAN OF CARE
Wounds stable, debridement per MD & pt. Tolerated well. Left dorsal foot/ankle with more granulation noted, some graft remains in place. Will cont. With current wound care regime with dressings & NPWT as ordered. Encouraged to decrease carbohydrates & get glucose levels under better control to assist with wound healing. Dr Josh Castillo also reinforced importance of protein with good nutrition & decreasing smoking to also assist with wound healing, both pt. & wife verbalize understanding. F/u in [facility] in 1 week as ordered, pt. Aware to call sooner with any changes or questions/concerns. Discharge instructions reviewed with patient & wife, all questions answered, copy given to patient. Dressings were applied to all wounds per M.D. Instructions at this visit.

## 2022-08-18 NOTE — DISCHARGE INSTRUCTIONS
215 [facility] Physician Orders and Discharge [address]  [address], [address]  [place], [facility]  Telephone: [phone]      Fax: [phone] home care company:   N/a . Your wound-care supplies will be provided by:  Patient     NAME:  Estrella Santiago   YOB: 1955  PRIMARY DIAGNOSIS FOR WOUND CARE CENTER:  Dehisced surgical, Arterial & Diabetic ulcers  . Wound cleansing:  Do not scrub or use excessive force. Wash hands with soap and water before and after dressing changes. Prior to applying a clean dressing, cleanse wound with normal saline, wound cleanser, or mild soap and water. Ask your physician or nurse before getting the wound(s) wet in the shower. Wound care for home:     Left Dorsal Foot/ankle wound:  Vashe, Benzoin esperanza-wound, TheraSkin, Mepitel Ag, steri-strips, adaptic   All above layers applied per MD      Drape under bridge  Adaptic over wound (at home)  Black foam over adaptic & BRIDGE TO LOWER LEG (DO NOT PLACE SUCKER PAD OVER WOUND)  Make sure there is drape under any black foam to protect skin  Cover black foam with drape  -125 mmHg intermittent pressure  Change dressing on Saturday or Sunday at home, [facility] will change on Wednesday        Left 2nd toe amp. Site, Right lateral ankle & foot wounds:  Triad mixed with polysporin to wound  Cover with dry dressing  Change dressing once daily           Attempt to wear the off-loading boots at night when in bed to help prevent further pressure on foot wounds.         Please note, all wounds (unless stated otherwise here) were mechanically debrided at the time of cleansing here in the wound-care center today, so a small amount of pain, drainage or bleeding from that process might be expected, and is normal.     All products for home use, including multiple products for a single wound if applicable, are medically necessary in order to achieve the best F/U Appointment is with Dr. Ria Cronin in 1 week, on                                   at                       .     Your nurse  is HELIO Rice. If we applied slip-resistant hospital socks today, be sure to remove them at least once a day to inspect your toes or feet, even if you're not changing the wraps or dressings underneath. If you see anything concerning (redness, excess moisture, etc), please call and let us know right away.      Should you experience any significant changes in your wound(s) (including redness, increased warmth, increased pain, increased drainage, odor, or fever) or have questions about your wound care, please contact the [employer] at [phone] Monday-Thursday from 8:00 am - 4:30 pm, or Friday from 8:00 am - 2:30 pm.  If you need help with your wound outside these hours and cannot wait until we are again available, contact your home-care company (if applicable), your PCP, or go to the nearest emergency room

## 2022-08-20 PROBLEM — L97.414 DIABETIC ULCER OF RIGHT MIDFOOT ASSOCIATED WITH TYPE 2 DIABETES MELLITUS, WITH NECROSIS OF BONE (HCC): Status: ACTIVE | Noted: 2022-06-07

## 2022-08-20 PROBLEM — E11.622 DIABETIC ULCER OF RIGHT ANKLE ASSOCIATED WITH TYPE 2 DIABETES MELLITUS, WITH NECROSIS OF MUSCLE (HCC): Status: ACTIVE | Noted: 2022-02-22

## 2022-08-20 PROBLEM — M86.272 SUBACUTE OSTEOMYELITIS OF LEFT FOOT (HCC): Status: RESOLVED | Noted: 2022-06-21 | Resolved: 2022-08-20

## 2022-08-20 PROBLEM — E11.621 DIABETIC ULCER OF RIGHT MIDFOOT ASSOCIATED WITH TYPE 2 DIABETES MELLITUS, WITH NECROSIS OF BONE (HCC): Status: ACTIVE | Noted: 2022-06-07

## 2022-08-20 NOTE — PROGRESS NOTES
Lev 30 Progress Note    Shana Barlow     : 1955    DATE OF VISIT:  2022    Subjective:     Shana Barlow is a 61 y.o. male who has an arterial and  diabetic ulcer located on the right ankle and foot (bilateral). Significant symptoms or pertinent wound history since last visit: doing ok overall, no fever, minimal swelling, modest wound drainage, doing ok with VAC. Additional ulcer(s) noted? no. Left lateral foot looks even more durably healed now; left 2nd toe amp site still with a small black eschar, but stable, unsure if open beneath. His current medication list consists of amLODIPine, aspirin, atorvastatin, docusate sodium, insulin glargine, metFORMIN, oxyCODONE, and psyllium. Allergies: Shrimp flavor    Objective:     Vitals:    22 1409   BP: 132/71   Pulse: 95   Resp: 18   Temp: 99.3 °F (37.4 °C)   TempSrc: Oral   Weight: 156 lb 2 oz (70.8 kg)   Height: 5' 11\" (1.803 m)     Constitutional:  well-developed, well-nourished, NAD  Cardiovascular:  bilateral pedal pulses Dopplerable, feet warm, good cap refill, mild edema  Lymphatic:  no inguinal or popliteal adenopathy, no lymphangitis, and no cellulitis  Musculoskeletal:  no clubbing, cyanosis or petechiae; RLE and LLE with no gross effusions, joint misalignment or acute arthritis  Princess-ulcer skin:  mild hyperkeratosis and reactive erythema at the right foot and ankle; some contact dermatitis at the left dorsal foot; just pink and indurated at the prior amp sites  Ulcer(s):   --          Left dorsal foot with the TheraSkin in place, probably 70% incorporating, just a bit of lifting medial where there's a bit of undermining now, a bit of presumed fibrin and biofilm, minor skin irritation, lateral tendon starting to granulate a bit, but graft not adherent there now  --          Left lateral foot looks nearly healed. --          Left 2nd toe amp site with the hemorrhagic crust loosening a bit.   -- associated with type 2 diabetes mellitus, with necrosis of bone ([facility]) E11.621, L97.414    Atherosclerosis of native arteries of left leg with ulceration of foot ([facility] I70.245    Atherosclerosis of native arteries of right leg with ulceration of foot ([facility] I70.235       Assessment of today's active condition(s): uncontrolled Dm2, neuropathy, PAD, multiple foot-ankle ulcers, left side already treated in the OR, both sides now revascularized; left sided infection seems resolved, but he does have a bit of chronic osteo at the right lateral foot; some progress at left dorsal foot with TheraSkin and VAC. Factors contributing to occurrence and/or persistence of the chronic ulcer include diabetes, poor glucose control, chronic pressure, shear force, smoking, arterial insufficiency, and decreased tissue oxygenation. Medical necessity of today's visit is shown by the above documentation. Sharp debridement is indicated today, based upon the exam findings in the wound(s) above. Procedure note:     Consent obtained. Time out performed per [facility]. Anesthetic  Anesthetic: 4% Lidocaine Cream     Using a curette, scissors, and forceps, I sharply debrided the right ankle and foot (bilateral) ulcer(s) down through and including the removal of dermis. The type(s) of tissue debrided included fibrin, biofilm, and necrotic/eschar. Total Surface Area Debrided: 8 sq cm. At the left dorsal foot, the remaining TheraSkin is incorporating nicely, but mostly central and medial, not over the tendon laterally, but that's slowly looking more granular there. The ulcers were then irrigated with normal saline solution. The procedure was completed with a small amount of bleeding, and hemostasis was with pressure. The patient tolerated the procedure well, with no significant complications. The patient's level of pain during and after the procedure was monitored.  Post-debridement measurements, if different from pre-debridement, are in the flowsheet as well. Discharge plan:     Treatment in the wound care center today, per RN documentation: Wound 02/16/22 #1 Right Lateral Ankle, Arterial, Full Thickness, onset -Dressing/Treatment: Other (comment) (triad PSO 4x4's kerlix)  Wound 02/16/22 #2 Right Lateral Foot, Arterial, Full Thickness Onset Nov 2021-Dressing/Treatment: Other (comment) (triad PSO 4x4's kerlix)  Wound 02/16/22 #3 Left Dorsal Foot, Arterial, Full Thickness, Onset Nov 2021-Dressing/Treatment: Other (comment) (adaptic NPWT). Still Betadine to the two left foot sites that were previously operated on. Important to continue working on glucose control, protein intake, smoking cessation. I'll inquire about the possibility of a 2nd TheraSkin for the left foot. Will be referring back to Dr. Dar Conner for consideration of right foot surgery soon. Home treatment: good handwashing before and after any dressing changes. Cleanse wound with saline or soap & water before dressing change. May use Vaseline (petrolatum), Aquaphor, Aveeno, CeraVe, Cetaphil, Eucerin, Lubriderm, etc for dry skin. Dressing type for home: as above,  2-3x weekly for the [facility], daily for everything else . Written discharge instructions given to patient. Follow up in 1 week.     Electronically signed by Shirley Shepherd MD on 8/20/2022 at 7:44 PM.

## 2022-08-20 NOTE — PROGRESS NOTES
Lev 30 Progress Note    Sheri Engel     : 1955    DATE OF VISIT:  8/10/2022    Subjective:     Sheri Engel is a 61 y.o. male who has a diabetic ulcer located on the left dorsal midfoot, right lateral ankle, right lateral midfoot. Significant symptoms or pertinent wound history since last visit: feeling pretty well, not much foot pain, no F/C/D, tolerating VAC well, mild pruritus. Says glucoses are better overall. Still smoking unfortunately. Additional ulcer(s) noted? no. Left lateral foot might be healed with a bit of hyperkeratosis and dry debris; left 2nd toe amp site still with a small black eschar, but I think slowly retracting from skin edges. His current medication list consists of amLODIPine, aspirin, atorvastatin, docusate sodium, insulin glargine, metFORMIN, oxyCODONE, and psyllium. Allergies: Shrimp flavor    Objective:     Vitals:    08/10/22 1341   BP: 129/68   Pulse: (!) 101   Resp: 18   Temp: 98 °F (36.7 °C)   TempSrc: Oral   Weight: 153 lb 6 oz (69.6 kg)   Height: 5' 11\" (1.803 m)     Constitutional:  well-developed, well-nourished, NAD  Cardiovascular:  bilateral pedal pulses Dopplerable, feet warm, good cap refill, mild edema  Lymphatic:  no inguinal or popliteal adenopathy, no lymphangitis, and no cellulitis  Musculoskeletal:  no clubbing, cyanosis or petechiae; RLE and LLE with no gross effusions, joint misalignment or acute arthritis  Princess-ulcer skin:  mild hyperkeratosis at the right foot and ankle; some contact dermatitis at the left dorsal foot; just pink and indurated at the prior amp sites  Ulcer(s):   --          Left dorsal foot with the TheraSkin in place, probably 80% well adhered, just a bit of lifting medial, a bit of presumed fibrin and biofilm, minor skin irritation, lateral tendon starting to granulate a bit   --          Left lateral foot again looks nearly healed.   --          Left 2nd toe amp site with the hemorrhagic crust loosening a bit. --          Right lateral ankle improved from prior to revascularization, some pink-red tissue, a bit of fat necrosis, biofilm, fibrin, but also a degree of undermining that I hadn't appreciated before  --          Right lateral midfoot I think also a bit smaller, definitely granulating more, some fibrin and biofilm, still with a bit of palpable and presumably chronic osteomyelitic bone. Photos also saved in electronic chart.     Today's wound measurements, per RN documentation:  Wound 02/16/22 #1 Right Lateral Ankle, Arterial, Full Thickness, onset -Wound Length (cm): 1 cm  Wound 02/16/22 #2 Right Lateral Foot, Arterial, Full Thickness Onset Nov 2021-Wound Length (cm): 1.1 cm  Wound 02/16/22 #3 Left Dorsal Foot, Arterial, Full Thickness, Onset Nov 2021-Wound Length (cm): 2.1 cm    Wound 02/16/22 #1 Right Lateral Ankle, Arterial, Full Thickness, onset -Wound Width (cm): 1 cm  Wound 02/16/22 #2 Right Lateral Foot, Arterial, Full Thickness Onset Nov 2021-Wound Width (cm): 0.6 cm  Wound 02/16/22 #3 Left Dorsal Foot, Arterial, Full Thickness, Onset Nov 2021-Wound Width (cm): 3 cm    Wound 02/16/22 #1 Right Lateral Ankle, Arterial, Full Thickness, onset -Wound Depth (cm): 0.3 cm  Wound 02/16/22 #2 Right Lateral Foot, Arterial, Full Thickness Onset Nov 2021-Wound Depth (cm): 0.3 cm  Wound 02/16/22 #3 Left Dorsal Foot, Arterial, Full Thickness, Onset Nov 2021-Wound Depth (cm):  (MD to measure)    Assessment:     Patient Active Problem List   Diagnosis Code    Uncontrolled type 2 diabetes mellitus with diabetic polyneuropathy, without long-term current use of insulin ([facility]) E11.42, E11.65    Diabetic ulcer of left midfoot associated with type 2 diabetes mellitus, with necrosis of muscle ([facility]) E11.621, L97.423    Diabetic ulcer of right ankle associated with type 2 diabetes mellitus, with necrosis of muscle ([facility]) E11.622, L97.313    Current every day smoker F17.200    Hyperlipidemia E78.5    Diabetic ulcer of left foot associated with type 2 diabetes mellitus, with necrosis of bone ([facility]) E11.621, L97.524    Diabetic ulcer of right midfoot associated with type 2 diabetes mellitus, with necrosis of bone ([facility]) E11.621, L97.414    Atherosclerosis of native arteries of left leg with ulceration of foot ([facility] I70.245    Atherosclerosis of native arteries of right leg with ulceration of foot ([facility] I70.235       Assessment of today's active condition(s): uncontrolled Dm2, neuropathy, PAD, multiple foot and ankle ulcers; both sides now revascularized; the two prior surgical sites we're just giving some time and keeping dry; left dorsal foot using TheraSkin and NPWT to get some tendon coverage with granulation, after recent OR debridement and Abx for possible underlying osteo; right foot needs OR at some point; right ankle could do well, if we can deal with the undermining. Factors contributing to occurrence and/or persistence of the chronic ulcer include edema, diabetes, poor glucose control, chronic pressure, decreased mobility, shear force, smoking, arterial insufficiency, and decreased tissue oxygenation. Medical necessity of today's visit is shown by the above documentation. Sharp debridement is indicated today, based upon the exam findings in the wound(s) above. Procedure note:     Consent obtained. Time out performed per [facility] policy. Anesthetic  Anesthetic: 4% Lidocaine Cream     Using a curette, I sharply debrided the right, lateral ankle ulcer(s) down through and including the removal of subcutaneous tissue. The type(s) of tissue debrided included fibrin, biofilm, slough, and necrotic/eschar. Total Surface Area Debrided: 1 sq cm. Using a curette, I sharply debrided the foot (right, lateral, midfoot) ulcer(s) down through and including the removal of dermis. The type(s) of tissue debrided included fibrin and biofilm. Total Surface Area Debrided: 1 sq cm.     The ulcers were then irrigated with normal saline solution. The procedure was completed with a moderate amount of bleeding, and hemostasis was with pressure. The patient tolerated the procedure well, with no significant complications. The patient's level of pain during and after the procedure was monitored. Post-debridement measurements, if different from pre-debridement, are in the flowsheet as well. I also used a pair of scissors to remove a bit of nonviable graft from the left dorsal foot, but no actual debridement of the wound this week; next week when the graft is strongly adhered, I anticipate a bit of that. Discharge plan:     Treatment in the wound care center today, per RN documentation: [REMOVED] Wound 07/20/22 #12 Left lateral foot amp. site, Dehisced surgical, Full Thickness, onset 7/20/22-Dressing/Treatment: Other (comment) (betadine dry dressing) -- same to the left 2nd toe amp site. Wound 02/16/22 #1 Right Lateral Ankle, Arterial, Full Thickness, onset -Dressing/Treatment: Other (comment) (triad PSO 4x4's kerlix)  Wound 02/16/22 #2 Right Lateral Foot, Arterial, Full Thickness Onset Nov 2021-Dressing/Treatment: Other (comment) (triad PSO 4x4's kerlix)  Wound 02/16/22 #3 Left Dorsal Foot, Arterial, Full Thickness, Onset Nov 2021-Dressing/Treatment: Other (comment) (adaptic NPWT). Imperative that he continue to work on glucoses, work harder on smoking cessation. Will be referring back to Dr. Lorri Hampton sometime in the coming weeks for surgery on the right lateral foot, but I'd like to see how much progress I can make on the ankle first (to be able to tell her if I need her to excise that wound edge in the OR also). Home treatment: good handwashing before and after any dressing changes. Cleanse wound with saline or soap & water before dressing change. May use Vaseline (petrolatum), Aquaphor, Aveeno, CeraVe, Cetaphil, Eucerin, Lubriderm, etc for dry skin.      Dressing type for home: as above,  daily for the right foot and ankle, and the two Betadine sites on the left foot, and just 2-3 x weekly for the left dorsal foot . Written discharge instructions given to patient. Follow up in 10 week.     Electronically signed by Mey Francois MD on 8/20/2022 at 7:33 PM.

## 2022-08-24 ENCOUNTER — HOSPITAL ENCOUNTER (OUTPATIENT)
Dept: WOUND CARE | Age: 67
Discharge: HOME | End: 2022-08-24

## 2022-08-24 VITALS
HEIGHT: 71 IN | HEART RATE: 92 BPM | TEMPERATURE: 98.1 F | BODY MASS INDEX: 22.15 KG/M2 | RESPIRATION RATE: 20 BRPM | SYSTOLIC BLOOD PRESSURE: 135 MMHG | DIASTOLIC BLOOD PRESSURE: 72 MMHG | WEIGHT: 158.25 LBS

## 2022-08-24 DIAGNOSIS — L97.414 DIABETIC ULCER OF RIGHT MIDFOOT ASSOCIATED WITH TYPE 2 DIABETES MELLITUS, WITH NECROSIS OF BONE (HCC): Primary | ICD-10-CM

## 2022-08-24 DIAGNOSIS — L97.313 DIABETIC ULCER OF RIGHT ANKLE ASSOCIATED WITH TYPE 2 DIABETES MELLITUS, WITH NECROSIS OF MUSCLE (HCC): ICD-10-CM

## 2022-08-24 DIAGNOSIS — E11.621 DIABETIC ULCER OF LEFT MIDFOOT ASSOCIATED WITH TYPE 2 DIABETES MELLITUS, WITH NECROSIS OF MUSCLE (HCC): ICD-10-CM

## 2022-08-24 DIAGNOSIS — L97.524 DIABETIC ULCER OF OTHER PART OF LEFT FOOT ASSOCIATED WITH TYPE 2 DIABETES MELLITUS, WITH NECROSIS OF BONE (HCC): ICD-10-CM

## 2022-08-24 DIAGNOSIS — E11.621 DIABETIC ULCER OF OTHER PART OF LEFT FOOT ASSOCIATED WITH TYPE 2 DIABETES MELLITUS, WITH NECROSIS OF BONE (HCC): ICD-10-CM

## 2022-08-24 DIAGNOSIS — L97.423 DIABETIC ULCER OF LEFT MIDFOOT ASSOCIATED WITH TYPE 2 DIABETES MELLITUS, WITH NECROSIS OF MUSCLE (HCC): ICD-10-CM

## 2022-08-24 DIAGNOSIS — E11.622 DIABETIC ULCER OF RIGHT ANKLE ASSOCIATED WITH TYPE 2 DIABETES MELLITUS, WITH NECROSIS OF MUSCLE (HCC): ICD-10-CM

## 2022-08-24 DIAGNOSIS — E11.621 DIABETIC ULCER OF RIGHT MIDFOOT ASSOCIATED WITH TYPE 2 DIABETES MELLITUS, WITH NECROSIS OF BONE (HCC): Primary | ICD-10-CM

## 2022-08-24 PROCEDURE — 97605 NEG PRS WND THER DME<=50SQCM: CPT

## 2022-08-24 PROCEDURE — 97597 DBRDMT OPN WND 1ST 20 CM/<: CPT

## 2022-08-24 RX ORDER — LIDOCAINE HYDROCHLORIDE 40 MG/ML
SOLUTION TOPICAL ONCE
Status: CANCELLED | OUTPATIENT
Start: 2022-08-24 | End: 2022-08-24

## 2022-08-24 RX ORDER — LIDOCAINE 50 MG/G
OINTMENT TOPICAL ONCE
Status: CANCELLED | OUTPATIENT
Start: 2022-08-24 | End: 2022-08-24

## 2022-08-24 RX ORDER — BACITRACIN ZINC AND POLYMYXIN B SULFATE 500; 1000 [USP'U]/G; [USP'U]/G
OINTMENT TOPICAL ONCE
Status: DISCONTINUED | OUTPATIENT
Start: 2022-08-24 | End: 2022-08-25 | Stop reason: HOSPADM

## 2022-08-24 RX ORDER — LIDOCAINE 40 MG/G
CREAM TOPICAL ONCE
Status: DISCONTINUED | OUTPATIENT
Start: 2022-08-24 | End: 2022-08-25 | Stop reason: HOSPADM

## 2022-08-24 RX ORDER — LIDOCAINE HYDROCHLORIDE 40 MG/ML
SOLUTION TOPICAL ONCE
Status: DISCONTINUED | OUTPATIENT
Start: 2022-08-24 | End: 2022-08-25 | Stop reason: HOSPADM

## 2022-08-24 RX ORDER — LIDOCAINE 50 MG/G
OINTMENT TOPICAL ONCE
Status: DISCONTINUED | OUTPATIENT
Start: 2022-08-24 | End: 2022-08-25 | Stop reason: HOSPADM

## 2022-08-24 RX ORDER — BACITRACIN ZINC AND POLYMYXIN B SULFATE 500; 1000 [USP'U]/G; [USP'U]/G
OINTMENT TOPICAL ONCE
Status: CANCELLED | OUTPATIENT
Start: 2022-08-24 | End: 2022-08-24

## 2022-08-24 RX ORDER — LIDOCAINE 40 MG/G
CREAM TOPICAL ONCE
Status: CANCELLED | OUTPATIENT
Start: 2022-08-24 | End: 2022-08-24

## 2022-08-24 NOTE — PLAN OF CARE
Left lateral foot wound healed, no dressing needed this week. Left 2nd toe amp. Site stable, debridement per MD, will change to using Triad/ PSO to wound this week. Right lateral foot & ankle wounds stable, debridement per MD, cont. With Triad/PSO to wounds, change daily. Left anterior foot/ankle wound remains with tendon exposure, but showing improvement & more granulation. Debridement & TheraSkin application per MD & pt. Tolerated well. Will cont. With NPWT as ordered. Pt. Cont. To wear aircast boot for immobilization of left foot/ankle. F/u in [facility] in 1 week as ordered, pt. Aware to call sooner with any changes or questions/concerns. Discharge instructions reviewed with patient & wife, all questions answered, copy given to patient. Dressings were applied to all wounds per M.D. Instructions at this visit.

## 2022-08-25 NOTE — DISCHARGE INSTRUCTIONS
[address] [facility] Physician Orders and Discharge [address] [facility]  [address], [address]  [place], [facility]  Telephone: [phone]      Fax: [phone] home care company:   N/a . Your wound-care supplies will be provided by:  Patient     NAME:  Charlene Verdugo   YOB: 1955  PRIMARY DIAGNOSIS FOR WOUND CARE CENTER:  Dehisced surgical, Arterial & Diabetic ulcers  . Wound cleansing:  Do not scrub or use excessive force. Wash hands with soap and water before and after dressing changes. Prior to applying a clean dressing, cleanse wound with normal saline, wound cleanser, or mild soap and water. Ask your physician or nurse before getting the wound(s) wet in the shower. Wound care for home:     Left Dorsal Foot/ankle wound:  Vashe, Benzoin esperanza-wound, TheraSkin, Mepitel Ag, steri-strips, adaptic   All above layers applied per MD        Drape under bridge  Adaptic over wound (at home)  Black foam over adaptic & BRIDGE TO LOWER LEG (DO NOT PLACE SUCKER PAD OVER WOUND)  Make sure there is drape under any black foam to protect skin  Cover black foam with drape  -125 mmHg intermittent pressure  Change dressing on Saturday or Sunday at home, [facility] will change on Wednesday        Left 2nd toe amp. Site, Right lateral ankle & foot wounds:  Triad mixed with polysporin to wound  Cover with dry dressing  Change dressing once daily           Attempt to wear the off-loading boots at night when in bed to help prevent further pressure on foot wounds.         Please note, all wounds (unless stated otherwise here) were mechanically debrided at the time of cleansing here in the wound-care center today, so a small amount of pain, drainage or bleeding from that process might be expected, and is normal.     All products for home use, including multiple products for a single wound if applicable, are medically necessary in order to achieve the best chance at timely wound healing. See provider documentation for details if needed. Substituted dressings applied in the [facility] today, if applicable:     N/a     New orders for this week (labs, imaging, medications, etc.):      Work on decreasing smoking to less than 3/4 of a pack by next weeks visit. Continue to wear the aircast boot on left foot to help with immobilization of left ankle at [address] If you develop a fever, chills, increased redness up ankle/leg, increased pain or overall not feeling well go to the emergency room. Additional instructions for specific diagnoses:     Use waffle cushion when you are sitting. Pt. Scheduled to f/u with Dr Finn Holland again 7/2022. Vascular intervention on left per Dr Lani Mcardle 6/14/22, 7/8/22     General comments for arterial ulcers:  *  Moisturize your skin regularly with Vaseline, Aquaphor, Aveeno, CeraVe, Cetaphil, Eucerin, Lubriderm, etc; but keep the skin between your toes dry. *  Always allow your wound-care doctor or podiatrist to cut nails, calluses & corns. *  Be sure to always wear footwear that fits well, and NEVER go without shoes and socks. *  Be sure to adhere to any recommendations from your PCP or endocrinologist when it comes to high blood pressure, cholesterol, diet and exercise. If you have questions, please ask. *  If you smoke, your wound can not heal properly -- please talk with us when you're ready to quit. *  Do not soak your feet unless specifically instructed to do so by us. *  Make sure you stay well-hydrated, and maintain good protein intake. *  Walk as much as you can tolerate. Exercise is part of the treatment of peripheral arterial disease. *  If you arent taking an aspirin or other related medication (Plavix, etc), please ask your primary care physician or vascular surgeon if you should. *  Make sure you know when your vascular surgeon needs to follow-up with you, or needs to get any follow-up circulation tests. F/U Appointment is with Dr. Ria Cronin in 1 week, on                                   at                       .     Your nurse  is HELIO Rice. If we applied slip-resistant hospital socks today, be sure to remove them at least once a day to inspect your toes or feet, even if you're not changing the wraps or dressings underneath. If you see anything concerning (redness, excess moisture, etc), please call and let us know right away.      Should you experience any significant changes in your wound(s) (including redness, increased warmth, increased pain, increased drainage, odor, or fever) or have questions about your wound care, please contact the [employer] at [phone] Monday-Thursday from 8:00 am - 4:30 pm, or Friday from 8:00 am - 2:30 pm.  If you need help with your wound outside these hours and cannot wait until we are again available, contact your home-care company (if applicable), your PCP, or go to the nearest emergency room

## 2022-08-31 ENCOUNTER — HOSPITAL ENCOUNTER (OUTPATIENT)
Dept: WOUND CARE | Age: 67
Discharge: HOME | End: 2022-08-31

## 2022-09-01 NOTE — DISCHARGE INSTRUCTIONS
215 [facility] Physician Orders and Discharge 800 [facility]  [address], [address]  [place], [facility]  Telephone: [phone]      Fax: [phone] home care company:   N/a . Your wound-care supplies will be provided by:  Patient     NAME:  Mirna Rowland   YOB: 1955  PRIMARY DIAGNOSIS FOR WOUND CARE CENTER:  Dehisced surgical, Arterial & Diabetic ulcers  . Wound cleansing:  Do not scrub or use excessive force. Wash hands with soap and water before and after dressing changes. Prior to applying a clean dressing, cleanse wound with normal saline, wound cleanser, or mild soap and water. Ask your physician or nurse before getting the wound(s) wet in the shower. Wound care for home:     Left Dorsal Foot/ankle wound:  Drape under bridge  Adaptic over wound (at home)  Black foam over adaptic & BRIDGE TO LOWER LEG (DO NOT PLACE SUCKER PAD OVER WOUND)  Make sure there is drape under any black foam to protect skin  Cover black foam with drape  -125 mmHg intermittent pressure  Change dressing on Saturday or Sunday at home, [facility] will change on Wednesday        Left 2nd toe amp. Site, Right lateral ankle & foot wounds:  Triad mixed with polysporin to wound  Cover with dry dressing  Change dressing once daily           Attempt to wear the off-loading boots at night when in bed to help prevent further pressure on foot wounds. Please note, all wounds (unless stated otherwise here) were mechanically debrided at the time of cleansing here in the wound-care center today, so a small amount of pain, drainage or bleeding from that process might be expected, and is normal.     All products for home use, including multiple products for a single wound if applicable, are medically necessary in order to achieve the best chance at timely wound healing. See provider documentation for details if needed.      Substituted dressings applied in the [facility] today, if applicable:     N/a     New orders for this week (labs, imaging, medications, etc.):      Continue to work on decreasing smoking. Continue to wear the aircast boot on left foot to help with immobilization of left ankle at [address] If you develop a fever, chills, increased redness up ankle/leg, increased pain or overall not feeling well go to the emergency room. Additional instructions for specific diagnoses:     Use waffle cushion when you are sitting. Pt. Scheduled to f/u with Dr Aliyah Sigala again 7/2022. Vascular intervention on left per Dr Prateek Zhao 6/14/22, 7/8/22     General comments for arterial ulcers:  *  Moisturize your skin regularly with Vaseline, Aquaphor, Aveeno, CeraVe, Cetaphil, Eucerin, Lubriderm, etc; but keep the skin between your toes dry. *  Always allow your wound-care doctor or podiatrist to cut nails, calluses & corns. *  Be sure to always wear footwear that fits well, and NEVER go without shoes and socks. *  Be sure to adhere to any recommendations from your PCP or endocrinologist when it comes to high blood pressure, cholesterol, diet and exercise. If you have questions, please ask. *  If you smoke, your wound can not heal properly -- please talk with us when you're ready to quit. *  Do not soak your feet unless specifically instructed to do so by us. *  Make sure you stay well-hydrated, and maintain good protein intake. *  Walk as much as you can tolerate. Exercise is part of the treatment of peripheral arterial disease. *  If you arent taking an aspirin or other related medication (Plavix, etc), please ask your primary care physician or vascular surgeon if you should. *  Make sure you know when your vascular surgeon needs to follow-up with you, or needs to get any follow-up circulation tests.         F/U Appointment is with Dr. Joshua Donnelly in 1 week, on                                   at                       .     Your nurse  is Krystal RN.     If we applied slip-resistant hospital socks today, be sure to remove them at least once a day to inspect your toes or feet, even if you're not changing the wraps or dressings underneath. If you see anything concerning (redness, excess moisture, etc), please call and let us know right away.      Should you experience any significant changes in your wound(s) (including redness, increased warmth, increased pain, increased drainage, odor, or fever) or have questions about your wound care, please contact the [employer] at [phone] Monday-Thursday from 8:00 am - 4:30 pm, or Friday from 8:00 am - 2:30 pm.  If you need help with your wound outside these hours and cannot wait until we are again available, contact your home-care company (if applicable), your PCP, or go to the nearest emergency room

## 2022-09-05 NOTE — PROGRESS NOTES
Lev 30 Progress Note    Huma Elias     : 1955    DATE OF VISIT:  2022    Subjective:     Huma Elias is a 61 y.o. male who has an arterial and  diabetic ulcer located on the right, lateral ankle and foot (bilateral). Significant symptoms or pertinent wound history since last visit: feeling pretty well overall, no fever, no falls, eating ok, still smoking. Additional ulcer(s) noted? no.      His current medication list consists of amLODIPine, aspirin, atorvastatin, docusate sodium, insulin glargine, metFORMIN, oxyCODONE, and psyllium. Allergies: Shrimp flavor    Objective:     Vitals:    22 1342 22 1347 22 1513   BP:  91/66 135/72   Pulse:  (!) 102 92   Resp:  20    Temp:  98.1 °F (36.7 °C)    TempSrc:  Oral    Weight: 158 lb 4 oz (71.8 kg)     Height: 5' 11\" (1.803 m)       Constitutional:  well-developed, well-nourished, NAD  Cardiovascular:  bilateral pedal pulses Dopplerable, feet warm, good cap refill, mild edema  Lymphatic:  no inguinal or popliteal adenopathy, no lymphangitis, and no cellulitis  Musculoskeletal:  no clubbing, cyanosis or petechiae; RLE and LLE with no gross effusions, joint misalignment or acute arthritis  Princess-ulcer skin:  mild hyperkeratosis and reactive erythema at the right foot and ankle; some contact dermatitis at the left dorsal foot; just pink and indurated at the prior amp sites  Ulcer(s):   --          Left dorsal foot with some decent progress toward healing with the first TheraSkin, still a bit of exposed lateral tendon with some granulation within, and a small tunnel medial-distal, some fibrin and biofilm  --          Left lateral foot looks healed.   --          Left 2nd toe amp site with the hemorrhagic crust / eschar loosening up  --          Right lateral ankle some pink-red tissue, biofilm, fibrin, but also stable undermining  --          Right lateral midfoot I think also a bit smaller, definitely granulating more, some fibrin and biofilm, still with a bit of palpable and presumably chronic osteomyelitic bone. Photos also saved in electronic chart.     Today's wound measurements, per RN documentation:  Wound 02/16/22 #1 Right Lateral Ankle, Arterial, Full Thickness, onset -Wound Length (cm): 0.8 cm  Wound 02/16/22 #2 Right Lateral Foot, Arterial, Full Thickness Onset Nov 2021-Wound Length (cm): 1.2 cm  Wound 02/16/22 #3 Left Dorsal Foot, Arterial, Full Thickness, Onset Nov 2021-Wound Length (cm): 1.3 cm    Wound 02/16/22 #1 Right Lateral Ankle, Arterial, Full Thickness, onset -Wound Width (cm): 0.7 cm  Wound 02/16/22 #2 Right Lateral Foot, Arterial, Full Thickness Onset Nov 2021-Wound Width (cm): 0.7 cm  Wound 02/16/22 #3 Left Dorsal Foot, Arterial, Full Thickness, Onset Nov 2021-Wound Width (cm): 3 cm    Wound 02/16/22 #1 Right Lateral Ankle, Arterial, Full Thickness, onset -Wound Depth (cm): 0.3 cm  Wound 02/16/22 #2 Right Lateral Foot, Arterial, Full Thickness Onset Nov 2021-Wound Depth (cm): 0.4 cm  Wound 02/16/22 #3 Left Dorsal Foot, Arterial, Full Thickness, Onset Nov 2021-Wound Depth (cm): 0.6 cm    Assessment:     Patient Active Problem List   Diagnosis Code    Uncontrolled type 2 diabetes mellitus with diabetic polyneuropathy, without long-term current use of insulin ([facility]) E11.42, E11.65    Diabetic ulcer of left midfoot associated with type 2 diabetes mellitus, with necrosis of muscle ([facility]) E11.621, L97.423    Diabetic ulcer of right ankle associated with type 2 diabetes mellitus, with necrosis of muscle ([facility] [address] [address] E11.622, L97.313    Current every day smoker F17.200    Hyperlipidemia E78.5    Diabetic ulcer of left foot associated with type 2 diabetes mellitus, with necrosis of bone (HCC) E11.621, J58.612    Diabetic ulcer of right midfoot associated with type 2 diabetes mellitus, with necrosis of bone ([facility]CC) E11.621, L97.414    Atherosclerosis of native arteries of left leg with ulceration of foot ([facility] [address] I70.245    Atherosclerosis of native arteries of right leg with ulceration of foot (HCC) I70.235       Assessment of today's active condition(s): uncontrolled Dm2, neuropathy, PAD, multiple LE diabetic ulcers, a couple complicated by osteo; left foot ought to be able to heal with ongoing wound-care here, I think, but I also still think he'll need surgery for the right foot at some point; no signs of soft tissue infection or recurrent ischemia today. Factors contributing to occurrence and/or persistence of the chronic ulcer include edema, diabetes, poor glucose control, shear force, smoking, arterial insufficiency, and decreased tissue oxygenation. Medical necessity of today's visit is shown by the above documentation. Sharp debridement is indicated today, based upon the exam findings in the wound(s) above. Procedure note:     Consent obtained. Time out performed per [facility]. Anesthetic  Anesthetic: 4% Lidocaine Cream     Using a curette, #15 blade scalpel, and forceps, I sharply debrided the right, lateral ankle and foot (bilateral) ulcer(s) down through and including the removal of dermis. The type(s) of tissue debrided included fibrin, biofilm, and necrotic/eschar. Total Surface Area Debrided: 6 sq cm. The ulcers were then irrigated with normal saline solution. The procedure was completed with a small amount of bleeding, and hemostasis was with pressure. The patient tolerated the procedure well, with no significant complications. The patient's level of pain during and after the procedure was monitored.  Post-debridement measurements, if different from pre-debridement, are in the flowsheet as well.  ___________    Because this patient's left dorsal foot diabetic ulcer has failed to respond to standard wound-care measures (including treatment of infection, debridement of necrosis, treatment of edema, provision of a moist wound environment, good compliance with offloading, medical treatment of diabetes) for more than 4 weeks, I recommended TheraSkin as adjunctive therapy to help speed healing of this ulcer. As per previous documentation, peripheral pulse exam is acceptable, and/or JUAN JOSÉ is at least 0.65. There is no evidence of untreated infection today. Mr. Tru Suh does currently smoke. He has been counseled on the effects of smoking on wound healing, and following that counseling, he HAS cut down some, is continuing to try to taper off. After cleansing the left dorsal foot ulcer with saline, spraying the ulcer with HClO and applying Skin-Prep to the esperanza-wound skin, a 6 sqcm piece of TheraSkin was cut to fit the ulcer, placed into the ulcer bed, affixed to the surrounding skin with Steri-Strips, and covered with Mepitel Ag and then Adaptic. Six sqcm of the product was used in the ulcer, and 0 sqcm of the product was discarded. If applicable, any wastage is due to the fact the smallest available product was larger than what was needed for his ulcer(s). The patient tolerated the procedure well, without complications. ** [employer] generously donated by [employer], and should not be charged to him. Discharge plan:     Treatment in the wound care center today, per RN documentation: Wound 02/16/22 #1 Right Lateral Ankle, Arterial, Full Thickness, onset -Dressing/Treatment: Other (comment) (triad PSO 4x4's robert)  Wound 02/16/22 #2 Right Lateral Foot, Arterial, Full Thickness Onset Nov 2021-Dressing/Treatment: Other (comment) (triad PSO 4x4's robert)  Wound 02/16/22 #3 Left Dorsal Foot, Arterial, Full Thickness, Onset Nov 2021-Dressing/Treatment: Other (comment) (NPWT kerlix). Crucial that he work harder on smoking cessation and glucose control. Plan to refer him back to Dr. Lunda Homans soon for right foot +/- ankle surgery, but I'd like to wait a few weeks, make sure the left foot is predictably healing by then.     No change in offloading plans, immobilization of that left ankle until the tendon is completely covered by granulation, with no residual hint of a tunnel. Home treatment: good handwashing before and after any dressing changes. Cleanse wound with saline or soap & water before dressing change. May use Vaseline (petrolatum), Aquaphor, Aveeno, CeraVe, Cetaphil, Eucerin, Lubriderm, etc for dry skin. Dressing type for home:  PSO and Triad  to the right ankle, right foot, left 2nd toe amp site daily; Betadine to the left lateral foot daily, and then NPWT to the left dorsal foot (with new Adaptic) ,  just once over the weekend . Written discharge instructions given to patient. Follow up in 1 week.     Electronically signed by Pascual Palacios MD on 9/5/2022 at 4:11 PM.

## 2022-09-07 ENCOUNTER — HOSPITAL ENCOUNTER (OUTPATIENT)
Dept: WOUND CARE | Age: 67
Discharge: HOME | End: 2022-09-07

## 2022-09-07 VITALS
HEIGHT: 71 IN | SYSTOLIC BLOOD PRESSURE: 136 MMHG | BODY MASS INDEX: 22.12 KG/M2 | DIASTOLIC BLOOD PRESSURE: 69 MMHG | HEART RATE: 90 BPM | WEIGHT: 158 LBS | RESPIRATION RATE: 18 BRPM | TEMPERATURE: 98.6 F

## 2022-09-07 DIAGNOSIS — L97.414 DIABETIC ULCER OF RIGHT MIDFOOT ASSOCIATED WITH TYPE 2 DIABETES MELLITUS, WITH NECROSIS OF BONE (HCC): Primary | ICD-10-CM

## 2022-09-07 DIAGNOSIS — E11.622 DIABETIC ULCER OF RIGHT ANKLE ASSOCIATED WITH TYPE 2 DIABETES MELLITUS, WITH NECROSIS OF MUSCLE (HCC): ICD-10-CM

## 2022-09-07 DIAGNOSIS — L97.524 DIABETIC ULCER OF OTHER PART OF LEFT FOOT ASSOCIATED WITH TYPE 2 DIABETES MELLITUS, WITH NECROSIS OF BONE (HCC): ICD-10-CM

## 2022-09-07 DIAGNOSIS — T81.31XA DEHISCENCE OF SURGICAL WOUND, INITIAL ENCOUNTER: ICD-10-CM

## 2022-09-07 DIAGNOSIS — E11.621 DIABETIC ULCER OF RIGHT MIDFOOT ASSOCIATED WITH TYPE 2 DIABETES MELLITUS, WITH NECROSIS OF BONE (HCC): Primary | ICD-10-CM

## 2022-09-07 DIAGNOSIS — E11.621 DIABETIC ULCER OF OTHER PART OF LEFT FOOT ASSOCIATED WITH TYPE 2 DIABETES MELLITUS, WITH NECROSIS OF BONE (HCC): ICD-10-CM

## 2022-09-07 DIAGNOSIS — E11.621 DIABETIC ULCER OF LEFT MIDFOOT ASSOCIATED WITH TYPE 2 DIABETES MELLITUS, WITH NECROSIS OF MUSCLE (HCC): ICD-10-CM

## 2022-09-07 DIAGNOSIS — L97.423 DIABETIC ULCER OF LEFT MIDFOOT ASSOCIATED WITH TYPE 2 DIABETES MELLITUS, WITH NECROSIS OF MUSCLE (HCC): ICD-10-CM

## 2022-09-07 DIAGNOSIS — L97.313 DIABETIC ULCER OF RIGHT ANKLE ASSOCIATED WITH TYPE 2 DIABETES MELLITUS, WITH NECROSIS OF MUSCLE (HCC): ICD-10-CM

## 2022-09-07 PROCEDURE — 97597 DBRDMT OPN WND 1ST 20 CM/<: CPT

## 2022-09-07 PROCEDURE — 11042 DBRDMT SUBQ TIS 1ST 20SQCM/<: CPT

## 2022-09-07 PROCEDURE — 97605 NEG PRS WND THER DME<=50SQCM: CPT

## 2022-09-07 RX ORDER — LIDOCAINE HYDROCHLORIDE 40 MG/ML
SOLUTION TOPICAL ONCE
Status: DISCONTINUED | OUTPATIENT
Start: 2022-09-07 | End: 2022-09-08 | Stop reason: HOSPADM

## 2022-09-07 RX ORDER — LIDOCAINE 40 MG/G
CREAM TOPICAL ONCE
Status: CANCELLED | OUTPATIENT
Start: 2022-09-07 | End: 2022-09-07

## 2022-09-07 RX ORDER — BACITRACIN ZINC AND POLYMYXIN B SULFATE 500; 1000 [USP'U]/G; [USP'U]/G
OINTMENT TOPICAL ONCE
Status: CANCELLED | OUTPATIENT
Start: 2022-09-07 | End: 2022-09-07

## 2022-09-07 RX ORDER — LIDOCAINE 40 MG/G
CREAM TOPICAL ONCE
Status: DISCONTINUED | OUTPATIENT
Start: 2022-09-07 | End: 2022-09-08 | Stop reason: HOSPADM

## 2022-09-07 RX ORDER — LIDOCAINE HYDROCHLORIDE 40 MG/ML
SOLUTION TOPICAL ONCE
Status: CANCELLED | OUTPATIENT
Start: 2022-09-07 | End: 2022-09-07

## 2022-09-07 RX ORDER — BACITRACIN ZINC AND POLYMYXIN B SULFATE 500; 1000 [USP'U]/G; [USP'U]/G
OINTMENT TOPICAL ONCE
Status: DISCONTINUED | OUTPATIENT
Start: 2022-09-07 | End: 2022-09-08 | Stop reason: HOSPADM

## 2022-09-07 RX ORDER — LIDOCAINE 50 MG/G
OINTMENT TOPICAL ONCE
Status: DISCONTINUED | OUTPATIENT
Start: 2022-09-07 | End: 2022-09-08 | Stop reason: HOSPADM

## 2022-09-07 RX ORDER — LIDOCAINE 50 MG/G
OINTMENT TOPICAL ONCE
Status: CANCELLED | OUTPATIENT
Start: 2022-09-07 | End: 2022-09-07

## 2022-09-07 NOTE — PLAN OF CARE
NPWT in place on left dorsal foot for 2 weeks d/t wife having COVID & wanting to decrease exposure to patient. Left dorsal foot wound showing improvement with graft remaining in place, wound debridement & stimulated with 27 gauge per MD. Remaining wounds stable, debridement per MD & pt. Tolerated well. Will cont. With current wound care regime with dressings & NPWT as ordered. Pt. To cont. Wearing boot for immobilization of foot/ankle to assist with wound healing. Patient also states he is continuing to work on smoking cessation & has cut further back since last visit. F/u in [facility] in 1 week as ordered, pt. Aware to call sooner with any changes or questions/concerns. Discharge instructions reviewed with patient & wife, all questions answered, copy given to patient. Dressings were applied to all wounds per M.D. Instructions at this visit.

## 2022-09-08 NOTE — DISCHARGE INSTRUCTIONS
215 [facility] Physician Orders and Discharge 800 [facility]  [address], [address]  [place], [facility]  Telephone: [phone]      Fax: [phone] home care company:   N/a . Your wound-care supplies will be provided by:  Patient     NAME:  Jermaine Oneil   YOB: 1955  PRIMARY DIAGNOSIS FOR WOUND CARE CENTER:  Dehisced surgical, Arterial & Diabetic ulcers  . Wound cleansing:  Do not scrub or use excessive force. Wash hands with soap and water before and after dressing changes. Prior to applying a clean dressing, cleanse wound with normal saline, wound cleanser, or mild soap and water. Ask your physician or nurse before getting the wound(s) wet in the shower. Wound care for home:     Left Dorsal Foot/ankle wound:  Drape under bridge  Adaptic over wound (at home)  Black foam over adaptic & BRIDGE TO LOWER LEG (DO NOT PLACE SUCKER PAD OVER WOUND)  Make sure there is drape under any black foam to protect skin  Cover black foam with drape  -125 mmHg intermittent pressure  Change dressing on Saturday or Sunday at home, [facility] will change on Wednesday        Left 2nd toe amp. Site, Right lateral ankle & foot wounds:  Triad mixed with polysporin to wound  Cover with dry dressing  Change dressing once daily           Attempt to wear the off-loading boots at night when in bed to help prevent further pressure on foot wounds. Please note, all wounds (unless stated otherwise here) were mechanically debrided at the time of cleansing here in the wound-care center today, so a small amount of pain, drainage or bleeding from that process might be expected, and is normal.     All products for home use, including multiple products for a single wound if applicable, are medically necessary in order to achieve the best chance at timely wound healing. See provider documentation for details if needed.      Substituted dressings applied in the [address] [place] [address],[address] today, if applicable:     N/a     New orders for this week (labs, imaging, medications, etc.):      Continue to work on decreasing smoking. Continue to wear the aircast boot on left foot to help with immobilization of left ankle at [address] If you develop a fever, chills, increased redness up ankle/leg, increased pain or overall not feeling well go to the emergency room. Additional instructions for specific diagnoses:     Use waffle cushion when you are sitting. Pt. Scheduled to f/u with Dr Jameson Cuba again 7/2022. Vascular intervention on left per Dr Mayra Velez 6/14/22, 7/8/22     General comments for arterial ulcers:  *  Moisturize your skin regularly with Vaseline, Aquaphor, Aveeno, CeraVe, Cetaphil, Eucerin, Lubriderm, etc; but keep the skin between your toes dry. *  Always allow your wound-care doctor or podiatrist to cut nails, calluses & corns. *  Be sure to always wear footwear that fits well, and NEVER go without shoes and socks. *  Be sure to adhere to any recommendations from your PCP or endocrinologist when it comes to high blood pressure, cholesterol, diet and exercise. If you have questions, please ask. *  If you smoke, your wound can not heal properly -- please talk with us when you're ready to quit. *  Do not soak your feet unless specifically instructed to do so by us. *  Make sure you stay well-hydrated, and maintain good protein intake. *  Walk as much as you can tolerate. Exercise is part of the treatment of peripheral arterial disease. *  If you arent taking an aspirin or other related medication (Plavix, etc), please ask your primary care physician or vascular surgeon if you should. *  Make sure you know when your vascular surgeon needs to follow-up with you, or needs to get any follow-up circulation tests.         F/U Appointment is with Dr. Etelvina Mcbride in 1 week, on                                   at                       .     Your nurse  is Krystal RN.     If we applied slip-resistant hospital socks today, be sure to remove them at least once a day to inspect your toes or feet, even if you're not changing the wraps or dressings underneath. If you see anything concerning (redness, excess moisture, etc), please call and let us know right away.      Should you experience any significant changes in your wound(s) (including redness, increased warmth, increased pain, increased drainage, odor, or fever) or have questions about your wound care, please contact the [address] [place] [address] at [phone] Monday-Thursday from 8:00 am - 4:30 pm, or Friday from 8:00 am - 2:30 pm.  If you need help with your wound outside these hours and cannot wait until we are again available, contact your home-care company (if applicable), your PCP, or go to the nearest emergency room

## 2022-09-14 ENCOUNTER — HOSPITAL ENCOUNTER (OUTPATIENT)
Dept: WOUND CARE | Age: 67
Discharge: HOME | End: 2022-09-14

## 2022-09-14 VITALS
BODY MASS INDEX: 22.26 KG/M2 | TEMPERATURE: 98.8 F | HEART RATE: 88 BPM | WEIGHT: 159 LBS | DIASTOLIC BLOOD PRESSURE: 63 MMHG | HEIGHT: 71 IN | SYSTOLIC BLOOD PRESSURE: 116 MMHG | RESPIRATION RATE: 20 BRPM

## 2022-09-14 DIAGNOSIS — E11.621 DIABETIC ULCER OF LEFT MIDFOOT ASSOCIATED WITH TYPE 2 DIABETES MELLITUS, WITH NECROSIS OF MUSCLE (HCC): ICD-10-CM

## 2022-09-14 DIAGNOSIS — L97.313 DIABETIC ULCER OF RIGHT ANKLE ASSOCIATED WITH TYPE 2 DIABETES MELLITUS, WITH NECROSIS OF MUSCLE (HCC): ICD-10-CM

## 2022-09-14 DIAGNOSIS — L97.414 DIABETIC ULCER OF RIGHT MIDFOOT ASSOCIATED WITH TYPE 2 DIABETES MELLITUS, WITH NECROSIS OF BONE (HCC): Primary | ICD-10-CM

## 2022-09-14 DIAGNOSIS — L97.524 DIABETIC ULCER OF OTHER PART OF LEFT FOOT ASSOCIATED WITH TYPE 2 DIABETES MELLITUS, WITH NECROSIS OF BONE (HCC): ICD-10-CM

## 2022-09-14 DIAGNOSIS — E11.621 DIABETIC ULCER OF OTHER PART OF LEFT FOOT ASSOCIATED WITH TYPE 2 DIABETES MELLITUS, WITH NECROSIS OF BONE (HCC): ICD-10-CM

## 2022-09-14 DIAGNOSIS — E11.621 DIABETIC ULCER OF RIGHT MIDFOOT ASSOCIATED WITH TYPE 2 DIABETES MELLITUS, WITH NECROSIS OF BONE (HCC): Primary | ICD-10-CM

## 2022-09-14 DIAGNOSIS — E11.622 DIABETIC ULCER OF RIGHT ANKLE ASSOCIATED WITH TYPE 2 DIABETES MELLITUS, WITH NECROSIS OF MUSCLE (HCC): ICD-10-CM

## 2022-09-14 DIAGNOSIS — L97.423 DIABETIC ULCER OF LEFT MIDFOOT ASSOCIATED WITH TYPE 2 DIABETES MELLITUS, WITH NECROSIS OF MUSCLE (HCC): ICD-10-CM

## 2022-09-14 PROCEDURE — 97605 NEG PRS WND THER DME<=50SQCM: CPT

## 2022-09-14 PROCEDURE — 97597 DBRDMT OPN WND 1ST 20 CM/<: CPT

## 2022-09-14 RX ORDER — LIDOCAINE 50 MG/G
OINTMENT TOPICAL ONCE
Status: CANCELLED | OUTPATIENT
Start: 2022-09-14 | End: 2022-09-14

## 2022-09-14 RX ORDER — BACITRACIN ZINC AND POLYMYXIN B SULFATE 500; 1000 [USP'U]/G; [USP'U]/G
OINTMENT TOPICAL ONCE
Status: CANCELLED | OUTPATIENT
Start: 2022-09-14 | End: 2022-09-14

## 2022-09-14 RX ORDER — LIDOCAINE 50 MG/G
OINTMENT TOPICAL ONCE
Status: DISCONTINUED | OUTPATIENT
Start: 2022-09-14 | End: 2022-09-15 | Stop reason: HOSPADM

## 2022-09-14 RX ORDER — LIDOCAINE HYDROCHLORIDE 40 MG/ML
SOLUTION TOPICAL ONCE
Status: DISCONTINUED | OUTPATIENT
Start: 2022-09-14 | End: 2022-09-15 | Stop reason: HOSPADM

## 2022-09-14 RX ORDER — LIDOCAINE 40 MG/G
CREAM TOPICAL ONCE
Status: DISCONTINUED | OUTPATIENT
Start: 2022-09-14 | End: 2022-09-15 | Stop reason: HOSPADM

## 2022-09-14 RX ORDER — LIDOCAINE HYDROCHLORIDE 40 MG/ML
SOLUTION TOPICAL ONCE
Status: CANCELLED | OUTPATIENT
Start: 2022-09-14 | End: 2022-09-14

## 2022-09-14 RX ORDER — LIDOCAINE 40 MG/G
CREAM TOPICAL ONCE
Status: CANCELLED | OUTPATIENT
Start: 2022-09-14 | End: 2022-09-14

## 2022-09-14 RX ORDER — BACITRACIN ZINC AND POLYMYXIN B SULFATE 500; 1000 [USP'U]/G; [USP'U]/G
OINTMENT TOPICAL ONCE
Status: DISCONTINUED | OUTPATIENT
Start: 2022-09-14 | End: 2022-09-15 | Stop reason: HOSPADM

## 2022-09-14 NOTE — PLAN OF CARE
Left dorsal foot wound showing improvement, graft remains in place, wound debridement & stimulated with 27 gauge needle per MD & pt. Tolerated well. Remaining wounds stable, debridement per MD. Will cont. With current wound care regime with dressings & NPWT as ordered. Pt. Is continuing to wear the boot at all times to help with immobilization of foot/ankle. Dr Joshua Donnelly again reinforced importance of continuing to work on smoking cessation & glucose control to also assist with wound healing, both pt. & wife verbalize understanding. Pt. States he saw his PCP yesterday & working on adjusting his diabetic meds. F/u in [facility] in 1 week as ordered, pt. Aware to call sooner with any changes or questions/concerns. Discharge instructions reviewed with patient, all questions answered, copy given to patient. Dressings were applied to all wounds per M.D. Instructions at this visit.

## 2022-09-15 NOTE — DISCHARGE INSTRUCTIONS
215 [facility] Physician Orders and Discharge [address], [address]  [place], [facility]  Telephone: [phone]      Fax: [phone] home care company:   N/a . Your wound-care supplies will be provided by:  Patient     NAME:  Soto Oro   YOB: 1955  PRIMARY DIAGNOSIS FOR WOUND CARE CENTER:  Dehisced surgical, Arterial & Diabetic ulcers  . Wound cleansing:  Do not scrub or use excessive force. Wash hands with soap and water before and after dressing changes. Prior to applying a clean dressing, cleanse wound with normal saline, wound cleanser, or mild soap and water. Ask your physician or nurse before getting the wound(s) wet in the shower. Wound care for home:     Left Dorsal Foot/ankle wound:  Triad/Polysporin to wound   Dry Dressing  Change dressing every other day     Left 2nd toe amp. Site  Polysporin  Dry Dressing   Change dressing every other day     Right lateral ankle & foot wounds:  Collagen tucked into wound and undermining area (as marked by Dr. Shilpa Tucker)  Polysporin on top of collagen (antibiotic ointment)  Cover with dry dressing  Change dressing every other day         Attempt to wear the off-loading boots at night when in bed to help prevent further pressure on foot wounds. Please note, all wounds (unless stated otherwise here) were mechanically debrided at the time of cleansing here in the wound-care center today, so a small amount of pain, drainage or bleeding from that process might be expected, and is normal.     All products for home use, including multiple products for a single wound if applicable, are medically necessary in order to achieve the best chance at timely wound healing. See provider documentation for details if needed.      Substituted dressings applied in the [facility] today, if applicable:     N/a     New orders for this week (labs, imaging, medications, etc.): Continue to work on decreasing smoking. You may discontinue using air cast boot      If you develop a fever, chills, increased redness up ankle/leg, increased pain or overall not feeling well go to the emergency room. Additional instructions for specific diagnoses:     Use waffle cushion when you are sitting. Pt. Scheduled to f/u with Dr Aliyah Sigala again 7/2022. Vascular intervention on left per Dr Prateek Zhao 6/14/22, 7/8/22     General comments for arterial ulcers:  *  Moisturize your skin regularly with Vaseline, Aquaphor, Aveeno, CeraVe, Cetaphil, Eucerin, Lubriderm, etc; but keep the skin between your toes dry. *  Always allow your wound-care doctor or podiatrist to cut nails, calluses & corns. *  Be sure to always wear footwear that fits well, and NEVER go without shoes and socks. *  Be sure to adhere to any recommendations from your PCP or endocrinologist when it comes to high blood pressure, cholesterol, diet and exercise. If you have questions, please ask. *  If you smoke, your wound can not heal properly -- please talk with us when you're ready to quit. *  Do not soak your feet unless specifically instructed to do so by us. *  Make sure you stay well-hydrated, and maintain good protein intake. *  Walk as much as you can tolerate. Exercise is part of the treatment of peripheral arterial disease. *  If you arent taking an aspirin or other related medication (Plavix, etc), please ask your primary care physician or vascular surgeon if you should. *  Make sure you know when your vascular surgeon needs to follow-up with you, or needs to get any follow-up circulation tests. F/U Appointment is with Dr. Joshua Donnelly in 1 week, on                                   at                       .     Your nurse  is HELIO Rice.      If we applied slip-resistant hospital socks today, be sure to remove them at least once a day to inspect your toes or feet, even if you're not changing the wraps or dressings underneath. If you see anything concerning (redness, excess moisture, etc), please call and let us know right away.      Should you experience any significant changes in your wound(s) (including redness, increased warmth, increased pain, increased drainage, odor, or fever) or have questions about your wound care, please contact the [employer] at [phone] Monday-Thursday from 8:00 am - 4:30 pm, or Friday from 8:00 am - 2:30 pm.  If you need help with your wound outside these hours and cannot wait until we are again available, contact your home-care company (if applicable), your PCP, or go to the nearest emergency room

## 2022-09-20 PROBLEM — E11.621 DIABETIC ULCER OF LEFT FOOT ASSOCIATED WITH TYPE 2 DIABETES MELLITUS, WITH NECROSIS OF BONE (HCC): Status: RESOLVED | Noted: 2022-03-05 | Resolved: 2022-09-20

## 2022-09-20 PROBLEM — L97.524 DIABETIC ULCER OF LEFT FOOT ASSOCIATED WITH TYPE 2 DIABETES MELLITUS, WITH NECROSIS OF BONE (HCC): Status: RESOLVED | Noted: 2022-03-05 | Resolved: 2022-09-20

## 2022-09-20 PROBLEM — T81.31XA DEHISCENCE OF SURGICAL WOUND, INITIAL ENCOUNTER: Status: ACTIVE | Noted: 2022-09-20

## 2022-09-20 NOTE — PROGRESS NOTES
Lev 30 Progress Note    Katelin Gamble     : 1955    DATE OF VISIT:  2022    Subjective:     Katelin Gamble is a 61 y.o. male who has a diabetic ulcer located on the right ankle and foot (bilateral). Significant symptoms or pertinent wound history since last visit: feeling pretty decent overall. Still smoking; uncertain of glucose control. His wife was ill with COVID last week, so they didn't make it in, and she didn't want to spend a lot of time in close contact with him, so she quickly changed the right foot dressings a few times, but didn't change the VAC on the left foot for a full two weeks, and thankfully he actually did quite well with that. No wound malodor, no real pain, minor swelling, no fever. Additional ulcer(s) noted? yes. Small dehiscence / eschar at the left 2nd toe amp site. His current medication list consists of amLODIPine, aspirin, atorvastatin, docusate sodium, insulin glargine, metFORMIN, oxyCODONE, and psyllium.     Allergies: Shrimp flavor    Objective:     Vitals:    22 1340   BP: 136/69   Pulse: 90   Resp: 18   Temp: 98.6 °F (37 °C)   TempSrc: Oral   Weight: 158 lb (71.7 kg)   Height: 5' 11\" (1.803 m)     Constitutional:  well-developed, well-nourished, NAD  Cardiovascular:  bilateral pedal pulses Dopplerable, feet warm, good cap refill, mild edema  Lymphatic:  no inguinal or popliteal adenopathy, no lymphangitis, and no cellulitis  Musculoskeletal:  no clubbing, cyanosis or petechiae; RLE and LLE with no gross effusions, joint misalignment or acute arthritis  Princess-ulcer skin:  mild hyperkeratosis and reactive erythema at the right foot and ankle; milder contact dermatitis at the left dorsal foot; just pink and indurated at the prior amp site  Ulcer(s):   --          Left dorsal foot with some very good progress toward healing with the 2 TheraSkins, no longer any obviously exposed tendon, better granulation, smaller overall, a small tunnel medial-distal, some fibrin and biofilm  --          Left lateral foot healed. --          Left 2nd toe amp site with the hemorrhagic crust / eschar loosening up, to reveal a small granular wound beneath  --          Right lateral ankle some pink-red tissue, biofilm, fibrin, but also some persistent undermining  --          Right lateral midfoot I think also a bit smaller, definitely granulating more, some fibrin and biofilm, still with a bit of palpable and presumably chronic osteomyelitic bone. Photos also saved in electronic chart.     Today's wound measurements, per RN documentation:  Wound 02/16/22 #3 Left Dorsal Foot, Arterial, Full Thickness, Onset Nov 2021-Wound Length (cm): 2.4 cm  Wound 02/16/22 #1 Right Lateral Ankle, Arterial, Full Thickness, onset -Wound Length (cm): 0.7 cm  Wound 02/16/22 #2 Right Lateral Foot, Arterial, Full Thickness Onset Nov 2021-Wound Length (cm): 1.2 cm    Wound 02/16/22 #3 Left Dorsal Foot, Arterial, Full Thickness, Onset Nov 2021-Wound Width (cm): 3.5 cm  Wound 02/16/22 #1 Right Lateral Ankle, Arterial, Full Thickness, onset -Wound Width (cm): 0.8 cm  Wound 02/16/22 #2 Right Lateral Foot, Arterial, Full Thickness Onset Nov 2021-Wound Width (cm): 0.3 cm    Wound 02/16/22 #3 Left Dorsal Foot, Arterial, Full Thickness, Onset Nov 2021-Wound Depth (cm): 0.4 cm  Wound 02/16/22 #1 Right Lateral Ankle, Arterial, Full Thickness, onset -Wound Depth (cm): 0.1 cm  Wound 02/16/22 #2 Right Lateral Foot, Arterial, Full Thickness Onset Nov 2021-Wound Depth (cm): 0.4 cm    Assessment:     Patient Active Problem List   Diagnosis Code    Uncontrolled type 2 diabetes mellitus with diabetic polyneuropathy, without long-term current use of insulin ([facility]) E11.42, E11.65    Diabetic ulcer of left midfoot associated with type 2 diabetes mellitus, with necrosis of muscle ([facility]) E11.621, L97.423    Diabetic ulcer of right ankle associated with type 2 diabetes mellitus, with necrosis of muscle ([facility] E11.622, L97.313    Current every day smoker F17.200    Hyperlipidemia E78.5    Diabetic ulcer of right midfoot associated with type 2 diabetes mellitus, with necrosis of bone ([facility]) E11.621, L97.414    Atherosclerosis of native arteries of left leg with ulceration of foot ([facility] I70.245    Atherosclerosis of native arteries of right leg with ulceration of foot ([facility] I70.235    Dehiscence of surgical wound at left 2nd toe amp site T81.31XA       Assessment of today's active condition(s): uncontrolled DM2, neuropathy, PAD, Hx of BL LE revascularization, multiple deep diabetic ulcers; the only one with concerns for possible ongoing infection is the right lateral foot; left foot should do very well; right ankle undermining might need to be dealt with surgically, but is doing ok for now. Factors contributing to occurrence and/or persistence of the chronic ulcer include diabetes, poor glucose control, shear force, smoking, arterial insufficiency, and decreased tissue oxygenation. Medical necessity of today's visit is shown by the above documentation. Sharp debridement is indicated today, based upon the exam findings in the wound(s) above. Procedure note:     Consent obtained. Time out performed per [facility] policy. Anesthetic  Anesthetic: 4% Lidocaine Cream     Using a curette and scissors, I sharply debrided the foot (bilateral) ulcer(s) down through and including the removal of dermis. The type(s) of tissue debrided included fibrin, biofilm, and necrotic/eschar. Total Surface Area Debrided: 6 sq cm. Using a curette, I sharply debrided the right ankle ulcer(s) down through and including the removal of subcutaneous tissue. The type(s) of tissue debrided included biofilm and necrotic/eschar. Total Surface Area Debrided: 1 sq cm. The ulcers were then irrigated with normal saline solution. The procedure was completed with a small amount of bleeding, and hemostasis was with pressure.  The patient tolerated the procedure well, with no significant complications. The patient's level of pain during and after the procedure was monitored. Post-debridement measurements, if different from pre-debridement, are in the flowsheet as well. Discharge plan:     Treatment in the wound care center today, per RN documentation: Wound 09/07/22 #13 Left 2nd toe amp. site, Dehisced surgical, full thickness, onset 7/6/22-Dressing/Treatment: Other (comment) (Triad PSO dry dressing)  Wound 02/16/22 #3 Left Dorsal Foot, Arterial, Full Thickness, Onset Nov 2021-Dressing/Treatment: Other (comment) (adaptic NPWT)  Wound 02/16/22 #1 Right Lateral Ankle, Arterial, Full Thickness, onset -Dressing/Treatment: Other (comment) (triad PSO 4x4's kerlix)  Wound 02/16/22 #2 Right Lateral Foot, Arterial, Full Thickness Onset Nov 2021-Dressing/Treatment: Other (comment) (triad PSO 4x4's kerlix). As well as he did for the past two weeks, I think they can leave the [facility] in place for the next week. Crucial that he work harder and more quickly on glucose control and smoking cessation. There's still the very real chance of additional spreading infection, loss of limb. Holding off on surgery for the right foot as long as we can, since things are doing well there, and I think he'd have a better post-op course with less smoking and better glucoses. Home treatment: good handwashing before and after any dressing changes. Cleanse wound with saline or soap & water before dressing change. May use Vaseline (petrolatum), Aquaphor, Aveeno, CeraVe, Cetaphil, Eucerin, Lubriderm, etc for dry skin. Dressing type for home:  as above for the three wounds without the VAC , every day or two, as needed. Written discharge instructions given to patient. Follow up in 1 week.     Electronically signed by Abi Perdue MD on 9/20/2022 at 7:06 AM.

## 2022-09-20 NOTE — PROGRESS NOTES
Lev 30 Progress Note    Katelin Gamble     : 1955    DATE OF VISIT:  2022    Subjective:     Katelin Gamble is a 61 y.o. male who has a diabetic ulcer located on the right ankle and foot (bilateral). Significant symptoms or pertinent wound history since last visit: feeling well overall, no foot pain, no fever, no change in drainage, no redness. Says he's cutting back on cigarettes more; apparently plans for new medication for DM once he gets Medicare (and Rx coverage) in Dec.     Additional ulcer(s) noted? no.  Still the 4 from last week. His current medication list consists of amLODIPine, aspirin, atorvastatin, docusate sodium, insulin glargine, metFORMIN, oxyCODONE, and psyllium. Allergies: Shrimp flavor    Objective:     Vitals:    22 1351   BP: 116/63   Pulse: 88   Resp: 20   Temp: 98.8 °F (37.1 °C)   TempSrc: Oral   Weight: 159 lb (72.1 kg)  Comment: yesterday at PCP   Height: 5' 11\" (1.803 m)     Constitutional:  well-developed, well-nourished, NAD  Cardiovascular:  bilateral pedal pulses Dopplerable, feet warm, good cap refill, mild edema  Lymphatic:  no inguinal or popliteal adenopathy, no lymphangitis, and no cellulitis  Musculoskeletal:  no clubbing, cyanosis or petechiae; RLE and LLE with no gross effusions, joint misalignment or acute arthritis  Princess-ulcer skin:  mild hyperkeratosis and reactive erythema at the right foot and ankle; milder contact dermatitis at the left dorsal foot; just pink and indurated at the prior amp site  Ulcer(s):   --          Left dorsal foot with some very good progress toward healing with the 2 TheraSkins, no longer any exposed tendon, better granulation, smaller overall, a small tunnel medial-distal, some fibrin and biofilm  --          Left lateral foot remains healed.   --          Left 2nd toe amp site with a tiny granular wound now, a bit of fibrin and biofilm  --          Right lateral ankle some pink-red tissue, biofilm, fibrin, but also some persistent undermining (improving in at least 1 direction)  --          Right lateral midfoot I think also a bit smaller, definitely granulating more, still with a bit of palpable and presumably chronic osteomyelitic bone, but not that I can even visualize, a small degree of undermining there too. Photos also saved in electronic chart.     Today's wound measurements, per RN documentation:  Wound 02/16/22 #1 Right Lateral Ankle, Arterial, Full Thickness, onset -Wound Length (cm): 0.6 cm  Wound 09/07/22 #13 Left 2nd toe amp. site, Dehisced surgical, full thickness, onset 7/6/22-Wound Length (cm): 0.1 cm  Wound 02/16/22 #2 Right Lateral Foot, Arterial, Full Thickness Onset Nov 2021-Wound Length (cm): 0.9 cm  Wound 02/16/22 #3 Left Dorsal Foot, Arterial, Full Thickness, Onset Nov 2021-Wound Length (cm): 0.9 cm    Wound 02/16/22 #1 Right Lateral Ankle, Arterial, Full Thickness, onset -Wound Width (cm): 0.6 cm  Wound 09/07/22 #13 Left 2nd toe amp. site, Dehisced surgical, full thickness, onset 7/6/22-Wound Width (cm): 0.1 cm  Wound 02/16/22 #2 Right Lateral Foot, Arterial, Full Thickness Onset Nov 2021-Wound Width (cm): 0.3 cm  Wound 02/16/22 #3 Left Dorsal Foot, Arterial, Full Thickness, Onset Nov 2021-Wound Width (cm): 2 cm    Wound 02/16/22 #1 Right Lateral Ankle, Arterial, Full Thickness, onset -Wound Depth (cm): 0.3 cm  Wound 09/07/22 #13 Left 2nd toe amp. site, Dehisced surgical, full thickness, onset 7/6/22-Wound Depth (cm): 0.1 cm  Wound 02/16/22 #2 Right Lateral Foot, Arterial, Full Thickness Onset Nov 2021-Wound Depth (cm): 0.3 cm  Wound 02/16/22 #3 Left Dorsal Foot, Arterial, Full Thickness, Onset Nov 2021-Wound Depth (cm): 0.1 cm    Assessment:     Patient Active Problem List   Diagnosis Code    Uncontrolled type 2 diabetes mellitus with diabetic polyneuropathy, without long-term current use of insulin ([facility]) E11.42, E11.65    Diabetic ulcer of left midfoot associated with type 2 diabetes mellitus, with necrosis of muscle ([facility]) E11.621, L97.423    Diabetic ulcer of right ankle associated with type 2 diabetes mellitus, with necrosis of muscle ([facility]) E11.622, L97.313    Current every day smoker F17.200    Hyperlipidemia E78.5    Diabetic ulcer of right midfoot associated with type 2 diabetes mellitus, with necrosis of bone ([facility]) E11.621, L97.414    Atherosclerosis of native arteries of left leg with ulceration of foot ([facility] I70.245    Atherosclerosis of native arteries of right leg with ulceration of foot ([facility] I70.235    Dehiscence of surgical wound at left 2nd toe amp site T81.31XA       Assessment of today's active condition(s): uncontrolled Dm2, neuropathy, PAD, Hx BL LE revascularization; overall healing diabetic ulcers, with the right foot still with some concern for focal chronic osteo, and the right ankle perhaps needing a surgical edge excision to facilitate healing; left foot doing very well. Factors contributing to occurrence and/or persistence of the chronic ulcer include diabetes, poor glucose control, smoking, arterial insufficiency, and decreased tissue oxygenation. Medical necessity of today's visit is shown by the above documentation. Sharp debridement is indicated today, based upon the exam findings in the wound(s) above. Procedure note:     Consent obtained. Time out performed per [facility] policy. Anesthetic  Anesthetic: 4% Lidocaine Cream     Using a curette and #15 blade scalpel, I sharply debrided the right ankle and foot (left ) ulcer(s) down through and including the removal of dermis. The type(s) of tissue debrided included fibrin, biofilm, and necrotic/eschar. Total Surface Area Debrided: 5 sq cm. The ulcers were then irrigated with normal saline solution. The procedure was completed with a small amount of bleeding, and hemostasis was with pressure. The patient tolerated the procedure well, with no significant complications.  The patient's level of pain during and after the procedure was monitored. Post-debridement measurements, if different from pre-debridement, are in the flowsheet as well. Discharge plan:     Treatment in the wound care center today, per RN documentation: Wound 02/16/22 #1 Right Lateral Ankle, Arterial, Full Thickness, onset -Dressing/Treatment: Other (comment) (Triad PSO 4x4's kerlix)  Wound 09/07/22 #13 Left 2nd toe amp. site, Dehisced surgical, full thickness, onset 7/6/22-Dressing/Treatment: Other (comment) (triad PSO 2x2 tape)  Wound 02/16/22 #2 Right Lateral Foot, Arterial, Full Thickness Onset Nov 2021-Dressing/Treatment: Other (comment) (Triad PSO 4x4's kerlix)  Wound 02/16/22 #3 Left Dorsal Foot, Arterial, Full Thickness, Onset Nov 2021-Dressing/Treatment: Other (comment) (adaptic NPWT). Leave the [facility] in place for the week. Might be stopping that next week. Keep up good work with offloading; left ankle immobilizer boot until that small tunnel is resolved. Keep working on glucoses and smoking. Home treatment: good handwashing before and after any dressing changes. Cleanse wound with saline or soap & water before dressing change. May use Vaseline (petrolatum), Aquaphor, Aveeno, CeraVe, Cetaphil, Eucerin, Lubriderm, etc for dry skin. Dressing type for home:  as above for the three non-VAC wounds , every day or two, as needed. Written discharge instructions given to patient. Follow up in 1 week.     Electronically signed by Shirley Shepherd MD on 9/20/2022 at 7:13 AM.

## 2022-09-21 ENCOUNTER — HOSPITAL ENCOUNTER (OUTPATIENT)
Dept: WOUND CARE | Age: 67
Discharge: HOME | End: 2022-09-21

## 2022-09-21 VITALS
RESPIRATION RATE: 20 BRPM | HEIGHT: 71 IN | HEART RATE: 86 BPM | TEMPERATURE: 98.1 F | SYSTOLIC BLOOD PRESSURE: 136 MMHG | DIASTOLIC BLOOD PRESSURE: 65 MMHG | BODY MASS INDEX: 22.18 KG/M2

## 2022-09-21 DIAGNOSIS — T81.31XA DEHISCENCE OF SURGICAL WOUND, INITIAL ENCOUNTER: Primary | ICD-10-CM

## 2022-09-21 DIAGNOSIS — E11.621 DIABETIC ULCER OF LEFT MIDFOOT ASSOCIATED WITH TYPE 2 DIABETES MELLITUS, WITH NECROSIS OF MUSCLE (HCC): ICD-10-CM

## 2022-09-21 DIAGNOSIS — L97.423 DIABETIC ULCER OF LEFT MIDFOOT ASSOCIATED WITH TYPE 2 DIABETES MELLITUS, WITH NECROSIS OF MUSCLE (HCC): ICD-10-CM

## 2022-09-21 DIAGNOSIS — L97.414 DIABETIC ULCER OF RIGHT MIDFOOT ASSOCIATED WITH TYPE 2 DIABETES MELLITUS, WITH NECROSIS OF BONE (HCC): ICD-10-CM

## 2022-09-21 DIAGNOSIS — E11.622 DIABETIC ULCER OF RIGHT ANKLE ASSOCIATED WITH TYPE 2 DIABETES MELLITUS, WITH NECROSIS OF MUSCLE (HCC): ICD-10-CM

## 2022-09-21 DIAGNOSIS — E11.621 DIABETIC ULCER OF RIGHT MIDFOOT ASSOCIATED WITH TYPE 2 DIABETES MELLITUS, WITH NECROSIS OF BONE (HCC): ICD-10-CM

## 2022-09-21 DIAGNOSIS — L97.313 DIABETIC ULCER OF RIGHT ANKLE ASSOCIATED WITH TYPE 2 DIABETES MELLITUS, WITH NECROSIS OF MUSCLE (HCC): ICD-10-CM

## 2022-09-21 PROCEDURE — 97597 DBRDMT OPN WND 1ST 20 CM/<: CPT

## 2022-09-21 PROCEDURE — 11043 DBRDMT MUSC&/FSCA 1ST 20/<: CPT

## 2022-09-21 RX ORDER — LIDOCAINE 40 MG/G
CREAM TOPICAL ONCE
Status: CANCELLED | OUTPATIENT
Start: 2022-09-21 | End: 2022-09-21

## 2022-09-21 RX ORDER — LIDOCAINE HYDROCHLORIDE 40 MG/ML
SOLUTION TOPICAL ONCE
Status: CANCELLED | OUTPATIENT
Start: 2022-09-21 | End: 2022-09-21

## 2022-09-21 RX ORDER — LIDOCAINE 50 MG/G
OINTMENT TOPICAL ONCE
Status: CANCELLED | OUTPATIENT
Start: 2022-09-21 | End: 2022-09-21

## 2022-09-21 RX ORDER — BACITRACIN ZINC AND POLYMYXIN B SULFATE 500; 1000 [USP'U]/G; [USP'U]/G
OINTMENT TOPICAL ONCE
Status: CANCELLED | OUTPATIENT
Start: 2022-09-21 | End: 2022-09-21

## 2022-09-21 RX ORDER — LIDOCAINE 40 MG/G
CREAM TOPICAL ONCE
Status: DISCONTINUED | OUTPATIENT
Start: 2022-09-21 | End: 2022-09-22 | Stop reason: HOSPADM

## 2022-09-21 ASSESSMENT — PAIN SCALES - GENERAL: PAINLEVEL_OUTOF10: 0

## 2022-09-21 NOTE — PLAN OF CARE
Patient seen in [address] [facility] today. Wounds are showing signs of improvement. Wound debridement per Dr. Rahel Florence. Vac discontinued today. Air cast boot discontinued as well. Possible surgery for right foot wound discussed today. Will wait to schedule anything at this time since wound continues to show signs of healing. F/u in [address] [facility] in 1 week as ordered, pt. Aware to call sooner with any changes or questions/concerns. Discharge instructions reviewed with patient, all questions answered, copy given to patient.  Dressings were applied to all wounds per MHECTOR

## 2022-09-22 NOTE — DISCHARGE INSTRUCTIONS
[address] [facility]charge [address] [facility], [address]  [place], [facility]  Telephone: [phone]      Fax: [phone] home care company:   N/a . Your wound-care supplies will be provided by:  Patient     NAME:  Jackie Carrasco   YOB: 1955  PRIMARY DIAGNOSIS FOR WOUND CARE CENTER:  Dehisced surgical, Arterial & Diabetic ulcers  . Wound cleansing:  Do not scrub or use excessive force. Wash hands with soap and water before and after dressing changes. Prior to applying a clean dressing, cleanse wound with normal saline, wound cleanser, or mild soap and water. Ask your physician or nurse before getting the wound(s) wet in the shower. Wound care for home:     Right lateral ankle and lateral foot, Left Dorsal foot wounds:  Collagen tucked into wound aPolysporin on top of collagen (antibiotic ointment)  Cover with dry dressing  Change dressing every other day         Attempt to wear the off-loading boots at night when in bed to help prevent further pressure on foot wounds. Please note, all wounds (unless stated otherwise here) were mechanically debrided at the time of cleansing here in the wound-care center today, so a small amount of pain, drainage or bleeding from that process might be expected, and is normal.     All products for home use, including multiple products for a single wound if applicable, are medically necessary in order to achieve the best chance at timely wound healing. See provider documentation for details if needed. Substituted dressings applied in the [address] [place] [address],[address] today, if applicable:     N/a     New orders for this week (labs, imaging, medications, etc.):      KEEP UP THE GOOD WORK WITH YOUR SMOKING CESSATION!!!       Additional instructions for specific diagnoses:     Use waffle cushion when you are sitting.      Pt. Scheduled to f/u with Dr Jovany Cavanaugh again 7/2022. Vascular intervention on left per Dr Felicity Isabel 6/14/22, 7/8/22     General comments for arterial ulcers:  *  Moisturize your skin regularly with Vaseline, Aquaphor, Aveeno, CeraVe, Cetaphil, Eucerin, Lubriderm, etc; but keep the skin between your toes dry. *  Always allow your wound-care doctor or podiatrist to cut nails, calluses & corns. *  Be sure to always wear footwear that fits well, and NEVER go without shoes and socks. *  Be sure to adhere to any recommendations from your PCP or endocrinologist when it comes to high blood pressure, cholesterol, diet and exercise. If you have questions, please ask. *  If you smoke, your wound can not heal properly -- please talk with us when you're ready to quit. *  Do not soak your feet unless specifically instructed to do so by us. *  Make sure you stay well-hydrated, and maintain good protein intake. *  Walk as much as you can tolerate. Exercise is part of the treatment of peripheral arterial disease. *  If you arent taking an aspirin or other related medication (Plavix, etc), please ask your primary care physician or vascular surgeon if you should. *  Make sure you know when your vascular surgeon needs to follow-up with you, or needs to get any follow-up circulation tests. F/U Appointment is with Dr. Dayron Bob in 1 week, on                                   at                       .     Your nurse  is HELIO Rice. If we applied slip-resistant hospital socks today, be sure to remove them at least once a day to inspect your toes or feet, even if you're not changing the wraps or dressings underneath. If you see anything concerning (redness, excess moisture, etc), please call and let us know right away.      Should you experience any significant changes in your wound(s) (including redness, increased warmth, increased pain, increased drainage, odor, or fever) or have questions about your wound care, please contact the [facility] at [phone] Monday-Thursday from 8:00 am - 4:30 pm, or Friday from 8:00 am - 2:30 pm.  If you need help with your wound outside these hours and cannot wait until we are again available, contact your home-care company (if applicable), your PCP, or go to the nearest emergency room

## 2022-09-22 NOTE — PROGRESS NOTES
Writer called & spoke with Julia at [facility] to notify VAC discontinued per Dr Jovany Early on 9/21/22.

## 2022-09-28 ENCOUNTER — HOSPITAL ENCOUNTER (OUTPATIENT)
Dept: WOUND CARE | Age: 67
Discharge: HOME | End: 2022-09-28

## 2022-09-28 VITALS
SYSTOLIC BLOOD PRESSURE: 163 MMHG | RESPIRATION RATE: 20 BRPM | WEIGHT: 158.38 LBS | TEMPERATURE: 98.2 F | HEART RATE: 88 BPM | HEIGHT: 71 IN | DIASTOLIC BLOOD PRESSURE: 77 MMHG | BODY MASS INDEX: 22.17 KG/M2

## 2022-09-28 DIAGNOSIS — T81.31XA DEHISCENCE OF SURGICAL WOUND, INITIAL ENCOUNTER: Primary | ICD-10-CM

## 2022-09-28 DIAGNOSIS — E11.621 DIABETIC ULCER OF LEFT MIDFOOT ASSOCIATED WITH TYPE 2 DIABETES MELLITUS, WITH NECROSIS OF MUSCLE (HCC): ICD-10-CM

## 2022-09-28 DIAGNOSIS — E11.622 DIABETIC ULCER OF RIGHT ANKLE ASSOCIATED WITH TYPE 2 DIABETES MELLITUS, WITH NECROSIS OF MUSCLE (HCC): ICD-10-CM

## 2022-09-28 DIAGNOSIS — E11.621 DIABETIC ULCER OF RIGHT MIDFOOT ASSOCIATED WITH TYPE 2 DIABETES MELLITUS, WITH NECROSIS OF BONE (HCC): ICD-10-CM

## 2022-09-28 DIAGNOSIS — L97.414 DIABETIC ULCER OF RIGHT MIDFOOT ASSOCIATED WITH TYPE 2 DIABETES MELLITUS, WITH NECROSIS OF BONE (HCC): ICD-10-CM

## 2022-09-28 DIAGNOSIS — L97.423 DIABETIC ULCER OF LEFT MIDFOOT ASSOCIATED WITH TYPE 2 DIABETES MELLITUS, WITH NECROSIS OF MUSCLE (HCC): ICD-10-CM

## 2022-09-28 DIAGNOSIS — L97.313 DIABETIC ULCER OF RIGHT ANKLE ASSOCIATED WITH TYPE 2 DIABETES MELLITUS, WITH NECROSIS OF MUSCLE (HCC): ICD-10-CM

## 2022-09-28 PROCEDURE — 97597 DBRDMT OPN WND 1ST 20 CM/<: CPT

## 2022-09-28 RX ORDER — LIDOCAINE 50 MG/G
OINTMENT TOPICAL ONCE
Status: CANCELLED | OUTPATIENT
Start: 2022-09-28 | End: 2022-09-28

## 2022-09-28 RX ORDER — LIDOCAINE HYDROCHLORIDE 40 MG/ML
SOLUTION TOPICAL ONCE
Status: CANCELLED | OUTPATIENT
Start: 2022-09-28 | End: 2022-09-28

## 2022-09-28 RX ORDER — LIDOCAINE 40 MG/G
CREAM TOPICAL ONCE
Status: DISCONTINUED | OUTPATIENT
Start: 2022-09-28 | End: 2022-09-29 | Stop reason: HOSPADM

## 2022-09-28 RX ORDER — BACITRACIN ZINC AND POLYMYXIN B SULFATE 500; 1000 [USP'U]/G; [USP'U]/G
OINTMENT TOPICAL ONCE
Status: CANCELLED | OUTPATIENT
Start: 2022-09-28 | End: 2022-09-28

## 2022-09-28 RX ORDER — LIDOCAINE 40 MG/G
CREAM TOPICAL ONCE
Status: CANCELLED | OUTPATIENT
Start: 2022-09-28 | End: 2022-09-28

## 2022-09-28 NOTE — PLAN OF CARE
Wounds debrided and noted improvement again this week in wounds. Both patient and his wife are both on board with smoking cessation. MD praised them for this and reminded them the overall health benefits of being an ex smoker. Discharge instructions reviewed with patient, all questions answered, copy given to patient. Dressings were applied to all wounds per M.D. Instructions at this visit.

## 2022-10-04 NOTE — PROGRESS NOTES
88 [facility] Progress Note    Wesley Umana     : 1955    DATE OF VISIT:  2022    Subjective:     Wesley Umana is a 61 y.o. male who has a diabetic ulcer located on the right ankle and foot (bilateral). Significant symptoms or pertinent wound history since last visit: feeling well overall, no real foot pain, mild swelling, modest drainage, no fever. Still smoking, but tells me that he's slowly cutting back again. Additional ulcer(s) noted? no.  Still the left dorsal foot, left 2nd toe amp site, right ankle, right lateral midfoot; left lateral foot has appeared more durably healed for a few weeks now. His current medication list consists of amLODIPine, aspirin, atorvastatin, insulin glargine, and metFORMIN. Allergies: Shrimp flavor    Objective:     Vitals:    22 1342   BP: 136/65   Pulse: 86   Resp: 20   Temp: 98.1 °F (36.7 °C)   TempSrc: Oral   Weight: Comment: no weight obtained today   Height: 5' 11\" (1.803 m)     Constitutional:  well-developed, well-nourished, NAD  Cardiovascular:  bilateral pedal pulses Dopplerable, feet warm, good cap refill, mild edema  Lymphatic:  no inguinal or popliteal adenopathy, no lymphangitis, and no cellulitis  Musculoskeletal:  no clubbing, cyanosis or petechiae; RLE and LLE with no gross effusions, joint misalignment or acute arthritis  Princess-ulcer skin:  mild hyperkeratosis and reactive erythema at the right foot and ankle; resolved contact dermatitis at the left dorsal foot; just pink and indurated at the prior amp site  Ulcer(s):   --          Left dorsal foot with some very good progress toward healing with the 2 TheraSkins, better granulation, smaller overall, a small tunnel medial-distal, some fibrin and biofilm, and a couple of tiny foci of residual tendon necrosis that I thought were covered last week  --          Left lateral foot remains healed.   --          Left 2nd toe amp site with a tiny granular wound now, clean  --          Right lateral ankle some pink-red tissue, biofilm, fibrin, but also some persistent undermining (improving in at least 1 direction)  --          Right lateral midfoot I think also a bit smaller, definitely granulating more, still with a bit of palpable and presumably chronic osteomyelitic bone, but not that I can even visualize, a small degree of undermining there too. Photos also saved in electronic chart.     Today's wound measurements, per RN documentation:  Wound 02/16/22 #3 Left Dorsal Foot, Arterial, Full Thickness, Onset Nov 2021-Wound Length (cm): 1 cm  [REMOVED] Wound 09/07/22 #13 Left 2nd toe amp. site, Dehisced surgical, full thickness, onset 7/6/22-Wound Length (cm): 0.2 cm  Wound 02/16/22 #1 Right Lateral Ankle, Arterial, Full Thickness, onset -Wound Length (cm): 0.8 cm  Wound 02/16/22 #2 Right Lateral Foot, Arterial, Full Thickness Onset Nov 2021-Wound Length (cm): 1.2 cm    Wound 02/16/22 #3 Left Dorsal Foot, Arterial, Full Thickness, Onset Nov 2021-Wound Width (cm): 1.5 cm  [REMOVED] Wound 09/07/22 #13 Left 2nd toe amp. site, Dehisced surgical, full thickness, onset 7/6/22-Wound Width (cm): 0.2 cm  Wound 02/16/22 #1 Right Lateral Ankle, Arterial, Full Thickness, onset -Wound Width (cm): 0.4 cm  Wound 02/16/22 #2 Right Lateral Foot, Arterial, Full Thickness Onset Nov 2021-Wound Width (cm): 0.2 cm    Wound 02/16/22 #3 Left Dorsal Foot, Arterial, Full Thickness, Onset Nov 2021-Wound Depth (cm): 0.3 cm  [REMOVED] Wound 09/07/22 #13 Left 2nd toe amp. site, Dehisced surgical, full thickness, onset 7/6/22-Wound Depth (cm): 0.1 cm  Wound 02/16/22 #1 Right Lateral Ankle, Arterial, Full Thickness, onset -Wound Depth (cm): 0.3 cm  Wound 02/16/22 #2 Right Lateral Foot, Arterial, Full Thickness Onset Nov 2021-Wound Depth (cm): 0.4 cm    Assessment:     Patient Active Problem List   Diagnosis Code    Uncontrolled type 2 diabetes mellitus with diabetic polyneuropathy, without long-term current use of insulin E11.42, E11.65    Diabetic ulcer of left midfoot associated with type 2 diabetes mellitus, with necrosis of muscle (HCC) E11.621, L97.423    Diabetic ulcer of right ankle associated with type 2 diabetes mellitus, with necrosis of muscle (HCC) E11.622, L97.313    Current every day smoker F17.200    Hyperlipidemia E78.5    Diabetic ulcer of right midfoot associated with type 2 diabetes mellitus, with necrosis of bone (HCC) E11.621, L97.414    Atherosclerosis of native arteries of left leg with ulceration of foot ([address] I70.245    Atherosclerosis of native arteries of right leg with ulceration of foot ([address] I70.235    Dehiscence of surgical wound at left 2nd toe amp site T81.31XA       Assessment of today's active condition(s): uncontrolled DM2, neuropathy, PAD, prior BL LE revascularization, generally slowly healing BL LE ulcers, with only the right lateral foot seeming likely to need surgery at some point (perhaps the right ankle, if that undermining doesn't improve); left side doing quite well, no signs of infection or recurrent ischemia at this point. Factors contributing to occurrence and/or persistence of the chronic ulcer include edema, diabetes, poor glucose control, chronic pressure, shear force, smoking, arterial insufficiency, and decreased tissue oxygenation. Medical necessity of today's visit is shown by the above documentation. Sharp debridement is indicated today, based upon the exam findings in the wound(s) above. Procedure note:     Consent obtained. Time out performed per [facility] policy. Anesthetic  Anesthetic: 4% Lidocaine Cream     Using a curette and #15 blade scalpel, I sharply debrided the right ankle and foot (right) ulcer(s) down through and including the removal of dermis. The type(s) of tissue debrided included fibrin, biofilm, and necrotic/eschar. Total Surface Area Debrided: 1 sq cm.     Using a curette, #15 blade scalpel, and forceps, I sharply debrided the foot (left , dorsal) ulcer(s) down through and including the removal of muscle/fascia. The type(s) of tissue debrided included fibrin, biofilm, and necrotic/eschar. Total Surface Area Debrided: 2 sq cm. The ulcers were then irrigated with normal saline solution. The procedure was completed with a small amount of bleeding, and hemostasis was with pressure and with silver nitrate stick(s). The patient tolerated the procedure well, with no significant complications. The patient's level of pain during and after the procedure was monitored. Post-debridement measurements, if different from pre-debridement, are in the flowsheet as well. Discharge plan:     Treatment in the wound care center today, per RN documentation: Wound 02/16/22 #3 Left Dorsal Foot, Arterial, Full Thickness, Onset Nov 2021-Dressing/Treatment: Other (comment) (PSO triad 4x4's kerlix)  [REMOVED] Wound 09/07/22 #13 Left 2nd toe amp. site, Dehisced surgical, full thickness, onset 7/6/22-Dressing/Treatment: Other (comment) (PSO 2x2's kerlix)  Wound 02/16/22 #1 Right Lateral Ankle, Arterial, Full Thickness, onset -Dressing/Treatment: Other (comment) (Collagen PSO 4x4's kerlix)  Wound 02/16/22 #2 Right Lateral Foot, Arterial, Full Thickness Onset Nov 2021-Dressing/Treatment: Other (comment) (Collagen PSO 4x4's kerlix). Crucial that he continue to work on diet, glucose control, smoking cessation. Holding off on surgery referral for that right foot for now, unless it starts to show signs of deterioration in any way. No further plans for [facility] -- many thanks to [facility] for their assistance there; it definitely helped that left dorsal foot wound to improve. Home treatment: good handwashing before and after any dressing changes. Cleanse wound with saline or soap & water before dressing change. May use Vaseline (petrolatum), Aquaphor, Aveeno, CeraVe, Cetaphil, Eucerin, Lubriderm, etc for dry skin. Dressing type for home: as above, every day or two, as needed. Written discharge instructions given to patient. Follow up in 1 week.     Electronically signed by Masood Pedersen MD on 10/4/2022 at 3:38 PM.

## 2022-10-11 NOTE — PROGRESS NOTES
88 [facility] Progress Note    Harshal Munoz     : 1955    DATE OF VISIT:  2022    Subjective:     Harshal Munoz is a 61 y.o. male who has a diabetic ulcer located on the right, lateral ankle and foot (bilateral). Significant symptoms or pertinent wound history since last visit: feeling well overall, little pain, no swelling, no pruritus, modest drainage, no fever. Additional ulcer(s) noted? no. That 2nd toe amp site necrosis / dehiscence is healed. His current medication list consists of amLODIPine, aspirin, atorvastatin, insulin glargine, and metFORMIN. Allergies: Shrimp flavor    Objective:     Vitals:    22 1340   BP: (!) 163/77   Pulse: 88   Resp: 20   Temp: 98.2 °F (36.8 °C)   TempSrc: Oral   Weight: 158 lb 6 oz (71.8 kg)   Height: 5' 11\" (1.803 m)     Constitutional:  well-developed, well-nourished, NAD  Cardiovascular:  bilateral pedal pulses Dopplerable, feet warm, good cap refill, mild edema  Lymphatic:  no inguinal or popliteal adenopathy, no lymphangitis, and no cellulitis  Musculoskeletal:  no clubbing, cyanosis or petechiae; RLE and LLE with no gross effusions, joint misalignment or acute arthritis  Princess-ulcer skin:  basically just pink and indurated, a bit of hyperkeratosis on the right   Ulcer(s):   --          Left dorsal foot with some very good progress toward healing with the 2 TheraSkins, better granulation, smaller overall, a small tunnel medial-distal, some fibrin and biofilm, and I think resolved tendon necrosis  --          Left lateral foot remains healed.   --          Left 2nd toe amp site delicately healed  --          Right lateral ankle some pink-red tissue, biofilm, fibrin, but also some persistent undermining (though improving in at least 1 direction)  --          Right lateral midfoot also a bit smaller, definitely granulating more, still with a bit of palpable and presumably chronic osteomyelitic bone, but not that I can Hyperlipidemia E78.5    Diabetic ulcer of right midfoot associated with type 2 diabetes mellitus, with necrosis of bone ([facility]) E11.621, L97.414    Atherosclerosis of native arteries of left leg with ulceration of foot ([facility] [address] I70.245    Atherosclerosis of native arteries of right leg with ulceration of foot ([facility] I70.235    Dehiscence of surgical wound at left 2nd toe amp site T81.31XA       Assessment of today's active condition(s): uncontrolled DM2, neuropathy, PAD, prior BL revascularization, multiple nonhealing BL diabetic ulcers; all are smaller, none look ischemic now, and the only one with significant possibility of infection is the right lateral foot, but it's doing ok despite that tiny focus of exposed bone. Factors contributing to occurrence and/or persistence of the chronic ulcer include diabetes, poor glucose control, shear force, smoking, arterial insufficiency, and decreased tissue oxygenation. Medical necessity of today's visit is shown by the above documentation. Sharp debridement is indicated today, based upon the exam findings in the wound(s) above. Procedure note:     Consent obtained. Time out performed per [facility] policy. Anesthetic  Anesthetic: 4% Lidocaine Cream     Using a curette, #15 blade scalpel, and forceps, I sharply debrided the right ankle and foot (bilateral) ulcer(s) down through and including the removal of dermis. The type(s) of tissue debrided included fibrin, biofilm, and necrotic/eschar. Total Surface Area Debrided: 1 sq cm. The ulcers were then irrigated with normal saline solution. The procedure was completed with a small amount of bleeding, and hemostasis was with pressure. The patient tolerated the procedure well, with no significant complications. The patient's level of pain during and after the procedure was monitored. Post-debridement measurements, if different from pre-debridement, are in the flowsheet as well.     Discharge plan:     Treatment in the wound care center today, per RN documentation: Wound 02/16/22 #3 Left Dorsal Foot, Arterial, Full Thickness, Onset Nov 2021-Dressing/Treatment: Other (comment) (collagen PSO 4x4's kerlix tape)  Wound 02/16/22 #1 Right Lateral Ankle, Arterial, Full Thickness, onset -Dressing/Treatment: Other (comment) (collagen PSO 4x4's kerlix tape)  Wound 02/16/22 #2 Right Lateral Foot, Arterial, Full Thickness Onset Nov 2021-Dressing/Treatment: Other (comment) (collagen PSO 4x4's kerlix tape). Most crucial that he work on glucose control and smoking. If the right foot ultimately does not heal, or if it regresses, will refer back to Dr. Lali Rizo for consideration of OR debridement; I think the left foot will heal soon; main thing holding up the right ankle is epibole and undermining, and that's slowly improving. Home treatment: good handwashing before and after any dressing changes. Cleanse wound with saline or soap & water before dressing change. May use Vaseline (petrolatum), Aquaphor, Aveeno, CeraVe, Cetaphil, Eucerin, Lubriderm, etc for dry skin. Dressing type for home: as above, every day or two, as needed. Written discharge instructions given to patient. Follow up in 1 week. If he's not able to make it next week (his wife has a medical issue of her own to attend to), it's fine if he just comes back in 2 weeks.      Electronically signed by Pascual Palacios MD on 10/11/2022 at 10:01 AM.

## 2022-10-12 ENCOUNTER — HOSPITAL ENCOUNTER (OUTPATIENT)
Dept: WOUND CARE | Age: 67
Discharge: HOME | End: 2022-10-12

## 2022-10-12 VITALS
RESPIRATION RATE: 20 BRPM | DIASTOLIC BLOOD PRESSURE: 82 MMHG | HEIGHT: 71 IN | HEART RATE: 91 BPM | BODY MASS INDEX: 22.09 KG/M2 | TEMPERATURE: 97.3 F | SYSTOLIC BLOOD PRESSURE: 147 MMHG

## 2022-10-12 DIAGNOSIS — L97.423 DIABETIC ULCER OF LEFT MIDFOOT ASSOCIATED WITH TYPE 2 DIABETES MELLITUS, WITH NECROSIS OF MUSCLE (HCC): ICD-10-CM

## 2022-10-12 DIAGNOSIS — E11.621 DIABETIC ULCER OF RIGHT MIDFOOT ASSOCIATED WITH TYPE 2 DIABETES MELLITUS, WITH NECROSIS OF BONE (HCC): Primary | ICD-10-CM

## 2022-10-12 DIAGNOSIS — E11.622 DIABETIC ULCER OF RIGHT ANKLE ASSOCIATED WITH TYPE 2 DIABETES MELLITUS, WITH NECROSIS OF MUSCLE (HCC): ICD-10-CM

## 2022-10-12 DIAGNOSIS — L97.313 DIABETIC ULCER OF RIGHT ANKLE ASSOCIATED WITH TYPE 2 DIABETES MELLITUS, WITH NECROSIS OF MUSCLE (HCC): ICD-10-CM

## 2022-10-12 DIAGNOSIS — L97.414 DIABETIC ULCER OF RIGHT MIDFOOT ASSOCIATED WITH TYPE 2 DIABETES MELLITUS, WITH NECROSIS OF BONE (HCC): Primary | ICD-10-CM

## 2022-10-12 DIAGNOSIS — T81.31XA DEHISCENCE OF SURGICAL WOUND, INITIAL ENCOUNTER: ICD-10-CM

## 2022-10-12 DIAGNOSIS — E11.621 DIABETIC ULCER OF LEFT MIDFOOT ASSOCIATED WITH TYPE 2 DIABETES MELLITUS, WITH NECROSIS OF MUSCLE (HCC): ICD-10-CM

## 2022-10-12 PROCEDURE — 97597 DBRDMT OPN WND 1ST 20 CM/<: CPT

## 2022-10-12 RX ORDER — BACITRACIN ZINC AND POLYMYXIN B SULFATE 500; 1000 [USP'U]/G; [USP'U]/G
OINTMENT TOPICAL ONCE
Status: CANCELLED | OUTPATIENT
Start: 2022-10-12 | End: 2022-10-12

## 2022-10-12 RX ORDER — LIDOCAINE 40 MG/G
CREAM TOPICAL ONCE
Status: CANCELLED | OUTPATIENT
Start: 2022-10-12 | End: 2022-10-12

## 2022-10-12 RX ORDER — BACITRACIN ZINC AND POLYMYXIN B SULFATE 500; 1000 [USP'U]/G; [USP'U]/G
OINTMENT TOPICAL ONCE
Status: DISCONTINUED | OUTPATIENT
Start: 2022-10-12 | End: 2022-10-13 | Stop reason: HOSPADM

## 2022-10-12 RX ORDER — LIDOCAINE 50 MG/G
OINTMENT TOPICAL ONCE
Status: DISCONTINUED | OUTPATIENT
Start: 2022-10-12 | End: 2022-10-13 | Stop reason: HOSPADM

## 2022-10-12 RX ORDER — LIDOCAINE HYDROCHLORIDE 40 MG/ML
SOLUTION TOPICAL ONCE
Status: CANCELLED | OUTPATIENT
Start: 2022-10-12 | End: 2022-10-12

## 2022-10-12 RX ORDER — LIDOCAINE 50 MG/G
OINTMENT TOPICAL ONCE
Status: CANCELLED | OUTPATIENT
Start: 2022-10-12 | End: 2022-10-12

## 2022-10-12 RX ORDER — LIDOCAINE 40 MG/G
CREAM TOPICAL ONCE
Status: DISCONTINUED | OUTPATIENT
Start: 2022-10-12 | End: 2022-10-13 | Stop reason: HOSPADM

## 2022-10-12 RX ORDER — LIDOCAINE HYDROCHLORIDE 40 MG/ML
SOLUTION TOPICAL ONCE
Status: DISCONTINUED | OUTPATIENT
Start: 2022-10-12 | End: 2022-10-13 | Stop reason: HOSPADM

## 2022-10-12 ASSESSMENT — PAIN SCALES - GENERAL: PAINLEVEL_OUTOF10: 0

## 2022-10-12 NOTE — DISCHARGE INSTRUCTIONS
[address] [facility] Physician Orders and Discharge [address] [facility], [address]  [place], [facility]  Telephone: [phone]      Fax: [phone] home care company:   N/a . Your wound-care supplies will be provided by:  Patient     NAME:  Cesar Clark   YOB: 1955  PRIMARY DIAGNOSIS FOR WOUND CARE CENTER:  Dehisced surgical, Arterial & Diabetic ulcers  . Wound cleansing:  Do not scrub or use excessive force. Wash hands with soap and water before and after dressing changes. Prior to applying a clean dressing, cleanse wound with normal saline, wound cleanser, or mild soap and water. Ask your physician or nurse before getting the wound(s) wet in the shower. Wound care for home:     Left lateral foot & 2nd toe amp. Site:  Apply small amount of Vaseline to dry areas    Left dorsal foot:  Apply Hydrocortisone to rash  No dressing or creams needed on newly healed wound    Right lateral ankle:  Hydrocortisone to rash esperanza-wound  Collagen to wound   Cover with dry dressing   Change dressing every other day    Right lateral foot wound:  Collagen tucked into wound   Cover with dry dressing  Change dressing every other day         Attempt to wear the off-loading boots at night when in bed to help prevent further pressure on foot wounds. Please note, all wounds (unless stated otherwise here) were mechanically debrided at the time of cleansing here in the wound-care center today, so a small amount of pain, drainage or bleeding from that process might be expected, and is normal.     All products for home use, including multiple products for a single wound if applicable, are medically necessary in order to achieve the best chance at timely wound healing. See provider documentation for details if needed.      Substituted dressings applied in the [facility] [address] today, if applicable:     Triamcinolone today in [address] [place] [address],[address] place of Hydrocortisone      New orders for this week (labs, imaging, medications, etc.):      Apply over the counter Hydrocortisone to dorsal foot rash, NOT on the newly healed wound. Additional instructions for specific diagnoses:     Use waffle cushion when you are sitting. Pt. Scheduled to f/u with Dr Danielito Toussaint again 7/2022. Vascular intervention on left per Dr Jordin Baker 6/14/22, 7/8/22     General comments for arterial ulcers:  *  Moisturize your skin regularly with Vaseline, Aquaphor, Aveeno, CeraVe, Cetaphil, Eucerin, Lubriderm, etc; but keep the skin between your toes dry. *  Always allow your wound-care doctor or podiatrist to cut nails, calluses & corns. *  Be sure to always wear footwear that fits well, and NEVER go without shoes and socks. *  Be sure to adhere to any recommendations from your PCP or endocrinologist when it comes to high blood pressure, cholesterol, diet and exercise. If you have questions, please ask. *  If you smoke, your wound can not heal properly -- please talk with us when you're ready to quit. *  Do not soak your feet unless specifically instructed to do so by us. *  Make sure you stay well-hydrated, and maintain good protein intake. *  Walk as much as you can tolerate. Exercise is part of the treatment of peripheral arterial disease. *  If you arent taking an aspirin or other related medication (Plavix, etc), please ask your primary care physician or vascular surgeon if you should. *  Make sure you know when your vascular surgeon needs to follow-up with you, or needs to get any follow-up circulation tests. F/U Appointment is with Dr. Jeevan Hahn in 1 week, on                                   at                       .     Your nurse  is HELIO Rice. If we applied slip-resistant hospital socks today, be sure to remove them at least once a day to inspect your toes or feet, even if you're not changing the wraps or dressings underneath.  If you see anything concerning (redness, excess moisture, etc), please call and let us know right away.      Should you experience any significant changes in your wound(s) (including redness, increased warmth, increased pain, increased drainage, odor, or fever) or have questions about your wound care, please contact the [facility] at [phone] Monday-Thursday from 8:00 am - 4:30 pm, or Friday from 8:00 am - 2:30 pm.  If you need help with your wound outside these hours and cannot wait until we are again available, contact your home-care company (if applicable), your PCP, or go to the nearest emergency room

## 2022-10-12 NOTE — PLAN OF CARE
Left dorsal foot wound healed, no dressing needed. Skin care recommendations using OTC Hydrocortisone for rash as directed. Remaining wounds stable, debridement per MD & pt. Tolerated well. Right lateral ankle cont. With collagen as ordered. Right lateral foot remains with bone exposure, pt. Aware he may need to return to Dr Micah Morales for surgical intervention, pt. Verbalizes understanding. Lateral foot, cont. With collagen. F/u in [facility] in 1 week as ordered, pt. Aware to call sooner with any changes or questions/concerns. Discharge instructions reviewed with patient & spouse, all questions answered, copy given to patient. Dressings were applied to all wounds per M.D. Instructions at this visit.

## 2022-10-20 PROBLEM — E11.621 DIABETIC ULCER OF LEFT MIDFOOT ASSOCIATED WITH TYPE 2 DIABETES MELLITUS, WITH NECROSIS OF MUSCLE (HCC): Status: RESOLVED | Noted: 2022-02-16 | Resolved: 2022-10-20

## 2022-10-20 PROBLEM — L97.423 DIABETIC ULCER OF LEFT MIDFOOT ASSOCIATED WITH TYPE 2 DIABETES MELLITUS, WITH NECROSIS OF MUSCLE (HCC): Status: RESOLVED | Noted: 2022-02-16 | Resolved: 2022-10-20

## 2022-10-20 NOTE — PROGRESS NOTES
88 [facility] Progress Note    Claude Chapman     : 1955    DATE OF VISIT:  10/12/2022    Subjective:     Claude Chapman is a 61 y.o. male who has a diabetic ulcer located on the right, lateral ankle and foot (right, lateral, midfoot). Significant symptoms or pertinent wound history since last visit: feeling well overall, no real foot pain, no fever, no pruritus, modest drainage. Still smoking. His wife has been applying Neosporin to skin around a couple of the ulcers, instead of just into the small wounds themselves, and he does have a bit of a rash this week (left dorsal foot, right ankle). Additional ulcer(s) noted? no. Left 2nd toe amp site healed, left dorsal foot ulcer healed, left lateral foot remains healed. His current medication list consists of amLODIPine, aspirin, atorvastatin, insulin glargine, and metFORMIN.     Allergies: Shrimp flavor    Objective:     Vitals:    10/12/22 1352   BP: (!) 147/82   Pulse: 91   Resp: 20   Temp: 97.3 °F (36.3 °C)   TempSrc: Oral   Weight: Comment: no weight obtained   Height: 5' 11\" (1.803 m)     Constitutional:  well-developed, well-nourished, NAD  Cardiovascular:  bilateral pedal pulses Dopplerable, feet warm, good cap refill, very mild edema  Lymphatic:  no inguinal or popliteal adenopathy, no lymphangitis, and no cellulitis  Musculoskeletal:  no clubbing, cyanosis or petechiae; RLE and LLE with no gross effusions, joint misalignment or acute arthritis  Princess-ulcer skin:  left dorsal foot and right lateral ankle with a bit of a contact dermatitis; right lateral foot with a bit of induration and hyperkeratosis  Ulcer(s):   --          Left dorsal foot, left 2nd toe amp site and left lateral foot healed (lateral foot with some very dry hyperkeratosis / callus)  --          Right lateral ankle some red tissue, biofilm, fibrin, less undermining, smaller overall  --          Right lateral midfoot also a bit smaller, definitely granulating, still with a bit of palpable and presumably chronic osteomyelitic bone, barely visible, a small degree of undermining there too, with a bit of epibole. Photos also saved in electronic chart.     Today's wound measurements, per RN documentation:  Wound 02/16/22 #1 Right Lateral Ankle, Arterial, Full Thickness, onset -Wound Length (cm): 0.1 cm  Wound 02/16/22 #2 Right Lateral Foot, Arterial, Full Thickness Onset Nov 2021-Wound Length (cm): 0.2 cm  [REMOVED] Wound 02/16/22 #3 Left Dorsal Foot, Arterial, Full Thickness, Onset Nov 2021-Wound Length (cm): 0 cm    Wound 02/16/22 #1 Right Lateral Ankle, Arterial, Full Thickness, onset -Wound Width (cm): 0.1 cm  Wound 02/16/22 #2 Right Lateral Foot, Arterial, Full Thickness Onset Nov 2021-Wound Width (cm): 0.2 cm  [REMOVED] Wound 02/16/22 #3 Left Dorsal Foot, Arterial, Full Thickness, Onset Nov 2021-Wound Width (cm): 0 cm    Wound 02/16/22 #1 Right Lateral Ankle, Arterial, Full Thickness, onset -Wound Depth (cm): 0.1 cm  Wound 02/16/22 #2 Right Lateral Foot, Arterial, Full Thickness Onset Nov 2021-Wound Depth (cm): 0.1 cm  [REMOVED] Wound 02/16/22 #3 Left Dorsal Foot, Arterial, Full Thickness, Onset Nov 2021-Wound Depth (cm): 0 cm    Assessment:     Patient Active Problem List   Diagnosis Code    Uncontrolled type 2 diabetes mellitus with diabetic polyneuropathy, without long-term current use of insulin [id number]    Diabetic ulcer of right ankle associated with type 2 diabetes mellitus, with necrosis of muscle ([facility]) E11.622, L97.313    Current every day smoker F17.200    Hyperlipidemia E78.5    Diabetic ulcer of right midfoot associated with type 2 diabetes mellitus, with necrosis of bone ([facility]) E11.621, L97.414    Atherosclerosis of native arteries of left leg with ulceration of foot ([address] I70.245    Atherosclerosis of native arteries of right leg with ulceration of foot ([facility] I70.235    Dehiscence of surgical wound at left 2nd toe amp site T81.31XA Assessment of today's active condition(s): uncontrolled DM2, neuropathy, ongoing tobacco use, PAD, prior BL revascularization; everything on the left side looks healed now, I think the right ankle can likely heal in the coming weeks with ongoing simple care, but I think that right lateral foot is probably going to need a minor surgical debridement, including bone. Factors contributing to occurrence and/or persistence of the chronic ulcer include diabetes, poor glucose control, shear force, smoking, arterial insufficiency, and decreased tissue oxygenation. Medical necessity of today's visit is shown by the above documentation. Sharp debridement is indicated today, based upon the exam findings in the wound(s) above. Procedure note:     Consent obtained. Time out performed per [facility]. Anesthetic  Anesthetic: 4% Lidocaine Cream     Using a curette and #15 blade scalpel, I sharply debrided the right ankle and foot (right) ulcer(s) down through and including the removal of dermis. The type(s) of tissue debrided included fibrin, biofilm, and necrotic/eschar. Total Surface Area Debrided: 1 sq cm. The ulcers were then irrigated with normal saline solution. The procedure was completed with a small amount of bleeding, and hemostasis was with pressure. The patient tolerated the procedure well, with no significant complications. The patient's level of pain during and after the procedure was monitored. Post-debridement measurements, if different from pre-debridement, are in the flowsheet as well. Discharge plan:     Treatment in the wound care center today, per RN documentation: Wound 02/16/22 #1 Right Lateral Ankle, Arterial, Full Thickness, onset -Dressing/Treatment: Other (comment) (Purachol, 4x4's, robert)  Wound 02/16/22 #2 Right Lateral Foot, Arterial, Full Thickness Onset Nov 2021-Dressing/Treatment: Other (comment) (Purachol, 4x4's, robert).     Home treatment: good handwashing before and after any dressing changes. Cleanse wound with saline or soap & water before dressing change. May use Vaseline (petrolatum), Aquaphor, Aveeno, CeraVe, Cetaphil, Eucerin, Lubriderm, etc for dry skin. Dressing type for home:  Aquaphor to any very dry hyperkeratosis or callus; hydrocortisone for a few days to those two areas of rash; stop Neosporin; collagen and dry dressings to the two remaining ulcers , every day or two, as needed. Written discharge instructions given to patient. Follow up in 2 weeks, as his wife has a medical appointment of her own next week. Once she's well enough to help him with transportation again, I'll likely be referring him back to Dr. Governor Mullins for consideration of right lateral foot debridement, after an XR, including bone cultures; if there are concerns for residual active osteo after that, I can manage the infection back here. Important that he continue to work on glucose control and smoking cessation.      Electronically signed by Nelda Espitia MD on 10/20/2022 at 7:54 AM.

## 2022-10-20 NOTE — DISCHARGE INSTRUCTIONS
[address] [facility] Physician Orders and Discharge [address], [address]  [place], [facility]  Telephone: [phone]      Fax: [phone] home care company:   N/a . Your wound-care supplies will be provided by:  Patient     NAME:  Elizabeth Scott   YOB: 1955  PRIMARY DIAGNOSIS FOR WOUND CARE CENTER:  Dehisced surgical, Arterial & Diabetic ulcers  . Wound cleansing:  Do not scrub or use excessive force. Wash hands with soap and water before and after dressing changes. Prior to applying a clean dressing, cleanse wound with normal saline, wound cleanser, or mild soap and water. Ask your physician or nurse before getting the wound(s) wet in the shower. Wound care for home:     Left lateral foot, top of left foot & right lateral ankle:  Apply small amount of Vaseline to dry areas         Right lateral foot wound:  Collagen tucked into wound   Cover with dry dressing  Change dressing every other day         Attempt to wear the off-loading boots at night when in bed to help prevent further pressure on foot wounds. Please note, all wounds (unless stated otherwise here) were mechanically debrided at the time of cleansing here in the wound-care center today, so a small amount of pain, drainage or bleeding from that process might be expected, and is normal.     All products for home use, including multiple products for a single wound if applicable, are medically necessary in order to achieve the best chance at timely wound healing. See provider documentation for details if needed. Substituted dressings applied in the [facility] today, if applicable:     N/a     New orders for this week (labs, imaging, medications, etc.):      Please photo healed ankle wound today 10/26/22    Please schedule a consult with Dr Donald Evans for bone exposure on right lateral foot.       Additional instructions for specific diagnoses: Use waffle cushion when you are sitting. Pt. Scheduled to f/u with Dr Vincent Yarbrough again 7/2022. Vascular intervention on left per Dr Gomez Degree 6/14/22, 7/8/22     General comments for arterial ulcers:  *  Moisturize your skin regularly with Vaseline, Aquaphor, Aveeno, CeraVe, Cetaphil, Eucerin, Lubriderm, etc; but keep the skin between your toes dry. *  Always allow your wound-care doctor or podiatrist to cut nails, calluses & corns. *  Be sure to always wear footwear that fits well, and NEVER go without shoes and socks. *  Be sure to adhere to any recommendations from your PCP or endocrinologist when it comes to high blood pressure, cholesterol, diet and exercise. If you have questions, please ask. *  If you smoke, your wound can not heal properly -- please talk with us when you're ready to quit. *  Do not soak your feet unless specifically instructed to do so by us. *  Make sure you stay well-hydrated, and maintain good protein intake. *  Walk as much as you can tolerate. Exercise is part of the treatment of peripheral arterial disease. *  If you arent taking an aspirin or other related medication (Plavix, etc), please ask your primary care physician or vascular surgeon if you should. *  Make sure you know when your vascular surgeon needs to follow-up with you, or needs to get any follow-up circulation tests. F/U Appointment is with Dr. Tamara Navas in 2 weeks, on                                   at                       .     Your nurse  is HELIO Rice. If we applied slip-resistant hospital socks today, be sure to remove them at least once a day to inspect your toes or feet, even if you're not changing the wraps or dressings underneath. If you see anything concerning (redness, excess moisture, etc), please call and let us know right away.      Should you experience any significant changes in your wound(s) (including redness, increased warmth, increased pain, increased drainage, odor, or fever) or have questions about your wound care, please contact the [facility] at [phone] Monday-Thursday from 8:00 am - 4:30 pm, or Friday from 8:00 am - 2:30 pm.  If you need help with your wound outside these hours and cannot wait until we are again available, contact your home-care company (if applicable), your PCP, or go to the nearest emergency room

## 2022-10-26 ENCOUNTER — HOSPITAL ENCOUNTER (OUTPATIENT)
Dept: WOUND CARE | Age: 67
Discharge: HOME | End: 2022-10-26

## 2022-10-26 VITALS
HEIGHT: 71 IN | SYSTOLIC BLOOD PRESSURE: 136 MMHG | BODY MASS INDEX: 22.12 KG/M2 | WEIGHT: 158 LBS | HEART RATE: 99 BPM | TEMPERATURE: 97.7 F | DIASTOLIC BLOOD PRESSURE: 78 MMHG | RESPIRATION RATE: 18 BRPM

## 2022-10-26 DIAGNOSIS — L97.313 DIABETIC ULCER OF RIGHT ANKLE ASSOCIATED WITH TYPE 2 DIABETES MELLITUS, WITH NECROSIS OF MUSCLE (HCC): ICD-10-CM

## 2022-10-26 DIAGNOSIS — L97.414 DIABETIC ULCER OF RIGHT MIDFOOT ASSOCIATED WITH TYPE 2 DIABETES MELLITUS, WITH NECROSIS OF BONE (HCC): Primary | ICD-10-CM

## 2022-10-26 DIAGNOSIS — E11.621 DIABETIC ULCER OF RIGHT MIDFOOT ASSOCIATED WITH TYPE 2 DIABETES MELLITUS, WITH NECROSIS OF BONE (HCC): Primary | ICD-10-CM

## 2022-10-26 DIAGNOSIS — E11.622 DIABETIC ULCER OF RIGHT ANKLE ASSOCIATED WITH TYPE 2 DIABETES MELLITUS, WITH NECROSIS OF MUSCLE (HCC): ICD-10-CM

## 2022-10-26 PROCEDURE — 97597 DBRDMT OPN WND 1ST 20 CM/<: CPT | Performed by: INTERNAL MEDICINE

## 2022-10-26 PROCEDURE — 97597 DBRDMT OPN WND 1ST 20 CM/<: CPT

## 2022-10-26 RX ORDER — LIDOCAINE 40 MG/G
CREAM TOPICAL ONCE
Status: CANCELLED | OUTPATIENT
Start: 2022-10-26 | End: 2022-10-26

## 2022-10-26 RX ORDER — BACITRACIN ZINC AND POLYMYXIN B SULFATE 500; 1000 [USP'U]/G; [USP'U]/G
OINTMENT TOPICAL ONCE
Status: DISCONTINUED | OUTPATIENT
Start: 2022-10-26 | End: 2022-10-27 | Stop reason: HOSPADM

## 2022-10-26 RX ORDER — LIDOCAINE HYDROCHLORIDE 40 MG/ML
SOLUTION TOPICAL ONCE
Status: DISCONTINUED | OUTPATIENT
Start: 2022-10-26 | End: 2022-10-27 | Stop reason: HOSPADM

## 2022-10-26 RX ORDER — LIDOCAINE 50 MG/G
OINTMENT TOPICAL ONCE
Status: CANCELLED | OUTPATIENT
Start: 2022-10-26 | End: 2022-10-26

## 2022-10-26 RX ORDER — LIDOCAINE HYDROCHLORIDE 40 MG/ML
SOLUTION TOPICAL ONCE
Status: CANCELLED | OUTPATIENT
Start: 2022-10-26 | End: 2022-10-26

## 2022-10-26 RX ORDER — LIDOCAINE 40 MG/G
CREAM TOPICAL ONCE
Status: DISCONTINUED | OUTPATIENT
Start: 2022-10-26 | End: 2022-10-27 | Stop reason: HOSPADM

## 2022-10-26 RX ORDER — LIDOCAINE 50 MG/G
OINTMENT TOPICAL ONCE
Status: DISCONTINUED | OUTPATIENT
Start: 2022-10-26 | End: 2022-10-27 | Stop reason: HOSPADM

## 2022-10-26 RX ORDER — BACITRACIN ZINC AND POLYMYXIN B SULFATE 500; 1000 [USP'U]/G; [USP'U]/G
OINTMENT TOPICAL ONCE
Status: CANCELLED | OUTPATIENT
Start: 2022-10-26 | End: 2022-10-26

## 2022-10-26 NOTE — PLAN OF CARE
Right lateral ankle wound noted to be healed. Right lateral foot wound stable, bone exposure noted, debridement per MD & pt. Tolerated well. Dr Hieu Rojas recommends patient consult with Dr Cele Skaggs to further evaluate if intervention needed, both pt. & wife verbalize understanding. Wound care & skin care orders updated per Dr Hieu Rojas. F/u in [facility] in 2 weeks as ordered, pt. Aware to call sooner with any changes or questions/concerns. Discharge instructions reviewed with patient & wife, all questions answered, copy given to patient. Dressings were applied to all wounds per M.D. Instructions at this visit.

## 2022-11-03 NOTE — DISCHARGE INSTRUCTIONS
215 [facility] Physician Orders and Discharge [address]  [address], [address]  [place], [facility]  Telephone: [phone]      Fax: [phone] home care company:   N/a . Your wound-care supplies will be provided by:  Patient     NAME:  Elizabeth Scott   YOB: 1955  PRIMARY DIAGNOSIS FOR WOUND CARE CENTER:  Dehisced surgical, Arterial & Diabetic ulcers  . Wound cleansing:  Do not scrub or use excessive force. Wash hands with soap and water before and after dressing changes. Prior to applying a clean dressing, cleanse wound with normal saline, wound cleanser, or mild soap and water. Ask your physician or nurse before getting the wound(s) wet in the shower. Wound care for home:     Left lateral foot, top of left foot & right lateral ankle:  Apply small amount of Vaseline to dry areas         Right lateral foot wound:  Collagen tucked into wound   Cover with dry dressing  Change dressing every other day         Attempt to wear the off-loading boots at night when in bed to help prevent further pressure on foot wounds. Please note, all wounds (unless stated otherwise here) were mechanically debrided at the time of cleansing here in the wound-care center today, so a small amount of pain, drainage or bleeding from that process might be expected, and is normal.     All products for home use, including multiple products for a single wound if applicable, are medically necessary in order to achieve the best chance at timely wound healing. See provider documentation for details if needed. Substituted dressings applied in the [address] [place] [address] today, if applicable:     N/a     New orders for this week (labs, imaging, medications, etc.):      Consult with Dr Donald Evans for bone exposure on right lateral foot scheduled for 11/10/22. Additional instructions for specific diagnoses:     Use waffle cushion when you are sitting. Pt. Scheduled to f/u with Dr Julieta Narayan again 7/2022. Vascular intervention on left per Dr Becky Morales 6/14/22, 7/8/22     General comments for arterial ulcers:  *  Moisturize your skin regularly with Vaseline, Aquaphor, Aveeno, CeraVe, Cetaphil, Eucerin, Lubriderm, etc; but keep the skin between your toes dry. *  Always allow your wound-care doctor or podiatrist to cut nails, calluses & corns. *  Be sure to always wear footwear that fits well, and NEVER go without shoes and socks. *  Be sure to adhere to any recommendations from your PCP or endocrinologist when it comes to high blood pressure, cholesterol, diet and exercise. If you have questions, please ask. *  If you smoke, your wound can not heal properly -- please talk with us when you're ready to quit. *  Do not soak your feet unless specifically instructed to do so by us. *  Make sure you stay well-hydrated, and maintain good protein intake. *  Walk as much as you can tolerate. Exercise is part of the treatment of peripheral arterial disease. *  If you arent taking an aspirin or other related medication (Plavix, etc), please ask your primary care physician or vascular surgeon if you should. *  Make sure you know when your vascular surgeon needs to follow-up with you, or needs to get any follow-up circulation tests. No Further follow up in [address] [place] [address] at this time. Dr Az Calixto will be in touch with Dr Barbara Mcgovern if any further follow up needed after surgery. Your nurse  is HELIO Rice. If we applied slip-resistant hospital socks today, be sure to remove them at least once a day to inspect your toes or feet, even if you're not changing the wraps or dressings underneath. If you see anything concerning (redness, excess moisture, etc), please call and let us know right away.      Should you experience any significant changes in your wound(s) (including redness, increased warmth, increased pain, increased drainage, odor, or fever) or have questions about your wound care, please contact the [address] [place] [address] at [phone] Monday-Thursday from 8:00 am - 4:30 pm, or Friday from 8:00 am - 2:30 pm.  If you need help with your wound outside these hours and cannot wait until we are again available, contact your home-care company (if applicable), your PCP, or go to the nearest emergency room

## 2022-11-08 NOTE — PROGRESS NOTES
88 [facility] Progress Note    Angela Luciano     : 1955    DATE OF VISIT:  10/26/2022    Subjective:     Angela Luciano is a 61 y.o. male who has a diabetic ulcer located on the foot (right, lateral, midfoot). Significant symptoms or pertinent wound history since last visit: feeling ok overall, no real foot pain, no fever, minimal wound drainage, no pruritus, improved rash. Additional ulcer(s) noted? no. Right ankle looks healed this week, and everything on the left side looks to be healed still. His current medication list consists of amLODIPine, aspirin, atorvastatin, insulin glargine, and metFORMIN. Allergies: Shrimp flavor    Objective:     Vitals:    10/26/22 1353   BP: 136/78   Pulse: 99   Resp: 18   Temp: 97.7 °F (36.5 °C)   TempSrc: Oral   Weight: 158 lb (71.7 kg)   Height: 5' 11\" (1.803 m)     Constitutional:  well-developed, well-nourished, NAD  Cardiovascular:  bilateral pedal pulses Dopplerable, feet warm, good cap refill, very mild edema  Lymphatic:  no inguinal or popliteal adenopathy, no lymphangitis, and no cellulitis  Musculoskeletal:  no clubbing, cyanosis or petechiae; RLE and LLE with no gross effusions, joint misalignment or acute arthritis  Princess-ulcer skin:  left dorsal foot and right lateral ankle with improved contact dermatitis; right lateral foot with a bit of induration and hyperkeratosis  Ulcer(s):   --          Left dorsal foot, left 2nd toe amp site and left lateral foot healed (lateral foot with some dry hyperkeratosis / callus)  --          Right lateral ankle looks delicately healed this week, with a bit of hyperkeratosis  --          Right lateral midfoot quite small, red, granular, one edge of hyperkeratosis, epibole and mild undermining, there's a bit of fibrin and biofilm, no visible bone, but it had been palpable for quite some time - certainly no signs of acute infection there. Photos also saved in electronic chart.     Today's wound measurements, per RN documentation:  [REMOVED] Wound 02/16/22 #1 Right Lateral Ankle, Arterial, Full Thickness, onset -Wound Length (cm): 0 cm  Wound 02/16/22 #2 Right Lateral Foot, Arterial, Full Thickness Onset Nov 2021-Wound Length (cm): 0.3 cm    [REMOVED] Wound 02/16/22 #1 Right Lateral Ankle, Arterial, Full Thickness, onset -Wound Width (cm): 0 cm  Wound 02/16/22 #2 Right Lateral Foot, Arterial, Full Thickness Onset Nov 2021-Wound Width (cm): 0.4 cm    [REMOVED] Wound 02/16/22 #1 Right Lateral Ankle, Arterial, Full Thickness, onset -Wound Depth (cm): 0 cm  Wound 02/16/22 #2 Right Lateral Foot, Arterial, Full Thickness Onset Nov 2021-Wound Depth (cm): 0.2 cm    Assessment:     Patient Active Problem List   Diagnosis Code    Uncontrolled type 2 diabetes mellitus with diabetic polyneuropathy, without long-term current use of insulin [id number]    Diabetic ulcer of right ankle associated with type 2 diabetes mellitus, with necrosis of muscle ([address] E11.622, L97.313    Current every day smoker F17.200    Hyperlipidemia E78.5    Diabetic ulcer of right midfoot associated with type 2 diabetes mellitus, with necrosis of bone ([address] E11.621, L97.414    Atherosclerosis of native arteries of left leg with ulceration of foot ([address] I70.245    Atherosclerosis of native arteries of right leg with ulceration of foot ([address] I70.235    Dehiscence of surgical wound at left 2nd toe amp site T81.31XA       Assessment of today's active condition(s): uncontrolled Dm2, neuropathy, PAD, prior BL revascularizations, ongoing tobacco use, multiple wounds healed (related to effects and complications of DM, plus his prior Shayla's), just that one lateral foot wound remaining, and though not inflamed, I do suspect a small focus of chronic osteo at that 5th met base.  Factors contributing to occurrence and/or persistence of the chronic ulcer include diabetes, poor glucose control, chronic pressure, shear force, smoking, arterial insufficiency, and decreased tissue oxygenation. Medical necessity of today's visit is shown by the above documentation. Sharp debridement is indicated today, based upon the exam findings in the wound(s) above. Procedure note:     Consent obtained. Time out performed per [facility] policy. Anesthetic  Anesthetic: 4% Lidocaine Cream     Using a #15 blade scalpel, I sharply debrided the foot (right, lateral, midfoot) ulcer(s) down through and including the removal of dermis. The type(s) of tissue debrided included fibrin, biofilm, necrotic/eschar, and callus. Total Surface Area Debrided: 1 sq cm. I definitely was still able to probe to a small focus of bone today, after getting that one skin edge pared back; no pus, no other tunneling etc.    The ulcers were then irrigated with normal saline solution. The procedure was completed with a small amount of bleeding, and hemostasis was with pressure. The patient tolerated the procedure well, with no significant complications. The patient's level of pain during and after the procedure was monitored. Post-debridement measurements, if different from pre-debridement, are in the flowsheet as well. Discharge plan:     Treatment in the wound care center today, per RN documentation: Wound 02/16/22 #2 Right Lateral Foot, Arterial, Full Thickness Onset Nov 2021-Dressing/Treatment: Other (comment) (collagen,dry dressing). Important that he continue to work on glucose control and smoking cessation. I think he will need one additional surgery, hopefully just a minor bone debridement at that right foot. Could still have residual osteo after that, depending on how things go overall, and if that's the case, I would be happy to manage his post-op osteo. Recommending that he go back to Dr. Cristal Negrete for a quick repeat XR and then discussion of what I think will be relatively minor right foot surgery.  He'd like to postpone surgery until Dec for insurance reasons, and with the way the foot looks now, I don't think that is risky. Will discuss with Dr. Jazmyn Greene, but I'm thinking one more F/U with me in 2 weeks, then see her a couple of weeks after that, then surgery a couple of weeks after that. For the other healed but dry / hyperkeratotic areas, no Neosporin or topical steroids or anything, just a bit of Aquaphor if needed. Home treatment: good handwashing before and after any dressing changes. Cleanse wound with saline or soap & water before dressing change. May use Vaseline (petrolatum), Aquaphor, Aveeno, CeraVe, Cetaphil, Eucerin, Lubriderm, etc for dry skin. Dressing type for home: as above, every day or two, as needed. Written discharge instructions given to patient. Follow up in 2 weeks.     Electronically signed by Paul Mai MD on 11/8/2022 at 12:31 PM.

## 2022-11-09 ENCOUNTER — HOSPITAL ENCOUNTER (OUTPATIENT)
Dept: WOUND CARE | Age: 67
Discharge: HOME | End: 2022-11-09

## 2022-11-09 VITALS
SYSTOLIC BLOOD PRESSURE: 131 MMHG | HEART RATE: 92 BPM | WEIGHT: 158 LBS | HEIGHT: 71 IN | BODY MASS INDEX: 22.12 KG/M2 | TEMPERATURE: 97.9 F | RESPIRATION RATE: 18 BRPM | DIASTOLIC BLOOD PRESSURE: 75 MMHG

## 2022-11-09 DIAGNOSIS — E11.622 DIABETIC ULCER OF RIGHT ANKLE ASSOCIATED WITH TYPE 2 DIABETES MELLITUS, WITH NECROSIS OF MUSCLE (HCC): ICD-10-CM

## 2022-11-09 DIAGNOSIS — T81.31XA DEHISCENCE OF SURGICAL WOUND, INITIAL ENCOUNTER: Primary | ICD-10-CM

## 2022-11-09 DIAGNOSIS — E11.621 DIABETIC ULCER OF RIGHT MIDFOOT ASSOCIATED WITH TYPE 2 DIABETES MELLITUS, WITH NECROSIS OF BONE (HCC): ICD-10-CM

## 2022-11-09 DIAGNOSIS — L97.414 DIABETIC ULCER OF RIGHT MIDFOOT ASSOCIATED WITH TYPE 2 DIABETES MELLITUS, WITH NECROSIS OF BONE (HCC): ICD-10-CM

## 2022-11-09 DIAGNOSIS — L97.313 DIABETIC ULCER OF RIGHT ANKLE ASSOCIATED WITH TYPE 2 DIABETES MELLITUS, WITH NECROSIS OF MUSCLE (HCC): ICD-10-CM

## 2022-11-09 PROCEDURE — 97597 DBRDMT OPN WND 1ST 20 CM/<: CPT | Performed by: INTERNAL MEDICINE

## 2022-11-09 PROCEDURE — 97597 DBRDMT OPN WND 1ST 20 CM/<: CPT

## 2022-11-09 RX ORDER — LIDOCAINE 40 MG/G
CREAM TOPICAL ONCE
OUTPATIENT
Start: 2022-11-09 | End: 2022-11-09

## 2022-11-09 RX ORDER — LIDOCAINE 40 MG/G
CREAM TOPICAL ONCE
Status: DISCONTINUED | OUTPATIENT
Start: 2022-11-09 | End: 2022-11-10 | Stop reason: HOSPADM

## 2022-11-09 RX ORDER — LIDOCAINE 50 MG/G
OINTMENT TOPICAL ONCE
Status: DISCONTINUED | OUTPATIENT
Start: 2022-11-09 | End: 2022-11-10 | Stop reason: HOSPADM

## 2022-11-09 RX ORDER — LIDOCAINE 50 MG/G
OINTMENT TOPICAL ONCE
OUTPATIENT
Start: 2022-11-09 | End: 2022-11-09

## 2022-11-09 RX ORDER — LIDOCAINE HYDROCHLORIDE 40 MG/ML
SOLUTION TOPICAL ONCE
Status: DISCONTINUED | OUTPATIENT
Start: 2022-11-09 | End: 2022-11-10 | Stop reason: HOSPADM

## 2022-11-09 RX ORDER — BACITRACIN ZINC AND POLYMYXIN B SULFATE 500; 1000 [USP'U]/G; [USP'U]/G
OINTMENT TOPICAL ONCE
Status: DISCONTINUED | OUTPATIENT
Start: 2022-11-09 | End: 2022-11-10 | Stop reason: HOSPADM

## 2022-11-09 RX ORDER — LIDOCAINE HYDROCHLORIDE 40 MG/ML
SOLUTION TOPICAL ONCE
OUTPATIENT
Start: 2022-11-09 | End: 2022-11-09

## 2022-11-09 RX ORDER — BACITRACIN ZINC AND POLYMYXIN B SULFATE 500; 1000 [USP'U]/G; [USP'U]/G
OINTMENT TOPICAL ONCE
OUTPATIENT
Start: 2022-11-09 | End: 2022-11-09

## 2022-11-21 PROBLEM — I70.202 ATHEROSCLEROSIS OF ARTERY OF LEFT LOWER EXTREMITY (HCC): Status: ACTIVE | Noted: 2022-06-07

## 2022-11-21 NOTE — PROGRESS NOTES
[facility] Progress Note    Kylie Tinsley     : 1955    DATE OF VISIT:  2022    Subjective:     Kylie Tinsley is a 61 y.o. male who has a diabetic ulcer located on the foot (right, lateral, midfoot). Significant symptoms or pertinent wound history since last visit: feeling ok overall, no real foot pain, scant drainage, no F/C/D, no pruritus, minimal rash. Scheduled to see Dr. Alysha Sorto soon for follow-up of this foot, and presumably an XR and discussions of OR debridement. Still smoking, still trying to cut back. .. Additional ulcer(s) noted? no.  Right ankle and everything on the left side seem durable healed. His current medication list consists of amLODIPine, aspirin, atorvastatin, insulin glargine, and metFORMIN.     Allergies: Shrimp flavor    Objective:     Vitals:    22 1342   BP: 131/75   Pulse: 92   Resp: 18   Temp: 97.9 °F (36.6 °C)   TempSrc: Oral   Weight: 158 lb (71.7 kg)   Height: 5' 11\" (1.803 m)     Constitutional:  well-developed, well-nourished, NAD  Cardiovascular:  bilateral pedal pulses Dopplerable, feet warm, good cap refill, very mild edema  Lymphatic:  no inguinal or popliteal adenopathy, no lymphangitis, and no cellulitis  Musculoskeletal:  no clubbing, cyanosis or petechiae; RLE and LLE with no gross effusions, joint misalignment or acute arthritis  Princess-ulcer skin:  left dorsal foot and right lateral ankle with resolved contact dermatitis; right lateral foot with a bit of induration and hyperkeratosis  Ulcer(s):   --          Left dorsal foot, left 2nd toe amp site and left lateral foot healed (lateral foot with some dry hyperkeratosis / callus)  --          Right lateral ankle still looks healed this week, a bit more durably  --          Right lateral midfoot quite small, red, granular, one edge of hyperkeratosis, epibole and mild undermining, there's a bit of fibrin and biofilm, no visible bone, but it has been palpable for quite some time, and still is - certainly no signs of acute infection there. Photos also saved in electronic chart. Today's wound measurements, per RN documentation:  Wound 02/16/22 #2 Right Lateral Foot, Arterial, Full Thickness Onset Nov 2021-Wound Length (cm): 0.4 cm    Wound 02/16/22 #2 Right Lateral Foot, Arterial, Full Thickness Onset Nov 2021-Wound Width (cm): 0.2 cm    Wound 02/16/22 #2 Right Lateral Foot, Arterial, Full Thickness Onset Nov 2021-Wound Depth (cm): 0.2 cm    Assessment:     Patient Active Problem List   Diagnosis Code    Uncontrolled type 2 diabetes mellitus with diabetic polyneuropathy, without long-term current use of insulin [id number]    Diabetic ulcer of right ankle associated with type 2 diabetes mellitus, with necrosis of muscle ([address] E11.622, L97.313    Current every day smoker F17.200    Hyperlipidemia E78.5    Diabetic ulcer of right midfoot associated with type 2 diabetes mellitus, with necrosis of bone ([address] E11.621, L97.414    Atherosclerosis of artery of left lower extremity ([address] I70.202    Atherosclerosis of native arteries of right leg with ulceration of foot ([address] I70.235    Dehiscence of surgical wound at left 2nd toe amp site T81.31XA       Assessment of today's active condition(s): uncontrolled DM2, neuropathy, PAD, ongoing tobacco use, BL diabetic foot and ankle ulcers, prior left partial 5th ray resection and left 2nd toe amp, everything healed now except this one small right foot ulcer, which I think contains a small focus of chronic active osteo, preventing complete closure; both sides revascularized in recent months, no signs of recurrent ischemia right now, and no signs of soft tissue infection. Factors contributing to occurrence and/or persistence of the chronic ulcer include diabetes, poor glucose control, shear force, smoking, arterial insufficiency, and decreased tissue oxygenation. Medical necessity of today's visit is shown by the above documentation.  Sharp debridement is indicated today, based upon the exam findings in the wound(s) above. Procedure note:     Consent obtained. Time out performed per [facility] policy. Anesthetic  Anesthetic: 4% Lidocaine Cream     Using a #15 blade scalpel and forceps, I sharply debrided the foot (right) ulcer(s) down through and including the removal of dermis. The type(s) of tissue debrided included fibrin, biofilm, necrotic/eschar, and callus. Total Surface Area Debrided: 1 sq cm. The ulcers were then irrigated with normal saline solution. The procedure was completed with a small amount of bleeding, and hemostasis was with pressure. The patient tolerated the procedure well, with no significant complications. The patient's level of pain during and after the procedure was monitored. Post-debridement measurements, if different from pre-debridement, are in the flowsheet as well. Discharge plan:     Treatment in the wound care center today, per RN documentation: Wound 02/16/22 #2 Right Lateral Foot, Arterial, Full Thickness Onset Nov 2021-Dressing/Treatment: Other (comment) (Collagen tucked into wound   Cover with dry dressing). F/U with Dr. Domi Urbano for an exam, XR, and (I think) discussions of limited OR debridement of that right 5th met base, for what seems likely a small focus of chronic osteo. Important to continue to work on protein intake, glucose control, and especially smoking cessation. Once the right foot is healed, I think he would be able to go to a  for a pair of diabetic shoes and custom orthotics; he's due to have active Medicare next month, I believe, so after that's in place, and after that right foot is healed, I think the timing would be good for that. Still needs to make plans for follow-up with plastic surgery, and for colostomy reversal, but I don't believe anything even semi-elective is being planned surgically until he can completely quit smoking, which we've discussed many times.      Home treatment: good handwashing before and after any dressing changes. Cleanse wound with saline or soap & water before dressing change. May use Vaseline (petrolatum), Aquaphor, Aveeno, CeraVe, Cetaphil, Eucerin, Lubriderm, etc for dry skin. Dressing type for home: as above, every day or two, as needed. Written discharge instructions given to patient. Follow up here as needed, depending on what Dr. Micah Morales thinks, what surgery she might perform, and whether or not she thinks there is a chance of residual osteo post-op, which I'm happy to help manage if needed.     Electronically signed by Ranjana Dougherty MD on 11/21/2022 at 3:56 PM.

## 2023-02-27 NOTE — PLAN OF CARE
Problem: Activity:  Goal: Ability to return to normal activity level will improve  Description: Ability to return to normal activity level will improve  Outcome: Ongoing     Problem:  Bowel/Gastric:  Goal: Gastrointestinal status for postoperative course will improve  Description: Gastrointestinal status for postoperative course will improve  Outcome: Ongoing  Goal: Control of bowel function will improve  Description: Control of bowel function will improve  Outcome: Ongoing  Goal: Ability to achieve a regular elimination pattern will improve  Description: Ability to achieve a regular elimination pattern will improve  Outcome: Ongoing     Problem: Health Behavior:  Goal: Identification of resources available to assist in meeting health care needs will improve  Description: Identification of resources available to assist in meeting health care needs will improve  Outcome: Ongoing     Problem: Physical Regulation:  Goal: Postoperative complications will be avoided or minimized  Description: Postoperative complications will be avoided or minimized  Outcome: Ongoing     Problem: Respiratory:  Goal: Ability to achieve and maintain a regular respiratory rate will improve  Description: Ability to achieve and maintain a regular respiratory rate will improve  Outcome: Ongoing     Problem: Safety:  Goal: Ability to remain free from injury will improve  Description: Ability to remain free from injury will improve  Outcome: Ongoing     Problem: Sensory:  Goal: General experience of comfort will improve  Description: General experience of comfort will improve  Outcome: Ongoing     Problem: Skin Integrity:  Goal: Demonstration of wound healing without infection will improve  Description: Demonstration of wound healing without infection will improve  Outcome: Ongoing  Goal: Will show no infection signs and symptoms  Description: Will show no infection signs and symptoms  Outcome: Ongoing  Goal: Absence of new skin breakdown  Description: Absence of new skin breakdown  Outcome: Ongoing  Goal: Risk for impaired skin integrity will decrease  Description: Risk for impaired skin integrity will decrease  Outcome: Ongoing     Problem: Infection - Surgical Site:  Goal: Will show no infection signs and symptoms  Description: Will show no infection signs and symptoms  Outcome: Ongoing     Problem: Falls - Risk of:  Goal: Will remain free from falls  Description: Will remain free from falls  Outcome: Ongoing  Goal: Absence of physical injury  Description: Absence of physical injury  Outcome: Ongoing     Problem: ABCDS Injury Assessment  Goal: Absence of physical injury  Outcome: Ongoing     Problem: Nutrition  Goal: Optimal nutrition therapy  Outcome: Ongoing     Problem: Pain:  Goal: Pain level will decrease  Description: Pain level will decrease  Outcome: Ongoing  Goal: Control of acute pain  Description: Control of acute pain  Outcome: Ongoing  Goal: Control of chronic pain  Description: Control of chronic pain  Outcome: Ongoing     Problem: Coping:  Goal: Expressions of feelings of enhanced comfort will increase  Description: Expressions of feelings of enhanced comfort will increase  Outcome: Ongoing     Problem: Urinary Elimination:  Goal: Ability to achieve a balanced intake and output will improve  Description: Ability to achieve a balanced intake and output will improve  Outcome: Ongoing  Goal: Ability to recognize the need to void and respond appropriately will improve  Description: Ability to recognize the need to void and respond appropriately will improve  Outcome: Ongoing  Goal: Will remain free from infection  Description: Will remain free from infection  Outcome: Ongoing     Problem: Nutritional:  Goal: Ability to follow a diet with enough fiber (20 to 30 grams) for normal bowel function will improve  Description: Ability to follow a diet with enough fiber (20 to 30 grams) for normal bowel function will improve  Outcome: Ongoing Other Specify

## 2023-10-05 NOTE — PROGRESS NOTES
Patient admitted to PACU # 15 from OR at 1236 post SPLIT THICKNESS SKIN GRAFT TO ABDOMEN WOUND VAC PLACEMENT. 44x 11 per Jeet Arevalo MD.  Attached to PACU monitoring system and report received from anesthesia provider. Patient was reported to be hemodynamically stable during procedure. Patient drowsy on admission and has c/o pain 6/10 to abdomen. New surgical site noted to Right thigh (skin harvest site), covered with transparent film and rolled gauze. Wound vac to operative site (abdomen) is working correctly. See wound care instructions which have been provided separately.     X Size Of Lesion In Cm (Optional): 0 Detail Level: Detailed Morphology Per Location (Optional): MOHS: 11/2018 Morphology Per Location (Optional): Hx of biopsy

## 2023-10-21 ENCOUNTER — HOSPITAL ENCOUNTER (OUTPATIENT)
Dept: CT IMAGING | Age: 68
Discharge: HOME | End: 2023-10-21
Payer: MEDICARE

## 2023-10-21 DIAGNOSIS — F17.210 CIGARETTE SMOKER: ICD-10-CM

## 2023-10-21 DIAGNOSIS — Z87.891 PERSONAL HISTORY OF TOBACCO USE: ICD-10-CM

## 2023-10-21 DIAGNOSIS — Z12.2 ENCOUNTER FOR SCREENING FOR LUNG CANCER: ICD-10-CM

## 2023-10-21 PROCEDURE — 71271 CT THORAX LUNG CANCER SCR C-: CPT

## 2024-05-30 ENCOUNTER — TELEPHONE (OUTPATIENT)
Dept: SURGERY | Age: 69
End: 2024-05-30

## 2024-05-30 NOTE — TELEPHONE ENCOUNTER
Spoke with patient back in 2021  did a skin flap on patient and  created a STOMA patient has not been in office since then but back in 2023 I signed an order for his supplies which have lasted him this far and now needs new order which  will no be in charge of.

## 2024-05-30 NOTE — TELEPHONE ENCOUNTER
Patient called stating he saw Liliam Plasencia a few years ago for his ostomy procedure. He is requesting for an order to be called in for him to receive more supplies for the bag. He states he goes through [employer], and was under the impression that they were going to be sending a request for the order via fax.    Please follow up with the patient [phone]

## 2024-06-11 NOTE — PLAN OF CARE
Problem: Nutrition  Goal: Optimal nutrition therapy  12/6/2021 1554 by Zachariah Chew, MS, RD, LD  Outcome: Ongoing  Note: Nutrition Problem #1: Increased nutrient needs  Intervention: Food and/or Nutrient Delivery: Continue Current Diet, Start Oral Nutrition Supplement  Nutritional Goals: pt will consistently consume >75% PO intake of meals and supplements offered through adm to meet increased energy needs   12/6/2021 0746 by Olya Pringle RN  Outcome: Ongoing Continue Regimen: Halobetasol propionate 0.05% topical cream\\n\\nTriamcinolone cream\\n\\nSkyrizi Render In Strict Bullet Format?: No Detail Level: Zone

## 2024-08-04 NOTE — PLAN OF CARE
Right lateral foot wound stable, although bone exposure remains, debridement per MD & pt. Tolerated well. Pt. Scheduled to see Dr Domi Urbano on 11/10/22 to discuss surgical plans. Pt. To cont. With current wound care regime with dressings, until seen by Dr Domi Urbano. Pt. Wearing off-loading boot. No further f/u in [facility] at this time. Pt. & Dr Domi Urbano will be in touch with Dr Debra Powell if needed after surgical debridement. Discharge instructions reviewed with patient & wife, all questions answered, copy given to patient. Dressings were applied to all wounds per M.D. Instructions at this visit.
[de-identified] : 63F, RHD, patient presents with Rt Wrist pain ongoing for the past year. Patient reports having RT deQuervain Release with Dr. Penn 4/5/24, patient reports that the surgery provided some relief. Patient reports having 3 CSIs prior to surgery, and was also given medrol stef 7/17/24, reports minimal to no relief. Patient reports that with any wrist flexion/extension he continues to have pain at the radial region of the wrist. 
[de-identified] : 63F, RHD, patient presents with Rt Wrist pain ongoing for the past year. Patient reports having RT deQuervain Release with Dr. Penn 4/5/24, patient reports that the surgery provided some relief. Patient reports having 3 CSIs prior to surgery, and was also given medrol stef 7/17/24, reports minimal to no relief. Patient reports that with any wrist flexion/extension he continues to have pain at the radial region of the wrist.

## 2024-09-27 ENCOUNTER — TELEPHONE (OUTPATIENT)
Dept: TELEMETRY | Age: 69
End: 2024-09-27

## 2024-11-01 NOTE — PROGRESS NOTES
Clinical Pharmacy Progress Note    Vancomycin - Management by Pharmacy    Consult Date(s): 12/1/21  Consulting Provider(s): ALEXANDER Vizcarra     Assessment / Plan    Shayla Gangrene and foot osteomyelitis - Vancomycin   Concurrent Antimicrobials:   o Meropenem - day #5   Day of Vanc Therapy: #6   Current Dosing Method: Bayesian-Guided AUC Dosing   Therapeutic Goal: 400-600 mg/L*hr   Current Dose / Frequency: 1250 mg every 12 hours   Plan / Rationale:   o Renal function is stable. Will continue current dose. o Vancomycin level yesterday was within goal therapeutic range. Plan to obtain another level in a few days.  Will continue to monitor clinical condition and make adjustments to regimen as appropriate. Thank you for consulting Pharmacy! Levi Conroy, PharmD, [facility]  Wireless: [phone]  12/5/2021 11:53 AM       Interval Update: Patient returned to the OR today further debridement and wound vac placement. Subjective/Objective: Mr. Ramos Quiroz is a 63 y.o. male  admitted for Necrotizing fasciitis . Pharmacy has been consulted to dose Vancomycin. Height:   Ht Readings from Last 1 Encounters:   12/02/21 5' 11\" (1.803 m)     Weight:   Wt Readings from Last 1 Encounters:   12/05/21 190 lb 7.6 oz (86.4 kg)     Level(s) / Doses:    Date Time Dose Level / Type of Level Interpretation   12/3 14:11 1250 mg IV q18h Random = 12 mcg/mL · Drawn ~14 hrs after previous dose; predicted  mg/L*h  · Increase dose to 1.25g IV q12h   12/4 09:00 1250 mg IV q24h Random = 24.6 mcg/mL · Drawn ~5 hrs after previous dose; predicted  mg/L*h  · Continue dose   Note: Serum levels collected for AUC-based dosing may be high if collected in close proximity to the dose administered. This is not necessarily an indicator of toxicity.     Cultures & Sensitivities:    Date Site Micro Susceptibility / Result   11/30 Blood x2 No growth    11/30 COVID-19 rapid Negative    11/30 Scrotal tissue Mixed anaerobic growth    12/1 Foot wound Mixed skin pam    12/3 Perineum, fungus NGTD    12/3 Perineum, AFB NGTD    12/3 Perineum, anaerobic and aerobic NGTD    12/5 Penile tissue In process    12/5 Penile tissue, fungus In process    12/5 Penile tissue, AFB In process    12/5 Blood x2 In process      Labs / Ancillary Data:    Estimated Creatinine Clearance: 134 mL/min (A) (based on SCr of 0.6 mg/dL (L)).     Recent Labs     12/03/21  0542 12/04/21  0505 12/04/21  0900 12/05/21  0430   CREATININE 0.7*  --  <0.5* 0.6*   BUN 34*  --  18 19   WBC 16.3* 15.8*  --  27.7* normal (ped)...

## 2024-11-21 ENCOUNTER — TELEPHONE (OUTPATIENT)
Dept: TELEMETRY | Age: 69
End: 2024-11-21

## 2025-01-03 ENCOUNTER — TELEPHONE (OUTPATIENT)
Dept: TELEMETRY | Age: 70
End: 2025-01-03

## 2025-01-03 NOTE — TELEPHONE ENCOUNTER
Patient due for annual CT Lung Screening. Reminder letter mailed.    Final notice    Jaz Goncalves RN

## (undated) DEVICE — TOWEL,STOP FLAG GOLD N-W: Brand: MEDLINE

## (undated) DEVICE — SOLUTION IRRIG 3000ML 0.9% SOD CHL USP UROMATIC PLAS CONT

## (undated) DEVICE — 1.5 TO 1 DERMACARRIER: Brand: MESHGRAFTTM  II TISSUE EXPANSION SYSTEM

## (undated) DEVICE — Device

## (undated) DEVICE — NEEDLE HYPO 22GA L1.5IN BLK POLYPR HUB S STL REG BVL STR

## (undated) DEVICE — SPONGE LAP W18XL18IN WHT COT 4 PLY FLD STRUNG RADPQ DISP ST

## (undated) DEVICE — CORD ES L10FT MPLR LAP

## (undated) DEVICE — SUTURE PROL SZ 3-0 L30IN NONABSORBABLE BLU L26MM SH 1/2 CIR 8832H

## (undated) DEVICE — STAPLER SKIN L440MM 32MM LNG 12 FIRING B FRM PWR + GRIPPING

## (undated) DEVICE — SPONGE GZ W4XL8IN COT WVN 12 PLY

## (undated) DEVICE — SCISSORS ENDOSCP DIA5MM CRV MPLR CAUT W/ RATCH HNDL

## (undated) DEVICE — DRAPE SURG FOR THER SYS STRL DERMATAC VAC

## (undated) DEVICE — GOWN,SIRUS,POLYRNF,BRTHSLV,XL,30/CS: Brand: MEDLINE

## (undated) DEVICE — SUTURE CHROMIC GUT SZ 3-0 L27IN ABSRB BRN L26MM SH 1/2 CIR G122H

## (undated) DEVICE — VAC VERAFLO: Brand: V.A.C. VERAFLO™

## (undated) DEVICE — CANISTER NEG PRSS 500ML WHT SENSATRAC TBNG CLMP CONN

## (undated) DEVICE — TUBING, SUCTION, 1/4" X 12', STRAIGHT: Brand: MEDLINE

## (undated) DEVICE — EXTRACTOR STPL L10CM REMV SKIN CLSR STPL SQUEEZE HNDL PROX

## (undated) DEVICE — SYRINGE IRRIG 60ML SFT PLIABLE BLB EZ TO GRP 1 HND USE W/

## (undated) DEVICE — PAD,NON-ADHERENT,3X8,STERILE,LF,1/PK: Brand: MEDLINE

## (undated) DEVICE — KIT DSG LRG NEG PRSS WND THER VAC GRANUFOAM

## (undated) DEVICE — GLOVE ORANGE PI 7 1/2   MSG9075

## (undated) DEVICE — GLOVE ORANGE PI 8 1/2   MSG9085

## (undated) DEVICE — SOLUTION IRRIG 2000ML 0.9% SOD CHL USP UROMATIC PLAS CONT

## (undated) DEVICE — PUMP SUC IRR TBNG L10FT W/ HNDPC ASSEMB STRYKEFLOW 2

## (undated) DEVICE — HANDPIECE SET WITH HIGH FLOW TIP AND SUCTION TUBE: Brand: INTERPULSE

## (undated) DEVICE — BANDAGE,GAUZE,4.5"X4.1YD,STERILE,LF: Brand: MEDLINE

## (undated) DEVICE — CLEANER ES TIP W2XL2IN ADH BK RADPQ FOR S STL ELECTRD

## (undated) DEVICE — CONNECTOR NEG THER PRESSURE Y VAC ASST VAC

## (undated) DEVICE — COLOSTOMY/ILEOSTOMY KIT, FLEXWEAR: Brand: NEW IMAGE

## (undated) DEVICE — ELECTRODE NDL L2.8IN COAT LO PWR SET EDGE

## (undated) DEVICE — TRAY PREP DRY W/ PREM GLV 2 APPL 6 SPNG 2 UNDPD 1 OVERWRAP

## (undated) DEVICE — GENERAL LAPAROSCOPIC: Brand: MEDLINE INDUSTRIES, INC.

## (undated) DEVICE — HIGH FLOW TIP

## (undated) DEVICE — ELECTRODE PT RET AD L9FT HI MOIST COND ADH HYDRGEL CORDED

## (undated) DEVICE — DRESSING KIT SM 10X75X1 CM VAC WHITEFOAM SENSATRAC

## (undated) DEVICE — DRAPE IRRIG FLD WRM W44XL44IN W/ AORN STD PRTBL INTRATEMP

## (undated) DEVICE — DRESSING,GAUZE,XEROFORM,CURAD,5"X9",ST: Brand: CURAD

## (undated) DEVICE — PAD ABD 5X9IN STRL

## (undated) DEVICE — STAPLER SKIN H3.9MM WIRE DIA0.58MM CRWN 6.9MM 35 STPL ROT

## (undated) DEVICE — DRESSING GAUZE XEROFORM PETROLATM LF 5X9

## (undated) DEVICE — DRESSING SPONGE KERLIX XLG 9.75X10.75

## (undated) DEVICE — CATHETER,FOLEY,SILI-ELAST,LTX,18FR,10ML: Brand: MEDLINE

## (undated) DEVICE — CONNECTOR NEG PRSS WHT PLAS Y DISP

## (undated) DEVICE — 3M™ STERI-STRIP™ BLEND TONE SKIN CLOSURES, B1557, TAN, 1/2 IN X 4 IN (12MM X 100MM), 6 STRIPS/ENVELOPE: Brand: 3M™ STERI-STRIP™

## (undated) DEVICE — SUTURE PDS II SZ 0 L60IN ABSRB VLT L48MM CTX 1/2 CIR Z990G

## (undated) DEVICE — PRECISION THIN (9.0 X 0.38 X 25.0MM)

## (undated) DEVICE — SUPPORT SCROT AD M FOR 33-38IN WHT POLY COT KNIT PCH E

## (undated) DEVICE — KIT,ANTI FOG,W/SPONGE & FLUID,SOFT PACK: Brand: MEDLINE

## (undated) DEVICE — SOLUTION IV 1000ML 0.9% SOD CHL

## (undated) DEVICE — SUTURE VCRL SZ 3-0 L18IN ABSRB UD L26MM SH 1/2 CIR J864D

## (undated) DEVICE — NEEDLE HYPO 25GA L1.5IN BLU POLYPR HUB S STL REG BVL STR

## (undated) DEVICE — LAPAROTOMY PACK: Brand: CONVERTORS

## (undated) DEVICE — GOWN SIRUS NONREIN XL W/TWL: Brand: MEDLINE INDUSTRIES, INC.

## (undated) DEVICE — YANKAUER,OPEN TIP,W/O VENT,STERILE: Brand: MEDLINE INDUSTRIES, INC.

## (undated) DEVICE — CANISTER NEG PRSS 1000ML W/ GEL INFOVAC

## (undated) DEVICE — GLOVE SURG SZ 7 L12IN FNGR THK75MIL WHT LTX POLYMER BEAD

## (undated) DEVICE — TROCAR: Brand: KII SHIELDED BLADED ACCESS SYSTEM

## (undated) DEVICE — 3 TO 1 DERMACARRIER: Brand: MESHGRAFTTM II TISSUE EXPANSION SYSTEM

## (undated) DEVICE — INTENDED FOR TISSUE SEPARATION, AND OTHER PROCEDURES THAT REQUIRE A SHARP SURGICAL BLADE TO PUNCTURE OR CUT.: Brand: BARD-PARKER ® CARBON RIB-BACK BLADES

## (undated) DEVICE — DRESSING WND VAC SM GRANUFOAM SENSATRAC

## (undated) DEVICE — WET SKIN SCRUB PREP TRAY: Brand: KENDALL

## (undated) DEVICE — GAUZE,SPONGE,4"X4",8PLY,STRL,LF,10/TRAY: Brand: MEDLINE

## (undated) DEVICE — SUTURE VCRL + SZ 3-0 L27IN ABSRB UD L26MM SH 1/2 CIR VCP416H

## (undated) DEVICE — Z DISCONTINUED USE 2272117 DRAPE SURG 3 QTR N INVASIVE 2 LAYR DISP

## (undated) DEVICE — GAUZE,PACKING STRIP,IODOFORM,1/2"X5YD,ST: Brand: CURAD

## (undated) DEVICE — Z DISCONTINUED USE 2272114 DRAPE SURG UTIL 26X15 IN W/ TAPE N INVASIVE MULTLYR DISP

## (undated) DEVICE — HANDPIECE SET WITH SUCTION TUBING: Brand: INTERPULSE

## (undated) DEVICE — SUTURE VCRL SZ 3-0 L27IN ABSRB UD L26MM SH 1/2 CIR J416H

## (undated) DEVICE — SYRINGE MED 30ML STD CLR PLAS LUERLOCK TIP N CTRL DISP

## (undated) DEVICE — SUTURE NONABSORBABLE MONOFILAMENT 3-0 PS-1 18 IN BLK ETHILON 1663H

## (undated) DEVICE — PACK PROC ORTH LO EXT IX CUST

## (undated) DEVICE — DERMATOME BLADES: Brand: DERMATOME

## (undated) DEVICE — 3M™ TEGADERM™ TRANSPARENT FILM DRESSING FRAME STYLE, 1629, 8 IN X 12 IN (20 CM X 30 CM), 10/CT 8CT/CASE: Brand: 3M™ TEGADERM™

## (undated) DEVICE — GENERAL: Brand: MEDLINE INDUSTRIES, INC.

## (undated) DEVICE — INTENDED USE FOR SURGICAL MARKING ON INTACT SKIN, ALSO PROVIDES A PERMANENT METHOD OF IDENTIFYING OBJECTS IN THE OPERATING ROOM: Brand: WRITESITE® PLUS MINI PREP RESISTANT MARKER

## (undated) DEVICE — SHEET, T, LAPAROTOMY, STERILE: Brand: MEDLINE

## (undated) DEVICE — 3M™ TEGADERM™ TRANSPARENT FILM DRESSING FRAME STYLE, 1628, 6 IN X 8 IN (15 CM X 20 CM), 10/CT 8CT/CASE: Brand: 3M™ TEGADERM™

## (undated) DEVICE — KIT NEG PRSS M DSG FOAM VAC VERAFLO

## (undated) DEVICE — SUTURE CHROMIC GUT SZ 4-0 L27IN ABSRB BRN L17MM RB-1 1/2 U203H

## (undated) DEVICE — SUTURE ETHLN SZ 3-0 L30IN NONABSORBABLE BLK FSL L30MM 3/8 1671H

## (undated) DEVICE — FLUID TRAP FOR MINIVAC ES EQUIP FLD TRAP

## (undated) DEVICE — HANDPIECE SUCTION TUBING INTERPULSE 10FT

## (undated) DEVICE — DRESSING PETRO W3XL8IN OIL EMUL N ADH GZ KNIT IMPREG CELOS

## (undated) DEVICE — SUTURE MCRYL SZ 4-0 L27IN ABSRB UD L19MM PS-2 1/2 CIR PRIM Y426H

## (undated) DEVICE — SEALER LAP L37CM MARYLAND JAW OPN NANO COAT MULTIFUNCTIONAL

## (undated) DEVICE — BASIC SINGLE BASIN 1-LF: Brand: MEDLINE INDUSTRIES, INC.

## (undated) DEVICE — BINDER ABD HK  LOOP CLOSURE UNIV 30-45 IN 9 IN WHT PROCARE

## (undated) DEVICE — PLASTIC MAJOR: Brand: MEDLINE INDUSTRIES, INC.

## (undated) DEVICE — SUTURE PERMA-HAND SZ 2-0 L30IN NONABSORBABLE BLK L26MM SH K833H

## (undated) DEVICE — CASSETTE THER 38 MM W/ INSTILLATION TBNG VAC VERALINK

## (undated) DEVICE — TOWEL,OR,DSP,ST,BLUE,DLX,8/PK,10PK/CS: Brand: MEDLINE

## (undated) DEVICE — BINDER ABD 45-62X9IN 3 PANEL ELAS VELCRO

## (undated) DEVICE — MEDICINE CUP, GRADUATED, STER: Brand: MEDLINE

## (undated) DEVICE — SYRINGE MED 10ML LUERLOCK TIP W/O SFTY DISP

## (undated) DEVICE — SUTURE PDS II SZ 2-0 L27IN ABSRB VLT SH L26MM 1/2 CIR Z317H

## (undated) DEVICE — RELOAD STPL L60MM H1.5-3.6MM REG TISS BLU GRIPPING SURF B

## (undated) DEVICE — SOLUTION IV IRRIG 500ML 0.9% SODIUM CHL 2F7123

## (undated) DEVICE — GLOVE SURG SZ 65 THK91MIL LTX FREE SYN POLYISOPRENE

## (undated) DEVICE — E-Z CLEAN, NON-STICK, PTFE COATED, ELECTROSURGICAL BLADE ELECTRODE, MODIFIED EXTENDED INSULATION, 2.5 INCH (6.35 CM): Brand: MEGADYNE

## (undated) DEVICE — COAXIAL HIGH FLOW TIP WITH SOFT SHIELD

## (undated) DEVICE — SUTURE VCRL SZ 0 L27IN ABSRB UD L36MM CT-1 1/2 CIR J260H

## (undated) DEVICE — 2-PIECE OSTOMY SKIN BARRIER, FLEXWEAR: Brand: NEW IMAGE

## (undated) DEVICE — CONNECTOR NEG PRSS INCIS MGMT SYS VAC PREVENA

## (undated) DEVICE — SIPS DUAL 2 MINUTE TIP

## (undated) DEVICE — BRAVA STRIP PASTE. OSTOMY CARE.: Brand: BRAVA

## (undated) DEVICE — ZIMMER® STERILE DISPOSABLE TOURNIQUET CUFF WITH PLC, DUAL PORT, SINGLE BLADDER, 18 IN. (46 CM)

## (undated) DEVICE — SPONGE,LAP,18"X18",DLX,XR,ST,5/PK,40/PK: Brand: MEDLINE

## (undated) DEVICE — OSTOMY BARRIER RING: Brand: ADAPT CERARING

## (undated) DEVICE — PACK LAP IV REINF TBL CVR ADH UTIL UNDERBUTTOCK DRP W CUF

## (undated) DEVICE — TROCAR: Brand: KII FIOS FIRST ENTRY

## (undated) DEVICE — APPLICATOR MEDICATED 26 CC SOLUTION HI LT ORNG CHLORAPREP

## (undated) DEVICE — PENCIL ES ULT VAC W TELSCP NOSE EZ CLN BLDE 10FT

## (undated) DEVICE — STRAP SFTY W5XL72IN 1 PC

## (undated) DEVICE — 3M™ IOBAN™ 2 ANTIMICROBIAL INCISE DRAPE 6648EZ: Brand: IOBAN™ 2

## (undated) DEVICE — V.A.C. VERAFLO CLEANSE CHOICE™ DRESSING LARGE: Brand: V.A.C. VERAFLO CLEANSE CHOICE™

## (undated) DEVICE — PACK,UNIVERSAL,NO GOWNS: Brand: MEDLINE

## (undated) DEVICE — CAUTERY TIP POLISHER: Brand: DEVON

## (undated) DEVICE — GAUZE SPONGES,8 PLY: Brand: CURITY

## (undated) DEVICE — MAJOR SET UP PK

## (undated) DEVICE — GAUZE,SPONGE,4"X4",16PLY,XRAY,STRL,LF: Brand: MEDLINE

## (undated) DEVICE — SUTURE VCRL SZ 0 L27IN ABSRB UD L26MM CT-2 1/2 CIR J270H

## (undated) DEVICE — DRAPE NEG PRSS W30.5XL26CM POLYUR ADH VAC

## (undated) DEVICE — SHEET,DRAPE,40X58,STERILE: Brand: MEDLINE